# Patient Record
Sex: MALE | Race: WHITE | Employment: FULL TIME | URBAN - NONMETROPOLITAN AREA
[De-identification: names, ages, dates, MRNs, and addresses within clinical notes are randomized per-mention and may not be internally consistent; named-entity substitution may affect disease eponyms.]

---

## 2017-02-01 ENCOUNTER — OFFICE VISIT (OUTPATIENT)
Dept: FAMILY MEDICINE CLINIC | Age: 57
End: 2017-02-01

## 2017-02-01 VITALS
HEART RATE: 85 BPM | WEIGHT: 161.6 LBS | HEIGHT: 69 IN | BODY MASS INDEX: 23.93 KG/M2 | SYSTOLIC BLOOD PRESSURE: 157 MMHG | DIASTOLIC BLOOD PRESSURE: 79 MMHG | RESPIRATION RATE: 15 BRPM

## 2017-02-01 DIAGNOSIS — Z72.0 TOBACCO ABUSE: ICD-10-CM

## 2017-02-01 DIAGNOSIS — Z87.898 HISTORY OF PREDIABETES: ICD-10-CM

## 2017-02-01 DIAGNOSIS — Z11.59 NEED FOR HEPATITIS C SCREENING TEST: ICD-10-CM

## 2017-02-01 DIAGNOSIS — I10 ESSENTIAL HYPERTENSION: Primary | ICD-10-CM

## 2017-02-01 DIAGNOSIS — Z12.12 ENCOUNTER FOR COLORECTAL CANCER SCREENING: ICD-10-CM

## 2017-02-01 DIAGNOSIS — Z12.11 ENCOUNTER FOR COLORECTAL CANCER SCREENING: ICD-10-CM

## 2017-02-01 PROCEDURE — 99999 POCT URINALYSIS DIPSTICK W/O MICROSCOPE (AUTO): CPT | Performed by: NURSE PRACTITIONER

## 2017-02-01 PROCEDURE — 99204 OFFICE O/P NEW MOD 45 MIN: CPT | Performed by: NURSE PRACTITIONER

## 2017-02-01 PROCEDURE — 93000 ELECTROCARDIOGRAM COMPLETE: CPT | Performed by: NURSE PRACTITIONER

## 2017-02-01 RX ORDER — LISINOPRIL 10 MG/1
10 TABLET ORAL DAILY
Qty: 30 TABLET | Refills: 3 | Status: SHIPPED | OUTPATIENT
Start: 2017-02-01 | End: 2020-04-08

## 2017-02-01 RX ORDER — ACETAMINOPHEN 500 MG
500 TABLET ORAL EVERY 6 HOURS PRN
COMMUNITY
End: 2017-05-03

## 2017-02-01 ASSESSMENT — ENCOUNTER SYMPTOMS
VOMITING: 0
CONSTIPATION: 0
RHINORRHEA: 0
WHEEZING: 0
DIARRHEA: 0
COUGH: 0
EYE DISCHARGE: 0
CHEST TIGHTNESS: 0
ABDOMINAL PAIN: 0
SHORTNESS OF BREATH: 0

## 2017-02-01 ASSESSMENT — PATIENT HEALTH QUESTIONNAIRE - PHQ9
SUM OF ALL RESPONSES TO PHQ9 QUESTIONS 1 & 2: 0
2. FEELING DOWN, DEPRESSED OR HOPELESS: 0
SUM OF ALL RESPONSES TO PHQ QUESTIONS 1-9: 0
1. LITTLE INTEREST OR PLEASURE IN DOING THINGS: 0

## 2017-02-28 ENCOUNTER — NURSE ONLY (OUTPATIENT)
Dept: FAMILY MEDICINE CLINIC | Age: 57
End: 2017-02-28

## 2017-02-28 DIAGNOSIS — I10 ESSENTIAL HYPERTENSION: ICD-10-CM

## 2017-02-28 DIAGNOSIS — Z87.898 HISTORY OF PREDIABETES: ICD-10-CM

## 2017-02-28 DIAGNOSIS — Z11.59 NEED FOR HEPATITIS C SCREENING TEST: ICD-10-CM

## 2017-02-28 LAB
ALBUMIN SERPL-MCNC: 4.1 GM/DL (ref 3.5–5.1)
ALP BLD-CCNC: 53 U/L (ref 38–126)
ALT SERPL-CCNC: 13 U/L (ref 11–66)
ANION GAP SERPL CALCULATED.3IONS-SCNC: 13 MEQ/L (ref 8–16)
AST SERPL-CCNC: 12 U/L (ref 5–40)
AVERAGE GLUCOSE: 255 MG/DL (ref 70–126)
BASOPHILS # BLD: 0.6 %
BASOPHILS ABSOLUTE: 0 THOU/MM3 (ref 0–0.1)
BILIRUB SERPL-MCNC: 1 MG/DL (ref 0.3–1.2)
BUN BLDV-MCNC: 13 MG/DL (ref 7–22)
CALCIUM SERPL-MCNC: 9.2 MG/DL (ref 8.5–10.5)
CHLORIDE BLD-SCNC: 98 MEQ/L (ref 98–111)
CHOLESTEROL, TOTAL: 188 MG/DL (ref 100–199)
CO2: 27 MEQ/L (ref 23–33)
CREAT SERPL-MCNC: 0.6 MG/DL (ref 0.4–1.2)
EOSINOPHIL # BLD: 2.6 %
EOSINOPHILS ABSOLUTE: 0.2 THOU/MM3 (ref 0–0.4)
GFR SERPL CREATININE-BSD FRML MDRD: > 90 ML/MIN/1.73M2
GLUCOSE BLD-MCNC: 199 MG/DL (ref 70–108)
HBA1C MFR BLD: 10.5 % (ref 4.4–6.4)
HCT VFR BLD CALC: 43.1 % (ref 42–52)
HDLC SERPL-MCNC: 61 MG/DL
HEMOGLOBIN: 14.3 GM/DL (ref 14–18)
HEPATITIS C ANTIBODY: NEGATIVE
LDL CHOLESTEROL CALCULATED: 113 MG/DL
LYMPHOCYTES # BLD: 32.2 %
LYMPHOCYTES ABSOLUTE: 2.4 THOU/MM3 (ref 1–4.8)
MCH RBC QN AUTO: 28.4 PG (ref 27–31)
MCHC RBC AUTO-ENTMCNC: 33.1 GM/DL (ref 33–37)
MCV RBC AUTO: 85.7 FL (ref 80–94)
MONOCYTES # BLD: 8.9 %
MONOCYTES ABSOLUTE: 0.7 THOU/MM3 (ref 0.4–1.3)
NUCLEATED RED BLOOD CELLS: 0 /100 WBC
PDW BLD-RTO: 13.9 % (ref 11.5–14.5)
PLATELET # BLD: 258 THOU/MM3 (ref 130–400)
PMV BLD AUTO: 8.6 MCM (ref 7.4–10.4)
POTASSIUM SERPL-SCNC: 4.1 MEQ/L (ref 3.5–5.2)
RBC # BLD: 5.03 MILL/MM3 (ref 4.7–6.1)
RBC # BLD: NORMAL 10*6/UL
SEG NEUTROPHILS: 55.7 %
SEGMENTED NEUTROPHILS ABSOLUTE COUNT: 4.1 THOU/MM3 (ref 1.8–7.7)
SODIUM BLD-SCNC: 138 MEQ/L (ref 135–145)
TOTAL PROTEIN: 6.7 GM/DL (ref 6.1–8)
TRIGL SERPL-MCNC: 69 MG/DL (ref 0–199)
TSH SERPL DL<=0.05 MIU/L-ACNC: 1.08 MCIU/ML (ref 0.4–4.2)
WBC # BLD: 7.4 THOU/MM3 (ref 4.8–10.8)

## 2017-03-02 ENCOUNTER — OFFICE VISIT (OUTPATIENT)
Dept: FAMILY MEDICINE CLINIC | Age: 57
End: 2017-03-02

## 2017-03-02 ENCOUNTER — TELEPHONE (OUTPATIENT)
Dept: FAMILY MEDICINE CLINIC | Age: 57
End: 2017-03-02

## 2017-03-02 VITALS
RESPIRATION RATE: 16 BRPM | HEIGHT: 69 IN | DIASTOLIC BLOOD PRESSURE: 82 MMHG | HEART RATE: 80 BPM | WEIGHT: 161 LBS | SYSTOLIC BLOOD PRESSURE: 144 MMHG | BODY MASS INDEX: 23.85 KG/M2

## 2017-03-02 DIAGNOSIS — E11.9 TYPE 2 DIABETES MELLITUS WITHOUT COMPLICATION, WITHOUT LONG-TERM CURRENT USE OF INSULIN (HCC): Primary | ICD-10-CM

## 2017-03-02 LAB
CONTROL: NORMAL
HEMOCCULT STL QL: NORMAL

## 2017-03-02 PROCEDURE — 99214 OFFICE O/P EST MOD 30 MIN: CPT | Performed by: NURSE PRACTITIONER

## 2017-03-02 PROCEDURE — 82274 ASSAY TEST FOR BLOOD FECAL: CPT | Performed by: NURSE PRACTITIONER

## 2017-03-02 RX ORDER — INSULIN GLARGINE 100 [IU]/ML
10 INJECTION, SOLUTION SUBCUTANEOUS EVERY MORNING
Qty: 1 VIAL | Refills: 3 | Status: SHIPPED | OUTPATIENT
Start: 2017-03-02 | End: 2017-07-12 | Stop reason: SDUPTHER

## 2017-03-02 RX ORDER — ATORVASTATIN CALCIUM 40 MG/1
40 TABLET, FILM COATED ORAL DAILY
Qty: 90 TABLET | Refills: 0 | Status: SHIPPED
Start: 2017-03-02 | End: 2020-04-08

## 2017-03-02 ASSESSMENT — ENCOUNTER SYMPTOMS
EYE DISCHARGE: 0
BLURRED VISION: 0
DIARRHEA: 0
VISUAL CHANGE: 0
VOMITING: 0
COUGH: 0
ABDOMINAL PAIN: 0
SHORTNESS OF BREATH: 0
RHINORRHEA: 0
CHEST TIGHTNESS: 0
CONSTIPATION: 0

## 2017-03-10 ENCOUNTER — TELEPHONE (OUTPATIENT)
Dept: FAMILY MEDICINE CLINIC | Age: 57
End: 2017-03-10

## 2017-03-10 DIAGNOSIS — E11.9 TYPE 2 DIABETES MELLITUS WITHOUT COMPLICATION, WITH LONG-TERM CURRENT USE OF INSULIN (HCC): Primary | ICD-10-CM

## 2017-03-10 DIAGNOSIS — Z79.4 TYPE 2 DIABETES MELLITUS WITHOUT COMPLICATION, WITH LONG-TERM CURRENT USE OF INSULIN (HCC): Primary | ICD-10-CM

## 2017-04-03 ENCOUNTER — OFFICE VISIT (OUTPATIENT)
Dept: FAMILY MEDICINE CLINIC | Age: 57
End: 2017-04-03

## 2017-04-03 VITALS
SYSTOLIC BLOOD PRESSURE: 118 MMHG | HEART RATE: 81 BPM | WEIGHT: 158.6 LBS | BODY MASS INDEX: 23.49 KG/M2 | RESPIRATION RATE: 16 BRPM | HEIGHT: 69 IN | DIASTOLIC BLOOD PRESSURE: 82 MMHG

## 2017-04-03 DIAGNOSIS — Z72.0 TOBACCO ABUSE: ICD-10-CM

## 2017-04-03 DIAGNOSIS — Z79.4 TYPE 2 DIABETES MELLITUS WITHOUT COMPLICATION, WITH LONG-TERM CURRENT USE OF INSULIN (HCC): Primary | ICD-10-CM

## 2017-04-03 DIAGNOSIS — E11.9 TYPE 2 DIABETES MELLITUS WITHOUT COMPLICATION, WITH LONG-TERM CURRENT USE OF INSULIN (HCC): Primary | ICD-10-CM

## 2017-04-03 DIAGNOSIS — M77.01 GOLFER'S ELBOW, RIGHT: ICD-10-CM

## 2017-04-03 DIAGNOSIS — B35.1 FUNGAL TOENAIL INFECTION: ICD-10-CM

## 2017-04-03 PROBLEM — M77.00 GOLFER'S ELBOW: Status: ACTIVE | Noted: 2017-04-03

## 2017-04-03 LAB
AVERAGE GLUCOSE: 228 MG/DL (ref 70–126)
CREATININE URINE POCT: NORMAL
HBA1C MFR BLD: 9.6 % (ref 4.4–6.4)
MICROALBUMIN/CREAT 24H UR: 20 MG/G{CREAT}
MICROALBUMIN/CREAT UR-RTO: NORMAL

## 2017-04-03 PROCEDURE — 36415 COLL VENOUS BLD VENIPUNCTURE: CPT | Performed by: NURSE PRACTITIONER

## 2017-04-03 PROCEDURE — 82044 UR ALBUMIN SEMIQUANTITATIVE: CPT | Performed by: NURSE PRACTITIONER

## 2017-04-03 PROCEDURE — 99214 OFFICE O/P EST MOD 30 MIN: CPT | Performed by: NURSE PRACTITIONER

## 2017-04-03 RX ORDER — VARENICLINE TARTRATE 25 MG
KIT ORAL
Qty: 1 EACH | Refills: 0 | Status: SHIPPED | OUTPATIENT
Start: 2017-04-03 | End: 2017-05-03 | Stop reason: ALTCHOICE

## 2017-04-03 RX ORDER — BUPROPION HYDROCHLORIDE 150 MG/1
150 TABLET ORAL 2 TIMES DAILY
Qty: 60 TABLET | Refills: 1 | Status: SHIPPED | OUTPATIENT
Start: 2017-04-03 | End: 2017-04-12 | Stop reason: ALTCHOICE

## 2017-04-03 ASSESSMENT — ENCOUNTER SYMPTOMS
NAUSEA: 1
WHEEZING: 0
COLOR CHANGE: 0
SHORTNESS OF BREATH: 0
ABDOMINAL PAIN: 0
CONSTIPATION: 0
COUGH: 0
VOMITING: 0
BLOOD IN STOOL: 0
SINUS PRESSURE: 1
EYE DISCHARGE: 0

## 2017-04-06 DIAGNOSIS — Z72.0 TOBACCO ABUSE: ICD-10-CM

## 2017-04-06 DIAGNOSIS — E11.9 TYPE 2 DIABETES MELLITUS WITHOUT COMPLICATION, WITHOUT LONG-TERM CURRENT USE OF INSULIN (HCC): ICD-10-CM

## 2017-04-10 ENCOUNTER — TELEPHONE (OUTPATIENT)
Dept: FAMILY MEDICINE CLINIC | Age: 57
End: 2017-04-10

## 2017-04-12 RX ORDER — BUPROPION HYDROCHLORIDE 300 MG/1
300 TABLET ORAL EVERY MORNING
Qty: 30 TABLET | Refills: 3 | Status: SHIPPED | OUTPATIENT
Start: 2017-04-12 | End: 2020-04-08

## 2017-05-03 ENCOUNTER — OFFICE VISIT (OUTPATIENT)
Dept: FAMILY MEDICINE CLINIC | Age: 57
End: 2017-05-03

## 2017-05-03 VITALS
WEIGHT: 157.8 LBS | BODY MASS INDEX: 23.37 KG/M2 | DIASTOLIC BLOOD PRESSURE: 80 MMHG | OXYGEN SATURATION: 98 % | SYSTOLIC BLOOD PRESSURE: 138 MMHG | TEMPERATURE: 98 F | HEIGHT: 69 IN | HEART RATE: 82 BPM | RESPIRATION RATE: 16 BRPM

## 2017-05-03 DIAGNOSIS — Z79.4 TYPE 2 DIABETES MELLITUS WITHOUT COMPLICATION, WITH LONG-TERM CURRENT USE OF INSULIN (HCC): ICD-10-CM

## 2017-05-03 DIAGNOSIS — E11.9 TYPE 2 DIABETES MELLITUS WITHOUT COMPLICATION, WITH LONG-TERM CURRENT USE OF INSULIN (HCC): ICD-10-CM

## 2017-05-03 DIAGNOSIS — Z72.0 TOBACCO ABUSE: Primary | ICD-10-CM

## 2017-05-03 PROCEDURE — 99213 OFFICE O/P EST LOW 20 MIN: CPT | Performed by: NURSE PRACTITIONER

## 2017-05-03 RX ORDER — VARENICLINE TARTRATE 1 MG/1
1 TABLET, FILM COATED ORAL 2 TIMES DAILY
Qty: 60 TABLET | Refills: 2 | Status: SHIPPED
Start: 2017-05-03 | End: 2020-04-08

## 2017-05-03 ASSESSMENT — ENCOUNTER SYMPTOMS
VOMITING: 0
COUGH: 0
WHEEZING: 0
CONSTIPATION: 0
DIARRHEA: 0
NAUSEA: 0
ABDOMINAL DISTENTION: 0
CHOKING: 0

## 2017-07-12 ENCOUNTER — OFFICE VISIT (OUTPATIENT)
Dept: FAMILY MEDICINE CLINIC | Age: 57
End: 2017-07-12

## 2017-07-12 VITALS
DIASTOLIC BLOOD PRESSURE: 76 MMHG | BODY MASS INDEX: 22.39 KG/M2 | SYSTOLIC BLOOD PRESSURE: 120 MMHG | HEIGHT: 69 IN | HEART RATE: 71 BPM | RESPIRATION RATE: 16 BRPM | WEIGHT: 151.2 LBS

## 2017-07-12 DIAGNOSIS — Z91.199 MEDICAL NON-COMPLIANCE: ICD-10-CM

## 2017-07-12 DIAGNOSIS — E11.9 TYPE 2 DIABETES MELLITUS WITHOUT COMPLICATION, WITHOUT LONG-TERM CURRENT USE OF INSULIN (HCC): Primary | ICD-10-CM

## 2017-07-12 DIAGNOSIS — Z72.0 TOBACCO ABUSE: ICD-10-CM

## 2017-07-12 LAB — HBA1C MFR BLD: 11.6 %

## 2017-07-12 PROCEDURE — 99213 OFFICE O/P EST LOW 20 MIN: CPT | Performed by: NURSE PRACTITIONER

## 2017-07-12 PROCEDURE — 83036 HEMOGLOBIN GLYCOSYLATED A1C: CPT | Performed by: NURSE PRACTITIONER

## 2017-07-12 RX ORDER — INSULIN GLARGINE 100 [IU]/ML
16 INJECTION, SOLUTION SUBCUTANEOUS EVERY MORNING
Qty: 1 VIAL | Refills: 3 | Status: SHIPPED
Start: 2017-07-12 | End: 2020-04-08

## 2017-07-12 ASSESSMENT — ENCOUNTER SYMPTOMS
EYE DISCHARGE: 0
COUGH: 0
RHINORRHEA: 0
VOMITING: 0
CONSTIPATION: 0
SHORTNESS OF BREATH: 0
CHEST TIGHTNESS: 0
ABDOMINAL PAIN: 0
DIARRHEA: 0

## 2017-12-15 ENCOUNTER — TELEPHONE (OUTPATIENT)
Dept: OTHER | Facility: CLINIC | Age: 57
End: 2017-12-15

## 2020-04-07 ENCOUNTER — TELEPHONE (OUTPATIENT)
Dept: FAMILY MEDICINE CLINIC | Age: 60
End: 2020-04-07

## 2020-04-08 ENCOUNTER — VIRTUAL VISIT (OUTPATIENT)
Dept: FAMILY MEDICINE CLINIC | Age: 60
End: 2020-04-08
Payer: COMMERCIAL

## 2020-04-08 PROCEDURE — 99214 OFFICE O/P EST MOD 30 MIN: CPT | Performed by: NURSE PRACTITIONER

## 2020-04-08 RX ORDER — AZITHROMYCIN 250 MG/1
TABLET, FILM COATED ORAL
Qty: 6 TABLET | Refills: 0 | Status: SHIPPED | OUTPATIENT
Start: 2020-04-08 | End: 2021-07-29

## 2020-04-08 ASSESSMENT — ENCOUNTER SYMPTOMS
VOMITING: 0
RHINORRHEA: 0
SORE THROAT: 0
SINUS PAIN: 0
NAUSEA: 1
ABDOMINAL PAIN: 0
SHORTNESS OF BREATH: 0
CONSTIPATION: 0
DIARRHEA: 0
COUGH: 1
SINUS PRESSURE: 0
CHEST TIGHTNESS: 0
EYE DISCHARGE: 0

## 2020-04-08 ASSESSMENT — PATIENT HEALTH QUESTIONNAIRE - PHQ9
SUM OF ALL RESPONSES TO PHQ QUESTIONS 1-9: 0
SUM OF ALL RESPONSES TO PHQ9 QUESTIONS 1 & 2: 0
2. FEELING DOWN, DEPRESSED OR HOPELESS: 0
SUM OF ALL RESPONSES TO PHQ QUESTIONS 1-9: 0
1. LITTLE INTEREST OR PLEASURE IN DOING THINGS: 0

## 2020-04-08 NOTE — PROGRESS NOTES
2020    TELEHEALTH EVALUATION -- Audio/Visual (During QEDHV-45 public health emergency)    HPI:    Sonya Black (:  1960) has requested an audio/video evaluation for the following concern(s):    Cough with mild chest tightness  Headache  Nausea  Onset a few days  Has been resting and drinking plenty of fluids with some relief  Daughter is off of work as she was suspected to have covid  Denies sob or wheezing  Current tobacco user     DM. 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 Onset years ago. No interest in quitting. Has tried before and not been successful. Review of Systems   Constitutional: Positive for activity change and appetite change. Negative for chills, diaphoresis, fatigue and fever. HENT: Negative for congestion, ear pain, rhinorrhea, sinus pressure, sinus pain, sneezing and sore throat. Eyes: Negative for discharge and visual disturbance. Respiratory: Positive for cough. Negative for chest tightness and shortness of breath. Cardiovascular: Negative for chest pain and palpitations. Gastrointestinal: Positive for nausea. Negative for abdominal pain, constipation, diarrhea and vomiting. Genitourinary: Negative for difficulty urinating and hematuria. Musculoskeletal: Negative for arthralgias and myalgias. Skin: Negative for rash. Neurological: Positive for headaches. Negative for dizziness, weakness and numbness. Psychiatric/Behavioral: The patient is not nervous/anxious. Prior to Visit Medications    Medication Sig Taking? Authorizing Provider   azithromycin (ZITHROMAX Z-ALEJO) 250 MG tablet 2 tablets day 1 then1 tablet days 2 - 5.  Yes Kevin Veliz

## 2021-01-21 ENCOUNTER — VIRTUAL VISIT (OUTPATIENT)
Dept: FAMILY MEDICINE CLINIC | Age: 61
End: 2021-01-21
Payer: COMMERCIAL

## 2021-01-21 DIAGNOSIS — J06.9 VIRAL URI: ICD-10-CM

## 2021-01-21 DIAGNOSIS — Z20.822 EXPOSURE TO COVID-19 VIRUS: Primary | ICD-10-CM

## 2021-01-21 PROCEDURE — 99213 OFFICE O/P EST LOW 20 MIN: CPT | Performed by: NURSE PRACTITIONER

## 2021-01-21 SDOH — ECONOMIC STABILITY: TRANSPORTATION INSECURITY
IN THE PAST 12 MONTHS, HAS LACK OF TRANSPORTATION KEPT YOU FROM MEETINGS, WORK, OR FROM GETTING THINGS NEEDED FOR DAILY LIVING?: NO

## 2021-01-21 SDOH — ECONOMIC STABILITY: TRANSPORTATION INSECURITY
IN THE PAST 12 MONTHS, HAS THE LACK OF TRANSPORTATION KEPT YOU FROM MEDICAL APPOINTMENTS OR FROM GETTING MEDICATIONS?: NO

## 2021-01-21 SDOH — ECONOMIC STABILITY: FOOD INSECURITY: WITHIN THE PAST 12 MONTHS, THE FOOD YOU BOUGHT JUST DIDN'T LAST AND YOU DIDN'T HAVE MONEY TO GET MORE.: NEVER TRUE

## 2021-01-21 ASSESSMENT — PATIENT HEALTH QUESTIONNAIRE - PHQ9
SUM OF ALL RESPONSES TO PHQ QUESTIONS 1-9: 0
1. LITTLE INTEREST OR PLEASURE IN DOING THINGS: 0

## 2021-01-21 ASSESSMENT — ENCOUNTER SYMPTOMS
SINUS PAIN: 1
EYE DISCHARGE: 0
SHORTNESS OF BREATH: 0
DIARRHEA: 0
ABDOMINAL PAIN: 0
RHINORRHEA: 1
SINUS PRESSURE: 1
SORE THROAT: 1
VOMITING: 0
CHEST TIGHTNESS: 1
COUGH: 1
CONSTIPATION: 0

## 2021-01-21 NOTE — PROGRESS NOTES
[address] [facility],[address] [facility], [facility]. [place] [address] [facility]  Dept: [phone]  Dept Fax: : [phone]  [address] [place] [address] Fax: [phone]     Visit type: Established patient    Reason for Visit: Concern For COVID-19      Assessment and Plan       1. Exposure to COVID-19 virus  -     COVID-19 Ambulatory; Future  2. Viral URI  -     COVID-19 Ambulatory; Future    Rest, increase fluids jaime water, quarantine 14 days from exposure, if positive covid test 10 days from onset of syptoms  Vit c, vit d, vit b12, zinc and melatonin   Return in about 3 months (around 4/21/2021) for Anual exam.    Subjective       URI symptoms  Onset x3 day   2 dozen people out at work with covid   Headache, nasal congestion, chest congestion, cough, body aches,   Has tried tylenol        Review of Systems   Constitutional: Positive for activity change, appetite change, chills, fatigue and fever. HENT: Positive for congestion, rhinorrhea, sinus pressure, sinus pain, sneezing and sore throat. Negative for ear pain. Eyes: Negative for discharge and visual disturbance. Respiratory: Positive for cough and chest tightness. Negative for shortness of breath. Cardiovascular: Negative for chest pain and palpitations. Gastrointestinal: Negative for abdominal pain, constipation, diarrhea and vomiting. Genitourinary: Negative for difficulty urinating and hematuria. Musculoskeletal: Negative for arthralgias and myalgias. Skin: Negative for rash. Neurological: Positive for dizziness and headaches. Negative for weakness and numbness. Psychiatric/Behavioral: The patient is not nervous/anxious.          No Known Allergies    Outpatient Medications Prior to Visit   Medication Sig Dispense Refill    Aspirin-Acetaminophen-Caffeine (EXCEDRIN EXTRA STRENGTH PO) Take by mouth as needed  azithromycin (ZITHROMAX Z-ALEJO) 250 MG tablet 2 tablets day 1 then1 tablet days 2 - 5. (Patient not taking: Reported on 1/21/2021) 6 tablet 0     No facility-administered medications prior to visit. Past Medical History:   Diagnosis Date    Prediabetes         Social History     Tobacco Use    Smoking status: Current Every Day Smoker     Packs/day: 0.50     Years: 33.00     Pack years: 16.50    Smokeless tobacco: Never Used   Substance Use Topics    Alcohol use: Yes     Comment: rarely        Past Surgical History:   Procedure Laterality Date    TONSILLECTOMY         Family History   Problem Relation Age of Onset    Diabetes Father     Stroke Father     Diabetes Brother     Diabetes Brother        Objective       There were no vitals taken for this visit. Physical Exam  Constitutional:       General: He is not in acute distress. Appearance: He is well-developed. Interventions: He is not intubated. HENT:      Head: Normocephalic and atraumatic. Right Ear: External ear normal.      Left Ear: External ear normal.   Eyes:      General: Lids are normal.      Conjunctiva/sclera: Conjunctivae normal.   Neck:      Musculoskeletal: Normal range of motion. Pulmonary:      Effort: No tachypnea, bradypnea, prolonged expiration, respiratory distress or retractions. He is not intubated. Skin:     Coloration: Skin is not pale. Findings: No erythema or rash. Neurological:      Mental Status: He is alert and oriented to person, place, and time. Psychiatric:         Attention and Perception: Attention and perception normal.         Mood and Affect: Mood and affect normal.         Speech: Speech normal.         Behavior: Behavior normal. Behavior is cooperative. Thought Content:  Thought content normal.         Cognition and Memory: Cognition and memory normal.         Judgment: Judgment normal.           Data Reviewed and Summarized       Labs:     Imaging/Testing:

## 2021-01-22 LAB — SARS-COV-2: NOT DETECTED

## 2021-02-01 ENCOUNTER — TELEPHONE (OUTPATIENT)
Dept: FAMILY MEDICINE CLINIC | Age: 61
End: 2021-02-01

## 2021-02-01 ENCOUNTER — HOSPITAL ENCOUNTER (OUTPATIENT)
Age: 61
Discharge: HOME | End: 2021-02-01
Payer: COMMERCIAL

## 2021-02-01 DIAGNOSIS — Z20.822 EXPOSURE TO COVID-19 VIRUS: Primary | ICD-10-CM

## 2021-02-01 DIAGNOSIS — Z20.822 EXPOSURE TO COVID-19 VIRUS: ICD-10-CM

## 2021-02-01 PROCEDURE — U0003 INFECTIOUS AGENT DETECTION BY NUCLEIC ACID (DNA OR RNA); SEVERE ACUTE RESPIRATORY SYNDROME CORONAVIRUS 2 (SARS-COV-2) (CORONAVIRUS DISEASE [COVID-19]), AMPLIFIED PROBE TECHNIQUE, MAKING USE OF HIGH THROUGHPUT TECHNOLOGIES AS DESCRIBED BY CMS-2020-01-R: HCPCS

## 2021-02-01 NOTE — TELEPHONE ENCOUNTER
Patient called office requesting another covid test to be ordered as his daughter tested positive recently. Patient last tested for covid on 01/22/21.

## 2021-02-01 NOTE — TELEPHONE ENCOUNTER
Patient informed and requested order to be sent to [facility]. I informed him I will fax over the covid test order for him today

## 2021-02-03 LAB — SARS-COV-2: NOT DETECTED

## 2021-04-12 ENCOUNTER — TELEPHONE (OUTPATIENT)
Dept: FAMILY MEDICINE CLINIC | Age: 61
End: 2021-04-12

## 2021-07-07 ENCOUNTER — TELEPHONE (OUTPATIENT)
Dept: FAMILY MEDICINE CLINIC | Age: 61
End: 2021-07-07

## 2021-07-07 NOTE — TELEPHONE ENCOUNTER
I left a message for the patient to call the office back in regards to scheduling a yearly appointment. Notified of phone hours. Patient is due for colonoscopy, POCT HgbA1C, POCT Microalbumin and other health maintenance orders.

## 2021-07-29 ENCOUNTER — VIRTUAL VISIT (OUTPATIENT)
Dept: FAMILY MEDICINE CLINIC | Age: 61
End: 2021-07-29
Payer: COMMERCIAL

## 2021-07-29 ENCOUNTER — HOSPITAL ENCOUNTER (EMERGENCY)
Age: 61
Discharge: HOME | End: 2021-07-29
Payer: COMMERCIAL

## 2021-07-29 VITALS
TEMPERATURE: 97.9 F | SYSTOLIC BLOOD PRESSURE: 153 MMHG | WEIGHT: 153 LBS | HEART RATE: 80 BPM | HEIGHT: 70 IN | RESPIRATION RATE: 18 BRPM | DIASTOLIC BLOOD PRESSURE: 66 MMHG | OXYGEN SATURATION: 97 % | BODY MASS INDEX: 21.9 KG/M2

## 2021-07-29 DIAGNOSIS — Z20.822 EXPOSURE TO COVID-19 VIRUS: Primary | ICD-10-CM

## 2021-07-29 DIAGNOSIS — J00 ACUTE NASOPHARYNGITIS: Primary | ICD-10-CM

## 2021-07-29 DIAGNOSIS — Z91.199 MEDICAL NON-COMPLIANCE: ICD-10-CM

## 2021-07-29 LAB — SARS-COV-2, NAA: NOT  DETECTED

## 2021-07-29 PROCEDURE — 99202 OFFICE O/P NEW SF 15 MIN: CPT | Performed by: NURSE PRACTITIONER

## 2021-07-29 PROCEDURE — 99213 OFFICE O/P EST LOW 20 MIN: CPT | Performed by: NURSE PRACTITIONER

## 2021-07-29 PROCEDURE — 87635 SARS-COV-2 COVID-19 AMP PRB: CPT

## 2021-07-29 PROCEDURE — 99213 OFFICE O/P EST LOW 20 MIN: CPT

## 2021-07-29 RX ORDER — AZELASTINE 1 MG/ML
1 SPRAY, METERED NASAL 2 TIMES DAILY
Qty: 1 BOTTLE | Refills: 0 | Status: SHIPPED | OUTPATIENT
Start: 2021-07-29 | End: 2021-08-10 | Stop reason: ALTCHOICE

## 2021-07-29 RX ORDER — PSEUDOEPHEDRINE HYDROCHLORIDE 30 MG/1
30 TABLET ORAL EVERY 6 HOURS PRN
Qty: 20 TABLET | Refills: 0 | COMMUNITY
Start: 2021-07-29 | End: 2021-08-03

## 2021-07-29 ASSESSMENT — ENCOUNTER SYMPTOMS
SNORING: 0
SHORTNESS OF BREATH: 0
SORE THROAT: 0
DIARRHEA: 0
VOMITING: 0
CONSTIPATION: 0
COUGH: 1
WHEEZING: 0
EYE DISCHARGE: 0
COUGH: 0
RHINORRHEA: 0
RHINORRHEA: 0
APNEA: 0
CHOKING: 0
STRIDOR: 0
SHORTNESS OF BREATH: 0
VOMITING: 0
NAUSEA: 0
ABDOMINAL PAIN: 0
HOARSE VOICE: 0
CHEST TIGHTNESS: 0
CHEST TIGHTNESS: 0
SWOLLEN GLANDS: 0

## 2021-07-29 ASSESSMENT — PAIN DESCRIPTION - LOCATION: LOCATION: HEAD

## 2021-07-29 ASSESSMENT — PAIN SCALES - GENERAL: PAINLEVEL_OUTOF10: 4

## 2021-07-29 NOTE — PROGRESS NOTES
Adriana Hassan (:  1960) is a 63 y.o. male,Established patient, here for evaluation of the following chief complaint(s): Headache (x yesterday ) and Congestion (x yesterday - pt states his wife was ordered to get a covid test so he would like one too )         ASSESSMENT/PLAN:  1. Exposure to COVID-19 virus  -     COVID-19; Future  2. Medical non-compliance    Ordered covid testing  Advised importance of wellness visit, and getting labs- patient states he will call back as he has just gotten a new job   Return if symptoms worsen or fail to improve, for pt to call back and schedule for wellness . SUBJECTIVE/OBJECTIVE:  Exposure to covid at work   Headache, congestion  As tried nothing for this at home    Started a new job and does not want to take time off to address his chronic health care needs      Review of Systems   Constitutional: Negative for activity change, appetite change and fever. HENT: Positive for congestion. Negative for ear pain and rhinorrhea. Eyes: Negative for discharge and visual disturbance. Respiratory: Positive for cough. Negative for chest tightness and shortness of breath. Cardiovascular: Negative for chest pain and palpitations. Gastrointestinal: Negative for abdominal pain, constipation, diarrhea and vomiting. Genitourinary: Negative for difficulty urinating and hematuria. Musculoskeletal: Negative for arthralgias and myalgias. Skin: Negative for rash. Neurological: Positive for headaches. Negative for dizziness, weakness and numbness. Psychiatric/Behavioral: The patient is not nervous/anxious. No flowsheet data found.      Physical Exam    [INSTRUCTIONS:  \"[x]\" Indicates a positive item  \"[]\" Indicates a negative item  -- DELETE ALL ITEMS NOT EXAMINED]    Constitutional: [x] Appears well-developed and well-nourished [x] No apparent distress      [] Abnormal -     Mental status: [x] Alert and awake  [x] Oriented to person/place/time [x] Able to follow commands    [] Abnormal -     Eyes:   EOM    [x]  Normal    [] Abnormal -   Sclera  [x]  Normal    [] Abnormal -          Discharge [x]  None visible   [] Abnormal -     HENT: [x] Normocephalic, atraumatic  [] Abnormal -   [x] Mouth/Throat: Mucous membranes are moist    External Ears [x] Normal  [] Abnormal -    Neck: [x] No visualized mass [] Abnormal -     Pulmonary/Chest: [x] Respiratory effort normal   [x] No visualized signs of difficulty breathing or respiratory distress        [] Abnormal -      Musculoskeletal:   [x] Normal gait with no signs of ataxia         [x] Normal range of motion of neck        [] Abnormal -     Neurological:        [x] No Facial Asymmetry (Cranial nerve 7 motor function) (limited exam due to video visit)          [x] No gaze palsy        [] Abnormal -          Skin:        [x] No significant exanthematous lesions or discoloration noted on facial skin         [] Abnormal -            Psychiatric:       [x] Normal Affect [] Abnormal -        [x] No Hallucinations    Other pertinent observable physical exam findings:-                Ramsey Coelho, was evaluated through a synchronous (real-time) audio-video encounter. The patient (or guardian if applicable) is aware that this is a billable service. Verbal consent to proceed has been obtained within the past 12 months. The visit was conducted pursuant to the emergency declaration under the [address] [place] [address] authority and the [email or web address] General Act. Patient identification was verified, and a caregiver was present when appropriate. The patient was located in a state where the provider was credentialed to provide care.       An electronic signature was used to authenticate this note.    --Pawan Lopez, APRN - CNP

## 2021-07-29 NOTE — ED NOTES
Pt verbalized discharge instructions. Pt informed to go to ER if develop chest pain, shortness of breath or abdominal pain. Pt ambulatory out in stable condition. Assessment unchanged.        Lupillo Godoy RN  07/29/21 3668

## 2021-07-29 NOTE — ED PROVIDER NOTES
Date    Hypertension     Prediabetes        SURGICAL HISTORY     Patient  has a past surgical history that includes Tonsillectomy. CURRENT MEDICATIONS       Discharge Medication List as of 7/29/2021  7:14 PM          ALLERGIES     Patient is has No Known Allergies. FAMILY HISTORY     Patient's family history includes Diabetes in his brother, brother, and father; Stroke in his father. SOCIAL HISTORY     Patient  reports that he has been smoking. He has a 16.50 pack-year smoking history. He has never used smokeless tobacco. He reports current alcohol use. He reports that he does not use drugs. PHYSICAL EXAM     ED TRIAGE VITALS  BP: (!) 153/66, Temp: 97.9 °F (36.6 °C), Pulse: 80, Resp: 18, SpO2: 97 %  Physical Exam  Vitals and nursing note reviewed. Constitutional:       General: He is awake. He is not in acute distress. Appearance: Normal appearance. He is normal weight. He is not ill-appearing, toxic-appearing or diaphoretic. HENT:      Head: Normocephalic and atraumatic. Right Ear: External ear normal. There is impacted cerumen. Left Ear: External ear normal. There is impacted cerumen. Eyes:      Extraocular Movements: Extraocular movements intact. Conjunctiva/sclera: Conjunctivae normal.   Pulmonary:      Effort: Pulmonary effort is normal.   Musculoskeletal:         General: Normal range of motion. Cervical back: Normal range of motion. Skin:     General: Skin is warm. Neurological:      General: No focal deficit present. Mental Status: He is alert and oriented to person, place, and time. Psychiatric:         Mood and Affect: Mood normal.         Behavior: Behavior normal. Behavior is cooperative. Thought Content:  Thought content normal.         Judgment: Judgment normal.         DIAGNOSTIC RESULTS   Labs:  Results for orders placed or performed during the hospital encounter of 07/29/21   COVID-19   Result Value Ref Range    SARS-CoV-2, LEVI NOT DETECTED NOT DETECTED       IMAGING:  No orders to display     URGENT CARE COURSE:     Vitals:    07/29/21 1836   BP: (!) 153/66   Pulse: 80   Resp: 18   Temp: 97.9 °F (36.6 °C)   SpO2: 97%   Weight: 153 lb (69.4 kg)   Height: 5' 10\" (1.778 m)       Medications - No data to display  PROCEDURES:  None  FINAL IMPRESSION      1. Acute nasopharyngitis        DISPOSITION/PLAN   Decision To Discharge     I did discuss clinical findings with the patient as well as vital signs in assessment findings. He was advised that the Patient has signs and symptoms of upper respiratory infection. Patient is afebrile and stable. Patient can use Tylenol and/or OTC cough syrup. Avoid tobacco use/exposure,Take medication as directed,Drink Lots of fluids and Use Inhalers as directed if prescribed. Advised to follow up with family doctor in the next 2-3 days for reevaluation. The patient may return to urgent care if does not get better or symptoms worsen. However the patient is advised to go to ER immediately if present symptoms worsen, high fever >102 , vomiting, breathing difficulty, chest pain, lethargy or new symptoms develop. Patient/ parents understands this approach of home management and agrees to the treatment plan. PATIENT REFERRED TO:  BLACK Lam CNP  [address] [place] [address]  [phone]    Call   As needed    DISCHARGE MEDICATIONS:  Discharge Medication List as of 7/29/2021  7:14 PM      START taking these medications    Details   pseudoephedrine (SUDAFED) 30 MG tablet Take 1 tablet by mouth every 6 hours as needed for Congestion, Disp-20 tablet, R-0OTC      azelastine (ASTELIN) 0.1 % nasal spray 1 spray by Nasal route 2 times daily Use in each nostril as directed, Disp-1 Bottle, R-0Normal           Discharge Medication List as of 7/29/2021  7:14 PM          BLACK Loera CNP, APRN - CNP  07/29/21 9848

## 2021-07-31 NOTE — ED NOTES
Pt  Wife calls yesterday 7/30/21) and today (7/31/21)  states \" Me and my  need PCR covid testing our employer is requesting it. Can we just bring in our arm bands from our visit the other day and have the PCR testing done. And not have to go through that whole process again ? \"  Explained to pt they can clock in for urgent care or contact PCP for OP covid tesitng order.  Saturday sheP reports \" not able to get ahold of PCP\"  Instruct to clock into urgent care then if needed\"       Faraz Lemon, ABIGAIL  07/31/21 6412

## 2021-08-10 ENCOUNTER — OFFICE VISIT (OUTPATIENT)
Dept: FAMILY MEDICINE CLINIC | Age: 61
End: 2021-08-10
Payer: COMMERCIAL

## 2021-08-10 ENCOUNTER — HOSPITAL ENCOUNTER (OUTPATIENT)
Age: 61
Setting detail: SPECIMEN
Discharge: HOME | End: 2021-08-10
Payer: COMMERCIAL

## 2021-08-10 VITALS
WEIGHT: 150.2 LBS | DIASTOLIC BLOOD PRESSURE: 82 MMHG | TEMPERATURE: 97 F | OXYGEN SATURATION: 96 % | HEART RATE: 129 BPM | RESPIRATION RATE: 16 BRPM | SYSTOLIC BLOOD PRESSURE: 152 MMHG | BODY MASS INDEX: 21.55 KG/M2

## 2021-08-10 DIAGNOSIS — Z12.11 SCREENING FOR COLON CANCER: ICD-10-CM

## 2021-08-10 DIAGNOSIS — E11.65 UNCONTROLLED TYPE 2 DIABETES MELLITUS WITH HYPERGLYCEMIA (HCC): ICD-10-CM

## 2021-08-10 DIAGNOSIS — H61.21 IMPACTED CERUMEN OF RIGHT EAR: ICD-10-CM

## 2021-08-10 DIAGNOSIS — N52.9 ERECTILE DYSFUNCTION, UNSPECIFIED ERECTILE DYSFUNCTION TYPE: ICD-10-CM

## 2021-08-10 DIAGNOSIS — F41.1 GAD (GENERALIZED ANXIETY DISORDER): ICD-10-CM

## 2021-08-10 DIAGNOSIS — Z00.00 ANNUAL PHYSICAL EXAM: Primary | ICD-10-CM

## 2021-08-10 DIAGNOSIS — I10 ESSENTIAL (PRIMARY) HYPERTENSION: ICD-10-CM

## 2021-08-10 PROBLEM — E11.9 TYPE 2 DIABETES MELLITUS WITHOUT COMPLICATION, WITHOUT LONG-TERM CURRENT USE OF INSULIN (HCC): Status: RESOLVED | Noted: 2017-04-03 | Resolved: 2021-08-10

## 2021-08-10 LAB
ABSOLUTE EOS #: 0.27 K/UL (ref 0–0.44)
ABSOLUTE IMMATURE GRANULOCYTE: 0.03 K/UL (ref 0–0.3)
ABSOLUTE LYMPH #: 1.97 K/UL (ref 1.1–3.7)
ABSOLUTE MONO #: 0.76 K/UL (ref 0.1–1.2)
BASOPHILS # BLD: 1 % (ref 0–2)
BASOPHILS ABSOLUTE: 0.06 K/UL (ref 0–0.2)
CREATININE URINE POCT: ABNORMAL
DIFFERENTIAL TYPE: NORMAL
EOSINOPHILS RELATIVE PERCENT: 3 % (ref 1–4)
HBA1C MFR BLD: 14 %
HCT VFR BLD CALC: 45.2 % (ref 40.7–50.3)
HEMOGLOBIN: 14.1 G/DL (ref 13–17)
IMMATURE GRANULOCYTES: 0 %
LYMPHOCYTES # BLD: 24 % (ref 24–43)
MCH RBC QN AUTO: 27.7 PG (ref 25.2–33.5)
MCHC RBC AUTO-ENTMCNC: 31.2 G/DL (ref 28.4–34.8)
MCV RBC AUTO: 88.8 FL (ref 82.6–102.9)
MICROALBUMIN/CREAT 24H UR: ABNORMAL MG/G{CREAT}
MICROALBUMIN/CREAT UR-RTO: ABNORMAL
MONOCYTES # BLD: 9 % (ref 3–12)
NRBC AUTOMATED: 0 PER 100 WBC
PDW BLD-RTO: 12.3 % (ref 11.8–14.4)
PLATELET # BLD: 243 K/UL (ref 138–453)
PLATELET ESTIMATE: NORMAL
PMV BLD AUTO: 11.4 FL (ref 8.1–13.5)
RBC # BLD: 5.09 M/UL (ref 4.21–5.77)
RBC # BLD: NORMAL 10*6/UL
SEG NEUTROPHILS: 63 % (ref 36–65)
SEGMENTED NEUTROPHILS ABSOLUTE COUNT: 5.27 K/UL (ref 1.5–8.1)
WBC # BLD: 8.4 K/UL (ref 3.5–11.3)
WBC # BLD: NORMAL 10*3/UL

## 2021-08-10 PROCEDURE — 82044 UR ALBUMIN SEMIQUANTITATIVE: CPT | Performed by: NURSE PRACTITIONER

## 2021-08-10 PROCEDURE — 36415 COLL VENOUS BLD VENIPUNCTURE: CPT | Performed by: NURSE PRACTITIONER

## 2021-08-10 PROCEDURE — 99396 PREV VISIT EST AGE 40-64: CPT | Performed by: NURSE PRACTITIONER

## 2021-08-10 PROCEDURE — 83036 HEMOGLOBIN GLYCOSYLATED A1C: CPT | Performed by: NURSE PRACTITIONER

## 2021-08-10 RX ORDER — GLIMEPIRIDE 2 MG/1
2 TABLET ORAL
Qty: 30 TABLET | Refills: 3 | Status: SHIPPED | OUTPATIENT
Start: 2021-08-10 | End: 2022-05-24 | Stop reason: ALTCHOICE

## 2021-08-10 RX ORDER — BLOOD-GLUCOSE METER
1 KIT MISCELLANEOUS DAILY
Qty: 1 KIT | Refills: 0 | Status: SHIPPED | OUTPATIENT
Start: 2021-08-10 | End: 2022-05-24 | Stop reason: ALTCHOICE

## 2021-08-10 RX ORDER — DULAGLUTIDE 3 MG/.5ML
3 INJECTION, SOLUTION SUBCUTANEOUS WEEKLY
Qty: 4 PEN | Refills: 1 | Status: SHIPPED | OUTPATIENT
Start: 2021-10-09 | End: 2021-11-08

## 2021-08-10 RX ORDER — DULAGLUTIDE 1.5 MG/.5ML
1.5 INJECTION, SOLUTION SUBCUTANEOUS WEEKLY
Qty: 4 PEN | Refills: 0 | Status: SHIPPED | OUTPATIENT
Start: 2021-09-09 | End: 2021-10-09

## 2021-08-10 RX ORDER — EMPAGLIFLOZIN 25 MG/1
25 TABLET, FILM COATED ORAL DAILY
Qty: 30 TABLET | Refills: 3 | Status: SHIPPED | OUTPATIENT
Start: 2021-08-10 | End: 2022-05-24 | Stop reason: ALTCHOICE

## 2021-08-10 RX ORDER — LISINOPRIL 10 MG/1
10 TABLET ORAL DAILY
Qty: 90 TABLET | Refills: 1 | Status: SHIPPED | OUTPATIENT
Start: 2021-08-10 | End: 2022-05-24 | Stop reason: ALTCHOICE

## 2021-08-10 RX ORDER — ATORVASTATIN CALCIUM 40 MG/1
40 TABLET, FILM COATED ORAL DAILY
Qty: 30 TABLET | Refills: 2 | Status: SHIPPED | OUTPATIENT
Start: 2021-08-10 | End: 2022-05-24 | Stop reason: ALTCHOICE

## 2021-08-10 RX ORDER — DULAGLUTIDE 0.75 MG/.5ML
0.75 INJECTION, SOLUTION SUBCUTANEOUS WEEKLY
Qty: 4 PEN | Refills: 0 | Status: SHIPPED | OUTPATIENT
Start: 2021-08-10 | End: 2022-05-24 | Stop reason: ALTCHOICE

## 2021-08-10 RX ORDER — GLUCOSAMINE HCL/CHONDROITIN SU 500-400 MG
CAPSULE ORAL
Qty: 100 STRIP | Refills: 0 | Status: SHIPPED | OUTPATIENT
Start: 2021-08-10 | End: 2022-05-24 | Stop reason: ALTCHOICE

## 2021-08-10 RX ORDER — METFORMIN HYDROCHLORIDE 500 MG/1
500 TABLET, EXTENDED RELEASE ORAL
Qty: 90 TABLET | Refills: 1 | Status: SHIPPED | OUTPATIENT
Start: 2021-08-10 | End: 2022-05-24 | Stop reason: SINTOL

## 2021-08-10 ASSESSMENT — ENCOUNTER SYMPTOMS
CHEST TIGHTNESS: 0
DIARRHEA: 0
COUGH: 0
ABDOMINAL PAIN: 0
RHINORRHEA: 0
SHORTNESS OF BREATH: 0
EYE DISCHARGE: 0
CONSTIPATION: 0
VOMITING: 0

## 2021-08-10 NOTE — PROGRESS NOTES
Michael  [address] [facility] [place] [facility] [address]  Dept: [phone]  Dept Fax: [phone]    Visit type: Established patient    Reason for Visit: Annual Exam (yearly appointment), Health Maintenance (Colonoscopy-Fit test 2017), Anxiety (getting worse- no medications in the past), and Memory Loss (forgets conversations (per wife)- does have family history- Dad, Grandma)         Assessment and Plan       1. Annual physical exam  2. Screening for colon cancer  -     Cologuard (For External Results Only); Future  3. Uncontrolled type 2 diabetes mellitus with hyperglycemia (HCC)  -     POCT glycosylated hemoglobin (Hb A1C)  -     POCT microalbumin  -     CBC Auto Differential; Future  -     Comprehensive Metabolic Panel; Future  -     Lipid Panel; Future  -     TSH with Reflex; Future  -     Hemoglobin A1C; Future  -     metFORMIN (GLUCOPHAGE-XR) 500 MG extended release tablet; Take 1 tablet by mouth daily (with breakfast) Titrate up by 500 mg each week until taking 2000 mg daily, Disp-90 tablet, R-1Normal  -     empagliflozin (JARDIANCE) 25 MG tablet; Take 25 mg by mouth daily, Disp-30 tablet, R-3Normal  -     glimepiride (AMARYL) 2 MG tablet; Take 1 tablet by mouth every morning (before breakfast), Disp-30 tablet, R-3Normal  -     Dulaglutide (TRULICITY) 6.52 SS/6.8FB SOPN; Inject 0.75 mg into the skin once a week, Disp-4 pen, R-0Normal  -     Dulaglutide (TRULICITY) 1.5 YP/8.6FL SOPN; Inject 1.5 mg into the skin once a week, Disp-4 pen, R-0Normal  -     atorvastatin (LIPITOR) 40 MG tablet; Take 1 tablet by mouth daily, Disp-30 tablet, R-2Normal  -     lisinopril (PRINIVIL;ZESTRIL) 10 MG tablet; Take 1 tablet by mouth daily, Disp-90 tablet, R-1Normal  -     Lancets  4. Essential (primary) hypertension  -     lisinopril (PRINIVIL;ZESTRIL) 10 MG tablet; Take 1 tablet by mouth daily, Disp-90 tablet, R-1Normal  5. Impacted cerumen of right ear  6.  NAVARRO (generalized anxiety disorder)  -     sertraline (ZOLOFT) 50 MG tablet; Take 1 tablet by mouth daily, Disp-30 tablet, R-1Normal  7. Erectile dysfunction, unspecified erectile dysfunction type    New medications for uncontrolled DM, some are restarts  BP elevated today- tolerated ACE in past- will restart  Alex- new diagnosis- new start medication, can cut in half if nausea   Will do foot exam next visit  Will see if controlling BS and HTN will help his ED before adding anything on like viagra or cialis  Needs labs done today  a1c >14, goal should be 7  Declines insulin   Return in about 2 months (around 10/10/2021) for fu medication DM, foot exam .       Subjective       Annual exam    Working at [employer]    DM  Uncontrolled  Type 2  Is trying to follow with ADA diet  Exercise- has physically demanding job. Eye- is due  Foot exam- is due     htn  Onset years ago  Was on ace in the past   no cp or palpitations    Mood issue  Onset over a year ago  Getting angry quick, feeling anxious and overwhelmed  A lot of changes in his jobs, as well as worrying about his son being home a lone and his daughter coming out as non binary    Right hearing impaired  Onset a couple of months  Hx of cerumen impaction    ED  Onset over the last year      Is due for colon cancer screening        Review of Systems   Constitutional: Negative for activity change, appetite change and fever. HENT: Positive for hearing loss. Negative for congestion, ear pain and rhinorrhea. Eyes: Negative for discharge and visual disturbance. Respiratory: Negative for cough, chest tightness and shortness of breath. Cardiovascular: Negative for chest pain and palpitations. Gastrointestinal: Negative for abdominal pain, constipation, diarrhea and vomiting. Genitourinary: Negative for difficulty urinating and hematuria. ED   Musculoskeletal: Negative for arthralgias and myalgias. Skin: Negative for rash. Neurological: Positive for numbness.  Negative for dizziness, weakness and headaches. Psychiatric/Behavioral: The patient is not nervous/anxious. No Known Allergies    Outpatient Medications Prior to Visit   Medication Sig Dispense Refill    azelastine (ASTELIN) 0.1 % nasal spray 1 spray by Nasal route 2 times daily Use in each nostril as directed (Patient not taking: Reported on 8/10/2021) 1 Bottle 0     No facility-administered medications prior to visit. Past Medical History:   Diagnosis Date    Hypertension     Prediabetes         Social History     Tobacco Use    Smoking status: Current Every Day Smoker     Packs/day: 0.50     Years: 33.00     Pack years: 16.50    Smokeless tobacco: Never Used   Substance Use Topics    Alcohol use: Yes     Comment: rarely        Past Surgical History:   Procedure Laterality Date    TONSILLECTOMY         Family History   Problem Relation Age of Onset    Diabetes Father     Stroke Father     Diabetes Brother     Diabetes Brother        Objective       BP (!) 152/82 (Site: Right Upper Arm, Position: Sitting, Cuff Size: Medium Adult) Comment: manual  Pulse 129   Temp 97 °F (36.1 °C) (Temporal)   Resp 16   Wt 150 lb 3.2 oz (68.1 kg)   SpO2 96%   BMI 21.55 kg/m²   Physical Exam  Vitals reviewed. Constitutional:       Appearance: He is well-developed. HENT:      Head: Normocephalic and atraumatic. Right Ear: External ear normal. There is impacted cerumen. Left Ear: External ear normal.   Eyes:      Conjunctiva/sclera: Conjunctivae normal.   Neck:      Thyroid: No thyromegaly. Trachea: Trachea normal.   Cardiovascular:      Rate and Rhythm: Normal rate and regular rhythm. Heart sounds: Normal heart sounds. No murmur heard. No friction rub. No gallop. Pulmonary:      Effort: Pulmonary effort is normal.      Breath sounds: Normal breath sounds. Abdominal:      General: Bowel sounds are normal.      Palpations: Abdomen is soft. Tenderness:  There is no abdominal tenderness. Musculoskeletal:         General: Normal range of motion. Cervical back: Normal range of motion and neck supple. No spinous process tenderness. Skin:     General: Skin is warm and dry. Findings: No erythema or rash. Neurological:      Mental Status: He is alert and oriented to person, place, and time. Psychiatric:         Speech: Speech normal.         Behavior: Behavior normal.         Thought Content:  Thought content normal.           Data Reviewed and Summarized       Labs:   Lab Results   Component Value Date    LABA1C 14.0 (>) 08/10/2021     No results found for: EAG    Imaging/Testing:        BLACK Brantley - ARRON

## 2021-08-11 LAB
ALBUMIN SERPL-MCNC: 4.3 G/DL (ref 3.5–5.2)
ALBUMIN/GLOBULIN RATIO: 1.6 (ref 1–2.5)
ALP BLD-CCNC: 76 U/L (ref 40–129)
ALT SERPL-CCNC: 19 U/L (ref 5–41)
ANION GAP SERPL CALCULATED.3IONS-SCNC: 15 MMOL/L (ref 9–17)
AST SERPL-CCNC: 16 U/L
BILIRUB SERPL-MCNC: 0.44 MG/DL (ref 0.3–1.2)
BUN BLDV-MCNC: 22 MG/DL (ref 6–20)
BUN/CREAT BLD: ABNORMAL (ref 9–20)
CALCIUM SERPL-MCNC: 9.8 MG/DL (ref 8.6–10.4)
CHLORIDE BLD-SCNC: 95 MMOL/L (ref 98–107)
CHOLESTEROL/HDL RATIO: 2.8
CHOLESTEROL: 181 MG/DL
CO2: 24 MMOL/L (ref 20–31)
CREAT SERPL-MCNC: 1.13 MG/DL (ref 0.7–1.2)
ESTIMATED AVERAGE GLUCOSE: 378 MG/DL
GFR AFRICAN AMERICAN: >60 ML/MIN
GFR NON-AFRICAN AMERICAN: >60 ML/MIN
GFR SERPL CREATININE-BSD FRML MDRD: ABNORMAL ML/MIN/{1.73_M2}
GFR SERPL CREATININE-BSD FRML MDRD: ABNORMAL ML/MIN/{1.73_M2}
GLUCOSE BLD-MCNC: 405 MG/DL (ref 70–99)
HBA1C MFR BLD: 14.8 % (ref 4–6)
HDLC SERPL-MCNC: 65 MG/DL
LDL CHOLESTEROL: 100 MG/DL (ref 0–130)
POTASSIUM SERPL-SCNC: 4.8 MMOL/L (ref 3.7–5.3)
SODIUM BLD-SCNC: 134 MMOL/L (ref 135–144)
TOTAL PROTEIN: 7 G/DL (ref 6.4–8.3)
TRIGL SERPL-MCNC: 78 MG/DL
TSH SERPL DL<=0.05 MIU/L-ACNC: 0.78 MIU/L (ref 0.3–5)
VLDLC SERPL CALC-MCNC: NORMAL MG/DL (ref 1–30)

## 2022-05-24 ENCOUNTER — OFFICE VISIT (OUTPATIENT)
Dept: FAMILY MEDICINE CLINIC | Age: 62
End: 2022-05-24
Payer: COMMERCIAL

## 2022-05-24 ENCOUNTER — HOSPITAL ENCOUNTER (OUTPATIENT)
Age: 62
Setting detail: SPECIMEN
Discharge: HOME | End: 2022-05-24

## 2022-05-24 VITALS
SYSTOLIC BLOOD PRESSURE: 168 MMHG | TEMPERATURE: 96.4 F | DIASTOLIC BLOOD PRESSURE: 94 MMHG | WEIGHT: 144.6 LBS | RESPIRATION RATE: 16 BRPM | OXYGEN SATURATION: 97 % | HEART RATE: 106 BPM | BODY MASS INDEX: 20.75 KG/M2

## 2022-05-24 DIAGNOSIS — Z72.0 TOBACCO ABUSE: ICD-10-CM

## 2022-05-24 DIAGNOSIS — Z12.5 SCREENING FOR PROSTATE CANCER: ICD-10-CM

## 2022-05-24 DIAGNOSIS — E11.65 UNCONTROLLED TYPE 2 DIABETES MELLITUS WITH HYPERGLYCEMIA (HCC): ICD-10-CM

## 2022-05-24 DIAGNOSIS — F41.1 GAD (GENERALIZED ANXIETY DISORDER): ICD-10-CM

## 2022-05-24 DIAGNOSIS — I10 ESSENTIAL (PRIMARY) HYPERTENSION: Primary | ICD-10-CM

## 2022-05-24 LAB — HBA1C MFR BLD: 14 %

## 2022-05-24 PROCEDURE — 99214 OFFICE O/P EST MOD 30 MIN: CPT | Performed by: NURSE PRACTITIONER

## 2022-05-24 PROCEDURE — 83036 HEMOGLOBIN GLYCOSYLATED A1C: CPT | Performed by: NURSE PRACTITIONER

## 2022-05-24 PROCEDURE — 3046F HEMOGLOBIN A1C LEVEL >9.0%: CPT | Performed by: NURSE PRACTITIONER

## 2022-05-24 RX ORDER — GLIMEPIRIDE 2 MG/1
2 TABLET ORAL
Qty: 30 TABLET | Refills: 3 | Status: SHIPPED | OUTPATIENT
Start: 2022-05-24 | End: 2022-06-28 | Stop reason: SDUPTHER

## 2022-05-24 RX ORDER — DULAGLUTIDE 0.75 MG/.5ML
0.75 INJECTION, SOLUTION SUBCUTANEOUS WEEKLY
Qty: 4 PEN | Refills: 0 | Status: SHIPPED | OUTPATIENT
Start: 2022-05-24 | End: 2022-06-28 | Stop reason: ALTCHOICE

## 2022-05-24 RX ORDER — LISINOPRIL 10 MG/1
10 TABLET ORAL DAILY
Qty: 90 TABLET | Refills: 1 | Status: SHIPPED | OUTPATIENT
Start: 2022-05-24 | End: 2022-06-28 | Stop reason: SDUPTHER

## 2022-05-24 RX ORDER — ATORVASTATIN CALCIUM 40 MG/1
40 TABLET, FILM COATED ORAL DAILY
Qty: 30 TABLET | Refills: 2 | Status: ON HOLD | OUTPATIENT
Start: 2022-05-24

## 2022-05-24 SDOH — ECONOMIC STABILITY: FOOD INSECURITY: WITHIN THE PAST 12 MONTHS, YOU WORRIED THAT YOUR FOOD WOULD RUN OUT BEFORE YOU GOT MONEY TO BUY MORE.: NEVER TRUE

## 2022-05-24 SDOH — ECONOMIC STABILITY: FOOD INSECURITY: WITHIN THE PAST 12 MONTHS, THE FOOD YOU BOUGHT JUST DIDN'T LAST AND YOU DIDN'T HAVE MONEY TO GET MORE.: NEVER TRUE

## 2022-05-24 ASSESSMENT — PATIENT HEALTH QUESTIONNAIRE - PHQ9
1. LITTLE INTEREST OR PLEASURE IN DOING THINGS: 0
SUM OF ALL RESPONSES TO PHQ QUESTIONS 1-9: 0
SUM OF ALL RESPONSES TO PHQ QUESTIONS 1-9: 0
SUM OF ALL RESPONSES TO PHQ9 QUESTIONS 1 & 2: 0
SUM OF ALL RESPONSES TO PHQ QUESTIONS 1-9: 0
SUM OF ALL RESPONSES TO PHQ QUESTIONS 1-9: 0
2. FEELING DOWN, DEPRESSED OR HOPELESS: 0

## 2022-05-24 ASSESSMENT — SOCIAL DETERMINANTS OF HEALTH (SDOH): HOW HARD IS IT FOR YOU TO PAY FOR THE VERY BASICS LIKE FOOD, HOUSING, MEDICAL CARE, AND HEATING?: NOT HARD AT ALL

## 2022-05-24 NOTE — PROGRESS NOTES
Estuardo Jesus [address] [facility] Nicole. [facility] [address]  Dept: [phone]  Dept Fax: [phone]    Visit type: Established patient    Reason for Visit: Diabetes (7 month follow up), Dizziness (off balance x couple months- getting worse), Tremors (bilateral hands and bilateral legs x couple months), and Memory Loss (x couple months)         Assessment and Plan       1. Essential (primary) hypertension  -     lisinopril (PRINIVIL;ZESTRIL) 10 MG tablet; Take 1 tablet by mouth daily, Disp-90 tablet, R-1Normal  2. NAVARRO (generalized anxiety disorder)  3. Tobacco abuse  4. Uncontrolled type 2 diabetes mellitus with hyperglycemia (HCC)  -     glimepiride (AMARYL) 2 MG tablet; Take 1 tablet by mouth every morning (before breakfast), Disp-30 tablet, R-3Normal  -     Dulaglutide (TRULICITY) 2.27 VB/0.4YS SOPN; Inject 0.75 mg into the skin once a week, Disp-4 pen, R-0Normal  -     empagliflozin (JARDIANCE) 25 MG tablet; Take 1 tablet by mouth daily, Disp-30 tablet, R-3Normal  -     atorvastatin (LIPITOR) 40 MG tablet; Take 1 tablet by mouth daily, Disp-30 tablet, R-2Normal  -     lisinopril (PRINIVIL;ZESTRIL) 10 MG tablet; Take 1 tablet by mouth daily, Disp-90 tablet, R-1Normal  -     CBC with Auto Differential; Future  -     Lipid Panel; Future  -     Magnesium; Future  -     TSH with Reflex; Future  -     Comprehensive Metabolic Panel; Future  5. Screening for prostate cancer  -     PSA Screening; Future    Non complaint with medications, chronic issues are uncontrolled- will restart medications, discussed the importance of compliance   Return in about 1 month (around 6/24/2022) for restart of medications, DM HTN - labs today. Subjective       Here for follow up chronic issues. He did not tolerate the metformin well so he decided to quit all of his medications. Since then he has had feeling of being dizzy at times, tremors when he is hot and dry.  Has not been monitoring his blood sugars. Has trouble remembering things at home and at work at [employer]     DM  Uncontrolled  Type 2  Diet- not following   Exercise- has physically demanding job. Current treatment- nothing  Ace- no  Statin- no  Eye- is due  Foot exam- is due     htn  Onset years ago  Was on ace in the past   no cp or palpitations    Mood issue  Onset over a year ago  Angry,anxious and overwhelmed and brain fog   Current treatment- nothing   Triggers- family and job          Review of Systems   Constitutional: Positive for activity change, appetite change and fatigue. Negative for fever. HENT: Negative for congestion, ear pain and rhinorrhea. Eyes: Negative for discharge and visual disturbance. Respiratory: Negative for cough, chest tightness and shortness of breath. Cardiovascular: Negative for chest pain and palpitations. Gastrointestinal: Negative for abdominal pain, constipation, diarrhea and vomiting. Endocrine: Positive for cold intolerance, heat intolerance, polydipsia, polyphagia and polyuria. Genitourinary: Negative for difficulty urinating and hematuria. Musculoskeletal: Positive for arthralgias and myalgias. Skin: Negative for rash. Neurological: Positive for dizziness, tremors and headaches. Negative for weakness and numbness. Psychiatric/Behavioral: Positive for decreased concentration. The patient is not nervous/anxious.          Allergies   Allergen Reactions    Metformin And Related Nausea And Vomiting       Outpatient Medications Prior to Visit   Medication Sig Dispense Refill    metFORMIN (GLUCOPHAGE-XR) 500 MG extended release tablet Take 1 tablet by mouth daily (with breakfast) Titrate up by 500 mg each week until taking 2000 mg daily (Patient not taking: Reported on 5/24/2022) 90 tablet 1    empagliflozin (JARDIANCE) 25 MG tablet Take 25 mg by mouth daily (Patient not taking: Reported on 5/24/2022) 30 tablet 3    glimepiride (AMARYL) 2 MG tablet Take 1 tablet by mouth every morning (before breakfast) (Patient not taking: Reported on 5/24/2022) 30 tablet 3    Dulaglutide (TRULICITY) 2.69 IP/1.6XD SOPN Inject 0.75 mg into the skin once a week (Patient not taking: Reported on 5/24/2022) 4 pen 0    atorvastatin (LIPITOR) 40 MG tablet Take 1 tablet by mouth daily (Patient not taking: Reported on 5/24/2022) 30 tablet 2    lisinopril (PRINIVIL;ZESTRIL) 10 MG tablet Take 1 tablet by mouth daily (Patient not taking: Reported on 5/24/2022) 90 tablet 1    glucose monitoring (FREESTYLE FREEDOM) kit 1 kit by Does not apply route daily (Patient not taking: Reported on 5/24/2022) 1 kit 0    blood glucose monitor strips Test 1 times a day & as needed for symptoms of irregular blood glucose. Dispense sufficient amount for indicated testing frequency plus additional to accommodate PRN testing needs. (Patient not taking: Reported on 5/24/2022) 100 strip 0    sertraline (ZOLOFT) 50 MG tablet Take 1 tablet by mouth daily (Patient not taking: Reported on 5/24/2022) 30 tablet 1     No facility-administered medications prior to visit. Past Medical History:   Diagnosis Date    Hypertension     Prediabetes         Social History     Tobacco Use    Smoking status: Current Every Day Smoker     Packs/day: 0.50     Years: 33.00     Pack years: 16.50    Smokeless tobacco: Never Used   Substance Use Topics    Alcohol use: Yes     Comment: rarely        Past Surgical History:   Procedure Laterality Date    TONSILLECTOMY         Family History   Problem Relation Age of Onset    Diabetes Father     Stroke Father     Diabetes Brother     Diabetes Brother        Objective       BP (!) 168/94 (Site: Left Upper Arm, Position: Sitting, Cuff Size: Medium Adult) Comment: manual  Pulse 106   Temp 96.4 °F (35.8 °C) (Temporal)   Resp 16   Wt 144 lb 9.6 oz (65.6 kg)   SpO2 97%   BMI 20.75 kg/m²   Physical Exam  Vitals reviewed. Constitutional:       Appearance: He is well-developed.    HENT:      Head: Normocephalic and atraumatic. Right Ear: External ear normal.      Left Ear: External ear normal.   Eyes:      Conjunctiva/sclera: Conjunctivae normal.   Neck:      Thyroid: No thyromegaly. Trachea: Trachea normal.   Cardiovascular:      Rate and Rhythm: Normal rate and regular rhythm. Heart sounds: Normal heart sounds. No murmur heard. No friction rub. No gallop. Pulmonary:      Effort: Pulmonary effort is normal.      Breath sounds: Normal breath sounds. Abdominal:      General: Bowel sounds are normal.      Palpations: Abdomen is soft. Tenderness: There is no abdominal tenderness. Musculoskeletal:         General: Normal range of motion. Cervical back: Normal range of motion and neck supple. No spinous process tenderness. Skin:     General: Skin is warm and dry. Findings: No erythema or rash. Neurological:      Mental Status: He is alert and oriented to person, place, and time. Psychiatric:         Speech: Speech normal.         Behavior: Behavior normal.         Thought Content: Thought content normal.           Data Reviewed and Summarized       Labs:     Imaging/Testing:    Contains abnormal data POCT glycosylated hemoglobin (Hb A1C)  Order: [id number]   Status: Final result     Visible to patient: Yes (not seen)     0 Result Notes     1  Topic    Component Ref Range & Units 15:02   (5/24/22) 9 mo ago   (8/10/21) 9 mo ago   (8/10/21) 4 yr ago   (7/12/17) 5 yr ago   (4/3/17) 5 yr ago   (2/28/17) 8 yr ago   (9/20/13)   Hemoglobin A1C % 14. 0 High Off-Scale (>)  14.8 High  R  14. 0 High Off-Scale (>)  11.6  9.6 High  R  10.5 High  R  9.8 High  R    Resulting [facility] Lab              Specimen Collected: 05/24/22 15:02 Last Resulted: 05/24/22 15:02         Lab Flowsheet      Order Details      View Encounter      Lab and Collection Details      Routing      Result History

## 2022-05-25 DIAGNOSIS — E11.65 UNCONTROLLED TYPE 2 DIABETES MELLITUS WITH HYPERGLYCEMIA (HCC): ICD-10-CM

## 2022-05-25 DIAGNOSIS — Z12.5 SCREENING FOR PROSTATE CANCER: ICD-10-CM

## 2022-05-25 LAB
ABSOLUTE EOS #: 0.16 K/UL (ref 0–0.44)
ABSOLUTE IMMATURE GRANULOCYTE: <0.03 K/UL (ref 0–0.3)
ABSOLUTE LYMPH #: 1.56 K/UL (ref 1.1–3.7)
ABSOLUTE MONO #: 0.58 K/UL (ref 0.1–1.2)
ALBUMIN SERPL-MCNC: 4.5 G/DL (ref 3.5–5.2)
ALBUMIN/GLOBULIN RATIO: 1.8 (ref 1–2.5)
ALP BLD-CCNC: 71 U/L (ref 40–129)
ALT SERPL-CCNC: 12 U/L (ref 5–41)
ANION GAP SERPL CALCULATED.3IONS-SCNC: 13 MMOL/L (ref 9–17)
AST SERPL-CCNC: 10 U/L
BASOPHILS # BLD: 1 % (ref 0–2)
BASOPHILS ABSOLUTE: 0.06 K/UL (ref 0–0.2)
BILIRUB SERPL-MCNC: 0.37 MG/DL (ref 0.3–1.2)
BUN BLDV-MCNC: 20 MG/DL (ref 8–23)
CALCIUM SERPL-MCNC: 9.8 MG/DL (ref 8.6–10.4)
CHLORIDE BLD-SCNC: 93 MMOL/L (ref 98–107)
CHOLESTEROL/HDL RATIO: 3.1
CHOLESTEROL: 197 MG/DL
CO2: 27 MMOL/L (ref 20–31)
CREAT SERPL-MCNC: 1.5 MG/DL (ref 0.7–1.2)
EOSINOPHILS RELATIVE PERCENT: 2 % (ref 1–4)
GFR AFRICAN AMERICAN: 58 ML/MIN
GFR NON-AFRICAN AMERICAN: 48 ML/MIN
GFR SERPL CREATININE-BSD FRML MDRD: ABNORMAL ML/MIN/{1.73_M2}
GLUCOSE BLD-MCNC: 543 MG/DL (ref 70–99)
HCT VFR BLD CALC: 43 % (ref 40.7–50.3)
HDLC SERPL-MCNC: 64 MG/DL
HEMOGLOBIN: 13.6 G/DL (ref 13–17)
IMMATURE GRANULOCYTES: 0 %
LDL CHOLESTEROL: 108 MG/DL (ref 0–130)
LYMPHOCYTES # BLD: 22 % (ref 24–43)
MAGNESIUM: 1.9 MG/DL (ref 1.6–2.6)
MCH RBC QN AUTO: 27.6 PG (ref 25.2–33.5)
MCHC RBC AUTO-ENTMCNC: 31.6 G/DL (ref 28.4–34.8)
MCV RBC AUTO: 87.4 FL (ref 82.6–102.9)
MONOCYTES # BLD: 8 % (ref 3–12)
NRBC AUTOMATED: 0 PER 100 WBC
PDW BLD-RTO: 12.5 % (ref 11.8–14.4)
PLATELET # BLD: 266 K/UL (ref 138–453)
PMV BLD AUTO: 11 FL (ref 8.1–13.5)
POTASSIUM SERPL-SCNC: 4.6 MMOL/L (ref 3.7–5.3)
PROSTATE SPECIFIC ANTIGEN: 0.21 NG/ML
RBC # BLD: 4.92 M/UL (ref 4.21–5.77)
SEG NEUTROPHILS: 67 % (ref 36–65)
SEGMENTED NEUTROPHILS ABSOLUTE COUNT: 4.68 K/UL (ref 1.5–8.1)
SODIUM BLD-SCNC: 133 MMOL/L (ref 135–144)
TOTAL PROTEIN: 7 G/DL (ref 6.4–8.3)
TRIGL SERPL-MCNC: 125 MG/DL
TSH SERPL DL<=0.05 MIU/L-ACNC: 1.03 UIU/ML (ref 0.3–5)
WBC # BLD: 7.1 K/UL (ref 3.5–11.3)

## 2022-05-30 ASSESSMENT — ENCOUNTER SYMPTOMS
SHORTNESS OF BREATH: 0
CHEST TIGHTNESS: 0
VOMITING: 0
COUGH: 0
CONSTIPATION: 0
DIARRHEA: 0
ABDOMINAL PAIN: 0
EYE DISCHARGE: 0
RHINORRHEA: 0

## 2022-06-28 ENCOUNTER — OFFICE VISIT (OUTPATIENT)
Dept: FAMILY MEDICINE CLINIC | Age: 62
End: 2022-06-28
Payer: COMMERCIAL

## 2022-06-28 VITALS
OXYGEN SATURATION: 98 % | RESPIRATION RATE: 16 BRPM | SYSTOLIC BLOOD PRESSURE: 154 MMHG | WEIGHT: 148.4 LBS | DIASTOLIC BLOOD PRESSURE: 88 MMHG | HEART RATE: 80 BPM | BODY MASS INDEX: 21.29 KG/M2 | TEMPERATURE: 97.3 F

## 2022-06-28 DIAGNOSIS — I10 ESSENTIAL (PRIMARY) HYPERTENSION: Primary | ICD-10-CM

## 2022-06-28 DIAGNOSIS — Z91.199 MEDICAL NON-COMPLIANCE: ICD-10-CM

## 2022-06-28 DIAGNOSIS — K21.9 GASTROESOPHAGEAL REFLUX DISEASE, UNSPECIFIED WHETHER ESOPHAGITIS PRESENT: ICD-10-CM

## 2022-06-28 DIAGNOSIS — E11.65 UNCONTROLLED TYPE 2 DIABETES MELLITUS WITH HYPERGLYCEMIA (HCC): ICD-10-CM

## 2022-06-28 DIAGNOSIS — R06.6 HICCUPS: ICD-10-CM

## 2022-06-28 PROCEDURE — 99214 OFFICE O/P EST MOD 30 MIN: CPT | Performed by: NURSE PRACTITIONER

## 2022-06-28 PROCEDURE — 3046F HEMOGLOBIN A1C LEVEL >9.0%: CPT | Performed by: NURSE PRACTITIONER

## 2022-06-28 RX ORDER — OMEPRAZOLE 20 MG/1
20 TABLET, DELAYED RELEASE ORAL
Qty: 30 TABLET | Refills: 3 | Status: SHIPPED | OUTPATIENT
Start: 2022-06-28 | End: 2022-08-23 | Stop reason: ALTCHOICE

## 2022-06-28 RX ORDER — LISINOPRIL 20 MG/1
20 TABLET ORAL DAILY
Qty: 90 TABLET | Refills: 0 | Status: ON HOLD | OUTPATIENT
Start: 2022-06-28

## 2022-06-28 RX ORDER — GLIMEPIRIDE 4 MG/1
4 TABLET ORAL
Qty: 90 TABLET | Refills: 0 | Status: ON HOLD | OUTPATIENT
Start: 2022-06-28

## 2022-06-28 ASSESSMENT — ENCOUNTER SYMPTOMS
CHEST TIGHTNESS: 0
DIARRHEA: 0
ABDOMINAL PAIN: 0
CONSTIPATION: 0
EYE DISCHARGE: 0
VOMITING: 0
RHINORRHEA: 0
COUGH: 0
SHORTNESS OF BREATH: 0

## 2022-06-28 ASSESSMENT — PATIENT HEALTH QUESTIONNAIRE - PHQ9
SUM OF ALL RESPONSES TO PHQ QUESTIONS 1-9: 0
1. LITTLE INTEREST OR PLEASURE IN DOING THINGS: 0
SUM OF ALL RESPONSES TO PHQ QUESTIONS 1-9: 0
SUM OF ALL RESPONSES TO PHQ9 QUESTIONS 1 & 2: 0
2. FEELING DOWN, DEPRESSED OR HOPELESS: 0

## 2022-06-28 NOTE — PROGRESS NOTES
Mattie Reyna [address] [facility] Lisa. [facility] [address]  Dept: [phone]  Dept Fax: [phone]    Visit type: Established patient    Reason for Visit: Hypertension (1 month follow up)         Assessment and Plan       1. Essential (primary) hypertension  -     lisinopril (PRINIVIL;ZESTRIL) 20 MG tablet; Take 1 tablet by mouth daily, Disp-90 tablet, R-0Normal  2. Uncontrolled type 2 diabetes mellitus with hyperglycemia (HCC)  -     lisinopril (PRINIVIL;ZESTRIL) 20 MG tablet; Take 1 tablet by mouth daily, Disp-90 tablet, R-0Normal  -     glimepiride (AMARYL) 4 MG tablet; Take 1 tablet by mouth every morning (before breakfast), Disp-90 tablet, R-0Normal  3. Medical non-compliance  4. Hiccups  -     omeprazole (PRILOSEC OTC) 20 MG tablet; Take 1 tablet by mouth daily (with breakfast), Disp-30 tablet, R-3Normal  5. Gastroesophageal reflux disease, unspecified whether esophagitis present  -     omeprazole (PRILOSEC OTC) 20 MG tablet; Take 1 tablet by mouth daily (with breakfast), Disp-30 tablet, R-3Normal    Start monitor BS  Has not started trulicity- will discontinue again and then if he is able to start will let us know  Increase amaryl  A1C at next visit   Add prilosec  Is following low carb diabetic diet   Increase lisinopril to control BP     Return in about 2 months (around 8/28/2022) for HTN, DM- POCT a1c . Subjective           DM  Uncontrolled  Type 2  Diet- not following   Exercise- has physically demanding job.    Current treatment- nothing  Not monitor BS  Has started amaryl and jardiance   Is fearful of doing trulicity   Ace- no  Statin- no  Eye- is due  Foot exam- is due     htn  Onset years ago  Was on ace in the past- and this was restarted    no cp or palpitations    Mood issue  Onset over a year ago  Angry,anxious and overwhelmed and brain fog   Current treatment- nothing   Triggers- family and job          Review of Systems   Constitutional: Negative for activity change, appetite change and fever. HENT: Negative for congestion, ear pain and rhinorrhea. Eyes: Negative for discharge and visual disturbance. Respiratory: Negative for cough, chest tightness and shortness of breath. Cardiovascular: Negative for chest pain and palpitations. Gastrointestinal: Negative for abdominal pain, constipation, diarrhea and vomiting. Genitourinary: Negative for difficulty urinating and hematuria. Musculoskeletal: Negative for arthralgias and myalgias. Skin: Negative for rash. Neurological: Negative for dizziness, weakness, numbness and headaches. Psychiatric/Behavioral: The patient is not nervous/anxious. Allergies   Allergen Reactions    Metformin And Related Nausea And Vomiting       Outpatient Medications Prior to Visit   Medication Sig Dispense Refill    empagliflozin (JARDIANCE) 25 MG tablet Take 1 tablet by mouth daily 30 tablet 3    atorvastatin (LIPITOR) 40 MG tablet Take 1 tablet by mouth daily 30 tablet 2    glimepiride (AMARYL) 2 MG tablet Take 1 tablet by mouth every morning (before breakfast) 30 tablet 3    Dulaglutide (TRULICITY) 9.67 DS/4.6WY SOPN Inject 0.75 mg into the skin once a week (Patient not taking: Reported on 6/28/2022) 4 pen 0    lisinopril (PRINIVIL;ZESTRIL) 10 MG tablet Take 1 tablet by mouth daily 90 tablet 1     No facility-administered medications prior to visit.         Past Medical History:   Diagnosis Date    Hypertension     Prediabetes         Social History     Tobacco Use    Smoking status: Current Every Day Smoker     Packs/day: 0.50     Years: 33.00     Pack years: 16.50    Smokeless tobacco: Never Used   Substance Use Topics    Alcohol use: Yes     Comment: rarely        Past Surgical History:   Procedure Laterality Date    TONSILLECTOMY         Family History   Problem Relation Age of Onset    Diabetes Father     Stroke Father     Diabetes Brother     Diabetes Brother        Objective       BP (!) 154/88 (Site: Right Upper Arm, Position: Sitting, Cuff Size: Medium Adult) Comment: manual  Pulse 80   Temp 97.3 °F (36.3 °C) (Temporal)   Resp 16   Wt 148 lb 6.4 oz (67.3 kg)   SpO2 98%   BMI 21.29 kg/m²   Physical Exam  Vitals reviewed. Constitutional:       Appearance: He is well-developed. HENT:      Head: Normocephalic and atraumatic. Right Ear: External ear normal.      Left Ear: External ear normal.   Eyes:      Conjunctiva/sclera: Conjunctivae normal.   Neck:      Thyroid: No thyromegaly. Trachea: Trachea normal.   Cardiovascular:      Rate and Rhythm: Normal rate and regular rhythm. Heart sounds: Normal heart sounds. No murmur heard. No friction rub. No gallop. Pulmonary:      Effort: Pulmonary effort is normal.      Breath sounds: Normal breath sounds. Abdominal:      General: Bowel sounds are normal.      Palpations: Abdomen is soft. Tenderness: There is no abdominal tenderness. Musculoskeletal:         General: Normal range of motion. Cervical back: Normal range of motion and neck supple. No spinous process tenderness. Skin:     General: Skin is warm and dry. Findings: No erythema or rash. Neurological:      Mental Status: He is alert and oriented to person, place, and time. Psychiatric:         Speech: Speech normal.         Behavior: Behavior normal.         Thought Content:  Thought content normal.           Data Reviewed and Summarized       Labs:     Imaging/Testing:        BLACK Stafford - ARRON

## 2022-08-16 ENCOUNTER — TELEPHONE (OUTPATIENT)
Dept: FAMILY MEDICINE CLINIC | Age: 62
End: 2022-08-16

## 2022-08-16 DIAGNOSIS — Z79.4 TYPE 2 DIABETES MELLITUS WITHOUT COMPLICATION, WITH LONG-TERM CURRENT USE OF INSULIN (HCC): Primary | ICD-10-CM

## 2022-08-16 DIAGNOSIS — E11.9 TYPE 2 DIABETES MELLITUS WITHOUT COMPLICATION, WITH LONG-TERM CURRENT USE OF INSULIN (HCC): Primary | ICD-10-CM

## 2022-08-16 NOTE — TELEPHONE ENCOUNTER
----- Message from Seun Lucero sent at 8/16/2022 11:25 AM EDT -----  Subject: Medication Problem    Medication: Dulaglutide (TRULICITY) 1.5 CH/3.9NU SOPN  Dosage: Inject 1.5 mg into the skin once a week  Ordering Provider: Janeen Campbell    Question/Problem: Anne Alexander called on 08/16 @ 11:22 am. She would like   for Dr. Daryle Robes to know that her 's prescription was supposed to sent   to the [address], not the pharmacy in   [place]. Please switch that to [facility] instead.   Additional Information for Provider:     Pharmacy: [address] [place], [place] [phone]    ---------------------------------------------------------------------------  --------------  Mel GRIMALDO  [phone]; OK to leave message on voicemail, OK to respond with   electronic message via [employer] portal (only for patients who have   registered [employer] account)  ---------------------------------------------------------------------------  --------------    SCRIPT ANSWERS  Relationship to Patient: Other  Representative Name: Anne Alexander - wife  Is the Representative on the appropriate HIPAA document in Epic: Yes

## 2022-08-18 RX ORDER — DULAGLUTIDE 1.5 MG/.5ML
1.5 INJECTION, SOLUTION SUBCUTANEOUS WEEKLY
Qty: 4 PEN | Refills: 0 | Status: ON HOLD | OUTPATIENT
Start: 2022-08-18

## 2022-08-23 ENCOUNTER — OFFICE VISIT (OUTPATIENT)
Dept: FAMILY MEDICINE CLINIC | Age: 62
End: 2022-08-23
Payer: COMMERCIAL

## 2022-08-23 VITALS
SYSTOLIC BLOOD PRESSURE: 144 MMHG | BODY MASS INDEX: 20.22 KG/M2 | OXYGEN SATURATION: 98 % | DIASTOLIC BLOOD PRESSURE: 82 MMHG | TEMPERATURE: 97.2 F | HEIGHT: 70 IN | WEIGHT: 141.2 LBS | HEART RATE: 95 BPM | RESPIRATION RATE: 16 BRPM

## 2022-08-23 DIAGNOSIS — R06.6 HICCUPS: ICD-10-CM

## 2022-08-23 DIAGNOSIS — E11.65 UNCONTROLLED TYPE 2 DIABETES MELLITUS WITH HYPERGLYCEMIA (HCC): ICD-10-CM

## 2022-08-23 DIAGNOSIS — I10 ESSENTIAL (PRIMARY) HYPERTENSION: Primary | ICD-10-CM

## 2022-08-23 DIAGNOSIS — K21.9 GASTROESOPHAGEAL REFLUX DISEASE, UNSPECIFIED WHETHER ESOPHAGITIS PRESENT: ICD-10-CM

## 2022-08-23 DIAGNOSIS — Z23 NEED FOR DIPHTHERIA-TETANUS-PERTUSSIS (TDAP) VACCINE: ICD-10-CM

## 2022-08-23 DIAGNOSIS — Z12.11 SCREENING FOR COLON CANCER: ICD-10-CM

## 2022-08-23 LAB
CREATININE URINE POCT: 200
HBA1C MFR BLD: 7.8 %
MICROALBUMIN/CREAT 24H UR: 150 MG/G{CREAT}
MICROALBUMIN/CREAT UR-RTO: NORMAL

## 2022-08-23 PROCEDURE — 99214 OFFICE O/P EST MOD 30 MIN: CPT | Performed by: NURSE PRACTITIONER

## 2022-08-23 PROCEDURE — 82044 UR ALBUMIN SEMIQUANTITATIVE: CPT | Performed by: NURSE PRACTITIONER

## 2022-08-23 PROCEDURE — 83036 HEMOGLOBIN GLYCOSYLATED A1C: CPT | Performed by: NURSE PRACTITIONER

## 2022-08-23 PROCEDURE — 3051F HG A1C>EQUAL 7.0%<8.0%: CPT | Performed by: NURSE PRACTITIONER

## 2022-08-23 ASSESSMENT — ENCOUNTER SYMPTOMS
VOMITING: 0
SHORTNESS OF BREATH: 0
CHEST TIGHTNESS: 0
RHINORRHEA: 0
DIARRHEA: 0
EYE DISCHARGE: 0
COUGH: 1
ABDOMINAL PAIN: 0
CONSTIPATION: 0

## 2022-08-23 ASSESSMENT — PATIENT HEALTH QUESTIONNAIRE - PHQ9
1. LITTLE INTEREST OR PLEASURE IN DOING THINGS: 0
SUM OF ALL RESPONSES TO PHQ QUESTIONS 1-9: 0
2. FEELING DOWN, DEPRESSED OR HOPELESS: 0
SUM OF ALL RESPONSES TO PHQ9 QUESTIONS 1 & 2: 0
SUM OF ALL RESPONSES TO PHQ QUESTIONS 1-9: 0

## 2022-08-23 NOTE — PROGRESS NOTES
Joseline Levin [address] [facility] Lisa. [facility] [address]  Dept: [phone]  Dept Fax: [phone]    Visit type: Established patient    Reason for Visit: Diabetes (2mo fu ), Health Maintenance (Retinal exam /Vaccines /Colorectal cancer screening /Foot exam ), and Other (Hiccups Arnold Charles /More frequent /Going on for months )         Assessment and Plan       1. Type 2 diabetes mellitus without complication, with long-term current use of insulin ([address]  2. Essential (primary) hypertension  3. Uncontrolled type 2 diabetes mellitus with hyperglycemia ([address]  4. Hiccups  5. Gastroesophageal reflux disease, unspecified whether esophagitis present    Get prilosec hiccups   Not an ACE cough because he had this symptoms prior to taking lisinopril  Believe cough and hiccups are SE of high sugars and uncontrolled gerd- will send in nexium   Need periodically eat throughout the day- discussed this can get worse with increase trulicity - will continue trulicity at this time as a1c has improved  Declines foot exam  Can get tdap, shingles and covid booster at pharmacy   No follow-ups on file. Subjective           DM  Uncontrolled  Type 2  Diet- the last three months has been doing low carb   Exercise- has physically demanding job.    Current treatment- nothing  Not monitor BS  Has started amaryl and jardiance   Could not tolerate metformin   Is fearful of doing trulicity but started his first injection last Thursday   Ace- no  Statin- no  Eye- is due  Foot exam- is due     Fingers and toes are \"tingling\" but better with taking his DM medications     htn  Onset years ago  Was on ace in the past- and this was restarted    no cp or palpitations    Mood issue  Onset over a year ago  Angry,anxious and overwhelmed and brain fog   Current treatment- nothing   Triggers- family and job     Hiccups  Turns into burp or belch  Lasting 2-3 days at a time  Is not taking prilosec  Starting with cough and History   Problem Relation Age of Onset    Diabetes Father     Stroke Father     Diabetes Brother     Diabetes Brother        Objective       BP (!) 144/82   Pulse 95   Temp 97.2 °F (36.2 °C) (Temporal)   Resp 16   Ht 5' 10\" (1.778 m)   Wt 141 lb 3.2 oz (64 kg)   SpO2 98%   BMI 20.26 kg/m²   Physical Exam  Vitals reviewed. Constitutional:       Appearance: He is well-developed. HENT:      Head: Normocephalic and atraumatic. Right Ear: External ear normal.      Left Ear: External ear normal.   Eyes:      Conjunctiva/sclera: Conjunctivae normal.   Neck:      Thyroid: No thyromegaly. Trachea: Trachea normal.   Cardiovascular:      Rate and Rhythm: Normal rate and regular rhythm. Heart sounds: Normal heart sounds. No murmur heard. No friction rub. No gallop. Pulmonary:      Effort: Pulmonary effort is normal.      Breath sounds: Normal breath sounds. Abdominal:      General: Bowel sounds are normal.      Palpations: Abdomen is soft. Tenderness: There is no abdominal tenderness. Musculoskeletal:         General: Normal range of motion. Cervical back: Normal range of motion and neck supple. No spinous process tenderness. Skin:     General: Skin is warm and dry. Findings: No erythema or rash. Neurological:      Mental Status: He is alert and oriented to person, place, and time. Psychiatric:         Speech: Speech normal.         Behavior: Behavior normal.         Thought Content: Thought content normal.         Data Reviewed and Summarized       Labs:     Imaging/Testing:    POCT glycosylated hemoglobin (Hb A1C)  Order: [id number]  Status: Final result    Visible to patient: Yes (not seen)    Next appt: None    Dx: Type 2 diabetes mellitus without comp. ..    0 Result Notes    1  Topic  Component Ref Range & Units 8/23/22 1232 5/24/22 1502 8/10/21 2323 8/10/21 1237 7/12/17 0946 4/3/17 1032 2/28/17 0831   Hemoglobin A1C % 7.8  14.0 High Off-Scale (>)  14.8 High  R 14.0 High Off-Scale (>)  11.6  9.6 High  R  10.5 High  R    Resulting [facility] [address] [facility]              Specimen Collected: 08/23/22 12:32 EDT Last Resulted: 08/23/22 00:00 EDT        Lab Flowsheet     Order Details     View Encounter     Lab and Collection Details     Routing     Result History     View Encounter Conversation        R=Reference range differs from displayed range        Result Care Coordination        POCT microalbumin  Order: [id number]  Status: Final result    Visible to patient: Yes (not seen)    Next appt: None    Dx: Type 2 diabetes mellitus without comp. ..    0 Result Notes    1  Topic  Component 8/23/22 1230    Microalb, Ur 150    Creatinine Ur POCT 200    Microalbumin Creatinine Ratio                Specimen Collected: 08/23/22 12:30 EDT Last Resulted: 08/23/22 00:00 EDT                BLACK Layne - CNP

## 2022-10-05 ENCOUNTER — HOSPITAL ENCOUNTER (EMERGENCY)
Age: 62
Discharge: HOME | End: 2022-10-05
Attending: EMERGENCY MEDICINE
Payer: COMMERCIAL

## 2022-10-05 ENCOUNTER — APPOINTMENT (OUTPATIENT)
Dept: CT IMAGING | Age: 62
End: 2022-10-05
Payer: COMMERCIAL

## 2022-10-05 VITALS
TEMPERATURE: 98.2 F | HEART RATE: 109 BPM | RESPIRATION RATE: 16 BRPM | DIASTOLIC BLOOD PRESSURE: 88 MMHG | HEIGHT: 70 IN | WEIGHT: 150 LBS | SYSTOLIC BLOOD PRESSURE: 153 MMHG | BODY MASS INDEX: 21.47 KG/M2 | OXYGEN SATURATION: 100 %

## 2022-10-05 DIAGNOSIS — S22.42XA CLOSED FRACTURE OF MULTIPLE RIBS OF LEFT SIDE, INITIAL ENCOUNTER: Primary | ICD-10-CM

## 2022-10-05 LAB
ALBUMIN SERPL-MCNC: 4 G/DL (ref 3.5–5.1)
ALP BLD-CCNC: 77 U/L (ref 38–126)
ALT SERPL-CCNC: 9 U/L (ref 11–66)
ANION GAP SERPL CALCULATED.3IONS-SCNC: 10 MEQ/L (ref 8–16)
AST SERPL-CCNC: 13 U/L (ref 5–40)
BASOPHILS # BLD: 0.8 %
BASOPHILS ABSOLUTE: 0.1 THOU/MM3 (ref 0–0.1)
BILIRUB SERPL-MCNC: 0.4 MG/DL (ref 0.3–1.2)
BUN BLDV-MCNC: 16 MG/DL (ref 7–22)
CALCIUM SERPL-MCNC: 9.4 MG/DL (ref 8.5–10.5)
CHLORIDE BLD-SCNC: 103 MEQ/L (ref 98–111)
CO2: 28 MEQ/L (ref 23–33)
CREAT SERPL-MCNC: 1.3 MG/DL (ref 0.4–1.2)
EOSINOPHIL # BLD: 3.2 %
EOSINOPHILS ABSOLUTE: 0.3 THOU/MM3 (ref 0–0.4)
ERYTHROCYTE [DISTWIDTH] IN BLOOD BY AUTOMATED COUNT: 12.8 % (ref 11.5–14.5)
ERYTHROCYTE [DISTWIDTH] IN BLOOD BY AUTOMATED COUNT: 42.5 FL (ref 35–45)
GFR SERPL CREATININE-BSD FRML MDRD: 56 ML/MIN/1.73M2
GLUCOSE BLD-MCNC: 159 MG/DL (ref 70–108)
GLUCOSE BLD-MCNC: 181 MG/DL (ref 70–108)
HCT VFR BLD CALC: 39.3 % (ref 42–52)
HEMOGLOBIN: 12.6 GM/DL (ref 14–18)
IMMATURE GRANS (ABS): 0.04 THOU/MM3 (ref 0–0.07)
IMMATURE GRANULOCYTES: 0.4 %
LYMPHOCYTES # BLD: 17.3 %
LYMPHOCYTES ABSOLUTE: 1.6 THOU/MM3 (ref 1–4.8)
MCH RBC QN AUTO: 28.6 PG (ref 26–33)
MCHC RBC AUTO-ENTMCNC: 32.1 GM/DL (ref 32.2–35.5)
MCV RBC AUTO: 89.3 FL (ref 80–94)
MONOCYTES # BLD: 6.8 %
MONOCYTES ABSOLUTE: 0.6 THOU/MM3 (ref 0.4–1.3)
NUCLEATED RED BLOOD CELLS: 0 /100 WBC
PLATELET # BLD: 335 THOU/MM3 (ref 130–400)
PMV BLD AUTO: 10.3 FL (ref 9.4–12.4)
POTASSIUM REFLEX MAGNESIUM: 4.1 MEQ/L (ref 3.5–5.2)
RBC # BLD: 4.4 MILL/MM3 (ref 4.7–6.1)
SEG NEUTROPHILS: 71.5 %
SEGMENTED NEUTROPHILS ABSOLUTE COUNT: 6.4 THOU/MM3 (ref 1.8–7.7)
SODIUM BLD-SCNC: 141 MEQ/L (ref 135–145)
TOTAL PROTEIN: 6.6 G/DL (ref 6.1–8)
WBC # BLD: 9 THOU/MM3 (ref 4.8–10.8)

## 2022-10-05 PROCEDURE — 96375 TX/PRO/DX INJ NEW DRUG ADDON: CPT

## 2022-10-05 PROCEDURE — 96374 THER/PROPH/DIAG INJ IV PUSH: CPT

## 2022-10-05 PROCEDURE — 6360000004 HC RX CONTRAST MEDICATION: Performed by: EMERGENCY MEDICINE

## 2022-10-05 PROCEDURE — 80053 COMPREHEN METABOLIC PANEL: CPT

## 2022-10-05 PROCEDURE — 6360000002 HC RX W HCPCS: Performed by: EMERGENCY MEDICINE

## 2022-10-05 PROCEDURE — 71260 CT THORAX DX C+: CPT

## 2022-10-05 PROCEDURE — 82948 REAGENT STRIP/BLOOD GLUCOSE: CPT

## 2022-10-05 PROCEDURE — 85025 COMPLETE CBC W/AUTO DIFF WBC: CPT

## 2022-10-05 PROCEDURE — 76376 3D RENDER W/INTRP POSTPROCES: CPT

## 2022-10-05 PROCEDURE — 99285 EMERGENCY DEPT VISIT HI MDM: CPT

## 2022-10-05 RX ORDER — MORPHINE SULFATE 4 MG/ML
4 INJECTION, SOLUTION INTRAMUSCULAR; INTRAVENOUS ONCE
Status: COMPLETED | OUTPATIENT
Start: 2022-10-05 | End: 2022-10-05

## 2022-10-05 RX ORDER — HYDROCODONE BITARTRATE AND ACETAMINOPHEN 5; 325 MG/1; MG/1
1 TABLET ORAL EVERY 6 HOURS PRN
Qty: 8 TABLET | Refills: 0 | Status: SHIPPED | OUTPATIENT
Start: 2022-10-05 | End: 2022-10-08

## 2022-10-05 RX ORDER — ONDANSETRON 2 MG/ML
4 INJECTION INTRAMUSCULAR; INTRAVENOUS ONCE
Status: COMPLETED | OUTPATIENT
Start: 2022-10-05 | End: 2022-10-05

## 2022-10-05 RX ADMIN — MORPHINE SULFATE 4 MG: 4 INJECTION, SOLUTION INTRAMUSCULAR; INTRAVENOUS at 07:18

## 2022-10-05 RX ADMIN — IOPAMIDOL 80 ML: 755 INJECTION, SOLUTION INTRAVENOUS at 08:12

## 2022-10-05 RX ADMIN — ONDANSETRON 4 MG: 2 INJECTION INTRAMUSCULAR; INTRAVENOUS at 07:18

## 2022-10-05 ASSESSMENT — PAIN - FUNCTIONAL ASSESSMENT
PAIN_FUNCTIONAL_ASSESSMENT: ACTIVITIES ARE NOT PREVENTED
PAIN_FUNCTIONAL_ASSESSMENT: 0-10
PAIN_FUNCTIONAL_ASSESSMENT: ACTIVITIES ARE NOT PREVENTED
PAIN_FUNCTIONAL_ASSESSMENT: 0-10

## 2022-10-05 ASSESSMENT — ENCOUNTER SYMPTOMS
ABDOMINAL PAIN: 0
VOMITING: 0
SHORTNESS OF BREATH: 0
CHEST TIGHTNESS: 0
BACK PAIN: 1
WHEEZING: 0
NAUSEA: 0

## 2022-10-05 ASSESSMENT — PAIN DESCRIPTION - FREQUENCY
FREQUENCY: CONTINUOUS
FREQUENCY: CONTINUOUS

## 2022-10-05 ASSESSMENT — PAIN DESCRIPTION - DESCRIPTORS
DESCRIPTORS: DISCOMFORT
DESCRIPTORS: DISCOMFORT

## 2022-10-05 ASSESSMENT — PAIN DESCRIPTION - LOCATION
LOCATION: BACK
LOCATION: BACK

## 2022-10-05 ASSESSMENT — PAIN DESCRIPTION - ONSET
ONSET: ON-GOING
ONSET: ON-GOING

## 2022-10-05 ASSESSMENT — PAIN SCALES - GENERAL
PAINLEVEL_OUTOF10: 7
PAINLEVEL_OUTOF10: 7

## 2022-10-05 ASSESSMENT — PAIN DESCRIPTION - PAIN TYPE
TYPE: ACUTE PAIN
TYPE: ACUTE PAIN

## 2022-10-05 ASSESSMENT — PAIN DESCRIPTION - ORIENTATION
ORIENTATION: LEFT;MID
ORIENTATION: LEFT;MID

## 2022-10-05 NOTE — ED NOTES
Pt vitals collected. Pt denies any needs at this time. Pt respirations easy and unlabored.      Rodriguez Zapata RN  10/05/22 [id number]

## 2022-10-05 NOTE — ED NOTES
Pt resting in bed. VSS. Updated on POC. Medicated per order.       Felicity Ahumada, RN  10/05/22 [id number]

## 2022-10-05 NOTE — ED NOTES
Report given to Dex Olivarez [facility].      Nedra Link RN  10/05/22 [id number]

## 2022-10-05 NOTE — Clinical Note
Lavinia Bosworth was seen and treated in our emergency department on 10/5/2022. He may return to work on 10/11/2022. If you have any questions or concerns, please don't hesitate to call.       Eric Parker, DO

## 2022-10-05 NOTE — ED PROVIDER NOTES
[facility] ENCOUNTER          Pt Name: Destini Talavera  MRN: [id number]  [place] 1960  Date of evaluation: 10/5/2022  Treating Resident Physician: Barbara Martel DO  Supervising Physician: Dr. Selene Ford    History obtained from the patient. CHIEF COMPLAINT       Chief Complaint   Patient presents with    Fall     Back pain     HISTORY OF PRESENT ILLNESS    HPI  Destini Talavera is a 63 y.o. male who presents to the emergency department for evaluation of a fall this morning that occurred in the shower. Patient states he was washing his feet bent over the shower when he slipped falling backwards. Patient hit the left side of his back on the shower/bathtub faucet. Patient is seemingly had pain. He has not taken any medications for pain control at this time. Patient is not ice or heat to the area either. Patient came to the ER reportedly immediately after the fall happened. Patient denies hitting his head or losing consciousness. Patient reports he did not hit anywhere else on his body besides the left side of his back right below his scapula. Patient denies any weakness, dizziness, lightheadedness, vertigo-like symptoms. He reports this was purely mechanical fall due to shower being slippery. Patient denies any midline tenderness at this time. Patient does not have any shortness of breath, difficulty breathing or chest pain. Patient does have pain to palpation to the left side over his ribs. Patient reports he does have pain with taking a big deep breath. Then patient states flexing his biceps or triceps causes pain in his back. The patient has no other acute complaints at this time. REVIEW OF SYSTEMS   Review of Systems   Constitutional:  Negative for chills, fatigue and fever. Respiratory:  Negative for chest tightness, shortness of breath and wheezing.     Cardiovascular:  Negative for chest pain, palpitations and leg swelling. Gastrointestinal:  Negative for abdominal pain, nausea and vomiting. Musculoskeletal:  Positive for back pain. Negative for arthralgias, gait problem, joint swelling, neck pain and neck stiffness. Neurological: Negative. Negative for dizziness, light-headedness and headaches. PAST MEDICAL AND SURGICAL HISTORY     Past Medical History:   Diagnosis Date    Hypertension     Prediabetes      Past Surgical History:   Procedure Laterality Date    TONSILLECTOMY       MEDICATIONS   No current facility-administered medications for this encounter. Current Outpatient Medications:     HYDROcodone-acetaminophen (NORCO) 5-325 MG per tablet, Take 1 tablet by mouth every 6 hours as needed for Pain for up to 3 days. Intended supply: 3 days.  Take lowest dose possible to manage pain, Disp: 8 tablet, Rfl: 0    esomeprazole (NEXIUM) 20 MG delayed release capsule, Take 1 capsule by mouth every morning (before breakfast), Disp: 90 capsule, Rfl: 0    Dulaglutide (TRULICITY) 1.5 KG/8.3TP SOPN, Inject 1.5 mg into the skin once a week, Disp: 4 pen, Rfl: 0    lisinopril (PRINIVIL;ZESTRIL) 20 MG tablet, Take 1 tablet by mouth daily, Disp: 90 tablet, Rfl: 0    glimepiride (AMARYL) 4 MG tablet, Take 1 tablet by mouth every morning (before breakfast), Disp: 90 tablet, Rfl: 0    empagliflozin (JARDIANCE) 25 MG tablet, Take 1 tablet by mouth daily, Disp: 30 tablet, Rfl: 3    atorvastatin (LIPITOR) 40 MG tablet, Take 1 tablet by mouth daily, Disp: 30 tablet, Rfl: 2    SOCIAL HISTORY     Social History     Social History Narrative    Not on file     Social History     Tobacco Use    Smoking status: Every Day     Packs/day: 0.50     Years: 33.00     Pack years: 16.50     Types: Cigarettes    Smokeless tobacco: Never   Vaping Use    Vaping Use: Never used   Substance Use Topics    Alcohol use: Yes     Comment: rarely    Drug use: No     ALLERGIES     Allergies   Allergen Reactions    Metformin And Related Nausea And Vomiting FAMILY HISTORY     Family History   Problem Relation Age of Onset    Diabetes Father     Stroke Father     Diabetes Brother     Diabetes Brother      PREVIOUS RECORDS   Previous records reviewed: Fani Zavala Last seen in the ER on 7/29/2021 for sinus pressure and headache, patient was diagnosed with nasopharyngitis at that time. Patient follows with Matthews Severe, CNP as her primary care physician. PHYSICAL EXAM     ED Triage Vitals [10/05/22 0624]   BP Temp Temp Source Heart Rate Resp SpO2 Height Weight   (!) 157/78 98.2 °F (36.8 °C) Oral (!) 111 18 100 % 5' 10\" (1.778 m) 150 lb (68 kg)     Initial vital signs and nursing assessment reviewed and abnormal from tachycardiac at 111 and hypertensive at 157/78 . Body mass index is 21.52 kg/m². Pulsoximetry is normal per my interpretation. Additional Vital Signs:  Vitals:    10/05/22 0722   BP: (!) 153/88   Pulse: (!) 109   Resp: 16   Temp:    SpO2: 100%     Physical Exam  Constitutional:       General: He is not in acute distress. Appearance: Normal appearance. He is normal weight. He is not ill-appearing or toxic-appearing. HENT:      Head: Normocephalic and atraumatic. Eyes:      General: No scleral icterus. Extraocular Movements: Extraocular movements intact. Conjunctiva/sclera: Conjunctivae normal.      Pupils: Pupils are equal, round, and reactive to light. Cardiovascular:      Rate and Rhythm: Tachycardia present. Pulses: Normal pulses. Heart sounds: Normal heart sounds, S1 normal and S2 normal. No murmur heard. No friction rub. No gallop. Pulmonary:      Effort: Pulmonary effort is normal.      Breath sounds: Normal breath sounds and air entry. No decreased air movement. No decreased breath sounds, wheezing, rhonchi or rales. Abdominal:      General: Abdomen is flat. Bowel sounds are normal. There is no distension. Palpations: Abdomen is soft. There is no hepatomegaly or splenomegaly. Tenderness:  There is no abdominal tenderness. Musculoskeletal:      Cervical back: Normal and full passive range of motion without pain. Thoracic back: Swelling and tenderness present. Lumbar back: Normal.        Back:       Comments: Abrasion with surrounding erythema at T8-T10. Lesion is from approximately 1 inch lateral of mid line to mid axillary line. There is swelling to the area. No tenderness to palpation of midline over spinal processes    Neurological:      General: No focal deficit present. Mental Status: He is alert and oriented to person, place, and time. Mental status is at baseline. Cranial Nerves: Cranial nerves 2-12 are intact. Motor: Motor function is intact. MEDICAL DECISION MAKING   Initial Assessment:   Left-sided back pain. Suspecting rib fractures on left side. Patient has pain to palpation of ribs on the left. No pain to palpation within midline. Patient patient has pain with deep breaths. Plan:   Labs ordered including CBC, CMP, and POCT glucose  CT chest with contrast and CT thoracic reconstruction without post process ordered for further evaluation. Trauma rule out. Rule out spine fracture. Continue to monitor.   Patient was given morphine 2 g for pain control    ED RESULTS   Laboratory results:  Labs Reviewed   CBC WITH AUTO DIFFERENTIAL - Abnormal; Notable for the following components:       Result Value    RBC 4.40 (*)     Hemoglobin 12.6 (*)     Hematocrit 39.3 (*)     MCHC 32.1 (*)     All other components within normal limits   COMPREHENSIVE METABOLIC PANEL W/ REFLEX TO MG FOR LOW K - Abnormal; Notable for the following components:    Glucose 181 (*)     Creatinine 1.3 (*)     ALT 9 (*)     All other components within normal limits   GLOMERULAR FILTRATION RATE, ESTIMATED - Abnormal; Notable for the following components:    Est, Glom Filt Rate 56 (*)     All other components within normal limits   POCT GLUCOSE - Abnormal; Notable for the following components:    POC Glucose 159 (*)     All other components within normal limits   ANION GAP     Radiologic studies results:  CT CHEST W CONTRAST   Final Result       1. Compound fractures of the left seventh and eighth ribs with minimal displacement. 2. Moderate left and mild right pleural effusions with adjacent atelectasis. 3. Minimal pericardial effusion. 4. Mild emphysematous changes. **This report has been created using voice recognition software. It may contain minor errors which are inherent in voice recognition technology. **      Final report electronically signed by Dr. Filippo Rajput MD on 10/5/2022 8:41 AM      CT THORACIC RECONSTRUCTION WO POST PROCESS   Final Result      1. Mild thoracic dextroscoliosis and thoracic spondylosis especially at T6-7, T7-8, T8-9 and T9-T10.   2. Ventral osteophytes at T4 and T5.   3. No acute fracture in the thoracic spine. 4. Mild canal stenosis at T7 and T9.   5. Minimally displaced fractures involving the left sixth, seventh and eighth ribs near the costovertebral junctions. 6. Bilateral pleural effusions. **This report has been created using voice recognition software. It may contain minor errors which are inherent in voice recognition technology. **      Final report electronically signed by DR Justin Keen on 10/5/2022 8:32 AM        ED Medications administered this visit:   Medications   morphine injection 4 mg (4 mg IntraVENous Given 10/5/22 0718)   ondansetron (ZOFRAN) injection 4 mg (4 mg IntraVENous Given 10/5/22 0718)   iopamidol (ISOVUE-370) 76 % injection 80 mL (80 mLs IntraVENous Given 10/5/22 8904)     ED COURSE      CT chest with contrast and CT thoracic reconstruction without post process imaging shows minimally displaced fractures involving the left 6th, 7th and 8th ribs near the costovertebral junctions. There is mild dextroscoliosis and thoracic spondylosis seen. There is no acute fractures in the thoracic spine.   No pneumothorax seen.  Patient has previous history of thoracic compression fracture so questionable new versus old thoracic compression fractures most likely are old. -We will discharge patient with prescription for Norco for pain control. Additionally will give patient work note to offer rest. Patient is recommended to follow-up with his primary care physician in 3 to 5 days. If new symptoms arise or his current symptoms worsen patient is to return the ER for further evaluation. Strict return precautions and follow up instructions were discussed with the patient prior to discharge, with which the patient agrees. MEDICATION CHANGES     Discharge Medication List as of 10/5/2022  8:53 AM        START taking these medications    Details   HYDROcodone-acetaminophen (NORCO) 5-325 MG per tablet Take 1 tablet by mouth every 6 hours as needed for Pain for up to 3 days. Intended supply: 3 days. Take lowest dose possible to manage pain, Disp-8 tablet, R-0Print           FINAL DISPOSITION     Final diagnoses:   Closed fracture of multiple ribs of left side, initial encounter     Condition: condition: stable  Dispo: Discharge to home      This transcription was electronically signed. Parts of this transcriptions may have been dictated by use of voice recognition software and electronically transcribed, and parts may have been transcribed with the assistance of an ED scribe. The transcription may contain errors not detected in proofreading. Please refer to my supervising physician's documentation if my documentation differs.     Electronically Signed: Barbara Martel DO, PGY-1 10/05/22, 9:30 AM  Case was discussed with Huyen Henriquez,   Resident  10/05/22 0085

## 2022-10-05 NOTE — ED PROVIDER NOTES
[id number]    Pt Name: Elizabeth Nicholson  MRN: [id number]  [place] 1960  Date of evaluation: 9/12/20      I personally saw and examined the patient. I have reviewed and agree with the Resident findings, including all diagnostic interpretations and treatment plans as written. I was present for the key portion of any procedures performed and the inclusive time noted in any critical care statement. History: This patient was seen with Sesar Mcgrath, resident physician. This is a 17-year-old male who slipped in the shower and hit the upper left back against the faucet area. He has a big bruise there, basically external hematoma. Hurts with deep breaths. We are suspecting probable rib fractures. This is confirmed on CT imaging. CT of the chest with thoracic reconstructions were obtained showing rib fractures of left sixth seventh and eighth. No pneumothorax. Question of new versus old thoracic compression fractures. The patient reports that he does previously have thoracic compression fractures so this is probably old. But the rib fractures are new. Patient received pain medicine while here. Prescription for Norco.  Work note. Follow-up needed with primary in 3 to 5 days.     Diagnosis: Rib fractures 6 ,7, and 8 status post fall in shower  Discharged                        [address] DO [place]  10/05/22 6697

## 2022-10-05 NOTE — ED TRIAGE NOTES
Pt presents to ED with c/o a fall onto his back. Pt states he fell in the shower and landed on his back. Pt states his back landed on the faucet of the bathtub. Pt denies hitting his head or being on blood thinners. Pt does state having hyperglycemia and not checking his blood sugar and not taking anything for it this morning. POCT glucose reads at 159 at this time. VSS. Call light in reach.

## 2022-10-12 ENCOUNTER — OFFICE VISIT (OUTPATIENT)
Dept: FAMILY MEDICINE CLINIC | Age: 62
End: 2022-10-12
Payer: COMMERCIAL

## 2022-10-12 VITALS
OXYGEN SATURATION: 99 % | RESPIRATION RATE: 16 BRPM | SYSTOLIC BLOOD PRESSURE: 134 MMHG | DIASTOLIC BLOOD PRESSURE: 80 MMHG | BODY MASS INDEX: 20.17 KG/M2 | TEMPERATURE: 96.8 F | HEART RATE: 85 BPM | WEIGHT: 140.6 LBS

## 2022-10-12 DIAGNOSIS — I10 ESSENTIAL (PRIMARY) HYPERTENSION: ICD-10-CM

## 2022-10-12 DIAGNOSIS — R91.8 ABNORMAL CT SCAN OF LUNG: Primary | ICD-10-CM

## 2022-10-12 DIAGNOSIS — S22.49XD CLOSED FRACTURE OF MULTIPLE RIBS WITH ROUTINE HEALING, UNSPECIFIED LATERALITY, SUBSEQUENT ENCOUNTER: ICD-10-CM

## 2022-10-12 DIAGNOSIS — M47.814 THORACIC ARTHRITIS: ICD-10-CM

## 2022-10-12 DIAGNOSIS — K21.9 GASTROESOPHAGEAL REFLUX DISEASE, UNSPECIFIED WHETHER ESOPHAGITIS PRESENT: ICD-10-CM

## 2022-10-12 DIAGNOSIS — Z72.0 TOBACCO ABUSE: ICD-10-CM

## 2022-10-12 PROCEDURE — 99215 OFFICE O/P EST HI 40 MIN: CPT | Performed by: NURSE PRACTITIONER

## 2022-10-12 RX ORDER — BUPROPION HYDROCHLORIDE 150 MG/1
150 TABLET ORAL EVERY MORNING
Qty: 30 TABLET | Refills: 1 | Status: ON HOLD | OUTPATIENT
Start: 2022-10-12

## 2022-10-12 RX ORDER — OMEPRAZOLE 40 MG/1
40 CAPSULE, DELAYED RELEASE ORAL DAILY
Qty: 90 CAPSULE | Refills: 3 | Status: ON HOLD | OUTPATIENT
Start: 2022-10-12

## 2022-10-12 RX ORDER — ACETAMINOPHEN 500 MG
500 TABLET ORAL EVERY 6 HOURS PRN
Status: ON HOLD | COMMUNITY

## 2022-10-12 ASSESSMENT — PATIENT HEALTH QUESTIONNAIRE - PHQ9
2. FEELING DOWN, DEPRESSED OR HOPELESS: 0
1. LITTLE INTEREST OR PLEASURE IN DOING THINGS: 0
SUM OF ALL RESPONSES TO PHQ QUESTIONS 1-9: 0
SUM OF ALL RESPONSES TO PHQ9 QUESTIONS 1 & 2: 0
SUM OF ALL RESPONSES TO PHQ QUESTIONS 1-9: 0
SUM OF ALL RESPONSES TO PHQ9 QUESTIONS 1 & 2: 0
2. FEELING DOWN, DEPRESSED OR HOPELESS: 0
1. LITTLE INTEREST OR PLEASURE IN DOING THINGS: 0
SUM OF ALL RESPONSES TO PHQ QUESTIONS 1-9: 0

## 2022-10-12 ASSESSMENT — ENCOUNTER SYMPTOMS
RHINORRHEA: 0
CHEST TIGHTNESS: 0
ABDOMINAL PAIN: 0
VOMITING: 0
SHORTNESS OF BREATH: 0
CONSTIPATION: 0
COUGH: 0
EYE DISCHARGE: 0
DIARRHEA: 0

## 2022-10-12 NOTE — LETTER
[address] [place]  [facility] [place] [address]  Phone: [phone]  Fax: [phone]    BLACK Rebolledo CNP        October 12, 2022     Patient: Esteban Jaquez   YOB: 1960   Date of Visit: 10/12/2022       To Whom it May Concern:    Keron Jeronimo was seen in my clinic on 10/12/2022. He needs to be excused 10-5-2022 through 10- and can return on 10-. Return on 10-     If you have any questions or concerns, please don't hesitate to call.     Sincerely,         BLACK Rebolledo CNP

## 2022-10-12 NOTE — PROGRESS NOTES
[place] [address] [facility], [facility]. [facility] [address]  Dept: [phone]  Dept Fax: [phone]    Visit type: Established patient    Reason for Visit: Follow-up Rosalba Talamantes in shower - C7 rib is cracked twice and C8 cracked once pet pt ), Health Maintenance (Vaccines /Diabetic retinal exam ), and Hypertension (Hypertension )         Assessment and Plan       1. Abnormal CT scan of lung  -     Full PFT Study With Bronchodilator; Future  2. Tobacco abuse  -     buPROPion (WELLBUTRIN XL) 150 MG extended release tablet; Take 1 tablet by mouth every morning, Disp-30 tablet, R-1Normal  3. Essential (primary) hypertension  4. Closed fracture of multiple ribs with routine healing, unspecified laterality, subsequent encounter  5. Thoracic arthritis  6. Gastroesophageal reflux disease, unspecified whether esophagitis present  -     omeprazole (PRILOSEC) 40 MG delayed release capsule; Take 1 capsule by mouth daily, Disp-90 capsule, R-3Normal      Do PFT- consider adding on daily inhaler   Reviewed Ct of chest and back   Insurance did not covid nexium- has been taking it OTC- would like a different script- so prilosec   Will start wellbutrin to help with smoking   Cough and deep breathing exercises  Return in about 2 weeks (around 10/26/2022) for fu rib fracture . Subjective       Fell in shower on 10/5. States when he went to wash his hair his feet gave out and hit his head. Had CT scan back. Multiple ribs were fractured Has not follow up with orthopedic. Pain is controlled with tylenol. States intially it was a 7/10 now it is at 5-6/10. 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 Is a smoker and would like to try wellbutrin to quit again. No diagnosed of COPD but abnormal CT showed possibility of this.  Has not had PFT completed         Review of Systems Constitutional:  Negative for activity change, appetite change and fever. HENT:  Negative for congestion, ear pain and rhinorrhea. Eyes:  Negative for discharge and visual disturbance. Respiratory:  Negative for cough, chest tightness and shortness of breath. Cardiovascular:  Negative for chest pain and palpitations. Gastrointestinal:  Negative for abdominal pain, constipation, diarrhea and vomiting. Genitourinary:  Negative for difficulty urinating and hematuria. Musculoskeletal:  Positive for arthralgias and myalgias. Skin:  Negative for rash. Neurological:  Negative for dizziness, weakness, numbness and headaches. Psychiatric/Behavioral:  The patient is not nervous/anxious. Allergies   Allergen Reactions    Metformin And Related Nausea And Vomiting       Outpatient Medications Prior to Visit   Medication Sig Dispense Refill    acetaminophen (TYLENOL) 500 MG tablet Take 500 mg by mouth every 6 hours as needed for Pain As needed      Dulaglutide (TRULICITY) 1.5 RQ/1.9SJ SOPN Inject 1.5 mg into the skin once a week 4 pen 0    lisinopril (PRINIVIL;ZESTRIL) 20 MG tablet Take 1 tablet by mouth daily 90 tablet 0    glimepiride (AMARYL) 4 MG tablet Take 1 tablet by mouth every morning (before breakfast) 90 tablet 0    empagliflozin (JARDIANCE) 25 MG tablet Take 1 tablet by mouth daily 30 tablet 3    atorvastatin (LIPITOR) 40 MG tablet Take 1 tablet by mouth daily 30 tablet 2    esomeprazole (NEXIUM) 20 MG delayed release capsule Take 1 capsule by mouth every morning (before breakfast) 90 capsule 0     No facility-administered medications prior to visit.         Past Medical History:   Diagnosis Date    Hypertension     Prediabetes         Social History     Tobacco Use    Smoking status: Every Day     Packs/day: 0.50     Years: 33.00     Pack years: 16.50     Types: Cigarettes    Smokeless tobacco: Never   Substance Use Topics    Alcohol use: Yes     Comment: rarely        Past Surgical History:   Procedure Laterality Date    TONSILLECTOMY         Family History   Problem Relation Age of Onset    Diabetes Father     Stroke Father     Diabetes Brother     Diabetes Brother        Objective       /80   Pulse 85   Temp 96.8 °F (36 °C) (Temporal)   Resp 16   Wt 140 lb 9.6 oz (63.8 kg)   SpO2 99%   BMI 20.17 kg/m²   Physical Exam  Vitals reviewed. Constitutional:       Appearance: He is well-developed. HENT:      Head: Normocephalic and atraumatic. Right Ear: External ear normal.      Left Ear: External ear normal.   Eyes:      Conjunctiva/sclera: Conjunctivae normal.   Neck:      Thyroid: No thyromegaly. Trachea: Trachea normal.   Cardiovascular:      Rate and Rhythm: Normal rate and regular rhythm. Heart sounds: Normal heart sounds. No murmur heard. No friction rub. No gallop. Pulmonary:      Effort: Pulmonary effort is normal.      Breath sounds: Normal breath sounds. Chest:      Chest wall: Tenderness present. Abdominal:      General: Bowel sounds are normal.      Palpations: Abdomen is soft. Tenderness: There is no abdominal tenderness. Musculoskeletal:         General: Normal range of motion. Cervical back: Normal range of motion and neck supple. No spinous process tenderness. Skin:     General: Skin is warm and dry. Findings: No erythema or rash. Neurological:      Mental Status: He is alert and oriented to person, place, and time. Psychiatric:         Speech: Speech normal.         Behavior: Behavior normal.         Thought Content:  Thought content normal.         Data Reviewed and Summarized       Labs:   Lab Results   Component Value Date    TSH 1.03 05/24/2022     Lab Results   Component Value Date    WBC 9.0 10/05/2022    HGB 12.6 (L) 10/05/2022    HCT 39.3 (L) 10/05/2022    MCV 89.3 10/05/2022     10/05/2022    LABLYMP 17.3 10/05/2022    LYMPHOPCT 22 (L) 05/24/2022    RBC 4.40 (L) 10/05/2022    MCH 28.6 10/05/2022    MCHC 32.1 (L) 10/05/2022    RDW 12.5 05/24/2022         Lab Results   Component Value Date     10/05/2022    K 4.1 10/05/2022     10/05/2022    CO2 28 10/05/2022    BUN 16 10/05/2022    CREATININE 1.3 (H) 10/05/2022    GLUCOSE 181 (H) 10/05/2022    CALCIUM 9.4 10/05/2022    PROT 6.6 10/05/2022    LABALBU 4.0 10/05/2022    BILITOT 0.4 10/05/2022    ALKPHOS 77 10/05/2022    AST 13 10/05/2022    ALT 9 (L) 10/05/2022    LABGLOM 56 (A) 10/05/2022    GFRAA 58 (L) 05/24/2022       Hemoglobin A1C   Date Value Ref Range Status   08/23/2022 7.8 % Final         Imaging/Testing:  No radiation information found for this patient  Narrative   PROCEDURE: CT THORACIC RECONSTRUCTION WO POST PROCESS       CLINICAL INFORMATION: fall in shower. COMPARISON: No prior study. TECHNIQUE: 3 mm axial noncontrast CT images were reconstructed through the thoracic spine. These are reconstructed from the patient's chest CT. IV contrast is present. Sagittal and coronal reconstructions were obtained. All CT scans at this facility use dose modulation, iterative reconstruction, and/or weight-based dosing when appropriate to reduce radiation dose to as low as reasonably achievable. FINDINGS:               There is mild thoracic dextroscoliosis. .  There is normal mineralization. There is thoracic spondylosis especially at T6-7, T7-8, T8-9 and T9-10 with disc space narrowing and spurring. There are ventral osteophytes at T4 and T5. There are no acute    compression fractures in the thoracic spine. On the axial images, there is mild spinal canal stenosis at T7 and T9. There are minimally displaced fractures involving approximately the left sixth, seventh and eighth ribs near the costovertebral junctions. There are bilateral pleural effusions present. .           Impression       1.  Mild thoracic dextroscoliosis and thoracic spondylosis especially at T6-7, T7-8, T8-9 and T9-T10.   2. Ventral osteophytes at T4 and T5.   3. No acute fracture in the thoracic spine. 4. Mild canal stenosis at T7 and T9.   5. Minimally displaced fractures involving the left sixth, seventh and eighth ribs near the costovertebral junctions. 6. Bilateral pleural effusions. **This report has been created using voice recognition software. It may contain minor errors which are inherent in voice recognition technology. **       Final report electronically signed by DR Milta Severe on 10/5/2022 8:32 AM     Narrative   PROCEDURE: CT CHEST W CONTRAST       CLINICAL INFORMATION: fall in shower. Left-sided rib and back pain. COMPARISON: No prior study. TECHNIQUE: 5 mm axial images were obtained through the chest after the administration of 80 mL Isovue-370 injected in the left [facility] with sagittal coronal reconstructions. All CT scans at this facility use dose modulation, iterative reconstruction, and/or weight-based dosing when appropriate to reduce radiation dose to as low as reasonably achievable. FINDINGS:   There are small right and moderate left pleural effusions. There is mild to moderate adjacent atelectasis. There are mild emphysematous changes predominantly in the upper lungs. Lungs otherwise appear clear. No axillary, mediastinal or hilar    lymphadenopathy is identified. The heart is normal in size. There is a small anterior pericardial effusion. There is a partially imaged hypodense cystic lesion in the left kidney. Visualized upper abdominal solid organs are otherwise unremarkable. There is a minimally displaced fracture at the posterior lateral aspect of the left eighth rib. There is minimal    buckling of the proximal left seventh rib at the costovertebral junction. There is mild anterior wedge shape configuration of the T6-T8 vertebral bodies with approximately 10% anterior height loss, stable compared to prior chest radiographs in 2013. There is a dextrocurvature of the thoracic spine.  There is otherwise anatomic vertebral body height and alignment. There is a nondisplaced fracture at the posterior lateral aspect of the left seventh rib and a minimally displaced fracture of the left    seventh rib more medially just distal to the costovertebral junction. Impression       1. Compound fractures of the left seventh and eighth ribs with minimal displacement. 2. Moderate left and mild right pleural effusions with adjacent atelectasis. 3. Minimal pericardial effusion. 4. Mild emphysematous changes. **This report has been created using voice recognition software. It may contain minor errors which are inherent in voice recognition technology. **       Final report electronically signed by Dr. Helen Hernandez MD on 10/5/2022 8:41 AM       BLACK Delgadillo - CNP

## 2022-10-12 NOTE — LETTER
[address] [place]  [facility] [place] [address]  Phone: [phone]  Fax: [phone]    BLACK Dickinson CNP        October 12, 2022     Patient: Elson Fleischer   YOB: 1960   Date of Visit: 10/12/2022       To Whom it May Concern:    Patti Bar was seen in my clinic on 10/12/2022. He needs to be off from 10-5-2022 may return to work on 10/27/2022. If you have any questions or concerns, please don't hesitate to call.     Sincerely,         BLACK Dickinson CNP

## 2022-10-26 ENCOUNTER — ANCILLARY PROCEDURE (OUTPATIENT)
Dept: FAMILY MEDICINE CLINIC | Age: 62
End: 2022-10-26
Payer: COMMERCIAL

## 2022-10-26 ENCOUNTER — OFFICE VISIT (OUTPATIENT)
Dept: FAMILY MEDICINE CLINIC | Age: 62
End: 2022-10-26
Payer: COMMERCIAL

## 2022-10-26 VITALS
RESPIRATION RATE: 16 BRPM | TEMPERATURE: 97.2 F | HEART RATE: 95 BPM | DIASTOLIC BLOOD PRESSURE: 88 MMHG | WEIGHT: 151 LBS | OXYGEN SATURATION: 96 % | BODY MASS INDEX: 21.67 KG/M2 | SYSTOLIC BLOOD PRESSURE: 148 MMHG

## 2022-10-26 DIAGNOSIS — Z23 NEED FOR DIPHTHERIA-TETANUS-PERTUSSIS (TDAP) VACCINE: ICD-10-CM

## 2022-10-26 DIAGNOSIS — Z23 NEED FOR IMMUNIZATION AGAINST INFLUENZA: ICD-10-CM

## 2022-10-26 DIAGNOSIS — I10 ESSENTIAL (PRIMARY) HYPERTENSION: ICD-10-CM

## 2022-10-26 DIAGNOSIS — Z72.0 TOBACCO ABUSE: ICD-10-CM

## 2022-10-26 DIAGNOSIS — S22.49XD CLOSED FRACTURE OF MULTIPLE RIBS WITH ROUTINE HEALING, UNSPECIFIED LATERALITY, SUBSEQUENT ENCOUNTER: Primary | ICD-10-CM

## 2022-10-26 DIAGNOSIS — S22.49XD CLOSED FRACTURE OF MULTIPLE RIBS WITH ROUTINE HEALING, UNSPECIFIED LATERALITY, SUBSEQUENT ENCOUNTER: ICD-10-CM

## 2022-10-26 PROCEDURE — 3074F SYST BP LT 130 MM HG: CPT | Performed by: NURSE PRACTITIONER

## 2022-10-26 PROCEDURE — 71046 X-RAY EXAM CHEST 2 VIEWS: CPT | Performed by: NURSE PRACTITIONER

## 2022-10-26 PROCEDURE — 90472 IMMUNIZATION ADMIN EACH ADD: CPT | Performed by: NURSE PRACTITIONER

## 2022-10-26 PROCEDURE — 71046 X-RAY EXAM CHEST 2 VIEWS: CPT

## 2022-10-26 PROCEDURE — 90471 IMMUNIZATION ADMIN: CPT | Performed by: NURSE PRACTITIONER

## 2022-10-26 PROCEDURE — 3078F DIAST BP <80 MM HG: CPT | Performed by: NURSE PRACTITIONER

## 2022-10-26 PROCEDURE — 90674 CCIIV4 VAC NO PRSV 0.5 ML IM: CPT | Performed by: NURSE PRACTITIONER

## 2022-10-26 PROCEDURE — 90715 TDAP VACCINE 7 YRS/> IM: CPT | Performed by: NURSE PRACTITIONER

## 2022-10-26 PROCEDURE — 99214 OFFICE O/P EST MOD 30 MIN: CPT | Performed by: NURSE PRACTITIONER

## 2022-10-26 ASSESSMENT — PATIENT HEALTH QUESTIONNAIRE - PHQ9
SUM OF ALL RESPONSES TO PHQ QUESTIONS 1-9: 0
1. LITTLE INTEREST OR PLEASURE IN DOING THINGS: 0
2. FEELING DOWN, DEPRESSED OR HOPELESS: 0
SUM OF ALL RESPONSES TO PHQ QUESTIONS 1-9: 0
SUM OF ALL RESPONSES TO PHQ9 QUESTIONS 1 & 2: 0

## 2022-10-26 ASSESSMENT — ENCOUNTER SYMPTOMS
VOMITING: 0
EYE DISCHARGE: 0
SHORTNESS OF BREATH: 0
COUGH: 0
DIARRHEA: 0
CHEST TIGHTNESS: 0
ABDOMINAL PAIN: 0
CONSTIPATION: 0
RHINORRHEA: 0

## 2022-10-26 NOTE — PROGRESS NOTES
Vaccine Information Sheet, \"Influenza - Inactivated\"  given to Lisbeth Schuster, or parent/legal guardian of  Lisbeth Schuster and verbalized understanding. Patient responses:    Have you ever had a reaction to a flu vaccine? No  Do you have an allergy to eggs, neomycin or polymixin? No  Do you have an allergy to Thimerosal, contact lens solution, or Merthiolate? No  Have you ever had [place] Syndrome? No  Do you have any current illness? No  Do you have a temperature above 100 degrees? No  Are you pregnant? No  If pregnant, permission obtained from physician? N/a  Do you have an active neurological disorder? No      Flu vaccine given per order. Please see immunization tab. After obtaining consent, and per orders of Andreas Vicente CNP, injection of influenza 0.5ml given in Right deltoid by Clare Sullivan MA. Patient instructed to remain in clinic for 20 minutes afterwards, and to report any adverse reaction to me immediately. After obtaining consent, and per orders of Andreas Vicente CNP, injection of boostrix 0.5ml given in Right deltoid by Clare Sullivan MA. Patient instructed to remain in clinic for 20 minutes afterwards, and to report any adverse reaction to me immediately. Immunizations Administered       Name Date Dose Route    Influenza, FLUCELVAX, (age 10 mo+), MDCK, PF, 0.5mL 10/26/2022 0.5 mL Intramuscular    Site: Deltoid- Right    Lot: [id number]    NDC: 63971-303-99    Tdap (Boostrix, Adacel) 10/26/2022 0.5 mL Intramuscular    Site: Deltoid- Right    Lot: [id number]    ND: 54371-570-37                Pt tolerated well. VIS was given.

## 2022-10-26 NOTE — LETTER
[address], [place]  [facility] [address]. [place] [address]  Phone: [phone]  Fax: [phone]    BLACK Zuniga CNP        October 26, 2022     Patient: Geena Torres   YOB: 1960   Date of Visit: 10/26/2022       To Whom it May Concern:    Matthew Phillip was seen in my clinic on 10/26/2022. FMLA is extended to November 3, 2022 with return to 11/4/2022   If you have any questions or concerns, please don't hesitate to call.     Sincerely,         BLACK Zuniga CNP

## 2022-10-26 NOTE — PROGRESS NOTES
[place]addie [place] [address] [facility] [place]. [facility] [address]  Dept: [phone]  Dept Fax: [phone]    Visit type: Established patient    Reason for Visit: Follow-up (Rib fracture /), Health Maintenance (Vaccines /Diabetic retinal exam /), and Hypertension         Assessment and Plan       1. Closed fracture of multiple ribs with routine healing, unspecified laterality, subsequent encounter  -     XR CHEST STANDARD (2 VW); Future  2. Tobacco abuse  3. Essential (primary) hypertension  4. Need for diphtheria-tetanus-pertussis (Tdap) vaccine  -     Tdap, BOOSTRIX, (age 8 yrs+), IM  5. Need for immunization against influenza  -     Influenza, FLUCELVAX, (age 10 mo+), IM, Preservative Free, 0.5 mL    Will do repeat xray to see how he is healing  Continue cough and deep breathing exercise  PFT due- needs to complete prior to follow up visit   Will plan to be off until November 3rd   New grandparent- discussed importance of updated tdap and influenza vaccine   BP is elevated today- will re eval at follow up visit- need to cut back on smoking and follow AHA. ADA diet  If still uncontrolled at follow up follow up visit will add HCTZ to lisinopril   Return for keep fu apt, chest xray today . Subjective       Fell in shower on 10/5. States when he went to wash his hair his feet gave out and hit his head. Had CT scan back. Multiple ribs were fractured Has not follow up with orthopedic. Pain is controlled with tylenol. States intially it was a 7/10 now it is at 5-6/10. 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 Is a smoker and would like to try wellbutrin to quit again. No diagnosed of COPD but abnormal CT showed possibility of this. Has not had PFT completed  Update 10/26/2022 States that pain is better. Is doing pursed lipped breathing.  Has not scheduled his PFT yet.     States that He is on short term disability right now. States that he is doing a lot of bending and side to side in which he has more pain          Review of Systems   Constitutional:  Negative for activity change, appetite change and fever. HENT:  Negative for congestion, ear pain and rhinorrhea. Eyes:  Negative for discharge and visual disturbance. Respiratory:  Negative for cough, chest tightness and shortness of breath. Cardiovascular:  Negative for chest pain and palpitations. Gastrointestinal:  Negative for abdominal pain, constipation, diarrhea and vomiting. Genitourinary:  Negative for difficulty urinating and hematuria. Musculoskeletal:  Positive for arthralgias and myalgias. Skin:  Negative for rash. Neurological:  Negative for dizziness, weakness, numbness and headaches. Psychiatric/Behavioral:  The patient is not nervous/anxious. Allergies   Allergen Reactions    Metformin And Related Nausea And Vomiting       Outpatient Medications Prior to Visit   Medication Sig Dispense Refill    acetaminophen (TYLENOL) 500 MG tablet Take 500 mg by mouth every 6 hours as needed for Pain As needed      buPROPion (WELLBUTRIN XL) 150 MG extended release tablet Take 1 tablet by mouth every morning 30 tablet 1    omeprazole (PRILOSEC) 40 MG delayed release capsule Take 1 capsule by mouth daily 90 capsule 3    Dulaglutide (TRULICITY) 1.5 TF/5.5HR SOPN Inject 1.5 mg into the skin once a week 4 pen 0    lisinopril (PRINIVIL;ZESTRIL) 20 MG tablet Take 1 tablet by mouth daily 90 tablet 0    glimepiride (AMARYL) 4 MG tablet Take 1 tablet by mouth every morning (before breakfast) 90 tablet 0    empagliflozin (JARDIANCE) 25 MG tablet Take 1 tablet by mouth daily 30 tablet 3    atorvastatin (LIPITOR) 40 MG tablet Take 1 tablet by mouth daily 30 tablet 2     No facility-administered medications prior to visit.         Past Medical History:   Diagnosis Date    Hypertension     Prediabetes Social History     Tobacco Use    Smoking status: Every Day     Packs/day: 0.50     Years: 33.00     Pack years: 16.50     Types: Cigarettes    Smokeless tobacco: Never   Substance Use Topics    Alcohol use: Yes     Comment: rarely        Past Surgical History:   Procedure Laterality Date    TONSILLECTOMY         Family History   Problem Relation Age of Onset    Diabetes Father     Stroke Father     Diabetes Brother     Diabetes Brother        Objective       BP (!) 148/88   Pulse 95   Temp 97.2 °F (36.2 °C) (Temporal)   Resp 16   Wt 151 lb (68.5 kg)   SpO2 96%   BMI 21.67 kg/m²   Physical Exam  Vitals reviewed. Constitutional:       Appearance: He is well-developed. HENT:      Head: Normocephalic and atraumatic. Right Ear: External ear normal.      Left Ear: External ear normal.   Eyes:      Conjunctiva/sclera: Conjunctivae normal.   Neck:      Thyroid: No thyromegaly. Trachea: Trachea normal.   Cardiovascular:      Rate and Rhythm: Normal rate and regular rhythm. Heart sounds: Normal heart sounds. No murmur heard. No friction rub. No gallop. Pulmonary:      Effort: Pulmonary effort is normal.      Breath sounds: Normal breath sounds. Chest:      Chest wall: Tenderness present. Abdominal:      General: Bowel sounds are normal.      Palpations: Abdomen is soft. Tenderness: There is no abdominal tenderness. Musculoskeletal:         General: Normal range of motion. Cervical back: Normal range of motion and neck supple. No spinous process tenderness. Skin:     General: Skin is warm and dry. Findings: No erythema or rash. Neurological:      Mental Status: He is alert and oriented to person, place, and time. Psychiatric:         Speech: Speech normal.         Behavior: Behavior normal.         Thought Content:  Thought content normal.         Data Reviewed and Summarized       Labs:     Imaging/Testing:        Claudene New, APRN - CNP

## 2022-10-27 DIAGNOSIS — I10 ESSENTIAL (PRIMARY) HYPERTENSION: ICD-10-CM

## 2022-10-27 DIAGNOSIS — J90 BILATERAL PLEURAL EFFUSION: Primary | ICD-10-CM

## 2022-10-28 ENCOUNTER — APPOINTMENT (OUTPATIENT)
Dept: CT IMAGING | Age: 62
DRG: 233 | End: 2022-10-28
Payer: COMMERCIAL

## 2022-10-28 ENCOUNTER — APPOINTMENT (OUTPATIENT)
Dept: GENERAL RADIOLOGY | Age: 62
DRG: 233 | End: 2022-10-28
Payer: COMMERCIAL

## 2022-10-28 ENCOUNTER — HOSPITAL ENCOUNTER (INPATIENT)
Age: 62
LOS: 17 days | Discharge: HOME HEALTH | DRG: 233 | End: 2022-11-15
Attending: STUDENT IN AN ORGANIZED HEALTH CARE EDUCATION/TRAINING PROGRAM | Admitting: STUDENT IN AN ORGANIZED HEALTH CARE EDUCATION/TRAINING PROGRAM
Payer: COMMERCIAL

## 2022-10-28 DIAGNOSIS — R77.8 ELEVATED TROPONIN: ICD-10-CM

## 2022-10-28 DIAGNOSIS — I50.21 ACUTE SYSTOLIC CONGESTIVE HEART FAILURE (HCC): ICD-10-CM

## 2022-10-28 DIAGNOSIS — Z95.1 S/P CABG X 2: Primary | ICD-10-CM

## 2022-10-28 LAB
ANION GAP SERPL CALCULATED.3IONS-SCNC: 12 MEQ/L (ref 8–16)
BASOPHILS # BLD: 0.9 %
BASOPHILS ABSOLUTE: 0.1 THOU/MM3 (ref 0–0.1)
BUN BLDV-MCNC: 19 MG/DL (ref 7–22)
CALCIUM SERPL-MCNC: 9.9 MG/DL (ref 8.5–10.5)
CHLORIDE BLD-SCNC: 104 MEQ/L (ref 98–111)
CO2: 26 MEQ/L (ref 23–33)
CREAT SERPL-MCNC: 1.3 MG/DL (ref 0.4–1.2)
EOSINOPHIL # BLD: 3.4 %
EOSINOPHILS ABSOLUTE: 0.3 THOU/MM3 (ref 0–0.4)
ERYTHROCYTE [DISTWIDTH] IN BLOOD BY AUTOMATED COUNT: 13.4 % (ref 11.5–14.5)
ERYTHROCYTE [DISTWIDTH] IN BLOOD BY AUTOMATED COUNT: 43.7 FL (ref 35–45)
GFR SERPL CREATININE-BSD FRML MDRD: > 60 ML/MIN/1.73M2
GLUCOSE BLD-MCNC: 115 MG/DL (ref 70–108)
HCT VFR BLD CALC: 43.9 % (ref 42–52)
HEMOGLOBIN: 13.7 GM/DL (ref 14–18)
IMMATURE GRANS (ABS): 0.02 THOU/MM3 (ref 0–0.07)
IMMATURE GRANULOCYTES: 0.3 %
LYMPHOCYTES # BLD: 23.5 %
LYMPHOCYTES ABSOLUTE: 1.8 THOU/MM3 (ref 1–4.8)
MCH RBC QN AUTO: 28 PG (ref 26–33)
MCHC RBC AUTO-ENTMCNC: 31.2 GM/DL (ref 32.2–35.5)
MCV RBC AUTO: 89.8 FL (ref 80–94)
MONOCYTES # BLD: 8.4 %
MONOCYTES ABSOLUTE: 0.6 THOU/MM3 (ref 0.4–1.3)
NUCLEATED RED BLOOD CELLS: 0 /100 WBC
OSMOLALITY CALCULATION: 286.3 MOSMOL/KG (ref 275–300)
PLATELET # BLD: 281 THOU/MM3 (ref 130–400)
PMV BLD AUTO: 10.5 FL (ref 9.4–12.4)
POTASSIUM REFLEX MAGNESIUM: 4.3 MEQ/L (ref 3.5–5.2)
PRO-BNP: 8508 PG/ML (ref 0–900)
RBC # BLD: 4.89 MILL/MM3 (ref 4.7–6.1)
SEG NEUTROPHILS: 63.5 %
SEGMENTED NEUTROPHILS ABSOLUTE COUNT: 4.8 THOU/MM3 (ref 1.8–7.7)
SODIUM BLD-SCNC: 142 MEQ/L (ref 135–145)
TROPONIN T: 0.04 NG/ML
WBC # BLD: 7.5 THOU/MM3 (ref 4.8–10.8)

## 2022-10-28 PROCEDURE — 93005 ELECTROCARDIOGRAM TRACING: CPT | Performed by: STUDENT IN AN ORGANIZED HEALTH CARE EDUCATION/TRAINING PROGRAM

## 2022-10-28 PROCEDURE — 2580000003 HC RX 258: Performed by: STUDENT IN AN ORGANIZED HEALTH CARE EDUCATION/TRAINING PROGRAM

## 2022-10-28 PROCEDURE — 6370000000 HC RX 637 (ALT 250 FOR IP): Performed by: STUDENT IN AN ORGANIZED HEALTH CARE EDUCATION/TRAINING PROGRAM

## 2022-10-28 PROCEDURE — 83880 ASSAY OF NATRIURETIC PEPTIDE: CPT

## 2022-10-28 PROCEDURE — 36415 COLL VENOUS BLD VENIPUNCTURE: CPT

## 2022-10-28 PROCEDURE — 96374 THER/PROPH/DIAG INJ IV PUSH: CPT

## 2022-10-28 PROCEDURE — 71045 X-RAY EXAM CHEST 1 VIEW: CPT

## 2022-10-28 PROCEDURE — 99285 EMERGENCY DEPT VISIT HI MDM: CPT

## 2022-10-28 PROCEDURE — 85025 COMPLETE CBC W/AUTO DIFF WBC: CPT

## 2022-10-28 PROCEDURE — 84484 ASSAY OF TROPONIN QUANT: CPT

## 2022-10-28 PROCEDURE — 6360000004 HC RX CONTRAST MEDICATION: Performed by: STUDENT IN AN ORGANIZED HEALTH CARE EDUCATION/TRAINING PROGRAM

## 2022-10-28 PROCEDURE — 71275 CT ANGIOGRAPHY CHEST: CPT

## 2022-10-28 PROCEDURE — 80048 BASIC METABOLIC PNL TOTAL CA: CPT

## 2022-10-28 RX ORDER — 0.9 % SODIUM CHLORIDE 0.9 %
1000 INTRAVENOUS SOLUTION INTRAVENOUS ONCE
Status: COMPLETED | OUTPATIENT
Start: 2022-10-28 | End: 2022-10-29

## 2022-10-28 RX ORDER — IPRATROPIUM BROMIDE AND ALBUTEROL SULFATE 2.5; .5 MG/3ML; MG/3ML
2 SOLUTION RESPIRATORY (INHALATION) ONCE
Status: COMPLETED | OUTPATIENT
Start: 2022-10-28 | End: 2022-10-28

## 2022-10-28 RX ADMIN — IPRATROPIUM BROMIDE AND ALBUTEROL SULFATE 2 AMPULE: .5; 3 SOLUTION RESPIRATORY (INHALATION) at 22:48

## 2022-10-28 RX ADMIN — SODIUM CHLORIDE 1000 ML: 9 INJECTION, SOLUTION INTRAVENOUS at 22:47

## 2022-10-28 RX ADMIN — IOPAMIDOL 80 ML: 755 INJECTION, SOLUTION INTRAVENOUS at 23:17

## 2022-10-28 ASSESSMENT — PAIN - FUNCTIONAL ASSESSMENT: PAIN_FUNCTIONAL_ASSESSMENT: NONE - DENIES PAIN

## 2022-10-29 PROBLEM — I50.9 NEW ONSET OF CONGESTIVE HEART FAILURE (HCC): Status: ACTIVE | Noted: 2022-10-29

## 2022-10-29 PROBLEM — I50.21 ACUTE SYSTOLIC CONGESTIVE HEART FAILURE (HCC): Status: ACTIVE | Noted: 2022-10-29

## 2022-10-29 LAB
AMPHETAMINE+METHAMPHETAMINE URINE SCREEN: NEGATIVE
ANION GAP SERPL CALCULATED.3IONS-SCNC: 13 MEQ/L (ref 8–16)
BARBITURATE QUANTITATIVE URINE: NEGATIVE
BASOPHILS # BLD: 0.7 %
BASOPHILS ABSOLUTE: 0.1 THOU/MM3 (ref 0–0.1)
BENZODIAZEPINE QUANTITATIVE URINE: NEGATIVE
BUN BLDV-MCNC: 18 MG/DL (ref 7–22)
CALCIUM SERPL-MCNC: 8.8 MG/DL (ref 8.5–10.5)
CANNABINOID QUANTITATIVE URINE: NEGATIVE
CHLORIDE BLD-SCNC: 106 MEQ/L (ref 98–111)
CO2: 24 MEQ/L (ref 23–33)
COCAINE METABOLITE QUANTITATIVE URINE: NEGATIVE
CREAT SERPL-MCNC: 1.4 MG/DL (ref 0.4–1.2)
EOSINOPHIL # BLD: 1.9 %
EOSINOPHILS ABSOLUTE: 0.2 THOU/MM3 (ref 0–0.4)
ERYTHROCYTE [DISTWIDTH] IN BLOOD BY AUTOMATED COUNT: 13.3 % (ref 11.5–14.5)
ERYTHROCYTE [DISTWIDTH] IN BLOOD BY AUTOMATED COUNT: 43.8 FL (ref 35–45)
ETHYL ALCOHOL, SERUM: < 0.01 %
FENTANYL: NEGATIVE
GFR SERPL CREATININE-BSD FRML MDRD: 57 ML/MIN/1.73M2
GLUCOSE BLD-MCNC: 156 MG/DL (ref 70–108)
GLUCOSE BLD-MCNC: 162 MG/DL (ref 70–108)
GLUCOSE BLD-MCNC: 164 MG/DL (ref 70–108)
GLUCOSE BLD-MCNC: 202 MG/DL (ref 70–108)
HCT VFR BLD CALC: 36.9 % (ref 42–52)
HEMOGLOBIN: 11.5 GM/DL (ref 14–18)
IMMATURE GRANS (ABS): 0.03 THOU/MM3 (ref 0–0.07)
IMMATURE GRANULOCYTES: 0.3 %
LV EF: 28 %
LVEF MODALITY: NORMAL
LYMPHOCYTES # BLD: 16.1 %
LYMPHOCYTES ABSOLUTE: 1.4 THOU/MM3 (ref 1–4.8)
MAGNESIUM: 1.7 MG/DL (ref 1.6–2.4)
MCH RBC QN AUTO: 28.1 PG (ref 26–33)
MCHC RBC AUTO-ENTMCNC: 31.2 GM/DL (ref 32.2–35.5)
MCV RBC AUTO: 90.2 FL (ref 80–94)
MONOCYTES # BLD: 8.2 %
MONOCYTES ABSOLUTE: 0.7 THOU/MM3 (ref 0.4–1.3)
NUCLEATED RED BLOOD CELLS: 0 /100 WBC
OPIATES, URINE: NEGATIVE
OXYCODONE: NEGATIVE
PHENCYCLIDINE QUANTITATIVE URINE: NEGATIVE
PHOSPHORUS: 3.5 MG/DL (ref 2.4–4.7)
PLATELET # BLD: 237 THOU/MM3 (ref 130–400)
PMV BLD AUTO: 10.5 FL (ref 9.4–12.4)
POTASSIUM SERPL-SCNC: 4 MEQ/L (ref 3.5–5.2)
RBC # BLD: 4.09 MILL/MM3 (ref 4.7–6.1)
SEG NEUTROPHILS: 72.8 %
SEGMENTED NEUTROPHILS ABSOLUTE COUNT: 6.3 THOU/MM3 (ref 1.8–7.7)
SODIUM BLD-SCNC: 143 MEQ/L (ref 135–145)
T4 FREE: 1.42 NG/DL (ref 0.93–1.76)
TROPONIN T: 0.03 NG/ML
TROPONIN T: 0.04 NG/ML
TSH SERPL DL<=0.05 MIU/L-ACNC: 1.02 UIU/ML (ref 0.4–4.2)
WBC # BLD: 8.6 THOU/MM3 (ref 4.8–10.8)

## 2022-10-29 PROCEDURE — 84100 ASSAY OF PHOSPHORUS: CPT

## 2022-10-29 PROCEDURE — 1200000003 HC TELEMETRY R&B

## 2022-10-29 PROCEDURE — 80307 DRUG TEST PRSMV CHEM ANLYZR: CPT

## 2022-10-29 PROCEDURE — 85025 COMPLETE CBC W/AUTO DIFF WBC: CPT

## 2022-10-29 PROCEDURE — 6370000000 HC RX 637 (ALT 250 FOR IP): Performed by: STUDENT IN AN ORGANIZED HEALTH CARE EDUCATION/TRAINING PROGRAM

## 2022-10-29 PROCEDURE — 6360000002 HC RX W HCPCS: Performed by: STUDENT IN AN ORGANIZED HEALTH CARE EDUCATION/TRAINING PROGRAM

## 2022-10-29 PROCEDURE — 84439 ASSAY OF FREE THYROXINE: CPT

## 2022-10-29 PROCEDURE — 99222 1ST HOSP IP/OBS MODERATE 55: CPT | Performed by: INTERNAL MEDICINE

## 2022-10-29 PROCEDURE — 36415 COLL VENOUS BLD VENIPUNCTURE: CPT

## 2022-10-29 PROCEDURE — 84484 ASSAY OF TROPONIN QUANT: CPT

## 2022-10-29 PROCEDURE — 93306 TTE W/DOPPLER COMPLETE: CPT

## 2022-10-29 PROCEDURE — 83735 ASSAY OF MAGNESIUM: CPT

## 2022-10-29 PROCEDURE — 99223 1ST HOSP IP/OBS HIGH 75: CPT | Performed by: STUDENT IN AN ORGANIZED HEALTH CARE EDUCATION/TRAINING PROGRAM

## 2022-10-29 PROCEDURE — 82948 REAGENT STRIP/BLOOD GLUCOSE: CPT

## 2022-10-29 PROCEDURE — 82077 ASSAY SPEC XCP UR&BREATH IA: CPT

## 2022-10-29 PROCEDURE — 80048 BASIC METABOLIC PNL TOTAL CA: CPT

## 2022-10-29 PROCEDURE — 2580000003 HC RX 258: Performed by: STUDENT IN AN ORGANIZED HEALTH CARE EDUCATION/TRAINING PROGRAM

## 2022-10-29 PROCEDURE — 1200000000 HC SEMI PRIVATE

## 2022-10-29 PROCEDURE — 6370000000 HC RX 637 (ALT 250 FOR IP): Performed by: INTERNAL MEDICINE

## 2022-10-29 PROCEDURE — 84443 ASSAY THYROID STIM HORMONE: CPT

## 2022-10-29 RX ORDER — FUROSEMIDE 10 MG/ML
40 INJECTION INTRAMUSCULAR; INTRAVENOUS 2 TIMES DAILY
Status: DISCONTINUED | OUTPATIENT
Start: 2022-10-29 | End: 2022-11-02

## 2022-10-29 RX ORDER — DEXTROSE MONOHYDRATE 100 MG/ML
INJECTION, SOLUTION INTRAVENOUS CONTINUOUS PRN
Status: DISCONTINUED | OUTPATIENT
Start: 2022-10-29 | End: 2022-11-07

## 2022-10-29 RX ORDER — ATORVASTATIN CALCIUM 40 MG/1
40 TABLET, FILM COATED ORAL DAILY
Status: DISCONTINUED | OUTPATIENT
Start: 2022-10-29 | End: 2022-11-07

## 2022-10-29 RX ORDER — PANTOPRAZOLE SODIUM 40 MG/1
40 TABLET, DELAYED RELEASE ORAL
Status: DISCONTINUED | OUTPATIENT
Start: 2022-10-29 | End: 2022-11-07

## 2022-10-29 RX ORDER — ACETAMINOPHEN 325 MG/1
650 TABLET ORAL EVERY 6 HOURS PRN
Status: DISCONTINUED | OUTPATIENT
Start: 2022-10-29 | End: 2022-11-01 | Stop reason: SDUPTHER

## 2022-10-29 RX ORDER — FUROSEMIDE 10 MG/ML
20 INJECTION INTRAMUSCULAR; INTRAVENOUS ONCE
Status: COMPLETED | OUTPATIENT
Start: 2022-10-29 | End: 2022-10-29

## 2022-10-29 RX ORDER — SODIUM CHLORIDE 9 MG/ML
INJECTION, SOLUTION INTRAVENOUS PRN
Status: DISCONTINUED | OUTPATIENT
Start: 2022-10-29 | End: 2022-11-07

## 2022-10-29 RX ORDER — INSULIN LISPRO 100 [IU]/ML
0-4 INJECTION, SOLUTION INTRAVENOUS; SUBCUTANEOUS
Status: DISCONTINUED | OUTPATIENT
Start: 2022-10-29 | End: 2022-11-07

## 2022-10-29 RX ORDER — ACETAMINOPHEN 650 MG/1
650 SUPPOSITORY RECTAL EVERY 6 HOURS PRN
Status: DISCONTINUED | OUTPATIENT
Start: 2022-10-29 | End: 2022-11-07

## 2022-10-29 RX ORDER — FUROSEMIDE 10 MG/ML
20 INJECTION INTRAMUSCULAR; INTRAVENOUS ONCE
Status: DISCONTINUED | OUTPATIENT
Start: 2022-10-29 | End: 2022-10-29

## 2022-10-29 RX ORDER — ONDANSETRON 2 MG/ML
4 INJECTION INTRAMUSCULAR; INTRAVENOUS EVERY 6 HOURS PRN
Status: DISCONTINUED | OUTPATIENT
Start: 2022-10-29 | End: 2022-11-07

## 2022-10-29 RX ORDER — ASPIRIN 81 MG/1
324 TABLET, CHEWABLE ORAL ONCE
Status: COMPLETED | OUTPATIENT
Start: 2022-10-29 | End: 2022-10-29

## 2022-10-29 RX ORDER — METOPROLOL SUCCINATE 25 MG/1
25 TABLET, EXTENDED RELEASE ORAL DAILY
Status: DISCONTINUED | OUTPATIENT
Start: 2022-10-29 | End: 2022-11-07

## 2022-10-29 RX ORDER — IPRATROPIUM BROMIDE AND ALBUTEROL SULFATE 2.5; .5 MG/3ML; MG/3ML
1 SOLUTION RESPIRATORY (INHALATION) EVERY 4 HOURS PRN
Status: DISCONTINUED | OUTPATIENT
Start: 2022-10-29 | End: 2022-11-07

## 2022-10-29 RX ORDER — BUPROPION HYDROCHLORIDE 150 MG/1
150 TABLET ORAL EVERY MORNING
Status: DISCONTINUED | OUTPATIENT
Start: 2022-10-29 | End: 2022-11-07

## 2022-10-29 RX ORDER — SODIUM CHLORIDE 0.9 % (FLUSH) 0.9 %
5-40 SYRINGE (ML) INJECTION EVERY 12 HOURS SCHEDULED
Status: DISCONTINUED | OUTPATIENT
Start: 2022-10-29 | End: 2022-11-07

## 2022-10-29 RX ORDER — IPRATROPIUM BROMIDE AND ALBUTEROL SULFATE 2.5; .5 MG/3ML; MG/3ML
1 SOLUTION RESPIRATORY (INHALATION)
Status: DISCONTINUED | OUTPATIENT
Start: 2022-10-29 | End: 2022-10-29

## 2022-10-29 RX ORDER — ASPIRIN 81 MG/1
81 TABLET, CHEWABLE ORAL DAILY
Status: DISCONTINUED | OUTPATIENT
Start: 2022-10-29 | End: 2022-11-07

## 2022-10-29 RX ORDER — ENOXAPARIN SODIUM 100 MG/ML
40 INJECTION SUBCUTANEOUS DAILY
Status: DISCONTINUED | OUTPATIENT
Start: 2022-10-29 | End: 2022-11-07

## 2022-10-29 RX ORDER — IPRATROPIUM BROMIDE AND ALBUTEROL SULFATE 2.5; .5 MG/3ML; MG/3ML
2 SOLUTION RESPIRATORY (INHALATION) ONCE
Status: DISCONTINUED | OUTPATIENT
Start: 2022-10-29 | End: 2022-11-07

## 2022-10-29 RX ORDER — SODIUM CHLORIDE 0.9 % (FLUSH) 0.9 %
5-40 SYRINGE (ML) INJECTION PRN
Status: DISCONTINUED | OUTPATIENT
Start: 2022-10-29 | End: 2022-11-07

## 2022-10-29 RX ORDER — ONDANSETRON 4 MG/1
4 TABLET, ORALLY DISINTEGRATING ORAL EVERY 8 HOURS PRN
Status: DISCONTINUED | OUTPATIENT
Start: 2022-10-29 | End: 2022-11-07

## 2022-10-29 RX ORDER — INSULIN LISPRO 100 [IU]/ML
0-4 INJECTION, SOLUTION INTRAVENOUS; SUBCUTANEOUS NIGHTLY
Status: DISCONTINUED | OUTPATIENT
Start: 2022-10-29 | End: 2022-11-07

## 2022-10-29 RX ORDER — POLYETHYLENE GLYCOL 3350 17 G/17G
17 POWDER, FOR SOLUTION ORAL DAILY PRN
Status: DISCONTINUED | OUTPATIENT
Start: 2022-10-29 | End: 2022-11-07

## 2022-10-29 RX ORDER — LISINOPRIL 20 MG/1
20 TABLET ORAL DAILY
Status: DISCONTINUED | OUTPATIENT
Start: 2022-10-29 | End: 2022-10-29

## 2022-10-29 RX ADMIN — ATORVASTATIN CALCIUM 40 MG: 40 TABLET, FILM COATED ORAL at 10:48

## 2022-10-29 RX ADMIN — ACETAMINOPHEN 650 MG: 325 TABLET ORAL at 10:48

## 2022-10-29 RX ADMIN — PANTOPRAZOLE SODIUM 40 MG: 40 TABLET, DELAYED RELEASE ORAL at 05:44

## 2022-10-29 RX ADMIN — FUROSEMIDE 20 MG: 10 INJECTION, SOLUTION INTRAMUSCULAR; INTRAVENOUS at 04:10

## 2022-10-29 RX ADMIN — FUROSEMIDE 40 MG: 10 INJECTION, SOLUTION INTRAMUSCULAR; INTRAVENOUS at 18:20

## 2022-10-29 RX ADMIN — ASPIRIN 81 MG 81 MG: 81 TABLET ORAL at 10:49

## 2022-10-29 RX ADMIN — LISINOPRIL 20 MG: 20 TABLET ORAL at 10:48

## 2022-10-29 RX ADMIN — SODIUM CHLORIDE, PRESERVATIVE FREE 10 ML: 5 INJECTION INTRAVENOUS at 10:49

## 2022-10-29 RX ADMIN — ASPIRIN 81 MG 324 MG: 81 TABLET ORAL at 01:04

## 2022-10-29 RX ADMIN — FUROSEMIDE 40 MG: 10 INJECTION, SOLUTION INTRAMUSCULAR; INTRAVENOUS at 10:48

## 2022-10-29 RX ADMIN — METOPROLOL SUCCINATE 25 MG: 25 TABLET, FILM COATED, EXTENDED RELEASE ORAL at 18:20

## 2022-10-29 RX ADMIN — BUPROPION HYDROCHLORIDE 150 MG: 150 TABLET, FILM COATED, EXTENDED RELEASE ORAL at 10:48

## 2022-10-29 RX ADMIN — FUROSEMIDE 20 MG: 10 INJECTION, SOLUTION INTRAMUSCULAR; INTRAVENOUS at 01:04

## 2022-10-29 ASSESSMENT — ENCOUNTER SYMPTOMS
ABDOMINAL PAIN: 0
SORE THROAT: 0
VOMITING: 0
NAUSEA: 0
PHOTOPHOBIA: 0
SHORTNESS OF BREATH: 1
CONSTIPATION: 0
BACK PAIN: 0
COUGH: 0
DIARRHEA: 0

## 2022-10-29 ASSESSMENT — PAIN SCALES - GENERAL: PAINLEVEL_OUTOF10: 0

## 2022-10-29 NOTE — ED NOTES
ED to inpatient nurses report    Chief Complaint   Patient presents with    Shortness of Breath      Present to ED from home  LOC: alert and orientated to name, place, date  Vital signs   Vitals:    10/28/22 2142 10/28/22 2250 10/28/22 2359 10/29/22 0107   BP: (!) 160/91 133/82 (!) 152/83 137/80   Pulse: (!) 107 100 (!) 109 (!) 108   Resp: 16 14 18 20   Temp: 97.7 °F (36.5 °C)      TempSrc: Oral      SpO2: 97% 100% 98% 99%   Weight: 150 lb (68 kg)      Height: 5' 10\" (1.778 m)         Oxygen Baseline RA    Current needs required RA   LDAs:   Peripheral IV 10/28/22 Left Antecubital (Active)   Site Assessment Clean, dry & intact 10/29/22 0108   Line Status Flushed 10/29/22 0108     Mobility: Independent  Pending ED orders: none  Present condition: stable    C-SSRS    Swallow Screening    Preferred Language: [place]     Electronically signed by Domenico Rust RN on 10/29/2022 at 2:22 AM       Domenico Rust, [address] [place] [address]  10/29/22 6295

## 2022-10-29 NOTE — CONSULTS
[facility]'s  Cardiology Consult        Patient:  Natalie Saenz  YOB: 1960  MRN: [id number]     Acct: [de-identified]    PCP: BLACK Mars CNP    Date of Admission: 10/28/2022      Reason for Consultation:  CHF      History Of Present Illness:    63 y.o. pleasant male c hx of DM and HTN who presented to the hospital with complaints of shortness of breath. As per patient the shortness of breath started 1-2 days prior to presentation. It progressively gotten worsen so he decided to seek medication attention. Patient states that he used to drink alcohol regularly several beers for many years. He has been smoking 1 pack/day for many years recently has cut down to half a pack a day while being on Wellbutrin. He has had poorly controlled diabetes with A1c as high as 15 recently it was brought back down to 7.5. His laboratory work-up shows mildly elevated troponins with a flat trend. Creatinine is 1.4.  proBNP was elevated at 8508. His electrocardiogram shows sinus tachycardia at 105 bpm with nonspecific ST-T wave changes. His echocardiogram from today showed EF of 25-30% with large pleural effusion. Cardiology was consulted for acute CHF exacerbation    All labs, EKG's, diagnostic testing and images as well as cardiac cath, stress testing were reviewed during this encounter. Past Medical History:          Diagnosis Date    Hypertension     Prediabetes        Past Surgical History:          Procedure Laterality Date    TONSILLECTOMY         Medications Prior to Admission:      Prior to Admission medications    Medication Sig Start Date End Date Taking?  Authorizing Provider   buPROPion (WELLBUTRIN XL) 150 MG extended release tablet Take 1 tablet by mouth every morning 10/12/22  Yes BLACK Sandoval CNP   omeprazole (PRILOSEC) 40 MG delayed release capsule Take 1 capsule by mouth daily 10/12/22  Yes BLACK Sandoval CNP   lisinopril (PRINIVIL;ZESTRIL) 20 MG tablet Take 1 tablet by mouth daily 6/28/22  Yes BLACK Gambao CNP   glimepiride (AMARYL) 4 MG tablet Take 1 tablet by mouth every morning (before breakfast) 6/28/22  Yes BLACK Gamboa CNP   empagliflozin (JARDIANCE) 25 MG tablet Take 1 tablet by mouth daily 5/24/22  Yes BLACK Gamboa CNP   atorvastatin (LIPITOR) 40 MG tablet Take 1 tablet by mouth daily 5/24/22  Yes BLACK Gamboa CNP   acetaminophen (TYLENOL) 500 MG tablet Take 500 mg by mouth every 6 hours as needed for Pain As needed    Historical Provider, MD   Dulaglutide (TRULICITY) 1.5 EY/3.3JC SOPN Inject 1.5 mg into the skin once a week 8/18/22   BLACK Gamboa CNP       Current Facility-Administered Medications   Medication Dose Route Frequency Provider Last Rate Last Admin    atorvastatin (LIPITOR) tablet 40 mg  40 mg Oral Daily Andrea Price MD   40 mg at 10/29/22 1048    buPROPion (WELLBUTRIN XL) extended release tablet 150 mg  150 mg Oral QAM Andrea Price MD   150 mg at 10/29/22 1048    lisinopril (PRINIVIL;ZESTRIL) tablet 20 mg  20 mg Oral Daily Andrea Price MD   20 mg at 10/29/22 1048    pantoprazole (PROTONIX) tablet 40 mg  40 mg Oral QAM AC Ashita Behl, MD   40 mg at 10/29/22 0544    sodium chloride flush 0.9 % injection 5-40 mL  5-40 mL IntraVENous 2 times per day Andrea Price MD   10 mL at 10/29/22 1049    sodium chloride flush 0.9 % injection 5-40 mL  5-40 mL IntraVENous PRN Andrea Price MD        0.9 % sodium chloride infusion   IntraVENous PRN Andrea Price MD        ondansetron (ZOFRAN-ODT) disintegrating tablet 4 mg  4 mg Oral Q8H PRN Ashita Behl, MD        Or    ondansetron (ZOFRAN) injection 4 mg  4 mg IntraVENous Q6H PRN Ashita Behl, MD        polyethylene glycol (GLYCOLAX) packet 17 g  17 g Oral Daily PRN Andrea Price MD        acetaminophen (TYLENOL) tablet 650 mg  650 mg Oral Q6H PRN Ashita Behl, MD   650 mg at 10/29/22 1048    Or    acetaminophen (TYLENOL) suppository 650 mg  650 mg Rectal Q6H PRN Dash Robins MD        enoxaparin (LOVENOX) injection 40 mg  40 mg SubCUTAneous Daily Ashita Behl, MD        furosemide (LASIX) injection 40 mg  40 mg IntraVENous BID Dash Robins MD   40 mg at 10/29/22 1048    insulin lispro (HUMALOG) injection vial 0-4 Units  0-4 Units SubCUTAneous TID WC Ashita Behl, MD        insulin lispro (HUMALOG) injection vial 0-4 Units  0-4 Units SubCUTAneous Nightly Dash Robins MD        glucose chewable tablet 16 g  4 tablet Oral PRN Ashita Behl, MD        dextrose bolus 10% 125 mL  125 mL IntraVENous PRN Dash Robins MD        Or    dextrose bolus 10% 250 mL  250 mL IntraVENous PRN Dash Robins MD        glucagon (rDNA) injection 1 mg  1 mg SubCUTAneous PRN Ashita Behl, MD        dextrose 10 % infusion   IntraVENous Continuous PRN Dash Robins MD        ipratropium-albuterol (DUONEB) nebulizer solution 2 ampule  2 ampule Inhalation Once Dash Robins MD        aspirin chewable tablet 81 mg  81 mg Oral Daily Dash Robins MD   81 mg at 10/29/22 1049    ipratropium-albuterol (DUONEB) nebulizer solution 1 ampule  1 ampule Inhalation Q4H PRN Ashita Behl, MD           Allergies:  Metformin and related    Social History:    TOBACCO:   reports that he has been smoking cigarettes. He has a 16.50 pack-year smoking history. He has never used smokeless tobacco.  ETOH:   reports that he does not currently use alcohol. Family History:        Problem Relation Age of Onset    Diabetes Father     Stroke Father     Diabetes Brother     Diabetes Brother          Review of Systems -   General ROS: negative  Psychological ROS: negative  Hematological and Lymphatic ROS: No history of blood clots or bleeding disorder.    Respiratory ROS: no cough, shortness of breath, or wheezing  Cardiovascular ROS: As per HPI  Gastrointestinal ROS: negative  Genito-Urinary ROS: no dysuria, trouble voiding, or hematuria  Musculoskeletal ROS: negative  Neurological ROS: no TIA or stroke symptoms  Dermatological ROS: negative    All others reviewed and are negative.        /68   Pulse 88   Temp 98.5 °F (36.9 °C) (Oral)   Resp 18   Ht 5' 10\" (1.778 m)   Wt 150 lb (68 kg)   SpO2 94%   BMI 21.52 kg/m²       Physical Examination:   General appearance - alert, appears older than stated age  Mental status - alert, oriented to person, place, and time  Neck - supple, no significant adenopathy, no JVD, or carotid bruits  Chest - clear to auscultation, no wheezes, rales or rhonchi, symmetric air entry  Heart - normal rate, regular rhythm, normal S1, S2, no murmurs, rubs, clicks or gallops  Abdomen - soft, nontender, nondistended, no masses or organomegaly  Neurological - alert, oriented, normal speech, no focal findings or movement disorder noted  Musculoskeletal - no joint tenderness, deformity or swelling  Extremities - peripheral pulses normal, no pedal edema, no clubbing or cyanosis  Skin - normal coloration and turgor, no rashes, no suspicious skin lesions noted      LABS:    Recent Labs     10/29/22  0455 10/29/22  0856 10/29/22  1157   TROPONINT 0.033* 0.038* 0.035*     CBC:   Lab Results   Component Value Date/Time    WBC 8.6 10/29/2022 03:30 AM    RBC 4.09 10/29/2022 03:30 AM    HGB 11.5 10/29/2022 03:30 AM    HCT 36.9 10/29/2022 03:30 AM    MCV 90.2 10/29/2022 03:30 AM    MCH 28.1 10/29/2022 03:30 AM    MCHC 31.2 10/29/2022 03:30 AM    RDW 12.5 05/24/2022 04:58 AM     10/29/2022 03:30 AM    MPV 10.5 10/29/2022 03:30 AM     BMP:    Lab Results   Component Value Date/Time     10/29/2022 03:30 AM    K 4.0 10/29/2022 03:30 AM    K 4.3 10/28/2022 10:00 PM     10/29/2022 03:30 AM    CO2 24 10/29/2022 03:30 AM    BUN 18 10/29/2022 03:30 AM    LABALBU 4.0 10/05/2022 06:30 AM    CREATININE 1.4 10/29/2022 03:30 AM    CALCIUM 8.8 10/29/2022 03:30 AM    GFRAA 58 05/24/2022 04:58 AM    LABGLOM 57 10/29/2022 03:30 AM    GLUCOSE 164 10/29/2022 03:30 AM     Hepatic Function Panel:    Lab Results   Component Value Date/Time    ALKPHOS 77 10/05/2022 06:30 AM    ALT 9 10/05/2022 06:30 AM    AST 13 10/05/2022 06:30 AM    PROT 6.6 10/05/2022 06:30 AM    BILITOT 0.4 10/05/2022 06:30 AM    LABALBU 4.0 10/05/2022 06:30 AM     Magnesium:    Lab Results   Component Value Date/Time    MG 1.7 10/29/2022 03:30 AM     Warfarin PT/INR:  No components found for: PTPATWAR, PTINRWAR  HgBA1c:    Lab Results   Component Value Date/Time    LABA1C 7.8 08/23/2022 12:32 PM     FLP:    Lab Results   Component Value Date/Time    TRIG 125 05/24/2022 04:58 AM    HDL 64 05/24/2022 04:58 AM    LDLCALC 113 02/28/2017 08:31 AM     TSH:    Lab Results   Component Value Date/Time    TSH 1.020 10/29/2022 03:30 AM     BNP: No components found for: PRO-BNP      Assessment/Plan:    Patient Active Problem List   Diagnosis    Syncope    Facial injury    Tobacco abuse    Cranial facial fractures (HCC)    Diabetes mellitus (HCC)    Fungal toenail infection    Golfer's elbow    Medical non-compliance    Erectile dysfunction    NAVARRO (generalized anxiety disorder)    Impacted cerumen of right ear    Essential (primary) hypertension    Uncontrolled type 2 diabetes mellitus with hyperglycemia (HCC)    Thoracic arthritis    New onset of congestive heart failure (HCC)     Acute CHF exacerbation  Newly diagnosed LV dysfunction with ejection fraction of 25 to 30%  Chronic alcohol abuse  Chronic smoker  Large pleural effusion on echo  Poorly controlled diabetes    Telemetry monitoring  Continue IV diuresis  Monitor electrolytes and renal function  Strict I's and O's  Needs ischemic work-up with left heart cath on Monday  Risk benefits and alternatives were discussed with patient and wife by bedside  Needs heart failure therapies optimized  LifeVest on discharge  Start Toprol 25 mg daily  Start Entresto after Cath  Heart failure clinic follow-ups  Continue rest of management    Please do note hesitate to contact me for any further questions.    Thank you for the opportunity to be involved in this patient's care.     Code Status: Full Code    Electronically signed by Lorna Ortiz MD on 10/29/2022 at 3:25 PM

## 2022-10-29 NOTE — ED PROVIDER NOTES
[facility] ENCOUNTER          Pt Name: Elson Fleischer  MRN: [id number]  [place] 1960  Date of evaluation: 10/28/2022  Treating Resident Physician: Shelva Collet, MD  Supervising Physician: Elaina Beltran MD.    History obtained from the patient. CHIEF COMPLAINT       Chief Complaint   Patient presents with    Shortness of Breath           HISTORY OF PRESENT ILLNESS    HPI  Elson Fleischer is a 63 y.o. male who presents to the emergency department for evaluation of shortness of breath. Presents to the emergency department complaining of sudden episode of shortness of breath 2 hours ago while sitting, no chest pain, patient denies cough, no abdominal pain, no lightheadedness, no syncope, patient first coming to the ED 20 days ago because of alcohol, left hemithorax trauma, diagnosed with rib fractures. The patient has no other acute complaints at this time. REVIEW OF SYSTEMS   Review of Systems   Constitutional:  Negative for activity change, fatigue and fever. HENT:  Negative for congestion, ear pain and sore throat. Eyes:  Negative for photophobia. Respiratory:  Positive for shortness of breath. Negative for cough. Cardiovascular:  Negative for chest pain and leg swelling. Gastrointestinal:  Negative for abdominal pain, constipation, diarrhea, nausea and vomiting. Genitourinary:  Negative for dysuria and hematuria. Musculoskeletal:  Negative for arthralgias and back pain. Neurological:  Negative for dizziness and headaches. Psychiatric/Behavioral:  Negative for confusion. PAST MEDICAL AND SURGICAL HISTORY     Past Medical History:   Diagnosis Date    Hypertension     Prediabetes      Past Surgical History:   Procedure Laterality Date    TONSILLECTOMY           MEDICATIONS   No current facility-administered medications for this encounter.     Current Outpatient Medications:     acetaminophen (TYLENOL) 500 MG tablet, Take 500 mg by mouth every 6 hours as needed for Pain As needed, Disp: , Rfl:     buPROPion (WELLBUTRIN XL) 150 MG extended release tablet, Take 1 tablet by mouth every morning, Disp: 30 tablet, Rfl: 1    omeprazole (PRILOSEC) 40 MG delayed release capsule, Take 1 capsule by mouth daily, Disp: 90 capsule, Rfl: 3    Dulaglutide (TRULICITY) 1.5 ZX/3.4XT SOPN, Inject 1.5 mg into the skin once a week, Disp: 4 pen, Rfl: 0    lisinopril (PRINIVIL;ZESTRIL) 20 MG tablet, Take 1 tablet by mouth daily, Disp: 90 tablet, Rfl: 0    glimepiride (AMARYL) 4 MG tablet, Take 1 tablet by mouth every morning (before breakfast), Disp: 90 tablet, Rfl: 0    empagliflozin (JARDIANCE) 25 MG tablet, Take 1 tablet by mouth daily, Disp: 30 tablet, Rfl: 3    atorvastatin (LIPITOR) 40 MG tablet, Take 1 tablet by mouth daily, Disp: 30 tablet, Rfl: 2      SOCIAL HISTORY     Social History     Social History Narrative    Not on file     Social History     Tobacco Use    Smoking status: Every Day     Packs/day: 0.50     Years: 33.00     Pack years: 16.50     Types: Cigarettes    Smokeless tobacco: Never   Vaping Use    Vaping Use: Never used   Substance Use Topics    Alcohol use: Not Currently    Drug use: No         ALLERGIES     Allergies   Allergen Reactions    Metformin And Related Nausea And Vomiting         FAMILY HISTORY     Family History   Problem Relation Age of Onset    Diabetes Father     Stroke Father     Diabetes Brother     Diabetes Brother          PREVIOUS RECORDS   Previous records reviewed:  Previous medical history of hypertension, diabetes, smoking, rib fractures 6-7-8 . PHYSICAL EXAM     ED Triage Vitals [10/28/22 2142]   BP Temp Temp Source Heart Rate Resp SpO2 Height Weight   (!) 160/91 97.7 °F (36.5 °C) Oral (!) 107 16 97 % 5' 10\" (1.778 m) 150 lb (68 kg)     Initial vital signs and nursing assessment reviewed and abnormal from tachycardic . Body mass index is 21.52 kg/m².  Pulsoximetry is normal per my interpretation. Additional Vital Signs:  Vitals:    10/29/22 0107   BP: 137/80   Pulse: (!) 108   Resp: 20   Temp:    SpO2: 99%       Physical Exam  Constitutional:       General: He is not in acute distress. Appearance: He is not ill-appearing, toxic-appearing or diaphoretic. HENT:      Head: Normocephalic and atraumatic. Right Ear: Tympanic membrane, ear canal and external ear normal.      Left Ear: Tympanic membrane, ear canal and external ear normal.      Nose: Nose normal. No congestion or rhinorrhea. Mouth/Throat:      Mouth: Mucous membranes are moist.      Pharynx: No oropharyngeal exudate or posterior oropharyngeal erythema. Eyes:      Extraocular Movements: Extraocular movements intact. Pupils: Pupils are equal, round, and reactive to light. Cardiovascular:      Rate and Rhythm: Regular rhythm. Tachycardia present. Pulses: Normal pulses. Heart sounds: Normal heart sounds. No murmur heard. No friction rub. No gallop. Pulmonary:      Effort: Pulmonary effort is normal. No respiratory distress. Breath sounds: Normal breath sounds. No stridor. No wheezing, rhonchi or rales. Chest:      Chest wall: No tenderness. Abdominal:      General: Abdomen is flat. There is no distension. Palpations: Abdomen is soft. Tenderness: There is no abdominal tenderness. There is no guarding or rebound. Musculoskeletal:         General: No swelling, tenderness, deformity or signs of injury. Normal range of motion. Cervical back: Normal range of motion and neck supple. No rigidity or tenderness. Right lower leg: No edema. Left lower leg: No edema. Skin:     General: Skin is warm. Capillary Refill: Capillary refill takes less than 2 seconds. Coloration: Skin is not pale. Findings: No erythema, lesion or rash. Neurological:      General: No focal deficit present. Mental Status: He is alert and oriented to person, place, and time. Sensory: No sensory deficit. Motor: No weakness. Coordination: Coordination normal.   Psychiatric:         Mood and Affect: Mood normal.         Behavior: Behavior normal.           MEDICAL DECISION MAKING   Initial Assessment:   81-year-old patient with previous medical history of hypertension, diabetes, smoking, rib fractures, #shortness of breath. Physical examination remarkable for tachycardia. No signs of CHF. Our differentials include but are not exclusive for pulmonary embolism, acute coronary syndrome, pulmonary contusion, pneumothorax, atelectasis. Plan:   IV fluids, parenteral analgesia, DuoNeb. CT chest scan. Blood work. Reassessment. ED RESULTS   Laboratory results:  Labs Reviewed   BASIC METABOLIC PANEL W/ REFLEX TO MG FOR LOW K - Abnormal; Notable for the following components:       Result Value    Glucose 115 (*)     Creatinine 1.3 (*)     All other components within normal limits   BRAIN NATRIURETIC PEPTIDE - Abnormal; Notable for the following components:    Pro-BNP 8508.0 (*)     All other components within normal limits   CBC WITH AUTO DIFFERENTIAL - Abnormal; Notable for the following components:    Hemoglobin 13.7 (*)     MCHC 31.2 (*)     All other components within normal limits   TROPONIN - Abnormal; Notable for the following components:    Troponin T 0.039 (*)     All other components within normal limits   GLOMERULAR FILTRATION RATE, ESTIMATED   ANION GAP   OSMOLALITY       Radiologic studies results:  CTA CHEST W WO CONTRAST   Final Result   Impression:   No pulmonary embolism. Moderate pulmonary edema. Interval worsening since the prior study on    10/5/22. This document has been electronically signed by: Dee Banuelos. Radha Demarco MD    on 10/28/2022 11:51 PM      All CTs at this facility use dose modulation techniques and iterative    reconstructions, and/or weight-based dosing   when appropriate to reduce radiation to a low as reasonably achievable.       3D Post-processing was performed on this study. XR CHEST PORTABLE   Final Result   Impression:   Mild to moderate pulmonary edema. This document has been electronically signed by: Nargis Black. Sharon Flores MD    on 10/28/2022 11:15 PM          ED Medications administered this visit:   Medications   ipratropium-albuterol (DUONEB) nebulizer solution 2 ampule (2 ampules Inhalation Given 10/28/22 2248)   0.9 % sodium chloride bolus (0 mLs IntraVENous Stopped 10/29/22 0000)   iopamidol (ISOVUE-370) 76 % injection 80 mL (80 mLs IntraVENous Given 10/28/22 2317)   aspirin chewable tablet 324 mg (324 mg Oral Given 10/29/22 0104)   furosemide (LASIX) injection 20 mg (20 mg IntraVENous Given 10/29/22 0104)         ED COURSE   EKG T wave inversion V4 V5 V6, elevated troponin, currently patient asymptomatic for chest pain or anginal equivalent. Is elevated, CT chest scan with no pulmonary embolism but bilateral pleural effusion/pulmonary edema, creatinine elevated. Consider admission for new onset heart failure, elevated troponin, acute kidney injury. MEDICATION CHANGES     New Prescriptions    No medications on file         FINAL DISPOSITION     Final diagnoses:   Acute systolic congestive heart failure (HCC)   Elevated troponin     Condition: condition: good  Dispo: Admit to med/surg floor      This transcription was electronically signed. Parts of this transcriptions may have been dictated by use of voice recognition software and electronically transcribed, and parts may have been transcribed with the assistance of an ED scribe. The transcription may contain errors not detected in proofreading. Please refer to my supervising physician's documentation if my documentation differs.     Electronically Signed: Micki Henry MD, 10/29/22, 1:47 AM        Micki Henry MD  Resident  10/29/22 9648

## 2022-10-29 NOTE — ED TRIAGE NOTES
Pt ambulatory to ER for c/o SOB x 1 hour. Pt is in no obvious respiratory distress, speaking in full sentences. Pt reports smoking ~1 pack per day but has began taking Wellbutrin to help him quit smoking. Pt reports fracturing his ribs at the beginning of October. Pt denies any pain at this time. EKG complete & given to Dr Candance Spiro.

## 2022-10-29 NOTE — PLAN OF CARE
Pt admit, oriented to signal lights and surroundings, reviewed POC, 0 s/sx acute distress, admission assessment complete, refer to flowsheet

## 2022-10-29 NOTE — ED NOTES
Patient transported to [phone]  by cart in stable condition. Patient monitored on cardiac telemetry. IV line is patent.         Rolanda Bernardo, EMT-P  10/29/22 9949

## 2022-10-29 NOTE — H&P
Internal Medicine Resident History and Physical          Patient: Sangeetha Daugherty  : 1960  MRN: [id number]     Acct: [de-identified]    PCP: BLACK Aldridge CNP  Date of Admission: 10/28/2022  Date of Service: Pt seen/examined on 10/29/22  and Admitted to Inpatient with expected LOS greater than two midnights due to medical therapy. Hospital Problems             Last Modified POA    * (Principal) New onset of congestive heart failure ([address] 10/29/2022 Yes       Assessment and Plan:  New onset congestive heart failure  No prior echo. Patient denies any prior cardiac history. Initial troponin 0.039, proBNP 8508.0. Repeat troponin 0.034. Patient's only complaint shortness of breath. CXR and CTA chest both show moderate pulmonary edema. Differential for cause of new onset congestive heart failure includes ischemic, alcohol-related, tachycardia induced cardiomyopathy, unlikely infiltrative, multiple myeloma. CRAB criteria negative. Patient also reports excessive caffeine intake which may also be related. Patient already on SGLT2 inhibitor at home, [place]. Echo is needed to evaluate EF and will guide further treatment. TSH and free T4 WNL. -Aspirin 81 mg  -Lasix 40 mg IV twice daily  -DuoNeb every 4 hours as needed for shortness of breath  -Lipid panel ordered  -BNP every third day  -Troponin every 3 hours for 2 days  -Echo ordered to evaluate EF  -I/O'Shayne cruz for more accurate measurements  -Daily weights  -Continuous pulse ox  -Diet NPO in case of need for intervention  -Cardiology consulted    Tremors  Patient and patient's wife state the patient has had tremors on and off for the past few years. Patient has not had these tremors worked up by any physician. Patient's wife states that the patient's tremors have been worsened on day of admission. Patient reports drinking an average of 2 beers per day, however he states he has not had any alcohol in 3 to 4 weeks.   Patient does state he would drink heavily when he was younger. Tremors may also be due to patient attempting smoking cessation. Blood alcohol level negative. -UDS ordered    Questionable LC  Likely 2/2 heart failure. Initial creatinine 1.3. Repeat creatinine 1.4. Initial increase in creatinine likely due to 1 L NS bolus given in ED. Baseline creatinine ~1.1.  -Diurese as above  -Monitor creatinine    NIDDM  A1c 7.8 on 8/23. Patient takes Amaryl, Jardiance, Trulicity at home.  -Low-dose SSI  -Hypoglycemia protocol    Essential hypertension  -Continue home lisinopril    Hyperlipidemia  -Continue home atorvastatin  -Lipid panel ordered    GERD  -Protonix as substitute for home Prilosec    Smoking cessation  Patient states that he is currently attempting to quit smoking. Patient states that he started Wellbutrin approximately 1 and half weeks ago to assist with quitting smoking. Patient states that he has reduced his amount of smoking from 1 PPD to 0.5 PPD. Patient also states that he has not smoked since Thursday evening, 10/27.  -Continue home Wellbutrin  -Do not give nicotine patch unless requested by patient      =======================================================================      Chief Complaint: SOB    History Of Present Illness:  Sangeetha Daugherty is a 63 y.o. male with PMHx of diabetes mellitus, HTN, HLD, GERD who presents to [address] [place] [address] with shortness of breath. Patient states at approximately 8:30 PM on the date of initial presentation to the ED he began having shortness of breath while he was at rest watching TV. Patient states that the shortness of breath continued to progress which is why he decided to present to the ED. Patient states that he fractured his ribs on 10/5 due to falling in the shower and he has been \"taking it easy\". Patient denies any current pain from his rib fractures.   At time of examination patient states that he is barely short of breath, which he now attributes to some anxiety due to being hospitalized. Patient notes that the DuoNeb he received in the ED helped with his shortness of breath. Patient denies chest pain, palpitations, LE swelling, N/V/C/D. Patient states that he has no prior cardiac history or pulmonary history. Patient denies any fatigue. Patient did see his primary care doctor on 10/27, and due to mild pulmonary edema present on CXR, echo and stress test were ordered, however these have not been completed. Patient states that he is currently attempting to quit smoking, and he has been smoking for approximately 42 years. Patient states that he previously smoked 1 PPD. Patient was started on Wellbutrin approximately 1 and 1/2 weeks ago to aid in smoking cessation. Patient is that he currently smokes 0.5 PPD. Patient states that he has not smoked a cigarette since Thursday night, 10/27. Patient states that he currently drinks an average of 2 beers per day, however he states his last beer was approximately 3 to 4 weeks ago. Patient states that when he was younger he would drink alcohol heavily. Patient also reports excessive caffeine intake, stating he drinks approximately 4 cups of coffee per day and approximately 3 20 ounce bottles of diet Mountain Dew per day. Patient also reports tremors. Patient states that these have been present \"on and off\" for the past few years. Patient's wife is at bedside and states that she has noticed the tremors to be worsened on day of admission. Patient states this is due to anxiety from hospitalization and DuoNeb that he received in the ED. ED course: Initial vitals include /91, heart rate 107, respiratory rate 16, temperature 97.7 °F, SPO2 97% on room air. Initial labs include creatinine 1.3, proBNP 8508.0, troponin 0.039. CXR shows mild to moderate pulmonary edema. CTA chest shows no pulmonary embolism, moderate pulmonary edema.   Patient was given 1 L NS bolus, aspirin 324 mg, Lasix 20 mg IV in the ED.    Patient will be admitted to [place] [address] for further management of new onset congestive heart failure. Past Medical History:        Diagnosis Date    Hypertension     Prediabetes        Past Surgical History:        Procedure Laterality Date    TONSILLECTOMY         Medications Prior to Admission:   Prior to Admission medications    Medication Sig Start Date End Date Taking? Authorizing Provider   buPROPion (WELLBUTRIN XL) 150 MG extended release tablet Take 1 tablet by mouth every morning 10/12/22  Yes BLACK Newman CNP   omeprazole (PRILOSEC) 40 MG delayed release capsule Take 1 capsule by mouth daily 10/12/22  Yes BLACK Newman CNP   lisinopril (PRINIVIL;ZESTRIL) 20 MG tablet Take 1 tablet by mouth daily 6/28/22  Yes BLACK Newman CNP   glimepiride (AMARYL) 4 MG tablet Take 1 tablet by mouth every morning (before breakfast) 6/28/22  Yes BLACK Newman CNP   empagliflozin (JARDIANCE) 25 MG tablet Take 1 tablet by mouth daily 5/24/22  Yes BLACK Newman CNP   atorvastatin (LIPITOR) 40 MG tablet Take 1 tablet by mouth daily 5/24/22  Yes BLACK Newman CNP   acetaminophen (TYLENOL) 500 MG tablet Take 500 mg by mouth every 6 hours as needed for Pain As needed    Historical Provider, MD   Dulaglutide (TRULICITY) 1.5 QE/9.2ZD SOPN Inject 1.5 mg into the skin once a week 8/18/22   BLACK Newman CNP       Allergies:  Metformin and related    Social History:    The patient currently lives at home. Tobacco use:   reports that he has been smoking cigarettes. He has a 16.50 pack-year smoking history. He has never used smokeless tobacco.  Alcohol use:   reports that he does not currently use alcohol. Drug use:  reports no history of drug use.      Family History:  as follows:      Problem Relation Age of Onset    Diabetes Father     Stroke Father     Diabetes Brother     Diabetes Brother        Review of Systems:   Pertinent positives and negatives as noted in the HPI. Otherwise complete ROS negative. Physical Exam:    BP (!) 145/85   Pulse 100   Temp 97.9 °F (36.6 °C) (Oral)   Resp 18   Ht 5' 10\" (1.778 m)   Wt 150 lb (68 kg)   SpO2 99%   BMI 21.52 kg/m²       General appearance: No acute distress, appears stated age. Eyes:  Pupils equal, round, and reactive to light. Conjunctivae/corneas clear. HENT: Head normal in appearance. External nares normal.  Oral mucosa moist without lesions. Hearing grossly intact. Neck: Supple, with full range of motion. Trachea midline. No gross JVD appreciated. Respiratory: Crackles bilaterally  Cardiovascular: Tachycardic, regular rhythm with normal S1/S2 without murmurs. No lower extremity edema. Abdomen: Soft, non-tender, non-distended with normal bowel sounds. Musculoskeletal: No joint swelling or tenderness. Normal tone. No abnormal movements. Skin: Warm and dry. No rashes or lesions. Neurologic:  No focal sensory/motor deficits in the upper and lower extremities. Cranial nerves:  grossly non-focal 2-12. Tremor bilateral extremities  Psychiatric: Alert and oriented, normal insight and thought content. Capillary Refill: Brisk,< 3 seconds. Peripheral Pulses: +2 palpable, equal bilaterally. Labs:     Recent Labs     10/28/22  2200 10/29/22  0330   WBC 7.5 8.6   HGB 13.7* 11.5*   HCT 43.9 36.9*    237     Recent Labs     10/28/22  2200 10/29/22  0330    143   K 4.3 4.0    106   CO2 26 24   BUN 19 18   CREATININE 1.3* 1.4*   CALCIUM 9.9 8.8   PHOS  --  3.5     No results for input(s): AST, ALT, BILIDIR, BILITOT, ALKPHOS in the last 72 hours. No results for input(s): INR in the last 72 hours. Recent Labs     10/28/22  2200 10/29/22  0330   TROPONINT 0.039* 0.034*     No results for input(s): PROCAL in the last 72 hours.  No results found for: Mari Gerald, BACTERIA, RBCUA, Leata Kumar, [address]      Radiology:     CTA CHEST W WO CONTRAST   Final Result   Impression:   No pulmonary embolism. Moderate pulmonary edema. Interval worsening since the prior study on    10/5/22. This document has been electronically signed by: Christopher Carter. Gustavo Alexander MD    on 10/28/2022 11:51 PM      All CTs at this facility use dose modulation techniques and iterative    reconstructions, and/or weight-based dosing   when appropriate to reduce radiation to a low as reasonably achievable. 3D Post-processing was performed on this study. XR CHEST PORTABLE   Final Result   Impression:   Mild to moderate pulmonary edema. This document has been electronically signed by: Christopher Alexander MD    on 10/28/2022 11:15 PM             PT/OT Eval Status: will be assessed  Diet: Diet NPO  DVT prophylaxis: Lovenox  Code Status: Full Code  Disposition: admit to [facility]    Thank you BLACK Dixon - [facility] for the opportunity to be involved in this patient's care.     Electronically signed by Susi Caballero DO on 10/29/2022 at 5:32 AM.     Case discussed with Attending, Dr. Marquis Cardenas MD.

## 2022-10-30 LAB
ANION GAP SERPL CALCULATED.3IONS-SCNC: 13 MEQ/L (ref 8–16)
BASOPHILS # BLD: 1 %
BASOPHILS ABSOLUTE: 0.1 THOU/MM3 (ref 0–0.1)
BUN BLDV-MCNC: 20 MG/DL (ref 7–22)
CALCIUM SERPL-MCNC: 9.5 MG/DL (ref 8.5–10.5)
CHLORIDE BLD-SCNC: 102 MEQ/L (ref 98–111)
CHOLESTEROL, TOTAL: 191 MG/DL (ref 100–199)
CO2: 26 MEQ/L (ref 23–33)
CREAT SERPL-MCNC: 1.6 MG/DL (ref 0.4–1.2)
EKG ATRIAL RATE: 101 BPM
EKG ATRIAL RATE: 105 BPM
EKG P AXIS: 49 DEGREES
EKG P AXIS: 53 DEGREES
EKG P-R INTERVAL: 134 MS
EKG P-R INTERVAL: 142 MS
EKG Q-T INTERVAL: 342 MS
EKG Q-T INTERVAL: 358 MS
EKG QRS DURATION: 80 MS
EKG QRS DURATION: 84 MS
EKG QTC CALCULATION (BAZETT): 443 MS
EKG QTC CALCULATION (BAZETT): 473 MS
EKG R AXIS: 44 DEGREES
EKG R AXIS: 49 DEGREES
EKG T AXIS: 16 DEGREES
EKG T AXIS: 38 DEGREES
EKG VENTRICULAR RATE: 101 BPM
EKG VENTRICULAR RATE: 105 BPM
EOSINOPHIL # BLD: 4.7 %
EOSINOPHILS ABSOLUTE: 0.3 THOU/MM3 (ref 0–0.4)
ERYTHROCYTE [DISTWIDTH] IN BLOOD BY AUTOMATED COUNT: 13.2 % (ref 11.5–14.5)
ERYTHROCYTE [DISTWIDTH] IN BLOOD BY AUTOMATED COUNT: 43.5 FL (ref 35–45)
GFR SERPL CREATININE-BSD FRML MDRD: 49 ML/MIN/1.73M2
GLUCOSE BLD-MCNC: 114 MG/DL (ref 70–108)
GLUCOSE BLD-MCNC: 137 MG/DL (ref 70–108)
GLUCOSE BLD-MCNC: 138 MG/DL (ref 70–108)
GLUCOSE BLD-MCNC: 218 MG/DL (ref 70–108)
GLUCOSE BLD-MCNC: 233 MG/DL (ref 70–108)
HCT VFR BLD CALC: 36.3 % (ref 42–52)
HDLC SERPL-MCNC: 59 MG/DL
HEMOGLOBIN: 11.6 GM/DL (ref 14–18)
IMMATURE GRANS (ABS): 0.02 THOU/MM3 (ref 0–0.07)
IMMATURE GRANULOCYTES: 0.3 %
LDL CHOLESTEROL CALCULATED: 120 MG/DL
LYMPHOCYTES # BLD: 24.9 %
LYMPHOCYTES ABSOLUTE: 1.8 THOU/MM3 (ref 1–4.8)
MAGNESIUM: 1.7 MG/DL (ref 1.6–2.4)
MCH RBC QN AUTO: 28.4 PG (ref 26–33)
MCHC RBC AUTO-ENTMCNC: 32 GM/DL (ref 32.2–35.5)
MCV RBC AUTO: 89 FL (ref 80–94)
MONOCYTES # BLD: 8.7 %
MONOCYTES ABSOLUTE: 0.6 THOU/MM3 (ref 0.4–1.3)
NUCLEATED RED BLOOD CELLS: 0 /100 WBC
PLATELET # BLD: 240 THOU/MM3 (ref 130–400)
PMV BLD AUTO: 10.5 FL (ref 9.4–12.4)
POTASSIUM SERPL-SCNC: 4.2 MEQ/L (ref 3.5–5.2)
RBC # BLD: 4.08 MILL/MM3 (ref 4.7–6.1)
SEG NEUTROPHILS: 60.4 %
SEGMENTED NEUTROPHILS ABSOLUTE COUNT: 4.4 THOU/MM3 (ref 1.8–7.7)
SODIUM BLD-SCNC: 141 MEQ/L (ref 135–145)
TRIGL SERPL-MCNC: 59 MG/DL (ref 0–199)
TROPONIN T: 0.04 NG/ML
TROPONIN T: 0.04 NG/ML
WBC # BLD: 7.3 THOU/MM3 (ref 4.8–10.8)

## 2022-10-30 PROCEDURE — 36415 COLL VENOUS BLD VENIPUNCTURE: CPT

## 2022-10-30 PROCEDURE — 82948 REAGENT STRIP/BLOOD GLUCOSE: CPT

## 2022-10-30 PROCEDURE — 80061 LIPID PANEL: CPT

## 2022-10-30 PROCEDURE — 85025 COMPLETE CBC W/AUTO DIFF WBC: CPT

## 2022-10-30 PROCEDURE — 6370000000 HC RX 637 (ALT 250 FOR IP): Performed by: STUDENT IN AN ORGANIZED HEALTH CARE EDUCATION/TRAINING PROGRAM

## 2022-10-30 PROCEDURE — 80048 BASIC METABOLIC PNL TOTAL CA: CPT

## 2022-10-30 PROCEDURE — 83735 ASSAY OF MAGNESIUM: CPT

## 2022-10-30 PROCEDURE — 84484 ASSAY OF TROPONIN QUANT: CPT

## 2022-10-30 PROCEDURE — 1200000000 HC SEMI PRIVATE

## 2022-10-30 PROCEDURE — 93010 ELECTROCARDIOGRAM REPORT: CPT | Performed by: INTERNAL MEDICINE

## 2022-10-30 PROCEDURE — 1200000003 HC TELEMETRY R&B

## 2022-10-30 PROCEDURE — 2580000003 HC RX 258: Performed by: STUDENT IN AN ORGANIZED HEALTH CARE EDUCATION/TRAINING PROGRAM

## 2022-10-30 PROCEDURE — 6360000002 HC RX W HCPCS: Performed by: STUDENT IN AN ORGANIZED HEALTH CARE EDUCATION/TRAINING PROGRAM

## 2022-10-30 RX ADMIN — ENOXAPARIN SODIUM 40 MG: 100 INJECTION SUBCUTANEOUS at 09:30

## 2022-10-30 RX ADMIN — FUROSEMIDE 40 MG: 10 INJECTION, SOLUTION INTRAMUSCULAR; INTRAVENOUS at 16:27

## 2022-10-30 RX ADMIN — PANTOPRAZOLE SODIUM 40 MG: 40 TABLET, DELAYED RELEASE ORAL at 05:47

## 2022-10-30 RX ADMIN — SODIUM CHLORIDE, PRESERVATIVE FREE 10 ML: 5 INJECTION INTRAVENOUS at 09:31

## 2022-10-30 RX ADMIN — ASPIRIN 81 MG 81 MG: 81 TABLET ORAL at 09:31

## 2022-10-30 RX ADMIN — SODIUM CHLORIDE, PRESERVATIVE FREE 10 ML: 5 INJECTION INTRAVENOUS at 20:55

## 2022-10-30 RX ADMIN — INSULIN LISPRO 1 UNITS: 100 INJECTION, SOLUTION INTRAVENOUS; SUBCUTANEOUS at 11:46

## 2022-10-30 RX ADMIN — BUPROPION HYDROCHLORIDE 150 MG: 150 TABLET, FILM COATED, EXTENDED RELEASE ORAL at 09:31

## 2022-10-30 RX ADMIN — ATORVASTATIN CALCIUM 40 MG: 40 TABLET, FILM COATED ORAL at 09:33

## 2022-10-30 ASSESSMENT — ENCOUNTER SYMPTOMS: VOMITING: 0

## 2022-10-30 ASSESSMENT — PAIN SCALES - GENERAL
PAINLEVEL_OUTOF10: 0
PAINLEVEL_OUTOF10: 0

## 2022-10-30 NOTE — PLAN OF CARE
Problem: Discharge Planning  Goal: Discharge to home or other facility with appropriate resources  Outcome: Progressing  Flowsheets (Taken 10/30/2022 1902)  Discharge to home or other facility with appropriate resources:   Identify barriers to discharge with patient and caregiver   Arrange for needed discharge resources and transportation as appropriate   Identify discharge learning needs (meds, wound care, etc)     Problem: Skin/Tissue Integrity  Goal: Absence of new skin breakdown  Description: 1. Monitor for areas of redness and/or skin breakdown  2. Assess vascular access sites hourly  3. Every 4-6 hours minimum:  Change oxygen saturation probe site  4. Every 4-6 hours:  If on nasal continuous positive airway pressure, respiratory therapy assess nares and determine need for appliance change or resting period. Outcome: Progressing  Note: Skin assessed throughout shift.       Problem: Safety - Adult  Goal: Free from fall injury  Outcome: Progressing  Flowsheets (Taken 10/30/2022 1902)  Free From Fall Injury: Instruct family/caregiver on patient safety     Problem: Chronic Conditions and Co-morbidities  Goal: Patient's chronic conditions and co-morbidity symptoms are monitored and maintained or improved  Outcome: Progressing  Flowsheets (Taken 10/30/2022 1902)  Care Plan - Patient's Chronic Conditions and Co-Morbidity Symptoms are Monitored and Maintained or Improved:   Monitor and assess patient's chronic conditions and comorbid symptoms for stability, deterioration, or improvement   Collaborate with multidisciplinary team to address chronic and comorbid conditions and prevent exacerbation or deterioration   Update acute care plan with appropriate goals if chronic or comorbid symptoms are exacerbated and prevent overall improvement and discharge     Problem: Pain  Goal: Verbalizes/displays adequate comfort level or baseline comfort level  Outcome: Progressing  Flowsheets (Taken 10/30/2022 1909)  Verbalizes/displays adequate comfort level or baseline comfort level:   Encourage patient to monitor pain and request assistance   Assess pain using appropriate pain scale   Implement non-pharmacological measures as appropriate and evaluate response   Administer analgesics based on type and severity of pain and evaluate response

## 2022-10-30 NOTE — FLOWSHEET NOTE
10/30/22 9670   Safe Environment   Safety Measures Other (comment)  (virtual safety round complete)     Pt sitting in bed resting quietly, call light in reach, family at bedside, camera still not working, IT ticket created.

## 2022-10-30 NOTE — PROGRESS NOTES
hour   Intake 420 ml   Output 2625 ml   Net -2205 ml       Diet:  ADULT DIET; Regular; Low Fat/Low Chol/High Fiber/PADMINI    Review of Systems   Cardiovascular:  Negative for chest pain. Gastrointestinal:  Negative for vomiting. Exam:  BP 99/69   Pulse 84   Temp 98.5 °F (36.9 °C) (Oral)   Resp 16   Ht 5' 10\" (1.778 m)   Wt 141 lb 14.4 oz (64.4 kg)   SpO2 93%   BMI 20.36 kg/m²     Physical Exam  Constitutional:       Interventions: He is not intubated. Cardiovascular:      Rate and Rhythm: Rhythm irregular. Pulmonary:      Effort: He is not intubated. Comments: Bilateral air entry  Neurological:      Mental Status: He is alert. Psychiatric:         Speech: He is communicative. Labs:   Recent Labs     10/28/22  2200 10/29/22  0330 10/30/22  0335   WBC 7.5 8.6 7.3   HGB 13.7* 11.5* 11.6*   HCT 43.9 36.9* 36.3*    237 240     Recent Labs     10/28/22  2200 10/29/22  0330 10/30/22  0335    143 141   K 4.3 4.0 4.2    106 102   CO2 26 24 26   BUN 19 18 20   CREATININE 1.3* 1.4* 1.6*   CALCIUM 9.9 8.8 9.5   PHOS  --  3.5  --      No results for input(s): AST, ALT, BILIDIR, BILITOT, ALKPHOS in the last 72 hours. No results for input(s): INR in the last 72 hours. No results for input(s): Lalla Ill in the last 72 hours. Urinalysis:    No results found for: Lisset Babinski, BACTERIA, RBCUA, BLOODU, SPECGRAV, [address]    Radiology:  CTA CHEST W WO CONTRAST   Final Result   Impression:   No pulmonary embolism. Moderate pulmonary edema. Interval worsening since the prior study on    10/5/22. This document has been electronically signed by: Tamara Quevedo. Esperanza Luke MD    on 10/28/2022 11:51 PM      All CTs at this facility use dose modulation techniques and iterative    reconstructions, and/or weight-based dosing   when appropriate to reduce radiation to a low as reasonably achievable. 3D Post-processing was performed on this study.       XR CHEST PORTABLE   Final Result   Impression:   Mild to moderate pulmonary edema. This document has been electronically signed by: Erik Goetz MD    on 10/28/2022 11:15 PM          Diet: ADULT DIET;  Regular; Low Fat/Low Chol/High Fiber/PADMINI    DVT prophylaxis: [x] Lovenox                                 [] SCDs                                 [] SQ Heparin                                 [] Encourage ambulation           [] Already on Anticoagulation     Disposition:    [x] Home       [] TCU       [] Rehab       [] Psych       [] SNF       [] [place]       [] Other-    Code Status: Full Code    PT/OT Eval:         Electronically signed by Loren Kilgore PA-C on 10/30/2022 at 10:14 AM

## 2022-10-30 NOTE — FLOWSHEET NOTE
10/30/22 1119   Safe Environment   Safety Measures Other (comment)  (virtual safety round complete)     Pt sitting in bed resting quietly, call light in reach, pt unable to see and hear virtual nurse at the same time due to camera issue, voiced in for round with patient camera on

## 2022-10-31 ENCOUNTER — APPOINTMENT (OUTPATIENT)
Dept: ULTRASOUND IMAGING | Age: 62
DRG: 233 | End: 2022-10-31
Payer: COMMERCIAL

## 2022-10-31 PROBLEM — Z01.818 PREOPERATIVE CLEARANCE: Status: ACTIVE | Noted: 2022-10-31

## 2022-10-31 PROBLEM — N18.31 STAGE 3A CHRONIC KIDNEY DISEASE (HCC): Status: ACTIVE | Noted: 2022-10-31

## 2022-10-31 PROBLEM — I42.8 NONISCHEMIC CARDIOMYOPATHY (HCC): Status: ACTIVE | Noted: 2022-10-31

## 2022-10-31 LAB
ANION GAP SERPL CALCULATED.3IONS-SCNC: 12 MEQ/L (ref 8–16)
BACTERIA: ABNORMAL
BASOPHILS # BLD: 1 %
BASOPHILS ABSOLUTE: 0.1 THOU/MM3 (ref 0–0.1)
BILIRUBIN URINE: NEGATIVE
BLOOD, URINE: NEGATIVE
BUN BLDV-MCNC: 27 MG/DL (ref 7–22)
CALCIUM SERPL-MCNC: 9.1 MG/DL (ref 8.5–10.5)
CASTS: ABNORMAL /LPF
CASTS: ABNORMAL /LPF
CHARACTER, URINE: CLEAR
CHLORIDE BLD-SCNC: 100 MEQ/L (ref 98–111)
CHLORIDE, URINE: 61 MEQ/L
CO2: 28 MEQ/L (ref 23–33)
COLOR: YELLOW
CREAT SERPL-MCNC: 1.7 MG/DL (ref 0.4–1.2)
CREATININE URINE: 82 MG/DL
CREATININE, URINE: 82 MG/DL
CRYSTALS: ABNORMAL
EOSINOPHIL # BLD: 4.5 %
EOSINOPHILS ABSOLUTE: 0.3 THOU/MM3 (ref 0–0.4)
EPITHELIAL CELLS, UA: ABNORMAL /HPF
ERYTHROCYTE [DISTWIDTH] IN BLOOD BY AUTOMATED COUNT: 12.9 % (ref 11.5–14.5)
ERYTHROCYTE [DISTWIDTH] IN BLOOD BY AUTOMATED COUNT: 42.2 FL (ref 35–45)
GFR SERPL CREATININE-BSD FRML MDRD: 45 ML/MIN/1.73M2
GLUCOSE BLD-MCNC: 146 MG/DL (ref 70–108)
GLUCOSE BLD-MCNC: 151 MG/DL (ref 70–108)
GLUCOSE BLD-MCNC: 165 MG/DL (ref 70–108)
GLUCOSE BLD-MCNC: 245 MG/DL (ref 70–108)
GLUCOSE BLD-MCNC: 71 MG/DL (ref 70–108)
GLUCOSE, URINE: NEGATIVE MG/DL
HCT VFR BLD CALC: 35.3 % (ref 42–52)
HEMOGLOBIN: 11.1 GM/DL (ref 14–18)
IMMATURE GRANS (ABS): 0.02 THOU/MM3 (ref 0–0.07)
IMMATURE GRANULOCYTES: 0.3 %
KETONES, URINE: NEGATIVE
LEUKOCYTE ESTERASE, URINE: ABNORMAL
LYMPHOCYTES # BLD: 25.5 %
LYMPHOCYTES ABSOLUTE: 1.8 THOU/MM3 (ref 1–4.8)
MAGNESIUM: 1.6 MG/DL (ref 1.6–2.4)
MCH RBC QN AUTO: 27.6 PG (ref 26–33)
MCHC RBC AUTO-ENTMCNC: 31.4 GM/DL (ref 32.2–35.5)
MCV RBC AUTO: 87.8 FL (ref 80–94)
MICROALBUMIN UR-MCNC: 14.7 MG/DL
MICROALBUMIN/CREAT UR-RTO: 179 MG/G (ref 0–30)
MISCELLANEOUS LAB TEST RESULT: ABNORMAL
MONOCYTES # BLD: 9.6 %
MONOCYTES ABSOLUTE: 0.7 THOU/MM3 (ref 0.4–1.3)
NITRITE, URINE: NEGATIVE
NUCLEATED RED BLOOD CELLS: 0 /100 WBC
PH UA: 6 (ref 5–9)
PLATELET # BLD: 232 THOU/MM3 (ref 130–400)
PMV BLD AUTO: 10.4 FL (ref 9.4–12.4)
POTASSIUM SERPL-SCNC: 4.1 MEQ/L (ref 3.5–5.2)
POTASSIUM, URINE: 31.5 MEQ/L
PROT/CREAT RATIO, UR: 0.33
PROTEIN UA: 30 MG/DL
PROTEIN, URINE: 27.2 MG/DL
RBC # BLD: 4.02 MILL/MM3 (ref 4.7–6.1)
RBC URINE: ABNORMAL /HPF
RENAL EPITHELIAL, UA: ABNORMAL
SEG NEUTROPHILS: 59.1 %
SEGMENTED NEUTROPHILS ABSOLUTE COUNT: 4.3 THOU/MM3 (ref 1.8–7.7)
SODIUM BLD-SCNC: 140 MEQ/L (ref 135–145)
SODIUM URINE: 74 MEQ/L
SPECIFIC GRAVITY UA: 1.01 (ref 1–1.03)
UROBILINOGEN, URINE: 0.2 EU/DL (ref 0–1)
WBC # BLD: 7.2 THOU/MM3 (ref 4.8–10.8)
WBC UA: ABNORMAL /HPF
YEAST: ABNORMAL

## 2022-10-31 PROCEDURE — 2580000003 HC RX 258: Performed by: STUDENT IN AN ORGANIZED HEALTH CARE EDUCATION/TRAINING PROGRAM

## 2022-10-31 PROCEDURE — 36415 COLL VENOUS BLD VENIPUNCTURE: CPT

## 2022-10-31 PROCEDURE — 6370000000 HC RX 637 (ALT 250 FOR IP): Performed by: STUDENT IN AN ORGANIZED HEALTH CARE EDUCATION/TRAINING PROGRAM

## 2022-10-31 PROCEDURE — 83735 ASSAY OF MAGNESIUM: CPT

## 2022-10-31 PROCEDURE — 84156 ASSAY OF PROTEIN URINE: CPT

## 2022-10-31 PROCEDURE — 82043 UR ALBUMIN QUANTITATIVE: CPT

## 2022-10-31 PROCEDURE — 99223 1ST HOSP IP/OBS HIGH 75: CPT | Performed by: INTERNAL MEDICINE

## 2022-10-31 PROCEDURE — 80048 BASIC METABOLIC PNL TOTAL CA: CPT

## 2022-10-31 PROCEDURE — 84133 ASSAY OF URINE POTASSIUM: CPT

## 2022-10-31 PROCEDURE — 82570 ASSAY OF URINE CREATININE: CPT

## 2022-10-31 PROCEDURE — 82948 REAGENT STRIP/BLOOD GLUCOSE: CPT

## 2022-10-31 PROCEDURE — 51798 US URINE CAPACITY MEASURE: CPT

## 2022-10-31 PROCEDURE — 1200000000 HC SEMI PRIVATE

## 2022-10-31 PROCEDURE — 84300 ASSAY OF URINE SODIUM: CPT

## 2022-10-31 PROCEDURE — 99232 SBSQ HOSP IP/OBS MODERATE 35: CPT | Performed by: STUDENT IN AN ORGANIZED HEALTH CARE EDUCATION/TRAINING PROGRAM

## 2022-10-31 PROCEDURE — 99232 SBSQ HOSP IP/OBS MODERATE 35: CPT | Performed by: PHYSICIAN ASSISTANT

## 2022-10-31 PROCEDURE — 6370000000 HC RX 637 (ALT 250 FOR IP): Performed by: INTERNAL MEDICINE

## 2022-10-31 PROCEDURE — 81001 URINALYSIS AUTO W/SCOPE: CPT

## 2022-10-31 PROCEDURE — 76770 US EXAM ABDO BACK WALL COMP: CPT

## 2022-10-31 PROCEDURE — 82436 ASSAY OF URINE CHLORIDE: CPT

## 2022-10-31 PROCEDURE — 85025 COMPLETE CBC W/AUTO DIFF WBC: CPT

## 2022-10-31 RX ORDER — ACETYLCYSTEINE 200 MG/ML
1200 SOLUTION ORAL; RESPIRATORY (INHALATION) EVERY 12 HOURS
Status: DISCONTINUED | OUTPATIENT
Start: 2022-10-31 | End: 2022-10-31 | Stop reason: ALTCHOICE

## 2022-10-31 RX ORDER — SODIUM CHLORIDE 9 MG/ML
INJECTION, SOLUTION INTRAVENOUS CONTINUOUS
Status: DISCONTINUED | OUTPATIENT
Start: 2022-10-31 | End: 2022-11-05

## 2022-10-31 RX ADMIN — PANTOPRAZOLE SODIUM 40 MG: 40 TABLET, DELAYED RELEASE ORAL at 06:28

## 2022-10-31 RX ADMIN — SODIUM CHLORIDE, PRESERVATIVE FREE 10 ML: 5 INJECTION INTRAVENOUS at 09:30

## 2022-10-31 RX ADMIN — ATORVASTATIN CALCIUM 40 MG: 40 TABLET, FILM COATED ORAL at 09:13

## 2022-10-31 RX ADMIN — ACETAMINOPHEN 650 MG: 325 TABLET ORAL at 08:19

## 2022-10-31 RX ADMIN — INSULIN LISPRO 1 UNITS: 100 INJECTION, SOLUTION INTRAVENOUS; SUBCUTANEOUS at 17:55

## 2022-10-31 RX ADMIN — SODIUM CHLORIDE: 9 INJECTION, SOLUTION INTRAVENOUS at 09:29

## 2022-10-31 RX ADMIN — METOPROLOL SUCCINATE 25 MG: 25 TABLET, FILM COATED, EXTENDED RELEASE ORAL at 09:13

## 2022-10-31 RX ADMIN — ASPIRIN 81 MG 81 MG: 81 TABLET ORAL at 09:13

## 2022-10-31 RX ADMIN — BUPROPION HYDROCHLORIDE 150 MG: 150 TABLET, FILM COATED, EXTENDED RELEASE ORAL at 09:13

## 2022-10-31 ASSESSMENT — ENCOUNTER SYMPTOMS
BACK PAIN: 0
DIARRHEA: 0
SHORTNESS OF BREATH: 1
ABDOMINAL PAIN: 0
NAUSEA: 0
VOMITING: 0
COUGH: 0
EYES NEGATIVE: 1

## 2022-10-31 ASSESSMENT — PAIN SCALES - GENERAL
PAINLEVEL_OUTOF10: 0
PAINLEVEL_OUTOF10: 4
PAINLEVEL_OUTOF10: 0

## 2022-10-31 ASSESSMENT — PAIN DESCRIPTION - LOCATION: LOCATION: HEAD

## 2022-10-31 ASSESSMENT — PAIN DESCRIPTION - DESCRIPTORS: DESCRIPTORS: ACHING

## 2022-10-31 NOTE — PLAN OF CARE
Problem: Discharge Planning  Goal: Discharge to home or other facility with appropriate resources  10/31/2022 0735 by Alondra Juarez RN  Outcome: Progressing     Problem: Skin/Tissue Integrity  Goal: Absence of new skin breakdown  Description: 1. Monitor for areas of redness and/or skin breakdown  2. Assess vascular access sites hourly  3. Every 4-6 hours minimum:  Change oxygen saturation probe site  4. Every 4-6 hours:  If on nasal continuous positive airway pressure, respiratory therapy assess nares and determine need for appliance change or resting period.   10/31/2022 0735 by Alondra Juarez RN  Outcome: Progressing     Problem: Safety - Adult  Goal: Free from fall injury  10/31/2022 0735 by Alondra Juarez RN  Outcome: Progressing     Problem: Chronic Conditions and Co-morbidities  Goal: Patient's chronic conditions and co-morbidity symptoms are monitored and maintained or improved  10/31/2022 0735 by Alondra Juarez RN  Outcome: Progressing     Problem: Pain  Goal: Verbalizes/displays adequate comfort level or baseline comfort level  10/31/2022 0735 by Alondra Juarez RN  Outcome: Progressing

## 2022-10-31 NOTE — PROGRESS NOTES
Educated patient regarding heart failure management by explaining the importance of obtaining daily weights at the same time every day with approximately the same amount of clothing, how to recognize symptoms, low sodium diet and taking prescribed medications as ordered. Patient was given our heart failure booklet. Patient was receptive to the education given and verbalized understanding. NEW patient appointment scheduled with CHF clinic.

## 2022-10-31 NOTE — PLAN OF CARE
Problem: Discharge Planning  Goal: Discharge to home or other facility with appropriate resources  10/31/2022 1723 by Lexie Salmeron RN  Outcome: Progressing  Flowsheets (Taken 10/31/2022 0815)  Discharge to home or other facility with appropriate resources:   Identify barriers to discharge with patient and caregiver   Arrange for needed discharge resources and transportation as appropriate   Identify discharge learning needs (meds, wound care, etc)   Refer to discharge planning if patient needs post-hospital services based on physician order or complex needs related to functional status, cognitive ability or social support system  10/31/2022 0735 by Amy Morales RN  Outcome: Progressing     Problem: Skin/Tissue Integrity  Goal: Absence of new skin breakdown  Description: 1. Monitor for areas of redness and/or skin breakdown  2. Assess vascular access sites hourly  3. Every 4-6 hours minimum:  Change oxygen saturation probe site  4. Every 4-6 hours:  If on nasal continuous positive airway pressure, respiratory therapy assess nares and determine need for appliance change or resting period.   10/31/2022 1723 by Lexie Salmeron RN  Outcome: Progressing  10/31/2022 0735 by Amy Morales RN  Outcome: Progressing     Problem: Safety - Adult  Goal: Free from fall injury  10/31/2022 1723 by Lexie Salmeron RN  Outcome: Progressing  Flowsheets (Taken 10/30/2022 1902 by Charli Chowdary RN)  Free From Fall Injury: Instruct family/caregiver on patient safety  10/31/2022 0735 by Amy Morales RN  Outcome: Progressing     Problem: Chronic Conditions and Co-morbidities  Goal: Patient's chronic conditions and co-morbidity symptoms are monitored and maintained or improved  10/31/2022 1723 by Lexie Salmeron RN  Outcome: Progressing  Flowsheets (Taken 10/31/2022 0815)  Care Plan - Patient's Chronic Conditions and Co-Morbidity Symptoms are Monitored and Maintained or Improved: Monitor and assess patient's chronic conditions and comorbid symptoms for stability, deterioration, or improvement  10/31/2022 0735 by Sofya Guerrero RN  Outcome: Progressing     Problem: Pain  Goal: Verbalizes/displays adequate comfort level or baseline comfort level  10/31/2022 1723 by Carlitos Jerez RN  Outcome: Progressing  Flowsheets (Taken 10/30/2022 1902 by Ibrahima Leiva RN)  Verbalizes/displays adequate comfort level or baseline comfort level:   Encourage patient to monitor pain and request assistance   Assess pain using appropriate pain scale   Implement non-pharmacological measures as appropriate and evaluate response   Administer analgesics based on type and severity of pain and evaluate response  10/31/2022 0735 by Sofya Guerrero RN  Outcome: Progressing

## 2022-10-31 NOTE — CARE COORDINATION
Case Management Assessment  Initial Evaluation    Date/Time of Evaluation: 10/31/2022 7:49 AM  Assessment Completed by: Doung Nichols RN    If patient is discharged prior to next notation, then this note serves as note for discharge by case management. Patient Name: Keri Sales                   YOB: 1960  Diagnosis: Elevated troponin [T30.8]  Acute systolic congestive heart failure ([facility] [I50.21]  New onset of congestive heart failure ([facility] [address] [I50.9]                   Date / Time: 10/28/2022  9:39 PM    Patient Admission Status: Inpatient     Current PCP: BLACK Lopez CNP  PCP verified by CM? Chart Reviewed: Yes      Patient Orientation:      Patient Cognition:    History Provided by:      Hospitalization in the last 30 days (Readmission):  No    If yes, Readmission Assessment in CM Navigator will be completed. Advance Directives:     Code Status: Full Code       Discharge Planning  Patient lives with: Spouse/Significant Other Type of Home: House   Primary Caregiver:    Patient Support Systems include: Family Members   Current Financial resources:    Current community resources:    Current services prior to admission: None   Type of Home Care services:  None    ADLS  Prior functional level:    Current functional level:        Family can provide assistance at DC: Would you like Case Management to discuss the discharge plan with any other family members/significant others, and if so, who?     Plans to Return to Present Housing:    Other Identified Issues/Barriers to RETURNING to current housing: none  Potential Assistance needed at discharge: N/A  Patient expects to discharge to: [address] [place] [address] for transportation at discharge:      Financial  Payor: Alyx Barrientos / Plan: [address] [place] [address] / Product Type: *No Product type* /     Does insurance require precert for SNF: Yes    Potential assistance Purchasing Medications:    Meds-to-Beds request:        [address] [place] [address] [address] - [place], [place] - [facility]  Phone: [phone] Fax: [phone]      Factors facilitating achievement of predicted outcomes: Family support    Additional Case Management Notes: Creat 1.7, trops elev. Heart cath rescheduled for tomorrow. Life vest needed at discharge. The Plan for Transition of Care is related to the following treatment goals of Elevated troponin [S98.0]  Acute systolic congestive heart failure ([address] [I50.21]  New onset of congestive heart failure ([address] [I50.9]    Patient Goals/Plan/Treatment Preferences: Met with Mary Anne Cannon and his wife, they will return home together at discharge. Mary Anne Cannon is independent, denies all needs. He is aware that Life Vest will be ordered, and is agreeable. Transportation/Food Security/Housekeeping Addressed:  No issues identified.      Handy Jha RN  Case Management Department

## 2022-10-31 NOTE — PROGRESS NOTES
Hospitalist Progress Note    Patient:  Christine España      Unit/Bed:6K-17/017-A    YOB: 1960    MRN: [id number]       Acct: [de-identified]     PCP: BLACK Ervin CNP    Date of Admission: 10/28/2022    Assessment/Plan:    Acute CHF Exacerbation   -Cardiology consulted   -Echo with estimated 25-30% EF   -Cardiology planning to take for cath 11/01   -Metoprolol 25 mg daily   -Entresto after heart cath   -Life Vest on d/c     LC, ? On CKD: GFR 48 May of 2022:  -Creatinine increased to 1.7 10/31/22   -Nephrology consulted by Cardiology    -Hold 40 mg IV lasix BID   -Renal US ordered with:  Impression:     1. Bilateral hydronephrosis. 2. Markedly distended urinary bladder with a large amount of postvoid residual urine. NIDDM   -Low dose sliding scale   -Hypoglycemia protocol    Essential HTN   -Lisinopril discontinued, Entresto following heart cath   -Metoprolol added by cardiology, monitor blood pressure    HLD   -Continue statin    [address] [facility] Course:    10/30/22  Cardiology following  Plan for cath tomorrow    10/31/22  Patient cath postponed to tomorrow  Nephrology following  Hold diuretics    Chief Complaint: Shortness of breath      Subjective: 63 y.o. male admitted to the hospitalist service for acute CHF exacerbation. Patient plan for heart cath tomorrow (10/31/22) by Cardiology, this was delayed pending nephrology evaluation. Patient denies pain. He is reportedly tolerating his diet.        Medications:    Infusion Medications    sodium chloride 100 mL/hr at 10/31/22 0929    sodium chloride      dextrose       Scheduled Medications    atorvastatin  40 mg Oral Daily    buPROPion  150 mg Oral QAM    pantoprazole  40 mg Oral QAM AC    sodium chloride flush  5-40 mL IntraVENous 2 times per day    [Held by provider] enoxaparin  40 mg SubCUTAneous Daily    [Held by provider] furosemide  40 mg IntraVENous BID    insulin lispro  0-4 Units SubCUTAneous TID WC insulin lispro  0-4 Units SubCUTAneous Nightly    ipratropium-albuterol  2 ampule Inhalation Once    aspirin  81 mg Oral Daily    metoprolol succinate  25 mg Oral Daily     PRN Meds: sodium chloride flush, sodium chloride, ondansetron **OR** ondansetron, polyethylene glycol, acetaminophen **OR** acetaminophen, glucose, dextrose bolus **OR** dextrose bolus, glucagon (rDNA), dextrose, ipratropium-albuterol      Intake/Output Summary (Last 24 hours) at 10/31/2022 0932  Last data filed at 10/30/2022 1230  Gross per 24 hour   Intake 240 ml   Output 175 ml   Net 65 ml         Diet:  ADULT DIET; Regular; Low Sodium (2 gm)    Review of Systems   Constitutional:  Negative for fever. Cardiovascular:  Negative for chest pain. Gastrointestinal:  Negative for vomiting. Exam:  /76   Pulse 85   Temp 97.4 °F (36.3 °C) (Oral)   Resp 16   Ht 5' 10\" (1.778 m)   Wt 141 lb 14.4 oz (64.4 kg)   SpO2 99%   BMI 20.36 kg/m²     Physical Exam  Constitutional:       Interventions: He is not intubated. Cardiovascular:      Rate and Rhythm: Normal rate. Pulmonary:      Effort: He is not intubated. Comments: Bilateral air entry  Neurological:      Mental Status: He is alert. Psychiatric:         Speech: He is communicative. Labs:   Recent Labs     10/29/22  0330 10/30/22  0335 10/31/22  0521   WBC 8.6 7.3 7.2   HGB 11.5* 11.6* 11.1*   HCT 36.9* 36.3* 35.3*    240 232       Recent Labs     10/29/22  0330 10/30/22  0335 10/31/22  0521    141 140   K 4.0 4.2 4.1    102 100   CO2 24 26 28   BUN 18 20 27*   CREATININE 1.4* 1.6* 1.7*   CALCIUM 8.8 9.5 9.1   PHOS 3.5  --   --        No results for input(s): AST, ALT, BILIDIR, BILITOT, ALKPHOS in the last 72 hours. No results for input(s): INR in the last 72 hours. No results for input(s): Juanito Rosas in the last 72 hours.     Urinalysis:    No results found for: [place], [address], BACTERIA, RBCUA, BLOODU, [address], GLUCOSEU    Radiology:  CTA CHEST W WO CONTRAST   Final Result   Impression:   No pulmonary embolism. Moderate pulmonary edema. Interval worsening since the prior study on    10/5/22. This document has been electronically signed by: Kevin Meza. Emma Springer MD    on 10/28/2022 11:51 PM      All CTs at this facility use dose modulation techniques and iterative    reconstructions, and/or weight-based dosing   when appropriate to reduce radiation to a low as reasonably achievable. 3D Post-processing was performed on this study. XR CHEST PORTABLE   Final Result   Impression:   Mild to moderate pulmonary edema. This document has been electronically signed by: Kevin Meza. Emma Springer MD    on 10/28/2022 11:15 PM          Diet: ADULT DIET; Regular;  Low Sodium (2 gm)    DVT prophylaxis: [x] Lovenox                                 [] SCDs                                 [] SQ Heparin                                 [] Encourage ambulation           [] Already on Anticoagulation     Disposition:    [x] Home       [] TCU       [] Rehab       [] Psych       [] SNF       [] [place]       [] Other-    Code Status: Full Code    PT/OT Eval:         Electronically signed by Harish Castaneda PA-C on 10/31/2022 at 9:32 AM

## 2022-10-31 NOTE — FLOWSHEET NOTE
10/31/22 1104   Safe Environment   Safety Measures Other (comment)  (virtual safety round complete)     Pt sitting in bed resting quietly, call light in reach, family at bedside.

## 2022-10-31 NOTE — PROGRESS NOTES
Cardiology Progress Note      Patient:  Lisbeth Schuster  YOB: 1960  MRN: [id number]   Acct: [de-identified]  Admit Date:  10/28/2022  Primary Cardiologist:  none  Per Dr Becky Morejon note:  Jorge Lance for Consultation:  CHF        History Of Present Illness:    63 y.o. pleasant male c hx of DM and HTN who presented to the hospital with complaints of shortness of breath. As per patient the shortness of breath started 1-2 days prior to presentation. It progressively gotten worsen so he decided to seek medication attention. Patient states that he used to drink alcohol regularly several beers for many years. He has been smoking 1 pack/day for many years recently has cut down to half a pack a day while being on Wellbutrin. He has had poorly controlled diabetes with A1c as high as 15 recently it was brought back down to 7.5. His laboratory work-up shows mildly elevated troponins with a flat trend. Creatinine is 1.4.  proBNP was elevated at 8508. His electrocardiogram shows sinus tachycardia at 105 bpm with nonspecific ST-T wave changes. His echocardiogram from today showed EF of 25-30% with large pleural effusion. Cardiology was consulted for acute CHF exacerbation     All labs, EKG's, diagnostic testing and images as well as cardiac cath, stress testing were reviewed during this encounter. \"    Subjective (Events in last 24 hours):   Pt awake, alert. NAD. Denies cp, sob, swelling. States he never had any swelling. Was SOB. Found to have low EF and diuresed. D/w patient about his results and plans for cath. He will be recommended for lifevest and adjustments with medications. All of this was explained to patient. Also will have nephrology see patient for further evaluation of his kidney function. Plan for [facility] tomorrow.      Objective:   /76   Pulse 85   Temp 97.4 °F (36.3 °C) (Oral)   Resp 16   Ht 5' 10\" (1.778 m)   Wt 141 lb 14.4 oz (64.4 kg)   SpO2 99%   BMI 20.36 kg/m²      vss  TELEMETRY: nsr, no ectopy    Physical Exam:  General Appearance: alert and oriented to person, place and time, in no acute distress  Cardiovascular: normal rate, regular rhythm, normal S1 and S2, no murmurs, rubs, clicks, or gallops, distal pulses intact, no carotid bruits, no JVD  Pulmonary/Chest: clear to auscultation bilaterally- no wheezes, rales or rhonchi, normal air movement, no respiratory distress  Abdomen: soft, non-tender, non-distended, normal bowel sounds, no masses   Extremities: no cyanosis, clubbing or edema, pulse   Skin: warm and dry, no rash or erythema  Neurological: alert, oriented, normal speech, no focal findings or movement disorder noted    Medications:    atorvastatin  40 mg Oral Daily    buPROPion  150 mg Oral QAM    pantoprazole  40 mg Oral QAM AC    sodium chloride flush  5-40 mL IntraVENous 2 times per day    [Held by provider] enoxaparin  40 mg SubCUTAneous Daily    [Held by provider] furosemide  40 mg IntraVENous BID    insulin lispro  0-4 Units SubCUTAneous TID WC    insulin lispro  0-4 Units SubCUTAneous Nightly    ipratropium-albuterol  2 ampule Inhalation Once    aspirin  81 mg Oral Daily    metoprolol succinate  25 mg Oral Daily      sodium chloride      dextrose       sodium chloride flush, 5-40 mL, PRN  sodium chloride, , PRN  ondansetron, 4 mg, Q8H PRN   Or  ondansetron, 4 mg, Q6H PRN  polyethylene glycol, 17 g, Daily PRN  acetaminophen, 650 mg, Q6H PRN   Or  acetaminophen, 650 mg, Q6H PRN  glucose, 4 tablet, PRN  dextrose bolus, 125 mL, PRN   Or  dextrose bolus, 250 mL, PRN  glucagon (rDNA), 1 mg, PRN  dextrose, , Continuous PRN  ipratropium-albuterol, 1 ampule, Q4H PRN        Diagnostics:  EKG:     Echo:   Conclusions      Summary   Left ventricular size is normal and systolic function is severely reduced. Ejection fraction was estimated at 25-30%. LV wall thickness is within   normal limits. There was severe global hypokinesis of the left ventricle.    The right ventricular size appears normal with normal systolic function   and wall thickness. Large pleural effusion      Signature      ----------------------------------------------------------------   Electronically signed by Luz Rodarte MD (Interpreting   physician) on 10/29/2022  Stress:     Left Heart Cath:     Lab Data:    Cardiac Enzymes:  No results for input(s): CKTOTAL, CKMB, CKMBINDEX, TROPONINI in the last 72 hours.     CBC:   Lab Results   Component Value Date/Time    WBC 7.2 10/31/2022 05:21 AM    RBC 4.02 10/31/2022 05:21 AM    HGB 11.1 10/31/2022 05:21 AM    HCT 35.3 10/31/2022 05:21 AM     10/31/2022 05:21 AM       CMP:    Lab Results   Component Value Date/Time     10/31/2022 05:21 AM    K 4.1 10/31/2022 05:21 AM    K 4.3 10/28/2022 10:00 PM     10/31/2022 05:21 AM    CO2 28 10/31/2022 05:21 AM    BUN 27 10/31/2022 05:21 AM    CREATININE 1.7 10/31/2022 05:21 AM    GFRAA 58 05/24/2022 04:58 AM    LABGLOM 45 10/30/2022 06:04 PM    GLUCOSE 151 10/31/2022 05:21 AM    CALCIUM 9.1 10/31/2022 05:21 AM       Hepatic Function Panel:    Lab Results   Component Value Date/Time    ALKPHOS 77 10/05/2022 06:30 AM    ALT 9 10/05/2022 06:30 AM    AST 13 10/05/2022 06:30 AM    PROT 6.6 10/05/2022 06:30 AM    BILITOT 0.4 10/05/2022 06:30 AM    LABALBU 4.0 10/05/2022 06:30 AM       Magnesium:    Lab Results   Component Value Date/Time    MG 1.6 10/31/2022 05:21 AM       PT/INR:  No results found for: PROTIME, INR    HgBA1c:    Lab Results   Component Value Date/Time    LABA1C 7.8 08/23/2022 12:32 PM       FLP:    Lab Results   Component Value Date/Time    TRIG 59 10/30/2022 03:35 AM    HDL 59 10/30/2022 03:35 AM    LDLCALC 120 10/30/2022 03:35 AM       TSH:    Lab Results   Component Value Date/Time    TSH 1.020 10/29/2022 03:30 AM         Assessment:  SOB-improved/resolved currently   New nonischemic CMP--pending [facility]  EF 25-30%  Large pleural effusion  Hx of alcohol abuse  Tobacco abuse  Poorly controlled diabetes   LC--came in with mild elevated creat 1.3, seems baseline recently, LC now up to 1.7, likely overdiuresis    Plan:  D/w Dr Jorge Pollock. Hold diuretics. Start  ml/hour. Nephrology consult, Dr Sherly Bar updated. Tentatively plan for [facility] tomorrow. Daily weights  2 g sodium restriction daily  2 L fluid restriction daily  Keep K>4, Mg>2    CHF Guidelines  BB-yes . Toprol 25 mg daily. ACE/ARB/Entresto- No, lc. Will look to start prior to d/c. Diuretics-yes. Lasix. Hold for now. Aldactone-no. Consider later  Jardiance/Farxiga-no. Depends on insurance. Verquvo-not yet    Lifevest prior to discharge. Needs heart cath for low EF. Plan for tomorrow. Further management plans to follow this.       Electronically signed by Stephon Mcgill PA-C on 10/31/2022 at 8:55 AM

## 2022-10-31 NOTE — CONSULTS
[facility]          [facility]        [address]        [place] [facility]        [phone]           Inpatient Initial consult note         10/31/2022 9:43 AM      Patient Name:   Lisbeth Schuster  YOB: 1960  Primary Care Physician:  BLACK Woods CNP   Admit Date:    10/28/2022  9:39 PM    Consultation requested by : Celi Flower PA-C    Reason for Consult : Nephrology following for LC/CKD and clearance for cardiac cath. History of presenting illness :Lisbeth Schuster is a 63 y.o.   male with Past Medical History as stated below presented with a chief complaint of Shortness of Breath   on 10/28/2022 . Patient presented with shortness of breath which happened acutely on Friday while he was watching television. And it has continued to get worse, symptoms very severe no associated symptoms of fever chills nausea vomiting or abdominal pain.,  No other specific modifying factors. Patient has apparently fallen in the shower 3 weeks ago and sustained some injury to the ribs. Upon arrival to the ED patient was found to have a saturation of 97% elevated proBNP and chest x-ray showed CHF so has received some diuretics. Patient also had a CTA done of the chest which was negative for any pulmonary embolism. Patient was seen by cardiology and he needs ischemic work-up and cardiac cath. Patient presented with a creatinine of 1.3 which has consistently gotten worse so nephrology has been consulted for further eval and management patient has received several doses of diuretics and 1 dose of lisinopril.     Past History:  Past Medical History:   Diagnosis Date    Hypertension     Prediabetes      Past Surgical History:   Procedure Laterality Date    TONSILLECTOMY       Social History     Socioeconomic History    Marital status:      Spouse name: Not on file    Number of children: Not on file    Years of education: Not on file    Highest education level: Not on file   Occupational History    Not on file   Tobacco Use    Smoking status: Every Day     Packs/day: 0.50     Years: 33.00     Pack years: 16.50     Types: Cigarettes    Smokeless tobacco: Never   Vaping Use    Vaping Use: Never used   Substance and Sexual Activity    Alcohol use: Not Currently    Drug use: No    Sexual activity: Not on file   Other Topics Concern    Not on file   Social History Narrative    Not on file     Social Determinants of Health     Financial Resource Strain: Low Risk     Difficulty of Paying Living Expenses: Not hard at all   Food Insecurity: No Food Insecurity    Worried About Running Out of Food in the Last Year: Never true    Ran Out of Food in the Last Year: Never true   Transportation Needs: No Transportation Needs    Lack of Transportation (Medical): No    Lack of Transportation (Non-Medical): No   Physical Activity: Not on file   Stress: Not on file   Social Connections: Not on file   Intimate Partner Violence: Not on file   Housing Stability: Not on file     Family History   Problem Relation Age of Onset    Diabetes Father     Stroke Father     Diabetes Brother     Diabetes Brother        Medications & Allergies :  Prior to Admission medications    Medication Sig Start Date End Date Taking?  Authorizing Provider   buPROPion (WELLBUTRIN XL) 150 MG extended release tablet Take 1 tablet by mouth every morning 10/12/22  Yes BLACK Davey CNP   omeprazole (PRILOSEC) 40 MG delayed release capsule Take 1 capsule by mouth daily 10/12/22  Yes BLACK Davey CNP   lisinopril (PRINIVIL;ZESTRIL) 20 MG tablet Take 1 tablet by mouth daily 6/28/22  Yes BLACK Davey CNP   glimepiride (AMARYL) 4 MG tablet Take 1 tablet by mouth every morning (before breakfast) 6/28/22  Yes BLACK Davey CNP   empagliflozin (JARDIANCE) 25 MG tablet Take 1 tablet by mouth daily 5/24/22  Yes BLACK Davey CNP   atorvastatin (LIPITOR) 40 MG tablet Take 1 tablet by mouth daily 5/24/22  Yes BLACK Johnson CNP   acetaminophen (TYLENOL) 500 MG tablet Take 500 mg by mouth every 6 hours as needed for Pain As needed    Historical Provider, MD   Dulaglutide (TRULICITY) 1.5 JW/0.8HH SOPN Inject 1.5 mg into the skin once a week 8/18/22   BLACK Johnson CNP     Allergies: Metformin and related  IP meds : Scheduled Meds:   atorvastatin  40 mg Oral Daily    buPROPion  150 mg Oral QAM    pantoprazole  40 mg Oral QAM AC    sodium chloride flush  5-40 mL IntraVENous 2 times per day    [Held by provider] enoxaparin  40 mg SubCUTAneous Daily    [Held by provider] furosemide  40 mg IntraVENous BID    insulin lispro  0-4 Units SubCUTAneous TID WC    insulin lispro  0-4 Units SubCUTAneous Nightly    ipratropium-albuterol  2 ampule Inhalation Once    aspirin  81 mg Oral Daily    metoprolol succinate  25 mg Oral Daily     Continuous Infusions:   sodium chloride 100 mL/hr at 10/31/22 0929    sodium chloride      dextrose         Review of Systems  Review of Systems   Constitutional:  Negative for activity change, appetite change and fatigue. HENT: Negative. Eyes: Negative. Respiratory:  Positive for shortness of breath. Negative for cough. Cardiovascular:  Negative for chest pain and leg swelling. Gastrointestinal:  Negative for abdominal pain, diarrhea, nausea and vomiting. Genitourinary:  Negative for difficulty urinating, dysuria, flank pain and frequency. Musculoskeletal:  Negative for back pain and neck pain. Skin:  Negative for rash and wound. Neurological:  Negative for dizziness, light-headedness and headaches. Psychiatric/Behavioral:  Negative for agitation and confusion. Physical Exam  Physical Exam  Constitutional:       General: He is not in acute distress. Appearance: Normal appearance. HENT:      Head: Normocephalic and atraumatic.       Right Ear: External ear normal.      Left Ear: External ear normal.      Nose: Nose normal. Mouth/Throat:      Mouth: Mucous membranes are moist.   Eyes:      General: No scleral icterus. Right eye: No discharge. Left eye: No discharge. Conjunctiva/sclera: Conjunctivae normal.   Cardiovascular:      Rate and Rhythm: Normal rate and regular rhythm. Heart sounds: Normal heart sounds. Pulmonary:      Effort: Pulmonary effort is normal. No respiratory distress. Breath sounds: Normal breath sounds. No wheezing or rales. Abdominal:      General: Bowel sounds are normal. There is no distension. Palpations: Abdomen is soft. Tenderness: There is no abdominal tenderness. Musculoskeletal:         General: No swelling. Cervical back: Normal range of motion and neck supple. Right lower leg: No edema. Left lower leg: No edema. Skin:     General: Skin is warm and dry. Findings: No erythema or rash. Neurological:      General: No focal deficit present. Mental Status: He is alert and oriented to person, place, and time. Psychiatric:         Mood and Affect: Mood normal.         Behavior: Behavior normal.        Labs, Radiology and Tests :  CBC:   Recent Labs     10/29/22  0330 10/30/22  0335 10/31/22  0521   WBC 8.6 7.3 7.2   HGB 11.5* 11.6* 11.1*   HCT 36.9* 36.3* 35.3*    240 232     CMP:  Recent Labs     10/29/22  0330 10/30/22  0335 10/31/22  0521    141 140   K 4.0 4.2 4.1    102 100   CO2 24 26 28   BUN 18 20 27*   CREATININE 1.4* 1.6* 1.7*   GLUCOSE 164* 114* 151*   CALCIUM 8.8 9.5 9.1   MG 1.7 1.7 1.6   PHOS 3.5  --   --      Hepatic: No results for input(s): LABALBU, AST, ALT, ALB, BILITOT, ALKPHOS in the last 72 hours. Radiology : Chest x-ray reviewed by me shows bilateral congestion    Old lab data have been reviewed and noted patient's baseline creatinine is around at least 1.3-1.5.   However several years ago it was 0.6    Other : Echocardiogram shows ejection fraction 25-30% and severe global hypokinesis of the left ventricle    Assessment and Plan:  Renal -acute kidney injury, on chronic kidney disease stage III  This appears most likely due to the use of diuretics ACE inhibitor and primarily due to contrast exposure on the day of his admission. Currently appears to be may be stabilizing and agree with gentle IV hydration  Check urine studies and renal ultrasound scan  Electrolytes -appear to be within normal limits  Essential hypertension running well continue current medications  Hx of diabetes mellitus apparently his A1c was 15 3 months ago  New onset of acute congestive heart failure systolic needs a cardiac cath seen by cardiology. If the renal function is 1.5 or less could proceed for a cardiac cath tomorrow we will start Mucomyst in anticipation for a cardiac cath tomorrow as well continue IV fluids  Meds reviewed and discussed with patient and wife in detail    Soha Longo MD  [facility]    This report has been created using voice recognition software.  It may contain minor errors which are inherent in voice recognition technology

## 2022-10-31 NOTE — PROGRESS NOTES
10/31/22 1517   Encounter Summary   Encounter Overview/Reason  Initial Encounter   Service Provided For: Patient;Significant other   Referral/Consult From: [employer] EMR   Support System Spouse   Last Encounter  10/31/22   Complexity of Encounter Low   Begin Time 1430   End Time  1500   Total Time Calculated 30 min   Assessment/Intervention/Outcome   Assessment Calm   Intervention Active listening   Outcome Restored Hope;Acceptance   Plan and Referrals   Plan/Referrals Continue to visit, (comment)     Chaplain Rev. Garcia:    ASSESSMENT    Patient said he was feeling better today. \"I am in a better condition today,\" he said. Femi's wife was sitting with him. He works in the [employer] in [facility]. There is no Sabianist or any Zoroastrianism community in the support system. INTERVENTION    I asked him about the cause of his hospitalization. He thinks it is COPD and other issues. We talked with the patient and his wife for a some time. I prayed for Barbra Gordon and his family. OUTCOME  He was glad to hear of my work at [facility] in his hometown. I offered a prayer for him and his wife. CARE PLAN    Continue to support Barbra Gordon with counseling and prayers.

## 2022-10-31 NOTE — PROGRESS NOTES
Nutrition Education    Consult: heart failure guidelines. Hx: CHF, DB, smoking. 10/30/22: Cholesterol 191, HDL 59, , TG 59. Patient very informed about diabetic diet, states he has lowered his HgbA1c from ~ 15% to 7.9%. Focuses discussion on heart diet, low sodium, limiting fluid, etc.  States he has stopped smoking and drinking alcohol as well. Planning life vest at discharge. Great appetite, consuming all of meals since admit he reports. Rx: lasix, humalog, wellbutrin, lipitor    Educated on carb controlled, cardiac, CHF diet and fluid recommendations. Learners: Patient and Significant Other  Readiness: Eager  Method: Explanation, Handout, and Teachback  Response: Verbalizes Understanding  Contact name and number provided.     Chaitanya Beth RD, LD  Contact Number: [phone]

## 2022-11-01 ENCOUNTER — APPOINTMENT (OUTPATIENT)
Dept: CARDIAC CATH/INVASIVE PROCEDURES | Age: 62
DRG: 233 | End: 2022-11-01
Payer: COMMERCIAL

## 2022-11-01 LAB
ABO: NORMAL
ALBUMIN SERPL-MCNC: 3.7 G/DL (ref 3.5–5.1)
ALP BLD-CCNC: 64 U/L (ref 38–126)
ALT SERPL-CCNC: 6 U/L (ref 11–66)
ANION GAP SERPL CALCULATED.3IONS-SCNC: 11 MEQ/L (ref 8–16)
ANTIBODY SCREEN: NORMAL
APTT: 31.6 SECONDS (ref 22–38)
AST SERPL-CCNC: 10 U/L (ref 5–40)
BILIRUB SERPL-MCNC: 0.3 MG/DL (ref 0.3–1.2)
BUN BLDV-MCNC: 27 MG/DL (ref 7–22)
CALCIUM SERPL-MCNC: 8.7 MG/DL (ref 8.5–10.5)
CHLORIDE BLD-SCNC: 103 MEQ/L (ref 98–111)
CO2: 25 MEQ/L (ref 23–33)
CREAT SERPL-MCNC: 1.5 MG/DL (ref 0.4–1.2)
EKG ATRIAL RATE: 92 BPM
EKG P AXIS: 69 DEGREES
EKG P-R INTERVAL: 158 MS
EKG Q-T INTERVAL: 388 MS
EKG QRS DURATION: 96 MS
EKG QTC CALCULATION (BAZETT): 479 MS
EKG R AXIS: 80 DEGREES
EKG T AXIS: 66 DEGREES
EKG VENTRICULAR RATE: 92 BPM
ERYTHROCYTE [DISTWIDTH] IN BLOOD BY AUTOMATED COUNT: 13.2 % (ref 11.5–14.5)
ERYTHROCYTE [DISTWIDTH] IN BLOOD BY AUTOMATED COUNT: 42.1 FL (ref 35–45)
GFR SERPL CREATININE-BSD FRML MDRD: 53 ML/MIN/1.73M2
GLUCOSE BLD-MCNC: 165 MG/DL (ref 70–108)
GLUCOSE BLD-MCNC: 181 MG/DL (ref 70–108)
GLUCOSE BLD-MCNC: 181 MG/DL (ref 70–108)
GLUCOSE BLD-MCNC: 296 MG/DL (ref 70–108)
HCT VFR BLD CALC: 36.5 % (ref 42–52)
HEMOGLOBIN: 11.7 GM/DL (ref 14–18)
INR BLD: 0.99 (ref 0.85–1.13)
MAGNESIUM: 1.7 MG/DL (ref 1.6–2.4)
MCH RBC QN AUTO: 28.3 PG (ref 26–33)
MCHC RBC AUTO-ENTMCNC: 32.1 GM/DL (ref 32.2–35.5)
MCV RBC AUTO: 88.4 FL (ref 80–94)
PLATELET # BLD: 240 THOU/MM3 (ref 130–400)
PMV BLD AUTO: 10.8 FL (ref 9.4–12.4)
POTASSIUM SERPL-SCNC: 4.4 MEQ/L (ref 3.5–5.2)
PRO-BNP: 3862 PG/ML (ref 0–900)
RBC # BLD: 4.13 MILL/MM3 (ref 4.7–6.1)
RH FACTOR: NORMAL
SODIUM BLD-SCNC: 139 MEQ/L (ref 135–145)
TOTAL PROTEIN: 5.8 G/DL (ref 6.1–8)
WBC # BLD: 9.7 THOU/MM3 (ref 4.8–10.8)

## 2022-11-01 PROCEDURE — 1200000000 HC SEMI PRIVATE

## 2022-11-01 PROCEDURE — 85730 THROMBOPLASTIN TIME PARTIAL: CPT

## 2022-11-01 PROCEDURE — 6360000002 HC RX W HCPCS: Performed by: STUDENT IN AN ORGANIZED HEALTH CARE EDUCATION/TRAINING PROGRAM

## 2022-11-01 PROCEDURE — 99232 SBSQ HOSP IP/OBS MODERATE 35: CPT | Performed by: PHYSICIAN ASSISTANT

## 2022-11-01 PROCEDURE — 85027 COMPLETE CBC AUTOMATED: CPT

## 2022-11-01 PROCEDURE — 85610 PROTHROMBIN TIME: CPT

## 2022-11-01 PROCEDURE — 93458 L HRT ARTERY/VENTRICLE ANGIO: CPT | Performed by: INTERNAL MEDICINE

## 2022-11-01 PROCEDURE — 83880 ASSAY OF NATRIURETIC PEPTIDE: CPT

## 2022-11-01 PROCEDURE — 1200000003 HC TELEMETRY R&B

## 2022-11-01 PROCEDURE — 93458 L HRT ARTERY/VENTRICLE ANGIO: CPT

## 2022-11-01 PROCEDURE — B2111ZZ FLUOROSCOPY OF MULTIPLE CORONARY ARTERIES USING LOW OSMOLAR CONTRAST: ICD-10-PCS | Performed by: INTERNAL MEDICINE

## 2022-11-01 PROCEDURE — 2500000003 HC RX 250 WO HCPCS

## 2022-11-01 PROCEDURE — 86850 RBC ANTIBODY SCREEN: CPT

## 2022-11-01 PROCEDURE — 83735 ASSAY OF MAGNESIUM: CPT

## 2022-11-01 PROCEDURE — 36221 PLACE CATH THORACIC AORTA: CPT

## 2022-11-01 PROCEDURE — 86900 BLOOD TYPING SEROLOGIC ABO: CPT

## 2022-11-01 PROCEDURE — 6370000000 HC RX 637 (ALT 250 FOR IP): Performed by: STUDENT IN AN ORGANIZED HEALTH CARE EDUCATION/TRAINING PROGRAM

## 2022-11-01 PROCEDURE — 36415 COLL VENOUS BLD VENIPUNCTURE: CPT

## 2022-11-01 PROCEDURE — 6360000002 HC RX W HCPCS

## 2022-11-01 PROCEDURE — 82948 REAGENT STRIP/BLOOD GLUCOSE: CPT

## 2022-11-01 PROCEDURE — 86901 BLOOD TYPING SEROLOGIC RH(D): CPT

## 2022-11-01 PROCEDURE — 75716 ARTERY X-RAYS ARMS/LEGS: CPT

## 2022-11-01 PROCEDURE — 93005 ELECTROCARDIOGRAM TRACING: CPT | Performed by: STUDENT IN AN ORGANIZED HEALTH CARE EDUCATION/TRAINING PROGRAM

## 2022-11-01 PROCEDURE — 80053 COMPREHEN METABOLIC PANEL: CPT

## 2022-11-01 PROCEDURE — 75630 X-RAY AORTA LEG ARTERIES: CPT

## 2022-11-01 PROCEDURE — 2580000003 HC RX 258: Performed by: STUDENT IN AN ORGANIZED HEALTH CARE EDUCATION/TRAINING PROGRAM

## 2022-11-01 PROCEDURE — 93010 ELECTROCARDIOGRAM REPORT: CPT | Performed by: INTERNAL MEDICINE

## 2022-11-01 PROCEDURE — 6370000000 HC RX 637 (ALT 250 FOR IP): Performed by: INTERNAL MEDICINE

## 2022-11-01 PROCEDURE — 4A023N7 MEASUREMENT OF CARDIAC SAMPLING AND PRESSURE, LEFT HEART, PERCUTANEOUS APPROACH: ICD-10-PCS | Performed by: INTERNAL MEDICINE

## 2022-11-01 PROCEDURE — 6360000004 HC RX CONTRAST MEDICATION: Performed by: INTERNAL MEDICINE

## 2022-11-01 PROCEDURE — C1894 INTRO/SHEATH, NON-LASER: HCPCS

## 2022-11-01 PROCEDURE — 99232 SBSQ HOSP IP/OBS MODERATE 35: CPT | Performed by: INTERNAL MEDICINE

## 2022-11-01 PROCEDURE — C1769 GUIDE WIRE: HCPCS

## 2022-11-01 RX ORDER — ASPIRIN 325 MG
325 TABLET ORAL ONCE
Status: DISCONTINUED | OUTPATIENT
Start: 2022-11-01 | End: 2022-11-07

## 2022-11-01 RX ORDER — SODIUM CHLORIDE 0.9 % (FLUSH) 0.9 %
5-40 SYRINGE (ML) INJECTION PRN
Status: DISCONTINUED | OUTPATIENT
Start: 2022-11-01 | End: 2022-11-01 | Stop reason: SDUPTHER

## 2022-11-01 RX ORDER — DIPHENHYDRAMINE HYDROCHLORIDE 50 MG/ML
50 INJECTION INTRAMUSCULAR; INTRAVENOUS ONCE
Status: DISCONTINUED | OUTPATIENT
Start: 2022-11-01 | End: 2022-11-07

## 2022-11-01 RX ORDER — ACETAMINOPHEN 325 MG/1
650 TABLET ORAL EVERY 4 HOURS PRN
Status: DISCONTINUED | OUTPATIENT
Start: 2022-11-01 | End: 2022-11-07

## 2022-11-01 RX ORDER — SODIUM CHLORIDE 0.9 % (FLUSH) 0.9 %
5-40 SYRINGE (ML) INJECTION EVERY 12 HOURS SCHEDULED
Status: DISCONTINUED | OUTPATIENT
Start: 2022-11-01 | End: 2022-11-01 | Stop reason: SDUPTHER

## 2022-11-01 RX ORDER — SODIUM CHLORIDE 9 MG/ML
INJECTION, SOLUTION INTRAVENOUS PRN
Status: DISCONTINUED | OUTPATIENT
Start: 2022-11-01 | End: 2022-11-01 | Stop reason: SDUPTHER

## 2022-11-01 RX ORDER — NITROGLYCERIN 0.4 MG/1
0.4 TABLET SUBLINGUAL EVERY 5 MIN PRN
Status: DISCONTINUED | OUTPATIENT
Start: 2022-11-01 | End: 2022-11-07

## 2022-11-01 RX ORDER — LIDOCAINE HYDROCHLORIDE 20 MG/ML
JELLY TOPICAL PRN
Status: DISCONTINUED | OUTPATIENT
Start: 2022-11-01 | End: 2022-11-07

## 2022-11-01 RX ORDER — TAMSULOSIN HYDROCHLORIDE 0.4 MG/1
0.4 CAPSULE ORAL DAILY
Status: DISCONTINUED | OUTPATIENT
Start: 2022-11-01 | End: 2022-11-07

## 2022-11-01 RX ADMIN — SODIUM CHLORIDE, PRESERVATIVE FREE 10 ML: 5 INJECTION INTRAVENOUS at 09:30

## 2022-11-01 RX ADMIN — PANTOPRAZOLE SODIUM 40 MG: 40 TABLET, DELAYED RELEASE ORAL at 06:09

## 2022-11-01 RX ADMIN — ACETAMINOPHEN 650 MG: 325 TABLET ORAL at 08:55

## 2022-11-01 RX ADMIN — SODIUM CHLORIDE, PRESERVATIVE FREE 10 ML: 5 INJECTION INTRAVENOUS at 21:14

## 2022-11-01 RX ADMIN — IOPAMIDOL 125 ML: 755 INJECTION, SOLUTION INTRAVENOUS at 12:28

## 2022-11-01 RX ADMIN — ASPIRIN 81 MG 81 MG: 81 TABLET ORAL at 09:22

## 2022-11-01 RX ADMIN — TAMSULOSIN HYDROCHLORIDE 0.4 MG: 0.4 CAPSULE ORAL at 15:32

## 2022-11-01 RX ADMIN — ATORVASTATIN CALCIUM 40 MG: 40 TABLET, FILM COATED ORAL at 09:22

## 2022-11-01 RX ADMIN — METOPROLOL SUCCINATE 25 MG: 25 TABLET, FILM COATED, EXTENDED RELEASE ORAL at 09:30

## 2022-11-01 RX ADMIN — SODIUM CHLORIDE: 9 INJECTION, SOLUTION INTRAVENOUS at 02:30

## 2022-11-01 RX ADMIN — FUROSEMIDE 40 MG: 10 INJECTION, SOLUTION INTRAMUSCULAR; INTRAVENOUS at 17:00

## 2022-11-01 RX ADMIN — BUPROPION HYDROCHLORIDE 150 MG: 150 TABLET, FILM COATED, EXTENDED RELEASE ORAL at 09:22

## 2022-11-01 ASSESSMENT — PAIN DESCRIPTION - LOCATION: LOCATION: HEAD

## 2022-11-01 ASSESSMENT — ENCOUNTER SYMPTOMS
VOMITING: 0
SHORTNESS OF BREATH: 0

## 2022-11-01 NOTE — PRE SEDATION
[address] [address]  Sedation/Analgesia History & Physical    Pt Name: Kym Sandoval  Account number: [de-identified]  MRN: [id number]  YOB: 1960  Provider Performing Procedure: Guera Murphy MD MD  Referring Provider: Becky Olivier PA-C   Primary Care Physician: BLACK Delgadillo CNP  Date: 11/1/2022    PRE-PROCEDURE    Code Status: FULL CODE  Brief History/Pre-Procedure Diagnosis:   Severe CMP  EF 94-62%  Acute systolic CHF  Consent: : I have discussed with the patient risks, benefits, and alternatives (and relevant risks, benefits, and side effects related to alternatives or not receiving care), and likelihood of the success. The patient and/or representative appear to understand and agree to proceed. The discussion encompasses risks, benefits, and side effects related to the alternatives and the risks related to not receiving the proposed care, treatment, and services. The indication, risks and benefits of the procedure and possible therapeutic consequences and alternatives were discussed with the patient. The patient was given the opportunity to ask questions and to have them answered to his/her satisfaction. Risks of the procedure include but are not limited to the following: Bleeding, hematoma including retroperitoneal hemmorhage, infection, pain and discomfort, injury to the aorta and other blood vessels, rhythm disturbance, low blood pressure, myocardial infarction, stroke, kidney damage/failure, myocardial perforation, allergic reactions to sedatives/contrast material, loss of pulse/vascular compromise requiring surgery, aneurysm/pseudoaneurysm formation, possible loss of a limb/hand/leg, needing blood transfusion, requiring emergent open heart surgery or emergent coronary intervention, the need for intubation/respiratory support, the requirement for defibrillation/cardioversion, and death. Alternatives to and omission of the suggested procedure were discussed.  The patient had no further questions and wished to proceed; the consent form was signed. MEDICAL HISTORY  []ASHD/ANGINA/MI/CHF   []Hypertension  []Diabetes  []Hyperlipidemia  []Smoking  []Family Hx of ASHD  []Additional information:       has a past medical history of Hypertension and Prediabetes. SURGICAL HISTORY   has a past surgical history that includes Tonsillectomy.   Additional information:       ALLERGIES   Allergies as of 10/28/2022 - Fully Reviewed 10/28/2022   Allergen Reaction Noted    Metformin and related Nausea And Vomiting 05/24/2022     Additional information:       MEDICATIONS   Aspirin  [] 81 mg  [] 325 mg  [] None  Antiplatelet drug therapy use last 5 days  []No []Yes  Coumadin Use Last 5 Days []No []Yes  Other anticoagulant use last 5 days  []No []Yes    Current Facility-Administered Medications:     0.9 % sodium chloride infusion, , IntraVENous, Continuous, Melba Oven, PA-C, Last Rate: 100 mL/hr at 11/01/22 0230, New Bag at 11/01/22 0230    atorvastatin (LIPITOR) tablet 40 mg, 40 mg, Oral, Daily, Narayan Akins MD, 40 mg at 11/01/22 7762    buPROPion (WELLBUTRIN XL) extended release tablet 150 mg, 150 mg, Oral, QAM, Narayan Akins MD, 150 mg at 11/01/22 0922    pantoprazole (PROTONIX) tablet 40 mg, 40 mg, Oral, QAM AC, Ashita Behl, MD, 40 mg at 11/01/22 0609    sodium chloride flush 0.9 % injection 5-40 mL, 5-40 mL, IntraVENous, 2 times per day, Narayan Akins MD, 10 mL at 11/01/22 0930    sodium chloride flush 0.9 % injection 5-40 mL, 5-40 mL, IntraVENous, PRN, Ashita Behl, MD    0.9 % sodium chloride infusion, , IntraVENous, PRN, Ashita Behl, MD    ondansetron (ZOFRAN-ODT) disintegrating tablet 4 mg, 4 mg, Oral, Q8H PRN **OR** ondansetron (ZOFRAN) injection 4 mg, 4 mg, IntraVENous, Q6H PRN, Ashita Behl, MD    polyethylene glycol (GLYCOLAX) packet 17 g, 17 g, Oral, Daily PRN, Narayan Akins MD    acetaminophen (TYLENOL) tablet 650 mg, 650 mg, Oral, Q6H PRN, 650 mg at 11/01/22 0855 **OR** acetaminophen (TYLENOL) suppository 650 mg, 650 mg, Rectal, Q6H PRN, Dash Robins MD    [Held by provider] enoxaparin (LOVENOX) injection 40 mg, 40 mg, SubCUTAneous, Daily, Ashita Behl, MD, 40 mg at 10/30/22 0930    [Held by provider] furosemide (LASIX) injection 40 mg, 40 mg, IntraVENous, BID, Ashita Behl, MD, 40 mg at 10/30/22 1627    insulin lispro (HUMALOG) injection vial 0-4 Units, 0-4 Units, SubCUTAneous, TID WC, Dash Robins MD, 1 Units at 10/31/22 1755    insulin lispro (HUMALOG) injection vial 0-4 Units, 0-4 Units, SubCUTAneous, Nightly, Ashita Behl, MD    glucose chewable tablet 16 g, 4 tablet, Oral, PRN, Ashita Behl, MD    dextrose bolus 10% 125 mL, 125 mL, IntraVENous, PRN **OR** dextrose bolus 10% 250 mL, 250 mL, IntraVENous, PRN, Ashita Behl, MD    glucagon (rDNA) injection 1 mg, 1 mg, SubCUTAneous, PRN, Ashita Behl, MD    dextrose 10 % infusion, , IntraVENous, Continuous PRN, Dash Robins MD    ipratropium-albuterol (DUONEB) nebulizer solution 2 ampule, 2 ampule, Inhalation, Once, Dash Robins MD    aspirin chewable tablet 81 mg, 81 mg, Oral, Daily, Ashita Behl, MD, 81 mg at 11/01/22 1255    ipratropium-albuterol (DUONEB) nebulizer solution 1 ampule, 1 ampule, Inhalation, Q4H PRN, Dash Robins MD    metoprolol succinate (TOPROL XL) extended release tablet 25 mg, 25 mg, Oral, Daily, Luciano Álvarez MD, 25 mg at 11/01/22 0930  Prior to Admission medications    Medication Sig Start Date End Date Taking?  Authorizing Provider   buPROPion (WELLBUTRIN XL) 150 MG extended release tablet Take 1 tablet by mouth every morning 10/12/22  Yes BLACK Lopez - CNP   omeprazole (PRILOSEC) 40 MG delayed release capsule Take 1 capsule by mouth daily 10/12/22  Yes BLACK Lopez CNP   lisinopril (PRINIVIL;ZESTRIL) 20 MG tablet Take 1 tablet by mouth daily 6/28/22  Yes BLACK Lopez CNP   glimepiride (AMARYL) 4 MG tablet Take 1 tablet by mouth every morning (before breakfast) 6/28/22  Yes Anival Tirado Albion Abrahan, APRN - CNP   empagliflozin (JARDIANCE) 25 MG tablet Take 1 tablet by mouth daily 5/24/22  Yes Matthews Severe, APRN - CNP   atorvastatin (LIPITOR) 40 MG tablet Take 1 tablet by mouth daily 5/24/22  Yes Bertram Severe, APRN - CNP   acetaminophen (TYLENOL) 500 MG tablet Take 500 mg by mouth every 6 hours as needed for Pain As needed    Historical Provider, MD   Dulaglutide (TRULICITY) 1.5 CI/8.1IT SOPN Inject 1.5 mg into the skin once a week 8/18/22   Bertram Severe, APRN - CNP     Additional information:       VITAL SIGNS   Vitals:    11/01/22 0855   BP: 121/71   Pulse: 82   Resp: 18   Temp: 98 °F (36.7 °C)   SpO2: 98%       PHYSICAL:   General: No acute distress  HEENT:  Unremarkable for age  Neck: without increased JVD, carotid pulses 2+ bilaterally without bruits  Heart: RRR, S1 & S2 WNL. No murmurs   Lungs: Clear to auscultation    Abdomen: BS present, without HSM, masses, or tenderness    Extremities: without C,C,E.  Pulses 2+ bilaterally   Mental Status: Alert & Oriented        PLANNED PROCEDURE   [x]Cath  [x]PCI                []Pacemaker/AICD  []JOY             []Cardioversion []Peripheral angiography/PTA  []Other:      SEDATION  Planned agent:[x]Midazolam []Meperidine []Sublimaze []Morphine  []Diazepam  []Other:     ASA Classification:  []1 []2 [x]3 []4 []5   Class 1: A normal healthy patient  Class 2: Pt with mild to moderate systemic disease  Class 3: Severe systemic disease or disturbance  Class 4: Severe systemic disorders that are already life threatening. Class 5: Moribund pt with little chances of survival, for more than 24 hours. Mallampati I Airway Classification:   []1 [x]2 []3 []4     [x]Pre-procedure diagnostic studies complete and results available. Comment:    [x]Previous sedation/anesthesia experiences assessed. Comment:    [x]The patient is an appropriate candidate to undergo the planned procedure sedation and anesthesia.  (Refer to nursing sedation/analgesia documentation record)  [x]Formulation and discussion of sedation/procedure plan, risks, and expectations with patient and/or responsible adult completed. [x]Patient examined immediately prior to the procedure.  (Refer to nursing sedation/analgesia documentation record)      Italia Pablo MD, Humberto Brood    Electronically signed 11/1/2022 at 11:38 AM

## 2022-11-01 NOTE — BRIEF OP NOTE
[facility]  Sedation/Analgesia Post Sedation Record    Pt Name: Keri Sales  Account number: [de-identified]  MRN: [id number]  YOB: 1960  Procedure Performed By: Jose Antonio Troncoso MD MD   Primary Care Physician: Cesar Leon APRN - CNP  Date: 11/1/2022    POST-PROCEDURE    Physicians/Assistants: Jose Antonio Troncoso MD MD     Procedure Performed:Cath      Sedation/Anesthesia: Versed/ Fentanyl and 2% xylocaine local anesthesia. Estimated Blood Loss: < 50 ml. Specimens Removed: None         Disposition of Specimen: N/A        Complications: No Immediate Complications. Post-procedure Diagnosis/Findings:       Severe ischemic cardiomyopathy  EF 15-20% on left ventriculogram   Acute systolic CHF, LVEDP 31 mmHg   Severe CAD involving ostial LAD, D1 and Ramus Intermedius     Recommendations:  Optimize volume status  Resume Lasix   Daily weight, strict I/O  Has CKD, daily BMP  Nephrology is following  Apparently, a Bella was cath was placed last night due to concern for urine retention. Hematuria was reported reported afterwards. Urology consult is recommended   Monitor CBC  Cont ASA, Lipitor   Monitor on telemetry    Consult cardiovascular surgery, heart team discussion revascularization approach (CABG Vs high risk PCI with Impella support)     Above findings and plan of care were discussed with patient and his wife, questions were answered, agreeable with plan.        Jose Antonio Troncoso MD, Peng Augustin   Electronically signed 11/1/2022 at 12:28 PM  Interventional Cardiology

## 2022-11-01 NOTE — PROGRESS NOTES
distress  Abdomen: soft, non-tender, non-distended, normal bowel sounds, no masses Extremities: no cyanosis, clubbing or edema, pulse   Skin: warm and dry,  Head: normocephalic and atraumatic  Eyes: pupils equal, round, and reactive to light  Neck: supple and non-tender without mass, no thyromegaly   Neurological: alert, oriented, normal speech, no focal findings or movement disorder noted    Medications:    atorvastatin  40 mg Oral Daily    buPROPion  150 mg Oral QAM    pantoprazole  40 mg Oral QAM AC    sodium chloride flush  5-40 mL IntraVENous 2 times per day    [Held by provider] enoxaparin  40 mg SubCUTAneous Daily    [Held by provider] furosemide  40 mg IntraVENous BID    insulin lispro  0-4 Units SubCUTAneous TID WC    insulin lispro  0-4 Units SubCUTAneous Nightly    ipratropium-albuterol  2 ampule Inhalation Once    aspirin  81 mg Oral Daily    metoprolol succinate  25 mg Oral Daily      sodium chloride 100 mL/hr at 11/01/22 0230    sodium chloride      dextrose       sodium chloride flush, 5-40 mL, PRN  sodium chloride, , PRN  ondansetron, 4 mg, Q8H PRN   Or  ondansetron, 4 mg, Q6H PRN  polyethylene glycol, 17 g, Daily PRN  acetaminophen, 650 mg, Q6H PRN   Or  acetaminophen, 650 mg, Q6H PRN  glucose, 4 tablet, PRN  dextrose bolus, 125 mL, PRN   Or  dextrose bolus, 250 mL, PRN  glucagon (rDNA), 1 mg, PRN  dextrose, , Continuous PRN  ipratropium-albuterol, 1 ampule, Q4H PRN        Diagnostics:  TTE 10/29/22   Summary   Left ventricular size is normal and systolic function is severely reduced. Ejection fraction was estimated at 25-30%. LV wall thickness is within   normal limits. There was severe global hypokinesis of the left ventricle. The right ventricular size appears normal with normal systolic function   and wall thickness.    Large pleural effusion      Signature      ----------------------------------------------------------------   Electronically signed by Lisset Russo MD (Interpreting physician) on 10/29/2022 at 01:59 PM      Lab Data:    Cardiac Enzymes:  No results for input(s): CKTOTAL, CKMB, CKMBINDEX, TROPONINI in the last 72 hours.     CBC:   Lab Results   Component Value Date/Time    WBC 9.7 11/01/2022 04:33 AM    RBC 4.13 11/01/2022 04:33 AM    HGB 11.7 11/01/2022 04:33 AM    HCT 36.5 11/01/2022 04:33 AM     11/01/2022 04:33 AM       CMP:    Lab Results   Component Value Date/Time     11/01/2022 04:32 AM    K 4.4 11/01/2022 04:32 AM    K 4.3 10/28/2022 10:00 PM     11/01/2022 04:32 AM    CO2 25 11/01/2022 04:32 AM    BUN 27 11/01/2022 04:32 AM    CREATININE 1.5 11/01/2022 04:32 AM    GFRAA 58 05/24/2022 04:58 AM    LABGLOM 53 11/01/2022 02:49 AM    GLUCOSE 181 11/01/2022 04:32 AM    CALCIUM 8.7 11/01/2022 04:32 AM       Hepatic Function Panel:    Lab Results   Component Value Date/Time    ALKPHOS 64 11/01/2022 04:32 AM    ALT 6 11/01/2022 04:32 AM    AST 10 11/01/2022 04:32 AM    PROT 5.8 11/01/2022 04:32 AM    BILITOT 0.3 11/01/2022 04:32 AM    LABALBU 3.7 11/01/2022 04:32 AM       Magnesium:    Lab Results   Component Value Date/Time    MG 1.7 11/01/2022 04:32 AM       PT/INR:    Lab Results   Component Value Date/Time    INR 0.99 11/01/2022 04:32 AM       HgBA1c:    Lab Results   Component Value Date/Time    LABA1C 7.8 08/23/2022 12:32 PM       FLP:    Lab Results   Component Value Date/Time    TRIG 59 10/30/2022 03:35 AM    HDL 59 10/30/2022 03:35 AM    LDLCALC 120 10/30/2022 03:35 AM       TSH:    Lab Results   Component Value Date/Time    TSH 1.020 10/29/2022 03:30 AM         Assessment:    Acute systolic CHF - improved  New LVD - ef 25-30 per TTE 10/29/22  Large pleural effusion on echo  DM  Chronic ETOH abuse  Tobacco abuse  LC/CKD - nephro following  Gross hematuria - attending to follow    Plan:    Daily I/o and weights  2 liter fluid restriction and 2 gm sodium diet  Keep mag >2 and K >4  Lifevest upon dc - ordered  Referral to chf clinic - ordered  Cont asa/statin/BB  No ace/arb due to lower bp and LC  Npo   LHC today if ok with nephrology - dr Orellana Favors ok for cath today  Smoking cessation advised   Due to gross hematuria - likely only diagnostic cath today - dr Teresita Staples updated  Advised ETOH cessation as well      Patient and Practitioner mutually agreed upon goal:   Patient and provider goals: Feel better and have more energy     Electronically signed by Ashley Da Silva PA-C on 11/1/2022 at 9:38 AM

## 2022-11-01 NOTE — PLAN OF CARE
Problem: Discharge Planning  Goal: Discharge to home or other facility with appropriate resources  11/1/2022 1722 by Hannah Abbasi RN  Outcome: Progressing  11/1/2022 0512 by Mahendra Chau RN  Outcome: Progressing     Problem: Skin/Tissue Integrity  Goal: Absence of new skin breakdown  Description: 1. Monitor for areas of redness and/or skin breakdown  2. Assess vascular access sites hourly  3. Every 4-6 hours minimum:  Change oxygen saturation probe site  4. Every 4-6 hours:  If on nasal continuous positive airway pressure, respiratory therapy assess nares and determine need for appliance change or resting period.   11/1/2022 1722 by Hannah Abbasi RN  Outcome: Progressing  11/1/2022 0512 by Mahendra Chau RN  Outcome: Progressing     Problem: Safety - Adult  Goal: Free from fall injury  11/1/2022 1722 by Hannah Abbasi RN  Outcome: Progressing  11/1/2022 0512 by Mahendra Chau RN  Outcome: Progressing     Problem: Chronic Conditions and Co-morbidities  Goal: Patient's chronic conditions and co-morbidity symptoms are monitored and maintained or improved  11/1/2022 1722 by Hannah Abbasi RN  Outcome: Progressing  Flowsheets (Taken 10/31/2022 0815)  Care Plan - Patient's Chronic Conditions and Co-Morbidity Symptoms are Monitored and Maintained or Improved: Monitor and assess patient's chronic conditions and comorbid symptoms for stability, deterioration, or improvement  11/1/2022 0512 by Mahendra Chau RN  Outcome: Progressing     Problem: Metabolic/Fluid and Electrolytes - Adult  Goal: Electrolytes maintained within normal limits  11/1/2022 1722 by Hannah Abbasi RN  Outcome: Progressing  Flowsheets (Taken 11/1/2022 1722)  Electrolytes maintained within normal limits:   Monitor labs and assess patient for signs and symptoms of electrolyte imbalances   Administer electrolyte replacement as ordered   Monitor response to electrolyte replacements, including repeat lab results as appropriate Fluid restriction as ordered   Instruct patient on fluid and nutrition restrictions as appropriate  11/1/2022 0512 by Loulou Adame RN  Outcome: Progressing  Goal: Hemodynamic stability and optimal renal function maintained  11/1/2022 1722 by Deniz Salmeron RN  Outcome: Progressing  Flowsheets (Taken 11/1/2022 1722)  Hemodynamic stability and optimal renal function maintained:   Monitor labs and assess for signs and symptoms of volume excess or deficit   Monitor intake, output and patient weight   Monitor urine specific gravity, serum osmolarity and serum sodium as indicated or ordered   Monitor response to interventions for patient's volume status, including labs, urine output, blood pressure (other measures as available)   Encourage oral intake as appropriate   Instruct patient on fluid and nutrition restrictions as appropriate  11/1/2022 0512 by Loulou Adame RN  Outcome: Progressing  Goal: Glucose maintained within prescribed range  11/1/2022 1722 by Deniz Salmeron RN  Outcome: Progressing  Flowsheets (Taken 11/1/2022 1722)  Glucose maintained within prescribed range:   Monitor blood glucose as ordered   Assess for signs and symptoms of hyperglycemia and hypoglycemia   Administer ordered medications to maintain glucose within target range   Assess barriers to adequate nutritional intake and initiate nutrition consult as needed   Instruct patient on self management of diabetes and initiate consult as needed  11/1/2022 0512 by Loulou Adame RN  Outcome: Progressing     Problem: Hematologic - Adult  Goal: Maintains hematologic stability  11/1/2022 1722 by Deniz Salmeron RN  Outcome: Progressing  Flowsheets (Taken 11/1/2022 1722)  Maintains hematologic stability:   Assess for signs and symptoms of bleeding or hemorrhage   Monitor labs for bleeding or clotting disorders   Administer blood products/factors as ordered  11/1/2022 0512 by Loulou Adame RN  Outcome: Progressing

## 2022-11-01 NOTE — PROGRESS NOTES
Full consult to follow. This is a 31-year-old man presents to the hospital in congestive heart failure with no previous cardiac history. 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 He saw his medical doctor about 3 weeks ago because of worsening shortness of breath and a cardiac work-up was initiated but he never received any of the tests. At this visit he had some gurgling consistent with congestive heart failure. He enters the hospital and congestive heart failure with severely depressed ejection fraction and cardiorenal syndrome. Work-up has included a cardiac catheterization which shows severe multivessel coronary artery disease with high-grade stenosis in the LAD ramus and first diagonal.  Echo shows dilated left ventricle without significant valvular disease and severely depressed ejection fraction. He had a CAT scan of the chest which ruled out pulmonary embolism, but showed bilateral pleural effusions consistent with CHF. He had some urinary retention and a Bella catheter placed. Renal ultrasound shows bilateral high no hydronephrosis with distended bladder in the postvoid residual urine. He has mild hematuria after the Bella. I believe this patient would benefit from surgical revascularization, CABG. I have discussed this with Dr. Padmini Pierce from interventional cardiology heart team.  Percutaneous intervention required stenting left main and he is high risk. I believe that the patient can be optimized for surgery. This will take at least a week in the hospital treating his heart failure and improving his renal function. Also allowing his hematuria to resolve. Even before I discussed the details of surgery with the patient he vocalized that he preferred a surgical approach. He said he had multiple friends who has have had multiple stenting procedures in the past and he would rather have 1 solution to the problem. I agree.   I have explained the risks of surgery to him. The risks, benefits and alternatives were discussed in detail with the patient and family. The risks include, but are not limited to: Death, stroke, bleeding requiring reoperation, infection, Heart attack with graft failure, cardiac arrhythmias, thromboembolism, renal failure requiring dialysis, pneumonia with respiratory failure requiring tracheostomy. The patient expressed understanding of these issues, confirmed that all questions were answered, and desires to proceed. Plan for surgery once optimized which will not be until later next week.

## 2022-11-01 NOTE — PROGRESS NOTES
Dr. Alexus Rodas also notified of the blood in the urine with the crum bag. He let this nurse know he will consult urology in the morning and to make sure this patient had a H&H ordered for this morning.

## 2022-11-01 NOTE — PROGRESS NOTES
Hospitalist Progress Note    Patient:  Marion Fonseca      Unit/Bed:6K-17/017-A    YOB: 1960    MRN: [id number]       Acct: [de-identified]     PCP: BLACK Shirley CNP    Date of Admission: 10/28/2022    Assessment/Plan:    Acute CHF Exacerbation   -Cardiology consulted   -Echo with estimated 25-30% EF   -Cardiology took patient for cath 11/1/22, recommend cardiovascular surgery consult   -Metoprolol 25 mg daily   -Entresto after heart cath   -Life Vest on d/c     LC, ? On CKD: GFR 48 May of 2022:  -Creatinine increased to 1.7 10/31/22   -Nephrology consulted by Cardiology    -Hold 40 mg IV lasix BID   -Renal US ordered with:  Impression:     1. Bilateral hydronephrosis. 2. Markedly distended urinary bladder with a large amount of postvoid residual urine. Urinary Retention with Gross Hematuria   -Urology consulted   -Bella catheter placed    NIDDM   -Low dose sliding scale   -Hypoglycemia protocol    Essential HTN   -Lisinopril discontinued, Entresto following heart cath   -Metoprolol added by cardiology, monitor blood pressure    HLD   -Continue statin    GERD   -[address] Course:    10/30/22  Cardiology following  Plan for cath tomorrow    10/31/22  Patient cath postponed to tomorrow  Nephrology following  Hold diuretics    11/01/22  Patient underwent heart cath  Urology consulted for gross hematuria    Chief Complaint: Shortness of breath      Subjective: 63 y.o. male admitted to the hospitalist service for acute CHF exacerbation. Patient plan for heart cath today  (11/01/22) by Cardiology. Patient denies BM today.        Medications:    Infusion Medications    sodium chloride 100 mL/hr at 11/01/22 0230    sodium chloride      dextrose       Scheduled Medications    atorvastatin  40 mg Oral Daily    buPROPion  150 mg Oral QAM    pantoprazole  40 mg Oral QAM AC    sodium chloride flush  5-40 mL IntraVENous 2 times per day    [Held by provider] enoxaparin  40 mg SubCUTAneous Daily    [Held by provider] furosemide  40 mg IntraVENous BID    insulin lispro  0-4 Units SubCUTAneous TID WC    insulin lispro  0-4 Units SubCUTAneous Nightly    ipratropium-albuterol  2 ampule Inhalation Once    aspirin  81 mg Oral Daily    metoprolol succinate  25 mg Oral Daily     PRN Meds: sodium chloride flush, sodium chloride, ondansetron **OR** ondansetron, polyethylene glycol, acetaminophen **OR** acetaminophen, glucose, dextrose bolus **OR** dextrose bolus, glucagon (rDNA), dextrose, ipratropium-albuterol      Intake/Output Summary (Last 24 hours) at 11/1/2022 0837  Last data filed at 11/1/2022 0135  Gross per 24 hour   Intake 240 ml   Output 3575 ml   Net -3335 ml         Diet:  Diet NPO Exceptions are: Sips of Water with Meds    Review of Systems   Respiratory:  Negative for shortness of breath. Cardiovascular:  Negative for chest pain. Gastrointestinal:  Negative for vomiting. Exam:  /65   Pulse 89   Temp 98.3 °F (36.8 °C) (Oral)   Resp 16   Ht 5' 10\" (1.778 m)   Wt 147 lb 6.4 oz (66.9 kg)   SpO2 91%   BMI 21.15 kg/m²     Physical Exam  Constitutional:       Interventions: He is not intubated. Cardiovascular:      Rate and Rhythm: Regular rhythm. Pulmonary:      Effort: He is not intubated. Comments: Bilateral air entry  Genitourinary:     Comments: Bella collection container with gross hematuria noted  Neurological:      Mental Status: He is alert. Psychiatric:         Speech: He is communicative.           Labs:   Recent Labs     10/30/22  0335 10/31/22  0521 11/01/22  0433   WBC 7.3 7.2 9.7   HGB 11.6* 11.1* 11.7*   HCT 36.3* 35.3* 36.5*    232 240       Recent Labs     10/30/22  0335 10/31/22  0521 11/01/22  0432    140 139   K 4.2 4.1 4.4    100 103   CO2 26 28 25   BUN 20 27* 27*   CREATININE 1.6* 1.7* 1.5*   CALCIUM 9.5 9.1 8.7       Recent Labs     11/01/22 0432   AST 10   ALT 6*   BILITOT 0.3   ALKPHOS 64     Recent Labs 11/01/22  0432   INR 0.99     No results for input(s): CKTOTAL, TROPONINI in the last 72 hours. Urinalysis:      Lab Results   Component Value Date/Time    NITRU NEGATIVE 10/31/2022 07:48 PM    WBCUA 2-4 10/31/2022 07:48 PM    BACTERIA NONE SEEN 10/31/2022 07:48 PM    RBCUA 0-2 10/31/2022 07:48 PM    BLOODU NEGATIVE 10/31/2022 07:48 PM    SPECGRAV 1.012 10/31/2022 07:48 PM       Radiology:  US RENAL COMPLETE   Final Result   1. Bilateral hydronephrosis. 2. Markedly distended urinary bladder with a large amount of postvoid residual urine. Final report electronically signed by Dr. Janie Catalan on 10/31/2022 1:13 PM      CTA CHEST W WO CONTRAST   Final Result   Impression:   No pulmonary embolism. Moderate pulmonary edema. Interval worsening since the prior study on    10/5/22. This document has been electronically signed by: Nargis Black. Sharon Flores MD    on 10/28/2022 11:51 PM      All CTs at this facility use dose modulation techniques and iterative    reconstructions, and/or weight-based dosing   when appropriate to reduce radiation to a low as reasonably achievable. 3D Post-processing was performed on this study. XR CHEST PORTABLE   Final Result   Impression:   Mild to moderate pulmonary edema. This document has been electronically signed by: Nargis Black.  Sharon Flores MD    on 10/28/2022 11:15 PM          Diet: Diet NPO Exceptions are: Sips of Water with Meds    DVT prophylaxis: [x] Lovenox                                 [] SCDs                                 [] SQ Heparin                                 [] Encourage ambulation           [] Already on Anticoagulation     Disposition:    [x] Home       [] TCU       [] Rehab       [] Psych       [] SNF       [] [place]       [] Other-    Code Status: Full Code    PT/OT Eval:         Electronically signed by Landon Couch PA-C on 11/1/2022 at 8:37 AM

## 2022-11-01 NOTE — PROCEDURES
12 lead EKG completed. Results handed to [facility].  Tae Rodriguez CET

## 2022-11-01 NOTE — CONSULTS
[address] RENAL TELEMETRY 6K  [address] [facility] [address]  Dept: [phone]  Loc: [phone]  Visit Date: 10/28/2022    Urology Consult Note    Reason for Consult:  gross hematuria, has MV CAD, may need PCI if turned down for CABG, may need clearance for PCI, DAPT x 1 year  Urinary retention, gross hematuria  Requesting Physician:  medicine, nephrology, cardiology    History Obtained From:  patient, electronic medical record    Chief Complaint: sob    HISTORY OF PRESENT ILLNESS:      The patient is a 63 y.o. male with significant past medical history of diabetes mellitus, HTN, HLD, GERD who presents to [address] [place] [address] with shortness of breath. New onset CHF, LC.   Had heart cath today found to have  Severe ischemic cardiomyopathy  EF 76%   Acute systolic CHF, LVEDP 31 mmHg            Severe CAD involving ostial LAD, D1 and Ramus Intermedius     Urology consulted for gross hematuria, has MV CAD, may need PCI if turned down for CABG, may need clearance for PCI, DAPT x 1 year  Urinary retention, gross hematuria    Past Medical History:        Diagnosis Date    Hypertension     Prediabetes      Past Surgical History:        Procedure Laterality Date    TONSILLECTOMY       Allergies:  Metformin and related  Social History:  Social History     Socioeconomic History    Marital status:      Spouse name: Not on file    Number of children: Not on file    Years of education: Not on file    Highest education level: Not on file   Occupational History    Not on file   Tobacco Use    Smoking status: Every Day     Packs/day: 0.50     Years: 33.00     Pack years: 16.50     Types: Cigarettes    Smokeless tobacco: Never   Vaping Use    Vaping Use: Never used   Substance and Sexual Activity    Alcohol use: Not Currently    Drug use: No    Sexual activity: Not on file   Other Topics Concern    Not on file   Social History Narrative    Not on file     Social Determinants of Health     Financial Resource Strain: Low Risk     Difficulty of Paying Living Expenses: Not hard at all   Food Insecurity: No Food Insecurity    Worried About [employer] in the Last Year: Never true    Ran Out of Food in the Last Year: Never true   Transportation Needs: No Transportation Needs    Lack of Transportation (Medical): No    Lack of Transportation (Non-Medical): No   Physical Activity: Not on file   Stress: Not on file   Social Connections: Not on file   Intimate Partner Violence: Not on file   Housing Stability: Not on file     Family History:       Problem Relation Age of Onset    Diabetes Father     Stroke Father     Diabetes Brother     Diabetes Brother        ROS:  Constitutional: Negative for chills, fatigue, fever, or weight loss. Eyes: Denies reported visual changes. ENT: Denies headache, difficulty swallowing, earache, and nosebleeds. Cardiovascular: Negative for chest pain, palpitations, tachycardia or edema. Respiratory: Denies cough or SOB. GI:The patient denies abdominal or flank pain, anorexia, nausea or vomiting. : see HPI. Musculoskeletal: Patient denies low back pain or painful or reduced range of ROM. Neurological: The patient denies any symptoms of neurological impairment or TIA. Psychiatric: Denies anxiety or depression. Skin: Denies rash or lesions. All remaining ROS negative. PHYSICAL EXAM:  VITALS:  /71   Pulse 82   Temp 98 °F (36.7 °C) (Oral)   Resp 18   Ht 5' 10\" (1.778 m)   Wt 147 lb 6.4 oz (66.9 kg)   SpO2 98%   BMI 21.15 kg/m² . Nursing note and vitals reviewed. Constitutional: Alert and oriented times x3, no acute distress, and cooperative to examination with appropriate mood and affect. HEENT:   Head:         Normocephalic and atraumatic. Mouth/Throat:          Mucous membranes are normal.   Eyes:         EOM are normal. No scleral icterus. Nose:    The external appearance of the nose is normal  Ears:      The ears appear normal to external inspection. Cardiovascular:       Normal rate, regular rhythm. Pulmonary/Chest:  Normal respiratory rate and rhthym. No use of accessory muscles. Lungs clear bilaterally. Abdominal:          Soft. No tenderness. Active bowel sounds             Genitalia:    Normal circumcised penis  Urethral meatus is normal in size and location  Scrotal contents normal to inspection and palpation   Normal testes palpated bilaterally  Urine is yellow in tubing, pink in bag  Musculoskeletal:    Normal range of motion. He exhibits no edema or tenderness of lower extremities. Extremities:    No cyanosis, clubbing, or edema present. Neurological:    Alert and oriented.      DATA:  CBC:   Lab Results   Component Value Date/Time    WBC 9.7 11/01/2022 04:33 AM    RBC 4.13 11/01/2022 04:33 AM    HGB 11.7 11/01/2022 04:33 AM    HCT 36.5 11/01/2022 04:33 AM    MCV 88.4 11/01/2022 04:33 AM    MCH 28.3 11/01/2022 04:33 AM    MCHC 32.1 11/01/2022 04:33 AM    RDW 12.5 05/24/2022 04:58 AM     11/01/2022 04:33 AM    MPV 10.8 11/01/2022 04:33 AM     BMP:    Lab Results   Component Value Date/Time     11/01/2022 04:32 AM    K 4.4 11/01/2022 04:32 AM    K 4.3 10/28/2022 10:00 PM     11/01/2022 04:32 AM    CO2 25 11/01/2022 04:32 AM    BUN 27 11/01/2022 04:32 AM    CREATININE 1.5 11/01/2022 04:32 AM    CALCIUM 8.7 11/01/2022 04:32 AM    GFRAA 58 05/24/2022 04:58 AM    LABGLOM 53 11/01/2022 02:49 AM    GLUCOSE 181 11/01/2022 04:32 AM     BUN/Creatinine:    Lab Results   Component Value Date/Time    BUN 27 11/01/2022 04:32 AM    CREATININE 1.5 11/01/2022 04:32 AM     Magnesium:    Lab Results   Component Value Date/Time    MG 1.7 11/01/2022 04:32 AM     Phosphorus:    Lab Results   Component Value Date/Time    PHOS 3.5 10/29/2022 03:30 AM     PT/INR:    Lab Results   Component Value Date/Time    INR 0.99 11/01/2022 04:32 AM     U/A:    Lab Results   Component Value Date/Time    COLORU YELLOW 10/31/2022 07:48 PM    PHUR 6.0 10/31/2022 07:48 PM    LABCAST NONE SEEN 10/31/2022 07:48 PM    LABCAST NONE SEEN 10/31/2022 07:48 PM    WBCUA 2-4 10/31/2022 07:48 PM    RBCUA 0-2 10/31/2022 07:48 PM    YEAST NONE SEEN 10/31/2022 07:48 PM    BACTERIA NONE SEEN 10/31/2022 07:48 PM    SPECGRAV 1.012 10/31/2022 07:48 PM    LEUKOCYTESUR TRACE 10/31/2022 07:48 PM    UROBILINOGEN 0.2 10/31/2022 07:48 PM    BILIRUBINUR NEGATIVE 10/31/2022 07:48 PM    BLOODU NEGATIVE 10/31/2022 07:48 PM       Imaging: The patient has had a Renal Ultrasound which I have independently reviewed along with its accompanying report. The study demonstrates   Narrative   PROCEDURE: US RENAL COMPLETE       CLINICAL INFORMATION: Acute kidney injury, chronic kidney disease       TECHNIQUE: Ultrasound of the kidneys and urinary bladder was performed. Grayscale and color images were obtained. COMPARISON: None       FINDINGS: The right kidney measures 11.5 x 7.2 x 6.9 cm and left kidney measures 11.1 x 6.9 x 5.7 cm. Renal cortical thickness is normal bilaterally. There is bilateral hydronephrosis. No intrarenal mass or calcifications are identified. Color Doppler demonstrates expected wave forms in the bilateral renal arteries. Arcuate resistive indices are at the upper limits of normal bilaterally. The urinary bladder is markedly distended with a prevoid volume of 2600 mL there is a large amount of postvoid residual urine in the bladder with a post void volume of 1300 mL. There is an incidental finding of incompletely visualized bilateral pleural effusions. Impression   1. Bilateral hydronephrosis. 2. Markedly distended urinary bladder with a large amount of postvoid residual urine. IMPRESSION/Plan:   Ok to continue anticoagulation  Ct abd/pelvis wo to evaluate hematuria/hydronephrosis  Urine is yellow in tubing, will monitor. Hand irrigate as needed    Hematuria - Ua neg for blood prior to cath insertion. Likely from traumatic crum insertion. Urine is yellow in crum tubing  Urinary retention - crum placed over 2.5 L out. Flomax started 11/1/22  Bilateral hydronephrosis - seen on KEY, will get Ct abd wo. Likely from retention  LC - CRT 1.5, bilateral hydron on KEY.   Likely from urinary retention    Will follow  Reviewed with Dr Natalie Manzo    Thank you for including us in the care of BLACK Coreas - CNP, BLACK  11/01/22 12:52 PM  Urology

## 2022-11-01 NOTE — PROCEDURES
Bladder scan completed at 1949 and results told to [facility]. Scan showed 999+ mL in the patients bladder. Last void was unknown.  [facility] CET

## 2022-11-01 NOTE — PROGRESS NOTES
[facility]   Nephrology progress note  11/1/2022, 10:31 AM      Pt Name:    Jennifer Gaona  MRN:     [id number]     YOB: 1960  Admit Date:    10/28/2022  9:39 PM    Chief Complaint: Nephrology following for Toño/CKD- . Subjective:  Patient seen and examined  No chest pain or shortness of breath  Feels okay. No other complaints  Patient had urinary retention and so Bella catheter has been placed and now is having some hematuria    Objective:  24HR INTAKE/OUTPUT:    Intake/Output Summary (Last 24 hours) at 11/1/2022 1031  Last data filed at 11/1/2022 0614  Gross per 24 hour   Intake 240 ml   Output 3575 ml   Net -3335 ml      Admission weight: 150 lb (68 kg)  Wt Readings from Last 3 Encounters:   11/01/22 147 lb 6.4 oz (66.9 kg)   10/26/22 151 lb (68.5 kg)   10/12/22 140 lb 9.6 oz (63.8 kg)        Vitals :   Vitals:    10/31/22 2002 10/31/22 2316 11/01/22 0352 11/01/22 0855   BP: (!) 97/56 (!) 105/56 105/65 121/71   Pulse: 74 82 89 82   Resp: 16 16 16 18   Temp: 98 °F (36.7 °C) 97.4 °F (36.3 °C) 98.3 °F (36.8 °C) 98 °F (36.7 °C)   TempSrc: Oral Oral Oral Oral   SpO2: 93% 93% 91% 98%   Weight:   147 lb 6.4 oz (66.9 kg)    Height:           Physical examination  General Appearance:  Well developed.  No distress  Mouth/Throat:  Oral mucosa moist  Neck:  Supple, no JVD  Lungs:  Breath sounds: clear  Heart[de-identified]  S1,S2 heard  Abdomen:  Soft, non - tender  Musculoskeletal:  Edema - none    Medications:  Infusion:    sodium chloride 100 mL/hr at 11/01/22 0230    sodium chloride      dextrose       Meds:    atorvastatin  40 mg Oral Daily    buPROPion  150 mg Oral QAM    pantoprazole  40 mg Oral QAM AC    sodium chloride flush  5-40 mL IntraVENous 2 times per day    [Held by provider] enoxaparin  40 mg SubCUTAneous Daily    [Held by provider] furosemide  40 mg IntraVENous BID    insulin lispro  0-4 Units SubCUTAneous TID WC    insulin lispro  0-4 Units SubCUTAneous Nightly ipratropium-albuterol  2 ampule Inhalation Once    aspirin  81 mg Oral Daily    metoprolol succinate  25 mg Oral Daily       Lab Data :  CBC:   Recent Labs     10/30/22  0335 10/31/22  0521 11/01/22  0433   WBC 7.3 7.2 9.7   HGB 11.6* 11.1* 11.7*   HCT 36.3* 35.3* 36.5*    232 240     CMP:  Recent Labs     10/30/22  0335 10/31/22  0521 11/01/22  0432    140 139   K 4.2 4.1 4.4    100 103   CO2 26 28 25   BUN 20 27* 27*   CREATININE 1.6* 1.7* 1.5*   GLUCOSE 114* 151* 181*   CALCIUM 9.5 9.1 8.7   MG 1.7 1.6 1.7     Hepatic:   Recent Labs     11/01/22 0432   LABALBU 3.7   AST 10   ALT 6*   BILITOT 0.3   ALKPHOS 64     Assessment and Plan:  Renal -acute kidney injury, on chronic kidney disease stage III  This appears most likely due to the use of diuretics ACE inhibitor and primarily due to contrast exposure on the day of his admission. Currently improved to some degree close to his baseline  Okay from renal standpoint for the cardiac cath, continue IV fluids at least 6 hours post cath  Electrolytes -appear to be within normal limits  Essential hypertension running well continue current medications  Hx of diabetes mellitus apparently his A1c was 15.0,, 3 months ago  New onset of acute congestive heart failure systolic needs a cardiac cath seen by cardiology. Okay from renal standpoint for cardiac cath  Urinary retention -status post Bella, now with hematuria however Flomax consult urology  Meds reviewed and discussed with patient and wife in detail    Linette Syed MD  [facility]    This report has been created using voice recognition software.  It may contain minor errors which are inherent in voice recognition technology

## 2022-11-01 NOTE — PROGRESS NOTES
This nurse in with patient, urine in collection part of bag now appears to have some blood in urine. Crum bag emptied. Patient educated that it can be common to have blood in urine a few hour after crum placed and we will monitor it. [place]	Cyril AGUILLON on floor notified face to face of blood now in urine, she instructed to continue to monitor.

## 2022-11-01 NOTE — PLAN OF CARE
Problem: Skin/Tissue Integrity  Goal: Absence of new skin breakdown  Description: 1. Monitor for areas of redness and/or skin breakdown  2. Assess vascular access sites hourly  3. Every 4-6 hours minimum:  Change oxygen saturation probe site  4. Every 4-6 hours:  If on nasal continuous positive airway pressure, respiratory therapy assess nares and determine need for appliance change or resting period.   11/1/2022 0512 by Benitez Ruelas RN  Outcome: Progressing     Problem: Safety - Adult  Goal: Free from fall injury  11/1/2022 0512 by Benitez Ruelas RN  Outcome: Progressing     Problem: Chronic Conditions and Co-morbidities  Goal: Patient's chronic conditions and co-morbidity symptoms are monitored and maintained or improved  11/1/2022 0512 by Benitez Ruelas RN  Outcome: Progressing     Problem: Pain  Goal: Verbalizes/displays adequate comfort level or baseline comfort level  11/1/2022 0512 by Benitez Ruelas RN  Outcome: Progressing     Problem: Metabolic/Fluid and Electrolytes - Adult  Goal: Electrolytes maintained within normal limits  Outcome: Progressing     Problem: Metabolic/Fluid and Electrolytes - Adult  Goal: Hemodynamic stability and optimal renal function maintained  Outcome: Progressing     Problem: Metabolic/Fluid and Electrolytes - Adult  Goal: Glucose maintained within prescribed range  Outcome: Progressing     Problem: Hematologic - Adult  Goal: Maintains hematologic stability  Outcome: Progressing

## 2022-11-01 NOTE — PROCEDURES
[address] [place] [address]                            CARDIAC CATHETERIZATION    PATIENT NAME: Sarita Hdz                   :        1960  MED REC NO:   [id number]                           ROOM:       0017  ACCOUNT NO:   [de-identified]                           ADMIT DATE: 10/28/2022  PROVIDER:     Mao Klein MD    DATE OF PROCEDURE:  2022    INDICATION FOR PROCEDURE:  Acute systolic congestive heart failure. New-onset severe cardiomyopathy. Ejection fraction on echocardiogram  was 25-30%. PROCEDURES PERFORMED:  1. Left cardiac catheterization with selective coronary angiogram.  2.  Left ventriculogram.  3.  Abdominal aortogram.    DESCRIPTION OF PROCEDURE AND DETAILS:  After informed consent, the  patient was brought to the cardiac catheterization room. He was prepped  and draped in a sterile fashion. 2% lidocaine was injected in the skin  and subcutaneous tissue overlying the right radial artery. Under  ultrasound guidance using modified Seldinger technique, access was  obtained in the right radial artery. 5/6 Slender sheath was inserted. Standard antithrombotic/antispasmodic medications were given. I used  6-Amharic JR4, 6-Amharic JL3.5 diagnostic catheters to complete the  coronary angiogram.  For the left ventriculogram and the abdominal  aortogram, I used to the 6-Amharic pigtail catheter. FINDINGS:  ABDOMINAL AORTOGRAM:  Distal abdominal aorta is patent without  significant stenosis or aneurysmal dilation. There is evidence for  20-30% stenosis in the right common iliac artery. Right external iliac  and internal iliac arteries are patent. No significant stenotic lesions  on the left common iliac, internal iliac and external iliac arteries. LEFT VENTRICULOGRAM:  There is evidence for severe global hypokinesis  with akinetic movement of the anterior wall.   Ejection fraction  estimated at 15-20%. HEMODYNAMICS:  Left ventricular end-diastolic pressure 31 mmHg. No  significant pressure gradient across the aortic valve upon pullback. CORONARY ANGIOGRAM:  1. The left main coronary artery has 10-20% stenosis in the distal  segment, otherwise patent without evidence for obstructive disease. Gives rise to left circumflex, left anterior descending and ramus  intermedius arteries. 2.  Ramus intermedius has 90-95% stenosis in the proximal segment. 3.  Left circumflex artery. Luminal irregularities were noticed without  evidence for obstructive disease. 4.  Left anterior descending artery. The ostium of the LAD has a 90%  stenosis. Proximal LAD with luminal irregularities, otherwise LAD is  patent with no significant stenotic lesions. D1 has 99% subtotal  occlusion. 5.  Right coronary artery. The proximal RCA has luminal irregularities. Mid RCA has 40-50% stenosis. Distal RCA is patent. DOMINANCE:  Codominant system. MEDICATIONS:  See MAR. COMPLICATIONS:  None. ESTIMATED BLOOD LOSS:  Less than 50 mL. ACCESS:  Right radial artery access. Vasc Band was applied. Hemostasis  was achieved. IMPRESSION:  1. Severe ischemic cardiomyopathy, ejection fraction 15-20% on left  ventriculogram.  2.  Acute systolic congestive heart failure. Left ventricular  end-diastolic pressure 31 mmHg. 3.  Severe coronary artery disease involving the ostium of the LAD,  first diagonal branch and the ramus intermedius artery. RECOMMENDATIONS:  Optimize the volume status. Resume Lasix. Stop IV  fluids. Daily weight. Strict intake and output. The patient has  chronic kidney disease, monitor daily basic metabolic panel, Nephrology  is following. Apparently, a Bella catheter was placed last night due to  concern for urinary retention. Hematuria was reported after Bella  placement. Urology consult is recommended. Monitor CBC. Continue on  aspirin and Lipitor.   Monitor the patient on telemetry. Consult  Cardiovascular Surgery. Heart team discussion regarding  revascularization approach is warranted. Options are coronary artery  bypass graft surgery versus high-risk PCI with Impella support. Findings and plan of care were discussed with the patient and his  family, they are agreeable to the plan.         Carol Nam MD    D: 11/01/2022 13:06:38       T: 11/01/2022 13:10:35     [email or web address]  Job#: [id number]     Doc#: [id number]    CC:

## 2022-11-02 ENCOUNTER — TELEPHONE (OUTPATIENT)
Dept: UROLOGY | Age: 62
End: 2022-11-02

## 2022-11-02 ENCOUNTER — APPOINTMENT (OUTPATIENT)
Dept: CT IMAGING | Age: 62
DRG: 233 | End: 2022-11-02
Payer: COMMERCIAL

## 2022-11-02 LAB
ANION GAP SERPL CALCULATED.3IONS-SCNC: 11 MEQ/L (ref 8–16)
BUN BLDV-MCNC: 25 MG/DL (ref 7–22)
CALCIUM SERPL-MCNC: 9 MG/DL (ref 8.5–10.5)
CHLORIDE BLD-SCNC: 101 MEQ/L (ref 98–111)
CO2: 25 MEQ/L (ref 23–33)
CREAT SERPL-MCNC: 1.6 MG/DL (ref 0.4–1.2)
GFR SERPL CREATININE-BSD FRML MDRD: 49 ML/MIN/1.73M2
GLUCOSE BLD-MCNC: 138 MG/DL (ref 70–108)
GLUCOSE BLD-MCNC: 157 MG/DL (ref 70–108)
GLUCOSE BLD-MCNC: 176 MG/DL (ref 70–108)
GLUCOSE BLD-MCNC: 180 MG/DL (ref 70–108)
GLUCOSE BLD-MCNC: 227 MG/DL (ref 70–108)
MAGNESIUM: 1.6 MG/DL (ref 1.6–2.4)
POTASSIUM SERPL-SCNC: 4.2 MEQ/L (ref 3.5–5.2)
SODIUM BLD-SCNC: 137 MEQ/L (ref 135–145)

## 2022-11-02 PROCEDURE — 6370000000 HC RX 637 (ALT 250 FOR IP): Performed by: STUDENT IN AN ORGANIZED HEALTH CARE EDUCATION/TRAINING PROGRAM

## 2022-11-02 PROCEDURE — 82948 REAGENT STRIP/BLOOD GLUCOSE: CPT

## 2022-11-02 PROCEDURE — 1200000000 HC SEMI PRIVATE

## 2022-11-02 PROCEDURE — 99232 SBSQ HOSP IP/OBS MODERATE 35: CPT | Performed by: PHYSICIAN ASSISTANT

## 2022-11-02 PROCEDURE — 74176 CT ABD & PELVIS W/O CONTRAST: CPT

## 2022-11-02 PROCEDURE — 99255 IP/OBS CONSLTJ NEW/EST HI 80: CPT | Performed by: THORACIC SURGERY (CARDIOTHORACIC VASCULAR SURGERY)

## 2022-11-02 PROCEDURE — 99232 SBSQ HOSP IP/OBS MODERATE 35: CPT | Performed by: INTERNAL MEDICINE

## 2022-11-02 PROCEDURE — 80048 BASIC METABOLIC PNL TOTAL CA: CPT

## 2022-11-02 PROCEDURE — 36415 COLL VENOUS BLD VENIPUNCTURE: CPT

## 2022-11-02 PROCEDURE — 1200000003 HC TELEMETRY R&B

## 2022-11-02 PROCEDURE — 6370000000 HC RX 637 (ALT 250 FOR IP): Performed by: INTERNAL MEDICINE

## 2022-11-02 PROCEDURE — 83735 ASSAY OF MAGNESIUM: CPT

## 2022-11-02 PROCEDURE — 2580000003 HC RX 258: Performed by: STUDENT IN AN ORGANIZED HEALTH CARE EDUCATION/TRAINING PROGRAM

## 2022-11-02 RX ORDER — FUROSEMIDE 10 MG/ML
40 INJECTION INTRAMUSCULAR; INTRAVENOUS DAILY
Status: DISCONTINUED | OUTPATIENT
Start: 2022-11-03 | End: 2022-11-06

## 2022-11-02 RX ORDER — TAMSULOSIN HYDROCHLORIDE 0.4 MG/1
0.4 CAPSULE ORAL DAILY
Qty: 30 CAPSULE | Refills: 3 | Status: SHIPPED | OUTPATIENT
Start: 2022-11-03 | End: 2022-11-15 | Stop reason: HOSPADM

## 2022-11-02 RX ADMIN — SODIUM CHLORIDE, PRESERVATIVE FREE 10 ML: 5 INJECTION INTRAVENOUS at 21:47

## 2022-11-02 RX ADMIN — TAMSULOSIN HYDROCHLORIDE 0.4 MG: 0.4 CAPSULE ORAL at 10:00

## 2022-11-02 RX ADMIN — ATORVASTATIN CALCIUM 40 MG: 40 TABLET, FILM COATED ORAL at 10:00

## 2022-11-02 RX ADMIN — BUPROPION HYDROCHLORIDE 150 MG: 150 TABLET, FILM COATED, EXTENDED RELEASE ORAL at 10:00

## 2022-11-02 RX ADMIN — INSULIN LISPRO 1 UNITS: 100 INJECTION, SOLUTION INTRAVENOUS; SUBCUTANEOUS at 13:31

## 2022-11-02 RX ADMIN — ASPIRIN 81 MG 81 MG: 81 TABLET ORAL at 09:59

## 2022-11-02 RX ADMIN — SODIUM CHLORIDE, PRESERVATIVE FREE 5 ML: 5 INJECTION INTRAVENOUS at 10:01

## 2022-11-02 RX ADMIN — PANTOPRAZOLE SODIUM 40 MG: 40 TABLET, DELAYED RELEASE ORAL at 10:00

## 2022-11-02 ASSESSMENT — ENCOUNTER SYMPTOMS
SHORTNESS OF BREATH: 0
EYE DISCHARGE: 0
COLOR CHANGE: 0
BACK PAIN: 0
SHORTNESS OF BREATH: 1
VOMITING: 0
ABDOMINAL PAIN: 0
CHEST TIGHTNESS: 1

## 2022-11-02 ASSESSMENT — PAIN SCALES - GENERAL: PAINLEVEL_OUTOF10: 0

## 2022-11-02 NOTE — PROGRESS NOTES
Hospitalist Progress Note    Patient:  Mira Melendez      Unit/Bed:6K-17/017-A    YOB: 1960    MRN: [id number]       Acct: [de-identified]     PCP: BLACK Sanches CNP    Date of Admission: 10/28/2022    Assessment/Plan:    Acute CHF Exacerbation  Pro-BNP 8508 (10/28) 3862 (11/1)  Serial Troponins stable ~0.035  Unsure of baseline  EKG (11/1) - Normal sinus rhythm, Possible L atrial enlargement, Anterolateral infarct cited on or before Oct 28  CXR (10/28) - Accesory azygos fissure, interstitial prominence w/ faint bilateral central opacity, Mod sized L pleural effusion, Mild to Mod pulmonary edema  Echo (10/29) - EF 25-30%,   Heart Cath (11/1)  20-30% stenosis in R common iliac artery, severe global hypokinesis w/ akineic movement of anterior wall, EF 15-20%, L main coronary artery 10-20% stenosis, Ramus intermedius had 90-95% stenosis, Ostium of LAD has 90% stenosis, D1 w/ 99% subtotal occlusion, Mid RCA 40-50% stenosis  Severe ischemic cardiomyopathy w/ EF 15-20% on L ventriculogram, Acute systolic CHF Left ventricular end-diastolic pressure 31 mmHg, Severe coronary artery disease involving ostium of LAD as well as first diagonal branch and ramus intermedius artery  CTA (10/28) - No PEs, Mod pulmonary edema  Cardiology consulted  2 L fluid restriction and 2 gm sodium diet  Life vest for DC - ordered  Referral for CHF clinic - ordered  Cardiovascular surgery consult  Following  Recommending CABG in 1 week following resolution of current exacerbation  Continue ASA, Atorvastatin, Metoprolol  Entresto after heart cath    Mod sized Bilateral Pleural Effusions w/ Atelectasis  Likely secondary to #1  Order Incentive Spirometry & Acapella  CKD Stage 3 w/ LC  (11/2) Cr 1.6, GFR 49  Baseline Cr ~1.4, GFR baseline difficult to determine  GFR 48, May 2022  Hold 40 mg IV lasix BID  Renal US (10/31)  Bilateral hydronephrosis  Markedly distended bladder w/ large amount of postvoid residual urine  CT A/P wo Contrast (11/2)  Mild bilateral hydronephrosis, Marked mural thickening of bladder, Moderate constipation, Mod sized bilateral pleural effusions w/ atelectasis  Nephrology Consult  Avoid Renal toxic agents    Urinary Retention with Gross Hematuria  Urology consulted  Meseret Paris to continue anticoagulation  Hematuria likely secondary to traumatic crum insertion  Discharge with follow and follow up OP for cystoscopy  Crum catheter placed  Flomax started (11/1)    NIDDM  Last A1C 7.8 (8/23/22)  Low dose SSI w/ hypoglycemic protocols  Home meds Dulaglutide, Glimepiride, Empagliflozin on hold    Essential HTN  Lisinopril discontinued, Entresto following heart cath  Metoprolol added by cardiology, monitor blood pressure    HLD  Continue home Atorvastatin    GERD  Continue Protonix  Smoking cessation  Patient states that he is currently attempting to quit smoking. Started Wellbutrin approximately 1 and half weeks ago  He has reduced his amount of smoking from 1 PPD to 0.5 PPD. Patient also states that he has not smoked since Thursday evening, 10/27  Continue home Wellbutrin  Do not give nicotine patch unless requested by patient  Generalized Anxiety Disorder  Not on any home meds for this per chart review    Hospital Course:  HPI Provided by Chart Review    \"Len Slater is a 63 y.o. male with PMHx of diabetes mellitus, HTN, HLD, GERD who presents to [address] [place] [address] with shortness of breath. Patient states at approximately 8:30 PM on the date of initial presentation to the ED he began having shortness of breath while he was at rest watching TV. Patient states that the shortness of breath continued to progress which is why he decided to present to the ED. Patient states that he fractured his ribs on 10/5 due to falling in the shower and he has been \"taking it easy\". Patient denies any current pain from his rib fractures.   At time of examination patient states that he is barely short of breath, which he Lasix 20 mg IV in the ED. Patient will be admitted to [place] [address] for further management of new onset congestive heart failure. \"    10/30/22  Cardiology following  Plan for cath tomorrow    10/31/22  Patient cath postponed to tomorrow  Nephrology following  Hold diuretics    11/01/22  Patient underwent heart cath  Urology consulted for gross hematuria    Chief Complaint: Shortness of breath      Subjective:   (11/2)  Patient was resting comfortably in bed upon entering the room. He is complaining of no symptoms and states he feels much better. Denies pain, chest pain, palpitations, shortness of breath, numbness or tingling, nausea or vomiting, bowel irregularities, difficulty w/ ADLs, or any new symptoms. Currently has indwelling crum w/ 500 mL of dark yellow urine.     Medications:    Infusion Medications    [Held by provider] sodium chloride 100 mL/hr at 11/01/22 0230    sodium chloride      dextrose       Scheduled Medications    aspirin  325 mg Oral Once    diphenhydrAMINE  50 mg IntraVENous Once    hydrocortisone sodium succinate PF  200 mg IntraVENous Once    tamsulosin  0.4 mg Oral Daily    atorvastatin  40 mg Oral Daily    buPROPion  150 mg Oral QAM    pantoprazole  40 mg Oral QAM AC    sodium chloride flush  5-40 mL IntraVENous 2 times per day    [Held by provider] enoxaparin  40 mg SubCUTAneous Daily    furosemide  40 mg IntraVENous BID    insulin lispro  0-4 Units SubCUTAneous TID WC    insulin lispro  0-4 Units SubCUTAneous Nightly    ipratropium-albuterol  2 ampule Inhalation Once    aspirin  81 mg Oral Daily    metoprolol succinate  25 mg Oral Daily     PRN Meds: nitroGLYCERIN, acetaminophen, lidocaine, sodium chloride flush, sodium chloride, ondansetron **OR** ondansetron, polyethylene glycol, [DISCONTINUED] acetaminophen **OR** acetaminophen, glucose, dextrose bolus **OR** dextrose bolus, glucagon (rDNA), dextrose, ipratropium-albuterol      Intake/Output Summary (Last 24 hours) at 11/2/2022 [address] [facility] filed at 11/2/2022 0358  Gross per 24 hour   Intake 580 ml   Output 3150 ml   Net -2570 ml         Diet:  ADULT DIET; Regular    Review of Systems   Respiratory:  Negative for shortness of breath. Cardiovascular:  Negative for chest pain. Gastrointestinal:  Negative for vomiting. Exam:  /65   Pulse 78   Temp 97.7 °F (36.5 °C) (Oral)   Resp 18   Ht 5' 10\" (1.778 m)   Wt 144 lb 3.2 oz (65.4 kg)   SpO2 95%   BMI 20.69 kg/m²     Physical Exam  Constitutional:       Appearance: Normal appearance. Interventions: He is not intubated. HENT:      Head: Normocephalic and atraumatic. Right Ear: External ear normal.      Left Ear: External ear normal.      Nose: Nose normal.      Mouth/Throat:      Mouth: Mucous membranes are moist.      Pharynx: Oropharynx is clear. Eyes:      Extraocular Movements: Extraocular movements intact. Conjunctiva/sclera: Conjunctivae normal.   Cardiovascular:      Rate and Rhythm: Normal rate and regular rhythm. Pulses: Normal pulses. Heart sounds: Normal heart sounds. Pulmonary:      Effort: Pulmonary effort is normal. He is not intubated. Breath sounds: Normal breath sounds. Comments: Bilateral air entry  Abdominal:      General: Bowel sounds are normal.      Palpations: Abdomen is soft. Genitourinary:     Comments: Bella collection container with gross hematuria noted  Musculoskeletal:         General: Normal range of motion. Cervical back: Normal range of motion. Skin:     General: Skin is warm and dry. Capillary Refill: Capillary refill takes less than 2 seconds. Neurological:      General: No focal deficit present. Mental Status: He is alert and oriented to person, place, and time. Psychiatric:         Mood and Affect: Mood normal.         Speech: He is communicative.           Labs:   Recent Labs     10/31/22  0521 11/01/22  0433   WBC 7.2 9.7   HGB 11.1* 11.7*   HCT 35.3* 36.5*    240 Recent Labs     10/31/22  0521 11/01/22  0432 11/02/22  0603    139 137   K 4.1 4.4 4.2    103 101   CO2 28 25 25   BUN 27* 27* 25*   CREATININE 1.7* 1.5* 1.6*   CALCIUM 9.1 8.7 9.0       Recent Labs     11/01/22  0432   AST 10   ALT 6*   BILITOT 0.3   ALKPHOS 64       Recent Labs     11/01/22  0432   INR 0.99       No results for input(s): Aaron Ill in the last 72 hours. Urinalysis:      Lab Results   Component Value Date/Time    NITRU NEGATIVE 10/31/2022 07:48 PM    WBCUA 2-4 10/31/2022 07:48 PM    BACTERIA NONE SEEN 10/31/2022 07:48 PM    RBCUA 0-2 10/31/2022 07:48 PM    BLOODU NEGATIVE 10/31/2022 07:48 PM    SPECGRAV 1.012 10/31/2022 07:48 PM       Radiology:  CT ABDOMEN PELVIS WO CONTRAST   Final Result      Mild bilateral hydronephrosis. There is bilateral renal contrast excretion    from prior contrast administration limiting evaluation for renal stones. Marked mural thickening of the urinary bladder. The differential diagnosis    includes cystitis and neoplasm. Moderate amount of retained stool. Partially visualized lower thorax shows moderate sized bilateral pleural    effusions with atelectasis. This document has been electronically signed by: Don Lopez MD on    11/02/2022 03:23 AM      All CTs at this facility use dose modulation techniques and iterative    reconstructions, and/or weight-based dosing   when appropriate to reduce radiation to a low as reasonably achievable. US RENAL COMPLETE   Final Result   1. Bilateral hydronephrosis. 2. Markedly distended urinary bladder with a large amount of postvoid residual urine. Final report electronically signed by Dr. Edmundo Fritz on 10/31/2022 1:13 PM      CTA CHEST W WO CONTRAST   Final Result   Impression:   No pulmonary embolism. Moderate pulmonary edema. Interval worsening since the prior study on    10/5/22. This document has been electronically signed by: Tamara Quevedo.  Esperanza Luke MD on 10/28/2022 11:51 PM      All CTs at this facility use dose modulation techniques and iterative    reconstructions, and/or weight-based dosing   when appropriate to reduce radiation to a low as reasonably achievable. 3D Post-processing was performed on this study. XR CHEST PORTABLE   Final Result   Impression:   Mild to moderate pulmonary edema. This document has been electronically signed by: Dee Banuelos. Radha Demarco MD    on 10/28/2022 11:15 PM      PET MYOCARDIAL VIABILITY    (Results Pending)       Diet: ADULT DIET;  Regular    DVT prophylaxis: [x] Lovenox                                 [] SCDs                                 [] SQ Heparin                                 [] Encourage ambulation           [] Already on Anticoagulation     Disposition:    [x] Home       [] TCU       [] Rehab       [] Psych       [] SNF       [] [place]       [] Other-    Code Status: Full Code    PT/OT Eval:         Electronically signed by Yuliya Lee PA-C on 11/2/2022 at 9:59 AM

## 2022-11-02 NOTE — FLOWSHEET NOTE
11/02/22 1036   Safe Environment   Safety Measures Other (comment)  (Virtual nurse rounding complete)   Virtual nurse introduced via audio. Patient permits camera. Patient sitting up in bed, awake and alert. Denies any needs at this time. Call light in hand.

## 2022-11-02 NOTE — PROGRESS NOTES
Urology Progress Note    Chief Complaint: Reason for Consult:  gross hematuria, has MV CAD, may need PCI if turned down for CABG, may need clearance for PCI, DAPT x 1 year  Urinary retention, gross hematuria  Requesting Physician:  medicine, nephrology, cardiology     History Obtained From:  patient, electronic medical record     Chief Complaint: sob    Subjective: \"    Patient is resting in bed, voiding concentrated urine via crum, +flatus, -BM, ambulating with assistance, tolerating regular diet, denies any nausea or vomiting. There are complaints of no pain at this time.             Vitals:  BP (!) 104/56   Pulse 84   Temp 99 °F (37.2 °C) (Oral)   Resp 16   Ht 5' 10\" (1.778 m)   Wt 144 lb 3.2 oz (65.4 kg)   SpO2 98%   BMI 20.69 kg/m²   Temp  Av.2 °F (36.8 °C)  Min: 97.7 °F (36.5 °C)  Max: 99 °F (37.2 °C)    Intake/Output Summary (Last 24 hours) at 2022 1248  Last data filed at 2022 1229  Gross per 24 hour   Intake 1440 ml   Output 2550 ml   Net -1110 ml       Social History     Socioeconomic History    Marital status:      Spouse name: Not on file    Number of children: Not on file    Years of education: Not on file    Highest education level: Not on file   Occupational History    Not on file   Tobacco Use    Smoking status: Every Day     Packs/day: 0.50     Years: 33.00     Pack years: 16.50     Types: Cigarettes    Smokeless tobacco: Never   Vaping Use    Vaping Use: Never used   Substance and Sexual Activity    Alcohol use: Not Currently    Drug use: No    Sexual activity: Not on file   Other Topics Concern    Not on file   Social History Narrative    Not on file     Social Determinants of Health     Financial Resource Strain: Low Risk     Difficulty of Paying Living Expenses: Not hard at all   Food Insecurity: No Food Insecurity    Worried About Running Out of Food in the Last Year: Never true    Ran Out of Food in the Last Year: Never true   Transportation Needs: No Transportation Needs    Lack of Transportation (Medical): No    Lack of Transportation (Non-Medical): No   Physical Activity: Not on file   Stress: Not on file   Social Connections: Not on file   Intimate Partner Violence: Not on file   Housing Stability: Not on file     Family History   Problem Relation Age of Onset    Diabetes Father     Stroke Father     Diabetes Brother     Diabetes Brother      Allergies   Allergen Reactions    Metformin And Related Nausea And Vomiting         Constitutional: Alert and oriented times x3, no acute distress, and cooperative to examination with appropriate mood and affect. HEENT:   Head:         Normocephalic and atraumatic. Mucous membranes are normal.   Eyes:         EOM are normal.  Nose:    The external appearance of the nose is normal  Ears: The ears appear normal to external inspection. Cardiovascular:       Normal rate, regular rhythm. Pulmonary/Chest:  Normal respiratory rate and rhthym. No use of accessory muscles. Lungs clear bilaterally. Abdominal:          Soft. No tenderness. Active bowel sounds. Genitalia:    Bella catheter draining yellow concentrated urine  Musculoskeletal:    Normal range of motion. He exhibits no edema or tenderness of lower extremities. Extremities:    No cyanosis, clubbing, or edema present. Neurological:    Alert and oriented.      Labs:  WBC:    Lab Results   Component Value Date/Time    WBC 9.7 11/01/2022 04:33 AM     Hemoglobin/Hematocrit:    Lab Results   Component Value Date/Time    HGB 11.7 11/01/2022 04:33 AM    HCT 36.5 11/01/2022 04:33 AM     BMP:    Lab Results   Component Value Date/Time     11/02/2022 06:03 AM    K 4.2 11/02/2022 06:03 AM    K 4.3 10/28/2022 10:00 PM     11/02/2022 06:03 AM    CO2 25 11/02/2022 06:03 AM    BUN 25 11/02/2022 06:03 AM    LABALBU 3.7 11/01/2022 04:32 AM    CREATININE 1.6 11/02/2022 06:03 AM    CALCIUM 9.0 11/02/2022 06:03 AM    GFRAA 58 05/24/2022 04:58 AM    LABGLOM 49 11/01/2022 06:01 PM   CT abd shows  Narrative   CT abdomen and pelvis without contrast       COMPARISON: No prior       FINDINGS:   Please note that the lack of intravenous contrast limits the evaluation of    the solid organs and vasculature. The liver, spleen, pancreas and both adrenal glands are normal in size and    shape. There is hyperdense fluid within the gallbladder compatible with    vicarious excretion of contrast.       Both kidneys are normal in size. There is mild bilateral hydronephrosis. There is bilateral renal contrast excretion from prior contrast    administration. The distal and of the urethral catheter is within the    urinary bladder. There is marked mural thickening of the urinary bladder. There is no evidence for bowel obstruction. There is a moderate amount of    retained stool. A normal appendix is seen. The partially visualized lower thorax shows moderate sized bilateral    pleural effusions with atelectasis. Impression       Mild bilateral hydronephrosis. There is bilateral renal contrast excretion    from prior contrast administration limiting evaluation for renal stones. Marked mural thickening of the urinary bladder. The differential diagnosis    includes cystitis and neoplasm. Moderate amount of retained stool. Partially visualized lower thorax shows moderate sized bilateral pleural    effusions with atelectasis. Impression/Plan:  Cont anticoagulation  Urine is yellow in tubing, will monitor. Hand irrigate as needed  Discharge with crum catheter  Cont Flomax    Hematuria - Ua neg for blood prior to cath insertion. Likely from traumatic crum insertion. Urine is yellow in crum tubing  Urinary retention - crum placed over 2.5 L out. Flomax started 11/1/22  Bilateral hydronephrosis - seen on KEY, will get Ct abd wo. Likely from retention. Imporved on CT scan  LC - CRT 1.6, bilateral hydron on KEY.   Likely from urinary retention.     Our office will coordinate cystoscopy in office in the upcoming wks  Urology signing off, please call for worsening hematuria    BLACK Vaughn - CNP, BLACK  11/02/22 12:48 PM  Urology

## 2022-11-02 NOTE — PROGRESS NOTES
[facility]    [address], [address]  [facility]  11/2/2022 11:36 AM    Pt Name:    Elson Fleischer  MRN:     [id number]   [id number]  YOB: 1960  Admit Date:    10/28/2022  9:39 PM  Primary Care Physician:  BLACK Dickinson CNP    CSN Number:   [id number]    Reason for Consult:  LC on CKD   Requesting provider:     History:     Elson Fleischer is a 63 y.o. male with PMHx of diabetes mellitus, HTN, HLD, GERD who presents to [address] [place] [address] with shortness of breath. Patient states at approximately 8:30 PM on the date of initial presentation to the ED he began having shortness of breath while he was at rest watching TV. Patient states that the shortness of breath continued to progress which is why he decided to present to the ED. Patient states that he fractured his ribs on 10/5 due to falling in the shower and he has been \"taking it easy\". Patient denies any current pain from his rib fractures. At time of examination patient states that he is barely short of breath, which he now attributes to some anxiety due to being hospitalized. Patient notes that the DuoNeb he received in the ED helped with his shortness of breath. 11/2 Pt is doing okay today. Has no new symptoms. He received a left heart catheterization on 11/1/2022. Pt has crum in place and urine is yellow and turbid.      Past Medical History:  Past Medical History:   Diagnosis Date    Hypertension     Prediabetes        Past Surgical History:  Past Surgical History:   Procedure Laterality Date    TONSILLECTOMY         Family History:  Family History   Problem Relation Age of Onset    Diabetes Father     Stroke Father     Diabetes Brother     Diabetes Brother        Social History:  Social History     Socioeconomic History    Marital status:      Spouse name: Not on file    Number of children: Not on file    Years of education: Not on file    Highest education level: Not on file   Occupational History    Not on file   Tobacco Use    Smoking status: Every Day     Packs/day: 0.50     Years: 33.00     Pack years: 16.50     Types: Cigarettes    Smokeless tobacco: Never   Vaping Use    Vaping Use: Never used   Substance and Sexual Activity    Alcohol use: Not Currently    Drug use: No    Sexual activity: Not on file   Other Topics Concern    Not on file   Social History Narrative    Not on file     Social Determinants of Health     Financial Resource Strain: Low Risk     Difficulty of Paying Living Expenses: Not hard at all   Food Insecurity: No Food Insecurity    Worried About Running Out of Food in the Last Year: Never true    Ran Out of Food in the Last Year: Never true   Transportation Needs: No Transportation Needs    Lack of Transportation (Medical): No    Lack of Transportation (Non-Medical): No   Physical Activity: Not on file   Stress: Not on file   Social Connections: Not on file   Intimate Partner Violence: Not on file   Housing Stability: Not on file       Home Meds:  Prior to Admission medications    Medication Sig Start Date End Date Taking?  Authorizing Provider   buPROPion (WELLBUTRIN XL) 150 MG extended release tablet Take 1 tablet by mouth every morning 10/12/22  Yes BLACK Oglesby CNP   omeprazole (PRILOSEC) 40 MG delayed release capsule Take 1 capsule by mouth daily 10/12/22  Yes BLACK Oglesby CNP   lisinopril (PRINIVIL;ZESTRIL) 20 MG tablet Take 1 tablet by mouth daily 6/28/22  Yes BLACK Oglesby CNP   glimepiride (AMARYL) 4 MG tablet Take 1 tablet by mouth every morning (before breakfast) 6/28/22  Yes BLACK Oglesby CNP   empagliflozin (JARDIANCE) 25 MG tablet Take 1 tablet by mouth daily 5/24/22  Yes BLACK Oglesby CNP   atorvastatin (LIPITOR) 40 MG tablet Take 1 tablet by mouth daily 5/24/22  Yes BLACK Oglesby CNP   acetaminophen (TYLENOL) 500 MG tablet Take 500 mg by mouth every 6 hours as needed for Pain As needed    Historical Provider, MD   Dulaglutide (TRULICITY) 1.5 KA/3.7IE SOPN Inject 1.5 mg into the skin once a week 8/18/22   BLACK Rizvi - CNP       Review of Systems:  Constitutional: Negative for fever, fatigue or chills. Reports good appetite  Head: Negative for headaches  Eyes: Negative for blurry vision or discharge  Ears: Negative for ear pain or hearing changes  Nose: Negative for runny nose or epistaxis  Respiratory: Negative for shortness of breath. Negative for cough or sputum production. Negative for hemoptysis  Cardiovascular: Negative for chest pain  GI: Negative for nausea, vomiting and diarrhea.   Negative for hematochezia and melena  : Negative for discharge, dysuria, or hematuria  Skin: Negative for rash  Musculoskeletal: Negative for joint pain, moves all ext  Neuro: Negative for numbness or tingling, negative for slurred speech  Psychiatric: Reports stable mood, negative for depression or insomnia    All other review of systems were reviewed and negative    Current Meds:  Infusion:    [Held by provider] sodium chloride 100 mL/hr at 11/01/22 0230    sodium chloride      dextrose       Meds:    [START ON 11/3/2022] furosemide  40 mg IntraVENous Daily    aspirin  325 mg Oral Once    diphenhydrAMINE  50 mg IntraVENous Once    hydrocortisone sodium succinate PF  200 mg IntraVENous Once    tamsulosin  0.4 mg Oral Daily    atorvastatin  40 mg Oral Daily    buPROPion  150 mg Oral QAM    pantoprazole  40 mg Oral QAM AC    sodium chloride flush  5-40 mL IntraVENous 2 times per day    [Held by provider] enoxaparin  40 mg SubCUTAneous Daily    insulin lispro  0-4 Units SubCUTAneous TID WC    insulin lispro  0-4 Units SubCUTAneous Nightly    ipratropium-albuterol  2 ampule Inhalation Once    aspirin  81 mg Oral Daily    metoprolol succinate  25 mg Oral Daily     Meds prn: nitroGLYCERIN, acetaminophen, lidocaine, sodium chloride flush, sodium chloride, ondansetron **OR** ondansetron, polyethylene glycol, [DISCONTINUED] acetaminophen **OR** acetaminophen, glucose, dextrose bolus **OR** dextrose bolus, glucagon (rDNA), dextrose, ipratropium-albuterol     Allergies/Intolerances: ALLERGIES: Metformin and related    24HR INTAKE/OUTPUT:    Intake/Output Summary (Last 24 hours) at 11/2/2022 1136  Last data filed at 11/2/2022 0358  Gross per 24 hour   Intake 580 ml   Output 2550 ml   Net -1970 ml     I/O last 3 completed shifts: In: 80 [P.O.:580]  Out: 6725 [Urine:6725]  No intake/output data recorded. Admission weight: 150 lb (68 kg)  Wt Readings from Last 3 Encounters:   11/02/22 144 lb 3.2 oz (65.4 kg)   10/26/22 151 lb (68.5 kg)   10/12/22 140 lb 9.6 oz (63.8 kg)     Body mass index is 20.69 kg/m². Physical Examination:  VITALS:   Vitals:    11/01/22 1558 11/01/22 1945 11/01/22 2331 11/02/22 0358   BP: 118/66 (!) 97/53 (!) 90/58 105/65   Pulse: 76 82 73 78   Resp: 17 16 18 18   Temp: 97.8 °F (36.6 °C) 99 °F (37.2 °C) 98.1 °F (36.7 °C) 97.7 °F (36.5 °C)   TempSrc: Oral Oral Oral Oral   SpO2: 98% 93% 96% 95%   Weight:    144 lb 3.2 oz (65.4 kg)   Height:         Weight:   Wt Readings from Last 3 Encounters:   11/02/22 144 lb 3.2 oz (65.4 kg)   10/26/22 151 lb (68.5 kg)   10/12/22 140 lb 9.6 oz (63.8 kg)     Constitutional and General Appearance: alert and cooperative with exam, appears comfortable, no distress, not diaphoretic  Eyes: no icteric sclera in left eye or right eye,  no pallor conjunctiva in left or right eye, no discharge seen from left eye or right eye  Ears and Nose: normal external appearance of left and right ear. Both ear lobules are nontender to palpation. Normal external appearance of nose. No active drainage from nose.    Oral: moist oral mucus membranes  Neck: No jugular venous distention, appears symmetric, good ROM  Lungs: Air entry B/L, no crackles or rales, no use of accessory muscles or labored breathing  Chest: No chest wall tenderness  Heart: regular rate, S1, S2  Extremities: no LE edema, no tenderness  GI: soft, non-tender, no guarding, no distention  Skin: no rash seen on exposed extremities, warm to touch  Musculo: moves all extremities, no clubbing or cyanosis of digits of either upper extremity. Neuro: no slurred speech, no facial drooping, symmetric strength  Psychiatric: Normal mood and affect, Not agitated    Lab Data  CBC:   Recent Labs     10/31/22  0521 11/01/22  0433   WBC 7.2 9.7   HGB 11.1* 11.7*   HCT 35.3* 36.5*    240     BMP:  Recent Labs     10/31/22  0521 11/01/22  0432 11/02/22  0603    139 137   K 4.1 4.4 4.2    103 101   CO2 28 25 25   BUN 27* 27* 25*   CREATININE 1.7* 1.5* 1.6*   GLUCOSE 151* 181* 157*   CALCIUM 9.1 8.7 9.0   MG 1.6 1.7 1.6     PTH: @PTH@  TSH: No results for input(s): TSH in the last 72 hours. HgBa1c: No results for input(s): LABA1C in the last 72 hours. Hepatic:   Recent Labs     11/01/22 0432   LABALBU 3.7   AST 10   ALT 6*   BILITOT 0.3   ALKPHOS 64     ABGs: No results found for: PHART, PO2ART, XOG0YBI  Troponin: No results for input(s): TROPONINI in the last 72 hours. BNP: No results for input(s): BNP in the last 72 hours. Additional Labs:  Diagnostics:    Old labs and diagnostics reviewed. Echo: 10/29 EF 25-30%. LV thickness is within normal limits. There is severe global hypokinesis of the left ventricle   Labs: Cr increased today, otherwise it is about stable       Impression and Plan:  Renal -acute kidney injury, on chronic kidney disease stage III   This appears most likely due to the use of diuretics ACE inhibitor and primarily due to contrast exposure on the day of his admission. Currently improved to some degree close to his baseline  I/O 580/2370 likely due to post ATN diuresis and being on laxis. Reduce lasix dose to 40mg daily.   Electrolytes -appear to be within normal limits  Essential hypertension running well continue current medications  Hx of diabetes mellitus apparently his A1c was 15.0,, 3 months ago  New onset of acute congestive heart failure systolic needs a cardiac cath seen by cardiology. Cardiac cath reveal multiple coronary artery vessel block and recommends CABG  Urinary retention -status post Bella, now with hematuria however Flomax. Urology following  Meds reviewed and discussed with patient and wife in detail       Thank you for the consult. Please feel free to call me if you have any questions.      Víctor Lord MD  [facility]

## 2022-11-02 NOTE — PROGRESS NOTES
Cardiology Progress Note      Patient:  Suha Goff  YOB: 1960  MRN: [id number]   Acct: [de-identified]  Admit Date:  10/28/2022  Primary Cardiologist:  none    Note per dr Hemalatha Valiente for Consultation:  CHF        History Of Present Illness:    63 y.o. pleasant male c hx of DM and HTN who presented to the hospital with complaints of shortness of breath. As per patient the shortness of breath started 1-2 days prior to presentation. It progressively gotten worsen so he decided to seek medication attention. Patient states that he used to drink alcohol regularly several beers for many years. He has been smoking 1 pack/day for many years recently has cut down to half a pack a day while being on Wellbutrin. He has had poorly controlled diabetes with A1c as high as 15 recently it was brought back down to 7.5. His laboratory work-up shows mildly elevated troponins with a flat trend. Creatinine is 1.4.  proBNP was elevated at 8508. His electrocardiogram shows sinus tachycardia at 105 bpm with nonspecific ST-T wave changes. His echocardiogram from today showed EF of 25-30% with large pleural effusion. Cardiology was consulted for acute CHF exacerbation\"    Subjective (Events in last 24 hours):   Pt awake and alert. NAD. No cp or sob.   No edema or orthopnea  On RA    Net I/o -8.8 L    Objective:   /65   Pulse 78   Temp 97.7 °F (36.5 °C) (Oral)   Resp 18   Ht 5' 10\" (1.778 m)   Wt 144 lb 3.2 oz (65.4 kg)   SpO2 95%   BMI 20.69 kg/m²        TELEMETRY: nsr    Physical Exam:  General Appearance: alert and oriented to person, place and time, in no acute distress  Cardiovascular: normal rate, regular rhythm, normal S1 and S2, no murmurs, rubs, clicks, or gallops, distal pulses intact, no carotid bruits, no JVD  Pulmonary/Chest: clear to auscultation bilaterally- no wheezes, rales or rhonchi, normal air movement, no respiratory distress  Abdomen: soft, non-tender, non-distended, normal bowel sounds, no masses Extremities: no cyanosis, clubbing or edema, pulse   Skin: warm and dry,  Head: normocephalic and atraumatic  Eyes: pupils equal, round, and reactive to light  Neck: supple and non-tender without mass, no thyromegaly   Neurological: alert, oriented, normal speech, no focal findings or movement disorder noted  Right wrist - no hematoma, +2 radial pulse    Medications:    [START ON 11/3/2022] furosemide  40 mg IntraVENous Daily    aspirin  325 mg Oral Once    diphenhydrAMINE  50 mg IntraVENous Once    hydrocortisone sodium succinate PF  200 mg IntraVENous Once    tamsulosin  0.4 mg Oral Daily    atorvastatin  40 mg Oral Daily    buPROPion  150 mg Oral QAM    pantoprazole  40 mg Oral QAM AC    sodium chloride flush  5-40 mL IntraVENous 2 times per day    [Held by provider] enoxaparin  40 mg SubCUTAneous Daily    insulin lispro  0-4 Units SubCUTAneous TID WC    insulin lispro  0-4 Units SubCUTAneous Nightly    ipratropium-albuterol  2 ampule Inhalation Once    aspirin  81 mg Oral Daily    metoprolol succinate  25 mg Oral Daily      [Held by provider] sodium chloride 100 mL/hr at 11/01/22 0230    sodium chloride      dextrose       nitroGLYCERIN, 0.4 mg, Q5 Min PRN  acetaminophen, 650 mg, Q4H PRN  lidocaine, , PRN  sodium chloride flush, 5-40 mL, PRN  sodium chloride, , PRN  ondansetron, 4 mg, Q8H PRN   Or  ondansetron, 4 mg, Q6H PRN  polyethylene glycol, 17 g, Daily PRN  acetaminophen, 650 mg, Q6H PRN  glucose, 4 tablet, PRN  dextrose bolus, 125 mL, PRN   Or  dextrose bolus, 250 mL, PRN  glucagon (rDNA), 1 mg, PRN  dextrose, , Continuous PRN  ipratropium-albuterol, 1 ampule, Q4H PRN      Diagnostics:  TTE 10/29/22   Summary   Left ventricular size is normal and systolic function is severely reduced. Ejection fraction was estimated at 25-30%. LV wall thickness is within   normal limits. There was severe global hypokinesis of the left ventricle.    The right ventricular size appears normal with normal systolic function   and wall thickness. Large pleural effusion      Signature      ----------------------------------------------------------------   Electronically signed by Bossman Ribeiro MD (Interpreting   physician) on 10/29/2022 at 01:59 PM    Cath 11/1/22  FINDINGS:  ABDOMINAL AORTOGRAM:  Distal abdominal aorta is patent without  significant stenosis or aneurysmal dilation. There is evidence for  20-30% stenosis in the right common iliac artery. Right external iliac  and internal iliac arteries are patent. No significant stenotic lesions  on the left common iliac, internal iliac and external iliac arteries. LEFT VENTRICULOGRAM:  There is evidence for severe global hypokinesis  with akinetic movement of the anterior wall. Ejection fraction  estimated at 15-20%. HEMODYNAMICS:  Left ventricular end-diastolic pressure 31 mmHg. No  significant pressure gradient across the aortic valve upon pullback. CORONARY ANGIOGRAM:  1. The left main coronary artery has 10-20% stenosis in the distal  segment, otherwise patent without evidence for obstructive disease. Gives rise to left circumflex, left anterior descending and ramus  intermedius arteries. 2.  Ramus intermedius has 90-95% stenosis in the proximal segment. 3.  Left circumflex artery. Luminal irregularities were noticed without  evidence for obstructive disease. 4.  Left anterior descending artery. The ostium of the LAD has a 90%  stenosis. Proximal LAD with luminal irregularities, otherwise LAD is  patent with no significant stenotic lesions. D1 has 99% subtotal  occlusion. 5.  Right coronary artery. The proximal RCA has luminal irregularities. Mid RCA has 40-50% stenosis. Distal RCA is patent. DOMINANCE:  Codominant system. MEDICATIONS:  See MAR. COMPLICATIONS:  None. ESTIMATED BLOOD LOSS:  Less than 50 mL. ACCESS:  Right radial artery access. Vasc Band was applied. Hemostasis  was achieved. IMPRESSION:  1. Severe ischemic cardiomyopathy, ejection fraction 15-20% on left  ventriculogram.  2.  Acute systolic congestive heart failure. Left ventricular  end-diastolic pressure 31 mmHg. 3.  Severe coronary artery disease involving the ostium of the LAD,  first diagonal branch and the ramus intermedius artery. RECOMMENDATIONS:  Optimize the volume status. Resume Lasix. Stop IV  fluids. Daily weight. Strict intake and output. The patient has  chronic kidney disease, monitor daily basic metabolic panel, Nephrology  is following. Apparently, a Bella catheter was placed last night due to  concern for urinary retention. Hematuria was reported after Bella  placement. Urology consult is recommended. Monitor CBC. Continue on  aspirin and Lipitor. Monitor the patient on telemetry. Consult  Cardiovascular Surgery. Heart team discussion regarding  revascularization approach is warranted. Options are coronary artery  bypass graft surgery versus high-risk PCI with Impella support. Findings and plan of care were discussed with the patient and his  family, they are agreeable to the plan. Rachel Peralta MD    Lab Data:    Cardiac Enzymes:  No results for input(s): CKTOTAL, CKMB, CKMBINDEX, TROPONINI in the last 72 hours.     CBC:   Lab Results   Component Value Date/Time    WBC 9.7 11/01/2022 04:33 AM    RBC 4.13 11/01/2022 04:33 AM    HGB 11.7 11/01/2022 04:33 AM    HCT 36.5 11/01/2022 04:33 AM     11/01/2022 04:33 AM       CMP:    Lab Results   Component Value Date/Time     11/02/2022 06:03 AM    K 4.2 11/02/2022 06:03 AM    K 4.3 10/28/2022 10:00 PM     11/02/2022 06:03 AM    CO2 25 11/02/2022 06:03 AM    BUN 25 11/02/2022 06:03 AM    CREATININE 1.6 11/02/2022 06:03 AM    GFRAA 58 05/24/2022 04:58 AM    LABGLOM 49 11/01/2022 06:01 PM    GLUCOSE 157 11/02/2022 06:03 AM    CALCIUM 9.0 11/02/2022 06:03 AM       Hepatic Function Panel:    Lab Results   Component Value Date/Time    ALKPHOS 64 11/01/2022 04:32 AM    ALT 6 11/01/2022 04:32 AM    AST 10 11/01/2022 04:32 AM    PROT 5.8 11/01/2022 04:32 AM    BILITOT 0.3 11/01/2022 04:32 AM    LABALBU 3.7 11/01/2022 04:32 AM       Magnesium:    Lab Results   Component Value Date/Time    MG 1.6 11/02/2022 06:03 AM       PT/INR:    Lab Results   Component Value Date/Time    INR 0.99 11/01/2022 04:32 AM       HgBA1c:    Lab Results   Component Value Date/Time    LABA1C 7.8 08/23/2022 12:32 PM       FLP:    Lab Results   Component Value Date/Time    TRIG 59 10/30/2022 03:35 AM    HDL 59 10/30/2022 03:35 AM    LDLCALC 120 10/30/2022 03:35 AM       TSH:    Lab Results   Component Value Date/Time    TSH 1.020 10/29/2022 03:30 AM         Assessment:    S/p cath 11/1/22 - Severe coronary artery disease involving the ostium of the LAD, first diagonal branch and the ramus intermedius artery - CVS consulted    Acute systolic CHF - improved  New LVD/severe ICMP - ef 25-30 per TTE 10/29/22  Large pleural effusion on echo  DM  Chronic ETOH abuse  Tobacco abuse  LC/CKD - nephro following  Gross hematuria - urology following    Plan:    Daily I/o and weights  2 liter fluid restriction and 2 gm sodium diet  Keep mag >2 and K >4  Cont diuresis  Daily BMP  Lifevest upon dc - ordered  Referral to chf clinic - ordered  Cont asa/statin/BB  No ace/arb due to lower bp and LC  Smoking cessation advised  Advised ETOH cessation as well  [facility] consulted - CABG vs high risk PCI with impella   [facility]S ordered myocardial viability study today  Cardiac rehab  Sl ntg upon dc      Patient and Practitioner mutually agreed upon goal:   Patient and provider goals: Feel better and have more energy     Electronically signed by Mey Deutsch PA-C on 11/2/2022 at 11:32 AM

## 2022-11-02 NOTE — PLAN OF CARE
Problem: Safety - Adult  Goal: Free from fall injury  Outcome: Progressing    Patient uses call light appropriately.

## 2022-11-02 NOTE — PROGRESS NOTES
Cardiovascular Surgery Progress Note    Full consult to follow  Patient with low EF, and presentation without ischemic symptoms  Myocardial viability study ordered  Final recommendations based on results

## 2022-11-02 NOTE — TELEPHONE ENCOUNTER
Consult for hematuria and urinary retention  Will be discharged with crum catheter  Flomax started 11/1/22  Will need cysto with Dr Tan Peng in 3 wks, no void trial scheduled

## 2022-11-02 NOTE — FLOWSHEET NOTE
11/02/22 7066   Safe Environment   Safety Measures Other (comment)  (Virtual nurse rounding complete)   Spoke with patient via audio. Patient denies any needs at this time. Staff at bedside. States call light within reach.

## 2022-11-02 NOTE — CARE COORDINATION
11/2/22, 2:26 PM EDT    DISCHARGE ON [facility] day: 4  Location: [facility]17/017-A Reason for admit: Elevated troponin [N42.9]  Acute systolic congestive heart failure ([facility] [address] [I50.21]  New onset of congestive heart failure ([facility] [address] [I50.9]   Procedure:   10/29 Echo: ef 25-30%  11/1 Cardiac cath  Barriers to Discharge: Hospitalist, urology, cardio, CVS following. Plan CABG next week once optimized for surgery. Plan myocardial viability study. Bella for retention, irrigate as needed. Creatinine 1.6. PCP: BLACK Spence CNP  Readmission Risk Score: 13.6%  Patient Goals/Plan/Treatment Preferences: Plans home with wife, LV ordered. Updated [facility] on possible cabg, all information faxed, they will get approval and hold until ready for discharge. All information faxed to [facility]oll.

## 2022-11-03 LAB
ANION GAP SERPL CALCULATED.3IONS-SCNC: 11 MEQ/L (ref 8–16)
BUN BLDV-MCNC: 23 MG/DL (ref 7–22)
CALCIUM SERPL-MCNC: 9.2 MG/DL (ref 8.5–10.5)
CHLORIDE BLD-SCNC: 97 MEQ/L (ref 98–111)
CO2: 28 MEQ/L (ref 23–33)
CREAT SERPL-MCNC: 1.4 MG/DL (ref 0.4–1.2)
ERYTHROCYTE [DISTWIDTH] IN BLOOD BY AUTOMATED COUNT: 12.7 % (ref 11.5–14.5)
ERYTHROCYTE [DISTWIDTH] IN BLOOD BY AUTOMATED COUNT: 41 FL (ref 35–45)
GFR SERPL CREATININE-BSD FRML MDRD: 57 ML/MIN/1.73M2
GLUCOSE BLD-MCNC: 166 MG/DL (ref 70–108)
GLUCOSE BLD-MCNC: 184 MG/DL (ref 70–108)
GLUCOSE BLD-MCNC: 187 MG/DL (ref 70–108)
GLUCOSE BLD-MCNC: 213 MG/DL (ref 70–108)
GLUCOSE BLD-MCNC: 244 MG/DL (ref 70–108)
GLUCOSE BLD-MCNC: 263 MG/DL (ref 70–108)
HCT VFR BLD CALC: 36.2 % (ref 42–52)
HEMOGLOBIN: 11.5 GM/DL (ref 14–18)
MCH RBC QN AUTO: 27.7 PG (ref 26–33)
MCHC RBC AUTO-ENTMCNC: 31.8 GM/DL (ref 32.2–35.5)
MCV RBC AUTO: 87.2 FL (ref 80–94)
PLATELET # BLD: 237 THOU/MM3 (ref 130–400)
PMV BLD AUTO: 11.1 FL (ref 9.4–12.4)
POTASSIUM SERPL-SCNC: 4.4 MEQ/L (ref 3.5–5.2)
RBC # BLD: 4.15 MILL/MM3 (ref 4.7–6.1)
SODIUM BLD-SCNC: 136 MEQ/L (ref 135–145)
WBC # BLD: 7.6 THOU/MM3 (ref 4.8–10.8)

## 2022-11-03 PROCEDURE — 1200000003 HC TELEMETRY R&B

## 2022-11-03 PROCEDURE — 6370000000 HC RX 637 (ALT 250 FOR IP): Performed by: INTERNAL MEDICINE

## 2022-11-03 PROCEDURE — 2580000003 HC RX 258

## 2022-11-03 PROCEDURE — 99232 SBSQ HOSP IP/OBS MODERATE 35: CPT | Performed by: INTERNAL MEDICINE

## 2022-11-03 PROCEDURE — 99232 SBSQ HOSP IP/OBS MODERATE 35: CPT | Performed by: PHYSICIAN ASSISTANT

## 2022-11-03 PROCEDURE — 1200000000 HC SEMI PRIVATE

## 2022-11-03 PROCEDURE — 6360000002 HC RX W HCPCS

## 2022-11-03 PROCEDURE — 2580000003 HC RX 258: Performed by: STUDENT IN AN ORGANIZED HEALTH CARE EDUCATION/TRAINING PROGRAM

## 2022-11-03 PROCEDURE — 6370000000 HC RX 637 (ALT 250 FOR IP): Performed by: STUDENT IN AN ORGANIZED HEALTH CARE EDUCATION/TRAINING PROGRAM

## 2022-11-03 PROCEDURE — 85027 COMPLETE CBC AUTOMATED: CPT

## 2022-11-03 PROCEDURE — 82948 REAGENT STRIP/BLOOD GLUCOSE: CPT

## 2022-11-03 PROCEDURE — 36415 COLL VENOUS BLD VENIPUNCTURE: CPT

## 2022-11-03 PROCEDURE — 80048 BASIC METABOLIC PNL TOTAL CA: CPT

## 2022-11-03 PROCEDURE — 6360000002 HC RX W HCPCS: Performed by: STUDENT IN AN ORGANIZED HEALTH CARE EDUCATION/TRAINING PROGRAM

## 2022-11-03 RX ORDER — INSULIN GLARGINE 100 [IU]/ML
7 INJECTION, SOLUTION SUBCUTANEOUS NIGHTLY
Status: DISCONTINUED | OUTPATIENT
Start: 2022-11-03 | End: 2022-11-07

## 2022-11-03 RX ORDER — SODIUM CHLORIDE 9 MG/ML
INJECTION, SOLUTION INTRAVENOUS CONTINUOUS
Status: DISCONTINUED | OUTPATIENT
Start: 2022-11-03 | End: 2022-11-05

## 2022-11-03 RX ADMIN — ATORVASTATIN CALCIUM 40 MG: 40 TABLET, FILM COATED ORAL at 08:18

## 2022-11-03 RX ADMIN — BUPROPION HYDROCHLORIDE 150 MG: 150 TABLET, FILM COATED, EXTENDED RELEASE ORAL at 08:18

## 2022-11-03 RX ADMIN — INSULIN LISPRO 2 UNITS: 100 INJECTION, SOLUTION INTRAVENOUS; SUBCUTANEOUS at 12:43

## 2022-11-03 RX ADMIN — TAMSULOSIN HYDROCHLORIDE 0.4 MG: 0.4 CAPSULE ORAL at 08:18

## 2022-11-03 RX ADMIN — FUROSEMIDE 40 MG: 10 INJECTION, SOLUTION INTRAMUSCULAR; INTRAVENOUS at 08:17

## 2022-11-03 RX ADMIN — METOPROLOL SUCCINATE 25 MG: 25 TABLET, FILM COATED, EXTENDED RELEASE ORAL at 08:19

## 2022-11-03 RX ADMIN — ACETAMINOPHEN 650 MG: 325 TABLET ORAL at 04:39

## 2022-11-03 RX ADMIN — ENOXAPARIN SODIUM 40 MG: 100 INJECTION SUBCUTANEOUS at 08:17

## 2022-11-03 RX ADMIN — SODIUM CHLORIDE, PRESERVATIVE FREE 10 ML: 5 INJECTION INTRAVENOUS at 08:17

## 2022-11-03 RX ADMIN — SODIUM CHLORIDE 1000 ML: 9 INJECTION, SOLUTION INTRAVENOUS at 13:24

## 2022-11-03 RX ADMIN — SODIUM CHLORIDE, PRESERVATIVE FREE 10 ML: 5 INJECTION INTRAVENOUS at 13:19

## 2022-11-03 RX ADMIN — ASPIRIN 81 MG 81 MG: 81 TABLET ORAL at 08:17

## 2022-11-03 RX ADMIN — PANTOPRAZOLE SODIUM 40 MG: 40 TABLET, DELAYED RELEASE ORAL at 04:44

## 2022-11-03 RX ADMIN — SODIUM CHLORIDE, PRESERVATIVE FREE 10 ML: 5 INJECTION INTRAVENOUS at 21:01

## 2022-11-03 ASSESSMENT — ENCOUNTER SYMPTOMS
SHORTNESS OF BREATH: 0
VOMITING: 0

## 2022-11-03 ASSESSMENT — PAIN SCALES - GENERAL: PAINLEVEL_OUTOF10: 0

## 2022-11-03 NOTE — CONSULTS
Cardiothoracic Surgery History & Physical       Patient:  Ari Da Silva  YOB: 1960    MRN: [id number]     Acct: [de-identified]    PCP: BLACK Talbert CNP    Date of Admission: 10/28/2022    Chief Complaint:    Chief Complaint   Patient presents with    Shortness of Breath       History Of Present Illness:  63 y.o. diabetic male, extensive previous history of EtOH, current smoker, presents with chronic progressive dyspnea, worsening over past 2 months. Patient denies chest pain/pressure; no peripheral edema, presyncope, orthopnea. Large pleural effusions seen on admission imaging prompted echo, showing LVEF 25%; subsequent cath shows 2-v CAD. Past Medical History:          Diagnosis Date    Hypertension     Prediabetes        Past Surgical History:          Procedure Laterality Date    TONSILLECTOMY         Medications Prior to Admission:      Prior to Admission medications    Medication Sig Start Date End Date Taking?  Authorizing Provider   tamsulosin (FLOMAX) 0.4 MG capsule Take 1 capsule by mouth daily 11/3/22  Yes BLACK Amaya CNP   buPROPion (WELLBUTRIN XL) 150 MG extended release tablet Take 1 tablet by mouth every morning 10/12/22  Yes BLACK Bagley CNP   omeprazole (PRILOSEC) 40 MG delayed release capsule Take 1 capsule by mouth daily 10/12/22  Yes BLACK Bagley CNP   lisinopril (PRINIVIL;ZESTRIL) 20 MG tablet Take 1 tablet by mouth daily 6/28/22  Yes BLACK Bagley CNP   glimepiride (AMARYL) 4 MG tablet Take 1 tablet by mouth every morning (before breakfast) 6/28/22  Yes BLACK Bagley CNP   empagliflozin (JARDIANCE) 25 MG tablet Take 1 tablet by mouth daily 5/24/22  Yes BLACK Bagley CNP   atorvastatin (LIPITOR) 40 MG tablet Take 1 tablet by mouth daily 5/24/22  Yes BLACK Bagley CNP   acetaminophen (TYLENOL) 500 MG tablet Take 500 mg by mouth every 6 hours as needed for Pain As needed    Historical Provider, MD   Dulaglutide (TRULICITY) 1.5 EM/4.7HM SOPN Inject 1.5 mg into the skin once a week 8/18/22   Stephanie Hyatt, APRN - CNP       Allergies:  Metformin and related    Social History:      TOBACCO:   reports that he has been smoking cigarettes. He has a 16.50 pack-year smoking history. He has never used smokeless tobacco.  ETOH:   reports that he does not currently use alcohol. Social History     Substance and Sexual Activity   Drug Use No         Family History:          Problem Relation Age of Onset    Diabetes Father     Stroke Father     Diabetes Brother     Diabetes Brother        REVIEW OFSYSTEMS:      Review of Systems   Constitutional:  Positive for activity change and fatigue. HENT:  Negative for congestion. Eyes:  Negative for discharge. Respiratory:  Positive for chest tightness and shortness of breath. Cardiovascular:  Negative for chest pain and palpitations. Gastrointestinal:  Negative for abdominal pain. Endocrine: Negative for cold intolerance. Genitourinary:  Negative for dysuria. Musculoskeletal:  Negative for back pain. Skin:  Negative for color change. Allergic/Immunologic: Negative for environmental allergies. Neurological:  Negative for dizziness. Hematological:  Negative for adenopathy. Psychiatric/Behavioral:  Negative for agitation. PHYSICALEXAM:    /70   Pulse 79   Temp 98.1 °F (36.7 °C) (Oral)   Resp 16   Ht 5' 10\" (1.778 m)   Wt 144 lb 3.2 oz (65.4 kg)   SpO2 96%   BMI 20.69 kg/m²     General appearance:  No apparent distress, appears stated age and cooperative. HEENT:  Normal cephalic, atraumatic withoutobvious deformity. Pupils equal, round, and reactive to light. Extra ocular muscles intact. Conjunctivae/corneas clear. Neck: Supple, with full range of motion. No jugular venous distention. Trachea midline. Respiratory:  Normal respiratory effort.  Decreased bibasilar breath sounds  Cardiovascular:  Regular rate and rhythm with normal S1/S2 without murmurs, rubs or gallops. Abdomen: Soft,non-tender, non-distended with normal bowel sounds. Musculoskeletal:  No clubbing, cyanosis or edemabilaterally. Full range of motion without deformity. Skin: Skin color, texture, turgor normal.  No rashes orlesions. Neurologic:  Neurovascularly intact without any focal sensory/motor deficits. Cranial nerves: II-XIIintact, grossly non-focal.  Psychiatric:  Alert and oriented, thought content appropriate, normal insight  Capillary Refill: Brisk,< 3 seconds   PeripheralPulses: +2 palpable, equal bilaterally       Labs:    Recent Labs     10/31/22  0521 11/01/22  0433   WBC 7.2 9.7   HGB 11.1* 11.7*   HCT 35.3* 36.5*    240     Recent Labs     10/31/22  0521 11/01/22  0432 11/02/22  0603    139 137   K 4.1 4.4 4.2    103 101   CO2 28 25 25   BUN 27* 27* 25*   CREATININE 1.7* 1.5* 1.6*   CALCIUM 9.1 8.7 9.0     Recent Labs     11/01/22 0432   AST 10   ALT 6*   BILITOT 0.3   ALKPHOS 64     Recent Labs     11/01/22 0432   INR 0.99     No results for input(s): Leslie Olivas in the last 72 hours.     Urinalysis:      Lab Results   Component Value Date/Time    NITRU NEGATIVE 10/31/2022 07:48 PM    WBCUA 2-4 10/31/2022 07:48 PM    BACTERIA NONE SEEN 10/31/2022 07:48 PM    RBCUA 0-2 10/31/2022 07:48 PM    BLOODU NEGATIVE 10/31/2022 07:48 PM    SPECGRAV 1.012 10/31/2022 07:48 PM       ECHO (images personally reviewed): LVEF 20-25%, mild MR, LVEDD 5.7 cm      Radiology:     CT chest: I have reviewed the CT chest images with the following interpretation: no calcification ascending Ao  Left heart cath:  I have reviewed the left heart catheterization images with the following interpretation: as per HPI      Code Status: Full Code      ASSESSMENT:    Active Hospital Problems    Diagnosis Date Noted    Nonischemic cardiomyopathy ([facility] [address] [I42.8] 10/31/2022     Priority: High    Preoperative clearance [Z01.818] 10/31/2022     Priority: Medium    Stage 3a chronic kidney disease ([facility] [address] [N18.31] 10/31/2022     Priority: Medium    New onset of congestive heart failure ([facility] [address] [I50.9] 10/29/2022     Priority: Medium    Acute systolic congestive heart failure ([facility] [address] [I50.21] 10/29/2022     Priority: Medium    Essential hypertension [I10] 08/10/2021       PLAN:  63year old male admitted with purely congestive symptoms associated cardiomyopathy disproportionate to severity of coronary disease. From a pure technical perspective, patient has straightforward coronary targets. However, the total absence of ischemic symptoms, as well as the cardiomyopathy of probable mixed etiology, suggest the need for a myocardial viability study. If significant viability is demonstrated, we would recommend CABG; if not, standard of care would favor PCI. PET viability ordered. Further recommendations based on results. Discussed decision-making in detail with the patient, who understands. Issues discussed in detail with the patient, who understands and has no further questions. Time spent with patient: 120 minutes, of which more than 50% was spent counseling/coordinating the patient's care.     Electronically signed by Victorino Gonzales MD on 11/2/2022 at 11:09 PM

## 2022-11-03 NOTE — PROGRESS NOTES
Cardiology Progress Note      Patient:  Geena Torres  YOB: 1960  MRN: [id number]   Acct: [de-identified]  Admit Date:  10/28/2022  Primary Cardiologist:  none    Note per dr Maxi Schuster for Consultation:  CHF        History Of Present Illness:    63 y.o. pleasant male c hx of DM and HTN who presented to the hospital with complaints of shortness of breath. As per patient the shortness of breath started 1-2 days prior to presentation. It progressively gotten worsen so he decided to seek medication attention. Patient states that he used to drink alcohol regularly several beers for many years. He has been smoking 1 pack/day for many years recently has cut down to half a pack a day while being on Wellbutrin. He has had poorly controlled diabetes with A1c as high as 15 recently it was brought back down to 7.5. His laboratory work-up shows mildly elevated troponins with a flat trend. Creatinine is 1.4.  proBNP was elevated at 8508. His electrocardiogram shows sinus tachycardia at 105 bpm with nonspecific ST-T wave changes. His echocardiogram from today showed EF of 25-30% with large pleural effusion. Cardiology was consulted for acute CHF exacerbation\"    Subjective (Events in last 24 hours):   Pt awake and alert. NAD.  No cp or sob    no edema or orthopnea  On RA    Net I/o -10 L    Objective:   BP (!) 96/54   Pulse 76   Temp 98.4 °F (36.9 °C) (Oral)   Resp 16   Ht 5' 10\" (1.778 m)   Wt 142 lb 10.2 oz (64.7 kg)   SpO2 95%   BMI 20.47 kg/m²        TELEMETRY: nsr    Physical Exam:  General Appearance: alert and oriented to person, place and time, in no acute distress  Cardiovascular: normal rate, regular rhythm, normal S1 and S2, no murmurs, rubs, clicks, or gallops, distal pulses intact, no carotid bruits, no JVD  Pulmonary/Chest: clear to auscultation bilaterally- no wheezes, rales or rhonchi, normal air movement, no respiratory distress  Abdomen: soft, non-tender, non-distended, normal bowel sounds, no masses Extremities: no cyanosis, clubbing or edema, pulse   Skin: warm and dry,  Head: normocephalic and atraumatic  Eyes: pupils equal, round, and reactive to light  Neck: supple and non-tender without mass, no thyromegaly   Neurological: alert, oriented, normal speech, no focal findings or movement disorder noted  Right wrist - no hematoma, +2 radial pulse    Medications:    insulin glargine  7 Units SubCUTAneous Nightly    furosemide  40 mg IntraVENous Daily    aspirin  325 mg Oral Once    diphenhydrAMINE  50 mg IntraVENous Once    hydrocortisone sodium succinate PF  200 mg IntraVENous Once    tamsulosin  0.4 mg Oral Daily    atorvastatin  40 mg Oral Daily    buPROPion  150 mg Oral QAM    pantoprazole  40 mg Oral QAM AC    sodium chloride flush  5-40 mL IntraVENous 2 times per day    enoxaparin  40 mg SubCUTAneous Daily    insulin lispro  0-4 Units SubCUTAneous TID WC    insulin lispro  0-4 Units SubCUTAneous Nightly    ipratropium-albuterol  2 ampule Inhalation Once    aspirin  81 mg Oral Daily    metoprolol succinate  25 mg Oral Daily      sodium chloride      [Held by provider] sodium chloride 100 mL/hr at 11/01/22 0230    sodium chloride      dextrose       nitroGLYCERIN, 0.4 mg, Q5 Min PRN  acetaminophen, 650 mg, Q4H PRN  lidocaine, , PRN  sodium chloride flush, 5-40 mL, PRN  sodium chloride, , PRN  ondansetron, 4 mg, Q8H PRN   Or  ondansetron, 4 mg, Q6H PRN  polyethylene glycol, 17 g, Daily PRN  acetaminophen, 650 mg, Q6H PRN  glucose, 4 tablet, PRN  dextrose bolus, 125 mL, PRN   Or  dextrose bolus, 250 mL, PRN  glucagon (rDNA), 1 mg, PRN  dextrose, , Continuous PRN  ipratropium-albuterol, 1 ampule, Q4H PRN      Diagnostics:  TTE 10/29/22   Summary   Left ventricular size is normal and systolic function is severely reduced. Ejection fraction was estimated at 25-30%. LV wall thickness is within   normal limits. There was severe global hypokinesis of the left ventricle.    The right ventricular size appears normal with normal systolic function   and wall thickness. Large pleural effusion      Signature      ----------------------------------------------------------------   Electronically signed by Rick Hinds MD (Interpreting   physician) on 10/29/2022 at 01:59 PM    Cath 11/1/22  FINDINGS:  ABDOMINAL AORTOGRAM:  Distal abdominal aorta is patent without  significant stenosis or aneurysmal dilation. There is evidence for  20-30% stenosis in the right common iliac artery. Right external iliac  and internal iliac arteries are patent. No significant stenotic lesions  on the left common iliac, internal iliac and external iliac arteries. LEFT VENTRICULOGRAM:  There is evidence for severe global hypokinesis  with akinetic movement of the anterior wall. Ejection fraction  estimated at 15-20%. HEMODYNAMICS:  Left ventricular end-diastolic pressure 31 mmHg. No  significant pressure gradient across the aortic valve upon pullback. CORONARY ANGIOGRAM:  1. The left main coronary artery has 10-20% stenosis in the distal  segment, otherwise patent without evidence for obstructive disease. Gives rise to left circumflex, left anterior descending and ramus  intermedius arteries. 2.  Ramus intermedius has 90-95% stenosis in the proximal segment. 3.  Left circumflex artery. Luminal irregularities were noticed without  evidence for obstructive disease. 4.  Left anterior descending artery. The ostium of the LAD has a 90%  stenosis. Proximal LAD with luminal irregularities, otherwise LAD is  patent with no significant stenotic lesions. D1 has 99% subtotal  occlusion. 5.  Right coronary artery. The proximal RCA has luminal irregularities. Mid RCA has 40-50% stenosis. Distal RCA is patent. DOMINANCE:  Codominant system. MEDICATIONS:  See MAR. COMPLICATIONS:  None. ESTIMATED BLOOD LOSS:  Less than 50 mL. ACCESS:  Right radial artery access. Vasc Band was applied. Hemostasis  was achieved. IMPRESSION:  1. Severe ischemic cardiomyopathy, ejection fraction 15-20% on left  ventriculogram.  2.  Acute systolic congestive heart failure. Left ventricular  end-diastolic pressure 31 mmHg. 3.  Severe coronary artery disease involving the ostium of the LAD,  first diagonal branch and the ramus intermedius artery. RECOMMENDATIONS:  Optimize the volume status. Resume Lasix. Stop IV  fluids. Daily weight. Strict intake and output. The patient has  chronic kidney disease, monitor daily basic metabolic panel, Nephrology  is following. Apparently, a Bella catheter was placed last night due to  concern for urinary retention. Hematuria was reported after Bella  placement. Urology consult is recommended. Monitor CBC. Continue on  aspirin and Lipitor. Monitor the patient on telemetry. Consult  Cardiovascular Surgery. Heart team discussion regarding  revascularization approach is warranted. Options are coronary artery  bypass graft surgery versus high-risk PCI with Impella support. Findings and plan of care were discussed with the patient and his  family, they are agreeable to the plan. Marco Liriano MD    Lab Data:    Cardiac Enzymes:  No results for input(s): CKTOTAL, CKMB, CKMBINDEX, TROPONINI in the last 72 hours.     CBC:   Lab Results   Component Value Date/Time    WBC 7.6 11/03/2022 08:32 AM    RBC 4.15 11/03/2022 08:32 AM    HGB 11.5 11/03/2022 08:32 AM    HCT 36.2 11/03/2022 08:32 AM     11/03/2022 08:32 AM       CMP:    Lab Results   Component Value Date/Time     11/03/2022 08:32 AM    K 4.4 11/03/2022 08:32 AM    K 4.3 10/28/2022 10:00 PM    CL 97 11/03/2022 08:32 AM    CO2 28 11/03/2022 08:32 AM    BUN 23 11/03/2022 08:32 AM    CREATININE 1.4 11/03/2022 08:32 AM    GFRAA 58 05/24/2022 04:58 AM    LABGLOM 57 11/03/2022 07:52 AM    GLUCOSE 213 11/03/2022 08:32 AM    CALCIUM 9.2 11/03/2022 08:32 AM       Hepatic Function Panel:    Lab Results   Component Value Date/Time    [facility] 64 11/01/2022 04:32 AM    ALT 6 11/01/2022 04:32 AM    AST 10 11/01/2022 04:32 AM    PROT 5.8 11/01/2022 04:32 AM    BILITOT 0.3 11/01/2022 04:32 AM    LABALBU 3.7 11/01/2022 04:32 AM       Magnesium:    Lab Results   Component Value Date/Time    MG 1.6 11/02/2022 06:03 AM       PT/INR:    Lab Results   Component Value Date/Time    INR 0.99 11/01/2022 04:32 AM       HgBA1c:    Lab Results   Component Value Date/Time    LABA1C 7.8 08/23/2022 12:32 PM       FLP:    Lab Results   Component Value Date/Time    TRIG 59 10/30/2022 03:35 AM    HDL 59 10/30/2022 03:35 AM    LDLCALC 120 10/30/2022 03:35 AM       TSH:    Lab Results   Component Value Date/Time    TSH 1.020 10/29/2022 03:30 AM         Assessment:    S/p cath 11/1/22 - Severe coronary artery disease involving the ostium of the LAD, first diagonal branch and the ramus intermedius artery - CVS consulted    Acute systolic CHF - improved  New LVD/severe ICMP - ef 25-30 per TTE 10/29/22  Large pleural effusion on echo  DM  Chronic ETOH abuse  Tobacco abuse  CKD - nephro following  Gross hematuria - urology following    Plan:    Daily I/o and weights  2 liter fluid restriction and 2 gm sodium diet  Keep mag >2 and K >4  Cont diuresis - ok to change to po if ok with nephr  Also nurse states IVF is now ordered, nurse to clarify with nephro if IVF needed  Daily BMP  Lifevest upon dc - ordered  Referral to chf clinic - ordered  Cont asa/statin/BB  No ace/arb due to lower bp and LC  Smoking cessation advised  Advised ETOH cessation as well  [facility]S consulted - CABG vs high risk PCI with impella   CVS ordered myocardial viability study - scheduled for Friday morning  Cardiac rehab  Sl ntg upon dc      Patient and Practitioner mutually agreed upon goal:   Patient and provider goals: Feel better and have more energy     Electronically signed by Shabana Summers PA-C on 11/3/2022 at 12:04 PM

## 2022-11-03 NOTE — CARE COORDINATION
11/3/22, 2:50 PM EDT    DISCHARGE ON [address] [facility] day: 5  Location: [facility]17/017-A Reason for admit: Elevated troponin [Y14.3]  Acute systolic congestive heart failure ([address] [I50.21]  New onset of congestive heart failure ([facility] [address] [I50.9]   Procedure: 11/1 Cardiac cath  Barriers to Discharge: Myocardial viability study Friday morning to determine CABG vs high risk PCI with impella. IVF, IV lasix. PCP: BLACK Medina CNP  Readmission Risk Score: 14%  Patient Goals/Plan/Treatment Preferences: Home with wife. Life Vest is approved, Flor Saini @ [employer] needs notified prior to discharge.

## 2022-11-03 NOTE — PROGRESS NOTES
Hospitalist Progress Note    Patient:  Dennis Velasquez      Unit/Bed:6K-17/017-A    YOB: 1960    MRN: [id number]       Acct: [de-identified]     PCP: BLACK Romero CNP    Date of Admission: 10/28/2022    Assessment/Plan:    Acute CHF Exacerbation  Pro-BNP 8508 (10/28) 3862 (11/1)  Serial Troponins stable ~0.035  Unsure of baseline  EKG (11/1) - Normal sinus rhythm, Possible L atrial enlargement, Anterolateral infarct cited on or before Oct 28  CXR (10/28) - Accesory azygos fissure, interstitial prominence w/ faint bilateral central opacity, Mod sized L pleural effusion, Mild to Mod pulmonary edema  Echo (10/29) - EF 25-30%,   Heart Cath (11/1)  20-30% stenosis in R common iliac artery, severe global hypokinesis w/ akineic movement of anterior wall, EF 15-20%, L main coronary artery 10-20% stenosis, Ramus intermedius had 90-95% stenosis, Ostium of LAD has 90% stenosis, D1 w/ 99% subtotal occlusion, Mid RCA 40-50% stenosis  Severe ischemic cardiomyopathy w/ EF 15-20% on L ventriculogram, Acute systolic CHF Left ventricular end-diastolic pressure 31 mmHg, Severe coronary artery disease involving ostium of LAD as well as first diagonal branch and ramus intermedius artery  CTA (10/28) - No PEs, Mod pulmonary edema  Cardiology consulted  2 L fluid restriction and 2 gm sodium diet  Life vest for DC - ordered  Referral for CHF clinic - ordered  Cardiovascular surgery consult  Following  Recommending CABG in 1 week following resolution of current exacerbation  Continue ASA, Atorvastatin, Metoprolol  Entresto after heart cath    Mod sized Bilateral Pleural Effusions w/ Atelectasis  Likely secondary to #1  Order Incentive Spirometry & Acapella  CKD Stage 3 w/ LC  (11/2) Cr 1.6, GFR 49  Baseline Cr ~1.4, GFR baseline difficult to determine  GFR 48, May 2022  Hold 40 mg IV lasix BID  Renal US (10/31)  Bilateral hydronephrosis  Markedly distended bladder w/ large amount of postvoid residual urine  CT A/P wo Contrast (11/2)  Mild bilateral hydronephrosis, Marked mural thickening of bladder, Moderate constipation, Mod sized bilateral pleural effusions w/ atelectasis  Nephrology Consult  Avoid Renal toxic agents    Urinary Retention with Gross Hematuria  Urology consulted  Mich Crowder to continue anticoagulation  Hematuria likely secondary to traumatic crum insertion  Discharge with follow and follow up OP for cystoscopy  Signed off  Crum catheter placed  Flomax started (11/1)    NIDDM  Last A1C 7.8 (8/23/22)  Low dose SSI w/ hypoglycemic protocols  FBG elevated- start Lantus 7 units 11/3  Home meds Dulaglutide, Glimepiride, Empagliflozin on hold    Essential HTN  Lisinopril discontinued, Entresto following heart cath  Metoprolol added by cardiology, monitor blood pressure    HLD  Continue home Atorvastatin    GERD  Continue Protonix  Smoking cessation  Patient states that he is currently attempting to quit smoking. Started Wellbutrin approximately 1 and half weeks ago  He has reduced his amount of smoking from 1 PPD to 0.5 PPD. Patient also states that he has not smoked since Thursday evening, 10/27  Continue home Wellbutrin  Do not give nicotine patch unless requested by patient  Generalized Anxiety Disorder  Not on any home meds for this per chart review    Hospital Course:  HPI Provided by Chart Review    \"Len Slater is a 63 y.o. male with PMHx of diabetes mellitus, HTN, HLD, GERD who presents to [address] [place] [address] with shortness of breath. Patient states at approximately 8:30 PM on the date of initial presentation to the ED he began having shortness of breath while he was at rest watching TV. Patient states that the shortness of breath continued to progress which is why he decided to present to the ED. Patient states that he fractured his ribs on 10/5 due to falling in the shower and he has been \"taking it easy\". Patient denies any current pain from his rib fractures.   At time of examination patient states that he is barely short of breath, which he now attributes to some anxiety due to being hospitalized. Patient notes that the DuoNeb he received in the ED helped with his shortness of breath. Patient denies chest pain, palpitations, LE swelling, N/V/C/D. Patient states that he has no prior cardiac history or pulmonary history. Patient denies any fatigue. Patient did see his primary care doctor on 10/27, and due to mild pulmonary edema present on CXR, echo and stress test were ordered, however these have not been completed. Patient states that he is currently attempting to quit smoking, and he has been smoking for approximately 42 years. Patient states that he previously smoked 1 PPD. Patient was started on Wellbutrin approximately 1 and 1/2 weeks ago to aid in smoking cessation. Patient is that he currently smokes 0.5 PPD. Patient states that he has not smoked a cigarette since Thursday night, 10/27. Patient states that he currently drinks an average of 2 beers per day, however he states his last beer was approximately 3 to 4 weeks ago. Patient states that when he was younger he would drink alcohol heavily. Patient also reports excessive caffeine intake, stating he drinks approximately 4 cups of coffee per day and approximately 3 20 ounce bottles of diet Mountain Dew per day. Patient also reports tremors. Patient states that these have been present \"on and off\" for the past few years. Patient's wife is at bedside and states that she has noticed the tremors to be worsened on day of admission. Patient states this is due to anxiety from hospitalization and DuoNeb that he received in the ED. ED course: Initial vitals include /91, heart rate 107, respiratory rate 16, temperature 97.7 °F, SPO2 97% on room air. Initial labs include creatinine 1.3, proBNP 8508.0, troponin 0.039. CXR shows mild to moderate pulmonary edema.   CTA chest shows no pulmonary embolism, moderate pulmonary edema. Patient was given 1 L NS bolus, aspirin 324 mg, Lasix 20 mg IV in the ED. Patient will be admitted to [place] [address] for further management of new onset congestive heart failure. \"    10/30/22  Cardiology following  Plan for cath tomorrow    10/31/22  Patient cath postponed to tomorrow  Nephrology following  Hold diuretics    11/01/22  Patient underwent heart cath  Urology consulted for gross hematuria    (11/2)  Patient was resting comfortably in bed upon entering the room. He is complaining of no symptoms and states he feels much better. Denies pain, chest pain, palpitations, shortness of breath, numbness or tingling, nausea or vomiting, bowel irregularities, difficulty w/ ADLs, or any new symptoms. Currently has indwelling crum w/ 500 mL of dark yellow urine. Chief Complaint: Shortness of breath      Subjective:   (11/3)  Patient was resting comfortably in bed upon entering the room. He is not having any pain and has no complaints. Denies pain, chest pain, palpitations, shortness of breath, numbness or tingling, nausea or vomiting, bowel or urinary irregularities, difficulty with ADLs, or any new symptoms. Currently has indwelling crum w/ 400mL of yellow urine.     Medications:    Infusion Medications    [Held by provider] sodium chloride 100 mL/hr at 11/01/22 0230    sodium chloride      dextrose       Scheduled Medications    furosemide  40 mg IntraVENous Daily    aspirin  325 mg Oral Once    diphenhydrAMINE  50 mg IntraVENous Once    hydrocortisone sodium succinate PF  200 mg IntraVENous Once    tamsulosin  0.4 mg Oral Daily    atorvastatin  40 mg Oral Daily    buPROPion  150 mg Oral QAM    pantoprazole  40 mg Oral QAM AC    sodium chloride flush  5-40 mL IntraVENous 2 times per day    enoxaparin  40 mg SubCUTAneous Daily    insulin lispro  0-4 Units SubCUTAneous TID WC    insulin lispro  0-4 Units SubCUTAneous Nightly    ipratropium-albuterol  2 ampule Inhalation Once    aspirin  81 mg Oral Daily metoprolol succinate  25 mg Oral Daily     PRN Meds: nitroGLYCERIN, acetaminophen, lidocaine, sodium chloride flush, sodium chloride, ondansetron **OR** ondansetron, polyethylene glycol, [DISCONTINUED] acetaminophen **OR** acetaminophen, glucose, dextrose bolus **OR** dextrose bolus, glucagon (rDNA), dextrose, ipratropium-albuterol      Intake/Output Summary (Last 24 hours) at 11/3/2022 0800  Last data filed at 11/3/2022 0344  Gross per 24 hour   Intake 920 ml   Output 2300 ml   Net -1380 ml         Diet:  ADULT DIET; Regular    Review of Systems   Respiratory:  Negative for shortness of breath. Cardiovascular:  Negative for chest pain. Gastrointestinal:  Negative for vomiting. Exam:  BP (!) 114/58   Pulse 72   Temp 97.9 °F (36.6 °C) (Oral)   Resp 16   Ht 5' 10\" (1.778 m)   Wt 142 lb 10.2 oz (64.7 kg)   SpO2 93%   BMI 20.47 kg/m²     Physical Exam  Constitutional:       Appearance: Normal appearance. Interventions: He is not intubated. HENT:      Head: Normocephalic and atraumatic. Right Ear: External ear normal.      Left Ear: External ear normal.      Nose: Nose normal.      Mouth/Throat:      Mouth: Mucous membranes are moist.      Pharynx: Oropharynx is clear. Eyes:      Extraocular Movements: Extraocular movements intact. Conjunctiva/sclera: Conjunctivae normal.   Cardiovascular:      Rate and Rhythm: Normal rate and regular rhythm. Pulses: Normal pulses. Heart sounds: Normal heart sounds. Pulmonary:      Effort: Pulmonary effort is normal. He is not intubated. Breath sounds: Normal breath sounds. Comments: Bilateral air entry  Abdominal:      General: Bowel sounds are normal.      Palpations: Abdomen is soft. Genitourinary:     Comments: Bella collection container with gross hematuria noted  Musculoskeletal:         General: Normal range of motion. Cervical back: Normal range of motion. Skin:     General: Skin is warm and dry.       Capillary Refill: Capillary refill takes less than 2 seconds. Neurological:      General: No focal deficit present. Mental Status: He is alert and oriented to person, place, and time. Psychiatric:         Mood and Affect: Mood normal.         Speech: He is communicative. Labs:   Recent Labs     11/01/22  0433   WBC 9.7   HGB 11.7*   HCT 36.5*          Recent Labs     11/01/22 0432 11/02/22  0603    137   K 4.4 4.2    101   CO2 25 25   BUN 27* 25*   CREATININE 1.5* 1.6*   CALCIUM 8.7 9.0       Recent Labs     11/01/22 0432   AST 10   ALT 6*   BILITOT 0.3   ALKPHOS 64       Recent Labs     11/01/22 0432   INR 0.99       No results for input(s): CKTOTAL, TROPONINI in the last 72 hours. Urinalysis:      Lab Results   Component Value Date/Time    NITRU NEGATIVE 10/31/2022 07:48 PM    WBCUA 2-4 10/31/2022 07:48 PM    BACTERIA NONE SEEN 10/31/2022 07:48 PM    RBCUA 0-2 10/31/2022 07:48 PM    BLOODU NEGATIVE 10/31/2022 07:48 PM    SPECGRAV 1.012 10/31/2022 07:48 PM       Radiology:  CT ABDOMEN PELVIS WO CONTRAST   Final Result      Mild bilateral hydronephrosis. There is bilateral renal contrast excretion    from prior contrast administration limiting evaluation for renal stones. Marked mural thickening of the urinary bladder. The differential diagnosis    includes cystitis and neoplasm. Moderate amount of retained stool. Partially visualized lower thorax shows moderate sized bilateral pleural    effusions with atelectasis. This document has been electronically signed by: Elvira Unger MD on    11/02/2022 03:23 AM      All CTs at this facility use dose modulation techniques and iterative    reconstructions, and/or weight-based dosing   when appropriate to reduce radiation to a low as reasonably achievable. US RENAL COMPLETE   Final Result   1. Bilateral hydronephrosis. 2. Markedly distended urinary bladder with a large amount of postvoid residual urine.       Final report electronically signed by Dr. Nikita Garibay on 10/31/2022 1:13 PM      CTA CHEST W WO CONTRAST   Final Result   Impression:   No pulmonary embolism. Moderate pulmonary edema. Interval worsening since the prior study on    10/5/22. This document has been electronically signed by: Ruslan Jeter. Matty Price MD    on 10/28/2022 11:51 PM      All CTs at this facility use dose modulation techniques and iterative    reconstructions, and/or weight-based dosing   when appropriate to reduce radiation to a low as reasonably achievable. 3D Post-processing was performed on this study. XR CHEST PORTABLE   Final Result   Impression:   Mild to moderate pulmonary edema. This document has been electronically signed by: Ruslan Jeter. Matty Price MD    on 10/28/2022 11:15 PM      PET MYOCARDIAL VIABILITY    (Results Pending)       Diet: ADULT DIET;  Regular    DVT prophylaxis: [x] Lovenox                                 [] SCDs                                 [] SQ Heparin                                 [] Encourage ambulation           [] Already on Anticoagulation     Disposition:    [x] Home       [] TCU       [] Rehab       [] Psych       [] SNF       [] [facility]       [] Other-    Code Status: Full Code    PT/OT Eval:         Electronically signed by Cipriano David PA-C on 11/3/2022 at 8:00 AM

## 2022-11-03 NOTE — PLAN OF CARE
Problem: Discharge Planning  Goal: Discharge to home or other facility with appropriate resources  Outcome: Progressing     Problem: Skin/Tissue Integrity  Goal: Absence of new skin breakdown  Description: 1. Monitor for areas of redness and/or skin breakdown  2. Assess vascular access sites hourly  3. Every 4-6 hours minimum:  Change oxygen saturation probe site  4. Every 4-6 hours:  If on nasal continuous positive airway pressure, respiratory therapy assess nares and determine need for appliance change or resting period.   Outcome: Progressing     Problem: Safety - Adult  Goal: Free from fall injury  Outcome: Progressing     Problem: Chronic Conditions and Co-morbidities  Goal: Patient's chronic conditions and co-morbidity symptoms are monitored and maintained or improved  Outcome: Progressing     Problem: Pain  Goal: Verbalizes/displays adequate comfort level or baseline comfort level  Outcome: Progressing     Problem: Metabolic/Fluid and Electrolytes - Adult  Goal: Electrolytes maintained within normal limits  Outcome: Progressing     Problem: Metabolic/Fluid and Electrolytes - Adult  Goal: Hemodynamic stability and optimal renal function maintained  Outcome: Progressing     Problem: Metabolic/Fluid and Electrolytes - Adult  Goal: Glucose maintained within prescribed range  Outcome: Progressing     Problem: Hematologic - Adult  Goal: Maintains hematologic stability  Outcome: Progressing

## 2022-11-03 NOTE — FLOWSHEET NOTE
No response to voice call. Camera on for safety check. Patient lying in bed. Blankets wrapped around. Respirations regular.

## 2022-11-03 NOTE — PROGRESS NOTES
[facility]    [address], [address]  [facility]  11/3/2022 10:26 AM    Pt Name:    Denise Castro  MRN:     [id number]   [id number]  YOB: 1960  Admit Date:    10/28/2022  9:39 PM  Primary Care Physician:  BLACK Samson - CNP    CSN Number:   [id number]    Reason for Consult:  LC on CKD  Requesting provider:     History:   Denise Castro is a 63 y.o. male with PMHx of diabetes mellitus, HTN, HLD, GERD who presents to [address] [place] [address] with shortness of breath. Patient states at approximately 8:30 PM on the date of initial presentation to the ED he began having shortness of breath while he was at rest watching TV. Patient states that the shortness of breath continued to progress which is why he decided to present to the ED. Patient states that he fractured his ribs on 10/5 due to falling in the shower and he has been \"taking it easy\". Patient denies any current pain from his rib fractures. At time of examination patient states that he is barely short of breath, which he now attributes to some anxiety due to being hospitalized. Patient notes that the DuoNeb he received in the ED helped with his shortness of breath. 11/2 Pt is doing okay today. Has no new symptoms. He received a left heart catheterization on 11/1/2022. Pt has crum in place and urine is yellow and turbid    11/3 has no new complaints this morning. Awaiting lab results. patient is currently stable.  He is awaiting myocardial viability study tomorrow    Past Medical History:  Past Medical History:   Diagnosis Date    Hypertension     Prediabetes        Past Surgical History:  Past Surgical History:   Procedure Laterality Date    TONSILLECTOMY         Family History:  Family History   Problem Relation Age of Onset    Diabetes Father     Stroke Father     Diabetes Brother     Diabetes Brother        Social History:  Social History     Socioeconomic History Marital status:      Spouse name: Not on file    Number of children: Not on file    Years of education: Not on file    Highest education level: Not on file   Occupational History    Not on file   Tobacco Use    Smoking status: Every Day     Packs/day: 0.50     Years: 33.00     Pack years: 16.50     Types: Cigarettes    Smokeless tobacco: Never   Vaping Use    Vaping Use: Never used   Substance and Sexual Activity    Alcohol use: Not Currently    Drug use: No    Sexual activity: Not on file   Other Topics Concern    Not on file   Social History Narrative    Not on file     Social Determinants of Health     Financial Resource Strain: Low Risk     Difficulty of Paying Living Expenses: Not hard at all   Food Insecurity: No Food Insecurity    Worried About Running Out of Food in the Last Year: Never true    [address] in the Last Year: Never true   Transportation Needs: No Transportation Needs    Lack of Transportation (Medical): No    Lack of Transportation (Non-Medical): No   Physical Activity: Not on file   Stress: Not on file   Social Connections: Not on file   Intimate Partner Violence: Not on file   Housing Stability: Not on file       Home Meds:  Prior to Admission medications    Medication Sig Start Date End Date Taking?  Authorizing Provider   tamsulosin (FLOMAX) 0.4 MG capsule Take 1 capsule by mouth daily 11/3/22  Yes BLACK Huitron CNP   buPROPion (WELLBUTRIN XL) 150 MG extended release tablet Take 1 tablet by mouth every morning 10/12/22  Yes BLACK Sandoval CNP   omeprazole (PRILOSEC) 40 MG delayed release capsule Take 1 capsule by mouth daily 10/12/22  Yes BLACK Sandoval CNP   lisinopril (PRINIVIL;ZESTRIL) 20 MG tablet Take 1 tablet by mouth daily 6/28/22  Yes BLACK Sandoval CNP   glimepiride (AMARYL) 4 MG tablet Take 1 tablet by mouth every morning (before breakfast) 6/28/22  Yes BLCAK Sandoval CNP   empagliflozin (JARDIANCE) 25 MG tablet Take 1 tablet by mouth daily 5/24/22  Yes BLACK Piña CNP   atorvastatin (LIPITOR) 40 MG tablet Take 1 tablet by mouth daily 5/24/22  Yes BLACK Piña CNP   acetaminophen (TYLENOL) 500 MG tablet Take 500 mg by mouth every 6 hours as needed for Pain As needed    Historical Provider, MD   Dulaglutide (TRULICITY) 1.5 SA/5.9YH SOPN Inject 1.5 mg into the skin once a week 8/18/22   BLACK Piña CNP       Review of Systems:  Constitutional: Negative for fever, fatigue or chills. Reports good appetite  Head: Negative for headaches  Eyes: Negative for blurry vision or discharge  Ears: Negative for ear pain or hearing changes  Nose: Negative for runny nose or epistaxis  Respiratory: Negative for shortness of breath. Negative for cough or sputum production. Negative for hemoptysis  Cardiovascular: Negative for chest pain  GI: Negative for nausea, vomiting and diarrhea.   Negative for hematochezia and melena  : Negative for discharge, dysuria, or hematuria  Skin: Negative for rash  Musculoskeletal: Negative for joint pain, moves all ext  Neuro: Negative for numbness or tingling, negative for slurred speech  Psychiatric: Reports stable mood, negative for depression or insomnia    All other review of systems were reviewed and negative    Current Meds:  Infusion:    [Held by provider] sodium chloride 100 mL/hr at 11/01/22 0230    sodium chloride      dextrose       Meds:    furosemide  40 mg IntraVENous Daily    aspirin  325 mg Oral Once    diphenhydrAMINE  50 mg IntraVENous Once    hydrocortisone sodium succinate PF  200 mg IntraVENous Once    tamsulosin  0.4 mg Oral Daily    atorvastatin  40 mg Oral Daily    buPROPion  150 mg Oral QAM    pantoprazole  40 mg Oral QAM AC    sodium chloride flush  5-40 mL IntraVENous 2 times per day    enoxaparin  40 mg SubCUTAneous Daily    insulin lispro  0-4 Units SubCUTAneous TID WC    insulin lispro  0-4 Units SubCUTAneous Nightly    ipratropium-albuterol  2 ampule Inhalation Once    aspirin  81 mg Oral Daily    metoprolol succinate  25 mg Oral Daily     Meds prn: nitroGLYCERIN, acetaminophen, lidocaine, sodium chloride flush, sodium chloride, ondansetron **OR** ondansetron, polyethylene glycol, [DISCONTINUED] acetaminophen **OR** acetaminophen, glucose, dextrose bolus **OR** dextrose bolus, glucagon (rDNA), dextrose, ipratropium-albuterol     Allergies/Intolerances: ALLERGIES: Metformin and related    24HR INTAKE/OUTPUT:    Intake/Output Summary (Last 24 hours) at 11/3/2022 1026  Last data filed at 11/3/2022 0344  Gross per 24 hour   Intake 920 ml   Output 2300 ml   Net -1380 ml     I/O last 3 completed shifts: In: 1500 [P.O.:1500]  Out: 4550 [Urine:4550]  No intake/output data recorded. Admission weight: 150 lb (68 kg)  Wt Readings from Last 3 Encounters:   11/03/22 142 lb 10.2 oz (64.7 kg)   10/26/22 151 lb (68.5 kg)   10/12/22 140 lb 9.6 oz (63.8 kg)     Body mass index is 20.47 kg/m². Physical Examination:  VITALS:   Vitals:    11/02/22 2146 11/02/22 2358 11/03/22 0348 11/03/22 0800   BP: 104/70 115/70 (!) 114/58 133/74   Pulse: 79 81 72 85   Resp: 16 16 16 16   Temp: 98.1 °F (36.7 °C) 97.9 °F (36.6 °C) 97.9 °F (36.6 °C) 97.7 °F (36.5 °C)   TempSrc: Oral Oral Oral Oral   SpO2: 96% 98% 93% 100%   Weight:   142 lb 10.2 oz (64.7 kg)    Height:         Weight:   Wt Readings from Last 3 Encounters:   11/03/22 142 lb 10.2 oz (64.7 kg)   10/26/22 151 lb (68.5 kg)   10/12/22 140 lb 9.6 oz (63.8 kg)     Constitutional and General Appearance: alert and cooperative with exam, appears comfortable, no distress, not diaphoretic  Eyes: no icteric sclera in left eye or right eye,  no pallor conjunctiva in left or right eye, no discharge seen from left eye or right eye  Ears and Nose: normal external appearance of left and right ear. Both ear lobules are nontender to palpation. Normal external appearance of nose. No active drainage from nose.    Oral: moist oral mucus membranes  Neck: No jugular venous distention, appears symmetric, good ROM  Lungs: Air entry B/L, no crackles or rales, no use of accessory muscles or labored breathing. No wheezing, rales and rhonchi   Chest: No chest wall tenderness bilaterally   Heart: regular rate, S1, S2. No murmur, gallops and rubs   Extremities: no  LE edema, no tenderness  GI: soft, non-tender, no guarding, no distention  Skin: no rash seen on exposed extremities, warm to touch  Musculo: moves all extremities, no clubbing or cyanosis of digits of either upper extremity. Neuro: no slurred speech, no facial drooping, symmetric strength  Psychiatric: Normal mood and affect, Not agitated    Lab Data  CBC:   Recent Labs     11/01/22 0433 11/03/22  0832   WBC 9.7 7.6   HGB 11.7* 11.5*   HCT 36.5* 36.2*    237     BMP:  Recent Labs     11/01/22 0432 11/02/22  0603 11/03/22  0832    137 136   K 4.4 4.2 4.4    101 97*   CO2 25 25 28   BUN 27* 25* 23*   CREATININE 1.5* 1.6* 1.4*   GLUCOSE 181* 157* 213*   CALCIUM 8.7 9.0 9.2   MG 1.7 1.6  --      PTH: @PTH@  TSH: No results for input(s): TSH in the last 72 hours. HgBa1c: No results for input(s): LABA1C in the last 72 hours. Hepatic:   Recent Labs     11/01/22 0432   LABALBU 3.7   AST 10   ALT 6*   BILITOT 0.3   ALKPHOS 64     ABGs: No results found for: PHART, PO2ART, [id number]  Troponin: No results for input(s): TROPONINI in the last 72 hours. BNP: No results for input(s): BNP in the last 72 hours. Additional Labs:  Diagnostics:    Old labs and diagnostics reviewed. Echo: 10/29 EF 25-30%. LV thickness is within normal limits. There is severe global hypokinesis of the left ventricle   Labs: results pending       Impression and Plan:    Renal -acute kidney injury, on chronic kidney disease stage III  This appears most likely due to the use of diuretics ACE inhibitor and primarily due to contrast exposure on the day of his admission.   Currently improved to some degree close to his baseline  Status post repeat cardiac cath on 11/1/2022, monitor for signs of any contrast-induced nephropathy. Start IV fluid at 50cc/hr   Electrolytes -results pending   Essential hypertension running borderline okay with once a day Lasix   Hx of diabetes mellitus apparently his A1c was 15.0,, 3 months ago  New onset of acute congestive heart failure systolic needs a PET variability study to determine the viability of cardiac muscle seen by cardiology and cardiothoracic surgery. continue furosemide 40mg daily. Urinary retention -status post Bella, now with hematuria urology following on Flomax as well  Meds reviewed and discussed with patient   Thank you for the consult. Please feel free to call me if you have any questions.      Jazmin Grace MD  [facility]

## 2022-11-04 ENCOUNTER — APPOINTMENT (OUTPATIENT)
Dept: PET IMAGING | Age: 62
DRG: 233 | End: 2022-11-04
Payer: COMMERCIAL

## 2022-11-04 PROBLEM — E78.5 HYPERLIPIDEMIA: Status: ACTIVE | Noted: 2022-11-04

## 2022-11-04 PROBLEM — K21.9 GASTROESOPHAGEAL REFLUX DISEASE: Status: ACTIVE | Noted: 2022-11-04

## 2022-11-04 PROBLEM — R25.1 TREMOR: Status: ACTIVE | Noted: 2022-11-04

## 2022-11-04 PROBLEM — N17.9 AKI (ACUTE KIDNEY INJURY) (HCC): Status: ACTIVE | Noted: 2022-11-04

## 2022-11-04 LAB
ANION GAP SERPL CALCULATED.3IONS-SCNC: 11 MEQ/L (ref 8–16)
BUN BLDV-MCNC: 24 MG/DL (ref 7–22)
CALCIUM SERPL-MCNC: 8.5 MG/DL (ref 8.5–10.5)
CHLORIDE BLD-SCNC: 100 MEQ/L (ref 98–111)
CO2: 27 MEQ/L (ref 23–33)
CREAT SERPL-MCNC: 1.5 MG/DL (ref 0.4–1.2)
ERYTHROCYTE [DISTWIDTH] IN BLOOD BY AUTOMATED COUNT: 12.8 % (ref 11.5–14.5)
ERYTHROCYTE [DISTWIDTH] IN BLOOD BY AUTOMATED COUNT: 41.9 FL (ref 35–45)
GFR SERPL CREATININE-BSD FRML MDRD: 53 ML/MIN/1.73M2
GLUCOSE BLD-MCNC: 148 MG/DL (ref 70–108)
GLUCOSE BLD-MCNC: 149 MG/DL (ref 70–108)
GLUCOSE BLD-MCNC: 192 MG/DL (ref 70–108)
GLUCOSE BLD-MCNC: 225 MG/DL (ref 70–108)
GLUCOSE BLD-MCNC: 226 MG/DL (ref 70–108)
HCT VFR BLD CALC: 37 % (ref 42–52)
HEMOGLOBIN: 11.6 GM/DL (ref 14–18)
MCH RBC QN AUTO: 28 PG (ref 26–33)
MCHC RBC AUTO-ENTMCNC: 31.4 GM/DL (ref 32.2–35.5)
MCV RBC AUTO: 89.2 FL (ref 80–94)
PLATELET # BLD: 262 THOU/MM3 (ref 130–400)
PMV BLD AUTO: 10.7 FL (ref 9.4–12.4)
POTASSIUM SERPL-SCNC: 4.1 MEQ/L (ref 3.5–5.2)
PRO-BNP: 3340 PG/ML (ref 0–900)
RBC # BLD: 4.15 MILL/MM3 (ref 4.7–6.1)
SODIUM BLD-SCNC: 138 MEQ/L (ref 135–145)
WBC # BLD: 8.1 THOU/MM3 (ref 4.8–10.8)

## 2022-11-04 PROCEDURE — 36415 COLL VENOUS BLD VENIPUNCTURE: CPT

## 2022-11-04 PROCEDURE — 83880 ASSAY OF NATRIURETIC PEPTIDE: CPT

## 2022-11-04 PROCEDURE — 99232 SBSQ HOSP IP/OBS MODERATE 35: CPT | Performed by: INTERNAL MEDICINE

## 2022-11-04 PROCEDURE — 78459 MYOCRD IMG PET SINGLE STUDY: CPT

## 2022-11-04 PROCEDURE — A9552 F18 FDG: HCPCS | Performed by: THORACIC SURGERY (CARDIOTHORACIC VASCULAR SURGERY)

## 2022-11-04 PROCEDURE — 3430000000 HC RX DIAGNOSTIC RADIOPHARMACEUTICAL: Performed by: THORACIC SURGERY (CARDIOTHORACIC VASCULAR SURGERY)

## 2022-11-04 PROCEDURE — 1200000000 HC SEMI PRIVATE

## 2022-11-04 PROCEDURE — 82948 REAGENT STRIP/BLOOD GLUCOSE: CPT

## 2022-11-04 PROCEDURE — 6370000000 HC RX 637 (ALT 250 FOR IP): Performed by: PHYSICIAN ASSISTANT

## 2022-11-04 PROCEDURE — 6370000000 HC RX 637 (ALT 250 FOR IP): Performed by: STUDENT IN AN ORGANIZED HEALTH CARE EDUCATION/TRAINING PROGRAM

## 2022-11-04 PROCEDURE — 80048 BASIC METABOLIC PNL TOTAL CA: CPT

## 2022-11-04 PROCEDURE — 6360000002 HC RX W HCPCS: Performed by: STUDENT IN AN ORGANIZED HEALTH CARE EDUCATION/TRAINING PROGRAM

## 2022-11-04 PROCEDURE — 85027 COMPLETE CBC AUTOMATED: CPT

## 2022-11-04 PROCEDURE — 6360000002 HC RX W HCPCS

## 2022-11-04 PROCEDURE — 2580000003 HC RX 258

## 2022-11-04 PROCEDURE — 2580000003 HC RX 258: Performed by: STUDENT IN AN ORGANIZED HEALTH CARE EDUCATION/TRAINING PROGRAM

## 2022-11-04 PROCEDURE — 6370000000 HC RX 637 (ALT 250 FOR IP): Performed by: INTERNAL MEDICINE

## 2022-11-04 PROCEDURE — 99232 SBSQ HOSP IP/OBS MODERATE 35: CPT | Performed by: PHYSICIAN ASSISTANT

## 2022-11-04 RX ORDER — FLUDEOXYGLUCOSE F 18 200 MCI/ML
10 INJECTION, SOLUTION INTRAVENOUS
Status: COMPLETED | OUTPATIENT
Start: 2022-11-04 | End: 2022-11-04

## 2022-11-04 RX ADMIN — SODIUM CHLORIDE, PRESERVATIVE FREE 10 ML: 5 INJECTION INTRAVENOUS at 08:37

## 2022-11-04 RX ADMIN — PANTOPRAZOLE SODIUM 40 MG: 40 TABLET, DELAYED RELEASE ORAL at 05:49

## 2022-11-04 RX ADMIN — ATORVASTATIN CALCIUM 40 MG: 40 TABLET, FILM COATED ORAL at 12:03

## 2022-11-04 RX ADMIN — INSULIN GLARGINE 7 UNITS: 100 INJECTION, SOLUTION SUBCUTANEOUS at 00:16

## 2022-11-04 RX ADMIN — METOPROLOL SUCCINATE 25 MG: 25 TABLET, FILM COATED, EXTENDED RELEASE ORAL at 11:42

## 2022-11-04 RX ADMIN — FLUDEOXYGLUCOSE F 18 11.9 MILLICURIE: 200 INJECTION, SOLUTION INTRAVENOUS at 10:05

## 2022-11-04 RX ADMIN — FUROSEMIDE 40 MG: 10 INJECTION, SOLUTION INTRAMUSCULAR; INTRAVENOUS at 11:43

## 2022-11-04 RX ADMIN — TAMSULOSIN HYDROCHLORIDE 0.4 MG: 0.4 CAPSULE ORAL at 12:03

## 2022-11-04 RX ADMIN — SODIUM CHLORIDE: 9 INJECTION, SOLUTION INTRAVENOUS at 18:00

## 2022-11-04 RX ADMIN — INSULIN LISPRO 1 UNITS: 100 INJECTION, SOLUTION INTRAVENOUS; SUBCUTANEOUS at 12:04

## 2022-11-04 RX ADMIN — INSULIN GLARGINE 7 UNITS: 100 INJECTION, SOLUTION SUBCUTANEOUS at 20:56

## 2022-11-04 RX ADMIN — ENOXAPARIN SODIUM 40 MG: 100 INJECTION SUBCUTANEOUS at 11:43

## 2022-11-04 RX ADMIN — ASPIRIN 81 MG 81 MG: 81 TABLET ORAL at 11:42

## 2022-11-04 RX ADMIN — BUPROPION HYDROCHLORIDE 150 MG: 150 TABLET, FILM COATED, EXTENDED RELEASE ORAL at 12:03

## 2022-11-04 ASSESSMENT — ENCOUNTER SYMPTOMS
VOMITING: 0
SHORTNESS OF BREATH: 0

## 2022-11-04 ASSESSMENT — PAIN SCALES - GENERAL
PAINLEVEL_OUTOF10: 0
PAINLEVEL_OUTOF10: 0

## 2022-11-04 NOTE — PROGRESS NOTES
[facility]   Nephrology progress note  11/4/2022, 9:24 AM      Pt Name:    Riccardo Velasquez  MRN:     [id number]     YOB: 1960  Admit Date:    10/28/2022  9:39 PM    Chief Complaint: Nephrology following for LC/CKD. Subjective:  Patient seen and examined  No chest pain or shortness of breath  Feels okay. Urine looks much clear    Objective:  24HR INTAKE/OUTPUT:    Intake/Output Summary (Last 24 hours) at 11/4/2022 0924  Last data filed at 11/4/2022 0358  Gross per 24 hour   Intake --   Output 2750 ml   Net -2750 ml      Admission weight: 150 lb (68 kg)  Wt Readings from Last 3 Encounters:   11/04/22 142 lb (64.4 kg)   10/26/22 151 lb (68.5 kg)   10/12/22 140 lb 9.6 oz (63.8 kg)        Vitals :   Vitals:    11/03/22 2000 11/03/22 2310 11/04/22 0311 11/04/22 0835   BP: 112/63 111/68 110/65 122/78   Pulse: 84 79 78 72   Resp: 16 16 16    Temp: 97.9 °F (36.6 °C) 98.5 °F (36.9 °C) 98.4 °F (36.9 °C) 97.7 °F (36.5 °C)   TempSrc: Oral Oral Oral    SpO2: 99% 98% 96% 98%   Weight:   142 lb (64.4 kg)    Height:           Physical examination  General Appearance:  Well developed.  No distress  Mouth/Throat:  Oral mucosa moist  Neck:  Supple, no JVD  Lungs:  Breath sounds: clear  Heart[de-identified]  S1,S2 heard  Abdomen:  Soft, non - tender  Musculoskeletal:  Edema -no edema    Medications:  Infusion:    sodium chloride Stopped (11/04/22 0838)    [Held by provider] sodium chloride 100 mL/hr at 11/01/22 0230    sodium chloride      dextrose       Meds:    insulin glargine  7 Units SubCUTAneous Nightly    furosemide  40 mg IntraVENous Daily    aspirin  325 mg Oral Once    diphenhydrAMINE  50 mg IntraVENous Once    tamsulosin  0.4 mg Oral Daily    atorvastatin  40 mg Oral Daily    buPROPion  150 mg Oral QAM    pantoprazole  40 mg Oral QAM AC    sodium chloride flush  5-40 mL IntraVENous 2 times per day    enoxaparin  40 mg SubCUTAneous Daily    insulin lispro  0-4 Units SubCUTAneous TID     insulin lispro  0-4 Units SubCUTAneous Nightly    ipratropium-albuterol  2 ampule Inhalation Once    aspirin  81 mg Oral Daily    metoprolol succinate  25 mg Oral Daily       Lab Data :  CBC:   Recent Labs     11/03/22  0832 11/04/22  0358   WBC 7.6 8.1   HGB 11.5* 11.6*   HCT 36.2* 37.0*    262     CMP:  Recent Labs     11/02/22  0603 11/03/22  0832 11/04/22  0358    136 138   K 4.2 4.4 4.1    97* 100   CO2 25 28 27   BUN 25* 23* 24*   CREATININE 1.6* 1.4* 1.5*   GLUCOSE 157* 213* 148*   CALCIUM 9.0 9.2 8.5   MG 1.6  --   --      Hepatic: No results for input(s): LABALBU, AST, ALT, ALB, BILITOT, ALKPHOS in the last 72 hours. Assessment and Plan:  Renal -acute kidney injury, on chronic kidney disease stage III  This appears most likely due to the use of diuretics ACE inhibitor and primarily due to contrast exposure on the day of his admission. Currently improved to some degree close to his baseline. Status post repeat cardiac cath on 11/1/2022, no signs of any contrast-induced nephropathy. However he definitely poses a higher risk for renal injury with the cardiac surgery  Electrolytes - appear to be within normal limits  Essential hypertension running reasonable at this time  Hx of diabetes mellitus apparently his A1c was 15.0,, 3 months ago  New onset of acute congestive heart failure systolic needs a cardiac cath seen by cardiology. on diuretic maintaining stable  CAD for possible CABG, awaiting viability studies for viability studies today  Urinary retention -status post Bella, hematuria better following urology  Meds reviewed and discussed with patient. Kvng Osei MD  [facility]    This report has been created using voice recognition software.  It may contain minor errors which are inherent in voice recognition technology

## 2022-11-04 NOTE — PLAN OF CARE
Problem: Discharge Planning  Goal: Discharge to home or other facility with appropriate resources  Outcome: Progressing     Problem: Skin/Tissue Integrity  Goal: Absence of new skin breakdown  Description: 1. Monitor for areas of redness and/or skin breakdown  2. Assess vascular access sites hourly  3. Every 4-6 hours minimum:  Change oxygen saturation probe site  4. Every 4-6 hours:  If on nasal continuous positive airway pressure, respiratory therapy assess nares and determine need for appliance change or resting period.   Outcome: Progressing     Problem: Safety - Adult  Goal: Free from fall injury  Outcome: Progressing     Problem: Chronic Conditions and Co-morbidities  Goal: Patient's chronic conditions and co-morbidity symptoms are monitored and maintained or improved  Outcome: Progressing     Problem: Pain  Goal: Verbalizes/displays adequate comfort level or baseline comfort level  Outcome: Progressing     Problem: Metabolic/Fluid and Electrolytes - Adult  Goal: Glucose maintained within prescribed range  Outcome: Progressing     Problem: Hematologic - Adult  Goal: Maintains hematologic stability  Outcome: Progressing

## 2022-11-04 NOTE — PROGRESS NOTES
Hospitalist Progress Note    Patient:  Kimberly Velazco      Unit/Bed:6K-17/017-A    YOB: 1960    MRN: [id number]       Acct: [de-identified]     PCP: BLACK Kramer CNP    Date of Admission: 10/28/2022    Assessment/Plan:    Acute CHF Exacerbation  Pro-BNP 8508 (10/28) 3862 (11/1)  Serial Troponins stable ~0.035  CXR (10/28) - Accesory azygos fissure, interstitial prominence w/ faint bilateral central opacity, Mod sized L pleural effusion, Mild to Mod pulmonary edema  Echo (10/29) - EF 25-30%,   Heart Cath (11/1)  20-30% stenosis in R common iliac artery, severe global hypokinesis w/ akineic movement of anterior wall, EF 15-20%, L main coronary artery 10-20% stenosis, Ramus intermedius had 90-95% stenosis, Ostium of LAD has 90% stenosis, D1 w/ 99% subtotal occlusion, Mid RCA 40-50% stenosis  Severe ischemic cardiomyopathy w/ EF 15-20% on L ventriculogram, Acute systolic CHF Left ventricular end-diastolic pressure 31 mmHg, Severe coronary artery disease involving ostium of LAD as well as first diagonal branch and ramus intermedius artery  CTA (10/28) - No PEs, Mod pulmonary edema  Cardiology consulted  2 L fluid restriction and 2 gm sodium diet  Life vest for DC - ordered  Referral for CHF clinic - ordered  Cardiovascular surgery consult  Following  Recommending CABG in 1 week following resolution of current exacerbation  PET myocardial viability study ordered for today  Continue ASA, Atorvastatin, Metoprolol  Entresto after heart cath    Mod sized Bilateral Pleural Effusions w/ Atelectasis  Likely secondary to #1  Order Incentive Spirometry & Acapella  CKD Stage 3 w/ LC  Baseline 1.4- currently 1.5  Gentle IVF started 11/3 given concerns for development of contrast induced nephropathy  Renal US (10/31)  Bilateral hydronephrosis  Markedly distended bladder w/ large amount of postvoid residual urine  CT A/P wo Contrast (11/2)  Mild bilateral hydronephrosis, Marked mural thickening of bladder, Moderate constipation, Mod sized bilateral pleural effusions w/ atelectasis  Nephrology following  Avoid Renal toxic agents    Urinary Retention with Gross Hematuria  Urology consulted  [id number] [address] to continue anticoagulation  Hematuria likely secondary to traumatic crum insertion  Discharge with follow and follow up OP for cystoscopy  Signed off  Crum catheter placed  Flomax started (11/1)    NIDDM  Last A1C 7.8 (8/23/22)  Low dose SSI w/ hypoglycemic protocols  FBG elevated- start Lantus 7 units 11/3  Home meds Dulaglutide, Glimepiride, Empagliflozin on hold    Essential HTN  Lisinopril discontinued, Entresto following heart cath  Metoprolol added by cardiology, monitor blood pressure    HLD  Continue home Atorvastatin    GERD  Continue Protonix  Smoking cessation  Patient states that he is currently attempting to quit smoking. Started Wellbutrin approximately 1 and half weeks ago  He has reduced his amount of smoking from 1 PPD to 0.5 PPD. Patient also states that he has not smoked since Thursday evening, 10/27  Continue home Wellbutrin  Do not give nicotine patch unless requested by patient  Generalized Anxiety Disorder  Not on any home meds for this per chart review    Hospital Course:  HPI Provided by Chart Review    \"Len Slater is a 63 y.o. male with PMHx of diabetes mellitus, HTN, HLD, GERD who presents to [facility] with shortness of breath. Patient states at approximately 8:30 PM on the date of initial presentation to the ED he began having shortness of breath while he was at rest watching TV. Patient states that the shortness of breath continued to progress which is why he decided to present to the ED. Patient states that he fractured his ribs on 10/5 due to falling in the shower and he has been \"taking it easy\". Patient denies any current pain from his rib fractures.   At time of examination patient states that he is barely short of breath, which he now attributes to some anxiety due to being hospitalized. Patient notes that the DuoNeb he received in the ED helped with his shortness of breath. Patient denies chest pain, palpitations, LE swelling, N/V/C/D. Patient states that he has no prior cardiac history or pulmonary history. Patient denies any fatigue. Patient did see his primary care doctor on 10/27, and due to mild pulmonary edema present on CXR, echo and stress test were ordered, however these have not been completed. Patient states that he is currently attempting to quit smoking, and he has been smoking for approximately 42 years. Patient states that he previously smoked 1 PPD. Patient was started on Wellbutrin approximately 1 and 1/2 weeks ago to aid in smoking cessation. Patient is that he currently smokes 0.5 PPD. Patient states that he has not smoked a cigarette since Thursday night, 10/27. Patient states that he currently drinks an average of 2 beers per day, however he states his last beer was approximately 3 to 4 weeks ago. Patient states that when he was younger he would drink alcohol heavily. Patient also reports excessive caffeine intake, stating he drinks approximately 4 cups of coffee per day and approximately 3 20 ounce bottles of diet Mountain Dew per day. Patient also reports tremors. Patient states that these have been present \"on and off\" for the past few years. Patient's wife is at bedside and states that she has noticed the tremors to be worsened on day of admission. Patient states this is due to anxiety from hospitalization and DuoNeb that he received in the ED. ED course: Initial vitals include /91, heart rate 107, respiratory rate 16, temperature 97.7 °F, SPO2 97% on room air. Initial labs include creatinine 1.3, proBNP 8508.0, troponin 0.039. CXR shows mild to moderate pulmonary edema. CTA chest shows no pulmonary embolism, moderate pulmonary edema. Patient was given 1 L NS bolus, aspirin 324 mg, Lasix 20 mg IV in the ED. Patient will be admitted to [place] [id number] for further management of new onset congestive heart failure. \"    10/30/22  Cardiology following  Plan for cath tomorrow    10/31/22  Patient cath postponed to tomorrow  Nephrology following  Hold diuretics    11/01/22  Patient underwent heart cath  Urology consulted for gross hematuria    (11/2)  Patient was resting comfortably in bed upon entering the room. He is complaining of no symptoms and states he feels much better. Denies pain, chest pain, palpitations, shortness of breath, numbness or tingling, nausea or vomiting, bowel irregularities, difficulty w/ ADLs, or any new symptoms. Currently has indwelling crum w/ 500 mL of dark yellow urine. (11/3)  Patient was resting comfortably in bed upon entering the room. He is not having any pain and has no complaints. Denies pain, chest pain, palpitations, shortness of breath, numbness or tingling, nausea or vomiting, bowel or urinary irregularities, difficulty with ADLs, or any new symptoms. Currently has indwelling crum w/ 400mL of yellow urine. 11/4  No changes overnight .  PET myocardial study scheduled for today   FBG much improved today with lantus 7U          Medications:    Infusion Medications    sodium chloride 1,000 mL (11/03/22 1324)    [Held by provider] sodium chloride 100 mL/hr at 11/01/22 0230    sodium chloride      dextrose       Scheduled Medications    insulin glargine  7 Units SubCUTAneous Nightly    furosemide  40 mg IntraVENous Daily    aspirin  325 mg Oral Once    diphenhydrAMINE  50 mg IntraVENous Once    hydrocortisone sodium succinate PF  200 mg IntraVENous Once    tamsulosin  0.4 mg Oral Daily    atorvastatin  40 mg Oral Daily    buPROPion  150 mg Oral QAM    pantoprazole  40 mg Oral QAM AC    sodium chloride flush  5-40 mL IntraVENous 2 times per day    enoxaparin  40 mg SubCUTAneous Daily    insulin lispro  0-4 Units SubCUTAneous TID WC    insulin lispro  0-4 Units SubCUTAneous Nightly ipratropium-albuterol  2 ampule Inhalation Once    aspirin  81 mg Oral Daily    metoprolol succinate  25 mg Oral Daily     PRN Meds: nitroGLYCERIN, acetaminophen, lidocaine, sodium chloride flush, sodium chloride, ondansetron **OR** ondansetron, polyethylene glycol, [DISCONTINUED] acetaminophen **OR** acetaminophen, glucose, dextrose bolus **OR** dextrose bolus, glucagon (rDNA), dextrose, ipratropium-albuterol      Intake/Output Summary (Last 24 hours) at 11/4/2022 0734  Last data filed at 11/4/2022 0358  Gross per 24 hour   Intake --   Output 2750 ml   Net -2750 ml         Diet:  ADULT DIET; Regular    Review of Systems   Respiratory:  Negative for shortness of breath. Cardiovascular:  Negative for chest pain. Gastrointestinal:  Negative for vomiting. Exam:  /65   Pulse 78   Temp 98.4 °F (36.9 °C) (Oral)   Resp 16   Ht 5' 10\" (1.778 m)   Wt 142 lb (64.4 kg)   SpO2 96%   BMI 20.37 kg/m²        Constitutional:       Appearance: Normal appearance. HENT:      Head: Normocephalic and atraumatic. Eyes:      Extraocular Movements: Extraocular movements intact. Conjunctiva/sclera: Conjunctivae normal.   Cardiovascular:      Rate and Rhythm: Normal rate and regular rhythm. Pulses: Normal pulses. Heart sounds: Normal heart sounds. Pulmonary:      Effort: Pulmonary effort is normal. He is not intubated. Breath sounds: Normal breath sounds. Abdominal:      General: Bowel sounds are normal.      Palpations: Abdomen is soft. Genitourinary:     Comments: Bella collection container with yellow urine  Musculoskeletal:         General: Normal range of motion. Cervical back: Normal range of motion. Skin:     General: Skin is warm and dry. Capillary Refill: Capillary refill takes less than 2 seconds. Neurological:      General: No focal deficit present. Mental Status: He is alert and oriented to person, place, and time.    Psychiatric:         Mood and Affect: Mood normal.         Speech: He is communicative. Labs:   Recent Labs     11/03/22  0832 11/04/22  0358   WBC 7.6 8.1   HGB 11.5* 11.6*   HCT 36.2* 37.0*    262       Recent Labs     11/02/22  0603 11/03/22  0832 11/04/22  0358    136 138   K 4.2 4.4 4.1    97* 100   CO2 25 28 27   BUN 25* 23* 24*   CREATININE 1.6* 1.4* 1.5*   CALCIUM 9.0 9.2 8.5       No results for input(s): AST, ALT, BILIDIR, BILITOT, ALKPHOS in the last 72 hours. No results for input(s): INR in the last 72 hours. No results for input(s): [place] Deisy in the last 72 hours. Urinalysis:      Lab Results   Component Value Date/Time    NITRU NEGATIVE 10/31/2022 07:48 PM    WBCUA 2-4 10/31/2022 07:48 PM    BACTERIA NONE SEEN 10/31/2022 07:48 PM    RBCUA 0-2 10/31/2022 07:48 PM    BLOODU NEGATIVE 10/31/2022 07:48 PM    SPECGRAV 1.012 10/31/2022 07:48 PM       Radiology:  CT ABDOMEN PELVIS WO CONTRAST   Final Result      Mild bilateral hydronephrosis. There is bilateral renal contrast excretion    from prior contrast administration limiting evaluation for renal stones. Marked mural thickening of the urinary bladder. The differential diagnosis    includes cystitis and neoplasm. Moderate amount of retained stool. Partially visualized lower thorax shows moderate sized bilateral pleural    effusions with atelectasis. This document has been electronically signed by: Jeff Riggins MD on    11/02/2022 03:23 AM      All CTs at this facility use dose modulation techniques and iterative    reconstructions, and/or weight-based dosing   when appropriate to reduce radiation to a low as reasonably achievable. US RENAL COMPLETE   Final Result   1. Bilateral hydronephrosis. 2. Markedly distended urinary bladder with a large amount of postvoid residual urine.       Final report electronically signed by Dr. Troy Mosquera on 10/31/2022 1:13 PM      CTA CHEST W WO CONTRAST   Final Result   Impression:   No pulmonary embolism. Moderate pulmonary edema. Interval worsening since the prior study on    10/5/22. This document has been electronically signed by: Kaitlyn Pal. Tanesha Rogers MD    on 10/28/2022 11:51 PM      All CTs at this facility use dose modulation techniques and iterative    reconstructions, and/or weight-based dosing   when appropriate to reduce radiation to a low as reasonably achievable. 3D Post-processing was performed on this study. XR CHEST PORTABLE   Final Result   Impression:   Mild to moderate pulmonary edema. This document has been electronically signed by: Kaitlyn Pal. Tanesha Rogers MD    on 10/28/2022 11:15 PM      PET MYOCARDIAL VIABILITY    (Results Pending)       Diet: ADULT DIET;  Regular    DVT prophylaxis: [x] Lovenox                                 [] SCDs                                 [] SQ Heparin                                 [] Encourage ambulation           [] Already on Anticoagulation     Disposition:    [x] Home       [] TCU       [] Rehab       [] Psych       [] SNF       [] [place]       [] Other-    Code Status: Full Code    PT/OT Eval:         Electronically signed by Lydia Armenta PA-C on 11/4/2022 at 7:34 AM

## 2022-11-05 PROBLEM — F10.10 ETOH ABUSE: Status: ACTIVE | Noted: 2022-11-05

## 2022-11-05 PROBLEM — I25.5 ISCHEMIC CARDIOMYOPATHY: Status: ACTIVE | Noted: 2022-11-05

## 2022-11-05 PROBLEM — Z98.890 S/P CARDIAC CATH: Status: ACTIVE | Noted: 2022-11-05

## 2022-11-05 PROBLEM — I25.10 CAD, MULTIPLE VESSEL: Status: ACTIVE | Noted: 2022-11-05

## 2022-11-05 LAB
ANION GAP SERPL CALCULATED.3IONS-SCNC: 9 MEQ/L (ref 8–16)
BUN BLDV-MCNC: 23 MG/DL (ref 7–22)
CALCIUM SERPL-MCNC: 8.8 MG/DL (ref 8.5–10.5)
CHLORIDE BLD-SCNC: 101 MEQ/L (ref 98–111)
CO2: 28 MEQ/L (ref 23–33)
CREAT SERPL-MCNC: 1.4 MG/DL (ref 0.4–1.2)
ERYTHROCYTE [DISTWIDTH] IN BLOOD BY AUTOMATED COUNT: 12.8 % (ref 11.5–14.5)
ERYTHROCYTE [DISTWIDTH] IN BLOOD BY AUTOMATED COUNT: 41.4 FL (ref 35–45)
GFR SERPL CREATININE-BSD FRML MDRD: 57 ML/MIN/1.73M2
GLUCOSE BLD-MCNC: 100 MG/DL (ref 70–108)
GLUCOSE BLD-MCNC: 167 MG/DL (ref 70–108)
GLUCOSE BLD-MCNC: 184 MG/DL (ref 70–108)
GLUCOSE BLD-MCNC: 227 MG/DL (ref 70–108)
GLUCOSE BLD-MCNC: 92 MG/DL (ref 70–108)
HCT VFR BLD CALC: 34.3 % (ref 42–52)
HEMOGLOBIN: 10.8 GM/DL (ref 14–18)
MCH RBC QN AUTO: 27.8 PG (ref 26–33)
MCHC RBC AUTO-ENTMCNC: 31.5 GM/DL (ref 32.2–35.5)
MCV RBC AUTO: 88.4 FL (ref 80–94)
PLATELET # BLD: 256 THOU/MM3 (ref 130–400)
PMV BLD AUTO: 10.6 FL (ref 9.4–12.4)
POTASSIUM SERPL-SCNC: 4.2 MEQ/L (ref 3.5–5.2)
RBC # BLD: 3.88 MILL/MM3 (ref 4.7–6.1)
SODIUM BLD-SCNC: 138 MEQ/L (ref 135–145)
WBC # BLD: 7.6 THOU/MM3 (ref 4.8–10.8)

## 2022-11-05 PROCEDURE — 36415 COLL VENOUS BLD VENIPUNCTURE: CPT

## 2022-11-05 PROCEDURE — 2580000003 HC RX 258: Performed by: STUDENT IN AN ORGANIZED HEALTH CARE EDUCATION/TRAINING PROGRAM

## 2022-11-05 PROCEDURE — 85027 COMPLETE CBC AUTOMATED: CPT

## 2022-11-05 PROCEDURE — 1200000000 HC SEMI PRIVATE

## 2022-11-05 PROCEDURE — 6370000000 HC RX 637 (ALT 250 FOR IP): Performed by: INTERNAL MEDICINE

## 2022-11-05 PROCEDURE — 6360000002 HC RX W HCPCS: Performed by: STUDENT IN AN ORGANIZED HEALTH CARE EDUCATION/TRAINING PROGRAM

## 2022-11-05 PROCEDURE — 99232 SBSQ HOSP IP/OBS MODERATE 35: CPT | Performed by: INTERNAL MEDICINE

## 2022-11-05 PROCEDURE — 6370000000 HC RX 637 (ALT 250 FOR IP): Performed by: NURSE PRACTITIONER

## 2022-11-05 PROCEDURE — 99233 SBSQ HOSP IP/OBS HIGH 50: CPT | Performed by: THORACIC SURGERY (CARDIOTHORACIC VASCULAR SURGERY)

## 2022-11-05 PROCEDURE — 6370000000 HC RX 637 (ALT 250 FOR IP): Performed by: PHYSICIAN ASSISTANT

## 2022-11-05 PROCEDURE — 99232 SBSQ HOSP IP/OBS MODERATE 35: CPT | Performed by: NURSE PRACTITIONER

## 2022-11-05 PROCEDURE — 6370000000 HC RX 637 (ALT 250 FOR IP): Performed by: STUDENT IN AN ORGANIZED HEALTH CARE EDUCATION/TRAINING PROGRAM

## 2022-11-05 PROCEDURE — 6360000002 HC RX W HCPCS

## 2022-11-05 PROCEDURE — 99232 SBSQ HOSP IP/OBS MODERATE 35: CPT | Performed by: PHYSICIAN ASSISTANT

## 2022-11-05 PROCEDURE — 80048 BASIC METABOLIC PNL TOTAL CA: CPT

## 2022-11-05 PROCEDURE — 82948 REAGENT STRIP/BLOOD GLUCOSE: CPT

## 2022-11-05 RX ORDER — SPIRONOLACTONE 25 MG/1
25 TABLET ORAL DAILY
Status: DISCONTINUED | OUTPATIENT
Start: 2022-11-05 | End: 2022-11-06

## 2022-11-05 RX ADMIN — ASPIRIN 81 MG 81 MG: 81 TABLET ORAL at 08:47

## 2022-11-05 RX ADMIN — PANTOPRAZOLE SODIUM 40 MG: 40 TABLET, DELAYED RELEASE ORAL at 05:49

## 2022-11-05 RX ADMIN — FUROSEMIDE 40 MG: 10 INJECTION, SOLUTION INTRAMUSCULAR; INTRAVENOUS at 08:47

## 2022-11-05 RX ADMIN — TAMSULOSIN HYDROCHLORIDE 0.4 MG: 0.4 CAPSULE ORAL at 08:46

## 2022-11-05 RX ADMIN — SODIUM CHLORIDE, PRESERVATIVE FREE 10 ML: 5 INJECTION INTRAVENOUS at 20:43

## 2022-11-05 RX ADMIN — ATORVASTATIN CALCIUM 40 MG: 40 TABLET, FILM COATED ORAL at 08:46

## 2022-11-05 RX ADMIN — INSULIN LISPRO 1 UNITS: 100 INJECTION, SOLUTION INTRAVENOUS; SUBCUTANEOUS at 12:13

## 2022-11-05 RX ADMIN — ENOXAPARIN SODIUM 40 MG: 100 INJECTION SUBCUTANEOUS at 08:47

## 2022-11-05 RX ADMIN — INSULIN GLARGINE 7 UNITS: 100 INJECTION, SOLUTION SUBCUTANEOUS at 20:41

## 2022-11-05 RX ADMIN — BUPROPION HYDROCHLORIDE 150 MG: 150 TABLET, FILM COATED, EXTENDED RELEASE ORAL at 08:46

## 2022-11-05 RX ADMIN — SPIRONOLACTONE 25 MG: 25 TABLET ORAL at 16:06

## 2022-11-05 ASSESSMENT — ENCOUNTER SYMPTOMS
VOMITING: 0
SHORTNESS OF BREATH: 0

## 2022-11-05 ASSESSMENT — PAIN SCALES - GENERAL: PAINLEVEL_OUTOF10: 0

## 2022-11-05 NOTE — PROGRESS NOTES
Cardiovascular Surgery Progress Note    Case discussed with Dr. Tiffanie Pablo  Patient considered to be poor candidate for PCI due to ostial LAD disease, with risk of occluding large RI and Cx  Plan off-pump CABG w/ IABP Monday  Will discuss with patient

## 2022-11-05 NOTE — PROGRESS NOTES
Hospitalist Progress Note    Patient:  Isacc Key      Unit/Bed:6K-17/017-A    YOB: 1960    MRN: [id number]       Acct: [de-identified]     PCP: BLACK Andrade CNP    Date of Admission: 10/28/2022    Assessment/Plan:    Acute CHF Exacerbation  Pro-BNP 8508 (10/28) 3862 (11/1)  Serial Troponins stable ~0.035  CXR (10/28) - Accesory azygos fissure, interstitial prominence w/ faint bilateral central opacity, Mod sized L pleural effusion, Mild to Mod pulmonary edema  Echo (10/29) - EF 25-30%,   Heart Cath (11/1)  20-30% stenosis in R common iliac artery, severe global hypokinesis w/ akineic movement of anterior wall, EF 15-20%, L main coronary artery 10-20% stenosis, Ramus intermedius had 90-95% stenosis, Ostium of LAD has 90% stenosis, D1 w/ 99% subtotal occlusion, Mid RCA 40-50% stenosis  Severe ischemic cardiomyopathy w/ EF 15-20% on L ventriculogram, Acute systolic CHF Left ventricular end-diastolic pressure 31 mmHg, Severe coronary artery disease involving ostium of LAD as well as first diagonal branch and ramus intermedius artery  CTA (10/28) - No PEs, Mod pulmonary edema  Cardiology consulted  2 L fluid restriction and 2 gm sodium diet  Life vest for DC - ordered  Referral for CHF clinic - ordered  Cardiovascular surgery consult  Following  Recommending CABG in 1 week following resolution of current exacerbation  PET myocardial viability study completed 11/4 showing a large area of anterior LV with suspected non viability- awaiting further recs from [facility]  Continue ASA, Atorvastatin, Metoprolol  Entresto after heart cath    Mod sized Bilateral Pleural Effusions w/ Atelectasis  Likely secondary to #1  Order Incentive Spirometry & Acapella  CKD Stage 3 w/ LC- resolved  Baseline 1.4- currently 1.4  Renal US (10/31)  Bilateral hydronephrosis  Markedly distended bladder w/ large amount of postvoid residual urine  CT A/P wo Contrast (11/2)  Mild bilateral hydronephrosis, Marked mural thickening of bladder, Moderate constipation, Mod sized bilateral pleural effusions w/ atelectasis  Nephrology following  Avoid Renal toxic agents    Urinary Retention with Gross Hematuria  Urology consulted  [id number] to continue anticoagulation  Hematuria likely secondary to traumatic crum insertion  Discharge with follow and follow up OP for cystoscopy  Signed off  Crum catheter placed  Flomax started (11/1)    NIDDM  Last A1C 7.8 (8/23/22)  Low dose SSI w/ hypoglycemic protocols  FBG elevated- start Lantus 7 units 11/3- FBG good since  Home meds Dulaglutide, Glimepiride, Empagliflozin on hold    Essential HTN  Lisinopril discontinued, Entresto following heart cath  Metoprolol added by cardiology, monitor blood pressure    HLD  Continue home Atorvastatin    GERD  Continue Protonix  Smoking cessation  Patient states that he is currently attempting to quit smoking. Started Wellbutrin approximately 1 and half weeks ago  He has reduced his amount of smoking from 1 PPD to 0.5 PPD. Patient also states that he has not smoked since Thursday evening, 10/27  Continue home Wellbutrin  Do not give nicotine patch unless requested by patient  Generalized Anxiety Disorder  Not on any home meds for this per chart review    Hospital Course:  HPI Provided by Chart Review    \"Len Slater is a 63 y.o. male with PMHx of diabetes mellitus, HTN, HLD, GERD who presents to [address] [place] [address] with shortness of breath. Patient states at approximately 8:30 PM on the date of initial presentation to the ED he began having shortness of breath while he was at rest watching TV. Patient states that the shortness of breath continued to progress which is why he decided to present to the ED. Patient states that he fractured his ribs on 10/5 due to falling in the shower and he has been \"taking it easy\". Patient denies any current pain from his rib fractures.   At time of examination patient states that he is barely short of breath, which he now attributes to some anxiety due to being hospitalized. Patient notes that the DuoNeb he received in the ED helped with his shortness of breath. Patient denies chest pain, palpitations, LE swelling, N/V/C/D. Patient states that he has no prior cardiac history or pulmonary history. Patient denies any fatigue. Patient did see his primary care doctor on 10/27, and due to mild pulmonary edema present on CXR, echo and stress test were ordered, however these have not been completed. Patient states that he is currently attempting to quit smoking, and he has been smoking for approximately 42 years. Patient states that he previously smoked 1 PPD. Patient was started on Wellbutrin approximately 1 and 1/2 weeks ago to aid in smoking cessation. Patient is that he currently smokes 0.5 PPD. Patient states that he has not smoked a cigarette since Thursday night, 10/27. Patient states that he currently drinks an average of 2 beers per day, however he states his last beer was approximately 3 to 4 weeks ago. Patient states that when he was younger he would drink alcohol heavily. Patient also reports excessive caffeine intake, stating he drinks approximately 4 cups of coffee per day and approximately 3 20 ounce bottles of diet Mountain Dew per day. Patient also reports tremors. Patient states that these have been present \"on and off\" for the past few years. Patient's wife is at bedside and states that she has noticed the tremors to be worsened on day of admission. Patient states this is due to anxiety from hospitalization and DuoNeb that he received in the ED. ED course: Initial vitals include /91, heart rate 107, respiratory rate 16, temperature 97.7 °F, SPO2 97% on room air. Initial labs include creatinine 1.3, proBNP 8508.0, troponin 0.039. CXR shows mild to moderate pulmonary edema. CTA chest shows no pulmonary embolism, moderate pulmonary edema.   Patient was given 1 L NS bolus, aspirin 324 mg, Lasix 20 mg IV in the ED. Patient will be admitted to [place] [address] for further management of new onset congestive heart failure. \"    10/30/22  Cardiology following  Plan for cath tomorrow    10/31/22  Patient cath postponed to tomorrow  Nephrology following  Hold diuretics    11/01/22  Patient underwent heart cath  Urology consulted for gross hematuria    (11/2)  Patient was resting comfortably in bed upon entering the room. He is complaining of no symptoms and states he feels much better. Denies pain, chest pain, palpitations, shortness of breath, numbness or tingling, nausea or vomiting, bowel irregularities, difficulty w/ ADLs, or any new symptoms. Currently has indwelling crum w/ 500 mL of dark yellow urine. (11/3)  Patient was resting comfortably in bed upon entering the room. He is not having any pain and has no complaints. Denies pain, chest pain, palpitations, shortness of breath, numbness or tingling, nausea or vomiting, bowel or urinary irregularities, difficulty with ADLs, or any new symptoms. Currently has indwelling crum w/ 400mL of yellow urine. 11/4  No changes overnight . PET myocardial study scheduled for today   FBG much improved today with lantus 7U    11/5  Labs look good- CR at baseline. FBG within range.    Myocardial study with evidence of non viable aspect of anterior LV   VSS          Medications:    Infusion Medications    sodium chloride 50 mL/hr at 11/04/22 1800    [Held by provider] sodium chloride 100 mL/hr at 11/01/22 0230    sodium chloride      dextrose       Scheduled Medications    insulin glargine  7 Units SubCUTAneous Nightly    furosemide  40 mg IntraVENous Daily    aspirin  325 mg Oral Once    diphenhydrAMINE  50 mg IntraVENous Once    tamsulosin  0.4 mg Oral Daily    atorvastatin  40 mg Oral Daily    buPROPion  150 mg Oral QAM    pantoprazole  40 mg Oral QAM AC    sodium chloride flush  5-40 mL IntraVENous 2 times per day    enoxaparin  40 mg SubCUTAneous Daily insulin lispro  0-4 Units SubCUTAneous TID     insulin lispro  0-4 Units SubCUTAneous Nightly    ipratropium-albuterol  2 ampule Inhalation Once    aspirin  81 mg Oral Daily    metoprolol succinate  25 mg Oral Daily     PRN Meds: nitroGLYCERIN, acetaminophen, lidocaine, sodium chloride flush, sodium chloride, ondansetron **OR** ondansetron, polyethylene glycol, [DISCONTINUED] acetaminophen **OR** acetaminophen, glucose, dextrose bolus **OR** dextrose bolus, glucagon (rDNA), dextrose, ipratropium-albuterol      Intake/Output Summary (Last 24 hours) at 11/5/2022 0932  Last data filed at 11/5/2022 0519  Gross per 24 hour   Intake 1358.47 ml   Output 3100 ml   Net -1741.53 ml         Diet:  ADULT DIET; Regular    Review of Systems   Respiratory:  Negative for shortness of breath. Cardiovascular:  Negative for chest pain. Gastrointestinal:  Negative for vomiting. Exam:  /65   Pulse 81   Temp 98.3 °F (36.8 °C) (Oral)   Resp 16   Ht 5' 10\" (1.778 m)   Wt 147 lb 11.3 oz (67 kg)   SpO2 100%   BMI 21.19 kg/m²        Constitutional:       Appearance: Normal appearance. HENT:      Head: Normocephalic and atraumatic. Eyes:      Extraocular Movements: Extraocular movements intact. Conjunctiva/sclera: Conjunctivae normal.   Cardiovascular:      Rate and Rhythm: Normal rate and regular rhythm. Pulses: Normal pulses. Heart sounds: Normal heart sounds. Pulmonary:      Effort: Pulmonary effort is normal. He is not intubated. Breath sounds: Normal breath sounds. Abdominal:      General: Bowel sounds are normal.      Palpations: Abdomen is soft. Genitourinary:     Comments: Bella collection container with yellow urine  Musculoskeletal:         General: Normal range of motion. Cervical back: Normal range of motion. Skin:     General: Skin is warm and dry. Capillary Refill: Capillary refill takes less than 2 seconds.    Neurological:      General: No focal deficit present. Mental Status: He is alert and oriented to person, place, and time. Psychiatric:         Mood and Affect: Mood normal.         Speech: He is communicative. Labs:   Recent Labs     11/03/22  0832 11/04/22  0358 11/05/22  0649   WBC 7.6 8.1 7.6   HGB 11.5* 11.6* 10.8*   HCT 36.2* 37.0* 34.3*    262 256       Recent Labs     11/03/22  0832 11/04/22  0358 11/05/22  0649    138 138   K 4.4 4.1 4.2   CL 97* 100 101   CO2 28 27 28   BUN 23* 24* 23*   CREATININE 1.4* 1.5* 1.4*   CALCIUM 9.2 8.5 8.8       No results for input(s): AST, ALT, BILIDIR, BILITOT, ALKPHOS in the last 72 hours. No results for input(s): INR in the last 72 hours. No results for input(s): [place] in the last 72 hours. Urinalysis:      Lab Results   Component Value Date/Time    NITRU NEGATIVE 10/31/2022 07:48 PM    WBCUA 2-4 10/31/2022 07:48 PM    BACTERIA NONE SEEN 10/31/2022 07:48 PM    RBCUA 0-2 10/31/2022 07:48 PM    BLOODU NEGATIVE 10/31/2022 07:48 PM    SPECGRAV 1.012 10/31/2022 07:48 PM       Radiology:  PET MYOCARDIAL VIABILITY   Final Result      Absent activity at the anterior left ventricular wall extending into the apex, likely nonviable myocardium. Final report electronically signed by Dr. Bette Lozano on 11/4/2022 1:31 PM      CT ABDOMEN PELVIS WO CONTRAST   Final Result      Mild bilateral hydronephrosis. There is bilateral renal contrast excretion    from prior contrast administration limiting evaluation for renal stones. Marked mural thickening of the urinary bladder. The differential diagnosis    includes cystitis and neoplasm. Moderate amount of retained stool. Partially visualized lower thorax shows moderate sized bilateral pleural    effusions with atelectasis.       This document has been electronically signed by: Cecil Buerger, MD on    11/02/2022 03:23 AM      All CTs at this facility use dose modulation techniques and iterative    reconstructions, and/or weight-based dosing   when appropriate to reduce radiation to a low as reasonably achievable. US RENAL COMPLETE   Final Result   1. Bilateral hydronephrosis. 2. Markedly distended urinary bladder with a large amount of postvoid residual urine. Final report electronically signed by Dr. Joshua Johnson on 10/31/2022 1:13 PM      CTA CHEST W WO CONTRAST   Final Result   Impression:   No pulmonary embolism. Moderate pulmonary edema. Interval worsening since the prior study on    10/5/22. This document has been electronically signed by: Gaby Younger. Rachel Kan MD    on 10/28/2022 11:51 PM      All CTs at this facility use dose modulation techniques and iterative    reconstructions, and/or weight-based dosing   when appropriate to reduce radiation to a low as reasonably achievable. 3D Post-processing was performed on this study. XR CHEST PORTABLE   Final Result   Impression:   Mild to moderate pulmonary edema. This document has been electronically signed by: Gaby Younger. Rachel Kan MD    on 10/28/2022 11:15 PM          Diet: ADULT DIET;  Regular    DVT prophylaxis: [x] Lovenox                                 [] SCDs                                 [] SQ Heparin                                 [] Encourage ambulation           [] Already on Anticoagulation     Disposition:    [x] Home       [] TCU       [] Rehab       [] Psych       [] SNF       [] [place]       [] Other-    Code Status: Full Code    PT/OT Eval:         Electronically signed by Susan Trammell PA-C on 11/5/2022 at 9:32 AM

## 2022-11-05 NOTE — PROGRESS NOTES
[facility]   Nephrology progress note  11/5/2022, 2:51 PM      Pt Name:    Kym Sandoval  MRN:     [id number]     YOB: 1960  Admit Date:    10/28/2022  9:39 PM    Chief Complaint: Nephrology following for LC/CKD. Subjective:  Patient seen and examined. Feels well. No SOB. No edema. Good urine output. Objective:  24HR INTAKE/OUTPUT:    Intake/Output Summary (Last 24 hours) at 11/5/2022 1451  Last data filed at 11/5/2022 1107  Gross per 24 hour   Intake 758.47 ml   Output 5000 ml   Net -4241.53 ml      Admission weight: 150 lb (68 kg)  Wt Readings from Last 3 Encounters:   11/05/22 147 lb 11.3 oz (67 kg)   10/26/22 151 lb (68.5 kg)   10/12/22 140 lb 9.6 oz (63.8 kg)        Vitals :   Vitals:    11/04/22 2315 11/05/22 0352 11/05/22 0843 11/05/22 1136   BP: (!) 105/55 100/65 112/65 113/64   Pulse: 79 72 81 71   Resp: 16 16 16 16   Temp: 98 °F (36.7 °C) 98.1 °F (36.7 °C) 98.3 °F (36.8 °C) 97.5 °F (36.4 °C)   TempSrc: Oral Oral Oral Oral   SpO2:  95% 100% 98%   Weight:  147 lb 11.3 oz (67 kg)     Height:  5' 10\" (1.778 m)         Physical examination  General Appearance:  Well developed.  No distress  Mouth/Throat:  Oral mucosa moist  Neck:  Supple, no JVD  Lungs:  Breath sounds: clear  Heart[de-identified]  S1,S2 heard  Abdomen:  Soft, non - tender  Musculoskeletal:  Edema -no edema    Medications:  Infusion:    [Held by provider] sodium chloride 100 mL/hr at 11/01/22 0230    sodium chloride      dextrose       Meds:    spironolactone  25 mg Oral Daily    insulin glargine  7 Units SubCUTAneous Nightly    furosemide  40 mg IntraVENous Daily    aspirin  325 mg Oral Once    diphenhydrAMINE  50 mg IntraVENous Once    tamsulosin  0.4 mg Oral Daily    atorvastatin  40 mg Oral Daily    buPROPion  150 mg Oral QAM    pantoprazole  40 mg Oral QAM AC    sodium chloride flush  5-40 mL IntraVENous 2 times per day    enoxaparin  40 mg SubCUTAneous Daily    insulin lispro  0-4 Units SubCUTAneous TID WC insulin lispro  0-4 Units SubCUTAneous Nightly    ipratropium-albuterol  2 ampule Inhalation Once    aspirin  81 mg Oral Daily    metoprolol succinate  25 mg Oral Daily       Lab Data :  CBC:   Recent Labs     11/03/22  0832 11/04/22  0358 11/05/22  0649   WBC 7.6 8.1 7.6   HGB 11.5* 11.6* 10.8*   HCT 36.2* 37.0* 34.3*    262 256     CMP:  Recent Labs     11/03/22  0832 11/04/22  0358 11/05/22  0649    138 138   K 4.4 4.1 4.2   CL 97* 100 101   CO2 28 27 28   BUN 23* 24* 23*   CREATININE 1.4* 1.5* 1.4*   GLUCOSE 213* 148* 92   CALCIUM 9.2 8.5 8.8     Hepatic: No results for input(s): LABALBU, AST, ALT, ALB, BILITOT, ALKPHOS in the last 72 hours. Assessment and Plan:  Renal -CKD III: renal function appears at his baseline  -did have some mild LC d/t contrast nephropathy earlier in admission    Electrolytes - appear to be within normal limits  Essential hypertension, stable  Hx of diabetes mellitus uncontrolled  Systolic CHF, new onset. Diuresing well on iv lasix  Multivessel CAD  : ? PCI  Urinary retention -status post Bella, hematuria better following urology  Meds reviewed and discussed with patient. Nusrat Robins,   [facility]    This report has been created using voice recognition software.  It may contain minor errors which are inherent in voice recognition technology

## 2022-11-05 NOTE — PROGRESS NOTES
Cardiothoracic Surgery History & Physical        Patient:  Kimberly Velazco  YOB: 1960    MRN: [id number]     Acct: [de-identified]    Date of Admission: 10/28/2022    Interim History:  Patient stable, results and images of viability study personally reviewed    Past Medical History:          Diagnosis Date    Hypertension     Prediabetes        Past Surgical History:          Procedure Laterality Date    TONSILLECTOMY         Medications Prior to Admission:      Prior to Admission medications    Medication Sig Start Date End Date Taking? Authorizing Provider   tamsulosin (FLOMAX) 0.4 MG capsule Take 1 capsule by mouth daily 11/3/22  Yes BLACK Truong CNP   buPROPion (WELLBUTRIN XL) 150 MG extended release tablet Take 1 tablet by mouth every morning 10/12/22  Yes BLACK Newman CNP   omeprazole (PRILOSEC) 40 MG delayed release capsule Take 1 capsule by mouth daily 10/12/22  Yes BLACK Newman CNP   lisinopril (PRINIVIL;ZESTRIL) 20 MG tablet Take 1 tablet by mouth daily 6/28/22  Yes BLACK Newman CNP   glimepiride (AMARYL) 4 MG tablet Take 1 tablet by mouth every morning (before breakfast) 6/28/22  Yes BLACK Newman CNP   empagliflozin (JARDIANCE) 25 MG tablet Take 1 tablet by mouth daily 5/24/22  Yes BLACK Newman CNP   atorvastatin (LIPITOR) 40 MG tablet Take 1 tablet by mouth daily 5/24/22  Yes BLACK Newman CNP   acetaminophen (TYLENOL) 500 MG tablet Take 500 mg by mouth every 6 hours as needed for Pain As needed    Historical Provider, MD   Dulaglutide (TRULICITY) 1.5 HO/1.2MA SOPN Inject 1.5 mg into the skin once a week 8/18/22   BLACK Newman CNP       Allergies:  Metformin and related    Social History:      TOBACCO:   reports that he has been smoking cigarettes. He has a 16.50 pack-year smoking history. He has never used smokeless tobacco.  ETOH:   reports that he does not currently use alcohol.     Social History     Substance and Sexual Activity   Drug Use No         Family History:          Problem Relation Age of Onset    Diabetes Father     Stroke Father     Diabetes Brother     Diabetes Brother        PHYSICAL EXAM:    /65   Pulse 81   Temp 98.3 °F (36.8 °C) (Oral)   Resp 16   Ht 5' 10\" (1.778 m)   Wt 147 lb 11.3 oz (67 kg)   SpO2 100%   BMI 21.19 kg/m²     General appearance:  No apparent distress, appears stated age and cooperative. HEENT:  Normal cephalic, atraumatic without obvious deformity. Pupils equal, round, and reactive to light. Extra ocular muscles intact. Conjunctivae/corneas clear. Neck: Supple, with full range of motion. No jugular venous distention. Trachea midline. Respiratory:  Normal respiratory effort. Clear to auscultation, bilaterally without Rales/Wheezes/Rhonchi. Cardiovascular:  Regular rate and rhythm with normal S1/S2 without murmurs, rubs or gallops. Abdomen: Soft, non-tender, non-distended with normal bowel sounds. Musculoskeletal:  No clubbing, cyanosis or edema bilaterally. Full range of motion without deformity. Skin: Skin color, texture, turgor normal.  No rashes or lesions. Neurologic:  Neurovascularly intact without any focal sensory/motor deficits. Cranial nerves: II-XII intact, grossly non-focal.  Psychiatric:  Alert and oriented, thought content appropriate, normal insight  Capillary Refill: Brisk,< 3 seconds   Peripheral Pulses: +2 palpable, equal bilaterally       Labs:     Recent Labs     11/03/22  0832 11/04/22  0358 11/05/22  0649   WBC 7.6 8.1 7.6   HGB 11.5* 11.6* 10.8*   HCT 36.2* 37.0* 34.3*    262 256     Recent Labs     11/03/22  0832 11/04/22  0358 11/05/22  0649    138 138   K 4.4 4.1 4.2   CL 97* 100 101   CO2 28 27 28   BUN 23* 24* 23*   CREATININE 1.4* 1.5* 1.4*   CALCIUM 9.2 8.5 8.8     No results for input(s): AST, ALT, BILIDIR, BILITOT, ALKPHOS in the last 72 hours. No results for input(s): INR in the last 72 hours.   No results for input(s): [place] in the last 72 hours. Urinalysis:      Lab Results   Component Value Date/Time    NITRU NEGATIVE 10/31/2022 07:48 PM    WBCUA 2-4 10/31/2022 07:48 PM    BACTERIA NONE SEEN 10/31/2022 07:48 PM    RBCUA 0-2 10/31/2022 07:48 PM    BLOODU NEGATIVE 10/31/2022 07:48 PM    SPECGRAV 1.012 10/31/2022 07:48 PM         Code Status: Full Code      ASSESSMENT:    Active Hospital Problems    Diagnosis Date Noted    Nonischemic cardiomyopathy ([facility] [address] [I42.8] 10/31/2022     Priority: High    Tremor [R25.1] 11/04/2022     Priority: Medium    LC (acute kidney injury) ([facility] [address] [N17.9] 11/04/2022     Priority: Medium    Hyperlipidemia [E78.5] 11/04/2022     Priority: Medium    Gastroesophageal reflux disease [K21.9] 11/04/2022     Priority: Medium    Preoperative clearance [Z01.818] 10/31/2022     Priority: Medium    Stage 3a chronic kidney disease ([facility] [address] [N18.31] 10/31/2022     Priority: Medium    New onset of congestive heart failure ([facility] [address] [I50.9] 10/29/2022     Priority: Medium    Acute systolic congestive heart failure ([facility] [address] [I50.21] 10/29/2022     Priority: Medium    Hypertension [I10] 08/10/2021    [facility] [E11.9] 09/22/2013       PLAN:  Long discussion with patient and wife regarding results of viability study  Study shows absence of uptake anterolateral wall and nonbasilar septum--essentially the bulk of the distribution planned for revascularization  Moreover, echo shows apex to be thinned out--which portends poorly for recruitability  Balance of data would tend to favor PCI if feasible, with CABG as backup option  Have reached out to Dr. Jose Mccurdy to discuss  Final recommendations to follow  Patient and wife fully informed, and express understanding    Time spent with patient: 60 minutes, of which more than 50% was spent counseling/coordinating the patient's care.     Electronically signed by Shirley You MD on 11/5/2022 at 10:36 AM

## 2022-11-05 NOTE — PROGRESS NOTES
Cardiology Progress Note      Patient:  Kym Sandoval  YOB: 1960  MRN: [id number]   Acct: [de-identified]  Admit Date:  10/28/2022  Primary Cardiologist:  none  seen by dr. Jorge Corral    Per prior cardiology consult note-  Reason for Consultation:  CHF        History Of Present Illness:    63 y.o. pleasant male c hx of DM and HTN who presented to the hospital with complaints of shortness of breath. As per patient the shortness of breath started 1-2 days prior to presentation. It progressively gotten worsen so he decided to seek medication attention. Patient states that he used to drink alcohol regularly several beers for many years. He has been smoking 1 pack/day for many years recently has cut down to half a pack a day while being on Wellbutrin. He has had poorly controlled diabetes with A1c as high as 15 recently it was brought back down to 7.5. His laboratory work-up shows mildly elevated troponins with a flat trend. Creatinine is 1.4.  proBNP was elevated at 8508. His electrocardiogram shows sinus tachycardia at 105 bpm with nonspecific ST-T wave changes. His echocardiogram from today showed EF of 25-30% with large pleural effusion. Cardiology was consulted for acute CHF exacerbation    Subjective (Events in last 24 hours):   Viability scan : There is a large segment of absent activity at the anterior left ventricular wall wall which extends into the apex. Normal activity is present in remaining myocardium.       Diuresed 3.9L/ 24 hours    Pt w/out CP or sob   Up in room     VSS  tele SR no ectopy     Objective:   /64   Pulse 71   Temp 97.5 °F (36.4 °C) (Oral)   Resp 16   Ht 5' 10\" (1.778 m)   Wt 147 lb 11.3 oz (67 kg)   SpO2 98%   BMI 21.19 kg/m²        TELEMETRY: SR no ectopy       Physical Exam:  General Appearance: alert and oriented to person, place and time, in no acute distress  Cardiovascular: normal rate, regular rhythm, normal S1 and S2, no murmurs, rubs, clicks, or gallops, distal pulses intact,   Pulmonary/Chest: clear to auscultation bilaterally- no wheezes, rales or rhonchi, normal air movement, no respiratory distress  Abdomen: soft, non-tender, non-distended, normal bowel sounds, no masses Extremities: no cyanosis, clubbing or edema, pulses present    Skin: warm and dry, no rash or erythema  Musculoskeletal: normal range of motion, no joint swelling, deformity or tenderness  Neurological: alert, oriented, normal speech, no focal findings or movement disorder noted    Medications:    insulin glargine  7 Units SubCUTAneous Nightly    furosemide  40 mg IntraVENous Daily    aspirin  325 mg Oral Once    diphenhydrAMINE  50 mg IntraVENous Once    tamsulosin  0.4 mg Oral Daily    atorvastatin  40 mg Oral Daily    buPROPion  150 mg Oral QAM    pantoprazole  40 mg Oral QAM AC    sodium chloride flush  5-40 mL IntraVENous 2 times per day    enoxaparin  40 mg SubCUTAneous Daily    insulin lispro  0-4 Units SubCUTAneous TID WC    insulin lispro  0-4 Units SubCUTAneous Nightly    ipratropium-albuterol  2 ampule Inhalation Once    aspirin  81 mg Oral Daily    metoprolol succinate  25 mg Oral Daily      [Held by provider] sodium chloride 100 mL/hr at 11/01/22 0230    sodium chloride      dextrose       nitroGLYCERIN, 0.4 mg, Q5 Min PRN  acetaminophen, 650 mg, Q4H PRN  lidocaine, , PRN  sodium chloride flush, 5-40 mL, PRN  sodium chloride, , PRN  ondansetron, 4 mg, Q8H PRN   Or  ondansetron, 4 mg, Q6H PRN  polyethylene glycol, 17 g, Daily PRN  acetaminophen, 650 mg, Q6H PRN  glucose, 4 tablet, PRN  dextrose bolus, 125 mL, PRN   Or  dextrose bolus, 250 mL, PRN  glucagon (rDNA), 1 mg, PRN  dextrose, , Continuous PRN  ipratropium-albuterol, 1 ampule, Q4H PRN      Diagnostics:    Echo:  Electronically signed by Rick Hinds MD (Interpreting   physician) on 10/29/2022 at 01:59 PM   ----------------------------------------------------------------      Findings      Mitral Valve   The mitral valve structure is normal with normal leaflet separation. DOPPLER: The transmitral velocity was within the normal range with no   evidence for mitral stenosis. Mild mitral regurgitation is present. Aortic Valve   The aortic valve appears trileaflet with normal thickness and leaflet   excursion. DOPPLER: Transaortic velocity was within the normal range with   no evidence of aortic stenosis. There was no evidence of aortic   regurgitation. Tricuspid Valve   The tricuspid valve structure is normal with normal leaflet separation. DOPPLER: There is no evidence of tricuspid stenosis. Mild tricuspid regurgitation visualized. Pulmonic Valve   The pulmonic valve was not well visualized . Left Atrium   Left atrial size is normal.      Left Ventricle   Left ventricular size is normal and systolic function is severely reduced. Ejection fraction was estimated at 25-30%. LV wall thickness is within   normal limits. There was severe global hypokinesis of the left ventricle. Right Atrium   Right atrial size was normal.      Right Ventricle   The right ventricular size appears normal with normal systolic function   and wall thickness. Pericardial Effusion   The pericardium appears normal with no evidence of a pericardial effusion. Pleural Effusion   Large pleural effusion      Aorta / Great Vessels   -Aortic root dimension within normal limits. -IVC size is within normal limits with normal respiratory phasic changes. Left Heart Cath:   FINDINGS:  ABDOMINAL AORTOGRAM:  Distal abdominal aorta is patent without  significant stenosis or aneurysmal dilation. There is evidence for  20-30% stenosis in the right common iliac artery. Right external iliac  and internal iliac arteries are patent. No significant stenotic lesions  on the left common iliac, internal iliac and external iliac arteries.      LEFT VENTRICULOGRAM:  There is evidence for severe global hypokinesis  with akinetic movement of the anterior wall. Ejection fraction  estimated at 15-20%. HEMODYNAMICS:  Left ventricular end-diastolic pressure 31 mmHg. No  significant pressure gradient across the aortic valve upon pullback. CORONARY ANGIOGRAM:  1. The left main coronary artery has 10-20% stenosis in the distal  segment, otherwise patent without evidence for obstructive disease. Gives rise to left circumflex, left anterior descending and ramus  intermedius arteries. 2.  Ramus intermedius has 90-95% stenosis in the proximal segment. 3.  Left circumflex artery. Luminal irregularities were noticed without  evidence for obstructive disease. 4.  Left anterior descending artery. The ostium of the LAD has a 90%  stenosis. Proximal LAD with luminal irregularities, otherwise LAD is  patent with no significant stenotic lesions. D1 has 99% subtotal  occlusion. 5.  Right coronary artery. The proximal RCA has luminal irregularities. Mid RCA has 40-50% stenosis. Distal RCA is patent. DOMINANCE:  Codominant system. IMPRESSION:  1. Severe ischemic cardiomyopathy, ejection fraction 15-20% on left  ventriculogram.  2.  Acute systolic congestive heart failure. Left ventricular  end-diastolic pressure 31 mmHg. 3.  Severe coronary artery disease involving the ostium of the LAD,  first diagonal branch and the ramus intermedius artery. RECOMMENDATIONS:  Optimize the volume status. Resume Lasix. Stop IV  fluids. Daily weight. Strict intake and output. The patient has  chronic kidney disease, monitor daily basic metabolic panel, Nephrology  is following. Apparently, a Bella catheter was placed last night due to  concern for urinary retention. Hematuria was reported after Bella  placement. Urology consult is recommended. Monitor CBC. Continue on  aspirin and Lipitor. Monitor the patient on telemetry. Consult  Cardiovascular Surgery. Heart team discussion regarding  revascularization approach is warranted. abuse    CKD stage 3  Chronic anemia     DM II        Plan:   Will need to discuss with  if pt candidate for impella assisted PCI   Follow        CHF guidelines:  BB: yes  ACE / ARB/ entresto: no  Diuretics: yes  Aldactone: start today   Sanna Second: check with insurance - not done   Valente candidate: no     Electronically signed by BLACK Mohan CNP on 11/5/2022 at 2:14 PM

## 2022-11-05 NOTE — FLOWSHEET NOTE
Pt family responded to audio and stated no voiced concerns or complaints      11/05/22 6620   Safe Environment   Safety Measures Other (comment)  (vn safety check complete)

## 2022-11-05 NOTE — PROGRESS NOTES
Nurses please contact Xavier Walter from [facility] the day before patient planes to be discharged so she can come and set him up with the vest.   #[phone]

## 2022-11-05 NOTE — PLAN OF CARE
Problem: Discharge Planning  Goal: Discharge to home or other facility with appropriate resources  11/4/2022 2354 by Michael Garcia RN  Outcome: Progressing  Flowsheets (Taken 11/4/2022 2045)  Discharge to home or other facility with appropriate resources: Identify barriers to discharge with patient and caregiver  Note: Patient would like to return to his private home with wife at discharge. Problem: Skin/Tissue Integrity  Goal: Absence of new skin breakdown  Description: 1. Monitor for areas of redness and/or skin breakdown  2. Assess vascular access sites hourly  3. Every 4-6 hours minimum:  Change oxygen saturation probe site  4. Every 4-6 hours:  If on nasal continuous positive airway pressure, respiratory therapy assess nares and determine need for appliance change or resting period. 11/4/2022 2354 by Michael Garcia RN  Note: Patient free from skin breakdown. Patient turns self and makes frequent positional changes. Will continue to monitor. Problem: Safety - Adult  Goal: Free from fall injury  11/4/2022 2354 by Michael Garcia RN  Outcome: Progressing  Note: Call light in reach, bed in lowest position, and bed alarm activated. Education given on use of call light before ambulation and when in need of assistance. Patient expressed understanding but seemed a little agitated. Hourly visual checks performed and charted. Toileting offered to patient. No falls this shift, at any time. Arm band and falling star in place. Will continue to monitor. Problem: Pain  Goal: Verbalizes/displays adequate comfort level or baseline comfort level  11/4/2022 2354 by Michael Garcia RN  Note: Patient free from pain this shift. Pain rated on 0-10 pain rating scale. Will continue to reassess.       Problem: Metabolic/Fluid and Electrolytes - Adult  Goal: Glucose maintained within prescribed range  11/4/2022 2354 by Michael Garcia RN  Outcome: Progressing  Note: Patient's Glucose is being checked every 8 hours and as needed to check need for intervention.

## 2022-11-05 NOTE — PLAN OF CARE
Problem: Discharge Planning  Goal: Discharge to home or other facility with appropriate resources  11/5/2022 1232 by Mimi Braxton RN  Outcome: Progressing  Flowsheets (Taken 11/5/2022 6493)  Discharge to home or other facility with appropriate resources:   Identify barriers to discharge with patient and caregiver   Arrange for needed discharge resources and transportation as appropriate   Identify discharge learning needs (meds, wound care, etc)  Note: Patient plans to discharge home with wife once medically stable. 11/4/2022 2354 by Karthikeyan Mcdonald RN  Outcome: Progressing  Flowsheets (Taken 11/4/2022 2045)  Discharge to home or other facility with appropriate resources: Identify barriers to discharge with patient and caregiver  Note: Patient would like to return to his private home with wife at discharge. Problem: Skin/Tissue Integrity  Goal: Absence of new skin breakdown  Description: 1. Monitor for areas of redness and/or skin breakdown  2. Assess vascular access sites hourly  3. Every 4-6 hours minimum:  Change oxygen saturation probe site  4. Every 4-6 hours:  If on nasal continuous positive airway pressure, respiratory therapy assess nares and determine need for appliance change or resting period. 11/5/2022 1232 by Mimi Braxton RN  Outcome: Progressing  Note: No new skin break down noted at this time. Encouraged patient to reposition self in bed. 11/4/2022 2354 by Karthikeyan Mcdonald RN  Note: Patient free from skin breakdown. Patient turns self and makes frequent positional changes. Will continue to monitor. Problem: Safety - Adult  Goal: Free from fall injury  11/5/2022 1232 by Mimi Braxton RN  Outcome: Progressing  Flowsheets (Taken 10/30/2022 1902 by Vincenzo An RN)  Free From Fall Injury: Instruct family/caregiver on patient safety  Note: No falls noted this shift. Continue falling star program. Bed alarm on, bed in low position. Call light and personal belongings in reach. Patient uses call light appropriately. 11/4/2022 2354 by Juma Quinn RN  Outcome: Progressing  Note: Call light in reach, bed in lowest position, and bed alarm activated. Education given on use of call light before ambulation and when in need of assistance. Patient expressed understanding but seemed a little agitated. Hourly visual checks performed and charted. Toileting offered to patient. No falls this shift, at any time. Arm band and falling star in place. Will continue to monitor. Problem: Chronic Conditions and Co-morbidities  Goal: Patient's chronic conditions and co-morbidity symptoms are monitored and maintained or improved  Outcome: Progressing  Flowsheets (Taken 11/5/2022 0842)  Care Plan - Patient's Chronic Conditions and Co-Morbidity Symptoms are Monitored and Maintained or Improved:   Monitor and assess patient's chronic conditions and comorbid symptoms for stability, deterioration, or improvement   Collaborate with multidisciplinary team to address chronic and comorbid conditions and prevent exacerbation or deterioration   Update acute care plan with appropriate goals if chronic or comorbid symptoms are exacerbated and prevent overall improvement and discharge  Note: Diabetic patient     Problem: Pain  Goal: Verbalizes/displays adequate comfort level or baseline comfort level  11/5/2022 1232 by Aravind Woodward RN  Outcome: Progressing  Flowsheets (Taken 10/30/2022 1902 by Amalia Jama RN)  Verbalizes/displays adequate comfort level or baseline comfort level:   Encourage patient to monitor pain and request assistance   Assess pain using appropriate pain scale   Implement non-pharmacological measures as appropriate and evaluate response   Administer analgesics based on type and severity of pain and evaluate response  Note: No complaint of pain voiced at this time. Continue to monitor. PRN medications available if needed.    11/4/2022 2354 by Juma Quinn RN  Note: Patient free from pain this shift. Pain rated on 0-10 pain rating scale. Will continue to reassess. Problem: Metabolic/Fluid and Electrolytes - Adult  Goal: Electrolytes maintained within normal limits  Outcome: Progressing  Flowsheets (Taken 11/5/2022 0842)  Electrolytes maintained within normal limits:   Monitor labs and assess patient for signs and symptoms of electrolyte imbalances   Administer electrolyte replacement as ordered   Monitor response to electrolyte replacements, including repeat lab results as appropriate  Goal: Hemodynamic stability and optimal renal function maintained  Outcome: Progressing  Flowsheets (Taken 11/5/2022 0842)  Hemodynamic stability and optimal renal function maintained:   Monitor labs and assess for signs and symptoms of volume excess or deficit   Monitor intake, output and patient weight   Monitor urine specific gravity, serum osmolarity and serum sodium as indicated or ordered  Goal: Glucose maintained within prescribed range  11/5/2022 1232 by Dudley Mendoza RN  Outcome: Progressing  Flowsheets (Taken 11/5/2022 4284)  Glucose maintained within prescribed range:   Monitor blood glucose as ordered   Assess for signs and symptoms of hyperglycemia and hypoglycemia   Administer ordered medications to maintain glucose within target range  11/4/2022 2354 by Selena Martinez RN  Outcome: Progressing  Note: Patient's Glucose is being checked every 8 hours and as needed to check need for intervention. Problem: Hematologic - Adult  Goal: Maintains hematologic stability  Outcome: Progressing  Flowsheets (Taken 11/5/2022 0842)  Maintains hematologic stability:   Assess for signs and symptoms of bleeding or hemorrhage   Monitor labs for bleeding or clotting disorders     Care plan reviewed with patient. Patient verbalizes understanding of plan of care and contributes to goal setting.

## 2022-11-06 ENCOUNTER — APPOINTMENT (OUTPATIENT)
Dept: INTERVENTIONAL RADIOLOGY/VASCULAR | Age: 62
DRG: 233 | End: 2022-11-06
Payer: COMMERCIAL

## 2022-11-06 LAB
ABO: NORMAL
ANION GAP SERPL CALCULATED.3IONS-SCNC: 12 MEQ/L (ref 8–16)
ANTIBODY SCREEN: NORMAL
BACTERIA: ABNORMAL
BILIRUBIN URINE: NEGATIVE
BLOOD, URINE: ABNORMAL
BUN BLDV-MCNC: 25 MG/DL (ref 7–22)
CALCIUM SERPL-MCNC: 9.2 MG/DL (ref 8.5–10.5)
CASTS: ABNORMAL /LPF
CASTS: ABNORMAL /LPF
CHARACTER, URINE: CLEAR
CHLORIDE BLD-SCNC: 95 MEQ/L (ref 98–111)
CO2: 26 MEQ/L (ref 23–33)
COLOR: YELLOW
CREAT SERPL-MCNC: 1.4 MG/DL (ref 0.4–1.2)
CRYSTALS: ABNORMAL
EPITHELIAL CELLS, UA: ABNORMAL /HPF
ERYTHROCYTE [DISTWIDTH] IN BLOOD BY AUTOMATED COUNT: 12.7 % (ref 11.5–14.5)
ERYTHROCYTE [DISTWIDTH] IN BLOOD BY AUTOMATED COUNT: 41 FL (ref 35–45)
GFR SERPL CREATININE-BSD FRML MDRD: 57 ML/MIN/1.73M2
GLUCOSE BLD-MCNC: 145 MG/DL (ref 70–108)
GLUCOSE BLD-MCNC: 190 MG/DL (ref 70–108)
GLUCOSE BLD-MCNC: 259 MG/DL (ref 70–108)
GLUCOSE BLD-MCNC: 268 MG/DL (ref 70–108)
GLUCOSE BLD-MCNC: 316 MG/DL (ref 70–108)
GLUCOSE, URINE: NEGATIVE MG/DL
HCT VFR BLD CALC: 38.4 % (ref 42–52)
HEMOGLOBIN: 12.1 GM/DL (ref 14–18)
KETONES, URINE: NEGATIVE
LEUKOCYTE EST, POC: ABNORMAL
MCH RBC QN AUTO: 27.9 PG (ref 26–33)
MCHC RBC AUTO-ENTMCNC: 31.5 GM/DL (ref 32.2–35.5)
MCV RBC AUTO: 88.5 FL (ref 80–94)
MISCELLANEOUS LAB TEST RESULT: ABNORMAL
NITRITE, URINE: NEGATIVE
PH UA: 6.5 (ref 5–9)
PLATELET # BLD: 280 THOU/MM3 (ref 130–400)
PMV BLD AUTO: 11 FL (ref 9.4–12.4)
POTASSIUM SERPL-SCNC: 4.5 MEQ/L (ref 3.5–5.2)
PROTEIN UA: 300 MG/DL
RBC # BLD: 4.34 MILL/MM3 (ref 4.7–6.1)
RBC URINE: > 200 /HPF
RENAL EPITHELIAL, UA: ABNORMAL
RH FACTOR: NORMAL
SODIUM BLD-SCNC: 133 MEQ/L (ref 135–145)
SPECIFIC GRAVITY UA: 1.01 (ref 1–1.03)
UROBILINOGEN, URINE: 0.2 EU/DL (ref 0–1)
WBC # BLD: 7.3 THOU/MM3 (ref 4.8–10.8)
WBC UA: ABNORMAL /HPF
YEAST: ABNORMAL

## 2022-11-06 PROCEDURE — P9016 RBC LEUKOCYTES REDUCED: HCPCS

## 2022-11-06 PROCEDURE — 6370000000 HC RX 637 (ALT 250 FOR IP): Performed by: THORACIC SURGERY (CARDIOTHORACIC VASCULAR SURGERY)

## 2022-11-06 PROCEDURE — 99232 SBSQ HOSP IP/OBS MODERATE 35: CPT | Performed by: INTERNAL MEDICINE

## 2022-11-06 PROCEDURE — 80048 BASIC METABOLIC PNL TOTAL CA: CPT

## 2022-11-06 PROCEDURE — 86900 BLOOD TYPING SEROLOGIC ABO: CPT

## 2022-11-06 PROCEDURE — 82948 REAGENT STRIP/BLOOD GLUCOSE: CPT

## 2022-11-06 PROCEDURE — 93971 EXTREMITY STUDY: CPT

## 2022-11-06 PROCEDURE — 86901 BLOOD TYPING SEROLOGIC RH(D): CPT

## 2022-11-06 PROCEDURE — 85027 COMPLETE CBC AUTOMATED: CPT

## 2022-11-06 PROCEDURE — 6370000000 HC RX 637 (ALT 250 FOR IP): Performed by: PHYSICIAN ASSISTANT

## 2022-11-06 PROCEDURE — 1200000000 HC SEMI PRIVATE

## 2022-11-06 PROCEDURE — 36415 COLL VENOUS BLD VENIPUNCTURE: CPT

## 2022-11-06 PROCEDURE — 99232 SBSQ HOSP IP/OBS MODERATE 35: CPT | Performed by: PHYSICIAN ASSISTANT

## 2022-11-06 PROCEDURE — 86850 RBC ANTIBODY SCREEN: CPT

## 2022-11-06 PROCEDURE — 6370000000 HC RX 637 (ALT 250 FOR IP): Performed by: INTERNAL MEDICINE

## 2022-11-06 PROCEDURE — 99233 SBSQ HOSP IP/OBS HIGH 50: CPT | Performed by: THORACIC SURGERY (CARDIOTHORACIC VASCULAR SURGERY)

## 2022-11-06 PROCEDURE — 6360000002 HC RX W HCPCS

## 2022-11-06 PROCEDURE — 6370000000 HC RX 637 (ALT 250 FOR IP): Performed by: STUDENT IN AN ORGANIZED HEALTH CARE EDUCATION/TRAINING PROGRAM

## 2022-11-06 PROCEDURE — 6360000002 HC RX W HCPCS: Performed by: THORACIC SURGERY (CARDIOTHORACIC VASCULAR SURGERY)

## 2022-11-06 PROCEDURE — 2580000003 HC RX 258: Performed by: THORACIC SURGERY (CARDIOTHORACIC VASCULAR SURGERY)

## 2022-11-06 PROCEDURE — 2580000003 HC RX 258: Performed by: STUDENT IN AN ORGANIZED HEALTH CARE EDUCATION/TRAINING PROGRAM

## 2022-11-06 PROCEDURE — 6370000000 HC RX 637 (ALT 250 FOR IP): Performed by: NURSE PRACTITIONER

## 2022-11-06 PROCEDURE — 86923 COMPATIBILITY TEST ELECTRIC: CPT

## 2022-11-06 PROCEDURE — 81001 URINALYSIS AUTO W/SCOPE: CPT

## 2022-11-06 RX ORDER — FUROSEMIDE 10 MG/ML
40 INJECTION INTRAMUSCULAR; INTRAVENOUS DAILY
Status: DISCONTINUED | OUTPATIENT
Start: 2022-11-08 | End: 2022-11-07

## 2022-11-06 RX ORDER — SODIUM CHLORIDE 0.9 % (FLUSH) 0.9 %
10 SYRINGE (ML) INJECTION PRN
Status: DISCONTINUED | OUTPATIENT
Start: 2022-11-06 | End: 2022-11-06 | Stop reason: SDUPTHER

## 2022-11-06 RX ORDER — CHLORHEXIDINE GLUCONATE 0.12 MG/ML
15 RINSE ORAL ONCE
Status: COMPLETED | OUTPATIENT
Start: 2022-11-07 | End: 2022-11-07

## 2022-11-06 RX ORDER — SODIUM CHLORIDE 0.9 % (FLUSH) 0.9 %
10 SYRINGE (ML) INJECTION EVERY 12 HOURS SCHEDULED
Status: DISCONTINUED | OUTPATIENT
Start: 2022-11-06 | End: 2022-11-06 | Stop reason: SDUPTHER

## 2022-11-06 RX ORDER — CHLORHEXIDINE GLUCONATE 0.12 MG/ML
15 RINSE ORAL ONCE
Status: DISCONTINUED | OUTPATIENT
Start: 2022-11-06 | End: 2022-11-06

## 2022-11-06 RX ORDER — SPIRONOLACTONE 25 MG/1
25 TABLET ORAL DAILY
Status: DISCONTINUED | OUTPATIENT
Start: 2022-11-08 | End: 2022-11-07

## 2022-11-06 RX ORDER — SODIUM CHLORIDE 9 MG/ML
INJECTION, SOLUTION INTRAVENOUS PRN
Status: DISCONTINUED | OUTPATIENT
Start: 2022-11-06 | End: 2022-11-06 | Stop reason: SDUPTHER

## 2022-11-06 RX ORDER — CHLORHEXIDINE GLUCONATE 4 G/100ML
SOLUTION TOPICAL 2 TIMES DAILY
Status: COMPLETED | OUTPATIENT
Start: 2022-11-06 | End: 2022-11-07

## 2022-11-06 RX ORDER — AMIODARONE HYDROCHLORIDE 200 MG/1
200 TABLET ORAL 3 TIMES DAILY
Status: DISCONTINUED | OUTPATIENT
Start: 2022-11-06 | End: 2022-11-07

## 2022-11-06 RX ADMIN — PANTOPRAZOLE SODIUM 40 MG: 40 TABLET, DELAYED RELEASE ORAL at 05:47

## 2022-11-06 RX ADMIN — BUPROPION HYDROCHLORIDE 150 MG: 150 TABLET, FILM COATED, EXTENDED RELEASE ORAL at 07:17

## 2022-11-06 RX ADMIN — INSULIN LISPRO 3 UNITS: 100 INJECTION, SOLUTION INTRAVENOUS; SUBCUTANEOUS at 15:53

## 2022-11-06 RX ADMIN — SODIUM CHLORIDE, PRESERVATIVE FREE 10 ML: 5 INJECTION INTRAVENOUS at 07:17

## 2022-11-06 RX ADMIN — SODIUM CHLORIDE, PRESERVATIVE FREE 5 ML: 5 INJECTION INTRAVENOUS at 22:28

## 2022-11-06 RX ADMIN — INSULIN GLARGINE 7 UNITS: 100 INJECTION, SOLUTION SUBCUTANEOUS at 22:46

## 2022-11-06 RX ADMIN — FUROSEMIDE 40 MG: 10 INJECTION, SOLUTION INTRAMUSCULAR; INTRAVENOUS at 07:17

## 2022-11-06 RX ADMIN — SPIRONOLACTONE 25 MG: 25 TABLET ORAL at 07:17

## 2022-11-06 RX ADMIN — ASPIRIN 81 MG 81 MG: 81 TABLET ORAL at 07:17

## 2022-11-06 RX ADMIN — AMIODARONE HYDROCHLORIDE 200 MG: 200 TABLET ORAL at 12:06

## 2022-11-06 RX ADMIN — ATORVASTATIN CALCIUM 40 MG: 40 TABLET, FILM COATED ORAL at 07:17

## 2022-11-06 RX ADMIN — TAMSULOSIN HYDROCHLORIDE 0.4 MG: 0.4 CAPSULE ORAL at 07:17

## 2022-11-06 RX ADMIN — SODIUM CHLORIDE, PRESERVATIVE FREE 5 ML: 5 INJECTION INTRAVENOUS at 22:45

## 2022-11-06 RX ADMIN — AMIODARONE HYDROCHLORIDE 200 MG: 200 TABLET ORAL at 22:28

## 2022-11-06 RX ADMIN — MAGNESIUM SULFATE HEPTAHYDRATE 1500 MG: 500 INJECTION, SOLUTION INTRAMUSCULAR; INTRAVENOUS at 12:08

## 2022-11-06 RX ADMIN — METOPROLOL SUCCINATE 25 MG: 25 TABLET, FILM COATED, EXTENDED RELEASE ORAL at 07:17

## 2022-11-06 RX ADMIN — CHLORHEXIDINE GLUCONATE: 4 SOLUTION TOPICAL at 22:46

## 2022-11-06 ASSESSMENT — ENCOUNTER SYMPTOMS
SHORTNESS OF BREATH: 0
VOMITING: 0

## 2022-11-06 NOTE — PLAN OF CARE
Problem: Discharge Planning  Goal: Discharge to home or other facility with appropriate resources  Outcome: Progressing  Flowsheets (Taken 11/6/2022 0712)  Discharge to home or other facility with appropriate resources:   Identify barriers to discharge with patient and caregiver   Arrange for needed discharge resources and transportation as appropriate   Identify discharge learning needs (meds, wound care, etc)  Note: Patient plans to discharge home with wife once medically stable. Problem: Skin/Tissue Integrity  Goal: Absence of new skin breakdown  Description: 1. Monitor for areas of redness and/or skin breakdown  2. Assess vascular access sites hourly  3. Every 4-6 hours minimum:  Change oxygen saturation probe site  4. Every 4-6 hours:  If on nasal continuous positive airway pressure, respiratory therapy assess nares and determine need for appliance change or resting period. Outcome: Progressing  Note: No new skin break down noted at this time. Encouraged patient to reposition self in bed. Problem: Safety - Adult  Goal: Free from fall injury  Outcome: Progressing  Flowsheets (Taken 10/30/2022 1902 by Charlie Barajas RN)  Free From Fall Injury: Instruct family/caregiver on patient safety  Note: No falls noted this shift. Continue falling star program. Bed alarm on, bed in low position. Call light and personal belongings in reach. Patient uses call light appropriately.       Problem: Chronic Conditions and Co-morbidities  Goal: Patient's chronic conditions and co-morbidity symptoms are monitored and maintained or improved  Outcome: Progressing  Flowsheets (Taken 11/6/2022 0712)  Care Plan - Patient's Chronic Conditions and Co-Morbidity Symptoms are Monitored and Maintained or Improved:   Monitor and assess patient's chronic conditions and comorbid symptoms for stability, deterioration, or improvement   Collaborate with multidisciplinary team to address chronic and comorbid conditions and prevent exacerbation or deterioration   Update acute care plan with appropriate goals if chronic or comorbid symptoms are exacerbated and prevent overall improvement and discharge  Note: Diabetic Patient     Problem: Pain  Goal: Verbalizes/displays adequate comfort level or baseline comfort level  Outcome: Progressing  Flowsheets (Taken 10/30/2022 1902 by Vanessa Humphries RN)  Verbalizes/displays adequate comfort level or baseline comfort level:   Encourage patient to monitor pain and request assistance   Assess pain using appropriate pain scale   Implement non-pharmacological measures as appropriate and evaluate response   Administer analgesics based on type and severity of pain and evaluate response  Note: No complaint of pain voiced at this time. Continue to monitor. PRN medications available if needed.       Problem: Metabolic/Fluid and Electrolytes - Adult  Goal: Electrolytes maintained within normal limits  Outcome: Progressing  Flowsheets (Taken 11/6/2022 0712)  Electrolytes maintained within normal limits:   Monitor labs and assess patient for signs and symptoms of electrolyte imbalances   Administer electrolyte replacement as ordered   Monitor response to electrolyte replacements, including repeat lab results as appropriate  Goal: Hemodynamic stability and optimal renal function maintained  Outcome: Progressing  Flowsheets (Taken 11/6/2022 0712)  Hemodynamic stability and optimal renal function maintained:   Monitor labs and assess for signs and symptoms of volume excess or deficit   Monitor intake, output and patient weight   Monitor urine specific gravity, serum osmolarity and serum sodium as indicated or ordered  Goal: Glucose maintained within prescribed range  Outcome: Progressing  Flowsheets (Taken 11/6/2022 0098)  Glucose maintained within prescribed range:   Monitor blood glucose as ordered   Assess for signs and symptoms of hyperglycemia and hypoglycemia   Administer ordered medications to maintain glucose within target range     Problem: Hematologic - Adult  Goal: Maintains hematologic stability  Outcome: Progressing  Flowsheets (Taken 11/6/2022 0712)  Maintains hematologic stability:   Assess for signs and symptoms of bleeding or hemorrhage   Monitor labs for bleeding or clotting disorders     Care plan reviewed with patient. Patient verbalizes understanding of plan of care and contributes to goal setting.

## 2022-11-06 NOTE — PROGRESS NOTES
[facility]   Nephrology progress note  11/6/2022, 1:04 PM      Pt Name:    Julio Corrales  MRN:     [id number]     YOB: 1960  Admit Date:    10/28/2022  9:39 PM    Chief Complaint: Nephrology following for LC/CKD. Subjective:  Patient seen and examined. Doing well. Good urine output. No SOB. He is having CABG tomorrow. Objective:  24HR INTAKE/OUTPUT:    Intake/Output Summary (Last 24 hours) at 11/6/2022 1304  Last data filed at 11/6/2022 1212  Gross per 24 hour   Intake 220 ml   Output 4350 ml   Net -4130 ml      Admission weight: 150 lb (68 kg)  Wt Readings from Last 3 Encounters:   11/06/22 145 lb 3.2 oz (65.9 kg)   10/26/22 151 lb (68.5 kg)   10/12/22 140 lb 9.6 oz (63.8 kg)        Vitals :   Vitals:    11/06/22 0355 11/06/22 0551 11/06/22 0711 11/06/22 1111   BP: 126/65  125/68 111/72   Pulse: 71  71 67   Resp: 16  16    Temp: 98.5 °F (36.9 °C)  98.3 °F (36.8 °C) 98.2 °F (36.8 °C)   TempSrc: Oral  Oral Oral   SpO2: 96%  100% 99%   Weight:  145 lb 3.2 oz (65.9 kg)     Height:           Physical examination  General Appearance:  Well developed.  No distress  Mouth/Throat:  Oral mucosa moist  Neck:  Supple, no JVD  Lungs:  Breath sounds: clear  Heart[de-identified]  S1,S2 heard  Abdomen:  Soft, non - tender  Musculoskeletal:  Edema -no edema    Medications:  Infusion:    sodium chloride      dextrose       Meds:    [START ON 11/7/2022] ceFAZolin (ANCEF) IVPB  2,000 mg IntraVENous On Call to OR    amiodarone  200 mg Oral TID    chlorhexidine   Topical BID    magnesium sulfate  1,500 mg IntraVENous Once    [START ON 11/7/2022] chlorhexidine  15 mL Mouth/Throat Once    [START ON 11/8/2022] furosemide  40 mg IntraVENous Daily    [START ON 11/8/2022] spironolactone  25 mg Oral Daily    insulin glargine  7 Units SubCUTAneous Nightly    aspirin  325 mg Oral Once    diphenhydrAMINE  50 mg IntraVENous Once    tamsulosin  0.4 mg Oral Daily    atorvastatin  40 mg Oral Daily    buPROPion  150 mg Oral QAM    pantoprazole  40 mg Oral QAM AC    sodium chloride flush  5-40 mL IntraVENous 2 times per day    [Held by provider] enoxaparin  40 mg SubCUTAneous Daily    insulin lispro  0-4 Units SubCUTAneous TID WC    insulin lispro  0-4 Units SubCUTAneous Nightly    ipratropium-albuterol  2 ampule Inhalation Once    aspirin  81 mg Oral Daily    metoprolol succinate  25 mg Oral Daily       Lab Data :  CBC:   Recent Labs     11/04/22 0358 11/05/22  0649 11/06/22  0910   WBC 8.1 7.6 7.3   HGB 11.6* 10.8* 12.1*   HCT 37.0* 34.3* 38.4*    256 280     CMP:  Recent Labs     11/04/22 0358 11/05/22  0649 11/06/22  0910    138 133*   K 4.1 4.2 4.5    101 95*   CO2 27 28 26   BUN 24* 23* 25*   CREATININE 1.5* 1.4* 1.4*   GLUCOSE 148* 92 259*   CALCIUM 8.5 8.8 9.2     Hepatic: No results for input(s): LABALBU, AST, ALT, ALB, BILITOT, ALKPHOS in the last 72 hours. Assessment and Plan:  Renal -CKD III: renal function appears at his baseline  -did have some mild LC d/t contrast nephropathy earlier in admission  -will be having CABG tomorrow. Hold diuretics in AM    Electrolytes - appear to be within normal limits  Essential hypertension, stable  Hx of diabetes mellitus uncontrolled  Systolic CHF, new onset. Diuresed well, appears euvolemic  Multivessel CAD  : CABG tomorrow  Urinary retention -status post Bella, hematuria better following urology  Meds reviewed and discussed with patient. [id number] Doris James DO  [facility]    This report has been created using voice recognition software.  It may contain minor errors which are inherent in voice recognition technology

## 2022-11-06 NOTE — PROGRESS NOTES
Hospitalist Progress Note    Patient:  Funmilayo Foley      Unit/Bed:6K-17/017-A    YOB: 1960    MRN: [id number]       Acct: [de-identified]     PCP: BLACK Tello CNP    Date of Admission: 10/28/2022    Assessment/Plan:    Acute CHF Exacerbation-   Pro-BNP 8508 (10/28) 3862 (11/1)  Serial Troponins stable ~0.035  CXR (10/28) - Accesory azygos fissure, interstitial prominence w/ faint bilateral central opacity, Mod sized L pleural effusion, Mild to Mod pulmonary edema  Echo (10/29) - EF 25-30%,   Heart Cath (11/1)  20-30% stenosis in R common iliac artery, severe global hypokinesis w/ akineic movement of anterior wall, EF 15-20%, L main coronary artery 10-20% stenosis, Ramus intermedius had 90-95% stenosis, Ostium of LAD has 90% stenosis, D1 w/ 99% subtotal occlusion, Mid RCA 40-50% stenosis  Severe ischemic cardiomyopathy w/ EF 15-20% on L ventriculogram, Acute systolic CHF Left ventricular end-diastolic pressure 31 mmHg, Severe coronary artery disease involving ostium of LAD as well as first diagonal branch and ramus intermedius artery  CTA (10/28) - No PEs, Mod pulmonary edema  Cardiology consulted  2 L fluid restriction and 2 gm sodium diet  Life vest for DC - ordered  Referral for CHF clinic - ordered  Cardiovascular surgery consult  Following  Patient not a candidate for PCI- plan for CABG with IABP tomorrow 11/7  PET myocardial viability study completed 11/4 showing a large area of anterior LV with suspected non viability-   Continue ASA, Atorvastatin, Metoprolol  Entresto after heart cath    Mod sized Bilateral Pleural Effusions w/ Atelectasis  Likely secondary to #1  Order Incentive Spirometry & Acapella  CKD Stage 3 w/ LC- resolved  Baseline 1.4- currently 1.4  Renal US (10/31)  Bilateral hydronephrosis  Markedly distended bladder w/ large amount of postvoid residual urine  CT A/P wo Contrast (11/2)  Mild bilateral hydronephrosis, Marked mural thickening of bladder, Moderate constipation, Mod sized bilateral pleural effusions w/ atelectasis  Nephrology following  Avoid Renal toxic agents    Urinary Retention with Gross Hematuria  Urology consulted  [id number] Carley Coelho to continue anticoagulation  Hematuria likely secondary to traumatic crum insertion  Discharge with follow and follow up OP for cystoscopy  Signed off  Crum catheter placed  Flomax started (11/1)    NIDDM  Last A1C 7.8 (8/23/22)  Low dose SSI w/ hypoglycemic protocols  FBG elevated- start Lantus 7 units 11/3- FBG good since  Home meds Dulaglutide, Glimepiride, Empagliflozin on hold    Essential HTN  Lisinopril discontinued, Entresto following heart cath  Metoprolol added by cardiology, monitor blood pressure    HLD  Continue home Atorvastatin    GERD  Continue Protonix  Smoking cessation  Patient states that he is currently attempting to quit smoking. Started Wellbutrin approximately 1 and half weeks ago  He has reduced his amount of smoking from 1 PPD to 0.5 PPD. Patient also states that he has not smoked since Thursday evening, 10/27  Continue home Wellbutrin  Do not give nicotine patch unless requested by patient  Generalized Anxiety Disorder  Not on any home meds for this per chart review    Hospital Course:  HPI Provided by Chart Review    \"Len Slater is a 63 y.o. male with PMHx of diabetes mellitus, HTN, HLD, GERD who presents to [address] [place] [address] with shortness of breath. Patient states at approximately 8:30 PM on the date of initial presentation to the ED he began having shortness of breath while he was at rest watching TV. Patient states that the shortness of breath continued to progress which is why he decided to present to the ED. Patient states that he fractured his ribs on 10/5 due to falling in the shower and he has been \"taking it easy\". Patient denies any current pain from his rib fractures.   At time of examination patient states that he is barely short of breath, which he now attributes to some anxiety due to being hospitalized. Patient notes that the DuoNeb he received in the ED helped with his shortness of breath. Patient denies chest pain, palpitations, LE swelling, N/V/C/D. Patient states that he has no prior cardiac history or pulmonary history. Patient denies any fatigue. Patient did see his primary care doctor on 10/27, and due to mild pulmonary edema present on CXR, echo and stress test were ordered, however these have not been completed. Patient states that he is currently attempting to quit smoking, and he has been smoking for approximately 42 years. Patient states that he previously smoked 1 PPD. Patient was started on Wellbutrin approximately 1 and 1/2 weeks ago to aid in smoking cessation. Patient is that he currently smokes 0.5 PPD. Patient states that he has not smoked a cigarette since Thursday night, 10/27. Patient states that he currently drinks an average of 2 beers per day, however he states his last beer was approximately 3 to 4 weeks ago. Patient states that when he was younger he would drink alcohol heavily. Patient also reports excessive caffeine intake, stating he drinks approximately 4 cups of coffee per day and approximately 3 20 ounce bottles of diet Mountain Dew per day. Patient also reports tremors. Patient states that these have been present \"on and off\" for the past few years. Patient's wife is at bedside and states that she has noticed the tremors to be worsened on day of admission. Patient states this is due to anxiety from hospitalization and DuoNeb that he received in the ED. ED course: Initial vitals include /91, heart rate 107, respiratory rate 16, temperature 97.7 °F, SPO2 97% on room air. Initial labs include creatinine 1.3, proBNP 8508.0, troponin 0.039. CXR shows mild to moderate pulmonary edema. CTA chest shows no pulmonary embolism, moderate pulmonary edema. Patient was given 1 L NS bolus, aspirin 324 mg, Lasix 20 mg IV in the ED. Patient will be admitted to [place] [address] for further management of new onset congestive heart failure. \"    10/30/22  Cardiology following  Plan for cath tomorrow    10/31/22  Patient cath postponed to tomorrow  Nephrology following  Hold diuretics    11/01/22  Patient underwent heart cath  Urology consulted for gross hematuria    (11/2)  Patient was resting comfortably in bed upon entering the room. He is complaining of no symptoms and states he feels much better. Denies pain, chest pain, palpitations, shortness of breath, numbness or tingling, nausea or vomiting, bowel irregularities, difficulty w/ ADLs, or any new symptoms. Currently has indwelling crum w/ 500 mL of dark yellow urine. (11/3)  Patient was resting comfortably in bed upon entering the room. He is not having any pain and has no complaints. Denies pain, chest pain, palpitations, shortness of breath, numbness or tingling, nausea or vomiting, bowel or urinary irregularities, difficulty with ADLs, or any new symptoms. Currently has indwelling crum w/ 400mL of yellow urine. 11/4  No changes overnight . PET myocardial study scheduled for today   FBG much improved today with lantus 7U    11/5  Labs look good- CR at baseline. FBG within range. Myocardial study with evidence of non viable aspect of anterior LV   VSS    11/6  Cardio recommended AGAINST PCI.  Planned for CABG and IABP tomorrow   VSS-       Medications:    Infusion Medications    sodium chloride      dextrose       Scheduled Medications    [START ON 11/7/2022] ceFAZolin (ANCEF) IVPB  2,000 mg IntraVENous On Call to OR    amiodarone  200 mg Oral TID    chlorhexidine  15 mL Mouth/Throat Once    chlorhexidine   Topical BID    magnesium sulfate  1,500 mg IntraVENous Once    spironolactone  25 mg Oral Daily    insulin glargine  7 Units SubCUTAneous Nightly    furosemide  40 mg IntraVENous Daily    aspirin  325 mg Oral Once    diphenhydrAMINE  50 mg IntraVENous Once    tamsulosin  0.4 mg Oral Daily    atorvastatin  40 mg Oral Daily    buPROPion  150 mg Oral QAM    pantoprazole  40 mg Oral QAM AC    sodium chloride flush  5-40 mL IntraVENous 2 times per day    enoxaparin  40 mg SubCUTAneous Daily    insulin lispro  0-4 Units SubCUTAneous TID WC    insulin lispro  0-4 Units SubCUTAneous Nightly    ipratropium-albuterol  2 ampule Inhalation Once    aspirin  81 mg Oral Daily    metoprolol succinate  25 mg Oral Daily     PRN Meds: nitroGLYCERIN, acetaminophen, lidocaine, sodium chloride flush, sodium chloride, ondansetron **OR** ondansetron, polyethylene glycol, [DISCONTINUED] acetaminophen **OR** acetaminophen, glucose, dextrose bolus **OR** dextrose bolus, glucagon (rDNA), dextrose, ipratropium-albuterol      Intake/Output Summary (Last 24 hours) at 11/6/2022 1205  Last data filed at 11/6/2022 0717  Gross per 24 hour   Intake 220 ml   Output 2700 ml   Net -2480 ml         Diet:  ADULT DIET; Regular  Diet NPO Exceptions are: Sips of Water with Meds    Review of Systems   Respiratory:  Negative for shortness of breath. Cardiovascular:  Negative for chest pain. Gastrointestinal:  Negative for vomiting. Exam:  /72   Pulse 67   Temp 98.2 °F (36.8 °C) (Oral)   Resp 16   Ht 5' 10\" (1.778 m)   Wt 145 lb 3.2 oz (65.9 kg)   SpO2 99%   BMI 20.83 kg/m²        Constitutional:       Appearance: Normal appearance. HENT:      Head: Normocephalic and atraumatic. Eyes:      Extraocular Movements: Extraocular movements intact. Conjunctiva/sclera: Conjunctivae normal.   Cardiovascular:      Rate and Rhythm: Normal rate and regular rhythm. Pulses: Normal pulses. Heart sounds: Normal heart sounds. Pulmonary:      Effort: Pulmonary effort is normal. He is not intubated. Breath sounds: Normal breath sounds. Abdominal:      General: Bowel sounds are normal.      Palpations: Abdomen is soft.    Genitourinary:     Comments: Bella collection container with yellow urine  Musculoskeletal:         General: Normal range of motion. Cervical back: Normal range of motion. Skin:     General: Skin is warm and dry. Capillary Refill: Capillary refill takes less than 2 seconds. Neurological:      General: No focal deficit present. Mental Status: He is alert and oriented to person, place, and time. Psychiatric:         Mood and Affect: Mood normal.         Speech: He is communicative. Labs:   Recent Labs     11/04/22  0358 11/05/22  0649 11/06/22  0910   WBC 8.1 7.6 7.3   HGB 11.6* 10.8* 12.1*   HCT 37.0* 34.3* 38.4*    256 280       Recent Labs     11/04/22  0358 11/05/22  0649 11/06/22  0910    138 133*   K 4.1 4.2 4.5    101 95*   CO2 27 28 26   BUN 24* 23* 25*   CREATININE 1.5* 1.4* 1.4*   CALCIUM 8.5 8.8 9.2       No results for input(s): AST, ALT, BILIDIR, BILITOT, ALKPHOS in the last 72 hours. No results for input(s): INR in the last 72 hours. No results for input(s): Debbie Rowels in the last 72 hours. Urinalysis:      Lab Results   Component Value Date/Time    NITRU NEGATIVE 11/06/2022 09:55 AM    WBCUA 5-9 11/06/2022 09:55 AM    BACTERIA NONE SEEN 11/06/2022 09:55 AM    RBCUA > 200 11/06/2022 09:55 AM    BLOODU LARGE 11/06/2022 09:55 AM    SPECGRAV 1.007 11/06/2022 09:55 AM       Radiology:  PET MYOCARDIAL VIABILITY   Final Result      Absent activity at the anterior left ventricular wall extending into the apex, likely nonviable myocardium. Final report electronically signed by Dr. Danya Shane on 11/4/2022 1:31 PM      CT ABDOMEN PELVIS WO CONTRAST   Final Result      Mild bilateral hydronephrosis. There is bilateral renal contrast excretion    from prior contrast administration limiting evaluation for renal stones. Marked mural thickening of the urinary bladder. The differential diagnosis    includes cystitis and neoplasm. Moderate amount of retained stool.       Partially visualized lower thorax shows moderate sized bilateral pleural    effusions with atelectasis. This document has been electronically signed by: Skip Cárdenas MD on    11/02/2022 03:23 AM      All CTs at this facility use dose modulation techniques and iterative    reconstructions, and/or weight-based dosing   when appropriate to reduce radiation to a low as reasonably achievable. US RENAL COMPLETE   Final Result   1. Bilateral hydronephrosis. 2. Markedly distended urinary bladder with a large amount of postvoid residual urine. Final report electronically signed by Dr. Richard Vera on 10/31/2022 1:13 PM      CTA CHEST W WO CONTRAST   Final Result   Impression:   No pulmonary embolism. Moderate pulmonary edema. Interval worsening since the prior study on    10/5/22. This document has been electronically signed by: Alise Klein MD    on 10/28/2022 11:51 PM      All CTs at this facility use dose modulation techniques and iterative    reconstructions, and/or weight-based dosing   when appropriate to reduce radiation to a low as reasonably achievable. 3D Post-processing was performed on this study. XR CHEST PORTABLE   Final Result   Impression:   Mild to moderate pulmonary edema. This document has been electronically signed by: Alise Klein MD    on 10/28/2022 11:15 PM      VL PRE OP VEIN MAPPING    (Results Pending)       Diet: ADULT DIET;  Regular  Diet NPO Exceptions are: Sips of Water with Meds    DVT prophylaxis: [x] Lovenox                                 [] SCDs                                 [] SQ Heparin                                 [] Encourage ambulation           [] Already on Anticoagulation     Disposition:    [x] Home       [] TCU       [] Rehab       [] Psych       [] SNF       [] [place]       [] Other-    Code Status: Full Code    PT/OT Eval:         Electronically signed by Ric Jeter PA-C on 11/6/2022 at 12:05 PM

## 2022-11-06 NOTE — PLAN OF CARE
Problem: Discharge Planning  Goal: Discharge to home or other facility with appropriate resources  11/5/2022 2000 by Heather Salazar  Outcome: Progressing  11/5/2022 1232 by Aravind Woodward RN  Outcome: Progressing  Flowsheets (Taken 11/5/2022 9342)  Discharge to home or other facility with appropriate resources:   Identify barriers to discharge with patient and caregiver   Arrange for needed discharge resources and transportation as appropriate   Identify discharge learning needs (meds, wound care, etc)  Note: Patient plans to discharge home with wife once medically stable. Problem: Skin/Tissue Integrity  Goal: Absence of new skin breakdown  Description: 1. Monitor for areas of redness and/or skin breakdown  2. Assess vascular access sites hourly  3. Every 4-6 hours minimum:  Change oxygen saturation probe site  4. Every 4-6 hours:  If on nasal continuous positive airway pressure, respiratory therapy assess nares and determine need for appliance change or resting period. 11/5/2022 2000 by Heather Salazar  Outcome: Progressing  11/5/2022 1232 by Aravind Woodward RN  Outcome: Progressing  Note: No new skin break down noted at this time. Encouraged patient to reposition self in bed. Problem: Safety - Adult  Goal: Free from fall injury  11/5/2022 2000 by Heather Salazar  Outcome: Progressing  11/5/2022 1232 by Aravind Woodward RN  Outcome: Progressing  Flowsheets (Taken 10/30/2022 1902 by Amalia Jama RN)  Free From Fall Injury: Instruct family/caregiver on patient safety  Note: No falls noted this shift. Continue falling star program. Bed alarm on, bed in low position. Call light and personal belongings in reach. Patient uses call light appropriately.       Problem: Chronic Conditions and Co-morbidities  Goal: Patient's chronic conditions and co-morbidity symptoms are monitored and maintained or improved  11/5/2022 2000 by Heathre Salazar  Outcome: Progressing  11/5/2022 1232 by Aravind Woodward RN  Outcome: Progressing  Flowsheets (Taken 11/5/2022 3085)  Care Plan - Patient's Chronic Conditions and Co-Morbidity Symptoms are Monitored and Maintained or Improved:   Monitor and assess patient's chronic conditions and comorbid symptoms for stability, deterioration, or improvement   Collaborate with multidisciplinary team to address chronic and comorbid conditions and prevent exacerbation or deterioration   Update acute care plan with appropriate goals if chronic or comorbid symptoms are exacerbated and prevent overall improvement and discharge  Note: Diabetic patient     Problem: Pain  Goal: Verbalizes/displays adequate comfort level or baseline comfort level  11/5/2022 2000 by Fátima Carlin  Outcome: Progressing  11/5/2022 1232 by Edd Ferreira RN  Outcome: Progressing  Flowsheets (Taken 10/30/2022 1902 by Mayte Del Cid RN)  Verbalizes/displays adequate comfort level or baseline comfort level:   Encourage patient to monitor pain and request assistance   Assess pain using appropriate pain scale   Implement non-pharmacological measures as appropriate and evaluate response   Administer analgesics based on type and severity of pain and evaluate response  Note: No complaint of pain voiced at this time. Continue to monitor. PRN medications available if needed.       Problem: Metabolic/Fluid and Electrolytes - Adult  Goal: Electrolytes maintained within normal limits  11/5/2022 2000 by Fátima Carlin  Outcome: Progressing  11/5/2022 1232 by Edd Ferreira, RN  Outcome: Progressing  Flowsheets (Taken 11/5/2022 6393)  Electrolytes maintained within normal limits:   Monitor labs and assess patient for signs and symptoms of electrolyte imbalances   Administer electrolyte replacement as ordered   Monitor response to electrolyte replacements, including repeat lab results as appropriate  Goal: Hemodynamic stability and optimal renal function maintained  11/5/2022 2000 by Fátima Carlin  Outcome: Progressing  11/5/2022 1232 by Chuy Estrada RN  Outcome: Progressing  Flowsheets (Taken 11/5/2022 1807)  Hemodynamic stability and optimal renal function maintained:   Monitor labs and assess for signs and symptoms of volume excess or deficit   Monitor intake, output and patient weight   Monitor urine specific gravity, serum osmolarity and serum sodium as indicated or ordered  Goal: Glucose maintained within prescribed range  11/5/2022 2000 by Hillary Cheng  Outcome: Progressing  11/5/2022 1232 by Chuy Estrada RN  Outcome: Progressing  Flowsheets (Taken 11/5/2022 9694)  Glucose maintained within prescribed range:   Monitor blood glucose as ordered   Assess for signs and symptoms of hyperglycemia and hypoglycemia   Administer ordered medications to maintain glucose within target range     Problem: Hematologic - Adult  Goal: Maintains hematologic stability  11/5/2022 2000 by Hillary Cheng  Outcome: Progressing  11/5/2022 1232 by Chuy Estrada RN  Outcome: Progressing  Flowsheets (Taken 11/5/2022 7142)  Maintains hematologic stability:   Assess for signs and symptoms of bleeding or hemorrhage   Monitor labs for bleeding or clotting disorders never used

## 2022-11-06 NOTE — PROGRESS NOTES
Cardiothoracic Surgery History & Physical        Patient:  Matthew Valderrama  YOB: 1960    MRN: [id number]     Acct: [de-identified]    Date of Admission: 10/28/2022    Interim History:  Patient stable, no chest pain    Past Medical History:          Diagnosis Date    Hypertension     Prediabetes        Past Surgical History:          Procedure Laterality Date    TONSILLECTOMY         Medications Prior to Admission:      Prior to Admission medications    Medication Sig Start Date End Date Taking? Authorizing Provider   tamsulosin (FLOMAX) 0.4 MG capsule Take 1 capsule by mouth daily 11/3/22  Yes BLACK Sen CNP   buPROPion (WELLBUTRIN XL) 150 MG extended release tablet Take 1 tablet by mouth every morning 10/12/22  Yes BLACK Lei CNP   omeprazole (PRILOSEC) 40 MG delayed release capsule Take 1 capsule by mouth daily 10/12/22  Yes BLACK Lei CNP   lisinopril (PRINIVIL;ZESTRIL) 20 MG tablet Take 1 tablet by mouth daily 6/28/22  Yes BLACK Lei CNP   glimepiride (AMARYL) 4 MG tablet Take 1 tablet by mouth every morning (before breakfast) 6/28/22  Yes BLACK Lei CNP   empagliflozin (JARDIANCE) 25 MG tablet Take 1 tablet by mouth daily 5/24/22  Yes BLACK Lei CNP   atorvastatin (LIPITOR) 40 MG tablet Take 1 tablet by mouth daily 5/24/22  Yes BLACK Lei CNP   acetaminophen (TYLENOL) 500 MG tablet Take 500 mg by mouth every 6 hours as needed for Pain As needed    Historical Provider, MD   Dulaglutide (TRULICITY) 1.5 ZN/9.0BX SOPN Inject 1.5 mg into the skin once a week 8/18/22   BLACK Lei CNP       Allergies:  Metformin and related    Social History:      TOBACCO:   reports that he has been smoking cigarettes. He has a 16.50 pack-year smoking history. He has never used smokeless tobacco.  ETOH:   reports that he does not currently use alcohol.     Social History     Substance and Sexual Activity   Drug Use No         Family History:          Problem Relation Age of Onset    Diabetes Father     Stroke Father     Diabetes Brother     Diabetes Brother        PHYSICAL EXAM:    /68   Pulse 71   Temp 98.3 °F (36.8 °C) (Oral)   Resp 16   Ht 5' 10\" (1.778 m)   Wt 145 lb 3.2 oz (65.9 kg)   SpO2 100%   BMI 20.83 kg/m²     General appearance:  No apparent distress, appears stated age and cooperative. HEENT:  Normal cephalic, atraumatic without obvious deformity. Pupils equal, round, and reactive to light. Extra ocular muscles intact. Conjunctivae/corneas clear. Neck: Supple, with full range of motion. No jugular venous distention. Trachea midline. Respiratory:  Normal respiratory effort. Clear to auscultation, bilaterally without Rales/Wheezes/Rhonchi. Cardiovascular:  Regular rate and rhythm with normal S1/S2 without murmurs, rubs or gallops. Abdomen: Soft, non-tender, non-distended with normal bowel sounds. Musculoskeletal:  No clubbing, cyanosis or edema bilaterally. Full range of motion without deformity. Skin: Skin color, texture, turgor normal.  No rashes or lesions. Neurologic:  Neurovascularly intact without any focal sensory/motor deficits. Cranial nerves: II-XII intact, grossly non-focal.  Psychiatric:  Alert and oriented, thought content appropriate, normal insight  Capillary Refill: Brisk,< 3 seconds   Peripheral Pulses: +2 palpable, equal bilaterally       Labs:     Recent Labs     11/04/22  0358 11/05/22  0649   WBC 8.1 7.6   HGB 11.6* 10.8*   HCT 37.0* 34.3*    256     Recent Labs     11/04/22  0358 11/05/22  0649    138   K 4.1 4.2    101   CO2 27 28   BUN 24* 23*   CREATININE 1.5* 1.4*   CALCIUM 8.5 8.8     No results for input(s): AST, ALT, BILIDIR, BILITOT, ALKPHOS in the last 72 hours. No results for input(s): INR in the last 72 hours. No results for input(s): Corky Stallion in the last 72 hours.     Urinalysis:      Lab Results   Component Value Date/Time    NITRU NEGATIVE 10/31/2022 07:48 PM    WBCUA 2-4 10/31/2022 07:48 PM    BACTERIA NONE SEEN 10/31/2022 07:48 PM    RBCUA 0-2 10/31/2022 07:48 PM    BLOODU NEGATIVE 10/31/2022 07:48 PM    SPECGRAV 1.012 10/31/2022 07:48 PM         Code Status: Full Code      ASSESSMENT:    Active Hospital Problems    Diagnosis Date Noted    S/P cardiac cath [Z98.890] 11/05/2022     Priority: High    CAD, multiple vessel [I25.10] 11/05/2022     Priority: High    Ischemic cardiomyopathy [I25.5] 11/05/2022     Priority: High    ETOH abuse [F10.10] 11/05/2022     Priority: High    Nonischemic cardiomyopathy ([address] [I42.8] 10/31/2022     Priority: High    Tremor [R25.1] 11/04/2022     Priority: Medium    LC (acute kidney injury) ([facility] [N17.9] 11/04/2022     Priority: Medium    Hyperlipidemia [E78.5] 11/04/2022     Priority: Medium    Gastroesophageal reflux disease [K21.9] 11/04/2022     Priority: Medium    Preoperative clearance [Z01.818] 10/31/2022     Priority: Medium    Stage 3a chronic kidney disease ([address] [N18.31] 10/31/2022     Priority: Medium    New onset of congestive heart failure ([address] [I50.9] 10/29/2022     Priority: Medium    Acute systolic congestive heart failure ([facility] [address] [I50.21] 10/29/2022     Priority: Medium    Hypertension [I10] 08/10/2021    Diabetes mellitus type 2 in nonobese [facility]) [E11.9] 09/22/2013       PLAN:  Patient deemed to be poor candidate for PCI due to left main equivalent disease  Plan insert IABP and off-pump CABG tomorrow  Discussed in detail with patient and wife, who understand and agree    The risks, benefits and alternatives were discussed in detail with the patient and family. The risks include bleeding, infection, graft failure, cardiac arrhythmias, thromboembolism, stroke, need for reoperation and death. The patient expressed understanding of these issues, confirmed that all questions were answered, and desires to proceed.     Time spent with patient: 35 minutes, of which more than 50% was spent counseling/coordinating the patient's care.     Electronically signed by Marie Pugh MD on 11/6/2022 at 8:48 AM

## 2022-11-07 ENCOUNTER — APPOINTMENT (OUTPATIENT)
Dept: GENERAL RADIOLOGY | Age: 62
DRG: 233 | End: 2022-11-07
Payer: COMMERCIAL

## 2022-11-07 ENCOUNTER — ANESTHESIA (OUTPATIENT)
Dept: OPERATING ROOM | Age: 62
DRG: 233 | End: 2022-11-07
Payer: COMMERCIAL

## 2022-11-07 ENCOUNTER — ANESTHESIA EVENT (OUTPATIENT)
Dept: OPERATING ROOM | Age: 62
DRG: 233 | End: 2022-11-07
Payer: COMMERCIAL

## 2022-11-07 PROBLEM — Z95.1 S/P CABG X 2: Status: ACTIVE | Noted: 2022-11-07

## 2022-11-07 LAB
ACTIVATED CLOTTING TIME: 129 SECONDS (ref 99–130)
ACTIVATED CLOTTING TIME: 139 SECONDS (ref 99–130)
ACTIVATED CLOTTING TIME: 367 SECONDS (ref 99–130)
ACTIVATED CLOTTING TIME: 610 SECONDS (ref 99–130)
ACTIVATED CLOTTING TIME: 646 SECONDS (ref 99–130)
ACTIVATED CLOTTING TIME: 745 SECONDS (ref 99–130)
ALLEN TEST: ABNORMAL
ANION GAP SERPL CALCULATED.3IONS-SCNC: 11 MEQ/L (ref 8–16)
ANION GAP SERPL CALCULATED.3IONS-SCNC: 14 MEQ/L (ref 8–16)
BASE EXCESS (CALCULATED): -2 MMOL/L (ref -2.5–2.5)
BASE EXCESS (CALCULATED): -2.2 MMOL/L (ref -2.5–2.5)
BASE EXCESS (CALCULATED): -3.3 MMOL/L (ref -2.5–2.5)
BASE EXCESS (CALCULATED): -3.4 MMOL/L (ref -2.5–2.5)
BASE EXCESS (CALCULATED): 0.3 MMOL/L (ref -2.5–2.5)
BASE EXCESS (CALCULATED): 2.2 MMOL/L (ref -2.5–2.5)
BASE EXCESS (CALCULATED): 2.9 MMOL/L (ref -2.5–2.5)
BASE EXCESS MIXED: 2 MMOL/L (ref -2–3)
BUN BLDV-MCNC: 27 MG/DL (ref 7–22)
BUN BLDV-MCNC: 30 MG/DL (ref 7–22)
CALCIUM IONIZED SERUM: 1.01 MMOL/L (ref 1.12–1.32)
CALCIUM IONIZED SERUM: 1.02 MMOL/L (ref 1.12–1.32)
CALCIUM IONIZED SERUM: 1.1 MMOL/L (ref 1.12–1.32)
CALCIUM IONIZED SERUM: 1.19 MMOL/L (ref 1.12–1.32)
CALCIUM IONIZED SERUM: 1.29 MMOL/L (ref 1.12–1.32)
CALCIUM IONIZED: 1.11 MMOL/L (ref 1.12–1.32)
CALCIUM IONIZED: 1.21 MMOL/L (ref 1.12–1.32)
CALCIUM SERPL-MCNC: 8.3 MG/DL (ref 8.5–10.5)
CALCIUM SERPL-MCNC: 9.4 MG/DL (ref 8.5–10.5)
CARTRIDGE COLOR: NORMAL
CHLORIDE BLD-SCNC: 98 MEQ/L (ref 98–111)
CHLORIDE BLD-SCNC: 99 MEQ/L (ref 98–111)
CO2: 21 MEQ/L (ref 23–33)
CO2: 28 MEQ/L (ref 23–33)
COLLECTED BY:: ABNORMAL
COMMENT: ABNORMAL
CREAT SERPL-MCNC: 1.6 MG/DL (ref 0.4–1.2)
CREAT SERPL-MCNC: 1.8 MG/DL (ref 0.4–1.2)
DEVICE: ABNORMAL
ERYTHROCYTE [DISTWIDTH] IN BLOOD BY AUTOMATED COUNT: 12.7 % (ref 11.5–14.5)
ERYTHROCYTE [DISTWIDTH] IN BLOOD BY AUTOMATED COUNT: 40.1 FL (ref 35–45)
GFR SERPL CREATININE-BSD FRML MDRD: 42 ML/MIN/1.73M2
GFR SERPL CREATININE-BSD FRML MDRD: 49 ML/MIN/1.73M2
GLUCOSE BLD-MCNC: 100 MG/DL (ref 70–108)
GLUCOSE BLD-MCNC: 116 MG/DL (ref 70–108)
GLUCOSE BLD-MCNC: 127 MG/DL (ref 70–108)
GLUCOSE BLD-MCNC: 131 MG/DL (ref 70–108)
GLUCOSE BLD-MCNC: 134 MG/DL (ref 70–108)
GLUCOSE BLD-MCNC: 151 MG/DL (ref 70–108)
GLUCOSE BLD-MCNC: 171 MG/DL (ref 70–108)
GLUCOSE BLD-MCNC: 214 MG/DL (ref 70–108)
GLUCOSE BLD-MCNC: 232 MG/DL (ref 70–108)
GLUCOSE BLD-MCNC: 97 MG/DL (ref 70–108)
GLUCOSE, WHOLE BLOOD: 103 MG/DL (ref 70–108)
GLUCOSE, WHOLE BLOOD: 142 MG/DL (ref 70–108)
GLUCOSE, WHOLE BLOOD: 156 MG/DL (ref 70–108)
GLUCOSE, WHOLE BLOOD: 195 MG/DL (ref 70–108)
GLUCOSE, WHOLE BLOOD: 195 MG/DL (ref 70–108)
GLUCOSE, WHOLE BLOOD: 224 MG/DL (ref 70–108)
GLUCOSE, WHOLE BLOOD: 250 MG/DL (ref 70–108)
HCO3, MIXED: 28 MMOL/L (ref 23–28)
HCO3: 21 MMOL/L (ref 23–28)
HCO3: 23 MMOL/L (ref 23–28)
HCO3: 23 MMOL/L (ref 23–28)
HCO3: 24 MMOL/L (ref 23–28)
HCO3: 25 MMOL/L (ref 23–28)
HCO3: 27 MMOL/L (ref 23–28)
HCO3: 29 MMOL/L (ref 23–28)
HCT VFR BLD CALC: 19.1 % (ref 42–52)
HCT VFR BLD CALC: 21.6 % (ref 42–52)
HCT VFR BLD CALC: 22.2 % (ref 42–52)
HCT VFR BLD CALC: 27.2 % (ref 42–52)
HCT VFR BLD CALC: 33.5 % (ref 42–52)
HEMOGLOBIN FINGERSTICK, POC: 10.3 G/DL (ref 14–18)
HEMOGLOBIN FINGERSTICK, POC: 6.7 G/DL (ref 14–18)
HEMOGLOBIN: 11.1 GM/DL (ref 14–18)
HEMOGLOBIN: 6 GM/DL (ref 14–18)
HEMOGLOBIN: 7.1 GM/DL (ref 14–18)
HEMOGLOBIN: 7.2 GM/DL (ref 14–18)
HEMOGLOBIN: 8.9 GM/DL (ref 14–18)
IFIO2: 40
IFIO2: 80
INR BLD: 1.14 (ref 0.85–1.13)
MAGNESIUM: 3.1 MG/DL (ref 1.6–2.4)
MCH RBC QN AUTO: 28.4 PG (ref 26–33)
MCHC RBC AUTO-ENTMCNC: 32.7 GM/DL (ref 32.2–35.5)
MCV RBC AUTO: 86.9 FL (ref 80–94)
MODE: ABNORMAL
O2 SAT, MIXED: 81 %
O2 SATURATION: 100 %
O2 SATURATION: 99 %
O2 SATURATION: 99 %
PATIENT BOLUS: NORMAL
PATIENT HEPARIN CONCENTRATION: 0
PATIENT HEPARIN CONCENTRATION: 2
PATIENT HEPARIN CONCENTRATION: 2
PATIENT HEPARIN CONCENTRATION: 3
PATIENT HEPARIN CONCENTRATION: 4
PCO2, MIXED VENOUS: 51 MMHG (ref 41–51)
PCO2: 30 MMHG (ref 35–45)
PCO2: 42 MMHG (ref 35–45)
PCO2: 43 MMHG (ref 35–45)
PCO2: 44 MMHG (ref 35–45)
PCO2: 45 MMHG (ref 35–45)
PCO2: 46 MMHG (ref 35–45)
PCO2: 49 MMHG (ref 35–45)
PH BLOOD GAS: 7.3 (ref 7.35–7.45)
PH BLOOD GAS: 7.31 (ref 7.35–7.45)
PH BLOOD GAS: 7.34 (ref 7.35–7.45)
PH BLOOD GAS: 7.38 (ref 7.35–7.45)
PH BLOOD GAS: 7.39 (ref 7.35–7.45)
PH BLOOD GAS: 7.41 (ref 7.35–7.45)
PH BLOOD GAS: 7.46 (ref 7.35–7.45)
PH, MIXED: 7.34 (ref 7.31–7.41)
PIP: 18 CMH2O
PLATELET # BLD: 183 THOU/MM3 (ref 130–400)
PLATELET # BLD: 202 THOU/MM3 (ref 130–400)
PLATELET # BLD: 282 THOU/MM3 (ref 130–400)
PMV BLD AUTO: 10.7 FL (ref 9.4–12.4)
PO2 MIXED: 49 MMHG (ref 25–40)
PO2: 137 MMHG (ref 71–104)
PO2: 157 MMHG (ref 71–104)
PO2: 330 MMHG (ref 71–104)
PO2: 446 MMHG (ref 71–104)
PO2: 449 MMHG (ref 71–104)
PO2: 500 MMHG (ref 71–104)
PO2: 524 MMHG (ref 71–104)
POTASSIUM SERPL-SCNC: 3.8 MEQ/L (ref 3.5–5.2)
POTASSIUM SERPL-SCNC: 3.8 MEQ/L (ref 3.5–5.2)
POTASSIUM SERPL-SCNC: 4.8 MEQ/L (ref 3.5–5.2)
POTASSIUM, WHOLE BLOOD: 4.1 MEQ/L (ref 3.5–4.9)
POTASSIUM, WHOLE BLOOD: 4.4 MEQ/L (ref 3.5–4.9)
POTASSIUM, WHOLE BLOOD: 4.6 MEQ/L (ref 3.5–4.9)
POTASSIUM, WHOLE BLOOD: 4.8 MEQ/L (ref 3.5–4.9)
POTASSIUM, WHOLE BLOOD: 5 MEQ/L (ref 3.5–4.9)
PROJECTED HEPARIN CONCENTATION: 3
RANGE: NORMAL
RBC # BLD: 3.13 MILL/MM3 (ref 4.7–6.1)
SET PEEP: 6 MMHG
SET PRESS SUPP: 10 CMH2O
SET PRESS SUPP: 10 CMH2O
SET RESPIRATORY RATE: 16 BPM
SODIUM BLD-SCNC: 134 MEQ/L (ref 135–145)
SODIUM BLD-SCNC: 137 MEQ/L (ref 135–145)
SODIUM, WHOLE BLOOD: 134 MEQ/L (ref 138–146)
SODIUM, WHOLE BLOOD: 136 MEQ/L (ref 138–146)
SODIUM, WHOLE BLOOD: 138 MEQ/L (ref 138–146)
SOURCE, BLOOD GAS: ABNORMAL
WBC # BLD: 17.9 THOU/MM3 (ref 4.8–10.8)

## 2022-11-07 PROCEDURE — 83735 ASSAY OF MAGNESIUM: CPT

## 2022-11-07 PROCEDURE — 5A02210 ASSISTANCE WITH CARDIAC OUTPUT USING BALLOON PUMP, CONTINUOUS: ICD-10-PCS | Performed by: THORACIC SURGERY (CARDIOTHORACIC VASCULAR SURGERY)

## 2022-11-07 PROCEDURE — 2580000003 HC RX 258: Performed by: THORACIC SURGERY (CARDIOTHORACIC VASCULAR SURGERY)

## 2022-11-07 PROCEDURE — 2580000003 HC RX 258: Performed by: NURSE ANESTHETIST, CERTIFIED REGISTERED

## 2022-11-07 PROCEDURE — 2000000000 HC ICU R&B

## 2022-11-07 PROCEDURE — 33508 ENDOSCOPIC VEIN HARVEST: CPT | Performed by: PHYSICIAN ASSISTANT

## 2022-11-07 PROCEDURE — 33533 CABG ARTERIAL SINGLE: CPT | Performed by: THORACIC SURGERY (CARDIOTHORACIC VASCULAR SURGERY)

## 2022-11-07 PROCEDURE — 36430 TRANSFUSION BLD/BLD COMPNT: CPT

## 2022-11-07 PROCEDURE — 2500000003 HC RX 250 WO HCPCS

## 2022-11-07 PROCEDURE — 2500000003 HC RX 250 WO HCPCS: Performed by: THORACIC SURGERY (CARDIOTHORACIC VASCULAR SURGERY)

## 2022-11-07 PROCEDURE — 33533 CABG ARTERIAL SINGLE: CPT | Performed by: PHYSICIAN ASSISTANT

## 2022-11-07 PROCEDURE — C1729 CATH, DRAINAGE: HCPCS | Performed by: THORACIC SURGERY (CARDIOTHORACIC VASCULAR SURGERY)

## 2022-11-07 PROCEDURE — 36415 COLL VENOUS BLD VENIPUNCTURE: CPT

## 2022-11-07 PROCEDURE — 84295 ASSAY OF SERUM SODIUM: CPT

## 2022-11-07 PROCEDURE — 2500000003 HC RX 250 WO HCPCS: Performed by: ANESTHESIOLOGY

## 2022-11-07 PROCEDURE — 82803 BLOOD GASES ANY COMBINATION: CPT

## 2022-11-07 PROCEDURE — 6370000000 HC RX 637 (ALT 250 FOR IP): Performed by: THORACIC SURGERY (CARDIOTHORACIC VASCULAR SURGERY)

## 2022-11-07 PROCEDURE — 94002 VENT MGMT INPAT INIT DAY: CPT

## 2022-11-07 PROCEDURE — 06BP4ZZ EXCISION OF RIGHT SAPHENOUS VEIN, PERCUTANEOUS ENDOSCOPIC APPROACH: ICD-10-PCS | Performed by: THORACIC SURGERY (CARDIOTHORACIC VASCULAR SURGERY)

## 2022-11-07 PROCEDURE — 3700000000 HC ANESTHESIA ATTENDED CARE: Performed by: THORACIC SURGERY (CARDIOTHORACIC VASCULAR SURGERY)

## 2022-11-07 PROCEDURE — 33970 AORTIC CIRCULATION ASSIST: CPT | Performed by: THORACIC SURGERY (CARDIOTHORACIC VASCULAR SURGERY)

## 2022-11-07 PROCEDURE — 2500000003 HC RX 250 WO HCPCS: Performed by: NURSE ANESTHETIST, CERTIFIED REGISTERED

## 2022-11-07 PROCEDURE — 99231 SBSQ HOSP IP/OBS SF/LOW 25: CPT | Performed by: PHYSICIAN ASSISTANT

## 2022-11-07 PROCEDURE — 33508 ENDOSCOPIC VEIN HARVEST: CPT | Performed by: THORACIC SURGERY (CARDIOTHORACIC VASCULAR SURGERY)

## 2022-11-07 PROCEDURE — P9047 ALBUMIN (HUMAN), 25%, 50ML: HCPCS

## 2022-11-07 PROCEDURE — 99232 SBSQ HOSP IP/OBS MODERATE 35: CPT | Performed by: INTERNAL MEDICINE

## 2022-11-07 PROCEDURE — 3600000008 HC SURGERY OHS BASE: Performed by: THORACIC SURGERY (CARDIOTHORACIC VASCULAR SURGERY)

## 2022-11-07 PROCEDURE — 37799 UNLISTED PX VASCULAR SURGERY: CPT

## 2022-11-07 PROCEDURE — 02100Z9 BYPASS CORONARY ARTERY, ONE ARTERY FROM LEFT INTERNAL MAMMARY, OPEN APPROACH: ICD-10-PCS | Performed by: THORACIC SURGERY (CARDIOTHORACIC VASCULAR SURGERY)

## 2022-11-07 PROCEDURE — 85049 AUTOMATED PLATELET COUNT: CPT

## 2022-11-07 PROCEDURE — 2700000000 HC OXYGEN THERAPY PER DAY

## 2022-11-07 PROCEDURE — 85014 HEMATOCRIT: CPT

## 2022-11-07 PROCEDURE — 2780000010 HC IMPLANT OTHER: Performed by: THORACIC SURGERY (CARDIOTHORACIC VASCULAR SURGERY)

## 2022-11-07 PROCEDURE — P9041 ALBUMIN (HUMAN),5%, 50ML: HCPCS | Performed by: THORACIC SURGERY (CARDIOTHORACIC VASCULAR SURGERY)

## 2022-11-07 PROCEDURE — 85018 HEMOGLOBIN: CPT

## 2022-11-07 PROCEDURE — 84132 ASSAY OF SERUM POTASSIUM: CPT

## 2022-11-07 PROCEDURE — 3600000018 HC SURGERY OHS ADDTL 15MIN: Performed by: THORACIC SURGERY (CARDIOTHORACIC VASCULAR SURGERY)

## 2022-11-07 PROCEDURE — 82330 ASSAY OF CALCIUM: CPT

## 2022-11-07 PROCEDURE — 82948 REAGENT STRIP/BLOOD GLUCOSE: CPT

## 2022-11-07 PROCEDURE — 6360000002 HC RX W HCPCS: Performed by: NURSE ANESTHETIST, CERTIFIED REGISTERED

## 2022-11-07 PROCEDURE — 33517 CABG ARTERY-VEIN SINGLE: CPT | Performed by: THORACIC SURGERY (CARDIOTHORACIC VASCULAR SURGERY)

## 2022-11-07 PROCEDURE — 85610 PROTHROMBIN TIME: CPT

## 2022-11-07 PROCEDURE — 021009W BYPASS CORONARY ARTERY, ONE ARTERY FROM AORTA WITH AUTOLOGOUS VENOUS TISSUE, OPEN APPROACH: ICD-10-PCS | Performed by: THORACIC SURGERY (CARDIOTHORACIC VASCULAR SURGERY)

## 2022-11-07 PROCEDURE — 80048 BASIC METABOLIC PNL TOTAL CA: CPT

## 2022-11-07 PROCEDURE — 2720000010 HC SURG SUPPLY STERILE: Performed by: THORACIC SURGERY (CARDIOTHORACIC VASCULAR SURGERY)

## 2022-11-07 PROCEDURE — 71045 X-RAY EXAM CHEST 1 VIEW: CPT

## 2022-11-07 PROCEDURE — 82947 ASSAY GLUCOSE BLOOD QUANT: CPT

## 2022-11-07 PROCEDURE — 85027 COMPLETE CBC AUTOMATED: CPT

## 2022-11-07 PROCEDURE — 6360000002 HC RX W HCPCS: Performed by: THORACIC SURGERY (CARDIOTHORACIC VASCULAR SURGERY)

## 2022-11-07 PROCEDURE — 2580000003 HC RX 258: Performed by: ANESTHESIOLOGY

## 2022-11-07 PROCEDURE — 93005 ELECTROCARDIOGRAM TRACING: CPT | Performed by: THORACIC SURGERY (CARDIOTHORACIC VASCULAR SURGERY)

## 2022-11-07 PROCEDURE — 85520 HEPARIN ASSAY: CPT

## 2022-11-07 PROCEDURE — 2709999900 HC NON-CHARGEABLE SUPPLY: Performed by: THORACIC SURGERY (CARDIOTHORACIC VASCULAR SURGERY)

## 2022-11-07 PROCEDURE — 2580000003 HC RX 258

## 2022-11-07 PROCEDURE — P9045 ALBUMIN (HUMAN), 5%, 250 ML: HCPCS | Performed by: NURSE ANESTHETIST, CERTIFIED REGISTERED

## 2022-11-07 PROCEDURE — 3700000001 HC ADD 15 MINUTES (ANESTHESIA): Performed by: THORACIC SURGERY (CARDIOTHORACIC VASCULAR SURGERY)

## 2022-11-07 PROCEDURE — 33517 CABG ARTERY-VEIN SINGLE: CPT | Performed by: PHYSICIAN ASSISTANT

## 2022-11-07 PROCEDURE — 6360000002 HC RX W HCPCS

## 2022-11-07 PROCEDURE — 33970 AORTIC CIRCULATION ASSIST: CPT | Performed by: PHYSICIAN ASSISTANT

## 2022-11-07 PROCEDURE — 94761 N-INVAS EAR/PLS OXIMETRY MLT: CPT

## 2022-11-07 RX ORDER — FAMOTIDINE 10 MG/ML
20 INJECTION, SOLUTION INTRAVENOUS 2 TIMES DAILY
Status: DISCONTINUED | OUTPATIENT
Start: 2022-11-07 | End: 2022-11-07

## 2022-11-07 RX ORDER — PROPOFOL 10 MG/ML
INJECTION, EMULSION INTRAVENOUS PRN
Status: DISCONTINUED | OUTPATIENT
Start: 2022-11-07 | End: 2022-11-07 | Stop reason: SDUPTHER

## 2022-11-07 RX ORDER — SENNA AND DOCUSATE SODIUM 50; 8.6 MG/1; MG/1
1 TABLET, FILM COATED ORAL 2 TIMES DAILY
Status: DISCONTINUED | OUTPATIENT
Start: 2022-11-07 | End: 2022-11-15 | Stop reason: HOSPADM

## 2022-11-07 RX ORDER — AMIODARONE HYDROCHLORIDE 200 MG/1
200 TABLET ORAL 2 TIMES DAILY
Status: DISCONTINUED | OUTPATIENT
Start: 2022-11-07 | End: 2022-11-09

## 2022-11-07 RX ORDER — FAMOTIDINE 10 MG/ML
20 INJECTION, SOLUTION INTRAVENOUS DAILY
Status: DISCONTINUED | OUTPATIENT
Start: 2022-11-07 | End: 2022-11-08

## 2022-11-07 RX ORDER — ROCURONIUM BROMIDE 10 MG/ML
INJECTION, SOLUTION INTRAVENOUS PRN
Status: DISCONTINUED | OUTPATIENT
Start: 2022-11-07 | End: 2022-11-07 | Stop reason: SDUPTHER

## 2022-11-07 RX ORDER — SODIUM CHLORIDE, SODIUM GLUCONATE, SODIUM ACETATE, POTASSIUM CHLORIDE AND MAGNESIUM CHLORIDE 526; 502; 368; 37; 30 MG/100ML; MG/100ML; MG/100ML; MG/100ML; MG/100ML
INJECTION, SOLUTION INTRAVENOUS CONTINUOUS PRN
Status: DISCONTINUED | OUTPATIENT
Start: 2022-11-07 | End: 2022-11-07 | Stop reason: SDUPTHER

## 2022-11-07 RX ORDER — MULTIVITAMIN WITH IRON
1 TABLET ORAL
Status: DISCONTINUED | OUTPATIENT
Start: 2022-11-08 | End: 2022-11-15 | Stop reason: HOSPADM

## 2022-11-07 RX ORDER — ACETAMINOPHEN 325 MG/1
650 TABLET ORAL EVERY 4 HOURS PRN
Status: DISCONTINUED | OUTPATIENT
Start: 2022-11-07 | End: 2022-11-15 | Stop reason: HOSPADM

## 2022-11-07 RX ORDER — MIDAZOLAM HYDROCHLORIDE 1 MG/ML
INJECTION INTRAMUSCULAR; INTRAVENOUS PRN
Status: DISCONTINUED | OUTPATIENT
Start: 2022-11-07 | End: 2022-11-07 | Stop reason: SDUPTHER

## 2022-11-07 RX ORDER — PROTAMINE SULFATE 10 MG/ML
50 INJECTION, SOLUTION INTRAVENOUS
Status: DISCONTINUED | OUTPATIENT
Start: 2022-11-07 | End: 2022-11-08

## 2022-11-07 RX ORDER — SODIUM CHLORIDE 9 MG/ML
INJECTION, SOLUTION INTRAVENOUS PRN
Status: DISCONTINUED | OUTPATIENT
Start: 2022-11-07 | End: 2022-11-08

## 2022-11-07 RX ORDER — MORPHINE SULFATE 2 MG/ML
2 INJECTION, SOLUTION INTRAMUSCULAR; INTRAVENOUS
Status: DISCONTINUED | OUTPATIENT
Start: 2022-11-07 | End: 2022-11-08 | Stop reason: ALTCHOICE

## 2022-11-07 RX ORDER — PHENYLEPHRINE HYDROCHLORIDE 10 MG/ML
INJECTION INTRAVENOUS PRN
Status: DISCONTINUED | OUTPATIENT
Start: 2022-11-07 | End: 2022-11-07 | Stop reason: SDUPTHER

## 2022-11-07 RX ORDER — ALBUTEROL SULFATE 90 UG/1
2 AEROSOL, METERED RESPIRATORY (INHALATION) EVERY 6 HOURS PRN
Status: DISCONTINUED | OUTPATIENT
Start: 2022-11-07 | End: 2022-11-08

## 2022-11-07 RX ORDER — ENOXAPARIN SODIUM 100 MG/ML
40 INJECTION SUBCUTANEOUS DAILY
Status: DISCONTINUED | OUTPATIENT
Start: 2022-11-08 | End: 2022-11-15 | Stop reason: HOSPADM

## 2022-11-07 RX ORDER — ATORVASTATIN CALCIUM 40 MG/1
40 TABLET, FILM COATED ORAL NIGHTLY
Status: DISCONTINUED | OUTPATIENT
Start: 2022-11-08 | End: 2022-11-15 | Stop reason: HOSPADM

## 2022-11-07 RX ORDER — ENALAPRILAT 2.5 MG/2ML
0.62 INJECTION INTRAVENOUS
Status: DISCONTINUED | OUTPATIENT
Start: 2022-11-07 | End: 2022-11-08

## 2022-11-07 RX ORDER — HEPARIN SODIUM 1000 [USP'U]/ML
INJECTION, SOLUTION INTRAVENOUS; SUBCUTANEOUS PRN
Status: DISCONTINUED | OUTPATIENT
Start: 2022-11-07 | End: 2022-11-07 | Stop reason: SDUPTHER

## 2022-11-07 RX ORDER — FENTANYL CITRATE 50 UG/ML
INJECTION, SOLUTION INTRAMUSCULAR; INTRAVENOUS PRN
Status: DISCONTINUED | OUTPATIENT
Start: 2022-11-07 | End: 2022-11-07 | Stop reason: SDUPTHER

## 2022-11-07 RX ORDER — PROMETHAZINE HYDROCHLORIDE 25 MG/ML
12.5 INJECTION, SOLUTION INTRAMUSCULAR; INTRAVENOUS EVERY 6 HOURS PRN
Status: DISCONTINUED | OUTPATIENT
Start: 2022-11-07 | End: 2022-11-15 | Stop reason: HOSPADM

## 2022-11-07 RX ORDER — OXYCODONE HYDROCHLORIDE 5 MG/1
5 TABLET ORAL EVERY 4 HOURS PRN
Status: DISCONTINUED | OUTPATIENT
Start: 2022-11-07 | End: 2022-11-15 | Stop reason: HOSPADM

## 2022-11-07 RX ORDER — SENNOSIDES 8.8 MG/5ML
10 LIQUID ORAL DAILY PRN
Status: DISCONTINUED | OUTPATIENT
Start: 2022-11-07 | End: 2022-11-15 | Stop reason: HOSPADM

## 2022-11-07 RX ORDER — ALBUMIN, HUMAN INJ 5% 5 %
25 SOLUTION INTRAVENOUS PRN
Status: DISCONTINUED | OUTPATIENT
Start: 2022-11-07 | End: 2022-11-08

## 2022-11-07 RX ORDER — ONDANSETRON 2 MG/ML
4 INJECTION INTRAMUSCULAR; INTRAVENOUS EVERY 6 HOURS PRN
Status: DISCONTINUED | OUTPATIENT
Start: 2022-11-07 | End: 2022-11-15 | Stop reason: HOSPADM

## 2022-11-07 RX ORDER — OXYCODONE HYDROCHLORIDE 5 MG/1
10 TABLET ORAL EVERY 4 HOURS PRN
Status: DISCONTINUED | OUTPATIENT
Start: 2022-11-07 | End: 2022-11-15 | Stop reason: HOSPADM

## 2022-11-07 RX ORDER — SODIUM CHLORIDE 0.9 % (FLUSH) 0.9 %
5-40 SYRINGE (ML) INJECTION PRN
Status: DISCONTINUED | OUTPATIENT
Start: 2022-11-07 | End: 2022-11-15 | Stop reason: HOSPADM

## 2022-11-07 RX ORDER — POTASSIUM CHLORIDE 29.8 MG/ML
20 INJECTION INTRAVENOUS PRN
Status: DISCONTINUED | OUTPATIENT
Start: 2022-11-07 | End: 2022-11-08

## 2022-11-07 RX ORDER — ATORVASTATIN CALCIUM 20 MG/1
20 TABLET, FILM COATED ORAL NIGHTLY
Status: DISCONTINUED | OUTPATIENT
Start: 2022-11-08 | End: 2022-11-07

## 2022-11-07 RX ORDER — MAGNESIUM SULFATE IN WATER 40 MG/ML
2000 INJECTION, SOLUTION INTRAVENOUS PRN
Status: DISCONTINUED | OUTPATIENT
Start: 2022-11-07 | End: 2022-11-08

## 2022-11-07 RX ORDER — NITROGLYCERIN 20 MG/100ML
INJECTION INTRAVENOUS PRN
Status: DISCONTINUED | OUTPATIENT
Start: 2022-11-07 | End: 2022-11-07 | Stop reason: SDUPTHER

## 2022-11-07 RX ORDER — PROTAMINE SULFATE 10 MG/ML
INJECTION, SOLUTION INTRAVENOUS PRN
Status: DISCONTINUED | OUTPATIENT
Start: 2022-11-07 | End: 2022-11-07 | Stop reason: SDUPTHER

## 2022-11-07 RX ORDER — ALBUMIN (HUMAN) 12.5 G/50ML
SOLUTION INTRAVENOUS
Status: DISCONTINUED
Start: 2022-11-07 | End: 2022-11-07 | Stop reason: WASHOUT

## 2022-11-07 RX ORDER — DEXTROSE MONOHYDRATE 100 MG/ML
INJECTION, SOLUTION INTRAVENOUS CONTINUOUS PRN
Status: DISCONTINUED | OUTPATIENT
Start: 2022-11-07 | End: 2022-11-08

## 2022-11-07 RX ORDER — AMINOCAPROIC ACID 250 MG/ML
INJECTION, SOLUTION INTRAVENOUS PRN
Status: DISCONTINUED | OUTPATIENT
Start: 2022-11-07 | End: 2022-11-07 | Stop reason: SDUPTHER

## 2022-11-07 RX ORDER — SODIUM CHLORIDE 9 MG/ML
INJECTION, SOLUTION INTRAVENOUS CONTINUOUS
Status: DISCONTINUED | OUTPATIENT
Start: 2022-11-07 | End: 2022-11-08

## 2022-11-07 RX ORDER — ALBUMIN, HUMAN INJ 5% 5 %
SOLUTION INTRAVENOUS PRN
Status: DISCONTINUED | OUTPATIENT
Start: 2022-11-07 | End: 2022-11-07 | Stop reason: SDUPTHER

## 2022-11-07 RX ORDER — FAMOTIDINE 20 MG/1
20 TABLET, FILM COATED ORAL 2 TIMES DAILY
Status: DISCONTINUED | OUTPATIENT
Start: 2022-11-07 | End: 2022-11-07

## 2022-11-07 RX ORDER — PAPAVERINE HYDROCHLORIDE 30 MG/ML
INJECTION INTRAMUSCULAR; INTRAVENOUS PRN
Status: DISCONTINUED | OUTPATIENT
Start: 2022-11-07 | End: 2022-11-07 | Stop reason: HOSPADM

## 2022-11-07 RX ORDER — SODIUM CHLORIDE 0.9 % (FLUSH) 0.9 %
5-40 SYRINGE (ML) INJECTION EVERY 12 HOURS SCHEDULED
Status: DISCONTINUED | OUTPATIENT
Start: 2022-11-07 | End: 2022-11-15 | Stop reason: HOSPADM

## 2022-11-07 RX ORDER — METOPROLOL TARTRATE 5 MG/5ML
2.5 INJECTION INTRAVENOUS EVERY 10 MIN PRN
Status: DISCONTINUED | OUTPATIENT
Start: 2022-11-07 | End: 2022-11-08

## 2022-11-07 RX ORDER — FAMOTIDINE 20 MG/1
20 TABLET, FILM COATED ORAL DAILY
Status: DISCONTINUED | OUTPATIENT
Start: 2022-11-07 | End: 2022-11-08

## 2022-11-07 RX ORDER — ONDANSETRON 4 MG/1
4 TABLET, ORALLY DISINTEGRATING ORAL EVERY 8 HOURS PRN
Status: DISCONTINUED | OUTPATIENT
Start: 2022-11-07 | End: 2022-11-15 | Stop reason: HOSPADM

## 2022-11-07 RX ADMIN — PROTAMINE SULFATE 180 MG: 10 INJECTION, SOLUTION INTRAVENOUS at 11:31

## 2022-11-07 RX ADMIN — MORPHINE SULFATE 2 MG: 2 INJECTION, SOLUTION INTRAMUSCULAR; INTRAVENOUS at 17:04

## 2022-11-07 RX ADMIN — SODIUM CHLORIDE: 9 INJECTION, SOLUTION INTRAVENOUS at 13:27

## 2022-11-07 RX ADMIN — ALBUMIN (HUMAN) 25 G: 12.5 INJECTION, SOLUTION INTRAVENOUS at 13:29

## 2022-11-07 RX ADMIN — HEPARIN SODIUM 23000 UNITS: 1000 INJECTION, SOLUTION INTRAVENOUS; SUBCUTANEOUS at 09:55

## 2022-11-07 RX ADMIN — POTASSIUM CHLORIDE 20 MEQ: 29.8 INJECTION, SOLUTION INTRAVENOUS at 21:53

## 2022-11-07 RX ADMIN — OXYCODONE 10 MG: 5 TABLET ORAL at 14:04

## 2022-11-07 RX ADMIN — SODIUM CHLORIDE, PRESERVATIVE FREE 10 ML: 5 INJECTION INTRAVENOUS at 21:03

## 2022-11-07 RX ADMIN — ALBUMIN (HUMAN) 25 G: 12.5 INJECTION, SOLUTION INTRAVENOUS at 18:16

## 2022-11-07 RX ADMIN — MORPHINE SULFATE 2 MG: 2 INJECTION, SOLUTION INTRAMUSCULAR; INTRAVENOUS at 13:52

## 2022-11-07 RX ADMIN — AMINOCAPROIC ACID 1 G/HR: 250 INJECTION, SOLUTION INTRAVENOUS at 12:15

## 2022-11-07 RX ADMIN — ROCURONIUM BROMIDE 50 MG: 10 INJECTION INTRAVENOUS at 08:17

## 2022-11-07 RX ADMIN — FENTANYL CITRATE 50 MCG: 50 INJECTION INTRAMUSCULAR; INTRAVENOUS at 09:17

## 2022-11-07 RX ADMIN — CHLORHEXIDINE GLUCONATE: 4 SOLUTION TOPICAL at 06:54

## 2022-11-07 RX ADMIN — ROCURONIUM BROMIDE 40 MG: 10 INJECTION INTRAVENOUS at 10:18

## 2022-11-07 RX ADMIN — NITROGLYCERIN 80 MCG: 20 INJECTION INTRAVENOUS at 09:20

## 2022-11-07 RX ADMIN — ALBUMIN (HUMAN) 25 G: 12.5 INJECTION, SOLUTION INTRAVENOUS at 16:27

## 2022-11-07 RX ADMIN — ALBUMIN (HUMAN) 250 ML: 12.5 INJECTION, SOLUTION INTRAVENOUS at 11:57

## 2022-11-07 RX ADMIN — PHENYLEPHRINE HYDROCHLORIDE 200 MCG: 10 INJECTION INTRAVENOUS at 08:39

## 2022-11-07 RX ADMIN — HEPARIN SODIUM 10000 UNITS: 1000 INJECTION, SOLUTION INTRAVENOUS; SUBCUTANEOUS at 10:14

## 2022-11-07 RX ADMIN — SODIUM CHLORIDE, SODIUM GLUCONATE, SODIUM ACETATE, POTASSIUM CHLORIDE AND MAGNESIUM CHLORIDE: 526; 502; 368; 37; 30 INJECTION, SOLUTION INTRAVENOUS at 08:01

## 2022-11-07 RX ADMIN — ONDANSETRON 4 MG: 2 INJECTION INTRAMUSCULAR; INTRAVENOUS at 17:03

## 2022-11-07 RX ADMIN — AMINOCAPROIC ACID 5000 MG: 250 INJECTION, SOLUTION INTRAVENOUS at 09:27

## 2022-11-07 RX ADMIN — PHENYLEPHRINE HYDROCHLORIDE 200 MCG: 10 INJECTION INTRAVENOUS at 10:21

## 2022-11-07 RX ADMIN — PHENYLEPHRINE HYDROCHLORIDE 200 MCG: 10 INJECTION INTRAVENOUS at 09:53

## 2022-11-07 RX ADMIN — CALCIUM CHLORIDE 1000 MG: 100 INJECTION, SOLUTION INTRAVENOUS at 16:16

## 2022-11-07 RX ADMIN — FENTANYL CITRATE 50 MCG: 50 INJECTION INTRAMUSCULAR; INTRAVENOUS at 09:30

## 2022-11-07 RX ADMIN — PROPOFOL 70 MG: 10 INJECTION, EMULSION INTRAVENOUS at 08:17

## 2022-11-07 RX ADMIN — CEFAZOLIN 2000 MG: 10 INJECTION, POWDER, FOR SOLUTION INTRAVENOUS at 17:29

## 2022-11-07 RX ADMIN — PHENYLEPHRINE HYDROCHLORIDE 200 MCG: 10 INJECTION INTRAVENOUS at 10:19

## 2022-11-07 RX ADMIN — PHENYLEPHRINE HYDROCHLORIDE 200 MCG: 10 INJECTION INTRAVENOUS at 08:22

## 2022-11-07 RX ADMIN — FENTANYL CITRATE 100 MCG: 50 INJECTION INTRAMUSCULAR; INTRAVENOUS at 09:22

## 2022-11-07 RX ADMIN — VASOPRESSIN 0.04 UNITS/MIN: 20 INJECTION INTRAVENOUS at 08:24

## 2022-11-07 RX ADMIN — PHENYLEPHRINE HYDROCHLORIDE 200 MCG: 10 INJECTION INTRAVENOUS at 10:23

## 2022-11-07 RX ADMIN — MORPHINE SULFATE 2 MG: 2 INJECTION, SOLUTION INTRAMUSCULAR; INTRAVENOUS at 13:39

## 2022-11-07 RX ADMIN — FENTANYL CITRATE 100 MCG: 50 INJECTION INTRAMUSCULAR; INTRAVENOUS at 08:17

## 2022-11-07 RX ADMIN — FENTANYL CITRATE 50 MCG: 50 INJECTION INTRAMUSCULAR; INTRAVENOUS at 09:26

## 2022-11-07 RX ADMIN — ALBUMIN (HUMAN) 25 G: 12.5 INJECTION, SOLUTION INTRAVENOUS at 13:37

## 2022-11-07 RX ADMIN — FENTANYL CITRATE 150 MCG: 50 INJECTION INTRAMUSCULAR; INTRAVENOUS at 10:14

## 2022-11-07 RX ADMIN — MORPHINE SULFATE 2 MG: 2 INJECTION, SOLUTION INTRAMUSCULAR; INTRAVENOUS at 16:04

## 2022-11-07 RX ADMIN — MORPHINE SULFATE 2 MG: 2 INJECTION, SOLUTION INTRAMUSCULAR; INTRAVENOUS at 13:22

## 2022-11-07 RX ADMIN — CEFAZOLIN SODIUM 2000 MG: 10 INJECTION, POWDER, FOR SOLUTION INTRAVENOUS at 08:42

## 2022-11-07 RX ADMIN — 0.12% CHLORHEXIDINE GLUCONATE 15 ML: 1.2 RINSE ORAL at 06:53

## 2022-11-07 RX ADMIN — NITROGLYCERIN 80 MCG: 20 INJECTION INTRAVENOUS at 09:22

## 2022-11-07 RX ADMIN — PHENYLEPHRINE HYDROCHLORIDE 200 MCG: 10 INJECTION INTRAVENOUS at 08:48

## 2022-11-07 RX ADMIN — PROMETHAZINE HYDROCHLORIDE 12.5 MG: 25 INJECTION INTRAMUSCULAR; INTRAVENOUS at 22:17

## 2022-11-07 RX ADMIN — MIDAZOLAM 3 MG: 1 INJECTION INTRAMUSCULAR; INTRAVENOUS at 08:01

## 2022-11-07 RX ADMIN — FAMOTIDINE 20 MG: 10 INJECTION INTRAVENOUS at 15:33

## 2022-11-07 RX ADMIN — ALBUMIN (HUMAN) 25 G: 12.5 INJECTION, SOLUTION INTRAVENOUS at 23:16

## 2022-11-07 RX ADMIN — MIDAZOLAM 2 MG: 1 INJECTION INTRAMUSCULAR; INTRAVENOUS at 08:17

## 2022-11-07 RX ADMIN — Medication 5 MCG/MIN: at 18:19

## 2022-11-07 RX ADMIN — PHENYLEPHRINE HYDROCHLORIDE 200 MCG: 10 INJECTION INTRAVENOUS at 08:24

## 2022-11-07 RX ADMIN — POTASSIUM CHLORIDE 20 MEQ: 29.8 INJECTION, SOLUTION INTRAVENOUS at 23:05

## 2022-11-07 RX ADMIN — Medication 5 MCG/MIN: at 08:49

## 2022-11-07 RX ADMIN — MORPHINE SULFATE 2 MG: 2 INJECTION, SOLUTION INTRAMUSCULAR; INTRAVENOUS at 14:02

## 2022-11-07 ASSESSMENT — ENCOUNTER SYMPTOMS
SHORTNESS OF BREATH: 0
VOMITING: 0

## 2022-11-07 ASSESSMENT — PULMONARY FUNCTION TESTS
PIF_VALUE: 17
PIF_VALUE: 1
PIF_VALUE: 17
PIF_VALUE: 15

## 2022-11-07 ASSESSMENT — LIFESTYLE VARIABLES: SMOKING_STATUS: 1

## 2022-11-07 NOTE — ANESTHESIA PROCEDURE NOTES
Arterial Line:    An arterial line was placed using surface landmarks, in the OR for the following indication(s): continuous blood pressure monitoring and blood sampling needed. A 20 gauge (size) (length), Arrow (type) catheter was placed, Seldinger technique used, into the left radial artery, secured by suture, tape and Tegaderm. Anesthesia type: Local  Local infiltration: Injection    Events:  patient tolerated procedure well with no complications.   Anesthesiologist: Jeff Dalton MD  Performed: Anesthesiologist   Preanesthetic Checklist  Completed: patient identified, IV checked, site marked, risks and benefits discussed, surgical/procedural consents, equipment checked, pre-op evaluation, timeout performed, anesthesia consent given, oxygen available, monitors applied/VS acknowledged, fire risk safety assessment completed and verbalized and blood product R/B/A discussed and consented

## 2022-11-07 NOTE — ANESTHESIA PROCEDURE NOTES
Procedure Performed: JOY       Start Time:        End Time:      Preanesthesia Checklist:  Patient identified, IV assessed, risks and benefits discussed, monitors and equipment assessed, procedure being performed at surgeon's request and anesthesia consent obtained. General Procedure Information  Diagnostic Indications for Echo:  assessment of ascending aorta, assessment of surgical repair, hemodynamic monitoring and assessment of valve function  Physician Requesting Echo: Lata Holland MD  CPT Code:  [id number] [id number]  Location performed:  OR  Intubated  Heart visualized  Probe Type:  3D  Modalities:  3D, color flow mapping, continuous wave Doppler and pulse wave Doppler    Echocardiographic and Doppler Measurements    Ventricles    Right Ventricle:  Cavity size normal.    Left Ventricle:  Cavity size normal.  Thrombus not present. Global Function severely impaired. Ejection Fraction 15%. Ventricular Regional Function:  1- Basal Anteroseptal:  hypokinetic  2- Basal Anterior:  hypokinetic  3- Basal Anterolateral:  hypokinetic  4- Basal Inferolateral:  hypokinetic  5- Basal Inferior:  hypokinetic  6- Basal Inferoseptal:  hypokinetic  7- Mid Anteroseptal:  hypokinetic  8- Mid Anterior:  hypokinetic  9- Mid Anterolateral:  hypokinetic  10- Mid Inferolateral:  hypokinetic  11- Mid Inferior:  hypokinetic  12- Mid Inferoseptal:  hypokinetic  13- Apical Anterior:  hypokinetic  14- Apical Lateral:  hypokinetic  15- Apical Inferior:  hypokinetic  16- Apical Septal:  hypokinetic  17- [place]:  hypokinetic      Valves    Aortic Valve: Annulus normal.  Stenosis not present. Regurgitation none. Leaflets normal.  Leaflet motions normal.      Mitral Valve: Annulus normal.  Stenosis not present. Regurgitation mild. Leaflets normal.  Leaflet motions normal.      Tricuspid Valve: Annulus normal.  Stenosis not present. Regurgitation mild. Leaflets normal.  Leaflet motions normal.    Pulmonic Valve:   Annulus normal. Stenosis not present. Regurgitation mild. Aorta    Ascending Aorta:  Size normal.    Other Aortic Findings: Moderate amount of atheroma present in descending aorta    Atria    Right Atrium:  Size normal.    Left Atrium:  Size normal.  Left atrial appendage normal.      Septa    Atrial Septum:  Intra-atrial septal morphology normal.      Ventricular Septum:  Intra-ventricular septum morphology normal.          Other Findings  Pericardium:  normal      Anesthesia Information  Performed Personally  Anesthesiologist:  Joel Wooten MD    Post Intervention Follow-up Study  Aortic Function: unchangedMitral Function: unchangedTricuspid Function: unchangedNoneComments: All findings discussed with Dr. Kirsty Bolaños intraoperatively.

## 2022-11-07 NOTE — OP NOTE
DATE: 11/07/22    PATIENT: Manisha Chester    MRN: [id number]     Acct: [de-identified]    PREOP DIAGNOSIS:   Coronary artery disease    Postop diagnosis:  Coronary artery disease    Procedure:  1) Coronary artery bypass grafting x 2, with the left internal mammary artery grafted to the left anterior descending artery, a reversed greater saphenous aortocoronary vein graft to the  ramus intermedius coronary artery  2) Endoscopic greater saphenous vein harvest  3) Intraoperative coronary angiography of all distal anastomoses  4) Insertion of a left common femoral intra-aortic balloon pump    SURGEON:  Handy Law MD    FIRST ASSISTANT OF MEDICAL NECESSITY: PAL Magana    DUE TO THE COMPLEXITY OF THE CASE, THE FIRST ASSISTANT WAS REQUIRED THROUGHOUT THE OPERATION FOR ENDOSCOPIC VEIN HARVEST AND FOR THE CRITICAL PORTIONS OF THE PROCEDURE    INDICATION:  The patient is a 63y.o. year-old male who presents with systolic congestive heart failure and a dilated cardiomyopathy in the context of double vessel coronary disease. FINDNGS: There were no intrapericardial adhesions. The mammary artery and saphenous vein were of good caliber. The coronary targets were approximately 2 mm in diameter. Insertion of the IABP was planned in advance, given the patient's severe cardiomyopathy (baseline LVEF 20%). Post-CPB JOY showed the LVEF to have improved to 30%. CARDIOPULMONARY BYPASS TIME: 61    CROSSCLAMP TIME: 50    ESTIMATED BLOOD LOSS: Minimal (Cardiopulmonary bypass/cell saver)    DESCRIPTION:  The patient was prepped and draped in sterile fashion in the supine position. A formal time-out was performed. Using Seldinger technique, the left common femoral artery was cannulated with a 6 Fr introducer. Under JOY guidance, a guidewire was advanced to the aortic arch. The balloon was advanced under JOY guidance and positioned in the proximal descending aorta. Heparin was administered.  Working in two teams, the chest was opened, while a segment of greater saphenous vein was harvested using endoscopic techniques from the right thigh. The left internal mammary artery was mobilized from the chest wall and divided at its distal bifurcation. A retractor was placed, and a pericardial well was created. The ascending aorta was palpated and found to be free of calcifications. An attempt was made to perform the procedure without cardiopulmonary bypass. However, the LAD was found to be subendocardial in the proximal 2/3 of its course, and the LIMA bifurcated somewhat high. Thus, the length of the LIMA, while certainly adequate with the heart in its normal position in the posterior pericardium, was of insufficient length to allow for the usual sequence in off-pump CABG procedure of grafting the LAD first, without risking undue tension on the LIMA itself (which was harvested in skeletonized fashion). The proximal arch was cannulated with a 21 mm arterial cannula. A double stage venous cannula was inserted into the right atrium. An antegrade cardioplegia cannula was placed in the mid distal ascending aorta. Cardiopulmonary bypass was initiated. The coronary targets were inspected. The aorta was crossclamped. 1500 ml of cold blood cardioplegia was administered antegrade. Thereafter, at 20 minute intervals throughout the crossclamped portion of the operation, cold blood cardioplegia was administered down the completed vein graft, and/or the aortic root. The lateral wall was exposed. An arteriotomy was made in the mid ramus intermedius coronary artery, and a segment of reversed greater saphenous vein was anastomosed in end to side fashion using running 7-0 Prolene.   Patency was confirmed with the direct injection of fluorescein contrast into the vein graft under real-time coronary angiography, showing excellent flow into the native coronary, followed by direct administration of cardioplegia down the graft at a rate of 80 ml/min. The anterolateral wall was exposed. The appropriately trimmed left internal mammary artery was anastomosed in end to side fashion to the mid proximal left anterior descending coronary artery using running 7-0 Prolene. Patency was confirmed with the injection into the cardiopulmonary bypass circuit of fluorescein contrast under real-time coronary angiography, showing excellent flow into the native coronary. The adventitia was trimmed off the mid ascending aorta. A 4.4 mm punch aortotomy was made. The appropriately trimmed proximal end of the vein graft was anastomosed to the ascending aorta using running 6-0 Prolene. The patient was then placed in steep Trendelenburg position with the aortic root vent turned on full and the right filled, so as to de-air the left heart. The crossclamp was removed, and the vein graft was de-aired. Atrial and ventricular pacing wires were placed. The patient weaned readily from cardiopulmonary bypass with excellent contractility. Protamine was administered. The patient was de-cannulated. All sites were verified as hemostatic. Chest tubes were placed in the mediastinum and the pleural cavities. The chest was closed using #7 wire for the sternum, followed by the usual 3-layer soft tissue closure. The patient transferred to the ICU in stable condition.

## 2022-11-07 NOTE — PROGRESS NOTES
thickening of bladder, Moderate constipation, Mod sized bilateral pleural effusions w/ atelectasis  Nephrology following  Avoid Renal toxic agents    Urinary Retention with Gross Hematuria  Urology consulted  [id number] Carley Coelho to continue anticoagulation  Hematuria likely secondary to traumatic crum insertion  Discharge with follow and follow up OP for cystoscopy  Signed off  Crum catheter placed  Flomax started (11/1)    NIDDM  Last A1C 7.8 (8/23/22)  Low dose SSI w/ hypoglycemic protocols  FBG elevated- start Lantus 7 units 11/3- FBG good since  Home meds Dulaglutide, Glimepiride, Empagliflozin on hold    Essential HTN  Lisinopril discontinued, Entresto following heart cath  Metoprolol added by cardiology, monitor blood pressure    HLD  Continue home Atorvastatin    GERD  Continue Protonix  Smoking cessation  Patient states that he is currently attempting to quit smoking. Started Wellbutrin approximately 1 and half weeks ago  He has reduced his amount of smoking from 1 PPD to 0.5 PPD. Patient also states that he has not smoked since Thursday evening, 10/27  Continue home Wellbutrin  Do not give nicotine patch unless requested by patient  Generalized Anxiety Disorder  Not on any home meds for this per chart review    Hospital Course:  HPI Provided by Chart Review    \"Len Slater is a 63 y.o. male with PMHx of diabetes mellitus, HTN, HLD, GERD who presents to [address] [place] [address] with shortness of breath. Patient states at approximately 8:30 PM on the date of initial presentation to the ED he began having shortness of breath while he was at rest watching TV. Patient states that the shortness of breath continued to progress which is why he decided to present to the ED. Patient states that he fractured his ribs on 10/5 due to falling in the shower and he has been \"taking it easy\". Patient denies any current pain from his rib fractures.   At time of examination patient states that he is barely short of breath, which he now attributes to some anxiety due to being hospitalized. Patient notes that the DuoNeb he received in the ED helped with his shortness of breath. Patient denies chest pain, palpitations, LE swelling, N/V/C/D. Patient states that he has no prior cardiac history or pulmonary history. Patient denies any fatigue. Patient did see his primary care doctor on 10/27, and due to mild pulmonary edema present on CXR, echo and stress test were ordered, however these have not been completed. Patient states that he is currently attempting to quit smoking, and he has been smoking for approximately 42 years. Patient states that he previously smoked 1 PPD. Patient was started on Wellbutrin approximately 1 and 1/2 weeks ago to aid in smoking cessation. Patient is that he currently smokes 0.5 PPD. Patient states that he has not smoked a cigarette since Thursday night, 10/27. Patient states that he currently drinks an average of 2 beers per day, however he states his last beer was approximately 3 to 4 weeks ago. Patient states that when he was younger he would drink alcohol heavily. Patient also reports excessive caffeine intake, stating he drinks approximately 4 cups of coffee per day and approximately 3 20 ounce bottles of diet Mountain Dew per day. Patient also reports tremors. Patient states that these have been present \"on and off\" for the past few years. Patient's wife is at bedside and states that she has noticed the tremors to be worsened on day of admission. Patient states this is due to anxiety from hospitalization and DuoNeb that he received in the ED. ED course: Initial vitals include /91, heart rate 107, respiratory rate 16, temperature 97.7 °F, SPO2 97% on room air. Initial labs include creatinine 1.3, proBNP 8508.0, troponin 0.039. CXR shows mild to moderate pulmonary edema. CTA chest shows no pulmonary embolism, moderate pulmonary edema.   Patient was given 1 L NS bolus, aspirin 324 mg, Lasix 20 mg IV in the ED. Patient will be admitted to [place] [address] for further management of new onset congestive heart failure. \"    10/30/22  Cardiology following  Plan for cath tomorrow    10/31/22  Patient cath postponed to tomorrow  Nephrology following  Hold diuretics    11/01/22  Patient underwent heart cath  Urology consulted for gross hematuria    (11/2)  Patient was resting comfortably in bed upon entering the room. He is complaining of no symptoms and states he feels much better. Denies pain, chest pain, palpitations, shortness of breath, numbness or tingling, nausea or vomiting, bowel irregularities, difficulty w/ ADLs, or any new symptoms. Currently has indwelling crum w/ 500 mL of dark yellow urine. (11/3)  Patient was resting comfortably in bed upon entering the room. He is not having any pain and has no complaints. Denies pain, chest pain, palpitations, shortness of breath, numbness or tingling, nausea or vomiting, bowel or urinary irregularities, difficulty with ADLs, or any new symptoms. Currently has indwelling crum w/ 400mL of yellow urine. 11/4  No changes overnight . PET myocardial study scheduled for today   FBG much improved today with lantus 7U    11/5  Labs look good- CR at baseline. FBG within range. Myocardial study with evidence of non viable aspect of anterior LV   VSS    11/6  Cardio recommended AGAINST PCI.  Planned for CABG and IABP tomorrow   VSS-     11/7   Patient in [address] [place] [address] currently for CABG with IABP  Mild worsening of LC today from 1.4 to 1.8 - nephrology continues to follow  VSS prior to procedure and blood glucose acceptable      Medications:    Infusion Medications    sodium chloride      sodium chloride      insulin      dextrose       Scheduled Medications    sodium chloride flush  5-40 mL IntraVENous 2 times per day    [START ON 11/8/2022] enoxaparin  40 mg SubCUTAneous Daily    [START ON 11/8/2022] aspirin  325 mg Oral Daily    amiodarone  200 mg Oral BID [START ON 11/8/2022] multivitamin  1 tablet Oral Daily with breakfast    sennosides-docusate sodium  1 tablet Oral BID    metoprolol tartrate  25 mg Oral BID    [START ON 11/8/2022] atorvastatin  20 mg Oral Nightly    famotidine  20 mg Oral BID    Or    famotidine (PEPCID) injection  20 mg IntraVENous BID    ceFAZolin (ANCEF) IVPB  2,000 mg IntraVENous Q8H     PRN Meds: protamine, sodium chloride flush, sodium chloride, ondansetron **OR** ondansetron, acetaminophen, oxyCODONE **OR** oxyCODONE, morphine, enalaprilat, metoprolol, sodium bicarbonate, magnesium hydroxide, senna, potassium chloride, magnesium sulfate, calcium chloride IVPB **OR** calcium chloride IVPB, albuterol sulfate HFA, albumin human, glucose, dextrose bolus **OR** dextrose bolus, glucagon (rDNA), dextrose      Intake/Output Summary (Last 24 hours) at 11/7/2022 1248  Last data filed at 11/7/2022 1207  Gross per 24 hour   Intake 1075 ml   Output 2325 ml   Net -1250 ml         Diet:  ADULT DIET; Clear Liquid    Review of Systems   Respiratory:  Negative for shortness of breath. Cardiovascular:  Negative for chest pain. Gastrointestinal:  Negative for vomiting.       Exam:  BP (!) 131/59   Pulse 64   Temp 97.9 °F (36.6 °C) (Oral)   Resp 18   Ht 5' 10\" (1.778 m)   Wt 131 lb 12.8 oz (59.8 kg)   SpO2 98%   BMI 18.91 kg/m²      PATIENT WAS KOMAL DURING ROUNDING         Labs:   Recent Labs     11/05/22  0649 11/06/22  0910 11/07/22  0910 11/07/22  1110   WBC 7.6 7.3  --   --    HGB 10.8* 12.1* 11.1* 7.1*   HCT 34.3* 38.4* 33.5* 21.6*    280 282 183       Recent Labs     11/05/22  0649 11/06/22  0910 11/07/22  0600 11/07/22  0916 11/07/22  1038 11/07/22  1120 11/07/22  1138    133* 137   < > 134* 134* 134*   K 4.2 4.5 4.8   < > 4.8 5.0* 4.4    95* 98  --   --   --   --    CO2 28 26 28  --   --   --   --    BUN 23* 25* 30*  --   --   --   --    CREATININE 1.4* 1.4* 1.8*  --   --   --   --    CALCIUM 8.8 9.2 9.4  --   --   --   -- < > = values in this interval not displayed. No results for input(s): AST, ALT, BILIDIR, BILITOT, ALKPHOS in the last 72 hours. No results for input(s): INR in the last 72 hours. No results for input(s): Purvi Bryce in the last 72 hours. Urinalysis:      Lab Results   Component Value Date/Time    NITRU NEGATIVE 11/06/2022 09:55 AM    WBCUA 5-9 11/06/2022 09:55 AM    BACTERIA NONE SEEN 11/06/2022 09:55 AM    RBCUA > 200 11/06/2022 09:55 AM    BLOODU LARGE 11/06/2022 09:55 AM    SPECGRAV 1.007 11/06/2022 09:55 AM       Radiology:  VL PRE OP VEIN MAPPING   Final Result   1. Status post vein mapping procedure. 2. Both greater and lesser saphenous veins are widely patent with the measurements indicated in the table above. **This report has been created using voice recognition software. It may contain minor errors which are inherent in voice recognition technology. **      Final report electronically signed by Dr. Alivia Lopez on 11/7/2022 11:53 AM      PET MYOCARDIAL VIABILITY   Final Result      Absent activity at the anterior left ventricular wall extending into the apex, likely nonviable myocardium. Final report electronically signed by Dr. Sadaf Ledesma on 11/4/2022 1:31 PM      CT ABDOMEN PELVIS WO CONTRAST   Final Result      Mild bilateral hydronephrosis. There is bilateral renal contrast excretion    from prior contrast administration limiting evaluation for renal stones. Marked mural thickening of the urinary bladder. The differential diagnosis    includes cystitis and neoplasm. Moderate amount of retained stool. Partially visualized lower thorax shows moderate sized bilateral pleural    effusions with atelectasis.       This document has been electronically signed by: Belem Golden MD on    11/02/2022 03:23 AM      All CTs at this facility use dose modulation techniques and iterative    reconstructions, and/or weight-based dosing   when appropriate to reduce radiation to a low as reasonably achievable. US RENAL COMPLETE   Final Result   1. Bilateral hydronephrosis. 2. Markedly distended urinary bladder with a large amount of postvoid residual urine. Final report electronically signed by Dr. Bette Lozano on 10/31/2022 1:13 PM      CTA CHEST W WO CONTRAST   Final Result   Impression:   No pulmonary embolism. Moderate pulmonary edema. Interval worsening since the prior study on    10/5/22. This document has been electronically signed by: Quinten Krause. Perez Regalado MD    on 10/28/2022 11:51 PM      All CTs at this facility use dose modulation techniques and iterative    reconstructions, and/or weight-based dosing   when appropriate to reduce radiation to a low as reasonably achievable. 3D Post-processing was performed on this study. XR CHEST PORTABLE   Final Result   Impression:   Mild to moderate pulmonary edema. This document has been electronically signed by: Quinten Regalado MD    on 10/28/2022 11:15 PM      XR CHEST PORTABLE    (Results Pending)   XR CHEST PORTABLE    (Results Pending)       Diet: ADULT DIET;  Clear Liquid    DVT prophylaxis: [x] Lovenox on hold for procedure                                 [] SCDs                                 [] SQ Heparin                                 [] Encourage ambulation           [] Already on Anticoagulation     Disposition:    [x] Home       [] TCU       [] Rehab       [] Psych       [] SNF       [] [place]       [] Other-    Code Status: Prior    PT/OT Eval:         Electronically signed by Luis Montague PA-C on 11/7/2022 at 12:48 PM

## 2022-11-07 NOTE — ANESTHESIA PRE PROCEDURE
Department of Anesthesiology  Preprocedure Note       Name:  Kimberly Velazco   Age:  63 y.o.  :  1960                                          MRN:  [id number]         Date:  2022      Surgeon: Joanne Nicole):  Elly Mann MD    Procedure: Procedure(s):  CABG X2 JOY WITH BALLOON PUMP    Medications prior to admission:   Prior to Admission medications    Medication Sig Start Date End Date Taking?  Authorizing Provider   tamsulosin (FLOMAX) 0.4 MG capsule Take 1 capsule by mouth daily 11/3/22  Yes BLACK Truong CNP   buPROPion (WELLBUTRIN XL) 150 MG extended release tablet Take 1 tablet by mouth every morning 10/12/22  Yes BLACK Newman CNP   omeprazole (PRILOSEC) 40 MG delayed release capsule Take 1 capsule by mouth daily 10/12/22  Yes BLACK Newman CNP   lisinopril (PRINIVIL;ZESTRIL) 20 MG tablet Take 1 tablet by mouth daily 22  Yes BLACK Newman CNP   glimepiride (AMARYL) 4 MG tablet Take 1 tablet by mouth every morning (before breakfast) 22  Yes BLACK Newman CNP   empagliflozin (JARDIANCE) 25 MG tablet Take 1 tablet by mouth daily 22  Yes BLACK Newman CNP   atorvastatin (LIPITOR) 40 MG tablet Take 1 tablet by mouth daily 22  Yes BLACK Newman CNP   acetaminophen (TYLENOL) 500 MG tablet Take 500 mg by mouth every 6 hours as needed for Pain As needed    Historical Provider, MD   Dulaglutide (TRULICITY) 1.5 KT/1.0XY SOPN Inject 1.5 mg into the skin once a week 22   BLACK Newman CNP       Current medications:    Current Facility-Administered Medications   Medication Dose Route Frequency Provider Last Rate Last Admin    vasopressin 20 Units in dextrose 5 % 100 mL infusion  0.04 Units/min IntraVENous Continuous Navjot Cruz MD        ceFAZolin (ANCEF) 2000 mg in dextrose 5 % 50 mL IVPB  2,000 mg IntraVENous On Call to [address] [place] [address] [address] [place], [place]        amiodarone (CORDARONE) tablet 200 mg  200 mg Oral TID Elly Mann MD 200 mg at 11/06/22 2228    [START ON 11/8/2022] furosemide (LASIX) injection 40 mg  40 mg IntraVENous Daily Tata Teran DO        [START ON 11/8/2022] spironolactone (ALDACTONE) tablet 25 mg  25 mg Oral Daily Tata Teran DO        insulin glargine (LANTUS) injection vial 7 Units  7 Units SubCUTAneous Nightly Holly Dunlap PA-C   7 Units at 11/06/22 2246    aspirin tablet 325 mg  325 mg Oral Once Oscar Springer PA-C        diphenhydrAMINE (BENADRYL) injection 50 mg  50 mg IntraVENous Once Oscar Springer PA-C        nitroGLYCERIN (NITROSTAT) SL tablet 0.4 mg  0.4 mg SubLINGual Q5 Min PRN Oscar Springer PA-C        tamsulosin [facility]) capsule 0.4 mg  0.4 mg Oral Daily Toya De La Cruz MD   0.4 mg at 11/06/22 0717    acetaminophen (TYLENOL) tablet 650 mg  650 mg Oral Q4H PRN Yuan Langford MD   650 mg at 11/03/22 0439    lidocaine (XYLOCAINE) 2 % uro-jet   Topical PRN BLACK Esparza - CNP        atorvastatin (LIPITOR) tablet 40 mg  40 mg Oral Daily Ashita Behl, MD   40 mg at 11/06/22 0717    buPROPion (WELLBUTRIN XL) extended release tablet 150 mg  150 mg Oral DEJUANM Ashita Behl, MD   150 mg at 11/06/22 0717    pantoprazole (PROTONIX) tablet 40 mg  40 mg Oral QAM AC Ashita Behl, MD   40 mg at 11/06/22 0547    sodium chloride flush 0.9 % injection 5-40 mL  5-40 mL IntraVENous 2 times per day Ese Mary MD   5 mL at 11/06/22 2245    sodium chloride flush 0.9 % injection 5-40 mL  5-40 mL IntraVENous PRN Ashita Behl, MD   10 mL at 11/03/22 1319    0.9 % sodium chloride infusion   IntraVENous PRN Ashita Behl, MD        ondansetron (ZOFRAN-ODT) disintegrating tablet 4 mg  4 mg Oral Q8H PRN Ashita Behl, MD        Or    ondansetron (ZOFRAN) injection 4 mg  4 mg IntraVENous Q6H PRN Ashita Behl, MD        polyethylene glycol (GLYCOLAX) packet 17 g  17 g Oral Daily PRN Ashita Behl, MD        acetaminophen (TYLENOL) suppository 650 mg  650 mg Rectal Q6H PRN Ese Mary MD       [facility] by provider] enoxaparin (LOVENOX) injection 40 mg  40 mg SubCUTAneous Daily Ashita Behl, MD   40 mg at 11/05/22 0847    insulin lispro (HUMALOG) injection vial 0-4 Units  0-4 Units SubCUTAneous TID WC Ashita Behl, MD   3 Units at 11/06/22 1553    insulin lispro (HUMALOG) injection vial 0-4 Units  0-4 Units SubCUTAneous Nightly Ashita Behl, MD        glucose chewable tablet 16 g  4 tablet Oral PRN Ashita Behl, MD        dextrose bolus 10% 125 mL  125 mL IntraVENous PRN Ashita Behl, MD        Or    dextrose bolus 10% 250 mL  250 mL IntraVENous PRN Narayan Akins MD        glucagon (rDNA) injection 1 mg  1 mg SubCUTAneous PRN Narayan Akins MD        dextrose 10 % infusion   IntraVENous Continuous PRN Narayan Akins MD        ipratropium-albuterol (DUONEB) nebulizer solution 2 ampule  2 ampule Inhalation Once Narayan Akins MD        aspirin chewable tablet 81 mg  81 mg Oral Daily Ashita Behl, MD   81 mg at 11/06/22 0717    ipratropium-albuterol (DUONEB) nebulizer solution 1 ampule  1 ampule Inhalation Q4H PRN Narayan Akins MD        metoprolol succinate (TOPROL XL) extended release tablet 25 mg  25 mg Oral Daily Severino Anthony MD   25 mg at 11/06/22 0717       Allergies: Allergies   Allergen Reactions    Metformin And Related Nausea And Vomiting       Problem List:    Patient Active Problem List   Diagnosis Code    Syncope R55    Facial injury S09. 93XA    Tobacco abuse Z72.0    Cranial facial fractures ([facility] [address] S02. 91XA, S02. 92XA    Diabetes mellitus type 2 in nonobese (HCC) E11.9    Fungal toenail infection B35.1    Golfer's elbow M77.00    Medical non-compliance Z91.199    Erectile dysfunction N52.9    NAVARRO (generalized anxiety disorder) F41.1    Impacted cerumen of right ear H61.21    Hypertension I10    Uncontrolled type 2 diabetes mellitus with hyperglycemia (HCC) E11.65    Thoracic arthritis M47.814    New onset of congestive heart failure ([facility]) K19.0    Acute systolic congestive heart failure ([address] I50.21    Nonischemic cardiomyopathy ([address] I42.8    Preoperative clearance Z01.818    Stage 3a chronic kidney disease (HCC) N18.31    Tremor R25.1    LC (acute kidney injury) ([address] N17.9    Hyperlipidemia E78.5    Gastroesophageal reflux disease K21.9    S/P cardiac cath Z98.890    CAD, multiple vessel I25.10    Ischemic cardiomyopathy I25.5    ETOH abuse F10.10       Past Medical History:        Diagnosis Date    Hypertension     Prediabetes        Past Surgical History:        Procedure Laterality Date    TONSILLECTOMY         Social History:    Social History     Tobacco Use    Smoking status: Every Day     Packs/day: 0.50     Years: 33.00     Pack years: 16.50     Types: Cigarettes    Smokeless tobacco: Never   Substance Use Topics    Alcohol use: Not Currently                                Ready to quit: Not Answered  Counseling given: Not Answered      Vital Signs (Current):   Vitals:    11/07/22 0000 11/07/22 0400 11/07/22 0700 11/07/22 0713   BP:   114/62 (!) 131/59   Pulse: 68 64     Resp:       Temp:       TempSrc:       SpO2:       Weight:   131 lb 12.8 oz (59.8 kg)    Height:   5' 10\" (1.778 m)                                               BP Readings from Last 3 Encounters:   11/07/22 (!) 131/59   10/26/22 (!) 148/88   10/12/22 134/80       NPO Status:                                                                                 BMI:   Wt Readings from Last 3 Encounters:   11/07/22 131 lb 12.8 oz (59.8 kg)   10/26/22 151 lb (68.5 kg)   10/12/22 140 lb 9.6 oz (63.8 kg)     Body mass index is 18.91 kg/m².     CBC:   Lab Results   Component Value Date/Time    WBC 7.3 11/06/2022 09:10 AM    RBC 4.34 11/06/2022 09:10 AM    HGB 12.1 11/06/2022 09:10 AM    HCT 38.4 11/06/2022 09:10 AM    MCV 88.5 11/06/2022 09:10 AM    RDW 12.5 05/24/2022 04:58 AM     11/06/2022 09:10 AM       CMP:   Lab Results   Component Value Date/Time     11/07/2022 06:00 AM    K 4.8 11/07/2022 06:00 AM    K 4.3 10/28/2022 10:00 PM    CL 98 11/07/2022 06:00 AM    CO2 28 11/07/2022 06:00 AM    BUN 30 11/07/2022 06:00 AM    CREATININE 1.8 11/07/2022 06:00 AM    GFRAA 58 05/24/2022 04:58 AM    LABGLOM 42 11/07/2022 06:00 AM    GLUCOSE 131 11/07/2022 06:00 AM    PROT 5.8 11/01/2022 04:32 AM    CALCIUM 9.4 11/07/2022 06:00 AM    BILITOT 0.3 11/01/2022 04:32 AM    ALKPHOS 64 11/01/2022 04:32 AM    AST 10 11/01/2022 04:32 AM    ALT 6 11/01/2022 04:32 AM       POC Tests:   Recent Labs     11/07/22  0710   POCGLU 151*       Coags:   Lab Results   Component Value Date/Time    INR 0.99 11/01/2022 04:32 AM    APTT 31.6 11/01/2022 04:32 AM       HCG (If Applicable): No results found for: PREGTESTUR, PREGSERUM, HCG, HCGQUANT     ABGs: No results found for: PHART, PO2ART, OKY6ANB, WEQ5KFT, BEART, I0GOECKA     Type & Screen (If Applicable):  Lab Results   Component Value Date    LABRH POS 11/06/2022       Drug/Infectious Status (If Applicable):  Lab Results   Component Value Date/Time    HEPCAB Negative 02/28/2017 08:31 AM       COVID-19 Screening (If Applicable):   Lab Results   Component Value Date/Time    COVID19 NOT  DETECTED 07/29/2021 06:29 PM           Anesthesia Evaluation   no history of anesthetic complications:   Airway: Mallampati: II  TM distance: >3 FB   Neck ROM: full  Mouth opening: > = 3 FB   Dental:          Pulmonary:Negative Pulmonary ROS and normal exam    (+) current smoker                           Cardiovascular:  Exercise tolerance: poor (<4 METS),   (+) hypertension:, CAD:, CHF: systolic,       ECG reviewed      Echocardiogram reviewed  Stress test reviewed  Cleared by cardiology              Neuro/Psych:   (+) psychiatric history:            GI/Hepatic/Renal:   (+) GERD:,           Endo/Other:    (+) Diabetes, . Pt had no PAT visit       Abdominal:             Vascular: negative vascular ROS.          Other Findings:           Anesthesia Plan      general     ASA 4       Induction: intravenous. arterial line, PA catheter, JOY and BIS  MIPS: Postoperative ventilation. Anesthetic plan and risks discussed with patient. Use of blood products discussed with patient whom consented to blood products. Plan discussed with CRNA.                     Brea Escamilla MD   11/7/2022

## 2022-11-07 NOTE — CARE COORDINATION
11/7/22, 1:46 PM EST    DISCHARGE ON GOING EVALUATION    [facility] day: 9  Location: -11/011-A Reason for admit: Elevated troponin [V56.3]  Acute systolic congestive heart failure ([facility] [address] [I50.21]  New onset of congestive heart failure ([facility] [address] [I50.9]   Procedure: 11/7 CABG with IABP   11/1 cardiac cath  Barriers to Discharge: Ical 1.11, WBC 17.9, creat 1.8. Intubated, AC/PC mode, 100% on FiO2 80. Chest tubes x4 to continuous suction. IVF, IV ancef, vasopressin and epinephrine gtts, lyte replacement, pain control. Nephrology, cardiology, CVS, and hospitalist following. PT/OT ordered. PCP: BLACK Dixon CNP  Readmission Risk Score: 14.8%  Patient Goals/Plan/Treatment Preferences: Home with wife. Will need HH. Life Vest is approved, please contact [facility] prior to discharge for fitting. Transferred to ICU post-op. Handoff report given to [facility] CM.

## 2022-11-07 NOTE — PROGRESS NOTES
[facility]   Nephrology progress note  11/7/2022, 6:16 PM      Pt Name:    Manisha Chester  MRN:     [id number]     YOB: 1960  Admit Date:    10/28/2022  9:39 PM    Chief Complaint: Nephrology following for LC/CKD. Subjective:  Patient seen and examined  Seen earlier today. Intubated and so accurate history and ROS cannot be obtain. Patient appears to be awake and alert being planned for possible extubation  Making decent urine output  Status post CABG with intra-aortic balloon pump in place    Objective:  24HR INTAKE/OUTPUT:    Intake/Output Summary (Last 24 hours) at 11/7/2022 1816  Last data filed at 11/7/2022 1800  Gross per 24 hour   Intake 3642.27 ml   Output 3727 ml   Net -84.73 ml        Admission weight: 150 lb (68 kg)  Wt Readings from Last 3 Encounters:   11/07/22 131 lb 12.8 oz (59.8 kg)   10/26/22 151 lb (68.5 kg)   10/12/22 140 lb 9.6 oz (63.8 kg)        Vitals :   Vitals:    11/07/22 1559 11/07/22 1646 11/07/22 1657 11/07/22 1721   BP:       Pulse: 94 87 100 92   Resp: 27 (!) 7 20 12   Temp:       TempSrc:       SpO2: 100% 100% 100% 100%   Weight:       Height:           Physical examination  General Appearance:  Well developed.  No distress  Mouth/Throat:  Oral mucosa moist  Neck:  Supple, no JVD  Lungs:  Breath sounds: clear  Heart[de-identified]  S1,S2 heard  Abdomen:  Soft, non - tender  Musculoskeletal:  Edema -no edema    Medications:  Infusion:    sodium chloride 20 mL/hr at 11/07/22 1327    sodium chloride      insulin 0.8 Units/hr (11/07/22 1709)    dextrose      vasopressin Stopped (11/07/22 1345)    EPINEPHrine Stopped (11/07/22 1345)    niCARdipine       Meds:    sodium chloride flush  5-40 mL IntraVENous 2 times per day    [START ON 11/8/2022] enoxaparin  40 mg SubCUTAneous Daily    [START ON 11/8/2022] aspirin  325 mg Oral Daily    amiodarone  200 mg Oral BID    [START ON 11/8/2022] multivitamin  1 tablet Oral Daily with breakfast    sennosides-docusate sodium 11/7/2022  Urinary retention -status post Bella,   Meds reviewed     Alanis Sims MD  [facility]    This report has been created using voice recognition software.  It may contain minor errors which are inherent in voice recognition technology

## 2022-11-07 NOTE — ANESTHESIA PROCEDURE NOTES
Central Venous Line:    A central venous line was placed using ultrasound guidance, in the OR for the following indication(s): central venous access and CVP monitoring. Sterility preparation included the following: hand hygiene performed prior to procedure, maximum sterile barriers used and sterile technique used to drape from head to toe. The patient was placed in Trendelenburg position. The right internal jugular vein was prepped. The site was prepped with Chloraprep. A 9 Fr (size), 10 (length), introducer single lumen was placed. During the procedure, the following specific steps were taken: target vein identified, needle advanced into vein and blood aspirated and guidewire advanced into vein. Intravenous verification was obtained by ultrasound, venous blood return, JOY and JOY. Post insertion care included: all ports aspirated, all ports flushed easily, guidewire removed intact, Biopatch applied, line sutured in place and dressing applied. During the procedure the patient experienced: patient tolerated procedure well with no complications. Insertion site scrubbed per usage guidelines?: Yes  Skin prep agent dried for 3 minutes prior to procedure?:yes  Outcomes: uncomplicated  Real-time US image taken/store: yes  Anesthesia type: generalA(n) non-oximetric, 7.5 (size) Pulmonary Artery Catheter (PAC) was placed through the Introducer CVL in the right internal jugular vein. The PAC placement was confirmed by pressure tracing changes and JOY. The patient experienced the following events during the procedure: patient tolerated procedure well with no complications. PA Cath placed?: Yes  Staffing  Performed: Anesthesiologist   Anesthesiologist: Ovi Kaba MD  Preanesthetic Checklist  Completed: patient identified, IV checked, site marked, risks and benefits discussed, surgical/procedural consents, equipment checked, pre-op evaluation, timeout performed, anesthesia consent given, oxygen available, monitors applied/VS acknowledged, fire risk safety assessment completed and verbalized and blood product R/B/A discussed and consented

## 2022-11-07 NOTE — PROGRESS NOTES
Patient has been successfully weaned from Mechanical Ventilation. Before extubation SpO2 of 100% on 40% FiO2. Patient extubated and placed on 4 liters/min via nasal cannula. Post extubation SpO2 is 100% with HR  92 bpm and RR 18 breaths/min. Patient had moderate cough that was non-productive. Extubation Well tolerated by patient. Yoli Ramos

## 2022-11-07 NOTE — ADT AUTH CERT
Heart Failure - Care Day 9 (11/5/2022) by Indiana Ahumada RN       Review Entered Review Status   11/7/2022 1408 Completed      Criteria Review      Care Day: 9 Care Date: 11/5/2022 Level of Care: Telemetry    Guideline Day 3    Level Of Care    ( ) Floor to discharge    11/7/2022 2:08 PM EST by Mandy Reyes      RENAL TELEMETRY    Clinical Status    (X) * Hemodynamic stability    11/7/2022 2:08 PM EST by Mandy Reyes      /55   Pulse 79    ( ) * Tachypnea absent    11/7/2022 2:08 PM EST by Mandy Reyes      RR 16    (X) * Oxygenation at baseline or acceptable for next level of care    11/7/2022 2:08 PM EST by Mandy Reyes      SpO2 95% ROOM AIR    (X) * Dyspnea at baseline or acceptable for next level of care    11/7/2022 2:08 PM EST by Mandy Reyes      Lungs:  Breath sounds: clear    (X) * Cardiac rate and rhythm acceptable    11/7/2022 2:08 PM EST by Mandy Reyes      Heart:  S1,S2 heard    (X) * Pulmonary edema absent or acceptable for next level of care    11/7/2022 2:08 PM EST by Veronika Porter    (X) * Peripheral or sacral edema absent or acceptable for next level of care    11/7/2022 2:08 PM EST by Manyd Reyes      Musculoskeletal:  Edema -no edema    (X) * Mental status at baseline    11/7/2022 2:08 PM EST by Mandy Reyes      awake and alert    (X) * Volume status acceptable on oral medication    11/7/2022 2:08 PM EST by Mandy Reyes      acceptable    ( ) * Renal function at baseline or acceptable for next level of care    11/7/2022 2:08 PM EST by Mandy Reyes      BUN 23 High    Creatinine   1.4 High    Est, Glom Filt Rate 57 Abnormal    ( ) * Electrolyte abnormalities absent or acceptable for next level of care    11/7/2022 2:08 PM EST by Mandy Reyes      absent    (X) * Precipitating factors absent or controlled    11/7/2022 2:08 PM EST by Mandy Reyes      controlled    ( ) * Discharge plans and education understood    Activity    ( ) * Ambulatory or acceptable for next level of care    11/7/2022 2:08 PM EST by Scooby Burrows      Strict Bedrest    Routes    (X) * Oral hydration    11/7/2022 2:08 PM EST by Scooby Burrows      [id number] ml    ( ) * Oral medications or regimen acceptable for next level of care    11/7/2022 2:08 PM EST by Scooby Burrows      aspirin chewable tablet 81 mg PO X1, atorvastatin (LIPITOR) tablet 40 mg PO X1, buPROPion (WELLBUTRIN XL) extended release  PO X1, pantoprazole (PROTONIX) tablet 40 mg PO X1    ( ) * Oral diet or acceptable for next level of care    11/7/2022 2:08 PM EST by Rashad Santiago DIET;  Regular    Interventions    (X) Weigh    11/7/2022 2:08 PM EST by Scooby Burrows      Wt 147 lb 11.3 oz (67 kg)    Medications    (X) Diuretics    11/7/2022 2:08 PM EST by Scooby Burrows      furosemide (LASIX) injection 40 mg  Freq: DAILY Route: IV, spironolactone (ALDACTONE) tablet 25 mg  Freq: DAILY Route: PO       Definitions for Care Day 9    Hemodynamic stability    (X) Hemodynamic stability, as indicated by  1 or more  of the following :       (X) Hemodynamic abnormalities at baseline or acceptable for next level of care       * Milestone   Additional Notes   DATE: 11/5/22  RENAL TELEMETRY          Pertinent Updates:   -with crum catheter in place   -monitor CBC, RBC   low   -monitor glucose, POC Glucose high   -monitor renal function, BUN and Creatinine  are high   -monitor signs of bleeding, enoxaparin given      Vitals:   /55   Pulse 79   Temp 97.5 °F (36.4 °C) (Oral)   Resp 16    SpO2 95% ROOM AIR            Abnl/Pertinent Labs/Radiology/Diagnostic Studies:   POC Glucose  184 High   167 High  CM  227 High      RBC   3.88 Low     Hemoglobin  10.8 Low     Hematocrit   34.3 Low           Physical Exam:   Lungs:  Breath sounds: clear   Heart[de-identified]  S1,S2 heard      MD Consults/Assessments & Plans:   Cardiothoracic Surgery 11/5   Case discussed with Dr. Priyanka Campuzano   Patient considered to be poor candidate for PCI due to ostial LAD disease, with risk of occluding large RI and Cx   Plan off-pump CABG w/ IABP Monday   Will discuss with patient       Nephrology 11/5   1. Renal -CKD III: renal function appears at his baseline   -did have some mild LC d/t contrast nephropathy earlier in admission       2. Electrolytes - appear to be within normal limits   3. Essential hypertension, stable   4. Hx of diabetes mellitus uncontrolled   5. Systolic CHF, new onset. Diuresing well on iv lasix   6. Multivessel CAD  : ? PCI   7. Urinary retention -status post Bella, hematuria better following urology   8. Meds reviewed and discussed with patient. Cardiology 11/5   ? Will need to discuss with  if pt candidate for impella assisted PCI    ? Follow       Internal Medicine 11/5   1. Acute CHF Exacerbation   a. Pro-BNP 8508 (10/28) 3862 (11/1)   b. Serial Troponins stable ~0.035   c. CXR (10/28) - Accesory azygos fissure, interstitial prominence w/ faint bilateral central opacity, Mod sized L pleural effusion, Mild to Mod pulmonary edema   d. Echo (10/29) - EF 25-30%,    e. Heart Cath (11/1)   i. 20-30% stenosis in R common iliac artery, severe global hypokinesis w/ akineic movement of anterior wall, EF 15-20%, L main coronary artery 10-20% stenosis, Ramus intermedius had 90-95% stenosis, Ostium of LAD has 90% stenosis, D1 w/ 99% subtotal occlusion, Mid RCA 40-50% stenosis   ii. Severe ischemic cardiomyopathy w/ EF 15-20% on L ventriculogram, Acute systolic CHF Left ventricular end-diastolic pressure 31 mmHg, Severe coronary artery disease involving ostium of LAD as well as first diagonal branch and ramus intermedius artery   f. CTA (10/28) - No PEs, Mod pulmonary edema   g. Cardiology consulted   i. 2 L fluid restriction and 2 gm sodium diet   ii. Life vest for DC - ordered   iii. Referral for CHF clinic - ordered   h. Cardiovascular surgery consult   i. Following   ii.  Recommending CABG in 1 week following resolution of current exacerbation   i. PET myocardial viability study completed 11/4 showing a large area of anterior LV with suspected non viability- awaiting further recs from [facility]   j. Continue ASA, Atorvastatin, Metoprolol   k. Entresto after heart cath       2. Mod sized Bilateral Pleural Effusions w/ Atelectasis   a. Likely secondary to #1   b. Order Incentive Spirometry & Acapella   3. CKD Stage 3 w/ LC- resolved   a. Baseline 1.4- currently 1.4   b. Renal US (10/31)   i. Bilateral hydronephrosis   ii. Markedly distended bladder w/ large amount of postvoid residual urine   c. CT A/P wo Contrast (11/2)   i. Mild bilateral hydronephrosis, Marked mural thickening of bladder, Moderate constipation, Mod sized bilateral pleural effusions w/ atelectasis   d. Nephrology following   e. Avoid Renal toxic agents       4. Urinary Retention with Gross Hematuria   a. Urology consulted   Kit Phill to continue anticoagulation   ii. Hematuria likely secondary to traumatic crum insertion   iii. Discharge with follow and follow up OP for cystoscopy   iv. Signed off   b. Crum catheter placed   c. Flomax started (11/1)       5. NIDDM   a. Last A1C 7.8 (8/23/22)   b. Low dose SSI w/ hypoglycemic protocols   c. FBG elevated- start Lantus 7 units 11/3- FBG good since   d. Home meds Dulaglutide, Glimepiride, Empagliflozin on hold       6. Essential HTN   a. Lisinopril discontinued, Entresto following heart cath   b. Metoprolol added by cardiology, monitor blood pressure       7. HLD   a. Continue home Atorvastatin       8. GERD   a. Continue Protonix   9. Smoking cessation   a. Patient states that he is currently attempting to quit smoking.     b. Started Wellbutrin approximately 1 and half weeks ago   i. He has reduced his amount of smoking from 1 PPD to 0.5 PPD. c. Patient also states that he has not smoked since Thursday evening, 10/27   d. Continue home Wellbutrin   e. Do not give nicotine patch unless requested by patient   10.  Generalized Anxiety Disorder   a. Not on any home meds for this per chart review         Cardiothoracic Surgery 11/5   Long discussion with patient and wife regarding results of viability study   Study shows absence of uptake anterolateral wall and nonbasilar septum--essentially the bulk of the distribution planned for revascularization   Moreover, echo shows apex to be thinned out--which portends poorly for recruitability   Balance of data would tend to favor PCI if feasible, with CABG as backup option   Have reached out to Dr. Elodia Gleason to discuss   Final recommendations to follow   Patient and wife fully informed, and express understanding         Medications:   aspirin chewable tablet 81 mg   Freq: DAILY Route: PO   atorvastatin (LIPITOR) tablet 40 mg   Freq: DAILY Route: PO   buPROPion (WELLBUTRIN XL) extended release Freq: EVERY MORNING Route: PO   pantoprazole (PROTONIX) tablet 40 mg   Freq: DAILY BEFORE BREAKFAST Route: PO      enoxaparin (LOVENOX) injection 40 mg   Freq: DAILY Route: SC      Orders:   Intake and output    ADULT DIET;  Regular         Heart Failure - Care Day 7 (11/3/2022) by Naty Fontaine RN       Review Entered Review Status   11/7/2022 1326 Completed      Criteria Review      Care Day: 7 Care Date: 11/3/2022 Level of Care: Telemetry    Guideline Day 3    Level Of Care    ( ) Floor to discharge    11/7/2022 1:26 PM EST by Maykel Martin      RENAL TELEMETRY    Clinical Status    (X) * Hemodynamic stability    11/7/2022 1:26 PM EST by Maykel Martin      BP (!) 96/54   Pulse 79    (X) * Tachypnea absent    11/7/2022 1:26 PM EST by Maykel Martin      RR 16    (X) * Oxygenation at baseline or acceptable for next level of care    11/7/2022 1:26 PM EST by Maykel Martin      SpO2 93% ROOM AIR    (X) * Dyspnea at baseline or acceptable for next level of care    11/7/2022 1:26 PM EST by Maykel Martin      clear to auscultation bilaterally- no wheezes, rales or rhonchi, normal air movement, no respiratory distress    (X) * Cardiac rate and rhythm acceptable    11/7/2022 1:26 PM EST by Gayathri Morocho      normal rate, regular rhythm, normal S1 and S2, no murmurs, rubs, clicks, or gallops, distal pulses intact, no carotid bruits, no JVD    (X) * Pulmonary edema absent or acceptable for next level of care    11/7/2022 1:26 PM EST by Jimbo Wallace    (X) * Peripheral or sacral edema absent or acceptable for next level of care    11/7/2022 1:26 PM EST by Jimbo Wallace    (X) * Mental status at baseline    11/7/2022 1:26 PM EST by Gayathri Morocho      alert, oriented, normal speech, no focal findings or movement disorder noted    (X) * Volume status acceptable on oral medication    11/7/2022 1:26 PM EST by Shayy Montoya    ( ) * Renal function at baseline or acceptable for next level of care    11/7/2022 1:26 PM EST by Gayathri Morocho      BUN 23 High    Creatinine   1.4 High    Est, Glom Filt Rate  53 Abnormal   57 Abnormal    ( ) * Electrolyte abnormalities absent or acceptable for next level of care    11/7/2022 1:26 PM EST by Gayathri Morocho      Chloride 97 Low    (X) * Precipitating factors absent or controlled    11/7/2022 1:26 PM EST by Jimbo Wallace    ( ) * Discharge plans and education understood    Activity    ( ) * Ambulatory or acceptable for next level of care    11/7/2022 1:26 PM EST by Gayathri Morocho      ambulatory    Routes    (X) * Oral hydration    11/7/2022 1:26 PM EST by Gayathri Morocho      [id number] ml    ( ) * Oral medications or regimen acceptable for next level of care    11/7/2022 1:26 PM EST by Gayathri Morocho      aspirin chewable tablet 81 mg PO X1, atorvastatin (LIPITOR) tablet 40 mg PO X1, buPROPion (WELLBUTRIN XL) extended release tablet 150 mg PO X1, pantoprazole (PROTONIX) tablet 40 mg PO X1, acetaminophen (TYLENOL) tablet 650 mg PO X1 PRN    ( ) * Oral diet or acceptable for next level of care    11/7/2022 1:26 PM EST by Kavitha Martin      ADULT DIET; Regular    Interventions    (X) Weigh    11/7/2022 1:26 PM EST by Klevercuauhtemoc Sniderolya      142 lb 10.2 oz (64.7 kg)    Medications    (X) Diuretics    11/7/2022 1:26 PM EST by Kavitha Martin      furosemide (LASIX) injection 40 mg  Freq: DAILY Route: IV    (X) Beta-blocker    11/7/2022 1:26 PM EST by Kavitha Martin      metoprolol succinate (TOPROL XL) extended release tablet 25 mg  Freq: DAILY Route: PO       Definitions for Care Day 7    Hemodynamic stability    (X) Hemodynamic stability, as indicated by  1 or more  of the following :       (X) Hemodynamic abnormalities at baseline or acceptable for next level of care       Tachypnea absent    (X) Tachypnea absent, as indicated by respiratory rate of  1 or more  of the following  (1) (2):       (X) Less than or equal to 18 breaths per minute in adult or child 15years of age or older       * Milestone   Additional Notes   DATE: 11/3/22  RENAL TELEMETRY          Pertinent Updates:   -monitor CBC, RBC low   -monitor renal function, BUN and Creatinine    are high   -monitor glucose, POC Glucose high   -2 L fluid restriction and 2 gm sodium diet   -Bella catheter placed         Vitals:   BP (!) 96/54   Pulse 79   Temp 98.5 °F (36.9 °C) (Oral)   Resp 16   SpO2 93% ROOM AIR          Abnl/Pertinent Labs/Radiology/Diagnostic Studies:   POC Glucose  166 High   244 High  CM  187 High  CM  263 High  CM  184 High     RBC  4.15 Low     Hemoglobin    11.5 Low     Hematocrit   36.2 Low     Glucose   213 High           Physical Exam:   Cardiovascular: normal rate, regular rhythm, normal S1 and S2, no murmurs, rubs, clicks, or gallops, distal pulses intact, no carotid bruits, no JVD   Pulmonary/Chest: clear to auscultation bilaterally- no wheezes, rales or rhonchi, normal air movement, no respiratory distress         MD Consults/Assessments & Plans:   Internal Medicine 11/3   1. Acute CHF Exacerbation   a. Pro-BNP 8508 (10/28) 3862 (11/1)   b.  Serial Troponins stable ~0.035   i. Unsure of baseline   c. EKG (11/1) - Normal sinus rhythm, Possible L atrial enlargement, Anterolateral infarct cited on or before Oct 28   d. CXR (10/28) - Accesory azygos fissure, interstitial prominence w/ faint bilateral central opacity, Mod sized L pleural effusion, Mild to Mod pulmonary edema   e. Echo (10/29) - EF 25-30%,    f. Heart Cath (11/1)   i. 20-30% stenosis in R common iliac artery, severe global hypokinesis w/ akineic movement of anterior wall, EF 15-20%, L main coronary artery 10-20% stenosis, Ramus intermedius had 90-95% stenosis, Ostium of LAD has 90% stenosis, D1 w/ 99% subtotal occlusion, Mid RCA 40-50% stenosis   ii. Severe ischemic cardiomyopathy w/ EF 15-20% on L ventriculogram, Acute systolic CHF Left ventricular end-diastolic pressure 31 mmHg, Severe coronary artery disease involving ostium of LAD as well as first diagonal branch and ramus intermedius artery   g. CTA (10/28) - No PEs, Mod pulmonary edema   h. Cardiology consulted   i. 2 L fluid restriction and 2 gm sodium diet   ii. Life vest for DC - ordered   iii. Referral for CHF clinic - ordered   i. Cardiovascular surgery consult   i. Following   ii. Recommending CABG in 1 week following resolution of current exacerbation   j. Continue ASA, Atorvastatin, Metoprolol   k. Entresto after heart cath       2. Mod sized Bilateral Pleural Effusions w/ Atelectasis   a. Likely secondary to #1   b. Order Incentive Spirometry & Acapella   3. CKD Stage 3 w/ LC   a. (11/2) Cr 1.6, GFR 49   i. Baseline Cr ~1.4, GFR baseline difficult to determine   1. GFR 48, May 2022   b. Hold 40 mg IV lasix BID   c. Renal US (10/31)   i. Bilateral hydronephrosis   ii. Markedly distended bladder w/ large amount of postvoid residual urine   d. CT A/P wo Contrast (11/2)   i. Mild bilateral hydronephrosis, Marked mural thickening of bladder, Moderate constipation, Mod sized bilateral pleural effusions w/ atelectasis   e.  Nephrology Consult   f. Avoid Renal toxic agents       4. Urinary Retention with Gross Hematuria   a. Urology consulted   [place] to continue anticoagulation   ii. Hematuria likely secondary to traumatic crum insertion   iii. Discharge with follow and follow up OP for cystoscopy   iv. Signed off   b. Crum catheter placed   c. Flomax started (11/1)       5. NIDDM   a. Last A1C 7.8 (8/23/22)   b. Low dose SSI w/ hypoglycemic protocols   c. FBG elevated- start Lantus 7 units 11/3   d. Home meds Dulaglutide, Glimepiride, Empagliflozin on hold       6. Essential HTN   a. Lisinopril discontinued, Entresto following heart cath   b. Metoprolol added by cardiology, monitor blood pressure       7. HLD   a. Continue home Atorvastatin       8. GERD   a. Continue Protonix   9. Smoking cessation   a. Patient states that he is currently attempting to quit smoking.     b. Started Wellbutrin approximately 1 and half weeks ago   i. He has reduced his amount of smoking from 1 PPD to 0.5 PPD. c. Patient also states that he has not smoked since Thursday evening, 10/27   d. Continue home Wellbutrin   e. Do not give nicotine patch unless requested by patient   10. Generalized Anxiety Disorder   a. Not on any home meds for this per chart review         Cardiology 11/3   Daily I/o and weights   2 liter fluid restriction and 2 gm sodium diet   Keep mag >2 and K >4   Cont diuresis - ok to change to po if ok with nephr   Also nurse states IVF is now ordered, nurse to clarify with nephro if IVF needed   Daily BMP   Lifevest upon dc - ordered   Referral to chf clinic - ordered   Cont asa/statin/BB   No ace/arb due to lower bp and LC   Smoking cessation advised   Advised ETOH cessation as well   [facility] consulted - CABG vs high risk PCI with impella               [facility] ordered myocardial viability study - scheduled for Friday morning   Cardiac rehab   Sl ntg upon dc      Internal Medicine 11/3   1.  Renal -acute kidney injury, on chronic kidney disease stage III   ? This appears most likely due to the use of diuretics ACE inhibitor and primarily due to contrast exposure on the day of his admission. ? Currently improved to some degree close to his baseline   ? Status post repeat cardiac cath on 11/1/2022, monitor for signs of any contrast-induced nephropathy. ? Start IV fluid at 50cc/hr    2. Electrolytes -results pending    3. Essential hypertension running borderline okay with once a day Lasix    4. Hx of diabetes mellitus apparently his A1c was 15.0,, 3 months ago   5. New onset of acute congestive heart failure systolic needs a PET variability study to determine the viability of cardiac muscle seen by cardiology and cardiothoracic surgery. continue furosemide 40mg daily. 6. Urinary retention -status post Bella, now with hematuria urology following on Flomax as well   7. Meds reviewed and discussed with patient             Medications:   enoxaparin (LOVENOX) injection 40 mg   Freq: DAILY Route: SC      0.9 % sodium chloride infusion   Rate: 50 mL/hr Freq: CONTINUOUS Route: IV         Orders:   Intake and output    ADULT DIET; Regular    Strict Bedrest          PT/OT/SLP/CM Assessments or Notes:   Case Management   Barriers to Discharge: Myocardial viability study Friday morning to determine CABG vs high risk PCI with impella. IVF, IV lasix. Readmission Risk Score: 14%   Patient Goals/Plan/Treatment Preferences: Home with wife. Life Vest is approved, Olena Bailey @ [facility] needs notified prior to discharge.

## 2022-11-08 ENCOUNTER — APPOINTMENT (OUTPATIENT)
Dept: GENERAL RADIOLOGY | Age: 62
DRG: 233 | End: 2022-11-08
Payer: COMMERCIAL

## 2022-11-08 LAB
ANION GAP SERPL CALCULATED.3IONS-SCNC: 9 MEQ/L (ref 8–16)
BUN BLDV-MCNC: 31 MG/DL (ref 7–22)
CALCIUM SERPL-MCNC: 8.8 MG/DL (ref 8.5–10.5)
CHLORIDE BLD-SCNC: 109 MEQ/L (ref 98–111)
CO2: 21 MEQ/L (ref 23–33)
CREAT SERPL-MCNC: 1.7 MG/DL (ref 0.4–1.2)
EKG ATRIAL RATE: 85 BPM
EKG P AXIS: 67 DEGREES
EKG P-R INTERVAL: 152 MS
EKG Q-T INTERVAL: 382 MS
EKG QRS DURATION: 80 MS
EKG QTC CALCULATION (BAZETT): 454 MS
EKG R AXIS: 78 DEGREES
EKG T AXIS: 98 DEGREES
EKG VENTRICULAR RATE: 85 BPM
ERYTHROCYTE [DISTWIDTH] IN BLOOD BY AUTOMATED COUNT: 13 % (ref 11.5–14.5)
ERYTHROCYTE [DISTWIDTH] IN BLOOD BY AUTOMATED COUNT: 13.2 % (ref 11.5–14.5)
ERYTHROCYTE [DISTWIDTH] IN BLOOD BY AUTOMATED COUNT: 42 FL (ref 35–45)
ERYTHROCYTE [DISTWIDTH] IN BLOOD BY AUTOMATED COUNT: 42.5 FL (ref 35–45)
GFR SERPL CREATININE-BSD FRML MDRD: 45 ML/MIN/1.73M2
GLUCOSE BLD-MCNC: 100 MG/DL (ref 70–108)
GLUCOSE BLD-MCNC: 100 MG/DL (ref 70–108)
GLUCOSE BLD-MCNC: 111 MG/DL (ref 70–108)
GLUCOSE BLD-MCNC: 138 MG/DL (ref 70–108)
GLUCOSE BLD-MCNC: 146 MG/DL (ref 70–108)
GLUCOSE BLD-MCNC: 201 MG/DL (ref 70–108)
GLUCOSE BLD-MCNC: 220 MG/DL (ref 70–108)
GLUCOSE BLD-MCNC: 75 MG/DL (ref 70–108)
GLUCOSE BLD-MCNC: 91 MG/DL (ref 70–108)
GLUCOSE BLD-MCNC: 92 MG/DL (ref 70–108)
GLUCOSE BLD-MCNC: 99 MG/DL (ref 70–108)
HCT VFR BLD CALC: 20.7 % (ref 42–52)
HCT VFR BLD CALC: 24.3 % (ref 42–52)
HEMOGLOBIN: 6.6 GM/DL (ref 14–18)
HEMOGLOBIN: 8 GM/DL (ref 14–18)
INR BLD: 1.23 (ref 0.85–1.13)
MAGNESIUM: 2.4 MG/DL (ref 1.6–2.4)
MCH RBC QN AUTO: 28.6 PG (ref 26–33)
MCH RBC QN AUTO: 29 PG (ref 26–33)
MCHC RBC AUTO-ENTMCNC: 31.9 GM/DL (ref 32.2–35.5)
MCHC RBC AUTO-ENTMCNC: 32.9 GM/DL (ref 32.2–35.5)
MCV RBC AUTO: 88 FL (ref 80–94)
MCV RBC AUTO: 89.6 FL (ref 80–94)
PATHOLOGIST REVIEW: ABNORMAL
PLATELET # BLD: 129 THOU/MM3 (ref 130–400)
PLATELET # BLD: 134 THOU/MM3 (ref 130–400)
PMV BLD AUTO: 11.1 FL (ref 9.4–12.4)
PMV BLD AUTO: 11.1 FL (ref 9.4–12.4)
POTASSIUM SERPL-SCNC: 5.6 MEQ/L (ref 3.5–5.2)
POTASSIUM SERPL-SCNC: 5.9 MEQ/L (ref 3.5–5.2)
RBC # BLD: 2.31 MILL/MM3 (ref 4.7–6.1)
RBC # BLD: 2.76 MILL/MM3 (ref 4.7–6.1)
SODIUM BLD-SCNC: 139 MEQ/L (ref 135–145)
WBC # BLD: 11.2 THOU/MM3 (ref 4.8–10.8)
WBC # BLD: 12.3 THOU/MM3 (ref 4.8–10.8)

## 2022-11-08 PROCEDURE — 6360000002 HC RX W HCPCS: Performed by: THORACIC SURGERY (CARDIOTHORACIC VASCULAR SURGERY)

## 2022-11-08 PROCEDURE — 97110 THERAPEUTIC EXERCISES: CPT

## 2022-11-08 PROCEDURE — 93005 ELECTROCARDIOGRAM TRACING: CPT | Performed by: THORACIC SURGERY (CARDIOTHORACIC VASCULAR SURGERY)

## 2022-11-08 PROCEDURE — 85610 PROTHROMBIN TIME: CPT

## 2022-11-08 PROCEDURE — 80048 BASIC METABOLIC PNL TOTAL CA: CPT

## 2022-11-08 PROCEDURE — 6370000000 HC RX 637 (ALT 250 FOR IP): Performed by: INTERNAL MEDICINE

## 2022-11-08 PROCEDURE — 97167 OT EVAL HIGH COMPLEX 60 MIN: CPT

## 2022-11-08 PROCEDURE — 97162 PT EVAL MOD COMPLEX 30 MIN: CPT

## 2022-11-08 PROCEDURE — 6370000000 HC RX 637 (ALT 250 FOR IP): Performed by: THORACIC SURGERY (CARDIOTHORACIC VASCULAR SURGERY)

## 2022-11-08 PROCEDURE — 82948 REAGENT STRIP/BLOOD GLUCOSE: CPT

## 2022-11-08 PROCEDURE — 99232 SBSQ HOSP IP/OBS MODERATE 35: CPT | Performed by: INTERNAL MEDICINE

## 2022-11-08 PROCEDURE — 71045 X-RAY EXAM CHEST 1 VIEW: CPT

## 2022-11-08 PROCEDURE — 97530 THERAPEUTIC ACTIVITIES: CPT

## 2022-11-08 PROCEDURE — 93010 ELECTROCARDIOGRAM REPORT: CPT | Performed by: NUCLEAR MEDICINE

## 2022-11-08 PROCEDURE — 83735 ASSAY OF MAGNESIUM: CPT

## 2022-11-08 PROCEDURE — 36415 COLL VENOUS BLD VENIPUNCTURE: CPT

## 2022-11-08 PROCEDURE — 84132 ASSAY OF SERUM POTASSIUM: CPT

## 2022-11-08 PROCEDURE — 85027 COMPLETE CBC AUTOMATED: CPT

## 2022-11-08 PROCEDURE — 2580000003 HC RX 258: Performed by: THORACIC SURGERY (CARDIOTHORACIC VASCULAR SURGERY)

## 2022-11-08 PROCEDURE — 36430 TRANSFUSION BLD/BLD COMPNT: CPT

## 2022-11-08 PROCEDURE — 2060000000 HC ICU INTERMEDIATE R&B

## 2022-11-08 RX ORDER — INSULIN LISPRO 100 [IU]/ML
0-16 INJECTION, SOLUTION INTRAVENOUS; SUBCUTANEOUS
Status: DISCONTINUED | OUTPATIENT
Start: 2022-11-08 | End: 2022-11-15 | Stop reason: HOSPADM

## 2022-11-08 RX ORDER — POTASSIUM CHLORIDE 20 MEQ/1
40 TABLET, EXTENDED RELEASE ORAL PRN
Status: DISCONTINUED | OUTPATIENT
Start: 2022-11-08 | End: 2022-11-15 | Stop reason: HOSPADM

## 2022-11-08 RX ORDER — POTASSIUM CHLORIDE 7.45 MG/ML
10 INJECTION INTRAVENOUS PRN
Status: DISCONTINUED | OUTPATIENT
Start: 2022-11-08 | End: 2022-11-15 | Stop reason: HOSPADM

## 2022-11-08 RX ORDER — PANTOPRAZOLE SODIUM 40 MG/1
40 TABLET, DELAYED RELEASE ORAL
Status: DISCONTINUED | OUTPATIENT
Start: 2022-11-09 | End: 2022-11-15 | Stop reason: HOSPADM

## 2022-11-08 RX ORDER — INSULIN LISPRO 100 [IU]/ML
0-4 INJECTION, SOLUTION INTRAVENOUS; SUBCUTANEOUS NIGHTLY
Status: DISCONTINUED | OUTPATIENT
Start: 2022-11-08 | End: 2022-11-15 | Stop reason: HOSPADM

## 2022-11-08 RX ORDER — POTASSIUM CHLORIDE 20 MEQ/1
20 TABLET, EXTENDED RELEASE ORAL PRN
Status: DISCONTINUED | OUTPATIENT
Start: 2022-11-08 | End: 2022-11-15 | Stop reason: HOSPADM

## 2022-11-08 RX ORDER — MORPHINE SULFATE 4 MG/ML
4 INJECTION, SOLUTION INTRAMUSCULAR; INTRAVENOUS
Status: DISCONTINUED | OUTPATIENT
Start: 2022-11-08 | End: 2022-11-15 | Stop reason: HOSPADM

## 2022-11-08 RX ADMIN — FAMOTIDINE 20 MG: 20 TABLET ORAL at 07:59

## 2022-11-08 RX ADMIN — MORPHINE SULFATE 2 MG: 2 INJECTION, SOLUTION INTRAMUSCULAR; INTRAVENOUS at 01:29

## 2022-11-08 RX ADMIN — AMIODARONE HYDROCHLORIDE 200 MG: 200 TABLET ORAL at 22:28

## 2022-11-08 RX ADMIN — CEFAZOLIN 2000 MG: 10 INJECTION, POWDER, FOR SOLUTION INTRAVENOUS at 19:11

## 2022-11-08 RX ADMIN — ONDANSETRON 4 MG: 4 TABLET, ORALLY DISINTEGRATING ORAL at 10:25

## 2022-11-08 RX ADMIN — OXYCODONE 5 MG: 5 TABLET ORAL at 22:27

## 2022-11-08 RX ADMIN — Medication 1 TABLET: at 07:53

## 2022-11-08 RX ADMIN — SODIUM ZIRCONIUM CYCLOSILICATE 10 G: 10 POWDER, FOR SUSPENSION ORAL at 10:25

## 2022-11-08 RX ADMIN — ONDANSETRON 4 MG: 2 INJECTION INTRAMUSCULAR; INTRAVENOUS at 00:34

## 2022-11-08 RX ADMIN — CEFAZOLIN 2000 MG: 10 INJECTION, POWDER, FOR SOLUTION INTRAVENOUS at 01:36

## 2022-11-08 RX ADMIN — ENOXAPARIN SODIUM 40 MG: 100 INJECTION SUBCUTANEOUS at 11:59

## 2022-11-08 RX ADMIN — METOPROLOL TARTRATE 25 MG: 25 TABLET, FILM COATED ORAL at 07:53

## 2022-11-08 RX ADMIN — METOPROLOL TARTRATE 25 MG: 25 TABLET, FILM COATED ORAL at 22:28

## 2022-11-08 RX ADMIN — SODIUM CHLORIDE, PRESERVATIVE FREE 10 ML: 5 INJECTION INTRAVENOUS at 22:27

## 2022-11-08 RX ADMIN — ACETAMINOPHEN 650 MG: 325 TABLET ORAL at 07:58

## 2022-11-08 RX ADMIN — AMIODARONE HYDROCHLORIDE 200 MG: 200 TABLET ORAL at 07:53

## 2022-11-08 RX ADMIN — SENNOSIDES AND DOCUSATE SODIUM 1 TABLET: 50; 8.6 TABLET ORAL at 22:28

## 2022-11-08 RX ADMIN — SODIUM ZIRCONIUM CYCLOSILICATE 10 G: 10 POWDER, FOR SUSPENSION ORAL at 19:57

## 2022-11-08 RX ADMIN — SODIUM CHLORIDE, PRESERVATIVE FREE 10 ML: 5 INJECTION INTRAVENOUS at 08:00

## 2022-11-08 RX ADMIN — MORPHINE SULFATE 4 MG: 4 INJECTION, SOLUTION INTRAMUSCULAR; INTRAVENOUS at 06:07

## 2022-11-08 RX ADMIN — MORPHINE SULFATE 2 MG: 2 INJECTION, SOLUTION INTRAMUSCULAR; INTRAVENOUS at 05:18

## 2022-11-08 RX ADMIN — ATORVASTATIN CALCIUM 40 MG: 40 TABLET, FILM COATED ORAL at 22:28

## 2022-11-08 RX ADMIN — PROMETHAZINE HYDROCHLORIDE 12.5 MG: 25 INJECTION INTRAMUSCULAR; INTRAVENOUS at 05:18

## 2022-11-08 RX ADMIN — ASPIRIN 325 MG: 325 TABLET, COATED ORAL at 07:59

## 2022-11-08 RX ADMIN — SENNOSIDES AND DOCUSATE SODIUM 1 TABLET: 50; 8.6 TABLET ORAL at 07:59

## 2022-11-08 RX ADMIN — CEFAZOLIN 2000 MG: 10 INJECTION, POWDER, FOR SOLUTION INTRAVENOUS at 07:55

## 2022-11-08 ASSESSMENT — PAIN - FUNCTIONAL ASSESSMENT: PAIN_FUNCTIONAL_ASSESSMENT: ACTIVITIES ARE NOT PREVENTED

## 2022-11-08 ASSESSMENT — PAIN SCALES - GENERAL
PAINLEVEL_OUTOF10: 8
PAINLEVEL_OUTOF10: 0
PAINLEVEL_OUTOF10: 1
PAINLEVEL_OUTOF10: 10
PAINLEVEL_OUTOF10: 0
PAINLEVEL_OUTOF10: 3
PAINLEVEL_OUTOF10: 10

## 2022-11-08 ASSESSMENT — PAIN DESCRIPTION - DESCRIPTORS: DESCRIPTORS: ACHING;DISCOMFORT

## 2022-11-08 ASSESSMENT — PAIN DESCRIPTION - LOCATION: LOCATION: CHEST;PENIS

## 2022-11-08 ASSESSMENT — PAIN DESCRIPTION - ORIENTATION: ORIENTATION: MID

## 2022-11-08 NOTE — CARE COORDINATION
11/8/22, 2:59 PM EST    DISCHARGE ON GOING EVALUATION    [facility] day: 10  Location: -17/017-A Reason for admit: Elevated troponin [T88.7]  Acute systolic congestive heart failure ([address] [I50.21]  New onset of congestive heart failure ([address] [I50.9]   Procedure:   10/28 CTA Chest: No pulmonary embolism. Moderate pulmonary edema. Interval worsening since the prior study on 10/5/22  10/29 Echo with EF 25-30%; severe global hypokinesis of LV  10/31 Renal US: Bilateral hydronephrosis; Markedly distended urinary bladder with a large amount of postvoid residual urine  11/1 Cardiac Cath: MVD  11/2 CT Abd/pelvis: Mild bilateral hydronephrosis. There is bilateral renal contrast excretion   from prior contrast administration limiting evaluation for renal stones. Marked mural thickening of the urinary bladder. The differential diagnosis includes cystitis and neoplasm. Moderate amount of retained stool. Partially visualized lower thorax shows moderate sized bilateral pleural effusions with atelectasis. 11/4 PET Myocardial viability: Absent activity at the anterior left ventricular wall extending into the apex, likely nonviable myocardium  11/7 Vein mapping  11/7 2v CABG; placement of IABP  11/7 Intubated - 11/7 Extubated  11/7 IABP removed  11/8 CXR:   Interval removal of IABP without development of pulmonary edema. Persistent patchy bilateral opacities worse on the left. Small left    pleural effusion. Barriers to Discharge: POD #1. Hgb down to 6 - received total 2 PRBC. Lines out today and transferred to stepdown. Afebrile. NSR. Sats 95% on 3L O2. Ox4. Follows commands. CT x4 to -20sx with 2040 ml out since surgery. CR/PT/OT. Dietitian consulted. Dietary ileus prevention protocol. CVS, Cardiology, and Nephrology following. Telemetry, I&O, daily weight, IS, sternal precautions, CT care, wound care, SCDs, crum care, ambulate.  Amio, asa, lipitor, IV ancef, lovenox, pepcid, SSI, lopressor, prn IV morphine, prn IV zofran, prn roxicodone, lokelma Q6H, electrolyte replacement protocols. K+ 5.6, BUN 31, Creat 1.7, wbc 12.3, hgb 6.6 - now 8, plt 129, INR 1.23. PCP: BLACK Bonilla CNP  Readmission Risk Score: 15.6%  Patient Goals/Plan/Treatment Preferences: Spoke with Bridger Owen; states he has a 2-wheeled walker and shower chair if needed. Monitor for possible BSC. Home with wife and new HH. SW on case.

## 2022-11-08 NOTE — PROGRESS NOTES
Cardiovascular Surgery Progress Note      Cardiovascular Surgery Progress Note      Comfortable on NC  Good hemodynamics, no inotropes  Nominal chest tube output currently  EKG no acute changes  CXR small left effusion, otherwise no space issues  Labs noted, good urine output  -Hold diuresis  -Probable transfer floor

## 2022-11-08 NOTE — ANESTHESIA POSTPROCEDURE EVALUATION
Department of Anesthesiology  Postprocedure Note    Patient: Christine España  MRN: [id number]  YOB: 1960  Date of evaluation: 11/8/2022      Procedure Summary     Date: 11/07/22 [facility]    Anesthesia Start: 0801 Anesthesia Stop: 4581    Procedure: CABG X2 JOY WITH BALLOON PUMP (Chest) Diagnosis: Other congestive heart failure [address] [address] [facility]    Surgeons: Zack Medina MD Responsible Provider: Lara Davidson MD    Anesthesia Type: general ASA Status: 4          Anesthesia Type: No value filed.     Eliza Phase I:      Eliza Phase II:        Anesthesia Post Evaluation    Patient location during evaluation: ICU  Level of consciousness: sleepy but conscious  Airway patency: patent  Nausea & Vomiting: vomiting  Complications: no  Cardiovascular status: hemodynamically stable  Respiratory status: room air  Hydration status: stable

## 2022-11-08 NOTE — FLOWSHEET NOTE
2000-Assessment    2030-Pt vomiting. Complete bath and linen change done at his time. 2100-Pt continues to vomit. Dr Virginia Noel updated. New order received for phenergan. 2224-IABP lost waveform on machine, no augmentation noted in arterial line waveform. IABP condom with blood in line (this is a new finding). Post tib and pedal pulses nonpalpable now. 2230-Dr Virginia Noel called and updated about IABP. States to get supplies ready, he will be in to pull the line. 2305-IABP pulled per Dr Virginia Noel, FemStop placed. States if recheck hgb is <7 transfuse another unit PRBCs    2345-Femstop pressure decreased by 10mmhg    0015-Assessment complete. 0030-Femstop removed. No signs of bleeding, no hematoma. Will start cath checks over a q15m.     0300-Now able to palpate pedal and post tib pulses on LLE    0430- Assessment complete. 0525-Dr Chopra notified of critical hgb, will transfuse. Updated on increase in pleural CT output. 0600-Dr Chopra at bedside. Suctioning out CTs. Discussed pain management. New orders received. Discussed insulin gtt, will place orders. Discussed possibility of returning to OR.     0645-Dr Virginia Noel returned to bedside. States will monitor bleeding. No OR at this time. Will likely place transfer orders after repeat cbc results.

## 2022-11-08 NOTE — PROGRESS NOTES
[address] ICU STEPDOWN TELEMETRY 4K  EVALUATION    Time:   Time In: 1210  Time Out: 1227  Timed Code Treatment Minutes: 8 Minutes  Minutes: 17          Date: 2022  Patient Name: Dalton Riggs,   Gender: male      MRN: [id number]  : 1960  (63 y.o.)  Referring Practitioner: Cait Brice MD  Diagnosis: elevated troponin  Additional Pertinent Hx: Per EMR:  63 y.o. diabetic male, extensive previous history of EtOH, current smoker, presents with chronic progressive dyspnea, worsening over past 2 months. Patient denies chest pain/pressure; no peripheral edema, presyncope, orthopnea. Pt admitted on 10/28/22. Large pleural effusions seen on admission imaging prompted echo, showing LVEF 25%; subsequent cath shows 2-v CAD. Pt s/p CABG X2 and insertion of IABP on 22. Restrictions/Precautions:  Restrictions/Precautions: Surgical Protocols, Fall Risk  Position Activity Restriction  Sternal Precautions: No Pushing, No Pulling, 10# Lifting Restrictions  Other position/activity restrictions: CT X2, temporary pacer    Subjective  Chart Reviewed: Yes, Orders, Progress Notes, History and Physical  Patient assessed for rehabilitation services?: Yes  Family / Caregiver Present: Yes (wife)    Subjective: Pt seated at EOB upon arrival with RN present in room. Pt preparing to transfer to stepdown unit and RN OK'd OT assisting. OT had attempted earlier  date and RN was just pulling Jareth and arterial lines    Pain: Pt c/o pain over sternum although does not quantify     Vitals: Vitals not assessed per clinical judgement, see nursing flowsheet-RN had just removed vitals monitoring in prep for transfer to stepdown.  Pt on RA during session, is relying on temporary pacer    Social/Functional History:  Lives With: Spouse  Type of Home: House  Home Layout: One level  Home Access: Stairs to enter with rails  Entrance Stairs - Number of Steps: 3  Home Equipment: Ashish Perry, rolling   Bathroom Shower/Tub: Tub/Shower unit  Bathroom Toilet: Standard  Bathroom Equipment: Shower chair (wife getting HHS)       ADL Assistance: Independent  Homemaking Assistance: Independent  Ambulation Assistance: Independent  Transfer Assistance: Independent    Active : No  Occupation: Full time employment  Type of Occupation: [address] in [employer] [employer]  Additional Comments: Wife works during the day    VISION:WFL    HEARING:  WFL    COGNITION: Decreased Recall, Decreased Problem Solving, and Decreased Safety Awareness    RANGE OF MOTION:  Bilateral Upper Extremity:  WFL, although only assessed shoulders to 90 degrees due to sternal prec    STRENGTH:  Bilateral Upper Extremity:  Not Tested    ADL:   No ADL's completed this session. Gina Simmons BALANCE:  Sitting Balance:  Contact Guard Assistance. Progressing to SBA  Standing Balance: Minimal Assistance, X 2. , Tolerates standing ~1 minute X2 trials    BED MOBILITY:  Not Tested    TRANSFERS:  Sit to Stand:  Minimal Assistance, X 2, with increased time for completion, cues for hand placement, with verbal cues. Stand to Sit: Minimal Assistance, X 2, with increased time for completion, cues for hand placement, with verbal cues. **Transfers completed X2 trials. Minimal cues for sternal prec    FUNCTIONAL MOBILITY:  Assistive Device: hand held assist  Assist Level:  Minimal Assistance, X 2, with verbal cues , and with increased time for completion. Distance:  EOB>transport chair; transport chair>bedside chair  Slightly unsteady with pt stating \"I just feel so wobbly right now. \"      Activity Tolerance:  Patient tolerance of  treatment: fair. Assessment:  Assessment: Pt presents requiring increased assistance for ADLs, transfers, and functional mobility compared to PLOF. Pt will continue to benefit from OT services to improve independence with these tasks, in addition to overall strength/endurance to facilitate return to PLOF.      Performance deficits / Impairments: Decreased functional mobility , Decreased ADL status, Decreased strength, Decreased endurance, Decreased balance, Decreased high-level IADLs  Prognosis: Good  REQUIRES OT FOLLOW-UP: Yes  Decision Making: High Complexity    Treatment Initiated: Treatment and education initiated within context of evaluation. Evaluation time included review of current medical information, gathering information related to past medical, social and functional history, completion of standardized testing, formal and informal observation of tasks, assessment of data and development of plan of care and goals. Treatment time included skilled education and facilitation of tasks to increase safety and independence with ADL's for improved functional independence and quality of life. Discharge Recommendations:  Patient would benefit from continued therapy after discharge, Continue to assess pending progress (may benefit from physiatry consult pending progress and family's ability to provide 24/7 supervision at discharge)    Patient Education:     Patient Education  Education Given To: Family, Patient  Education Provided: Role of Therapy, Plan of Care, Precautions, Transfer Training  Education Method: Demonstration, Verbal  Barriers to Learning: Cognition  Education Outcome: Verbalized understanding, Continued education needed    Equipment Recommendations: Other: may benefit from [facility] to place over toilet at home    Plan:  Times Per Week: 6x  Current Treatment Recommendations: ROM, Balance training, Functional mobility training, Endurance training, Safety education & training, Patient/Caregiver education & training, Equipment evaluation, education, & procurement, Self-Care / ADL. See long-term goal time frame for expected duration of plan of care. If no long-term goals established, a short length of stay is anticipated.     Goals:     Short Term Goals  Time Frame for Short Term Goals: by discharge  Short Term Goal 1: Pt will increase activity tolerance for functional mobility to/from bathroom, progressing to household distances as able, with supervision in prep for ADL completion. Short Term Goal 2: Pt will complete functional transfers with supervision in prep for toilet/shower transfers. Short Term Goal 3: Pt will complete BADL tasks with supervision to increase independence with self care tasks. Short Term Goal 4: Pt will tolerate standing >5 minutes with supervision in prep for sinkside grooming tasks. Following session, patient left in safe position with all fall risk precautions in place.

## 2022-11-08 NOTE — PROGRESS NOTES
[facility]   Nephrology progress note  11/8/2022, 8:54 AM      Pt Name:    Geena Torres  MRN:     [id number]     YOB: 1960  Admit Date:    10/28/2022  9:39 PM    Chief Complaint: Nephrology following for LC/CKD. Subjective:  Patient seen and examined  Feels okay denies any complaints  Some chest pain but no significant shortness of breath  Has a lot of output from the chest tubes, bloody received blood transfusion    Objective:  24HR INTAKE/OUTPUT:    Intake/Output Summary (Last 24 hours) at 11/8/2022 0854  Last data filed at 11/8/2022 4638  Gross per 24 hour   Intake 5171.4 ml   Output 3697 ml   Net 1474.4 ml        Admission weight: 150 lb (68 kg)  Wt Readings from Last 3 Encounters:   11/07/22 131 lb 12.8 oz (59.8 kg)   10/26/22 151 lb (68.5 kg)   10/12/22 140 lb 9.6 oz (63.8 kg)        Vitals :   Vitals:    11/08/22 0547 11/08/22 0600 11/08/22 0635 11/08/22 0734   BP: (!) 134/46 (!) 109/53 (!) 152/48    Pulse: 87 90 87    Resp: 16 20 12    Temp: 98.2 °F (36.8 °C) 98.1 °F (36.7 °C) 98.1 °F (36.7 °C) 98.1 °F (36.7 °C)   TempSrc:  Core Core Core   SpO2: 98% 100% 95%    Weight:       Height:           Physical examination  General Appearance:  Well developed.  No distress  Mouth/Throat:  Oral mucosa moist  Neck:  Supple, no JVD  Lungs:  Breath sounds: clear  Heart[de-identified]  S1,S2 heard  Abdomen:  Soft, non - tender  Musculoskeletal:  Edema -no edema    Medications:  Infusion:    sodium chloride      dextrose      EPINEPHrine Stopped (11/07/22 0154)     Meds:    insulin lispro  0-16 Units SubCUTAneous TID     insulin lispro  0-4 Units SubCUTAneous Nightly    sodium chloride flush  5-40 mL IntraVENous 2 times per day    enoxaparin  40 mg SubCUTAneous Daily    aspirin  325 mg Oral Daily    amiodarone  200 mg Oral BID    multivitamin  1 tablet Oral Daily with breakfast    sennosides-docusate sodium  1 tablet Oral BID    metoprolol tartrate  25 mg Oral BID    ceFAZolin (ANCEF) IVPB 2,000 mg IntraVENous Q8H    famotidine  20 mg Oral Daily    Or    famotidine (PEPCID) injection  20 mg IntraVENous Daily    atorvastatin  40 mg Oral Nightly       Lab Data :  CBC:   Recent Labs     11/07/22  1300 11/07/22  1850 11/07/22  2300 11/08/22  0430 11/08/22  0800   WBC 17.9*  --   --  11.2* 12.3*   HGB 8.9*   < > 7.2* 6.6* 8.0*   HCT 27.2*   < > 22.2* 20.7* 24.3*     --   --  134 129*    < > = values in this interval not displayed. CMP:  Recent Labs     11/07/22  0600 11/07/22  0916 11/07/22  1138 11/07/22  1300 11/07/22  1850 11/08/22  0155 11/08/22  0430      < > 134* 134*  --   --  139   K 4.8   < > 4.4 3.8 3.8 5.9* 5.6*   CL 98  --   --  99  --   --  109   CO2 28  --   --  21*  --   --  21*   BUN 30*  --   --  27*  --   --  31*   CREATININE 1.8*  --   --  1.6*  --   --  1.7*   GLUCOSE 131*  --   --  171*  --   --  75   CALCIUM 9.4  --   --  8.3*  --   --  8.8   MG  --   --   --  3.1*  --   --  2.4    < > = values in this interval not displayed. Hepatic: No results for input(s): LABALBU, AST, ALT, ALB, BILITOT, ALKPHOS in the last 72 hours. Assessment and Plan:  Renal -acute kidney injury, on chronic kidney disease stage III  This appears most likely due to the use of diuretics ACE inhibitor and primarily due to contrast exposure on the day of his admission. Currently improved to some degree close to his baseline, with mild fluctuations  Urine output okay  Electrolytes -hyperkalemia may be due to multiple units of blood transfusion we will give Lokelma 2 doses  Essential hypertension running reasonable at this time  Hx of diabetes mellitus apparently his A1c was 15.0,, 3 months ago  New onset of acute congestive heart failure systolic stable  Anemia due to increased drainage from the chest tubes got several units of blood transfusion  CAD status post CABG 11/7/2022  Urinary retention -status post Bella,   Meds reviewed.   Discussed with patient and nursing staff    Daryle Leyland Sherly Bar MD  [facility]    This report has been created using voice recognition software.  It may contain minor errors which are inherent in voice recognition technology

## 2022-11-08 NOTE — PLAN OF CARE
Problem: Discharge Planning  Goal: Discharge to home or other facility with appropriate resources  Outcome: Progressing      Consult received. Please see SW note dated 11/08.

## 2022-11-08 NOTE — CONSENT
Informed Consent for Blood Component Transfusion Note    I have discussed with the patient the rationale for blood component transfusion; its benefits in treating or preventing fatigue, organ damage, or death; and its risk which includes mild transfusion reactions, rare risk of blood borne infection, or more serious but rare reactions. I have discussed the alternatives to transfusion, including the risk and consequences of not receiving transfusion. The patient had an opportunity to ask questions and had agreed to proceed with transfusion of blood components.     Electronically signed by Almaz Hernandez MD on 11/7/22 at 8:47 PM EST

## 2022-11-08 NOTE — CARE COORDINATION
DISCHARGE/PLANNING EVALUATION  11/8/22, 2:43 PM EST    Reason for Referral: Patient will require [facility]  Mental Status: Answered questions appropriately  Decision Making: Independent with help from wife  Family/Social/Home Environment: Lives at home with his wife  Current Services including food security, transportation and housekeeping: Patient is independent in his care, will require new HH following OHS. Current Equipment: None  Payment Source: BCBS  Concerns or Barriers to Discharge:  None   Post-acute [facility]) provider list was provided to patient. Patient was informed of their freedom to choose [facility] provider. Discussed and offered to show the patient the relevant [facility] Providers quality and resource use measures on Medicare Compare web site via computer based on patient's goals of care and treatment preferences. Questions regarding selection process were answered. Teach Back Method used with Usha Gatica regarding care plan and discharge planning  Patient and wife verbalize understanding of the plan of care and contribute to goal setting. Patient goals, treatment preferences and discharge plan: Return to home with wife and new HH. This SW provided patient with a [facility] list and explained the star rating system. Made him aware that someone will stop back tomorrow for [facility] choice.      Electronically signed by SYDNEY Maharaj on 11/8/2022 at 2:43 PM

## 2022-11-08 NOTE — PROGRESS NOTES
[address] [address]  INPATIENT PHYSICAL THERAPY  EVALUATION  [facility] ICU STEPDOWN TELEMETRY 4K - 4K-17/017-A    Time In: 1340  Time Out: 1359  Timed Code Treatment Minutes: 10 Minutes  Minutes: 19          Date: 2022  Patient Name: Keri Sales,  Gender:  male        MRN: [id number]  : 1960  (63 y.o.)      Referring Practitioner: Radha Vasquez MD  Diagnosis: Elevated troponin  Additional Pertinent Hx: 63 y.o. diabetic male, extensive previous history of EtOH, current smoker, presents with chronic progressive dyspnea, worsening over past 2 months. Patient denies chest pain/pressure; no peripheral edema, presyncope, orthopnea. Large pleural effusions seen on admission imaging prompted echo, showing LVEF 25%; subsequent cath shows 2-v CAD. Pt is s/p CABG x 2 . Restrictions/Precautions:  Restrictions/Precautions: Surgical Protocols, Fall Risk  Position Activity Restriction  Sternal Precautions: No Pushing, No Pulling, 10# Lifting Restrictions  Other position/activity restrictions: CT X2, temporary pacer    Subjective:  Chart Reviewed: Yes  Patient assessed for rehabilitation services?: Yes  Family / Caregiver Present: No  Subjective: RN approved session, pt is seated in recliner, agreeable to PT. Education on heart hugger as it is on bed out of reach upon arrival to room, given to pt.     General:  Overall Orientation Status: Within Functional Limits  Vision: Within Functional Limits  Hearing: Within functional limits       Pain: 7/10:     Vitals: Vitals not assessed per clinical judgement, see nursing flowsheet    Social/Functional History:    Lives With: Spouse  Type of Home: House  Home Layout: One level  Home Access: Stairs to enter with rails  Entrance Stairs - Number of Steps: 3  Home Equipment: Walker, rolling     Bathroom Shower/Tub: Tub/Shower unit  Bathroom Toilet: Standard  Bathroom Equipment: Shower chair (wife getting HHS)       ADL Assistance: Independent  Homemaking Assistance: Independent  Ambulation Assistance: Independent  Transfer Assistance: Independent    Active : No  Occupation: Full time employment  Type of Occupation: [id number] in [employer]  Additional Comments: Wife works during the day    OBJECTIVE:  Range of Motion:  Bilateral Lower Extremity: WFL    Strength:  Bilateral Lower Extremity: Impaired - grossly 4/5    Balance:  Static Sitting Balance: Moderate Assistance, X 1, with verbal cues   Static Standing Balance: Minimal Assistance, X 1  Dynamic Standing Balance: Moderate Assistance, X 1    Bed Mobility:  Sit to Supine: Moderate Assistance, X 1, with head of bed flat, with verbal cues; pt impulsive to lie down after ambulation     Transfers:  Sit to Stand: Minimal Assistance, X 1, with increased time for completion, with verbal cues  Stand to Sit:Minimal Assistance, X 1, with verbal cues    Ambulation:  Minimal Assistance-Moderate Assistance, X 1  Distance: 10 feet  Surface: Level Tile  Device:No Device  Gait Deviations: Forward Flexed Posture, Slow Jada, Decreased Step Length Bilaterally, Decreased Trunk Rotation, Decreased Gait Speed, and Unsteady Gait  **Increased posterior push towards the end of ambulation, pt lacks awareness, cues for posture, impulsive to sit down    Exercise:  Patient was guided in 1 set(s) 10 reps of exercise to both lower extremities. Ankle pumps, Upper trunk rotations, Shoulder horizontal abduction/adduction, Shoulder rolls, and Long arc quads. Exercises were completed for increased independence with functional mobility. Functional Outcome Measures: Completed  AM-PAC Inpatient Mobility without Stair Climbing Raw Score : 13  AM-PAC Inpatient without Stair Climbing T-Scale Score : 38.96    ASSESSMENT:  Activity Tolerance:  Patient tolerance of  treatment: good. Treatment Initiated: Treatment and education initiated within context of evaluation.   Evaluation time included review of current medical information, gathering information related to past medical, social and functional history, completion of standardized testing, formal and informal observation of tasks, assessment of data and development of plan of care and goals. Treatment time included skilled education and facilitation of tasks to increase safety and independence with functional mobility for improved independence and quality of life. Assessment: Body Structures, Functions, Activity Limitations Requiring Skilled Therapeutic Intervention: Decreased functional mobility , Decreased safe awareness, Decreased endurance, Decreased balance, Decreased strength  Assessment: Toño Davis is a 63 y.o. male that presents s/p CABG. he is ind prior to admission now requiring assist for basic mobility. Pt demonstrates a decrease in baseline by way of bed mobility, transfers and ambulation secondary to decreased activity tolerance, strength, fatigue, and balance deficits. Pt will benefit from skilled PT services throughout admission and beyond hospital discharge for improvements in functional mobility and in order to decrease fall risk and return pt to PLOF. Therapy Prognosis: Good    Requires PT Follow-Up: Yes    Discharge Recommendations:  Discharge Recommendations: Continue to assess pending progress, Patient would benefit from continued therapy after discharge    Patient Education:      .     Patient Education  Education Given To: Patient  Education Provided: Role of Therapy, Plan of Care, Precautions  Education Method: Verbal  Barriers to Learning: None  Education Outcome: Verbalized understanding       Equipment Recommendations:  Equipment Needed: No    Plan:  Current Treatment Recommendations: Strengthening, Balance training, Functional mobility training, Transfer training, Endurance training, Neuromuscular re-education, Gait training, Stair training, Patient/Caregiver education & training, Therapeutic activities, Safety education & training, Home exercise program  General Plan: (6x CABG)    Goals:  Patient Goals : to go home  Short Term Goals  Time Frame for Short Term Goals: by discharge  Short Term Goal 1: Pt to transfer supine <--> sit S to enable pt to get in/out of bed. Short Term Goal 2: Pt to transfer sit <--> stand S for increased functional mobility. Short Term Goal 3: Pt to ambulate >250 feet without AD SBA for household ambulation. Short Term Goal 4: Pt to ascend/descend 3 steps with HR SBA for home entry. Long Term Goals  Time Frame for Long Term Goals : NA due to short length of stay. Following session, patient left in safe position with all fall risk precautions in place.

## 2022-11-09 ENCOUNTER — APPOINTMENT (OUTPATIENT)
Dept: GENERAL RADIOLOGY | Age: 62
DRG: 233 | End: 2022-11-09
Payer: COMMERCIAL

## 2022-11-09 LAB
ANION GAP SERPL CALCULATED.3IONS-SCNC: 10 MEQ/L (ref 8–16)
BUN BLDV-MCNC: 36 MG/DL (ref 7–22)
CALCIUM SERPL-MCNC: 9.1 MG/DL (ref 8.5–10.5)
CHLORIDE BLD-SCNC: 101 MEQ/L (ref 98–111)
CO2: 25 MEQ/L (ref 23–33)
CREAT SERPL-MCNC: 1.7 MG/DL (ref 0.4–1.2)
EKG ATRIAL RATE: 91 BPM
EKG P AXIS: 78 DEGREES
EKG P-R INTERVAL: 166 MS
EKG Q-T INTERVAL: 400 MS
EKG QRS DURATION: 94 MS
EKG QTC CALCULATION (BAZETT): 492 MS
EKG R AXIS: 80 DEGREES
EKG T AXIS: 98 DEGREES
EKG VENTRICULAR RATE: 91 BPM
ERYTHROCYTE [DISTWIDTH] IN BLOOD BY AUTOMATED COUNT: 13.5 % (ref 11.5–14.5)
ERYTHROCYTE [DISTWIDTH] IN BLOOD BY AUTOMATED COUNT: 44.4 FL (ref 35–45)
GFR SERPL CREATININE-BSD FRML MDRD: 45 ML/MIN/1.73M2
GLUCOSE BLD-MCNC: 148 MG/DL (ref 70–108)
GLUCOSE BLD-MCNC: 155 MG/DL (ref 70–108)
GLUCOSE BLD-MCNC: 177 MG/DL (ref 70–108)
GLUCOSE BLD-MCNC: 203 MG/DL (ref 70–108)
GLUCOSE BLD-MCNC: 226 MG/DL (ref 70–108)
HCT VFR BLD CALC: 25.1 % (ref 42–52)
HEMOGLOBIN: 8.2 GM/DL (ref 14–18)
MCH RBC QN AUTO: 29.4 PG (ref 26–33)
MCHC RBC AUTO-ENTMCNC: 32.7 GM/DL (ref 32.2–35.5)
MCV RBC AUTO: 90 FL (ref 80–94)
PLATELET # BLD: 156 THOU/MM3 (ref 130–400)
PMV BLD AUTO: 11.4 FL (ref 9.4–12.4)
POTASSIUM SERPL-SCNC: 4.8 MEQ/L (ref 3.5–5.2)
RBC # BLD: 2.79 MILL/MM3 (ref 4.7–6.1)
SODIUM BLD-SCNC: 136 MEQ/L (ref 135–145)
WBC # BLD: 14.2 THOU/MM3 (ref 4.8–10.8)

## 2022-11-09 PROCEDURE — 6370000000 HC RX 637 (ALT 250 FOR IP): Performed by: THORACIC SURGERY (CARDIOTHORACIC VASCULAR SURGERY)

## 2022-11-09 PROCEDURE — 71045 X-RAY EXAM CHEST 1 VIEW: CPT

## 2022-11-09 PROCEDURE — 2580000003 HC RX 258: Performed by: THORACIC SURGERY (CARDIOTHORACIC VASCULAR SURGERY)

## 2022-11-09 PROCEDURE — 36415 COLL VENOUS BLD VENIPUNCTURE: CPT

## 2022-11-09 PROCEDURE — 97110 THERAPEUTIC EXERCISES: CPT

## 2022-11-09 PROCEDURE — 2060000000 HC ICU INTERMEDIATE R&B

## 2022-11-09 PROCEDURE — 6360000002 HC RX W HCPCS: Performed by: THORACIC SURGERY (CARDIOTHORACIC VASCULAR SURGERY)

## 2022-11-09 PROCEDURE — 82948 REAGENT STRIP/BLOOD GLUCOSE: CPT

## 2022-11-09 PROCEDURE — 80048 BASIC METABOLIC PNL TOTAL CA: CPT

## 2022-11-09 PROCEDURE — 85027 COMPLETE CBC AUTOMATED: CPT

## 2022-11-09 PROCEDURE — 97530 THERAPEUTIC ACTIVITIES: CPT

## 2022-11-09 PROCEDURE — 97535 SELF CARE MNGMENT TRAINING: CPT

## 2022-11-09 PROCEDURE — 99232 SBSQ HOSP IP/OBS MODERATE 35: CPT | Performed by: INTERNAL MEDICINE

## 2022-11-09 RX ORDER — INSULIN GLARGINE 100 [IU]/ML
15 INJECTION, SOLUTION SUBCUTANEOUS 2 TIMES DAILY
Status: DISCONTINUED | OUTPATIENT
Start: 2022-11-09 | End: 2022-11-11

## 2022-11-09 RX ORDER — AMIODARONE HYDROCHLORIDE 200 MG/1
200 TABLET ORAL DAILY
Status: DISCONTINUED | OUTPATIENT
Start: 2022-11-09 | End: 2022-11-12

## 2022-11-09 RX ADMIN — PANTOPRAZOLE SODIUM 40 MG: 40 TABLET, DELAYED RELEASE ORAL at 09:02

## 2022-11-09 RX ADMIN — Medication 1 TABLET: at 09:02

## 2022-11-09 RX ADMIN — INSULIN GLARGINE 15 UNITS: 100 INJECTION, SOLUTION SUBCUTANEOUS at 09:11

## 2022-11-09 RX ADMIN — SENNOSIDES AND DOCUSATE SODIUM 1 TABLET: 50; 8.6 TABLET ORAL at 21:02

## 2022-11-09 RX ADMIN — ATORVASTATIN CALCIUM 40 MG: 40 TABLET, FILM COATED ORAL at 21:02

## 2022-11-09 RX ADMIN — AMIODARONE HYDROCHLORIDE 200 MG: 200 TABLET ORAL at 09:02

## 2022-11-09 RX ADMIN — ASPIRIN 325 MG: 325 TABLET, COATED ORAL at 09:02

## 2022-11-09 RX ADMIN — SENNOSIDES AND DOCUSATE SODIUM 1 TABLET: 50; 8.6 TABLET ORAL at 09:03

## 2022-11-09 RX ADMIN — ENOXAPARIN SODIUM 40 MG: 100 INJECTION SUBCUTANEOUS at 09:03

## 2022-11-09 RX ADMIN — METOPROLOL TARTRATE 25 MG: 25 TABLET, FILM COATED ORAL at 21:02

## 2022-11-09 RX ADMIN — SODIUM CHLORIDE, PRESERVATIVE FREE 10 ML: 5 INJECTION INTRAVENOUS at 09:24

## 2022-11-09 RX ADMIN — INSULIN GLARGINE 15 UNITS: 100 INJECTION, SOLUTION SUBCUTANEOUS at 21:02

## 2022-11-09 RX ADMIN — INSULIN LISPRO 4 UNITS: 100 INJECTION, SOLUTION INTRAVENOUS; SUBCUTANEOUS at 09:09

## 2022-11-09 RX ADMIN — METOPROLOL TARTRATE 25 MG: 25 TABLET, FILM COATED ORAL at 09:02

## 2022-11-09 RX ADMIN — SODIUM CHLORIDE, PRESERVATIVE FREE 10 ML: 5 INJECTION INTRAVENOUS at 21:02

## 2022-11-09 RX ADMIN — CEFAZOLIN 2000 MG: 10 INJECTION, POWDER, FOR SOLUTION INTRAVENOUS at 00:42

## 2022-11-09 ASSESSMENT — PAIN SCALES - GENERAL: PAINLEVEL_OUTOF10: 5

## 2022-11-09 ASSESSMENT — PAIN - FUNCTIONAL ASSESSMENT: PAIN_FUNCTIONAL_ASSESSMENT: ACTIVITIES ARE NOT PREVENTED

## 2022-11-09 ASSESSMENT — PAIN DESCRIPTION - LOCATION: LOCATION: CHEST

## 2022-11-09 ASSESSMENT — PAIN DESCRIPTION - ONSET: ONSET: ON-GOING

## 2022-11-09 ASSESSMENT — PAIN DESCRIPTION - PAIN TYPE: TYPE: SURGICAL PAIN

## 2022-11-09 ASSESSMENT — PAIN DESCRIPTION - FREQUENCY: FREQUENCY: CONTINUOUS

## 2022-11-09 ASSESSMENT — PAIN DESCRIPTION - DESCRIPTORS: DESCRIPTORS: ACHING;DISCOMFORT

## 2022-11-09 ASSESSMENT — PAIN DESCRIPTION - ORIENTATION: ORIENTATION: MID

## 2022-11-09 NOTE — CARE COORDINATION
11/9/22, 2:39 PM EST    DISCHARGE ON GOING EVALUATION    [facility] campus day: 11  Location: -17/017-A Reason for admit: Elevated troponin [W76.4]  Acute systolic congestive heart failure ([address] [I50.21]  New onset of congestive heart failure ([address] [I50.9]   Procedure:   10/28 CTA Chest: No pulmonary embolism. Moderate pulmonary edema. Interval worsening since the prior study on 10/5/22  10/29 Echo with EF 25-30%; severe global hypokinesis of LV  10/31 Renal US: Bilateral hydronephrosis; Markedly distended urinary bladder with a large amount of postvoid residual urine  11/1 Cardiac Cath: MVD  11/2 CT Abd/pelvis: Mild bilateral hydronephrosis. There is bilateral renal contrast excretion   from prior contrast administration limiting evaluation for renal stones. Marked mural thickening of the urinary bladder. The differential diagnosis includes cystitis and neoplasm. Moderate amount of retained stool. Partially visualized lower thorax shows moderate sized bilateral pleural effusions with atelectasis. 11/4 PET Myocardial viability: Absent activity at the anterior left ventricular wall extending into the apex, likely nonviable myocardium  11/7 Vein mapping  11/7 2v CABG; placement of IABP  11/7 Intubated - 11/7 Extubated  11/7 IABP removed  11/9 CXR: There are very small biapical pneumothoraces. The appearance of the chest has improved since the previous study    Barriers to Discharge: POD #2. The external tubing of the chest tube was inadvertently disconnected last night and this morning. This resulted in b/l pneumothorax briefly until the chest tube was reconnected. Pneumothorax almost completely resolved now. No BM yet. Tmax 99.3. NSR. On room air. Ox4. Follows commands. CT x4 to -20sx with 2040 ml out since surgery. CR/PT/OT. Dietitian consulted. Dietary ileus prevention protocol. CVS, Cardiology, and Nephrology following.  Telemetry, I&O, daily weight, IS, sternal precautions, CT care, wound care, SCDs, crum care, ambulate. Amio, asa, lipitor, lovenox, lantus, SSI, lopressor, prn IV morphine, prn IV zofran, prn roxicodone, protonix, electrolyte replacement protocols. BUN 36, Creat 1.7, wbc 14.2, hgb 8.2. PCP: BLACK Dixon CNP  Readmission Risk Score: 15.4%  Patient Goals/Plan/Treatment Preferences: Home with wife and new HH. Life Vest approved. Monitor for poss BSC at discharge. SW on case.

## 2022-11-09 NOTE — PROGRESS NOTES
[facility] STEPDOWN TELEMET[facility] 4K  Occupational Therapy  Daily Note  Time:   Time In:   Time Out: 1443  Timed Code Treatment Minutes: 38 Minutes  Minutes: 38          Date: 2022  Patient Name: Riccardo Velasquez,   Gender: male      Room: [facility]17/017-A  MRN: [id number]  : 1960  (63 y.o.)  Referring Practitioner: Vandana Rosa MD  Diagnosis: elevated troponin  Additional Pertinent Hx: Per EMR:  63 y.o. diabetic male, extensive previous history of EtOH, current smoker, presents with chronic progressive dyspnea, worsening over past 2 months. Patient denies chest pain/pressure; no peripheral edema, presyncope, orthopnea. Pt admitted on 10/28/22. Large pleural effusions seen on admission imaging prompted echo, showing LVEF 25%; subsequent cath shows 2-v CAD. Pt s/p CABG X2 and insertion of IABP on 22. Restrictions/Precautions:  Restrictions/Precautions: Surgical Protocols, Fall Risk  Position Activity Restriction  Sternal Precautions: No Pushing, No Pulling, 10# Lifting Restrictions  Other position/activity restrictions: CT X2, temporary pacer     SUBJECTIVE: Patient supine in bed upon arrival; agreeable to therapy this date. Nursing ok'd session, temp pacemaker attached however not on. Patient becomes irritable with cueing expresses frustration as he feels he should be moving better. This therapist provided emotional support and education to patient and spouse. PAIN: no numerical scale provided    Vitals: Blood Pressure: 98/58 beginning of session, 83/63 at end of session in bedside chair    COGNITION: Decreased Insight, Decreased Problem Solving, Decreased Safety Awareness, and Impulsive    ADL:   Lower Extremity Dressing: Maximum Assistance. Doff/carli B socks . BALANCE:  Sitting Balance:  Stand By Assistance, Contact Guard Assistance, X 1, with cues for safety, with verbal cues , with increased time for completion.  Seated EOB during CABG exercises requiring cues for upright posture as well as maintaining sternal precautions  Standing Balance: Minimal Assistance, X 1, with cues for safety, with verbal cues , posterior lean, RW. Patient states he does not feel lightheaded or dizzy however \"just feel wobbly\". BED MOBILITY:  Supine to Sit: Moderate Assistance, X 1, with head of bed raised, without rail, with verbal cues , with increased time for completion, verbal cues to maintain sternal precautions      TRANSFERS:  Sit to Stand: Moderate Assistance, X 1, with increased time for completion, cues for hand placement, with verbal cues, posterior lean, cues to maintain sternal precautions. Stand to Sit: Maximum Assistance, X 1, with increased time for completion, cues for hand placement, with verbal cues, to/from chair with arms, verbal cues to maintain sternal precautions as well as technique as patient demonstrates posterior lean. FUNCTIONAL MOBILITY:  Assistive Device: Rolling Walker  Assist Level:  Minimal Assistance, X 1, with verbal cues , and with increased time for completion. Distance:  EOB to bedside chair  Posterior lean     ADDITIONAL ACTIVITIES:  Pt completed CABG Step II exercises x10 reps x1 set this date in order to increase strength and improve activity tolerance for ADLs and homemaking tasks. Pt exhibited minimal fatigue during task, requring minimal rest breaks and minimal verbal VCs for technique frequent rest breaks for positioning to upright posture on EOB demonstrating decreased core strength. Patient/spouse vended open heart booklet with education regarding exercises as well as heart health information. ASSESSMENT:     Activity Tolerance:  Patient tolerance of  treatment: good. Discharge Recommendations: Inpatient Rehabilitation  Equipment Recommendations:  Other: may benefit from [facility] to place over toilet at home  Plan: Times Per Week: 6x  Current Treatment Recommendations: ROM, Balance training, Functional mobility training, Endurance training, Safety education & training, Patient/Caregiver education & training, Equipment evaluation, education, & procurement, Self-Care / ADL    Patient Education  Patient Education: Role of OT, Plan of Care, ADL's, IADL's, Home Exercise Program, Precautions, Family Education, Equipment Education, Home Safety, Importance of Increasing Activity, and Assistive Device Safety    Goals  Short Term Goals  Time Frame for Short Term Goals: by discharge  Short Term Goal 1: Pt will increase activity tolerance for functional mobility to/from bathroom, progressing to household distances as able, with supervision in prep for ADL completion. Short Term Goal 2: Pt will complete functional transfers with supervision in prep for toilet/shower transfers. Short Term Goal 3: Pt will complete BADL tasks with supervision to increase independence with self care tasks. Short Term Goal 4: Pt will tolerate standing >5 minutes with supervision in prep for sinkside grooming tasks. Following session, patient left in safe position with all fall risk precautions in place.

## 2022-11-09 NOTE — PROGRESS NOTES
CT/CV Surgery Progress Note    2022 2:08 PM  Surgeon:  Dr. Corinna Owen:  Mr. Concetta Thomas is s/p CABGx2 and insertion of IABP, POD#2. He is resting comfortably in bed on room air, alert, and in no acute distress. Pt denies chest pressure, SOB, fever,chills, N/V/D. The external tubing of the chest tube was inadvertently disconnected last night and this morning. This resulted in b/l pneumothorax briefly until the chest tube was reconnected. Pneumothorax almost completely resolved now. Pt currently has no SOB with O2 sat 96% on room air. CT output: 850ml past 24 hrs. Urine output: 1,000ml past 24 hrs. Vital Signs: BP (!) 95/47   Pulse 89   Temp 99.2 °F (37.3 °C) (Oral)   Resp 20   Ht 5' 10\" (1.778 m)   Wt 138 lb (62.6 kg)   SpO2 91%   BMI 19.80 kg/m²    Temp (24hrs), Av.8 °F (37.1 °C), Min:98.2 °F (36.8 °C), Max:99.3 °F (37.4 °C)      PULSE OXIMETRY RANGE: SpO2  Av.2 %  Min: 91 %  Max: 98 %     Labs:   CBC:     Recent Labs     22  1300 22  1850 22  0430 22  0800 22  1008   WBC 17.9*  --  11.2* 12.3* 14.2*   HGB 8.9*   < > 6.6* 8.0* 8.2*   HCT 27.2*   < > 20.7* 24.3* 25.1*   MCV 86.9  --  89.6 88.0 90.0     --  134 129* 156   INR 1.14*  --  1.23*  --   --     < > = values in this interval not displayed. BMP:   Recent Labs     22  1300 22  1424 22  0155 22  0156 22  0430 22  0431 22  1008 22  1138   *  --   --   --  139  --  136  --    K 3.8   < > 5.9*  --  5.6*  --  4.8  --    CL 99  --   --   --  109  --  101  --    CO2 21*  --   --   --  21*  --  25  --    BUN 27*  --   --   --  31*  --  36*  --    CREATININE 1.6*  --   --   --  1.7*  --  1.7*  --    MG 3.1*  --   --   --  2.4  --   --   --    POCGLU  --    < >  --    < >  --    < >  --  155*    < > = values in this interval not displayed.      Last HgA1C:   Lab Results   Component Value Date    LABA1C 7.8 2022       Imaging:  CXR: I have reviewed the CXR image. 11/9/22  FINDINGS:    Metallic wire sutures remain in position. There are mediastinal drains and bilateral chest tubes. There is cardiomegaly. There are is persistence of small biapical pneumothoraces. Improvement is seen when compared to the prior study. There is blunting at the left costophrenic angle which could represent a small pleural effusion. There are patchy perihilar and lower lobe opacities, worse on the left. Visualized portions of the upper abdomen are within normal limits. The osseous structures are intact. No acute fractures or suspicious osseous lesions. Impression   There are very small biapical pneumothoraces. The appearance of the chest has improved since the previous study. Intake/Output Summary (Last 24 hours) at 11/9/2022 1408  Last data filed at 11/9/2022 1330  Gross per 24 hour   Intake 960 ml   Output 1960 ml   Net -1000 ml       Scheduled Meds:    amiodarone  200 mg Oral Daily    insulin glargine  15 Units SubCUTAneous BID    insulin lispro  0-16 Units SubCUTAneous TID WC    insulin lispro  0-4 Units SubCUTAneous Nightly    pantoprazole  40 mg Oral QAM AC    sodium chloride flush  5-40 mL IntraVENous 2 times per day    enoxaparin  40 mg SubCUTAneous Daily    aspirin  325 mg Oral Daily    multivitamin  1 tablet Oral Daily with breakfast    sennosides-docusate sodium  1 tablet Oral BID    metoprolol tartrate  25 mg Oral BID    atorvastatin  40 mg Oral Nightly       ROS: All neg unless specifically mentioned in subjective section.      Exam:  General Appearance: alert ,conversing, in no acute distress  Cardiovascular: normal rate, regular rhythm, normal S1 and S2, no murmurs, rubs, clicks, or gallops  Pulmonary/Chest: clear to auscultation bilaterally- no wheezes, rales or rhonchi, normal air movement, no respiratory distress  Neurological: alert, oriented, normal speech, no focal findings or movement disorder noted  Sternum: Incision healing appropriately and no wound dehiscence noted.      Assessment:   Patient Active Problem List   Diagnosis    Syncope    Facial injury    Tobacco abuse    Cranial facial fractures (HCC)    Diabetes mellitus type 2 in nonobese (HCC)    Fungal toenail infection    Golfer's elbow    Medical non-compliance    Erectile dysfunction    NAVARRO (generalized anxiety disorder)    Impacted cerumen of right ear    Essential hypertension    Uncontrolled type 2 diabetes mellitus with hyperglycemia (HCC)    Thoracic arthritis    New onset of congestive heart failure (HCC)    Acute systolic congestive heart failure (HCC)    Nonischemic cardiomyopathy (HCC)    Preoperative clearance    Stage 3a chronic kidney disease (HCC)    Tremor    LC (acute kidney injury) ([facility] [facility]    Hyperlipidemia    Gastroesophageal reflux disease    S/P cardiac cath    CAD, multiple vessel    Ischemic cardiomyopathy    ETOH abuse    S/P CABG x 2       Plan:   CXR reviewed- Continue daily CXR's   Appreciate Nephrology input  Keep chest tubes to suction  Encourage incentive spirometer  Continue current care      The plan of care was discussed in detail with Dr. Lilliana Krishnan, PA

## 2022-11-09 NOTE — FLOWSHEET NOTE
11/09/22 1010   Safe Environment   Safety Measures Other (comment)  (vn quiet round complete , pt resting in bed , family member present in pt room , no s/s distress noted)

## 2022-11-09 NOTE — PROGRESS NOTES
[facility]   Nephrology progress note  11/9/2022, 9:47 AM      Pt Name:    Tonny Miner  MRN:     [id number]     YOB: 1960  Admit Date:    10/28/2022  9:39 PM    Chief Complaint: Nephrology following for LC/CKD. Subjective:  Patient seen and examined  Feels okay denies any complaints  Some chest pain but no significant shortness of breath  Hb is stable. decent UO    Objective:  24HR INTAKE/OUTPUT:    Intake/Output Summary (Last 24 hours) at 11/9/2022 0947  Last data filed at 11/9/2022 0649  Gross per 24 hour   Intake 600 ml   Output 1850 ml   Net -1250 ml        Admission weight: 150 lb (68 kg)  Wt Readings from Last 3 Encounters:   11/09/22 138 lb (62.6 kg)   10/26/22 151 lb (68.5 kg)   10/12/22 140 lb 9.6 oz (63.8 kg)        Vitals :   Vitals:    11/08/22 2308 11/09/22 0329 11/09/22 0649 11/09/22 0845   BP: (!) 119/58 128/66  119/63   Pulse: 92 85  78   Resp: 18 22  20   Temp: 99.3 °F (37.4 °C) 98.8 °F (37.1 °C)  98.6 °F (37 °C)   TempSrc: Oral Oral  Oral   SpO2: 95% 98%  96%   Weight:   138 lb (62.6 kg)    Height:           Physical examination  General Appearance:  Well developed.  No distress  Mouth/Throat:  Oral mucosa moist  Neck:  Supple, no JVD  Lungs:  Breath sounds: clear, diminished slightly  Heart[de-identified]  S1,S2 heard  Abdomen:  Soft, non - tender  Musculoskeletal:  Edema -no edema    Medications:  Infusion:       Meds:    amiodarone  200 mg Oral Daily    insulin glargine  15 Units SubCUTAneous BID    insulin lispro  0-16 Units SubCUTAneous TID WC    insulin lispro  0-4 Units SubCUTAneous Nightly    pantoprazole  40 mg Oral QAM AC    sodium chloride flush  5-40 mL IntraVENous 2 times per day    enoxaparin  40 mg SubCUTAneous Daily    aspirin  325 mg Oral Daily    multivitamin  1 tablet Oral Daily with breakfast    sennosides-docusate sodium  1 tablet Oral BID    metoprolol tartrate  25 mg Oral BID    atorvastatin  40 mg Oral Nightly       Lab Data :  CBC:   Recent Labs 11/07/22  1300 11/07/22  1850 11/07/22  2300 11/08/22  0430 11/08/22  0800   WBC 17.9*  --   --  11.2* 12.3*   HGB 8.9*   < > 7.2* 6.6* 8.0*   HCT 27.2*   < > 22.2* 20.7* 24.3*     --   --  134 129*    < > = values in this interval not displayed. CMP:  Recent Labs     11/07/22  0600 11/07/22  0916 11/07/22  1138 11/07/22  1300 11/07/22  1850 11/08/22  0155 11/08/22  0430      < > 134* 134*  --   --  139   K 4.8   < > 4.4 3.8 3.8 5.9* 5.6*   CL 98  --   --  99  --   --  109   CO2 28  --   --  21*  --   --  21*   BUN 30*  --   --  27*  --   --  31*   CREATININE 1.8*  --   --  1.6*  --   --  1.7*   GLUCOSE 131*  --   --  171*  --   --  75   CALCIUM 9.4  --   --  8.3*  --   --  8.8   MG  --   --   --  3.1*  --   --  2.4    < > = values in this interval not displayed. Hepatic: No results for input(s): LABALBU, AST, ALT, ALB, BILITOT, ALKPHOS in the last 72 hours. Assessment and Plan:  Renal -acute kidney injury, on chronic kidney disease stage III  This appears most likely due to the use of diuretics ACE inhibitor and primarily due to contrast exposure on the day of his admission. Currently improved to some degree close to his baseline, with mild fluctuations  Urine output okay  Electrolytes -hyperkalemia may be due to multiple units of blood transfusion . S/P Lokelma 2 doses. No new labs check a BMP today  Essential hypertension running reasonable at this time  Hx of diabetes mellitus apparently his A1c was 15.0,, 3 months ago  New onset of acute congestive heart failure systolic stable  Anemia due to increased drainage from the chest tubes got several units of blood transfusion  CAD status post CABG 11/7/2022  Urinary retention -status post Bella,   Meds reviewed. Discussed with patient and family    Isabella Conroy MD  [facility]    This report has been created using voice recognition software.  It may contain minor errors which are inherent in voice recognition technology

## 2022-11-09 NOTE — CONSULTS
Comprehensive Nutrition Assessment    Type and Reason for Visit:  Reassess, Consult (s/p CABG)    Nutrition Recommendations/Plan:   Diet as per Physician  Initiated ileus prevention protocol; Activa yogurt at breakfast and 4 oz Caffeine free Hot Beverage with all meals  Diet Education Provided (10/31); Carb Controlled, Cardiac, CHF diet, and Fluid Restrcition     Malnutrition Assessment:  Malnutrition Status: At risk for malnutrition (Comment) (11/09/22 6405)    Context:  Acute Illness     Findings of the 6 clinical characteristics of malnutrition:  Energy Intake:   (Good po intake during beging of LOS %; Varied po intake s/p CABG 11/9/22)  Weight Loss:  No significant weight loss (varies d/t fluid shifts)     Body Fat Loss:  No significant body fat loss     Muscle Mass Loss:  No significant muscle mass loss    Fluid Accumulation:  No significant fluid accumulation     Strength:  Not Performed    Nutrition Assessment:     Pt. nutritionally compromised AEB varied po intake s/p CABG. At risk for further nutrition compromise r/t  Acute CHF exacerbation (pt not a candidate for PCI- s/p CAGB 11/7), LC/CKD stage III, T2DM, and underlying medical condition (Hx; CHF, HTN, CKD stage 3). Nutrition Related Findings:    Pt. Report/Treatments/Miscellaneous: Pt resting in chair with family at bedside; S/P CABG 11/7; started ileus prevention protocol; Good appetite and intake during beginning of LOS, varied po intake s/p CABG; Will continue to monitor while inpatient. GI Status: BM 11/5  Pertinent Labs: A1C 7.8 (8/23/22), BUN 36, Cr 1.7, Glucose 203, Hgb 8.2  Pertinent Meds: Lantus, Humalog, MVI, Protonix, Senokot-S     Wound Type: Surgical Incision (sternum and leg s/p CABG 11/7; Chest tube in place)       Current Nutrition Intake & Therapies:    Average Meal Intake: 51-75%, %, 26-50%  Average Supplements Intake:  (just initiated the ileus prevention protocol)  ADULT DIET;  Regular; 3 carb choices (45 gm/meal); Low Sodium (2 gm); 1800 ml  ADULT ORAL NUTRITION SUPPLEMENT; Breakfast, Lunch, Dinner; Other Oral Supplement; Activia Yogurt with breakfast; 4 oz Caffeine Free Hot beverage with all meals    Anthropometric Measures:  Height: 5' 10\" (177.8 cm)  Ideal Body Weight (IBW): 166 lbs (75 kg)    Admission Body Weight: 141 lb 14 oz (64.4 kg) (10/30; Generalized Edema)  Current Body Weight: 138 lb (62.6 kg) (11/9; Trace Edema), 83.1 % IBW. Weight Source: Standing Scale  Current BMI (kg/m2): 19.8  Usual Body Weight:  (hard to say fluctuates with fluid \"150-155# Per EMR 8/10/21 150 lbs 3 oz, 5/24/22 144 lbs 10 oz)     Weight Adjustment For: No Adjustment                 BMI Categories: Normal Weight (BMI 18.5-24. 9)    Estimated Daily Nutrient Needs:  Energy Requirements Based On: Kcal/kg  Weight Used for Energy Requirements: Current (62.6 kg)  Energy (kcal/day): 3471-3972 (25-30 kcal/kg)  Weight Used for Protein Requirements: Current (62.6 kg)  Protein (g/day): 63-94 (1-1.5 g/kg)- h/o CKD stage III will adjust appropriately         Nutrition Diagnosis:   Increased nutrient needs related to increase demand for energy/nutrients as evidenced by  (s/p CABG)    Nutrition Interventions:   Food and/or Nutrient Delivery: Continue Current Diet (intitated ileus prevention protocol)  Nutrition Education/Counseling: Education completed (Carb Controlled, Cardiac, CHF, and fluid restriction diet education given 10/31)  Coordination of Nutrition Care: Continue to monitor while inpatient       Goals:     Goals: PO intake 75% or greater, by next RD assessment       Nutrition Monitoring and Evaluation:   Behavioral-Environmental Outcomes: None Identified  Food/Nutrient Intake Outcomes: Food and Nutrient Intake, Vitamin/Mineral Intake  Physical Signs/Symptoms Outcomes: Biochemical Data, GI Status, Fluid Status or Edema, Weight, Skin    Discharge Planning:     Too soon to determine     Daniel Tolliver, [address]  Contact: [phone]

## 2022-11-09 NOTE — PROGRESS NOTES
[facility]  PHYSICAL THERAPY MISSED TREATMENT NOTE  [facility] ICU STEPDOWN TELEMETRY 4K    Date: 2022  Patient Name: Sangeetha Daugherty        MRN: [id number]   : 1960  (63 y.o.)  Gender: male   Referring Practitioner: Ivon Montesinos MD  Diagnosis: Elevated troponin         REASON FOR MISSED TREATMENT:  Missed Treat.   Pt just returning to bed, pt declines PT.

## 2022-11-09 NOTE — PROGRESS NOTES
[facility] PROGRESS NOTE      Patient: Riccardo Velasquez  Room #: 5V-28/610-R            YOB: 1960  Age: 63 y.o. Gender: male            Admit Date & Time: 10/28/2022  9:39 PM    Assessment:  Jennifer Zapata is a 63year old who is no longer in ICU. He is sitting up in a chair on 4k and his wife is by his side. This  had seen them in ICU and he is doing better but is not having the quick results that he wants. He stated, \"the healing is coming but it is very slow. \"    Interventions:  Prayer for his healing and strength, for patience as it is going slow for him. Words of encouragement and support given. Outcomes:  His wife and he are grateful for the spiritual care contact and the prayer cards given. Plan:    1.follow up as needed. Care Plan:  Continue spiritual and emotional care for patient and family. Including prayers.       Electronically signed by Kim Arnold on 11/9/2022 at 4:09 PM.  Karl Atkinson  [phone]

## 2022-11-10 ENCOUNTER — APPOINTMENT (OUTPATIENT)
Dept: GENERAL RADIOLOGY | Age: 62
DRG: 233 | End: 2022-11-10
Payer: COMMERCIAL

## 2022-11-10 LAB
ANION GAP SERPL CALCULATED.3IONS-SCNC: 10 MEQ/L (ref 8–16)
BUN BLDV-MCNC: 36 MG/DL (ref 7–22)
CALCIUM SERPL-MCNC: 8.3 MG/DL (ref 8.5–10.5)
CHLORIDE BLD-SCNC: 106 MEQ/L (ref 98–111)
CO2: 25 MEQ/L (ref 23–33)
CREAT SERPL-MCNC: 1.4 MG/DL (ref 0.4–1.2)
ERYTHROCYTE [DISTWIDTH] IN BLOOD BY AUTOMATED COUNT: 13.2 % (ref 11.5–14.5)
ERYTHROCYTE [DISTWIDTH] IN BLOOD BY AUTOMATED COUNT: 43.2 FL (ref 35–45)
GFR SERPL CREATININE-BSD FRML MDRD: 57 ML/MIN/1.73M2
GLUCOSE BLD-MCNC: 131 MG/DL (ref 70–108)
GLUCOSE BLD-MCNC: 176 MG/DL (ref 70–108)
GLUCOSE BLD-MCNC: 194 MG/DL (ref 70–108)
GLUCOSE BLD-MCNC: 88 MG/DL (ref 70–108)
GLUCOSE BLD-MCNC: 96 MG/DL (ref 70–108)
HCT VFR BLD CALC: 22.1 % (ref 42–52)
HEMOGLOBIN: 7.1 GM/DL (ref 14–18)
MCH RBC QN AUTO: 28.7 PG (ref 26–33)
MCHC RBC AUTO-ENTMCNC: 32.1 GM/DL (ref 32.2–35.5)
MCV RBC AUTO: 89.5 FL (ref 80–94)
PLATELET # BLD: 154 THOU/MM3 (ref 130–400)
PMV BLD AUTO: 11.4 FL (ref 9.4–12.4)
POTASSIUM SERPL-SCNC: 3.9 MEQ/L (ref 3.5–5.2)
RBC # BLD: 2.47 MILL/MM3 (ref 4.7–6.1)
SODIUM BLD-SCNC: 141 MEQ/L (ref 135–145)
WBC # BLD: 11.9 THOU/MM3 (ref 4.8–10.8)

## 2022-11-10 PROCEDURE — 97110 THERAPEUTIC EXERCISES: CPT

## 2022-11-10 PROCEDURE — 71045 X-RAY EXAM CHEST 1 VIEW: CPT

## 2022-11-10 PROCEDURE — 80048 BASIC METABOLIC PNL TOTAL CA: CPT

## 2022-11-10 PROCEDURE — 36415 COLL VENOUS BLD VENIPUNCTURE: CPT

## 2022-11-10 PROCEDURE — 85027 COMPLETE CBC AUTOMATED: CPT

## 2022-11-10 PROCEDURE — 97530 THERAPEUTIC ACTIVITIES: CPT

## 2022-11-10 PROCEDURE — 82948 REAGENT STRIP/BLOOD GLUCOSE: CPT

## 2022-11-10 PROCEDURE — 99232 SBSQ HOSP IP/OBS MODERATE 35: CPT | Performed by: INTERNAL MEDICINE

## 2022-11-10 PROCEDURE — 2580000003 HC RX 258: Performed by: THORACIC SURGERY (CARDIOTHORACIC VASCULAR SURGERY)

## 2022-11-10 PROCEDURE — 6360000002 HC RX W HCPCS: Performed by: THORACIC SURGERY (CARDIOTHORACIC VASCULAR SURGERY)

## 2022-11-10 PROCEDURE — 6370000000 HC RX 637 (ALT 250 FOR IP): Performed by: THORACIC SURGERY (CARDIOTHORACIC VASCULAR SURGERY)

## 2022-11-10 PROCEDURE — 2060000000 HC ICU INTERMEDIATE R&B

## 2022-11-10 RX ORDER — POTASSIUM CHLORIDE 20 MEQ/1
20 TABLET, EXTENDED RELEASE ORAL 2 TIMES DAILY WITH MEALS
Status: DISCONTINUED | OUTPATIENT
Start: 2022-11-10 | End: 2022-11-11

## 2022-11-10 RX ORDER — FUROSEMIDE 10 MG/ML
20 INJECTION INTRAMUSCULAR; INTRAVENOUS 2 TIMES DAILY
Status: DISCONTINUED | OUTPATIENT
Start: 2022-11-10 | End: 2022-11-11

## 2022-11-10 RX ADMIN — Medication 1 TABLET: at 08:02

## 2022-11-10 RX ADMIN — AMIODARONE HYDROCHLORIDE 200 MG: 200 TABLET ORAL at 08:02

## 2022-11-10 RX ADMIN — INSULIN GLARGINE 15 UNITS: 100 INJECTION, SOLUTION SUBCUTANEOUS at 20:46

## 2022-11-10 RX ADMIN — Medication 17.6 MG: at 15:19

## 2022-11-10 RX ADMIN — PANTOPRAZOLE SODIUM 40 MG: 40 TABLET, DELAYED RELEASE ORAL at 08:02

## 2022-11-10 RX ADMIN — FUROSEMIDE 20 MG: 10 INJECTION, SOLUTION INTRAMUSCULAR; INTRAVENOUS at 18:18

## 2022-11-10 RX ADMIN — ENOXAPARIN SODIUM 40 MG: 100 INJECTION SUBCUTANEOUS at 08:02

## 2022-11-10 RX ADMIN — SENNOSIDES AND DOCUSATE SODIUM 1 TABLET: 50; 8.6 TABLET ORAL at 19:58

## 2022-11-10 RX ADMIN — METOPROLOL TARTRATE 12.5 MG: 25 TABLET, FILM COATED ORAL at 19:58

## 2022-11-10 RX ADMIN — SODIUM CHLORIDE, PRESERVATIVE FREE 10 ML: 5 INJECTION INTRAVENOUS at 08:03

## 2022-11-10 RX ADMIN — ASPIRIN 325 MG: 325 TABLET, COATED ORAL at 08:02

## 2022-11-10 RX ADMIN — SODIUM CHLORIDE, PRESERVATIVE FREE 10 ML: 5 INJECTION INTRAVENOUS at 19:58

## 2022-11-10 RX ADMIN — MAGNESIUM HYDROXIDE 30 ML: 400 SUSPENSION ORAL at 15:19

## 2022-11-10 RX ADMIN — SENNOSIDES AND DOCUSATE SODIUM 1 TABLET: 50; 8.6 TABLET ORAL at 08:02

## 2022-11-10 RX ADMIN — POTASSIUM CHLORIDE 20 MEQ: 1500 TABLET, EXTENDED RELEASE ORAL at 18:18

## 2022-11-10 RX ADMIN — ATORVASTATIN CALCIUM 40 MG: 40 TABLET, FILM COATED ORAL at 19:58

## 2022-11-10 RX ADMIN — INSULIN GLARGINE 15 UNITS: 100 INJECTION, SOLUTION SUBCUTANEOUS at 08:03

## 2022-11-10 NOTE — CARE COORDINATION
11/10/22 8:16 AM    Spoke with Dr. Arnoldo Buck; he states patient will still need LifeVest at discharge as his post-op EF only improved to 30%. Called and LM with Chase Coats at [facility]; reported patient will still need LifeVest and please come to fit patient.

## 2022-11-10 NOTE — FLOWSHEET NOTE
11/10/22 0745   Vital Signs   Blood Pressure Lying 110/59  (return to supine 95/57)   Pulse Lying 80 PER MINUTE   Blood Pressure Sitting 78/46  (sitting after [id number])   Pulse Sitting 78 PER MINUTE   Blood Pressure Standing (S)  51/42  (CTS aware)   Pulse Standing 82 PER MINUTE       Patient's initial lying BP:   110/59 HR 79  Sitting BP: 78/46  Standing BP: 51/42 HR 83    Return to sittin/52 HR 87  Return to lyin/57 HR 85

## 2022-11-10 NOTE — PROGRESS NOTES
[facility]  INPATIENT PHYSICAL THERAPY  DAILY NOTE  [facility] ICU STEPDOWN TELEMETRY 4K - 4K-17/017-A    Time In: 5910  Time Out: 1358  Timed Code Treatment Minutes: 13 Minutes  Minutes: 13          Date: 11/10/2022  Patient Name: Dalton Riggs,  Gender:  male        MRN: [id number]  : 1960  (63 y.o.)     Referring Practitioner: Cait Brice MD  Diagnosis: Elevated troponin  Additional Pertinent Hx: 63 y.o. diabetic male, extensive previous history of EtOH, current smoker, presents with chronic progressive dyspnea, worsening over past 2 months. Patient denies chest pain/pressure; no peripheral edema, presyncope, orthopnea. Large pleural effusions seen on admission imaging prompted echo, showing LVEF 25%; subsequent cath shows 2-v CAD. Pt is s/p CABG x 2 . Prior Level of Function:  Lives With: Spouse  Type of Home: House  Home Layout: One level  Home Access: Stairs to enter with rails  Entrance Stairs - Number of Steps: 3  Home Equipment: FluxDriveara, rolling   Bathroom Shower/Tub: Tub/Shower unit  Bathroom Toilet: Standard  Bathroom Equipment: Shower chair (wife getting HHS)    ADL Assistance: Independent  Homemaking Assistance: Independent  Ambulation Assistance: Independent  Transfer Assistance: Independent  Active : No  Additional Comments: Wife works during the day    Restrictions/Precautions:  Restrictions/Precautions: Surgical Protocols, Fall Risk  Position Activity Restriction  Sternal Precautions: No Pushing, No Pulling, 10# Lifting Restrictions  Other position/activity restrictions: CT X2, temporary pacer     SUBJECTIVE: RN approved session, reported just sat patient edge of bed ~15min just prior. Patient laying in bed upon arrival and agreeable to exercises in bed. Patient's family member present to observe session. PAIN: denies, reports \"feeling better\"    Vitals: Blood Pressure: 90/51 in supine during exercises.     OBJECTIVE:  Bed Mobility:  Not Tested    Transfers:  Not Tested    Ambulation:  Not Tested    Exercise:  Patient was guided in 1 set(s) 10 reps of exercise to both lower extremities. In supine position. Ankle pumps, Heelslides, Hip abduction/adduction, Straight leg raises, Upper trunk rotations, Shoulder horizontal abduction/adduction, and Shoulder rolls. Exercises were completed for increased independence with functional mobility. Functional Outcome Measures: Completed  AM-PAC Inpatient Mobility without Stair Climbing Raw Score : 13  AM-PAC Inpatient without Stair Climbing T-Scale Score : 38.96    ASSESSMENT:  Assessment:  Limited by fatigue. Activity Tolerance:  Patient tolerance of  treatment: fair. Equipment Recommendations:Equipment Needed: No  Discharge Recommendations: Continue to assess pending progress and Patient would benefit from continued PT at discharge  Plan: Current Treatment Recommendations: Strengthening, Balance training, Functional mobility training, Transfer training, Endurance training, Neuromuscular re-education, Gait training, Stair training, Patient/Caregiver education & training, Therapeutic activities, Safety education & training, Home exercise program  General Plan:  (6x CABG)    Patient Education  Patient Education: Plan of Care, Precautions/Restrictions, Verbal Exercise Instruction    Goals:  Patient Goals : to go home  Short Term Goals  Time Frame for Short Term Goals: by discharge  Short Term Goal 1: Pt to transfer supine <--> sit S to enable pt to get in/out of bed. Short Term Goal 2: Pt to transfer sit <--> stand S for increased functional mobility. Short Term Goal 3: Pt to ambulate >250 feet without AD SBA for household ambulation. Short Term Goal 4: Pt to ascend/descend 3 steps with HR SBA for home entry. Long Term Goals  Time Frame for Long Term Goals : NA due to short length of stay. Following session, patient left in safe position with all fall risk precautions in place.

## 2022-11-10 NOTE — PROGRESS NOTES
Inpatient Cardiac Rehabilitation Consult    Received consult for Phase II Cardiac Rehabilitation. Patient needs cardiac rehab due to CABG on 11/7/22. Importance of Cardiac Rehab discussed with patient. Reviewed cardiac rehab class times. Patient questions answered. We will contact patient at home to schedule evaluation appointment. Cardiac Rehab brochure given.

## 2022-11-10 NOTE — FLOWSHEET NOTE
11/10/22 1516   Safe Environment   Safety Measures Other (comment)  (pt resting in bed with eyes closed family at the bedside)

## 2022-11-10 NOTE — PROGRESS NOTES
[facility] ICU STEPDOWN TELEMETRY 4K  Occupational Therapy  Daily Note  Time:   Time In: 7614  Time Out: 9889  Timed Code Treatment Minutes: 32 Minutes  Minutes: 32          Date: 11/10/2022  Patient Name: Denise Castro,   Gender: male      Room: [facility]17/017-A  MRN: [id number]  : 1960  (63 y.o.)  Referring Practitioner: Mikala Dodd MD  Diagnosis: elevated troponin  Additional Pertinent Hx: Per EMR:  63 y.o. diabetic male, extensive previous history of EtOH, current smoker, presents with chronic progressive dyspnea, worsening over past 2 months. Patient denies chest pain/pressure; no peripheral edema, presyncope, orthopnea. Pt admitted on 10/28/22. Large pleural effusions seen on admission imaging prompted echo, showing LVEF 25%; subsequent cath shows 2-v CAD. Pt s/p CABG X2 and insertion of IABP on 22. Restrictions/Precautions:  Restrictions/Precautions: Surgical Protocols, Fall Risk  Position Activity Restriction  Sternal Precautions: No Pushing, No Pulling, 10# Lifting Restrictions  Other position/activity restrictions: monitor ortho     SUBJECTIVE: Patient supine in bed upon arrival; agreeable to therapy this date. Patient pleasant and cooperative throughout. Nursing present mid session d/t low bp, nursing terminates session. PAIN: no indication of pain at this time    Vitals: Orthostatic Blood Pressure: Supine: 110/59, upon returning to supine 95/57, Sittinst: 78/46, 2nd: 80/56, 3rd upon returning from standing position 74/52, Standin/42    COGNITION: Decreased Insight, Decreased Problem Solving, and Decreased Safety Awareness    ADL:   Lower Extremity Dressing: Maximum Assistance. Kumar B socks . BALANCE:  Sitting Balance:  Stand By Assistance, X 1, with cues for safety, with verbal cues , with increased time for completion. Standing Balance: Contact Guard Assistance, Maximum Assistance.  RW, no LOB however posterior lean during standing orthos as activity progressed. Patient notes dizziness. BED MOBILITY:  Rolling to Left: Maximum Assistance, X 2, with head of bed flat    Rolling to Right: Maximum Assistance, X 2, with head of bed flat    Supine to Sit: Moderate Assistance, X 1, with head of bed raised, without rail, with verbal cues , with increased time for completion    Sit to Supine: Maximum Assistance, X 2, with head of bed flat    Scooting: Dependent      TRANSFERS:  Sit to Stand:  Minimal Assistance. Stand to Sit: Maximum Assistance, X 2, with increased time for completion, cues for hand placement,    FUNCTIONAL MOBILITY:  Not appropriate at this time    ASSESSMENT:     Activity Tolerance:  Patient tolerance of  treatment: poor. Discharge Recommendations: Inpatient Rehabilitation  Equipment Recommendations: Other: may benefit from [facility] to place over toilet at home  Plan: Times Per Week: 6x  Current Treatment Recommendations: ROM, Balance training, Functional mobility training, Endurance training, Safety education & training, Patient/Caregiver education & training, Equipment evaluation, education, & procurement, Self-Care / ADL    Patient Education  Patient Education: Role of OT, Plan of Care, and Precautions     Goals  Short Term Goals  Time Frame for Short Term Goals: by discharge  Short Term Goal 1: Pt will increase activity tolerance for functional mobility to/from bathroom, progressing to household distances as able, with supervision in prep for ADL completion. Short Term Goal 2: Pt will complete functional transfers with supervision in prep for toilet/shower transfers. Short Term Goal 3: Pt will complete BADL tasks with supervision to increase independence with self care tasks. Short Term Goal 4: Pt will tolerate standing >5 minutes with supervision in prep for sinkside grooming tasks. Following session, patient left in safe position with all fall risk precautions in place.

## 2022-11-10 NOTE — CARE COORDINATION
11/10/22, 12:56 PM EST    DISCHARGE PLANNING EVALUATION     SW met with pt and sig other, requested [facility], referral made.

## 2022-11-10 NOTE — PLAN OF CARE
Problem: Discharge Planning  Goal: Discharge to home or other facility with appropriate resources  Outcome: Progressing  Flowsheets (Taken 11/10/2022 0054)  Discharge to home or other facility with appropriate resources:   Identify barriers to discharge with patient and caregiver   Arrange for needed discharge resources and transportation as appropriate   Identify discharge learning needs (meds, wound care, etc)     Problem: Skin/Tissue Integrity  Goal: Absence of new skin breakdown  Description: 1. Monitor for areas of redness and/or skin breakdown  2. Assess vascular access sites hourly  3. Every 4-6 hours minimum:  Change oxygen saturation probe site  4. Every 4-6 hours:  If on nasal continuous positive airway pressure, respiratory therapy assess nares and determine need for appliance change or resting period. Outcome: Progressing  Note: No new skin breakdown this shift. Problem: Safety - Adult  Goal: Free from fall injury  Outcome: Progressing  Flowsheets (Taken 11/10/2022 0054)  Free From Fall Injury: Instruct family/caregiver on patient safety  Note: Patient free from falls this shift.      Problem: Chronic Conditions and Co-morbidities  Goal: Patient's chronic conditions and co-morbidity symptoms are monitored and maintained or improved  Outcome: Progressing  Flowsheets (Taken 11/10/2022 0054)  Care Plan - Patient's Chronic Conditions and Co-Morbidity Symptoms are Monitored and Maintained or Improved:   Monitor and assess patient's chronic conditions and comorbid symptoms for stability, deterioration, or improvement   Collaborate with multidisciplinary team to address chronic and comorbid conditions and prevent exacerbation or deterioration   Update acute care plan with appropriate goals if chronic or comorbid symptoms are exacerbated and prevent overall improvement and discharge     Problem: Pain  Goal: Verbalizes/displays adequate comfort level or baseline comfort level  Outcome: Progressing  Flowsheets (Taken 11/10/2022 0054)  Verbalizes/displays adequate comfort level or baseline comfort level:   Encourage patient to monitor pain and request assistance   Assess pain using appropriate pain scale   Administer analgesics based on type and severity of pain and evaluate response   Implement non-pharmacological measures as appropriate and evaluate response     Problem: Metabolic/Fluid and Electrolytes - Adult  Goal: Electrolytes maintained within normal limits  Outcome: Progressing  Flowsheets (Taken 11/10/2022 0054)  Electrolytes maintained within normal limits: Monitor labs and assess patient for signs and symptoms of electrolyte imbalances     Problem: Metabolic/Fluid and Electrolytes - Adult  Goal: Glucose maintained within prescribed range  Outcome: Progressing  Flowsheets (Taken 11/10/2022 0054)  Glucose maintained within prescribed range:   Monitor blood glucose as ordered   Assess for signs and symptoms of hyperglycemia and hypoglycemia   Administer ordered medications to maintain glucose within target range   Assess barriers to adequate nutritional intake and initiate nutrition consult as needed     Problem: Hematologic - Adult  Goal: Maintains hematologic stability  Outcome: Progressing  Flowsheets (Taken 11/10/2022 0054)  Maintains hematologic stability:   Assess for signs and symptoms of bleeding or hemorrhage   Monitor labs for bleeding or clotting disorders     Problem: Nutrition Deficit:  Goal: Optimize nutritional status  Outcome: Progressing  Flowsheets (Taken 11/10/2022 0054)  Nutrient intake appropriate for improving, restoring, or maintaining nutritional needs:   Assess nutritional status and recommend course of action   Monitor oral intake, labs, and treatment plans   Recommend appropriate diets, oral nutritional supplements, and vitamin/mineral supplements     Problem: ABCDS Injury Assessment  Goal: Absence of physical injury  Outcome: Progressing  Flowsheets (Taken 11/10/2022 0054)  Absence of Physical Injury: Implement safety measures based on patient assessment  Note: Bed alarm and tele-sitter in use this shift.      Problem: Cardiovascular - Adult  Goal: Maintains optimal cardiac output and hemodynamic stability  Outcome: Progressing  Flowsheets (Taken 11/10/2022 0054)  Maintains optimal cardiac output and hemodynamic stability:   Monitor blood pressure and heart rate   Monitor urine output and notify Licensed Independent Practitioner for values outside of normal range   Assess for signs of decreased cardiac output     Problem: Skin/Tissue Integrity - Adult  Goal: Skin integrity remains intact  Outcome: Progressing  Flowsheets (Taken 11/10/2022 0054)  Skin Integrity Remains Intact:   Monitor for areas of redness and/or skin breakdown   Assess vascular access sites hourly     Problem: Skin/Tissue Integrity - Adult  Goal: Incisions, wounds, or drain sites healing without S/S of infection  Outcome: Progressing  Flowsheets (Taken 11/10/2022 0054)  Incisions, Wounds, or Drain Sites Healing Without Sign and Symptoms of Infection:   ADMISSION and DAILY: Assess and document risk factors for pressure ulcer development   TWICE DAILY: Assess and document skin integrity   TWICE DAILY: Assess and document dressing/incision, wound bed, drain sites and surrounding tissue     Problem: Musculoskeletal - Adult  Goal: Return mobility to safest level of function  Outcome: Progressing  Flowsheets (Taken 11/10/2022 0054)  Return Mobility to Safest Level of Function:   Assess patient stability and activity tolerance for standing, transferring and ambulating with or without assistive devices   Assist with transfers and ambulation using safe patient handling equipment as needed   Ensure adequate protection for wounds/incisions during mobilization   Instruct patient/family in ordered activity level     Problem: Gastrointestinal - Adult  Goal: Maintains or returns to baseline bowel function  Outcome: Progressing  Flowsheets (Taken 11/10/2022 0054)  Maintains or returns to baseline bowel function:   Assess bowel function   Encourage mobilization and activity   Administer ordered medications as needed     Problem: Genitourinary - Adult  Goal: Absence of urinary retention  Outcome: Progressing  Flowsheets (Taken 11/10/2022 0054)  Absence of urinary retention:   Assess patients ability to void and empty bladder   Monitor intake/output and perform bladder scan as needed     Problem: Genitourinary - Adult  Goal: Urinary catheter remains patent  Outcome: Progressing  Flowsheets (Taken 11/10/2022 0054)  Urinary catheter remains patent: Assess patency of urinary catheter     Problem: Infection - Adult  Goal: Absence of infection during hospitalization  Outcome: Progressing  Flowsheets (Taken 11/10/2022 0054)  Absence of infection during hospitalization:   Assess and monitor for signs and symptoms of infection   Monitor lab/diagnostic results   Monitor all insertion sites i.e., indwelling lines, tubes and drains   Instruct and encourage patient and family to use good hand hygiene technique   Care plan reviewed with patient. Patient verbalizes understanding of the plan of care and contribute to goal setting.

## 2022-11-10 NOTE — PROGRESS NOTES
CT/CV Surgery Progress Note    11/10/2022 8:36 AM  Surgeon:  Dr. Xavier Emerson:  Mr. Bridget Glass is s/p CABGx2 and insertion of IABP, POD#3. He is resting comfortably in bed on 2L NC, O2sat 98%, alert, and in no acute distress. Pt denies chest pressure, SOB, fever,chills, N/V/D, urinary or BM changes. Pneumothoraces from yesterday further resolving per imaging. Pt currently has no SOB. CT output: 110ml past 24 hrs. Urine output: 1,100ml past 24 hrs. I/O -850ml past 24 hrs. Hypotensive and pt complains of becoming lightheaded when standing. Lopressor and lasix held this am.    Vital Signs: BP (!) 95/57   Pulse 80   Temp 97 °F (36.1 °C) (Axillary)   Resp 16   Ht 5' 10\" (1.778 m)   Wt 139 lb 5.3 oz (63.2 kg)   SpO2 90%   BMI 19.99 kg/m²    Temp (24hrs), Av.2 °F (36.8 °C), Min:97 °F (36.1 °C), Max:99.2 °F (37.3 °C)      PULSE OXIMETRY RANGE: SpO2  Av %  Min: 90 %  Max: 96 %     Labs:   CBC:     Recent Labs     22  1300 22  1850 22  0430 22  0800 22  1008 11/10/22  0335   WBC 17.9*  --  11.2* 12.3* 14.2* 11.9*   HGB 8.9*   < > 6.6* 8.0* 8.2* 7.1*   HCT 27.2*   < > 20.7* 24.3* 25.1* 22.1*   MCV 86.9  --  89.6 88.0 90.0 89.5     --  134 129* 156 154   INR 1.14*  --  1.23*  --   --   --     < > = values in this interval not displayed. BMP:   Recent Labs     22  1300 22  1424 22  0430 22  0431 22  1008 22  1138 22  1950 11/10/22  0333   *  --  139  --  136  --   --  141   K 3.8   < > 5.6*  --  4.8  --   --  3.9   CL 99  --  109  --  101  --   --  106   CO2 21*  --  21*  --  25  --   --  25   BUN 27*  --  31*  --  36*  --   --  36*   CREATININE 1.6*  --  1.7*  --  1.7*  --   --  1.4*   MG 3.1*  --  2.4  --   --   --   --   --    POCGLU  --    < >  --    < >  --    < > 177*  --     < > = values in this interval not displayed.        Last HgA1C:   Lab Results   Component Value Date    LABA1C 7.8 2022 Imaging:  CXR: I have reviewed the CXR image. 11/10/22  Findings:   Bilateral chest tubes. Trace residual bilateral pneumothoraces, further    decreased since the most recent study. Mediastinal drain. Midline sternotomy wires and surgical clips. Epicardial    leads. Mild blunting of the left costophrenic angle, could be small pleural    effusion, similar to prior study. Airspace opacity left lower lobe,    improved. Mild patchy opacities in both perihilar regions. The cardiac silhouette is enlarged and stable in size. Bony thorax is grossly intact. Impression   Impression:   1. Trace bilateral pneumothoraces, further decreased since the prior    study. 2. Improvement of the left basilar airspace disease. Intake/Output Summary (Last 24 hours) at 11/10/2022 0836  Last data filed at 11/10/2022 0027  Gross per 24 hour   Intake 360 ml   Output 1210 ml   Net -850 ml         Scheduled Meds:    furosemide  20 mg IntraVENous BID    potassium chloride  20 mEq Oral BID WC    potassium (CARDIAC) replacement protocol   Other RX Placeholder    amiodarone  200 mg Oral Daily    insulin glargine  15 Units SubCUTAneous BID    insulin lispro  0-16 Units SubCUTAneous TID     insulin lispro  0-4 Units SubCUTAneous Nightly    pantoprazole  40 mg Oral QAM AC    sodium chloride flush  5-40 mL IntraVENous 2 times per day    enoxaparin  40 mg SubCUTAneous Daily    aspirin  325 mg Oral Daily    multivitamin  1 tablet Oral Daily with breakfast    sennosides-docusate sodium  1 tablet Oral BID    metoprolol tartrate  25 mg Oral BID    atorvastatin  40 mg Oral Nightly       ROS: All neg unless specifically mentioned in subjective section.      Exam:  General Appearance: alert ,conversing, in no acute distress  Cardiovascular: normal rate, regular rhythm, normal S1 and S2, no murmurs, rubs, clicks, or gallops  Pulmonary/Chest: clear to auscultation bilaterally- no wheezes, rales or rhonchi, normal air movement, no respiratory distress  Neurological: alert, oriented, normal speech, no focal findings or movement disorder noted  Sternum: Incision healing appropriately and no wound dehiscence noted.      Assessment:   Patient Active Problem List   Diagnosis    Syncope    Facial injury    Tobacco abuse    Cranial facial fractures (HCC)    Diabetes mellitus type 2 in nonobese (HCC)    Fungal toenail infection    Golfer's elbow    Medical non-compliance    Erectile dysfunction    NAVARRO (generalized anxiety disorder)    Impacted cerumen of right ear    Essential hypertension    Uncontrolled type 2 diabetes mellitus with hyperglycemia (HCC)    Thoracic arthritis    New onset of congestive heart failure (HCC)    Acute systolic congestive heart failure (HCC)    Nonischemic cardiomyopathy (HCC)    Preoperative clearance    Stage 3a chronic kidney disease (HCC)    Tremor    LC (acute kidney injury) ([facility] [address]    Hyperlipidemia    Gastroesophageal reflux disease    S/P cardiac cath    CAD, multiple vessel    Ischemic cardiomyopathy    ETOH abuse    S/P CABG x 2       Plan:   CXR reviewed with continued improvement- Continue daily CXR's  Remove temporary pacing wires and chest tubes  Encourage incentive spirometer  Continue current care      The plan of care was discussed in detail with Dr. Riley Kerns

## 2022-11-11 ENCOUNTER — APPOINTMENT (OUTPATIENT)
Dept: GENERAL RADIOLOGY | Age: 62
DRG: 233 | End: 2022-11-11
Payer: COMMERCIAL

## 2022-11-11 LAB
ANION GAP SERPL CALCULATED.3IONS-SCNC: 12 MEQ/L (ref 8–16)
ANION GAP SERPL CALCULATED.3IONS-SCNC: 12 MEQ/L (ref 8–16)
BUN BLDV-MCNC: 33 MG/DL (ref 7–22)
BUN BLDV-MCNC: 35 MG/DL (ref 7–22)
CALCIUM SERPL-MCNC: 8.1 MG/DL (ref 8.5–10.5)
CALCIUM SERPL-MCNC: 8.5 MG/DL (ref 8.5–10.5)
CHLORIDE BLD-SCNC: 102 MEQ/L (ref 98–111)
CHLORIDE BLD-SCNC: 98 MEQ/L (ref 98–111)
CO2: 25 MEQ/L (ref 23–33)
CO2: 25 MEQ/L (ref 23–33)
CREAT SERPL-MCNC: 1.5 MG/DL (ref 0.4–1.2)
CREAT SERPL-MCNC: 1.5 MG/DL (ref 0.4–1.2)
EKG ATRIAL RATE: 74 BPM
EKG P AXIS: 66 DEGREES
EKG P-R INTERVAL: 164 MS
EKG Q-T INTERVAL: 420 MS
EKG QRS DURATION: 86 MS
EKG QTC CALCULATION (BAZETT): 466 MS
EKG R AXIS: 80 DEGREES
EKG T AXIS: 96 DEGREES
EKG VENTRICULAR RATE: 74 BPM
ERYTHROCYTE [DISTWIDTH] IN BLOOD BY AUTOMATED COUNT: 12.8 % (ref 11.5–14.5)
ERYTHROCYTE [DISTWIDTH] IN BLOOD BY AUTOMATED COUNT: 41.6 FL (ref 35–45)
GFR SERPL CREATININE-BSD FRML MDRD: 53 ML/MIN/1.73M2
GFR SERPL CREATININE-BSD FRML MDRD: 53 ML/MIN/1.73M2
GLUCOSE BLD-MCNC: 106 MG/DL (ref 70–108)
GLUCOSE BLD-MCNC: 145 MG/DL (ref 70–108)
GLUCOSE BLD-MCNC: 177 MG/DL (ref 70–108)
GLUCOSE BLD-MCNC: 191 MG/DL (ref 70–108)
GLUCOSE BLD-MCNC: 209 MG/DL (ref 70–108)
GLUCOSE BLD-MCNC: 235 MG/DL (ref 70–108)
GLUCOSE BLD-MCNC: 271 MG/DL (ref 70–108)
GLUCOSE BLD-MCNC: 301 MG/DL (ref 70–108)
GLUCOSE BLD-MCNC: 40 MG/DL (ref 70–108)
GLUCOSE BLD-MCNC: 46 MG/DL (ref 70–108)
GLUCOSE BLD-MCNC: 47 MG/DL (ref 70–108)
GLUCOSE BLD-MCNC: 57 MG/DL (ref 70–108)
GLUCOSE BLD-MCNC: 60 MG/DL (ref 70–108)
GLUCOSE BLD-MCNC: 62 MG/DL (ref 70–108)
HCT VFR BLD CALC: 23.3 % (ref 42–52)
HEMOGLOBIN: 7.6 GM/DL (ref 14–18)
MCH RBC QN AUTO: 28.8 PG (ref 26–33)
MCHC RBC AUTO-ENTMCNC: 32.6 GM/DL (ref 32.2–35.5)
MCV RBC AUTO: 88.3 FL (ref 80–94)
PLATELET # BLD: 206 THOU/MM3 (ref 130–400)
PMV BLD AUTO: 11.2 FL (ref 9.4–12.4)
POTASSIUM SERPL-SCNC: 3.5 MEQ/L (ref 3.5–5.2)
POTASSIUM SERPL-SCNC: 4.5 MEQ/L (ref 3.5–5.2)
RBC # BLD: 2.64 MILL/MM3 (ref 4.7–6.1)
SODIUM BLD-SCNC: 135 MEQ/L (ref 135–145)
SODIUM BLD-SCNC: 139 MEQ/L (ref 135–145)
WBC # BLD: 13.1 THOU/MM3 (ref 4.8–10.8)

## 2022-11-11 PROCEDURE — 6370000000 HC RX 637 (ALT 250 FOR IP): Performed by: INTERNAL MEDICINE

## 2022-11-11 PROCEDURE — 97530 THERAPEUTIC ACTIVITIES: CPT

## 2022-11-11 PROCEDURE — 6370000000 HC RX 637 (ALT 250 FOR IP): Performed by: PHYSICIAN ASSISTANT

## 2022-11-11 PROCEDURE — 99232 SBSQ HOSP IP/OBS MODERATE 35: CPT | Performed by: INTERNAL MEDICINE

## 2022-11-11 PROCEDURE — 6360000002 HC RX W HCPCS: Performed by: PHYSICIAN ASSISTANT

## 2022-11-11 PROCEDURE — 2060000000 HC ICU INTERMEDIATE R&B

## 2022-11-11 PROCEDURE — 93010 ELECTROCARDIOGRAM REPORT: CPT | Performed by: NUCLEAR MEDICINE

## 2022-11-11 PROCEDURE — 82948 REAGENT STRIP/BLOOD GLUCOSE: CPT

## 2022-11-11 PROCEDURE — 71045 X-RAY EXAM CHEST 1 VIEW: CPT

## 2022-11-11 PROCEDURE — 2580000003 HC RX 258: Performed by: THORACIC SURGERY (CARDIOTHORACIC VASCULAR SURGERY)

## 2022-11-11 PROCEDURE — 86923 COMPATIBILITY TEST ELECTRIC: CPT

## 2022-11-11 PROCEDURE — 6370000000 HC RX 637 (ALT 250 FOR IP): Performed by: THORACIC SURGERY (CARDIOTHORACIC VASCULAR SURGERY)

## 2022-11-11 PROCEDURE — P9045 ALBUMIN (HUMAN), 5%, 250 ML: HCPCS | Performed by: PHYSICIAN ASSISTANT

## 2022-11-11 PROCEDURE — 97535 SELF CARE MNGMENT TRAINING: CPT

## 2022-11-11 PROCEDURE — 93005 ELECTROCARDIOGRAM TRACING: CPT | Performed by: INTERNAL MEDICINE

## 2022-11-11 PROCEDURE — 6360000002 HC RX W HCPCS: Performed by: THORACIC SURGERY (CARDIOTHORACIC VASCULAR SURGERY)

## 2022-11-11 PROCEDURE — 80048 BASIC METABOLIC PNL TOTAL CA: CPT

## 2022-11-11 PROCEDURE — 36415 COLL VENOUS BLD VENIPUNCTURE: CPT

## 2022-11-11 PROCEDURE — 2580000003 HC RX 258: Performed by: PHYSICIAN ASSISTANT

## 2022-11-11 PROCEDURE — 85027 COMPLETE CBC AUTOMATED: CPT

## 2022-11-11 RX ORDER — 0.9 % SODIUM CHLORIDE 0.9 %
250 INTRAVENOUS SOLUTION INTRAVENOUS ONCE
Status: COMPLETED | OUTPATIENT
Start: 2022-11-11 | End: 2022-11-11

## 2022-11-11 RX ORDER — DEXTROSE MONOHYDRATE 100 MG/ML
INJECTION, SOLUTION INTRAVENOUS AS NEEDED
Status: DISCONTINUED | OUTPATIENT
Start: 2022-11-11 | End: 2022-11-15 | Stop reason: HOSPADM

## 2022-11-11 RX ORDER — 0.9 % SODIUM CHLORIDE 0.9 %
250 INTRAVENOUS SOLUTION INTRAVENOUS ONCE
Status: DISCONTINUED | OUTPATIENT
Start: 2022-11-11 | End: 2022-11-11

## 2022-11-11 RX ORDER — MIDODRINE HYDROCHLORIDE 5 MG/1
5 TABLET ORAL
Status: DISCONTINUED | OUTPATIENT
Start: 2022-11-11 | End: 2022-11-12

## 2022-11-11 RX ORDER — FUROSEMIDE 20 MG/1
20 TABLET ORAL DAILY
Status: DISCONTINUED | OUTPATIENT
Start: 2022-11-12 | End: 2022-11-11

## 2022-11-11 RX ORDER — SODIUM CHLORIDE 9 MG/ML
INJECTION, SOLUTION INTRAVENOUS PRN
Status: DISCONTINUED | OUTPATIENT
Start: 2022-11-11 | End: 2022-11-15 | Stop reason: HOSPADM

## 2022-11-11 RX ORDER — INSULIN GLARGINE 100 [IU]/ML
10 INJECTION, SOLUTION SUBCUTANEOUS NIGHTLY
Status: COMPLETED | OUTPATIENT
Start: 2022-11-12 | End: 2022-11-12

## 2022-11-11 RX ORDER — MIDODRINE HYDROCHLORIDE 5 MG/1
5 TABLET ORAL ONCE
Status: COMPLETED | OUTPATIENT
Start: 2022-11-11 | End: 2022-11-11

## 2022-11-11 RX ORDER — INSULIN GLARGINE 100 [IU]/ML
30 INJECTION, SOLUTION SUBCUTANEOUS 2 TIMES DAILY
Status: DISCONTINUED | OUTPATIENT
Start: 2022-11-12 | End: 2022-11-12

## 2022-11-11 RX ORDER — DEXTROSE MONOHYDRATE 100 MG/ML
INJECTION, SOLUTION INTRAVENOUS CONTINUOUS PRN
Status: DISCONTINUED | OUTPATIENT
Start: 2022-11-11 | End: 2022-11-15 | Stop reason: HOSPADM

## 2022-11-11 RX ORDER — ALBUMIN, HUMAN INJ 5% 5 %
25 SOLUTION INTRAVENOUS ONCE
Status: COMPLETED | OUTPATIENT
Start: 2022-11-11 | End: 2022-11-11

## 2022-11-11 RX ADMIN — SODIUM CHLORIDE 250 ML: 9 INJECTION, SOLUTION INTRAVENOUS at 23:24

## 2022-11-11 RX ADMIN — FUROSEMIDE 20 MG: 10 INJECTION, SOLUTION INTRAMUSCULAR; INTRAVENOUS at 07:32

## 2022-11-11 RX ADMIN — SODIUM CHLORIDE, PRESERVATIVE FREE 10 ML: 5 INJECTION INTRAVENOUS at 08:32

## 2022-11-11 RX ADMIN — MIDODRINE HYDROCHLORIDE 5 MG: 5 TABLET ORAL at 16:08

## 2022-11-11 RX ADMIN — AMIODARONE HYDROCHLORIDE 200 MG: 200 TABLET ORAL at 07:31

## 2022-11-11 RX ADMIN — MIDODRINE HYDROCHLORIDE 5 MG: 5 TABLET ORAL at 09:28

## 2022-11-11 RX ADMIN — MIDODRINE HYDROCHLORIDE 5 MG: 5 TABLET ORAL at 10:08

## 2022-11-11 RX ADMIN — SODIUM CHLORIDE, PRESERVATIVE FREE 10 ML: 5 INJECTION INTRAVENOUS at 19:44

## 2022-11-11 RX ADMIN — DEXTROSE MONOHYDRATE 125 ML: 100 INJECTION, SOLUTION INTRAVENOUS at 09:44

## 2022-11-11 RX ADMIN — POTASSIUM CHLORIDE 20 MEQ: 1500 TABLET, EXTENDED RELEASE ORAL at 07:31

## 2022-11-11 RX ADMIN — ALBUMIN (HUMAN) 12.5 G: 12.5 INJECTION, SOLUTION INTRAVENOUS at 12:25

## 2022-11-11 RX ADMIN — Medication 1 TABLET: at 07:31

## 2022-11-11 RX ADMIN — POTASSIUM CHLORIDE 20 MEQ: 1500 TABLET, EXTENDED RELEASE ORAL at 16:08

## 2022-11-11 RX ADMIN — METOPROLOL TARTRATE 12.5 MG: 25 TABLET, FILM COATED ORAL at 07:31

## 2022-11-11 RX ADMIN — PANTOPRAZOLE SODIUM 40 MG: 40 TABLET, DELAYED RELEASE ORAL at 05:13

## 2022-11-11 RX ADMIN — ATORVASTATIN CALCIUM 40 MG: 40 TABLET, FILM COATED ORAL at 19:44

## 2022-11-11 RX ADMIN — SODIUM CHLORIDE 250 ML: 9 INJECTION, SOLUTION INTRAVENOUS at 09:41

## 2022-11-11 RX ADMIN — MIDODRINE HYDROCHLORIDE 5 MG: 5 TABLET ORAL at 11:41

## 2022-11-11 RX ADMIN — ENOXAPARIN SODIUM 40 MG: 100 INJECTION SUBCUTANEOUS at 07:32

## 2022-11-11 RX ADMIN — ASPIRIN 325 MG: 325 TABLET, COATED ORAL at 07:32

## 2022-11-11 ASSESSMENT — PAIN SCALES - GENERAL: PAINLEVEL_OUTOF10: 0

## 2022-11-11 NOTE — PROGRESS NOTES
[facility]   Nephrology progress note  11/11/2022, 8:50 AM      Pt Name:    Oseas Wilkinson  MRN:     [id number]     YOB: 1960  Admit Date:    10/28/2022  9:39 PM    Chief Complaint: Nephrology following for LC/CKD. Subjective:  Patient seen and examined  Feels okay denies any complaints  Some chest pain but no significant shortness of breath  The chest tubes have been taken out yesterday  Blood pressure still fluctuating to some degree    Objective:  24HR INTAKE/OUTPUT:    Intake/Output Summary (Last 24 hours) at 11/11/2022 0850  Last data filed at 11/11/2022 1425  Gross per 24 hour   Intake 10 ml   Output 2000 ml   Net -1990 ml        Admission weight: 150 lb (68 kg)  Wt Readings from Last 3 Encounters:   11/11/22 139 lb 8.8 oz (63.3 kg)   10/26/22 151 lb (68.5 kg)   10/12/22 140 lb 9.6 oz (63.8 kg)        Vitals :   Vitals:    11/11/22 0321 11/11/22 0723 11/11/22 0830 11/11/22 0835   BP: (!) 118/58 124/60 (!) 77/51 (!) 94/47   Pulse: 85 83     Resp: 16 18     Temp: 98.6 °F (37 °C) 98.7 °F (37.1 °C)     TempSrc: Oral Oral     SpO2: 96% 96%     Weight: 139 lb 8.8 oz (63.3 kg)      Height:           Physical examination  General Appearance:  Well developed.  No distress  Mouth/Throat:  Oral mucosa moist  Neck:  Supple, no JVD  Lungs:  Breath sounds: clear, diminished slightly  Heart[de-identified]  S1,S2 heard  Abdomen:  Soft, non - tender  Musculoskeletal:  Edema -no edema    Medications:  Infusion:       Meds:    [START ON 11/12/2022] furosemide  20 mg Oral Daily    potassium chloride  20 mEq Oral BID WC    metoprolol tartrate  12.5 mg Oral BID    amiodarone  200 mg Oral Daily    insulin glargine  15 Units SubCUTAneous BID    insulin lispro  0-16 Units SubCUTAneous TID WC    insulin lispro  0-4 Units SubCUTAneous Nightly    pantoprazole  40 mg Oral QAM AC    sodium chloride flush  5-40 mL IntraVENous 2 times per day    enoxaparin  40 mg SubCUTAneous Daily    aspirin  325 mg Oral Daily multivitamin  1 tablet Oral Daily with breakfast    sennosides-docusate sodium  1 tablet Oral BID    atorvastatin  40 mg Oral Nightly       Lab Data :  CBC:   Recent Labs     11/09/22  1008 11/10/22  0335 11/11/22  0343   WBC 14.2* 11.9* 13.1*   HGB 8.2* 7.1* 7.6*   HCT 25.1* 22.1* 23.3*    154 206       CMP:  Recent Labs     11/09/22  1008 11/10/22  0333 11/11/22  0343    141 139   K 4.8 3.9 3.5    106 102   CO2 25 25 25   BUN 36* 36* 35*   CREATININE 1.7* 1.4* 1.5*   GLUCOSE 203* 96 46*   CALCIUM 9.1 8.3* 8.1*       Hepatic: No results for input(s): LABALBU, AST, ALT, ALB, BILITOT, ALKPHOS in the last 72 hours. Assessment and Plan:  Renal -acute kidney injury, on chronic kidney disease stage III  This appears most likely due to the use of diuretics ACE inhibitor and primarily due to contrast exposure on the day of his admission. Overall improved currently at 1.4-1.5 this is mostly his baseline  Electrolytes -within normal limits  Essential hypertension now running borderline, add midodrine 5 3 times daily until blood pressure stabilizes  Hx of diabetes mellitus apparently his A1c was 15.0,, 3 months ago  New onset of acute congestive heart failure systolic stable on Lasix also may consider holding the Lasix volume status looks pretty good  Anemia due to increased drainage from the chest tubes got several units of blood transfusion currently maintaining stable  CAD status post CABG 11/7/2022  Urinary retention -status post Bella, this will be removed today  Meds reviewed. Discussed with patient and family and nursing staff    Ari Lamb MD  [facility]    This report has been created using voice recognition software.  It may contain minor errors which are inherent in voice recognition technology

## 2022-11-11 NOTE — PROGRESS NOTES
CT/CV Surgery Progress Note    2022 7:51 AM  Surgeon:  Dr. Nargis Fuller:  Mr. Veda Montgomery is s/p CABGx2 and insertion of IABP, POD#4. He is resting comfortably in bed on RA (weaned off 2L NC) this am, O2sat 97%, alert, and in no acute distress. Pt denies chest pressure, SOB, fever,chills, N/V/D, urinary or BM changes. Pneumothoraces from yesterday almost fully resolved per imaging. Pt currently has no SOB. Urine output: 2000ml past 24 hrs. I/O -2000ml past 24 hrs. Pt still orthostatic hypotensive and on ambulation. Vital Signs: /60   Pulse 83   Temp 98.7 °F (37.1 °C) (Oral)   Resp 18   Ht 5' 10\" (1.778 m)   Wt 139 lb 8.8 oz (63.3 kg)   SpO2 96%   BMI 20.02 kg/m²    Temp (24hrs), Av.5 °F (36.9 °C), Min:98.1 °F (36.7 °C), Max:98.7 °F (37.1 °C)      PULSE OXIMETRY RANGE: SpO2  Av %  Min: 93 %  Max: 100 %     Labs:   CBC:  Recent Labs     22  1008 11/10/22  0335 22  0343   WBC 14.2* 11.9* 13.1*   HGB 8.2* 7.1* 7.6*   HCT 25.1* 22.1* 23.3*   MCV 90.0 89.5 88.3    154 206       BMP:   Recent Labs     22  1008 22  1138 11/10/22  0333 11/10/22  0850 22  0343 22  0445 22  0512     --  141  --  139  --   --    K 4.8  --  3.9  --  3.5  --   --      --  106  --  102  --   --    CO2 25  --  25  --  25  --   --    BUN 36*  --  36*  --  35*  --   --    CREATININE 1.7*  --  1.4*  --  1.5*  --   --    POCGLU  --    < >  --    < >  --    < > 106    < > = values in this interval not displayed. Last HgA1C:   Lab Results   Component Value Date    LABA1C 7.8 2022     Imaging:  CXR: I have reviewed the CXR image. 22  Findings:   Interval removal of bilateral chest tubes and mediastinal drain. Near    complete resolution of the trace pneumothoraces, further decreased since    the prior study. No significant pleural effusion. Mild airspace opacity in the left    retrocardiac region, similar to prior study.        The cardiac silhouette is enlarged and stable in size. Prior sternotomy. Epicardial leads. Bony thorax is grossly intact. Impression   Impression:   1. Near complete resolution of the trace pneumothoraces decreased since    the prior study. 2. Nikko left retrocardiac/basilar airspace disease. Intake/Output Summary (Last 24 hours) at 11/11/2022 0751  Last data filed at 11/11/2022 0325  Gross per 24 hour   Intake --   Output 2000 ml   Net -2000 ml       Scheduled Meds:    furosemide  20 mg IntraVENous BID    potassium chloride  20 mEq Oral BID WC    metoprolol tartrate  12.5 mg Oral BID    amiodarone  200 mg Oral Daily    insulin glargine  15 Units SubCUTAneous BID    insulin lispro  0-16 Units SubCUTAneous TID WC    insulin lispro  0-4 Units SubCUTAneous Nightly    pantoprazole  40 mg Oral QAM AC    sodium chloride flush  5-40 mL IntraVENous 2 times per day    enoxaparin  40 mg SubCUTAneous Daily    aspirin  325 mg Oral Daily    multivitamin  1 tablet Oral Daily with breakfast    sennosides-docusate sodium  1 tablet Oral BID    atorvastatin  40 mg Oral Nightly     ROS: All neg unless specifically mentioned in subjective section. Exam:  General Appearance: alert ,conversing, in no acute distress  Cardiovascular: normal rate, regular rhythm, normal S1 and S2, no murmurs, rubs, clicks, or gallops  Pulmonary/Chest: clear to auscultation bilaterally- no wheezes, rales or rhonchi, normal air movement, no respiratory distress  Neurological: alert, oriented, normal speech, no focal findings or movement disorder noted  Sternum: Incision healing appropriately and no wound dehiscence noted.      Assessment:   Patient Active Problem List   Diagnosis    Syncope    Facial injury    Tobacco abuse    Cranial facial fractures (HCC)    Diabetes mellitus type 2 in nonobese (HCC)    Fungal toenail infection    [employer]'s elbow    Medical non-compliance    Erectile dysfunction    NAVARRO (generalized anxiety disorder)    Impacted cerumen of right ear    Essential hypertension    Uncontrolled type 2 diabetes mellitus with hyperglycemia (HCC)    Thoracic arthritis    New onset of congestive heart failure (HCC)    Acute systolic congestive heart failure (HCC)    Nonischemic cardiomyopathy (HCC)    Preoperative clearance    Stage 3a chronic kidney disease (HCC)    Tremor    LC (acute kidney injury) ([facility] [address]    Hyperlipidemia    Gastroesophageal reflux disease    S/P cardiac cath    CAD, multiple vessel    Ischemic cardiomyopathy    ETOH abuse    S/P CABG x 2     Plan:   CXR reviewed with continued improvement- Continue daily CXR's  Encourage incentive spirometer  Midodrine 5mg TID  Per urology, leave in [place] for cystoscopy next week. Continue current care, encourage ambulation    The plan of care was discussed in detail with Dr. Yvette Rodriguez agree with above plan.   Priti Steen PA-C.

## 2022-11-11 NOTE — PROGRESS NOTES
[facility] [address]  PHYSICAL THERAPY MISSED TREATMENT NOTE  [facility] ICU STEPDOWN TELEMETRY 4K    Date: 2022  Patient Name: Destini Talavera        MRN: [id number]   : 1960  (63 y.o.)  Gender: male   Referring Practitioner: Cherylene Papas MD  Diagnosis: Elevated troponin         REASON FOR MISSED TREATMENT:  RN requested to hold session due to low pressure's and patient reporting increased chest pain with all movement. Will check back at next available date.   Ora Marie, PTA

## 2022-11-11 NOTE — PLAN OF CARE
Problem: Discharge Planning  Goal: Discharge to home or other facility with appropriate resources  Outcome: Progressing  Flowsheets (Taken 11/10/2022 2206)  Discharge to home or other facility with appropriate resources:   Identify barriers to discharge with patient and caregiver   Arrange for needed discharge resources and transportation as appropriate   Identify discharge learning needs (meds, wound care, etc)     Problem: Skin/Tissue Integrity  Goal: Absence of new skin breakdown  Description: 1. Monitor for areas of redness and/or skin breakdown  2. Assess vascular access sites hourly  3. Every 4-6 hours minimum:  Change oxygen saturation probe site  4. Every 4-6 hours:  If on nasal continuous positive airway pressure, respiratory therapy assess nares and determine need for appliance change or resting period. Outcome: Progressing  Note: No new skin breakdown this shift. Problem: Safety - Adult  Goal: Free from fall injury  Outcome: Progressing  Flowsheets (Taken 11/10/2022 2206)  Free From Fall Injury: Instruct family/caregiver on patient safety  Note: Patient free from falls this shift.      Problem: Chronic Conditions and Co-morbidities  Goal: Patient's chronic conditions and co-morbidity symptoms are monitored and maintained or improved  Outcome: Progressing  Flowsheets (Taken 11/10/2022 2206)  Care Plan - Patient's Chronic Conditions and Co-Morbidity Symptoms are Monitored and Maintained or Improved:   Monitor and assess patient's chronic conditions and comorbid symptoms for stability, deterioration, or improvement   Update acute care plan with appropriate goals if chronic or comorbid symptoms are exacerbated and prevent overall improvement and discharge   Collaborate with multidisciplinary team to address chronic and comorbid conditions and prevent exacerbation or deterioration     Problem: Pain  Goal: Verbalizes/displays adequate comfort level or baseline comfort level  Outcome: Progressing  Flowsheets (Taken 11/10/2022 2206)  Verbalizes/displays adequate comfort level or baseline comfort level:   Encourage patient to monitor pain and request assistance   Assess pain using appropriate pain scale   Implement non-pharmacological measures as appropriate and evaluate response     Problem: Metabolic/Fluid and Electrolytes - Adult  Goal: Electrolytes maintained within normal limits  Outcome: Progressing  Flowsheets (Taken 11/10/2022 2206)  Electrolytes maintained within normal limits: Monitor labs and assess patient for signs and symptoms of electrolyte imbalances     Problem: Metabolic/Fluid and Electrolytes - Adult  Goal: Hemodynamic stability and optimal renal function maintained  Outcome: Progressing  Flowsheets (Taken 11/10/2022 2206)  Hemodynamic stability and optimal renal function maintained:   Monitor labs and assess for signs and symptoms of volume excess or deficit   Monitor intake, output and patient weight   Monitor urine specific gravity, serum osmolarity and serum sodium as indicated or ordered     Problem: Metabolic/Fluid and Electrolytes - Adult  Goal: Glucose maintained within prescribed range  Outcome: Progressing  Flowsheets (Taken 11/10/2022 2206)  Glucose maintained within prescribed range:   Monitor blood glucose as ordered   Assess for signs and symptoms of hyperglycemia and hypoglycemia   Administer ordered medications to maintain glucose within target range     Problem: Hematologic - Adult  Goal: Maintains hematologic stability  Outcome: Progressing  Flowsheets (Taken 11/10/2022 2206)  Maintains hematologic stability: Assess for signs and symptoms of bleeding or hemorrhage     Problem: Nutrition Deficit:  Goal: Optimize nutritional status  Outcome: Progressing  Flowsheets (Taken 11/10/2022 2206)  Nutrient intake appropriate for improving, restoring, or maintaining nutritional needs:   Assess nutritional status and recommend course of action   Monitor oral intake, labs, and treatment plans   Recommend appropriate diets, oral nutritional supplements, and vitamin/mineral supplements     Problem: ABCDS Injury Assessment  Goal: Absence of physical injury  Outcome: Progressing  Flowsheets (Taken 11/10/2022 2206)  Absence of Physical Injury: Implement safety measures based on patient assessment  Note: Bed alarm in use this shift.      Problem: Cardiovascular - Adult  Goal: Maintains optimal cardiac output and hemodynamic stability  Outcome: Progressing  Flowsheets (Taken 11/10/2022 2206)  Maintains optimal cardiac output and hemodynamic stability:   Monitor blood pressure and heart rate   Monitor urine output and notify Licensed Independent Practitioner for values outside of normal range   Assess for signs of decreased cardiac output     Problem: Skin/Tissue Integrity - Adult  Goal: Incisions, wounds, or drain sites healing without S/S of infection  Outcome: Progressing  Flowsheets (Taken 11/10/2022 2206)  Incisions, Wounds, or Drain Sites Healing Without Sign and Symptoms of Infection:   ADMISSION and DAILY: Assess and document risk factors for pressure ulcer development   TWICE DAILY: Assess and document skin integrity   TWICE DAILY: Assess and document dressing/incision, wound bed, drain sites and surrounding tissue     Problem: Skin/Tissue Integrity - Adult  Goal: Skin integrity remains intact  Outcome: Progressing  Flowsheets (Taken 11/10/2022 2206)  Skin Integrity Remains Intact:   Monitor for areas of redness and/or skin breakdown   Assess vascular access sites hourly     Problem: Gastrointestinal - Adult  Goal: Maintains or returns to baseline bowel function  Outcome: Progressing  Flowsheets (Taken 11/10/2022 2206)  Maintains or returns to baseline bowel function:   Assess bowel function   Encourage mobilization and activity   Encourage oral fluids to ensure adequate hydration   Administer ordered medications as needed     Problem: Genitourinary - Adult  Goal: Absence of urinary retention  Outcome: Progressing  Flowsheets (Taken 11/10/2022 2206)  Absence of urinary retention: Assess patients ability to void and empty bladder     Problem: Genitourinary - Adult  Goal: Urinary catheter remains patent  Outcome: Progressing  Flowsheets (Taken 11/10/2022 2206)  Urinary catheter remains patent: Assess patency of urinary catheter     Problem: Infection - Adult  Goal: Absence of infection during hospitalization  Outcome: Progressing  Flowsheets (Taken 11/10/2022 2206)  Absence of infection during hospitalization:   Assess and monitor for signs and symptoms of infection   Monitor lab/diagnostic results   Monitor all insertion sites i.e., indwelling lines, tubes and drains   Instruct and encourage patient and family to use good hand hygiene technique   Care plan reviewed with patient. Patient verbalizes understanding of the plan of care and contribute to goal setting.

## 2022-11-11 NOTE — FLOWSHEET NOTE
11/11/22 [facility]   Safe Environment   Safety Measures Other (comment)  (VN safety round)   Called into patient room and introduced myself and role. Patient stated he is not in the mood today. Can try again tomorrow.

## 2022-11-11 NOTE — PROGRESS NOTES
0918- PT complained of chest pain and pressure. STAT EKG ordered. Chente AGUILLON messaged. 3611- EKG completed    0930- Chente AGUILLON assessed     D10 250ml bolus started   BP: 83/46, 0.9 NS 250ml bolus started   Chest pressure 6/10      0932- BP 80/50-map (64), HR 74, O2 100%    0935- chem 40 R hand, Chem 145 L hand. 0941- BP 92/51 map (64), HR 77  Chest pressure 3/10    0945- BP 88/48 map (61), D10 bolus stopped    0955- chest pressure increasing. Lab arrived. BMP drawn, chem 301 via lab drawn  BP- 82/51 (61), HR 75  Chest pressure 5/10  Hiccups started  5mg midodrine given. 0959- BP 82/51 map (61), [employer]. Not symptomatic  1008- 5mg midodrine given    1016-BP 84/50 map(61)      1145- BP 79/38 map 54. Trygve Pedrito AGUILLON messaged. 1225- albumin started     1539- [employer]. BP 82/38 (map 52). Not symptomatic.   1540- No new orders

## 2022-11-11 NOTE — PROGRESS NOTES
[facility] ICU STEPDOWN TELEMETRY 4K  Occupational Therapy  Daily Note  Time:   Time In: 813  Time Out:   Timed Code Treatment Minutes: 24 Minutes  Minutes: 24          Date: 2022  Patient Name: Elson Fleischer,   Gender: male      Room: [facility]17/017-A  MRN: [id number]  : 1960  (63 y.o.)  Referring Practitioner: Jaja Barney MD  Diagnosis: elevated troponin  Additional Pertinent Hx: Per EMR:  63 y.o. diabetic male, extensive previous history of EtOH, current smoker, presents with chronic progressive dyspnea, worsening over past 2 months. Patient denies chest pain/pressure; no peripheral edema, presyncope, orthopnea. Pt admitted on 10/28/22. Large pleural effusions seen on admission imaging prompted echo, showing LVEF 25%; subsequent cath shows 2-v CAD. Pt s/p CABG X2 and insertion of IABP on 22. Restrictions/Precautions:  Restrictions/Precautions: Surgical Protocols, Fall Risk  Position Activity Restriction  Sternal Precautions: No Pushing, No Pulling, 10# Lifting Restrictions  Other position/activity restrictions: monitor ortho     SUBJECTIVE: Nurse approved Tx. Pt supine in bed,     PAIN: does not report pain     Vitals: increased time, BP supine initially 119/67, Supine BP 85/40. On RA SpO2 95%. Nurse notified of BP. Per nursing- pt approved for sitting/supine activity. COGNITION: Decreased Insight    ADL:   Bathing: Minimal Assistance. UB sponge bath and face,assist with back. Sitting EOB. Grooming: Oral care with set up sitting EOB. BALANCE:  Sitting Balance:  Stand By Assistance, Minimal Assistance. -dmeo'd posterior lean initially, min A to correct. Tactile cues and vc's to facilitate sitting balance and posture. Tolerated over x10min sitting EOB.      BED MOBILITY:  Supine to Sit: Moderate Assistance    Sit to Supine: Maximum Assistance to MOD A for BLEs and repositioning   Increased time for task     ASSESSMENT:     Activity Tolerance:  Patient tolerance of  treatment: fair. Discharge Recommendations: Continue to assess pending progress  Equipment Recommendations: Other: may benefit from [facility] to place over toilet at home  Plan: Times Per Week: 6x  Current Treatment Recommendations: ROM, Balance training, Functional mobility training, Endurance training, Safety education & training, Patient/Caregiver education & training, Equipment evaluation, education, & procurement, Self-Care / ADL    Patient Education  Patient Education: Role of OT, Plan of Care, and Precautions     Goals  Short Term Goals  Time Frame for Short Term Goals: by discharge  Short Term Goal 1: Pt will increase activity tolerance for functional mobility to/from bathroom, progressing to household distances as able, with supervision in prep for ADL completion. Short Term Goal 2: Pt will complete functional transfers with supervision in prep for toilet/shower transfers. Short Term Goal 3: Pt will complete BADL tasks with supervision to increase independence with self care tasks. Short Term Goal 4: Pt will tolerate standing >5 minutes with supervision in prep for sinkside grooming tasks. Following session, patient left in safe position with all fall risk precautions in place.

## 2022-11-11 NOTE — CARE COORDINATION
11/11/22, 2:51 PM EST    DISCHARGE ON GOING EVALUATION    [facility] day: 13  Location: -17/017-A Reason for admit: Elevated troponin [Q38.3]  Acute systolic congestive heart failure ([address] [I50.21]  New onset of congestive heart failure ([address] [I50.9]   Procedure:   10/28 CTA Chest: No pulmonary embolism. Moderate pulmonary edema. Interval worsening since the prior study on 10/5/22  10/29 Echo with EF 25-30%; severe global hypokinesis of LV  10/31 Renal US: Bilateral hydronephrosis; Markedly distended urinary bladder with a large amount of postvoid residual urine  11/1 Cardiac Cath: MVD  11/2 CT Abd/pelvis: Mild bilateral hydronephrosis. There is bilateral renal contrast excretion   from prior contrast administration limiting evaluation for renal stones. Marked mural thickening of the urinary bladder. The differential diagnosis includes cystitis and neoplasm. Moderate amount of retained stool. Partially visualized lower thorax shows moderate sized bilateral pleural effusions with atelectasis. 11/4 PET Myocardial viability: Absent activity at the anterior left ventricular wall extending into the apex, likely nonviable myocardium  11/7 Vein mapping  11/7 2v CABG; placement of IABP  11/7 Intubated - 11/7 Extubated  11/7 IABP removed  11/10 Chest tubes and pacer wires removed  11/11 CXR:   1. Near complete resolution of the trace pneumothoraces decreased since    the prior study. 2. Nikko left retrocardiac/basilar airspace disease     Barriers to Discharge: POD #4. Chest tubes and pacer wires removed yesterday. Episodes of orthostatic hypotension yesterday and again today. Midodrine tid. Plan to keep crum in at discharge and plan for OP f/u for cystoscopy next week. +BM. Afebrile. NSR. On room air. Ox4. Follows commands. CR/PT/OT. Dietitian consulted. Dietary ileus prevention protocol. CVS, Cardiology, and Nephrology following.  Telemetry, I&O, daily weight, IS, sternal precautions, wound care, SCDs, crum care, ambulate. Amio, asa, lipitor, lovenox, lantus, SSI, midodrine 5 mg tid, prn roxicodone, protonix, K+, electrolyte replacement protocols. Lasix 20 mg daily to start tomorrow. Received 250 ml fld bolus x1, albumin x1, and 125 ml D10 bolus x1 today. BUN 33, Creat 1.5, wbc 13.1, hgb 7.6. PCP: BLACK Romero CNP  Readmission Risk Score: 17.8%  Patient Goals/Plan/Treatment Preferences: Home with wife and new [address]. Life Vest to be fitted today. Spoke with Henry Perea and his wife; denies need for [facility]. No need for DME. SW on case.

## 2022-11-12 LAB
ABO: NORMAL
ALBUMIN SERPL-MCNC: 3.3 G/DL (ref 3.5–5.1)
ANION GAP SERPL CALCULATED.3IONS-SCNC: 10 MEQ/L (ref 8–16)
ANTIBODY SCREEN: NORMAL
BUN BLDV-MCNC: 24 MG/DL (ref 7–22)
CALCIUM SERPL-MCNC: 8.3 MG/DL (ref 8.5–10.5)
CHLORIDE BLD-SCNC: 98 MEQ/L (ref 98–111)
CO2: 24 MEQ/L (ref 23–33)
CREAT SERPL-MCNC: 1.2 MG/DL (ref 0.4–1.2)
ERYTHROCYTE [DISTWIDTH] IN BLOOD BY AUTOMATED COUNT: 13.3 % (ref 11.5–14.5)
ERYTHROCYTE [DISTWIDTH] IN BLOOD BY AUTOMATED COUNT: 43.3 FL (ref 35–45)
GFR SERPL CREATININE-BSD FRML MDRD: > 60 ML/MIN/1.73M2
GLUCOSE BLD-MCNC: 123 MG/DL (ref 70–108)
GLUCOSE BLD-MCNC: 150 MG/DL (ref 70–108)
GLUCOSE BLD-MCNC: 163 MG/DL (ref 70–108)
GLUCOSE BLD-MCNC: 165 MG/DL (ref 70–108)
GLUCOSE BLD-MCNC: 222 MG/DL (ref 70–108)
GLUCOSE BLD-MCNC: 222 MG/DL (ref 70–108)
HCT VFR BLD CALC: 27.1 % (ref 42–52)
HEMOGLOBIN: 8.8 GM/DL (ref 14–18)
MCH RBC QN AUTO: 28.9 PG (ref 26–33)
MCHC RBC AUTO-ENTMCNC: 32.5 GM/DL (ref 32.2–35.5)
MCV RBC AUTO: 88.9 FL (ref 80–94)
PLATELET # BLD: 237 THOU/MM3 (ref 130–400)
PMV BLD AUTO: 10.6 FL (ref 9.4–12.4)
POTASSIUM SERPL-SCNC: 4.5 MEQ/L (ref 3.5–5.2)
RBC # BLD: 3.05 MILL/MM3 (ref 4.7–6.1)
RH FACTOR: NORMAL
SODIUM BLD-SCNC: 132 MEQ/L (ref 135–145)
WBC # BLD: 12.6 THOU/MM3 (ref 4.8–10.8)

## 2022-11-12 PROCEDURE — P9016 RBC LEUKOCYTES REDUCED: HCPCS

## 2022-11-12 PROCEDURE — 36430 TRANSFUSION BLD/BLD COMPNT: CPT

## 2022-11-12 PROCEDURE — 86850 RBC ANTIBODY SCREEN: CPT

## 2022-11-12 PROCEDURE — P9045 ALBUMIN (HUMAN), 5%, 250 ML: HCPCS | Performed by: THORACIC SURGERY (CARDIOTHORACIC VASCULAR SURGERY)

## 2022-11-12 PROCEDURE — 82040 ASSAY OF SERUM ALBUMIN: CPT

## 2022-11-12 PROCEDURE — 6360000002 HC RX W HCPCS: Performed by: THORACIC SURGERY (CARDIOTHORACIC VASCULAR SURGERY)

## 2022-11-12 PROCEDURE — 80048 BASIC METABOLIC PNL TOTAL CA: CPT

## 2022-11-12 PROCEDURE — 2580000003 HC RX 258: Performed by: THORACIC SURGERY (CARDIOTHORACIC VASCULAR SURGERY)

## 2022-11-12 PROCEDURE — 6370000000 HC RX 637 (ALT 250 FOR IP): Performed by: THORACIC SURGERY (CARDIOTHORACIC VASCULAR SURGERY)

## 2022-11-12 PROCEDURE — 97530 THERAPEUTIC ACTIVITIES: CPT

## 2022-11-12 PROCEDURE — 85027 COMPLETE CBC AUTOMATED: CPT

## 2022-11-12 PROCEDURE — 6370000000 HC RX 637 (ALT 250 FOR IP): Performed by: INTERNAL MEDICINE

## 2022-11-12 PROCEDURE — 36415 COLL VENOUS BLD VENIPUNCTURE: CPT

## 2022-11-12 PROCEDURE — 2060000000 HC ICU INTERMEDIATE R&B

## 2022-11-12 PROCEDURE — 82948 REAGENT STRIP/BLOOD GLUCOSE: CPT

## 2022-11-12 PROCEDURE — 99232 SBSQ HOSP IP/OBS MODERATE 35: CPT | Performed by: INTERNAL MEDICINE

## 2022-11-12 PROCEDURE — 86901 BLOOD TYPING SEROLOGIC RH(D): CPT

## 2022-11-12 PROCEDURE — 86900 BLOOD TYPING SEROLOGIC ABO: CPT

## 2022-11-12 RX ORDER — MIDODRINE HYDROCHLORIDE 10 MG/1
10 TABLET ORAL
Status: DISCONTINUED | OUTPATIENT
Start: 2022-11-12 | End: 2022-11-14

## 2022-11-12 RX ORDER — ALBUMIN, HUMAN INJ 5% 5 %
25 SOLUTION INTRAVENOUS ONCE
Status: COMPLETED | OUTPATIENT
Start: 2022-11-12 | End: 2022-11-12

## 2022-11-12 RX ORDER — INSULIN GLARGINE 100 [IU]/ML
10 INJECTION, SOLUTION SUBCUTANEOUS 2 TIMES DAILY
Status: DISCONTINUED | OUTPATIENT
Start: 2022-11-12 | End: 2022-11-14

## 2022-11-12 RX ADMIN — AMIODARONE HYDROCHLORIDE 200 MG: 200 TABLET ORAL at 08:09

## 2022-11-12 RX ADMIN — MIDODRINE HYDROCHLORIDE 10 MG: 10 TABLET ORAL at 17:27

## 2022-11-12 RX ADMIN — ASPIRIN 325 MG: 325 TABLET, COATED ORAL at 08:09

## 2022-11-12 RX ADMIN — SODIUM CHLORIDE, PRESERVATIVE FREE 10 ML: 5 INJECTION INTRAVENOUS at 19:58

## 2022-11-12 RX ADMIN — MIDODRINE HYDROCHLORIDE 5 MG: 5 TABLET ORAL at 08:09

## 2022-11-12 RX ADMIN — ALBUMIN (HUMAN) 25 G: 12.5 INJECTION, SOLUTION INTRAVENOUS at 02:09

## 2022-11-12 RX ADMIN — INSULIN GLARGINE 10 UNITS: 100 INJECTION, SOLUTION SUBCUTANEOUS at 01:10

## 2022-11-12 RX ADMIN — ENOXAPARIN SODIUM 40 MG: 100 INJECTION SUBCUTANEOUS at 08:08

## 2022-11-12 RX ADMIN — SENNOSIDES AND DOCUSATE SODIUM 1 TABLET: 50; 8.6 TABLET ORAL at 08:09

## 2022-11-12 RX ADMIN — INSULIN GLARGINE 10 UNITS: 100 INJECTION, SOLUTION SUBCUTANEOUS at 21:04

## 2022-11-12 RX ADMIN — MIDODRINE HYDROCHLORIDE 10 MG: 10 TABLET ORAL at 11:50

## 2022-11-12 RX ADMIN — PANTOPRAZOLE SODIUM 40 MG: 40 TABLET, DELAYED RELEASE ORAL at 06:54

## 2022-11-12 RX ADMIN — ATORVASTATIN CALCIUM 40 MG: 40 TABLET, FILM COATED ORAL at 19:58

## 2022-11-12 RX ADMIN — INSULIN GLARGINE 10 UNITS: 100 INJECTION, SOLUTION SUBCUTANEOUS at 09:08

## 2022-11-12 RX ADMIN — Medication 1 TABLET: at 08:09

## 2022-11-12 RX ADMIN — INSULIN LISPRO 4 UNITS: 100 INJECTION, SOLUTION INTRAVENOUS; SUBCUTANEOUS at 11:50

## 2022-11-12 ASSESSMENT — PAIN SCALES - GENERAL
PAINLEVEL_OUTOF10: 0

## 2022-11-12 NOTE — FLOWSHEET NOTE
11/12/22 1038   Safe Environment   Safety Measures   (VIrtual RN rounds complete)       PT lying in bed with eyes closed, no distress noted at this time

## 2022-11-12 NOTE — PROGRESS NOTES
Postop day #4 status post high risk CABG  Patient in good spirits and comfortable, vocalizing well  Has been on midodrine with continued orthostatic hypotension, will increase midodrine to 10 mg; despite episodes of hypotension patient remains well perfused and has good urine output and therefore I am not too concerned about this  Urinary retention last night requiring replacement of Bella catheter  Yesterday had episode of hypoglycemia due to too much insulin; I have decreased his Lantus to 10 mg with excellent result and maintenance of normal glycemia  I discussed this with his nurse who is well versed on his issues and taking good care of him

## 2022-11-12 NOTE — PROGRESS NOTES
[facility]   Nephrology progress note  11/12/2022, 11:18 AM      Pt Name:    Kimberly Velazco  MRN:     [id number]     YOB: 1960  Admit Date:    10/28/2022  9:39 PM    Chief Complaint: Nephrology following for LC/CKD. Subjective:  Patient seen and examined  Feels okay denies any complaints  Some chest pain but no significant shortness of breath  Blood pressure still fluctuating  Bella has been taken out and has to be replaced    Objective:  24HR INTAKE/OUTPUT:    Intake/Output Summary (Last 24 hours) at 11/12/2022 1118  Last data filed at 11/12/2022 0915  Gross per 24 hour   Intake 1796.87 ml   Output 1800 ml   Net -3.13 ml        Admission weight: 150 lb (68 kg)  Wt Readings from Last 3 Encounters:   11/12/22 145 lb 15.1 oz (66.2 kg)   10/26/22 151 lb (68.5 kg)   10/12/22 140 lb 9.6 oz (63.8 kg)        Vitals :   Vitals:    11/12/22 0729 11/12/22 0800 11/12/22 0915 11/12/22 0916   BP:  111/64 (!) 82/52 (!) 82/52   Pulse: (!) 101  87 86   Resp: 20  20 20   Temp: 98.7 °F (37.1 °C)  98.4 °F (36.9 °C) 98.4 °F (36.9 °C)   TempSrc: Oral   Oral   SpO2: 96%  92% 92%   Weight:       Height:           Physical examination  General Appearance:  Well developed.  No distress  Mouth/Throat:  Oral mucosa moist  Neck:  Supple, no JVD  Lungs:  Breath sounds: clear, diminished slightly  Heart[de-identified]  S1,S2 heard  Abdomen:  Soft, non - tender  Musculoskeletal:  Edema -no edema    Medications:  Infusion:    dextrose      dextrose      sodium chloride         Meds:    insulin glargine  10 Units SubCUTAneous BID    midodrine  10 mg Oral TID WC    insulin lispro  0-16 Units SubCUTAneous TID WC    insulin lispro  0-4 Units SubCUTAneous Nightly    pantoprazole  40 mg Oral QAM AC    sodium chloride flush  5-40 mL IntraVENous 2 times per day    enoxaparin  40 mg SubCUTAneous Daily    aspirin  325 mg Oral Daily    multivitamin  1 tablet Oral Daily with breakfast    sennosides-docusate sodium  1 tablet Oral BID atorvastatin  40 mg Oral Nightly       Lab Data :  CBC:   Recent Labs     11/10/22  0335 11/11/22  0343 11/12/22  0933   WBC 11.9* 13.1* 12.6*   HGB 7.1* 7.6* 8.8*   HCT 22.1* 23.3* 27.1*    206 237       CMP:  Recent Labs     11/11/22  0343 11/11/22  0959 11/12/22  0933    135 132*   K 3.5 4.5 4.5    98 98   CO2 25 25 24   BUN 35* 33* 24*   CREATININE 1.5* 1.5* 1.2   GLUCOSE 46* 271* 165*   CALCIUM 8.1* 8.5 8.3*       Hepatic:   Recent Labs     11/12/22  0933   LABALBU 3.3*         Assessment and Plan:  Renal -acute kidney injury, on chronic kidney disease stage III  This appears most likely due to the use of diuretics ACE inhibitor and primarily due to contrast exposure on the day of his admission. Overall improved currently at 1.4-1.5 this is mostly his baseline today it is down to 1.2 after holding the Lasix  Electrolytes -within normal limits  Essential hypertension now running borderline, on midodrine  Hx of diabetes mellitus apparently his A1c was 15.0,, 3 months ago  New onset of acute congestive heart failure systolic stable off lassix  Anemia due to increased drainage from the chest tubes got several units of blood transfusion currently maintaining stable  CAD status post CABG 11/7/2022  Urinary retention -status post reinsertion of the Bella  Meds reviewed. Discussed with patient and family     Roshan Shultz MD  [facility]    This report has been created using voice recognition software.  It may contain minor errors which are inherent in voice recognition technology

## 2022-11-12 NOTE — PLAN OF CARE
Problem: Discharge Planning  Goal: Discharge to home or other facility with appropriate resources  Outcome: Progressing  Flowsheets (Taken 11/11/2022 1934)  Discharge to home or other facility with appropriate resources:   Identify barriers to discharge with patient and caregiver   Arrange for needed discharge resources and transportation as appropriate   Identify discharge learning needs (meds, wound care, etc)     Problem: Skin/Tissue Integrity  Goal: Absence of new skin breakdown  Description: 1. Monitor for areas of redness and/or skin breakdown  2. Assess vascular access sites hourly  3. Every 4-6 hours minimum:  Change oxygen saturation probe site  4. Every 4-6 hours:  If on nasal continuous positive airway pressure, respiratory therapy assess nares and determine need for appliance change or resting period. Outcome: Progressing  Note: No s/s of new skin breakdown. Will continue to monitor.       Problem: Safety - Adult  Goal: Free from fall injury  Outcome: Progressing  Flowsheets (Taken 11/12/2022 0556)  Free From Fall Injury:   Instruct family/caregiver on patient safety   Based on caregiver fall risk screen, instruct family/caregiver to ask for assistance with transferring infant if caregiver noted to have fall risk factors     Problem: Chronic Conditions and Co-morbidities  Goal: Patient's chronic conditions and co-morbidity symptoms are monitored and maintained or improved  Outcome: Progressing  Flowsheets (Taken 11/11/2022 1934)  Care Plan - Patient's Chronic Conditions and Co-Morbidity Symptoms are Monitored and Maintained or Improved:   Monitor and assess patient's chronic conditions and comorbid symptoms for stability, deterioration, or improvement   Collaborate with multidisciplinary team to address chronic and comorbid conditions and prevent exacerbation or deterioration   Update acute care plan with appropriate goals if chronic or comorbid symptoms are exacerbated and prevent overall improvement and discharge     Problem: Pain  Goal: Verbalizes/displays adequate comfort level or baseline comfort level  Outcome: Progressing  Flowsheets (Taken 11/12/2022 0556)  Verbalizes/displays adequate comfort level or baseline comfort level:   Encourage patient to monitor pain and request assistance   Assess pain using appropriate pain scale   Administer analgesics based on type and severity of pain and evaluate response   Implement non-pharmacological measures as appropriate and evaluate response   Consider cultural and social influences on pain and pain management   Notify Licensed Independent Practitioner if interventions unsuccessful or patient reports new pain     Problem: Metabolic/Fluid and Electrolytes - Adult  Goal: Electrolytes maintained within normal limits  Outcome: Progressing  Flowsheets (Taken 11/11/2022 1934)  Electrolytes maintained within normal limits:   Monitor labs and assess patient for signs and symptoms of electrolyte imbalances   Administer electrolyte replacement as ordered   Monitor response to electrolyte replacements, including repeat lab results as appropriate     Problem: Metabolic/Fluid and Electrolytes - Adult  Goal: Hemodynamic stability and optimal renal function maintained  Outcome: Progressing  Flowsheets (Taken 11/11/2022 1934)  Hemodynamic stability and optimal renal function maintained:   Monitor labs and assess for signs and symptoms of volume excess or deficit   Monitor intake, output and patient weight   Monitor urine specific gravity, serum osmolarity and serum sodium as indicated or ordered   Monitor response to interventions for patient's volume status, including labs, urine output, blood pressure (other measures as available)     Problem: Metabolic/Fluid and Electrolytes - Adult  Goal: Glucose maintained within prescribed range  Outcome: Progressing  Flowsheets (Taken 11/11/2022 1934)  Glucose maintained within prescribed range:   Monitor blood glucose as ordered   Assess for signs and symptoms of hyperglycemia and hypoglycemia   Administer ordered medications to maintain glucose within target range     Problem: Hematologic - Adult  Goal: Maintains hematologic stability  Outcome: Progressing  Flowsheets (Taken 11/11/2022 1934)  Maintains hematologic stability:   Assess for signs and symptoms of bleeding or hemorrhage   Monitor labs for bleeding or clotting disorders   Administer blood products/factors as ordered     Problem: Nutrition Deficit:  Goal: Optimize nutritional status  Outcome: Progressing  Flowsheets (Taken 11/12/2022 0556)  Nutrient intake appropriate for improving, restoring, or maintaining nutritional needs:   Assess nutritional status and recommend course of action   Recommend appropriate diets, oral nutritional supplements, and vitamin/mineral supplements     Problem: ABCDS Injury Assessment  Goal: Absence of physical injury  Outcome: Progressing  Flowsheets (Taken 11/12/2022 0556)  Absence of Physical Injury: Implement safety measures based on patient assessment     Problem: Cardiovascular - Adult  Goal: Maintains optimal cardiac output and hemodynamic stability  Outcome: Progressing  Flowsheets  Taken 11/12/2022 0556  Maintains optimal cardiac output and hemodynamic stability:   Monitor blood pressure and heart rate   Monitor urine output and notify Licensed Independent Practitioner for values outside of normal range   Assess for signs of decreased cardiac output  Taken 11/11/2022 1934  Maintains optimal cardiac output and hemodynamic stability:   Monitor blood pressure and heart rate   Monitor urine output and notify Licensed Independent Practitioner for values outside of normal range   Assess for signs of decreased cardiac output   Administer fluid and/or volume expanders as ordered   Administer vasoactive medications as ordered     Problem: Skin/Tissue Integrity - Adult  Goal: Skin integrity remains intact  Outcome: Progressing  Flowsheets  Taken 11/12/2022 0556  Skin Integrity Remains Intact: Monitor for areas of redness and/or skin breakdown  Taken 11/11/2022 1934  Skin Integrity Remains Intact: Monitor for areas of redness and/or skin breakdown     Problem: Skin/Tissue Integrity - Adult  Goal: Incisions, wounds, or drain sites healing without S/S of infection  Outcome: Progressing  Flowsheets  Taken 11/12/2022 0556  Incisions, Wounds, or Drain Sites Healing Without Sign and Symptoms of Infection: ADMISSION and DAILY: Assess and document risk factors for pressure ulcer development  Taken 11/11/2022 1934  Incisions, Wounds, or Drain Sites Healing Without Sign and Symptoms of Infection: ADMISSION and DAILY: Assess and document risk factors for pressure ulcer development     Problem: Musculoskeletal - Adult  Goal: Return mobility to safest level of function  Outcome: Progressing  Flowsheets  Taken 11/12/2022 0556  Return Mobility to Safest Level of Function:   Assess patient stability and activity tolerance for standing, transferring and ambulating with or without assistive devices   Assist with transfers and ambulation using safe patient handling equipment as needed   Ensure adequate protection for wounds/incisions during mobilization  Taken 11/11/2022 1934  Return Mobility to Safest Level of Function:   Assess patient stability and activity tolerance for standing, transferring and ambulating with or without assistive devices   Assist with transfers and ambulation using safe patient handling equipment as needed   Ensure adequate protection for wounds/incisions during mobilization     Problem: Gastrointestinal - Adult  Goal: Maintains or returns to baseline bowel function  Outcome: Progressing  Flowsheets (Taken 11/11/2022 1934)  Maintains or returns to baseline bowel function:   Assess bowel function   Encourage oral fluids to ensure adequate hydration   Administer IV fluids as ordered to ensure adequate hydration   Administer ordered medications as needed Encourage mobilization and activity     Problem: Genitourinary - Adult  Goal: Absence of urinary retention  Outcome: Progressing  Flowsheets (Taken 11/11/2022 1934)  Absence of urinary retention:   Assess patients ability to void and empty bladder   Monitor intake/output and perform bladder scan as needed   Place urinary catheter per Licensed Independent Practitioner order if needed     Problem: Genitourinary - Adult  Goal: Urinary catheter remains patent  Outcome: Progressing  Flowsheets (Taken 11/12/2022 0556)  Urinary catheter remains patent: Assess patency of urinary catheter     Problem: Infection - Adult  Goal: Absence of infection during hospitalization  Outcome: Progressing  Flowsheets (Taken 11/11/2022 1934)  Absence of infection during hospitalization:   Assess and monitor for signs and symptoms of infection   Monitor lab/diagnostic results   Monitor all insertion sites i.e., indwelling lines, tubes and drains    Care plan reviewed with patient. Patient verbalize understanding of the plan of care and contribute to goal setting.

## 2022-11-12 NOTE — PROGRESS NOTES
[address] [facility] PHYSICAL THERAPY  DAILY NOTE  [facility] ICU STEPDOWN TELEMETRY 4K - 4K-17/017-A    Time In: 0720  Time Out: 0745  Timed Code Treatment Minutes: 25 Minutes  Minutes: 25          Date: 2022  Patient Name: Dalton Riggs,  Gender:  male        MRN: [id number]  : 1960  (63 y.o.)     Referring Practitioner: Cait Brice MD  Diagnosis: Elevated troponin  Additional Pertinent Hx: 63 y.o. diabetic male, extensive previous history of EtOH, current smoker, presents with chronic progressive dyspnea, worsening over past 2 months. Patient denies chest pain/pressure; no peripheral edema, presyncope, orthopnea. Large pleural effusions seen on admission imaging prompted echo, showing LVEF 25%; subsequent cath shows 2-v CAD. Pt is s/p CABG x 2 . Prior Level of Function:  Lives With: Spouse  Type of Home: House  Home Layout: One level  Home Access: Stairs to enter with rails  Entrance Stairs - Number of Steps: 3  Home Equipment: Walker, rolling   Bathroom Shower/Tub: Tub/Shower unit  Bathroom Toilet: Standard  Bathroom Equipment: Shower chair (wife getting HHS)    ADL Assistance: Independent  Homemaking Assistance: Independent  Ambulation Assistance: Independent  Transfer Assistance: Independent  Active : No  Additional Comments: Wife works during the day    Restrictions/Precautions:  Restrictions/Precautions: Surgical Protocols, Fall Risk  Position Activity Restriction  Sternal Precautions: No Pushing, No Pulling, 10# Lifting Restrictions  Other position/activity restrictions: monitor ortho     SUBJECTIVE: per RN, ok to work with pt if able to maintain systolic over 956. Pt motivated and cooperative for PT session.     PAIN: no pain per pt    Vitals: on room air with O2 sats >90%,   Sup HR 85, /62  Sit EOB HR 89 and BP 84/56  After around 5 min sitting EOB /65  Std BP 89/52 and   Sitting back EOB BP 97/66  Pt returned to sup in bed per RN request    OBJECTIVE:  Bed Mobility:  Rolling to Left: Stand By Assistance   Rolling to Right: Stand By Assistance   Supine to Sit: Contact Guard Assistance  Sit to Supine: Contact Guard Assistance   Scooting: Contact Guard Assistance  HOB up 20 degrees, cues to maintain BUE close to body during mobility  Transfers:  Sit to Stand: Minimal Assistance  Stand to Sit:Minimal Assistance  HHA, unsteady  Ambulation:  Not tested      Balance:  Static Sitting Balance:  Stand By Assistance, tolerated around 15 min, see vitals above  Static Standing Balance: Minimal Assistance, to CGA, HHA, tolerated around 1 min to have vitals taken as stated above    Exercise:  Patient was guided in 1 set(s) 10 reps of exercise to both lower extremities. Seated marches, Seated heel/toe raises, and Long arc quads. Exercises were completed for increased independence with functional mobility. Functional Outcome Measures: Completed  AM-PAC Inpatient Mobility without Stair Climbing Raw Score : 12  AM-PAC Inpatient without Stair Climbing T-Scale Score : 37.26    ASSESSMENT:  Assessment: Patient progressing toward established goals.  and pt limited by vitals as stated above, pt very motivated, 1 assist for safety  Activity Tolerance:  Patient tolerance of  treatment: fair. -     Equipment Recommendations:Equipment Needed: No  Discharge Recommendations: Continue to assess pending progress  Plan: Current Treatment Recommendations: Strengthening, Balance training, Functional mobility training, Transfer training, Endurance training, Neuromuscular re-education, Gait training, Stair training, Patient/Caregiver education & training, Therapeutic activities, Safety education & training, Home exercise program  General Plan:  (6x CABG)    Patient Education  Patient Education: Home Exercise Program, Bed Mobility, Transfers    Goals:  Patient Goals : to go home  Short Term Goals  Time Frame for Short Term Goals: by discharge  Short Term Goal 1: Pt to transfer supine <--> sit S to enable pt to get in/out of bed. Short Term Goal 2: Pt to transfer sit <--> stand S for increased functional mobility. Short Term Goal 3: Pt to ambulate >250 feet without AD SBA for household ambulation. Short Term Goal 4: Pt to ascend/descend 3 steps with HR SBA for home entry. Long Term Goals  Time Frame for Long Term Goals : NA due to short length of stay. Following session, patient left in safe position with all fall risk precautions in place.

## 2022-11-13 ENCOUNTER — APPOINTMENT (OUTPATIENT)
Dept: GENERAL RADIOLOGY | Age: 62
DRG: 233 | End: 2022-11-13
Payer: COMMERCIAL

## 2022-11-13 LAB
ANION GAP SERPL CALCULATED.3IONS-SCNC: 9 MEQ/L (ref 8–16)
BUN BLDV-MCNC: 24 MG/DL (ref 7–22)
CALCIUM SERPL-MCNC: 8.4 MG/DL (ref 8.5–10.5)
CHLORIDE BLD-SCNC: 101 MEQ/L (ref 98–111)
CO2: 24 MEQ/L (ref 23–33)
CREAT SERPL-MCNC: 1.2 MG/DL (ref 0.4–1.2)
EKG ATRIAL RATE: 83 BPM
EKG P AXIS: 70 DEGREES
EKG P-R INTERVAL: 144 MS
EKG Q-T INTERVAL: 374 MS
EKG QRS DURATION: 84 MS
EKG QTC CALCULATION (BAZETT): 439 MS
EKG R AXIS: 77 DEGREES
EKG T AXIS: 88 DEGREES
EKG VENTRICULAR RATE: 83 BPM
ERYTHROCYTE [DISTWIDTH] IN BLOOD BY AUTOMATED COUNT: 13.2 % (ref 11.5–14.5)
ERYTHROCYTE [DISTWIDTH] IN BLOOD BY AUTOMATED COUNT: 41.1 FL (ref 35–45)
GFR SERPL CREATININE-BSD FRML MDRD: > 60 ML/MIN/1.73M2
GLUCOSE BLD-MCNC: 109 MG/DL (ref 70–108)
GLUCOSE BLD-MCNC: 142 MG/DL (ref 70–108)
GLUCOSE BLD-MCNC: 207 MG/DL (ref 70–108)
GLUCOSE BLD-MCNC: 256 MG/DL (ref 70–108)
GLUCOSE BLD-MCNC: 96 MG/DL (ref 70–108)
HCT VFR BLD CALC: 27.7 % (ref 42–52)
HEMOGLOBIN: 9 GM/DL (ref 14–18)
MCH RBC QN AUTO: 28 PG (ref 26–33)
MCHC RBC AUTO-ENTMCNC: 32.5 GM/DL (ref 32.2–35.5)
MCV RBC AUTO: 86 FL (ref 80–94)
PLATELET # BLD: 297 THOU/MM3 (ref 130–400)
PMV BLD AUTO: 10.1 FL (ref 9.4–12.4)
POTASSIUM SERPL-SCNC: 4.3 MEQ/L (ref 3.5–5.2)
RBC # BLD: 3.22 MILL/MM3 (ref 4.7–6.1)
SODIUM BLD-SCNC: 134 MEQ/L (ref 135–145)
WBC # BLD: 11.9 THOU/MM3 (ref 4.8–10.8)

## 2022-11-13 PROCEDURE — 85027 COMPLETE CBC AUTOMATED: CPT

## 2022-11-13 PROCEDURE — 6370000000 HC RX 637 (ALT 250 FOR IP): Performed by: THORACIC SURGERY (CARDIOTHORACIC VASCULAR SURGERY)

## 2022-11-13 PROCEDURE — 80048 BASIC METABOLIC PNL TOTAL CA: CPT

## 2022-11-13 PROCEDURE — 82948 REAGENT STRIP/BLOOD GLUCOSE: CPT

## 2022-11-13 PROCEDURE — 99232 SBSQ HOSP IP/OBS MODERATE 35: CPT | Performed by: INTERNAL MEDICINE

## 2022-11-13 PROCEDURE — 97110 THERAPEUTIC EXERCISES: CPT

## 2022-11-13 PROCEDURE — 93005 ELECTROCARDIOGRAM TRACING: CPT | Performed by: THORACIC SURGERY (CARDIOTHORACIC VASCULAR SURGERY)

## 2022-11-13 PROCEDURE — 2580000003 HC RX 258: Performed by: THORACIC SURGERY (CARDIOTHORACIC VASCULAR SURGERY)

## 2022-11-13 PROCEDURE — 36415 COLL VENOUS BLD VENIPUNCTURE: CPT

## 2022-11-13 PROCEDURE — 2060000000 HC ICU INTERMEDIATE R&B

## 2022-11-13 PROCEDURE — 93010 ELECTROCARDIOGRAM REPORT: CPT | Performed by: NUCLEAR MEDICINE

## 2022-11-13 PROCEDURE — 71045 X-RAY EXAM CHEST 1 VIEW: CPT

## 2022-11-13 PROCEDURE — 6360000002 HC RX W HCPCS: Performed by: THORACIC SURGERY (CARDIOTHORACIC VASCULAR SURGERY)

## 2022-11-13 RX ADMIN — ASPIRIN 325 MG: 325 TABLET, COATED ORAL at 07:39

## 2022-11-13 RX ADMIN — MIDODRINE HYDROCHLORIDE 10 MG: 10 TABLET ORAL at 16:17

## 2022-11-13 RX ADMIN — MIDODRINE HYDROCHLORIDE 10 MG: 10 TABLET ORAL at 07:39

## 2022-11-13 RX ADMIN — OXYCODONE 5 MG: 5 TABLET ORAL at 05:41

## 2022-11-13 RX ADMIN — PANTOPRAZOLE SODIUM 40 MG: 40 TABLET, DELAYED RELEASE ORAL at 05:41

## 2022-11-13 RX ADMIN — SODIUM CHLORIDE, PRESERVATIVE FREE 10 ML: 5 INJECTION INTRAVENOUS at 07:40

## 2022-11-13 RX ADMIN — SODIUM CHLORIDE, PRESERVATIVE FREE 10 ML: 5 INJECTION INTRAVENOUS at 20:16

## 2022-11-13 RX ADMIN — INSULIN LISPRO 4 UNITS: 100 INJECTION, SOLUTION INTRAVENOUS; SUBCUTANEOUS at 11:12

## 2022-11-13 RX ADMIN — Medication 1 TABLET: at 07:39

## 2022-11-13 RX ADMIN — SENNOSIDES AND DOCUSATE SODIUM 1 TABLET: 50; 8.6 TABLET ORAL at 20:16

## 2022-11-13 RX ADMIN — ENOXAPARIN SODIUM 40 MG: 100 INJECTION SUBCUTANEOUS at 07:40

## 2022-11-13 RX ADMIN — SENNOSIDES AND DOCUSATE SODIUM 1 TABLET: 50; 8.6 TABLET ORAL at 07:39

## 2022-11-13 RX ADMIN — INSULIN GLARGINE 10 UNITS: 100 INJECTION, SOLUTION SUBCUTANEOUS at 20:18

## 2022-11-13 RX ADMIN — MIDODRINE HYDROCHLORIDE 10 MG: 10 TABLET ORAL at 11:13

## 2022-11-13 RX ADMIN — ATORVASTATIN CALCIUM 40 MG: 40 TABLET, FILM COATED ORAL at 20:15

## 2022-11-13 ASSESSMENT — PAIN SCALES - GENERAL
PAINLEVEL_OUTOF10: 6
PAINLEVEL_OUTOF10: 0

## 2022-11-13 ASSESSMENT — PAIN DESCRIPTION - ORIENTATION: ORIENTATION: MID

## 2022-11-13 ASSESSMENT — PAIN DESCRIPTION - LOCATION: LOCATION: CHEST

## 2022-11-13 ASSESSMENT — PAIN DESCRIPTION - DESCRIPTORS: DESCRIPTORS: ACHING;TENDER

## 2022-11-13 ASSESSMENT — PAIN - FUNCTIONAL ASSESSMENT: PAIN_FUNCTIONAL_ASSESSMENT: PREVENTS OR INTERFERES SOME ACTIVE ACTIVITIES AND ADLS

## 2022-11-13 NOTE — PROGRESS NOTES
Postop day #5 status post high risk CABG  Lantus held today because blood sugar 96  Continue midodrine  Keep Bella in place

## 2022-11-13 NOTE — FLOWSHEET NOTE
11/13/22 1227   Safe Environment   Safety Measures Standard Safety Measures; Other (comment)  (Virtual Safety Check)   Patient sitting up in chair. Respirations even, unlabored. Call light in reach. No safety concerns at this time.

## 2022-11-13 NOTE — PROGRESS NOTES
[facility]   Nephrology progress note  11/13/2022, 2:26 PM      Pt Name:    Christine España  MRN:     [id number]     YOB: 1960  Admit Date:    10/28/2022  9:39 PM    Chief Complaint: Nephrology following for LC/CKD. Subjective:  Patient seen and examined  Feels okay denies any complaints  Some chest pain but no significant shortness of breath  Blood pressure still fluctuating but better    Objective:  24HR INTAKE/OUTPUT:    Intake/Output Summary (Last 24 hours) at 11/13/2022 1426  Last data filed at 11/13/2022 1108  Gross per 24 hour   Intake 860 ml   Output 2825 ml   Net -1965 ml        Admission weight: 150 lb (68 kg)  Wt Readings from Last 3 Encounters:   11/13/22 141 lb 14.4 oz (64.4 kg)   10/26/22 151 lb (68.5 kg)   10/12/22 140 lb 9.6 oz (63.8 kg)        Vitals :   Vitals:    11/12/22 2312 11/13/22 0306 11/13/22 0728 11/13/22 1055   BP: (!) 116/54 107/62 90/62 109/73   Pulse: 82 90 93 97   Resp: 16 18 18 16   Temp: 98.7 °F (37.1 °C) 98.4 °F (36.9 °C) 98.3 °F (36.8 °C) 98.4 °F (36.9 °C)   TempSrc: Oral Oral Oral Oral   SpO2: 96% 98% 92% 99%   Weight:  141 lb 14.4 oz (64.4 kg)     Height:           Physical examination  General Appearance:  Well developed.  No distress  Mouth/Throat:  Oral mucosa moist  Neck:  Supple, no JVD  Lungs:  Breath sounds: clear, diminished slightly  Heart[de-identified]  S1,S2 heard  Abdomen:  Soft, non - tender  Musculoskeletal:  Edema -no edema    Medications:  Infusion:    dextrose      dextrose      sodium chloride         Meds:    insulin glargine  10 Units SubCUTAneous BID    midodrine  10 mg Oral TID WC    insulin lispro  0-16 Units SubCUTAneous TID WC    insulin lispro  0-4 Units SubCUTAneous Nightly    pantoprazole  40 mg Oral QAM AC    sodium chloride flush  5-40 mL IntraVENous 2 times per day    enoxaparin  40 mg SubCUTAneous Daily    aspirin  325 mg Oral Daily    multivitamin  1 tablet Oral Daily with breakfast    sennosides-docusate sodium  1 tablet Oral BID    atorvastatin  40 mg Oral Nightly       Lab Data :  CBC:   Recent Labs     11/11/22  0343 11/12/22  0933 11/13/22  0436   WBC 13.1* 12.6* 11.9*   HGB 7.6* 8.8* 9.0*   HCT 23.3* 27.1* 27.7*    237 297       CMP:  Recent Labs     11/11/22  0959 11/12/22  0933 11/13/22  0436    132* 134*   K 4.5 4.5 4.3   CL 98 98 101   CO2 25 24 24   BUN 33* 24* 24*   CREATININE 1.5* 1.2 1.2   GLUCOSE 271* 165* 109*   CALCIUM 8.5 8.3* 8.4*       Hepatic:   Recent Labs     11/12/22  0933   LABALBU 3.3*           Assessment and Plan:  Renal -acute kidney injury, on chronic kidney disease stage III  This appears most likely due to the use of diuretics ACE inhibitor and primarily due to contrast exposure on the day of his admission. Overall improved currently down to 1.2 having a lot of urine output encouraged to drink more liquids  Electrolytes -within normal limits  Essential hypertension now running borderline, on midodrine  Hx of diabetes mellitus apparently his A1c was 15.0,, 3 months ago  New onset of acute congestive heart failure systolic stable off lassix  Anemia status post CABG doing better  CAD status post CABG 11/7/2022  Urinary retention -status post reinsertion of the [place]  Meds reviewed. Discussed with patient and family     Lin Sanders MD  [facility]    This report has been created using voice recognition software.  It may contain minor errors which are inherent in voice recognition technology

## 2022-11-13 NOTE — PROGRESS NOTES
[facility]  Occupational Therapy  Daily Note  Time:    Time In: 1083  Time Out: 1104  Timed Code Treatment Minutes: 21 Minutes  Minutes: 21          Date: 2022  Patient Name: Evelyn Matthews,   Gender: male      Room: [facility]17/017-A  MRN: [id number]  : 1960  (63 y.o.)  Referring Practitioner: Alexa Carmona MD  Diagnosis: elevated troponin  Additional Pertinent Hx: Per EMR:  63 y.o. diabetic male, extensive previous history of EtOH, current smoker, presents with chronic progressive dyspnea, worsening over past 2 months. Patient denies chest pain/pressure; no peripheral edema, presyncope, orthopnea. Pt admitted on 10/28/22. Large pleural effusions seen on admission imaging prompted echo, showing LVEF 25%; subsequent cath shows 2-v CAD. Pt s/p CABG X2 and insertion of IABP on 22. Restrictions/Precautions:  Restrictions/Precautions: Surgical Protocols, Fall Risk  Position Activity Restriction  Sternal Precautions: No Pushing, No Pulling, 10# Lifting Restrictions  Other position/activity restrictions: monitor ortho, life vest on       SUBJECTIVE: cooperative, up in recliner upon arrival of therapist-Pt slide way down in recliner had to have A to correct prior to therapist bringing leg rest down    PAIN: no # given/10: minimal pain complaints during session    Vitals: Orthostatic Blood Pressure: Supine: NA, Sittin/73, Standin/59; after ambulation in room 115/68  HR 94  O2 sat 98% on room air    COGNITION: Slow Processing, Decreased Insight, Decreased Problem Solving, and Decreased Safety Awareness    ADL:   No ADL's completed this session. Pt declined participation in ADLs at this time . BALANCE:  Standing Balance: Contact Guard Assistance. BED MOBILITY:  Not Tested    TRANSFERS:  Sit to Stand:  Minimal Assistance. From recliner  Stand to Sit: [employer].  Vcs for technique  **mod vcs for sternal precautions throughout session    FUNCTIONAL MOBILITY:  Assistive Device: Rolling Walker  Assist Level:  Minimal Assistance-CGA  Distance:  10ft in room  Vcs for posture & walker safety     ADDITIONAL ACTIVITIES:  Completed step 2 cardiac exercises x 10 reps while seated in recliner. Poor attention span during exercises mod vcs for completion. ASSESSMENT:     Activity Tolerance:  Patient tolerance of  treatment: fair. Discharge Recommendations: 24 hour assistance or supervision and Home with Home Health OT  Equipment Recommendations: Other: may benefit from [facility] to place over toilet at home  Plan: Times Per Week: 6x  Current Treatment Recommendations: ROM, Balance training, Functional mobility training, Endurance training, Safety education & training, Patient/Caregiver education & training, Equipment evaluation, education, & procurement, Self-Care / ADL    Patient Education  Patient Education:  see above    Goals  Short Term Goals  Time Frame for Short Term Goals: by discharge  Short Term Goal 1: Pt will increase activity tolerance for functional mobility to/from bathroom, progressing to household distances as able, with supervision in prep for ADL completion. Short Term Goal 2: Pt will complete functional transfers with supervision in prep for toilet/shower transfers. Short Term Goal 3: Pt will complete BADL tasks with supervision to increase independence with self care tasks. Short Term Goal 4: Pt will tolerate standing >5 minutes with supervision in prep for sinkside grooming tasks. Following session, patient left in safe position with all fall risk precautions in place.

## 2022-11-13 NOTE — PLAN OF CARE
Problem: Discharge Planning  Goal: Discharge to home or other facility with appropriate resources  Outcome: Progressing  Flowsheets (Taken 11/12/2022 1933)  Discharge to home or other facility with appropriate resources:   Identify barriers to discharge with patient and caregiver   Arrange for needed discharge resources and transportation as appropriate   Identify discharge learning needs (meds, wound care, etc)     Problem: Skin/Tissue Integrity  Goal: Absence of new skin breakdown  Description: 1. Monitor for areas of redness and/or skin breakdown  2. Assess vascular access sites hourly  3. Every 4-6 hours minimum:  Change oxygen saturation probe site  4. Every 4-6 hours:  If on nasal continuous positive airway pressure, respiratory therapy assess nares and determine need for appliance change or resting period. Outcome: Progressing  Note: No s/s of new skin breakdown. Will continue to monitor.      Problem: Safety - Adult  Goal: Free from fall injury  Outcome: Progressing  Flowsheets (Taken 11/12/2022 0556)  Free From Fall Injury:   Instruct family/caregiver on patient safety   Based on caregiver fall risk screen, instruct family/caregiver to ask for assistance with transferring infant if caregiver noted to have fall risk factors     Problem: Chronic Conditions and Co-morbidities  Goal: Patient's chronic conditions and co-morbidity symptoms are monitored and maintained or improved  Outcome: Progressing  Flowsheets (Taken 11/12/2022 1933)  Care Plan - Patient's Chronic Conditions and Co-Morbidity Symptoms are Monitored and Maintained or Improved:   Monitor and assess patient's chronic conditions and comorbid symptoms for stability, deterioration, or improvement   Collaborate with multidisciplinary team to address chronic and comorbid conditions and prevent exacerbation or deterioration   Update acute care plan with appropriate goals if chronic or comorbid symptoms are exacerbated and prevent overall improvement and discharge     Problem: Pain  Goal: Verbalizes/displays adequate comfort level or baseline comfort level  Outcome: Progressing  Flowsheets (Taken 11/13/2022 0029)  Verbalizes/displays adequate comfort level or baseline comfort level:   Encourage patient to monitor pain and request assistance   Assess pain using appropriate pain scale   Administer analgesics based on type and severity of pain and evaluate response   Implement non-pharmacological measures as appropriate and evaluate response   Consider cultural and social influences on pain and pain management   Notify Licensed Independent Practitioner if interventions unsuccessful or patient reports new pain     Problem: Metabolic/Fluid and Electrolytes - Adult  Goal: Electrolytes maintained within normal limits  Outcome: Progressing  Flowsheets  Taken 11/13/2022 0029  Electrolytes maintained within normal limits:   Monitor labs and assess patient for signs and symptoms of electrolyte imbalances   Administer electrolyte replacement as ordered   Monitor response to electrolyte replacements, including repeat lab results as appropriate  Taken 11/12/2022 1933  Electrolytes maintained within normal limits:   Monitor labs and assess patient for signs and symptoms of electrolyte imbalances   Administer electrolyte replacement as ordered   Monitor response to electrolyte replacements, including repeat lab results as appropriate     Problem: Metabolic/Fluid and Electrolytes - Adult  Goal: Hemodynamic stability and optimal renal function maintained  Outcome: Progressing  Flowsheets (Taken 11/12/2022 1933)  Hemodynamic stability and optimal renal function maintained:   Monitor labs and assess for signs and symptoms of volume excess or deficit   Monitor intake, output and patient weight   Monitor urine specific gravity, serum osmolarity and serum sodium as indicated or ordered     Problem: Metabolic/Fluid and Electrolytes - Adult  Goal: Glucose maintained within prescribed range  Outcome: Progressing  Flowsheets (Taken 11/12/2022 1933)  Glucose maintained within prescribed range:   Monitor blood glucose as ordered   Assess for signs and symptoms of hyperglycemia and hypoglycemia   Administer ordered medications to maintain glucose within target range     Problem: Hematologic - Adult  Goal: Maintains hematologic stability  Outcome: Progressing  Flowsheets  Taken 11/13/2022 0029  Maintains hematologic stability:   Assess for signs and symptoms of bleeding or hemorrhage   Administer blood products/factors as ordered   Monitor labs for bleeding or clotting disorders  Taken 11/12/2022 1933  Maintains hematologic stability:   Assess for signs and symptoms of bleeding or hemorrhage   Monitor labs for bleeding or clotting disorders   Administer blood products/factors as ordered     Problem: Nutrition Deficit:  Goal: Optimize nutritional status  Outcome: Progressing  Flowsheets (Taken 11/13/2022 0029)  Nutrient intake appropriate for improving, restoring, or maintaining nutritional needs:   Assess nutritional status and recommend course of action   Recommend appropriate diets, oral nutritional supplements, and vitamin/mineral supplements     Problem: ABCDS Injury Assessment  Goal: Absence of physical injury  Outcome: Progressing  Flowsheets (Taken 11/13/2022 0029)  Absence of Physical Injury: Implement safety measures based on patient assessment     Problem: Cardiovascular - Adult  Goal: Maintains optimal cardiac output and hemodynamic stability  Outcome: Progressing  Flowsheets (Taken 11/12/2022 1933)  Maintains optimal cardiac output and hemodynamic stability:   Monitor blood pressure and heart rate   Monitor urine output and notify Licensed Independent Practitioner for values outside of normal range   Assess for signs of decreased cardiac output     Problem: Skin/Tissue Integrity - Adult  Goal: Skin integrity remains intact  Outcome: Progressing  Flowsheets  Taken 11/13/2022 6876  Skin Integrity Remains Intact: Monitor for areas of redness and/or skin breakdown  Taken 11/12/2022 1933  Skin Integrity Remains Intact: Monitor for areas of redness and/or skin breakdown     Problem: Skin/Tissue Integrity - Adult  Goal: Incisions, wounds, or drain sites healing without S/S of infection  Outcome: Progressing  Flowsheets  Taken 11/13/2022 0029  Incisions, Wounds, or Drain Sites Healing Without Sign and Symptoms of Infection: ADMISSION and DAILY: Assess and document risk factors for pressure ulcer development  Taken 11/12/2022 1933  Incisions, Wounds, or Drain Sites Healing Without Sign and Symptoms of Infection: ADMISSION and DAILY: Assess and document risk factors for pressure ulcer development     Problem: Musculoskeletal - Adult  Goal: Return mobility to safest level of function  Outcome: Progressing  Flowsheets (Taken 11/12/2022 1933)  Return Mobility to Safest Level of Function:   Assess patient stability and activity tolerance for standing, transferring and ambulating with or without assistive devices   Assist with transfers and ambulation using safe patient handling equipment as needed   Ensure adequate protection for wounds/incisions during mobilization     Problem: Gastrointestinal - Adult  Goal: Maintains or returns to baseline bowel function  Outcome: Progressing  Flowsheets (Taken 11/12/2022 1933)  Maintains or returns to baseline bowel function:   Assess bowel function   Encourage oral fluids to ensure adequate hydration   Administer IV fluids as ordered to ensure adequate hydration   Administer ordered medications as needed   Encourage mobilization and activity     Problem: Genitourinary - Adult  Goal: Absence of urinary retention  Outcome: Progressing  Flowsheets (Taken 11/12/2022 1933)  Absence of urinary retention:   Assess patients ability to void and empty bladder   Monitor intake/output and perform bladder scan as needed   Place urinary catheter per Licensed Independent Practitioner order if needed     Problem: Genitourinary - Adult  Goal: Urinary catheter remains patent  Outcome: Progressing  Flowsheets  Taken 11/13/2022 0029  Urinary catheter remains patent:   Assess patency of urinary catheter   Irrigate catheter per Licensed Independent Practitioner order if indicated and notify Licensed Independent Practitioner if unable to irrigate  Taken 11/12/2022 1933  Urinary catheter remains patent:   Assess patency of urinary catheter   Irrigate catheter per Licensed Independent Practitioner order if indicated and notify Licensed Independent Practitioner if unable to irrigate   Assess need for a larger catheter size or a 3-way catheter for continuous bladder irrigation     Problem: Infection - Adult  Goal: Absence of infection during hospitalization  Outcome: Progressing  Flowsheets (Taken 11/12/2022 1933)  Absence of infection during hospitalization:   Assess and monitor for signs and symptoms of infection   Monitor lab/diagnostic results   Monitor all insertion sites i.e., indwelling lines, tubes and drains    Care plan reviewed with patient. Patient verbalize understanding of the plan of care and contribute to goal setting.

## 2022-11-14 LAB
ANION GAP SERPL CALCULATED.3IONS-SCNC: 11 MEQ/L (ref 8–16)
BUN BLDV-MCNC: 22 MG/DL (ref 7–22)
CALCIUM SERPL-MCNC: 8.7 MG/DL (ref 8.5–10.5)
CHLORIDE BLD-SCNC: 100 MEQ/L (ref 98–111)
CO2: 24 MEQ/L (ref 23–33)
CREAT SERPL-MCNC: 1.1 MG/DL (ref 0.4–1.2)
ERYTHROCYTE [DISTWIDTH] IN BLOOD BY AUTOMATED COUNT: 13.2 % (ref 11.5–14.5)
ERYTHROCYTE [DISTWIDTH] IN BLOOD BY AUTOMATED COUNT: 46.9 FL (ref 35–45)
GFR SERPL CREATININE-BSD FRML MDRD: > 60 ML/MIN/1.73M2
GLUCOSE BLD-MCNC: 130 MG/DL (ref 70–108)
GLUCOSE BLD-MCNC: 130 MG/DL (ref 70–108)
GLUCOSE BLD-MCNC: 158 MG/DL (ref 70–108)
GLUCOSE BLD-MCNC: 174 MG/DL (ref 70–108)
GLUCOSE BLD-MCNC: 295 MG/DL (ref 70–108)
GLUCOSE BLD-MCNC: 88 MG/DL (ref 70–108)
HCT VFR BLD CALC: 31.9 % (ref 42–52)
HEMOGLOBIN: 9.4 GM/DL (ref 14–18)
MCH RBC QN AUTO: 28.3 PG (ref 26–33)
MCHC RBC AUTO-ENTMCNC: 29.5 GM/DL (ref 32.2–35.5)
MCV RBC AUTO: 96.1 FL (ref 80–94)
PLATELET # BLD: 329 THOU/MM3 (ref 130–400)
PMV BLD AUTO: 10.1 FL (ref 9.4–12.4)
POTASSIUM SERPL-SCNC: 4.6 MEQ/L (ref 3.5–5.2)
RBC # BLD: 3.32 MILL/MM3 (ref 4.7–6.1)
SODIUM BLD-SCNC: 135 MEQ/L (ref 135–145)
WBC # BLD: 13.1 THOU/MM3 (ref 4.8–10.8)

## 2022-11-14 PROCEDURE — 6360000002 HC RX W HCPCS: Performed by: THORACIC SURGERY (CARDIOTHORACIC VASCULAR SURGERY)

## 2022-11-14 PROCEDURE — 99232 SBSQ HOSP IP/OBS MODERATE 35: CPT | Performed by: INTERNAL MEDICINE

## 2022-11-14 PROCEDURE — 97535 SELF CARE MNGMENT TRAINING: CPT

## 2022-11-14 PROCEDURE — 51798 US URINE CAPACITY MEASURE: CPT

## 2022-11-14 PROCEDURE — 51702 INSERT TEMP BLADDER CATH: CPT

## 2022-11-14 PROCEDURE — 97110 THERAPEUTIC EXERCISES: CPT

## 2022-11-14 PROCEDURE — 6370000000 HC RX 637 (ALT 250 FOR IP): Performed by: THORACIC SURGERY (CARDIOTHORACIC VASCULAR SURGERY)

## 2022-11-14 PROCEDURE — 97116 GAIT TRAINING THERAPY: CPT

## 2022-11-14 PROCEDURE — 80048 BASIC METABOLIC PNL TOTAL CA: CPT

## 2022-11-14 PROCEDURE — 85027 COMPLETE CBC AUTOMATED: CPT

## 2022-11-14 PROCEDURE — 2060000000 HC ICU INTERMEDIATE R&B

## 2022-11-14 PROCEDURE — 82948 REAGENT STRIP/BLOOD GLUCOSE: CPT

## 2022-11-14 PROCEDURE — APPSS30 APP SPLIT SHARED TIME 16-30 MINUTES: Performed by: PHYSICIAN ASSISTANT

## 2022-11-14 PROCEDURE — 36415 COLL VENOUS BLD VENIPUNCTURE: CPT

## 2022-11-14 PROCEDURE — 2580000003 HC RX 258: Performed by: THORACIC SURGERY (CARDIOTHORACIC VASCULAR SURGERY)

## 2022-11-14 PROCEDURE — 97530 THERAPEUTIC ACTIVITIES: CPT

## 2022-11-14 RX ORDER — INSULIN GLARGINE 100 [IU]/ML
20 INJECTION, SOLUTION SUBCUTANEOUS 2 TIMES DAILY
Status: DISCONTINUED | OUTPATIENT
Start: 2022-11-14 | End: 2022-11-15 | Stop reason: HOSPADM

## 2022-11-14 RX ADMIN — SENNOSIDES AND DOCUSATE SODIUM 1 TABLET: 50; 8.6 TABLET ORAL at 07:44

## 2022-11-14 RX ADMIN — PANTOPRAZOLE SODIUM 40 MG: 40 TABLET, DELAYED RELEASE ORAL at 06:19

## 2022-11-14 RX ADMIN — Medication 1 TABLET: at 07:44

## 2022-11-14 RX ADMIN — ASPIRIN 325 MG: 325 TABLET, COATED ORAL at 07:44

## 2022-11-14 RX ADMIN — ENOXAPARIN SODIUM 40 MG: 100 INJECTION SUBCUTANEOUS at 07:44

## 2022-11-14 RX ADMIN — SODIUM CHLORIDE, PRESERVATIVE FREE 10 ML: 5 INJECTION INTRAVENOUS at 19:54

## 2022-11-14 RX ADMIN — INSULIN GLARGINE 20 UNITS: 100 INJECTION, SOLUTION SUBCUTANEOUS at 21:41

## 2022-11-14 RX ADMIN — INSULIN GLARGINE 10 UNITS: 100 INJECTION, SOLUTION SUBCUTANEOUS at 07:45

## 2022-11-14 RX ADMIN — SODIUM CHLORIDE, PRESERVATIVE FREE 10 ML: 5 INJECTION INTRAVENOUS at 07:44

## 2022-11-14 RX ADMIN — INSULIN LISPRO 8 UNITS: 100 INJECTION, SOLUTION INTRAVENOUS; SUBCUTANEOUS at 12:53

## 2022-11-14 RX ADMIN — ATORVASTATIN CALCIUM 40 MG: 40 TABLET, FILM COATED ORAL at 19:54

## 2022-11-14 RX ADMIN — MIDODRINE HYDROCHLORIDE 10 MG: 10 TABLET ORAL at 07:43

## 2022-11-14 RX ADMIN — SENNOSIDES AND DOCUSATE SODIUM 1 TABLET: 50; 8.6 TABLET ORAL at 19:54

## 2022-11-14 ASSESSMENT — PAIN SCALES - GENERAL
PAINLEVEL_OUTOF10: 0

## 2022-11-14 NOTE — PLAN OF CARE
Problem: Discharge Planning  Goal: Discharge to home or other facility with appropriate resources  Outcome: Progressing  Flowsheets (Taken 11/14/2022 0745)  Discharge to home or other facility with appropriate resources: Identify barriers to discharge with patient and caregiver     Problem: Skin/Tissue Integrity  Goal: Absence of new skin breakdown  Description: 1. Monitor for areas of redness and/or skin breakdown  2. Assess vascular access sites hourly  3. Every 4-6 hours minimum:  Change oxygen saturation probe site  4. Every 4-6 hours:  If on nasal continuous positive airway pressure, respiratory therapy assess nares and determine need for appliance change or resting period. Outcome: Progressing  Note: Patient's skin is appropriate for ethnicity, warm, and dry, with no new skin issues noted this shift. Mucous membranes are pink, moist, and intact.        Problem: Safety - Adult  Goal: Free from fall injury  Outcome: Progressing  Flowsheets (Taken 11/14/2022 1221)  Free From Fall Injury: Instruct family/caregiver on patient safety     Problem: Chronic Conditions and Co-morbidities  Goal: Patient's chronic conditions and co-morbidity symptoms are monitored and maintained or improved  Outcome: Progressing  Flowsheets (Taken 11/14/2022 0745)  Care Plan - Patient's Chronic Conditions and Co-Morbidity Symptoms are Monitored and Maintained or Improved: Monitor and assess patient's chronic conditions and comorbid symptoms for stability, deterioration, or improvement     Problem: Pain  Goal: Verbalizes/displays adequate comfort level or baseline comfort level  Outcome: Progressing  Flowsheets (Taken 11/14/2022 0730)  Verbalizes/displays adequate comfort level or baseline comfort level: Encourage patient to monitor pain and request assistance     Problem: Metabolic/Fluid and Electrolytes - Adult  Goal: Electrolytes maintained within normal limits  Outcome: Progressing  Flowsheets (Taken 11/14/2022 0745)  Electrolytes maintained within normal limits: Monitor labs and assess patient for signs and symptoms of electrolyte imbalances  Goal: Hemodynamic stability and optimal renal function maintained  Outcome: Progressing  Flowsheets (Taken 11/14/2022 0745)  Hemodynamic stability and optimal renal function maintained: Monitor labs and assess for signs and symptoms of volume excess or deficit  Goal: Glucose maintained within prescribed range  Outcome: Progressing  Flowsheets (Taken 11/14/2022 0745)  Glucose maintained within prescribed range: Monitor blood glucose as ordered     Problem: Hematologic - Adult  Goal: Maintains hematologic stability  Outcome: Progressing  Flowsheets (Taken 11/14/2022 0745)  Maintains hematologic stability: Assess for signs and symptoms of bleeding or hemorrhage     Problem: Nutrition Deficit:  Goal: Optimize nutritional status  Outcome: Progressing  Flowsheets (Taken 11/13/2022 0029 by Arvid Simmonds, RN)  Nutrient intake appropriate for improving, restoring, or maintaining nutritional needs:   Assess nutritional status and recommend course of action   Recommend appropriate diets, oral nutritional supplements, and vitamin/mineral supplements     Problem: ABCDS Injury Assessment  Goal: Absence of physical injury  Outcome: Progressing  Flowsheets (Taken 11/13/2022 2214 by Arvid Simmonds, RN)  Absence of Physical Injury: Implement safety measures based on patient assessment     Problem: Cardiovascular - Adult  Goal: Maintains optimal cardiac output and hemodynamic stability  Outcome: Progressing  Flowsheets (Taken 11/14/2022 0745)  Maintains optimal cardiac output and hemodynamic stability: Monitor blood pressure and heart rate     Problem: Skin/Tissue Integrity - Adult  Goal: Skin integrity remains intact  Outcome: Progressing  Flowsheets  Taken 11/14/2022 1221  Skin Integrity Remains Intact: Monitor for areas of redness and/or skin breakdown  Taken 11/14/2022 0745  Skin Integrity Remains Intact: Monitor for areas of redness and/or skin breakdown  Goal: Incisions, wounds, or drain sites healing without S/S of infection  Outcome: Progressing  Flowsheets  Taken 11/14/2022 1221  Incisions, Wounds, or Drain Sites Healing Without Sign and Symptoms of Infection: ADMISSION and DAILY: Assess and document risk factors for pressure ulcer development  Taken 11/14/2022 0745  Incisions, Wounds, or Drain Sites Healing Without Sign and Symptoms of Infection: ADMISSION and DAILY: Assess and document risk factors for pressure ulcer development     Problem: Musculoskeletal - Adult  Goal: Return mobility to safest level of function  Outcome: Progressing  Flowsheets (Taken 11/14/2022 0745)  Return Mobility to Safest Level of Function: Assess patient stability and activity tolerance for standing, transferring and ambulating with or without assistive devices     Problem: Gastrointestinal - Adult  Goal: Maintains or returns to baseline bowel function  Outcome: Progressing  Flowsheets (Taken 11/14/2022 0745)  Maintains or returns to baseline bowel function: Assess bowel function     Problem: Genitourinary - Adult  Goal: Absence of urinary retention  Outcome: Progressing  Flowsheets (Taken 11/14/2022 0745)  Absence of urinary retention: Assess patients ability to void and empty bladder     Problem: Infection - Adult  Goal: Absence of infection during hospitalization  Outcome: Progressing  Flowsheets  Taken 11/14/2022 1221  Absence of infection during hospitalization: Assess and monitor for signs and symptoms of infection  Taken 11/14/2022 0745  Absence of infection during hospitalization: Assess and monitor for signs and symptoms of infection       Care plan reviewed with patient and family. Patient and family verbalize understanding of the plan of care and contribute to goal setting.

## 2022-11-14 NOTE — PROGRESS NOTES
[facility]   Nephrology progress note  11/14/2022, 10:00 AM      Pt Name:    Jeffrey Garcia  MRN:     [id number]     YOB: 1960  Admit Date:    10/28/2022  9:39 PM    Chief Complaint: Nephrology following for LC/CKD. Subjective:  Patient seen and examined  Feels okay denies any complaints  Some chest pain but no significant shortness of breath  Blood pressure still fluctuating but better    Objective:  24HR INTAKE/OUTPUT:    Intake/Output Summary (Last 24 hours) at 11/14/2022 1000  Last data filed at 11/14/2022 0930  Gross per 24 hour   Intake 400 ml   Output 3550 ml   Net -3150 ml        Admission weight: 150 lb (68 kg)  Wt Readings from Last 3 Encounters:   11/14/22 138 lb 8 oz (62.8 kg)   10/26/22 151 lb (68.5 kg)   10/12/22 140 lb 9.6 oz (63.8 kg)        Vitals :   Vitals:    11/13/22 2308 11/14/22 0319 11/14/22 0457 11/14/22 0730   BP: (!) 106/48 126/76  134/62   Pulse: 80 87  87   Resp: 16 18  16   Temp: 98.7 °F (37.1 °C) 98 °F (36.7 °C)  97.2 °F (36.2 °C)   TempSrc: Oral Oral  Oral   SpO2: 96% 98%  99%   Weight:   138 lb 8 oz (62.8 kg)    Height:           Physical examination  General Appearance:  Well developed.  No distress  Mouth/Throat:  Oral mucosa moist  Neck:  Supple, no JVD  Lungs:  Breath sounds: clear, diminished slightly  Heart[de-identified]  S1,S2 heard  Abdomen:  Soft, non - tender  Musculoskeletal:  Edema -no edema    Medications:  Infusion:    dextrose      dextrose      sodium chloride         Meds:    insulin glargine  10 Units SubCUTAneous BID    midodrine  10 mg Oral TID WC    insulin lispro  0-16 Units SubCUTAneous TID WC    insulin lispro  0-4 Units SubCUTAneous Nightly    pantoprazole  40 mg Oral QAM AC    sodium chloride flush  5-40 mL IntraVENous 2 times per day    enoxaparin  40 mg SubCUTAneous Daily    aspirin  325 mg Oral Daily    multivitamin  1 tablet Oral Daily with breakfast    sennosides-docusate sodium  1 tablet Oral BID    atorvastatin  40 mg Oral Nightly       Lab Data :  CBC:   Recent Labs     11/12/22  0933 11/13/22  0436 11/14/22  0426   WBC 12.6* 11.9* 13.1*   HGB 8.8* 9.0* 9.4*   HCT 27.1* 27.7* 31.9*    297 329       CMP:  Recent Labs     11/12/22  0933 11/13/22  0436 11/14/22  0426   * 134* 135   K 4.5 4.3 4.6   CL 98 101 100   CO2 24 24 24   BUN 24* 24* 22   CREATININE 1.2 1.2 1.1   GLUCOSE 165* 109* 130*   CALCIUM 8.3* 8.4* 8.7       Hepatic:   Recent Labs     11/12/22 0933   LABALBU 3.3*           Assessment and Plan:  Renal -acute kidney injury, on chronic kidney disease stage III  This appears most likely due to the use of diuretics ACE inhibitor and primarily due to contrast exposure on the day of his admission. Overall improved currently down to 1.1 having a lot of urine output   Electrolytes -within normal limits  Essential hypertension now running borderline, on midodrine  Hx of diabetes mellitus apparently his A1c was 15.0,, 3 months ago  New onset of acute congestive heart failure systolic stable off lassix  Anemia status post CABG doing better  CAD status post CABG 11/7/2022  Urinary retention -status post reinsertion of the [place]. Seen by urology  Meds reviewed. Discussed with patient     Overall renal issues resolved will sign off please call with any questions or concerns or if situation changes. Please have the patient follow-up with me in my office in 1 month with a BMP prior    Isabella Conroy MD  [facility]    This report has been created using voice recognition software.  It may contain minor errors which are inherent in voice recognition technology

## 2022-11-14 NOTE — FLOWSHEET NOTE
Denise Castro was evaluated today and a DME order was entered for a wheeled walker because he requires this to successfully complete daily living tasks of toileting, personal cares, and ambulating. A wheeled walker is necessary due to the patient's unsteady gait, upper body weakness, and inability to  an ambulation device; and he can ambulate only by pushing a walker instead of a lesser assistive device such as a cane, crutch, or standard walker. The need for this equipment was discussed with the patient and he understands and is in agreement.

## 2022-11-14 NOTE — PLAN OF CARE
Problem: Discharge Planning  Goal: Discharge to home or other facility with appropriate resources  Outcome: Progressing  Flowsheets  Taken 11/13/2022 2214 by Mimi Baez RN  Discharge to home or other facility with appropriate resources:   Identify barriers to discharge with patient and caregiver   Arrange for needed discharge resources and transportation as appropriate   Identify discharge learning needs (meds, wound care, etc)  Taken 11/13/2022 2008 by Mimi Baez RN  Discharge to home or other facility with appropriate resources:   Identify barriers to discharge with patient and caregiver   Arrange for needed discharge resources and transportation as appropriate   Identify discharge learning needs (meds, wound care, etc)  Taken 11/13/2022 0855 by Esdras Ruth RN  Discharge to home or other facility with appropriate resources:   Identify barriers to discharge with patient and caregiver   Arrange for needed discharge resources and transportation as appropriate   Identify discharge learning needs (meds, wound care, etc)   Refer to discharge planning if patient needs post-hospital services based on physician order or complex needs related to functional status, cognitive ability or social support system     Problem: Skin/Tissue Integrity  Goal: Absence of new skin breakdown  Description: 1. Monitor for areas of redness and/or skin breakdown  2. Assess vascular access sites hourly  3. Every 4-6 hours minimum:  Change oxygen saturation probe site  4. Every 4-6 hours:  If on nasal continuous positive airway pressure, respiratory therapy assess nares and determine need for appliance change or resting period. Outcome: Progressing  Note: No s/s of new skin breakdown. Will continue to monitor.       Problem: Safety - Adult  Goal: Free from fall injury  Outcome: Progressing  Flowsheets (Taken 11/13/2022 2214)  Free From Fall Injury:   Instruct family/caregiver on patient safety   Based on caregiver fall risk screen, instruct family/caregiver to ask for assistance with transferring infant if caregiver noted to have fall risk factors     Problem: Chronic Conditions and Co-morbidities  Goal: Patient's chronic conditions and co-morbidity symptoms are monitored and maintained or improved  Outcome: Progressing  Flowsheets  Taken 11/13/2022 2008 by Amaris Mayorga 34 - Patient's Chronic Conditions and Co-Morbidity Symptoms are Monitored and Maintained or Improved:   Monitor and assess patient's chronic conditions and comorbid symptoms for stability, deterioration, or improvement   Collaborate with multidisciplinary team to address chronic and comorbid conditions and prevent exacerbation or deterioration   Update acute care plan with appropriate goals if chronic or comorbid symptoms are exacerbated and prevent overall improvement and discharge  Taken 11/13/2022 0855 by Amaris Oliva 34 - Patient's Chronic Conditions and Co-Morbidity Symptoms are Monitored and Maintained or Improved:   Monitor and assess patient's chronic conditions and comorbid symptoms for stability, deterioration, or improvement   Collaborate with multidisciplinary team to address chronic and comorbid conditions and prevent exacerbation or deterioration   Update acute care plan with appropriate goals if chronic or comorbid symptoms are exacerbated and prevent overall improvement and discharge     Problem: Pain  Goal: Verbalizes/displays adequate comfort level or baseline comfort level  Outcome: Progressing  Flowsheets (Taken 11/13/2022 2214)  Verbalizes/displays adequate comfort level or baseline comfort level:   Encourage patient to monitor pain and request assistance   Assess pain using appropriate pain scale   Administer analgesics based on type and severity of pain and evaluate response   Implement non-pharmacological measures as appropriate and evaluate response   Consider cultural and social influences on pain and pain management   Notify Licensed Independent Practitioner if interventions unsuccessful or patient reports new pain     Problem: Metabolic/Fluid and Electrolytes - Adult  Goal: Electrolytes maintained within normal limits  Outcome: Progressing  Flowsheets  Taken 11/13/2022 2008 by Esperanza Beth RN  Electrolytes maintained within normal limits:   Monitor labs and assess patient for signs and symptoms of electrolyte imbalances   Administer electrolyte replacement as ordered   Monitor response to electrolyte replacements, including repeat lab results as appropriate  Taken 11/13/2022 0855 by Rick Cardozo RN  Electrolytes maintained within normal limits: Monitor labs and assess patient for signs and symptoms of electrolyte imbalances     Problem: Metabolic/Fluid and Electrolytes - Adult  Goal: Hemodynamic stability and optimal renal function maintained  Outcome: Progressing  Flowsheets  Taken 11/13/2022 2008 by Esperanza Beth RN  Hemodynamic stability and optimal renal function maintained:   Monitor labs and assess for signs and symptoms of volume excess or deficit   Monitor intake, output and patient weight   Monitor urine specific gravity, serum osmolarity and serum sodium as indicated or ordered  Taken 11/13/2022 0855 by Rick Cardozo RN  Hemodynamic stability and optimal renal function maintained: Monitor labs and assess for signs and symptoms of volume excess or deficit     Problem: Metabolic/Fluid and Electrolytes - Adult  Goal: Glucose maintained within prescribed range  Outcome: Progressing  Flowsheets  Taken 11/13/2022 2214 by Esperanza Beth RN  Glucose maintained within prescribed range:   Monitor blood glucose as ordered   Assess for signs and symptoms of hyperglycemia and hypoglycemia   Administer ordered medications to maintain glucose within target range   Assess barriers to adequate nutritional intake and initiate nutrition consult as needed   Instruct patient on self management of diabetes and initiate consult as needed  Taken 11/13/2022 2008 by Devyn Dial RN  Glucose maintained within prescribed range:   Monitor blood glucose as ordered   Assess for signs and symptoms of hyperglycemia and hypoglycemia   Administer ordered medications to maintain glucose within target range  Taken 11/13/2022 0855 by Alejandrina Rodriguez RN  Glucose maintained within prescribed range: Monitor blood glucose as ordered     Problem: Hematologic - Adult  Goal: Maintains hematologic stability  Outcome: Progressing  Flowsheets  Taken 11/13/2022 2008 by Devyn Dial RN  Maintains hematologic stability:   Assess for signs and symptoms of bleeding or hemorrhage   Monitor labs for bleeding or clotting disorders   Administer blood products/factors as ordered  Taken 11/13/2022 0855 by Alejandrina Rodriguez RN  Maintains hematologic stability:   Assess for signs and symptoms of bleeding or hemorrhage   Monitor labs for bleeding or clotting disorders   Administer blood products/factors as ordered     Problem: ABCDS Injury Assessment  Goal: Absence of physical injury  Outcome: Progressing  Flowsheets (Taken 11/13/2022 2214)  Absence of Physical Injury: Implement safety measures based on patient assessment     Problem: Cardiovascular - Adult  Goal: Maintains optimal cardiac output and hemodynamic stability  Outcome: Progressing  Flowsheets  Taken 11/13/2022 2214 by Devyn Dial RN  Maintains optimal cardiac output and hemodynamic stability:   Monitor blood pressure and heart rate   Monitor urine output and notify Licensed Independent Practitioner for values outside of normal range   Assess for signs of decreased cardiac output   Administer fluid and/or volume expanders as ordered   Administer vasoactive medications as ordered  Taken 11/13/2022 2008 by Devyn Dial RN  Maintains optimal cardiac output and hemodynamic stability:   Monitor blood pressure and heart rate   Monitor urine output and notify Licensed Independent Practitioner for values outside of normal range   Assess for signs of decreased cardiac output  Taken 11/13/2022 0855 by Negrita Bean RN  Maintains optimal cardiac output and hemodynamic stability: Monitor blood pressure and heart rate     Problem: Skin/Tissue Integrity - Adult  Goal: Skin integrity remains intact  Outcome: Progressing  Flowsheets  Taken 11/13/2022 2214 by Almas Penn RN  Skin Integrity Remains Intact: Monitor for areas of redness and/or skin breakdown  Taken 11/13/2022 2008 by Almas Penn RN  Skin Integrity Remains Intact: Monitor for areas of redness and/or skin breakdown  Taken 11/13/2022 0855 by Negrita Bean RN  Skin Integrity Remains Intact: Monitor for areas of redness and/or skin breakdown     Problem: Skin/Tissue Integrity - Adult  Goal: Incisions, wounds, or drain sites healing without S/S of infection  Outcome: Progressing  Flowsheets  Taken 11/13/2022 2214 by Almas Penn RN  Incisions, Wounds, or Drain Sites Healing Without Sign and Symptoms of Infection: ADMISSION and DAILY: Assess and document risk factors for pressure ulcer development  Taken 11/13/2022 2008 by Almas Penn RN  Incisions, Wounds, or Drain Sites Healing Without Sign and Symptoms of Infection: ADMISSION and DAILY: Assess and document risk factors for pressure ulcer development  Taken 11/13/2022 0855 by Negrita Bean RN  Incisions, Wounds, or Drain Sites Healing Without Sign and Symptoms of Infection: ADMISSION and DAILY: Assess and document risk factors for pressure ulcer development     Problem: Musculoskeletal - Adult  Goal: Return mobility to safest level of function  Outcome: Progressing  Flowsheets  Taken 11/13/2022 2008 by Almas Penn RN  Return Mobility to Safest Level of Function:   Assess patient stability and activity tolerance for standing, transferring and ambulating with or without assistive devices   Assist with transfers and ambulation using safe patient handling equipment as needed   Ensure adequate protection for wounds/incisions during mobilization  Taken 11/13/2022 0855 by Esdras Ruth RN  Return Mobility to Safest Level of Function: Assess patient stability and activity tolerance for standing, transferring and ambulating with or without assistive devices     Problem: Gastrointestinal - Adult  Goal: Maintains or returns to baseline bowel function  Outcome: Progressing  Flowsheets  Taken 11/13/2022 2008 by Mimi Baez RN  Maintains or returns to baseline bowel function:   Assess bowel function   Encourage oral fluids to ensure adequate hydration   Administer IV fluids as ordered to ensure adequate hydration   Administer ordered medications as needed   Encourage mobilization and activity  Taken 11/13/2022 0855 by Esdras Ruth [address] [place] [address] or returns to baseline bowel function: Assess bowel function     Problem: Genitourinary - Adult  Goal: Absence of urinary retention  Outcome: Progressing  Flowsheets  Taken 11/13/2022 2008 by Mimi Baez RN  Absence of urinary retention:   Assess patients ability to void and empty bladder   Monitor intake/output and perform bladder scan as needed   Place urinary catheter per Licensed Independent Practitioner order if needed  Taken 11/13/2022 0855 by Esdras Ruth RN  Absence of urinary retention: Assess patients ability to void and empty bladder     Problem: Genitourinary - Adult  Goal: Urinary catheter remains patent  Outcome: Progressing  Flowsheets  Taken 11/13/2022 2008 by Mimi Baez RN  Urinary catheter remains patent:   Assess patency of urinary catheter   Irrigate catheter per Licensed Independent Practitioner order if indicated and notify Licensed Independent Practitioner if unable to irrigate   Assess need for a larger catheter size or a 3-way catheter for continuous bladder irrigation  Taken 11/13/2022 0855 by Esdras Ruth RN  Urinary catheter remains patent: Assess patency of urinary catheter     Problem: Infection - Adult  Goal: Absence of infection during hospitalization  Outcome: Progressing  Flowsheets  Taken 11/13/2022 2008 by Kalyan Teague RN  Absence of infection during hospitalization:   Assess and monitor for signs and symptoms of infection   Monitor lab/diagnostic results   Monitor all insertion sites i.e., indwelling lines, tubes and drains  Taken 11/13/2022 0855 by Marie Estrada RN  Absence of infection during hospitalization: Assess and monitor for signs and symptoms of infection    Care plan reviewed with patient. Patient verbalize understanding of the plan of care and contribute to goal setting.

## 2022-11-14 NOTE — PROGRESS NOTES
CT/CV Surgery Progress Note    2022 7:56 AM  Surgeon:  Dr. Evelyn Arcos: Mr. Steven Luo is resting comfortably in bed, alert, and in no acute distress. Pt denies chest pressure, SOB, fever,chills, N/V/D. I/O last 3 completed shifts: In: 1000 [P.O.:1000]  Out: 5125 [Urine:5125]    Vital Signs: /62   Pulse 87   Temp 97.2 °F (36.2 °C) (Oral)   Resp 16   Ht 5' 10\" (1.778 m)   Wt 138 lb 8 oz (62.8 kg)   SpO2 99%   BMI 19.87 kg/m²    Temp (24hrs), Av.2 °F (36.8 °C), Min:97.2 °F (36.2 °C), Max:98.7 °F (37.1 °C)    Labs:   CBC:   Recent Labs     22  0933 22  0436 22  0426   WBC 12.6* 11.9* 13.1*   HGB 8.8* 9.0* 9.4*   HCT 27.1* 27.7* 31.9*   MCV 88.9 86.0 96.1*    297 329     BMP:   Recent Labs     22  0933 22  1120 22  0436 22  0738 22  0426 22  0718   *  --  134*  --  135  --    K 4.5  --  4.3  --  4.6  --    CL 98  --  101  --  100  --    CO2 24  --  24  --  24  --    BUN 24*  --  24*  --  22  --    CREATININE 1.2  --  1.2  --  1.1  --    POCGLU  --    < >  --    < >  --  130*    < > = values in this interval not displayed. Intake/Output Summary (Last 24 hours) at 2022 2295  Last data filed at 2022 0319  Gross per 24 hour   Intake 640 ml   Output 3050 ml   Net -2410 ml       Scheduled Meds:    insulin glargine  10 Units SubCUTAneous BID    midodrine  10 mg Oral TID WC    insulin lispro  0-16 Units SubCUTAneous TID WC    insulin lispro  0-4 Units SubCUTAneous Nightly    pantoprazole  40 mg Oral QAM AC    sodium chloride flush  5-40 mL IntraVENous 2 times per day    enoxaparin  40 mg SubCUTAneous Daily    aspirin  325 mg Oral Daily    multivitamin  1 tablet Oral Daily with breakfast    sennosides-docusate sodium  1 tablet Oral BID    atorvastatin  40 mg Oral Nightly     ROS: All neg unless specifically mentioned in subjective section.      Exam:  General Appearance: alert ,conversing, in no acute distress  Cardiovascular: normal rate, regular rhythm, normal S1 and S2, no murmurs, rubs, clicks, or gallops  Pulmonary/Chest: clear to auscultation bilaterally- no wheezes, rales or rhonchi, normal air movement, no respiratory distress  Neurological: alert, oriented, normal speech, no focal findings or movement disorder noted  Sternum: Incision healing appropriately and no wound dehiscence noted.      Assessment:   Patient Active Problem List   Diagnosis    Syncope    Facial injury    Tobacco abuse    Cranial facial fractures (HCC)    Diabetes mellitus type 2 in nonobese (HCC)    Fungal toenail infection    Augmentix's elbow    Medical non-compliance    Erectile dysfunction    NAVARRO (generalized anxiety disorder)    Impacted cerumen of right ear    Essential hypertension    Uncontrolled type 2 diabetes mellitus with hyperglycemia ([address]    Thoracic arthritis    New onset of congestive heart failure (HCC)    Acute systolic congestive heart failure (HCC)    Nonischemic cardiomyopathy (HCC)    Preoperative clearance    Stage 3a chronic kidney disease (HCC)    Tremor    LC (acute kidney injury) ([address]    Hyperlipidemia    Gastroesophageal reflux disease    S/P cardiac cath    CAD, multiple vessel    Ischemic cardiomyopathy    ETOH abuse    S/P CABG x 2     Plan:   CXR reviewed- Continue daily CXR's   Continue current therapy    The plan of care was discussed in detail with Dr. Melisa Tinoco     Electronically signed by Leelee Baron PA-C on 11/14/2022 at 7:56 AM

## 2022-11-14 NOTE — CARE COORDINATION
11/14/22, 2:57 PM EST    DISCHARGE ON GOING EVALUATION    [facility] day: 16  Location: -17/017-A Reason for admit: Elevated troponin [Z92.3]  Acute systolic congestive heart failure ([address] [I50.21]  New onset of congestive heart failure ([address] [I50.9]   Procedure:   10/28 CTA Chest: No pulmonary embolism. Moderate pulmonary edema. Interval worsening since the prior study on 10/5/22  10/29 Echo with EF 25-30%; severe global hypokinesis of LV  10/31 Renal US: Bilateral hydronephrosis; Markedly distended urinary bladder with a large amount of postvoid residual urine  11/1 Cardiac Cath: MVD  11/2 CT Abd/pelvis: Mild bilateral hydronephrosis. There is bilateral renal contrast excretion   from prior contrast administration limiting evaluation for renal stones. Marked mural thickening of the urinary bladder. The differential diagnosis includes cystitis and neoplasm. Moderate amount of retained stool. Partially visualized lower thorax shows moderate sized bilateral pleural effusions with atelectasis. 11/4 PET Myocardial viability: Absent activity at the anterior left ventricular wall extending into the apex, likely nonviable myocardium  11/7 Vein mapping  11/7 2v CABG; placement of IABP  11/7 Intubated - 11/7 Extubated  11/7 IABP removed  11/10 Chest tubes and pacer wires removed  11/13 CXR:   Possible small left pleural effusion. Atelectasis or consolidation left lower lung     Barriers to Discharge: POD #7. BP better today. +BM. Afebrile. NSR. On room air. Ox4. Follows commands. CR/PT/OT. Dietitian consulted. Dietary ileus prevention protocol. CVS, Cardiology, and Nephrology following. Telemetry, I&O, daily weight, IS, sternal precautions, wound care, SCDs, ambulate. Asa, lipitor, lovenox, lantus, SSI, prn roxicodone, protonix, electrolyte replacement protocols. WBC 13.1, hgb 9.4.      PCP: BLACK Talbert CNP  Readmission Risk Score: 18.1%  Patient Goals/Plan/Treatment Preferences: Home with wife and [facility]. Life Vest here. RW ordered. SW on case.

## 2022-11-14 NOTE — PROGRESS NOTES
[address] [address]  INPATIENT PHYSICAL THERAPY  DAILY NOTE  [facility] ICU STEPDOWN TELEMETRY 4K - 4K-17/017-A    Time In: 4565  Time Out: 1415  Timed Code Treatment Minutes: 52 Minutes  Minutes: 47          Date: 2022  Patient Name: Diamond Jose,  Gender:  male        MRN: [id number]  : 1960  (63 y.o.)     Referring Practitioner: Feliciano Lassiter MD  Diagnosis: Elevated troponin  Additional Pertinent Hx: 63 y.o. diabetic male, extensive previous history of EtOH, current smoker, presents with chronic progressive dyspnea, worsening over past 2 months. Patient denies chest pain/pressure; no peripheral edema, presyncope, orthopnea. Large pleural effusions seen on admission imaging prompted echo, showing LVEF 25%; subsequent cath shows 2-v CAD. Pt is s/p CABG x 2 . Prior Level of Function:  Lives With: Spouse  Type of Home: House  Home Layout: One level  Home Access: Stairs to enter with rails  Entrance Stairs - Number of Steps: 3  Home Equipment: Shon Chandler, rolling   Bathroom Shower/Tub: Tub/Shower unit  Bathroom Toilet: Standard  Bathroom Equipment: Shower chair (wife getting HHS)    ADL Assistance: Independent  Homemaking Assistance: Independent  Ambulation Assistance: Independent  Transfer Assistance: Independent  Active : No  Additional Comments: Wife works during the day    Restrictions/Precautions:  Restrictions/Precautions: Surgical Protocols, Fall Risk  Position Activity Restriction  Sternal Precautions: No Pushing, No Pulling, 10# Lifting Restrictions  Other position/activity restrictions: monitor ortho, life vest on      SUBJECTIVE: RN approved session stating they had stopped pts midodrin today and BPs have been better/not dizzy. Pt in bed with wife present and cooperative.     PAIN: 0/10: denies    Vitals: Orthostatic Blood Pressure: Supine: 100/56 MAP 69, Sittin/56 MAP 69, Standin/56 MAP66  Oxygen: WFL  Heart Rate: 88    OBJECTIVE:  Bed Mobility:  Rolling to Right: Minimal Assistance, with verbal cues , with increased time for completion, pt needing cues to avoid using UEs per restrictions   Supine to Sit: Minimal Assistance, with verbal cues , with increased time for completion, ciues for restrictions  Sit to Supine: Minimal Assistance, with verbal cues , with increased time for completion   Scooting: Moderate Assistance, to scoot to EOB in seated position    Transfers:  Sit to Stand: Minimal Assistance, with increased time for completion, cues for hand placement, with verbal cues, cues for hand placement due to restrictions  Stand to [place] [address], cues for hand placement, with verbal cues    Ambulation:  Contact Guard Assistance, Minimal Assistance, with cues for safety, with verbal cues , with increased time for completion  Distance: ~50ft  Surface: Level Tile  Device:Rolling Walker  Gait Deviations: Forward Flexed Posture, Slow Jada, Decreased Step Length Bilaterally, Decreased Gait Speed, Decreased Heel Strike Bilaterally, Mild Path Deviations, Unsteady Gait, Decreased Terminal Knee Extension, and mult mild LOBs, pt unsteady and shaky on RLE, cues to sytay within RW frame    Balance:  Pt sat EOB ~2 mins for BP check with gerber UE support. Pt also stood with UE support at AD ~2 mins. Pt demos 0 LOBs. Pt asymptomatic. Exercise:  Patient was guided in 1 set(s) 10 reps of exercise to both upper and lower extremities. Seated CABG therex . Exercises were completed for increased independence with functional mobility. Functional Outcome Measures: Completed  AM-PAC Inpatient Mobility without Stair Climbing Raw Score : 15  AM-PAC Inpatient without Stair Climbing T-Scale Score : 43.03    ASSESSMENT:  Assessment: Patient progressing toward established goals. Activity Tolerance:  Patient tolerance of  treatment: good. Pt demos decreased strength, endurance, and independence with mobility.  Pt unsteady on feet and requires hands on assist for safety with mobility. Pt will benefit from cont PT at this time to ensure safety, to decrease the risk for falls and to return to PLOF. Equipment Recommendations:Equipment Needed: No  Discharge Recommendations: 24 hour assistance or supervision, Home with Home Health PT, and recommend initial 24 hour S/A to enssuresafety and establish a routine  Plan: Current Treatment Recommendations: Strengthening, Balance training, Functional mobility training, Transfer training, Endurance training, Neuromuscular re-education, Gait training, Stair training, Patient/Caregiver education & training, Therapeutic activities, Safety education & training, Home exercise program  General Plan:  (6x CABG)    Patient Education  Patient Education: Plan of Care, Home Exercise Program, Precautions/Restrictions, Bed Mobility, Equipment Education, Transfers, Gait, Stairs, Car Transfers, RPE Scale, Verbal Exercise Instruction, Education Related to Potential Risks and Complications Due to Impairment/Illness/Injury, Health Promotion and Wellness Education, Home Safety Education, Heart Booklet, Activity Pacing    Goals:  Patient Goals : to go home  Short Term Goals  Time Frame for Short Term Goals: by discharge  Short Term Goal 1: Pt to transfer supine <--> sit S to enable pt to get in/out of bed. Short Term Goal 2: Pt to transfer sit <--> stand S for increased functional mobility. Short Term Goal 3: Pt to ambulate >250 feet without AD SBA for household ambulation. Short Term Goal 4: Pt to ascend/descend 3 steps with HR SBA for home entry. Long Term Goals  Time Frame for Long Term Goals : NA due to short length of stay. Following session, patient left in safe position with all fall risk precautions in place.

## 2022-11-14 NOTE — DISCHARGE INSTRUCTIONS
Get a BMP (Blood test) done prior to appointment with the nephrologist (Kidney doctor)      Discharge Instructions [facility]  [facility]  Pt Name: Manisha Chester  Medical Record Number: [id number]  Today's Date: 11/15/2022    ACTIVITY/EXERCISE   ? It will take 2 to 3 months to feel like you did before surgery in terms of energy and strength. ?    Slowly increase your activity after you go home. Pace yourself, listen to your body. If it hurts, stop. During the first 2 months, no digging, outside bike riding, or using arm movement on  a stationary bike for sternal precautions. ?   The limit on how much you can lift, push or pull is 10 pounds. For the first 4 weeks after surgery, you must not lift anything over 10 pounds. (You cannot lift anything heavier than a gallon of  milk). Beginning the 5th week after surgery, you may lift, push or pull up to 20 pounds. ? Begin your walking program on the first day you are home and record how many times per day and number of minutes on your log. You may climb stairs; there are no limits to stair climbing. ? Continue to do your cough and deep breathing exercises and the incentive spirometer 6 times a day as you did in the hospital.   ?    Do not use arms to get up from a seated position. Instead, rock in your chair to give yourself momentum to sit up.   ?    You may begin light house hold chores, washing a few dishes, dusting, setting the table or folding laundry. ?    You can have sex when it is comfortable for you. The amount of stress on the heart during sex is about the same as climbing 2 flights of stairs. APPETITE   ? Your appetite might be decreased at first.  It should slowly improve. ? Small, frequent meals, as well as cold foods, may help. ?   The dietitian will assist you with a low fat, low cholesterol, low salt diet. ?    Consult your open heart binder for diet instructions. TEMPERATURE   ? Take your temperature at the same time each day. Take your temperature at 8 a.m. (morning) and 8 p.m. (evening). WEIGHT   ? Weigh yourself when you awaken in the morning and record in your daily log. PAIN   ? You may have pain at the incision. Shoulder, neck, back and upper chest discomfort are common. Take your pain medications as ordered. ?   You may use a heating pad on your neck and shoulders if you have soreness. Never place it on your incision. BOWEL HABITS   ? Constipation is common due to Pain medications and inactivity. ?   Try drinking prune juice, eating a well balanced diet including fruits, vegetables and whole grains. ?   If constipation persists, you may use any over-the-counter laxative, suppository or enema. DRIVING AND RIDING IN A CAR INSTRUCTIONS  ? You may ride in a car, NO DRIVING until seen by your surgeon. ? Your surgeon will tell you when you can resume driving at your postoperative office visit, normally 4 weeks after you are discharged, so he can check your sternal incision. ? No DRIVING while on Prescription pain medication! ? You should always wear the seat belt. It is OK to sit in the front passenger seat. If you are in an accident that causes the air bag to go off. ..it is better to have the seat belt on and the air bag to protect yourself. INCISIONS  ? Warning signs of infection: redness, warmth to touch, green colored pus, tender to touch. Notify surgeon's office right away if any warnings occur. ?   Ok to shower daily, no tub baths for the first month following procedure. ?   Be sure to rinse the incision with water and pat dry with clean towels. ?   Wash your hands before beginning to clean your incisions. ?   Wash each of your incisions with Exidine soap and water 2 times a day using a clean wash cloth and towel for each incision each time. Carefully pat incision dry with a towel.    ? Female patients, wear a bra 24 hours/day for 2 weeks. Change your bra daily. You may place a gauze between the breasts to reduce moisture. ?   Sutures may be removed by any health-care professional after 7 days from DAV/chest tube removal.    SMOKING   If you smoke, STOP. Smoking will cause early graft closure, other blockages, new heart attacks, and possibly even death. SLEEPING   ? If you have trouble sleeping during the night, take your pain medication at bedtime. SUPPORT STOCKINGS   ? Wear at home for 1 month and when the swelling in your legs go away. Take them off at night when you go to bed. ?   A family member needs to put them on you, it takes more than 10 pounds of pressure to put them on.   ? Hand or machine wash. Line dry. SWELLING OF LEGS   ? It is common to have leg swelling, especially if you have a leg incision. ? It is helpful to keep your legs elevated when you are sitting or lie down and elevate your legs on pillows. AWARENESS OF HEART BEATING   ? You may feel your heart is beating stronger or that your heart is beating in your neck and ears. DON'T WORRY - this is common especially at bedtime. VIDEO   ? This is a video link to watch about discharge. Type into computer and you will be able to watch them. [email or web address]            CALL YOUR SURGEON IF YOU HAVE:   ? Rapid heart rate or fluttering in your chest   ? Fever over 101° F.   ? Weight gain or loss of 3 pounds overnight or 5      pounds in one week   ? Persistent nausea or vomiting        ? ANY NEW STERNAL DRAINAGE   ? Excessive LEG drainage or very red incision   ? Increasing shortness of breath   ? Pain unrelieved by prescribed medication    OFFICE VISITS   ? BRING YOUR MEDICATION LIST and medications even over the counter medications to all your follow up appointments.    ?    You should have a follow up appointment in the office in about 1 month after discharge from the hospital.   Office Location:   [address] [facility], [facility] [address]   ? You may call the office to make an appointment, if you don't have an appointment. ([phone]). ? You will need to have a chest xray and labs completed 3-7 days before your follow up appointment. ?    Bring any questions you have so they may be addressed. ? You should have a follow up appointment with your primary care doctor 7-10 days from discharge, please call and make one if you do not have one.   ?   You should have a  follow up appointment with your Cardiologist 5-6 weeks from discharge, please call and make one if you do not have one.   ?   Cardiac Rehab will set up a follow up time for you to begin your rehabilitation program.         EMERGENCIES   ? You should either call 911 or go to the Emergency Room to be evaluated if you need seen immediately. ?  Sudden, severe shortness of breath go to the Emergency Room. If you have questions regarding these instructions, please call our office  [phone]. We have an answering service 24 hours a day to get your calls to the ON Call Physician, but we are often in surgery and it takes us awhile to get back to you.

## 2022-11-14 NOTE — PROGRESS NOTES
[address]  ICU STEPDOWN TELEMETRY 4K  Occupational Therapy  Daily Note  Time:   Time In: 0800  Time Out: 0825  Timed Code Treatment Minutes: 25 Minutes  Minutes: 25          Date: 2022  Patient Name: Ari Da Silva,   Gender: male      Room: [facility]17/017-A  MRN: [id number]  : 1960  (63 y.o.)  Referring Practitioner: Jillian Mcwilliams MD  Diagnosis: elevated troponin  Additional Pertinent Hx: Per EMR:  63 y.o. diabetic male, extensive previous history of EtOH, current smoker, presents with chronic progressive dyspnea, worsening over past 2 months. Patient denies chest pain/pressure; no peripheral edema, presyncope, orthopnea. Pt admitted on 10/28/22. Large pleural effusions seen on admission imaging prompted echo, showing LVEF 25%; subsequent cath shows 2-v CAD. Pt s/p CABG X2 and insertion of IABP on 22. Restrictions/Precautions:  Restrictions/Precautions: Surgical Protocols, Fall Risk  Position Activity Restriction  Sternal Precautions: No Pushing, No Pulling, 10# Lifting Restrictions  Other position/activity restrictions: monitor ortho, life vest on      SUBJECTIVE: Nurse approved Tx. Pt sitting in chair upon arrival. Pt agreeable to Tx Pt donning LIFE VEST this day. PAIN: does not rate pain     Vitals: Vitals not assessed per clinical judgement, see nursing flowsheet    COGNITION: WNL and Decreased Insight    ADL:   Grooming: Contact Guard Assistance. Ambulated to bathroom and engaged at sink with oral care, cues for walker safety and use  . BALANCE:  Sitting Balance:  Stand By Assistance. Supported   Standing Balance: [employer]. Demo'd slight unsteadiness. Educated on RODNEY  and demo'd heel walking, no toe off. Pt states \" I've walked that way for years. \"     BED MOBILITY:  Not Tested    TRANSFERS:  Sit to Stand:  Contact Guard Assistance. Adhere to precautions   Stand to Sit: Contact Guard Assistance.  Adhere to precautions     FUNCTIONAL MOBILITY:  Assistive Device: Rolling Walker  Assist Level:  Contact Guard Assistance. Distance: To and from bathroom  Cues for RODNEY and heal to toe walking demo'd slight unsteadiness, educated on fall risk     ADDITIONAL ACTIVITIES:  Engaged in seated CABG I and CABG II Ex's, 1x10 each to increase strength and activity tolerance for self care tasks and transferring. ASSESSMENT:     Activity Tolerance:  Patient tolerance of  treatment: good. Discharge Recommendations: Home with Home Health OT  Equipment Recommendations: Other: may benefit from [facility] to place over toilet at home  Plan: Times Per Week: 6x  Current Treatment Recommendations: ROM, Balance training, Functional mobility training, Endurance training, Safety education & training, Patient/Caregiver education & training, Equipment evaluation, education, & procurement, Self-Care / ADL    Patient Education  Patient Education: Role of OT, Plan of Care, and Precautions    Goals  Short Term Goals  Time Frame for Short Term Goals: by discharge  Short Term Goal 1: Pt will increase activity tolerance for functional mobility to/from bathroom, progressing to household distances as able, with supervision in prep for ADL completion. Short Term Goal 2: Pt will complete functional transfers with supervision in prep for toilet/shower transfers. Short Term Goal 3: Pt will complete BADL tasks with supervision to increase independence with self care tasks. Short Term Goal 4: Pt will tolerate standing >5 minutes with supervision in prep for sinkside grooming tasks. Following session, patient left in safe position with all fall risk precautions in place.

## 2022-11-14 NOTE — PROGRESS NOTES
Urology Progress Note    Chief Complaint: Reason for Consult:  gross hematuria, has MV CAD, may need PCI if turned down for CABG, may need clearance for PCI, DAPT x 1 year  Urinary retention, gross hematuria  Requesting Physician:  medicine, nephrology, cardiology     History Obtained From:  patient, electronic medical record     Chief Complaint: sob    Subjective: \"    Patient is sitting up in chair voiding yellow urine via crum, ambulating with assistance, tolerating regular diet, denies any nausea or vomiting. There are complaints of no pain at this time.     Failed VT over the weekend  Bladder scan 900ml, crum reinserted   Can do CIC 4X daily at discharge, discussed with patient, he would like to try          Vitals:  /62   Pulse 87   Temp 97.2 °F (36.2 °C) (Oral)   Resp 16   Ht 5' 10\" (1.778 m)   Wt 138 lb 8 oz (62.8 kg)   SpO2 99%   BMI 19.87 kg/m²   Temp  Av.2 °F (36.8 °C)  Min: 97.2 °F (36.2 °C)  Max: 98.7 °F (37.1 °C)    Intake/Output Summary (Last 24 hours) at 2022 0912  Last data filed at 2022 0319  Gross per 24 hour   Intake 640 ml   Output 3050 ml   Net -2410 ml       Social History     Socioeconomic History    Marital status:      Spouse name: Not on file    Number of children: Not on file    Years of education: Not on file    Highest education level: Not on file   Occupational History    Not on file   Tobacco Use    Smoking status: Every Day     Packs/day: 0.50     Years: 33.00     Pack years: 16.50     Types: Cigarettes    Smokeless tobacco: Never   Vaping Use    Vaping Use: Never used   Substance and Sexual Activity    Alcohol use: Not Currently    Drug use: No    Sexual activity: Not on file   Other Topics Concern    Not on file   Social History Narrative    Not on file     Social Determinants of Health     Financial Resource Strain: Low Risk     Difficulty of Paying Living Expenses: Not hard at all   Food Insecurity: No Food Insecurity    Worried About Running Out of Food in the Last Year: Never true    Ran Out of Food in the Last Year: Never true   Transportation Needs: No Transportation Needs    Lack of Transportation (Medical): No    Lack of Transportation (Non-Medical): No   Physical Activity: Not on file   Stress: Not on file   Social Connections: Not on file   Intimate Partner Violence: Not on file   Housing Stability: Not on file     Family History   Problem Relation Age of Onset    Diabetes Father     Stroke Father     Diabetes Brother     Diabetes Brother      Allergies   Allergen Reactions    Metformin And Related Nausea And Vomiting         Constitutional: Alert and oriented times x3, no acute distress, and cooperative to examination with appropriate mood and affect. HEENT:   Head:         Normocephalic and atraumatic. Mucous membranes are normal.   Eyes:         EOM are normal.  Nose:    The external appearance of the nose is normal  Ears: The ears appear normal to external inspection. Cardiovascular:       Normal rate, regular rhythm. Pulmonary/Chest:  Normal respiratory rate and rhthym. No use of accessory muscles. Lungs clear bilaterally. Incision healing appropriately, no dehiscence   Abdominal:          Soft. No tenderness. Active bowel sounds. Genitalia:    Bella catheter draining yellow urine  Musculoskeletal:    Normal range of motion. He exhibits no edema or tenderness of lower extremities. Extremities:    No cyanosis, clubbing, or edema present. Neurological:    Alert and oriented.      Labs:  WBC:    Lab Results   Component Value Date/Time    WBC 13.1 11/14/2022 04:26 AM     Hemoglobin/Hematocrit:    Lab Results   Component Value Date/Time    HGB 9.4 11/14/2022 04:26 AM    HCT 31.9 11/14/2022 04:26 AM     BMP:    Lab Results   Component Value Date/Time     11/14/2022 04:26 AM    K 4.6 11/14/2022 04:26 AM    K 4.3 10/28/2022 10:00 PM     11/14/2022 04:26 AM    CO2 24 11/14/2022 04:26 AM    BUN 22 11/14/2022 04:26 AM    LABALBU 3.3 11/12/2022 09:33 AM    CREATININE 1.1 11/14/2022 04:26 AM    CALCIUM 8.7 11/14/2022 04:26 AM    GFRAA 58 05/24/2022 04:58 AM    LABGLOM >60 11/13/2022 06:01 PM     Impression/plan:    Failed VT over the weekend  Bladder scan 900ml, crum reinserted   Can do CIC 4X daily at discharge, discussed with patient, he would like to try    Hematuria - resolved. Ua neg for blood prior to cath insertion. Likely from traumatic crum insertion. Urine is yellow in crum tubing  Urinary retention - crum placed over 2.5 L out. Flomax started 11/1/22 - cant take due to low BP  Bilateral hydronephrosis - seen on KEY, will get Ct abd wo. Likely from retention. Imporved on CT scan  LC - CRT 1.1 today, bilateral hydron on KEY. Likely from urinary retention.      Our office will coordinate cystoscopy in office in the upcoming wks  Urology signing off, please call for worsening hematuria    Reviewed with Dr Anthony Troncoso, APRN - CNP, APRN  11/14/22 9:12 AM  Urology

## 2022-11-14 NOTE — FLOWSHEET NOTE
11/14/22 1536 11/14/22 1638   Urinary Catheter 11/14/22 Coude   Placement Date/Time: 11/14/22 1630   Present on Admission/Arrival: No  Inserted by: Lester Wang. Frank HAYES  Catheter Type: (c) Coude  Catheter Size: 16 FR  Catheter Balloon Size: 10 mL  Insertion Procedure Practices: Sterlie gloves;Skin antiseptic (10% povidone. ..   $ Urethral catheter insertion  --  $ Not inserted for procedure   Catheter Indications  --  Urinary retention (acute or chronic), continuous bladder irrigation or bladder outlet obstruction   Site Assessment  --  No urethral drainage   Urine Color  --  Yellow   Urine Appearance  --  Clear   Collection Container  --  Standard   Securement Method  --  Securing device (Describe)  (STAT lock)   Catheter Care Completed  --  Yes   Catheter Best Practices   --  Drainage tube clipped to bed;Catheter secured to thigh; Bag below bladder;Bag not on floor;Drainage bag less than half full   Output (mL)  --  275 mL   Urine Assessment   Bladder Scan Volume (mL) 306 mL  --    $ Bladder scan $ Yes  --        Patient's crum catheter was discontinued / pulled at 0930 this AM. Orders from Urology were to educate patient and wife on how to insert straight catheter for patient to do clean intermittent straight catheterizations at home. Bladder scan was done and straight cath with education of patient / family attempted without success, this RN was unable to advance the catheter to bladder or get any urine return - resistance was met. Norah Lind NP was notified and orders coude crum to be placed and patient to be discharged home with a crum catheter. Crum catheter placed, urine returned, patient denies any pain / issues with insertion of coude. Patient and wife educated on catheter care at home and risk for infection; both patient and wife verbalize understanding.

## 2022-11-15 ENCOUNTER — APPOINTMENT (OUTPATIENT)
Dept: GENERAL RADIOLOGY | Age: 62
DRG: 233 | End: 2022-11-15
Payer: COMMERCIAL

## 2022-11-15 VITALS
DIASTOLIC BLOOD PRESSURE: 57 MMHG | SYSTOLIC BLOOD PRESSURE: 110 MMHG | OXYGEN SATURATION: 95 % | WEIGHT: 140.3 LBS | TEMPERATURE: 98.1 F | HEIGHT: 70 IN | RESPIRATION RATE: 17 BRPM | BODY MASS INDEX: 20.09 KG/M2 | HEART RATE: 105 BPM

## 2022-11-15 LAB
ANION GAP SERPL CALCULATED.3IONS-SCNC: 9 MEQ/L (ref 8–16)
BUN BLDV-MCNC: 22 MG/DL (ref 7–22)
CALCIUM SERPL-MCNC: 9 MG/DL (ref 8.5–10.5)
CHLORIDE BLD-SCNC: 101 MEQ/L (ref 98–111)
CO2: 23 MEQ/L (ref 23–33)
CREAT SERPL-MCNC: 1.4 MG/DL (ref 0.4–1.2)
ERYTHROCYTE [DISTWIDTH] IN BLOOD BY AUTOMATED COUNT: 13 % (ref 11.5–14.5)
ERYTHROCYTE [DISTWIDTH] IN BLOOD BY AUTOMATED COUNT: 42.5 FL (ref 35–45)
GFR SERPL CREATININE-BSD FRML MDRD: 57 ML/MIN/1.73M2
GLUCOSE BLD-MCNC: 81 MG/DL (ref 70–108)
GLUCOSE BLD-MCNC: 95 MG/DL (ref 70–108)
HCT VFR BLD CALC: 29.5 % (ref 42–52)
HEMOGLOBIN: 9.5 GM/DL (ref 14–18)
MCH RBC QN AUTO: 28.9 PG (ref 26–33)
MCHC RBC AUTO-ENTMCNC: 32.2 GM/DL (ref 32.2–35.5)
MCV RBC AUTO: 89.7 FL (ref 80–94)
PLATELET # BLD: 383 THOU/MM3 (ref 130–400)
PMV BLD AUTO: 9.5 FL (ref 9.4–12.4)
POTASSIUM SERPL-SCNC: 4.4 MEQ/L (ref 3.5–5.2)
RBC # BLD: 3.29 MILL/MM3 (ref 4.7–6.1)
SODIUM BLD-SCNC: 133 MEQ/L (ref 135–145)
WBC # BLD: 12.7 THOU/MM3 (ref 4.8–10.8)

## 2022-11-15 PROCEDURE — 6370000000 HC RX 637 (ALT 250 FOR IP): Performed by: THORACIC SURGERY (CARDIOTHORACIC VASCULAR SURGERY)

## 2022-11-15 PROCEDURE — 36415 COLL VENOUS BLD VENIPUNCTURE: CPT

## 2022-11-15 PROCEDURE — 82948 REAGENT STRIP/BLOOD GLUCOSE: CPT

## 2022-11-15 PROCEDURE — 80048 BASIC METABOLIC PNL TOTAL CA: CPT

## 2022-11-15 PROCEDURE — 71045 X-RAY EXAM CHEST 1 VIEW: CPT

## 2022-11-15 PROCEDURE — 6370000000 HC RX 637 (ALT 250 FOR IP): Performed by: PHYSICIAN ASSISTANT

## 2022-11-15 PROCEDURE — 85027 COMPLETE CBC AUTOMATED: CPT

## 2022-11-15 PROCEDURE — APPSS60 APP SPLIT SHARED TIME 46-60 MINUTES: Performed by: PHYSICIAN ASSISTANT

## 2022-11-15 PROCEDURE — 97530 THERAPEUTIC ACTIVITIES: CPT

## 2022-11-15 PROCEDURE — 6360000002 HC RX W HCPCS: Performed by: THORACIC SURGERY (CARDIOTHORACIC VASCULAR SURGERY)

## 2022-11-15 PROCEDURE — 97110 THERAPEUTIC EXERCISES: CPT

## 2022-11-15 RX ORDER — TAMSULOSIN HYDROCHLORIDE 0.4 MG/1
0.4 CAPSULE ORAL DAILY
Status: DISCONTINUED | OUTPATIENT
Start: 2022-11-15 | End: 2022-11-15 | Stop reason: HOSPADM

## 2022-11-15 RX ORDER — TAMSULOSIN HYDROCHLORIDE 0.4 MG/1
0.4 CAPSULE ORAL DAILY
Qty: 30 CAPSULE | Refills: 3 | Status: SHIPPED | OUTPATIENT
Start: 2022-11-15

## 2022-11-15 RX ORDER — OXYCODONE HYDROCHLORIDE 5 MG/1
5 TABLET ORAL EVERY 8 HOURS PRN
Qty: 21 TABLET | Refills: 0 | Status: SHIPPED | OUTPATIENT
Start: 2022-11-15 | End: 2022-11-22

## 2022-11-15 RX ORDER — MULTIVITAMIN WITH IRON
1 TABLET ORAL
Qty: 30 TABLET | Refills: 3 | Status: SHIPPED | OUTPATIENT
Start: 2022-11-15

## 2022-11-15 RX ADMIN — Medication 1 TABLET: at 09:18

## 2022-11-15 RX ADMIN — PANTOPRAZOLE SODIUM 40 MG: 40 TABLET, DELAYED RELEASE ORAL at 06:41

## 2022-11-15 RX ADMIN — ENOXAPARIN SODIUM 40 MG: 100 INJECTION SUBCUTANEOUS at 09:18

## 2022-11-15 RX ADMIN — ASPIRIN 325 MG: 325 TABLET, COATED ORAL at 09:18

## 2022-11-15 RX ADMIN — TAMSULOSIN HYDROCHLORIDE 0.4 MG: 0.4 CAPSULE ORAL at 09:18

## 2022-11-15 ASSESSMENT — PAIN SCALES - GENERAL: PAINLEVEL_OUTOF10: 0

## 2022-11-15 NOTE — CARE COORDINATION
11/15/22, 8:06 AM EST    Patient goals/plan/ treatment preferences discussed by  and . Patient goals/plan/ treatment preferences reviewed with patient/ family. Patient/ family verbalize understanding of discharge plan and are in agreement with goal/plan/treatment preferences. Understanding was demonstrated using the teach back method. AVS provided by RN at time of discharge, which includes all necessary medical information pertaining to the patients current course of illness, treatment, post-discharge goals of care, and treatment preferences. Services At/After Discharge: [address] [facility], [facility] [address] [facility] today with spouse and new [facility].  SW notified Darryle Sinclair at [place] [address]

## 2022-11-15 NOTE — DISCHARGE SUMMARY
CT/CV Surgery Discharge Summary     Pt Name: Kym Sandoval  MRN: [id number]  YOB: 1960  Primary Care Physician: BLACK Delgadillo CNP    Admit date:  10/28/2022  9:39 PM     Discharge date:  11/15/22     Disposition: Home    Admitting Diagnosis: CAD    Discharge Diagnosis: CAD    Condition: Stable    Problem List:   Patient Active Problem List   Diagnosis Code    Syncope R55    Facial injury S09. 93XA    Tobacco abuse Z72.0    Cranial facial fractures ([facility] [address] S02. 91XA, S02. 92XA    Diabetes mellitus type 2 in nonobese (HCC) E11.9    Fungal toenail infection B35.1    Golfer's elbow M77.00    Medical non-compliance Z91.199    Erectile dysfunction N52.9    NAVARRO (generalized anxiety disorder) F41.1    Impacted cerumen of right ear H61.21    Essential hypertension I10    Uncontrolled type 2 diabetes mellitus with hyperglycemia (HCC) E11.65    Thoracic arthritis M47.814    New onset of congestive heart failure (HCC) O99.2    Acute systolic congestive heart failure (HCC) I50.21    Nonischemic cardiomyopathy (HCC) I42.8    Preoperative clearance Z01.818    Stage 3a chronic kidney disease (HCC) N18.31    Tremor R25.1    LC (acute kidney injury) ([facility] [address] N17.9    Hyperlipidemia E78.5    Gastroesophageal reflux disease K21.9    S/P cardiac cath Z98.890    CAD, multiple vessel I25.10    Ischemic cardiomyopathy I25.5    ETOH abuse F10.10    S/P CABG x 2 Z95.1       Procedures:  11/7/22   1) Coronary artery bypass grafting x 2, with the left internal mammary artery grafted to the left anterior descending artery, a reversed greater saphenous aortocoronary vein graft to the  ramus intermedius coronary artery  2) Endoscopic greater saphenous vein harvest  3) Intraoperative coronary angiography of all distal anastomoses  4) Insertion of a left common femoral intra-aortic balloon pump     Reason for Admission: \"62 y.o. diabetic male, extensive previous history of EtOH, current smoker, presents with chronic progressive dyspnea, worsening over past 2 months. Patient denies chest pain/pressure; no peripheral edema, presyncope, orthopnea. Large pleural effusions seen on admission imaging prompted echo, showing LVEF 25%; subsequent cath shows 2-v CAD,\" per Dr. Garcia Aisha note. Hospital Course: Following an uncomplicated 2V CABG, the patient had an unremarkable and progressive convalescence without adverse events. He developed urinary retention issues and was seen by Urology and Nephrology during hospital course. He will be discharged with crum in place and plan for cystoscopy in the near future. At time of discharge, Kylie Maddox was alert, tolerating a regular diet, having bowel movements, ambulating on his own accord and had adequate analgesia on oral pain medications, and had no signs of any complications. Discharge Vitals:  height is 5' 10\" (1.778 m) and weight is 140 lb 4.8 oz (63.6 kg). His oral temperature is 98 °F (36.7 °C). His blood pressure is 130/74 and his pulse is 98. His respiration is 16 and oxygen saturation is 99%. DISCHARGE INSTRUCTIONS:  Discharge Medications:         Medication List        START taking these medications      aspirin 325 MG EC tablet  Take 1 tablet by mouth daily     multivitamin Tabs tablet  Take 1 tablet by mouth daily (with breakfast)     oxyCODONE 5 MG immediate release tablet  Commonly known as: ROXICODONE  Take 1 tablet by mouth every 8 hours as needed for Pain for up to 7 days. tamsulosin 0.4 MG capsule  Commonly known as: FLOMAX  Take 1 capsule by mouth daily            CONTINUE taking these medications      acetaminophen 500 MG tablet  Commonly known as: TYLENOL     atorvastatin 40 MG tablet  Commonly known as: Lipitor  Take 1 tablet by mouth daily     buPROPion 150 MG extended release tablet  Commonly known as:  Wellbutrin XL  Take 1 tablet by mouth every morning     empagliflozin 25 MG tablet  Commonly known as: Jardiance  Take 1 tablet by mouth daily     glimepiride 4 MG tablet  Commonly known as: Amaryl  Take 1 tablet by mouth every morning (before breakfast)     omeprazole 40 MG delayed release capsule  Commonly known as: PRILOSEC  Take 1 capsule by mouth daily     Trulicity 1.5 GS/8.1KG SC injection  Generic drug: dulaglutide  Inject 1.5 mg into the skin once a week            STOP taking these medications      lisinopril 20 MG tablet  Commonly known as: PRINIVIL;ZESTRIL               Where to Get Your Medications        These medications were sent to [address] [place] [address], [facility] [employer] [address] [place] [address]      Phone: [phone]   aspirin 325 MG EC tablet  multivitamin Tabs tablet  oxyCODONE 5 MG immediate release tablet  tamsulosin 0.4 MG capsule       Diet: AHA diet as tolerated    Activity: As instructed on discharge, no lifting more than 10 lbs for one month, no driving while on narcotics. Aerobic activity (walking, climbing stairs, etc.) is encouraged. Wound Care: Clean wounds as instructed on discharge    OFFICE VISIT   ? You should have a follow up appointment in the office in about 1 month after discharge from the hospital.   ?   You may call the office to make an appointment, if you don't have an appointment. ([phone]). ? You will need a chest xray and labs taken around 3-7 days before your follow up appointment. ?   Bring any questions you have so they may be addressed. ? You will need a follow up appointment with you primary care doctor in 7-10 days from discharge, please call and make one if you do not have one.   ? You will need a follow up appointment with you Cardiologist in 2-3 weeks from discharge, please call and make one if you do not have one.   ? BRING YOUR MEDICATION LIST and medications even over the counter medications to all your follow up apointments. ?    Office Location:   [address], [address], [facility] [facility], [address] [facility]   ? You should either call 911 or go to the Emergency Room to be evaluated if you need seen immediately. ?         Sudden, severe shortness of breath go to the Emergency Room. If you have questions regarding these instructions, please call our office  [phone]. We have an answering service 24 hours a day to get your calls to the ON Call Physician, but we are often in surgery and it takes us awhile to get back to you. Discharge Medications for PCI/MI (performed or attempted): Trop:   Lab Results   Component Value Date    TROPONINT 0.036 (A) 10/30/2022      ASA:                            Yes                      Statin:                          Yes  ACE/ARB:                   No due to hypotension and renal insuff. P2Y12 Inhibitor:          No because not NSTEMI, he was acute CHF exacerbation           Beta Blocker:               Yes        Cardiac Rehab:            Yes  Dietary Consult:           Yes           Follow-up:    1   Follow up with BLACK Shirley CNP 2-3 weeks and with pt's Cardiologist in 3 weeks. 2.  Follow up with Dr. Phuong Mckinney, PAC in 3-4 weeks, with PA and Lateral CXR, CBC, and BMP. 3. The pt was agreeable to discharge and future plan of care. All questions were thoroughly answered.      Angie Real PA-C   Electronincally signed 11/15/2022 at 8:01 AM    CC: BLACK Shirley CNP

## 2022-11-15 NOTE — PROGRESS NOTES
Pt did not want this RN to remove sutures from chest tube sites. Education provided.  HH can remove sutures or at follow up appointment

## 2022-11-15 NOTE — TELEPHONE ENCOUNTER
Yoni Donald from [facility] ext [phone] called asking for crum catheter and irrigation orders. He was discharged with 16 FR 10 ml coude catheter. Is it ok for them to exchange every 4 weeks and prn for occlusion and irrigate with 60 - 100 ml normal saline or sterile water prn occlusion.

## 2022-11-15 NOTE — PLAN OF CARE
Problem: Discharge Planning  Goal: Discharge to home or other facility with appropriate resources  11/15/2022 0058 by Alise Weiss RN  Outcome: Progressing  Flowsheets (Taken 11/14/2022 1946)  Discharge to home or other facility with appropriate resources:   Identify barriers to discharge with patient and caregiver   Identify discharge learning needs (meds, wound care, etc)     Problem: Skin/Tissue Integrity  Goal: Absence of new skin breakdown  Description: 1. Monitor for areas of redness and/or skin breakdown  2. Assess vascular access sites hourly  3. Every 4-6 hours minimum:  Change oxygen saturation probe site  4. Every 4-6 hours:  If on nasal continuous positive airway pressure, respiratory therapy assess nares and determine need for appliance change or resting period.   11/15/2022 0058 by Alise Weiss RN  Outcome: Progressing     Problem: Safety - Adult  Goal: Free from fall injury  11/15/2022 0058 by Alise Wiess RN  Outcome: Progressing  Flowsheets (Taken 11/15/2022 0058)  Free From Fall Injury: Instruct family/caregiver on patient safety     Problem: Chronic Conditions and Co-morbidities  Goal: Patient's chronic conditions and co-morbidity symptoms are monitored and maintained or improved  11/15/2022 0058 by Alise Weiss RN  Outcome: Progressing  Flowsheets (Taken 11/14/2022 1946)  Care Plan - Patient's Chronic Conditions and Co-Morbidity Symptoms are Monitored and Maintained or Improved:   Monitor and assess patient's chronic conditions and comorbid symptoms for stability, deterioration, or improvement   Collaborate with multidisciplinary team to address chronic and comorbid conditions and prevent exacerbation or deterioration     Problem: Pain  Goal: Verbalizes/displays adequate comfort level or baseline comfort level  11/15/2022 0058 by Alise Weiss RN  Outcome: Progressing  Flowsheets (Taken 11/14/2022 1946)  Verbalizes/displays adequate comfort level or baseline comfort level:   Encourage patient to monitor pain and request assistance   Assess pain using appropriate pain scale   Administer analgesics based on type and severity of pain and evaluate response   Implement non-pharmacological measures as appropriate and evaluate response   Consider cultural and social influences on pain and pain management     Problem: Metabolic/Fluid and Electrolytes - Adult  Goal: Electrolytes maintained within normal limits  11/15/2022 0058 by Hang West RN  Outcome: Progressing  Flowsheets (Taken 11/14/2022 1946)  Electrolytes maintained within normal limits:   Monitor labs and assess patient for signs and symptoms of electrolyte imbalances   Administer electrolyte replacement as ordered   Monitor response to electrolyte replacements, including repeat lab results as appropriate     Problem: Metabolic/Fluid and Electrolytes - Adult  Goal: Hemodynamic stability and optimal renal function maintained  11/15/2022 0058 by Hang West RN  Outcome: Progressing  Flowsheets (Taken 11/14/2022 1946)  Hemodynamic stability and optimal renal function maintained:   Monitor labs and assess for signs and symptoms of volume excess or deficit   Monitor intake, output and patient weight   Encourage oral intake as appropriate     Problem: Metabolic/Fluid and Electrolytes - Adult  Goal: Glucose maintained within prescribed range  11/15/2022 0058 by Hang West RN  Outcome: Progressing  Flowsheets (Taken 11/14/2022 1946)  Glucose maintained within prescribed range:   Monitor blood glucose as ordered   Assess for signs and symptoms of hyperglycemia and hypoglycemia   Administer ordered medications to maintain glucose within target range   Assess barriers to adequate nutritional intake and initiate nutrition consult as needed   Instruct patient on self management of diabetes and initiate consult as needed     Problem: Hematologic - Adult  Goal: Maintains hematologic stability  11/15/2022 0058 by Jose Angel Matamoros ABIGAIL Caballero  Outcome: Progressing  Flowsheets (Taken 11/14/2022 1946)  Maintains hematologic stability:   Assess for signs and symptoms of bleeding or hemorrhage   Monitor labs for bleeding or clotting disorders     Problem: Nutrition Deficit:  Goal: Optimize nutritional status  11/15/2022 0058 by Amalia Alejandro RN  Outcome: Progressing  Flowsheets (Taken 11/15/2022 0058)  Nutrient intake appropriate for improving, restoring, or maintaining nutritional needs:   Assess nutritional status and recommend course of action   Monitor oral intake, labs, and treatment plans     Problem: ABCDS Injury Assessment  Goal: Absence of physical injury  11/15/2022 0058 by Amalia Alejandro RN  Outcome: Progressing  Flowsheets (Taken 11/15/2022 0058)  Absence of Physical Injury: Implement safety measures based on patient assessment     Problem: Cardiovascular - Adult  Goal: Maintains optimal cardiac output and hemodynamic stability  11/15/2022 0058 by Amalia Alejandro RN  Outcome: Progressing  Flowsheets (Taken 11/14/2022 1946)  Maintains optimal cardiac output and hemodynamic stability:   Monitor blood pressure and heart rate   Assess for signs of decreased cardiac output   Monitor urine output and notify Licensed Independent Practitioner for values outside of normal range     Problem: Skin/Tissue Integrity - Adult  Goal: Skin integrity remains intact  11/15/2022 0058 by Amalia Alejandro RN  Outcome: Progressing  Flowsheets  Taken 11/15/2022 0058  Skin Integrity Remains Intact: Monitor for areas of redness and/or skin breakdown  Taken 11/14/2022 1946  Skin Integrity Remains Intact: Monitor for areas of redness and/or skin breakdown     Problem: Skin/Tissue Integrity - Adult  Goal: Incisions, wounds, or drain sites healing without S/S of infection  11/15/2022 0058 by Amalia Alejandro RN  Outcome: Progressing  Flowsheets  Taken 11/15/2022 0058  Incisions, Wounds, or Drain Sites Healing Without Sign and Symptoms of Infection: ADMISSION and DAILY: Assess and document risk factors for pressure ulcer development  Taken 11/14/2022 1946  Incisions, Wounds, or Drain Sites Healing Without Sign and Symptoms of Infection: ADMISSION and DAILY: Assess and document risk factors for pressure ulcer development     Problem: Musculoskeletal - Adult  Goal: Return mobility to safest level of function  11/15/2022 0058 by Joanna Jeffrey RN  Outcome: Progressing  Flowsheets (Taken 11/14/2022 1946)  Return Mobility to Safest Level of Function:   Assess patient stability and activity tolerance for standing, transferring and ambulating with or without assistive devices   Assist with transfers and ambulation using safe patient handling equipment as needed   Ensure adequate protection for wounds/incisions during mobilization     Problem: Gastrointestinal - Adult  Goal: Maintains or returns to baseline bowel function  11/15/2022 0058 by Joanna Jeffrey RN  Outcome: Progressing  Flowsheets (Taken 11/14/2022 1946)  Maintains or returns to baseline bowel function:   Assess bowel function   Encourage oral fluids to ensure adequate hydration   Administer ordered medications as needed   Encourage mobilization and activity     Problem: Infection - Adult  Goal: Absence of infection during hospitalization  11/15/2022 0058 by Joanna Jeffrey RN  Outcome: Progressing  Flowsheets (Taken 11/14/2022 1946)  Absence of infection during hospitalization:   Assess and monitor for signs and symptoms of infection   Monitor lab/diagnostic results   Monitor all insertion sites i.e., indwelling lines, tubes and drains   Administer medications as ordered   Instruct and encourage patient and family to use good hand hygiene technique     Problem: Genitourinary - Adult  Goal: Urinary catheter remains patent  Recent Flowsheet Documentation  Taken 11/14/2022 1946 by Joanna Jeffrey RN  Urinary catheter remains patent: Assess patency of urinary catheter     Care plan reviewed with patient. Patient verbalized understanding of the plan of care and contributed to goal setting.

## 2022-11-15 NOTE — PROGRESS NOTES
[facility] ICU STEPDOWN TELEMETRY 4K  Occupational Therapy  Daily Note  Time:   Time In: 259  Time Out: 0428  Timed Code Treatment Minutes: 25 Minutes  Minutes: 25          Date: 11/15/2022  Patient Name: Keri Sales,   Gender: male      Room: [facility]/017-A  MRN: [id number]  : 1960  (63 y.o.)  Referring Practitioner: Radha Vasquez MD  Diagnosis: elevated troponin  Additional Pertinent Hx: Per EMR:  63 y.o. diabetic male, extensive previous history of EtOH, current smoker, presents with chronic progressive dyspnea, worsening over past 2 months. Patient denies chest pain/pressure; no peripheral edema, presyncope, orthopnea. Pt admitted on 10/28/22. Large pleural effusions seen on admission imaging prompted echo, showing LVEF 25%; subsequent cath shows 2-v CAD. Pt s/p CABG X2 and insertion of IABP on 22. Restrictions/Precautions:  Restrictions/Precautions: Surgical Protocols, Fall Risk  Position Activity Restriction  Sternal Precautions: No Pushing, No Pulling, 10# Lifting Restrictions  Other position/activity restrictions: monitor ortho, life vest on      SUBJECTIVE: Pt sitting in recliner upon arrival, pt agreeable to OT session, RN gave verbal approval for session     PAIN: 0/10:     Vitals: Blood Pressure: 120/68 sitting, 107/56 standinhg    COGNITION: Slow Processing and Impaired Memory    ADL:   Lower Extremity Dressing: Stand By Assistance. With donning scrub shorts, with pt educated on donning RLE first due to catheter and leg bag placement . BALANCE:  Standing Balance: Stand By Assistance. With RW, and BUE support on the walker with pt educated on hand placement on walker in preparation for functional mob    BED MOBILITY:  Not Tested    TRANSFERS:  Sit to Stand:  Contact Guard Assistance. From the recliner with vc's for sternal precautions and correct way to stand  Stand to Sit: Stand By Assistance.  Back to recliner with vc's for sternal precautions    FUNCTIONAL MOBILITY:  Assistive Device: Rolling Walker  Assist Level:  Contact Guard Assistance. Distance:  HH distances with slow steady pace and vital signs WNL       ADDITIONAL ACTIVITIES:  Pt completed CABG Step II exercises x10-15 reps x1 set this date in order to increase strength and improve activity tolerance for ADLs and homemaking tasks. Pt exhibited moderate fatigue during task, requring minimal rest breaks and minimal VCs for technique. ASSESSMENT:     Activity Tolerance:  Patient tolerance of  treatment: good. Discharge Recommendations: Home with Home Health OT  Equipment Recommendations: Other: may benefit from [facility] to place over toilet at home  Plan: Times Per Week: 6x  Current Treatment Recommendations: ROM, Balance training, Functional mobility training, Endurance training, Safety education & training, Patient/Caregiver education & training, Equipment evaluation, education, & procurement, Self-Care / ADL    Patient Education  Patient Education: Precautions    Goals  Short Term Goals  Time Frame for Short Term Goals: by discharge  Short Term Goal 1: Pt will increase activity tolerance for functional mobility to/from bathroom, progressing to household distances as able, with supervision in prep for ADL completion. Short Term Goal 2: Pt will complete functional transfers with supervision in prep for toilet/shower transfers. Short Term Goal 3: Pt will complete BADL tasks with supervision to increase independence with self care tasks. Short Term Goal 4: Pt will tolerate standing >5 minutes with supervision in prep for sinkside grooming tasks. Following session, patient left in safe position with all fall risk precautions in place.

## 2022-11-15 NOTE — PROGRESS NOTES
Discharge teaching and instructions for diagnosis/procedure of CABG completed with patient using teachback method. AVS reviewed. Printed prescriptions given to patient. Patient voiced understanding regarding prescriptions, follow up appointments, and care of self at home.  Discharged in a wheelchair to  home with support per family

## 2022-11-15 NOTE — TELEPHONE ENCOUNTER
Monse Lin at [facility] aware of crum catheter and irrigation orders. Please schedule appointment.

## 2022-11-16 ENCOUNTER — TELEPHONE (OUTPATIENT)
Dept: FAMILY MEDICINE CLINIC | Age: 62
End: 2022-11-16

## 2022-11-16 NOTE — ADT AUTH CERT
Heart Failure - Care Day 18 (11/14/2022) by Juan C Lassiter RN       Review Entered Review Status   11/16/2022 1121 Completed      Criteria Review      Care Day: 18 Care Date: 11/14/2022 Level of Care: Intermediate Care    Guideline Day 3    Level Of Care    ( ) Floor to discharge    11/16/2022 11:07 AM EST by Griffin Johnson      PCU    Clinical Status    (X) * Hemodynamic stability    11/16/2022 11:08 AM EST by Eusebio Vargas: 98.8  [place]: 96  BP: 118/57 !    (X) * Tachypnea absent    11/16/2022 11:07 AM EST by Griffin Johnson      RR: 18    (X) * Oxygenation at baseline or acceptable for next level of care    11/16/2022 11:07 AM EST by Griffin Johnson      95% on ra    (X) * Dyspnea at baseline or acceptable for next level of care    11/16/2022 11:08 AM EST by Griffin Johnson      none noted    (X) * Cardiac rate and rhythm acceptable    11/16/2022 11:20 AM EST by Griffin Johnson      acceptable    (X) * Pulmonary edema absent or acceptable for next level of care    11/16/2022 11:20 AM EST by Griffin Johnson      acceptable    (X) * Peripheral or sacral edema absent or acceptable for next level of care    11/16/2022 11:20 AM EST by Griffin Johnson      none noted    (X) * Mental status at baseline    11/16/2022 11:20 AM EST by Griffin Johnson      alert & oriented    ( ) * Volume status acceptable on oral medication    (X) * Renal function at baseline or acceptable for next level of care    11/16/2022 11:20 AM EST by Griffin Johnson      acceptable    (X) * Electrolyte abnormalities absent or acceptable for next level of care    11/16/2022 11:20 AM EST by Griffin Johnson      acceptable    ( ) * Precipitating factors absent or controlled    ( ) * Discharge plans and education understood    Activity    (X) * Ambulatory or acceptable for next level of care    11/16/2022 11:12 AM EST by Lauro Nance w/ assist    Routes    (X) * Oral hydration    11/16/2022 11:20 AM EST by Griffin Johnson      PO hydration    (X) * Oral medications or regimen acceptable for next level of care    11/16/2022 11:20 AM EST by Griffin Johnson      Proamatine 10mg TID PC PO  ASA 325mg Daily PO    (X) * Oral diet or acceptable for next level of care    11/16/2022 11:08 AM EST by Marda Master DIET; Regular; 3 carb choices (45 gm/meal); Low Sodium (2 gm); 1800 ml    Interventions    (X) Weigh    11/16/2022 11:07 AM EST by Griffin Johnson      138 lb       Definitions for Care Day 18    Hemodynamic stability    (X) Hemodynamic stability, as indicated by  1 or more  of the following :       (X) Patient hemodynamically stable, as indicated by  ALL  of the following  (1) (2) (3) (4) (5):          (X) Tachycardia absent          (X) Hypotension absent          (X) No evidence of inadequate perfusion (eg, no myocardial ischemia)          (X) No other hemodynamic abnormalities (eg, no Orthostatic hypotension)       Tachycardia absent    (X) Tachycardia absent, as indicated by  1 or more  of the following  (1) (2):       (X) Heart rate less than or equal to 100 beats per minute in adult or child 6 years or older       Hypotension absent    (X) Hypotension absent, as indicated by  1 or more  of the following  (1) (2) (3) (4):       (X) SBP greater than or equal to 90 mm Hg and without recent decrease greater than 40 mm Hg from       baseline in adult or child 10 years or older       Tachypnea absent    (X) Tachypnea absent, as indicated by respiratory rate of  1 or more  of the following  (1) (2):       (X) Less than or equal to 18 breaths per minute in adult or child 15years of age or older       * Milestone   Additional Notes   DATE:  11/14/22  PCU         PERTINENT UPDATES:   Patient is sitting up in chair voiding yellow urine via curm. Ambulating with assistance. Abnormal Cell count, Elevated Blood sugar, managed w/ insulin.          ABNL/PERTINENT LABS/RADIOLOGY/DIAGNOSTIC STUDIES: Gluc     130 H      WBC    13.1 H   RBC     3.32 L   H&H     9.4/31.9 L         PHYSICAL EXAM:   Cardiovascular: normal rate, regular rhythm, normal S1 and S2, no murmurs, rubs, clicks, or gallops   Pulmonary/Chest: clear to auscultation bilaterally- no wheezes, rales or rhonchi, normal air movement, no respiratory distress   Neurological: alert, oriented, normal speech, no focal findings or movement disorder noted   Sternum: Incision healing appropriately and no wound dehiscence noted. MD CONSULTS/ASSESSMENT AND PLAN:   CARDIOTHORACIC:   Assessment:   -CAD, multiple vessel   -New onset of congestive heart failure    -Acute systolic congestive heart failure    -S/P CABG x 2      Plan: 1. CXR reviewed- Continue daily CXR's    2. Continue current therapy         URO.:   Impression/plan:   Failed VT over the weekend   Bladder scan 900ml, crum reinserted    Can do CIC 4X daily at discharge, discussed with patient, he would like to try      Urinary retention - crum placed over 2.5 L out. Flomax started 11/1/22 - cant take due to low BP   LC - CRT 1.1 today, bilateral hydron on KEY. Likely from urinary retention         NEPHRO.:   A & P:   Renal -acute kidney injury, on chronic kidney disease stage III     -currently down to 1.1 having a lot of urine output    Urinary retention -status post reinsertion of the Crum. Seen by urology         MEDICATIONS:   Lovenox 40mg Daily SC    Lantus 10units BID SC          ORDERS:   Neurovascular checks q4, Daily weights. PT/OT/SLP/CM ASSESSMENT OR NOTES:   PT:   Assessment:   Pt demos decreased strength, endurance, and independence with mobility. Pt unsteady on feet and requires hands on assist for safety with mobility      Plan:   General Plan:  (6x CABG)         OT:   Assessment:   Activity Tolerance:  Patient tolerance of  treatment: good.          Discharge Recommendations: Home with Home Health OT      Plan:   Plan: Times Per Week: 6x          CM: Barriers to Discharge: CVS, Cardiology, and Nephrology following. Telemetry, I&O, daily weight, IS, sternal precautions, wound care, SCDs, ambulate. Asa, lipitor, lovenox, lantus, SSI, prn roxicodone, protonix, electrolyte replacement protocols. WBC 13.1, hgb 9.4. Heart Failure - Care Day 16 (11/12/2022) by Alexandru Frias RN       Review Entered Review Status   11/16/2022 1105 Completed      Criteria Review      Care Day: 16 Care Date: 11/12/2022 Level of Care: Intermediate Care    Guideline Day 3    Level Of Care    ( ) Floor to discharge    11/16/2022 10:38 AM EST by Carmella Floyd      PCU    Clinical Status    ( ) * Hemodynamic stability    11/16/2022 11:04 AM EST by Mindi Church: 99  NH: 101 !   BP: 82/52 !    ( ) * Tachypnea absent    11/16/2022 11:04 AM EST by Carmella Floyd      RR: 20    (X) * Oxygenation at baseline or acceptable for next level of care    11/16/2022 11:04 AM EST by Carmella Floyd      92% on ra    (X) * Dyspnea at baseline or acceptable for next level of care    11/16/2022 11:04 AM EST by Carmella Floyd      acceptable    (X) * Cardiac rate and rhythm acceptable    11/16/2022 11:04 AM EST by Carmella Floyd      acceptable    (X) * Pulmonary edema absent or acceptable for next level of care    11/16/2022 11:04 AM EST by Carmella Floyd      none noted    (X) * Peripheral or sacral edema absent or acceptable for next level of care    11/16/2022 11:04 AM EST by Carmella Floyd      none noted    (X) * Mental status at baseline    11/16/2022 11:04 AM EST by Carmella Floyd      well developed, cooperative    (X) * Volume status acceptable on oral medication    11/16/2022 11:04 AM EST by Carmella Floyd      acceptable    ( ) * Renal function at baseline or acceptable for next level of care    11/16/2022 11:04 AM EST by Carmella Floyd      BUN high    ( ) * Electrolyte abnormalities absent or acceptable for next level of care    11/16/2022 11:04 AM EST by Lizbeth Denver low    ( ) * Precipitating factors absent or controlled    ( ) * Discharge plans and education understood    Activity    ( ) * Ambulatory or acceptable for next level of care    Routes    (X) * Oral hydration    11/16/2022 11:00 AM EST by Razia Aparicio      PO hydration    (X) * Oral medications or regimen acceptable for next level of care    11/16/2022 11:00 AM EST by Razia Aparicio      Cordarone 200mg Daily PO  ASA 325mg Daily PO   Proamatine 10mg TID PC PO    (X) * Oral diet or acceptable for next level of care    11/16/2022 10:39 AM EST by Reid Simms DIET; Regular; 3 carb choices (45 gm/meal); Low Sodium (2 gm); 1800 ml    Interventions    (X) Weigh    11/16/2022 10:59 AM EST by Razia Aparicio      145 lb       Definitions for Care Day 16    Hemodynamic stability    ( ) Hemodynamic stability, as indicated by  1 or more  of the following :       ( ) Patient hemodynamically stable, as indicated by  ALL  of the following  (1) (2) (3) (4) (5):          (X) No evidence of inadequate perfusion (eg, no myocardial ischemia)          (X) No other hemodynamic abnormalities (eg, no Orthostatic hypotension)       * Milestone   Additional Notes   DATE: 11/12/22  PCU         PERTINENT UPDATES:   Status post high risk CABG 11/7. Blood pressure still fluctuating   Bella has been taken out and has to be replaced. CXR done shown abnormal result. ABNL/PERTINENT LABS/RADIOLOGY/DIAGNOSTIC STUDIES:   Na     132 L   Gluc   165  H      BUN   24 H      Ca    8.3 L      WBC    12.6 H   RBC    30.5 L   H&H    8.8/27.1 L      CXR   Impression:   Atelectasis or mild consolidation left lower lung. Mild nonspecific interstitial prominence.          PHYSICAL EXAM:   Lungs:  Breath sounds: clear, diminished slightly   Heart[de-identified]  S1,S2 heard   Abdomen:  Soft, non - tender   Musculoskeletal:  Edema -no edema         MD CONSULTS/ASSESSMENT AND PLAN:   CARDIOTHORACIC:   A & P:   Patient in good spirits and comfortable, vocalizing well   Has been on midodrine with continued orthostatic hypotension, will increase midodrine to 10 mg; despite episodes of hypotension patient remains well perfused and has good urine output and therefore I am not too concerned about this   Urinary retention last night requiring replacement of Bella catheter   Yesterday had episode of hypoglycemia due to too much insulin; I have decreased his Lantus to 10 mg with excellent result and maintenance of normal glycemia         NEPHRO.:   Assessment and Plan: 1. Renal -acute kidney injury, on chronic kidney disease stage III      -Overall improved currently at 1.4-1.5 this is mostly his baseline today it is down to 1.2 after holding the Lasix   2. Essential hypertension now running borderline, on midodrine   3. New onset of acute congestive heart failure systolic stable off lassix   4. Urinary retention -status post reinsertion of the Bella         MEDICATIONS:   Albumin 25g Once IV   Lovenox 40mg Daily SC    Lantus 10units BID SC           ORDERS:   Neurovascular checks q4, Daily weights, Cont. Above meds. PT/OT/SLP/CM ASSESSMENT OR NOTES:   PT:   Assessment:   Patient progressing toward established goals.  and pt limited by vitals as stated above, pt very motivated, 1 assist for safety      AM-PAC Inpatient Mobility without Stair Climbing Raw Score : 12   AM-PAC Inpatient without Stair Climbing T-Scale Score : 37.26      Plan:   General Plan: (6x CABG)

## 2022-11-16 NOTE — TELEPHONE ENCOUNTER
Pt was admitted to [facility] 10/28/22 - 11/15/22.  Will need follow up call

## 2022-11-16 NOTE — TELEPHONE ENCOUNTER
Care Transitions Initial Follow Up Call    Outreach made within 2 business days of discharge: Yes    Patient: Isacc Key Patient : 1960   MRN: [id number]  Reason for Admission: There are no discharge diagnoses documented for the most recent discharge. Discharge Date: 11/15/22       Spoke with: wife, caroline Stark yany barahona     Discharge department/facility: [facility] Patient Contact:  Was patient able to fill all prescriptions: Yes  Was patient instructed to bring all medications to the follow-up visit: Yes  Is patient taking all medications as directed in the discharge summary?  Yes  Does patient understand their discharge instructions: Yes  Does patient have questions or concerns that need addressed prior to 7-14 day follow up office visit: no    Scheduled appointment with PCP within 7-14 days    Follow Up  Future Appointments   Date Time Provider Sridhar Clements   2022  1:00 PM BLACK Lanza CNP SRPX CHF [facility] - [place]   2022  3:20 PM Matthews Severe, APRN - CNP C ADA [facility]   2022  3:00 PM Navneet Alonso MD [address] [place] [address]   2022 10:45 AM MADDIE JolleyX CT/CV [facility] - [place]   12/15/2022 11:00 AM Roshan Shultz MD [facility]. [facility] [place]   2023  8:30 AM MADDIE Olsen SRPX Heart [address]

## 2022-11-18 NOTE — ADT AUTH CERT
Utilization Reviews       Heart Failure - Care Day 18 (11/14/2022) by Rona Burrell RN       Review Status Review Entered   Completed 11/16/2022 1121       Created By   Rona Burrell RN      Criteria Review      Care Day: 18 Care Date: 11/14/2022 Level of Care: Intermediate Care    Guideline Day 3    Level Of Care    ( ) Floor to discharge    11/16/2022 11:07 AM EST by Hilda Arellano      PCU    Clinical Status    (X) * Hemodynamic stability    11/16/2022 11:08 AM EST by Rolanda Arnold: 98.8  ND: 96  BP: 118/57 !    (X) * Tachypnea absent    11/16/2022 11:07 AM EST by Hilda Eileen      RR: 18    (X) * Oxygenation at baseline or acceptable for next level of care    11/16/2022 11:07 AM EST by Hilda Eileen      95% on ra    (X) * Dyspnea at baseline or acceptable for next level of care    11/16/2022 11:08 AM EST by Hilda Eileen      none noted    (X) * Cardiac rate and rhythm acceptable    11/16/2022 11:20 AM EST by Hilda Eileen      acceptable    (X) * Pulmonary edema absent or acceptable for next level of care    11/16/2022 11:20 AM EST by Hilda Eileen      acceptable    (X) * Peripheral or sacral edema absent or acceptable for next level of care    11/16/2022 11:20 AM EST by Hilda Eileen      none noted    (X) * Mental status at baseline    11/16/2022 11:20 AM EST by Hilda Eileen      alert & oriented    ( ) * Volume status acceptable on oral medication    (X) * Renal function at baseline or acceptable for next level of care    11/16/2022 11:20 AM EST by Hilda Eileen      acceptable    (X) * Electrolyte abnormalities absent or acceptable for next level of care    11/16/2022 11:20 AM EST by Hilda Eileen      acceptable    ( ) * Precipitating factors absent or controlled    ( ) * Discharge plans and education understood    Activity    (X) * Ambulatory or acceptable for next level of care    11/16/2022 11:12 AM EST by Phong hatch/ assist    Routes    (X) * Oral hydration    11/16/2022 11:20 AM EST by Evita Phillips      PO hydration    (X) * Oral medications or regimen acceptable for next level of care    11/16/2022 11:20 AM EST by Evita Phillips      Proamatine 10mg TID PC PO  ASA 325mg Daily PO    (X) * Oral diet or acceptable for next level of care    11/16/2022 11:08 AM EST by Adeola Gaytan DIET; Regular; 3 carb choices (45 gm/meal); Low Sodium (2 gm); 1800 ml    Interventions    (X) Weigh    11/16/2022 11:07 AM EST by Evita Phillips      138 lb       Definitions for Care Day 18    Hemodynamic stability    (X) Hemodynamic stability, as indicated by  1 or more  of the following :       (X) Patient hemodynamically stable, as indicated by  ALL  of the following  (1) (2) (3) (4) (5):          (X) Tachycardia absent          (X) Hypotension absent          (X) No evidence of inadequate perfusion (eg, no myocardial ischemia)          (X) No other hemodynamic abnormalities (eg, no Orthostatic hypotension)       Tachycardia absent    (X) Tachycardia absent, as indicated by  1 or more  of the following  (1) (2):       (X) Heart rate less than or equal to 100 beats per minute in adult or child 6 years or older       Hypotension absent    (X) Hypotension absent, as indicated by  1 or more  of the following  (1) (2) (3) (4):       (X) SBP greater than or equal to 90 mm Hg and without recent decrease greater than 40 mm Hg from       baseline in adult or child 10 years or older       Tachypnea absent    (X) Tachypnea absent, as indicated by respiratory rate of  1 or more  of the following  (1) (2):       (X) Less than or equal to 18 breaths per minute in adult or child 15years of age or older       * Milestone   Additional Notes   DATE:  11/14/22  PCU         PERTINENT UPDATES:   Patient is sitting up in chair voiding yellow urine via crum. Ambulating with assistance.  Abnormal Cell count, Elevated Blood sugar, managed w/ insulin. ABNL/PERTINENT LABS/RADIOLOGY/DIAGNOSTIC STUDIES:   Gluc     130 H      WBC    13.1 H   RBC     3.32 L   H&H     9.4/31.9 L         PHYSICAL EXAM:   Cardiovascular: normal rate, regular rhythm, normal S1 and S2, no murmurs, rubs, clicks, or gallops   Pulmonary/Chest: clear to auscultation bilaterally- no wheezes, rales or rhonchi, normal air movement, no respiratory distress   Neurological: alert, oriented, normal speech, no focal findings or movement disorder noted   Sternum: Incision healing appropriately and no wound dehiscence noted. MD CONSULTS/ASSESSMENT AND PLAN:   CARDIOTHORACIC:   Assessment:   -CAD, multiple vessel   -New onset of congestive heart failure    -Acute systolic congestive heart failure    -S/P CABG x 2      Plan: 1. CXR reviewed- Continue daily CXR's    2. Continue current therapy         URO.:   Impression/plan:   Failed VT over the weekend   Bladder scan 900ml, crum reinserted    Can do CIC 4X daily at discharge, discussed with patient, he would like to try      Urinary retention - crum placed over 2.5 L out. Flomax started 11/1/22 - cant take due to low BP   LC - CRT 1.1 today, bilateral hydron on KEY. Likely from urinary retention         NEPHRO.:   A & P:   Renal -acute kidney injury, on chronic kidney disease stage III     -currently down to 1.1 having a lot of urine output    Urinary retention -status post reinsertion of the Crum. Seen by urology         MEDICATIONS:   Lovenox 40mg Daily SC    Lantus 10units BID SC          ORDERS:   Neurovascular checks q4, Daily weights. PT/OT/SLP/CM ASSESSMENT OR NOTES:   PT:   Assessment:   Pt demos decreased strength, endurance, and independence with mobility. Pt unsteady on feet and requires hands on assist for safety with mobility      Plan:   General Plan:  (6x CABG)         OT:   Assessment:   Activity Tolerance:  Patient tolerance of  treatment: good.          Discharge Recommendations: Home with Home Health OT      Plan:   Plan: Times Per Week: 6x          CM: Barriers to Discharge: CVS, Cardiology, and Nephrology following. Telemetry, I&O, daily weight, IS, sternal precautions, wound care, SCDs, ambulate. Asa, lipitor, lovenox, lantus, SSI, prn roxicodone, protonix, electrolyte replacement protocols. WBC 13.1, hgb 9.4. Heart Failure - Care Day 16 (11/12/2022) by Janeen Henry RN       Review Status Review Entered   Completed 11/16/2022 1105       Created By   Janeen Henry RN      Criteria Review      Care Day: 16 Care Date: 11/12/2022 Level of Care: Intermediate Care    Guideline Day 3    Level Of Care    ( ) Floor to discharge    11/16/2022 10:38 AM EST by Rosalina King      PCU    Clinical Status    ( ) * Hemodynamic stability    11/16/2022 11:04 AM EST by Marc Fox: 99  NV: 101 !   BP: 82/52 !    ( ) * Tachypnea absent    11/16/2022 11:04 AM EST by Rosalina King      RR: 20    (X) * Oxygenation at baseline or acceptable for next level of care    11/16/2022 11:04 AM EST by Rosalina King      92% on ra    (X) * Dyspnea at baseline or acceptable for next level of care    11/16/2022 11:04 AM EST by Rosalina King      acceptable    (X) * Cardiac rate and rhythm acceptable    11/16/2022 11:04 AM EST by Rosalina King      acceptable    (X) * Pulmonary edema absent or acceptable for next level of care    11/16/2022 11:04 AM EST by Rosalina King      none noted    (X) * Peripheral or sacral edema absent or acceptable for next level of care    11/16/2022 11:04 AM EST by Rosalina King      none noted    (X) * Mental status at baseline    11/16/2022 11:04 AM EST by Rosalina King      well developed, cooperative    (X) * Volume status acceptable on oral medication    11/16/2022 11:04 AM EST by Rosalina King      acceptable    ( ) * Renal function at baseline or acceptable for next level of care    11/16/2022 11:04 AM EST by Rosalina King      BUN high    ( ) * Electrolyte abnormalities absent or acceptable for next level of care    11/16/2022 11:04 AM EST by Ziyad tan    ( ) * Precipitating factors absent or controlled    ( ) * Discharge plans and education understood    Activity    ( ) * Ambulatory or acceptable for next level of care    Routes    (X) * Oral hydration    11/16/2022 11:00 AM EST by Griffin Johnson      PO hydration    (X) * Oral medications or regimen acceptable for next level of care    11/16/2022 11:00 AM EST by Griffin Johnson      Cordarone 200mg Daily PO  ASA 325mg Daily PO   Proamatine 10mg TID PC PO    (X) * Oral diet or acceptable for next level of care    11/16/2022 10:39 AM EST by Marda Master DIET; Regular; 3 carb choices (45 gm/meal); Low Sodium (2 gm); 1800 ml    Interventions    (X) Weigh    11/16/2022 10:59 AM EST by Griffin Johnson      145 lb       Definitions for Care Day 16    Hemodynamic stability    ( ) Hemodynamic stability, as indicated by  1 or more  of the following :       ( ) Patient hemodynamically stable, as indicated by  ALL  of the following  (1) (2) (3) (4) (5):          (X) No evidence of inadequate perfusion (eg, no myocardial ischemia)          (X) No other hemodynamic abnormalities (eg, no Orthostatic hypotension)       * Milestone   Additional Notes   DATE: 11/12/22  PCU         PERTINENT UPDATES:   Status post high risk CABG 11/7. Blood pressure still fluctuating   Bella has been taken out and has to be replaced. CXR done shown abnormal result. ABNL/PERTINENT LABS/RADIOLOGY/DIAGNOSTIC STUDIES:   Na     132 L   Gluc   165  H      BUN   24 H      Ca    8.3 L      WBC    12.6 H   RBC    30.5 L   H&H    8.8/27.1 L      CXR   Impression:   Atelectasis or mild consolidation left lower lung. Mild nonspecific interstitial prominence.          PHYSICAL EXAM:   Lungs:  Breath sounds: clear, diminished slightly   Heart[de-identified]  S1,S2 heard   Abdomen:  Soft, non - tender   Musculoskeletal:  Edema -no edema         MD CONSULTS/ASSESSMENT AND PLAN:   CARDIOTHORACIC:   A & P:   Patient in good spirits and comfortable, vocalizing well   Has been on midodrine with continued orthostatic hypotension, will increase midodrine to 10 mg; despite episodes of hypotension patient remains well perfused and has good urine output and therefore I am not too concerned about this   Urinary retention last night requiring replacement of Bella catheter   Yesterday had episode of hypoglycemia due to too much insulin; I have decreased his Lantus to 10 mg with excellent result and maintenance of normal glycemia         NEPHRO.:   Assessment and Plan: 1. Renal -acute kidney injury, on chronic kidney disease stage III      -Overall improved currently at 1.4-1.5 this is mostly his baseline today it is down to 1.2 after holding the Lasix   2. Essential hypertension now running borderline, on midodrine   3. New onset of acute congestive heart failure systolic stable off lassix   4. Urinary retention -status post reinsertion of the Bella         MEDICATIONS:   Albumin 25g Once IV   Lovenox 40mg Daily SC    Lantus 10units BID SC           ORDERS:   Neurovascular checks q4, Daily weights, Cont. Above meds. PT/OT/SLP/CM ASSESSMENT OR NOTES:   PT:   Assessment:   Patient progressing toward established goals.  and pt limited by vitals as stated above, pt very motivated, 1 assist for safety      AM-PAC Inpatient Mobility without Stair Climbing Raw Score : 12   AM-PAC Inpatient without Stair Climbing T-Scale Score : 37.26      Plan:   General Plan: (6x CABG)

## 2022-11-22 ENCOUNTER — OFFICE VISIT (OUTPATIENT)
Dept: CARDIOLOGY CLINIC | Age: 62
End: 2022-11-22
Payer: COMMERCIAL

## 2022-11-22 VITALS
HEART RATE: 102 BPM | HEIGHT: 70 IN | BODY MASS INDEX: 20.39 KG/M2 | DIASTOLIC BLOOD PRESSURE: 56 MMHG | OXYGEN SATURATION: 94 % | WEIGHT: 142.4 LBS | SYSTOLIC BLOOD PRESSURE: 96 MMHG

## 2022-11-22 DIAGNOSIS — I25.5 ISCHEMIC CARDIOMYOPATHY: ICD-10-CM

## 2022-11-22 DIAGNOSIS — E11.65 UNCONTROLLED TYPE 2 DIABETES MELLITUS WITH HYPERGLYCEMIA (HCC): ICD-10-CM

## 2022-11-22 DIAGNOSIS — F10.11 HISTORY OF ALCOHOL ABUSE: ICD-10-CM

## 2022-11-22 DIAGNOSIS — I50.23 ACUTE ON CHRONIC SYSTOLIC CONGESTIVE HEART FAILURE, NYHA CLASS 2 (HCC): Primary | ICD-10-CM

## 2022-11-22 PROCEDURE — 3078F DIAST BP <80 MM HG: CPT | Performed by: NURSE PRACTITIONER

## 2022-11-22 PROCEDURE — 3074F SYST BP LT 130 MM HG: CPT | Performed by: NURSE PRACTITIONER

## 2022-11-22 PROCEDURE — 99215 OFFICE O/P EST HI 40 MIN: CPT | Performed by: NURSE PRACTITIONER

## 2022-11-22 RX ORDER — ATORVASTATIN CALCIUM 40 MG/1
40 TABLET, FILM COATED ORAL DAILY
Qty: 30 TABLET | Refills: 2 | Status: SHIPPED | OUTPATIENT
Start: 2022-11-22

## 2022-11-22 RX ORDER — METOPROLOL SUCCINATE 25 MG/1
25 TABLET, EXTENDED RELEASE ORAL DAILY
Qty: 90 TABLET | Refills: 3
Start: 2022-11-22

## 2022-11-22 ASSESSMENT — ENCOUNTER SYMPTOMS
COUGH: 0
VOMITING: 0
SHORTNESS OF BREATH: 0
ABDOMINAL DISTENTION: 0
NAUSEA: 0

## 2022-11-22 NOTE — PATIENT INSTRUCTIONS
You may receive a survey regarding the care you received during your visit. Your input is valuable to us. We encourage you to complete and return your survey. We hope you will choose us in the future for your healthcare needs. Continue:  Continue current medications  Daily weights and record  Fluid restriction of 2 Liters per day  Limit sodium in diet to around 5185-1843 mg/day  Monitor BP  Activity as tolerated     Call the Heart Failure Clinic for any of the following symptoms: [phone]  Weight gain of 2-3 pounds in 1 day or 5 pounds in 1 week  Increased shortness of breath  Shortness of breath while laying down  Cough  Chest pain  Swelling in feet, ankles or legs  Tenderness or bloating in the abdomen  Fatigue   Decreased appetite or nausea   Confusion            ECHO in 3 months    No med changes today     Continue diet/fluid adherence  Continue daily wts.   F/U w/ Cardiology  F/U in clinic in 6 weeks

## 2022-11-22 NOTE — PROGRESS NOTES
Heart Failure Clinic       Visit Date: 11/22/2022  Cardiologist:  Dr. Donovan Montoya Physician: Dr. Afsaneh Saleem, APRN - CNP    Sangeetha Daugherty is a 63 y.o. male who presents today for:  Chief Complaint   Patient presents with    Congestive Heart Failure     New Patient        HPI:     TYPE HF: HFrEF 25-30%    Cause: ischemic s/p CABG 11/2022, hx of alcohol abuse  Device: life vest  HX: HTN, CAD s/p CABG 11/2022, smoker  Dry Wt:  142 on 11/22/22      Sangeetha Daugherty is a 63 y.o. male who presents to the office for a new patient visit in the heart failure clinic. Concerns today: here today as a new pt. S/P CABG and new dx of ischemic cardiomyopathy. Doing well, only complaint is fatigue. Not on a diuretic. Had been on midodrine, not on it now but still hypotensive. Patient follows:      Hospitalization:        Date of Admission: 10/28/2022  Reason for Consultation:  CHF        History Of Present Illness:    63 y.o. pleasant male c hx of DM and HTN who presented to the hospital with complaints of shortness of breath. As per patient the shortness of breath started 1-2 days prior to presentation. It progressively gotten worsen so he decided to seek medication attention. Patient states that he used to drink alcohol regularly several beers for many years. He has been smoking 1 pack/day for many years recently has cut down to half a pack a day while being on Wellbutrin. He has had poorly controlled diabetes with A1c as high as 15 recently it was brought back down to 7.5. His laboratory work-up shows mildly elevated troponins with a flat trend. Creatinine is 1.4.  proBNP was elevated at 8508. His electrocardiogram shows sinus tachycardia at 105 bpm with nonspecific ST-T wave changes. His echocardiogram from today showed EF of 25-30% with large pleural effusion.   Cardiology was consulted for acute CHF exacerbation     All labs, EKG's, diagnostic testing and images as well as cardiac cath, stress testing were reviewed during this encounter. Activity: ADLs performed  Diet: educated today    Patient has:  Chest Pain: no  SOB: no  Orthopnea/PND: no  CONSTANZA: never tested  Edema: no  Fatigue: yes  Abdominal bloating: no  Cough: no  Appetite: good      Past Medical History:   Diagnosis Date    Hypertension     Prediabetes      Past Surgical History:   Procedure Laterality Date    CORONARY ARTERY BYPASS GRAFT N/A 11/7/2022    CABG X2 JOY WITH BALLOON PUMP performed by Nick Zabala MD at [address] [facility] [facility]       Family History   Problem Relation Age of Onset    Diabetes Father     Stroke Father     Diabetes Brother     Diabetes Brother      Social History     Tobacco Use    Smoking status: Every Day     Packs/day: 0.50     Years: 33.00     Pack years: 16.50     Types: Cigarettes    Smokeless tobacco: Never   Substance Use Topics    Alcohol use: Not Currently     Current Outpatient Medications   Medication Sig Dispense Refill    oxyCODONE (ROXICODONE) 5 MG immediate release tablet Take 1 tablet by mouth every 8 hours as needed for Pain for up to 7 days.  21 tablet 0    aspirin 325 MG EC tablet Take 1 tablet by mouth daily 30 tablet 3    Multiple Vitamin (MULTIVITAMIN) TABS tablet Take 1 tablet by mouth daily (with breakfast) 30 tablet 3    tamsulosin (FLOMAX) 0.4 MG capsule Take 1 capsule by mouth daily 30 capsule 3    acetaminophen (TYLENOL) 500 MG tablet Take 500 mg by mouth every 6 hours as needed for Pain As needed      buPROPion (WELLBUTRIN XL) 150 MG extended release tablet Take 1 tablet by mouth every morning 30 tablet 1    omeprazole (PRILOSEC) 40 MG delayed release capsule Take 1 capsule by mouth daily 90 capsule 3    Dulaglutide (TRULICITY) 1.5 XL/7.9YM SOPN Inject 1.5 mg into the skin once a week 4 pen 0    glimepiride (AMARYL) 4 MG tablet Take 1 tablet by mouth every morning (before breakfast) 90 tablet 0    empagliflozin (JARDIANCE) 25 MG tablet Take 1 tablet by mouth daily 30 tablet 3 atorvastatin (LIPITOR) 40 MG tablet Take 1 tablet by mouth daily 30 tablet 2     No current facility-administered medications for this visit. Allergies   Allergen Reactions    Metformin And Related Nausea And Vomiting       SUBJECTIVE:   Review of Systems   Constitutional:  Positive for fatigue. Negative for activity change, appetite change and diaphoresis. Respiratory:  Negative for cough and shortness of breath. Cardiovascular:  Negative for chest pain, palpitations and leg swelling. Gastrointestinal:  Negative for abdominal distention, nausea and vomiting. Neurological:  Negative for weakness, light-headedness and headaches. Hematological:  Negative for adenopathy. Psychiatric/Behavioral:  Negative for sleep disturbance. OBJECTIVE:   Today's Vitals:  BP (!) 96/56   Pulse (!) 102   Ht 5' 10\" (1.778 m)   Wt 142 lb 6.4 oz (64.6 kg)   SpO2 94%   BMI 20.43 kg/m²     Physical Exam  Vitals reviewed. Constitutional:       General: He is not in acute distress. Appearance: Normal appearance. He is well-developed. He is not diaphoretic. HENT:      Head: Normocephalic and atraumatic. Eyes:      Conjunctiva/sclera: Conjunctivae normal.   Cardiovascular:      Rate and Rhythm: Normal rate and regular rhythm. Heart sounds: Normal heart sounds. No murmur heard. Pulmonary:      Effort: Pulmonary effort is normal. No respiratory distress. Breath sounds: Normal breath sounds. No wheezing or rales. Abdominal:      General: Bowel sounds are normal. There is no distension. Palpations: Abdomen is soft. Tenderness: There is no abdominal tenderness. Musculoskeletal:         General: Normal range of motion. Cervical back: Normal range of motion and neck supple. Right lower leg: No edema. Left lower leg: No edema. Skin:     General: Skin is warm and dry. Capillary Refill: Capillary refill takes less than 2 seconds.    Neurological:      Mental Status: He is alert and oriented to person, place, and time. Coordination: Coordination normal.   Psychiatric:         Behavior: Behavior normal.       Wt Readings from Last 3 Encounters:   11/22/22 142 lb 6.4 oz (64.6 kg)   11/15/22 140 lb 4.8 oz (63.6 kg)   10/26/22 151 lb (68.5 kg)     BP Readings from Last 3 Encounters:   11/22/22 (!) 96/56   11/15/22 (!) 110/57   10/26/22 (!) 148/88     Pulse Readings from Last 3 Encounters:   11/22/22 (!) 102   11/15/22 (!) 105   10/26/22 95     Body mass index is 20.43 kg/m². ECHO:    Conclusions      Summary   Left ventricular size is normal and systolic function is severely reduced. Ejection fraction was estimated at 25-30%. LV wall thickness is within   normal limits. There was severe global hypokinesis of the left ventricle. The right ventricular size appears normal with normal systolic function   and wall thickness. Large pleural effusion      Signature      ----------------------------------------------------------------   Electronically signed by Brandy Vernon MD (Interpreting   physician) on 10/29/2022 at 01:59 PM   ----------------------------------------------------------------       CATH/STRESS:   IMPRESSION:  1. Severe ischemic cardiomyopathy, ejection fraction 15-20% on left  ventriculogram.  2.  Acute systolic congestive heart failure. Left ventricular  end-diastolic pressure 31 mmHg. 3.  Severe coronary artery disease involving the ostium of the LAD,  first diagonal branch and the ramus intermedius artery. RECOMMENDATIONS:  Optimize the volume status. Resume Lasix. Stop IV  fluids. Daily weight. Strict intake and output. The patient has  chronic kidney disease, monitor daily basic metabolic panel, Nephrology  is following. Apparently, a Bella catheter was placed last night due to  concern for urinary retention. Hematuria was reported after Bella  placement. Urology consult is recommended. Monitor CBC. Continue on  aspirin and Lipitor. Monitor the patient on telemetry. Consult  Cardiovascular Surgery. Heart team discussion regarding  revascularization approach is warranted. Options are coronary artery  bypass graft surgery versus high-risk PCI with Impella support. Findings and plan of care were discussed with the patient and his  family, they are agreeable to the plan. Александр Valentine MD     D: 11/01/2022 13:06:38       T: 11/01/2022 13:10:35     [email or web address]  Job#: [id number]     Doc#: [id number]     CC:         Results reviewed:  BNP: No results found for: BNP  CBC:   Lab Results   Component Value Date/Time    WBC 12.7 11/15/2022 03:32 AM    RBC 3.29 11/15/2022 03:32 AM    HGB 9.5 11/15/2022 03:32 AM    HCT 29.5 11/15/2022 03:32 AM     11/15/2022 03:32 AM     CMP:    Lab Results   Component Value Date/Time     11/15/2022 03:32 AM    K 4.4 11/15/2022 03:32 AM    K 4.3 10/28/2022 10:00 PM     11/15/2022 03:32 AM    CO2 23 11/15/2022 03:32 AM    BUN 22 11/15/2022 03:32 AM    CREATININE 1.4 11/15/2022 03:32 AM    GFRAA 58 05/24/2022 04:58 AM    LABGLOM 57 11/14/2022 06:01 PM    GLUCOSE 95 11/15/2022 03:32 AM    CALCIUM 9.0 11/15/2022 03:32 AM     Hepatic Function Panel:    Lab Results   Component Value Date/Time    ALKPHOS 64 11/01/2022 04:32 AM    ALT 6 11/01/2022 04:32 AM    AST 10 11/01/2022 04:32 AM    PROT 5.8 11/01/2022 04:32 AM    BILITOT 0.3 11/01/2022 04:32 AM    LABALBU 3.3 11/12/2022 09:33 AM     Magnesium:    Lab Results   Component Value Date/Time    MG 2.4 11/08/2022 04:30 AM     PT/INR:    Lab Results   Component Value Date/Time    INR 1.23 11/08/2022 04:30 AM     Lipids:    Lab Results   Component Value Date/Time    TRIG 59 10/30/2022 03:35 AM    HDL 59 10/30/2022 03:35 AM    LDLCALC 120 10/30/2022 03:35 AM       ASSESSMENT AND PLAN:   The patient's condition/symptoms are stable        Diagnosis Orders   1. Acute on chronic systolic congestive heart failure, NYHA class 2 ([facility] [address]        2.  Ischemic treatment plan.        Electronically signed by BLACK Márquez CNP on 11/22/2022 at 2:35 PM

## 2022-11-29 ENCOUNTER — TELEPHONE (OUTPATIENT)
Dept: FAMILY MEDICINE CLINIC | Age: 62
End: 2022-11-29

## 2022-11-29 NOTE — TELEPHONE ENCOUNTER
Called patient to get a visit scheduled so Laraine Bloch can fill out his paperwork at the time of his visit that he dropped off for FMLA. When he calls back just need to get him scheduled for a visit.

## 2022-11-30 ENCOUNTER — TELEPHONE (OUTPATIENT)
Dept: CARDIOTHORACIC SURGERY | Age: 62
End: 2022-11-30

## 2022-11-30 PROBLEM — Z01.818 PREOPERATIVE CLEARANCE: Status: RESOLVED | Noted: 2022-10-31 | Resolved: 2022-11-30

## 2022-11-30 NOTE — TELEPHONE ENCOUNTER
Patient states that he needs something sent to his work place, acknowledging his surgery date and time off work. I asked him if he had FMLA paperwork that needed to be filled out? He was not sure. Patient works at [employer] in [address] name is Delmi Robb ([phone] ext. [phone])  [facility] [place] me back regarding patient's FMLA.

## 2022-11-30 NOTE — TELEPHONE ENCOUNTER
That would work, or he could reach out to his specialist and ask if one that saw him in the hospital could fill it out. Thanks!

## 2022-11-30 NOTE — TELEPHONE ENCOUNTER
Call returned by [employer]- she states that they do not handle FMLA, that it will come from third-party source [employer].   She just needs a letter stating how long patient will be off work and when he can return. Letter to be faxed to [phone] once completed.

## 2022-11-30 NOTE — TELEPHONE ENCOUNTER
I spoke to patient wife okay per HIPAA and notified her of patient needing to have a follow up appointment for [facility] paperwork. Patient wife states they do not have a car at this time and have no way of getting to the office. Patient wife wanting to know if Fiorella Jimenez CNP would be able to do a Virtual Visit to discuss paperwork. Please advise.

## 2022-12-05 ENCOUNTER — TELEMEDICINE (OUTPATIENT)
Dept: FAMILY MEDICINE CLINIC | Age: 62
End: 2022-12-05
Payer: COMMERCIAL

## 2022-12-05 DIAGNOSIS — E11.9 DIABETES MELLITUS TYPE 2 IN NONOBESE (HCC): ICD-10-CM

## 2022-12-05 DIAGNOSIS — I42.8 NONISCHEMIC CARDIOMYOPATHY (HCC): ICD-10-CM

## 2022-12-05 DIAGNOSIS — I50.9 NEW ONSET OF CONGESTIVE HEART FAILURE (HCC): Primary | ICD-10-CM

## 2022-12-05 DIAGNOSIS — N18.31 STAGE 3A CHRONIC KIDNEY DISEASE (HCC): ICD-10-CM

## 2022-12-05 DIAGNOSIS — I25.5 ISCHEMIC CARDIOMYOPATHY: ICD-10-CM

## 2022-12-05 PROCEDURE — 3051F HG A1C>EQUAL 7.0%<8.0%: CPT | Performed by: NURSE PRACTITIONER

## 2022-12-05 PROCEDURE — 99214 OFFICE O/P EST MOD 30 MIN: CPT | Performed by: NURSE PRACTITIONER

## 2022-12-05 ASSESSMENT — ENCOUNTER SYMPTOMS
EYE DISCHARGE: 0
CONSTIPATION: 0
COUGH: 0
DIARRHEA: 0
VOMITING: 0
ABDOMINAL PAIN: 0
CHEST TIGHTNESS: 0
SHORTNESS OF BREATH: 0

## 2022-12-05 NOTE — PROGRESS NOTES
Maddie Brizuela [address] [facility] [address]. [address] [facility]  Dept: [phone]  Dept Fax: [phone]    Visit type: Established patient    Reason for Visit: Follow-Up from Hospital         Assessment and Plan       1. New onset of congestive heart failure ([address]  2. Ischemic cardiomyopathy  3. Nonischemic cardiomyopathy ([address]  4. Diabetes mellitus type 2 in nonobese (HCC)  5. Stage 3a chronic kidney disease ([address]    FMLA 10/28/22- when cleared by cardiologist   No medication changes at this time  Had no questions regarding admission  ADA/ AHA Diet with fluid restriction   Return in about 3 months (around 3/5/2023) for DM. Subjective       Is here for follow up hospital  Was admitted for CP, HAD CABG completed and diagnosed with CHF and CAD   Needs FMLA filled out   Is taking toprol, lipitor and ASA  Has follow up apt with cardiothoracic  12/13/2022  Does have crum cath and has follow up on 12/6/2022 with urology   CHF clinic- apt on 1/4/2023   Cardiology follow up 1/18/2023  Diet- trying to follow heart healthy diabetic  Has not been monitoring BS at home   Is not able to move or lift. Review of Systems   Constitutional:  Negative for activity change, appetite change and fever. Eyes:  Negative for discharge and visual disturbance. Respiratory:  Negative for cough, chest tightness and shortness of breath. Cardiovascular:  Negative for chest pain and palpitations. Has life vest in place   Gastrointestinal:  Negative for abdominal pain, constipation, diarrhea and vomiting. Genitourinary:  Positive for difficulty urinating. Crum cath    Musculoskeletal:  Negative for arthralgias and myalgias. Skin:  Positive for wound (to chest from CABG- states itchy). Negative for rash. Neurological:  Negative for dizziness, weakness, numbness and headaches. Psychiatric/Behavioral:  The patient is not nervous/anxious.        Allergies   Allergen Reactions Metformin And Related Nausea And Vomiting       Outpatient Medications Prior to Visit   Medication Sig Dispense Refill    atorvastatin (LIPITOR) 40 MG tablet Take 1 tablet by mouth daily 30 tablet 2    metoprolol succinate (TOPROL XL) 25 MG extended release tablet Take 1 tablet by mouth daily 90 tablet 3    aspirin 325 MG EC tablet Take 1 tablet by mouth daily 30 tablet 3    Multiple Vitamin (MULTIVITAMIN) TABS tablet Take 1 tablet by mouth daily (with breakfast) 30 tablet 3    tamsulosin (FLOMAX) 0.4 MG capsule Take 1 capsule by mouth daily 30 capsule 3    acetaminophen (TYLENOL) 500 MG tablet Take 500 mg by mouth every 6 hours as needed for Pain As needed      buPROPion (WELLBUTRIN XL) 150 MG extended release tablet Take 1 tablet by mouth every morning 30 tablet 1    omeprazole (PRILOSEC) 40 MG delayed release capsule Take 1 capsule by mouth daily 90 capsule 3    Dulaglutide (TRULICITY) 1.5 MG/0.5HL SOPN Inject 1.5 mg into the skin once a week 4 pen 0    glimepiride (AMARYL) 4 MG tablet Take 1 tablet by mouth every morning (before breakfast) 90 tablet 0    empagliflozin (JARDIANCE) 25 MG tablet Take 1 tablet by mouth daily 30 tablet 3     No facility-administered medications prior to visit. Past Medical History:   Diagnosis Date    Hypertension     Prediabetes         Social History     Tobacco Use    Smoking status: Every Day     Packs/day: 0.50     Years: 33.00     Pack years: 16.50     Types: Cigarettes    Smokeless tobacco: Never   Substance Use Topics    Alcohol use: Not Currently        Past Surgical History:   Procedure Laterality Date    CORONARY ARTERY BYPASS GRAFT N/A 11/7/2022    CABG X2 JOY WITH BALLOON PUMP performed by Buffy Balbuena MD at [address] [facility]         Family History   Problem Relation Age of Onset    Diabetes Father     Stroke Father     Diabetes Brother     Diabetes Brother        Objective       There were no vitals taken for this visit.   Physical Exam  Constitutional: General: He is not in acute distress. Appearance: He is well-developed. Interventions: He is not intubated. HENT:      Head: Normocephalic and atraumatic. Right Ear: External ear normal.      Left Ear: External ear normal.   Eyes:      General: Lids are normal.      Conjunctiva/sclera: Conjunctivae normal.   Pulmonary:      Effort: No tachypnea, bradypnea, prolonged expiration, respiratory distress or retractions. He is not intubated. Musculoskeletal:      Cervical back: Normal range of motion. Skin:     Coloration: Skin is not pale. Findings: No erythema or rash. Neurological:      Mental Status: He is alert and oriented to person, place, and time. Psychiatric:         Attention and Perception: Attention and perception normal.         Mood and Affect: Mood and affect normal.         Speech: Speech normal.         Behavior: Behavior normal. Behavior is cooperative. Thought Content: Thought content normal.         Cognition and Memory: Cognition and memory normal.         Judgment: Judgment normal.         Data Reviewed and Summarized       Labs:     Imaging/Testing:        BLACK Samson - CNP    [employer], was evaluated through a synchronous (real-time) audio-video encounter. The patient (or guardian if applicable) is aware that this is a billable service, which includes applicable co-pays. This Virtual Visit was conducted with patient's (and/or legal guardian's) consent. The visit was conducted pursuant to the emergency declaration under the [facility], [facility] authority and the [place] [employer] and [employer] General Act. Patient identification was verified, and a caregiver was present when appropriate. The patient was located at Home: [address]. Provider was located at [facility] (Appt Dept): [address] [place] [address]. [address]         Total time spent for this encounter: Not billed by time    --BLACK Bonilla - CNP on 12/5/2022 at 12:52 PM    An electronic signature was used to authenticate this note.

## 2022-12-06 ENCOUNTER — TELEPHONE (OUTPATIENT)
Dept: FAMILY MEDICINE CLINIC | Age: 62
End: 2022-12-06

## 2022-12-06 ENCOUNTER — TELEPHONE (OUTPATIENT)
Dept: CARDIOTHORACIC SURGERY | Age: 62
End: 2022-12-06

## 2022-12-06 NOTE — TELEPHONE ENCOUNTER
Mary Pardon called from [employer]  group she was wanting some confirmation on him short term disability     She needs confirmation as to that he was out of work from October 5th due to fractured ribs and when his return to work is/was?     Mary Damian can be reached at [facility]

## 2022-12-06 NOTE — TELEPHONE ENCOUNTER
October 5th - Nov 3rd is what I had discussed with patient for fracture ribs-  During that time however he had Had CABG and was admitted 10/28/2022 for something different and is off until cardiac clearance

## 2022-12-08 NOTE — TELEPHONE ENCOUNTER
Faxed restrictions form to [employer] [phone]  Received confirmation.     Physician's statement from [employer] filled out, pending signature

## 2022-12-13 ENCOUNTER — PROCEDURE VISIT (OUTPATIENT)
Dept: UROLOGY | Age: 62
End: 2022-12-13
Payer: COMMERCIAL

## 2022-12-13 ENCOUNTER — HOSPITAL ENCOUNTER (OUTPATIENT)
Age: 62
Discharge: HOME | End: 2022-12-13
Payer: COMMERCIAL

## 2022-12-13 ENCOUNTER — HOSPITAL ENCOUNTER (OUTPATIENT)
Dept: GENERAL RADIOLOGY | Age: 62
Discharge: HOME | End: 2022-12-13
Payer: COMMERCIAL

## 2022-12-13 ENCOUNTER — OFFICE VISIT (OUTPATIENT)
Dept: CARDIOTHORACIC SURGERY | Age: 62
End: 2022-12-13

## 2022-12-13 ENCOUNTER — NURSE ONLY (OUTPATIENT)
Dept: LAB | Age: 62
End: 2022-12-13

## 2022-12-13 VITALS
DIASTOLIC BLOOD PRESSURE: 57 MMHG | HEART RATE: 93 BPM | HEIGHT: 70 IN | BODY MASS INDEX: 20.41 KG/M2 | SYSTOLIC BLOOD PRESSURE: 108 MMHG | OXYGEN SATURATION: 100 % | WEIGHT: 142.6 LBS

## 2022-12-13 VITALS — WEIGHT: 142 LBS | RESPIRATION RATE: 16 BRPM | BODY MASS INDEX: 20.33 KG/M2 | HEIGHT: 70 IN

## 2022-12-13 DIAGNOSIS — R31.9 HEMATURIA, UNSPECIFIED TYPE: Primary | ICD-10-CM

## 2022-12-13 DIAGNOSIS — Z95.1 S/P CABG X 2: ICD-10-CM

## 2022-12-13 DIAGNOSIS — Z95.1 S/P CABG X 2: Primary | ICD-10-CM

## 2022-12-13 LAB
ANION GAP SERPL CALCULATED.3IONS-SCNC: 12 MEQ/L (ref 8–16)
BUN BLDV-MCNC: 36 MG/DL (ref 7–22)
CALCIUM SERPL-MCNC: 9.5 MG/DL (ref 8.5–10.5)
CHLORIDE BLD-SCNC: 101 MEQ/L (ref 98–111)
CO2: 24 MEQ/L (ref 23–33)
CREAT SERPL-MCNC: 1.7 MG/DL (ref 0.4–1.2)
ERYTHROCYTE [DISTWIDTH] IN BLOOD BY AUTOMATED COUNT: 13.3 % (ref 11.5–14.5)
ERYTHROCYTE [DISTWIDTH] IN BLOOD BY AUTOMATED COUNT: 44.1 FL (ref 35–45)
GFR SERPL CREATININE-BSD FRML MDRD: 45 ML/MIN/1.73M2
GLUCOSE BLD-MCNC: 185 MG/DL (ref 70–108)
HCT VFR BLD CALC: 36.5 % (ref 42–52)
HEMOGLOBIN: 11.2 GM/DL (ref 14–18)
MCH RBC QN AUTO: 27.7 PG (ref 26–33)
MCHC RBC AUTO-ENTMCNC: 30.7 GM/DL (ref 32.2–35.5)
MCV RBC AUTO: 90.3 FL (ref 80–94)
PLATELET # BLD: 339 THOU/MM3 (ref 130–400)
PMV BLD AUTO: 10.4 FL (ref 9.4–12.4)
POTASSIUM SERPL-SCNC: 4.9 MEQ/L (ref 3.5–5.2)
RBC # BLD: 4.04 MILL/MM3 (ref 4.7–6.1)
SODIUM BLD-SCNC: 137 MEQ/L (ref 135–145)
WBC # BLD: 8.4 THOU/MM3 (ref 4.8–10.8)

## 2022-12-13 PROCEDURE — 52000 CYSTOURETHROSCOPY: CPT | Performed by: UROLOGY

## 2022-12-13 PROCEDURE — 71046 X-RAY EXAM CHEST 2 VIEWS: CPT

## 2022-12-13 PROCEDURE — 99024 POSTOP FOLLOW-UP VISIT: CPT | Performed by: PHYSICIAN ASSISTANT

## 2022-12-13 NOTE — PROGRESS NOTES
Cystoscopy    Operative Note    Patient:  Geena Torres  MRN: [id number]  YOB: 1960    Date: 12/13/22  Surgeon: Kala Holt MD  Anesthesia: Radha Vieyra Local  Indications: gross hematuria, retention after cabg. Position: Supine  EBL: 0 ml    Findings:   The patient was prepped and draped in the usual sterile fashion. The flexible cystoscope was advanced through the urethra and into the bladder. The bladder was thoroughly inspected and the following was noted:    Residual Urine: significant\" \" . urine cloudy and concerning for infection  Urethra: No abnormalities of the urethra are noted. Urethral dilation was not performed. Prostate: lateral lobe hypertrophy + present, prostate moderately obstructing, intravesical extension of prostate not present. There was no previous TURP defect. Bladder:  catheter cystitis vs tumor  . Moderate trabeculation noted. small bladder diverticulum. Ureters: Orifices with normal configuration and location. The cystoscope was removed. The patient tolerated the procedure well. Postop retention after cabg  Failed voiding trials  Leave catheter out today  Needs reassessment in 6-8 weeks with cystoscopy to reevaluate cystitis vs tumor  Incomplete emptying likely baseline but pt without issues.  Cont flomax

## 2022-12-13 NOTE — PROGRESS NOTES
Patient has given me verbal consent to perform crum removal  Yes    10 cc of water deflated from crum balloon. 16 Fr crum removed without difficulty. Foreskin reduced back down? N/A      Pt will drink fluids and report to ER in 6-8 hours if patient unable to urinate.

## 2022-12-13 NOTE — PROGRESS NOTES
CT/CV Surgery Follow Up Office Visit      Patient's Name/Date of Birth: Tonny Miner / 1960 (63 y.o.)    CC:   Chief Complaint   Patient presents with    Follow-up     S/p CABG x 2 11.07.2022 with Dr. Monika Casarez        PCP: BLACK Severino CNP    Date: December 13, 2022     HPI:   We had the pleasure of seeing Tonny Miner in the office today, as you know this is a very pleasant 63y.o. year old male S/p CABG X 2 on 11/7/22 by Dr. Matt Dee. He has slowly regaining energy and strength post-op. Has not started cardiac rehab yet. CXR PA & LATERAL:12/13/22      Past Medical History:  Gurmeet Smart  has a past medical history of Hypertension and Prediabetes. Past Surgical History:  The patient  has a past surgical history that includes Tonsillectomy and Coronary artery bypass graft (N/A, 11/7/2022). Allergies: The patient is allergic to metformin and related. Medications:  Prior to Admission medications    Medication Sig Start Date End Date Taking?  Authorizing Provider   atorvastatin (LIPITOR) 40 MG tablet Take 1 tablet by mouth daily 11/22/22  Yes BLACK Shultz CNP   metoprolol succinate (TOPROL XL) 25 MG extended release tablet Take 1 tablet by mouth daily 11/22/22  Yes BLACK Saldivar CNP   aspirin 325 MG EC tablet Take 1 tablet by mouth daily 11/15/22  Yes Vishnu Jovel PA-C   tamsulosin [facility]) 0.4 MG capsule Take 1 capsule by mouth daily 11/15/22  Yes Vishnu Jovel PA-C   acetaminophen (TYLENOL) 500 MG tablet Take 500 mg by mouth every 6 hours as needed for Pain As needed   Yes Historical Provider, MD   buPROPion (WELLBUTRIN XL) 150 MG extended release tablet Take 1 tablet by mouth every morning 10/12/22  Yes BLACK Shultz CNP   omeprazole (PRILOSEC) 40 MG delayed release capsule Take 1 capsule by mouth daily 10/12/22  Yes BLACK Shultz CNP   Dulaglutide (TRULICITY) 1.5 KI/6.9LO SOPN Inject 1.5 mg into the skin once a week 8/18/22 Yes Monie Cerise, APRN - CNP   glimepiride (AMARYL) 4 MG tablet Take 1 tablet by mouth every morning (before breakfast) 6/28/22  Yes Monie Cerise, APRN - CNP   empagliflozin (JARDIANCE) 25 MG tablet Take 1 tablet by mouth daily 5/24/22  Yes Monie Cerise, APRN - CNP   Multiple Vitamin (MULTIVITAMIN) TABS tablet Take 1 tablet by mouth daily (with breakfast)  Patient not taking: Reported on 12/13/2022 11/15/22   En Fitzgerald PA-C       Family History: This patient's family history includes Diabetes in his brother, brother, and father; Stroke in his father. Social History:  Sindy Parry  reports that he has been smoking cigarettes. He has a 16.50 pack-year smoking history. He has never used smokeless tobacco. He reports that he does not currently use alcohol. He reports that he does not use drugs. Vital Signs:   BP (!) 108/57   Pulse 93   Ht 5' 10\" (1.778 m)   Wt 142 lb 9.6 oz (64.7 kg)   SpO2 100%   BMI 20.46 kg/m²          Physical Exam:  General appearance:  No acute distress, appears stated age and cooperative. Neck: No jugular venous distention. Trachea midline. Respiratory:  Normal respiratory effort. Clear to auscultation, bilaterally without Rales/Wheezes/Rhonch. Cardiovascular:  Regular rate and rhythm with normal S1/S2 without murmurs, rubs or gallops. Abdomen: Soft, non-tender, non-distended with normal bowel sounds. Ext: No clubbing, cyanosis or edema bilaterally. Skin: Skin color, texture, turgor normal.  No rashes or lesions. Neurologic:  Neurovascularly intact without any focal sensory/motor deficits. Psychiatric:  Alert and oriented, thought content appropriate, normal insight. Peripheral Pulses: +2 palpable, equal bilaterally   Incisions: Clean, dry, and intact. Sternum with no dehiscence.      Labs:    CBC:  Lab Results   Component Value Date/Time    WBC 8.4 12/13/2022 09:36 AM    HGB 11.2 12/13/2022 09:36 AM    HCT 36.5 12/13/2022 09:36 AM    MCV 90.3 12/13/2022 09:36 AM  12/13/2022 09:36 AM    INR 1.23 11/08/2022 04:30 AM     BMP:   Lab Results   Component Value Date/Time     12/13/2022 09:36 AM    K 4.9 12/13/2022 09:36 AM    K 4.3 10/28/2022 10:00 PM     12/13/2022 09:36 AM    CO2 24 12/13/2022 09:36 AM    PHOS 3.5 10/29/2022 03:30 AM    BUN 36 12/13/2022 09:36 AM    CREATININE 1.7 12/13/2022 09:36 AM    MG 2.4 11/08/2022 04:30 AM       Active Problem List  Patient Active Problem List   Diagnosis    Syncope    Facial injury    Tobacco abuse    Cranial facial fractures (HCC)    Diabetes mellitus type 2 in nonobese (HCC)    Fungal toenail infection    Golfer's elbow    Medical non-compliance    Erectile dysfunction    NAVARRO (generalized anxiety disorder)    Impacted cerumen of right ear    Essential hypertension    Uncontrolled type 2 diabetes mellitus with hyperglycemia (HCC)    Thoracic arthritis    New onset of congestive heart failure (HCC)    Acute systolic congestive heart failure (HCC)    Nonischemic cardiomyopathy (HCC)    Stage 3a chronic kidney disease (HCC)    Tremor    LC (acute kidney injury) ([facility] [address]    Hyperlipidemia    Gastroesophageal reflux disease    S/P cardiac cath    CAD, multiple vessel    Ischemic cardiomyopathy    ETOH abuse    S/P CABG x 2       Assessment:  CAD- S/p CABG X 2      Plan 12/13/22:  Continue current medical therapy. 2.  CXR and labs reviewed. 3.  Sternal precautions are still in place for 2 full months postop with no heavy lifting, but they are cleared to start driving locally. 4.  Importance of cardiac rehab recommended. 5.  Follow up office visit prn. Thank you for allowing us to be involved in the patient's care.     Electronically by Pari Powell PA-C  on 12/13/2022 at 12:25 PM

## 2022-12-15 ENCOUNTER — APPOINTMENT (OUTPATIENT)
Dept: CT IMAGING | Age: 62
End: 2022-12-15
Payer: COMMERCIAL

## 2022-12-15 ENCOUNTER — APPOINTMENT (OUTPATIENT)
Dept: MRI IMAGING | Age: 62
End: 2022-12-15
Payer: COMMERCIAL

## 2022-12-15 ENCOUNTER — HOSPITAL ENCOUNTER (INPATIENT)
Age: 62
LOS: 8 days | Discharge: NURSING, REHAB OR LONG-TERM CARE | End: 2022-12-23
Attending: EMERGENCY MEDICINE | Admitting: INTERNAL MEDICINE
Payer: COMMERCIAL

## 2022-12-15 DIAGNOSIS — N10 ACUTE PYELONEPHRITIS: Primary | ICD-10-CM

## 2022-12-15 DIAGNOSIS — R29.6 MULTIPLE FALLS: ICD-10-CM

## 2022-12-15 PROBLEM — N30.80 EMPHYSEMATOUS CYSTITIS: Status: ACTIVE | Noted: 2022-12-15

## 2022-12-15 LAB
ACINETOBACTER CALCOACETICUS-BAUMANNII COMPLEX: NOT DETECTED
ALBUMIN SERPL-MCNC: 4 G/DL (ref 3.5–5.1)
ALP BLD-CCNC: 96 U/L (ref 38–126)
ALT SERPL-CCNC: 10 U/L (ref 11–66)
AMORPHOUS: ABNORMAL
ANION GAP SERPL CALCULATED.3IONS-SCNC: 12 MEQ/L (ref 8–16)
ANION GAP SERPL CALCULATED.3IONS-SCNC: 15 MEQ/L (ref 8–16)
ANTIBIOTIC RESISTANCE MARKER: VANCOMYCIN: VANA,B: ABNORMAL
AST SERPL-CCNC: 15 U/L (ref 5–40)
AVERAGE GLUCOSE: 168 MG/DL (ref 70–126)
BACTERIA: ABNORMAL /HPF
BACTEROIDES FRAGILIS: NOT DETECTED
BASOPHILS # BLD: 0.3 %
BASOPHILS ABSOLUTE: 0.1 THOU/MM3 (ref 0–0.1)
BILIRUB SERPL-MCNC: 0.8 MG/DL (ref 0.3–1.2)
BILIRUBIN URINE: ABNORMAL
BLOOD, URINE: ABNORMAL
BOTTLE TYPE: ABNORMAL
BUN BLDV-MCNC: 42 MG/DL (ref 7–22)
BUN BLDV-MCNC: 48 MG/DL (ref 7–22)
CALCIUM SERPL-MCNC: 8.4 MG/DL (ref 8.5–10.5)
CALCIUM SERPL-MCNC: 9.6 MG/DL (ref 8.5–10.5)
CANDIDA ALBICANS: NOT DETECTED
CANDIDA AURIS: NOT DETECTED
CANDIDA GLABRATA: NOT DETECTED
CANDIDA KRUSEI: NOT DETECTED
CANDIDA PARAPSILOSIS: NOT DETECTED
CANDIDA TROPICALIS: NOT DETECTED
CASTS 2: ABNORMAL /LPF
CASTS UA: ABNORMAL /LPF
CHARACTER, URINE: ABNORMAL
CHLORIDE BLD-SCNC: 102 MEQ/L (ref 98–111)
CHLORIDE BLD-SCNC: 97 MEQ/L (ref 98–111)
CO2: 19 MEQ/L (ref 23–33)
CO2: 20 MEQ/L (ref 23–33)
COLOR: ABNORMAL
CREAT SERPL-MCNC: 1.7 MG/DL (ref 0.4–1.2)
CREAT SERPL-MCNC: 2.1 MG/DL (ref 0.4–1.2)
CRYPTOCOCCUS NEOFORMANS/GATTII: NOT DETECTED
CRYSTALS, UA: ABNORMAL
CRYSTALS, UA: ABNORMAL
CTX-M: NOT DETECTED
EKG ATRIAL RATE: 121 BPM
EKG P AXIS: 73 DEGREES
EKG P-R INTERVAL: 132 MS
EKG Q-T INTERVAL: 312 MS
EKG QRS DURATION: 74 MS
EKG QTC CALCULATION (BAZETT): 443 MS
EKG R AXIS: 85 DEGREES
EKG T AXIS: -33 DEGREES
EKG VENTRICULAR RATE: 121 BPM
ENTEROBACTER CLOACAE COMPLEX: DETECTED
ENTEROBACTERALES: DETECTED
ENTEROCOCCUS FAECALIS: NOT DETECTED
ENTEROCOCCUS FAECIUM: NOT DETECTED
EOSINOPHIL # BLD: 0.2 %
EOSINOPHILS ABSOLUTE: 0 THOU/MM3 (ref 0–0.4)
EPITHELIAL CELLS, UA: ABNORMAL /HPF
ERYTHROCYTE [DISTWIDTH] IN BLOOD BY AUTOMATED COUNT: 13.6 % (ref 11.5–14.5)
ERYTHROCYTE [DISTWIDTH] IN BLOOD BY AUTOMATED COUNT: 43.4 FL (ref 35–45)
ESCHERICHIA COLI: NOT DETECTED
ETHYL ALCOHOL, SERUM: < 0.01 %
GFR SERPL CREATININE-BSD FRML MDRD: 35 ML/MIN/1.73M2
GFR SERPL CREATININE-BSD FRML MDRD: 45 ML/MIN/1.73M2
GLUCOSE BLD-MCNC: 130 MG/DL (ref 70–108)
GLUCOSE BLD-MCNC: 151 MG/DL (ref 70–108)
GLUCOSE BLD-MCNC: 164 MG/DL (ref 70–108)
GLUCOSE BLD-MCNC: 171 MG/DL (ref 70–108)
GLUCOSE BLD-MCNC: 275 MG/DL (ref 70–108)
GLUCOSE URINE: >= 1000 MG/DL
HAEMOPHILUS INFLUENZAE: NOT DETECTED
HBA1C MFR BLD: 7.6 % (ref 4.4–6.4)
HCT VFR BLD CALC: 33.3 % (ref 42–52)
HEMOGLOBIN: 10.6 GM/DL (ref 14–18)
ICTOTEST: NEGATIVE
IMMATURE GRANS (ABS): 0.15 THOU/MM3 (ref 0–0.07)
IMMATURE GRANULOCYTES: 0.7 %
IMP: NOT DETECTED
KETONES, URINE: NEGATIVE
KLEBSIELLA AEROGENES: NOT DETECTED
KLEBSIELLA OXYTOCA: NOT DETECTED
KLEBSIELLA PNEUMONIAE GROUP: DETECTED
KPC (CARBAPENEM RESISTANCE GENE): NOT DETECTED
LACTIC ACID: 1.3 MMOL/L (ref 0.5–2)
LEUKOCYTE ESTERASE, URINE: ABNORMAL
LISTERIA MONOCYTOGENES: NOT DETECTED
LYMPHOCYTES # BLD: 2.6 %
LYMPHOCYTES ABSOLUTE: 0.6 THOU/MM3 (ref 1–4.8)
MCH RBC QN AUTO: 27.7 PG (ref 26–33)
MCHC RBC AUTO-ENTMCNC: 31.8 GM/DL (ref 32.2–35.5)
MCR-1: NOT DETECTED
MCV RBC AUTO: 87.2 FL (ref 80–94)
MECA/C AND MREJ (MRSA): ABNORMAL
MECA/C: ABNORMAL
MISCELLANEOUS 2: ABNORMAL
MISCELLANEOUS LAB TEST RESULT: ABNORMAL
MONOCYTES # BLD: 8.1 %
MONOCYTES ABSOLUTE: 1.8 THOU/MM3 (ref 0.4–1.3)
MRSA SCREEN RT-PCR: NEGATIVE
MUCUS: ABNORMAL
NDM: NOT DETECTED
NEISSERIA MENINGITIDIS, NMNI: NOT DETECTED
NITRITE, URINE: POSITIVE
NUCLEATED RED BLOOD CELLS: 0 /100 WBC
OSMOLALITY CALCULATION: 280.5 MOSMOL/KG (ref 275–300)
OSMOLALITY CALCULATION: 286.9 MOSMOL/KG (ref 275–300)
OXA-48-LIKE: NOT DETECTED
PH UA: 5.5 (ref 5–9)
PLATELET # BLD: 296 THOU/MM3 (ref 130–400)
PMV BLD AUTO: 10.2 FL (ref 9.4–12.4)
POTASSIUM SERPL-SCNC: 4.1 MEQ/L (ref 3.5–5.2)
POTASSIUM SERPL-SCNC: 4.5 MEQ/L (ref 3.5–5.2)
PROTEIN UA: 100
PROTEUS SPP: NOT DETECTED
PSEUDOMONAS AERUGINOSA: NOT DETECTED
RBC # BLD: 3.82 MILL/MM3 (ref 4.7–6.1)
RBC URINE: > 200 /HPF
RENAL EPITHELIAL, UA: ABNORMAL
SALMONELLA SPECIES: NOT DETECTED
SEG NEUTROPHILS: 88.1 %
SEGMENTED NEUTROPHILS ABSOLUTE COUNT: 19.6 THOU/MM3 (ref 1.8–7.7)
SERRATIA MARCESCENS: NOT DETECTED
SODIUM BLD-SCNC: 132 MEQ/L (ref 135–145)
SODIUM BLD-SCNC: 133 MEQ/L (ref 135–145)
SOURCE OF BLOOD CULTURE: ABNORMAL
SPECIFIC GRAVITY, URINE: >= 1.03 (ref 1–1.03)
STAPHYLOCOCCUS AUREUS: NOT DETECTED
STAPHYLOCOCCUS EPIDERMIDIS: NOT DETECTED
STAPHYLOCOCCUS LUGDUNENSIS: NOT DETECTED
STAPHYLOCOCCUS SPP: NOT DETECTED
STENOTROPHOMONAS MALTOPHILIA: NOT DETECTED
STREPTOCOCCUS AGALACTIAE (GROUP B): NOT DETECTED
STREPTOCOCCUS PNEUMONIAE: NOT DETECTED
STREPTOCOCCUS PYOGENES (GROUP A): NOT DETECTED
STREPTOCOCCUS SPP: NOT DETECTED
TOTAL PROTEIN: 6.7 G/DL (ref 6.1–8)
TROPONIN T: 0.01 NG/ML
UROBILINOGEN, URINE: 0.2 EU/DL (ref 0–1)
VANCOMYCIN RESISTANT ENTEROCOCCUS: NEGATIVE
VIM: NOT DETECTED
WBC # BLD: 22.3 THOU/MM3 (ref 4.8–10.8)
WBC UA: > 200 /HPF
YEAST: ABNORMAL

## 2022-12-15 PROCEDURE — 99221 1ST HOSP IP/OBS SF/LOW 40: CPT | Performed by: NURSE PRACTITIONER

## 2022-12-15 PROCEDURE — 81001 URINALYSIS AUTO W/SCOPE: CPT

## 2022-12-15 PROCEDURE — 70551 MRI BRAIN STEM W/O DYE: CPT

## 2022-12-15 PROCEDURE — 87070 CULTURE OTHR SPECIMN AEROBIC: CPT

## 2022-12-15 PROCEDURE — 6360000002 HC RX W HCPCS: Performed by: EMERGENCY MEDICINE

## 2022-12-15 PROCEDURE — 85025 COMPLETE CBC W/AUTO DIFF WBC: CPT

## 2022-12-15 PROCEDURE — 84484 ASSAY OF TROPONIN QUANT: CPT

## 2022-12-15 PROCEDURE — 6370000000 HC RX 637 (ALT 250 FOR IP): Performed by: SOCIAL WORKER

## 2022-12-15 PROCEDURE — 70547 MR ANGIOGRAPHY NECK W/O DYE: CPT

## 2022-12-15 PROCEDURE — 72125 CT NECK SPINE W/O DYE: CPT

## 2022-12-15 PROCEDURE — 70544 MR ANGIOGRAPHY HEAD W/O DYE: CPT

## 2022-12-15 PROCEDURE — 76376 3D RENDER W/INTRP POSTPROCES: CPT

## 2022-12-15 PROCEDURE — 87040 BLOOD CULTURE FOR BACTERIA: CPT

## 2022-12-15 PROCEDURE — 36415 COLL VENOUS BLD VENIPUNCTURE: CPT

## 2022-12-15 PROCEDURE — 71250 CT THORAX DX C-: CPT

## 2022-12-15 PROCEDURE — 70450 CT HEAD/BRAIN W/O DYE: CPT

## 2022-12-15 PROCEDURE — 82948 REAGENT STRIP/BLOOD GLUCOSE: CPT

## 2022-12-15 PROCEDURE — 93005 ELECTROCARDIOGRAM TRACING: CPT | Performed by: EMERGENCY MEDICINE

## 2022-12-15 PROCEDURE — 87641 MR-STAPH DNA AMP PROBE: CPT

## 2022-12-15 PROCEDURE — 87186 SC STD MICRODIL/AGAR DIL: CPT

## 2022-12-15 PROCEDURE — 6370000000 HC RX 637 (ALT 250 FOR IP): Performed by: EMERGENCY MEDICINE

## 2022-12-15 PROCEDURE — 80053 COMPREHEN METABOLIC PANEL: CPT

## 2022-12-15 PROCEDURE — 83605 ASSAY OF LACTIC ACID: CPT

## 2022-12-15 PROCEDURE — 87500 VANOMYCIN DNA AMP PROBE: CPT

## 2022-12-15 PROCEDURE — 2580000003 HC RX 258: Performed by: INTERNAL MEDICINE

## 2022-12-15 PROCEDURE — 83036 HEMOGLOBIN GLYCOSYLATED A1C: CPT

## 2022-12-15 PROCEDURE — 6360000002 HC RX W HCPCS: Performed by: STUDENT IN AN ORGANIZED HEALTH CARE EDUCATION/TRAINING PROGRAM

## 2022-12-15 PROCEDURE — 6370000000 HC RX 637 (ALT 250 FOR IP): Performed by: STUDENT IN AN ORGANIZED HEALTH CARE EDUCATION/TRAINING PROGRAM

## 2022-12-15 PROCEDURE — 96360 HYDRATION IV INFUSION INIT: CPT

## 2022-12-15 PROCEDURE — 93010 ELECTROCARDIOGRAM REPORT: CPT | Performed by: INTERNAL MEDICINE

## 2022-12-15 PROCEDURE — 99223 1ST HOSP IP/OBS HIGH 75: CPT | Performed by: INTERNAL MEDICINE

## 2022-12-15 PROCEDURE — 87077 CULTURE AEROBIC IDENTIFY: CPT

## 2022-12-15 PROCEDURE — 87086 URINE CULTURE/COLONY COUNT: CPT

## 2022-12-15 PROCEDURE — 74176 CT ABD & PELVIS W/O CONTRAST: CPT

## 2022-12-15 PROCEDURE — 82077 ASSAY SPEC XCP UR&BREATH IA: CPT

## 2022-12-15 PROCEDURE — 96365 THER/PROPH/DIAG IV INF INIT: CPT

## 2022-12-15 PROCEDURE — 93005 ELECTROCARDIOGRAM TRACING: CPT | Performed by: INTERNAL MEDICINE

## 2022-12-15 PROCEDURE — 87801 DETECT AGNT MULT DNA AMPLI: CPT

## 2022-12-15 PROCEDURE — 2580000003 HC RX 258: Performed by: EMERGENCY MEDICINE

## 2022-12-15 PROCEDURE — 2060000000 HC ICU INTERMEDIATE R&B

## 2022-12-15 PROCEDURE — 99285 EMERGENCY DEPT VISIT HI MDM: CPT

## 2022-12-15 PROCEDURE — 2580000003 HC RX 258: Performed by: STUDENT IN AN ORGANIZED HEALTH CARE EDUCATION/TRAINING PROGRAM

## 2022-12-15 RX ORDER — ASPIRIN 81 MG/1
81 TABLET ORAL DAILY
Status: DISCONTINUED | OUTPATIENT
Start: 2022-12-15 | End: 2022-12-18

## 2022-12-15 RX ORDER — DEXTROSE MONOHYDRATE 100 MG/ML
INJECTION, SOLUTION INTRAVENOUS CONTINUOUS PRN
Status: DISCONTINUED | OUTPATIENT
Start: 2022-12-15 | End: 2022-12-23 | Stop reason: HOSPADM

## 2022-12-15 RX ORDER — SODIUM CHLORIDE 0.9 % (FLUSH) 0.9 %
5-40 SYRINGE (ML) INJECTION EVERY 12 HOURS SCHEDULED
Status: DISCONTINUED | OUTPATIENT
Start: 2022-12-15 | End: 2022-12-23 | Stop reason: HOSPADM

## 2022-12-15 RX ORDER — BUPROPION HYDROCHLORIDE 150 MG/1
150 TABLET ORAL EVERY MORNING
Status: DISCONTINUED | OUTPATIENT
Start: 2022-12-15 | End: 2022-12-23 | Stop reason: HOSPADM

## 2022-12-15 RX ORDER — ACETAMINOPHEN 500 MG
1000 TABLET ORAL ONCE
Status: COMPLETED | OUTPATIENT
Start: 2022-12-15 | End: 2022-12-15

## 2022-12-15 RX ORDER — POLYETHYLENE GLYCOL 3350 17 G/17G
17 POWDER, FOR SOLUTION ORAL DAILY PRN
Status: DISCONTINUED | OUTPATIENT
Start: 2022-12-15 | End: 2022-12-23

## 2022-12-15 RX ORDER — INSULIN LISPRO 100 [IU]/ML
0-8 INJECTION, SOLUTION INTRAVENOUS; SUBCUTANEOUS
Status: DISCONTINUED | OUTPATIENT
Start: 2022-12-15 | End: 2022-12-23 | Stop reason: HOSPADM

## 2022-12-15 RX ORDER — OMEPRAZOLE 20 MG/1
40 CAPSULE, DELAYED RELEASE ORAL DAILY
Status: DISCONTINUED | OUTPATIENT
Start: 2022-12-15 | End: 2022-12-15 | Stop reason: CLARIF

## 2022-12-15 RX ORDER — ATORVASTATIN CALCIUM 40 MG/1
40 TABLET, FILM COATED ORAL DAILY
Status: DISCONTINUED | OUTPATIENT
Start: 2022-12-15 | End: 2022-12-23 | Stop reason: HOSPADM

## 2022-12-15 RX ORDER — LEVOFLOXACIN 5 MG/ML
500 INJECTION, SOLUTION INTRAVENOUS ONCE
Status: COMPLETED | OUTPATIENT
Start: 2022-12-15 | End: 2022-12-15

## 2022-12-15 RX ORDER — ONDANSETRON 2 MG/ML
4 INJECTION INTRAMUSCULAR; INTRAVENOUS EVERY 6 HOURS PRN
Status: DISCONTINUED | OUTPATIENT
Start: 2022-12-15 | End: 2022-12-23 | Stop reason: HOSPADM

## 2022-12-15 RX ORDER — ACETAMINOPHEN 650 MG/1
650 SUPPOSITORY RECTAL EVERY 6 HOURS PRN
Status: DISCONTINUED | OUTPATIENT
Start: 2022-12-15 | End: 2022-12-23 | Stop reason: HOSPADM

## 2022-12-15 RX ORDER — CLOPIDOGREL BISULFATE 75 MG/1
75 TABLET ORAL DAILY
Status: DISCONTINUED | OUTPATIENT
Start: 2022-12-15 | End: 2022-12-18

## 2022-12-15 RX ORDER — HYDROCODONE BITARTRATE AND ACETAMINOPHEN 5; 325 MG/1; MG/1
1 TABLET ORAL ONCE
Status: COMPLETED | OUTPATIENT
Start: 2022-12-15 | End: 2022-12-15

## 2022-12-15 RX ORDER — SODIUM CHLORIDE 0.9 % (FLUSH) 0.9 %
5-40 SYRINGE (ML) INJECTION PRN
Status: DISCONTINUED | OUTPATIENT
Start: 2022-12-15 | End: 2022-12-23 | Stop reason: HOSPADM

## 2022-12-15 RX ORDER — ONDANSETRON 4 MG/1
4 TABLET, ORALLY DISINTEGRATING ORAL EVERY 8 HOURS PRN
Status: DISCONTINUED | OUTPATIENT
Start: 2022-12-15 | End: 2022-12-23 | Stop reason: HOSPADM

## 2022-12-15 RX ORDER — SODIUM CHLORIDE 9 MG/ML
INJECTION, SOLUTION INTRAVENOUS PRN
Status: DISCONTINUED | OUTPATIENT
Start: 2022-12-15 | End: 2022-12-23 | Stop reason: HOSPADM

## 2022-12-15 RX ORDER — SODIUM CHLORIDE, SODIUM LACTATE, POTASSIUM CHLORIDE, CALCIUM CHLORIDE 600; 310; 30; 20 MG/100ML; MG/100ML; MG/100ML; MG/100ML
INJECTION, SOLUTION INTRAVENOUS CONTINUOUS
Status: DISCONTINUED | OUTPATIENT
Start: 2022-12-15 | End: 2022-12-16

## 2022-12-15 RX ORDER — INSULIN LISPRO 100 [IU]/ML
0-4 INJECTION, SOLUTION INTRAVENOUS; SUBCUTANEOUS NIGHTLY
Status: DISCONTINUED | OUTPATIENT
Start: 2022-12-15 | End: 2022-12-23 | Stop reason: HOSPADM

## 2022-12-15 RX ORDER — ACETAMINOPHEN 325 MG/1
650 TABLET ORAL EVERY 6 HOURS PRN
Status: DISCONTINUED | OUTPATIENT
Start: 2022-12-15 | End: 2022-12-23 | Stop reason: HOSPADM

## 2022-12-15 RX ORDER — PANTOPRAZOLE SODIUM 40 MG/1
40 TABLET, DELAYED RELEASE ORAL
Status: DISCONTINUED | OUTPATIENT
Start: 2022-12-15 | End: 2022-12-23 | Stop reason: HOSPADM

## 2022-12-15 RX ORDER — SODIUM CHLORIDE 9 MG/ML
INJECTION, SOLUTION INTRAVENOUS CONTINUOUS
Status: DISCONTINUED | OUTPATIENT
Start: 2022-12-15 | End: 2022-12-23 | Stop reason: HOSPADM

## 2022-12-15 RX ORDER — ENOXAPARIN SODIUM 100 MG/ML
40 INJECTION SUBCUTANEOUS DAILY
Status: DISCONTINUED | OUTPATIENT
Start: 2022-12-15 | End: 2022-12-18

## 2022-12-15 RX ORDER — 0.9 % SODIUM CHLORIDE 0.9 %
2000 INTRAVENOUS SOLUTION INTRAVENOUS ONCE
Status: COMPLETED | OUTPATIENT
Start: 2022-12-15 | End: 2022-12-15

## 2022-12-15 RX ADMIN — SODIUM CHLORIDE: 9 INJECTION, SOLUTION INTRAVENOUS at 12:32

## 2022-12-15 RX ADMIN — ASPIRIN 81 MG: 81 TABLET, COATED ORAL at 20:11

## 2022-12-15 RX ADMIN — ACETAMINOPHEN 650 MG: 325 TABLET ORAL at 08:55

## 2022-12-15 RX ADMIN — ASPIRIN 325 MG: 325 TABLET, COATED ORAL at 11:55

## 2022-12-15 RX ADMIN — ACETAMINOPHEN 1000 MG: 500 TABLET ORAL at 07:21

## 2022-12-15 RX ADMIN — SODIUM CHLORIDE 2000 ML: 9 INJECTION, SOLUTION INTRAVENOUS at 03:55

## 2022-12-15 RX ADMIN — SODIUM CHLORIDE, PRESERVATIVE FREE 10 ML: 5 INJECTION INTRAVENOUS at 20:13

## 2022-12-15 RX ADMIN — ATORVASTATIN CALCIUM 40 MG: 40 TABLET, FILM COATED ORAL at 20:12

## 2022-12-15 RX ADMIN — SODIUM CHLORIDE, POTASSIUM CHLORIDE, SODIUM LACTATE AND CALCIUM CHLORIDE: 600; 310; 30; 20 INJECTION, SOLUTION INTRAVENOUS at 17:43

## 2022-12-15 RX ADMIN — PANTOPRAZOLE SODIUM 40 MG: 40 TABLET, DELAYED RELEASE ORAL at 11:55

## 2022-12-15 RX ADMIN — ENOXAPARIN SODIUM 40 MG: 100 INJECTION SUBCUTANEOUS at 11:55

## 2022-12-15 RX ADMIN — SODIUM CHLORIDE: 9 INJECTION, SOLUTION INTRAVENOUS at 08:55

## 2022-12-15 RX ADMIN — LEVOFLOXACIN 500 MG: 5 INJECTION, SOLUTION INTRAVENOUS at 06:12

## 2022-12-15 RX ADMIN — CLOPIDOGREL BISULFATE 75 MG: 75 TABLET ORAL at 20:11

## 2022-12-15 RX ADMIN — SODIUM CHLORIDE, PRESERVATIVE FREE 10 ML: 5 INJECTION INTRAVENOUS at 11:55

## 2022-12-15 RX ADMIN — VANCOMYCIN HYDROCHLORIDE 750 MG: 1 INJECTION, POWDER, LYOPHILIZED, FOR SOLUTION INTRAVENOUS at 12:36

## 2022-12-15 RX ADMIN — HYDROCODONE BITARTRATE AND ACETAMINOPHEN 1 TABLET: 5; 325 TABLET ORAL at 02:49

## 2022-12-15 RX ADMIN — MEROPENEM 1000 MG: 1 INJECTION, POWDER, FOR SOLUTION INTRAVENOUS at 11:50

## 2022-12-15 RX ADMIN — BUPROPION HYDROCHLORIDE 150 MG: 150 TABLET, FILM COATED, EXTENDED RELEASE ORAL at 12:32

## 2022-12-15 RX ADMIN — ACETAMINOPHEN 650 MG: 325 TABLET ORAL at 20:11

## 2022-12-15 ASSESSMENT — ENCOUNTER SYMPTOMS
RESPIRATORY NEGATIVE: 1
DIARRHEA: 0
COUGH: 0
GASTROINTESTINAL NEGATIVE: 1
TROUBLE SWALLOWING: 0
PHOTOPHOBIA: 0
NAUSEA: 0
VOMITING: 0
SHORTNESS OF BREATH: 0
EYES NEGATIVE: 1

## 2022-12-15 ASSESSMENT — PAIN SCALES - GENERAL
PAINLEVEL_OUTOF10: 3
PAINLEVEL_OUTOF10: 6
PAINLEVEL_OUTOF10: 0
PAINLEVEL_OUTOF10: 6

## 2022-12-15 ASSESSMENT — PAIN - FUNCTIONAL ASSESSMENT
PAIN_FUNCTIONAL_ASSESSMENT: NONE - DENIES PAIN
PAIN_FUNCTIONAL_ASSESSMENT: PREVENTS OR INTERFERES SOME ACTIVE ACTIVITIES AND ADLS

## 2022-12-15 ASSESSMENT — PAIN DESCRIPTION - ORIENTATION: ORIENTATION: RIGHT;LEFT;MID

## 2022-12-15 ASSESSMENT — PAIN DESCRIPTION - DESCRIPTORS: DESCRIPTORS: ACHING

## 2022-12-15 ASSESSMENT — PAIN DESCRIPTION - LOCATION: LOCATION: HEAD

## 2022-12-15 NOTE — ED PROVIDER NOTES
[facility] [address] [address] EMERGENCY DEPT  [address] [facility] [address]  Phone: [phone]      CHIEF COMPLAINT       Chief Complaint   Patient presents with    Fall    Back Pain       Nurses Notes reviewed and I agree except as noted in the HPI. HISTORY OF PRESENT ILLNESS    Elson Fleischer is a 63 y.o. male. This patient initially described the reason for being here as a fall but clearly is ill-appearing. And likely led to him falling in the first place. It looks like the patient has had a lot of recent falls. I saw him for a fall a few weeks ago where he had some rib fractures. He is also been having a lot of recent urological history. Just had a Bella catheter removed and uroscopy done yesterday. He sees Dr. Cody Vicente from the urology group    The patient in any case reported a fall tonight and complained of pain in his upper back and neck but denies hitting head. The spouse stated that after they took out the Bella catheter she thinks that he is having urinary retention again. Patient is noted to be tachycardic and having a soft blood pressure. Patient also recently had a cardiac bypass. REVIEW OF SYSTEMS         Generalized pains, has had some suprapubic pain, low neck upper back. Remainder of review of systems is otherwise reviewed as negative. PAST MEDICAL HISTORY    has a past medical history of Hypertension and Prediabetes. SURGICAL HISTORY      has a past surgical history that includes Tonsillectomy and Coronary artery bypass graft (N/A, 11/7/2022).     CURRENT MEDICATIONS       Previous Medications    ACETAMINOPHEN (TYLENOL) 500 MG TABLET    Take 500 mg by mouth every 6 hours as needed for Pain As needed    ASPIRIN 325 MG EC TABLET    Take 1 tablet by mouth daily    ATORVASTATIN (LIPITOR) 40 MG TABLET    Take 1 tablet by mouth daily    BUPROPION (WELLBUTRIN XL) 150 MG EXTENDED RELEASE TABLET    Take 1 tablet by mouth every morning    DULAGLUTIDE (TRULICITY) 1.5 MW/5.6YI SOPN Inject 1.5 mg into the skin once a week    EMPAGLIFLOZIN (JARDIANCE) 25 MG TABLET    Take 1 tablet by mouth daily    GLIMEPIRIDE (AMARYL) 4 MG TABLET    Take 1 tablet by mouth every morning (before breakfast)    METOPROLOL SUCCINATE (TOPROL XL) 25 MG EXTENDED RELEASE TABLET    Take 1 tablet by mouth daily    MULTIPLE VITAMIN (MULTIVITAMIN) TABS TABLET    Take 1 tablet by mouth daily (with breakfast)    OMEPRAZOLE (PRILOSEC) 40 MG DELAYED RELEASE CAPSULE    Take 1 capsule by mouth daily    TAMSULOSIN (FLOMAX) 0.4 MG CAPSULE    Take 1 capsule by mouth daily       ALLERGIES     is allergic to metformin and related. FAMILY HISTORY     He indicated that his mother is . He indicated that his father is . He indicated that his sister is alive. He indicated that both of his brothers are alive. family history includes Diabetes in his brother, brother, and father; Stroke in his father. SOCIAL HISTORY      reports that he has quit smoking. His smoking use included cigarettes. He has a 16.50 pack-year smoking history. He has never used smokeless tobacco. He reports that he does not currently use alcohol. He reports that he does not use drugs. PHYSICAL EXAM     INITIAL VITALS:  height is 5' 10\" (1.778 m) and weight is 140 lb (63.5 kg). His oral temperature is 98.4 °F (36.9 °C). His blood pressure is 113/70 and his pulse is 107 (abnormal). His respiration is 22 and oxygen saturation is 100%. Constitutional: Chronically ill-appearing  Eyes:  Pupils are equal and reactive, extraocular muscles intact   HENT:  Atraumatic appearing  oropharynx moist, no pharyngeal exudates.   Neck- normal range of motion, no point tenderness, supple   Respiratory:  No wheezing, rhonchi or rales  Cardiovascular: regular, no murmur  GI:  Non tender, no rigidity, rebound or guarding  Musculoskeletal:  2/4 distal pulses, no pitting edema  Integument: warm and dry  Neurologic:  Alert & oriented x 3, cranial nerves II through XII are grossly intact. Equal strength in the upper and lower extremities bilaterally. DIAGNOSTIC RESULTS     EKG: All EKG's are interpreted by the Emergency Department Physician who either signs or Co-signs this chart in the absence of a cardiologist.  EKG interpreted by me showing sinus tachycardia at a rate of 121, QRS of 74, QTc of 443, axis of 85. RADIOLOGY: non-plain film images(s) such as CT, Ultrasound and MRI are read by the radiologist.  CT of the cervical spine and chest with no new traumatic injury. CT of the abdomen and pelvis showing urinary bladder distended with significant mucosal thickening air within the mucosa of the urinary bladder with inflammatory stranding and air-fluid levels in the lumen consistent with emphysematous cystitis.   Bilateral hydronephrosis      LABS:   Labs Reviewed   CBC WITH AUTO DIFFERENTIAL - Abnormal; Notable for the following components:       Result Value    WBC 22.3 (*)     RBC 3.82 (*)     Hemoglobin 10.6 (*)     Hematocrit 33.3 (*)     MCHC 31.8 (*)     Segs Absolute 19.6 (*)     Lymphocytes Absolute 0.6 (*)     Monocytes Absolute 1.8 (*)     Immature Grans (Abs) 0.15 (*)     All other components within normal limits   COMPREHENSIVE METABOLIC PANEL - Abnormal; Notable for the following components:    Glucose 275 (*)     Creatinine 2.1 (*)     BUN 48 (*)     Sodium 132 (*)     Chloride 97 (*)     CO2 20 (*)     ALT 10 (*)     All other components within normal limits   TROPONIN - Abnormal; Notable for the following components:    Troponin T 0.011 (*)     All other components within normal limits   GLOMERULAR FILTRATION RATE, ESTIMATED - Abnormal; Notable for the following components:    Est, Glom Filt Rate 35 (*)     All other components within normal limits   URINE WITH REFLEXED MICRO - Abnormal; Notable for the following components:    Glucose, Ur >= 1000 (*)     Bilirubin Urine SMALL (*)     Blood, Urine MODERATE (*)     Protein,  (*)     Nitrite, Urine POSITIVE (*)     Leukocyte Esterase, Urine SMALL (*)     Color, UA BROWN (*)     Character, Urine TURBID (*)     All other components within normal limits   CULTURE, BLOOD 1   CULTURE, BLOOD 2   CULTURE, REFLEXED, URINE    Narrative:     Source: urine, clean catch       Site:           Current Antibiotics: not stated   ETHANOL   ANION GAP   OSMOLALITY   LACTIC ACID   BILE ACIDS, TOTAL       EMERGENCY DEPARTMENT COURSE:   Vitals:    Vitals:    12/15/22 0546 12/15/22 0547 12/15/22 0612 12/15/22 0647   BP: 111/61  118/68 113/70   Pulse: (!) 114 (!) 116 (!) 107 (!) 107   Resp: 21 22 18 22   Temp:       TempSrc:       SpO2:  100% 100%    Weight:       Height:         The patient's urine is very purulent appearing. He does not have a fever but does have tachycardia and a low blood pressure. We are giving him IV Levaquin. The CT imaging suggests urinary retention is occurring once again. He just had a catheter in for urinary retention. We put in a urinary catheter today due to urinary retention. I did discuss this with urology. Initially more than 400 cc out of then a rush of air and also blood and clots are noted. Urology is the [facility] this morning. Indicated they would be coming by to consult on the patient. We are admitting to the hospitalist service. Spoke to Dr. Patricia Messina. CRITICAL CARE:   15 min    CONSULTS:  urology    PROCEDURES:  None    FINAL IMPRESSION      1. Acute pyelonephritis    2.  Multiple falls          DISPOSITION/PLAN   Admitted    DISCHARGE MEDICATIONS:  New Prescriptions    No medications on file       (Please note that portions of this note were completed with a voice recognition program.  Efforts were made to edit the dictations but occasionally words are mis-transcribed.)    Betsey Dominguez, [address] [place] [address] [place], DO  12/15/22 [id number]

## 2022-12-15 NOTE — PROGRESS NOTES
Pharmacy Note - Renal Dosing    Meropenem 1g Q8h for treatment of Urinary tract infection. Per [facility] Renal Dose Adjustment Policy, meropenem will be changed to   1000mg Q12h extended infusion    Please call with any questions.     Thank you,    Beth Brenner, [facility]

## 2022-12-15 NOTE — CONSULTS
Neurology Stroke Alert Note    Date:12/15/2022       CTYQ:[id number]  Patient Name:Len Slater     YOB: 1960     Age:62 y.o. Requesting Physician: Starr Gilliland MD     Reason for Consult:  Evaluate for stroke alert    HPI: Jackelyn Nj who is a 63 y.o. male with a history of hypertension, diabetes, CAD on  daily, recent CABG in November, ischemic cardiomyopathy is admitted to [facility] for sepsis, emphysematous cystitis and acute kidney injury on CKD. During admission exam patient stated that his right arm feldman has been weak and that he has had some slurred speech. Stroke alert was called for right-sided weakness and dysarthria. On arrival patient's NIH was 4 for right upper extremity, right lower extremity weakness, 1 limb ataxia and dysarthria. On further questioning, patient and wife state that the right arm weakness actually started last night. Patient's wife noticed when patient was trying to get up from the toilet that he was unable to push himself up with his right arm. Last known well 2330 on 12/14/2022. Patient taken to imaging for stat CT head which revealed no ICH, midline shift, mass-effect. CTA head and neck deferred due to patient's LC. Decision for no tPA was made due to patient's time since last known well. Patient with relatively low NIH and symptoms which are not consistent with LVO, therefore no thrombectomy/neuro intervention at this time. Patient will have stat MRI brain and MRA head and neck without contrast.     Time of symptoms onset/last time seen at baseline: 2330 on 12/14/2022. Time of stroke alert: 1620 on 12/15/2022. Time of Neurology arrival: 1622. Vascular risk factors: HTN, T2DM. Initial Glucose: 171. Old deficits from prior stroke: None. INR in ED if anticoagulated: N/A.  BP in ED: 96/60. Initial NIHSS: 4.  Modified Waverly Score upon admission: 3.    IV tPA administerd: No.  Time of Initial Imaging Read: 1637.  Thrombectomy performed: No.  Puncture time: N/A. Review of Systems   Review of Systems   Constitutional:  Positive for activity change, chills and fever. Negative for fatigue. HENT:  Negative for tinnitus and trouble swallowing. Eyes:  Negative for photophobia and visual disturbance. Respiratory:  Negative for cough and shortness of breath. Cardiovascular:  Negative for chest pain and palpitations. Gastrointestinal:  Negative for diarrhea, nausea and vomiting. Genitourinary:  Positive for difficulty urinating, dysuria and hematuria. Negative for flank pain. Musculoskeletal:  Negative for neck pain and neck stiffness. Skin:  Positive for pallor. Negative for rash. Neurological:  Positive for dizziness, speech difficulty and weakness. Negative for facial asymmetry, numbness and headaches. Psychiatric/Behavioral:  Negative for agitation and confusion. Medications   Scheduled Meds:    sodium chloride flush  5-40 mL IntraVENous 2 times per day    enoxaparin  40 mg SubCUTAneous Daily    aspirin  325 mg Oral Daily    atorvastatin  40 mg Oral Daily    buPROPion  150 mg Oral QAM    insulin lispro  0-8 Units SubCUTAneous TID WC    insulin lispro  0-4 Units SubCUTAneous Nightly    vancomycin (VANCOCIN) intermittent dosing (placeholder)   Other RX Placeholder    vancomycin  750 mg IntraVENous Q24H    pantoprazole  40 mg Oral QAM AC    meropenem  1,000 mg IntraVENous Q12H     Continuous Infusions:    sodium chloride      sodium chloride 125 mL/hr at 12/15/22 1427    dextrose      lactated ringers       PRN Meds: sodium chloride flush, sodium chloride, ondansetron **OR** ondansetron, polyethylene glycol, acetaminophen **OR** acetaminophen, glucose, dextrose bolus **OR** dextrose bolus, glucagon (rDNA), dextrose    Past History    Past Medical History:   has a past medical history of Hypertension and Prediabetes. Social History:   reports that he has quit smoking.  His smoking use included cigarettes. He has a 16.50 pack-year smoking history. He has never used smokeless tobacco. He reports that he does not currently use alcohol. He reports that he does not use drugs. Family History:   Family History   Problem Relation Age of Onset    Diabetes Father     Stroke Father     Diabetes Brother     Diabetes Brother        Physical Examination        NIH Stroke Scale  1a Level of consciousness 0=alert; keenly responsive   1b LOC questions 0=Performs both tasks correctly   1c LOC commands 0=Performs both tasks correctly   2 Best Gaze 0=normal   3 Visual 0=No visual loss   4 Facial Palsy 0=Normal symmetric movement   5a Motor left arm 0=No drift, limb holds 90 (or 45) degrees for full 10 seconds   5b Motor right arm 1=Drift, limb holds 90 (or 45) degrees but drifts down before full 10 seconds: does not hit bed   6a Motor left leg 0=No drift, limb holds 90 (or 45) degrees for full 10 seconds   6b Motor right leg 1=Drift, limb holds 90 (or 45) degrees but drifts down before full 10 seconds: does not hit bed   7 Limb Ataxia 1=Present in one limb   8 Sensory 0=Normal; no sensory loss   9 Best Language 0=No aphasia, normal   10 Dysarthria 1=Mild to moderate, patient slurs at least some words and at worst, can be understood with some difficulty   11 Extinction and Inattention 0=No abnormality   TOTAL  4   Time NIHSS performed: 1624    Vitals:  BP 96/60   Pulse 94   Temp 99.1 °F (37.3 °C) (Oral)   Resp 16   Ht 5' 10\" (1.778 m)   Wt 140 lb (63.5 kg)   SpO2 100%   BMI 20.09 kg/m²   Temp (24hrs), Av.2 °F (37.3 °C), Min:98.1 °F (36.7 °C), Max:101.2 °F (38.4 °C)      I/O (24Hr): Intake/Output Summary (Last 24 hours) at 12/15/2022 1727  Last data filed at 12/15/2022 1427  Gross per 24 hour   Intake 3274.66 ml   Output --   Net 3274.66 ml       Physical Exam  Vitals reviewed. Constitutional:       Comments: Chronically ill appearing   HENT:      Head: Normocephalic and atraumatic.       Right Ear: External ear normal.      Left Ear: External ear normal.      Nose: Nose normal.      Mouth/Throat:      Mouth: Mucous membranes are moist.      Pharynx: Oropharynx is clear. Eyes:      Extraocular Movements: Extraocular movements intact and EOM normal.      Pupils: Pupils are equal, round, and reactive to light. Cardiovascular:      Rate and Rhythm: Normal rate and regular rhythm. Comments: Midline chest incision healing  Chest binder in place  Pulmonary:      Effort: Pulmonary effort is normal. No respiratory distress. Abdominal:      General: There is no distension. Palpations: Abdomen is soft. Musculoskeletal:         General: No deformity or signs of injury. Cervical back: No rigidity or tenderness. Skin:     General: Skin is warm and dry. Neurological:      Mental Status: He is alert and oriented to person, place, and time. Coordination: Finger-Nose-Finger Test abnormal. Heel to Hawkins Test normal.      Deep Tendon Reflexes:      Reflex Scores:       Bicep reflexes are 2+ on the right side and 2+ on the left side. Brachioradialis reflexes are 2+ on the right side and 2+ on the left side. Patellar reflexes are 2+ on the right side and 2+ on the left side. Psychiatric:         Mood and Affect: Mood normal.         Speech: Speech is slurred. Behavior: Behavior normal.         Thought Content: Thought content normal.     Neurologic Exam     Mental Status   Oriented to person, place, and time. Follows 2 step commands. Attention: normal.   Speech: slurred   Level of consciousness: alert  Knowledge: good. Able to name object. Able to read. Able to repeat. Mildly slurred speech     Cranial Nerves     CN II   Visual fields full to confrontation. CN III, IV, VI   Pupils are equal, round, and reactive to light. Extraocular motions are normal.     CN V   Facial sensation intact. CN VII   Facial expression full, symmetric.      CN VIII   CN VIII normal.   Hearing: intact    CN IX, X   CN IX normal.   Palate: symmetric    CN XI   CN XI normal.   Right sternocleidomastoid strength: normal  Left sternocleidomastoid strength: normal  Right trapezius strength: weak  Left trapezius strength: normal    CN XII   CN XII normal.   Tongue: not atrophic    Motor Exam   Muscle bulk: normal  Overall muscle tone: normalLUE 5/5  RUE 3/5  LLE 5/5  RLE 4-/5     Sensory Exam   Light touch normal.     Gait, Coordination, and Reflexes     Coordination   Finger to nose coordination: abnormal  Heel to shin coordination: normal    Reflexes   Right brachioradialis: 2+  Left brachioradialis: 2+  Right biceps: 2+  Left biceps: 2+  Right patellar: 2+  Left patellar: 2+       Labs/Imaging/Diagnostics   Labs:  CBC:  Recent Labs     12/13/22  0936 12/15/22  0130   WBC 8.4 22.3*   RBC 4.04* 3.82*   HGB 11.2* 10.6*   HCT 36.5* 33.3*   MCV 90.3 87.2    296     CHEMISTRIES:  Recent Labs     12/13/22  0936 12/15/22  0130 12/15/22  1615    132* 133*   K 4.9 4.5 4.1    97* 102   CO2 24 20* 19*   BUN 36* 48* 42*   CREATININE 1.7* 2.1* 1.7*   GLUCOSE 185* 275* 164*     PT/INR:No results for input(s): PROTIME, INR in the last 72 hours. APTT:No results for input(s): APTT in the last 72 hours. LIVER PROFILE:  Recent Labs     12/15/22  0130   AST 15   ALT 10*   BILITOT 0.8   ALKPHOS 96       Imaging Last 24 Hours:  CT ABDOMEN PELVIS WO CONTRAST Additional Contrast? None    Result Date: 12/15/2022   ADDENDUM #1  Receipt of this report by the clinical staff was confirmed with Landon Saenz on Dec 15, 2022 05:08:00 EST. This document has been electronically signed by: Deepika Khan on 12/15/2022 05:09 AM  ORIGINAL REPORT  CT abdomen and pelvis without contrast Comparison: None Findings: The lung bases are clear. The stomach is nondistended. Severe mucosal thickening of the stomach at the fundus and mid body which could represent changes of gastritis in the correct clinical setting.  Atherosclerosis of the abdominal aorta which continues into the iliac vasculature. The liver and gallbladder are unremarkable. Partial atrophy of the pancreas. The spleen is unremarkable. Chronic thickening of the bilateral adrenal glands. Moderate bilateral hydronephrosis with mild bilateral perinephric stranding. Evaluation for infectious process including pyelonephritis limited without IV contrast, recommend correlation with urinalysis. Mild bilateral hydroureter throughout the course of the bilateral ureters without ureterolithiasis. The urinary bladder is dilated with significant mucosal thickening. Air within the mucosa of the urinary bladder. Inflammatory stranding surrounds the urinary bladder. An air-fluid level within the lumen of the bladder. 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 Recommend correlation with urinalysis. Pelvic contents unremarkable. Perirectal and presacral stranding. Circumferential mucosal thickening of the distal rectum best identified on axial image 73 of series 2. Recommend correlation with direct visualization/colonoscopy to exclude underlying mucosal lesion. Moderate fecal burden throughout the colon greatest involving the ascending and transverse colon, favor constipation. The cecum is positioned within the right mid abdomen. No bowel obstruction, pneumoperitoneum, or pneumatosis. Normal appendix. The bones are intact. Spondylosis of the lumbar spine. 1. The urinary bladder is dilated with significant mucosal thickening. Air within the mucosa of the urinary bladder. Inflammatory stranding surrounds the urinary bladder. An air-fluid level within the lumen of the bladder. 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 Recommend correlation with urinalysis. 2. Moderate bilateral hydronephrosis with mild bilateral perinephric stranding. Evaluation for infectious process including pyelonephritis limited without IV contrast, recommend correlation with urinalysis. Mild bilateral hydroureter throughout the course of the bilateral ureters without ureterolithiasis. 3. Moderate fecal burden throughout the colon greatest involving the ascending and transverse colon, favor constipation. 4. Perirectal and presacral stranding. Circumferential mucosal thickening of the distal rectum best identified on axial image 73 of series 2. Recommend correlation with direct visualization/colonoscopy to exclude underlying mucosal lesion. 5. Severe mucosal thickening of the stomach at the fundus and mid body which could represent changes of gastritis in the correct clinical setting. This document has been electronically signed by: Sanket Espinoza DO on 12/15/2022 05:03 AM All CTs at this facility use dose modulation techniques and iterative reconstructions, and/or weight-based dosing when appropriate to reduce radiation to a low as reasonably achievable. CT HEAD WO CONTRAST    Result Date: 12/15/2022  PROCEDURE: CT HEAD WO CONTRAST CLINICAL INFORMATION:Code stroke COMPARISON: CT head 9/21/2013 TECHNIQUE: 5 mm axial imaging through the head without IV contrast. All CT scans at this facility use dose modulation, iterative reconstruction, and/or weight based dosing when appropriate to reduce the radiation dose to as low as reasonably achievable. FINDINGS: Intracranial atherosclerotic calcifications. Areas of old infarct seen in the coronal radiata on the left. No ventriculomegaly. No midline shift or mass effect. No acute intracranial hemorrhage. No intracranial collection. Gray-white differentiation is unremarkable. The posterior fossa is unremarkable. The craniocervical junction is unremarkable. No acute bony abnormality. The  paranasal sinuses are clear. Mild left mastoid effusion. Otherwise, the right mastoid air cells are clear. The orbits are unremarkable. 1. No acute intracranial abnormality.  The pertinent finding(s) was called to patient's nurse  Francisco Hays RN at 1645 hours on 12/15/2022 by Dr. Bessie Meredith. Verbal acknowledgment and readback was given. 2. Chronic findings as described above. **This report has been created using voice recognition software. It may contain minor errors which are inherent in voice recognition technology. ** Final report electronically signed by Dr Faraz Perkins on 12/15/2022 4:46 PM    CT CHEST WO CONTRAST    Result Date: 12/15/2022  CT chest without contrast. Comparison: Chest x-ray December 13, 2022 Findings: The thyroid is unremarkable. Normal caliber of the thoracic aorta. Atherosclerosis of the thoracic aorta. The heart is normal size. Coronary bypass grafting. Moderate coronary atherosclerosis. The mediastinum is intact. Mild paraseptal emphysematous disease involving the lung apices. Chronic left seventh posterior rib fracture with callus formation. Likely chronic left eighth posterior rib fracture, mild sclerosis at the margins of the fracture. Metallic device external to the patient and medially posterior to the left eighth rib fracture partially limits evaluation at this region. Median sternotomy wires. The stomach is non-distended. Moderate/severe bilateral hydronephrosis. No nephrolithiasis. Recommend consideration for dedicated cross-sectional imaging of the abdomen/pelvis for further evaluation of this finding. 1. No definitive acute rib fracture. 2. Chronic left seventh posterior rib fracture with callus formation. Likely chronic left eighth posterior rib fracture, mild sclerosis at the margins of this chronic appearing fracture. Metallic device external to the patient and medially posterior to the left eighth rib fracture partially limits evaluation at this region. 3. Moderate/severe bilateral hydronephrosis. No nephrolithiasis. Recommend consideration for dedicated cross-sectional imaging of the abdomen/pelvis for further evaluation of this finding.  This document has been electronically signed by: Brinda Villalba DO on 12/15/2022 03:05 AM All CTs at this facility use dose modulation techniques and iterative reconstructions, and/or weight-based dosing when appropriate to reduce radiation to a low as reasonably achievable. CT CERVICAL SPINE WO CONTRAST    Result Date: 12/15/2022  CT cervical spine without contrast Comparison: September 21, 2013 Findings: Study is partially limited due to motion artifact and technique. Vertebral alignment is within normal limits. No acute fractures or dislocations. Multilevel facet and uncovertebral joint arthropathy. Visualized intracranial contents are unremarkable. No cervical fluid collections or masses. Thyroid gland unremarkable. Paraseptal emphysematous disease. Minimal fluid within the left mastoid air cells. 1. Study is partially limited due to motion artifact and technique. Within this limitation, no acute fracture of the cervical spine. 2. Multilevel facet and uncovertebral joint arthropathy. This document has been electronically signed by: Hay Gutierrez DO on 12/15/2022 02:45 AM All CTs at this facility use dose modulation techniques and iterative reconstructions, and/or weight-based dosing when appropriate to reduce radiation to a low as reasonably achievable. CT THORACIC RECONSTRUCTION WO POST PROCESS    Result Date: 12/15/2022  CT thoracic spine without contrast Comparison: CT chest October 5, 2022 Findings: Vertebral alignment is within normal limits. Minimal rightward curvature of the mid thoracic spine. No acute fractures or dislocations. Mild multilevel spondylosis. Paraseptal emphysematous disease of the lung apices. Median sternotomy wires. Coronary artery bypass grafting. Atherosclerosis of the thoracic aorta. Coronary atherosclerosis Appearance of mild to moderate bilateral hydronephrosis. Recommend consideration for cross-sectional imaging of the abdomen and pelvis for further evaluation of this finding. 1. No acute fracture of the thoracic spine.  2. Mild multilevel spondylosis of the thoracic spine. This document has been electronically signed by: Elsy Driver DO on 12/15/2022 02:58 AM All CTs at this facility use dose modulation techniques and iterative reconstructions, and/or weight-based dosing when appropriate to reduce radiation to a low as reasonably achievable. Assessment and Plan:        Hospital Problems             Last Modified POA    * (Principal) Emphysematous cystitis 12/15/2022 Yes    Acute pyelonephritis 12/15/2022 Yes       Right Upper and Lower Extremity Weakness, Dysarthria- suspect acute ischemic stroke   Imaging  CT revealed no ICH, midline shift, mass-effect, dense MCA sign. Does demonstrate chronic infarct of the L corona radiata. CTA of head and neck deferred due to LC on CKD. MRI/MRA Head/MRA Neck WO Contrast ordered  2D echocardiogram ordered. Stat CT head is needed if the patient develops new-onset altered mental status, a severe headache, or new-onset neurologic deficit  Risk factors and medications  Blood pressure goal: permissive hypertension up to 220/120 for 24 hours after stroke onset. following this period, less than 130/80. Keep well hydrated. Will defer to primary as pt is with CHF  Antithrombotics: ASA 81 + Plavix 75mg  HgbA1C 7.6, recommend tight control of A1c with goal of <7.0 if attainable. Lipid panel in the morning. LDL goal of 45-70. Continue Lipitor 40mg daily. Smoking and alcohol cessation when applicable. Provide stroke eduction for individualized risk factors. EKG with T wave inversion in anterolateral leads  Maintain telemetry to monitor for atrial fibrillation  Core stroke metrics  Dysphagia screen prior to oral intake  PT/OT/SLP consult. IPR consult if applicable. DVT prophylaxis: Lovenox  NIHSS every shift. Neuro checks per unit unless otherwise specified. Pre-morbid Modified Cardwell Scale: 2 - Slight disability:  unable to carry out all previous activities, but able to look after own affairs without assistance. Discussed plan with patient and family. Thank you for allowing us to participate in the care of [place]. This is a critically ill patient with impairment of vital organ system requiring high complexity decision making to assess, manipulate, and support vital systems to treat vital organ system failure and prevent further life-threatening deterioration of the patient's condition. 33 minutes of critical care time was spent with this patient not including procedure time. Electronically signed by Chetan Godinez PA-C on 12/15/22 at 5:27 PM EST    I agree with evaluation and plan.     Francy Baptiste MD

## 2022-12-15 NOTE — CONSULTS
Urology Consult Note      Reason for Consult:  Gross hematuria and abnormal CT findings  Requesting Physician:  Dr. Teena Pérez    History Obtained From:  patient, electronic medical record, staff    Chief Complaint: fall    HISTORY OF PRESENT ILLNESS:                The patient is a 63 y.o. male with extensive previous history of ETOH use recently hospitalized 10/28/22-11/15/22 during which hospitalization he underwent CABG x 2 by Dr. Shi Moran on 11/7/22. He had issues with urinary retention failing void trials and hematuria during hospitalization for which urology was consulted. Pt was discharged with crum catheter in place and saw Dr. Gian Wright in the office 12/13/22 at which time cystoscopy was performed and crum removed. Cystoscopy revealed findings of catheter cystitis vs tumor, moderate trabeculation and small bladder diverticulum. Reassessment with repeat cystoscopy in 6-8 weeks was recommended and crum left out. Pt presented to ER today following fall when attempting to sit on the toilet. Pt noted to be tachycardic and have mildly low blood pressures. UA + for infection. CT abd/pelvis with findings of significant mucosal thickening of the bladder, air/fluid levels in lumen of the bladder and air within the mucosa of the bladder, moderate bilateral hydronephrosis, perirectal and presacral stranding. Crum inserted by ER with return of 400 mls of pus and purulent yellow appearing urine followed by air and then return of bloody urine with clots. Currently pt denies pain. Crum draining light red/brown urine with few clots/debris in tubing. Low grade temps. Reports has felt weak and fatigued last 2 days. Has been urinating small amounts every 10-15 minutes since crum removed 12/13.     Past Medical History:        Diagnosis Date    Hypertension     Prediabetes      Past Surgical History:        Procedure Laterality Date    CORONARY ARTERY BYPASS GRAFT N/A 11/7/2022    CABG X2 JOY WITH BALLOON PUMP performed by Alm Holstein, MD at [address]       Allergies:  Metformin and related    Social History     Socioeconomic History    Marital status:      Spouse name: Not on file    Number of children: Not on file    Years of education: Not on file    Highest education level: Not on file   Occupational History    Not on file   Tobacco Use    Smoking status: Former     Packs/day: 0.50     Years: 33.00     Pack years: 16.50     Types: Cigarettes    Smokeless tobacco: Never   Vaping Use    Vaping Use: Never used   Substance and Sexual Activity    Alcohol use: Not Currently    Drug use: No    Sexual activity: Not on file   Other Topics Concern    Not on file   Social History Narrative    Not on file     Social Determinants of Health     Financial Resource Strain: Low Risk     Difficulty of Paying Living Expenses: Not hard at all   Food Insecurity: No Food Insecurity    Worried About Running Out of Food in the Last Year: Never true    [address] in the Last Year: Never true   Transportation Needs: No Transportation Needs    Lack of Transportation (Medical): No    Lack of Transportation (Non-Medical): No   Physical Activity: Not on file   Stress: Not on file   Social Connections: Not on file   Intimate Partner Violence: Not on file   Housing Stability: Not on file       Family History:       Problem Relation Age of Onset    Diabetes Father     Stroke Father     Diabetes Brother     Diabetes Brother        ROS:  Constitutional: Negative for chills. + fatigue and weakness  Eyes: Denies reported visual changes. ENT: Denies headache, difficulty swallowing, earache, and nosebleeds. Cardiovascular: Negative for chest pain, palpitations, tachycardia or edema. Respiratory: Denies cough or SOB. GI:The patient denies abdominal or flank pain, anorexia, nausea or vomiting. : see HPI. Musculoskeletal: Patient denies low back pain or painful or reduced range of ROM.   Neurological: The patient denies any symptoms of neurological impairment or TIA. Psychiatric: Denies anxiety or depression. Skin: Denies rash or lesions. All remaining ROS negative. PHYSICAL EXAM:  VITALS:  BP (!) 96/53   Pulse (!) 112   Temp 99.3 °F (37.4 °C) (Oral)   Resp 18   Ht 5' 10\" (1.778 m)   Wt 140 lb (63.5 kg)   SpO2 99%   BMI 20.09 kg/m² . Nursing note and vitals reviewed. Constitutional: Alert and oriented times x3, no acute distress, and cooperative to examination with appropriate mood and affect. HEENT:   Head:         Normocephalic and atraumatic. Mouth/Throat:          Mucous membranes are normal.   Eyes:         EOM are normal. No scleral icterus. Nose:    The external appearance of the nose is normal  Ears: The ears appear normal to external inspection. Cardiovascular:       Normal rate, regular rhythm. Pulmonary/Chest:  Normal respiratory rate and rhthym. No use of accessory muscles. Lungs clear bilaterally. Abdominal:          Soft. No tenderness. Active bowel sounds             Genitalia:   Normal circumcised penis  Urethral meatus is normal in size and location  Bella draining tea colored urine--pink after irrigation  Musculoskeletal:    Normal range of motion. He exhibits no edema or tenderness of lower extremities. Extremities:    No cyanosis, clubbing, or edema present. Neurological:    Alert and oriented.      DATA:  CBC:   Lab Results   Component Value Date/Time    WBC 22.3 12/15/2022 01:30 AM    RBC 3.82 12/15/2022 01:30 AM    HGB 10.6 12/15/2022 01:30 AM    HCT 33.3 12/15/2022 01:30 AM    MCV 87.2 12/15/2022 01:30 AM    MCH 27.7 12/15/2022 01:30 AM    MCHC 31.8 12/15/2022 01:30 AM    RDW 12.5 05/24/2022 04:58 AM     12/15/2022 01:30 AM    MPV 10.2 12/15/2022 01:30 AM     BMP:    Lab Results   Component Value Date/Time     12/15/2022 01:30 AM    K 4.5 12/15/2022 01:30 AM    K 4.3 10/28/2022 10:00 PM    CL 97 12/15/2022 01:30 AM    CO2 20 12/15/2022 01:30 AM    BUN 48 12/15/2022 01:30 AM    CREATININE 2.1 12/15/2022 01:30 AM    CALCIUM 9.6 12/15/2022 01:30 AM    GFRAA 58 05/24/2022 04:58 AM    LABGLOM 35 12/15/2022 01:30 AM    GLUCOSE 275 12/15/2022 01:30 AM     BUN/Creatinine:    Lab Results   Component Value Date/Time    BUN 48 12/15/2022 01:30 AM    CREATININE 2.1 12/15/2022 01:30 AM     Magnesium:    Lab Results   Component Value Date/Time    MG 2.4 11/08/2022 04:30 AM     Phosphorus:    Lab Results   Component Value Date/Time    PHOS 3.5 10/29/2022 03:30 AM     PT/INR:    Lab Results   Component Value Date/Time    INR 1.23 11/08/2022 04:30 AM     U/A:    Lab Results   Component Value Date/Time    COLORU BROWN 12/15/2022 05:19 AM    PHUR 5.5 12/15/2022 05:19 AM    LABCAST 4-8 HYALINE 11/06/2022 09:55 AM    LABCAST NONE SEEN 11/06/2022 09:55 AM    WBCUA > 200 12/15/2022 05:19 AM    RBCUA > 200 12/15/2022 05:19 AM    MUCUS NONE SEEN 12/15/2022 05:19 AM    YEAST NONE SEEN 12/15/2022 05:19 AM    BACTERIA MANY 12/15/2022 05:19 AM    SPECGRAV 1.007 11/06/2022 09:55 AM    LEUKOCYTESUR SMALL 12/15/2022 05:19 AM    UROBILINOGEN 0.2 12/15/2022 05:19 AM    BILIRUBINUR SMALL 12/15/2022 05:19 AM    BLOODU MODERATE 12/15/2022 05:19 AM    GLUCOSEU >= 1000 12/15/2022 05:19 AM    AMORPHOUS NONE SEEN 12/15/2022 05:19 AM       Imaging: The patient has had a CT abd/pelvis 12/15/22  which I have reviewed along with its accompanying report. The study demonstrates significant mucosal thickening of the bladder, air/fluid levels in lumen of the bladder and air within the mucosa of the bladder, moderate bilateral hydronephrosis, perirectal and presacral stranding. CT abdomen and pelvis without contrast       Comparison: None       Findings: The lung bases are clear. The stomach is nondistended. Severe mucosal thickening of the stomach at    the fundus and mid body which could represent changes of gastritis in the    correct clinical setting.        Atherosclerosis of the abdominal aorta which continues into the iliac    vasculature. The liver and gallbladder are unremarkable. Partial atrophy of the    pancreas. The spleen is unremarkable. Chronic thickening of the bilateral    adrenal glands. Moderate bilateral hydronephrosis with mild bilateral perinephric    stranding. Evaluation for infectious process including pyelonephritis    limited without IV contrast, recommend correlation with urinalysis. Mild    bilateral hydroureter throughout the course of the bilateral ureters    without ureterolithiasis. The urinary bladder is dilated with significant mucosal thickening. Air    within the mucosa of the urinary bladder. Inflammatory stranding surrounds    the urinary bladder. An air-fluid level within the lumen of the bladder. 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 Recommend    correlation with urinalysis. Pelvic contents unremarkable. Perirectal and presacral stranding. Circumferential mucosal thickening of    the distal rectum best identified on axial image 73 of series 2. Recommend    correlation with direct visualization/colonoscopy to exclude underlying    mucosal lesion. Moderate fecal burden throughout the colon greatest involving the    ascending and transverse colon, favor constipation. The cecum is positioned within the right mid abdomen. No bowel obstruction, pneumoperitoneum, or pneumatosis. Normal appendix. The bones are intact. Spondylosis of the lumbar spine. Impression   1. The urinary bladder is dilated with significant mucosal thickening. Air    within the mucosa of the urinary bladder. Inflammatory stranding surrounds    the urinary bladder. An air-fluid level within the lumen of the bladder. 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 Recommend    correlation with urinalysis. 2. Moderate bilateral hydronephrosis with mild bilateral perinephric    stranding.  Evaluation for infectious process including pyelonephritis    limited without IV contrast, recommend correlation with urinalysis. Mild    bilateral hydroureter throughout the course of the bilateral ureters    without ureterolithiasis. 3. Moderate fecal burden throughout the colon greatest involving the    ascending and transverse colon, favor constipation. 4. Perirectal and presacral stranding. Circumferential mucosal thickening    of the distal rectum best identified on axial image 73 of series 2. Recommend correlation with direct visualization/colonoscopy to exclude    underlying mucosal lesion. 5. Severe mucosal thickening of the stomach at the fundus and mid body    which could represent changes of gastritis in the correct clinical setting. Urologic Impression/Plan:   Emphysematous cystitis  BPH with Urinary retention  Gross hematuria  LC  Moderate bilateral hydronephrosis  Hx ETOH use      Crum hand irrigated with 1 liter of sterile solution with return of pink urine with small amount of small clots and debris. Draining light pink colored urine when I left the room. Hand irrigate prn with 120 mls of sterile solution. Notify urology of any worsening in hematuria or clot retention. Continue broad spectrum antibiotics. Follow culture results. Hydronephrosis likely secondary to urinary retention. Pt failed multiple void trials during recent hospitalization and again s/p crum removal on 12/13. Continue catheter at this time. Noted lateral lobe hypertrophy and moderately obstructive prostate on recent cysto. Urology will continue to follow.       Thank you for including us in the care of Νοταρά BLACK [facility], [place]  12/15/22 10:13 AM  Urology

## 2022-12-15 NOTE — ED NOTES
Patient called nurse in stating that his body is shaking.  patient body temp has increased, order from provider obtained      Cassi Fonseca RN  12/15/22 1010

## 2022-12-15 NOTE — PROGRESS NOTES
Pharmacy Note  BioFire Result    Jeffrey Garcia is a 63 y.o. male, with a positive blood culture result    Communication received from Sherrell, laboratory employee on 12/15/2022 at 6:24 PM    First Gram stain result: gram negative bacilli    BioFire BCID result: Klebsiella pneumoniae no resistance genes detected and Enterobacter cloacae no resistance genes detected    BioFire BCID and gram stain congruent? Yes    Suspected source? Potentially urine    Patient chart has been reviewed for signs/symptoms of infection: Yes  BP 96/60   Pulse 94   Temp 99.1 °F (37.3 °C) (Oral)   Resp 16   Ht 5' 10\" (1.778 m)   Wt 140 lb (63.5 kg)   SpO2 100%   BMI 20.09 kg/m²   Lab Results   Component Value Date    WBC 22.3 (H) 12/15/2022     Allergies reviewed  Metformin and related    Renal function reviewed  Estimated Creatinine Clearance: 42 mL/min (A) (based on SCr of 1.7 mg/dL (H)). Current antibiotic regimen: meropenem 1000 mg Q12H and vancomycin 750 mg Q24H    Intervention needed: No    Individual contacted: Dustin Schultz RN    Recommendations: Continue current abx and await sensitives    Recommendations accepted?  N/A    Clare Dalton, [facility]  12/15/2022 6:24 PM

## 2022-12-15 NOTE — ED NOTES
Pt medicated per MAR. Pt resting comfortably at this time. No needs expressed.      Griselda Zamora RN  12/15/22 [id number]

## 2022-12-15 NOTE — CARE COORDINATION
DISCHARGE PLANNING EVALUATION  12/15/22, 10:25 AM EST    Reason for Referral: Discharge planning  Mental Status: Alert and Oriented  Decision Making: Self  Family/Social/Home Environment: Patient lives with spouse, who is supportive and helpful. Current Services including food security, transportation and housekeeping: Patient reported that he was fairly independent at home but just recently had open heart surgery last month so he has been slower than normal. SW asked if he had any current services such as HH and he reported, \"No they were not working out\". Current Equipment: None  Payment Source: BCBS  Concerns or Barriers to Discharge:  None  Post-acute [facility]) provider list was provided to patient. Patient was informed of their freedom to choose [facility] provider. Discussed and offered to show the patient the relevant [facility] Providers quality and resource use measures on Medicare Compare web site via computer based on patient's goals of care and treatment preferences. Questions regarding selection process were answered. Teach Back Method used with patient and spouse regarding care plan and needs. Patient and spouse verbalize understanding of the plan of care and contribute to goal setting. Patient goals, treatment preferences and discharge plan: Patient plans to return home. He reported that he may need a walker at discharge. Await PT and OT recs for further discharge planning. SW following.      Electronically signed by SYDNEY Reyna on 12/15/2022 at 10:25 AM

## 2022-12-15 NOTE — SIGNIFICANT EVENT
1620: CODE STROKE called. Tricia Leonard in room at beside to assess patient. Glucose 171    1621: Neuro team and rapid response team  at bedside to assess patient and complete NIH. Mara Garcia Score 4      1623: Patient applied to cardiac monitor. . rhythm normal sinus heart rate 94, oxygen 99% on room air, bp 96/60       1626: Patient to CT at this time.

## 2022-12-15 NOTE — ED TRIAGE NOTES
Pt presents to the ED with complaints of a fall. Pt states he was trying to sit down on the toilet when he lost his balance and fell. Pt denies hitting his head. Pt states he just had bypass surgery about a month ago and has been home for about 10 days. Pt denies any pain at this time.

## 2022-12-15 NOTE — ED NOTES
Indwelling catheter inserted. Initial rush of air noted once in the bladder and then roughly 400ml of dark brown urine noted with bright red noted as well.      Nicole Pal RN  12/15/22 [id number]

## 2022-12-15 NOTE — PLAN OF CARE
Problem: Discharge Planning  Goal: Discharge to home or other facility with appropriate resources  Outcome: Progressing   SW consult received and completed. See SW Note.

## 2022-12-15 NOTE — PROGRESS NOTES
[address] [facility] Pharmacokinetic Monitoring Service - Vancomycin     Dalton Riggs is a 63 y.o. male starting on vancomycin therapy for UTI (emphysematous pyelonephritis). Pharmacy consulted by Dr. Earl Brooks for monitoring and adjustment. Target Concentration: Dosing based on anticipated concentration <15 mg/L due to renal impairment/insufficiency    Additional Antimicrobials: Merrem    Pertinent Laboratory Values: Wt Readings from Last 1 Encounters:   12/15/22 140 lb (63.5 kg)     Temp Readings from Last 1 Encounters:   12/15/22 99.3 °F (37.4 °C) (Oral)     Estimated Creatinine Clearance: 34 mL/min (A) (based on SCr of 2.1 mg/dL (H)). Recent Labs     12/13/22  0936 12/15/22  0130   CREATININE 1.7* 2.1*   WBC 8.4 22.3*       Pertinent Cultures:  Culture Date Source Results   12/15/22 Urine Pending (NGTD)    12/15/22 Blood Pending (NGTD)    MRSA Nasal Swab: N/A. Non-respiratory infection. Plan:  Concentration-guided dosing due to renal impairment/insufficiency  Start vancomycin 750mg IV q24h  Renal labs as indicated   Vancomycin concentration not yet ordered. Pharmacy will continue to monitor patient and adjust therapy as indicated    Thank you for the consult,  Gayla Goldberg.  Ignacio Nazario, [facility]  12/15/2022 8:52 AM

## 2022-12-15 NOTE — ED NOTES
ED to inpatient nurses report    Chief Complaint   Patient presents with    Fall    Back Pain      Present to ED from home  LOC: alert and orientated to name, place, date  Vital signs   Vitals:    12/15/22 0516 12/15/22 0546 12/15/22 0547 12/15/22 0612   BP: (!) 106/55 111/61  118/68   Pulse: (!) 107 (!) 114 (!) 116 (!) 107   Resp: 20 21 22 18   Temp:       TempSrc:       SpO2: 100%  100% 100%   Weight:       Height:          Oxygen Baseline RA    Current needs required RA   LDAs:   Peripheral IV 12/15/22 Left Antecubital (Active)   Site Assessment Clean, dry & intact 12/15/22 0115   Line Status Normal saline locked; Blood return noted;Specimen collected; Flushed 12/15/22 0115   Phlebitis Assessment No symptoms 12/15/22 0115   Infiltration Assessment 0 12/15/22 0115   Dressing Status Clean, dry & intact 12/15/22 0115       Peripheral IV 12/15/22 Right Forearm (Active)     Mobility: Requires assistance * 2  Pending ED orders: NA  Present condition: Stable    C-SSRS    Swallow Screening    Preferred Language: [place]     Electronically signed by Yoon Mosquera RN on 12/15/2022 at [address] [facility]  12/15/22 1467

## 2022-12-15 NOTE — NURSE NAVIGATOR
Code Stroke    Patient- Esteban Jaquez   [id number]   12/15/2022   Attending Physician- Kellen Hand MD    Code stroke called for sudden weakness of right arm(s) and slurred speech. Pt reports right arm weakness, which wife reports began last evening at 1130 pm. Pt and wife endorse slurred speech, but wife states speech doesn't seemed as slurred now as it did earlier in the day. NIHSS assessed. BP 96/60 prior to transport. Pt transported to CT by primary RN; RR team; Dr. Anderson 0777; Dennis Pe[place], [place]; and this RN. CT complete. Pt transported back to [facility]. Team members       RR team-Dr. Maribeth Montague- PAL Sen, [facility], RN/NAN     Chem stick- 171     Time to CT scan- 1626     INITIAL NIH STROKE SCALE    Time Performed:  5166    1a. Level of consciousness:  0 - alert; keenly responsive  1b. Level of consciousness questions:  0 - answers both questions correctly  1c. Level of consciousness questions:  0 - performs both tasks correctly  2. Best Gaze:  0 - normal  3. Visual:  0 - no visual loss  4. Facial Palsy:  0 - normal symmetric movement  5a. Motor left arm:  0 - no drift, limb holds 90 (or 45) degrees for full 10 seconds  5b. Motor right arm:  1 - drift, limb holds 90 (or 45) degrees but drifts down before full 10 seconds: does not hit bed  6a. Motor left le - no drift; leg holds 30 degree position for full 5 seconds  6b. Motor right le - drift; leg falls by the end of the 5 second period but does not hit bed  7. Limb Ataxia:  1 - present in one limb  8. Sensory:  0 - normal; no sensory loss  9. Best Language:  0 - no aphasia, normal  10. Dysarthria:  1 - mild to moderate, patient slurs at least some words and at worst, can be understood with some difficulty  11. Extinction and Inattention:  0 - no abnormality    TOTAL:  4        Primary RN, Felix Abarca, updated on POC.     End time of Code Stroke-      Electronically signed by Socorro Bejarano Rachel Victoria, RN, RN on 12/15/2022 at 4:44 PM

## 2022-12-15 NOTE — H&P
History & Physical       Patient: Armen Rose  YOB: 1960    MRN: [id number]     Acct: [de-identified]    PCP: BLACK Christianson CNP    Date of Admission: 12/15/2022    Date of Service: Patient seen / examined on 12/15/22 and admitted to inpatient with expected LOS greater than two midnights due to medical therapy. ASSESSMENT / PLAN:    Sepsis -meets SIRS criteria (elevated heart rate > 90, elevated WBC 22.3) +UTI as a source of infection w/ imaging findings for emphysematous cystitis. S/p 2L IVF in the ED along with Levaquin x1. Plan -follow-up blood cultures/urine cultures-> results pending. Initiate broad-spectrum antibiotic treatment for emphysematous cystitis. De-escalate when cultures become available. Continue maintenance IV fluid at 125 cc/hr and clinically monitor for any signs of fluid overload (patient currently dry on physical exam)    Emphysematous cystitis -as seen on CT abdomen / pelvis. Urinalysis positive for UTI. S/p Levaquin x1 on presentation. Plan -initiate broad-spectrum antibiotic coverage with Merrem/vancomycin. De-escalate antibiotic treatment when urine culture result becomes available. Moderate bilateral hydronephrosis with mild bilateral perinephric stranding -as seen on CT abdomen pelvis. Patient developed urinary retention post CABG surgery 11/15/22 and has had Bella catheter inserted but recently removed 12/13/22. Patient follows with Dr. Gian Wright (urology). Plan -consult urology (Vee Will, NP notified), appreciate recommendations. Urinary retention - Patient developed urinary retention post CABG surgery 11/15/22 and has had Bella catheter inserted but recently removed 12/13/22. Patient follows with Dr. Gian Wright (urology). Plan -Bella catheter placed on presentation. consult urology (Vee Will, NP notified), appreciate recommendations. Patient takes Flomax 0.4 mg. Unable to resume at this time due to hypotension. Acute kidney injury -creatinine 2.1, with BUN to creatinine > 20. Difficult to determine baseline as patient has had an increase in creatinine to 1.6 in the last month. Secondary to dehydration/hypotension/post renal obstruction. Plan -continue maintenance IV fluids at 125 cc/h. Repeat renal function panel. Daily BMP. Avoid nephrotoxic agents. Renally dose medications    Ischemic cardiomyopathy, HFrEF 25-30% echo 10/2022, LifeVest on- compensated. Patient follows with heart failure clinic as outpatient. Currently he is dry appearing/hypotensive. No concern for acute CHF exacerbation. He is not on GDMT ACE/ARB/ARNi/Diuretic due to hypotension. Patient was started on Toprol 25 mg 11/22/22. Plan -continue with maintenance fluids, patient appears dry. Hold Toprol 25 mg due to hypotension. Consider resuming when patient is clinically stable. Daily weights. Low-salt diet. CAD s/p CABG x2 -patient underwent open heart on 11/7/2022. Patient follows with Dr. Virginia Noel. NIDDM type II -uncontrolled. Last hemoglobin A1c 7.8 8/23/2022. Home medications include Trulicity/Amaryl/Jardiance. Blood glucose noted to be 275  Plan -check hemoglobin A1c. Hold home diabetic medications. Initiate medium dose insulin sliding scale. Hypoglycemia protocol. Accu-Cheks    Primary hypertension -currently patient is hypotensive. Hold Toprol 25 mg. Closely monitor blood pressure. Chief Complaint:  Fall/weakness     History of Present Illness:  63 y.o. male who presented to [address] [place] [address] for evaluation of fall. PMHx significant for ischemic cardiomyopathy (echo EF 25-30%), CAD s/p CABG x2 (11/2022), primary hypertension, NIDDM type II, urinary retention. Patient presents for evaluation of a fall. Patient fell after getting up rom the toilet in the bathroom. He felt dizzy and lightheaded. He did not lose consciousness or hit his head. He was on the ground for approximately 20 minutes.   His wife called EMS and patient was brought to the hospital.     History provided by patient and wife at bedside. Patient had CABG X2 on 11/7/22 at [facility] and developed urinary retention post CABG needing insertion of a Bella. Patient was previously hospitalized 10/28/22 - 11/15/22 and underwent CABG x2 on 11/7/22. For the past week patient has been getting weaker and weaker, experiencing fatigue. He went to Dr. Juan Paige on 12/13/22 for cystoscopy and removal of Bella. Since removal of Bella patient has not been able to urinate well. He has not been drinking and eating much in the last week. States that he feels \" tired\". He denies chest pain or shortness of breath. Initial vital signs reveal temp 98.4, respiratory rate 18, heart rate 112, blood pressure 95/68, SPO2 100 on room air. Lab work significant for sodium 132, chloride 97, bicarb 20, BUN 48, creatinine 2.1, blood glucose 275, WBC 22.3, hemoglobin 10.6. Urinalysis revealed positive for nitrite, small leukocyte esterase. CT chest reveals chronic rib fractures but no acute. CT cervical spine reveals no fractures. CT thorax reveals no acute fracture of the thoracic spine. CT abdomen pelvis reveals emphysematous cystitis, moderate bilateral hydronephrosis with mild bilateral perinephric stranding. ED treatment, patient was given Norco, 2 L of IV fluid, levofloxacin. Past Medical History:    Past Medical History:   Diagnosis Date    Hypertension     Prediabetes      Past Surgical History:    Past Surgical History:   Procedure Laterality Date    CORONARY ARTERY BYPASS GRAFT N/A 11/7/2022    CABG X2 JOY WITH BALLOON PUMP performed by Lata Holalnd MD at [address] [place] [address]        Medications Prior to Admission:   No current facility-administered medications on file prior to encounter.      Current Outpatient Medications on File Prior to Encounter   Medication Sig Dispense Refill    atorvastatin (LIPITOR) 40 MG tablet Take 1 tablet by mouth daily 30 tablet 2    metoprolol succinate (TOPROL XL) 25 MG extended release tablet Take 1 tablet by mouth daily 90 tablet 3    aspirin 325 MG EC tablet Take 1 tablet by mouth daily 30 tablet 3    Multiple Vitamin (MULTIVITAMIN) TABS tablet Take 1 tablet by mouth daily (with breakfast) (Patient not taking: Reported on 12/13/2022) 30 tablet 3    tamsulosin (FLOMAX) 0.4 MG capsule Take 1 capsule by mouth daily 30 capsule 3    acetaminophen (TYLENOL) 500 MG tablet Take 500 mg by mouth every 6 hours as needed for Pain As needed      buPROPion (WELLBUTRIN XL) 150 MG extended release tablet Take 1 tablet by mouth every morning 30 tablet 1    omeprazole (PRILOSEC) 40 MG delayed release capsule Take 1 capsule by mouth daily 90 capsule 3    Dulaglutide (TRULICITY) 1.5 FG/4.1NZ SOPN Inject 1.5 mg into the skin once a week 4 pen 0    glimepiride (AMARYL) 4 MG tablet Take 1 tablet by mouth every morning (before breakfast) 90 tablet 0    empagliflozin (JARDIANCE) 25 MG tablet Take 1 tablet by mouth daily 30 tablet 3     Allergies:   Metformin and related    Social History:   Social History     Socioeconomic History    Marital status:      Spouse name: Not on file    Number of children: Not on file    Years of education: Not on file    Highest education level: Not on file   Occupational History    Not on file   Tobacco Use    Smoking status: Former     Packs/day: 0.50     Years: 33.00     Pack years: 16.50     Types: Cigarettes    Smokeless tobacco: Never   Vaping Use    Vaping Use: Never used   Substance and Sexual Activity    Alcohol use: Not Currently    Drug use: No    Sexual activity: Not on file   Other Topics Concern    Not on file   Social History Narrative    Not on file     Social Determinants of Health     Financial Resource Strain: Low Risk     Difficulty of Paying Living Expenses: Not hard at all   Food Insecurity: No Food Insecurity    Worried About Running Out of Food in the Last Year: Never true    Ran Out of Food in the Last Year: Never true   Transportation Needs: No Transportation Needs    Lack of Transportation (Medical): No    Lack of Transportation (Non-Medical): No   Physical Activity: Not on file   Stress: Not on file   Social Connections: Not on file   Intimate Partner Violence: Not on file   Housing Stability: Not on file     Family History:    Family History   Problem Relation Age of Onset    Diabetes Father     Stroke Father     Diabetes Brother     Diabetes Brother      REVIEW OF SYSTEMS:  A 14-point ROS was obtained and negative, with the exception of pertinent positives as listed below:    Review of Systems   Constitutional:  Positive for activity change, chills and fatigue. HENT: Negative. Eyes: Negative. Respiratory: Negative. Cardiovascular: Negative. Gastrointestinal: Negative. Endocrine: Negative. Genitourinary:  Positive for difficulty urinating and hematuria. Musculoskeletal:  Positive for gait problem. Skin:  Positive for pallor. Neurological:  Positive for dizziness, weakness and light-headedness. Psychiatric/Behavioral: Negative. PHYSICAL EXAM:  Vitals:    12/15/22 0706 12/15/22 0709 12/15/22 0751 12/15/22 0809   BP: (!) 106/45  (!) 112/55 (!) 96/53   Pulse: (!) 123  (!) 114 (!) 112   Resp: 20  20 18   Temp:  99.3 °F (37.4 °C)     TempSrc:  Oral     SpO2: 98%  98% 99%   Weight:       Height:         General appearance: Acutely ill-appearing male  HEENT:  Normocephalic / atraumatic. PERRL. EOM intact. Conjunctivae appear normal.  Neck: Supple. No JVD. Respiratory: Normal respiratory effort on RA. CTAB. Cardiovascular: Tachycardia  Abdomen: Soft / non-tender / non-distended. BS present. Musculoskeletal: No cyanosis or edema. Skin: Warm / dry. Normal turgor. Neurologic: A/O x 3.  Speech normal.     Labs:   Results for orders placed or performed during the hospital encounter of 12/15/22   CBC with Auto Differential   Result Value Ref Range    WBC 22.3 (H) 4.8 - 10.8 thou/mm3    RBC 3.82 (L) 4.70 - 6.10 mill/mm3    Hemoglobin 10.6 (L) 14.0 - 18.0 gm/dl    Hematocrit 33.3 (L) 42.0 - 52.0 %    MCV 87.2 80.0 - 94.0 fL    MCH 27.7 26.0 - 33.0 pg    MCHC 31.8 (L) 32.2 - 35.5 gm/dl    RDW-CV 13.6 11.5 - 14.5 %    RDW-SD 43.4 35.0 - 45.0 fL    Platelets 274 097 - 432 thou/mm3    MPV 10.2 9.4 - 12.4 fL    Seg Neutrophils 88.1 %    Lymphocytes 2.6 %    Monocytes 8.1 %    Eosinophils 0.2 %    Basophils 0.3 %    Immature Granulocytes 0.7 %    Segs Absolute 19.6 (H) 1.8 - 7.7 thou/mm3    Lymphocytes Absolute 0.6 (L) 1.0 - 4.8 thou/mm3    Monocytes Absolute 1.8 (H) 0.4 - 1.3 thou/mm3    Eosinophils Absolute 0.0 0.0 - 0.4 thou/mm3    Basophils Absolute 0.1 0.0 - 0.1 thou/mm3    Immature Grans (Abs) 0.15 (H) 0.00 - 0.07 thou/mm3    nRBC 0 /100 wbc   CMP   Result Value Ref Range    Glucose 275 (H) 70 - 108 mg/dL    Creatinine 2.1 (H) 0.4 - 1.2 mg/dL    BUN 48 (H) 7 - 22 mg/dL    Sodium 132 (L) 135 - 145 meq/L    Potassium 4.5 3.5 - 5.2 meq/L    Chloride 97 (L) 98 - 111 meq/L    CO2 20 (L) 23 - 33 meq/L    Calcium 9.6 8.5 - 10.5 mg/dL    AST 15 5 - 40 U/L    Alkaline Phosphatase 96 38 - 126 U/L    Total Protein 6.7 6.1 - 8.0 g/dL    Albumin 4.0 3.5 - 5.1 g/dL    Total Bilirubin 0.8 0.3 - 1.2 mg/dL    ALT 10 (L) 11 - 66 U/L   Troponin   Result Value Ref Range    Troponin T 0.011 (A) ng/ml   ETOH   Result Value Ref Range    ETHYL ALCOHOL, SERUM < 0.01 0.00 %   Anion Gap   Result Value Ref Range    Anion Gap 15.0 8.0 - 16.0 meq/L   Glomerular Filtration Rate, Estimated   Result Value Ref Range    Est, Glom Filt Rate 35 (A) >60 ml/min/1.73m2   Osmolality   Result Value Ref Range    Osmolality Calc 286.9 275.0 - 300.0 mOsmol/kg   Lactic Acid   Result Value Ref Range    Lactic Acid 1.3 0.5 - 2.0 mmol/L   Urine with Reflexed Micro   Result Value Ref Range    Glucose, Ur >= 1000 (A) NEGATIVE mg/dl    Bilirubin Urine SMALL (A) NEGATIVE    Ketones, Urine NEGATIVE NEGATIVE    Specific [place], Urine >= 1.030 1.002 - 1.030    Blood, Urine MODERATE (A) NEGATIVE    pH, UA 5.5 5.0 - 9.0    Protein,  (A) NEGATIVE    Urobilinogen, Urine 0.2 0.0 - 1.0 eu/dl    Nitrite, Urine POSITIVE (A) NEGATIVE    Leukocyte Esterase, Urine SMALL (A) NEGATIVE    Color, UA BROWN (A) STRAW-YELLOW    Character, Urine TURBID (A) CLEAR-SL CLOUD    RBC, UA > 200 0-2/hpf /hpf    WBC, UA > 200 0-4/hpf /hpf    Epithelial Cells, UA 5-10 3-5/hpf /hpf    Amorphous, UA NONE SEEN NONE SEEN    Mucus, UA NONE SEEN NONE SEEN/THREA    Bacteria, UA MANY FEW/NONE SEEN /hpf    Casts UA NONE SEEN NONE SEEN /lpf    Crystals, UA NONE SEEN NONE SEEN    Renal Epithelial, UA NONE NONE SEEN    Yeast, UA NONE SEEN NONE SEEN    Miscellaneous Lab Test Result NONE SEEN     CASTS 2 NONE SEEN NONE SEEN /lpf    Crystals, UA NONE SEEN NONE SEEN    MISCELLANEOUS 2 NONE SEEN    Bile Acids, Total   Result Value Ref Range    Ictotest NEGATIVE NEGATIVE   EKG Emergency   Result Value Ref Range    Ventricular Rate 121 BPM    Atrial Rate 121 BPM    P-R Interval 132 ms    QRS Duration 74 ms    Q-T Interval 312 ms    QTc Calculation (Bazett) 443 ms    P Axis 73 degrees    R Axis 85 degrees    T Axis -33 degrees       EKG / Radiology:     EKG:  Reviewed by me --    CXR:   Reviewed by me --    CT ABDOMEN PELVIS WO CONTRAST Additional Contrast? None    Result Date: 12/15/2022  ADDENDUM #1 Receipt of this report by the clinical staff was confirmed with  [place] on Dec 15, 2022 05:08:00 EST. This document has been electronically signed by: Letty Silva on 12/15/2022 05:09 AM ORIGINAL REPORT CT abdomen and pelvis without contrast Comparison: None Findings: The lung bases are clear. The stomach is nondistended. Severe mucosal thickening of the stomach at the fundus and mid body which could represent changes of gastritis in the correct clinical setting. Atherosclerosis of the abdominal aorta which continues into the iliac vasculature.  The liver and gallbladder are unremarkable. Partial atrophy of the pancreas. The spleen is unremarkable. Chronic thickening of the bilateral adrenal glands. Moderate bilateral hydronephrosis with mild bilateral perinephric stranding. Evaluation for infectious process including pyelonephritis limited without IV contrast, recommend correlation with urinalysis. Mild bilateral hydroureter throughout the course of the bilateral ureters without ureterolithiasis. The urinary bladder is dilated with significant mucosal thickening. Air within the mucosa of the urinary bladder. Inflammatory stranding surrounds the urinary bladder. An air-fluid level within the lumen of the bladder. 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 Recommend correlation with urinalysis. Pelvic contents unremarkable. Perirectal and presacral stranding. Circumferential mucosal thickening of the distal rectum best identified on axial image 73 of series 2. Recommend correlation with direct visualization/colonoscopy to exclude underlying mucosal lesion. Moderate fecal burden throughout the colon greatest involving the ascending and transverse colon, favor constipation. The cecum is positioned within the right mid abdomen. No bowel obstruction, pneumoperitoneum, or pneumatosis. Normal appendix. The bones are intact. Spondylosis of the lumbar spine. 1. The urinary bladder is dilated with significant mucosal thickening. Air within the mucosa of the urinary bladder. Inflammatory stranding surrounds the urinary bladder. An air-fluid level within the lumen of the bladder. 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 Recommend correlation with urinalysis. 2. Moderate bilateral hydronephrosis with mild bilateral perinephric stranding. Evaluation for infectious process including pyelonephritis limited without IV contrast, recommend correlation with urinalysis.  Mild bilateral hydroureter throughout the course of the bilateral ureters without ureterolithiasis. 3. Moderate fecal burden throughout the colon greatest involving the ascending and transverse colon, favor constipation. 4. Perirectal and presacral stranding. Circumferential mucosal thickening of the distal rectum best identified on axial image 73 of series 2. Recommend correlation with direct visualization/colonoscopy to exclude underlying mucosal lesion. 5. Severe mucosal thickening of the stomach at the fundus and mid body which could represent changes of gastritis in the correct clinical setting. This document has been electronically signed by: Sushila Tomas DO on 12/15/2022 05:03 AM All CTs at this facility use dose modulation techniques and iterative reconstructions, and/or weight-based dosing when appropriate to reduce radiation to a low as reasonably achievable. XR CHEST (2 VW)    Result Date: 12/13/2022  PROCEDURE: XR CHEST (2 VW) CLINICAL INFORMATION: Status post coronary artery bypass x 2 COMPARISON: Chest x-ray 11/15/2022. TECHNIQUE: PA and lateral views of the chest performed. FINDINGS: Lines/tubes: None. Heart/mediastinum: The heart size is normal. Median sternotomy wires are stable. The pulmonary vascularity is unremarkable. Lungs: Improved aeration is noted at the lung bases. The small left pleural effusion has decreased in volume. No focal opacity is observed. No pneumothorax is identified. Bones: The visualized skeletal structures appear intact. Improved aeration is noted at the lung bases. Pulmonary vascular congestion has improved and the small left pleural effusion has decreased in volume. The right pleural effusion has resolved. **This report has been created using voice recognition software. It may contain minor errors which are inherent in voice recognition technology. ** Final report electronically signed by Dr Yamilka Dozier on 12/13/2022 11:05 AM    CT CHEST WO CONTRAST    Result Date: 12/15/2022  CT chest without contrast. Comparison: Chest x-ray December 13, 2022 Findings: The thyroid is unremarkable. Normal caliber of the thoracic aorta. Atherosclerosis of the thoracic aorta. The heart is normal size. Coronary bypass grafting. Moderate coronary atherosclerosis. The mediastinum is intact. Mild paraseptal emphysematous disease involving the lung apices. Chronic left seventh posterior rib fracture with callus formation. Likely chronic left eighth posterior rib fracture, mild sclerosis at the margins of the fracture. Metallic device external to the patient and medially posterior to the left eighth rib fracture partially limits evaluation at this region. Median sternotomy wires. The stomach is non-distended. Moderate/severe bilateral hydronephrosis. No nephrolithiasis. Recommend consideration for dedicated cross-sectional imaging of the abdomen/pelvis for further evaluation of this finding. 1. No definitive acute rib fracture. 2. Chronic left seventh posterior rib fracture with callus formation. Likely chronic left eighth posterior rib fracture, mild sclerosis at the margins of this chronic appearing fracture. Metallic device external to the patient and medially posterior to the left eighth rib fracture partially limits evaluation at this region. 3. Moderate/severe bilateral hydronephrosis. No nephrolithiasis. Recommend consideration for dedicated cross-sectional imaging of the abdomen/pelvis for further evaluation of this finding. This document has been electronically signed by: Brinda Villalba DO on 12/15/2022 03:05 AM All CTs at this facility use dose modulation techniques and iterative reconstructions, and/or weight-based dosing when appropriate to reduce radiation to a low as reasonably achievable. CT CERVICAL SPINE WO CONTRAST    Result Date: 12/15/2022  CT cervical spine without contrast Comparison: September 21, 2013 Findings: Study is partially limited due to motion artifact and technique. Vertebral alignment is within normal limits.  No acute fractures or dislocations. Multilevel facet and uncovertebral joint arthropathy. Visualized intracranial contents are unremarkable. No cervical fluid collections or masses. Thyroid gland unremarkable. Paraseptal emphysematous disease. Minimal fluid within the left mastoid air cells. 1. Study is partially limited due to motion artifact and technique. Within this limitation, no acute fracture of the cervical spine. 2. Multilevel facet and uncovertebral joint arthropathy. This document has been electronically signed by: Hay Gutierrez DO on 12/15/2022 02:45 AM All CTs at this facility use dose modulation techniques and iterative reconstructions, and/or weight-based dosing when appropriate to reduce radiation to a low as reasonably achievable. CT THORACIC RECONSTRUCTION WO POST PROCESS    Result Date: 12/15/2022  CT thoracic spine without contrast Comparison: CT chest October 5, 2022 Findings: Vertebral alignment is within normal limits. Minimal rightward curvature of the mid thoracic spine. No acute fractures or dislocations. Mild multilevel spondylosis. Paraseptal emphysematous disease of the lung apices. Median sternotomy wires. Coronary artery bypass grafting. Atherosclerosis of the thoracic aorta. Coronary atherosclerosis Appearance of mild to moderate bilateral hydronephrosis. Recommend consideration for cross-sectional imaging of the abdomen and pelvis for further evaluation of this finding. 1. No acute fracture of the thoracic spine. 2. Mild multilevel spondylosis of the thoracic spine. This document has been electronically signed by: Hay Gutierrez DO on 12/15/2022 02:58 AM All CTs at this facility use dose modulation techniques and iterative reconstructions, and/or weight-based dosing when appropriate to reduce radiation to a low as reasonably achievable.     FEN/GI/DVT:  IVF: NS @ 125 cc/hr  Electrolytes: Monitor and replace per protocols  Diet: Diabetic  GI PPX: On H2 blocker for GERD  DVT Prophylaxis: Lovenox    CODE STATUS:  Full    Thank you BLACK Delgadillo CNP for the opportunity to be involved in this patient's care.     Electronically signed by Abimael Hawkins MD PGY-3 IM on 12/15/2022 at 8:19 AM

## 2022-12-16 ENCOUNTER — ANESTHESIA (OUTPATIENT)
Dept: ENDOSCOPY | Age: 62
End: 2022-12-16
Payer: COMMERCIAL

## 2022-12-16 ENCOUNTER — ANESTHESIA EVENT (OUTPATIENT)
Dept: ENDOSCOPY | Age: 62
End: 2022-12-16
Payer: COMMERCIAL

## 2022-12-16 ENCOUNTER — TELEPHONE (OUTPATIENT)
Dept: FAMILY MEDICINE CLINIC | Age: 62
End: 2022-12-16

## 2022-12-16 PROBLEM — B96.89 BACTEREMIA DUE TO ENTEROBACTER SPECIES: Status: ACTIVE | Noted: 2022-12-16

## 2022-12-16 PROBLEM — R33.9 URINARY RETENTION: Status: ACTIVE | Noted: 2022-12-16

## 2022-12-16 PROBLEM — I63.9 ACUTE CVA (CEREBROVASCULAR ACCIDENT) (HCC): Status: ACTIVE | Noted: 2022-12-16

## 2022-12-16 PROBLEM — R78.81 BACTEREMIA DUE TO ENTEROBACTER SPECIES: Status: ACTIVE | Noted: 2022-12-16

## 2022-12-16 LAB
ANION GAP SERPL CALCULATED.3IONS-SCNC: 12 MEQ/L (ref 8–16)
BUN BLDV-MCNC: 40 MG/DL (ref 7–22)
CALCIUM SERPL-MCNC: 8.5 MG/DL (ref 8.5–10.5)
CHLORIDE BLD-SCNC: 101 MEQ/L (ref 98–111)
CHOLESTEROL, TOTAL: 99 MG/DL (ref 100–199)
CO2: 17 MEQ/L (ref 23–33)
CREAT SERPL-MCNC: 1.7 MG/DL (ref 0.4–1.2)
EKG ATRIAL RATE: 87 BPM
EKG ATRIAL RATE: 99 BPM
EKG P AXIS: 61 DEGREES
EKG P AXIS: 69 DEGREES
EKG P-R INTERVAL: 136 MS
EKG P-R INTERVAL: 148 MS
EKG Q-T INTERVAL: 334 MS
EKG Q-T INTERVAL: 364 MS
EKG QRS DURATION: 82 MS
EKG QRS DURATION: 86 MS
EKG QTC CALCULATION (BAZETT): 428 MS
EKG QTC CALCULATION (BAZETT): 438 MS
EKG R AXIS: 81 DEGREES
EKG R AXIS: 87 DEGREES
EKG T AXIS: 113 DEGREES
EKG T AXIS: 44 DEGREES
EKG VENTRICULAR RATE: 87 BPM
EKG VENTRICULAR RATE: 99 BPM
ERYTHROCYTE [DISTWIDTH] IN BLOOD BY AUTOMATED COUNT: 13.5 % (ref 11.5–14.5)
ERYTHROCYTE [DISTWIDTH] IN BLOOD BY AUTOMATED COUNT: 44.6 FL (ref 35–45)
GFR SERPL CREATININE-BSD FRML MDRD: 45 ML/MIN/1.73M2
GLUCOSE BLD-MCNC: 148 MG/DL (ref 70–108)
GLUCOSE BLD-MCNC: 187 MG/DL (ref 70–108)
GLUCOSE BLD-MCNC: 193 MG/DL (ref 70–108)
GLUCOSE BLD-MCNC: 200 MG/DL (ref 70–108)
HCT VFR BLD CALC: 30.3 % (ref 42–52)
HDLC SERPL-MCNC: 45 MG/DL
HEMOGLOBIN: 9.3 GM/DL (ref 14–18)
LDL CHOLESTEROL CALCULATED: 42 MG/DL
MCH RBC QN AUTO: 27.6 PG (ref 26–33)
MCHC RBC AUTO-ENTMCNC: 30.7 GM/DL (ref 32.2–35.5)
MCV RBC AUTO: 89.9 FL (ref 80–94)
ORGANISM: ABNORMAL
PLATELET # BLD: 206 THOU/MM3 (ref 130–400)
PMV BLD AUTO: 10.2 FL (ref 9.4–12.4)
POTASSIUM SERPL-SCNC: 4.1 MEQ/L (ref 3.5–5.2)
RBC # BLD: 3.37 MILL/MM3 (ref 4.7–6.1)
SODIUM BLD-SCNC: 130 MEQ/L (ref 135–145)
SODIUM BLD-SCNC: 136 MEQ/L (ref 135–145)
TRIGL SERPL-MCNC: 62 MG/DL (ref 0–199)
TROPONIN T: < 0.01 NG/ML
URINE CULTURE REFLEX: ABNORMAL
WBC # BLD: 14.9 THOU/MM3 (ref 4.8–10.8)

## 2022-12-16 PROCEDURE — 93325 DOPPLER ECHO COLOR FLOW MAPG: CPT

## 2022-12-16 PROCEDURE — 93005 ELECTROCARDIOGRAM TRACING: CPT

## 2022-12-16 PROCEDURE — 84295 ASSAY OF SERUM SODIUM: CPT

## 2022-12-16 PROCEDURE — 97167 OT EVAL HIGH COMPLEX 60 MIN: CPT

## 2022-12-16 PROCEDURE — 7100000010 HC PHASE II RECOVERY - FIRST 15 MIN: Performed by: INTERNAL MEDICINE

## 2022-12-16 PROCEDURE — 99223 1ST HOSP IP/OBS HIGH 75: CPT | Performed by: INTERNAL MEDICINE

## 2022-12-16 PROCEDURE — 99232 SBSQ HOSP IP/OBS MODERATE 35: CPT

## 2022-12-16 PROCEDURE — 93320 DOPPLER ECHO COMPLETE: CPT

## 2022-12-16 PROCEDURE — 85027 COMPLETE CBC AUTOMATED: CPT

## 2022-12-16 PROCEDURE — 84484 ASSAY OF TROPONIN QUANT: CPT

## 2022-12-16 PROCEDURE — 6360000002 HC RX W HCPCS: Performed by: ANESTHESIOLOGY

## 2022-12-16 PROCEDURE — 2580000003 HC RX 258: Performed by: STUDENT IN AN ORGANIZED HEALTH CARE EDUCATION/TRAINING PROGRAM

## 2022-12-16 PROCEDURE — 6370000000 HC RX 637 (ALT 250 FOR IP): Performed by: SOCIAL WORKER

## 2022-12-16 PROCEDURE — 2060000000 HC ICU INTERMEDIATE R&B

## 2022-12-16 PROCEDURE — 82948 REAGENT STRIP/BLOOD GLUCOSE: CPT

## 2022-12-16 PROCEDURE — 6360000002 HC RX W HCPCS: Performed by: STUDENT IN AN ORGANIZED HEALTH CARE EDUCATION/TRAINING PROGRAM

## 2022-12-16 PROCEDURE — 6370000000 HC RX 637 (ALT 250 FOR IP): Performed by: STUDENT IN AN ORGANIZED HEALTH CARE EDUCATION/TRAINING PROGRAM

## 2022-12-16 PROCEDURE — 93312 ECHO TRANSESOPHAGEAL: CPT | Performed by: INTERNAL MEDICINE

## 2022-12-16 PROCEDURE — 99233 SBSQ HOSP IP/OBS HIGH 50: CPT | Performed by: STUDENT IN AN ORGANIZED HEALTH CARE EDUCATION/TRAINING PROGRAM

## 2022-12-16 PROCEDURE — 2500000003 HC RX 250 WO HCPCS: Performed by: ANESTHESIOLOGY

## 2022-12-16 PROCEDURE — 93010 ELECTROCARDIOGRAM REPORT: CPT | Performed by: INTERNAL MEDICINE

## 2022-12-16 PROCEDURE — 97535 SELF CARE MNGMENT TRAINING: CPT

## 2022-12-16 PROCEDURE — 36415 COLL VENOUS BLD VENIPUNCTURE: CPT

## 2022-12-16 PROCEDURE — 92523 SPEECH SOUND LANG COMPREHEN: CPT

## 2022-12-16 PROCEDURE — 80048 BASIC METABOLIC PNL TOTAL CA: CPT

## 2022-12-16 PROCEDURE — 93312 ECHO TRANSESOPHAGEAL: CPT

## 2022-12-16 PROCEDURE — 97112 NEUROMUSCULAR REEDUCATION: CPT

## 2022-12-16 PROCEDURE — 80061 LIPID PANEL: CPT

## 2022-12-16 PROCEDURE — 2709999900 HC NON-CHARGEABLE SUPPLY: Performed by: INTERNAL MEDICINE

## 2022-12-16 PROCEDURE — 3700000000 HC ANESTHESIA ATTENDED CARE: Performed by: INTERNAL MEDICINE

## 2022-12-16 PROCEDURE — 3700000001 HC ADD 15 MINUTES (ANESTHESIA): Performed by: INTERNAL MEDICINE

## 2022-12-16 PROCEDURE — 97530 THERAPEUTIC ACTIVITIES: CPT

## 2022-12-16 PROCEDURE — 6370000000 HC RX 637 (ALT 250 FOR IP)

## 2022-12-16 RX ORDER — LIDOCAINE HYDROCHLORIDE 20 MG/ML
INJECTION, SOLUTION INFILTRATION; PERINEURAL PRN
Status: DISCONTINUED | OUTPATIENT
Start: 2022-12-16 | End: 2022-12-16 | Stop reason: SDUPTHER

## 2022-12-16 RX ORDER — PROPOFOL 10 MG/ML
INJECTION, EMULSION INTRAVENOUS PRN
Status: DISCONTINUED | OUTPATIENT
Start: 2022-12-16 | End: 2022-12-16 | Stop reason: SDUPTHER

## 2022-12-16 RX ORDER — 0.9 % SODIUM CHLORIDE 0.9 %
500 INTRAVENOUS SOLUTION INTRAVENOUS ONCE
Status: DISCONTINUED | OUTPATIENT
Start: 2022-12-16 | End: 2022-12-16

## 2022-12-16 RX ORDER — TAMSULOSIN HYDROCHLORIDE 0.4 MG/1
0.8 CAPSULE ORAL DAILY
Status: DISCONTINUED | OUTPATIENT
Start: 2022-12-16 | End: 2022-12-23 | Stop reason: HOSPADM

## 2022-12-16 RX ADMIN — PROPOFOL 40 MG: 10 INJECTION, EMULSION INTRAVENOUS at 16:18

## 2022-12-16 RX ADMIN — ATORVASTATIN CALCIUM 40 MG: 40 TABLET, FILM COATED ORAL at 20:41

## 2022-12-16 RX ADMIN — BUPROPION HYDROCHLORIDE 150 MG: 150 TABLET, FILM COATED, EXTENDED RELEASE ORAL at 10:29

## 2022-12-16 RX ADMIN — PANTOPRAZOLE SODIUM 40 MG: 40 TABLET, DELAYED RELEASE ORAL at 05:01

## 2022-12-16 RX ADMIN — LIDOCAINE HYDROCHLORIDE 100 MG: 20 INJECTION, SOLUTION INFILTRATION; PERINEURAL at 16:11

## 2022-12-16 RX ADMIN — TAMSULOSIN HYDROCHLORIDE 0.8 MG: 0.4 CAPSULE ORAL at 18:12

## 2022-12-16 RX ADMIN — ENOXAPARIN SODIUM 40 MG: 100 INJECTION SUBCUTANEOUS at 10:29

## 2022-12-16 RX ADMIN — PROPOFOL 50 MG: 10 INJECTION, EMULSION INTRAVENOUS at 16:15

## 2022-12-16 RX ADMIN — SODIUM CHLORIDE, PRESERVATIVE FREE 10 ML: 5 INJECTION INTRAVENOUS at 20:41

## 2022-12-16 RX ADMIN — PROPOFOL 50 MG: 10 INJECTION, EMULSION INTRAVENOUS at 16:11

## 2022-12-16 RX ADMIN — SODIUM CHLORIDE, PRESERVATIVE FREE 10 ML: 5 INJECTION INTRAVENOUS at 10:28

## 2022-12-16 RX ADMIN — MEROPENEM 1000 MG: 1 INJECTION, POWDER, FOR SOLUTION INTRAVENOUS at 00:28

## 2022-12-16 RX ADMIN — CLOPIDOGREL BISULFATE 75 MG: 75 TABLET ORAL at 10:29

## 2022-12-16 RX ADMIN — SODIUM CHLORIDE: 9 INJECTION, SOLUTION INTRAVENOUS at 18:09

## 2022-12-16 RX ADMIN — MEROPENEM 1000 MG: 1 INJECTION, POWDER, FOR SOLUTION INTRAVENOUS at 11:42

## 2022-12-16 RX ADMIN — ASPIRIN 81 MG: 81 TABLET, COATED ORAL at 10:29

## 2022-12-16 ASSESSMENT — PAIN SCALES - GENERAL: PAINLEVEL_OUTOF10: 0

## 2022-12-16 ASSESSMENT — PAIN - FUNCTIONAL ASSESSMENT: PAIN_FUNCTIONAL_ASSESSMENT: NONE - DENIES PAIN

## 2022-12-16 ASSESSMENT — PAIN SCALES - WONG BAKER: WONGBAKER_NUMERICALRESPONSE: 0

## 2022-12-16 NOTE — PROGRESS NOTES
Message sent to Dr. Jovana Gleason regarding pt's right arm drifting. Pt states he thinks this started last night. Inquiring if this was noticed on her examination this morning. She stated that this was not there, and pt was shivering. Updated her that he's able to move both of his arms but the right is more difficult to move and it drifts. Advised that there is no numbness, redness, warmth or pain. Pt is alert/oriented, no facial droop. 1340: Dr. Jovana Gleason to bedside.

## 2022-12-16 NOTE — PROGRESS NOTES
[address] STEPDOWN TELEMETRY 4K  [address] [facility] [address]  Dept: [phone]  Loc: [phone]  Visit Date: 12/15/2022  Urology Progress Note    Chief Complaint: Fall     HPI:               The patient is a 63 y.o. male with extensive previous history of ETOH use recently hospitalized 10/28/22-11/15/22 during which hospitalization he underwent CABG x 2 by Dr. Arnoldo Buck on 22. He had issues with urinary retention failing void trials and hematuria during hospitalization for which urology was consulted. Patient was discharged with crum catheter in place and saw Dr. Domenica Tierney in the office 22 at which time cystoscopy was performed and crum removed. Cystoscopy revealed findings of catheter cystitis vs tumor, moderate trabeculation and small bladder diverticulum. Reassessment with repeat cystoscopy in 6-8 weeks was recommended and crum left out. Pt presented to ER today following fall when attempting to sit on the toilet. Pt noted to be tachycardic and have mildly low blood pressures. UA + for infection. CT abd/pelvis with findings of significant mucosal thickening of the bladder, air/fluid levels in lumen of the bladder and air within the mucosa of the bladder, moderate bilateral hydronephrosis, perirectal and presacral stranding. Crum inserted by ER with return of 400 mls of pus and purulent yellow appearing urine followed by air and then return of bloody urine with clots. Currently pt denies pain. Crum draining light red/brown urine with few clots/debris in tubing. Low grade temps. Reports has felt weak and fatigued last 2 days. Has been urinating small amounts every 10-15 minutes since crum removed .           Vitals:  /68   Pulse (!) 101   Temp 99.1 °F (37.3 °C) (Oral)   Resp 18   Ht 5' 10\" (1.778 m)   Wt 140 lb (63.5 kg)   SpO2 99%   BMI 20.09 kg/m²   Temp  Av.5 °F (36.9 °C)  Min: 97.8 °F (36.6 °C)  Max: 99.1 °F (37.3 °C)    Intake/Output Summary (Last 24 hours) at 12/16/2022 1022  Last data filed at 12/16/2022 0416  Gross per 24 hour   Intake 3310.65 ml   Output 3050 ml   Net 260.65 ml       Social History     Socioeconomic History    Marital status:      Spouse name: Not on file    Number of children: Not on file    Years of education: Not on file    Highest education level: Not on file   Occupational History    Not on file   Tobacco Use    Smoking status: Former     Packs/day: 0.50     Years: 33.00     Pack years: 16.50     Types: Cigarettes    Smokeless tobacco: Never   Vaping Use    Vaping Use: Never used   Substance and Sexual Activity    Alcohol use: Not Currently    Drug use: No    Sexual activity: Not on file   Other Topics Concern    Not on file   Social History Narrative    Not on file     Social Determinants of Health     Financial Resource Strain: Low Risk     Difficulty of Paying Living Expenses: Not hard at all   Food Insecurity: No Food Insecurity    Worried About [address] in the Last Year: Never true    [address] [employer] in the Last Year: Never true   Transportation Needs: No Transportation Needs    Lack of Transportation (Medical): No    Lack of Transportation (Non-Medical): No   Physical Activity: Not on file   Stress: Not on file   Social Connections: Not on file   Intimate Partner Violence: Not on file   Housing Stability: Not on file     Family History   Problem Relation Age of Onset    Diabetes Father     Stroke Father     Diabetes Brother     Diabetes Brother      Allergies   Allergen Reactions    Metformin And Related Nausea And Vomiting       Objective:    Constitutional: Alert and oriented times x3, no acute distress, and cooperative to examination with appropriate mood and affect. Cardiovascular:   Normal rate and regular rhythm. Pulmonary/Chest:  Normal respiratory rate and rhthym. No use of accessory muscles. Abdominal:          Soft. No tenderness.  Active bowel sounds. Genitourinary: Crum catheter draining yellow urine without clots or mucus/debris. Neurological:    Alert and oriented. Labs:  WBC:    Lab Results   Component Value Date/Time    WBC 14.9 12/16/2022 05:09 AM     Hemoglobin/Hematocrit:    Lab Results   Component Value Date/Time    HGB 9.3 12/16/2022 05:09 AM    HCT 30.3 12/16/2022 05:09 AM     BMP:    Lab Results   Component Value Date/Time     12/16/2022 05:09 AM    K 4.1 12/16/2022 05:09 AM    K 4.3 10/28/2022 10:00 PM     12/16/2022 05:09 AM    CO2 17 12/16/2022 05:09 AM    BUN 40 12/16/2022 05:09 AM    LABALBU 4.0 12/15/2022 01:30 AM    CREATININE 1.7 12/16/2022 05:09 AM    CALCIUM 8.5 12/16/2022 05:09 AM    GFRAA 58 05/24/2022 04:58 AM    LABGLOM 45 12/16/2022 05:09 AM       Impression/Plan:  Emphysematous cystitis  - Continue broad spectrum antibiotics. Follow culture results. Preliminary results show gram negative bacilli. Pending sensitivity. BPH with Urinary retention  - Patient failed multiple void trials during recent hospitalization and again s/p crum removal on 12/13. Continue catheter at this time. Gross hematuria  - Continue to hand irrigate PRN with 120 mls of sterile solution. Notify Urology with any worsening in hematuria or clot retention. LC  - Creatinine at 1.7. Improved from 2.1 yesterday. Secondary to dehydration/postrenal obstruction. Patient receiving IVF. Moderate bilateral hydronephrosis  - Hydronephrosis likely secondary to urinary retention. Noted lateral lobe hypertrophy and moderately obstructive prostate on recent cystoscopy. Urology will continue to follow.        Thank you for including us in the care of Lola James PA-C  12/16/22 10:22 AM  Urology

## 2022-12-16 NOTE — PROGRESS NOTES
Occupational Therapy  Mónica Schilling 62  INPATIENT OCCUPATIONAL THERAPY  STR ICU STEPDOWN TELEMETRY 4K  EVALUATION    Time:   Time In: 6174  Time Out: 1120  Timed Code Treatment Minutes: 44 Minutes  Minutes: 52          Date: 2022  Patient Name: Destini Talvaera,   Gender: male      MRN: [id number]  : 1960  (63 y.o.)  Referring Practitioner: Ari Conway MD  Diagnosis: emphysematous cycstitis  Additional Pertinent Hx: Destini Talavera who is a 63 y.o. male with a history of hypertension, diabetes, CAD on  daily, recent CABG in 2022, ischemic cardiomyopathy is admitted to [facility] for sepsis, emphysematous cystitis and acute kidney injury on CKD. During admission exam patient stated that his right arm feldman has been weak and that he has had some slurred speech. Stroke alert was called for right-sided weakness and dysarthria on 12/15. On arrival patient's NIH was 4 for right upper extremity, right lower extremity weakness, 1 limb ataxia and dysarthria. On further questioning, patient and wife state that the right arm weakness actually started last night. Patient's wife noticed when patient was trying to get up from the toilet that he was unable to push himself up with his right arm. Last known well 2330 on 2022. Patient taken to imaging for stat CT head which revealed no ICH, midline shift, mass-effect. CTA head and neck deferred due to patient's LC. Decision for no tPA was made due to patient's time since last known well. Patient with relatively low NIH and symptoms which are not consistent with LVO, therefore no thrombectomy/neuro intervention at this time. Patient had stat MRI brain and MRA head and neck without contrast.  MRI showed acute ischemic stroke of left parietal region.     Restrictions/Precautions:  Restrictions/Precautions: Up as Tolerated, Fall Risk  Position Activity Restriction  Other position/activity restrictions: right side hemiparesis    Subjective  Chart Reviewed: Yes, Orders, Imaging    Subjective: Pt in bed upon arrival, wife at bedside and supportive. Pt initially declines OOB activity. Education provided on importance of out of bed activity and importance of being able to assess pt abilities s/p confirmed CVA to assist in discharge planning. Pt is agreeable after educaiton. Pain: does not report pain     Vitals: Vitals not assessed per clinical judgement, see nursing flowsheet    Social/Functional History:  Lives With: Spouse  Type of Home: House  Home Layout: One level  Home Access: Stairs to enter without rails, Stairs to enter with rails  Entrance Stairs - Number of Steps: 3 AMOR  Entrance Stairs - Rails: Both  Home Equipment: Cinthya Chastity, rolling   Bathroom Shower/Tub: Tub/Shower unit  Bathroom Toilet: Standard  Bathroom Equipment: Tub transfer bench    Receives Help From: Family  ADL Assistance: Independent  Homemaking Assistance: Independent  Ambulation Assistance: Independent  Transfer Assistance: Independent    Active : No  Mode of Transportation: Car  Occupation: Full time employment  Type of Occupation: currently still on short term disability from CABG in November 2022       VISION:WFL    HEARING:  WFL    COGNITION: Decreased Insight, Decreased Problem Solving, and Decreased Safety Awareness    [address]:  Right Upper Extremity: WFL for PROM, limited AROM due to strength deficits (see below)  Left Upper Extremity:  WFL    STRENGTH:  Right Upper Extremity: Impaired - shoulder flexion: 2/5, elbow flexion: 2+/5, elbow extension: 2/5  Left Upper Extremity:  WFL grossly 4/5    HAND ASSESSMENT    Hand Dominance: Left  Left Hand Strength -  (lbs)  Handle Setting 2: 12/16: 33#, 31#, 30#  Right Hand Strength -  (lbs)  Handle Setting 2: 12/16: 5#, 4#, 4#        ADL:   Bathing: Maximum Assistance. Upper Extremity Dressing: Maximum Assistance. Lower Extremity Dressing: Dependent.      Toileting: Dependent. Bella catheter in place . BALANCE:  Sitting Balance:  Minimal Assistance, Moderate Assistance. Varied between Minimal A to moderate A with posterior lean and pushing to right side. Pt is not receptive to tactile cues to correct posture in sitting. Knees blocked to prevent scooting self off bed. Standing Balance: Moderate Assistance. With B Ue support on walker. Pt places limited weight through R LE and reports decreased proprioception of R LE.     BED MOBILITY:  Supine to Sit: Maximum Assistance, X 1, with head of bed raised, with verbal cues , with increased time for completion      TRANSFERS:  Sit to Stand:  Maximum Assistance. With blocking of R LE provided. 1st attempt was at walker. Pt requires max A to lift right hand onto walker and hand over hand to maintain  on walker. Stand to Sit: Maximum Assistance. With tactile cues to initiate hip flexion secondary to extension tone at hips and decreased ability to motor plan  Stand Pivot: Maximum Assistance. From EOB to recliner going towards left side. Without use of AD. FUNCTIONAL MOBILITY:  Assistive Device: Attempted side steps with RW  Assist Level:  Not tested. Distance:  Attempted side step to right/left standing infront of bed with walker. Pt unable to successfully weight shift to initiate functional mobility. OTR to assess when appropriate. Activity Tolerance:  Patient tolerance of  treatment: fair. Assessment:  Assessment: Elson Fleischer is a 63 y.o.male that presents with above new performance deficits secondary to acute left CVA. Pt is requiring increased assistance for ADLs, functional mobility, ADL transfers compared to baseline level of function. Pt is exhibiting new significant right sided hemiparesis of R UE and LE. Pt demos decreased proprioception and motor planning abilities. Skilled OT services is warranted to improve above performance deficits and progress pt towards PLOF.  Without OT pt is at risk for falls, further decline in functional abilities, increased caregiver burden, increased risk for medical complication as a result of reduce mobility and inability to return to prior level of living. Performance deficits / Impairments: Decreased functional mobility , Decreased cognition, Decreased ADL status, Decreased strength, Decreased endurance, Decreased safe awareness, Decreased balance  Prognosis: Good  REQUIRES OT FOLLOW-UP: Yes  Decision Making: High Complexity    Treatment Initiated: Treatment and education initiated within context of evaluation. Evaluation time included review of current medical information, gathering information related to past medical, social and functional history, completion of standardized testing, formal and informal observation of tasks, assessment of data and development of plan of care and goals. Treatment time included skilled education and facilitation of tasks to increase safety and independence with ADL's for improved functional independence and quality of life. Discharge Recommendations:  IP Rehab    Patient Education:     Patient Education  Education Given To: Patient  Education Provided: Role of Therapy, Plan of Care  Education Method: Demonstration  Barriers to Learning: None  Education Outcome: Verbalized understanding, Demonstrated understanding, Continued education needed    Equipment Recommendations:  Equipment Needed: Yes  Other: TBD. Pt's wife reports she would like a walker but pt is not safe to use walker at this time. Will continue to assess for needs    Plan:  Times Per Week: 6x  Times Per Day: Once a day  Current Treatment Recommendations: Strengthening, Balance training, Self-Care / ADL, Equipment evaluation, education, & procurement, Safety education & training, Endurance training, Functional mobility training, Neuromuscular re-education. See long-term goal time frame for expected duration of plan of care.   If no long-term goals established, a short length of stay is anticipated. Goals:  Patient goals : To return home with wife  Short Term Goals  Time Frame for Short Term Goals: until discharge  Short Term Goal 1: Pt will complete stand pivot transfer with Min A x1 to improve access with ADLs  Short Term Goal 2: OTR to assess functional mobility when appropriate  Short Term Goal 3: Pt will improve R UE strength to 3+/5 to improve participation with ADLs. Short Term Goal 4: Pt will improve dynamic sitting balance and tolerance to SBA x10 minutes to improve trunk control required for transfers and ADLs. Short Term Goal 5: Pt will perofrm UB ADls and grooming with Min A. Following session, patient left in safe position with all fall risk precautions in place.

## 2022-12-16 NOTE — PROGRESS NOTES
JOY completed. Emulsion study complete. Patient tolerated well. Report called to [facility] staff.

## 2022-12-16 NOTE — NURSE NAVIGATOR
Neuro Nurse Navigator     This RN in to provide stroke education to patient. Patient resting in bed with wife, Adán Nathan, at bedside. Stroke folder reviewed with pt and wife. Adán Nathan asking if the patient would need 24 hour care at home and voiced that her intermittent FMLA has  and she may struggle to get him to appointments. Explained to Adán Nathan that OT/PT/ST will evaluate pt today and give their recommendations. Also shared with Adán Nathan that insurance companies will sometimes provide rides to appointments and she should speak with her insurance company to see if this is an option for them. Adán Nathan voiced understanding. All other questions answered. No further needs or concerns voiced at this time. Primary RN, Comfort Pulliam, updated that education is complete. No questions or concerns voiced from primary RN. Stroke Folder given. What is Stroke/CVA  Signs and Symptoms of stroke (BEFAST)  Treatments for Stroke  Personal Risk Factors for Stroke discussed  Education--Call 911    Patient/family has been educated on their personal risk factors of:    Hypertension  Hyperlipidemia  smoking cessation-recently quit  Coronary Artery Disease  diabetes      They have been given hand outs on the following medications:(give handouts/attach to AVS)   asa  Plavix    statins(Lipitor)      Treatment for stroke includes:  Risk factor modifications  Following the medication regime prescribed by physician      Educated on FAST-Face-Arm-Speech-Time    A stroke is a brain attack. Stroke is a brain injury. It occurs when the brain's blood supply is interrupted. Blood carries oxygen and nutrients to the brain. Without oxygen and nutrients from blood, brain tissue starts to die rapidly. This can happen in less than 10 minutes. A stroke occurs when blood flow to the brain is blocked (called ischemic stroke).  This is caused by one of the following:   Sudden decreased blood flow   Damage to a blood vessel supplying blood to the brain can occur suddenly from either:   Injury   A clot that forms and breaks off from another part of the body (such as the heart or neck)   There are certain conditions which predispose people to form blood clots, such as:   Cancer   Pregnancy   Atrial fibrillation   Certain autoimmune diseases   Local blood clot   A build-up of fatty substances ( atherosclerotic plaque ) along the inner lining of the artery causes:   Narrowing of artery   Reduced elasticity   Local inflammation   Blood protein defects leading to increased clotting tendency   Decreased blood flow in the artery   Clot in an artery supplying the brain   Inflammatory conditions in the blood vessels (vasculitis)   A stroke may also occur if a blood vessel breaks and bleeds into or around the brain. This is called hemorrhagic stroke. This condition needs to be monitored closely. Be sure to keep all appointments. Have exams and blood tests done as directed. Call 911 If Any of the Following Occurs   It is important that you and those around you know the warning signs for stroke. CALL 911 immediately if you have any of the following which may suggest a new stroke:   Sudden weakness or numbness of face, arm, or leg, especially on one side of the body   Sudden confusion   Sudden trouble speaking or understanding   Sudden trouble seeing in one or both eyes   Sudden dizziness, trouble walking, loss of balance, or coordination   Sudden severe headache with no known cause   If you think you have an emergency, CALL 911       To help reduce your risk of stroke, take the following steps:   Eat a well-balanced diet. The DASH diet rich in fruits, vegetables and low-fat dairy foods, and low in saturated fat, total fat, and cholesterolmay help keep your blood pressure in the healthy range. Exercise regularly. Maintain a healthy weight. (Your body mass index should be below 25.)   If you smoke, quit . Drink alcohol in moderation.  Moderate is two or fewer drinks per day for men and one or fewer drinks per day for women and older adults. Control your diabetes    All patient/family questions were answered and teach back method was utilized.

## 2022-12-16 NOTE — CONSULTS
CONSULTATION NOTE :ID       Patient - Julio Corrales,  Age - 63 y.o.    - 1960      Room Number - 4K-13/013-A   MRN -  [id number]   Acct # - [de-identified]  Date of Admission -  12/15/2022  1:08 AM  Patient's PCP: BLACK Romo CNP     Requesting Physician: Melinda Nichols MD    REASON FOR CONSULTATION   Emphysematous Cystitis, assist with management    CHIEF COMPLAINT   Fall/weakness    HISTORY OF PRESENT ILLNESS     This is a very pleasant 63 y.o. male who was admitted to the hospital with a chief complaints of fall. He has a PMHx significant for CAD S/p CABGx2, Urinary retention post surgery s/p crum, HTN, NIDDM2 and former tobacco use. He had felt dizzy and lightheaded after standing up from using the toilet and fell to the ground. There was no reported LOC or head injury. His wife called EMS and he was brought to the ED for further evaluation. He felt over the past week he has been getting weaker. He had a recent cystoscopy for removal of crum with Dr. Kvng Hernandez on  and since has had poor oral intake. In the ED he was afebrile, slightly tachy with a HR of 112. He was noted to have an LC with a Cr of 2.1 and leukocytosis with WBC 22.3.  UA +eve for nitrite and LE, blood, protein. Microscopy showed >200 WBC and many bacteria. CTAP showed emphysematous cystitis, moderate b/l hydronephrosis with mild b/l perinephric stranding. He was given IVF and a dose of Levaquin in the ED. He was then started on Meropenum and Vancomycin    Blood cultures x2 are positive for GNB. Biofire shows Klebsiella Pneumonia and Enterobacter Cloaca. Urine culture was sent. On 12/15 a stroke alert was called. He was noted to have right sided weakness and dysarthria. Per report weakness had started slightly day previous. Neurology evaluated patient. CT head was negative. MRI showed acute ischemic stroke of left parietal region.     PAST MEDICAL  HISTORY       Past Medical History:   Diagnosis Date    Hypertension     Prediabetes        PAST SURGICAL HISTORY     Past Surgical History:   Procedure Laterality Date    CORONARY ARTERY BYPASS GRAFT N/A 11/7/2022    CABG X2 JOY WITH BALLOON PUMP performed by Caldwell Cockayne, MD at [address]:       Scheduled Meds:   sodium chloride flush  5-40 mL IntraVENous 2 times per day    enoxaparin  40 mg SubCUTAneous Daily    atorvastatin  40 mg Oral Daily    buPROPion  150 mg Oral QAM    insulin lispro  0-8 Units SubCUTAneous TID WC    insulin lispro  0-4 Units SubCUTAneous Nightly    pantoprazole  40 mg Oral QAM AC    meropenem  1,000 mg IntraVENous Q12H    clopidogrel  75 mg Oral Daily    aspirin  81 mg Oral Daily     Continuous Infusions:   sodium chloride      sodium chloride Stopped (12/15/22 1743)    dextrose       PRN Meds:sodium chloride flush, sodium chloride, ondansetron **OR** ondansetron, polyethylene glycol, acetaminophen **OR** acetaminophen, glucose, dextrose bolus **OR** dextrose bolus, glucagon (rDNA), dextrose  Allergies:   ALLERGIES:    Metformin and related        SOCIAL HISTORY:     TOBACCO:   reports that he has quit smoking. His smoking use included cigarettes. He has a 16.50 pack-year smoking history. He has never used smokeless tobacco.     ETOH:   reports that he does not currently use alcohol. Patient currently lives with family       FAMILY HISTORY:         Problem Relation Age of Onset    Diabetes Father     Stroke Father     Diabetes Brother     Diabetes Brother        REVIEW OF SYSTEMS:     Constitutional: no fever, no night sweats, no fatigue, no weight loss. Head: no head ache , no head injury, no migranes. Eye: no eye discharge, blurring of vision, no double vision,no eye pain.   Ears: no hearing difficulty, no tinnitus  Mouth/throat: some slurred speech  Respiratory: no cough no chest pain,no shortness of breath,no wheezing  CVS: no palpitation, no chest pain,   GI: no n/v/d/c  YADI: no dysuria, frequency and urgency. Hematuria improved. Musculoskeletal: no joint pain, swelling , stiffness,  Endocrine: no polyuria, polydipsia, no cold or heat intolerance  Hematology: no anemia, no easy brusing or bleeding, no hx of clotting disorder  Dermatology: no skin rash, no skin lesions, no pruritis,  Neurological:Right upper extremity weakness  Psychiatry: no depression, no anxiety,no panic attacks, no suicide ideation    PHYSICAL EXAM:     /68   Pulse (!) 101   Temp 99.1 °F (37.3 °C) (Oral)   Resp 18   Ht 5' 10\" (1.778 m)   Wt 140 lb (63.5 kg)   SpO2 99%   BMI 20.09 kg/m²     General apperance: Chronically ill appearing. No acute distress  HEENT: pink conjunctiva, unicteric sclera, moist oral mucosa. Chest:  CTA. No wheezes rales noted. Surgical incision midline chest present. Cardiovascular:  RRR ,S1S2, no murmur or gallop. Abdomen:  Soft, non tender to palpation. Extremities: Residual right sided weakness, UE > LE  Skin:  Warm and dry. CNS: A&Ox3. Slight dysarthria presents        LABS:     CBC:   Recent Labs     12/15/22  0130 12/16/22  0509   WBC 22.3* 14.9*   HGB 10.6* 9.3*    206     BMP:    Recent Labs     12/15/22  0130 12/15/22  1615 12/16/22  0509   * 133* 130*   K 4.5 4.1 4.1   CL 97* 102 101   CO2 20* 19* 17*   BUN 48* 42* 40*   CREATININE 2.1* 1.7* 1.7*   GLUCOSE 275* 164* 148*     Calcium:  Recent Labs     12/16/22  0509   CALCIUM 8.5     Ionized Calcium:No results for input(s): IONCA in the last 72 hours. Magnesium:No results for input(s): MG in the last 72 hours. Phosphorus:No results for input(s): PHOS in the last 72 hours. BNP:No results for input(s): BNP in the last 72 hours. Glucose:  Recent Labs     12/15/22  1202 12/15/22  1621 12/15/22  2021   POCGLU 151* 171* 130*     HgbA1C:   Recent Labs     12/15/22  0130   LABA1C 7.6*     INR: No results for input(s): INR in the last 72 hours.   Hepatic:   Recent Labs     12/15/22  0130   ALKPHOS 96   ALT 10*   AST 15 PROT 6.7   BILITOT 0.8   LABALBU 4.0     Amylase and Lipase:  Recent Labs     12/15/22  0400   LACTA 1.3     Lactic Acid:   Recent Labs     12/15/22  0400   LACTA 1.3     Troponin: No results for input(s): CKTOTAL, CKMB, TROPONINI in the last 72 hours. BNP: No results for input(s): BNP in the last 72 hours. Lipids:   Recent Labs     12/16/22  0509   CHOL 99*   TRIG 62   HDL 45   LDLCALC 42     ABGs: No results found for: PHART, PO2ART, XAP3UCG, SGM2OXH, BEART    Cultures: BC x2 - GNB - Biofire positive for Klebsiella Pneumonia and Enterobacter Cloaca        U Cx - GNB >100k CFU                   MRSA Screen Nares - negative       CXR: CTAP - findings consistent with emphysematous cystitis. B/l Hydronephrosis with mild b/l perinephric stranding. B/l Hydroureter. Perirectal and presacral stranding. CT Chest - Chronic rib fracture    CT Head - No acute findings    MRA H/N - Mild disease b/l carotid bulbs    MRI Brain - Acute left parietal ischemic event. Potential small additional acute ischemic event within right lobe of cerebellum. UA:   Recent Labs     12/15/22  0519   PHUR 5.5   COLORU BROWN*   MUCUS NONE SEEN   PROTEINU 100*   BLOODU MODERATE*   RBCUA > 200   WBCUA > 200   BACTERIA MANY   NITRU POSITIVE*   GLUCOSEU >= 1000*   BILIRUBINUR SMALL*   UROBILINOGEN 0.2   KETUA NEGATIVE         IMAGING:    Micro: No results found for: [facility]    Problem list of patient      Patient Active Problem List   Diagnosis Code    Syncope R55    Facial injury S09. 93XA    Tobacco abuse Z72.0    Cranial facial fractures ([address] S02. 91XA, S02. 92XA    Diabetes mellitus type 2 in nonobese (HCC) E11.9    Fungal toenail infection B35.1    Golfer's elbow M77.00    Medical non-compliance Z91.199    Erectile dysfunction N52.9    NAVARRO (generalized anxiety disorder) F41.1    Impacted cerumen of right ear H61.21    Essential hypertension I10    Uncontrolled type 2 diabetes mellitus with hyperglycemia ([address] E11.65    Thoracic arthritis X11.889    New onset of congestive heart failure (HCC) P00.3    Acute systolic congestive heart failure (HCC) I50.21    Nonischemic cardiomyopathy (HCC) I42.8    Stage 3a chronic kidney disease (HCC) N18.31    Tremor R25.1    LC (acute kidney injury) ([facility] [address] N17.9    Hyperlipidemia E78.5    Gastroesophageal reflux disease K21.9    S/P cardiac cath Z98.890    CAD, multiple vessel I25.10    Ischemic cardiomyopathy I25.5    ETOH abuse F10.10    S/P CABG x 2 Z95.1    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    Acute pyelonephritis N10           Impression and Recommendation:   Sepsis with GNB Bacteremia due to cystitis   - Biofire positive for Enterobacter Cloaca and Klebsiella Pneumonia   - Continue Meropenum. Follow C&S   - D/c Vanc  Emphysematous Cystitis - Urine Cx positive for GNB. Recent history of urinary retention s/p crum   - Continue Meropenum. Will need prolonged course. - Follow C&S  Left parietal acute CVA - Neurology following  LC  Urinary retention following surgery - Urology following      Infection Control:   Standard Precautions      Discharge Planning (Coordination of out patient care: Thank you Trav Leon MD for allowing me to participate in this patient's care. Case and plan developed in coordination with Dr. Diego Mariano DO,  12/16/2022 11:01 AM   Patient was seen and examined face-to-face by me  The chart, progress notes, labs and radiographs were reviewed. Case discussed with the nurse. Questions and concerns were addressed. I agree with the progress note.

## 2022-12-16 NOTE — PROGRESS NOTES
[facility] COORDINATOR CONSULT    Referral Type: internal    Patient Name: Geena Torres      MRN: [id number]    : 1960  (63 y.o.)  Gender: male   Race:White (non-)     Payor Source: Payor: Cyndee Cervantes / Plan: Rollene Litten / Product Type: *No Product type* /   Secondary Payor Source:      Isolation Status: No active isolations    Lives With: Spouse  Type of Home: House  Home Layout: One level  Home Access: Stairs to enter without rails, Stairs to enter with rails  Entrance Stairs - Number of Steps: 3 AMOR  Receives Help From: Family  Occupation: Full time employment  Type of Occupation: currently still on short term disability from CABG in 2022       What is treatment plan?   Disciplines Required upon Admission to Inpatient Rehabilitation: Physical Therapy, Occupational Therapy, and Speech Therapy  Post operative: No  Fall: Yes  Dialysis: No  Diet: Diet NPO  Discussed patient with  and PM&R provider: Await medical work up completion

## 2022-12-16 NOTE — PROGRESS NOTES
Neurology Progress Note    Date:12/16/2022       VGMN:[id number]  Patient Madi Floyd     YOB: 1960     Age:62 y.o.        HPI: Evelyn Cousin who is a 63 y.o. male with a history of hypertension, diabetes, CAD on  daily, recent CABG in November, ischemic cardiomyopathy is admitted to [facility] for sepsis, emphysematous cystitis and acute kidney injury on CKD. During admission exam patient stated that his right arm feldman has been weak and that he has had some slurred speech. Stroke alert was called for right-sided weakness and dysarthria. On arrival patient's NIH was 4 for right upper extremity, right lower extremity weakness, 1 limb ataxia and dysarthria. On further questioning, patient and wife state that the right arm weakness actually started last night. Patient's wife noticed when patient was trying to get up from the toilet that he was unable to push himself up with his right arm. Last known well 2330 on 12/14/2022. Patient taken to imaging for stat CT head which revealed no ICH, midline shift, mass-effect. CTA head and neck deferred due to patient's LC. Decision for no tPA was made due to patient's time since last known well. Patient with relatively low NIH and symptoms which are not consistent with LVO, therefore no thrombectomy/neuro intervention at this time. Patient will have stat MRI brain and MRA head and neck without contrast.     Time of symptoms onset/last time seen at baseline: 2330 on 12/14/2022. Time of stroke alert: 1620 on 12/15/2022. Time of Neurology arrival: 1622. Vascular risk factors: HTN, T2DM. Initial Glucose: 171. Old deficits from prior stroke: None. INR in ED if anticoagulated: N/A.  BP in ED: 96/60. Initial NIHSS: 4.  Modified Celine Score upon admission: 3. IV tPA administerd: No.  Time of Initial Imaging Read: 1637. Thrombectomy performed: No.  Puncture time: N/A. Interval history 12/16/22:  The patient continues to have right-sided weakness, dysarthria, and facial droop. He went down for a JOY today. The results are pending. Review of Systems   Review of Systems   Eyes:  Negative for visual disturbance. Neurological:  Positive for facial asymmetry, speech difficulty, weakness and numbness. Negative for dizziness, light-headedness and headaches. Medications   Scheduled Meds:    sodium chloride flush  5-40 mL IntraVENous 2 times per day    enoxaparin  40 mg SubCUTAneous Daily    atorvastatin  40 mg Oral Daily    buPROPion  150 mg Oral QAM    insulin lispro  0-8 Units SubCUTAneous TID WC    insulin lispro  0-4 Units SubCUTAneous Nightly    vancomycin (VANCOCIN) intermittent dosing (placeholder)   Other RX Placeholder    vancomycin  750 mg IntraVENous Q24H    pantoprazole  40 mg Oral QAM AC    meropenem  1,000 mg IntraVENous Q12H    clopidogrel  75 mg Oral Daily    aspirin  81 mg Oral Daily     Continuous Infusions:    sodium chloride      sodium chloride Stopped (12/15/22 1743)    dextrose       PRN Meds: sodium chloride flush, sodium chloride, ondansetron **OR** ondansetron, polyethylene glycol, acetaminophen **OR** acetaminophen, glucose, dextrose bolus **OR** dextrose bolus, glucagon (rDNA), dextrose    Past History    Past Medical History:   has a past medical history of Hypertension and Prediabetes. Social History:   reports that he has quit smoking. His smoking use included cigarettes. He has a 16.50 pack-year smoking history. He has never used smokeless tobacco. He reports that he does not currently use alcohol. He reports that he does not use drugs.      Family History:   Family History   Problem Relation Age of Onset    Diabetes Father     Stroke Father     Diabetes Brother     Diabetes Brother        Physical Examination          Vitals:  BP (!) 111/59   Pulse 88   Temp 97.8 °F (36.6 °C) (Oral)   Resp 18   Ht 5' 10\" (1.778 m)   Wt 140 lb (63.5 kg)   SpO2 99%   BMI 20.09 kg/m²   Temp (24hrs), Av.3 °F (36.8 °C), Min:97.8 °F (36.6 °C), Max:99.1 °F (37.3 °C)      I/O (24Hr): Intake/Output Summary (Last 24 hours) at 2022 0813  Last data filed at 2022 0416  Gross per 24 hour   Intake 3310.65 ml   Output 3050 ml   Net 260.65 ml         Physical Exam  Vitals reviewed. Constitutional:       General: He is not in acute distress. Appearance: Normal appearance. He is not ill-appearing. HENT:      Head: Normocephalic and atraumatic. Right Ear: External ear normal.      Left Ear: External ear normal.      Nose: Nose normal.      Mouth/Throat:      Mouth: Mucous membranes are moist.      Pharynx: No oropharyngeal exudate or posterior oropharyngeal erythema. Eyes:      Extraocular Movements: EOM normal.      Pupils: Pupils are equal, round, and reactive to light. Neurological:      Mental Status: He is alert and oriented to person, place, and time. Psychiatric:         Mood and Affect: Mood normal.         Speech: Speech is slurred. Behavior: Behavior normal.     Neurologic Exam     Mental Status   Oriented to person, place, and time. Attention: normal. Concentration: normal.   Speech: slurred   Level of consciousness: alert  Normal comprehension. Cranial Nerves     CN II   Visual fields full to confrontation. Right visual field deficit: none  Left visual field deficit: none     CN III, IV, VI   Pupils are equal, round, and reactive to light. Extraocular motions are normal.   Right pupil: Shape: regular. Reactivity: brisk. Left pupil: Shape: regular. Reactivity: brisk. CN V   Facial sensation intact.    Right facial sensation deficit: none  Left facial sensation deficit: none    CN VII   Right facial weakness: central  Left facial weakness: none    CN VIII   CN VIII normal.   Hearing: intact    CN IX, X   CN IX normal.   CN X normal.   Palate: symmetric    CN XI   CN XI normal.   Right trapezius strength: normal  Left trapezius strength: normal    CN XII   CN XII normal.   Tongue: not atrophic  Fasciculations: absent  Tongue deviation: none    Motor Exam   Muscle bulk: normal  Overall muscle tone: normal  Right arm tone: normal  Left arm tone: normal  Right arm pronator drift: absent  Left arm pronator drift: absent  Right leg tone: normal  Left leg tone: normal  Muscle strength 5/5 in left upper and lower extremities. Muscle strength 4/5 in right upper and lower extremities. Sensory Exam   Light touch normal.        Labs/Imaging/Diagnostics   Labs:  CBC:  Recent Labs     12/13/22  0936 12/15/22  0130 12/16/22  0509   WBC 8.4 22.3* 14.9*   RBC 4.04* 3.82* 3.37*   HGB 11.2* 10.6* 9.3*   HCT 36.5* 33.3* 30.3*   MCV 90.3 87.2 89.9    296 206       CHEMISTRIES:  Recent Labs     12/15/22  0130 12/15/22  1615 12/16/22  0509   * 133* 130*   K 4.5 4.1 4.1   CL 97* 102 101   CO2 20* 19* 17*   BUN 48* 42* 40*   CREATININE 2.1* 1.7* 1.7*   GLUCOSE 275* 164* 148*       PT/INR:No results for input(s): PROTIME, INR in the last 72 hours. APTT:No results for input(s): APTT in the last 72 hours. LIVER PROFILE:  Recent Labs     12/15/22  0130   AST 15   ALT 10*   BILITOT 0.8   ALKPHOS 96         Imaging Last 24 Hours:  CT ABDOMEN PELVIS WO CONTRAST Additional Contrast? None    Result Date: 12/15/2022   ADDENDUM #1  Receipt of this report by the clinical staff was confirmed with Humera Retana on Dec 15, 2022 05:08:00 EST. This document has been electronically signed by: Dior Godfrey on 12/15/2022 05:09 AM  ORIGINAL REPORT CT abdomen and pelvis without contrast Comparison: None Findings: The lung bases are clear. The stomach is nondistended. Severe mucosal thickening of the stomach at the fundus and mid body which could represent changes of gastritis in the correct clinical setting. Atherosclerosis of the abdominal aorta which continues into the iliac vasculature. The liver and gallbladder are unremarkable. Partial atrophy of the pancreas.  The spleen is unremarkable. Chronic thickening of the bilateral adrenal glands. Moderate bilateral hydronephrosis with mild bilateral perinephric stranding. Evaluation for infectious process including pyelonephritis limited without IV contrast, recommend correlation with urinalysis. Mild bilateral hydroureter throughout the course of the bilateral ureters without ureterolithiasis. The urinary bladder is dilated with significant mucosal thickening. Air within the mucosa of the urinary bladder. Inflammatory stranding surrounds the urinary bladder. An air-fluid level within the lumen of the bladder. 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 Recommend correlation with urinalysis. Pelvic contents unremarkable. Perirectal and presacral stranding. Circumferential mucosal thickening of the distal rectum best identified on axial image 73 of series 2. Recommend correlation with direct visualization/colonoscopy to exclude underlying mucosal lesion. Moderate fecal burden throughout the colon greatest involving the ascending and transverse colon, favor constipation. The cecum is positioned within the right mid abdomen. No bowel obstruction, pneumoperitoneum, or pneumatosis. Normal appendix. The bones are intact. Spondylosis of the lumbar spine. 1. The urinary bladder is dilated with significant mucosal thickening. Air within the mucosa of the urinary bladder. Inflammatory stranding surrounds the urinary bladder. An air-fluid level within the lumen of the bladder. 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 Recommend correlation with urinalysis. 2. Moderate bilateral hydronephrosis with mild bilateral perinephric stranding. Evaluation for infectious process including pyelonephritis limited without IV contrast, recommend correlation with urinalysis. Mild bilateral hydroureter throughout the course of the bilateral ureters without ureterolithiasis.  3. Moderate fecal burden throughout the colon greatest involving the ascending and transverse colon, favor constipation. 4. Perirectal and presacral stranding. Circumferential mucosal thickening of the distal rectum best identified on axial image 73 of series 2. Recommend correlation with direct visualization/colonoscopy to exclude underlying mucosal lesion. 5. Severe mucosal thickening of the stomach at the fundus and mid body which could represent changes of gastritis in the correct clinical setting. This document has been electronically signed by: Marie Morin DO on 12/15/2022 05:03 AM All CTs at this facility use dose modulation techniques and iterative reconstructions, and/or weight-based dosing when appropriate to reduce radiation to a low as reasonably achievable. CT HEAD WO CONTRAST    Result Date: 12/15/2022  PROCEDURE: CT HEAD WO CONTRAST CLINICAL INFORMATION:Code stroke COMPARISON: CT head 9/21/2013 TECHNIQUE: 5 mm axial imaging through the head without IV contrast. All CT scans at this facility use dose modulation, iterative reconstruction, and/or weight based dosing when appropriate to reduce the radiation dose to as low as reasonably achievable. FINDINGS: Intracranial atherosclerotic calcifications. Areas of old infarct seen in the coronal radiata on the left. No ventriculomegaly. No midline shift or mass effect. No acute intracranial hemorrhage. No intracranial collection. Gray-white differentiation is unremarkable. The posterior fossa is unremarkable. The craniocervical junction is unremarkable. No acute bony abnormality. The  paranasal sinuses are clear. Mild left mastoid effusion. Otherwise, the right mastoid air cells are clear. The orbits are unremarkable. 1. No acute intracranial abnormality. The pertinent finding(s) was called to patient's nurse  Vj Lopez RN at [phone] hours on 12/15/2022 by Dr. Selena Fonseca. Verbal acknowledgment and readback was given. 2. Chronic findings as described above.  **This report has been created using voice recognition software. It may contain minor errors which are inherent in voice recognition technology. ** Final report electronically signed by Dr Glenn Booker on 12/15/2022 4:46 PM    CT CHEST WO CONTRAST    Result Date: 12/15/2022  CT chest without contrast. Comparison: Chest x-ray December 13, 2022 Findings: The thyroid is unremarkable. Normal caliber of the thoracic aorta. Atherosclerosis of the thoracic aorta. The heart is normal size. Coronary bypass grafting. Moderate coronary atherosclerosis. The mediastinum is intact. Mild paraseptal emphysematous disease involving the lung apices. Chronic left seventh posterior rib fracture with callus formation. Likely chronic left eighth posterior rib fracture, mild sclerosis at the margins of the fracture. Metallic device external to the patient and medially posterior to the left eighth rib fracture partially limits evaluation at this region. Median sternotomy wires. The stomach is non-distended. Moderate/severe bilateral hydronephrosis. No nephrolithiasis. Recommend consideration for dedicated cross-sectional imaging of the abdomen/pelvis for further evaluation of this finding. 1. No definitive acute rib fracture. 2. Chronic left seventh posterior rib fracture with callus formation. Likely chronic left eighth posterior rib fracture, mild sclerosis at the margins of this chronic appearing fracture. Metallic device external to the patient and medially posterior to the left eighth rib fracture partially limits evaluation at this region. 3. Moderate/severe bilateral hydronephrosis. No nephrolithiasis. Recommend consideration for dedicated cross-sectional imaging of the abdomen/pelvis for further evaluation of this finding.  This document has been electronically signed by: Arcelia Alvarenga DO on 12/15/2022 03:05 AM All CTs at this facility use dose modulation techniques and iterative reconstructions, and/or weight-based dosing when appropriate to reduce radiation to a low as reasonably achievable. CT CERVICAL SPINE WO CONTRAST    Result Date: 12/15/2022  CT cervical spine without contrast Comparison: September 21, 2013 Findings: Study is partially limited due to motion artifact and technique. Vertebral alignment is within normal limits. No acute fractures or dislocations. Multilevel facet and uncovertebral joint arthropathy. Visualized intracranial contents are unremarkable. No cervical fluid collections or masses. Thyroid gland unremarkable. Paraseptal emphysematous disease. Minimal fluid within the left mastoid air cells. 1. Study is partially limited due to motion artifact and technique. Within this limitation, no acute fracture of the cervical spine. 2. Multilevel facet and uncovertebral joint arthropathy. This document has been electronically signed by: Arcelia Alvarenga DO on 12/15/2022 02:45 AM All CTs at this facility use dose modulation techniques and iterative reconstructions, and/or weight-based dosing when appropriate to reduce radiation to a low as reasonably achievable. MRA HEAD WO CONTRAST    Result Date: 12/15/2022  MRA of the brain. Technique: Axial MR images of the brain. 3-D reconstructions provided. Comparison: None Findings: Normal anterior circulation. This includes the intracranial portion of the internal carotid arteries, anterior cerebral arteries and middle cerebral arteries. No evidence for aneurysm or occlusion. Normal posterior circulation. This includes the vertebral arteries, basilar artery and posterior cerebral arteries. No evidence for aneurysm or occlusion. Impression: Normal MRA of the brain. This document has been electronically signed by: China Mittal MD on 12/15/2022 08:10 PM 3D Post-processing was performed on this study. CT THORACIC RECONSTRUCTION WO POST PROCESS    Result Date: 12/15/2022  CT thoracic spine without contrast Comparison: CT chest October 5, 2022 Findings: Vertebral alignment is within normal limits.  Minimal rightward curvature of the mid thoracic spine. No acute fractures or dislocations. Mild multilevel spondylosis. Paraseptal emphysematous disease of the lung apices. Median sternotomy wires. Coronary artery bypass grafting. Atherosclerosis of the thoracic aorta. Coronary atherosclerosis Appearance of mild to moderate bilateral hydronephrosis. Recommend consideration for cross-sectional imaging of the abdomen and pelvis for further evaluation of this finding. 1. No acute fracture of the thoracic spine. 2. Mild multilevel spondylosis of the thoracic spine. This document has been electronically signed by: Latia Cisse DO on 12/15/2022 02:58 AM All CTs at this facility use dose modulation techniques and iterative reconstructions, and/or weight-based dosing when appropriate to reduce radiation to a low as reasonably achievable. MRA NECK WO CONTRAST    Result Date: 12/15/2022  MRA of the neck Technique: 3-D time-of-flight axial images obtained. 3-D reconstructions. Comparison: None Findings: Right common carotid artery: Normal, no significant stenosis. Right internal carotid artery: Mild disease right carotid bulb, no significant stenosis. Left common carotid artery: Normal, no significant stenosis. Left internal carotid artery: Mild disease left carotid bulb, no significant stenosis. Normal bilateral vertebral arteries. Incidental findings: None     Impression: Mild disease bilateral carotid bulbs. This document has been electronically signed by: Lisset Alex MD on 12/15/2022 08:20 PM Carotid stenosis and measurements are in accordance with NASCET criteria. 3D Post-processing was performed on this study. MRI BRAIN WO CONTRAST    Result Date: 12/15/2022   ADDENDUM #1  This report was discussed with Ronak Greenwood RN on Dec 15, 2022 20:23:00 EST. This document has been electronically signed by: Padmini Mays on 12/15/2022 08:23 PM  ORIGINAL REPORT  Noncontrast MRI of the brain.  Technique: Sagittal T1, coronal T2, axial T1, T2, FLAIR and diffusion-weighted imaging. Comparison: CT/SR - CT HEAD WO CONTRAST - 12/15/2022 04:32 PM EST Findings: Area of increased signal within the left parietal region coronal radiata measuring 1.2 x 0.8 cm. This represent acute ischemic event. Possible small additional acute ischemic event within the right lobe of the cerebellum measuring 3 mm. Moderate nonspecific periventricular and deep white matter disease. This most often can ascribed to chronic small vessel ischemic change. There are no intracranial masses. No evidence for hemorrhage. The ventricles are normal size, no hydrocephalus. Contents of the posterior fossa including brainstem and cerebellum are unremarkable. Normal vascular flow voids. Normal aeration of paranasal sinuses. Moderate left mastoid effusion. Impression: Acute ischemic event left parietal region coronal radiata. Possible small additional acute ischemic event within the right lobe of the cerebellum. Moderate nonspecific periventricular and deep white matter disease. This most often can ascribed to chronic small vessel ischemic change. This document has been electronically signed by: Maegan Aguilera MD on 12/15/2022 08:14 PM        Assessment and Plan:          Acute ischemic strokes in left corona radiata and right cerebellar hemisphere concerning for a cardioembolic source. Imaging  CT revealed no ICH, midline shift, mass-effect, dense MCA sign. Does demonstrate chronic infarct of the L corona radiata. CTA of head and neck deferred due to LC on CKD. MRI/MRA Head/MRA Neck WO Contrast ordered  JOY pending. Upon reviewing the patient's echocardiogram from October, he was noted to have a low ejection fraction of 20 to 25% with severe global hypokinesis of the left ventricle.   Stat CT head is needed if the patient develops new-onset altered mental status, a severe headache, or new-onset neurologic deficit  Risk factors and medications  Blood pressure goal: permissive hypertension up to 220/120 for 24 hours after stroke onset. following this period, less than 130/80. Keep well hydrated. Will defer to primary as pt is with CHF  Antithrombotics: ASA 81 + Plavix 75mg  HgbA1C 7.6, recommend tight control of A1c with goal of <7.0 if attainable. LDL 42. LDL goal of 45-70. Continue Lipitor 40mg daily. Smoking and alcohol cessation when applicable. Provide stroke eduction for individualized risk factors. EKG with T wave inversion in anterolateral leads  Maintain telemetry to monitor for atrial fibrillation  Core stroke metrics  Dysphagia screen prior to oral intake  PT/OT/SLP consult. IPR consult if applicable. DVT prophylaxis: Lovenox  NIHSS every shift. Neuro checks per unit unless otherwise specified. Pre-morbid Modified [place] Scale: 2 - Slight disability:  unable to carry out all previous activities, but able to look after own affairs without assistance. Depending on the patient's JOY results, he may benefit from anticoagulation for secondary stroke prevention given a cardioembolic appearing etiology of his strokes and the severe global hypokinesis of his left ventricle. Will review JOY results tomorrow. Discussed with patient possibility of inpatient rehab. Patient is agreeable. Will await therapy recommendations.     Electronically signed by Claudia Tierney PA-C on 12/16/22 at 6:21 PM EST

## 2022-12-16 NOTE — CARE COORDINATION
Case Management Assessment  Initial Evaluation    Date/Time of Evaluation: 12/16/2022 11:49 AM  Assessment Completed by: Dione Cole RN    If patient is discharged prior to next notation, then this note serves as note for discharge by case management. Patient Name: Ari Da Silva                   YOB: 1960  Diagnosis: Emphysematous cystitis [N30.80]  Acute pyelonephritis [N10]  Multiple falls [R29.6]                   Date / Time: 12/15/2022  1:08 AM  Location: [facility]     Patient Admission Status: Inpatient   Readmission Risk (Low < 19, Mod (19-27), High > 27): Readmission Risk Score: 23.6    Current PCP: BLACK Talbert CNP  PCP verified by CM? Yes    Chart Reviewed: Yes      History Provided by: Patient, Spouse  Patient Orientation: Alert and Oriented    Patient Cognition: Alert    Hospitalization in the last 30 days (Readmission):  No    If yes, Readmission Assessment in CM Navigator will be completed. Advance Directives:      Code Status: Full Code   Patient's Primary Decision Maker is: Legal Next of Kin      Discharge Planning:    Patient lives with: Spouse/Significant Other Type of Home: House  Primary Care Giver: Self  Patient Support Systems include: Spouse/Significant Other   Current Financial resources: Other (Comment) (BCBS)  Current community resources: None  Current services prior to admission: None            Current DME:              Type of Home Care services:  None    ADLS  Prior functional level: Independent in ADLs/IADLs  Current functional level: Independent in ADLs/IADLs    Family can provide assistance at DC: Yes  Would you like Case Management to discuss the discharge plan with any other family members/significant others, and if so, who?  Yes (spouse Estrella Lust)  Plans to Return to Present Housing: Yes  Other Identified Issues/Barriers to RETURNING to current housing: yes  Potential Assistance needed at discharge: N/A            Potential DME: Walker  Patient expects to discharge to: House  Plan for transportation at discharge: Family    Financial    Payor: Gualberto Gaines / Plan: [facility] / Product Type: *No Product type* /     Does insurance require precert for SNF: Yes    Potential assistance Purchasing Medications: No  Meds-to-Beds request:        Christopher GoldbergKrystin Ramirezmir Sherwin [address]  [address] [facility]  Phone: [phone] Fax: [phone]      Notes:    Factors facilitating achievement of predicted outcomes: Family support, Motivated, Cooperative, and Pleasant    Barriers to discharge: Cognitive deficit, Anxiety, Upper extremity weakness, and Lower extremity weakness    Additional Case Management Notes:   Emphysematous Cystitis/Fall/CVA/Bacteremia  History: recent CABG 11/22   12/15 Code Stroke  Blood ID  Enterobacter Cloacae, Klebsiella Pneumoniae  ECHO planned today    IV AB, IVF    The Plan for Transition of Care is related to the following treatment goals of Emphysematous cystitis [N30.80]  Acute pyelonephritis [N10]  Multiple falls [R29.6]    Patient Goals/Plan/Treatment Preferences: lives w spouse Nano Will; prefers new IPR (precert) when stable; prefers new RW; therapy following; monitor Physiatry consult when stable  Transportation/Food Security/Housekeeping Addressed: No issues identified.      Artem Goode RN  Case Management Department

## 2022-12-16 NOTE — PROGRESS NOTES
Internal Medicine Resident Progress Note    Patient:  Christine España    YOB: 1960  Unit/Bed:4K-13/013-A  MRN: [id number]    Acct: [de-identified]   PCP: BLACK Ervin CNP    Date of Admission: 12/15/2022      Assessment/Plan:  Sepsis, secondary to klebsiella pneumoniae/enterobacter cloacae bacteremia. On admit met SIRS 2/4 (HR >90, WBC 22.3) with purulent UA and imaging showing emphysematous cystitis. Received 2L IVFs and levofloxacin in the ED. LA 1.3 [id number]. Blood cultures positive x2 for gram negative bacilli. Preliminary urine culture results show gram negative bacilli. Initially started on meropenem and vancomycin. Biofire positive for klebsiella pneumoniae and enterobacter cloacae complex. ID following  Continue meropenem, will likely require prolonged course of antibiotics  Discontinue vancomycin  Final blood & urine cultures pending. Continue gentle IVFs as tolerated with close attention to fluid status. Bacteremia, secondary to emphysematous cystitis. Treatment as above. Acute ischemic stroke, code stroke called [id number] for right sided weakness and dysarthria. Initial NIHSS 4. Last known well uncertain. CT Head unremarkable for any acute events, however MRI showed acute ischemic stroke in the left parietal corona radiata region right cerebellar hemisphere, concerning for cardioembolic stroke. Ongoing right-sided deficits, mildly improved from yesterday. Neurology following  Permissive HTN up to 220/120 for 24h, then <130/80 (starting [id number]). ASA + plavix  Continue telemetry  NIHSS every shift  Cardiology consulted, JYO performed today, report pending. Completed successful bedside swallow exam  SLP exam, including cognitive eval, to follow. PT/OT/speech eval & treat  Consult IPR Monday  Emphysematous cystitis, secondary to chronic indwelling Crum. On CT Abd/Pelvis at admit. S/p urinary retention with chronic crum after CABG in November.  UA growing gram negative bacilli. Treatment as above. Pyelonephritis, with bilateral hydronephrosis with bilateral perinephric stranding, secondary to chronic indwelling Bella. Urine retention, s/p CABG [id number] in setting of moderate BPH on recent cystoscopy. Discharged home with Bella catheter at that time. Flomax initially held d/t hypotension, restarted [id number]. Urology following. Continue Bella  Continue flomax if tolerated  LC, BUN 36/Cr 1.7 on admit. BUN/Cr stable. Continue gentle fluids as needed with careful attention to fluid status. Hyponatremia, Na 137 on admit, trending down and 130 [id number]. Continue to monitor with daily BMP and evening sodium  Gentle IVFs as tolerated  HFrEF, secondary to ischemic cardiomyopathy. LVEF 25-30% per echo [id number]. Presented with Libia Villalobos with heart failure clinic. Dry and hypotensive on initial presentation. Metoprolol held d/t hypotension  Cardiology consulted  Strict I/Os  Daily weights  Continue to hold metoprolol. CAD s/p CABG x 2, CABG performed [id number]. Follows with Dr. Marcie Jackson. Troponin elevation 0.011 and EKG changes noted [id number]. Metoprolol held d/t hypotension. Continue treatment as above. NIDDM2, poorly controlled. HbA1c 7.6 [id number]. On trulicity, amaryl, and jardiance outpatient. Blood glucose 275 on admit. Holding home meds. Inpatient blood glucose goal 140-180, currently maintaining. MD SSI in place, but has not required insulin during this admit  Hypoglycemic protocol in place  HTN, essential. Hypotensive on admit, remains hypotensive. Holding home metoprolol. Continue to closely monitor BP. Expected discharge date:  TBD    Disposition: TBD  [] Home  [] TCU  [] Rehab  [] Psych  [] SNF  [] [place]  [] Other-    ===================================================================      Chief Complaint: fall/weakness    Hospital Course:   Per original HPI:  \"63 y.o. male who presented to [address] [place] [address] for evaluation of fall. PMHx significant for ischemic cardiomyopathy (echo EF 25-30%), CAD s/p CABG x2 (11/2022), primary hypertension, NIDDM type II, urinary retention. Patient presents for evaluation of a fall. Patient fell after getting up rom the toilet in the bathroom. He felt dizzy and lightheaded. He did not lose consciousness or hit his head. He was on the ground for approximately 20 minutes. His wife called EMS and patient was brought to the hospital.      History provided by patient and wife at bedside. Patient had CABG X2 on 11/7/22 at [address] [place] [address] and developed urinary retention post CABG needing insertion of a Bella. Patient was previously hospitalized 10/28/22 - 11/15/22 and underwent CABG x2 on 11/7/22. For the past week patient has been getting weaker and weaker, experiencing fatigue. He went to Dr. Felisa Wilcox on 12/13/22 for cystoscopy and removal of Bella. Since removal of Bella patient has not been able to urinate well. He has not been drinking and eating much in the last week. States that he feels \" tired\". He denies chest pain or shortness of breath. Initial vital signs reveal temp 98.4, respiratory rate 18, heart rate 112, blood pressure 95/68, SPO2 100 on room air. Lab work significant for sodium 132, chloride 97, bicarb 20, BUN 48, creatinine 2.1, blood glucose 275, WBC 22.3, hemoglobin 10.6. Urinalysis revealed positive for nitrite, small leukocyte esterase. CT chest reveals chronic rib fractures but no acute. CT cervical spine reveals no fractures. CT thorax reveals no acute fracture of the thoracic spine. CT abdomen pelvis reveals emphysematous cystitis, moderate bilateral hydronephrosis with mild bilateral perinephric stranding. ED treatment, patient was given Norco, 2 L of IV fluid, levofloxacin. \"      [id number] - experienced right-sided weakness and dysarthria with uncertain last known well time.  Code stroke called, initial CT without acute changes, but MRI showed acute infarct in the left parietal region/corona radiata and possible smaller acute stroke in right cerebellar hemisphere. Concern for cardioembolic etiology. Subjective (past 24 hours):   Endorses ongoing dysarthria and right-sided weakness. NIHSS remains 4, however right-sided drift of the upper and lower extremities is improved today over yesterday. Denies headache, dizziness, n/v, chest pain, dyspnea, abdominal pain, changes in bowels/bladder, skin changes, or edema. ROS: reviewed complete ROS unchanged unless otherwise stated in hospital course/subjective portion. Medications:  Reviewed    Infusion Medications    sodium chloride      sodium chloride 75 mL/hr at 12/16/22 1809    dextrose       Scheduled Medications    tamsulosin  0.8 mg Oral Daily    sodium chloride flush  5-40 mL IntraVENous 2 times per day    enoxaparin  40 mg SubCUTAneous Daily    atorvastatin  40 mg Oral Daily    buPROPion  150 mg Oral QAM    insulin lispro  0-8 Units SubCUTAneous TID WC    insulin lispro  0-4 Units SubCUTAneous Nightly    pantoprazole  40 mg Oral QAM AC    meropenem  1,000 mg IntraVENous Q12H    clopidogrel  75 mg Oral Daily    aspirin  81 mg Oral Daily     PRN Meds: sodium chloride flush, sodium chloride, ondansetron **OR** ondansetron, polyethylene glycol, acetaminophen **OR** acetaminophen, glucose, dextrose bolus **OR** dextrose bolus, glucagon (rDNA), dextrose        Intake/Output Summary (Last 24 hours) at 12/16/2022 1947  Last data filed at 12/16/2022 1927  Gross per 24 hour   Intake 1998.91 ml   Output 3100 ml   Net -1101.09 ml       Exam:  /60   Pulse 84   Temp 98.4 °F (36.9 °C) (Oral)   Resp 16   Ht 5' 10\" (1.778 m)   Wt 140 lb (63.5 kg)   SpO2 99%   BMI 20.09 kg/m²     General: elderly  male, resting in bed on arrival, alert, cooperative  Eyes:  PERRLA. Nonicteric, no conjunctivitis, EOMs intact. HENT: Normocephalic, atraumatic. Nares normal. Oral mucosa moist.  Hearing grossly intact.    Neck: Supple, with full range of motion. No gross JVD appreciated. Respiratory:  Normal effort. Clear to auscultation, without rales or wheezes or rhonchi. Cardiovascular: RRR, good S1/S2, rubs, gallops, or murmurs. No lower extremity edema. Abdomen: Soft, non-tender, suprapubic tenderness to palpation. Bowel sounds present. Musculoskeletal: No joint swelling or tenderness. Normal tone. No abnormal movements. Skin: Warm and dry. No rashes or lesions. Neurologic:  Dysarthria, right sided facial droop, ongoing right-sided upper and lower extremity weakness 4/5  Psychiatric: Alert and oriented, normal insight and thought content. Capillary Refill: Brisk,< 3 seconds. Peripheral Pulses: +2 palpable, equal bilaterally. Labs:   Recent Labs     12/15/22  0130 12/16/22  0509   WBC 22.3* 14.9*   HGB 10.6* 9.3*   HCT 33.3* 30.3*    206     Recent Labs     12/15/22  0130 12/15/22  1615 12/16/22  0509   * 133* 130*   K 4.5 4.1 4.1   CL 97* 102 101   CO2 20* 19* 17*   BUN 48* 42* 40*   CREATININE 2.1* 1.7* 1.7*   CALCIUM 9.6 8.4* 8.5     Recent Labs     12/15/22  0130   AST 15   ALT 10*   BILITOT 0.8   ALKPHOS 96     No results for input(s): INR in the last 72 hours. No results for input(s): Sylvia Fuse in the last 72 hours. No results for input(s): PROCAL in the last 72 hours. Lab Results   Component Value Date/Time    NITRU POSITIVE 12/15/2022 05:19 AM    WBCUA > 200 12/15/2022 05:19 AM    BACTERIA MANY 12/15/2022 05:19 AM    RBCUA > 200 12/15/2022 05:19 AM    BLOODU MODERATE 12/15/2022 05:19 AM    SPECGRAV 1.007 11/06/2022 09:55 AM    GLUCOSEU >= 1000 12/15/2022 05:19 AM       Radiology (48 hours):  CT ABDOMEN PELVIS WO CONTRAST Additional Contrast? None    Result Date: 12/15/2022  1. The urinary bladder is dilated with significant mucosal thickening. Air within the mucosa of the urinary bladder. Inflammatory stranding surrounds the urinary bladder.  An air-fluid level within the lumen of the bladder. 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 Recommend correlation with urinalysis. 2. Moderate bilateral hydronephrosis with mild bilateral perinephric stranding. Evaluation for infectious process including pyelonephritis limited without IV contrast, recommend correlation with urinalysis. Mild bilateral hydroureter throughout the course of the bilateral ureters without ureterolithiasis. 3. Moderate fecal burden throughout the colon greatest involving the ascending and transverse colon, favor constipation. 4. Perirectal and presacral stranding. Circumferential mucosal thickening of the distal rectum best identified on axial image 73 of series 2. Recommend correlation with direct visualization/colonoscopy to exclude underlying mucosal lesion. 5. Severe mucosal thickening of the stomach at the fundus and mid body which could represent changes of gastritis in the correct clinical setting. This document has been electronically signed by: Hay Gutierrez DO on 12/15/2022 05:03 AM All CTs at this facility use dose modulation techniques and iterative reconstructions, and/or weight-based dosing when appropriate to reduce radiation to a low as reasonably achievable. CT HEAD WO CONTRAST    Result Date: 12/15/2022  1. No acute intracranial abnormality. The pertinent finding(s) was called to patient's nurse  Seth Danielle RN at [phone] hours on 12/15/2022 by Dr. Nathan Ramsay. Verbal acknowledgment and readback was given. 2. Chronic findings as described above. **This report has been created using voice recognition software. It may contain minor errors which are inherent in voice recognition technology. ** Final report electronically signed by Dr Joya Fenton on 12/15/2022 4:46 PM    CT CHEST WO CONTRAST    Result Date: 12/15/2022  1. No definitive acute rib fracture. 2. Chronic left seventh posterior rib fracture with callus formation.  Likely chronic left eighth posterior rib fracture, mild sclerosis at the margins of this chronic appearing fracture. Metallic device external to the patient and medially posterior to the left eighth rib fracture partially limits evaluation at this region. 3. Moderate/severe bilateral hydronephrosis. No nephrolithiasis. Recommend consideration for dedicated cross-sectional imaging of the abdomen/pelvis for further evaluation of this finding. This document has been electronically signed by: Mone Mathew DO on 12/15/2022 03:05 AM All CTs at this facility use dose modulation techniques and iterative reconstructions, and/or weight-based dosing when appropriate to reduce radiation to a low as reasonably achievable. CT CERVICAL SPINE WO CONTRAST    Result Date: 12/15/2022  1. Study is partially limited due to motion artifact and technique. Within this limitation, no acute fracture of the cervical spine. 2. Multilevel facet and uncovertebral joint arthropathy. This document has been electronically signed by: Mone Mathew DO on 12/15/2022 02:45 AM All CTs at this facility use dose modulation techniques and iterative reconstructions, and/or weight-based dosing when appropriate to reduce radiation to a low as reasonably achievable. MRA HEAD WO CONTRAST    Result Date: 12/15/2022  Impression: Normal MRA of the brain. This document has been electronically signed by: Jarred Obrien MD on 12/15/2022 08:10 PM 3D Post-processing was performed on this study. CT THORACIC RECONSTRUCTION WO POST PROCESS    Result Date: 12/15/2022  1. No acute fracture of the thoracic spine. 2. Mild multilevel spondylosis of the thoracic spine. This document has been electronically signed by: Mone Mathew DO on 12/15/2022 02:58 AM All CTs at this facility use dose modulation techniques and iterative reconstructions, and/or weight-based dosing when appropriate to reduce radiation to a low as reasonably achievable.     MRA NECK WO CONTRAST    Result Date: 12/15/2022  Impression: Mild disease bilateral carotid bulbs. This document has been electronically signed by: Jarred Obrien MD on 12/15/2022 08:20 PM Carotid stenosis and measurements are in accordance with NASCET criteria. 3D Post-processing was performed on this study. MRI BRAIN WO CONTRAST    Result Date: 12/15/2022  Impression: Acute ischemic event left parietal region coronal radiata. Possible small additional acute ischemic event within the right lobe of the cerebellum. Moderate nonspecific periventricular and deep white matter disease. This most often can ascribed to chronic small vessel ischemic change. This document has been electronically signed by: Jarred Obrien MD on 12/15/2022 08:14 PM        DVT prophylaxis:    [x] Lovenox  [] SCDs  [] SQ Heparin  [] Encourage ambulation   [] Already on Anticoagulation       Diet: ADULT DIET; Dysphagia - Soft and Bite Sized; 3 carb choices (45 gm/meal)  Code Status: Full Code  PT/OT: Yes  Tele: Yes  IVF: NS 75ml/h with frequent fluid status reassessment. Electronically signed by Tanisha Hall MD, [id number] on 12/16/2022 at 7:47 PM    Case was discussed with Attending, Dr. aMndeep Perez.

## 2022-12-16 NOTE — CONSULTS
[facility]    Patient's Name/Date of Birth: Christine España / 1960 (22 y.o.)    Date: December 16, 2022     Referring Provider: Brittany Barone MD    CHIEF COMPLAINT: S/P mechanical fall with generalized weakness      HPI:   This is a 77-year-old male who presented to [facility] ED on 12/15/2022 after a mechanical fall. Patient was sitting in the bathroom and when he tried to rise he felt generalized weakness in his lower extremities and had a fall. He denies loss of consciousness, seizure activity, or head trauma. Patient's wife attempted to help him get back up but was unable to do so and so she called EMS. Patient admits to generalized malaise since 12/14/2022. He states that he saw his outpatient providers on 61/97/8097 without complication. He denies chest pain, fever, chills, shortness of breath, cough with sputum production, nausea, vomiting, diarrhea, constipation, falls, loss of consciousness, seizure activity, rash/skin changes, hematochezia, melena, hemoptysis, hematemesis, hematuria, dysphagia, arm/leg swelling, headache, change in vision. Review of systems is positive for decreased appetite, abdominal cramping. He admits to having difficulty with urination after Bella catheter removal.    He admits to hospitalization in November 2022 for coronary artery bypass grafting without complication. He denies any changes in medication since discharge. He denies any additional surgical interventions since discharge. He admits to having a cystoscopy in the outpatient urology setting without complication. He denies recent illness, sick contacts, recent travel, new exposures, recent traumas. He admits to being a former smoker and admits to smoking again after his coronary artery bypass grafting. His wife has thrown out his cigarettes and he has not smoked in the last week. He denies any alcohol, marijuana, smokeless tobacco, recreational drug use.   He denies any additional acute symptoms or concerns. ED provider documentation reviewed. Patient was endorsing pain in his upper back at that time. EKG showed sinus tachycardia ventricular rate 121, QRS 74, QTc 443 with ST and T wave abnormality concerning for possible inferior ischemia. CT of the cervical spine and chest showed no new traumatic injury. CT of the abdomen and pelvis showed urinary bladder distention with significant mucosal thickening, air within the mucosa of the urinary bladder with inflammatory stranding and air-fluid levels in the lumen consistent with emphysematous cystitis. Bilateral hydronephrosis. Labs were significant for WBC 22.3, Hb 10.6, HCT 33.3. Glucose 275, creatinine 2.1, BUN 48. Initial troponin was elevated at 0.011. Urinalysis was consistent with a urinary tract infection. In the evening, patient endorsed right upper extremity weakness and slurred speech that also started the same day. Neurologic exam showed evidence of diminished strength and pronator drift in the right upper extremity. Code stroke was called. A CT noncontrast of the head did not show any evidence of acute bleed. MRI showed evidence of acute ischemic event in the left parietal region corona radiata with possible small additional acute ischemic event within the right lobe of the cerebellum. MRA of the head and neck showed mild disease bilaterally in the carotid bulbs. Repeat EKG on 12/15/2022 at 1636 showed normal sinus rhythm with ventricular rate 87, UT interval 148, QTc 438. There was nonspecific T wave abnormalities in the inferior leads with more prominent T wave inversions in the lateral leads. 11/01/2022 diagnostic cardiac catheterization showed the left main coronary artery had 10 to 20% stenosis in the distal segment. Ramus intermedius had 90-95% stenosis in the proximal segment. There was luminal irregularities in the left circumflex artery. The ostium of the LAD had 90% stenosis. Proximal LAD had luminal irregularities. D1 had 99% subtotal occlusion. Proximal RCA had luminal irregularities. Mid RCA had 40-50% stenosis. 10/29/2022 echocardiogram showed left ventricular size was normal and systolic function was severely reduced. Ejection fraction was estimated at 25-30%. Severe global hypokinesis of the left ventricle. There was also evidence of a large pleural effusion at that time. Mild mitral regurgitation was present. Mild tricuspid regurgitation was also visualized. 11/07/2022 operative note reviewed. Coronary artery bypass grafting x2 with a left internal mammary artery grafted to the left anterior descending artery, a reversed greater saphenous aortocoronary vein graft to the ramus intermedius coronary artery. Endoscopic greater saphenous vein harvest.  Intraoperative coronary angiography of all distal anastomoses. Insertion of the left common femoral intra-aortic balloon pump. Patient was discharged with a regimen of aspirin 325 mg daily, atorvastatin 40 mg, empagliflozin 25 mg once daily, Trulicity once a week. Patient was told to stop taking lisinopril 20 mg.       All labs, EKG's, diagnostic testing and images as well as cardiac cath, stress testing were reviewed during this encounter    Past Medical History:   Diagnosis Date    Hypertension     Prediabetes      Past Surgical History:   Procedure Laterality Date    CORONARY ARTERY BYPASS GRAFT N/A 11/7/2022    CABG X2 JOY WITH BALLOON PUMP performed by Kayden Mathew MD at [address] [place] [address] Facility-Administered Medications   Medication Dose Route Frequency Provider Last Rate Last Admin    sodium chloride flush 0.9 % injection 5-40 mL  5-40 mL IntraVENous 2 times per day Sarita Banerjee MD   10 mL at 12/15/22 2013    sodium chloride flush 0.9 % injection 5-40 mL  5-40 mL IntraVENous PRN Sarita Banerjee MD        0.9 % sodium chloride infusion   IntraVENous PRN Sarita Banerjee MD enoxaparin (LOVENOX) injection 40 mg  40 mg SubCUTAneous Daily Emerald Zavala MD   40 mg at 12/15/22 1155    ondansetron (ZOFRAN-ODT) disintegrating tablet 4 mg  4 mg Oral Q8H PRN Emerald Zavala MD        Or    ondansetron (ZOFRAN) injection 4 mg  4 mg IntraVENous Q6H PRN Emerald Zavala MD        polyethylene glycol (GLYCOLAX) packet 17 g  17 g Oral Daily PRN Emerald Zavala MD        acetaminophen (TYLENOL) tablet 650 mg  650 mg Oral Q6H PRN Emerald Zavala MD   650 mg at 12/15/22 2011    Or    acetaminophen (TYLENOL) suppository 650 mg  650 mg Rectal Q6H PRN Emerald Zavala MD        0.9 % sodium chloride infusion   IntraVENous Continuous Emerald Zavala MD   Stopped at 12/15/22 1743    atorvastatin (LIPITOR) tablet 40 mg  40 mg Oral Daily Emerald Zavala MD   40 mg at 12/15/22 2012    buPROPion (WELLBUTRIN XL) extended release tablet 150 mg  150 mg Oral QAM Emerald Zavala MD   150 mg at 12/15/22 1232    insulin lispro (HUMALOG) injection vial 0-8 Units  0-8 Units SubCUTAneous TID WC Emerald Zavala MD        insulin lispro (HUMALOG) injection vial 0-4 Units  0-4 Units SubCUTAneous Nightly Emerald Zavala MD        glucose chewable tablet 16 g  4 tablet Oral PRN Emerald Zavala MD        dextrose bolus 10% 125 mL  125 mL IntraVENous PRSILVIO Zavala MD        Or    dextrose bolus 10% 250 mL  250 mL IntraVENous PRN Emerald Zavala MD        glucagon (rDNA) injection 1 mg  1 mg SubCUTAneous PRN Emerald Zavala MD        dextrose 10 % infusion   IntraVENous Continuous PRN Emerald Zavala MD        vancomycin (VANCOCIN) intermittent dosing (placeholder)   Other RX Celestino Kirkland MD        vancomycin (VANCOCIN) 750 mg in dextrose 5 % 250 mL IVPB  750 mg IntraVENous Q24H Emerald Zavala MD   Stopped at 12/15/22 1336    pantoprazole (PROTONIX) tablet 40 mg  40 mg Oral QAM AC Emerald Zavala MD   40 mg at 12/16/22 0501 meropenem (MERREM) 1,000 mg in sodium chloride 0.9 % 100 mL IVPB (mini-bag)  1,000 mg IntraVENous Q12H Sarita Banerjee MD   Stopped at 12/16/22 0329    clopidogrel (PLAVIX) tablet 75 mg  75 mg Oral Daily Dannielle Hewitt PA-C   75 mg at 12/15/22 2011    aspirin EC tablet 81 mg  81 mg Oral Daily Dannielle Hewitt PA-C   81 mg at 12/15/22 2011     Prior to Admission medications    Medication Sig Start Date End Date Taking?  Authorizing Provider   atorvastatin (LIPITOR) 40 MG tablet Take 1 tablet by mouth daily 11/22/22   BLACK Whittaker CNP   metoprolol succinate (TOPROL XL) 25 MG extended release tablet Take 1 tablet by mouth daily 11/22/22   BLACK Reinoso CNP   aspirin 325 MG EC tablet Take 1 tablet by mouth daily 11/15/22   Alberta Flores PA-C   Multiple Vitamin (MULTIVITAMIN) TABS tablet Take 1 tablet by mouth daily (with breakfast)  Patient not taking: No sig reported 11/15/22   Alberta Flores PA-C   tamsulosin [facility]) 0.4 MG capsule Take 1 capsule by mouth daily  Patient not taking: Reported on 12/15/2022 11/15/22   Alberta Flores PA-C   acetaminophen (TYLENOL) 500 MG tablet Take 500 mg by mouth every 6 hours as needed for Pain As needed  Patient not taking: Reported on 12/15/2022    Historical Provider, MD   buPROPion (WELLBUTRIN XL) 150 MG extended release tablet Take 1 tablet by mouth every morning 10/12/22   BLACK Whittaker CNP   omeprazole (PRILOSEC) 40 MG delayed release capsule Take 1 capsule by mouth daily 10/12/22   BLACK Whittaker CNP   Dulaglutide (TRULICITY) 1.5 UR/4.6NW SOPN Inject 1.5 mg into the skin once a week 8/18/22   BLACK Whittaker CNP   glimepiride (AMARYL) 4 MG tablet Take 1 tablet by mouth every morning (before breakfast) 6/28/22   BLACK Whittaker CNP   empagliflozin (JARDIANCE) 25 MG tablet Take 1 tablet by mouth daily 5/24/22   Michelle Yosvany, APRN - CNP   Scheduled Meds:   sodium chloride flush  5-40 mL IntraVENous 2 times per day    enoxaparin  40 mg SubCUTAneous Daily    atorvastatin  40 mg Oral Daily    buPROPion  150 mg Oral QAM    insulin lispro  0-8 Units SubCUTAneous TID WC    insulin lispro  0-4 Units SubCUTAneous Nightly    vancomycin (VANCOCIN) intermittent dosing (placeholder)   Other RX Placeholder    vancomycin  750 mg IntraVENous Q24H    pantoprazole  40 mg Oral QAM AC    meropenem  1,000 mg IntraVENous Q12H    clopidogrel  75 mg Oral Daily    aspirin  81 mg Oral Daily     Continuous Infusions:   sodium chloride      sodium chloride Stopped (12/15/22 9664)    dextrose       PRN Meds:.sodium chloride flush, sodium chloride, ondansetron **OR** ondansetron, polyethylene glycol, acetaminophen **OR** acetaminophen, glucose, dextrose bolus **OR** dextrose bolus, glucagon (rDNA), dextrose    Allergies   Allergen Reactions    Metformin And Related Nausea And Vomiting     Family History   Problem Relation Age of Onset    Diabetes Father     Stroke Father     Diabetes Brother     Diabetes Brother      Social History     Socioeconomic History    Marital status:      Spouse name: Not on file    Number of children: Not on file    Years of education: Not on file    Highest education level: Not on file   Occupational History    Not on file   Tobacco Use    Smoking status: Former     Packs/day: 0.50     Years: 33.00     Pack years: 16.50     Types: Cigarettes    Smokeless tobacco: Never   Vaping Use    Vaping Use: Never used   Substance and Sexual Activity    Alcohol use: Not Currently    Drug use: No    Sexual activity: Not on file   Other Topics Concern    Not on file   Social History Narrative    Not on file     Social Determinants of Health     Financial Resource Strain: Low Risk     Difficulty of Paying Living Expenses: Not hard at all   Food Insecurity: No Food Insecurity    Worried About Running Out of Food in the Last Year: Never true    Ran Out of Food in the Last Year: Never true   Transportation Needs: No Transportation Needs    Lack of Transportation (Medical): No    Lack of Transportation (Non-Medical): No   Physical Activity: Not on file   Stress: Not on file   Social Connections: Not on file   Intimate Partner Violence: Not on file   Housing Stability: Not on file     ROS:   12 point review of systems negative unless otherwise specified in HPI  Labs:  CBC:   Recent Labs     12/13/22  0936 12/15/22  0130 12/16/22  0509   WBC 8.4 22.3* 14.9*   HGB 11.2* 10.6* 9.3*   HCT 36.5* 33.3* 30.3*   MCV 90.3 87.2 89.9    296 206     BMP:   Recent Labs     12/15/22  0130 12/15/22  1615 12/16/22  0509   * 133* 130*   K 4.5 4.1 4.1   CL 97* 102 101   CO2 20* 19* 17*   BUN 48* 42* 40*   CREATININE 2.1* 1.7* 1.7*     Accucheck Glucoses:   Recent Labs     12/15/22  1202 12/15/22  1621 12/15/22  2021   POCGLU 151* 171* 130*     Cardiac Enzymes: No results for input(s): CKTOTAL, CKMB, CKMBINDEX, TROPONINI in the last 72 hours. PT/INR: No results for input(s): PROTIME, INR in the last 72 hours. APTT: No results for input(s): APTT in the last 72 hours.   Liver Profile:  Lab Results   Component Value Date/Time    AST 15 12/15/2022 01:30 AM    ALT 10 12/15/2022 01:30 AM    BILITOT 0.8 12/15/2022 01:30 AM    ALKPHOS 96 12/15/2022 01:30 AM     Lab Results   Component Value Date/Time    CHOL 99 12/16/2022 05:09 AM    HDL 45 12/16/2022 05:09 AM    TRIG 62 12/16/2022 05:09 AM     TSH:   Lab Results   Component Value Date/Time    TSH 1.020 10/29/2022 03:30 AM     UA:   Lab Results   Component Value Date/Time    COLORU BROWN 12/15/2022 05:19 AM    PHUR 5.5 12/15/2022 05:19 AM    LABCAST 4-8 HYALINE 11/06/2022 09:55 AM    LABCAST NONE SEEN 11/06/2022 09:55 AM    WBCUA > 200 12/15/2022 05:19 AM    RBCUA > 200 12/15/2022 05:19 AM    MUCUS NONE SEEN 12/15/2022 05:19 AM    YEAST NONE SEEN 12/15/2022 05:19 AM    BACTERIA MANY 12/15/2022 05:19 AM    SPECGRAV 1.007 11/06/2022 09:55 AM    LEUKOCYTESUR SMALL 12/15/2022 05:19 AM    UROBILINOGEN 0.2 12/15/2022 05:19 AM BILIRUBINUR SMALL 12/15/2022 05:19 AM    BLOODU MODERATE 12/15/2022 05:19 AM    GLUCOSEU >= 1000 12/15/2022 05:19 AM    AMORPHOUS NONE SEEN 12/15/2022 05:19 AM         Physical Exam:  Vitals:    12/16/22 0400   BP: (!) 111/59   Pulse: 88   Resp: 18   Temp: 97.8 °F (36.6 °C)   SpO2: 99%      Intake/Output Summary (Last 24 hours) at 12/16/2022 9271  Last data filed at 12/16/2022 0416  Gross per 24 hour   Intake 3310.65 ml   Output 3050 ml   Net 260.65 ml      General:  No acute distress  Neck: Supple, no JVD, no carotid bruits  Heart: Regular rhythm normal S1 and S2, no rubs, murmurs or gallops  Lungs: clear to ascultation no rales, wheezes, or rhonchi  Abdomen: positive bowel sounds, soft, non-tender, non-distended, no bruits, no masses  Extremities:no clubbing, cyanosis or edema  Neurologic: alert and oriented x 3, cranial nerves 2-12 grossly intact, motor and sensory intact, moving all extremities. 3 out of 5 strength in the right upper extremity. 5 out of 5 strength in all other extremities with full range of motion.   Skin: No rashes  Psych: AO x 3, no depression/madeline, no pressured speech, normal affect  Lymph: No obvious LAD      Assessment:  Emphysematous cystitis  Coronary artery disease, s/p CABG 11/07/2022  CVA left parietal region corona radiata, possibly right lobe of the cerebellum  T wave inversion in the lateral leads  Troponin elevation  Hypertension  Hyperlipidemia  Sepsis secondary to emphysematous cystitis  Chronic urinary retention  Tobacco use disorder  Diabetes mellitus type 2, with insulin use, with hyperglycemia  CKD stage IIIa  Hx alcohol abuse  S/p mechanical fall without loss of consciousness or head trauma  Troponin Elevation on presentation, now downtrending  Chronic Systolic Heart Failure with Reduced Ejection Fraction, EF 25-30%, NYHA Class III, Not in Acute Exacerbation    Plan:  Patient presented after mechanical fall with generalized weakness, with notable weakness in the right upper extremity as well as dysarthria. Patient was diagnosed with sepsis as well as CVA. Troponin elevation with EKG changes noted. Patient denies any chest pain. It is possible for troponin to be elevated in sepsis, heart failure, demand ischemiaand stroke. Likely elevation secondary to these etiologies. Downtrending troponin noted (0.011, less than 0.010)  It is possible to see t wave changes after CVA secondary to ischemia (\"cerebral t waves\"). Low clinical suspicion for ACS  Recommendation for initiation of DAPT therapy when ok with Neurology   Antihypertensive resumption per neurology  After CABG, it is possible for dysrhythmia to occur. Recommendation for 30 day event monitor on discharge  Recommendation for JOY with bubble study to assess for possible embolic source  Recommendation for optimization of medical therapy for patient's other conditions  Patient counseled on lifestyle modification (patient admitted to smoking cigarettes after CABG)  Strict I/O's, Daily Weights, Judicious Diuresis and IVF  Continue daily aspirin, statin  Thank you for allowing us to participate in the care of this patient. Please do not hesitate to call us with questions. Electronically signed by Orlin Sharif MD on 12/16/2022 at 8:07 AM    Interventional Cardiology - [facility]     Attestation     I performed the subjective and objective examination of the patient and discussed management with the resident/CNP. I reviewed the resident/CNPs note. Any areas of disagreement were adjusted in the chart. I have personally evaluated this patient and have completed at least one if not all key elements of the E/M (history, physical exam, and MDM). Additional findings are as noted. I agree with the chief complaint, past medical history, past surgical history, allergies, medications, social and family history as documented unless otherwise noted below.      Electronically signed by Bria Flores MD on 12/16/2022 at 1:53 PM  Interventional Cardiology - [facility]

## 2022-12-16 NOTE — ANESTHESIA PRE PROCEDURE
Department of Anesthesiology  Preprocedure Note       Name:  Shawn Low   Age:  63 y.o.  :  1960                                          MRN:  [id number]         Date:  2022      Surgeon: Andria William):  Chantal Murillo MD    Procedure: Procedure(s):  TRANSESOPHAGEAL ECHOCARDIOGRAM WITH ANESTHESIA    Medications prior to admission:   Prior to Admission medications    Medication Sig Start Date End Date Taking?  Authorizing Provider   atorvastatin (LIPITOR) 40 MG tablet Take 1 tablet by mouth daily 22   BLACK Gabriel CNP   metoprolol succinate (TOPROL XL) 25 MG extended release tablet Take 1 tablet by mouth daily 22   BLACK Bustos CNP   aspirin 325 MG EC tablet Take 1 tablet by mouth daily 11/15/22   Yuniel Call PA-C   Multiple Vitamin (MULTIVITAMIN) TABS tablet Take 1 tablet by mouth daily (with breakfast)  Patient not taking: No sig reported 11/15/22   Yuniel Call PA-C   tamsulosin [facility]) 0.4 MG capsule Take 1 capsule by mouth daily  Patient not taking: Reported on 12/15/2022 11/15/22   Yuniel Call PA-C   acetaminophen (TYLENOL) 500 MG tablet Take 500 mg by mouth every 6 hours as needed for Pain As needed  Patient not taking: Reported on 12/15/2022    Historical Provider, MD   buPROPion (WELLBUTRIN XL) 150 MG extended release tablet Take 1 tablet by mouth every morning 10/12/22   BLACK Gabriel CNP   omeprazole (PRILOSEC) 40 MG delayed release capsule Take 1 capsule by mouth daily 10/12/22   BLACK Gabriel CNP   Dulaglutide (TRULICITY) 1.5 KY/7.2SY SOPN Inject 1.5 mg into the skin once a week 22   BLACK Gabriel CNP   glimepiride (AMARYL) 4 MG tablet Take 1 tablet by mouth every morning (before breakfast) 22   BLACK Gabriel CNP   empagliflozin (JARDIANCE) 25 MG tablet Take 1 tablet by mouth daily 22   BLACK Gabriel CNP       Current medications:    Current Facility-Administered Medications   Medication Dose Route Frequency Provider Last Rate Last Admin    tamsulosin (FLOMAX) capsule 0.8 mg  0.8 mg Oral Daily Magno Batres DO        sodium chloride flush 0.9 % injection 5-40 mL  5-40 mL IntraVENous 2 times per day Abimael Hawkins MD   10 mL at 12/16/22 1028    sodium chloride flush 0.9 % injection 5-40 mL  5-40 mL IntraVENous PRN Abimael Hawkins MD        0.9 % sodium chloride infusion   IntraVENous PRN Abimael Hawkins MD        enoxaparin (LOVENOX) injection 40 mg  40 mg SubCUTAneous Daily Abimael Hawkins MD   40 mg at 12/16/22 1029    ondansetron (ZOFRAN-ODT) disintegrating tablet 4 mg  4 mg Oral Q8H PRN Abimael Hawkins MD        Or    ondansetron (ZOFRAN) injection 4 mg  4 mg IntraVENous Q6H PRN Abimael Hawkins MD        polyethylene glycol (GLYCOLAX) packet 17 g  17 g Oral Daily PRN Abimael Hawkins MD        acetaminophen (TYLENOL) tablet 650 mg  650 mg Oral Q6H PRN Abimael Hawkins MD   650 mg at 12/15/22 2011    Or    acetaminophen (TYLENOL) suppository 650 mg  650 mg Rectal Q6H PRN Abimael Hawkins MD        0.9 % sodium chloride infusion   IntraVENous Continuous Magno Batres DO 75 mL/hr at 12/16/22 1509 Rate Change at 12/16/22 1509    atorvastatin (LIPITOR) tablet 40 mg  40 mg Oral Daily Abimael Hawkins MD   40 mg at 12/15/22 2012    buPROPion (WELLBUTRIN XL) extended release tablet 150 mg  150 mg Oral QAM Abimael Hawkins MD   150 mg at 12/16/22 1029    insulin lispro (HUMALOG) injection vial 0-8 Units  0-8 Units SubCUTAneous TID WC Abimael Hawkins MD        insulin lispro (HUMALOG) injection vial 0-4 Units  0-4 Units SubCUTAneous Nightly Abimael Hawkins MD        glucose chewable tablet 16 g  4 tablet Oral PRN Abimael Hawkins MD        dextrose bolus 10% 125 mL  125 mL IntraVENous PRN Abimael Hawkins MD        Or    dextrose bolus 10% 250 mL  250 mL IntraVENous PRN Abimael Hawkins MD        glucagon (rDNA) injection 1 mg  1 mg SubCUTAneous PRN Fabiola Bustillo MD        dextrose 10 % infusion   IntraVENous Continuous PRN Fabiola Bustillo MD        pantoprazole (PROTONIX) tablet 40 mg  40 mg Oral QAM AC Fabiola Bustillo MD   40 mg at 12/16/22 0501    meropenem (MERREM) 1,000 mg in sodium chloride 0.9 % 100 mL IVPB (mini-bag)  1,000 mg IntraVENous Q12H Fabiola Bustillo MD 33.3 mL/hr at 12/16/22 1142 1,000 mg at 12/16/22 1142    clopidogrel (PLAVIX) tablet 75 mg  75 mg Oral Daily Cinthya Deal PA-C   75 mg at 12/16/22 1029    aspirin EC tablet 81 mg  81 mg Oral Daily Cinthya Deal PA-C   81 mg at 12/16/22 1029       Allergies: Allergies   Allergen Reactions    Metformin And Related Nausea And Vomiting       Problem List:    Patient Active Problem List   Diagnosis Code    Syncope R55    Facial injury S09. 93XA    Tobacco abuse Z72.0    Cranial facial fractures ([facility] [address] S02. 91XA, S02. 92XA    Diabetes mellitus type 2 in nonobese ([facility]) E11.9    Fungal toenail infection B35.1    Golfer's elbow M77.00    Medical non-compliance Z91.199    Erectile dysfunction N52.9    NAVARRO (generalized anxiety disorder) F41.1    Impacted cerumen of right ear H61.21    Essential hypertension I10    Uncontrolled type 2 diabetes mellitus with hyperglycemia ([facility]) E11.65    Thoracic arthritis M47.814    New onset of congestive heart failure ([facility]) C78.5    Acute systolic congestive heart failure ([facility]) I50.21    Nonischemic cardiomyopathy ([facility]) I42.8    Stage 3a chronic kidney disease ([facility]) N18.31    Tremor R25.1    LC (acute kidney injury) ([facility]) N17.9    Hyperlipidemia E78.5    Gastroesophageal reflux disease K21.9    S/P cardiac cath Z98.890    CAD, multiple vessel I25.10    Ischemic cardiomyopathy I25.5    ETOH abuse F10.10    S/P CABG x 2 Z95.1    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    Acute pyelonephritis N10       Past Medical History:        Diagnosis Date    Hypertension     Prediabetes        Past Surgical History: Procedure Laterality Date    CORONARY ARTERY BYPASS GRAFT N/A 11/7/2022    CABG X2 JOY WITH BALLOON PUMP performed by Milady Aguilar MD at [facility] History:    Social History     Tobacco Use    Smoking status: Former     Packs/day: 0.50     Years: 33.00     Pack years: 16.50     Types: Cigarettes    Smokeless tobacco: Never   Substance Use Topics    Alcohol use: Not Currently                                Counseling given: Not Answered      Vital Signs (Current):   Vitals:    12/16/22 0830 12/16/22 1115 12/16/22 1509 12/16/22 1532   BP: 121/68 (!) 84/52 130/73 (!) 115/57   Pulse:  (!) 107 94 95   Resp:  18 20 18   Temp:  98.8 °F (37.1 °C) 98.8 °F (37.1 °C)    TempSrc:   Oral    SpO2:  97% 98% 100%   Weight:       Height:                                                  BP Readings from Last 3 Encounters:   12/16/22 (!) 115/57   12/13/22 (!) 108/57   11/22/22 (!) 96/56       NPO Status: Time of last liquid consumption: 1030 (sips with meds)                        Time of last solid consumption: 1700                        Date of last liquid consumption: 12/16/22                        Date of last solid food consumption: 12/15/22    BMI:   Wt Readings from Last 3 Encounters:   12/15/22 140 lb (63.5 kg)   12/13/22 142 lb 9.6 oz (64.7 kg)   12/13/22 142 lb (64.4 kg)     Body mass index is 20.09 kg/m².     CBC:   Lab Results   Component Value Date/Time    WBC 14.9 12/16/2022 05:09 AM    RBC 3.37 12/16/2022 05:09 AM    HGB 9.3 12/16/2022 05:09 AM    HCT 30.3 12/16/2022 05:09 AM    MCV 89.9 12/16/2022 05:09 AM    RDW 12.5 05/24/2022 04:58 AM     12/16/2022 05:09 AM       CMP:   Lab Results   Component Value Date/Time     12/16/2022 05:09 AM    K 4.1 12/16/2022 05:09 AM    K 4.3 10/28/2022 10:00 PM     12/16/2022 05:09 AM    CO2 17 12/16/2022 05:09 AM    BUN 40 12/16/2022 05:09 AM    CREATININE 1.7 12/16/2022 05:09 AM    GFRAA 58 05/24/2022 04:58 AM    LABGLOM 45 12/16/2022 05:09 AM    GLUCOSE 148 12/16/2022 05:09 AM    PROT 6.7 12/15/2022 01:30 AM    CALCIUM 8.5 12/16/2022 05:09 AM    BILITOT 0.8 12/15/2022 01:30 AM    ALKPHOS 96 12/15/2022 01:30 AM    AST 15 12/15/2022 01:30 AM    ALT 10 12/15/2022 01:30 AM       POC Tests:   Recent Labs     12/16/22  1203   POCGLU 187*       Coags:   Lab Results   Component Value Date/Time    INR 1.23 11/08/2022 04:30 AM    APTT 31.6 11/01/2022 04:32 AM       HCG (If Applicable): No results found for: PREGTESTUR, PREGSERUM, HCG, HCGQUANT     ABGs: No results found for: PHART, PO2ART, JYK3YBI, XET9EQD, BEART, D7KUVXDA     Type & Screen (If Applicable):  Lab Results   Component Value Date    LABRH POS 11/12/2022       Drug/Infectious Status (If Applicable):  Lab Results   Component Value Date/Time    HEPCAB Negative 02/28/2017 08:31 AM       COVID-19 Screening (If Applicable):   Lab Results   Component Value Date/Time    COVID19 NOT  DETECTED 07/29/2021 06:29 PM           Anesthesia Evaluation  Patient summary reviewed no history of anesthetic complications:   Airway: Mallampati: II  TM distance: >3 FB   Neck ROM: full  Mouth opening: > = 3 FB   Dental:    (+) edentulous      Pulmonary:   (+) decreased breath sounds                            Cardiovascular:    (+) hypertension:, CAD:, CABG/stent:, CHF: systolic,                   Neuro/Psych:   (+) depression/anxiety             GI/Hepatic/Renal:   (+) GERD:, renal disease: CRI,           Endo/Other:    (+) Diabetes, . Abdominal:             Vascular: Other Findings:           Anesthesia Plan      MAC     ASA 4       Induction: intravenous. MIPS: prophylactic pharmacologic antiemetic agents not administered perioperatively for documented reasons. Anesthetic plan and risks discussed with patient and spouse.                         Alfonzo Tucker MD   12/16/2022

## 2022-12-16 NOTE — CARE COORDINATION
12/16/22 1132   Service Assessment   Patient Orientation Alert and Oriented   Cognition Alert   History Provided By Patient;Spouse   Primary Caregiver Self   Accompanied By/Relationship spouse [address] Spouse/Significant Other   Patient's Healthcare Decision Maker is: Legal Next of Kin   PCP Verified by CM Yes   Last Visit to PCP Within last 3 months   Prior Functional Level Independent in ADLs/IADLs   Current Functional Level Independent in ADLs/IADLs   Can patient return to prior living arrangement Yes   Ability to make needs known: Good   Family able to assist with home care needs: Yes   Would you like for me to discuss the discharge plan with any other family members/significant others, and if so, who? Yes  (spouse Lavelle Jiménez)   Financial Resources Other (Comment)  (BCBS)   Community Resources None   CM/SW Referral ADLs/IADLs   Social/Functional History   Lives With Spouse   Type of [address] One level   Home Access Stairs to enter without rails;Stairs to enter with rails   Entrance Stairs - Number of Steps 3 AMOR   Entrance Stairs - Rails Both   Bathroom Shower/Tub Tub/Shower unit   Bathroom Toilet Standard   Bathroom Equipment Tub transfer bench   Home Equipment Walker, [address]   Ambulation Assistance Independent   Transfer Assistance Independent   Active  No   Mode of Transportation Car   Occupation Full time employment   Type of Occupation currently still on short term disability from CABG in November 2022   Discharge Planning   Type of [address] Prior To Admission None   Potential Assistance Needed N/A   Potential DME Needed [employer]   DME Ordered?  No   Potential Assistance Purchasing Medications No   Type of Home Care Services None   Patient expects to be discharged to: House   Follow Up Appointment: Best Day/Time (PM)   One/Two Story Residence One story   Services At/After Discharge   Transition of Care Consult (CM Consult) [address] Discharge Inpatient rehab   [address] Provided? No   Confirm Follow Up Transport Family   Condition of Participation: Discharge Planning   The Plan for Transition of Care is related to the following treatment goals: Fall/Cystitis/CVA Treatment   The Patient and/or Patient Representative was provided with a Choice of Provider? Patient   The Patient and/Or Patient Representative agree with the Discharge Plan? Yes   Freedom of Choice list was provided with basic dialogue that supports the patient's individualized plan of care/goals, treatment preferences, and shares the quality data associated with the providers?   Yes

## 2022-12-16 NOTE — PROGRESS NOTES
Physician Progress Note      Sarai Khan  CSN #:                  [id number]  :                       1960  ADMIT DATE:       12/15/2022 1:08 AM  DISCH DATE:  RESPONDING  PROVIDER #:        Leona Corrales MD          QUERY TEXT:    Pt admitted with UTI. Pt noted to have previous urinary catheter that was   removed . If possible, please document in the progress notes and   discharge summary if you are evaluating and/or treating any of the following: The medical record reflects the following:    Risk Factors: previous crum catheter- removed   Clinical Indicators: presents with purulent appearing urine, fever,   tachycardia, UA many bacteria, small leukocytes, + nitrite, just had catheter   removed day prior to presenting  Treatment: IV Meropenem, IVF    Thank you! Daniel Rodrigues Asp, CRCR  RN Clinical   P: [phone]  Options provided:  -- UTI due to previous urinary catheter removed   -- UTI not due to indwelling urinary catheter  -- Other - I will add my own diagnosis  -- Disagree - Not applicable / Not valid  -- Disagree - Clinically unable to determine / Unknown  -- Refer to Clinical Documentation Reviewer    PROVIDER RESPONSE TEXT:    UTI is due to the previous indwelling urinary catheter removed .     Query created by: Shelley Cardenas on 2022 10:12 AM      Electronically signed by:  Leona Corrales MD 2022 2:54 PM

## 2022-12-16 NOTE — H&P
[address] [address]  Sedation/Analgesia History & Physical    Pt Name: Genea Torres  Account number: [de-identified]  MRN: [id number]  YOB: 1960  Provider Performing Procedure: Elly Torres MD MD  Referring Provider: Lazaro Manning MD   Primary Care Physician: Fariba Mcfadden APRN - [facility]  Date: 12/16/2022    PRE-PROCEDURE    Code Status: FULL CODE  Brief History/Pre-Procedure Diagnosis:   CVA  Consent: : I have discussed with the patient risks, benefits, and alternatives (and relevant risks, benefits, and side effects related to alternatives or not receiving care), and likelihood of the success. The patient and/or representative appear to understand and agree to proceed. The discussion encompasses risks, benefits, and side effects related to the alternatives and the risks related to not receiving the proposed care, treatment, and services. The indication, risks and benefits of the procedure and possible therapeutic consequences and alternatives were discussed with the patient. The patient was given the opportunity to ask questions and to have them answered to his/her satisfaction. Risks of the procedure include but are not limited to the following: Bleeding, hematoma including retroperitoneal hemmorhage, infection, pain and discomfort, injury to the aorta and other blood vessels, rhythm disturbance, low blood pressure, myocardial infarction, stroke, kidney damage/failure, myocardial perforation, allergic reactions to sedatives/contrast material, loss of pulse/vascular compromise requiring surgery, aneurysm/pseudoaneurysm formation, possible loss of a limb/hand/leg, needing blood transfusion, requiring emergent open heart surgery or emergent coronary intervention, the need for intubation/respiratory support, the requirement for defibrillation/cardioversion, and death. Alternatives to and omission of the suggested procedure were discussed.  The patient had no further questions and wished to proceed; the consent form was signed. MEDICAL HISTORY  []ASHD/ANGINA/MI/CHF   []Hypertension  []Diabetes  []Hyperlipidemia  []Smoking  []Family Hx of ASHD  []Additional information:       has a past medical history of Hypertension and Prediabetes. SURGICAL HISTORY   has a past surgical history that includes Tonsillectomy and Coronary artery bypass graft (N/A, 11/7/2022).   Additional information:       ALLERGIES   Allergies as of 12/15/2022 - Fully Reviewed 12/15/2022   Allergen Reaction Noted    Metformin and related Nausea And Vomiting 05/24/2022     Additional information:       MEDICATIONS   Aspirin  [] 81 mg  [] 325 mg  [] None  Antiplatelet drug therapy use last 5 days  []No []Yes  Coumadin Use Last 5 Days []No []Yes  Other anticoagulant use last 5 days  []No []Yes    Current Facility-Administered Medications:     sodium chloride flush 0.9 % injection 5-40 mL, 5-40 mL, IntraVENous, 2 times per day, Travon Thao MD, 10 mL at 12/16/22 1028    sodium chloride flush 0.9 % injection 5-40 mL, 5-40 mL, IntraVENous, PRN, Travon Thao MD    0.9 % sodium chloride infusion, , IntraVENous, PRN, Travon Thao MD    enoxaparin (LOVENOX) injection 40 mg, 40 mg, SubCUTAneous, Daily, Travon Thao MD, 40 mg at 12/16/22 1029    ondansetron (ZOFRAN-ODT) disintegrating tablet 4 mg, 4 mg, Oral, Q8H PRN **OR** ondansetron (ZOFRAN) injection 4 mg, 4 mg, IntraVENous, Q6H PRN, Travon Thao MD    polyethylene glycol (GLYCOLAX) packet 17 g, 17 g, Oral, Daily PRN, Travon Thao MD    acetaminophen (TYLENOL) tablet 650 mg, 650 mg, Oral, Q6H PRN, 650 mg at 12/15/22 2011 **OR** acetaminophen (TYLENOL) suppository 650 mg, 650 mg, Rectal, Q6H PRN, Travon Thao MD    0.9 % sodium chloride infusion, , IntraVENous, Continuous, Travon Thao MD, Stopped at 12/15/22 1743    atorvastatin (LIPITOR) tablet 40 mg, 40 mg, Oral, Daily, Travon Thao MD, 40 mg at 12/15/22 2012 buPROPion (WELLBUTRIN XL) extended release tablet 150 mg, 150 mg, Oral, QAM, Alex Avalos MD, 150 mg at 12/16/22 1029    insulin lispro (HUMALOG) injection vial 0-8 Units, 0-8 Units, SubCUTAneous, TID WC, Eri Guzman MD    insulin lispro (HUMALOG) injection vial 0-4 Units, 0-4 Units, SubCUTAneous, Nightly, Alex Avalos MD    glucose chewable tablet 16 g, 4 tablet, Oral, PRN, Alex Avalos MD    dextrose bolus 10% 125 mL, 125 mL, IntraVENous, PRN **OR** dextrose bolus 10% 250 mL, 250 mL, IntraVENous, PRN, Alex Avalos MD    glucagon (rDNA) injection 1 mg, 1 mg, SubCUTAneous, PRN, Alex Avalos MD    dextrose 10 % infusion, , IntraVENous, Continuous PRN, Alex Avalos MD    pantoprazole (PROTONIX) tablet 40 mg, 40 mg, Oral, QAM AC, Alex Avalos MD, 40 mg at 12/16/22 0501    meropenem (MERREM) 1,000 mg in sodium chloride 0.9 % 100 mL IVPB (mini-bag), 1,000 mg, IntraVENous, Q12H, Alex Avalos MD, Last Rate: 33.3 mL/hr at 12/16/22 1142, 1,000 mg at 12/16/22 1142    clopidogrel (PLAVIX) tablet 75 mg, 75 mg, Oral, Daily, Pratima Villegas PA-C, 75 mg at 12/16/22 1029    aspirin EC tablet 81 mg, 81 mg, Oral, Daily, Pratima Villegas PA-C, 81 mg at 12/16/22 1029  Prior to Admission medications    Medication Sig Start Date End Date Taking?  Authorizing Provider   atorvastatin (LIPITOR) 40 MG tablet Take 1 tablet by mouth daily 11/22/22   BLACK Sinclair CNP   metoprolol succinate (TOPROL XL) 25 MG extended release tablet Take 1 tablet by mouth daily 11/22/22   BLACK Cunningham CNP   aspirin 325 MG EC tablet Take 1 tablet by mouth daily 11/15/22   Angie Real PA-C   Multiple Vitamin (MULTIVITAMIN) TABS tablet Take 1 tablet by mouth daily (with breakfast)  Patient not taking: No sig reported 11/15/22   Angie Real PA-C   tamsulosin [facility]) 0.4 MG capsule Take 1 capsule by mouth daily  Patient not taking: Reported on 12/15/2022 11/15/22   Angie Real, MADDIE   acetaminophen (TYLENOL) 500 MG tablet Take 500 mg by mouth every 6 hours as needed for Pain As needed  Patient not taking: Reported on 12/15/2022    Historical Provider, MD   buPROPion (WELLBUTRIN XL) 150 MG extended release tablet Take 1 tablet by mouth every morning 10/12/22   BLACK Whittaker CNP   omeprazole (PRILOSEC) 40 MG delayed release capsule Take 1 capsule by mouth daily 10/12/22   BLACK Whittaker CNP   Dulaglutide (TRULICITY) 1.5 JJ/7.6UC SOPN Inject 1.5 mg into the skin once a week 8/18/22   BLACK Whittaker CNP   glimepiride (AMARYL) 4 MG tablet Take 1 tablet by mouth every morning (before breakfast) 6/28/22   BLACK Whittaker CNP   empagliflozin (JARDIANCE) 25 MG tablet Take 1 tablet by mouth daily 5/24/22   BLACK Whittaker CNP     Additional information:       VITAL SIGNS   Vitals:    12/16/22 1115   BP: (!) 84/52   Pulse: (!) 107   Resp: 18   Temp: 98.8 °F (37.1 °C)   SpO2: 97%       PHYSICAL:   General: No acute distress  HEENT:  Unremarkable for age  Neck: without increased JVD, carotid pulses 2+ bilaterally without bruits  Heart: RRR, S1 & S2 WNL. No murmurs    Lungs: Clear to auscultation    Abdomen: BS present, without HSM, masses, or tenderness    Extremities: without C,C,E.  Pulses 2+ bilaterally   Mental Status: Alert & Oriented        PLANNED PROCEDURE   []Cath  []PCI                []Pacemaker/AICD  [x]JOY             []Cardioversion []Peripheral angiography/PTA  []Other:      SEDATION  Planned agent:[]Midazolam []Meperidine []Sublimaze []Morphine  []Diazepam  [x]Other: PER ANESTHESIA     ASA Classification:  []1 []2 [x]3 []4 []5   Class 1: A normal healthy patient  Class 2: Pt with mild to moderate systemic disease  Class 3: Severe systemic disease or disturbance  Class 4: Severe systemic disorders that are already life threatening. Class 5: Moribund pt with little chances of survival, for more than 24 hours.   Mallampati I Airway Classification:   []1 []2 [x]3 []4     [x]Pre-procedure diagnostic studies complete and results available. Comment:    [x]Previous sedation/anesthesia experiences assessed. Comment:    [x]The patient is an appropriate candidate to undergo the planned procedure sedation and anesthesia. (Refer to nursing sedation/analgesia documentation record)  [x]Formulation and discussion of sedation/procedure plan, risks, and expectations with patient and/or responsible adult completed. [x]Patient examined immediately prior to the procedure.  (Refer to nursing sedation/analgesia documentation record)      Eloy Beard MD, Jose Muse    Electronically signed 12/16/2022 at 1:54 PM

## 2022-12-16 NOTE — ANESTHESIA POSTPROCEDURE EVALUATION
Department of Anesthesiology  Postprocedure Note    Patient: Connor Bose  MRN: [id number]  YOB: 1960  Date of evaluation: 12/16/2022      Procedure Summary     Date: 12/16/22 Room / Location: [address] [place] [address] 12 / [facility]    Anesthesia Start: 5185 Anesthesia Stop: 5875    Procedure: TRANSESOPHAGEAL ECHOCARDIOGRAM WITH ANESTHESIA Diagnosis:       Emphysematous cystitis      (Emphysematous cystitis [N30.80])    Surgeons: Catalina Lyn MD Responsible Provider: Arley Savage MD    Anesthesia Type: MAC ASA Status: 4          Anesthesia Type: No value filed. Eliza Phase I:      Eliza Phase II:        Anesthesia Post Evaluation    Patient location during evaluation: PACU  Patient participation: complete - patient participated  Level of consciousness: awake and alert  Airway patency: patent  Nausea & Vomiting: no nausea and no vomiting  Complications: no  Cardiovascular status: hemodynamically stable  Respiratory status: acceptable  Hydration status: euvolemic      [facility]  POST-ANESTHESIA NOTE       Name:  Connor Bose                                         Age:  63 y.o.   MRN:  [id number]      Last Vitals:  BP (!) 115/57   Pulse 95   Temp 98.8 °F (37.1 °C) (Oral)   Resp 18   Ht 5' 10\" (1.778 m)   Wt 140 lb (63.5 kg)   SpO2 100%   BMI 20.09 kg/m²   Patient Vitals for the past 4 hrs:   BP Temp Temp src Pulse Resp SpO2   12/16/22 1532 (!) 115/57 -- -- 95 18 100 %   12/16/22 1509 130/73 98.8 °F (37.1 °C) Oral 94 20 98 %       Level of Consciousness:  Awake    Respiratory:  Stable    Oxygen Saturation:  Stable    Cardiovascular:  Stable    Hydration:  Adequate    PONV:  Stable    Post-op Pain:  Adequate analgesia    Post-op Assessment:  No apparent anesthetic complications    Additional Follow-Up / Treatment / Comment:  None    Heena Bee MD  December 16, 2022   4:21 PM

## 2022-12-16 NOTE — PROGRESS NOTES
[address] ICU STEPDOWN TELEMETRY 4K  Speech - Language - Cognitive Evaluation    SLP Individual Minutes  Time In: 6234  Time Out: 3448  Minutes: 26  Timed Code Treatment Minutes: 0 Minutes       Date: 2022  Patient Name: Jackelyn Nj      CSN: [id number]   : 1960  (63 y.o.)  Gender: male   Referring Physician:  Dona Dorman PA-C  Diagnosis: Emphysematous cystitis  Precautions: Fall Risk  History of Present Illness/Injury: Patient admitted to [facility] with above diagnosis; please see physician H&P for full report. Per chart review, \"63 y.o. male who presented to [facility] for evaluation of fall. PMHx significant for ischemic cardiomyopathy (echo EF 25-30%), CAD s/p CABG x2 (2022), primary hypertension, NIDDM type II, urinary retention. Patient presents for evaluation of a fall. Patient fell after getting up rom the toilet in the bathroom. He felt dizzy and lightheaded. He did not lose consciousness or hit his head. He was on the ground for approximately 20 minutes. His wife called EMS and patient was brought to the hospital.      History provided by patient and wife at bedside. Patient had CABG X2 on 22 at [facility] and developed urinary retention post CABG needing insertion of a Bella. Patient was previously hospitalized 10/28/22 - 11/15/22 and underwent CABG x2 on 22. For the past week patient has been getting weaker and weaker, experiencing fatigue. He went to Dr. Mikal Fraser on 22 for cystoscopy and removal of Bella. Since removal of Bella patient has not been able to urinate well. He has not been drinking and eating much in the last week. States that he feels \" tired\". He denies chest pain or shortness of breath. \"    ST consulted to further evaluate cognitive function with implementation of goals/POC as clinically indicated.  Patient also with need for completion of CSE, however, currently NPO pending JOY procedure; will complete as clinically indicated. Past Medical History:   Diagnosis Date    Hypertension     Prediabetes        Pain: No pain reported. Subjective:  Patient seen with ABIGAIL Cote permission. Patient seen sitting upright in chair upon ST arrival; alert and cooperative throughout evaluation. Patient's wife present. 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 Hobbies: None reported  Vision Status: Impaired  Hearing: [facility]  Type of Home: House  Home Layout: One level  Home Access: Stairs to enter without rails, Stairs to enter with rails  Entrance Stairs - Number of Steps: 3 AMOR  Entrance Stairs - Rails: Both  Home Equipment: Ollie Angulo, david    SPEECH / VOICE:  Speech and Voice appear to be grossly intact for basic and complex daily communication    LANGUAGE:  Receptive:  Receptive language skills appear to be grossly intact for basic and complex daily communication. Expressive:  Expressive language skills appear to be grossly intact for basic and complex daily communication. COGNITION:  [place] Cognitive Assessment [facility]) version 7.1 completed. Patient scored 26/30. Normal is greater than or equal to 26/30.   Inclusion of +1 point given highest level of education achieved less than/equal to 12th grade or GED with limited-0 post-secondary schooling   Orientation: 6/6 independent  Immediate Recall: 3/5 independent, 5/5  with repetition  Short-Term Recall: 3/5 independent, 2/5 with MC cuing  Divergent Namin members/60 seconds  Problem Solving: WFL  Reasonin/2 independent  Sequencing: [facility]  Thought Organization: [facility]  Insight: Good  Attention: 3/3  BASIC sustained attention  Math Computation: 5/5 independent  Executive Functionin/5 independent    SWALLOWING:  Current Diet: NPO pending completion of JOY;  will complete CSE as clinically appropriate. Not Tested    RECOMMENDATIONS/ASSESSMENT:  DIAGNOSTIC IMPRESSIONS:    Patient presents with cognitive functioning grossly intact evidenced by evaluation results outlined above. Speech and voice appear to be [facility] with no presence of dysarthria, dysphonia, or aphonia; patient intelligible at the conversation level with approximately 100% accuracy. Expressive and receptive language skills grossly intact with no apparent communicative breakdowns. Skilled ST services are recommended  to address HIGH level cognitive function d/t PLOF and high level of independence. Post evaluation, patient with call light in reach and bed alarm on upon leaving room;  Mansi Mcadams notified re: clinical findings and recommendations from the assessment; verbal receptiveness noted. Rehabilitation Potential: good  Discharge Recommendations: Continue to Assess Pending Progress    EDUCATION:  Learner: Patient and Significant Other  Education:  Reviewed results and recommendations of this evaluation, Reviewed ST goals and Plan of Care, and Reviewed recommendations for follow-up  Evaluation of Education: Demarco Ames understanding and Needs further instruction    PLAN:  Skilled SLP intervention on acute care 3-5 x per week or until goals met and/or pt plateaus in function. Specific interventions for next session may include: CSE and cognitive treatment. PATIENT GOAL:    Return to prior level of function. SHORT TERM GOALS:  Short Term Goals  Time Frame for Short Term Goals: 2 weeks  Goal 1: Patient will complete complex executive functioning tasks (i.e., medication management, complex reasoning/deductive reasoning, etc.) with 80% accuracy and minimal cuing in order to allow for safe return to PLOF.   Goal 2: Patient will complete complex attention tasks with no more than two errros in three minutes in order to improve completion of ADLs/IADLs. Goal 3: Patient will complete clinical swallow evaluation in order to determine safety with PO diet. LONG TERM GOALS:  No LTGs established due to short ELOS.       Daina Floyd M.S., [facility]

## 2022-12-17 LAB
ANION GAP SERPL CALCULATED.3IONS-SCNC: 10 MEQ/L (ref 8–16)
BLOOD CULTURE, ROUTINE: ABNORMAL
BUN BLDV-MCNC: 36 MG/DL (ref 7–22)
CALCIUM SERPL-MCNC: 8.5 MG/DL (ref 8.5–10.5)
CHLORIDE BLD-SCNC: 104 MEQ/L (ref 98–111)
CO2: 20 MEQ/L (ref 23–33)
CREAT SERPL-MCNC: 1.8 MG/DL (ref 0.4–1.2)
ERYTHROCYTE [DISTWIDTH] IN BLOOD BY AUTOMATED COUNT: 13.5 % (ref 11.5–14.5)
ERYTHROCYTE [DISTWIDTH] IN BLOOD BY AUTOMATED COUNT: 42.8 FL (ref 35–45)
GFR SERPL CREATININE-BSD FRML MDRD: 42 ML/MIN/1.73M2
GLUCOSE BLD-MCNC: 159 MG/DL (ref 70–108)
GLUCOSE BLD-MCNC: 183 MG/DL (ref 70–108)
GLUCOSE BLD-MCNC: 189 MG/DL (ref 70–108)
GLUCOSE BLD-MCNC: 191 MG/DL (ref 70–108)
GLUCOSE BLD-MCNC: 290 MG/DL (ref 70–108)
HCT VFR BLD CALC: 28.1 % (ref 42–52)
HEMOGLOBIN: 9.1 GM/DL (ref 14–18)
MCH RBC QN AUTO: 28 PG (ref 26–33)
MCHC RBC AUTO-ENTMCNC: 32.4 GM/DL (ref 32.2–35.5)
MCV RBC AUTO: 86.5 FL (ref 80–94)
ORGANISM: ABNORMAL
PLATELET # BLD: 238 THOU/MM3 (ref 130–400)
PMV BLD AUTO: 10.4 FL (ref 9.4–12.4)
POTASSIUM SERPL-SCNC: 4.1 MEQ/L (ref 3.5–5.2)
RBC # BLD: 3.25 MILL/MM3 (ref 4.7–6.1)
SODIUM BLD-SCNC: 134 MEQ/L (ref 135–145)
WBC # BLD: 8.8 THOU/MM3 (ref 4.8–10.8)

## 2022-12-17 PROCEDURE — 2060000000 HC ICU INTERMEDIATE R&B

## 2022-12-17 PROCEDURE — 99233 SBSQ HOSP IP/OBS HIGH 50: CPT | Performed by: STUDENT IN AN ORGANIZED HEALTH CARE EDUCATION/TRAINING PROGRAM

## 2022-12-17 PROCEDURE — 2580000003 HC RX 258: Performed by: STUDENT IN AN ORGANIZED HEALTH CARE EDUCATION/TRAINING PROGRAM

## 2022-12-17 PROCEDURE — 80048 BASIC METABOLIC PNL TOTAL CA: CPT

## 2022-12-17 PROCEDURE — 99232 SBSQ HOSP IP/OBS MODERATE 35: CPT

## 2022-12-17 PROCEDURE — 6370000000 HC RX 637 (ALT 250 FOR IP): Performed by: STUDENT IN AN ORGANIZED HEALTH CARE EDUCATION/TRAINING PROGRAM

## 2022-12-17 PROCEDURE — 97530 THERAPEUTIC ACTIVITIES: CPT

## 2022-12-17 PROCEDURE — 36415 COLL VENOUS BLD VENIPUNCTURE: CPT

## 2022-12-17 PROCEDURE — 6360000002 HC RX W HCPCS: Performed by: STUDENT IN AN ORGANIZED HEALTH CARE EDUCATION/TRAINING PROGRAM

## 2022-12-17 PROCEDURE — 92610 EVALUATE SWALLOWING FUNCTION: CPT

## 2022-12-17 PROCEDURE — 85027 COMPLETE CBC AUTOMATED: CPT

## 2022-12-17 PROCEDURE — 97110 THERAPEUTIC EXERCISES: CPT

## 2022-12-17 PROCEDURE — 6370000000 HC RX 637 (ALT 250 FOR IP): Performed by: SOCIAL WORKER

## 2022-12-17 PROCEDURE — 82948 REAGENT STRIP/BLOOD GLUCOSE: CPT

## 2022-12-17 PROCEDURE — 97163 PT EVAL HIGH COMPLEX 45 MIN: CPT

## 2022-12-17 RX ADMIN — INSULIN LISPRO 4 UNITS: 100 INJECTION, SOLUTION INTRAVENOUS; SUBCUTANEOUS at 12:44

## 2022-12-17 RX ADMIN — ATORVASTATIN CALCIUM 40 MG: 40 TABLET, FILM COATED ORAL at 19:54

## 2022-12-17 RX ADMIN — MEROPENEM 1000 MG: 1 INJECTION, POWDER, FOR SOLUTION INTRAVENOUS at 23:43

## 2022-12-17 RX ADMIN — TAMSULOSIN HYDROCHLORIDE 0.8 MG: 0.4 CAPSULE ORAL at 10:37

## 2022-12-17 RX ADMIN — PANTOPRAZOLE SODIUM 40 MG: 40 TABLET, DELAYED RELEASE ORAL at 05:52

## 2022-12-17 RX ADMIN — ENOXAPARIN SODIUM 40 MG: 100 INJECTION SUBCUTANEOUS at 10:36

## 2022-12-17 RX ADMIN — BUPROPION HYDROCHLORIDE 150 MG: 150 TABLET, FILM COATED, EXTENDED RELEASE ORAL at 10:37

## 2022-12-17 RX ADMIN — ACETAMINOPHEN 650 MG: 325 TABLET ORAL at 02:13

## 2022-12-17 RX ADMIN — ASPIRIN 81 MG: 81 TABLET, COATED ORAL at 10:36

## 2022-12-17 RX ADMIN — CLOPIDOGREL BISULFATE 75 MG: 75 TABLET ORAL at 10:36

## 2022-12-17 RX ADMIN — SODIUM CHLORIDE: 9 INJECTION, SOLUTION INTRAVENOUS at 12:42

## 2022-12-17 RX ADMIN — MEROPENEM 1000 MG: 1 INJECTION, POWDER, FOR SOLUTION INTRAVENOUS at 00:57

## 2022-12-17 RX ADMIN — MEROPENEM 1000 MG: 1 INJECTION, POWDER, FOR SOLUTION INTRAVENOUS at 12:43

## 2022-12-17 RX ADMIN — ACETAMINOPHEN 650 MG: 325 TABLET ORAL at 10:14

## 2022-12-17 RX ADMIN — SODIUM CHLORIDE, PRESERVATIVE FREE 10 ML: 5 INJECTION INTRAVENOUS at 10:37

## 2022-12-17 ASSESSMENT — PAIN DESCRIPTION - ORIENTATION
ORIENTATION: MID
ORIENTATION: MID

## 2022-12-17 ASSESSMENT — PAIN - FUNCTIONAL ASSESSMENT
PAIN_FUNCTIONAL_ASSESSMENT: ACTIVITIES ARE NOT PREVENTED
PAIN_FUNCTIONAL_ASSESSMENT: ACTIVITIES ARE NOT PREVENTED

## 2022-12-17 ASSESSMENT — PAIN DESCRIPTION - LOCATION
LOCATION: HEAD
LOCATION: GENERALIZED

## 2022-12-17 ASSESSMENT — PAIN SCALES - GENERAL
PAINLEVEL_OUTOF10: 2
PAINLEVEL_OUTOF10: 0
PAINLEVEL_OUTOF10: 6

## 2022-12-17 ASSESSMENT — PAIN DESCRIPTION - DESCRIPTORS
DESCRIPTORS: THROBBING
DESCRIPTORS: ACHING

## 2022-12-17 ASSESSMENT — PAIN DESCRIPTION - PAIN TYPE: TYPE: ACUTE PAIN

## 2022-12-17 NOTE — PLAN OF CARE
Problem: Discharge Planning  Goal: Discharge to home or other facility with appropriate resources  Outcome: Progressing  Flowsheets (Taken 12/17/2022 1000)  Discharge to home or other facility with appropriate resources:   Identify barriers to discharge with patient and caregiver   Arrange for needed discharge resources and transportation as appropriate   Arrange for interpreters to assist at discharge as needed     Problem: ABCDS Injury Assessment  Goal: Absence of physical injury  Outcome: Progressing     Problem: Safety - Adult  Goal: Free from fall injury  Outcome: Progressing     Problem: Chronic Conditions and Co-morbidities  Goal: Patient's chronic conditions and co-morbidity symptoms are monitored and maintained or improved  Outcome: Progressing  Flowsheets (Taken 12/17/2022 1000)  Care Plan - Patient's Chronic Conditions and Co-Morbidity Symptoms are Monitored and Maintained or Improved:   Monitor and assess patient's chronic conditions and comorbid symptoms for stability, deterioration, or improvement   Collaborate with multidisciplinary team to address chronic and comorbid conditions and prevent exacerbation or deterioration   Update acute care plan with appropriate goals if chronic or comorbid symptoms are exacerbated and prevent overall improvement and discharge     Problem: Pain  Goal: Verbalizes/displays adequate comfort level or baseline comfort level  Outcome: Progressing     Problem: Skin/Tissue Integrity  Goal: Absence of new skin breakdown  Description: 1. Monitor for areas of redness and/or skin breakdown  2. Assess vascular access sites hourly  3. Every 4-6 hours minimum:  Change oxygen saturation probe site  4. Every 4-6 hours:  If on nasal continuous positive airway pressure, respiratory therapy assess nares and determine need for appliance change or resting period.   Outcome: Progressing     Problem: Neurosensory - Adult  Goal: Achieves stable or improved neurological status  Outcome: Progressing     Problem: Neurosensory - Adult  Goal: Achieves maximal functionality and self care  Outcome: Progressing

## 2022-12-17 NOTE — PROGRESS NOTES
[facility]  INPATIENT PHYSICAL THERAPY  EVALUATION  [facility] ICU STEPDOWN TELEMETRY 4K - 4K-13/013-A    Time In: 1007  Time Out: 1026  Timed Code Treatment Minutes: 9 Minutes  Minutes: 19          Date: 2022  Patient Name: Samantha Kwon,  Gender:  male        MRN: [id number]  : 1960  (63 y.o.)      Referring Practitioner: Dr. Juan Antonio Campos  Diagnosis: emphysematous cystitis  Additional Pertinent Hx: Samantha Kwon who is a 63 y.o. male with a history of hypertension, diabetes, CAD on  daily, recent CABG in 2022, ischemic cardiomyopathy is admitted to [facility] for sepsis, emphysematous cystitis and acute kidney injury on CKD. During admission exam patient stated that his right arm feldman has been weak and that he has had some slurred speech. Stroke alert was called for right-sided weakness and dysarthria on 12/15. On arrival patient's NIH was 4 for right upper extremity, right lower extremity weakness, 1 limb ataxia and dysarthria. On further questioning, patient and wife state that the right arm weakness actually started last night. Patient's wife noticed when patient was trying to get up from the toilet that he was unable to push himself up with his right arm. Last known well 2330 on 2022. Patient taken to imaging for stat CT head which revealed no ICH, midline shift, mass-effect. CTA head and neck deferred due to patient's LC. Decision for no tPA was made due to patient's time since last known well. Patient with relatively low NIH and symptoms which are not consistent with LVO, therefore no thrombectomy/neuro intervention at this time. Patient had stat MRI brain and MRA head and neck without contrast.  MRI showed acute ischemic stroke of left parietal region.      Restrictions/Precautions:  Restrictions/Precautions: Up as Tolerated, Fall Risk  Position Activity Restriction  Other position/activity restrictions: right side hemiparesis, recent CABG 11/22    Subjective:  Chart Reviewed: Yes  Patient assessed for rehabilitation services?: Yes  Subjective: pleasant and cooperative, pt up in chair and stated feeling tired and ready to return to bed soon. motivated. General:        Hearing: Within functional limits       Pain: 6/10: everywhere per pt    Vitals: Vitals not assessed per clinical judgement, see nursing flowsheet    Social/Functional History:    Lives With: Spouse  Type of Home: House  Home Layout: One level  Home Access: Stairs to enter without rails, Stairs to enter with rails  Entrance Stairs - Number of Steps: 3 AMOR  Entrance Stairs - Rails: Both  Home Equipment: Nelida Schillings, rolling     Bathroom Shower/Tub: Tub/Shower unit  Bathroom Toilet: Standard  Bathroom Equipment: Tub transfer bench    Receives Help From: Family  ADL Assistance: Independent  Homemaking Assistance: Independent  Ambulation Assistance: Independent  Transfer Assistance: Independent    Active : No  Mode of Transportation: Car  Occupation: Full time employment  Type of Occupation: currently still on short term disability from CABG in November 2022       OBJECTIVE:  Range of Motion:  Bilateral Lower Extremity: WFL  With reciprocal/coordination testing BLE, RLE dec accuracy and slower compared to LLE  Strength:  Right Lower Extremity: grossly 3+/5  Left Lower Extremity: WFL    Sensation: BLE WNL to lt touch    Balance:  Static Sitting Balance:  Stand By Assistance  Static Standing Balance: Minimal Assistance, X 1, BUE HHA, fwd flexed posture, cues to look fwd, pt with tendency for post lean.     Bed Mobility:  Sit to Supine: Contact Guard Assistance, inc time to complete to get RLE onto bed   Scooting: Minimal Assistance fwd in sitting to edge of chair  HOB flat    Transfers:  Sit to Stand: Minimal Assistance  Stand to [address] for hand placement and wt shift fwd  Ambulation:  Minimal Assistance, X 1  Distance: 3'x1  Surface: Level Tile  Device:Hand-Held Assist  Gait Deviations: Forward Flexed Posture, Slow Jada, Decreased Step Length Bilaterally, Decreased Heel Strike on Right, Narrow Base of Support, Unsteady Gait, and tendency to lean post    Exercise:  Patient was guided in 1 set(s) 3-5 reps of exercise to both lower extremities. Ankle pumps, Heelslides, Seated marches, Long arc quads, and sup heel to oppposite great toe . Exercises were completed for increased independence with functional mobility. Functional Outcome Measures: Completed  AM-PAC Inpatient Mobility Raw Score : 17  AM-PAC Inpatient T-Scale Score : 42.13    ASSESSMENT:  Activity Tolerance:  Patient tolerance of  treatment: good. Treatment Initiated: Treatment and education initiated within context of evaluation. Evaluation time included review of current medical information, gathering information related to past medical, social and functional history, completion of standardized testing, formal and informal observation of tasks, assessment of data and development of plan of care and goals. Treatment time included skilled education and facilitation of tasks to increase safety and independence with functional mobility for improved independence and quality of life. Assessment: Body Structures, Functions, Activity Limitations Requiring Skilled Therapeutic Intervention: Decreased functional mobility , Decreased endurance, Decreased balance, Decreased tolerance to work activity, Decreased strength, Decreased coordination, Increased pain  Assessment: pt c/o pain everywhere, s/p CVA with right side hemiparesis, Kym Sandoval is a 63 y.o. male that presents with emphysematous cystitis. he is ind prior to admission now requiring assist for basic mobility. Pt demonstrates a decrease in baseline by way of bed mobility, transfers and ambulation secondary to decreased activity tolerance, strength, fatigue, and balance deficits.  Pt will benefit from skilled PT services throughout admission and beyond hospital discharge for improvements in functional mobility and in order to decrease fall risk and return pt to PLOF. Therapy Prognosis: Good    Requires PT Follow-Up: Yes    Discharge Recommendations:  Discharge Recommendations: Continue to assess pending progress, IP Rehab, Patient able to tolerate 3hrs of therapy a day, Patient would benefit from continued therapy after discharge    Patient Education:      . Patient Education  Education Given To: Patient  Education Provided: Role of Therapy, Plan of Care  Education Method: Verbal  Barriers to Learning: Hearing  Education Outcome: Verbalized understanding, Demonstrated understanding, Continued education needed       Equipment Recommendations: Other: cont to assess needs    Plan:  Specific Instructions for Next Treatment: therex and mobility, pregait and balance activities  General Plan:  (6X N)  Specific Instructions for Next Treatment: therex and mobility, pregait and balance activities    Goals:  Patient Goals : do aggressive rehab to get independence back  Short Term Goals  Time Frame for Short Term Goals: by discharge  Short Term Goal 1: bed mobility with SBA to get in/out of bed  Short Term Goal 2: transfer with SBA to get in/out of chairs  Short Term Goal 3: amb >10'x1 with LRAD and CGA to walk safely to bathroom  Long Term Goals  Time Frame for Long Term Goals : no LTGs set secondary to short ELOS    Following session, patient left in safe position with all fall risk precautions in place.

## 2022-12-17 NOTE — PROGRESS NOTES
[facility] ICU STEPDOWN TELEMETRY 4K  Occupational Therapy  Daily Note  Time:   Time In: 5849  Time Out: 5123  Timed Code Treatment Minutes: 40 Minutes  Minutes: 40          Date: 2022  Patient Name: Dennis Velasquez,   Gender: male      Room: [facility]13/013-A  MRN: [id number]  : 1960  (63 y.o.)  Referring Practitioner: Arianna Sharma MD  Diagnosis: emphysematous cycstitis  Additional Pertinent Hx: Dennis Velasquez who is a 63 y.o. male with a history of hypertension, diabetes, CAD on  daily, recent CABG in 2022, ischemic cardiomyopathy is admitted to [facility] for sepsis, emphysematous cystitis and acute kidney injury on CKD. During admission exam patient stated that his right arm feldman has been weak and that he has had some slurred speech. Stroke alert was called for right-sided weakness and dysarthria on 12/15. On arrival patient's NIH was 4 for right upper extremity, right lower extremity weakness, 1 limb ataxia and dysarthria. On further questioning, patient and wife state that the right arm weakness actually started last night. Patient's wife noticed when patient was trying to get up from the toilet that he was unable to push himself up with his right arm. Last known well 2330 on 2022. Patient taken to imaging for stat CT head which revealed no ICH, midline shift, mass-effect. CTA head and neck deferred due to patient's LC. Decision for no tPA was made due to patient's time since last known well. Patient with relatively low NIH and symptoms which are not consistent with LVO, therefore no thrombectomy/neuro intervention at this time. Patient had stat MRI brain and MRA head and neck without contrast.  MRI showed acute ischemic stroke of left parietal region.     Restrictions/Precautions:  Restrictions/Precautions: Up as Tolerated, Fall Risk  Position Activity Restriction  Other position/activity restrictions: right side hemiparesis SUBJECTIVE: Pleasant and cooperative  Pt was willing to get up to a chair for breakfast.  He also did some hand exercises with a therapy ball. Pt reported generalized pain. His nurse was notified that he had asked for a tylenol. PAIN: 6/10: Generalized and pt requesting a tylenol    Vitals: Vitals not assessed per clinical judgement, see nursing flowsheet    COGNITION: Decreased Problem Solving and Decreased Safety Awareness    ADL:   Feeding: with set-up. Pt used his L hand throughout with help needed to open packets or condiments . BALANCE:  Sitting Balance:  Contact Guard Assistance. Static sitting as pt tends to lean posteriorly- help needed for positioning his legs and cues provided to straighten his back  Standing Balance: Minimal Assistance. With hand held assist on his R side. Preparing to take steps. Cues needed to keep feet apart for a larger base of support    BED MOBILITY:  Rolling to Left: Contact Guard Assistance, with head of bed raised, with rail    Supine to Sit: Minimal Assistance, with head of bed raised, with rail    Scooting: Minimal Assistance, with rail, with increased time for completion      TRANSFERS:  Sit to Stand:  Minimal Assistance. From the edge of bed  Stand to Sit: Minimal Assistance. To the chair with cues for sitting slowly    FUNCTIONAL MOBILITY:  Assistive Device: None  Assist Level: Moderate Assistance. Distance:  2 ft toward his L side  Pt had extra time to get balance and take steps. Cues needed to take small steps for safety and to keep his feet apart to help his balance. ADDITIONAL ACTIVITIES:  Pt completed hand squeeze exercises with the soft thera ball with his R hand. Cues provided to exercise his thumb especially. Pt verbalized understanding. Pt was given tissues during session x 2 for wiping his mouth secondary to drool out of the R part if his mouth.   Cues provided for pt to check his mouth for pocketed food after he had finished breakfast.    ASSESSMENT:     Activity Tolerance:  Patient tolerance of  treatment: fair. Discharge Recommendations: Inpatient Rehabilitation  Equipment Recommendations: Equipment Needed: Yes  Other: TBD. Pt's wife reports she would like a walker but pt is not safe to use walker at this time. Will continue to assess for needs  Plan: Times Per Week: 6x  Times Per Day: Once a day  Current Treatment Recommendations: Strengthening, Balance training, Self-Care / ADL, Equipment evaluation, education, & procurement, Safety education & training, Endurance training, Functional mobility training, Neuromuscular re-education    Patient Education  Patient Education: ADL's, Hemibody Awareness/Attention, and using the theraball to help him regain strength in his hand    Goals  Short Term Goals  Time Frame for Short Term Goals: until discharge  Short Term Goal 1: Pt will complete stand pivot transfer with Min A x1 to improve access with ADLs. Not met. Short Term Goal 2: OTR to assess functional mobility when appropriate. Met. Revise goal.  Short Term Goal 3: Pt will improve R UE strength to 3+/5 to improve participation with ADLs. Not met. Short Term Goal 4: Pt will improve dynamic sitting balance and tolerance to SBA x10 minutes to improve trunk control required for transfers and ADLs. Not met. Short Term Goal 5: Pt will perofrm UB ADls and grooming with Min A. Not met. Revised Short-Term Goals  Short Term Goals  Time Frame for Short Term Goals: until discharge  Short Term Goal 1: Pt will complete stand pivot transfer with Min A x1 to improve access with ADLs  Short Term Goal 2: Pt will demonstrate functional mobility walking short distances while using any AD needed with no > than MIN A and cues to attend to his RUE for preparing to do self care while out of bed. Short Term Goal 3: Pt will improve R UE strength to 3+/5 to improve participation with ADLs.   Short Term Goal 4: Pt will improve dynamic sitting balance and tolerance to SBA x10 minutes to improve trunk control required for transfers and ADLs. Short Term Goal 5: Pt will perofrm UB ADls and grooming with Min A. Following session, patient left in safe position with all fall risk precautions in place.

## 2022-12-17 NOTE — PROGRESS NOTES
Hospitalist Progress Note      Patient:  Marion Fonseca    Unit/Bed:4K-13/013-A  YOB: 1960  MRN: [id number]   Acct: [de-identified]   PCP: BLACK Shirley CNP  Date of Admission: 12/15/2022      Chief Complaint: fall/weakness     Admission HPI:   \"63 y.o. male who presented to [facility] for evaluation of fall. PMHx significant for ischemic cardiomyopathy (echo EF 25-30%), CAD s/p CABG x2 (11/2022), primary hypertension, NIDDM type II, urinary retention. Patient presents for evaluation of a fall. Patient fell after getting up rom the toilet in the bathroom. He felt dizzy and lightheaded. He did not lose consciousness or hit his head. He was on the ground for approximately 20 minutes. His wife called EMS and patient was brought to the hospital.      History provided by patient and wife at bedside. Patient had CABG X2 on 11/7/22 at [facility] and developed urinary retention post CABG needing insertion of a Bella. Patient was previously hospitalized 10/28/22 - 11/15/22 and underwent CABG x2 on 11/7/22. For the past week patient has been getting weaker and weaker, experiencing fatigue. He went to Dr. Stephen Alejandra on 12/13/22 for cystoscopy and removal of Bella. Since removal of Bella patient has not been able to urinate well. He has not been drinking and eating much in the last week. States that he feels \" tired\". He denies chest pain or shortness of breath. Initial vital signs reveal temp 98.4, respiratory rate 18, heart rate 112, blood pressure 95/68, SPO2 100 on room air. Lab work significant for sodium 132, chloride 97, bicarb 20, BUN 48, creatinine 2.1, blood glucose 275, WBC 22.3, hemoglobin 10.6. Urinalysis revealed positive for nitrite, small leukocyte esterase. CT chest reveals chronic rib fractures but no acute. CT cervical spine reveals no fractures. CT thorax reveals no acute fracture of the thoracic spine.  CT abdomen pelvis reveals emphysematous cystitis, moderate bilateral hydronephrosis with mild bilateral perinephric stranding. ED treatment, patient was given Norco, 2 L of IV fluid, levofloxacin. \"    Subjective (past 24 hours):   No acute overnight events. Clinically appears improved since admission. Sitting up in chair at bedside. No BM since admission. Assessment/Plan:    Acute CVA involving the left parietal corona radiata  -Seen on MRI which was done due to patient exhibiting slurred speech and left sided weakness on admission for which a code stroke was called. Patient went for JOY with cardiology for concern for PFO and to elucidate possible cardioembolic source of CVA    Plan:  -Neurology following and recommend [address] for secondary stroke prevention. We will touch base with cardiology and start patient on [address] in am.   Will continue aspirin/Plavix/statin for now. -PT/OT/SLP evaluation     Klebsiella pneumoniae and Enterobacter cloacae bacteremia due to underlying pyelonephritis in the setting of urinary retention  -Bio fire positive for above species on 12/15  -Initially started on vancomycin and meropenem which have been de-escalated to just meropenem based on cultures. Cont meropenem, will follow-up culture and sensitivities. Vancomycin discontinued on 12/16. -ID following    Bilateral hydronephrosis  Obstructive uropathy due to BPH   Urinary retention with chronic indwelling Bella-new since 11/2022  LC due to sepsis/post-renal   -Patient developed urinary retention post CABG back in 11/2022 and was discharged with a Bella catheter due to failure of multiple voiding trials. It was recently removed on 12/13  by urology and since then patient was having difficulty voiding at home.  Bella reinserted on admission, 12/15    Plan:  -Urology following, he will need repeat cystoscopy in 6 to 8 weeks  -Cont flomax     CAD status post CABG in 11/2022  HFrEF due to ICM with EF 25-30%-compensated  -Last echo in 10/2022. Patient had LifeVest on presentation. Plan:  -Holding home metoprolol due to hypotension and underlying sepsis  -Cardiology following for JOY  -Continue aspirin/Plavix/statin as mentioned above    Past medical history, family history, social history and allergies reviewed again and is unchanged since admission. ROS (All review of systems completed. Pertinent positives noted. Otherwise All other systems reviewed and negative.)     Medications:  Reviewed    Infusion Medications    sodium chloride      sodium chloride 75 mL/hr at 12/16/22 1809    dextrose       Scheduled Medications    tamsulosin  0.8 mg Oral Daily    sodium chloride flush  5-40 mL IntraVENous 2 times per day    enoxaparin  40 mg SubCUTAneous Daily    atorvastatin  40 mg Oral Daily    buPROPion  150 mg Oral QAM    insulin lispro  0-8 Units SubCUTAneous TID WC    insulin lispro  0-4 Units SubCUTAneous Nightly    pantoprazole  40 mg Oral QAM AC    meropenem  1,000 mg IntraVENous Q12H    clopidogrel  75 mg Oral Daily    aspirin  81 mg Oral Daily     PRN Meds: sodium chloride flush, sodium chloride, ondansetron **OR** ondansetron, polyethylene glycol, acetaminophen **OR** acetaminophen, glucose, dextrose bolus **OR** dextrose bolus, glucagon (rDNA), dextrose      Intake/Output Summary (Last 24 hours) at 12/17/2022 0956  Last data filed at 12/17/2022 7270  Gross per 24 hour   Intake 800 ml   Output 3000 ml   Net -2200 ml       Diet:  ADULT DIET;  Dysphagia - Soft and Bite Sized; 3 carb choices (45 gm/meal)    Exam:  BP (!) 105/59   Pulse 96   Temp 99.9 °F (37.7 °C) (Oral)   Resp 16   Ht 5' 10\" (1.778 m)   Wt 151 lb 14.4 oz (68.9 kg)   SpO2 100%   BMI 21.79 kg/m²   General: ederly gentleman sitting up at in chair; awake and alert; clinically euvolemic   HEENT: Pupils equal, round, and reactive to light; oral mucosa is moist; no pharyngeal erythema; dentition is intact  Cardiac: Regular rate and rhythm, S1 and S2 auscultated; no murmurs, gallops, or rubs; peripheral pulses 2+   Respiratory: Non labored respirations on RA; no wheezing, rhonchi, or crackles bilaterally   GI: Abdomen is soft, non-tender, and non-distended; bowel sounds are normal  : No suprapubic or pelvic tenderness; crum in place draining light yellow urine with sediment present    Neuro: ANOx3; CN2-12 are grossly intact; no tremors; no FNDs  MSK: Normal muscle tone and bulk, no joint effusions     Labs:   Recent Labs     12/15/22  0130 12/16/22  0509 12/17/22  0323   WBC 22.3* 14.9* 8.8   HGB 10.6* 9.3* 9.1*   HCT 33.3* 30.3* 28.1*    206 238     Recent Labs     12/15/22  1615 12/16/22  0509 12/16/22  1940 12/17/22  0323   * 130* 136 134*   K 4.1 4.1  --  4.1    101  --  104   CO2 19* 17*  --  20*   BUN 42* 40*  --  36*   CREATININE 1.7* 1.7*  --  1.8*   CALCIUM 8.4* 8.5  --  8.5     Recent Labs     12/15/22  0130   AST 15   ALT 10*   BILITOT 0.8   ALKPHOS 96     No results for input(s): INR in the last 72 hours. No results for input(s): Doyne Knock in the last 72 hours.     Microbiology:    Blood culture #1:   Lab Results   Component Value Date/Time    [facility]  12/15/2022 04:12 AM     sensitivity done-previous specimen; see blood culture drawn on 12/15/2022 at 04:10       Blood culture #2:No results found for: Anjum Amato    Organism:  Lab Results   Component Value Date/Time    ORG Klebsiella pneumoniae 12/15/2022 05:19 AM       No results found for: EMILY    MRSA culture only:No results found for: [facility]    Urine culture:   Lab Results   Component Value Date/Time    LABURIN 200 08/23/2022 12:30 PM       Respiratory culture: No results found for: CULTRESP    Aerobic and Anaerobic :  No results found for: LABAERO  No results found for: LABANAE    Urinalysis:      Lab Results   Component Value Date/Time    NITRU POSITIVE 12/15/2022 05:19 AM    WBCUA > 200 12/15/2022 05:19 AM    BACTERIA MANY 12/15/2022 05:19 AM    RBCUA > 200 12/15/2022 05:19 AM BLOODU MODERATE 12/15/2022 05:19 AM    SPECGRAV 1.007 11/06/2022 09:55 AM    GLUCOSEU >= 1000 12/15/2022 05:19 AM       Radiology:  MRA HEAD WO CONTRAST   Final Result   Impression:   Normal MRA of the brain. This document has been electronically signed by: Arpit Ho MD on    12/15/2022 08:10 PM      3D Post-processing was performed on this study. MRA NECK WO CONTRAST   Final Result   Impression:   Mild disease bilateral carotid bulbs. This document has been electronically signed by: Arpit Ho MD on    12/15/2022 08:20 PM      Carotid stenosis and measurements are in accordance with NASCET criteria. 3D Post-processing was performed on this study. MRI BRAIN WO CONTRAST   Final Result   Impression:   Acute ischemic event left parietal region coronal radiata. Possible small additional acute ischemic event within the right lobe of    the cerebellum. Moderate nonspecific periventricular and deep white matter disease. This    most often can ascribed to chronic small vessel ischemic change. This document has been electronically signed by: Arpit Ho MD on    12/15/2022 08:14 PM         CT HEAD WO CONTRAST   Final Result   1. No acute intracranial abnormality. The pertinent finding(s) was called to patient's nurse  Jose Mahoney RN at [phone] hours on 12/15/2022 by Dr. Stephon Burton. Verbal acknowledgment and readback was given. 2. Chronic findings as described above. **This report has been created using voice recognition software. It may contain minor errors which are inherent in voice recognition technology. **      Final report electronically signed by Dr Edilberto Mcnamara on 12/15/2022 4:46 PM      CT ABDOMEN PELVIS WO CONTRAST Additional Contrast? None   Final Result   1. The urinary bladder is dilated with significant mucosal thickening. Air    within the mucosa of the urinary bladder. Inflammatory stranding surrounds    the urinary bladder.  An air-fluid level within the lumen of the bladder. 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 Recommend    correlation with urinalysis. 2. Moderate bilateral hydronephrosis with mild bilateral perinephric    stranding. Evaluation for infectious process including pyelonephritis    limited without IV contrast, recommend correlation with urinalysis. Mild    bilateral hydroureter throughout the course of the bilateral ureters    without ureterolithiasis. 3. Moderate fecal burden throughout the colon greatest involving the    ascending and transverse colon, favor constipation. 4. Perirectal and presacral stranding. Circumferential mucosal thickening    of the distal rectum best identified on axial image 73 of series 2. Recommend correlation with direct visualization/colonoscopy to exclude    underlying mucosal lesion. 5. Severe mucosal thickening of the stomach at the fundus and mid body    which could represent changes of gastritis in the correct clinical setting. This document has been electronically signed by: Chidi Hutson DO on    12/15/2022 05:03 AM      All CTs at this facility use dose modulation techniques and iterative    reconstructions, and/or weight-based dosing   when appropriate to reduce radiation to a low as reasonably achievable. CT CERVICAL SPINE WO CONTRAST   Final Result   1. Study is partially limited due to motion artifact and technique. Within    this limitation, no acute fracture of the cervical spine. 2. Multilevel facet and uncovertebral joint arthropathy. This document has been electronically signed by: Chidi Hutson DO on    12/15/2022 02:45 AM      All CTs at this facility use dose modulation techniques and iterative    reconstructions, and/or weight-based dosing   when appropriate to reduce radiation to a low as reasonably achievable. CT CHEST WO CONTRAST   Final Result   1. No definitive acute rib fracture.    2. Chronic left seventh posterior rib fracture with callus formation. Likely chronic left eighth posterior rib fracture, mild sclerosis at the    margins of this chronic appearing fracture. Metallic device external to    the patient and medially posterior to the left eighth rib fracture    partially limits evaluation at this region. 3. Moderate/severe bilateral hydronephrosis. No nephrolithiasis. Recommend    consideration for dedicated cross-sectional imaging of the abdomen/pelvis    for further evaluation of this finding. This document has been electronically signed by: Gisel Grimaldo DO on    12/15/2022 03:05 AM      All CTs at this facility use dose modulation techniques and iterative    reconstructions, and/or weight-based dosing   when appropriate to reduce radiation to a low as reasonably achievable. CT THORACIC RECONSTRUCTION WO POST PROCESS   Final Result   1. No acute fracture of the thoracic spine. 2. Mild multilevel spondylosis of the thoracic spine. This document has been electronically signed by: Gisel Grimaldo DO on    12/15/2022 02:58 AM      All CTs at this facility use dose modulation techniques and iterative    reconstructions, and/or weight-based dosing   when appropriate to reduce radiation to a low as reasonably achievable. CT ABDOMEN PELVIS WO CONTRAST Additional Contrast? None    Result Date: 12/15/2022   ADDENDUM #1  Receipt of this report by the clinical staff was confirmed with Rolando Magallon on Dec 15, 2022 05:08:00 EST. This document has been electronically signed by: Ela Brooks on 12/15/2022 05:09 AM ORIGINAL REPORT CT abdomen and pelvis without contrast Comparison: None Findings: The lung bases are clear. The stomach is nondistended. Severe mucosal thickening of the stomach at the fundus and mid body which could represent changes of gastritis in the correct clinical setting. Atherosclerosis of the abdominal aorta which continues into the iliac vasculature.  The liver and gallbladder are unremarkable. Partial atrophy of the pancreas. The spleen is unremarkable. Chronic thickening of the bilateral adrenal glands. Moderate bilateral hydronephrosis with mild bilateral perinephric stranding. Evaluation for infectious process including pyelonephritis limited without IV contrast, recommend correlation with urinalysis. Mild bilateral hydroureter throughout the course of the bilateral ureters without ureterolithiasis. The urinary bladder is dilated with significant mucosal thickening. Air within the mucosa of the urinary bladder. Inflammatory stranding surrounds the urinary bladder. An air-fluid level within the lumen of the bladder. 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 Recommend correlation with urinalysis. Pelvic contents unremarkable. Perirectal and presacral stranding. Circumferential mucosal thickening of the distal rectum best identified on axial image 73 of series 2. Recommend correlation with direct visualization/colonoscopy to exclude underlying mucosal lesion. Moderate fecal burden throughout the colon greatest involving the ascending and transverse colon, favor constipation. The cecum is positioned within the right mid abdomen. No bowel obstruction, pneumoperitoneum, or pneumatosis. Normal appendix. The bones are intact. Spondylosis of the lumbar spine. 1. The urinary bladder is dilated with significant mucosal thickening. Air within the mucosa of the urinary bladder. Inflammatory stranding surrounds the urinary bladder. An air-fluid level within the lumen of the bladder. 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 Recommend correlation with urinalysis. 2. Moderate bilateral hydronephrosis with mild bilateral perinephric stranding. Evaluation for infectious process including pyelonephritis limited without IV contrast, recommend correlation with urinalysis.  Mild bilateral hydroureter throughout the course of the bilateral ureters without ureterolithiasis. 3. Moderate fecal burden throughout the colon greatest involving the ascending and transverse colon, favor constipation. 4. Perirectal and presacral stranding. Circumferential mucosal thickening of the distal rectum best identified on axial image 73 of series 2. Recommend correlation with direct visualization/colonoscopy to exclude underlying mucosal lesion. 5. Severe mucosal thickening of the stomach at the fundus and mid body which could represent changes of gastritis in the correct clinical setting. This document has been electronically signed by: Mone Mathew DO on 12/15/2022 05:03 AM All CTs at this facility use dose modulation techniques and iterative reconstructions, and/or weight-based dosing when appropriate to reduce radiation to a low as reasonably achievable. CT HEAD WO CONTRAST    Result Date: 12/15/2022  PROCEDURE: CT HEAD WO CONTRAST CLINICAL INFORMATION:Code stroke COMPARISON: CT head 9/21/2013 TECHNIQUE: 5 mm axial imaging through the head without IV contrast. All CT scans at this facility use dose modulation, iterative reconstruction, and/or weight based dosing when appropriate to reduce the radiation dose to as low as reasonably achievable. FINDINGS: Intracranial atherosclerotic calcifications. Areas of old infarct seen in the coronal radiata on the left. No ventriculomegaly. No midline shift or mass effect. No acute intracranial hemorrhage. No intracranial collection. Gray-white differentiation is unremarkable. The posterior fossa is unremarkable. The craniocervical junction is unremarkable. No acute bony abnormality. The  paranasal sinuses are clear. Mild left mastoid effusion. Otherwise, the right mastoid air cells are clear. The orbits are unremarkable. 1. No acute intracranial abnormality. The pertinent finding(s) was called to patient's nurse  Rosco Jeans RN at [phone] hours on 12/15/2022 by Dr. Zoie Arnold. Verbal acknowledgment and readback was given.  2. Chronic findings as described above. **This report has been created using voice recognition software. It may contain minor errors which are inherent in voice recognition technology. ** Final report electronically signed by Dr Eryn Hoskins on 12/15/2022 4:46 PM    CT CHEST WO CONTRAST    Result Date: 12/15/2022  CT chest without contrast. Comparison: Chest x-ray December 13, 2022 Findings: The thyroid is unremarkable. Normal caliber of the thoracic aorta. Atherosclerosis of the thoracic aorta. The heart is normal size. Coronary bypass grafting. Moderate coronary atherosclerosis. The mediastinum is intact. Mild paraseptal emphysematous disease involving the lung apices. Chronic left seventh posterior rib fracture with callus formation. Likely chronic left eighth posterior rib fracture, mild sclerosis at the margins of the fracture. Metallic device external to the patient and medially posterior to the left eighth rib fracture partially limits evaluation at this region. Median sternotomy wires. The stomach is non-distended. Moderate/severe bilateral hydronephrosis. No nephrolithiasis. Recommend consideration for dedicated cross-sectional imaging of the abdomen/pelvis for further evaluation of this finding. 1. No definitive acute rib fracture. 2. Chronic left seventh posterior rib fracture with callus formation. Likely chronic left eighth posterior rib fracture, mild sclerosis at the margins of this chronic appearing fracture. Metallic device external to the patient and medially posterior to the left eighth rib fracture partially limits evaluation at this region. 3. Moderate/severe bilateral hydronephrosis. No nephrolithiasis. Recommend consideration for dedicated cross-sectional imaging of the abdomen/pelvis for further evaluation of this finding.  This document has been electronically signed by: Peter Mckay DO on 12/15/2022 03:05 AM All CTs at this facility use dose modulation techniques and iterative reconstructions, and/or weight-based dosing when appropriate to reduce radiation to a low as reasonably achievable. CT CERVICAL SPINE WO CONTRAST    Result Date: 12/15/2022  CT cervical spine without contrast Comparison: September 21, 2013 Findings: Study is partially limited due to motion artifact and technique. Vertebral alignment is within normal limits. No acute fractures or dislocations. Multilevel facet and uncovertebral joint arthropathy. Visualized intracranial contents are unremarkable. No cervical fluid collections or masses. Thyroid gland unremarkable. Paraseptal emphysematous disease. Minimal fluid within the left mastoid air cells. 1. Study is partially limited due to motion artifact and technique. Within this limitation, no acute fracture of the cervical spine. 2. Multilevel facet and uncovertebral joint arthropathy. This document has been electronically signed by: Lara Milton DO on 12/15/2022 02:45 AM All CTs at this facility use dose modulation techniques and iterative reconstructions, and/or weight-based dosing when appropriate to reduce radiation to a low as reasonably achievable. MRA HEAD WO CONTRAST    Result Date: 12/15/2022  MRA of the brain. Technique: Axial MR images of the brain. 3-D reconstructions provided. Comparison: None Findings: Normal anterior circulation. This includes the intracranial portion of the internal carotid arteries, anterior cerebral arteries and middle cerebral arteries. No evidence for aneurysm or occlusion. Normal posterior circulation. This includes the vertebral arteries, basilar artery and posterior cerebral arteries. No evidence for aneurysm or occlusion. Impression: Normal MRA of the brain. This document has been electronically signed by: Raymond Funez MD on 12/15/2022 08:10 PM 3D Post-processing was performed on this study.     CT THORACIC RECONSTRUCTION WO POST PROCESS    Result Date: 12/15/2022  CT thoracic spine without contrast Comparison: CT chest October 5, 2022 Findings: Vertebral alignment is within normal limits. Minimal rightward curvature of the mid thoracic spine. No acute fractures or dislocations. Mild multilevel spondylosis. Paraseptal emphysematous disease of the lung apices. Median sternotomy wires. Coronary artery bypass grafting. Atherosclerosis of the thoracic aorta. Coronary atherosclerosis Appearance of mild to moderate bilateral hydronephrosis. Recommend consideration for cross-sectional imaging of the abdomen and pelvis for further evaluation of this finding. 1. No acute fracture of the thoracic spine. 2. Mild multilevel spondylosis of the thoracic spine. This document has been electronically signed by: Jacob Wheeler DO on 12/15/2022 02:58 AM All CTs at this facility use dose modulation techniques and iterative reconstructions, and/or weight-based dosing when appropriate to reduce radiation to a low as reasonably achievable. MRA NECK WO CONTRAST    Result Date: 12/15/2022  MRA of the neck Technique: 3-D time-of-flight axial images obtained. 3-D reconstructions. Comparison: None Findings: Right common carotid artery: Normal, no significant stenosis. Right internal carotid artery: Mild disease right carotid bulb, no significant stenosis. Left common carotid artery: Normal, no significant stenosis. Left internal carotid artery: Mild disease left carotid bulb, no significant stenosis. Normal bilateral vertebral arteries. Incidental findings: None     Impression: Mild disease bilateral carotid bulbs. This document has been electronically signed by: Olivia Turcios MD on 12/15/2022 08:20 PM Carotid stenosis and measurements are in accordance with NASCET criteria. 3D Post-processing was performed on this study. MRI BRAIN WO CONTRAST    Result Date: 12/15/2022 ADDENDUM #1 This report was discussed with Fortunato Zabala RN on Dec 15, 2022 20:23:00 EST.  This document has been electronically signed by: hSe Miller on 12/15/2022 08:23 PM ORIGINAL REPORT Noncontrast MRI of the brain. Technique: Sagittal T1, coronal T2, axial T1, T2, FLAIR and diffusion-weighted imaging. Comparison: CT/SR - CT HEAD WO CONTRAST - 12/15/2022 04:32 PM EST Findings: Area of increased signal within the left parietal region coronal radiata measuring 1.2 x 0.8 cm. This represent acute ischemic event. Possible small additional acute ischemic event within the right lobe of the cerebellum measuring 3 mm. Moderate nonspecific periventricular and deep white matter disease. This most often can ascribed to chronic small vessel ischemic change. There are no intracranial masses. No evidence for hemorrhage. The ventricles are normal size, no hydrocephalus. Contents of the posterior fossa including brainstem and cerebellum are unremarkable. Normal vascular flow voids. Normal aeration of paranasal sinuses. Moderate left mastoid effusion. Impression: Acute ischemic event left parietal region coronal radiata. Possible small additional acute ischemic event within the right lobe of the cerebellum. Moderate nonspecific periventricular and deep white matter disease. This most often can ascribed to chronic small vessel ischemic change.  This document has been electronically signed by: Olivia Turcios MD on 12/15/2022 08:14 PM       Electronically signed by Johana Shabazz MD on 12/17/2022 at 9:56 AM

## 2022-12-17 NOTE — PROGRESS NOTES
[address] ICU STEPDOWN TELEMETRY 4K  Clinical Swallow Evaluation      SLP Individual Minutes  Time In: 5473  Time Out: 1382  Minutes: 15  Timed Code Treatment Minutes: 0 Minutes       Date: 2022  Patient Name: Oseas Wilkinson      CSN: [id number]   : 1960  (63 y.o.)  Gender: male   Referring Physician:  Pam Veras PA-C  Diagnosis: Emphysematous cystitis  Precautions: Fall Risk  History of Present Illness/Injury: Patient admitted to [facility] with above diagnosis. Per chart review, \"Len Slater who is a 63 y.o. male with a history of hypertension, diabetes, CAD on  daily, recent CABG in November, ischemic cardiomyopathy is admitted to [facility] for sepsis, emphysematous cystitis and acute kidney injury on CKD. During admission exam patient stated that his right arm feldman has been weak and that he has had some slurred speech. Stroke alert was called for right-sided weakness and dysarthria. On arrival patient's NIH was 4 for right upper extremity, right lower extremity weakness, 1 limb ataxia and dysarthria. On further questioning, patient and wife state that the right arm weakness actually started last night. Patient's wife noticed when patient was trying to get up from the toilet that he was unable to push himself up with his right arm. Last known well 2330 on 2022. Patient taken to imaging for stat CT head which revealed no ICH, midline shift, mass-effect. CTA head and neck deferred due to patient's LC. Decision for no tPA was made due to patient's time since last known well. Patient with relatively low NIH and symptoms which are not consistent with LVO, therefore no thrombectomy/neuro intervention at this time.   Patient will have stat MRI brain and MRA head and neck without contrast.\"     ST consulted to complete clinical swallow evaluation to assess current swallow function and update POC as clinically indicated. Past Medical History:   Diagnosis Date    Hypertension     Prediabetes        SUBJECTIVE:  ABIGAIL Cordova approved completion of clinical swallow evaluation and reporting good success with current diet level thus far. Patient awake in bed upon ST arrival. Pleasant and cooperative throughout. OBJECTIVE:    Pain:  No pain reported. Current Diet: Soft and Bite-Size with Thin Liquids     Respiratory Status:  Room Air    Behavioral Observation:  Alert and Oriented    CRANIAL NERVE ASSESSMENT   CN V (Trigeminal) Closes and Opens Mandible WFL    Rotary Jaw Movement WFL      CN VII (Facial) Cheeks Hold Food out of Sulci WFL    Opens, Closes/Seals, Protrudes, Retracts Lips WFL    General Appearance WFL    Sensation WFL      CN X (Vagus - Pharyngeal) Raises Back of Tongue WFL      CN XI (Accessory) Lifts Soft Palate WFL      CN XII (Hypoglossal) Elevates Tongue Up and Back WFL    Protrusion   WFL    Lateralizes Tongue WFL    Sensation Not Tested      Other Observations Dentition Edentulous - does not wear dentures/partial     Vocal Quality WFL    Cough DNT     Patient Evaluated Using: Thin Liquids and Coarse Solids    Oral Phase:  Impaired:  Impaired Mastication    Pharyngeal Phase: WFL:  Pharyngeal phase appears WFL but cannot rule out pharyngeal phase deficits from a bedside swallowing evaluation alone. Signs and Symptoms of Laryngeal Penetration/Aspiration: No signs/symptoms of aspiration evident in this evaluation, but cannot rule out silent aspiration. Impressions: Patient presents with WFL/MOD I oral dysphagia with inability to fully discern potential presence of pharyngeal phase deficits without formal instrumentation. Patient with adequate bolus control and manipulation, mildly prolonged mastication with effective textural breakdown when given additional time, and complete bolus clearance with utilization of lingual sweep and/or liquid wash.  Patient with suspected consistent swallow initiation and no overt s/s of aspiration observed across all trials. Patient endorsing he does not consume raw fruits/vegetables s/t edentulous state. At this time, it is recommended patient initiate an easy to chew diet with thin liquids. RECOMMENDATIONS/ASSESSMENT:  Instrumental Evaluation: Instrumental evaluation not indicated at this time. Diet Recommendations:  Easy to Chew with Thin Liquids   Strategies:  Full Upright Position, Small Bite/Sip, Medications Whole with Thin, and Alternate Solids and Liquids   Rehabilitation Potential: good  Discharge Recommendations: Inpatient Rehabilitation    EDUCATION:  Learner: Patient  Education:  Reviewed results and recommendations of this evaluation, Reviewed diet and strategies, Reviewed signs, symptoms and risks of aspiration, Reviewed ST goals and Plan of Care, Education Related to Potential Risks and Complications Due to Impairment/Illness/Injury, and Education Related to [employer] and Wellness  Evaluation of Education: Stoney Ribeiro understanding and Needs further instruction    PLAN:  Skilled SLP intervention on acute care 3-5 x per week or until goals met and/or pt plateaus in function. Specific interventions for next session may include: Dietary analysis + cog tx. PATIENT GOAL:    Did not state. Will further assess during treatment. SHORT TERM GOALS:  Short Term Goals  Time Frame for Short Term Goals: 2 weeks  Goal 1: Patient will complete complex executive functioning tasks (i.e., medication management, complex reasoning/deductive reasoning, etc.) with 80% accuracy and minimal cuing in order to allow for safe return to PLOF. Goal 2: Patient will complete complex attention tasks with no more than two errros in three minutes in order to improve completion of ADLs/IADLs.   Goal 3: Patient will consume an easy to chew diet and thin liquids with stable pulmonary status and adequate endurance to assist with meeting nutrition/hydration needs across 1-2 skilled dietary analysis    LONG TERM GOALS:  No long term goals recommended d/t short FAZAL Nowak ([facility] 587) [address], [address]

## 2022-12-17 NOTE — PROGRESS NOTES
[address] ICU STEPDOWN TELEMETRY 4K  [address] [facility] [address]  Dept: [phone]  Loc: [phone]  Visit Date: 12/15/2022  Urology Progress Note    Chief Complaint: Fall      Impression/Plan:  Emphysematous cystitis  - Patient on Meropenem which shows sensitivity to cultured organism of Klebsiella pneumoniae. Resulted last evening. Also shows sensitivity to Augmentin and Bactrim as alternative therapy/outpatient options. BPH with Urinary retention  - Patient failed multiple void trials during recent hospitalization and again s/p crum removal on 12/13. Crum replaced. Continue catheter at this time. - Patient on Flomax.  - Draining yellow urine, mucus in tubing. Improved from yesterday. Gross hematuria  - Continue to hand irrigate PRN with 120 mls of sterile solution. Notify Urology with any worsening in hematuria or clot retention. LC  - Creatinine at 1.8. Stable. Secondary to dehydration/postrenal obstruction. Patient receiving IVF. Moderate bilateral hydronephrosis  - Hydronephrosis likely secondary to urinary retention. Noted lateral lobe hypertrophy and moderately obstructive prostate on recent cystoscopy. Urology Disposition:  Urology will continue to follow. Will plan to keep catheter in as inpatient given failure of multiple void trials. Continue Flomax. May have been living in state of incomplete bladder emptying. Cystoscopy on 12/13/22. Per Dr. Jena Bingham will repeat cystoscopy in 6-8 weeks. HPI:    The patient is a 63 y.o. male with extensive previous history of ETOH use recently hospitalized 10/28/22-11/15/22 during which hospitalization he underwent CABG x 2 by Dr. Melisa Tinooc on 11/7/22. He had issues with urinary retention failing void trials and hematuria during hospitalization for which urology was consulted.  Patient was discharged with crum catheter in place and saw Dr. Jena Bingham in the office 12/13/22 at which time cystoscopy was performed and crum removed. Cystoscopy revealed findings of catheter cystitis vs tumor, moderate trabeculation and small bladder diverticulum. Reassessment with repeat cystoscopy in 6-8 weeks was recommended and crum left out. Patient presented to ER today following fall when attempting to sit on the toilet. Noted to be tachycardic and have mildly low blood pressures. UA + for infection. CT Abdomen Pelvis with findings of significant mucosal thickening of the bladder, air/fluid levels in lumen of the bladder and air within the mucosa of the bladder, moderate bilateral hydronephrosis, perirectal and presacral stranding. Crum inserted by ER with return of 400 mls of pus and purulent yellow appearing urine followed by air and then return of bloody urine with clots. Today, patient denies pain. Crum draining dark yellow urine. Low grade temps. Reports has felt weak and fatigued last 2 days.           Vitals:  BP (!) 105/59   Pulse 96   Temp 99.9 °F (37.7 °C) (Oral)   Resp 16   Ht 5' 10\" (1.778 m)   Wt 151 lb 14.4 oz (68.9 kg)   SpO2 100%   BMI 21.79 kg/m²   Temp  Av.7 °F (37.1 °C)  Min: 98.1 °F (36.7 °C)  Max: 99.9 °F (37.7 °C)    Intake/Output Summary (Last 24 hours) at 2022 0925  Last data filed at 2022 3333  Gross per 24 hour   Intake 800 ml   Output 3000 ml   Net -2200 ml       Social History     Socioeconomic History    Marital status:      Spouse name: Not on file    Number of children: Not on file    Years of education: Not on file    Highest education level: Not on file   Occupational History    Not on file   Tobacco Use    Smoking status: Former     Packs/day: 0.50     Years: 33.00     Pack years: 16.50     Types: Cigarettes    Smokeless tobacco: Never   Vaping Use    Vaping Use: Never used   Substance and Sexual Activity    Alcohol use: Not Currently    Drug use: No    Sexual activity: Not on file   Other Topics Concern    Not on file   Social History Narrative    Not on file     Social Determinants of Health     Financial Resource Strain: Low Risk     Difficulty of Paying Living Expenses: Not hard at all   Food Insecurity: No Food Insecurity    Worried About [address] in the Last Year: Never true    [address] [facility] [address] in the Last Year: Never true   Transportation Needs: No Transportation Needs    Lack of Transportation (Medical): No    Lack of Transportation (Non-Medical): No   Physical Activity: Not on file   Stress: Not on file   Social Connections: Not on file   Intimate Partner Violence: Not on file   Housing Stability: Not on file     Family History   Problem Relation Age of Onset    Diabetes Father     Stroke Father     Diabetes Brother     Diabetes Brother      Allergies   Allergen Reactions    Metformin And Related Nausea And Vomiting       Objective:    Constitutional: Alert and oriented times x3, no acute distress, and cooperative to examination with appropriate mood and affect. Cardiovascular:   Normal rate and regular rhythm. Pulmonary/Chest:  Normal respiratory rate and rhthym. No use of accessory muscles. Abdominal:          Soft. No tenderness. Active bowel sounds. Genitourinary: Bella catheter draining pale yellow urine with mucus in tubing. Neurological:    Alert and oriented.      Labs:  WBC:    Lab Results   Component Value Date/Time    WBC 8.8 12/17/2022 03:23 AM     Hemoglobin/Hematocrit:    Lab Results   Component Value Date/Time    HGB 9.1 12/17/2022 03:23 AM    HCT 28.1 12/17/2022 03:23 AM     BMP:    Lab Results   Component Value Date/Time     12/17/2022 03:23 AM    K 4.1 12/17/2022 03:23 AM    K 4.3 10/28/2022 10:00 PM     12/17/2022 03:23 AM    CO2 20 12/17/2022 03:23 AM    BUN 36 12/17/2022 03:23 AM    LABALBU 4.0 12/15/2022 01:30 AM    CREATININE 1.8 12/17/2022 03:23 AM    CALCIUM 8.5 12/17/2022 03:23 AM    GFRAA 58 05/24/2022 04:58 AM    LABGLOM 42 12/17/2022 03:23 AM         Dolores Brink MADDIE  12/17/22 9:25 AM  Urology

## 2022-12-17 NOTE — PROGRESS NOTES
Neurology Progress Note    Date:12/17/2022       BKYN:[id number]  Patient Cesar Madrigal     YOB: 1960     Age:62 y.o.        HPI: Marion Fonseca who is a 63 y.o. male with a history of hypertension, diabetes, CAD on  daily, recent CABG in November, ischemic cardiomyopathy is admitted to [facility] for sepsis, emphysematous cystitis and acute kidney injury on CKD. During admission exam patient stated that his right arm feldman has been weak and that he has had some slurred speech. Stroke alert was called for right-sided weakness and dysarthria. On arrival patient's NIH was 4 for right upper extremity, right lower extremity weakness, 1 limb ataxia and dysarthria. On further questioning, patient and wife state that the right arm weakness actually started last night. Patient's wife noticed when patient was trying to get up from the toilet that he was unable to push himself up with his right arm. Last known well 2330 on 12/14/2022. Patient taken to imaging for stat CT head which revealed no ICH, midline shift, mass-effect. CTA head and neck deferred due to patient's LC. Decision for no tPA was made due to patient's time since last known well. Patient with relatively low NIH and symptoms which are not consistent with LVO, therefore no thrombectomy/neuro intervention at this time. Patient will have stat MRI brain and MRA head and neck without contrast.     Time of symptoms onset/last time seen at baseline: 2330 on 12/14/2022. Time of stroke alert: 1620 on 12/15/2022. Time of Neurology arrival: 1622. Vascular risk factors: HTN, T2DM. Initial Glucose: 171. Old deficits from prior stroke: None. INR in ED if anticoagulated: N/A.  BP in ED: 96/60. Initial NIHSS: 4.  Modified Celine Score upon admission: 3. IV tPA administerd: No.  Time of Initial Imaging Read: 1637. Thrombectomy performed: No.  Puncture time: N/A. Interval history 12/16/22:  The patient continues to have right-sided weakness, dysarthria, and facial droop. He went down for a JOY today. The results are pending. Interval history 22: No neurologic changes today. Review of Systems   Review of Systems   Eyes:  Negative for visual disturbance. Neurological:  Positive for facial asymmetry, speech difficulty, weakness and numbness. Negative for dizziness, light-headedness and headaches. Medications   Scheduled Meds:    tamsulosin  0.8 mg Oral Daily    sodium chloride flush  5-40 mL IntraVENous 2 times per day    enoxaparin  40 mg SubCUTAneous Daily    atorvastatin  40 mg Oral Daily    buPROPion  150 mg Oral QAM    insulin lispro  0-8 Units SubCUTAneous TID WC    insulin lispro  0-4 Units SubCUTAneous Nightly    pantoprazole  40 mg Oral QAM AC    meropenem  1,000 mg IntraVENous Q12H    clopidogrel  75 mg Oral Daily    aspirin  81 mg Oral Daily     Continuous Infusions:    sodium chloride      sodium chloride 75 mL/hr at 22 1242    dextrose       PRN Meds: sodium chloride flush, sodium chloride, ondansetron **OR** ondansetron, polyethylene glycol, acetaminophen **OR** acetaminophen, glucose, dextrose bolus **OR** dextrose bolus, glucagon (rDNA), dextrose    Past History    Past Medical History:   has a past medical history of Hypertension and Prediabetes. Social History:   reports that he has quit smoking. His smoking use included cigarettes. He has a 16.50 pack-year smoking history. He has never used smokeless tobacco. He reports that he does not currently use alcohol. He reports that he does not use drugs.      Family History:   Family History   Problem Relation Age of Onset    Diabetes Father     Stroke Father     Diabetes Brother     Diabetes Brother        Physical Examination          Vitals:  BP (!) 102/52   Pulse 95   Temp 99.8 °F (37.7 °C) (Oral)   Resp 18   Ht 5' 10\" (1.778 m)   Wt 151 lb 14.4 oz (68.9 kg)   SpO2 97%   BMI 21.79 kg/m²   Temp (24hrs), Av °F (37.2 °C), Min:98.1 °F (36.7 °C), Max:99.9 °F (37.7 °C)      I/O (24Hr): Intake/Output Summary (Last 24 hours) at 12/17/2022 1546  Last data filed at 12/17/2022 1508  Gross per 24 hour   Intake 1040 ml   Output 3150 ml   Net -2110 ml         Physical Exam  Vitals reviewed. Constitutional:       General: He is not in acute distress. Appearance: Normal appearance. He is not ill-appearing. HENT:      Head: Normocephalic and atraumatic. Right Ear: External ear normal.      Left Ear: External ear normal.      Nose: Nose normal.      Mouth/Throat:      Mouth: Mucous membranes are moist.      Pharynx: No oropharyngeal exudate or posterior oropharyngeal erythema. Eyes:      Extraocular Movements: EOM normal.      Pupils: Pupils are equal, round, and reactive to light. Neurological:      Mental Status: He is alert and oriented to person, place, and time. Psychiatric:         Mood and Affect: Mood normal.         Speech: Speech is slurred. Behavior: Behavior normal.     Neurologic Exam     Mental Status   Oriented to person, place, and time. Attention: normal. Concentration: normal.   Speech: slurred   Level of consciousness: alert  Normal comprehension. Cranial Nerves     CN II   Visual fields full to confrontation. Right visual field deficit: none  Left visual field deficit: none     CN III, IV, VI   Pupils are equal, round, and reactive to light. Extraocular motions are normal.   Right pupil: Shape: regular. Reactivity: brisk. Left pupil: Shape: regular. Reactivity: brisk. CN V   Facial sensation intact.    Right facial sensation deficit: none  Left facial sensation deficit: none    CN VII   Right facial weakness: central  Left facial weakness: none    CN VIII   CN VIII normal.   Hearing: intact    CN IX, X   CN IX normal.   CN X normal.   Palate: symmetric    CN XI   CN XI normal.   Right trapezius strength: normal  Left trapezius strength: normal    CN XII   CN XII normal. Tongue: not atrophic  Fasciculations: absent  Tongue deviation: none    Motor Exam   Muscle bulk: normal  Overall muscle tone: normal  Right arm tone: normal  Left arm tone: normal  Right arm pronator drift: absent  Left arm pronator drift: absent  Right leg tone: normal  Left leg tone: normal  Muscle strength 5/5 in left upper and lower extremities. Muscle strength 4/5 in right upper and lower extremities. Sensory Exam   Light touch normal.        Labs/Imaging/Diagnostics   Labs:  CBC:  Recent Labs     12/15/22  0130 12/16/22  0509 12/17/22  0323   WBC 22.3* 14.9* 8.8   RBC 3.82* 3.37* 3.25*   HGB 10.6* 9.3* 9.1*   HCT 33.3* 30.3* 28.1*   MCV 87.2 89.9 86.5    206 238       CHEMISTRIES:  Recent Labs     12/15/22  1615 12/16/22  0509 12/16/22  1940 12/17/22  0323   * 130* 136 134*   K 4.1 4.1  --  4.1    101  --  104   CO2 19* 17*  --  20*   BUN 42* 40*  --  36*   CREATININE 1.7* 1.7*  --  1.8*   GLUCOSE 164* 148*  --  191*       PT/INR:No results for input(s): PROTIME, INR in the last 72 hours. APTT:No results for input(s): APTT in the last 72 hours. LIVER PROFILE:  Recent Labs     12/15/22  0130   AST 15   ALT 10*   BILITOT 0.8   ALKPHOS 96         Imaging Last 24 Hours:  CT HEAD WO CONTRAST    Result Date: 12/15/2022  PROCEDURE: CT HEAD WO CONTRAST CLINICAL INFORMATION:Code stroke COMPARISON: CT head 9/21/2013 TECHNIQUE: 5 mm axial imaging through the head without IV contrast. All CT scans at this facility use dose modulation, iterative reconstruction, and/or weight based dosing when appropriate to reduce the radiation dose to as low as reasonably achievable. FINDINGS: Intracranial atherosclerotic calcifications. Areas of old infarct seen in the coronal radiata on the left. No ventriculomegaly. No midline shift or mass effect. No acute intracranial hemorrhage. No intracranial collection. Gray-white differentiation is unremarkable. The posterior fossa is unremarkable.  The craniocervical junction is unremarkable. No acute bony abnormality. The  paranasal sinuses are clear. Mild left mastoid effusion. Otherwise, the right mastoid air cells are clear. The orbits are unremarkable. 1. No acute intracranial abnormality. The pertinent finding(s) was called to patient's nurse  Vj Lopez RN at [phone] hours on 12/15/2022 by Dr. Selena Fonseca. Verbal acknowledgment and readback was given. 2. Chronic findings as described above. **This report has been created using voice recognition software. It may contain minor errors which are inherent in voice recognition technology. ** Final report electronically signed by Dr Michael Erickson on 12/15/2022 4:46 PM    MRA HEAD WO CONTRAST    Result Date: 12/15/2022  MRA of the brain. Technique: Axial MR images of the brain. 3-D reconstructions provided. Comparison: None Findings: Normal anterior circulation. This includes the intracranial portion of the internal carotid arteries, anterior cerebral arteries and middle cerebral arteries. No evidence for aneurysm or occlusion. Normal posterior circulation. This includes the vertebral arteries, basilar artery and posterior cerebral arteries. No evidence for aneurysm or occlusion. Impression: Normal MRA of the brain. This document has been electronically signed by: Mary La MD on 12/15/2022 08:10 PM 3D Post-processing was performed on this study. MRA NECK WO CONTRAST    Result Date: 12/15/2022  MRA of the neck Technique: 3-D time-of-flight axial images obtained. 3-D reconstructions. Comparison: None Findings: Right common carotid artery: Normal, no significant stenosis. Right internal carotid artery: Mild disease right carotid bulb, no significant stenosis. Left common carotid artery: Normal, no significant stenosis. Left internal carotid artery: Mild disease left carotid bulb, no significant stenosis. Normal bilateral vertebral arteries.  Incidental findings: None     Impression: Mild disease bilateral carotid bulbs. This document has been electronically signed by: Cady Sih MD on 12/15/2022 08:20 PM Carotid stenosis and measurements are in accordance with NASCET criteria. 3D Post-processing was performed on this study. MRI BRAIN WO CONTRAST    Result Date: 12/15/2022   ADDENDUM #1  This report was discussed with Bora Oakley RN on Dec 15, 2022 20:23:00 EST. This document has been electronically signed by: Sheeba Carey on 12/15/2022 08:23 PM  ORIGINAL REPORT  Noncontrast MRI of the brain. Technique: Sagittal T1, coronal T2, axial T1, T2, FLAIR and diffusion-weighted imaging. Comparison: CT/SR - CT HEAD WO CONTRAST - 12/15/2022 04:32 PM EST Findings: Area of increased signal within the left parietal region coronal radiata measuring 1.2 x 0.8 cm. This represent acute ischemic event. Possible small additional acute ischemic event within the right lobe of the cerebellum measuring 3 mm. Moderate nonspecific periventricular and deep white matter disease. This most often can ascribed to chronic small vessel ischemic change. There are no intracranial masses. No evidence for hemorrhage. The ventricles are normal size, no hydrocephalus. Contents of the posterior fossa including brainstem and cerebellum are unremarkable. Normal vascular flow voids. Normal aeration of paranasal sinuses. Moderate left mastoid effusion. Impression: Acute ischemic event left parietal region coronal radiata. Possible small additional acute ischemic event within the right lobe of the cerebellum. Moderate nonspecific periventricular and deep white matter disease. This most often can ascribed to chronic small vessel ischemic change. This document has been electronically signed by: Cady Shi MD on 12/15/2022 08:14 PM        Assessment and Plan:          Acute ischemic strokes in left corona radiata and right cerebellar hemisphere concerning for a cardioembolic source.   Imaging  CT revealed no ICH, midline shift, mass-effect, dense MCA sign. Does demonstrate chronic infarct of the L corona radiata. CTA of head and neck deferred due to LC on CKD. MRI/MRA Head/MRA Neck WO Contrast ordered  JOY pending. Upon reviewing the patient's echocardiogram from October, he was noted to have a low ejection fraction of 20 to 25% with severe global hypokinesis of the left ventricle. Stat CT head is needed if the patient develops new-onset altered mental status, a severe headache, or new-onset neurologic deficit  Risk factors and medications  Blood pressure goal: Less than 130/80  Keep well hydrated. Will defer to primary as pt is with CHF  Antithrombotics: ASA 81 + Plavix 75mg   HgbA1C 7.6, recommend tight control of A1c with goal of <7.0 if attainable. LDL 42. LDL goal of 45-70. Continue Lipitor 40mg daily. Smoking and alcohol cessation when applicable. Provide stroke eduction for individualized risk factors. EKG with T wave inversion in anterolateral leads  Maintain telemetry to monitor for atrial fibrillation  Core stroke metrics  Dysphagia screen prior to oral intake  PT/OT/SLP consult. IPR consult if applicable. DVT prophylaxis: Lovenox  NIHSS every shift. Neuro checks per unit unless otherwise specified. Pre-morbid Modified Celine Scale: 2 - Slight disability:  unable to carry out all previous activities, but able to look after own affairs without assistance. Discussed with Dr. Haider Malone. Given the patient's low ejection fraction and left ventricular global hypokinesis, we recommend initiating anticoagulation for secondary stroke prevention. From a neurologic standpoint, if anticoagulation is initiated, antiplatelets can be discontinued. We will defer the choice of the specific anticoagulant to cardiology. Discussed with patient possibility of inpatient rehab. Patient is agreeable. Will await therapy recommendations. No further inpatient work-up from neurologic standpoint at this time.   Neurology will sign off.  Please call any questions or concerns. Outpatient follow-up with general neurology upon discharge.     Electronically signed by Polly Anders PA-C on 12/17/22 at 6:36 PM EST

## 2022-12-18 ENCOUNTER — APPOINTMENT (OUTPATIENT)
Dept: ULTRASOUND IMAGING | Age: 62
End: 2022-12-18
Payer: COMMERCIAL

## 2022-12-18 ENCOUNTER — TELEPHONE (OUTPATIENT)
Dept: UROLOGY | Age: 62
End: 2022-12-18

## 2022-12-18 LAB
AEROBIC CULTURE: NORMAL
ANION GAP SERPL CALCULATED.3IONS-SCNC: 11 MEQ/L (ref 8–16)
BLOOD CULTURE, ROUTINE: ABNORMAL
BUN BLDV-MCNC: 31 MG/DL (ref 7–22)
CALCIUM SERPL-MCNC: 8.6 MG/DL (ref 8.5–10.5)
CHLORIDE BLD-SCNC: 102 MEQ/L (ref 98–111)
CO2: 20 MEQ/L (ref 23–33)
CREAT SERPL-MCNC: 1.8 MG/DL (ref 0.4–1.2)
EKG ATRIAL RATE: 94 BPM
EKG P AXIS: 54 DEGREES
EKG P-R INTERVAL: 140 MS
EKG Q-T INTERVAL: 356 MS
EKG QRS DURATION: 84 MS
EKG QTC CALCULATION (BAZETT): 445 MS
EKG R AXIS: 69 DEGREES
EKG T AXIS: 96 DEGREES
EKG VENTRICULAR RATE: 94 BPM
ERYTHROCYTE [DISTWIDTH] IN BLOOD BY AUTOMATED COUNT: 13.4 % (ref 11.5–14.5)
ERYTHROCYTE [DISTWIDTH] IN BLOOD BY AUTOMATED COUNT: 42.3 FL (ref 35–45)
GFR SERPL CREATININE-BSD FRML MDRD: 42 ML/MIN/1.73M2
GLUCOSE BLD-MCNC: 142 MG/DL (ref 70–108)
GLUCOSE BLD-MCNC: 148 MG/DL (ref 70–108)
GLUCOSE BLD-MCNC: 154 MG/DL (ref 70–108)
GLUCOSE BLD-MCNC: 178 MG/DL (ref 70–108)
GLUCOSE BLD-MCNC: 208 MG/DL (ref 70–108)
GLUCOSE BLD-MCNC: 307 MG/DL (ref 70–108)
HCT VFR BLD CALC: 29.1 % (ref 42–52)
HEMOGLOBIN: 9.3 GM/DL (ref 14–18)
MAGNESIUM: 1.8 MG/DL (ref 1.6–2.4)
MCH RBC QN AUTO: 27.4 PG (ref 26–33)
MCHC RBC AUTO-ENTMCNC: 32 GM/DL (ref 32.2–35.5)
MCV RBC AUTO: 85.8 FL (ref 80–94)
ORGANISM: ABNORMAL
ORGANISM: ABNORMAL
PHOSPHORUS: 2.9 MG/DL (ref 2.4–4.7)
PLATELET # BLD: 238 THOU/MM3 (ref 130–400)
PMV BLD AUTO: 9.9 FL (ref 9.4–12.4)
POTASSIUM SERPL-SCNC: 4.3 MEQ/L (ref 3.5–5.2)
RBC # BLD: 3.39 MILL/MM3 (ref 4.7–6.1)
SODIUM BLD-SCNC: 133 MEQ/L (ref 135–145)
TROPONIN T: < 0.01 NG/ML
WBC # BLD: 6.1 THOU/MM3 (ref 4.8–10.8)

## 2022-12-18 PROCEDURE — C9113 INJ PANTOPRAZOLE SODIUM, VIA: HCPCS

## 2022-12-18 PROCEDURE — 93005 ELECTROCARDIOGRAM TRACING: CPT

## 2022-12-18 PROCEDURE — 83735 ASSAY OF MAGNESIUM: CPT

## 2022-12-18 PROCEDURE — 2580000003 HC RX 258: Performed by: STUDENT IN AN ORGANIZED HEALTH CARE EDUCATION/TRAINING PROGRAM

## 2022-12-18 PROCEDURE — 82948 REAGENT STRIP/BLOOD GLUCOSE: CPT

## 2022-12-18 PROCEDURE — 2060000000 HC ICU INTERMEDIATE R&B

## 2022-12-18 PROCEDURE — 85027 COMPLETE CBC AUTOMATED: CPT

## 2022-12-18 PROCEDURE — 93010 ELECTROCARDIOGRAM REPORT: CPT | Performed by: INTERNAL MEDICINE

## 2022-12-18 PROCEDURE — 6360000002 HC RX W HCPCS: Performed by: STUDENT IN AN ORGANIZED HEALTH CARE EDUCATION/TRAINING PROGRAM

## 2022-12-18 PROCEDURE — 99233 SBSQ HOSP IP/OBS HIGH 50: CPT | Performed by: STUDENT IN AN ORGANIZED HEALTH CARE EDUCATION/TRAINING PROGRAM

## 2022-12-18 PROCEDURE — 6370000000 HC RX 637 (ALT 250 FOR IP): Performed by: SOCIAL WORKER

## 2022-12-18 PROCEDURE — 6360000002 HC RX W HCPCS

## 2022-12-18 PROCEDURE — 99232 SBSQ HOSP IP/OBS MODERATE 35: CPT

## 2022-12-18 PROCEDURE — 36415 COLL VENOUS BLD VENIPUNCTURE: CPT

## 2022-12-18 PROCEDURE — 84484 ASSAY OF TROPONIN QUANT: CPT

## 2022-12-18 PROCEDURE — 6370000000 HC RX 637 (ALT 250 FOR IP): Performed by: STUDENT IN AN ORGANIZED HEALTH CARE EDUCATION/TRAINING PROGRAM

## 2022-12-18 PROCEDURE — 84100 ASSAY OF PHOSPHORUS: CPT

## 2022-12-18 PROCEDURE — 6370000000 HC RX 637 (ALT 250 FOR IP)

## 2022-12-18 PROCEDURE — 80048 BASIC METABOLIC PNL TOTAL CA: CPT

## 2022-12-18 RX ORDER — METOPROLOL SUCCINATE 25 MG/1
25 TABLET, EXTENDED RELEASE ORAL DAILY
Status: DISCONTINUED | OUTPATIENT
Start: 2022-12-18 | End: 2022-12-20

## 2022-12-18 RX ORDER — OXYCODONE HYDROCHLORIDE 5 MG/1
5 TABLET ORAL EVERY 6 HOURS PRN
Status: DISCONTINUED | OUTPATIENT
Start: 2022-12-18 | End: 2022-12-23 | Stop reason: HOSPADM

## 2022-12-18 RX ORDER — PANTOPRAZOLE SODIUM 40 MG/10ML
40 INJECTION, POWDER, LYOPHILIZED, FOR SOLUTION INTRAVENOUS ONCE
Status: COMPLETED | OUTPATIENT
Start: 2022-12-18 | End: 2022-12-18

## 2022-12-18 RX ADMIN — ASPIRIN 81 MG: 81 TABLET, COATED ORAL at 09:23

## 2022-12-18 RX ADMIN — ENOXAPARIN SODIUM 40 MG: 100 INJECTION SUBCUTANEOUS at 09:23

## 2022-12-18 RX ADMIN — ACETAMINOPHEN 650 MG: 325 TABLET ORAL at 01:49

## 2022-12-18 RX ADMIN — TAMSULOSIN HYDROCHLORIDE 0.8 MG: 0.4 CAPSULE ORAL at 09:23

## 2022-12-18 RX ADMIN — MEROPENEM 1000 MG: 1 INJECTION, POWDER, FOR SOLUTION INTRAVENOUS at 12:41

## 2022-12-18 RX ADMIN — ACETAMINOPHEN 650 MG: 325 TABLET ORAL at 18:24

## 2022-12-18 RX ADMIN — SODIUM CHLORIDE, PRESERVATIVE FREE 10 ML: 5 INJECTION INTRAVENOUS at 09:23

## 2022-12-18 RX ADMIN — ATORVASTATIN CALCIUM 40 MG: 40 TABLET, FILM COATED ORAL at 20:48

## 2022-12-18 RX ADMIN — INSULIN LISPRO 6 UNITS: 100 INJECTION, SOLUTION INTRAVENOUS; SUBCUTANEOUS at 16:50

## 2022-12-18 RX ADMIN — SODIUM CHLORIDE: 9 INJECTION, SOLUTION INTRAVENOUS at 09:23

## 2022-12-18 RX ADMIN — PANTOPRAZOLE SODIUM 40 MG: 40 INJECTION, POWDER, FOR SOLUTION INTRAVENOUS at 02:48

## 2022-12-18 RX ADMIN — MEROPENEM 1000 MG: 1 INJECTION, POWDER, FOR SOLUTION INTRAVENOUS at 23:24

## 2022-12-18 RX ADMIN — METOPROLOL SUCCINATE 25 MG: 25 TABLET, EXTENDED RELEASE ORAL at 12:41

## 2022-12-18 RX ADMIN — ACETAMINOPHEN 650 MG: 325 TABLET ORAL at 09:23

## 2022-12-18 RX ADMIN — SODIUM CHLORIDE, PRESERVATIVE FREE 10 ML: 5 INJECTION INTRAVENOUS at 20:48

## 2022-12-18 RX ADMIN — BUPROPION HYDROCHLORIDE 150 MG: 150 TABLET, FILM COATED, EXTENDED RELEASE ORAL at 09:23

## 2022-12-18 RX ADMIN — APIXABAN 5 MG: 5 TABLET, FILM COATED ORAL at 20:48

## 2022-12-18 RX ADMIN — POLYETHYLENE GLYCOL 3350 17 G: 17 POWDER, FOR SOLUTION ORAL at 12:47

## 2022-12-18 RX ADMIN — CLOPIDOGREL BISULFATE 75 MG: 75 TABLET ORAL at 09:23

## 2022-12-18 ASSESSMENT — PAIN SCALES - GENERAL
PAINLEVEL_OUTOF10: 0
PAINLEVEL_OUTOF10: 6
PAINLEVEL_OUTOF10: 0
PAINLEVEL_OUTOF10: 0

## 2022-12-18 NOTE — PROGRESS NOTES
[address] STEPDOWN TELEMETRY 4[address]  [address] [facility] [address]  Dept: [phone]  Loc: [phone]  Visit Date: 12/15/2022  Urology Progress Note    Chief Complaint: Fall  Reason for Consult: Urinary Retention and Emphysematous Cystitis    Impression/Plan:  Emphysematous cystitis  - Patient on Meropenem which shows sensitivity to cultured organism of Klebsiella pneumoniae. Resulted last evening. Also shows sensitivity to Augmentin and Bactrim as alternative therapy/outpatient options. ID is following. BPH with Urinary retention  - Patient failed multiple void trials during recent hospitalization and again s/p crum removal on 12/13. Crum replaced. Continue catheter at this time. - Patient on Flomax.  - Draining yellow urine, mucus in tubing. Improved from yesterday. Gross hematuria  - Continue to hand irrigate PRN with 120 mls of sterile solution. Notify Urology with any worsening in hematuria or clot retention. LC  - Creatinine at 1.8. Stable. Secondary to dehydration/postrenal obstruction. Patient receiving IVF. Moderate bilateral hydronephrosis  - Hydronephrosis likely secondary to urinary retention. Noted lateral lobe hypertrophy and moderately obstructive prostate on recent cystoscopy. - Spoke to IM who will be ordering Renal US today. Urology Disposition:  Will plan to keep catheter in as inpatient given failure of multiple void trials. Continue Flomax. May have been living in state of incomplete bladder emptying. Cystoscopy on 12/13/22. Per Dr. Nathan Cool will repeat cystoscopy in 6-8 weeks. Office notified. Discharge home with catheter in place. Will need follow-up in 2 weeks to discontinue catheter and have PVR. HPI:     The patient is a 63 y.o. male with extensive previous history of ETOH use recently hospitalized 10/28/22-11/15/22 during which hospitalization he underwent CABG x 2 by Dr. Antwon Duque on 11/7/22.   He had issues with urinary retention failing void trials and hematuria during hospitalization for which urology was consulted. Patient was discharged with crum catheter in place and saw Dr. Wendy Krishnan in the office 22 at which time cystoscopy was performed and crum removed. Cystoscopy revealed findings of catheter cystitis vs tumor, moderate trabeculation and small bladder diverticulum. Reassessment with repeat cystoscopy in 6-8 weeks was recommended and crum left out. Patient presented to ER today following fall when attempting to sit on the toilet. Noted to be tachycardic and have mildly low blood pressures. UA + for infection. CT Abdomen Pelvis with findings of significant mucosal thickening of the bladder, air/fluid levels in lumen of the bladder and air within the mucosa of the bladder, moderate bilateral hydronephrosis, perirectal and presacral stranding. Crum inserted by ER with return of 400 mls of pus and purulent yellow appearing urine followed by air and then return of bloody urine with clots. Today, patient denies pain. Crum draining light yellow urine. No hematuria. No new mucus present in drainage tubes. No fevers overnight.          Vitals:  /77   Pulse 95   Temp 97.6 °F (36.4 °C) (Oral)   Resp 20   Ht 5' 10\" (1.778 m)   Wt 151 lb 14.4 oz (68.9 kg)   SpO2 100%   BMI 21.79 kg/m²   Temp  Av °F (37.2 °C)  Min: 97.6 °F (36.4 °C)  Max: 99.8 °F (37.7 °C)    Intake/Output Summary (Last 24 hours) at 2022 0842  Last data filed at 2022 0300  Gross per 24 hour   Intake 2560.89 ml   Output 3400 ml   Net -839.11 ml       Social History     Socioeconomic History    Marital status:      Spouse name: Not on file    Number of children: Not on file    Years of education: Not on file    Highest education level: Not on file   Occupational History    Not on file   Tobacco Use    Smoking status: Former     Packs/day: 0.50     Years: 33.00     Pack years: 16.50     Types: Cigarettes Smokeless tobacco: Never   Vaping Use    Vaping Use: Never used   Substance and Sexual Activity    Alcohol use: Not Currently    Drug use: No    Sexual activity: Not on file   Other Topics Concern    Not on file   Social History Narrative    Not on file     Social Determinants of Health     Financial Resource Strain: Low Risk     Difficulty of Paying Living Expenses: Not hard at all   Food Insecurity: No Food Insecurity    Worried About [address] in the Last Year: Never true    [address] in the Last Year: Never true   Transportation Needs: No Transportation Needs    Lack of Transportation (Medical): No    Lack of Transportation (Non-Medical): No   Physical Activity: Not on file   Stress: Not on file   Social Connections: Not on file   Intimate Partner Violence: Not on file   Housing Stability: Not on file     Family History   Problem Relation Age of Onset    Diabetes Father     Stroke Father     Diabetes Brother     Diabetes Brother      Allergies   Allergen Reactions    Metformin And Related Nausea And Vomiting       Objective:    Constitutional: Alert and oriented times x3, no acute distress, and cooperative to examination with appropriate mood and affect. Cardiovascular:   Normal rate and regular rhythm. Pulmonary/Chest:  Normal respiratory rate and rhthym. No use of accessory muscles. Abdominal:          Soft. No tenderness. Active bowel sounds. Genitourinary: Bella catheter draining clear, light yellow urine. Mucus present in tubes (old). Neurological:    Alert and oriented.      Labs:  WBC:    Lab Results   Component Value Date/Time    WBC 6.1 12/18/2022 03:33 AM     Hemoglobin/Hematocrit:    Lab Results   Component Value Date/Time    HGB 9.3 12/18/2022 03:33 AM    HCT 29.1 12/18/2022 03:33 AM     BMP:    Lab Results   Component Value Date/Time     12/18/2022 03:33 AM    K 4.3 12/18/2022 03:33 AM    K 4.3 10/28/2022 10:00 PM     12/18/2022 03:33 AM    CO2 20 12/18/2022 03:33 AM    BUN 31 12/18/2022 03:33 AM    LABALBU 4.0 12/15/2022 01:30 AM    CREATININE 1.8 12/18/2022 03:33 AM    CALCIUM 8.6 12/18/2022 03:33 AM    GFRAA 58 05/24/2022 04:58 AM    LABGLOM 42 12/18/2022 03:33 AM         Burton Brink PA-C  12/18/22 8:42 AM  Urology

## 2022-12-18 NOTE — SIGNIFICANT EVENT
I received notification from the nurse that the patient started developing chest tightness (described as substernal pressure) at around 2 AM. It was also associated with hiccuping that started around the same time as the chest pain. A repeat EKG was ordered which showed worsening changes in V1 and V2 (with improvement in V3), as well as new T-wave inversions in V4. A repeat Troponin was ordered and is currently pending. Cardiology, who is already following this patient, was notified of the changes in the EKG and the new chest pain. I have also trialed 1 dose of IV protonix 40 mg to treat a potential GI cause of the patient's new symptoms (because of the associated hiccuping).     Elizabeth Costa, DO

## 2022-12-18 NOTE — TELEPHONE ENCOUNTER
Had cystoscopy with Dr. Zelalem Blum following findings of emphysematous cystitis and bilateral hydronephrosis on 12/13. Per his note, repeat cystoscopy in 6-8 weeks. Patient will need scheduled!

## 2022-12-18 NOTE — PLAN OF CARE
Problem: Discharge Planning  Goal: Discharge to home or other facility with appropriate resources  12/18/2022 0126 by Ana Donovan RN  Outcome: Progressing  Flowsheets (Taken 12/17/2022 1000 by Socorro Kaufman RN)  Discharge to home or other facility with appropriate resources:   Identify barriers to discharge with patient and caregiver   Arrange for needed discharge resources and transportation as appropriate   Arrange for interpreters to assist at discharge as needed  12/17/2022 1651 by Socorro Kaufman RN  Outcome: Progressing  Flowsheets (Taken 12/17/2022 1000)  Discharge to home or other facility with appropriate resources:   Identify barriers to discharge with patient and caregiver   Arrange for needed discharge resources and transportation as appropriate   Arrange for interpreters to assist at discharge as needed     Problem: ABCDS Injury Assessment  Goal: Absence of physical injury  12/18/2022 0126 by Ana Donovan RN  Outcome: Progressing  Flowsheets (Taken 12/18/2022 0126)  Absence of Physical Injury: Implement safety measures based on patient assessment  12/17/2022 1651 by Socorro Kaufman RN  Outcome: Progressing     Problem: Safety - Adult  Goal: Free from fall injury  12/18/2022 0126 by Ana Donovan RN  Outcome: Progressing  Flowsheets (Taken 12/18/2022 0126)  Free From Fall Injury: Instruct family/caregiver on patient safety  12/17/2022 1651 by Socorro Kaufman RN  Outcome: Progressing     Problem: Chronic Conditions and Co-morbidities  Goal: Patient's chronic conditions and co-morbidity symptoms are monitored and maintained or improved  12/18/2022 0126 by Ana Donovan RN  Outcome: Progressing  Flowsheets (Taken 12/17/2022 1000 by Socorro Kaufman RN)  Care Plan - Patient's Chronic Conditions and Co-Morbidity Symptoms are Monitored and Maintained or Improved:   Monitor and assess patient's chronic conditions and comorbid symptoms for stability, deterioration, or improvement   Collaborate with multidisciplinary team to address chronic and comorbid conditions and prevent exacerbation or deterioration   Update acute care plan with appropriate goals if chronic or comorbid symptoms are exacerbated and prevent overall improvement and discharge  12/17/2022 1651 by Socorro Kaufman RN  Outcome: Progressing  Flowsheets (Taken 12/17/2022 1000)  Care Plan - Patient's Chronic Conditions and Co-Morbidity Symptoms are Monitored and Maintained or Improved:   Monitor and assess patient's chronic conditions and comorbid symptoms for stability, deterioration, or improvement   Collaborate with multidisciplinary team to address chronic and comorbid conditions and prevent exacerbation or deterioration   Update acute care plan with appropriate goals if chronic or comorbid symptoms are exacerbated and prevent overall improvement and discharge     Problem: Pain  Goal: Verbalizes/displays adequate comfort level or baseline comfort level  12/18/2022 0126 by Ana Donovan RN  Outcome: Progressing  Flowsheets (Taken 12/15/2022 2246 by Alexandrea Devlin RN)  Verbalizes/displays adequate comfort level or baseline comfort level:   Encourage patient to monitor pain and request assistance   Assess pain using appropriate pain scale   Implement non-pharmacological measures as appropriate and evaluate response  12/17/2022 1651 by Socorro Kaufman RN  Outcome: Progressing     Problem: Skin/Tissue Integrity  Goal: Absence of new skin breakdown  Description: 1. Monitor for areas of redness and/or skin breakdown  2. Assess vascular access sites hourly  3. Every 4-6 hours minimum:  Change oxygen saturation probe site  4. Every 4-6 hours:  If on nasal continuous positive airway pressure, respiratory therapy assess nares and determine need for appliance change or resting period.   12/18/2022 0126 by Ana Donovan RN  Outcome: Progressing  Note: No new skin breakdown this shift  12/17/2022 1651 by Socorro Kaufman RN  Outcome: Progressing     Problem: Neurosensory - Adult  Goal: Achieves stable or improved neurological status  12/18/2022 0126 by Kitty Pace RN  Outcome: Progressing  Flowsheets (Taken 12/18/2022 0126)  Achieves stable or improved neurological status: Assess for and report changes in neurological status  12/17/2022 1651 by Samantha Story RN  Outcome: Progressing  Goal: Achieves maximal functionality and self care  12/18/2022 0126 by Kitty Pace RN  Outcome: Progressing  Flowsheets (Taken 12/18/2022 0126)  Achieves maximal functionality and self care: Monitor swallowing and airway patency with patient fatigue and changes in neurological status  12/17/2022 1651 by Samantha Story RN  Outcome: Progressing

## 2022-12-18 NOTE — PROGRESS NOTES
Cardiology Progress Note      Patient:  Jennifer Gaona  YOB: 1960  MRN: [id number]   Acct: [de-identified]  Admit Date:  12/15/2022  Primary Cardiologist:  Preeti Hendrix note:  \"CHIEF COMPLAINT: S/P mechanical fall with generalized weakness        HPI:   This is a 61-year-old male who presented to [address] [place] [address] ED on 12/15/2022 after a mechanical fall. Patient was sitting in the bathroom and when he tried to rise he felt generalized weakness in his lower extremities and had a fall. He denies loss of consciousness, seizure activity, or head trauma. Patient's wife attempted to help him get back up but was unable to do so and so she called EMS. Patient admits to generalized malaise since 12/14/2022. He states that he saw his outpatient providers on 04/01/6575 without complication. He denies chest pain, fever, chills, shortness of breath, cough with sputum production, nausea, vomiting, diarrhea, constipation, falls, loss of consciousness, seizure activity, rash/skin changes, hematochezia, melena, hemoptysis, hematemesis, hematuria, dysphagia, arm/leg swelling, headache, change in vision. Review of systems is positive for decreased appetite, abdominal cramping. He admits to having difficulty with urination after Bella catheter removal.     He admits to hospitalization in November 2022 for coronary artery bypass grafting without complication. He denies any changes in medication since discharge. He denies any additional surgical interventions since discharge. He admits to having a cystoscopy in the outpatient urology setting without complication. He denies recent illness, sick contacts, recent travel, new exposures, recent traumas. He admits to being a former smoker and admits to smoking again after his coronary artery bypass grafting. His wife has thrown out his cigarettes and he has not smoked in the last week. He denies any alcohol, marijuana, smokeless tobacco, recreational drug use.   He denies any additional acute symptoms or concerns. ED provider documentation reviewed. Patient was endorsing pain in his upper back at that time. EKG showed sinus tachycardia ventricular rate 121, QRS 74, QTc 443 with ST and T wave abnormality concerning for possible inferior ischemia. CT of the cervical spine and chest showed no new traumatic injury. CT of the abdomen and pelvis showed urinary bladder distention with significant mucosal thickening, air within the mucosa of the urinary bladder with inflammatory stranding and air-fluid levels in the lumen consistent with emphysematous cystitis. Bilateral hydronephrosis. Labs were significant for WBC 22.3, Hb 10.6, HCT 33.3. Glucose 275, creatinine 2.1, BUN 48. Initial troponin was elevated at 0.011. Urinalysis was consistent with a urinary tract infection. In the evening, patient endorsed right upper extremity weakness and slurred speech that also started the same day. Neurologic exam showed evidence of diminished strength and pronator drift in the right upper extremity. Code stroke was called. A CT noncontrast of the head did not show any evidence of acute bleed. MRI showed evidence of acute ischemic event in the left parietal region corona radiata with possible small additional acute ischemic event within the right lobe of the cerebellum. MRA of the head and neck showed mild disease bilaterally in the carotid bulbs. Repeat EKG on 12/15/2022 at 1636 showed normal sinus rhythm with ventricular rate 87, MO interval 148, QTc 438. There was nonspecific T wave abnormalities in the inferior leads with more prominent T wave inversions in the lateral leads. 11/01/2022 diagnostic cardiac catheterization showed the left main coronary artery had 10 to 20% stenosis in the distal segment. Ramus intermedius had 90-95% stenosis in the proximal segment. There was luminal irregularities in the left circumflex artery.   The ostium of the LAD had 90% stenosis. Proximal LAD had luminal irregularities. D1 had 99% subtotal occlusion. Proximal RCA had luminal irregularities. Mid RCA had 40-50% stenosis. 10/29/2022 echocardiogram showed left ventricular size was normal and systolic function was severely reduced. Ejection fraction was estimated at 25-30%. Severe global hypokinesis of the left ventricle. There was also evidence of a large pleural effusion at that time. Mild mitral regurgitation was present. Mild tricuspid regurgitation was also visualized. 11/07/2022 operative note reviewed. Coronary artery bypass grafting x2 with a left internal mammary artery grafted to the left anterior descending artery, a reversed greater saphenous aortocoronary vein graft to the ramus intermedius coronary artery. Endoscopic greater saphenous vein harvest.  Intraoperative coronary angiography of all distal anastomoses. Insertion of the left common femoral intra-aortic balloon pump. Patient was discharged with a regimen of aspirin 325 mg daily, atorvastatin 40 mg, empagliflozin 25 mg once daily, Trulicity once a week. Patient was told to stop taking lisinopril 20 mg. All labs, EKG's, diagnostic testing and images as well as cardiac cath, stress testing were reviewed during this encounter\"    Subjective (Events in last 24 hours):   Pt awake, alert. NAD. Not feeling the best. Had some chest pain, midsternal, along the incision line from cABG. Hurts worse with palpation, no pain elsewhere really. Only that 1 episode with SOB as well. No significant swelling. Did not notice the episode of NSVT this morning. Has not received his metoprolol upon admission d/t lower BP. Now stable. Reviewed all of the above with patient. Restart metoprolol. Monitor for worsening. Has lifevest when he goes home.      Objective:   /77   Pulse 95   Temp 97.6 °F (36.4 °C) (Oral)   Resp 20   Ht 5' 10\" (1.778 m)   Wt 151 lb 14.4 oz (68.9 kg)   SpO2 100%   BMI 21.79 kg/m²      vss  TELEMETRY: nsr, episode of NSVT 20 beats off metoprolol.      Physical Exam:  General Appearance: alert and oriented to person, place and time, in no acute distress  Cardiovascular: normal rate, regular rhythm, normal S1 and S2, no murmurs, rubs, clicks, or gallops, distal pulses intact, no carotid bruits, no JVD  Pulmonary/Chest: clear to auscultation bilaterally- no wheezes, rales or rhonchi, normal air movement, no respiratory distress, exquisitely tender to palpation over the sternal incision  Abdomen: soft, non-tender, non-distended, normal bowel sounds, no masses   Extremities: no cyanosis, clubbing or edema, pulse   Skin: warm and dry, no rash or erythema  Neurological: alert, oriented, normal speech, no focal findings or movement disorder noted    Medications:    tamsulosin  0.8 mg Oral Daily    sodium chloride flush  5-40 mL IntraVENous 2 times per day    enoxaparin  40 mg SubCUTAneous Daily    atorvastatin  40 mg Oral Daily    buPROPion  150 mg Oral QAM    insulin lispro  0-8 Units SubCUTAneous TID WC    insulin lispro  0-4 Units SubCUTAneous Nightly    pantoprazole  40 mg Oral QAM AC    meropenem  1,000 mg IntraVENous Q12H    clopidogrel  75 mg Oral Daily    aspirin  81 mg Oral Daily      sodium chloride      sodium chloride 75 mL/hr at 12/18/22 0923    dextrose       sodium chloride flush, 5-40 mL, PRN  sodium chloride, , PRN  ondansetron, 4 mg, Q8H PRN   Or  ondansetron, 4 mg, Q6H PRN  polyethylene glycol, 17 g, Daily PRN  acetaminophen, 650 mg, Q6H PRN   Or  acetaminophen, 650 mg, Q6H PRN  glucose, 4 tablet, PRN  dextrose bolus, 125 mL, PRN   Or  dextrose bolus, 250 mL, PRN  glucagon (rDNA), 1 mg, PRN  dextrose, , Continuous PRN        Diagnostics:  EKG:   Normal sinus rhythm  Possible Lateral infarct (cited on or before 28-OCT-2022)  Abnormal ECG  When compared with ECG of 16-DEC-2022 09:49,  Questionable change in initial forces of Anterior leads  Confirmed by Nyla Byrd ([id number]) on 12/18/2022  Echo:     Stress:     Left Heart Cath:     Lab Data:    Cardiac Enzymes:  No results for input(s): CKTOTAL, CKMB, CKMBINDEX, TROPONINI in the last 72 hours. CBC:   Lab Results   Component Value Date/Time    WBC 6.1 12/18/2022 03:33 AM    RBC 3.39 12/18/2022 03:33 AM    HGB 9.3 12/18/2022 03:33 AM    HCT 29.1 12/18/2022 03:33 AM     12/18/2022 03:33 AM       CMP:    Lab Results   Component Value Date/Time     12/18/2022 03:33 AM    K 4.3 12/18/2022 03:33 AM    K 4.3 10/28/2022 10:00 PM     12/18/2022 03:33 AM    CO2 20 12/18/2022 03:33 AM    BUN 31 12/18/2022 03:33 AM    CREATININE 1.8 12/18/2022 03:33 AM    GFRAA 58 05/24/2022 04:58 AM    LABGLOM 42 12/18/2022 03:33 AM    GLUCOSE 148 12/18/2022 03:33 AM    CALCIUM 8.6 12/18/2022 03:33 AM       Hepatic Function Panel:    Lab Results   Component Value Date/Time    ALKPHOS 96 12/15/2022 01:30 AM    ALT 10 12/15/2022 01:30 AM    AST 15 12/15/2022 01:30 AM    PROT 6.7 12/15/2022 01:30 AM    BILITOT 0.8 12/15/2022 01:30 AM    LABALBU 4.0 12/15/2022 01:30 AM       Magnesium:    Lab Results   Component Value Date/Time    MG 1.8 12/18/2022 03:33 AM       PT/INR:    Lab Results   Component Value Date/Time    INR 1.23 11/08/2022 04:30 AM       HgBA1c:    Lab Results   Component Value Date/Time    LABA1C 7.6 12/15/2022 01:30 AM       FLP:    Lab Results   Component Value Date/Time    TRIG 62 12/16/2022 05:09 AM    HDL 45 12/16/2022 05:09 AM    LDLCALC 42 12/16/2022 05:09 AM       TSH:    Lab Results   Component Value Date/Time    TSH 1.020 10/29/2022 03:30 AM         Assessment:  Emphysematous cystitis  CAD s/p CABG 11/07/22  Acute CVA left parietal   Elevated trop-  HTN  HLD  Chronic systolic CHF  EF 65-74%  Chest pain-incisional   NSVT--restart metoprolol 25 mg     Plan:  Neurology recommending [facility] and can stop DAPT if initiating. Would recommend eliquis 5 mg BID.  Patient did have CABG, no stents so no absolute need for DAPT in setting of [address] [place] [address].     Continue statin. Restart metoprolol 25 mg daily. Chest pain appears to be incisional pain. Patient is quite tender to any pressure at the incision site. Has no other chest pain focuses. Sob related to the 1 episode of chest pain so may just be pain related as well. Would continue to watch the monitor for further NSVT episodes. May need to consider increasing bb or adding amiodarone. Does have lifevest for discharge, previously order and has been wearing outside the hospital.     Cardiology will monitor symptoms and heart rate.       Electronically signed by Liza Patel PA-C on 12/18/2022 at 10:14 AM

## 2022-12-19 ENCOUNTER — APPOINTMENT (OUTPATIENT)
Dept: ULTRASOUND IMAGING | Age: 62
End: 2022-12-19
Payer: COMMERCIAL

## 2022-12-19 LAB
ANION GAP SERPL CALCULATED.3IONS-SCNC: 13 MEQ/L (ref 8–16)
BUN BLDV-MCNC: 29 MG/DL (ref 7–22)
CALCIUM SERPL-MCNC: 8.3 MG/DL (ref 8.5–10.5)
CHLORIDE BLD-SCNC: 98 MEQ/L (ref 98–111)
CO2: 21 MEQ/L (ref 23–33)
CREAT SERPL-MCNC: 1.3 MG/DL (ref 0.4–1.2)
ERYTHROCYTE [DISTWIDTH] IN BLOOD BY AUTOMATED COUNT: 13.6 % (ref 11.5–14.5)
ERYTHROCYTE [DISTWIDTH] IN BLOOD BY AUTOMATED COUNT: 42.9 FL (ref 35–45)
GFR SERPL CREATININE-BSD FRML MDRD: > 60 ML/MIN/1.73M2
GLUCOSE BLD-MCNC: 174 MG/DL (ref 70–108)
GLUCOSE BLD-MCNC: 179 MG/DL (ref 70–108)
GLUCOSE BLD-MCNC: 197 MG/DL (ref 70–108)
GLUCOSE BLD-MCNC: 229 MG/DL (ref 70–108)
GLUCOSE BLD-MCNC: 230 MG/DL (ref 70–108)
HCT VFR BLD CALC: 31.3 % (ref 42–52)
HEMOGLOBIN: 10.2 GM/DL (ref 14–18)
MCH RBC QN AUTO: 27.9 PG (ref 26–33)
MCHC RBC AUTO-ENTMCNC: 32.6 GM/DL (ref 32.2–35.5)
MCV RBC AUTO: 85.8 FL (ref 80–94)
PLATELET # BLD: 260 THOU/MM3 (ref 130–400)
PMV BLD AUTO: 10.5 FL (ref 9.4–12.4)
POTASSIUM SERPL-SCNC: 4.4 MEQ/L (ref 3.5–5.2)
RBC # BLD: 3.65 MILL/MM3 (ref 4.7–6.1)
SODIUM BLD-SCNC: 132 MEQ/L (ref 135–145)
WBC # BLD: 7.4 THOU/MM3 (ref 4.8–10.8)

## 2022-12-19 PROCEDURE — 99232 SBSQ HOSP IP/OBS MODERATE 35: CPT | Performed by: STUDENT IN AN ORGANIZED HEALTH CARE EDUCATION/TRAINING PROGRAM

## 2022-12-19 PROCEDURE — 2060000000 HC ICU INTERMEDIATE R&B

## 2022-12-19 PROCEDURE — 36415 COLL VENOUS BLD VENIPUNCTURE: CPT

## 2022-12-19 PROCEDURE — 6370000000 HC RX 637 (ALT 250 FOR IP)

## 2022-12-19 PROCEDURE — 6370000000 HC RX 637 (ALT 250 FOR IP): Performed by: STUDENT IN AN ORGANIZED HEALTH CARE EDUCATION/TRAINING PROGRAM

## 2022-12-19 PROCEDURE — 85027 COMPLETE CBC AUTOMATED: CPT

## 2022-12-19 PROCEDURE — 97535 SELF CARE MNGMENT TRAINING: CPT

## 2022-12-19 PROCEDURE — 82948 REAGENT STRIP/BLOOD GLUCOSE: CPT

## 2022-12-19 PROCEDURE — 97530 THERAPEUTIC ACTIVITIES: CPT

## 2022-12-19 PROCEDURE — 97112 NEUROMUSCULAR REEDUCATION: CPT

## 2022-12-19 PROCEDURE — 97116 GAIT TRAINING THERAPY: CPT

## 2022-12-19 PROCEDURE — 80048 BASIC METABOLIC PNL TOTAL CA: CPT

## 2022-12-19 PROCEDURE — 6360000002 HC RX W HCPCS: Performed by: STUDENT IN AN ORGANIZED HEALTH CARE EDUCATION/TRAINING PROGRAM

## 2022-12-19 PROCEDURE — 76775 US EXAM ABDO BACK WALL LIM: CPT

## 2022-12-19 PROCEDURE — 2580000003 HC RX 258: Performed by: STUDENT IN AN ORGANIZED HEALTH CARE EDUCATION/TRAINING PROGRAM

## 2022-12-19 PROCEDURE — B24BZZ4 ULTRASONOGRAPHY OF HEART WITH AORTA, TRANSESOPHAGEAL: ICD-10-PCS | Performed by: INTERNAL MEDICINE

## 2022-12-19 PROCEDURE — APPNB30 APP NON BILLABLE TIME 0-30 MINS: Performed by: UROLOGY

## 2022-12-19 RX ORDER — BACLOFEN 10 MG/1
10 TABLET ORAL 3 TIMES DAILY
Status: DISCONTINUED | OUTPATIENT
Start: 2022-12-19 | End: 2022-12-20

## 2022-12-19 RX ADMIN — BUPROPION HYDROCHLORIDE 150 MG: 150 TABLET, FILM COATED, EXTENDED RELEASE ORAL at 08:02

## 2022-12-19 RX ADMIN — ACETAMINOPHEN 650 MG: 325 TABLET ORAL at 06:45

## 2022-12-19 RX ADMIN — APIXABAN 5 MG: 5 TABLET, FILM COATED ORAL at 20:59

## 2022-12-19 RX ADMIN — BACLOFEN 10 MG: 10 TABLET ORAL at 14:41

## 2022-12-19 RX ADMIN — ATORVASTATIN CALCIUM 40 MG: 40 TABLET, FILM COATED ORAL at 20:59

## 2022-12-19 RX ADMIN — INSULIN LISPRO 2 UNITS: 100 INJECTION, SOLUTION INTRAVENOUS; SUBCUTANEOUS at 12:17

## 2022-12-19 RX ADMIN — SODIUM CHLORIDE, PRESERVATIVE FREE 10 ML: 5 INJECTION INTRAVENOUS at 08:03

## 2022-12-19 RX ADMIN — SODIUM CHLORIDE, PRESERVATIVE FREE 10 ML: 5 INJECTION INTRAVENOUS at 20:59

## 2022-12-19 RX ADMIN — APIXABAN 5 MG: 5 TABLET, FILM COATED ORAL at 08:02

## 2022-12-19 RX ADMIN — TAMSULOSIN HYDROCHLORIDE 0.8 MG: 0.4 CAPSULE ORAL at 08:02

## 2022-12-19 RX ADMIN — PANTOPRAZOLE SODIUM 40 MG: 40 TABLET, DELAYED RELEASE ORAL at 06:43

## 2022-12-19 RX ADMIN — BACLOFEN 10 MG: 10 TABLET ORAL at 10:50

## 2022-12-19 RX ADMIN — MEROPENEM 1000 MG: 1 INJECTION, POWDER, FOR SOLUTION INTRAVENOUS at 12:13

## 2022-12-19 RX ADMIN — BACLOFEN 10 MG: 10 TABLET ORAL at 20:59

## 2022-12-19 ASSESSMENT — PAIN DESCRIPTION - DESCRIPTORS: DESCRIPTORS: DISCOMFORT

## 2022-12-19 ASSESSMENT — PAIN SCALES - GENERAL
PAINLEVEL_OUTOF10: 0
PAINLEVEL_OUTOF10: 0
PAINLEVEL_OUTOF10: 3
PAINLEVEL_OUTOF10: 0

## 2022-12-19 NOTE — DISCHARGE INSTRUCTIONS
Please document Modified [place] Score on the Barthel Index Scale flow sheet before discharge. 2 - Slight disability:  unable to carry out all previous activities, but able to look after own affairs without assistance.

## 2022-12-19 NOTE — PROGRESS NOTES
Discharge with crum  Hydronephrosis improved  CRT near new baseline  Continue Flomax 0.8mg  UTI tx per primary  Urology planning repeat cystoscopy 1/24/22    Discussed with resident, Urology signing off, please call with questions

## 2022-12-19 NOTE — PROGRESS NOTES
Progress note: Infectious diseases    Patient - Jennifer Gaona,  Age - 63 y.o.    - 1960      Room Number - 4K-13/013-A   MRN -  [id number]   Acct # - [de-identified]  Date of Admission -  12/15/2022  1:08 AM    SUBJECTIVE:   He is feeling good, denies any abdominal pain. OBJECTIVE   VITALS    height is 5' 10\" (1.778 m) and weight is 145 lb 4.5 oz (65.9 kg). His oral temperature is 98.3 °F (36.8 °C). His blood pressure is 145/81 (abnormal) and his pulse is 87. His respiration is 14 and oxygen saturation is 100%. Wt Readings from Last 3 Encounters:   22 145 lb 4.5 oz (65.9 kg)   22 142 lb 9.6 oz (64.7 kg)   22 142 lb (64.4 kg)       I/O (24 Hours)    Intake/Output Summary (Last 24 hours) at 2022 1401  Last data filed at 2022 0802  Gross per 24 hour   Intake 1951.9 ml   Output 3450 ml   Net -1498.1 ml       General Appearance  Awake, alert, oriented,  not  In acute distress  HEENT - normocephalic, atraumatic, pink conjunctiva,  anicteric sclera  Neck - Supple, no mass.   Lungs -  Bilateral good air entry, no rhonchi, no wheeze  Cardiovascular - Heart sounds are normal.     Abdomen - soft, not distended, nontender,   Neurologic -oriented  Skin - No bruising or bleeding  Extremities - No edema, no cyanosis, clubbing   Bella in place  MEDICATIONS:      baclofen  10 mg Oral TID    metoprolol succinate  25 mg Oral Daily    apixaban  5 mg Oral BID    tamsulosin  0.8 mg Oral Daily    sodium chloride flush  5-40 mL IntraVENous 2 times per day    atorvastatin  40 mg Oral Daily    buPROPion  150 mg Oral QAM    insulin lispro  0-8 Units SubCUTAneous TID WC    insulin lispro  0-4 Units SubCUTAneous Nightly    pantoprazole  40 mg Oral QAM AC    meropenem  1,000 mg IntraVENous Q12H      sodium chloride      [Held by provider] sodium chloride Stopped (22 1622)    dextrose       oxyCODONE, sodium chloride flush, sodium chloride, ondansetron **OR** ondansetron, polyethylene glycol, acetaminophen **OR** acetaminophen, glucose, dextrose bolus **OR** dextrose bolus, glucagon (rDNA), dextrose      LABS:     CBC:   Recent Labs     12/17/22  0323 12/18/22  0333 12/19/22  0459   WBC 8.8 6.1 7.4   HGB 9.1* 9.3* 10.2*    238 260     BMP:    Recent Labs     12/17/22  0323 12/18/22  0333 12/19/22  0459   * 133* 132*   K 4.1 4.3 4.4    102 98   CO2 20* 20* 21*   BUN 36* 31* 29*   CREATININE 1.8* 1.8* 1.3*   GLUCOSE 191* 148* 179*     Calcium:  Recent Labs     12/19/22  0459   CALCIUM 8.3*     Ionized Calcium:No results for input(s): IONCA in the last 72 hours. Magnesium:  Recent Labs     12/18/22  0333   MG 1.8     Phosphorus:  Recent Labs     12/18/22  0333   PHOS 2.9     BNP:No results for input(s): BNP in the last 72 hours. Glucose:  Recent Labs     12/18/22  2047 12/19/22  0743 12/19/22  1148   POCGLU 178* 174* 229*         Problem list of patient:     Patient Active Problem List   Diagnosis Code    Syncope R55    Facial injury S09. 93XA    Tobacco abuse Z72.0    Cranial facial fractures ([facility] [address] S02. 91XA, S02. 92XA    Diabetes mellitus type 2 in nonobese (HCC) E11.9    Fungal toenail infection B35.1    Golfer's elbow M77.00    Medical non-compliance Z91.199    Erectile dysfunction N52.9    NAVARRO (generalized anxiety disorder) F41.1    Impacted cerumen of right ear H61.21    Essential hypertension I10    Uncontrolled type 2 diabetes mellitus with hyperglycemia (HCC) E11.65    Thoracic arthritis M47.814    New onset of congestive heart failure (HCC) M62.8    Acute systolic congestive heart failure (HCC) I50.21    Nonischemic cardiomyopathy (HCC) I42.8    Stage 3a chronic kidney disease (HCC) N18.31    Tremor R25.1    LC (acute kidney injury) ([address] N17.9    Hyperlipidemia E78.5    Gastroesophageal reflux disease K21.9    S/P cardiac cath Z98.890    CAD, multiple vessel I25.10    Ischemic cardiomyopathy I25.5    ETOH abuse F10.10    S/P CABG x 2 Z95.1    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    Acute pyelonephritis N10    Bacteremia due to Enterobacter species R78.81, B96.89    Acute CVA (cerebrovascular accident) ([facility] [address] I63.9    Urinary retention R33.9         ASSESSMENT/PLAN     UTI(emphysematous): will transition to oral antibiotic on discharge  Hx of CVA  Deconditioning  Continue current treatment.     Panda Yan MD, MD, FACP 12/19/2022 2:01 PM

## 2022-12-19 NOTE — PROGRESS NOTES
Assessment: This was a spiritual care encounter as a part of rounds. Patient, [address], was oriented and alert. Patient appeared calm and shared extensively about his life and his beliefs. Interventions:   provided active listening and facilitated storytelling and theological reflection with patient.  and provided information regarding  services and availability. Outcomes:  Patient debriefed his health concerns and reflected on his spirituality and how it impacts his current state. Patient's spirituality is based in a variety of different religions with themes of justice, protection, and acceptance as main pillars of his beliefs. Patient shared stories from his life and requested follow-up visits. Plan:  Spiritual care team will remain available to support patient, PRN. [address], [place]. [address] [place] [address] Kenny Valdes, [address]  [phone]

## 2022-12-19 NOTE — PROGRESS NOTES
[address] [place] [address]  INPATIENT PHYSICAL THERAPY  DAILY NOTE  [facility] ICU STEPDOWN TELEMETRY 4K - 4K-13/013-A    Time In: 9548  Time Out: 3156  Timed Code Treatment Minutes: 29 Minutes  Minutes: 29          Date: 2022  Patient Name: Isacc Key,  Gender:  male        MRN: [id number]  : 1960  (63 y.o.)     Referring Practitioner: Dr. Salma Pat  Diagnosis: emphysematous cystitis  Additional Pertinent Hx: Isacc Key who is a 63 y.o. male with a history of hypertension, diabetes, CAD on  daily, recent CABG in 2022, ischemic cardiomyopathy is admitted to [facility] for sepsis, emphysematous cystitis and acute kidney injury on CKD. During admission exam patient stated that his right arm feldman has been weak and that he has had some slurred speech. Stroke alert was called for right-sided weakness and dysarthria on 12/15. On arrival patient's NIH was 4 for right upper extremity, right lower extremity weakness, 1 limb ataxia and dysarthria. On further questioning, patient and wife state that the right arm weakness actually started last night. Patient's wife noticed when patient was trying to get up from the toilet that he was unable to push himself up with his right arm. Last known well 2330 on 2022. Patient taken to imaging for stat CT head which revealed no ICH, midline shift, mass-effect. CTA head and neck deferred due to patient's LC. Decision for no tPA was made due to patient's time since last known well. Patient with relatively low NIH and symptoms which are not consistent with LVO, therefore no thrombectomy/neuro intervention at this time. Patient had stat MRI brain and MRA head and neck without contrast.  MRI showed acute ischemic stroke of left parietal region.      Prior Level of Function:  Lives With: Spouse  Type of Home: House  Home Layout: One level  Home Access: Stairs to enter without rails, Stairs to enter with rails  Entrance Stairs - Number of Steps: 3 AMOR  Entrance Stairs - Rails: Both  Home Equipment: Saranya Mowers, rolling   Bathroom Shower/Tub: Tub/Shower unit  Bathroom Toilet: Standard  Bathroom Equipment: Tub transfer bench    Receives Help From: Family  ADL Assistance: Independent  Homemaking Assistance: Independent  Ambulation Assistance: Independent  Transfer Assistance: Independent  Active : No    Restrictions/Precautions:  Restrictions/Precautions: Up as Tolerated, Fall Risk  Position Activity Restriction  Other position/activity restrictions: right side hemiparesis, recent CABG 11/22     SUBJECTIVE: RN approved session. Patient in bed upon arrival and agreeable to therapy. PAIN: Not Rated    Vitals: Vitals not assessed per clinical judgement, see nursing flowsheet    OBJECTIVE:  Bed Mobility:  Supine to Sit: Contact Guard Assistance, Minimal Assistance, X 1, with head of bed raised, with rail, with verbal cues , with increased time for completion  Scooting: Stand By Assistance, with verbal cues , with increased time for completion    Transfers:  Sit to Stand: Contact Guard Assistance, Minimal Assistance, with increased time for completion, cues for hand placement, with verbal cues, x3 trials; x1 EOB, x 2 from chair  Stand to [place] [address]    Ambulation:  [address], with verbal cues , with increased time for completion  Distance: 20 ft  Surface: Level Tile  Device:Rolling Walker  Gait Deviations:   Forward Flexed Posture, Slow Jada, Decreased Step Length Bilaterally, Decreased Heel Strike Bilaterally, and Mild Path Deviations  -pt needed cues for foot placement of right and cues to keep right LE inside walker especially when turning    Balance:  Dynamic Sitting Balance: Stand By Assistance, While performing seated exercises without back support  Static Standing Balance: Contact Guard Assistance, Minimal Assistance, with verbal cues   -x30 sec no UE support shoulder width RODNEY  *Posterior lean Tandem Stance: x30 sec with each LE in front and increased width between LE  -No UE support and increased assistance needed with this task    Exercise:  Patient was guided in 1 set(s) 10 reps of exercise to both lower extremities. Seated marches and Long arc quads. Exercises were completed for increased independence with functional mobility. Functional Outcome Measures: Completed  AM-PAC Inpatient Mobility Raw Score : 17  AM-PAC Inpatient T-Scale Score : 42.13    ASSESSMENT:  Assessment: Patient progressing toward established goals. Activity Tolerance:  Patient tolerance of  treatment: good. Equipment Recommendations: Other: cont to assess needs  Discharge Recommendations: Inpatient Rehabilitation and Patient would benefit from continued PT at discharge  Plan: Specific Instructions for Next Treatment: therex and mobility, pregait and balance activities  General Plan:  (6X N)  Specific Instructions for Next Treatment: therex and mobility, pregait and balance activities    Patient Education  Patient Education: Plan of Care, Bed Mobility, Transfers, Gait, Up in Chair for All Meals, Verbal Exercise Instruction    Goals:  Patient Goals : do aggressive rehab to get independence back  Short Term Goals  Time Frame for Short Term Goals: by discharge  Short Term Goal 1: bed mobility with SBA to get in/out of bed  Short Term Goal 2: transfer with SBA to get in/out of chairs  Short Term Goal 3: amb >10'x1 with LRAD and CGA to walk safely to bathroom  Long Term Goals  Time Frame for Long Term Goals : no LTGs set secondary to short ELOS    Following session, patient left in safe position with all fall risk precautions in place.

## 2022-12-19 NOTE — CARE COORDINATION
Collaborative Discharge Planning    Destini Talavera  :  1960  MRN:  [id number]    ADMIT DATE:  12/15/2022      Discharge Planning Discharge Planning  Type of Residence: House  Living Arrangements: Spouse/Significant Other  Support Systems: Spouse/Significant Other  Current Services Prior To Admission: None  Potential Assistance Needed: N/A  Potential DME Needed: Eleonore Fail  DME Ordered?: No  Potential Assistance Purchasing Medications: No  Type of Home Care Services: None  Patient expects to be discharged to[de-identified] House  Follow Up Appointment: Best Day/Time :  (PM)  One/Two Story Residence: One story  White Board Notes /Social Work Whiteboard Notes  /Social Work Whiteboard: ; SW - From home with spouse. Await PT/OT recs and clinical course.     Discharge Plan Rehab  lives w spouse Steve Ross; prefers new IPR (precert) w new fole; prefers new RW; therapy following; monitor Physiatry consult recommendations, has [facility] PTA  Discharge Milestones and Delays: Clinical status  Emphysematous Cystitis/Fall/CVA/Bacteremia  History: recent CABG 11/7  12/15 Code Stroke/Acute CVA  Blood ID  Enterobacter Cloacae, Klebsiella Pneumoniae     ID Plans PO AB at discharge    SIGNED:  Lacy Floyd RN   2022, 12:53 PM

## 2022-12-19 NOTE — PROGRESS NOTES
Internal Medicine Resident Progress Note    Patient:  Maria Guadalupe Rush    YOB: 1960  Unit/Bed:4K-13/013-A  MRN: [id number]    Acct: [de-identified]   PCP: BLACK Dixon CNP    Date of Admission: 12/15/2022      Assessment/Plan:  Acute ischemic stroke, code stroke called [id number] for right sided weakness and dysarthria. Initial NIHSS 4. Last known well uncertain. CT Head unremarkable for any acute events, however MRI showed acute ischemic stroke in the left parietal corona radiata region right cerebellar hemisphere, concerning for cardioembolic stroke. Ongoing right-sided deficits, mildly improved from yesterday. Neurology following  Maintain <130/80  Discontinue ASA + plavix  Start eliquis 5mg BID  Continue telemetry  NIHSS every shift  Cardiology consulted  Restart metoprolol 25mg daily  Continue statin  JOY results pending  Speech following  Recommend easy to chew with thin liquids  Cog eval to follow  PT/OT/speech eval & treat  Consult IPR Monday  Sepsis, secondary to klebsiella pneumoniae/enterobacter cloacae bacteremia. On admit met SIRS 2/4 (HR >90, WBC 22.3) with purulent UA and imaging showing emphysematous cystitis. Received 2L IVFs and levofloxacin in the ED. LA 1.3 [id number]. Blood cultures positive x2 for gram negative bacilli. Preliminary urine culture results show gram negative bacilli. Initially started on meropenem and vancomycin. Biofire positive for klebsiella pneumoniae and enterobacter cloacae complex, vancomycin discontinued. Blood culture sensitivities show organisms are both sensitive to cefepime and ciprofloxacin. UA positive for klebsiella pneumoniae. ID following  Continue meropenem, will likely require prolonged course of antibiotics  Consider deescalation to cefepime pending ID recommendations. Hold IVFs if PO intake adequate, reassess frequently  Chest pain, s/p CAD s/p CABG x 2, CABG performed [id number].  Reported episode of significant chest pain and dyspnea early AM of [id number]. Reproducible on palpation, troponins negative. Cardiology following, chest pain likely incisional  Restarted metoprolol and continue statin as above. Consider increasing BB or adding amiodarone if he has further episodes of   Continue to monitor for NSVT  [facility] at discharge, already has at home. Bacteremia, secondary to emphysematous cystitis. Treatment as above. Emphysematous cystitis, secondary to chronic indwelling Crum. On CT Abd/Pelvis at admit. S/p urinary retention with chronic crum after CABG in November. UA growing gram negative bacilli. Treatment as above. Bilateral hydronephrosis, with bilateral perinephric stranding, secondary to urine retention. Noted lateral lobe hypertrophy and moderately obstructive prostate on recent cystoscopy. US renal ordered  Urology following  Repeat cystoscopy in 6-8 weeks after discharge  Continue Crum while inpatient  Voiding trial(s) prior to dc  Continue flomax  Urine retention, s/p CABG [id number] in setting of moderate BPH on recent cystoscopy. Failed multiple void trials during most recent hospitalization and again after Crum removal on [id number]. Subsequently replaced, currently productive of yellow urine with pale sediment. Flomax initially held d/t hypotension, restarted [id number]. Urology following. Continue Crum  Continue flomax if tolerated  LC, BUN 36/Cr 1.7 on admit. BUN/Cr stable. Continue gentle fluids as needed with careful attention to fluid status. Hyponatremia, Na 137 on admit, trending down and 130 [id number]. Continue to monitor with daily BMP and evening sodium  Gentle IVFs as tolerated  HFrEF, secondary to ischemic cardiomyopathy. LVEF 25-30% per echo [id number]. Presented with Zakiya Gallegos with heart failure clinic. Dry and hypotensive on initial presentation. Metoprolol held d/t hypotension  Cardiology consulted  Strict I/Os  Daily weights  Continue to hold metoprolol.   CAD s/p CABG x 2, CABG performed [id number]. Follows with Dr. Cindy Kurtz. Troponin elevation 0.011 and EKG changes noted [id number]. Metoprolol held d/t hypotension. Continue treatment as above. NIDDM2, poorly controlled. HbA1c 7.6 [id number]. On trulicity, amaryl, and jardiance outpatient. Blood glucose 275 on admit. Holding home meds. Inpatient blood glucose goal 140-180, currently maintaining. MD SSI in place, but has not required insulin during this admit  Hypoglycemic protocol in place  HTN, essential. Hypotensive on admit, remains hypotensive. Holding home metoprolol. Continue to closely monitor BP. Expected discharge date:  TBD    Disposition: TBD  [] Home  [] TCU  [] Rehab  [] Psych  [] SNF  [] [place]  [] Other-    ===================================================================      Chief Complaint: fall/weakness    Hospital Course:   Per original HPI:  \"63 y.o. male who presented to [address] [place] [address] for evaluation of fall. PMHx significant for ischemic cardiomyopathy (echo EF 25-30%), CAD s/p CABG x2 (11/2022), primary hypertension, NIDDM type II, urinary retention. Patient presents for evaluation of a fall. Patient fell after getting up rom the toilet in the bathroom. He felt dizzy and lightheaded. He did not lose consciousness or hit his head. He was on the ground for approximately 20 minutes. His wife called EMS and patient was brought to the hospital.      History provided by patient and wife at bedside. Patient had CABG X2 on 11/7/22 at [facility] and developed urinary retention post CABG needing insertion of a Bella. Patient was previously hospitalized 10/28/22 - 11/15/22 and underwent CABG x2 on 11/7/22. For the past week patient has been getting weaker and weaker, experiencing fatigue. He went to Dr. Sheryl Moran on 12/13/22 for cystoscopy and removal of Bella. Since removal of Bella patient has not been able to urinate well. He has not been drinking and eating much in the last week.   States that he feels \" tired\". He denies chest pain or shortness of breath. Initial vital signs reveal temp 98.4, respiratory rate 18, heart rate 112, blood pressure 95/68, SPO2 100 on room air. Lab work significant for sodium 132, chloride 97, bicarb 20, BUN 48, creatinine 2.1, blood glucose 275, WBC 22.3, hemoglobin 10.6. Urinalysis revealed positive for nitrite, small leukocyte esterase. CT chest reveals chronic rib fractures but no acute. CT cervical spine reveals no fractures. CT thorax reveals no acute fracture of the thoracic spine. CT abdomen pelvis reveals emphysematous cystitis, moderate bilateral hydronephrosis with mild bilateral perinephric stranding. ED treatment, patient was given Norco, 2 L of IV fluid, levofloxacin. \"      [id number] - experienced right-sided weakness and dysarthria with uncertain last known well time. Code stroke called, initial CT without acute changes, but MRI showed acute infarct in the left parietal region/corona radiata and possible smaller acute stroke in right cerebellar hemisphere. Concern for cardioembolic etiology. Subjective (past 24 hours): Improved right-sided upper and lower extremity strength. Describes episode of SOB and chest pain earlier this morning, stating that the pain was worse with any movement or palpation of his chest. Denies headache, dizziness, n/v, chest pain, dyspnea, abdominal pain, changes in bowels/bladder, skin changes, or edema. ROS: reviewed complete ROS unchanged unless otherwise stated in hospital course/subjective portion.        Medications:  Reviewed    Infusion Medications    sodium chloride      [Held by provider] sodium chloride 75 mL/hr at 12/18/22 0923    dextrose       Scheduled Medications    metoprolol succinate  25 mg Oral Daily    tamsulosin  0.8 mg Oral Daily    sodium chloride flush  5-40 mL IntraVENous 2 times per day    enoxaparin  40 mg SubCUTAneous Daily    atorvastatin  40 mg Oral Daily    buPROPion  150 mg Oral QAM insulin lispro  0-8 Units SubCUTAneous TID WC    insulin lispro  0-4 Units SubCUTAneous Nightly    pantoprazole  40 mg Oral QAM AC    meropenem  1,000 mg IntraVENous Q12H    clopidogrel  75 mg Oral Daily    aspirin  81 mg Oral Daily     PRN Meds: oxyCODONE, sodium chloride flush, sodium chloride, ondansetron **OR** ondansetron, polyethylene glycol, acetaminophen **OR** acetaminophen, glucose, dextrose bolus **OR** dextrose bolus, glucagon (rDNA), dextrose        Intake/Output Summary (Last 24 hours) at 12/18/2022 1914  Last data filed at 12/18/2022 1502  Gross per 24 hour   Intake 550 ml   Output 3650 ml   Net -3100 ml       Exam:  BP (!) 96/57   Pulse 87   Temp 97.7 °F (36.5 °C) (Oral)   Resp 18   Ht 5' 10\" (1.778 m)   Wt 151 lb 14.4 oz (68.9 kg)   SpO2 100%   BMI 21.79 kg/m²     General: elderly  male, resting in bed on arrival, alert, cooperative  Eyes:  Nonicteric, no conjunctivitis, EOMs intact. HENT: Normocephalic, atraumatic. Nares normal. Oral mucosa moist.  Hearing grossly intact. Neck: Supple, with full range of motion. No gross JVD appreciated. Respiratory:  Normal effort. Clear to auscultation, without rales or wheezes or rhonchi. Cardiovascular: RRR, good S1/S2, rubs, gallops, or murmurs. No lower extremity edema. Abdomen: Soft, non-distended. Ongoing suprapubic tenderness to palpation. Bowel sounds present. Musculoskeletal: No joint swelling or tenderness. Normal tone. No abnormal movements. Skin: Warm and dry. No rashes or lesions. Neurologic:  Dysarthria, right sided facial droop, ongoing right-sided upper and lower extremity weakness more significant in the upper extremity. Psychiatric: Alert and oriented, normal insight and thought content. Capillary Refill: Brisk,< 3 seconds. Peripheral Pulses: +2 palpable, equal bilaterally.          Labs:   Recent Labs     12/16/22  0509 12/17/22  0323 12/18/22  0333   WBC 14.9* 8.8 6.1   HGB 9.3* 9.1* 9.3*   HCT 30.3* 28.1* 29.1*    238 238     Recent Labs     12/16/22  0509 12/16/22  1940 12/17/22  0323 12/18/22  0333   * 136 134* 133*   K 4.1  --  4.1 4.3     --  104 102   CO2 17*  --  20* 20*   BUN 40*  --  36* 31*   CREATININE 1.7*  --  1.8* 1.8*   CALCIUM 8.5  --  8.5 8.6   PHOS  --   --   --  2.9     No results for input(s): AST, ALT, BILIDIR, BILITOT, ALKPHOS in the last 72 hours. No results for input(s): INR in the last 72 hours. No results for input(s): [place] in the last 72 hours. No results for input(s): PROCAL in the last 72 hours. Lab Results   Component Value Date/Time    NITRU POSITIVE 12/15/2022 05:19 AM    WBCUA > 200 12/15/2022 05:19 AM    BACTERIA MANY 12/15/2022 05:19 AM    RBCUA > 200 12/15/2022 05:19 AM    BLOODU MODERATE 12/15/2022 05:19 AM    SPECGRAV 1.007 11/06/2022 09:55 AM    GLUCOSEU >= 1000 12/15/2022 05:19 AM       Radiology (48 hours):  CT ABDOMEN PELVIS WO CONTRAST Additional Contrast? None    Result Date: 12/15/2022  1. The urinary bladder is dilated with significant mucosal thickening. Air within the mucosa of the urinary bladder. Inflammatory stranding surrounds the urinary bladder. An air-fluid level within the lumen of the bladder. 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 Recommend correlation with urinalysis. 2. Moderate bilateral hydronephrosis with mild bilateral perinephric stranding. Evaluation for infectious process including pyelonephritis limited without IV contrast, recommend correlation with urinalysis. Mild bilateral hydroureter throughout the course of the bilateral ureters without ureterolithiasis. 3. Moderate fecal burden throughout the colon greatest involving the ascending and transverse colon, favor constipation. 4. Perirectal and presacral stranding. Circumferential mucosal thickening of the distal rectum best identified on axial image 73 of series 2.  Recommend correlation with direct visualization/colonoscopy to exclude underlying mucosal lesion. 5. Severe mucosal thickening of the stomach at the fundus and mid body which could represent changes of gastritis in the correct clinical setting. This document has been electronically signed by: Salima Au DO on 12/15/2022 05:03 AM All CTs at this facility use dose modulation techniques and iterative reconstructions, and/or weight-based dosing when appropriate to reduce radiation to a low as reasonably achievable. CT HEAD WO CONTRAST    Result Date: 12/15/2022  1. No acute intracranial abnormality. The pertinent finding(s) was called to patient's nurse  Nickolas Domingo RN at [facility] 81. hours on 12/15/2022 by Dr. Domonique Menard. Verbal acknowledgment and readback was given. 2. Chronic findings as described above. **This report has been created using voice recognition software. It may contain minor errors which are inherent in voice recognition technology. ** Final report electronically signed by Dr Kayleigh Coley on 12/15/2022 4:46 PM    CT CHEST WO CONTRAST    Result Date: 12/15/2022  1. No definitive acute rib fracture. 2. Chronic left seventh posterior rib fracture with callus formation. Likely chronic left eighth posterior rib fracture, mild sclerosis at the margins of this chronic appearing fracture. Metallic device external to the patient and medially posterior to the left eighth rib fracture partially limits evaluation at this region. 3. Moderate/severe bilateral hydronephrosis. No nephrolithiasis. Recommend consideration for dedicated cross-sectional imaging of the abdomen/pelvis for further evaluation of this finding. This document has been electronically signed by: Salima Au DO on 12/15/2022 03:05 AM All CTs at this facility use dose modulation techniques and iterative reconstructions, and/or weight-based dosing when appropriate to reduce radiation to a low as reasonably achievable. CT CERVICAL SPINE WO CONTRAST    Result Date: 12/15/2022  1.  Study is partially limited due to motion artifact and technique. Within this limitation, no acute fracture of the cervical spine. 2. Multilevel facet and uncovertebral joint arthropathy. This document has been electronically signed by: Carter Blandon DO on 12/15/2022 02:45 AM All CTs at this facility use dose modulation techniques and iterative reconstructions, and/or weight-based dosing when appropriate to reduce radiation to a low as reasonably achievable. MRA HEAD WO CONTRAST    Result Date: 12/15/2022  Impression: Normal MRA of the brain. This document has been electronically signed by: Rosendo Shelby MD on 12/15/2022 08:10 PM 3D Post-processing was performed on this study. CT THORACIC RECONSTRUCTION WO POST PROCESS    Result Date: 12/15/2022  1. No acute fracture of the thoracic spine. 2. Mild multilevel spondylosis of the thoracic spine. This document has been electronically signed by: Carter Blandon DO on 12/15/2022 02:58 AM All CTs at this facility use dose modulation techniques and iterative reconstructions, and/or weight-based dosing when appropriate to reduce radiation to a low as reasonably achievable. MRA NECK WO CONTRAST    Result Date: 12/15/2022  Impression: Mild disease bilateral carotid bulbs. This document has been electronically signed by: Rosendo Shelby MD on 12/15/2022 08:20 PM Carotid stenosis and measurements are in accordance with NASCET criteria. 3D Post-processing was performed on this study. MRI BRAIN WO CONTRAST    Result Date: 12/15/2022  Impression: Acute ischemic event left parietal region coronal radiata. Possible small additional acute ischemic event within the right lobe of the cerebellum. Moderate nonspecific periventricular and deep white matter disease. This most often can ascribed to chronic small vessel ischemic change.  This document has been electronically signed by: Rosendo Shelby MD on 12/15/2022 08:14 PM        DVT prophylaxis:    [] Lovenox  [] SCDs  [] SQ Heparin  [] Encourage ambulation   [x] Already on Anticoagulation - eliquis 5mg BID       Diet: ADULT DIET; Easy to Chew; 3 carb choices (45 gm/meal)  Code Status: Full Code  PT/OT: Yes  Tele: Yes  IVF: NS 75ml/h with frequent fluid status reassessment. Electronically signed by Ramos Dominguez MD, [id number] on 12/18/2022 at 7:14 PM    Case was discussed with Attending, Dr. Bethany Bell.

## 2022-12-19 NOTE — FLOWSHEET NOTE
12/19/22 1020   Safe Environment   Safety Measures Other (comment)  (VN safety round)   VN called into patients room and introduced myself and role. Patient answered and permitted video. Video activated. . Patient resting comfortably in bed. . Patient voiced no needs or concerns at this time. Call light within reach.

## 2022-12-19 NOTE — PROGRESS NOTES
Occupational [facility] ICU STEPDOWN TELEMETRY 4K  Occupational Therapy  Daily Note  Time:   Time In: 6483  Time Out: 1408  Timed Code Treatment Minutes: 25 Minutes  Minutes: 25          Date: 2022  Patient Name: Maria Guadalupe Rush,   Gender: male      Room: [facility]13/013-A  MRN: [id number]  : 1960  (63 y.o.)  Referring Practitioner: Alfonso Lamar MD  Diagnosis: emphysematous cycstitis  Additional Pertinent Hx: Maria Guadalupe Rush who is a 63 y.o. male with a history of hypertension, diabetes, CAD on  daily, recent CABG in 2022, ischemic cardiomyopathy is admitted to [facility] for sepsis, emphysematous cystitis and acute kidney injury on CKD. During admission exam patient stated that his right arm feldman has been weak and that he has had some slurred speech. Stroke alert was called for right-sided weakness and dysarthria on 12/15. On arrival patient's NIH was 4 for right upper extremity, right lower extremity weakness, 1 limb ataxia and dysarthria. On further questioning, patient and wife state that the right arm weakness actually started last night. Patient's wife noticed when patient was trying to get up from the toilet that he was unable to push himself up with his right arm. Last known well 2330 on 2022. Patient taken to imaging for stat CT head which revealed no ICH, midline shift, mass-effect. CTA head and neck deferred due to patient's LC. Decision for no tPA was made due to patient's time since last known well. Patient with relatively low NIH and symptoms which are not consistent with LVO, therefore no thrombectomy/neuro intervention at this time. Patient had stat MRI brain and MRA head and neck without contrast.  MRI showed acute ischemic stroke of left parietal region.     Restrictions/Precautions:  Restrictions/Precautions: Up as Tolerated, Fall Risk  Position Activity Restriction  Other position/activity restrictions: right side hemiparesis, recent CABG 11/22     SUBJECTIVE: Nurse approved OT treatment. Pt in bed upon arrival, pleasant and agreeable to OT session. Pt able to recall PT session and events that occurred during that session. Pt reporting improved movement and strength in right UE compared to 12/16. PAIN: denies    Vitals: Vitals not assessed per clinical judgement, see nursing flowsheet    COGNITION: Decreased Problem Solving, Decreased Safety Awareness, and Impulsive    ADL:   Upper Extremity Dressing: Maximum Assistance. Lower Extremity Dressing: Maximum Assistance. Secondary to impaired FM skills of R hand  Toileting: Dependent. Bella catheter in place . BALANCE:  Sitting Balance:  Stand By Assistance. (Improved form Moderate A at evaluation)  Standing Balance: Minimal Assistance. With RW    BED MOBILITY:  Supine to Sit: Minimal Assistance      TRANSFERS:  Sit to Stand:  Minimal Assistance. With cues for proper hand placement, cues for technique  Stand to Sit: Moderate Assistance. To avoid premature sitting and for slow descend to chair  To/From Bed and Chair: Minimal Assistance. Using RW (improved from stand pivot  @ moderate A without device on 12/16) With minimal tactile cues required to management walker and moderate VC for technique. Pt also demos x3 LOB with moderate A to correct and avoid fall. FUNCTIONAL MOBILITY:  Assistive Device: Rolling Walker  Assist Level:  Minimal Assistance. Distance: To and from bathroom  Pt requires minimal tactile cues. Difficulty with retro stepping and requiring max verbal cues and moderate physical assist to back up to chair and avoid premature sitting. Fatigue also a limiting factor at end of functional mobility. ADDITIONAL ACTIVITIES:  Pt demo's improved strength of R UE. Pt demo's 3+/5 strength for shoulder and elbow flexion.  Pt continues to demo impaired FM abilities in right hand but demos improved  strength (not formally tested)    ASSESSMENT:     Activity Tolerance:  Patient tolerance of  treatment: fair. Discharge Recommendations: Inpatient Rehabilitation  Equipment Recommendations: Equipment Needed: Yes  Other: TBD. Pt's wife reports she would like a walker but pt is not safe to use walker at this time. Will continue to assess for needs  Plan: Times Per Week: 6x  Times Per Day: Once a day  Current Treatment Recommendations: Strengthening, Balance training, Self-Care / ADL, Equipment evaluation, education, & procurement, Safety education & training, Endurance training, Functional mobility training, Neuromuscular re-education    Patient Education  Patient Education: Hemibody Awareness/Attention, Importance of Increasing Activity, and Assistive Device Safety    Goals  Short Term Goals  Time Frame for Short Term Goals: until discharge  Short Term Goal 1: Pt will complete stand pivot transfer with Min A x1 to improve access with ADLs  Short Term Goal 2: Pt will demonstrate functional mobility walking short distances while using any AD needed with no > than MIN A and cues to attend to his RUE for preparing to do self care while out of bed. Short Term Goal 3: Pt will improve R UE strength to 3+/5 to improve participation with ADLs. Short Term Goal 4: Pt will improve dynamic sitting balance and tolerance to SBA x10 minutes to improve trunk control required for transfers and ADLs. Short Term Goal 5: Pt will perofrm UB ADls and grooming with Min A. Following session, patient left in safe position with all fall risk precautions in place.

## 2022-12-19 NOTE — PLAN OF CARE
Problem: Discharge Planning  Goal: Discharge to home or other facility with appropriate resources  Outcome: Progressing  Flowsheets (Taken 12/18/2022 2042)  Discharge to home or other facility with appropriate resources:   Identify barriers to discharge with patient and caregiver   Arrange for needed discharge resources and transportation as appropriate   Identify discharge learning needs (meds, wound care, etc)   Arrange for interpreters to assist at discharge as needed   Refer to discharge planning if patient needs post-hospital services based on physician order or complex needs related to functional status, cognitive ability or social support system     Problem: ABCDS Injury Assessment  Goal: Absence of physical injury  Outcome: Progressing  Flowsheets (Taken 12/18/2022 2145)  Absence of Physical Injury: Implement safety measures based on patient assessment     Problem: Safety - Adult  Goal: Free from fall injury  Outcome: Progressing  Flowsheets (Taken 12/18/2022 2145)  Free From Fall Injury: Instruct family/caregiver on patient safety     Problem: Chronic Conditions and Co-morbidities  Goal: Patient's chronic conditions and co-morbidity symptoms are monitored and maintained or improved  Outcome: Progressing  Flowsheets (Taken 12/18/2022 2042)  Care Plan - Patient's Chronic Conditions and Co-Morbidity Symptoms are Monitored and Maintained or Improved:   Monitor and assess patient's chronic conditions and comorbid symptoms for stability, deterioration, or improvement   Collaborate with multidisciplinary team to address chronic and comorbid conditions and prevent exacerbation or deterioration   Update acute care plan with appropriate goals if chronic or comorbid symptoms are exacerbated and prevent overall improvement and discharge     Problem: Pain  Goal: Verbalizes/displays adequate comfort level or baseline comfort level  Outcome: Progressing  Flowsheets (Taken 12/18/2022 2145)  Verbalizes/displays adequate comfort level or baseline comfort level:   Encourage patient to monitor pain and request assistance   Assess pain using appropriate pain scale   Administer analgesics based on type and severity of pain and evaluate response   Implement non-pharmacological measures as appropriate and evaluate response   Consider cultural and social influences on pain and pain management   Notify Licensed Independent Practitioner if interventions unsuccessful or patient reports new pain     Problem: Skin/Tissue Integrity  Goal: Absence of new skin breakdown  Description: 1. Monitor for areas of redness and/or skin breakdown  2. Assess vascular access sites hourly  3. Every 4-6 hours minimum:  Change oxygen saturation probe site  4. Every 4-6 hours:  If on nasal continuous positive airway pressure, respiratory therapy assess nares and determine need for appliance change or resting period. Outcome: Progressing  Note: No new skin breakdown this shift. Patient is assisted with turning every two hours and as needed. Problem: Neurosensory - Adult  Goal: Achieves stable or improved neurological status  Outcome: Progressing  Flowsheets (Taken 12/18/2022 2042)  Achieves stable or improved neurological status:   Assess for and report changes in neurological status   Initiate measures to prevent increased intracranial pressure   Maintain blood pressure and fluid volume within ordered parameters to optimize cerebral perfusion and minimize risk of hemorrhage   Monitor temperature, glucose, and sodium.  Initiate appropriate interventions as ordered  Goal: Achieves maximal functionality and self care  Outcome: Progressing  Flowsheets (Taken 12/18/2022 2042)  Achieves maximal functionality and self care:   Monitor swallowing and airway patency with patient fatigue and changes in neurological status   Encourage and assist patient to increase activity and self care with guidance from physical therapy/occupational therapy   Encourage visually impaired, hearing impaired and aphasic patients to use assistive/communication devices   Care plan reviewed with patient. Patient verbalized understanding of the plan of care and contribute to goal setting.

## 2022-12-19 NOTE — BRIEF OP NOTE
[facility]  Sedation/Analgesia Post Sedation Record    Pt Name: Denise Castro  Account number: [de-identified]  MRN: [id number]  YOB: 1960  Procedure Performed By: Tri Schafer MD MD   Primary Care Physician: BLACK Samson - [facility]  Date: 12/16/2022    POST-PROCEDURE    Physicians/Assistants: Tri Schafer MD MD     Procedure Performed: JOY       Sedation/Anesthesia:per anesthesia records/MAR    Complications: No Immediate Complications. Post-procedure Diagnosis/Findings:       Stable EF, full report completed     Above findings and plan of care were discussed with patient, questions were answered, agreeable with plan.        Tri Schafer MD, Deangelo Ivey   Electronically signed 12/19/2022 at 2:14 PM  Interventional Cardiology

## 2022-12-19 NOTE — PROGRESS NOTES
Maurisio served Dr. Conklin Loop inquiring about the length of Merepenum treatment; he responeded--Max one wk. PM&R team updated on this.

## 2022-12-20 LAB
ANION GAP SERPL CALCULATED.3IONS-SCNC: 15 MEQ/L (ref 8–16)
BUN BLDV-MCNC: 29 MG/DL (ref 7–22)
CALCIUM IONIZED: 1.18 MMOL/L (ref 1.12–1.32)
CALCIUM SERPL-MCNC: 8.4 MG/DL (ref 8.5–10.5)
CHLORIDE BLD-SCNC: 97 MEQ/L (ref 98–111)
CO2: 24 MEQ/L (ref 23–33)
CREAT SERPL-MCNC: 1.2 MG/DL (ref 0.4–1.2)
ERYTHROCYTE [DISTWIDTH] IN BLOOD BY AUTOMATED COUNT: 13.5 % (ref 11.5–14.5)
ERYTHROCYTE [DISTWIDTH] IN BLOOD BY AUTOMATED COUNT: 41.4 FL (ref 35–45)
GFR SERPL CREATININE-BSD FRML MDRD: > 60 ML/MIN/1.73M2
GLUCOSE BLD-MCNC: 151 MG/DL (ref 70–108)
GLUCOSE BLD-MCNC: 180 MG/DL (ref 70–108)
GLUCOSE BLD-MCNC: 187 MG/DL (ref 70–108)
GLUCOSE BLD-MCNC: 188 MG/DL (ref 70–108)
GLUCOSE BLD-MCNC: 265 MG/DL (ref 70–108)
GLUCOSE BLD-MCNC: 269 MG/DL (ref 70–108)
HCT VFR BLD CALC: 31.3 % (ref 42–52)
HEMOGLOBIN: 10.1 GM/DL (ref 14–18)
MCH RBC QN AUTO: 27.3 PG (ref 26–33)
MCHC RBC AUTO-ENTMCNC: 32.3 GM/DL (ref 32.2–35.5)
MCV RBC AUTO: 84.6 FL (ref 80–94)
PLATELET # BLD: 279 THOU/MM3 (ref 130–400)
PMV BLD AUTO: 10.1 FL (ref 9.4–12.4)
POTASSIUM SERPL-SCNC: 4.2 MEQ/L (ref 3.5–5.2)
RBC # BLD: 3.7 MILL/MM3 (ref 4.7–6.1)
SODIUM BLD-SCNC: 136 MEQ/L (ref 135–145)
T4 FREE: 1.5 NG/DL (ref 0.93–1.76)
TSH SERPL DL<=0.05 MIU/L-ACNC: 2.43 UIU/ML (ref 0.4–4.2)
WBC # BLD: 7 THOU/MM3 (ref 4.8–10.8)

## 2022-12-20 PROCEDURE — 2580000003 HC RX 258

## 2022-12-20 PROCEDURE — 99223 1ST HOSP IP/OBS HIGH 75: CPT | Performed by: PHYSICAL MEDICINE & REHABILITATION

## 2022-12-20 PROCEDURE — 6370000000 HC RX 637 (ALT 250 FOR IP): Performed by: STUDENT IN AN ORGANIZED HEALTH CARE EDUCATION/TRAINING PROGRAM

## 2022-12-20 PROCEDURE — 1200000000 HC SEMI PRIVATE

## 2022-12-20 PROCEDURE — 97116 GAIT TRAINING THERAPY: CPT

## 2022-12-20 PROCEDURE — 97110 THERAPEUTIC EXERCISES: CPT

## 2022-12-20 PROCEDURE — 6360000002 HC RX W HCPCS: Performed by: STUDENT IN AN ORGANIZED HEALTH CARE EDUCATION/TRAINING PROGRAM

## 2022-12-20 PROCEDURE — 92526 ORAL FUNCTION THERAPY: CPT | Performed by: SPEECH-LANGUAGE PATHOLOGIST

## 2022-12-20 PROCEDURE — 82948 REAGENT STRIP/BLOOD GLUCOSE: CPT

## 2022-12-20 PROCEDURE — 84443 ASSAY THYROID STIM HORMONE: CPT

## 2022-12-20 PROCEDURE — 36415 COLL VENOUS BLD VENIPUNCTURE: CPT

## 2022-12-20 PROCEDURE — 6370000000 HC RX 637 (ALT 250 FOR IP)

## 2022-12-20 PROCEDURE — 85027 COMPLETE CBC AUTOMATED: CPT

## 2022-12-20 PROCEDURE — 80048 BASIC METABOLIC PNL TOTAL CA: CPT

## 2022-12-20 PROCEDURE — 99232 SBSQ HOSP IP/OBS MODERATE 35: CPT | Performed by: STUDENT IN AN ORGANIZED HEALTH CARE EDUCATION/TRAINING PROGRAM

## 2022-12-20 PROCEDURE — 84439 ASSAY OF FREE THYROXINE: CPT

## 2022-12-20 PROCEDURE — 82330 ASSAY OF CALCIUM: CPT

## 2022-12-20 PROCEDURE — 2580000003 HC RX 258: Performed by: STUDENT IN AN ORGANIZED HEALTH CARE EDUCATION/TRAINING PROGRAM

## 2022-12-20 PROCEDURE — 92507 TX SP LANG VOICE COMM INDIV: CPT | Performed by: SPEECH-LANGUAGE PATHOLOGIST

## 2022-12-20 RX ORDER — GLIMEPIRIDE 4 MG/1
4 TABLET ORAL
Status: DISCONTINUED | OUTPATIENT
Start: 2022-12-21 | End: 2022-12-20 | Stop reason: CLARIF

## 2022-12-20 RX ORDER — BACLOFEN 10 MG/1
5 TABLET ORAL 3 TIMES DAILY
Status: DISCONTINUED | OUTPATIENT
Start: 2022-12-20 | End: 2022-12-23 | Stop reason: HOSPADM

## 2022-12-20 RX ORDER — METOPROLOL SUCCINATE 25 MG/1
12.5 TABLET, EXTENDED RELEASE ORAL DAILY
Status: DISCONTINUED | OUTPATIENT
Start: 2022-12-21 | End: 2022-12-23 | Stop reason: HOSPADM

## 2022-12-20 RX ORDER — GLIPIZIDE 10 MG/1
10 TABLET ORAL
Status: DISCONTINUED | OUTPATIENT
Start: 2022-12-21 | End: 2022-12-23 | Stop reason: HOSPADM

## 2022-12-20 RX ORDER — 0.9 % SODIUM CHLORIDE 0.9 %
500 INTRAVENOUS SOLUTION INTRAVENOUS ONCE
Status: COMPLETED | OUTPATIENT
Start: 2022-12-20 | End: 2022-12-20

## 2022-12-20 RX ADMIN — SODIUM CHLORIDE, PRESERVATIVE FREE 10 ML: 5 INJECTION INTRAVENOUS at 09:12

## 2022-12-20 RX ADMIN — PANTOPRAZOLE SODIUM 40 MG: 40 TABLET, DELAYED RELEASE ORAL at 06:03

## 2022-12-20 RX ADMIN — TAMSULOSIN HYDROCHLORIDE 0.8 MG: 0.4 CAPSULE ORAL at 09:13

## 2022-12-20 RX ADMIN — INSULIN LISPRO 4 UNITS: 100 INJECTION, SOLUTION INTRAVENOUS; SUBCUTANEOUS at 18:27

## 2022-12-20 RX ADMIN — SODIUM CHLORIDE, PRESERVATIVE FREE 10 ML: 5 INJECTION INTRAVENOUS at 22:04

## 2022-12-20 RX ADMIN — APIXABAN 5 MG: 5 TABLET, FILM COATED ORAL at 09:13

## 2022-12-20 RX ADMIN — INSULIN LISPRO 4 UNITS: 100 INJECTION, SOLUTION INTRAVENOUS; SUBCUTANEOUS at 12:34

## 2022-12-20 RX ADMIN — MEROPENEM 1000 MG: 1 INJECTION, POWDER, FOR SOLUTION INTRAVENOUS at 12:34

## 2022-12-20 RX ADMIN — BACLOFEN 10 MG: 10 TABLET ORAL at 09:13

## 2022-12-20 RX ADMIN — MEROPENEM 1000 MG: 1 INJECTION, POWDER, FOR SOLUTION INTRAVENOUS at 00:05

## 2022-12-20 RX ADMIN — BACLOFEN 10 MG: 10 TABLET ORAL at 13:29

## 2022-12-20 RX ADMIN — BUPROPION HYDROCHLORIDE 150 MG: 150 TABLET, FILM COATED, EXTENDED RELEASE ORAL at 09:12

## 2022-12-20 RX ADMIN — ATORVASTATIN CALCIUM 40 MG: 40 TABLET, FILM COATED ORAL at 22:04

## 2022-12-20 RX ADMIN — BACLOFEN 5 MG: 10 TABLET ORAL at 22:03

## 2022-12-20 RX ADMIN — SODIUM CHLORIDE 500 ML: 9 INJECTION, SOLUTION INTRAVENOUS at 15:44

## 2022-12-20 RX ADMIN — METOPROLOL SUCCINATE 25 MG: 25 TABLET, EXTENDED RELEASE ORAL at 09:13

## 2022-12-20 RX ADMIN — OXYCODONE 5 MG: 5 TABLET ORAL at 12:28

## 2022-12-20 RX ADMIN — MEROPENEM 1000 MG: 1 INJECTION, POWDER, FOR SOLUTION INTRAVENOUS at 22:09

## 2022-12-20 RX ADMIN — APIXABAN 5 MG: 5 TABLET, FILM COATED ORAL at 22:04

## 2022-12-20 ASSESSMENT — ENCOUNTER SYMPTOMS
TROUBLE SWALLOWING: 0
DIARRHEA: 0
EYE PAIN: 0
SHORTNESS OF BREATH: 0
RHINORRHEA: 0
CONSTIPATION: 1
EYE DISCHARGE: 0
BACK PAIN: 1
ABDOMINAL PAIN: 0
COUGH: 0
WHEEZING: 0
SORE THROAT: 0
VOMITING: 0
NAUSEA: 0

## 2022-12-20 ASSESSMENT — PAIN - FUNCTIONAL ASSESSMENT: PAIN_FUNCTIONAL_ASSESSMENT: ACTIVITIES ARE NOT PREVENTED

## 2022-12-20 ASSESSMENT — PAIN DESCRIPTION - DESCRIPTORS: DESCRIPTORS: ACHING;DISCOMFORT

## 2022-12-20 ASSESSMENT — PAIN SCALES - GENERAL
PAINLEVEL_OUTOF10: 0
PAINLEVEL_OUTOF10: 7
PAINLEVEL_OUTOF10: 0

## 2022-12-20 ASSESSMENT — PAIN DESCRIPTION - LOCATION: LOCATION: NECK

## 2022-12-20 ASSESSMENT — PAIN DESCRIPTION - ORIENTATION: ORIENTATION: POSTERIOR

## 2022-12-20 ASSESSMENT — PAIN DESCRIPTION - PAIN TYPE: TYPE: ACUTE PAIN

## 2022-12-20 ASSESSMENT — PAIN DESCRIPTION - FREQUENCY: FREQUENCY: CONTINUOUS

## 2022-12-20 ASSESSMENT — PAIN DESCRIPTION - ONSET: ONSET: ON-GOING

## 2022-12-20 NOTE — PROGRESS NOTES
[address] [facility]  INPATIENT REHABILITATION UNIT  PRECERTIFICATION TEMPLATE    Date of Admission: 12/15/2022  1:08 AM    Patient Name: Mira Melendez      MRN: [id number]    : 1960  (63 y.o.)  Gender: male     Payor Source: Payor: [facility] / Plan: [address] [place] [address] / Product Type: *No Product type* /   Secondary Payor Source:      Isolation Status: No active isolations    Precert initiated for Inpatient Rehab Admission at [facility]. Reference Number: [id number]  Route of Precertification Transaction: Phone Call [phone] faxed clinicals to .

## 2022-12-20 NOTE — PROGRESS NOTES
[facility]  PREADMISSION CONVERSATION WITH PATIENT/FAMILY    Date of Admission: 12/15/2022  1:08 AM    Patient Name: Oseas Wilkinson      MRN: [id number]    : 1960  (63 y.o.)  Gender: male     Payor Source: Payor: [employer] / Plan: [facility] / Product Type: *No Product type* /   Secondary Payor Source:      Isolation Status: No active isolations    Spoke with patient and family explained acute rehabilitation philosophy, including fact that rehab consists of inpatient stay at [address] [place] [address]. Rehabilitation unit care includes 24-hour nursing care, oversight by physician, 3 hours of therapy 6 days a week, individualized treatment plan. Explained how benefits through insurance provider works. Discussed patient readiness for admission to Inpatient Rehab based. All medical testing must be completed and must be medically stable to be discharged from acute hospital setting to the acute inpatient rehabilitation unit. Patient and family agree with planned admission to acute inpatient rehab upon insurance approval and medical stability for admission. Patient and family able to explain reason for admission to acute inpatient rehab. Spoke with patient in person; called patient spouse Sophie Aguirre and explained Rehab philosophy to her. Patient and spouse both are agreeable with admission upon precert approval.  Precert approval discussed with both parties.

## 2022-12-20 NOTE — CONSULTS
Physical Medicine & Rehabilitation Consultation Note      Admitting Physician: Marge Urbano MD    Primary Care Provider: BLACK Samson CNP     Reason for Consult: Rehab knee, deconditioning, new stroke    History of Present Illness:  Denise Castro is a 63 y.o. left-handed  male with a history of hypertension, diabetes mellitus type 2, coronary artery disease with ischemic cardiomyopathy requiring two-vessel CABG, urinary retention with several failed void trial, status post tonsillectomy, was admitted to [address] [place] [address] on 12/15/2022 due to urinary tract infection with sepsis/bacteremia, and stroke. The patient was previously hospitalized at [facility] from 10/28/2022 to 11/15/2022 for coronary artery disease and ischemic cardiomyopathy requiring two-vessel CABG on 11/7/2022. His postoperative course was complicated by urinary retention with failed void trial.  The patient has been experiencing progressive weakness after he was discharged to home. The Bella was later removed on 12/13/2022 but the patient continued having difficulty voiding. On 12/14/2022 approximately 11:30 PM his wife noticed the patient had slight slurred speech with right arm weakness. On 12/15/2022 the patient suddenly felt dizzy and lightheaded while he was trying to get up from sitting on toilet and fell into the ground. He says he did not lose consciousness and did not hit his head. His wife called [phone]. He was transported to [address] [place] [address] ER for evaluation by EMS. He complains of neck pain and upper back pain. He was found to be tachycardic. His WBC was found to be 22.3. Bella was placed in ER and a rush of air followed by dark brown and bright red color urine came out. He was put on IV meropenem and admitted. Urology was consulted. CT of cervical spine revealed only multilevel facet and uncovertebral joint arthropathy.   CT of head done on 12/15/2020 revealed no acute intracranial abnormality. CT of the chest, abdomen and pelvis done on 12/15/2022 revealed emphysematous cystitis, bilateral hydronephrosis, and constipation. Because of right arm weakness, stroke code was called on 12/15/2022. MRI of brain done on 12/15/2022 revealed acute ischemic stroke at the left parietal region corona radiata, and possible small additional acute ischemic stroke within right cerebellum. Neurology service start patient on aspirin and Plavix combination for the stroke. MRA of the neck and head done on 12/15/2022 showed only mild bilateral carotid bulb disease. Transesophageal echocardiogram was done on 12/16/2022 showing severe global hypokinesis of left ventricle with estimated ejection fraction of 30%, and no thrombus identified. 2 blood culture and urine culture done on 12/15/2022 revealed Klebsiella pneumonia. Infectious disease had been consulted. The plan is to switch to oral antibiotic when the patient is discharged from acute hospital.    At the present time the patient says he still has weakness on his right upper extremity but it is improving. He also noticed the right lower extremity also is slightly weaker than the left side. He says his bilateral hands and feet have intermittent tingling sensation. He has slight difficulty speaking. He says his neck and upper back has intermittent pain. He has not moved his bowel for about 2-day. He denies having headache, dizziness, lightheadedness, blurry vision, nausea or vomiting, abdominal pain, chest pain, cough, sore throat, diarrhea. Most Recent Rehabilitation Assessments:  PT:    (12/19/2022) : Timed Code Treatment Minutes: 29 Minutes  Activity Tolerance:  Patient tolerance of  treatment: good.     Bed Mobility:  Supine to Sit: Contact Guard Assistance, Minimal Assistance, X 1, with head of bed raised, with rail, with verbal cues , with increased time for completion  Scooting: Stand By Assistance, with verbal cues , with increased time for completion     Transfers:  Sit to Stand: Contact Guard Assistance, Minimal Assistance, with increased time for completion, cues for hand placement, with verbal cues, x3 trials; x1 EOB, x 2 from chair  Stand to [place] [address]     Ambulation:  [employer], with verbal cues , with increased time for completion  Distance: 20 ft  Surface: Level Tile  Device:Rolling Walker  Gait Deviations: Forward Flexed Posture, Slow Jada, Decreased Step Length Bilaterally, Decreased Heel Strike Bilaterally, and Mild Path Deviations  -pt needed cues for foot placement of right and cues to keep right LE inside walker especially when turning     Balance:  Dynamic Sitting Balance: Stand By Assistance, While performing seated exercises without back support  Static Standing Balance: Contact Guard Assistance, Minimal Assistance, with verbal cues   -x30 sec no UE support shoulder width RODNEY  *Posterior lean   Tandem Stance: x30 sec with each LE in front and increased width between LE  -No UE support and increased assistance needed with this task        OT:    (12/19/2022)  Timed Code Treatment Minutes: 25 Minutes  Activity Tolerance:  Patient tolerance of  treatment: fair. ADL:   Upper Extremity Dressing: Maximum Assistance. Lower Extremity Dressing: Maximum Assistance. Secondary to impaired FM skills of R hand  Toileting: Dependent. Bella catheter in place . BALANCE:  Sitting Balance:  Stand By Assistance. (Improved form Moderate A at evaluation)  Standing Balance: Minimal Assistance. With RW     BED MOBILITY:  Supine to Sit: Minimal Assistance       TRANSFERS:  Sit to Stand:  Minimal Assistance. With cues for proper hand placement, cues for technique  Stand to Sit: Moderate Assistance. To avoid premature sitting and for slow descend to chair  To/From Bed and Chair: Minimal Assistance.  Using RW (improved from stand pivot  @ moderate A without device on 12/16) With minimal tactile cues required to management walker and moderate VC for technique. Pt also demos x3 LOB with moderate A to correct and avoid fall. FUNCTIONAL MOBILITY:  Assistive Device: Rolling Walker  Assist Level:  Minimal Assistance. Distance: To and from bathroom  Pt requires minimal tactile cues. Difficulty with retro stepping and requiring max verbal cues and moderate physical assist to back up to chair and avoid premature sitting. Fatigue also a limiting factor at end of functional mobility. ADDITIONAL ACTIVITIES:  Pt demo's improved strength of R UE. Pt demo's 3+/5 strength for shoulder and elbow flexion. Pt continues to demo impaired FM abilities in right hand but demos improved  strength (not formally tested)      ST:    (12/17/2022) : Impressions: Patient presents with WFL/MOD I oral dysphagia with inability to fully discern potential presence of pharyngeal phase deficits without formal instrumentation. Patient with adequate bolus control and manipulation, mildly prolonged mastication with effective textural breakdown when given additional time, and complete bolus clearance with utilization of lingual sweep and/or liquid wash. Patient with suspected consistent swallow initiation and no overt s/s of aspiration observed across all trials. Patient endorsing he does not consume raw fruits/vegetables s/t edentulous state. At this time, it is recommended patient initiate an easy to chew diet with thin liquids. Diet Recommendations:  Easy to Chew with Thin Liquids     (12/16/2022)  [facility] Cognitive Assessment [facility]) version 7.1 completed. Patient scored 26/30. Normal is greater than or equal to 26/30. DIAGNOSTIC IMPRESSIONS:    Patient presents with cognitive functioning grossly intact evidenced by evaluation results outlined above. Speech and voice appear to be [facility] with no presence of dysarthria, dysphonia, or aphonia; patient intelligible at the conversation level with approximately 100% accuracy.  Expressive and receptive language skills grossly intact with no apparent communicative breakdowns. Skilled ST services are recommended  to address HIGH level cognitive function d/t PLOF and high level of independence. Post evaluation, patient with call light in reach and bed alarm on upon leaving room; RN Jazmine Fitch notified re: clinical findings and recommendations from the assessment; verbal receptiveness noted. Past Medical History:        Diagnosis Date    Hypertension     Prediabetes        Past Surgical History:        Procedure Laterality Date    CORONARY ARTERY BYPASS GRAFT N/A 11/7/2022    CABG X2 JOY WITH BALLOON PUMP performed by Lyndsey Davies MD at [address] [facility]      TRANSESOPHAGEAL ECHOCARDIOGRAM N/A 12/16/2022    TRANSESOPHAGEAL ECHOCARDIOGRAM WITH ANESTHESIA performed by Wero Washington MD at [facility]       Allergies:     Allergies   Allergen Reactions    Metformin And Related Nausea And Vomiting        Current Medications:   Current Facility-Administered Medications   Medication Dose Route Frequency Provider Last Rate Last Admin    baclofen (LIORESAL) tablet 10 mg  10 mg Oral TID Magno Batres DO   10 mg at 12/20/22 0913    metoprolol succinate (TOPROL XL) extended release tablet 25 mg  25 mg Oral Daily Ronnie Ragland MD   25 mg at 12/20/22 0913    oxyCODONE (ROXICODONE) immediate release tablet 5 mg  5 mg Oral Q6H PRN Ronnie Ragland MD        apixaban Urvashi Viviana) tablet 5 mg  5 mg Oral BID Jai Levine MD   5 mg at 12/20/22 0913    tamsulosin (FLOMAX) capsule 0.8 mg  0.8 mg Oral Daily Magno Batres DO   0.8 mg at 12/20/22 0913    sodium chloride flush 0.9 % injection 5-40 mL  5-40 mL IntraVENous 2 times per day Shirley Gleason MD   10 mL at 12/20/22 0912    sodium chloride flush 0.9 % injection 5-40 mL  5-40 mL IntraVENous PRN Shirley Gleason MD        0.9 % sodium chloride infusion   IntraVENous PRN Shirley Gleason MD        ondansetron (ZOFRAN-ODT) disintegrating tablet 4 mg  4 mg Oral Q8H PRN Hasmukh Mcgraw MD        Or    ondansetron (ZOFRAN) injection 4 mg  4 mg IntraVENous Q6H PRN Hasmukh Mcgraw MD        polyethylene glycol (GLYCOLAX) packet 17 g  17 g Oral Daily PRN Hasmukh Mcgraw MD   17 g at 12/18/22 1247    acetaminophen (TYLENOL) tablet 650 mg  650 mg Oral Q6H PRN Hasmukh Mcgraw MD   650 mg at 12/19/22 3411    Or    acetaminophen (TYLENOL) suppository 650 mg  650 mg Rectal Q6H PRN Hasmukh Mcgraw MD        [Held by provider] 0.9 % sodium chloride infusion   IntraVENous Continuous Magno Batres, DO   Stopped at 12/18/22 1622    atorvastatin (LIPITOR) tablet 40 mg  40 mg Oral Daily Hasmukh Mcgraw MD   40 mg at 12/19/22 2059    buPROPion (WELLBUTRIN XL) extended release tablet 150 mg  150 mg Oral QAM Hasmukh Mcgraw MD   150 mg at 12/20/22 0912    insulin lispro (HUMALOG) injection vial 0-8 Units  0-8 Units SubCUTAneous TID WC Hasmukh Mcgraw MD   2 Units at 12/19/22 1217    insulin lispro (HUMALOG) injection vial 0-4 Units  0-4 Units SubCUTAneous Nightly Hasmukh Mcgraw MD        glucose chewable tablet 16 g  4 tablet Oral PRN Hasmukh Mcgraw MD        dextrose bolus 10% 125 mL  125 mL IntraVENous PRN Hasmukh Mcgraw MD        Or    dextrose bolus 10% 250 mL  250 mL IntraVENous PRN Hasmukh Mcgraw MD        glucagon (rDNA) injection 1 mg  1 mg SubCUTAneous PRN Hasmukh Mcgraw MD        dextrose 10 % infusion   IntraVENous Continuous PRN Hasmukh Mcgraw MD        pantoprazole (PROTONIX) tablet 40 mg  40 mg Oral QAM AC Hasmukh Mcgraw MD   40 mg at 12/20/22 0603    meropenem (MERREM) 1,000 mg in sodium chloride 0.9 % 100 mL IVPB (mini-bag)  1,000 mg IntraVENous Q12H Hasmukh Mcgraw MD   Stopped at 12/20/22 0306        Social History:  Social History     Socioeconomic History    Marital status:      Spouse name: Not on file    Number of children: Not on file    Years of education: Not on file    Highest education level: Not on file   Occupational History    Not on file   Tobacco Use    Smoking status: Former     Packs/day: 0.50     Years: 33.00     Pack years: 16.50     Types: Cigarettes    Smokeless tobacco: Never   Vaping Use    Vaping Use: Never used   Substance and Sexual Activity    Alcohol use: Not Currently    Drug use: No    Sexual activity: Not on file   Other Topics Concern    Not on file   Social History Narrative    Not on file     Social Determinants of Health     Financial Resource Strain: Low Risk     Difficulty of Paying Living Expenses: Not hard at all   Food Insecurity: No Food Insecurity    Worried About Running Out of Food in the Last Year: Never true    [address] in the Last Year: Never true   Transportation Needs: No Transportation Needs    Lack of Transportation (Medical): No    Lack of Transportation (Non-Medical): No   Physical Activity: Not on file   Stress: Not on file   Social Connections: Not on file   Intimate Partner Violence: Not on file   Housing Stability: Not on file     Occupation: Last work in early October 2022 as [employer]   Lives with: His wife, his 15years old son and 21years old daughter  Home setup: 1 level house with a 2+1/2 step outside front door with bilateral handrail  Previous level of independence: Independent in all ADLs, community ambulation without using any assistive walking device; patient does not drive      Family History:       Problem Relation Age of Onset    Diabetes Father     Stroke Father     Diabetes Brother     Diabetes Brother        Review of Systems:  Review of Systems   Constitutional:  Negative for chills, diaphoresis, fatigue and fever. HENT:  Negative for ear discharge, ear pain, hearing loss, rhinorrhea, sneezing, sore throat, tinnitus and trouble swallowing. Eyes:  Negative for pain, discharge and visual disturbance. Respiratory:  Negative for cough, shortness of breath and wheezing. Cardiovascular:  Negative for chest pain, palpitations and leg swelling. Gastrointestinal:  Positive for constipation. Negative for abdominal pain, diarrhea, nausea and vomiting. Endocrine: Negative for cold intolerance and heat intolerance. Genitourinary:  Positive for difficulty urinating. Negative for dysuria. Musculoskeletal:  Positive for back pain (Upper back), gait problem and neck pain. Negative for arthralgias, joint swelling and myalgias. Skin:  Negative for rash. Allergic/Immunologic: Negative for food allergies. Neurological:  Positive for speech difficulty, weakness (Right upper and lower extremities) and numbness (Bilateral feet and hands intermittently). Negative for dizziness, tremors, facial asymmetry, light-headedness and headaches. Hematological:  Does not bruise/bleed easily. Psychiatric/Behavioral:  Negative for dysphoric mood, hallucinations and sleep disturbance. The patient is not nervous/anxious.         Physical Exam:  /86   Pulse 93   Temp 97.7 °F (36.5 °C) (Oral)   Resp 18   Ht 5' 10\" (1.778 m)   Wt 140 lb 3.4 oz (63.6 kg)   SpO2 99%   BMI 20.12 kg/m²   Physical Exam  General:  well-developed, well nourished  male; in no acute distress ; appropriate affect & mood; sitting on reclining chair comfortably  Eyes: pupil equally round ; extra-ocular motion intact bilaterally  Head, Ear, Nose, Mouth & Throat : normocephalic ; no tenderness at the face or head scalp; no discharge from ears or nose ; no deformity ; no facial swelling ; oral mucosa pink   Neck :  supple ; no tenderness ; mild muscle spasm at bilateral cervical paraspinal muscles  Cardiovascular : Presence of newly healed vertical surgical scar at sternum with tenderness; regular rate & rhythm ; normal S1 & S2 heart sound ; no murmur ; normal peripheral pulse at the bilateral upper extremities; reduced pulse strength at the bilateral lower extremities  Pulmonary : Present breath sounds at the bilateral lung fields; no wheezing ; no rale; no crackle  Gastrointestinal : soft, slightly protruded abdomen without tenderness ; normal bowel sound present   Back : no tenderness; no muscle spasm  Skin: no skin lesion or rash ; no pitting edema at all 4 extremities  Musculoskeletal : no limb asymmetry; no limb deformity; no tenderness at bilateral upper & lower extremities; no palpable mass at limbs ; no joints laxity or crepitation ; shoulder flexion and abduction passive ROM reaching 170 degrees bilaterally; hip flexion passive ROM reaching 130 degrees bilaterally; normal functional joints ROM at bilateral upper & lower extremities  Cerebral :  alert ; awake ; oriented to place, person and time; follow two-step verbal command; able to recall 3/3 items given immediately and 2/3 items about 3 minutes later; able to repeat series of 5 single digit numbers in right order forward and backward; abstract thinking intact; perform serial 7 subtraction test for 6 steps and making 1 mistake ([id number]  Cerebellum : Dysmetria with the right finger-to-nose test; dysmetria with left finger-to-nose test ; very mild dysmetria with right heel-to-shin test; no dysmetria with the left heel-to-shin test; presence of right dysdiadochokinesia with rapid bilateral synchronous supination/pronation  Cranial Nerves : Tongue protrudes slightly to her right  (CN XII) ; mild right upper trapezius muscle weakness (CN XI) : grossly intact other CN II to X function  Sensory : intact light touch and pin prick sensation at bilateral upper & lower extremities  Motor : Slightly reduced muscle tone at right upper and right lower extremities; normal muscle tone at left upper & lower extremities ; 4+/5 to 5/5 muscle strength at right shoulder flexion and abduction; 4+/5 muscle strength at right elbow flexion and extension; 4+/5 muscle strength at right wrist extension; 4-/5 muscle strain at right finger extension; 4+/5 muscle strength at the right finger flexion and handgrip; 3+/5 to 4-/5 muscle strength at right finger abduction; 4-/5 to 4+/5 muscle strength at the right hip flexion; 4-/5 muscle strength at right knee flexion; 4+/5 muscle strength at the right knee extension; 4-/5 muscle strength at right ankle dorsiflexion; 4+/5 muscle strength at the right ankle plantarflexion; normal 5/5 muscle strength at left upper & lower extremities  Reflex : 1+ left biceps, left brachioradialis, left knee reflexes; 0 right biceps, bilateral triceps, right brachioradialis, right knee and bilateral ankle reflexes  Pathological Reflex :  No Ion's sign ; no Babinski sign ; no ankle clonus  Gait : Not assessed      Diagnostics:  Recent Results (from the past 24 hour(s))   POCT Glucose    Collection Time: 12/19/22 11:48 AM   Result Value Ref Range    POC Glucose 229 (H) 70 - 108 mg/dl   POCT Glucose    Collection Time: 12/19/22  4:47 PM   Result Value Ref Range    POC Glucose 197 (H) 70 - 108 mg/dl   POCT Glucose    Collection Time: 12/19/22  8:04 PM   Result Value Ref Range    POC Glucose 230 (H) 70 - 108 mg/dl   Basic Metabolic Panel    Collection Time: 12/20/22  3:43 AM   Result Value Ref Range    Sodium 136 135 - 145 meq/L    Potassium 4.2 3.5 - 5.2 meq/L    Chloride 97 (L) 98 - 111 meq/L    CO2 24 23 - 33 meq/L    Glucose 180 (H) 70 - 108 mg/dL    BUN 29 (H) 7 - 22 mg/dL    Creatinine 1.2 0.4 - 1.2 mg/dL    Calcium 8.4 (L) 8.5 - 10.5 mg/dL   CBC    Collection Time: 12/20/22  3:43 AM   Result Value Ref Range    WBC 7.0 4.8 - 10.8 thou/mm3    RBC 3.70 (L) 4.70 - 6.10 mill/mm3    Hemoglobin 10.1 (L) 14.0 - 18.0 gm/dl    Hematocrit 31.3 (L) 42.0 - 52.0 %    MCV 84.6 80.0 - 94.0 fL    MCH 27.3 26.0 - 33.0 pg    MCHC 32.3 32.2 - 35.5 gm/dl    RDW-CV 13.5 11.5 - 14.5 %    RDW-SD 41.4 35.0 - 45.0 fL    Platelets 333 814 - 351 thou/mm3    MPV 10.1 9.4 - 12.4 fL   Anion Gap    Collection Time: 12/20/22  3:43 AM   Result Value Ref Range    Anion Gap 15.0 8.0 - 16.0 meq/L   Glomerular Filtration Rate, Estimated    Collection Time: 12/20/22  3:43 AM   Result Value Ref Range    Est, Glom Filt Rate >60 >60 ml/min/1.73m2   POCT Glucose    Collection Time: 12/20/22  6:00 AM   Result Value Ref Range    POC Glucose 187 (H) 70 - 108 mg/dl          Latest Reference Range & Units 12/18/22 03:33 12/19/22 04:59 12/20/22 03:43   Sodium 135 - 145 meq/L 133 (L) 132 (L) 136   Potassium 3.5 - 5.2 meq/L 4.3 4.4 4.2   Chloride 98 - 111 meq/L 102 98 97 (L)   CO2 23 - 33 meq/L 20 (L) 21 (L) 24   BUN,BUNPL 7 - 22 mg/dL 31 (H) 29 (H) 29 (H)   Creatinine 0.4 - 1.2 mg/dL 1.8 (H) 1.3 (H) 1.2   Anion Gap 8.0 - 16.0 meq/L 11.0 13.0 15.0   Est, Glom Filt Rate >60 ml/min/1.73m2 42 ! >60 >60   Magnesium 1.6 - 2.4 mg/dL 1.8     Glucose, Random 70 - 108 mg/dL 148 (H) 179 (H) 180 (H)   CALCIUM, SERUM, [id number] 8.5 - 10.5 mg/dL 8.6 8.3 (L) 8.4 (L)   Phosphorus 2.4 - 4.7 mg/dL 2.9     (L): Data is abnormally low  (H): Data is abnormally high  !: Data is abnormal       Latest Reference Range & Units 12/19/22 16:47 12/19/22 20:04 12/20/22 06:00 12/20/22 09:03   POC Glucose 70 - 108 mg/dl 197 (H) 230 (H) 187 (H) 188 (H)   (H): Data is abnormally high       Latest Reference Range & Units 8/23/22 12:32 12/15/22 01:30   Hemoglobin A1C 4.4 - 6.4 % 7.8 7.6 (H)   (H): Data is abnormally high       Latest Reference Range & Units 12/16/22 05:09 12/16/22 09:38 12/18/22 02:10   Troponin T ng/ml  < 0.010 < 0.010   CHOLESTEROL, TOTAL, [id number] 100 - 199 mg/dL 99 (L)     HDL Cholesterol mg/dL 45     LDL Calculated mg/dL 42     Triglycerides 0 - 199 mg/dL 62     (L): Data is abnormally low       Latest Reference Range & Units 12/18/22 03:33 12/19/22 04:59 12/20/22 03:43   WBC 4.8 - 10.8 thou/mm3 6.1 7.4 7.0   RBC 4.70 - 6.10 mill/mm3 3.39 (L) 3.65 (L) 3.70 (L)   Hemoglobin Quant 14.0 - 18.0 gm/dl 9.3 (L) 10.2 (L) 10.1 (L)   Hematocrit 42.0 - 52.0 % 29.1 (L) 31.3 (L) 31.3 (L)   MCV 80.0 - 94.0 fL 85.8 85.8 84.6   MCH 26.0 - 33.0 pg 27.4 27.9 27.3   MCHC 32.2 - 35.5 gm/dl 32.0 (L) 32.6 32.3   MPV 9.4 - 12.4 fL 9.9 10.5 10.1   RDW-CV 11.5 - 14.5 % 13.4 13.6 13.5   RDW-SD 35.0 - 45.0 fL 42.3 42.9 41.4   Platelet Count 144 - 400 thou/mm3 238 260 279   (L): Data is abnormally low       Latest Reference Range & Units 12/15/22 05:19   Color, UA STRAW-YELLOW  BROWN ! Glucose, UA NEGATIVE mg/dl >= 1000 ! Bilirubin, Urine NEGATIVE  SMALL ! Ketones, Urine NEGATIVE  NEGATIVE   Blood, Urine NEGATIVE  MODERATE !   pH, UA 5.0 - 9.0  5.5   Protein, UA NEGATIVE  100 ! Urobilinogen, Urine 0.0 - 1.0 eu/dl 0.2   Nitrite, Urine NEGATIVE  POSITIVE ! Leukocyte Esterase, Urine NEGATIVE  SMALL ! Casts UA NONE SEEN /lpf NONE SEEN   CASTS 2 NONE SEEN /lpf NONE SEEN   Mucus, UA NONE SEEN/THREA  NONE SEEN   WBC, UA 0-4/hpf /hpf > 200   RBC, UA 0-2/hpf /hpf > 200   Epithelial Cells, UA 3-5/hpf /hpf 5-10   Renal Epithelial, UA NONE SEEN  NONE   Bacteria, UA FEW/NONE SEEN /hpf MANY   Amorphous, UA NONE SEEN  NONE SEEN   Yeast, Urine NONE SEEN  NONE SEEN   Crystals, UA NONE SEEN   NONE SEEN  NONE SEEN  NONE SEEN   Ictotest NEGATIVE  NEGATIVE   Character, Urine CLEAR-SL CLOUD  TURBID !   !: Data is abnormal      Blood culture 1 (12/15/2022)  Component 12/15/22 0410    Organism Klebsiella pneumoniae Abnormal     Organism Enterobacter cloacae complex Abnormal     Blood Culture, Routine Enterobacter species which may be initially susceptible to third generation cephalosporins may develop resistance within three to four days of initiation of this antimicrobial therapy.    Susceptibility  Klebsiella pneumoniae (1)  Antibiotic Interpretation Microscan  Method Status    cefTRIAXone Sensitive <=1 mcg/mL BACTERIAL SUSCEPTIBILITY PANEL BY DILIP Final    cefepime Sensitive <=1 mcg/mL BACTERIAL SUSCEPTIBILITY PANEL BY DILIP Final    ampicillin Resistant >=32 mcg/mL BACTERIAL SUSCEPTIBILITY PANEL BY DILIP Final    meropenem Sensitive <=0.25 mcg/mL BACTERIAL SUSCEPTIBILITY PANEL BY DILIP Final    trimethoprim-sulfamethoxazole Sensitive <=20 mcg/mL BACTERIAL SUSCEPTIBILITY PANEL BY DIILP Final    gentamicin Sensitive <=1 mcg/mL BACTERIAL SUSCEPTIBILITY PANEL BY DILIP Final    ciprofloxacin Sensitive <=0.25 mcg/mL BACTERIAL SUSCEPTIBILITY PANEL BY DILIP Final    Enterobacter cloacae complex (2)  Antibiotic Interpretation Microscan  Method Status    cefepime Sensitive <=1 mcg/mL BACTERIAL SUSCEPTIBILITY PANEL BY DILIP Final    meropenem Sensitive <=0.25 mcg/mL BACTERIAL SUSCEPTIBILITY PANEL BY DILIP Final    trimethoprim-sulfamethoxazole Sensitive <=20 mcg/mL BACTERIAL SUSCEPTIBILITY PANEL BY DILIP Final    gentamicin Sensitive <=1 mcg/mL BACTERIAL SUSCEPTIBILITY PANEL BY DILIP Final    ciprofloxacin Sensitive <=0.25 mcg/mL BACTERIAL SUSCEPTIBILITY PANEL BY DILIP Final        Blood culture 2 (12/15/2022) : Component 12/15/22 0412    Organism Klebsiella pneumoniae Abnormal     Blood Culture, Routine sensitivity done-previous specimen; see blood culture drawn on 12/15/2022 at 04:10        Urine culture (12/15/2022) :   Component 12/15/22 0519    Organism Klebsiella pneumoniae Abnormal     Urine Culture Reflex [place] count: >100,000 CFU/mL    Susceptibility  Klebsiella pneumoniae (1)  Antibiotic Interpretation Microscan  Method Status    amoxicillin-clavulanate Sensitive <=2 mcg/mL BACTERIAL SUSCEPTIBILITY PANEL BY DILIP Final    cefOXitin Sensitive <=4 mcg/mL BACTERIAL SUSCEPTIBILITY PANEL BY DILIP Final    cefTRIAXone Sensitive <=1 mcg/mL BACTERIAL SUSCEPTIBILITY PANEL BY DILIP Final    ampicillin Resistant >=32 mcg/mL BACTERIAL SUSCEPTIBILITY PANEL BY DILIP Final    meropenem Sensitive <=0.25 mcg/mL BACTERIAL SUSCEPTIBILITY PANEL BY DILIP Final    trimethoprim-sulfamethoxazole Sensitive <=20 mcg/mL BACTERIAL SUSCEPTIBILITY PANEL BY DILIP Final    gentamicin Sensitive <=1 mcg/mL BACTERIAL SUSCEPTIBILITY PANEL BY DILIP Final    tetracycline Sensitive <=1 mcg/mL BACTERIAL SUSCEPTIBILITY PANEL BY DILIP Final    nitrofurantoin Resistant 128 mcg/mL BACTERIAL SUSCEPTIBILITY PANEL BY DILIP Final        CT of cervical spine without contrast (12/15/2022) : Impression   1. Study is partially limited due to motion artifact and technique. Within this limitation, no acute fracture of the cervical spine. 2. Multilevel facet and uncovertebral joint arthropathy. CT of chest without contrast (12/15/2022) : Impression   1. No definitive acute rib fracture. 2. Chronic left seventh posterior rib fracture with callus formation. Likely chronic left eighth posterior rib fracture, mild sclerosis at the margins of this chronic appearing fracture. Metallic device external to the patient and medially posterior to the left eighth rib fracture partially limits evaluation at this region. 3. Moderate/severe bilateral hydronephrosis. No nephrolithiasis. Recommend consideration for dedicated cross-sectional imaging of the abdomen/pelvis for further evaluation of this finding. CT of thoracic spine reconstruction (12/15/2022) : Impression   1. No acute fracture of the thoracic spine. 2. Mild multilevel spondylosis of the thoracic spine. CT of abdomen and pelvis without contrast (12/15/2022) : Impression   1. The urinary bladder is dilated with significant mucosal thickening. Air within the mucosa of the urinary bladder. Inflammatory stranding surrounds the urinary bladder. An air-fluid level within the lumen of the bladder. 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 Recommend correlation with urinalysis. 2. Moderate bilateral hydronephrosis with mild bilateral perinephric stranding. Evaluation for infectious process including pyelonephritis limited without IV contrast, recommend correlation with urinalysis. Mild bilateral hydroureter throughout the course of the bilateral ureters without ureterolithiasis.    3. Moderate fecal burden throughout the colon greatest involving the ascending and transverse colon, favor constipation. 4. Perirectal and presacral stranding. Circumferential mucosal thickening of the distal rectum best identified on axial image 73 of series 2. Recommend correlation with direct visualization/colonoscopy to exclude underlying mucosal lesion. 5. Severe mucosal thickening of the stomach at the fundus and mid body which could represent changes of gastritis in the correct clinical setting. CT of the head without contrast (12/15/2022) : Impression   1. No acute intracranial abnormality. 2. Chronic findings as described above. MRI of brain without contrast (12/15/2022) : Impression   Acute ischemic event left parietal region coronal radiata. Possible small additional acute ischemic event within the right lobe of the cerebellum. Moderate nonspecific periventricular and deep white matter disease. This most often can ascribed to chronic small vessel ischemic change. MRA of the neck without contrast (12/15/2022) : Impression   Mild disease bilateral carotid bulbs. MRA of the head without contrast (12/15/2022) : Impression   Normal MRA of the brain. Limited renal ultrasound (12/19/2022) : Impression   Bilateral hydronephrosis as described, significantly improved vs prior US. Transesophageal echocardiogram (12/16/2020) :  Conclusions Summary  Left ventricle size is normal.   No asymmetrical left ventricular hypertrophy was seen. There was severe global hypokinesis of the left ventricle. Ejection fraction is visually estimated at 30%. Left atrial size was moderately dilated with no thrombus identified. ATRIAL SEPTUM: No defect or patent foramen ovale was identified. The left atrial appendage size was normal with no thrombus identified.       Impression:  Acute left parietal corona radiata and right cerebellar ischemic stroke causing right hemiparesis with impaired coordination, and dysarthria  Debility/physical decondition due to Klebsiella pneumonia urinary tract infection with emphysematous cystitis and bilateral hydronephrosis complicated by Klebsiella pneumonia bacteremia/sepsis  Urinary retention  Klebsiella pneumonia urinary tract infection with emphysematous cystitis and bilateral hydronephrosis  Klebsiella pneumoniae bacteremia/sepsis  Coronary artery disease with ischemic cardiomyopathy requiring two-vessel coronary artery bypass graft surgery on 11/7/2022  Hypertension  Diabetes mellitus type 2    Continuing ongoing PT, OT and speech therapy is medically indicated. The patient will benefit from a course of intensive inpatient rehabilitation treatment to further improve his function. He should be able to tolerate minimal 3-hour rehab intervention required for intensive rehabilitation program.  We will initiate insurance pre-cert process for possible inpatient rehab admission. We plan to admit the patient to inpatient rehab service when insurance approval is obtained, all medically necessary diagnostic test and treatment are completed, and the patient is medically stable to be discharged from acute hospital.      Recommendations:  Continue PT, OT and speech therapy while the patient remained in acute hospital  The patient will benefit from intensive inpatient rehab treatment program to improve his function. We will initiate insurance pre-cert process for possible inpatient rehab admission. Plan to admit the patient inpatient rehab service when insurance approval is obtained, all medically necessary diagnostic test and treatment are completed, and the patient is medically stable and cleared to be discharged from acute hospital.      It was my pleasure to evaluate Samantha Kwon today. Please call with questions.       Fallon Price MD

## 2022-12-20 NOTE — CARE COORDINATION
Collaborative Discharge Planning    Evelyn Matthews  :  1960  MRN:  [id number]    ADMIT DATE:  12/15/2022      Discharge Planning Discharge Planning  Type of Residence: House  Living Arrangements: Spouse/Significant Other  Support Systems: Spouse/Significant Other  Current Services Prior To Admission: None  Potential Assistance Needed: N/A  Potential DME Needed: Mason Mariaalbamyke  DME Ordered?: No  Potential Assistance Purchasing Medications: No  Type of Home Care Services: None  Patient expects to be discharged to[de-identified] Acute rehab  Follow Up Appointment: Best Day/Time :  (PM)  One/Two Story Residence: One story  White Board Notes /Social Work Whiteboard Notes  /Social Work Whiteboard: ; SW - From home with spouse. Await PT/OT recs and clinical course.     Discharge Plan Home with family  lives w spouse [place]; prefers new IPR (precert) w new crum; prefers new RW (unless goes to IPR; then can get there); therapy following; monitor Physiatry consult recommendations, has [employer] PTA; plans PO AB at discharge; collaborated w Gayathri Vera, [address] Liaison  Discharge Milestones and Delays: Clinical status  Emphysematous Cystitis/Fall/CVA/Bacteremia/UTI  History: recent CABG 11/7  12/15 Code Stroke/Acute CVA  Blood ID  Enterobacter Cloacae, Klebsiella Pneumoniae     ID Plans PO AB at discharge    Await IPR precet (day 1)          SIGNED:  Keyonna Guan RN   2022, 1:33 PM

## 2022-12-20 NOTE — PROGRESS NOTES
[facility] ICU STEPDOWN TELEMETRY 4K  Occupational Therapy  Daily Note  Time:   Time In: 1406  Time Out: 1423  Timed Code Treatment Minutes: 17 Minutes  Minutes: 17          Date: 2022  Patient Name: Ryanne Montalvo,   Gender: male      Room: [facility]13/013-A  MRN: [id number]  : 1960  (63 y.o.)  Referring Practitioner: Arnoldo Araiza MD  Diagnosis: emphysematous cycstitis  Additional Pertinent Hx: Ryanne Montalvo who is a 63 y.o. male with a history of hypertension, diabetes, CAD on  daily, recent CABG in 2022, ischemic cardiomyopathy is admitted to [facility] for sepsis, emphysematous cystitis and acute kidney injury on CKD. During admission exam patient stated that his right arm feldman has been weak and that he has had some slurred speech. Stroke alert was called for right-sided weakness and dysarthria on 12/15. On arrival patient's NIH was 4 for right upper extremity, right lower extremity weakness, 1 limb ataxia and dysarthria. On further questioning, patient and wife state that the right arm weakness actually started last night. Patient's wife noticed when patient was trying to get up from the toilet that he was unable to push himself up with his right arm. Last known well 2330 on 2022. Patient taken to imaging for stat CT head which revealed no ICH, midline shift, mass-effect. CTA head and neck deferred due to patient's LC. Decision for no tPA was made due to patient's time since last known well. Patient with relatively low NIH and symptoms which are not consistent with LVO, therefore no thrombectomy/neuro intervention at this time. Patient had stat MRI brain and MRA head and neck without contrast.  MRI showed acute ischemic stroke of left parietal region.     Restrictions/Precautions:  Restrictions/Precautions: Up as Tolerated, Fall Risk  Position Activity Restriction  Other position/activity restrictions: right side hemiparesis, recent CABG 11/22     SUBJECTIVE: Nurse approved Tx. Pt supine in bed, denied OOB activites. Reports he just got to bed and agreeable to UE ex's this day. PAIN: denies pain    Vitals: Vitals not assessed per clinical judgement, see nursing flowsheet    COGNITION: WFL    ADL:   No ADL's completed this session. .    ADDITIONAL ACTIVITIES:  Engaged in [address] ex's at the shoulder and elbow joints in all planes of motion with min [place] assist to R hand to engage theraband secondary to decrease FM strength. Pt completed BUEx 1x15 ea, with RB between each set to increase (I) with self care tasks. Pt demo'd good ROM in BUEs. ASSESSMENT:     Activity Tolerance:  Patient tolerance of  treatment: good. Discharge Recommendations: Inpatient Rehabilitation  Equipment Recommendations: Equipment Needed: Yes  Other: TBD. Pt's wife reports she would like a walker but pt is not safe to use walker at this time. Will continue to assess for needs  Plan: Times Per Week: 6x  Times Per Day: Once a day  Current Treatment Recommendations: Strengthening, Balance training, Self-Care / ADL, Equipment evaluation, education, & procurement, Safety education & training, Endurance training, Functional mobility training, Neuromuscular re-education    Patient Education  Patient Education: Role of OT, Plan of Care, Hemibody Awareness/Attention, and Importance of Increasing Activity     Goals  Short Term Goals  Time Frame for Short Term Goals: until discharge  Short Term Goal 1: Pt will complete stand pivot transfer with Min A x1 to improve access with ADLs  Short Term Goal 2: Pt will demonstrate functional mobility walking short distances while using any AD needed with no > than MIN A and cues to attend to his RUE for preparing to do self care while out of bed. Short Term Goal 3: Pt will improve R UE strength to 3+/5 to improve participation with ADLs.   Short Term Goal 4: Pt will improve dynamic sitting balance and tolerance to SBA x10 minutes to improve trunk control required for transfers and ADLs. Short Term Goal 5: Pt will perofrm UB ADls and grooming with Min A. Following session, patient left in safe position with all fall risk precautions in place.

## 2022-12-20 NOTE — PROGRESS NOTES
[address] [place] [address]  INPATIENT PHYSICAL THERAPY  DAILY NOTE  [facility] ICU STEPDOWN TELEMETRY 4K - 4K-13/013-A     Time In: 1201  Time Out: 1224  Timed Code Treatment Minutes: 23 Minutes  Minutes: 23          Date: 2022  Patient Name: Julio Corrales,  Gender:  male        MRN: [id number]  : 1960  (63 y.o.)     Referring Practitioner: Dr. Gracia Victor  Diagnosis: emphysematous cystitis  Additional Pertinent Hx: Julio Corrales who is a 63 y.o. male with a history of hypertension, diabetes, CAD on  daily, recent CABG in 2022, ischemic cardiomyopathy is admitted to [facility] for sepsis, emphysematous cystitis and acute kidney injury on CKD. During admission exam patient stated that his right arm feldman has been weak and that he has had some slurred speech. Stroke alert was called for right-sided weakness and dysarthria on 12/15. On arrival patient's NIH was 4 for right upper extremity, right lower extremity weakness, 1 limb ataxia and dysarthria. On further questioning, patient and wife state that the right arm weakness actually started last night. Patient's wife noticed when patient was trying to get up from the toilet that he was unable to push himself up with his right arm. Last known well 2330 on 2022. Patient taken to imaging for stat CT head which revealed no ICH, midline shift, mass-effect. CTA head and neck deferred due to patient's LC. Decision for no tPA was made due to patient's time since last known well. Patient with relatively low NIH and symptoms which are not consistent with LVO, therefore no thrombectomy/neuro intervention at this time. Patient had stat MRI brain and MRA head and neck without contrast.  MRI showed acute ischemic stroke of left parietal region.      Prior Level of Function:  Lives With: Spouse  Type of Home: House  Home Layout: One level  Home Access: Stairs to enter without rails, Stairs to enter with rails  Entrance Stairs - Number of Steps: 3 AMOR  Entrance Stairs - Rails: Both  Home Equipment: Yoni Star, rolling   Bathroom Shower/Tub: Tub/Shower unit  Bathroom Toilet: Standard  Bathroom Equipment: Tub transfer bench    Receives Help From: Family  ADL Assistance: Independent  Homemaking Assistance: Independent  Ambulation Assistance: Independent  Transfer Assistance: Independent  Active : No    Restrictions/Precautions:  Restrictions/Precautions: Up as Tolerated, Fall Risk  Position Activity Restriction  Other position/activity restrictions: right side hemiparesis, recent CABG 11/22      SUBJECTIVE: Pt sitting in bathroom with STNA having just finished up with bathing. Pt agreed to therapy. RN approved session. PAIN: 6-7/10: neck    Vitals: Vitals not assessed per clinical judgement, see nursing flowsheet    OBJECTIVE:  Bed Mobility:  Not Tested    Transfers:  Sit to Stand: Contact Guard Assistance  Stand to Sit:Stand By Assistance    Ambulation:  Contact Guard Assistance  Distance: 20 ft  Surface: Level Tile  Device:Rolling Walker  Gait Deviations:  Decreased Step Length Bilaterally and Decreased Gait Speed    Balance:  Static Sitting Balance:  Modified Independent  Dynamic Sitting Balance: Supervision  Static Standing Balance: Contact Guard Assistance  Dynamic Standing Balance: Contact Guard Assistance    Exercise:  Patient was guided in 1-2 set(s) 10-12 reps of exercise to both lower extremities. Ankle pumps, Glut sets, Quad sets, Hip abduction/adduction, Seated marches, Seated heel/toe raises, and Long arc quads. Exercises were completed for increased independence with functional mobility. Functional Outcome Measures: Completed  AM-PAC Inpatient Mobility Raw Score : 17  AM-PAC Inpatient T-Scale Score : 42.13    ASSESSMENT:  Assessment: Patient progressing toward established goals. Activity Tolerance:  Patient tolerance of  treatment: good. Equipment Recommendations: Other: cont to assess needs  Discharge Recommendations: Continue to assess pending progress, Inpatient Rehabilitation, and Patient would benefit from continued PT at discharge  Plan: Specific Instructions for Next Treatment: therex and mobility, pregait and balance activities  General Plan:  (6X N)  Specific Instructions for Next Treatment: therex and mobility, pregait and balance activities    Patient Education  Patient Education: Plan of Care, Home Exercise Program    Goals:  Patient Goals : do aggressive rehab to get independence back  Short Term Goals  Time Frame for Short Term Goals: by discharge  Short Term Goal 1: bed mobility with SBA to get in/out of bed  Short Term Goal 2: transfer with SBA to get in/out of chairs  Short Term Goal 3: amb >10'x1 with LRAD and CGA to walk safely to bathroom - MET, see revision  Revised Short-Term Goals:    Short Term Goals  Time Frame for Short Term Goals: by discharge  Short Term Goal 1: bed mobility with SBA to get in/out of bed  Short Term Goal 2: transfer with SBA to get in/out of chairs  Short Term Goal 3: amb >30'x1 with LRAD and SBA to walk safely to bathroom      Long Term Goals  Time Frame for Long Term Goals : no LTGs set secondary to short ELOS    Following session, patient left in safe position with all fall risk precautions in place. Pita Mcdaniel.  Nino Church [place] [address]

## 2022-12-20 NOTE — PROGRESS NOTES
[address] [place] [address]  INPATIENT SPEECH THERAPY  [facility] ICU STEPDOWN TELEMETRY 4K  DAILY NOTE    TIME   SLP Individual Minutes  Time In: 1135  Time Out: 5530  Minutes: 21  Timed Code Treatment Minutes: 0 Minutes   Speech- 11 minutes  Dysphagia tx- 10 minutes    Date: 2022  Patient Name: Sangeetha Daugherty      CSN: [id number]   : 1960  (63 y.o.)  Gender: male   Referring Physician:  Ines Fuller PA-C  Diagnosis: Emphysematous cystitis  Precautions: Fall Risk   Current Diet: Easy to chew with thin liquids  Swallowing Strategies: Standard Universal Swallow Precautions  Date of Last MBS/FEES: N/A    Pain:  No pain reported. Subjective:  Patient seen sitting upright in bed, pleasant and cooperative. No family present. Short-Term Goals:  SHORT TERM GOAL #1:  Goal 1: Patient will complete complex executive functioning tasks (i.e., medication management, complex reasoning/deductive reasoning, etc.) with 80% accuracy and minimal cuing in order to allow for safe return to PLOF. INTERVENTIONS:Excellent recall of information pertaining to medical care, recalling name of physiatrist that came to see him earlier today (Dr. Heidy Pulido), as well as as name of intake nurse (Oscar) to coordinate possible transition to IPR. Patient reporting change in speech clarity, provided education of speech strategies (slow rate, opening mouth more, speaking up) to improve speech clarity. Wrote strategies on patient's care board for future review. SHORT TERM GOAL #2:  Goal 2: Patient will complete complex attention tasks with no more than two errros in three minutes in order to improve completion of ADLs/IADLs. INTERVENTIONS:Did not address due to focus on other goals.      SHORT TERM GOAL #3:  Goal 3: Patient will consume an easy to chew diet and thin liquids with stable pulmonary status and adequate endurance to assist with meeting nutrition/hydration needs across 1-2  skilled dietary analysis  INTERVENTIONS:Completed trials of cracker x2 and thin liquids (continuous sips) x5 with mild oral dysphagia requiring slightly prolonged time for mastication and bolus formation. Patient verbalizing and demonstrating use of lingual sweep and liquid wash to ensure oral clearance. No cough, throat clear or change in vocal quality evident. Overall, patient reporting good success with easy to chew diet and thin liquids. Long-Term Goals:  No long term goals established due to estimated length of stay. EDUCATION:  Learner: Patient  Education:  Reviewed diet and strategies Speech strategies   Evaluation of Education: Verbalizes understanding    ASSESSMENT/PLAN:  Activity Tolerance:  Patient tolerance of  treatment: good. Appropriate participation      Assessment/Plan: Patient progressing toward established goals. Continues to require skilled care of licensed speech pathologist to progress toward achievement of established goals and plan of care. .     Plan for Next Session: Structured speech tasks, Higher level cognition   Discharge Recommendations:  KERRY Raya Ratel.  [address], [address]

## 2022-12-20 NOTE — PROGRESS NOTES
Progress note: Infectious diseases    Patient - Geena Torres,  Age - 63 y.o.    - 1960      Room Number - 4K-13/013-A   MRN -  [id number]   Acct # - [de-identified]  Date of Admission -  12/15/2022  1:08 AM    SUBJECTIVE:   No new issues  OBJECTIVE   VITALS    height is 5' 10\" (1.778 m) and weight is 140 lb 3.4 oz (63.6 kg). His oral temperature is 97.5 °F (36.4 °C). His blood pressure is 100/51 (abnormal) and his pulse is 86. His respiration is 16 and oxygen saturation is 96%. Wt Readings from Last 3 Encounters:   22 140 lb 3.4 oz (63.6 kg)   22 142 lb 9.6 oz (64.7 kg)   22 142 lb (64.4 kg)       I/O (24 Hours)    Intake/Output Summary (Last 24 hours) at 2022 1851  Last data filed at 2022 1337  Gross per 24 hour   Intake 2101.33 ml   Output 3575 ml   Net -1473.67 ml         General Appearance  Awake, alert, oriented,  not  In acute distress  HEENT - normocephalic, atraumatic, pink conjunctiva,  anicteric sclera  Neck - Supple, no mass.   Lungs -  Bilateral good air entry, no rhonchi, no wheeze  Cardiovascular - Heart sounds are normal.     Abdomen - soft, not distended, nontender,   Neurologic -oriented  Skin - No bruising or bleeding  Extremities - No edema, no cyanosis, clubbing   Bella in place  MEDICATIONS:      baclofen  5 mg Oral TID    [START ON 2022] metoprolol succinate  12.5 mg Oral Daily    meropenem  1,000 mg IntraVENous Q8H    [START ON 2022] glimepiride  4 mg Oral QAM AC    [START ON 2022] empagliflozin  25 mg Oral Daily    apixaban  5 mg Oral BID    tamsulosin  0.8 mg Oral Daily    sodium chloride flush  5-40 mL IntraVENous 2 times per day    atorvastatin  40 mg Oral Daily    buPROPion  150 mg Oral QAM    insulin lispro  0-8 Units SubCUTAneous TID     insulin lispro  0-4 Units SubCUTAneous Nightly    pantoprazole  40 mg Oral QAM AC      sodium chloride      [Held by provider] sodium chloride Stopped (12/18/22 1622)    dextrose       oxyCODONE, sodium chloride flush, sodium chloride, ondansetron **OR** ondansetron, polyethylene glycol, acetaminophen **OR** acetaminophen, glucose, dextrose bolus **OR** dextrose bolus, glucagon (rDNA), dextrose      LABS:     CBC:   Recent Labs     12/18/22  0333 12/19/22  0459 12/20/22  0343   WBC 6.1 7.4 7.0   HGB 9.3* 10.2* 10.1*    260 279       BMP:    Recent Labs     12/18/22  0333 12/19/22  0459 12/20/22  0343   * 132* 136   K 4.3 4.4 4.2    98 97*   CO2 20* 21* 24   BUN 31* 29* 29*   CREATININE 1.8* 1.3* 1.2   GLUCOSE 148* 179* 180*       Calcium:  Recent Labs     12/20/22  0343   CALCIUM 8.4*       Ionized Calcium:No results for input(s): IONCA in the last 72 hours. Magnesium:  Recent Labs     12/18/22  0333   MG 1.8       Phosphorus:  Recent Labs     12/18/22  0333   PHOS 2.9       BNP:No results for input(s): BNP in the last 72 hours. Glucose:  Recent Labs     12/20/22  0903 12/20/22  1229 12/20/22  1644   POCGLU 188* 265* 269*           Problem list of patient:     Patient Active Problem List   Diagnosis Code    Syncope R55    Facial injury S09. 93XA    Tobacco abuse Z72.0    Cranial facial fractures ([facility] [address] S02. 91XA, S02. 92XA    Diabetes mellitus type 2 in nonobese (HCC) E11.9    Fungal toenail infection B35.1    Golfer's elbow M77.00    Medical non-compliance Z91.199    Erectile dysfunction N52.9    NAVARRO (generalized anxiety disorder) F41.1    Impacted cerumen of right ear H61.21    Essential hypertension I10    Uncontrolled type 2 diabetes mellitus with hyperglycemia (HCC) E11.65    Thoracic arthritis M47.814    New onset of congestive heart failure (HCC) O63.2    Acute systolic congestive heart failure (HCC) I50.21    Nonischemic cardiomyopathy (HCC) I42.8    Stage 3a chronic kidney disease (HCC) N18.31    Tremor R25.1    LC (acute kidney injury) ([facility] [address] N17.9    Hyperlipidemia E78.5 Gastroesophageal reflux disease K21.9    S/P cardiac cath Z98.890    CAD, multiple vessel I25.10    Ischemic cardiomyopathy I25.5    ETOH abuse F10.10    S/P CABG x 2 Z95.1    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    Acute pyelonephritis N10    Bacteremia due to Enterobacter species R78.81, B96.89    Acute CVA (cerebrovascular accident) ([address] I63.9    Urinary retention R33.9         ASSESSMENT/PLAN     UTI(emphysematous): will transition to oral antibiotic on discharge  Hx of CVA  Deconditioning  Continue current treatment.     Grayson Conde MD, MD, FACP 12/20/2022 6:51 PM

## 2022-12-20 NOTE — PLAN OF CARE
Problem: Discharge Planning  Goal: Discharge to home or other facility with appropriate resources  Outcome: Progressing  Flowsheets (Taken 12/19/2022 2044)  Discharge to home or other facility with appropriate resources:   Identify barriers to discharge with patient and caregiver   Identify discharge learning needs (meds, wound care, etc)     Problem: ABCDS Injury Assessment  Goal: Absence of physical injury  Outcome: Progressing  Flowsheets (Taken 12/19/2022 2311)  Absence of Physical Injury: Implement safety measures based on patient assessment     Problem: Safety - Adult  Goal: Free from fall injury  Outcome: Progressing  Flowsheets (Taken 12/19/2022 2311)  Free From Fall Injury: Instruct family/caregiver on patient safety     Problem: Chronic Conditions and Co-morbidities  Goal: Patient's chronic conditions and co-morbidity symptoms are monitored and maintained or improved  Outcome: Progressing  Flowsheets (Taken 12/19/2022 2044)  Care Plan - Patient's Chronic Conditions and Co-Morbidity Symptoms are Monitored and Maintained or Improved:   Monitor and assess patient's chronic conditions and comorbid symptoms for stability, deterioration, or improvement   Collaborate with multidisciplinary team to address chronic and comorbid conditions and prevent exacerbation or deterioration     Problem: Pain  Goal: Verbalizes/displays adequate comfort level or baseline comfort level  Outcome: Progressing  Flowsheets (Taken 12/19/2022 2044)  Verbalizes/displays adequate comfort level or baseline comfort level:   Encourage patient to monitor pain and request assistance   Assess pain using appropriate pain scale   Administer analgesics based on type and severity of pain and evaluate response   Implement non-pharmacological measures as appropriate and evaluate response   Consider cultural and social influences on pain and pain management     Problem: Skin/Tissue Integrity  Goal: Absence of new skin breakdown  Description: 1. Monitor for areas of redness and/or skin breakdown  2. Assess vascular access sites hourly  3. Every 4-6 hours minimum:  Change oxygen saturation probe site  4. Every 4-6 hours:  If on nasal continuous positive airway pressure, respiratory therapy assess nares and determine need for appliance change or resting period. Outcome: Progressing     Problem: Neurosensory - Adult  Goal: Achieves stable or improved neurological status  Outcome: Progressing  Flowsheets (Taken 12/19/2022 2044)  Achieves stable or improved neurological status: Assess for and report changes in neurological status  Goal: Achieves maximal functionality and self care  Outcome: Progressing  Flowsheets (Taken 12/19/2022 2044)  Achieves maximal functionality and self care:   Monitor swallowing and airway patency with patient fatigue and changes in neurological status   Encourage and assist patient to increase activity and self care with guidance from physical therapy/occupational therapy   Care plan reviewed with patient. Patient verbalized understanding of the plan of care and contributed to goal setting.

## 2022-12-20 NOTE — PROGRESS NOTES
Internal Medicine Resident Progress Note    Patient:  Sangeetha Daugherty    YOB: 1960  Unit/Bed:4K-13/013-A  MRN: [id number]    Acct: [de-identified]   PCP: BLACK Aldridge CNP    Date of Admission: 12/15/2022      Assessment/Plan:  Acute ischemic stroke, code stroke called [id number] for right sided weakness and dysarthria. Initial NIHSS 4. Last known well uncertain. CT Head unremarkable for any acute events, however MRI showed acute ischemic stroke in the left parietal corona radiata region right cerebellar hemisphere, concerning for cardioembolic stroke. Ongoing right-sided deficits, mildly improved from yesterday. JOY performed [id number], full report pending. IPR consult, pending eval  Neurology signed off  Maintain <130/80  Start eliquis 5mg BID  Continue telemetry  NIHSS every shift  Cardiology consulted  Continue metoprolol 25mg daily  Continue statin  Speech following  Recommend easy to chew with thin liquids  Sepsis, secondary to klebsiella pneumoniae/enterobacter cloacae bacteremia. On admit met SIRS 2/4 (HR >90, WBC 22.3) with purulent UA and imaging showing emphysematous cystitis. Received 2L IVFs and levofloxacin in the ED. LA 1.3 [id number]. Blood cultures positive x2 for gram negative bacilli. Preliminary urine culture results show gram negative bacilli. Initially started on meropenem and vancomycin. Biofire positive for klebsiella pneumoniae and enterobacter cloacae complex, vancomycin discontinued. Blood culture sensitivities show organisms are both sensitive to cefepime and ciprofloxacin. UA positive for klebsiella pneumoniae. ID following  Continue meropenem max 7 days, will likely require prolonged course of antibiotics, however will be able to deescalate to PO abx on discharge  Hold IVFs if PO intake adequate, reassess frequently  Chest pain, s/p CAD s/p CABG x 2, CABG performed [id number]. Reported episode of significant chest pain and dyspnea early AM of [id number].  Reproducible on palpation, troponins negative. Cardiology following, chest pain likely incisional  Restarted metoprolol and continue statin as above. Consider increasing BB or adding amiodarone if he has further episodes of   Start baclofen 10mg TID  Continue to monitor for NSVT  Lifevest at discharge, already has at home. Bacteremia, secondary to emphysematous cystitis. Treatment as above. Emphysematous cystitis, secondary to chronic indwelling Crum. On CT Abd/Pelvis at admit. S/p urinary retention with chronic crum after CABG in November. UA growing gram negative bacilli. Treatment as above. Bilateral hydronephrosis, with bilateral perinephric stranding, secondary to urine retention. Noted lateral lobe hypertrophy and moderately obstructive prostate on recent cystoscopy. US renal ordered  Urology following  Repeat cystoscopy in 6-8 weeks after discharge  Continue Crum while inpatient  Voiding trial(s) prior to dc  Continue flomax  Urine retention, s/p CABG [id number] in setting of moderate BPH on recent cystoscopy. Failed multiple void trials during most recent hospitalization and again after Crum removal on [id number]. Subsequently replaced, currently productive of yellow urine with pale sediment. Flomax initially held d/t hypotension, restarted [id number]. Per urology, patient likely has a component of neurogenic bladder on existing moderate BPH and possible trauma during prior admit. Urology signed off  Plan for repeat cystoscopy [id number]  Avoid voiding trials   Discharge with Crum  Continue Flomax  LC, BUN 36/Cr 1.7 on admit. BUN/Cr improved to 29/1.3 [id number]. Continue gentle fluids as needed with careful attention to fluid status. Hyponatremia, Na 137 on admit, 132 [id number]. Continue to monitor with daily BMP and evening sodium  Gentle IVFs as tolerated  HFrEF, compensated. Secondary to ischemic cardiomyopathy. LVEF 25-30% per echo [id number]. Presented with Rossana Page with heart failure clinic.  Dry and hypotensive on initial presentation. Metoprolol initially held d/t hypotension  Cardiology consulted  Strict I/Os  Daily weights  Continue to hold metoprolol. CAD s/p CABG x 2, CABG performed [id number]. Follows with Dr. Antwon Duque. Troponin elevation 0.011 and EKG changes noted [id number]. Metoprolol held d/t hypotension. Continue treatment as above. NIDDM2, poorly controlled. HbA1c 7.6 [id number]. On trulicity, amaryl, and jardiance outpatient. Blood glucose 275 on admit. Holding home meds. Inpatient blood glucose goal 140-180, currently maintaining. MD SSI in place, but has not required insulin during this admit  Hypoglycemic protocol in place  HTN, essential. Hypotensive on admit, remains hypotensive. Holding home metoprolol. Continue to closely monitor BP. Expected discharge date:  1-3 days    Disposition: TBD - strong preference for IPR  [] Home  [] TCU  [] Rehab  [] Psych  [] SNF  [] [facility]  [] Other-    ===================================================================      Chief Complaint: fall/weakness    Hospital Course:   Per original HPI:  \"63 y.o. male who presented to [address] [place] [address] for evaluation of fall. PMHx significant for ischemic cardiomyopathy (echo EF 25-30%), CAD s/p CABG x2 (11/2022), primary hypertension, NIDDM type II, urinary retention. Patient presents for evaluation of a fall. Patient fell after getting up rom the toilet in the bathroom. He felt dizzy and lightheaded. He did not lose consciousness or hit his head. He was on the ground for approximately 20 minutes. His wife called EMS and patient was brought to the hospital.      History provided by patient and wife at bedside. Patient had CABG X2 on 11/7/22 at [facility] and developed urinary retention post CABG needing insertion of a Bella. Patient was previously hospitalized 10/28/22 - 11/15/22 and underwent CABG x2 on 11/7/22.  For the past week patient has been getting weaker and weaker, experiencing fatigue. He went to Dr. Abbe Russell on 12/13/22 for cystoscopy and removal of Bella. Since removal of Bella patient has not been able to urinate well. He has not been drinking and eating much in the last week. States that he feels \" tired\". He denies chest pain or shortness of breath. Initial vital signs reveal temp 98.4, respiratory rate 18, heart rate 112, blood pressure 95/68, SPO2 100 on room air. Lab work significant for sodium 132, chloride 97, bicarb 20, BUN 48, creatinine 2.1, blood glucose 275, WBC 22.3, hemoglobin 10.6. Urinalysis revealed positive for nitrite, small leukocyte esterase. CT chest reveals chronic rib fractures but no acute. CT cervical spine reveals no fractures. CT thorax reveals no acute fracture of the thoracic spine. CT abdomen pelvis reveals emphysematous cystitis, moderate bilateral hydronephrosis with mild bilateral perinephric stranding. ED treatment, patient was given Norco, 2 L of IV fluid, levofloxacin. \"      [id number] - experienced right-sided weakness and dysarthria with uncertain last known well time. Code stroke called, initial CT without acute changes, but MRI showed acute infarct in the left parietal region/corona radiata and possible smaller acute stroke in right cerebellar hemisphere. Concern for cardioembolic etiology. Subjective (past 24 hours):   Reports episode of diaphoresis without CP after working with PT today, however states that he feels overall better. Notes improvement in his dysarthria and in his right upper extremity strength. Has been working actively with PT/OT and is optimistic about rehab. Denies headache, dizziness, n/v, chest pain, dyspnea, abdominal pain, changes in bowels/bladder, skin changes, or edema. ROS: reviewed complete ROS unchanged unless otherwise stated in hospital course/subjective portion.        Medications:  Reviewed    Infusion Medications    sodium chloride      [Held by provider] sodium chloride Stopped (12/18/22 1622)    dextrose       Scheduled Medications    baclofen  10 mg Oral TID    metoprolol succinate  25 mg Oral Daily    apixaban  5 mg Oral BID    tamsulosin  0.8 mg Oral Daily    sodium chloride flush  5-40 mL IntraVENous 2 times per day    atorvastatin  40 mg Oral Daily    buPROPion  150 mg Oral QAM    insulin lispro  0-8 Units SubCUTAneous TID WC    insulin lispro  0-4 Units SubCUTAneous Nightly    pantoprazole  40 mg Oral QAM AC    meropenem  1,000 mg IntraVENous Q12H     PRN Meds: oxyCODONE, sodium chloride flush, sodium chloride, ondansetron **OR** ondansetron, polyethylene glycol, acetaminophen **OR** acetaminophen, glucose, dextrose bolus **OR** dextrose bolus, glucagon (rDNA), dextrose        Intake/Output Summary (Last 24 hours) at 12/19/2022 2328  Last data filed at 12/19/2022 2306  Gross per 24 hour   Intake 884.43 ml   Output 3950 ml   Net -3065.57 ml       Exam:  /78   Pulse 87   Temp 98.1 °F (36.7 °C) (Oral)   Resp 16   Ht 5' 10\" (1.778 m)   Wt 145 lb 4.5 oz (65.9 kg)   SpO2 100%   BMI 20.85 kg/m²     General: elderly  male, resting in bed on arrival, alert, cooperative  Eyes:  Nonicteric, no conjunctivitis, EOMs intact. HENT: Normocephalic, atraumatic. Nares normal. Oral mucosa moist.  Hearing grossly intact. Neck: Supple, with full range of motion. No gross JVD appreciated. Respiratory:  Normal effort. Clear to auscultation, without rales or wheezes or rhonchi. Cardiovascular: RRR, good S1/S2, rubs, gallops, or murmurs. No lower extremity edema. Abdomen: Soft, non-distended. Ongoing suprapubic tenderness to palpation. Bowel sounds present. Musculoskeletal: No joint swelling or tenderness. Normal tone. No abnormal movements. Skin: Warm and dry. No rashes or lesions.   Neurologic:  Marked improvement in dysarthria today, right sided facial droop also improved, ongoing right-sided upper and lower extremity weakness more significant in the upper extremity with improved ROM in RUE today over previous exams. Psychiatric: Alert and oriented, normal insight and thought content. Capillary Refill: Brisk,< 3 seconds. Peripheral Pulses: +2 palpable, equal bilaterally. Labs:   Recent Labs     12/17/22 0323 12/18/22 0333 12/19/22 0459   WBC 8.8 6.1 7.4   HGB 9.1* 9.3* 10.2*   HCT 28.1* 29.1* 31.3*    238 260     Recent Labs     12/17/22 0323 12/18/22 0333 12/19/22 0459   * 133* 132*   K 4.1 4.3 4.4    102 98   CO2 20* 20* 21*   BUN 36* 31* 29*   CREATININE 1.8* 1.8* 1.3*   CALCIUM 8.5 8.6 8.3*   PHOS  --  2.9  --      No results for input(s): AST, ALT, BILIDIR, BILITOT, ALKPHOS in the last 72 hours. No results for input(s): INR in the last 72 hours. No results for input(s): Hammad Ebbs in the last 72 hours. No results for input(s): PROCAL in the last 72 hours. Lab Results   Component Value Date/Time    NITRU POSITIVE 12/15/2022 05:19 AM    WBCUA > 200 12/15/2022 05:19 AM    BACTERIA MANY 12/15/2022 05:19 AM    RBCUA > 200 12/15/2022 05:19 AM    BLOODU MODERATE 12/15/2022 05:19 AM    SPECGRAV 1.007 11/06/2022 09:55 AM    GLUCOSEU >= 1000 12/15/2022 05:19 AM       Radiology (48 hours):  CT ABDOMEN PELVIS WO CONTRAST Additional Contrast? None    Result Date: 12/15/2022  1. The urinary bladder is dilated with significant mucosal thickening. Air within the mucosa of the urinary bladder. Inflammatory stranding surrounds the urinary bladder. An air-fluid level within the lumen of the bladder. 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 Recommend correlation with urinalysis. 2. Moderate bilateral hydronephrosis with mild bilateral perinephric stranding. Evaluation for infectious process including pyelonephritis limited without IV contrast, recommend correlation with urinalysis. Mild bilateral hydroureter throughout the course of the bilateral ureters without ureterolithiasis.  3. Moderate fecal burden throughout the colon greatest involving the ascending and transverse colon, favor constipation. 4. Perirectal and presacral stranding. Circumferential mucosal thickening of the distal rectum best identified on axial image 73 of series 2. Recommend correlation with direct visualization/colonoscopy to exclude underlying mucosal lesion. 5. Severe mucosal thickening of the stomach at the fundus and mid body which could represent changes of gastritis in the correct clinical setting. This document has been electronically signed by: Arcelia Alvarenga DO on 12/15/2022 05:03 AM All CTs at this facility use dose modulation techniques and iterative reconstructions, and/or weight-based dosing when appropriate to reduce radiation to a low as reasonably achievable. CT HEAD WO CONTRAST    Result Date: 12/15/2022  1. No acute intracranial abnormality. The pertinent finding(s) was called to patient's nurse  Lisa Chappell RN at [phone] hours on 12/15/2022 by Dr. Ramos Holden. Verbal acknowledgment and readback was given. 2. Chronic findings as described above. **This report has been created using voice recognition software. It may contain minor errors which are inherent in voice recognition technology. ** Final report electronically signed by Dr Glenn Booker on 12/15/2022 4:46 PM    CT CHEST WO CONTRAST    Result Date: 12/15/2022  1. No definitive acute rib fracture. 2. Chronic left seventh posterior rib fracture with callus formation. Likely chronic left eighth posterior rib fracture, mild sclerosis at the margins of this chronic appearing fracture. Metallic device external to the patient and medially posterior to the left eighth rib fracture partially limits evaluation at this region. 3. Moderate/severe bilateral hydronephrosis. No nephrolithiasis. Recommend consideration for dedicated cross-sectional imaging of the abdomen/pelvis for further evaluation of this finding.  This document has been electronically signed by: Arcelia Alvarenga DO on 12/15/2022 03:05 AM All CTs at this facility use dose modulation techniques and iterative reconstructions, and/or weight-based dosing when appropriate to reduce radiation to a low as reasonably achievable. CT CERVICAL SPINE WO CONTRAST    Result Date: 12/15/2022  1. Study is partially limited due to motion artifact and technique. Within this limitation, no acute fracture of the cervical spine. 2. Multilevel facet and uncovertebral joint arthropathy. This document has been electronically signed by: Kellie Sparks DO on 12/15/2022 02:45 AM All CTs at this facility use dose modulation techniques and iterative reconstructions, and/or weight-based dosing when appropriate to reduce radiation to a low as reasonably achievable. MRA HEAD WO CONTRAST    Result Date: 12/15/2022  Impression: Normal MRA of the brain. This document has been electronically signed by: Chapin Eddy MD on 12/15/2022 08:10 PM 3D Post-processing was performed on this study. CT THORACIC RECONSTRUCTION WO POST PROCESS    Result Date: 12/15/2022  1. No acute fracture of the thoracic spine. 2. Mild multilevel spondylosis of the thoracic spine. This document has been electronically signed by: Kellie Sparks DO on 12/15/2022 02:58 AM All CTs at this facility use dose modulation techniques and iterative reconstructions, and/or weight-based dosing when appropriate to reduce radiation to a low as reasonably achievable. MRA NECK WO CONTRAST    Result Date: 12/15/2022  Impression: Mild disease bilateral carotid bulbs. This document has been electronically signed by: Chapin Eddy MD on 12/15/2022 08:20 PM Carotid stenosis and measurements are in accordance with NASCET criteria. 3D Post-processing was performed on this study. MRI BRAIN WO CONTRAST    Result Date: 12/15/2022  Impression: Acute ischemic event left parietal region coronal radiata. Possible small additional acute ischemic event within the right lobe of the cerebellum.  Moderate nonspecific periventricular and deep white matter disease. This most often can ascribed to chronic small vessel ischemic change. This document has been electronically signed by: Pineda Vargas MD on 12/15/2022 08:14 PM        DVT prophylaxis:    [] Lovenox  [] SCDs  [] SQ Heparin  [] Encourage ambulation   [x] Already on Anticoagulation - eliquis 5mg BID       Diet: ADULT DIET; Easy to Chew; 3 carb choices (45 gm/meal)  Code Status: Full Code  PT/OT: Yes  Tele: Yes  IVF: Holding NS 75ml/h. Continue frequent fluid status reassessments. Electronically signed by Bassem Dejesus MD, [id number] on 12/19/2022 at 11:28 PM    Case was discussed with Attending, Dr. Daniel Hayward.

## 2022-12-21 LAB
ANION GAP SERPL CALCULATED.3IONS-SCNC: 10 MEQ/L (ref 8–16)
BUN BLDV-MCNC: 31 MG/DL (ref 7–22)
CALCIUM SERPL-MCNC: 8.6 MG/DL (ref 8.5–10.5)
CHLORIDE BLD-SCNC: 102 MEQ/L (ref 98–111)
CO2: 25 MEQ/L (ref 23–33)
CREAT SERPL-MCNC: 1.2 MG/DL (ref 0.4–1.2)
ERYTHROCYTE [DISTWIDTH] IN BLOOD BY AUTOMATED COUNT: 13.6 % (ref 11.5–14.5)
ERYTHROCYTE [DISTWIDTH] IN BLOOD BY AUTOMATED COUNT: 42.8 FL (ref 35–45)
GFR SERPL CREATININE-BSD FRML MDRD: > 60 ML/MIN/1.73M2
GLUCOSE BLD-MCNC: 184 MG/DL (ref 70–108)
GLUCOSE BLD-MCNC: 186 MG/DL (ref 70–108)
GLUCOSE BLD-MCNC: 190 MG/DL (ref 70–108)
GLUCOSE BLD-MCNC: 197 MG/DL (ref 70–108)
GLUCOSE BLD-MCNC: 210 MG/DL (ref 70–108)
GLUCOSE BLD-MCNC: 215 MG/DL (ref 70–108)
HCT VFR BLD CALC: 31.6 % (ref 42–52)
HEMOGLOBIN: 10.3 GM/DL (ref 14–18)
MCH RBC QN AUTO: 27.8 PG (ref 26–33)
MCHC RBC AUTO-ENTMCNC: 32.6 GM/DL (ref 32.2–35.5)
MCV RBC AUTO: 85.2 FL (ref 80–94)
PLATELET # BLD: 316 THOU/MM3 (ref 130–400)
PMV BLD AUTO: 9.8 FL (ref 9.4–12.4)
POTASSIUM SERPL-SCNC: 4.5 MEQ/L (ref 3.5–5.2)
RBC # BLD: 3.71 MILL/MM3 (ref 4.7–6.1)
SODIUM BLD-SCNC: 137 MEQ/L (ref 135–145)
WBC # BLD: 7.8 THOU/MM3 (ref 4.8–10.8)

## 2022-12-21 PROCEDURE — 85027 COMPLETE CBC AUTOMATED: CPT

## 2022-12-21 PROCEDURE — 97535 SELF CARE MNGMENT TRAINING: CPT

## 2022-12-21 PROCEDURE — 97530 THERAPEUTIC ACTIVITIES: CPT

## 2022-12-21 PROCEDURE — 1200000000 HC SEMI PRIVATE

## 2022-12-21 PROCEDURE — 2580000003 HC RX 258: Performed by: STUDENT IN AN ORGANIZED HEALTH CARE EDUCATION/TRAINING PROGRAM

## 2022-12-21 PROCEDURE — 6370000000 HC RX 637 (ALT 250 FOR IP): Performed by: STUDENT IN AN ORGANIZED HEALTH CARE EDUCATION/TRAINING PROGRAM

## 2022-12-21 PROCEDURE — 36415 COLL VENOUS BLD VENIPUNCTURE: CPT

## 2022-12-21 PROCEDURE — 97116 GAIT TRAINING THERAPY: CPT

## 2022-12-21 PROCEDURE — 80048 BASIC METABOLIC PNL TOTAL CA: CPT

## 2022-12-21 PROCEDURE — 92526 ORAL FUNCTION THERAPY: CPT

## 2022-12-21 PROCEDURE — 6360000002 HC RX W HCPCS: Performed by: STUDENT IN AN ORGANIZED HEALTH CARE EDUCATION/TRAINING PROGRAM

## 2022-12-21 PROCEDURE — 6370000000 HC RX 637 (ALT 250 FOR IP)

## 2022-12-21 PROCEDURE — 97130 THER IVNTJ EA ADDL 15 MIN: CPT

## 2022-12-21 PROCEDURE — 97129 THER IVNTJ 1ST 15 MIN: CPT

## 2022-12-21 PROCEDURE — 82948 REAGENT STRIP/BLOOD GLUCOSE: CPT

## 2022-12-21 RX ORDER — BUPROPION HYDROCHLORIDE 150 MG/1
150 TABLET ORAL EVERY MORNING
Status: CANCELLED | OUTPATIENT
Start: 2022-12-22

## 2022-12-21 RX ORDER — INSULIN LISPRO 100 [IU]/ML
0-8 INJECTION, SOLUTION INTRAVENOUS; SUBCUTANEOUS
Status: CANCELLED | OUTPATIENT
Start: 2022-12-21

## 2022-12-21 RX ORDER — DEXTROSE MONOHYDRATE 100 MG/ML
INJECTION, SOLUTION INTRAVENOUS CONTINUOUS PRN
Status: CANCELLED | OUTPATIENT
Start: 2022-12-21

## 2022-12-21 RX ORDER — OXYCODONE HYDROCHLORIDE 5 MG/1
2.5 TABLET ORAL EVERY 6 HOURS PRN
Status: CANCELLED | OUTPATIENT
Start: 2022-12-21

## 2022-12-21 RX ORDER — ONDANSETRON 4 MG/1
4 TABLET, ORALLY DISINTEGRATING ORAL EVERY 8 HOURS PRN
Status: CANCELLED | OUTPATIENT
Start: 2022-12-21

## 2022-12-21 RX ORDER — OXYCODONE HYDROCHLORIDE 5 MG/1
5 TABLET ORAL EVERY 6 HOURS PRN
Status: CANCELLED | OUTPATIENT
Start: 2022-12-21

## 2022-12-21 RX ORDER — BISACODYL 10 MG
10 SUPPOSITORY, RECTAL RECTAL DAILY PRN
Status: CANCELLED | OUTPATIENT
Start: 2022-12-21

## 2022-12-21 RX ORDER — POLYETHYLENE GLYCOL 3350 17 G/17G
17 POWDER, FOR SOLUTION ORAL DAILY PRN
Status: CANCELLED | OUTPATIENT
Start: 2022-12-21

## 2022-12-21 RX ORDER — TAMSULOSIN HYDROCHLORIDE 0.4 MG/1
0.8 CAPSULE ORAL DAILY
Status: CANCELLED | OUTPATIENT
Start: 2022-12-22

## 2022-12-21 RX ORDER — SODIUM CHLORIDE 9 MG/ML
INJECTION, SOLUTION INTRAVENOUS PRN
Status: CANCELLED | OUTPATIENT
Start: 2022-12-21

## 2022-12-21 RX ORDER — SODIUM CHLORIDE 0.9 % (FLUSH) 0.9 %
5-40 SYRINGE (ML) INJECTION EVERY 12 HOURS SCHEDULED
Status: CANCELLED | OUTPATIENT
Start: 2022-12-21

## 2022-12-21 RX ORDER — BACLOFEN 10 MG/1
5 TABLET ORAL 3 TIMES DAILY
Status: CANCELLED | OUTPATIENT
Start: 2022-12-21

## 2022-12-21 RX ORDER — TRAZODONE HYDROCHLORIDE 50 MG/1
50 TABLET ORAL NIGHTLY PRN
Status: CANCELLED | OUTPATIENT
Start: 2022-12-21

## 2022-12-21 RX ORDER — INSULIN LISPRO 100 [IU]/ML
0-4 INJECTION, SOLUTION INTRAVENOUS; SUBCUTANEOUS NIGHTLY
Status: CANCELLED | OUTPATIENT
Start: 2022-12-21

## 2022-12-21 RX ORDER — SODIUM CHLORIDE 0.9 % (FLUSH) 0.9 %
5-40 SYRINGE (ML) INJECTION PRN
Status: CANCELLED | OUTPATIENT
Start: 2022-12-21

## 2022-12-21 RX ORDER — GLIPIZIDE 10 MG/1
10 TABLET ORAL
Status: CANCELLED | OUTPATIENT
Start: 2022-12-22

## 2022-12-21 RX ORDER — SENNA PLUS 8.6 MG/1
2 TABLET ORAL NIGHTLY PRN
Status: CANCELLED | OUTPATIENT
Start: 2022-12-21

## 2022-12-21 RX ORDER — ATORVASTATIN CALCIUM 40 MG/1
40 TABLET, FILM COATED ORAL DAILY
Status: CANCELLED | OUTPATIENT
Start: 2022-12-21

## 2022-12-21 RX ORDER — ACETAMINOPHEN 325 MG/1
650 TABLET ORAL EVERY 4 HOURS PRN
Status: CANCELLED | OUTPATIENT
Start: 2022-12-21

## 2022-12-21 RX ORDER — MULTIVITAMIN WITH IRON
1 TABLET ORAL
Status: CANCELLED | OUTPATIENT
Start: 2022-12-22

## 2022-12-21 RX ORDER — METOPROLOL SUCCINATE 25 MG/1
12.5 TABLET, EXTENDED RELEASE ORAL DAILY
Status: CANCELLED | OUTPATIENT
Start: 2022-12-22

## 2022-12-21 RX ORDER — DOCUSATE SODIUM 100 MG/1
100 CAPSULE, LIQUID FILLED ORAL 2 TIMES DAILY
Status: CANCELLED | OUTPATIENT
Start: 2022-12-21

## 2022-12-21 RX ORDER — PANTOPRAZOLE SODIUM 40 MG/1
40 TABLET, DELAYED RELEASE ORAL
Status: CANCELLED | OUTPATIENT
Start: 2022-12-22

## 2022-12-21 RX ORDER — LANOLIN ALCOHOL/MO/W.PET/CERES
3 CREAM (GRAM) TOPICAL NIGHTLY
Status: CANCELLED | OUTPATIENT
Start: 2022-12-21

## 2022-12-21 RX ADMIN — GLIPIZIDE 10 MG: 10 TABLET ORAL at 06:10

## 2022-12-21 RX ADMIN — MEROPENEM 1000 MG: 1 INJECTION, POWDER, FOR SOLUTION INTRAVENOUS at 22:07

## 2022-12-21 RX ADMIN — EMPAGLIFLOZIN 25 MG: 25 TABLET, FILM COATED ORAL at 08:13

## 2022-12-21 RX ADMIN — BACLOFEN 5 MG: 10 TABLET ORAL at 21:47

## 2022-12-21 RX ADMIN — BUPROPION HYDROCHLORIDE 150 MG: 150 TABLET, FILM COATED, EXTENDED RELEASE ORAL at 08:13

## 2022-12-21 RX ADMIN — ATORVASTATIN CALCIUM 40 MG: 40 TABLET, FILM COATED ORAL at 21:47

## 2022-12-21 RX ADMIN — APIXABAN 5 MG: 5 TABLET, FILM COATED ORAL at 08:13

## 2022-12-21 RX ADMIN — METOPROLOL SUCCINATE 12.5 MG: 25 TABLET, EXTENDED RELEASE ORAL at 08:13

## 2022-12-21 RX ADMIN — APIXABAN 5 MG: 5 TABLET, FILM COATED ORAL at 21:47

## 2022-12-21 RX ADMIN — TAMSULOSIN HYDROCHLORIDE 0.8 MG: 0.4 CAPSULE ORAL at 08:13

## 2022-12-21 RX ADMIN — MEROPENEM 1000 MG: 1 INJECTION, POWDER, FOR SOLUTION INTRAVENOUS at 13:38

## 2022-12-21 RX ADMIN — OXYCODONE 5 MG: 5 TABLET ORAL at 13:35

## 2022-12-21 RX ADMIN — PANTOPRAZOLE SODIUM 40 MG: 40 TABLET, DELAYED RELEASE ORAL at 06:10

## 2022-12-21 RX ADMIN — MEROPENEM 1000 MG: 1 INJECTION, POWDER, FOR SOLUTION INTRAVENOUS at 05:36

## 2022-12-21 RX ADMIN — BACLOFEN 5 MG: 10 TABLET ORAL at 13:36

## 2022-12-21 RX ADMIN — BACLOFEN 5 MG: 10 TABLET ORAL at 08:13

## 2022-12-21 RX ADMIN — SODIUM CHLORIDE, PRESERVATIVE FREE 10 ML: 5 INJECTION INTRAVENOUS at 08:10

## 2022-12-21 ASSESSMENT — PAIN SCALES - GENERAL: PAINLEVEL_OUTOF10: 0

## 2022-12-21 NOTE — PROGRESS NOTES
Internal Medicine Resident Progress Note    Patient:  Destini Talavera    YOB: 1960  Unit/Bed:5K-20/020-A  MRN: [id number]    Acct: [de-identified]   PCP: BLACK Frazier CNP    Date of Admission: 12/15/2022      Assessment/Plan:  Acute ischemic stroke, code stroke called [id number] for right sided weakness and dysarthria. Initial NIHSS 4. Last known well uncertain. CT Head unremarkable for any acute events, however MRI showed acute ischemic stroke in the left parietal corona radiata region and right cerebellar hemisphere, concerning for cardioembolic stroke. Ongoing right-sided deficits, mildly improved from yesterday. JOY performed [id number], found LVEF 30%, severe global hypokinesis of the LV, moderately dilted LA, and no defect or patent foramen ovale of the atrial septum, no thrombus in LA or in LA appendage. IPR consulted, tentative plan to discharge [id number] to [facility]. Stable for transfer off stepdown  Neurology signed off  Maintain <130/80  Start eliquis 5mg BID  Continue telemetry  NIHSS every shift  Cardiology consulted  Continue metoprolol 25mg daily  Continue statin  Speech following  Recommend easy to chew with thin liquids  Sepsis, secondary to klebsiella pneumoniae/enterobacter cloacae bacteremia. On admit met SIRS 2/4 (HR >90, WBC 22.3) with purulent UA and imaging showing emphysematous cystitis. Received 2L IVFs and levofloxacin in the ED. LA 1.3 [id number]. Blood cultures positive x2 for gram negative bacilli. Preliminary urine culture results show gram negative bacilli. Initially started on meropenem and vancomycin. Biofire positive for klebsiella pneumoniae and enterobacter cloacae complex, vancomycin discontinued. Blood culture sensitivities show organisms are both sensitive to cefepime and ciprofloxacin. UA positive for klebsiella pneumoniae. ID following  Continue meropenem, scheduled to end [id number], will deescalate to PO abx upon discharge to IPR.   Hold IVFs if PO intake adequate, reassess frequently  Chest pain, s/p CAD s/p CABG x 2, CABG performed [id number]. Reported episode of significant chest pain and dyspnea early AM of [id number]. Reproducible on palpation, troponins negative. Chest pain improved s/p addition of baclofen and resolution of hiccups. Cardiology following, chest pain likely incisional  Continue metoprolol and continue statin as above. Consider increasing BB or adding amiodarone if he has further episodes of   Continue baclofen 10mg TID  Continue to monitor for NSVT  Lifevest at discharge, already has at home. Bacteremia, secondary to emphysematous cystitis. Treatment as above. Emphysematous cystitis, secondary to chronic indwelling Crum. On CT Abd/Pelvis at admit. S/p urinary retention with chronic crum after CABG in November. UA growing gram negative bacilli. Treatment as above. Bilateral hydronephrosis, with bilateral perinephric stranding, secondary to urine retention. Noted lateral lobe hypertrophy and moderately obstructive prostate on recent cystoscopy. US renal ordered  Urology following  Repeat cystoscopy in 6-8 weeks after discharge  Continue Crum  Continue flomax  Urine retention, s/p CABG [id number] in setting of moderate BPH on recent cystoscopy. Failed multiple void trials during most recent hospitalization and again after Crum removal on [id number]. Subsequently replaced, currently productive of yellow urine with pale sediment. Flomax initially held d/t hypotension, restarted [id number]. Per urology, patient likely has a component of neurogenic bladder on existing moderate BPH and possible trauma during prior admit. Urology signed off  Plan for repeat cystoscopy [id number]  Avoid voiding trials   Discharge with Crum  Continue Flomax  LC, BUN 36/Cr 1.7 on admit. BUN/Cr improved to 29/1.3 [id number]. Continue gentle fluids as needed with careful attention to fluid status. Hyponatremia, Na 137 on admit, 132 [id number].    Continue to monitor with daily BMP and evening sodium  Gentle IVFs as tolerated  HFrEF, compensated. Secondary to ischemic cardiomyopathy. LVEF 25-30% per echo [id number]. Presented with [facility] [place] with heart failure clinic. Dry and hypotensive on initial presentation. Metoprolol initially held d/t hypotension  Cardiology following  Strict I/Os  Daily weights  Continue metoprolol  CAD s/p CABG x 2, CABG performed [id number]. Follows with Dr. Tina Valverde. Troponin elevation 0.011 and EKG changes noted 69KPH6192. Metoprolol held d/t hypotension, restarted at lower dose. Continue treatment as above. NIDDM2, poorly controlled. HbA1c 7.6 [id number]. On trulicity, amaryl, and jardiance outpatient. Blood glucose 275 on admit. Holding home meds. Inpatient blood glucose goal 140-180, currently maintaining. MD SSI in place, but has not required insulin during this admit  Hypoglycemic protocol in place  HTN, essential. Hypotensive on admit, remains hypotensive. Initially held home metoprolol, restarted at decreased dose d/t hypotension. Continue to closely monitor BP. Expected discharge date:  1-2 days    Disposition:   [] Home  [] TCU  [x] Rehab - IPR  [] Psych  [] SNF  [] [place]  [] Other-    ===================================================================      Chief Complaint: fall/weakness    Hospital Course:   Per original HPI:  \"63 y.o. male who presented to [facility] for evaluation of fall. PMHx significant for ischemic cardiomyopathy (echo EF 25-30%), CAD s/p CABG x2 (11/2022), primary hypertension, NIDDM type II, urinary retention. Patient presents for evaluation of a fall. Patient fell after getting up rom the toilet in the bathroom. He felt dizzy and lightheaded. He did not lose consciousness or hit his head. He was on the ground for approximately 20 minutes. His wife called EMS and patient was brought to the hospital.      History provided by patient and wife at bedside.   Patient had CABG X2 on 11/7/22 at [facility] and developed urinary retention post CABG needing insertion of a Bella. Patient was previously hospitalized 10/28/22 - 11/15/22 and underwent CABG x2 on 11/7/22. For the past week patient has been getting weaker and weaker, experiencing fatigue. He went to Dr. Radha García on 12/13/22 for cystoscopy and removal of Bella. Since removal of Bella patient has not been able to urinate well. He has not been drinking and eating much in the last week. States that he feels \" tired\". He denies chest pain or shortness of breath. Initial vital signs reveal temp 98.4, respiratory rate 18, heart rate 112, blood pressure 95/68, SPO2 100 on room air. Lab work significant for sodium 132, chloride 97, bicarb 20, BUN 48, creatinine 2.1, blood glucose 275, WBC 22.3, hemoglobin 10.6. Urinalysis revealed positive for nitrite, small leukocyte esterase. CT chest reveals chronic rib fractures but no acute. CT cervical spine reveals no fractures. CT thorax reveals no acute fracture of the thoracic spine. CT abdomen pelvis reveals emphysematous cystitis, moderate bilateral hydronephrosis with mild bilateral perinephric stranding. ED treatment, patient was given Norco, 2 L of IV fluid, levofloxacin. \"      [id number] - experienced right-sided weakness and dysarthria with uncertain last known well time. Code stroke called, initial CT without acute changes, but MRI showed acute infarct in the left parietal region/corona radiata and possible smaller acute stroke in right cerebellar hemisphere. Concern for cardioembolic etiology. [id number] - Pending precert to [facility]. [id number] - [facility] approved for 7 days, [id number]. Plan to discharge to [facility] tomorrow. Subjective (past 24 hours): Modest interval improvement in fine motor movement of the right hand, but has ongoing weakness. States he has not had chest pain or hiccups since starting baclofen.  Denies headache, dizziness, n/v, chest pain, dyspnea, abdominal pain, changes in bowels/bladder, skin changes, or edema. ROS: reviewed complete ROS unchanged unless otherwise stated in hospital course/subjective portion. Medications:  Reviewed    Infusion Medications    sodium chloride      [Held by provider] sodium chloride Stopped (12/18/22 1622)    dextrose       Scheduled Medications    baclofen  5 mg Oral TID    metoprolol succinate  12.5 mg Oral Daily    meropenem  1,000 mg IntraVENous Q8H    empagliflozin  25 mg Oral Daily    glipiZIDE  10 mg Oral QAM AC    apixaban  5 mg Oral BID    tamsulosin  0.8 mg Oral Daily    sodium chloride flush  5-40 mL IntraVENous 2 times per day    atorvastatin  40 mg Oral Daily    buPROPion  150 mg Oral QAM    insulin lispro  0-8 Units SubCUTAneous TID WC    insulin lispro  0-4 Units SubCUTAneous Nightly    pantoprazole  40 mg Oral QAM AC     PRN Meds: oxyCODONE, sodium chloride flush, sodium chloride, ondansetron **OR** ondansetron, polyethylene glycol, acetaminophen **OR** acetaminophen, glucose, dextrose bolus **OR** dextrose bolus, glucagon (rDNA), dextrose        Intake/Output Summary (Last 24 hours) at 12/21/2022 1726  Last data filed at 12/21/2022 1538  Gross per 24 hour   Intake 940 ml   Output 4000 ml   Net -3060 ml       Exam:  /74   Pulse 84   Temp 98.1 °F (36.7 °C) (Oral)   Resp 18   Ht 5' 10\" (1.778 m)   Wt 140 lb 3.4 oz (63.6 kg)   SpO2 100%   BMI 20.12 kg/m²     General: elderly  male, seated in bed on arrival, alert, cooperative, verbose  Eyes:  Nonicteric, no conjunctivitis, EOMs intact. HENT: Normocephalic, atraumatic. Nares normal. Oral mucosa moist.  Hearing grossly intact. Neck: Supple, with full range of motion. No gross JVD appreciated. Respiratory:  Normal effort. Clear to auscultation, without rales or wheezes or rhonchi. Cardiovascular: RRR, good S1/S2, rubs, gallops, or murmurs. No lower extremity edema. Abdomen: Soft, non-distended.  Ongoing suprapubic tenderness to palpation. Bowel sounds present. Musculoskeletal: No joint swelling or tenderness. Normal tone. No abnormal movements. Skin: Warm and dry. No rashes or lesions. Neurologic:  Continued improvement in dysarthria, right-sided facial droop and right-sided upper and lower extremity weakness. Ongoing significant right upper extremity fine motor deficit. Psychiatric: Alert and oriented, normal insight and thought content. Capillary Refill: Brisk,< 3 seconds. Peripheral Pulses: +2 palpable, equal bilaterally. Labs:   Recent Labs     12/19/22 0459 12/20/22 0343 12/21/22  0528   WBC 7.4 7.0 7.8   HGB 10.2* 10.1* 10.3*   HCT 31.3* 31.3* 31.6*    279 316     Recent Labs     12/19/22 0459 12/20/22 0343 12/21/22  0528   * 136 137   K 4.4 4.2 4.5   CL 98 97* 102   CO2 21* 24 25   BUN 29* 29* 31*   CREATININE 1.3* 1.2 1.2   CALCIUM 8.3* 8.4* 8.6     No results for input(s): AST, ALT, BILIDIR, BILITOT, ALKPHOS in the last 72 hours. No results for input(s): INR in the last 72 hours. No results for input(s): Sylvia Fuse in the last 72 hours. No results for input(s): PROCAL in the last 72 hours. Lab Results   Component Value Date/Time    NITRU POSITIVE 12/15/2022 05:19 AM    WBCUA > 200 12/15/2022 05:19 AM    BACTERIA MANY 12/15/2022 05:19 AM    RBCUA > 200 12/15/2022 05:19 AM    BLOODU MODERATE 12/15/2022 05:19 AM    SPECGRAV 1.007 11/06/2022 09:55 AM    GLUCOSEU >= 1000 12/15/2022 05:19 AM       Radiology (48 hours):  CT ABDOMEN PELVIS WO CONTRAST Additional Contrast? None    Result Date: 12/15/2022  1. The urinary bladder is dilated with significant mucosal thickening. Air within the mucosa of the urinary bladder. Inflammatory stranding surrounds the urinary bladder. An air-fluid level within the lumen of the bladder. 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 Recommend correlation with urinalysis.  2. Moderate bilateral hydronephrosis with mild bilateral perinephric stranding. Evaluation for infectious process including pyelonephritis limited without IV contrast, recommend correlation with urinalysis. Mild bilateral hydroureter throughout the course of the bilateral ureters without ureterolithiasis. 3. Moderate fecal burden throughout the colon greatest involving the ascending and transverse colon, favor constipation. 4. Perirectal and presacral stranding. Circumferential mucosal thickening of the distal rectum best identified on axial image 73 of series 2. Recommend correlation with direct visualization/colonoscopy to exclude underlying mucosal lesion. 5. Severe mucosal thickening of the stomach at the fundus and mid body which could represent changes of gastritis in the correct clinical setting. This document has been electronically signed by: Samuel Bauman DO on 12/15/2022 05:03 AM All CTs at this facility use dose modulation techniques and iterative reconstructions, and/or weight-based dosing when appropriate to reduce radiation to a low as reasonably achievable. CT HEAD WO CONTRAST    Result Date: 12/15/2022  1. No acute intracranial abnormality. The pertinent finding(s) was called to patient's nurse  Itzel Mercedes RN at [phone] hours on 12/15/2022 by Dr. Khanh Nicholson. Verbal acknowledgment and readback was given. 2. Chronic findings as described above. **This report has been created using voice recognition software. It may contain minor errors which are inherent in voice recognition technology. ** Final report electronically signed by Dr Richard Honeycutt on 12/15/2022 4:46 PM    CT CHEST WO CONTRAST    Result Date: 12/15/2022  1. No definitive acute rib fracture. 2. Chronic left seventh posterior rib fracture with callus formation. Likely chronic left eighth posterior rib fracture, mild sclerosis at the margins of this chronic appearing fracture.  Metallic device external to the patient and medially posterior to the left eighth rib fracture partially limits evaluation at this region. 3. Moderate/severe bilateral hydronephrosis. No nephrolithiasis. Recommend consideration for dedicated cross-sectional imaging of the abdomen/pelvis for further evaluation of this finding. This document has been electronically signed by: Rahul Hare DO on 12/15/2022 03:05 AM All CTs at this facility use dose modulation techniques and iterative reconstructions, and/or weight-based dosing when appropriate to reduce radiation to a low as reasonably achievable. CT CERVICAL SPINE WO CONTRAST    Result Date: 12/15/2022  1. Study is partially limited due to motion artifact and technique. Within this limitation, no acute fracture of the cervical spine. 2. Multilevel facet and uncovertebral joint arthropathy. This document has been electronically signed by: Rahul Hare DO on 12/15/2022 02:45 AM All CTs at this facility use dose modulation techniques and iterative reconstructions, and/or weight-based dosing when appropriate to reduce radiation to a low as reasonably achievable. MRA HEAD WO CONTRAST    Result Date: 12/15/2022  Impression: Normal MRA of the brain. This document has been electronically signed by: Karely Khan MD on 12/15/2022 08:10 PM 3D Post-processing was performed on this study. CT THORACIC RECONSTRUCTION WO POST PROCESS    Result Date: 12/15/2022  1. No acute fracture of the thoracic spine. 2. Mild multilevel spondylosis of the thoracic spine. This document has been electronically signed by: Rahul Hare DO on 12/15/2022 02:58 AM All CTs at this facility use dose modulation techniques and iterative reconstructions, and/or weight-based dosing when appropriate to reduce radiation to a low as reasonably achievable. MRA NECK WO CONTRAST    Result Date: 12/15/2022  Impression: Mild disease bilateral carotid bulbs. This document has been electronically signed by: Karely Khan MD on 12/15/2022 08:20 PM Carotid stenosis and measurements are in accordance with NASCET criteria.  3D Post-processing was performed on this study. MRI BRAIN WO CONTRAST    Result Date: 12/15/2022  Impression: Acute ischemic event left parietal region coronal radiata. Possible small additional acute ischemic event within the right lobe of the cerebellum. Moderate nonspecific periventricular and deep white matter disease. This most often can ascribed to chronic small vessel ischemic change. This document has been electronically signed by: Raymond Funez MD on 12/15/2022 08:14 PM        DVT prophylaxis:    [] Lovenox  [] SCDs  [] SQ Heparin  [] Encourage ambulation   [x] Already on Anticoagulation - eliquis 5mg BID       Diet: ADULT DIET; Easy to Chew; 3 carb choices (45 gm/meal)  Code Status: Full Code  PT/OT: Yes  Tele: Yes  IVF: Holding NS 75ml/h. Continue frequent fluid status reassessments. Electronically signed by Zuleyka Pal MD, [id number] on 12/21/2022 at 5:26 PM    Case was discussed with Attending, Dr. Remy Dykes.

## 2022-12-21 NOTE — PROGRESS NOTES
Call from Aladdin at [address] [place] [address], approved for IPR 7days  Auth # [id number]  dates 12/22-12/28 with NRD 12/28 fax updated clinicals to [phone] or Call [phone]. Will plan to admit to IPR on 12/22.

## 2022-12-21 NOTE — PROGRESS NOTES
Progress note: Infectious diseases    Patient - Sangeetha Daugherty,  Age - 63 y.o.    - 1960      Room Number - 5K-20/020-A   MRN -  [id number]   Acct # - [de-identified]  Date of Admission -  12/15/2022  1:08 AM    SUBJECTIVE:   No new complaints. OBJECTIVE   VITALS    height is 5' 10\" (1.778 m) and weight is 140 lb 3.4 oz (63.6 kg). His oral temperature is 98.1 °F (36.7 °C). His blood pressure is 123/74 and his pulse is 84. His respiration is 18 and oxygen saturation is 100%. Wt Readings from Last 3 Encounters:   22 140 lb 3.4 oz (63.6 kg)   22 142 lb 9.6 oz (64.7 kg)   22 142 lb (64.4 kg)       I/O (24 Hours)    Intake/Output Summary (Last 24 hours) at 2022 1557  Last data filed at 2022 1538  Gross per 24 hour   Intake 940 ml   Output 4000 ml   Net -3060 ml         General Appearance  Awake, alert, oriented,  not  In acute distress  HEENT - normocephalic, atraumatic, pink conjunctiva,  anicteric sclera  Neck - Supple, no mass.   Lungs -  Bilateral  air entry, no rhonchi, no wheeze  Cardiovascular - Heart sounds are normal.     Abdomen - soft, not distended, nontender,   Neurologic -oriented  Skin - No bruising or bleeding  Extremities - No edema, no cyanosis, clubbing   Bella in place  MEDICATIONS:      baclofen  5 mg Oral TID    metoprolol succinate  12.5 mg Oral Daily    meropenem  1,000 mg IntraVENous Q8H    empagliflozin  25 mg Oral Daily    glipiZIDE  10 mg Oral QAM AC    apixaban  5 mg Oral BID    tamsulosin  0.8 mg Oral Daily    sodium chloride flush  5-40 mL IntraVENous 2 times per day    atorvastatin  40 mg Oral Daily    buPROPion  150 mg Oral QAM    insulin lispro  0-8 Units SubCUTAneous TID WC    insulin lispro  0-4 Units SubCUTAneous Nightly    pantoprazole  40 mg Oral QAM AC      sodium chloride      [Held by provider] sodium chloride Stopped (22 1622)    dextrose oxyCODONE, sodium chloride flush, sodium chloride, ondansetron **OR** ondansetron, polyethylene glycol, acetaminophen **OR** acetaminophen, glucose, dextrose bolus **OR** dextrose bolus, glucagon (rDNA), dextrose      LABS:     CBC:   Recent Labs     12/19/22  0459 12/20/22  0343 12/21/22  0528   WBC 7.4 7.0 7.8   HGB 10.2* 10.1* 10.3*    279 316       BMP:    Recent Labs     12/19/22  0459 12/20/22  0343 12/21/22  0528   * 136 137   K 4.4 4.2 4.5   CL 98 97* 102   CO2 21* 24 25   BUN 29* 29* 31*   CREATININE 1.3* 1.2 1.2   GLUCOSE 179* 180* 215*       Calcium:  Recent Labs     12/21/22  0528   CALCIUM 8.6       Recent Labs     12/21/22  0607 12/21/22  0740 12/21/22  1208   POCGLU 210* 197* 184*           Problem list of patient:     Patient Active Problem List   Diagnosis Code    Syncope R55    Facial injury S09. 93XA    Tobacco abuse Z72.0    Cranial facial fractures ([facility] [address] S02. 91XA, S02. 92XA    Diabetes mellitus type 2 in nonobese (HCC) E11.9    Fungal toenail infection B35.1    Golfer's elbow M77.00    Medical non-compliance Z91.199    Erectile dysfunction N52.9    NAVARRO (generalized anxiety disorder) F41.1    Impacted cerumen of right ear H61.21    Essential hypertension I10    Uncontrolled type 2 diabetes mellitus with hyperglycemia (HCC) E11.65    Thoracic arthritis M47.814    New onset of congestive heart failure (HCC) P25.4    Acute systolic congestive heart failure (HCC) I50.21    Nonischemic cardiomyopathy (HCC) I42.8    Stage 3a chronic kidney disease (HCC) N18.31    Tremor R25.1    LC (acute kidney injury) ([facility] [address] N17.9    Hyperlipidemia E78.5    Gastroesophageal reflux disease K21.9    S/P cardiac cath Z98.890    CAD, multiple vessel I25.10    Ischemic cardiomyopathy I25.5    ETOH abuse F10.10    S/P CABG x 2 Z95.1    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    Acute pyelonephritis N10    Bacteremia due to Enterobacter species R78.81, B96.89    Acute CVA (cerebrovascular accident) ([facility] [address] I63.9 Urinary retention R33.9         ASSESSMENT/PLAN     UTI(emphysematous): Continue current treatment   Hx of CVA  Deconditioning: Plan for inpatient rehab pending insurance approval.  Continue current treatment.     Michael Taylor MD, MD, FACP 12/21/2022 3:57 PM

## 2022-12-21 NOTE — PROGRESS NOTES
[id number]. Reported episode of significant chest pain and dyspnea early AM of [id number]. Reproducible on palpation, troponins negative. Cardiology following, chest pain likely incisional  Continue metoprolol and continue statin as above. Consider increasing BB or adding amiodarone if he has further episodes of   Start baclofen 10mg TID  Continue to monitor for NSVT  [facility] at discharge, already has at home. Bacteremia, secondary to emphysematous cystitis. Treatment as above. Emphysematous cystitis, secondary to chronic indwelling Crum. On CT Abd/Pelvis at admit. S/p urinary retention with chronic crum after CABG in November. UA growing gram negative bacilli. Treatment as above. Bilateral hydronephrosis, with bilateral perinephric stranding, secondary to urine retention. Noted lateral lobe hypertrophy and moderately obstructive prostate on recent cystoscopy. US renal ordered  Urology following  Repeat cystoscopy in 6-8 weeks after discharge  Continue Crum while inpatient  Voiding trial(s) prior to dc  Continue flomax  Urine retention, s/p CABG [id number] in setting of moderate BPH on recent cystoscopy. Failed multiple void trials during most recent hospitalization and again after Crum removal on [id number]. Subsequently replaced, currently productive of yellow urine with pale sediment. Flomax initially held d/t hypotension, restarted [id number]. Per urology, patient likely has a component of neurogenic bladder on existing moderate BPH and possible trauma during prior admit. Urology signed off  Plan for repeat cystoscopy [id number]  Avoid voiding trials   Discharge with Crum  Continue Flomax  LC, BUN 36/Cr 1.7 on admit. BUN/Cr improved to 29/1.3 [id number]. Continue gentle fluids as needed with careful attention to fluid status. Hyponatremia, Na 137 on admit, 132 [id number]. Continue to monitor with daily BMP and evening sodium  Gentle IVFs as tolerated  HFrEF, compensated.  Secondary to ischemic cardiomyopathy. LVEF 25-30% per echo [id number]. Presented with Jose C Lechuga with heart failure clinic. Dry and hypotensive on initial presentation. Metoprolol initially held d/t hypotension  Cardiology consulted  Strict I/Os  Daily weights  Continue metoprolol  CAD s/p CABG x 2, CABG performed [id number]. Follows with Dr. Fernandez Birch. Troponin elevation 0.011 and EKG changes noted [id number]. Metoprolol held d/t hypotension, restarted at lower dose. Continue treatment as above. NIDDM2, poorly controlled. HbA1c 7.6 [id number]. On trulicity, amaryl, and jardiance outpatient. Blood glucose 275 on admit. Holding home meds. Inpatient blood glucose goal 140-180, currently maintaining. MD SSI in place, but has not required insulin during this admit  Hypoglycemic protocol in place  HTN, essential. Hypotensive on admit, remains hypotensive. Initially held home metoprolol, restarted at decreased dose d/t hypotension. Continue to closely monitor BP. Expected discharge date:  1-3 days    Disposition: TBD - strong preference for IPR  [] Home  [] TCU  [] Rehab  [] Psych  [] SNF  [] [place]  [] Other-    ===================================================================      Chief Complaint: fall/weakness    Hospital Course:   Per original HPI:  \"63 y.o. male who presented to [facility] for evaluation of fall. PMHx significant for ischemic cardiomyopathy (echo EF 25-30%), CAD s/p CABG x2 (11/2022), primary hypertension, NIDDM type II, urinary retention. Patient presents for evaluation of a fall. Patient fell after getting up rom the toilet in the bathroom. He felt dizzy and lightheaded. He did not lose consciousness or hit his head. He was on the ground for approximately 20 minutes. His wife called EMS and patient was brought to the hospital.      History provided by patient and wife at bedside.   Patient had CABG X2 on 11/7/22 at [facility] and developed urinary retention post CABG needing insertion of a Bella. Patient was previously hospitalized 10/28/22 - 11/15/22 and underwent CABG x2 on 11/7/22. For the past week patient has been getting weaker and weaker, experiencing fatigue. He went to Dr. Isaías Aldrich on 12/13/22 for cystoscopy and removal of Bella. Since removal of Bella patient has not been able to urinate well. He has not been drinking and eating much in the last week. States that he feels \" tired\". He denies chest pain or shortness of breath. Initial vital signs reveal temp 98.4, respiratory rate 18, heart rate 112, blood pressure 95/68, SPO2 100 on room air. Lab work significant for sodium 132, chloride 97, bicarb 20, BUN 48, creatinine 2.1, blood glucose 275, WBC 22.3, hemoglobin 10.6. Urinalysis revealed positive for nitrite, small leukocyte esterase. CT chest reveals chronic rib fractures but no acute. CT cervical spine reveals no fractures. CT thorax reveals no acute fracture of the thoracic spine. CT abdomen pelvis reveals emphysematous cystitis, moderate bilateral hydronephrosis with mild bilateral perinephric stranding. ED treatment, patient was given Norco, 2 L of IV fluid, levofloxacin. \"      [id number] - experienced right-sided weakness and dysarthria with uncertain last known well time. Code stroke called, initial CT without acute changes, but MRI showed acute infarct in the left parietal region/corona radiata and possible smaller acute stroke in right cerebellar hemisphere. Concern for cardioembolic etiology. [id number] - Pending precert to [facility]. Subjective (past 24 hours):   Continues to work hard toward rehab. Daily progress noted in dysarthria and right-sided weakness. Denies headache, dizziness, n/v, chest pain, dyspnea, abdominal pain, changes in bowels/bladder, skin changes, or edema. ROS: reviewed complete ROS unchanged unless otherwise stated in hospital course/subjective portion.        Medications:  Reviewed    Infusion Medications    sodium chloride      [Held by provider] sodium chloride Stopped (12/18/22 1622)    dextrose       Scheduled Medications    baclofen  5 mg Oral TID    [START ON 12/21/2022] metoprolol succinate  12.5 mg Oral Daily    meropenem  1,000 mg IntraVENous Q8H    [START ON 12/21/2022] empagliflozin  25 mg Oral Daily    [START ON 12/21/2022] glipiZIDE  10 mg Oral QAM AC    apixaban  5 mg Oral BID    tamsulosin  0.8 mg Oral Daily    sodium chloride flush  5-40 mL IntraVENous 2 times per day    atorvastatin  40 mg Oral Daily    buPROPion  150 mg Oral QAM    insulin lispro  0-8 Units SubCUTAneous TID WC    insulin lispro  0-4 Units SubCUTAneous Nightly    pantoprazole  40 mg Oral QAM AC     PRN Meds: oxyCODONE, sodium chloride flush, sodium chloride, ondansetron **OR** ondansetron, polyethylene glycol, acetaminophen **OR** acetaminophen, glucose, dextrose bolus **OR** dextrose bolus, glucagon (rDNA), dextrose        Intake/Output Summary (Last 24 hours) at 12/20/2022 1910  Last data filed at 12/20/2022 1337  Gross per 24 hour   Intake 2101.33 ml   Output 3575 ml   Net -1473.67 ml       Exam:  BP (!) 100/51   Pulse 86   Temp 97.5 °F (36.4 °C) (Oral)   Resp 16   Ht 5' 10\" (1.778 m)   Wt 140 lb 3.4 oz (63.6 kg)   SpO2 96%   BMI 20.12 kg/m²     General: elderly  male, seated in chair on arrival, alert, cooperative  Eyes:  Nonicteric, no conjunctivitis, EOMs intact. HENT: Normocephalic, atraumatic. Nares normal. Oral mucosa moist.  Hearing grossly intact. Neck: Supple, with full range of motion. No gross JVD appreciated. Respiratory:  Normal effort. Clear to auscultation, without rales or wheezes or rhonchi. Cardiovascular: RRR, good S1/S2, rubs, gallops, or murmurs. No lower extremity edema. Abdomen: Soft, non-distended. Ongoing suprapubic tenderness to palpation. Bowel sounds present. Musculoskeletal: No joint swelling or tenderness. Normal tone. No abnormal movements. Skin: Warm and dry.  No rashes or lesions. Neurologic:  Continued improvement in dysarthria, right-sided facial droop and right-sided upper and lower extremity weakness. Ongoing significant right upper extremity fine motor deficit. Psychiatric: Alert and oriented, normal insight and thought content. Capillary Refill: Brisk,< 3 seconds. Peripheral Pulses: +2 palpable, equal bilaterally. Labs:   Recent Labs     12/18/22  0333 12/19/22  0459 12/20/22  0343   WBC 6.1 7.4 7.0   HGB 9.3* 10.2* 10.1*   HCT 29.1* 31.3* 31.3*    260 279     Recent Labs     12/18/22  0333 12/19/22  0459 12/20/22  0343   * 132* 136   K 4.3 4.4 4.2    98 97*   CO2 20* 21* 24   BUN 31* 29* 29*   CREATININE 1.8* 1.3* 1.2   CALCIUM 8.6 8.3* 8.4*   PHOS 2.9  --   --      No results for input(s): AST, ALT, BILIDIR, BILITOT, ALKPHOS in the last 72 hours. No results for input(s): INR in the last 72 hours. No results for input(s): [place] in the last 72 hours. No results for input(s): PROCAL in the last 72 hours. Lab Results   Component Value Date/Time    NITRU POSITIVE 12/15/2022 05:19 AM    WBCUA > 200 12/15/2022 05:19 AM    BACTERIA MANY 12/15/2022 05:19 AM    RBCUA > 200 12/15/2022 05:19 AM    BLOODU MODERATE 12/15/2022 05:19 AM    SPECGRAV 1.007 11/06/2022 09:55 AM    GLUCOSEU >= 1000 12/15/2022 05:19 AM       Radiology (48 hours):  CT ABDOMEN PELVIS WO CONTRAST Additional Contrast? None    Result Date: 12/15/2022  1. The urinary bladder is dilated with significant mucosal thickening. Air within the mucosa of the urinary bladder. Inflammatory stranding surrounds the urinary bladder. An air-fluid level within the lumen of the bladder. 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 Recommend correlation with urinalysis. 2. Moderate bilateral hydronephrosis with mild bilateral perinephric stranding.  Evaluation for infectious process including pyelonephritis limited without IV contrast, recommend correlation with urinalysis. Mild bilateral hydroureter throughout the course of the bilateral ureters without ureterolithiasis. 3. Moderate fecal burden throughout the colon greatest involving the ascending and transverse colon, favor constipation. 4. Perirectal and presacral stranding. Circumferential mucosal thickening of the distal rectum best identified on axial image 73 of series 2. Recommend correlation with direct visualization/colonoscopy to exclude underlying mucosal lesion. 5. Severe mucosal thickening of the stomach at the fundus and mid body which could represent changes of gastritis in the correct clinical setting. This document has been electronically signed by: Samuel Bauman DO on 12/15/2022 05:03 AM All CTs at this facility use dose modulation techniques and iterative reconstructions, and/or weight-based dosing when appropriate to reduce radiation to a low as reasonably achievable. CT HEAD WO CONTRAST    Result Date: 12/15/2022  1. No acute intracranial abnormality. The pertinent finding(s) was called to patient's nurse  Itzel Mercedes RN at [phone] hours on 12/15/2022 by Dr. Khanh Nicholson. Verbal acknowledgment and readback was given. 2. Chronic findings as described above. **This report has been created using voice recognition software. It may contain minor errors which are inherent in voice recognition technology. ** Final report electronically signed by Dr Richard Honeycutt on 12/15/2022 4:46 PM    CT CHEST WO CONTRAST    Result Date: 12/15/2022  1. No definitive acute rib fracture. 2. Chronic left seventh posterior rib fracture with callus formation. Likely chronic left eighth posterior rib fracture, mild sclerosis at the margins of this chronic appearing fracture. Metallic device external to the patient and medially posterior to the left eighth rib fracture partially limits evaluation at this region. 3. Moderate/severe bilateral hydronephrosis. No nephrolithiasis.  Recommend consideration for dedicated cross-sectional imaging of the abdomen/pelvis for further evaluation of this finding. This document has been electronically signed by: Jacob Wheeler DO on 12/15/2022 03:05 AM All CTs at this facility use dose modulation techniques and iterative reconstructions, and/or weight-based dosing when appropriate to reduce radiation to a low as reasonably achievable. CT CERVICAL SPINE WO CONTRAST    Result Date: 12/15/2022  1. Study is partially limited due to motion artifact and technique. Within this limitation, no acute fracture of the cervical spine. 2. Multilevel facet and uncovertebral joint arthropathy. This document has been electronically signed by: Jacob Wheeler DO on 12/15/2022 02:45 AM All CTs at this facility use dose modulation techniques and iterative reconstructions, and/or weight-based dosing when appropriate to reduce radiation to a low as reasonably achievable. MRA HEAD WO CONTRAST    Result Date: 12/15/2022  Impression: Normal MRA of the brain. This document has been electronically signed by: Olivia Turcios MD on 12/15/2022 08:10 PM 3D Post-processing was performed on this study. CT THORACIC RECONSTRUCTION WO POST PROCESS    Result Date: 12/15/2022  1. No acute fracture of the thoracic spine. 2. Mild multilevel spondylosis of the thoracic spine. This document has been electronically signed by: Jacob Wheeler DO on 12/15/2022 02:58 AM All CTs at this facility use dose modulation techniques and iterative reconstructions, and/or weight-based dosing when appropriate to reduce radiation to a low as reasonably achievable. MRA NECK WO CONTRAST    Result Date: 12/15/2022  Impression: Mild disease bilateral carotid bulbs. This document has been electronically signed by: Olivia Turcios MD on 12/15/2022 08:20 PM Carotid stenosis and measurements are in accordance with NASCET criteria. 3D Post-processing was performed on this study.     MRI BRAIN WO CONTRAST    Result Date: 12/15/2022  Impression: Acute ischemic event left parietal region coronal radiata. Possible small additional acute ischemic event within the right lobe of the cerebellum. Moderate nonspecific periventricular and deep white matter disease. This most often can ascribed to chronic small vessel ischemic change. This document has been electronically signed by: France Mcgowan MD on 12/15/2022 08:14 PM        DVT prophylaxis:    [] Lovenox  [] SCDs  [] SQ Heparin  [] Encourage ambulation   [x] Already on Anticoagulation - eliquis 5mg BID       Diet: ADULT DIET; Easy to Chew; 3 carb choices (45 gm/meal)  Code Status: Full Code  PT/OT: Yes  Tele: Yes  IVF: Holding NS 75ml/h. Continue frequent fluid status reassessments. Electronically signed by Willam Reid MD, IM-[id number] on 12/20/2022 at 7:10 PM    Case was discussed with Attending, Dr. Itzel Medrano.

## 2022-12-21 NOTE — CARE COORDINATION
Collaborative Discharge Planning    Isacc Key  :  [id number]  MRN:  [id number]    ADMIT DATE:  12/15/2022      Discharge Planning Discharge Planning  Type of Residence: House  Living Arrangements: Spouse/Significant Other  Support Systems: Spouse/Significant Other  Current Services Prior To Admission: None  Potential Assistance Needed: N/A  Potential DME Needed: Fatmata Estrada  DME Ordered?: No  Potential Assistance Purchasing Medications: No  Type of Home Care Services: None  Patient expects to be discharged to[de-identified] Acute rehab  Follow Up Appointment: Best Day/Time :  (PM)  One/Two Story Residence: One story  White Board Notes /Social Work Whiteboard Notes  /Social Work Whiteboard: ; SW - From home with spouse. Await PT/OT recs and clinical course.     Discharge Plan Rehab  lives w spouse Abelino Baker; prefers new IPR (precert) w new crum; therapy following; Physiatry following, has [facility] PTA; plans PO AB at discharge  Discharge Milestones and Delays: Clinical status    Emphysematous Cystitis/Fall/CVA/Bacteremia/UTI  History: recent CABG , DM, Ischemic Cardiomyopathy  12/15 Code Stroke/Acute CVA   Renal US: B/L Hydronephrosis    Blood ID  Enterobacter Cloacae, Klebsiella Pneumoniae     IV Merem; ID Plans PO AB at discharge     Await IPR precet (day 2)    Client transferred to [facility]; hand-off given to Merry [address] [place] [address]       SIGNED:  Muriel Hodgkin, RN   2022, 8:00 AM

## 2022-12-21 NOTE — PROGRESS NOTES
[facility]  INPATIENT PHYSICAL THERAPY  DAILY NOTE  STR ONC MED 5K - 5K-20/020-A    Time In: 8245  Time Out: 1117  Timed Code Treatment Minutes: 29 Minutes  Minutes: 29          Date: 2022  Patient Name: Christine España,  Gender:  male        MRN: [id number]  : 1960  (63 y.o.)     Referring Practitioner: Dr. Cantu See  Diagnosis: emphysematous cystitis  Additional Pertinent Hx: Christine España who is a 63 y.o. male with a history of hypertension, diabetes, CAD on  daily, recent CABG in 2022, ischemic cardiomyopathy is admitted to [facility] for sepsis, emphysematous cystitis and acute kidney injury on CKD. During admission exam patient stated that his right arm feldman has been weak and that he has had some slurred speech. Stroke alert was called for right-sided weakness and dysarthria on 12/15. On arrival patient's NIH was 4 for right upper extremity, right lower extremity weakness, 1 limb ataxia and dysarthria. On further questioning, patient and wife state that the right arm weakness actually started last night. Patient's wife noticed when patient was trying to get up from the toilet that he was unable to push himself up with his right arm. Last known well 2330 on 2022. Patient taken to imaging for stat CT head which revealed no ICH, midline shift, mass-effect. CTA head and neck deferred due to patient's LC. Decision for no tPA was made due to patient's time since last known well. Patient with relatively low NIH and symptoms which are not consistent with LVO, therefore no thrombectomy/neuro intervention at this time. Patient had stat MRI brain and MRA head and neck without contrast.  MRI showed acute ischemic stroke of left parietal region.      Prior Level of Function:  Lives With: Spouse  Type of Home: House  Home Layout: One level  Home Access: Stairs to enter without rails, Stairs to enter with rails  Entrance Stairs - Number of Steps: 3 AMOR  Entrance Stairs - Rails: Both  Home Equipment: Nyoka Feil, rolling   Bathroom Shower/Tub: Tub/Shower unit  Bathroom Toilet: Standard  Bathroom Equipment: Tub transfer bench    Receives Help From: Family  ADL Assistance: Independent  Homemaking Assistance: Independent  Ambulation Assistance: Independent  Transfer Assistance: Independent  Active : No    Restrictions/Precautions:  Restrictions/Precautions: Up as Tolerated, Fall Risk  Position Activity Restriction  Other position/activity restrictions: right side hemiparesis, recent CABG 11/22     SUBJECTIVE: RN approved session. Patient in bed upon arrival and agreeable to therapy. PAIN: Not Rated    Vitals: Vitals not assessed per clinical judgement, see nursing flowsheet    OBJECTIVE:  Bed Mobility:  Supine to Sit: Minimal Assistance, X 1, with head of bed raised, with rail, with verbal cues , with increased time for completion  Scooting: Contact Guard Assistance    Transfers:  Sit to Stand: [employer], with increased time for completion, cues for hand placement, x3 trials; x 1 EOB, x 2 from chair  Stand to [place] [id number]    Ambulation:  Contact Guard Assistance, X 1, with verbal cues , with increased time for completion  Distance: 40 ft  Surface: Level Tile  Device:Rolling Walker  Gait Deviations: Forward Flexed Posture, Slow Jada, Decreased Step Length Bilaterally, Decreased Gait Speed, and Unsteady Gait at times  -Pt needed cues for R foot placement to stay within walker. One occurences of LOB due to R LE being outside of walker. Balance:  Dynamic Sitting Balance: Stand By Assistance  Static Standing Balance: Contact Guard Assistance  -No UE support x30 sec EO  Tandem Stance: x30 sec with each LE in front and no UE support  -Pt had increased R lean with tandem standing. Exercise:  Patient was guided in 1 set(s) 10 reps of exercise to both lower extremities. Seated marches and Long arc quads.   Exercises were completed for increased independence with functional mobility. Functional Outcome Measures: Completed  AM-PAC Inpatient Mobility Raw Score : 17  AM-PAC Inpatient T-Scale Score : 42.13    ASSESSMENT:  Assessment: Patient progressing toward established goals. Activity Tolerance:  Patient tolerance of  treatment: good. Equipment Recommendations: Other: cont to assess needs  Discharge Recommendations: Inpatient Rehabilitation and Patient would benefit from continued PT at discharge  Plan: Specific Instructions for Next Treatment: therex and mobility, pregait and balance activities  General Plan:  (6X N)  Specific Instructions for Next Treatment: therex and mobility, pregait and balance activities    Patient Education  Patient Education: Plan of Care, Bed Mobility, Transfers, Gait, Up in Chair for All Meals, Verbal Exercise Instruction    Goals:  Patient Goals : do aggressive rehab to get independence back  Short Term Goals  Time Frame for Short Term Goals: by discharge  Short Term Goal 1: bed mobility with SBA to get in/out of bed  Short Term Goal 2: transfer with SBA to get in/out of chairs  Short Term Goal 3: amb >30'x1 with LRAD and SBA to walk safely to bathroom  Long Term Goals  Time Frame for Long Term Goals : no LTGs set secondary to short ELOS    Following session, patient left in safe position with all fall risk precautions in place.

## 2022-12-21 NOTE — PROGRESS NOTES
[facility]  INPATIENT SPEECH THERAPY  STRZ ICU STEPDOWN TELEMETRY 4K  DAILY NOTE    TIME   SLP Individual Minutes  Time In: 2654  Time Out: 8432  Minutes: 49  Timed Code Treatment Minutes: 41 Minutes       Cognitive tx: 41 minutes  Dysphagia tx: 8 minutes    Date: 2022  Patient Name: Kian Little      CSN: [id number]   : 1960  (63 y.o.)  Gender: male   Referring Physician:  Ashley Cardoso PA-C  Diagnosis: Emphysematous cystitis  Precautions: Fall Risk   Current Diet: Easy to chew with thin liquids  Swallowing Strategies: Standard Universal Swallow Precautions  Date of Last MBS/FEES: N/A    Pain:  No pain reported. Subjective:  Patient seen sitting upright in recliner, pleasant and cooperative. No family present. Short-Term Goals:  SHORT TERM GOAL #1:  Goal 1: Patient will complete complex executive functioning tasks (i.e., medication management, complex reasoning/deductive reasoning, etc.) with 80% accuracy and minimal cuing in order to allow for safe return to [facility]. INTERVENTIONS:  Delayed Recall (24 hours)   Excellent recall of 3/3 speech strategies (slow rate, opening mouth more, speaking up) and implementation of speech strategies throughout session this date with speech intelligibility approximating 100% across all domains. Deductive Reasoning Puzzle #1  7/10 independently, 2/10 given min cues, 1/10 given mod cues   *good success with tasks     Money Management - [employer]   4/5 independently, 1/5 given min cues   *good mental math computation and utilization of pen/paper to improve thought organization/math computation. SHORT TERM GOAL #2:  Goal 2: Patient will complete complex attention tasks with no more than two errros in three minutes in order to improve completion of ADLs/IADLs. INTERVENTIONS:Did not address due to focus on other goals.      SHORT TERM GOAL #3:  Goal 3: Patient will consume an easy to chew diet and thin liquids with stable pulmonary status and adequate endurance to assist with meeting nutrition/hydration needs across 1-2  skilled dietary analysis  INTERVENTIONS: Completed trials of hard/coarse texture and thin liquids. Patient with evidence of adequate bolus control and manipulation, prolonged mastication with munch chew pattern that is effective for adequate textural breakdown when given additional time, and complete bolus clearance. Patient with consistent swallow initiation. Patient presence of immediate cough x1 during consumption of hard/coarse texture. Likely s/t talking while chewing. Provided education re: safe swallow strategies; verbal receptiveness noted. Long-Term Goals:  No long term goals established due to estimated length of stay. EDUCATION:  Learner: Patient  Education:  Reviewed diet and strategies, Reviewed ST goals and Plan of Care, and Education Related to [employer] [facility] Wellness   Evaluation of Education: [employer] understanding    ASSESSMENT/PLAN:  Activity Tolerance:  Patient tolerance of  treatment: good. Appropriate participation      Assessment/Plan: Patient progressing toward established goals. Continues to require skilled care of licensed speech pathologist to progress toward achievement of established goals and plan of care. .     Plan for Next Session: Higher level cognition and dietary analysis   [facility]ations:  [facility] [address]

## 2022-12-21 NOTE — PROGRESS NOTES
[facility] 5K  Occupational Therapy  Daily Note  Time:   Time In: 1300  Time Out: 1328  Timed Code Treatment Minutes: 28 Minutes  Minutes: 28          Date: 2022  Patient Name: Marion Fonseca,   Gender: male      Room: [facility]020-A  MRN: [id number]  : 1960  (63 y.o.)  Referring Practitioner: Alex Avalos MD  Diagnosis: emphysematous cycstitis  Additional Pertinent Hx: Marion Fonseca who is a 63 y.o. male with a history of hypertension, diabetes, CAD on  daily, recent CABG in 2022, ischemic cardiomyopathy is admitted to [facility] for sepsis, emphysematous cystitis and acute kidney injury on CKD. During admission exam patient stated that his right arm feldman has been weak and that he has had some slurred speech. Stroke alert was called for right-sided weakness and dysarthria on 12/15. On arrival patient's NIH was 4 for right upper extremity, right lower extremity weakness, 1 limb ataxia and dysarthria. On further questioning, patient and wife state that the right arm weakness actually started last night. Patient's wife noticed when patient was trying to get up from the toilet that he was unable to push himself up with his right arm. Last known well 2330 on 2022. Patient taken to imaging for stat CT head which revealed no ICH, midline shift, mass-effect. CTA head and neck deferred due to patient's LC. Decision for no tPA was made due to patient's time since last known well. Patient with relatively low NIH and symptoms which are not consistent with LVO, therefore no thrombectomy/neuro intervention at this time. Patient had stat MRI brain and MRA head and neck without contrast.  MRI showed acute ischemic stroke of left parietal region.     Restrictions/Precautions:  Restrictions/Precautions: Up as Tolerated, Fall Risk  Position Activity Restriction  Other position/activity restrictions: right side hemiparesis, recent CABG 11/22     SUBJECTIVE: RN approved session, patient seated up in recliner upon OT arrival and agreeable to tx with MIN encouragement discussing requirements for IPR. Patient states he is fatigued from not sleeping well due to being a new room for him being transferred from another floor last night. Patient A & O x 3. PAIN: 0/10:     Vitals: Vitals not assessed per clinical judgement, see nursing flowsheet    COGNITION: Tangential and talkative    ADL:   Grooming: Stand By Assistance. To stand at sink to complete oral care routine. Cues for 2 w/w placement  Lower Extremity Dressing: Stand By Assistance. To doff/don slipper sock seated in recliner . BALANCE:  Sitting Balance:  Stand By Assistance. Standing Balance: Contact Guard Assistance. With use of 2 w/w for support    BED MOBILITY:  Sit to Supine: Stand By Assistance cues for tech, use of hercules to pull up into bed    TRANSFERS:  Sit to Stand:  Stand By Assistance. Cues for tech x 2 trials with first trial patient unable to fully stand and sat back down in recliner. Cues for repositioning and tech with patient able to complete after tech    FUNCTIONAL MOBILITY:  Assistive Device: Rolling Walker  Assist Level:  Contact Guard Assistance. Distance: To and from bathroom  Slow gait, ataxic movements, shuffling        ASSESSMENT:  Activity Tolerance:  Patient tolerance of  treatment: good. Discharge Recommendations: Continue to assess pending progress, Inpatient Rehabilitation, and Patient would benefit from continued OT at discharge  Equipment Recommendations: Equipment Needed: Yes  Other: TBD. Pt's wife reports she would like a walker but pt is not safe to use walker at this time. Will continue to assess for needs  Plan: Times Per Week: 6x  Times Per Day:  Once a day  Current Treatment Recommendations: Strengthening, Balance training, Self-Care / ADL, Equipment evaluation, education, & procurement, Safety education & training, Endurance training, Functional mobility training, Neuromuscular re-education    Patient Education  Patient Education: Role of OT, Plan of Care, ADL's, Precautions, Reviewed Prior Education, Importance of Increasing Activity, and Assistive Device Safety    Goals  Short Term Goals  Time Frame for Short Term Goals: until discharge  Short Term Goal 1: Pt will complete stand pivot transfer with Min A x1 to improve access with ADLs  Short Term Goal 2: Pt will demonstrate functional mobility walking short distances while using any AD needed with no > than MIN A and cues to attend to his RUE for preparing to do self care while out of bed. Short Term Goal 3: Pt will improve R UE strength to 3+/5 to improve participation with ADLs. Short Term Goal 4: Pt will improve dynamic sitting balance and tolerance to SBA x10 minutes to improve trunk control required for transfers and ADLs. Short Term Goal 5: Pt will perofrm UB ADls and grooming with Min A. Following session, patient left in safe position with all fall risk precautions in place.

## 2022-12-21 NOTE — PLAN OF CARE
Problem: Discharge Planning  Goal: Discharge to home or other facility with appropriate resources  12/21/2022 1056 by Hilton Mcgarry RN  Outcome: Progressing     Problem: ABCDS Injury Assessment  Goal: Absence of physical injury  12/21/2022 1056 by Hilton Mcgarry RN  Outcome: Progressing     Problem: Safety - Adult  Goal: Free from fall injury  12/21/2022 1056 by Hilton Mcgarry RN  Outcome: Progressing     Problem: Chronic Conditions and Co-morbidities  Goal: Patient's chronic conditions and co-morbidity symptoms are monitored and maintained or improved  12/21/2022 1056 by Hilton Mcgarry RN  Outcome: Progressing     Problem: Pain  Goal: Verbalizes/displays adequate comfort level or baseline comfort level  12/21/2022 1056 by Hilton Mcgarry RN  Outcome: Progressing     Problem: Skin/Tissue Integrity  Goal: Absence of new skin breakdown  Description: 1. Monitor for areas of redness and/or skin breakdown  2. Assess vascular access sites hourly  3. Every 4-6 hours minimum:  Change oxygen saturation probe site  4. Every 4-6 hours:  If on nasal continuous positive airway pressure, respiratory therapy assess nares and determine need for appliance change or resting period. 12/21/2022 1056 by Hilton Mcgarry RN  Outcome: Progressing     Problem: Neurosensory - Adult  Goal: Achieves stable or improved neurological status  12/21/2022 1056 by Hilton Mcgarry RN  Outcome: Progressing     Problem: Neurosensory - Adult  Goal: Achieves maximal functionality and self care  12/21/2022 1056 by Hilton Mcgarry RN  Outcome: Progressing   Care plan reviewed with patient and . Patient and  verbalize understanding of the plan of care and contribute to goal setting.

## 2022-12-22 ENCOUNTER — APPOINTMENT (OUTPATIENT)
Dept: ULTRASOUND IMAGING | Age: 62
End: 2022-12-22
Payer: COMMERCIAL

## 2022-12-22 LAB
ANION GAP SERPL CALCULATED.3IONS-SCNC: 8 MEQ/L (ref 8–16)
BUN BLDV-MCNC: 31 MG/DL (ref 7–22)
CALCIUM SERPL-MCNC: 8.6 MG/DL (ref 8.5–10.5)
CHLORIDE BLD-SCNC: 103 MEQ/L (ref 98–111)
CO2: 29 MEQ/L (ref 23–33)
CREAT SERPL-MCNC: 1.3 MG/DL (ref 0.4–1.2)
ERYTHROCYTE [DISTWIDTH] IN BLOOD BY AUTOMATED COUNT: 13.5 % (ref 11.5–14.5)
ERYTHROCYTE [DISTWIDTH] IN BLOOD BY AUTOMATED COUNT: 42.5 FL (ref 35–45)
GFR SERPL CREATININE-BSD FRML MDRD: > 60 ML/MIN/1.73M2
GLUCOSE BLD-MCNC: 161 MG/DL (ref 70–108)
GLUCOSE BLD-MCNC: 166 MG/DL (ref 70–108)
GLUCOSE BLD-MCNC: 184 MG/DL (ref 70–108)
GLUCOSE BLD-MCNC: 188 MG/DL (ref 70–108)
GLUCOSE BLD-MCNC: 224 MG/DL (ref 70–108)
HCT VFR BLD CALC: 31.3 % (ref 42–52)
HEMOGLOBIN: 10 GM/DL (ref 14–18)
MCH RBC QN AUTO: 27.4 PG (ref 26–33)
MCHC RBC AUTO-ENTMCNC: 31.9 GM/DL (ref 32.2–35.5)
MCV RBC AUTO: 85.8 FL (ref 80–94)
PLATELET # BLD: 373 THOU/MM3 (ref 130–400)
PMV BLD AUTO: 9.7 FL (ref 9.4–12.4)
POTASSIUM SERPL-SCNC: 5 MEQ/L (ref 3.5–5.2)
RBC # BLD: 3.65 MILL/MM3 (ref 4.7–6.1)
SODIUM BLD-SCNC: 140 MEQ/L (ref 135–145)
WBC # BLD: 8.2 THOU/MM3 (ref 4.8–10.8)

## 2022-12-22 PROCEDURE — 6370000000 HC RX 637 (ALT 250 FOR IP): Performed by: STUDENT IN AN ORGANIZED HEALTH CARE EDUCATION/TRAINING PROGRAM

## 2022-12-22 PROCEDURE — 92526 ORAL FUNCTION THERAPY: CPT

## 2022-12-22 PROCEDURE — 6360000002 HC RX W HCPCS: Performed by: STUDENT IN AN ORGANIZED HEALTH CARE EDUCATION/TRAINING PROGRAM

## 2022-12-22 PROCEDURE — 82948 REAGENT STRIP/BLOOD GLUCOSE: CPT

## 2022-12-22 PROCEDURE — 97129 THER IVNTJ 1ST 15 MIN: CPT

## 2022-12-22 PROCEDURE — 6370000000 HC RX 637 (ALT 250 FOR IP)

## 2022-12-22 PROCEDURE — 2580000003 HC RX 258: Performed by: STUDENT IN AN ORGANIZED HEALTH CARE EDUCATION/TRAINING PROGRAM

## 2022-12-22 PROCEDURE — 76775 US EXAM ABDO BACK WALL LIM: CPT | Performed by: STUDENT IN AN ORGANIZED HEALTH CARE EDUCATION/TRAINING PROGRAM

## 2022-12-22 PROCEDURE — 1200000000 HC SEMI PRIVATE

## 2022-12-22 PROCEDURE — 80048 BASIC METABOLIC PNL TOTAL CA: CPT

## 2022-12-22 PROCEDURE — 85027 COMPLETE CBC AUTOMATED: CPT

## 2022-12-22 PROCEDURE — 97130 THER IVNTJ EA ADDL 15 MIN: CPT

## 2022-12-22 PROCEDURE — 36415 COLL VENOUS BLD VENIPUNCTURE: CPT

## 2022-12-22 PROCEDURE — 6370000000 HC RX 637 (ALT 250 FOR IP): Performed by: PHYSICAL MEDICINE & REHABILITATION

## 2022-12-22 RX ORDER — SENNA PLUS 8.6 MG/1
2 TABLET ORAL NIGHTLY
Status: CANCELLED | OUTPATIENT
Start: 2022-12-22

## 2022-12-22 RX ORDER — CIPROFLOXACIN 500 MG/1
500 TABLET, FILM COATED ORAL EVERY 12 HOURS SCHEDULED
Status: DISCONTINUED | OUTPATIENT
Start: 2022-12-22 | End: 2022-12-23

## 2022-12-22 RX ORDER — BISACODYL 10 MG
10 SUPPOSITORY, RECTAL RECTAL DAILY PRN
Status: DISCONTINUED | OUTPATIENT
Start: 2022-12-22 | End: 2022-12-23 | Stop reason: HOSPADM

## 2022-12-22 RX ORDER — SODIUM PHOSPHATE, DIBASIC AND SODIUM PHOSPHATE, MONOBASIC 7; 19 G/133ML; G/133ML
133 ENEMA RECTAL
Status: COMPLETED | OUTPATIENT
Start: 2022-12-22 | End: 2022-12-22

## 2022-12-22 RX ORDER — CIPROFLOXACIN 500 MG/1
500 TABLET, FILM COATED ORAL EVERY 12 HOURS SCHEDULED
Status: CANCELLED | OUTPATIENT
Start: 2022-12-22 | End: 2022-12-29

## 2022-12-22 RX ORDER — POLYETHYLENE GLYCOL 3350 17 G/17G
17 POWDER, FOR SOLUTION ORAL DAILY
Status: CANCELLED | OUTPATIENT
Start: 2022-12-23

## 2022-12-22 RX ORDER — DOCUSATE SODIUM 100 MG/1
100 CAPSULE, LIQUID FILLED ORAL 2 TIMES DAILY
Status: DISCONTINUED | OUTPATIENT
Start: 2022-12-22 | End: 2022-12-23 | Stop reason: HOSPADM

## 2022-12-22 RX ORDER — SENNA PLUS 8.6 MG/1
2 TABLET ORAL NIGHTLY
Status: DISCONTINUED | OUTPATIENT
Start: 2022-12-22 | End: 2022-12-23 | Stop reason: HOSPADM

## 2022-12-22 RX ADMIN — DOCUSATE SODIUM 100 MG: 100 CAPSULE, LIQUID FILLED ORAL at 11:43

## 2022-12-22 RX ADMIN — METOPROLOL SUCCINATE 12.5 MG: 25 TABLET, EXTENDED RELEASE ORAL at 09:10

## 2022-12-22 RX ADMIN — CIPROFLOXACIN 500 MG: 500 TABLET, FILM COATED ORAL at 11:43

## 2022-12-22 RX ADMIN — EMPAGLIFLOZIN 25 MG: 25 TABLET, FILM COATED ORAL at 09:11

## 2022-12-22 RX ADMIN — BISACODYL 10 MG: 10 SUPPOSITORY RECTAL at 14:10

## 2022-12-22 RX ADMIN — BACLOFEN 5 MG: 10 TABLET ORAL at 16:09

## 2022-12-22 RX ADMIN — MEROPENEM 1000 MG: 1 INJECTION, POWDER, FOR SOLUTION INTRAVENOUS at 05:16

## 2022-12-22 RX ADMIN — CIPROFLOXACIN 500 MG: 500 TABLET, FILM COATED ORAL at 20:10

## 2022-12-22 RX ADMIN — GLIPIZIDE 10 MG: 10 TABLET ORAL at 06:51

## 2022-12-22 RX ADMIN — SODIUM CHLORIDE, PRESERVATIVE FREE 10 ML: 5 INJECTION INTRAVENOUS at 20:13

## 2022-12-22 RX ADMIN — APIXABAN 5 MG: 5 TABLET, FILM COATED ORAL at 20:10

## 2022-12-22 RX ADMIN — INSULIN LISPRO 2 UNITS: 100 INJECTION, SOLUTION INTRAVENOUS; SUBCUTANEOUS at 12:07

## 2022-12-22 RX ADMIN — APIXABAN 5 MG: 5 TABLET, FILM COATED ORAL at 09:11

## 2022-12-22 RX ADMIN — ATORVASTATIN CALCIUM 40 MG: 40 TABLET, FILM COATED ORAL at 20:10

## 2022-12-22 RX ADMIN — BACLOFEN 5 MG: 10 TABLET ORAL at 20:11

## 2022-12-22 RX ADMIN — DOCUSATE SODIUM 100 MG: 100 CAPSULE, LIQUID FILLED ORAL at 20:11

## 2022-12-22 RX ADMIN — BUPROPION HYDROCHLORIDE 150 MG: 150 TABLET, FILM COATED, EXTENDED RELEASE ORAL at 09:10

## 2022-12-22 RX ADMIN — BACLOFEN 5 MG: 10 TABLET ORAL at 09:11

## 2022-12-22 RX ADMIN — PANTOPRAZOLE SODIUM 40 MG: 40 TABLET, DELAYED RELEASE ORAL at 06:51

## 2022-12-22 RX ADMIN — MAGNESIUM HYDROXIDE 30 ML: 400 SUSPENSION ORAL at 15:16

## 2022-12-22 RX ADMIN — SODIUM PHOSPHATE 1 ENEMA: 7; 19 ENEMA RECTAL at 16:05

## 2022-12-22 RX ADMIN — TAMSULOSIN HYDROCHLORIDE 0.8 MG: 0.4 CAPSULE ORAL at 09:10

## 2022-12-22 RX ADMIN — SENNOSIDES 17.2 MG: 8.6 TABLET, FILM COATED ORAL at 20:11

## 2022-12-22 ASSESSMENT — PAIN SCALES - GENERAL: PAINLEVEL_OUTOF10: 0

## 2022-12-22 NOTE — NURSE NAVIGATOR
Neuro Nurse Navigator Follow Up    This RN completed mRS score prior to patient discharge to [facility].

## 2022-12-22 NOTE — PROGRESS NOTES
[address] [facility]    Date of Admission: 12/15/2022  1:08 AM    Patient Name: Destini Talavera      MRN: [id number]    : 1960  (63 y.o.)  Gender: male     Payor Source: Payor: [facility] / Plan: [address] [place] [address] / Product Type: *No Product type* /   Secondary Payor Source:      Isolation Status: No active isolations    Inpatient Rehabilitation Admission Status: Planning admission to acute inpatient rehabilitation. Spoke with patient and primary RN Cristy regarding need for patient to have bowel movement prior to admission to [facility]. Patient to be admitted to [facility] room 7e87 please call [phone] for report.

## 2022-12-22 NOTE — PROGRESS NOTES
Progress note: Infectious diseases    Patient - Connor Bose,  Age - 63 y.o.    - 1960      Room Number - 5K-20/020-A   MRN -  [id number]   Acct # - [de-identified]  Date of Admission -  12/15/2022  1:08 AM    SUBJECTIVE:   No new issues. Plan for inpatient rehabilitation. OBJECTIVE   VITALS    height is 5' 10\" (1.778 m) and weight is 140 lb 3.4 oz (63.6 kg). His oral temperature is 98.4 °F (36.9 °C). His blood pressure is 110/58 (abnormal) and his pulse is 76. His respiration is 18 and oxygen saturation is 98%. Wt Readings from Last 3 Encounters:   22 140 lb 3.4 oz (63.6 kg)   22 142 lb 9.6 oz (64.7 kg)   22 142 lb (64.4 kg)       I/O (24 Hours)    Intake/Output Summary (Last 24 hours) at 2022 1211  Last data filed at 2022 7675  Gross per 24 hour   Intake 420 ml   Output 3300 ml   Net -2880 ml         General Appearance  Awake, alert, oriented,  not  In acute distress  HEENT - normocephalic, atraumatic, pink conjunctiva,  anicteric sclera  Neck - Supple, no mass.   Lungs -  Bilateral  air entry, no rhonchi, no wheeze  Cardiovascular - Heart sounds are normal.     Abdomen - soft, not distended, nontender,   Neurologic -oriented  Skin - No bruising or bleeding  Extremities - No edema, no cyanosis, clubbing   Bella in place  MEDICATIONS:      ciprofloxacin  500 mg Oral 2 times per day    docusate sodium  100 mg Oral BID    senna  2 tablet Oral Nightly    baclofen  5 mg Oral TID    metoprolol succinate  12.5 mg Oral Daily    empagliflozin  25 mg Oral Daily    glipiZIDE  10 mg Oral QAM AC    apixaban  5 mg Oral BID    tamsulosin  0.8 mg Oral Daily    sodium chloride flush  5-40 mL IntraVENous 2 times per day    atorvastatin  40 mg Oral Daily    buPROPion  150 mg Oral QAM    insulin lispro  0-8 Units SubCUTAneous TID WC    insulin lispro  0-4 Units SubCUTAneous Nightly    pantoprazole  40 mg Oral QAM AC      sodium chloride      [Held by provider] sodium chloride Stopped (12/18/22 1622)    dextrose       bisacodyl, oxyCODONE, sodium chloride flush, sodium chloride, ondansetron **OR** ondansetron, polyethylene glycol, acetaminophen **OR** acetaminophen, glucose, dextrose bolus **OR** dextrose bolus, glucagon (rDNA), dextrose      LABS:     CBC:   Recent Labs     12/20/22  0343 12/21/22  0528 12/22/22  0523   WBC 7.0 7.8 8.2   HGB 10.1* 10.3* 10.0*    316 373       BMP:    Recent Labs     12/20/22  0343 12/21/22  0528 12/22/22  0523    137 140   K 4.2 4.5 5.0   CL 97* 102 103   CO2 24 25 29   BUN 29* 31* 31*   CREATININE 1.2 1.2 1.3*   GLUCOSE 180* 215* 184*       Calcium:  Recent Labs     12/22/22  0523   CALCIUM 8.6       Recent Labs     12/21/22  1952 12/22/22  0750 12/22/22  1131   POCGLU 186* 161* 224*           Problem list of patient:     Patient Active Problem List   Diagnosis Code    Syncope R55    Facial injury S09. 93XA    Tobacco abuse Z72.0    Cranial facial fractures ([facility] [address] S02. 91XA, S02. 92XA    Diabetes mellitus type 2 in nonobese (HCC) E11.9    Fungal toenail infection B35.1    Golfer's elbow M77.00    Medical non-compliance Z91.199    Erectile dysfunction N52.9    NAVARRO (generalized anxiety disorder) F41.1    Impacted cerumen of right ear H61.21    Essential hypertension I10    Uncontrolled type 2 diabetes mellitus with hyperglycemia (HCC) E11.65    Thoracic arthritis M47.814    New onset of congestive heart failure (HCC) P33.9    Acute systolic congestive heart failure (HCC) I50.21    Nonischemic cardiomyopathy (HCC) I42.8    Stage 3a chronic kidney disease (HCC) N18.31    Tremor R25.1    LC (acute kidney injury) ([facility] [address] N17.9    Hyperlipidemia E78.5    Gastroesophageal reflux disease K21.9    S/P cardiac cath Z98.890    CAD, multiple vessel I25.10    Ischemic cardiomyopathy I25.5    ETOH abuse F10.10    S/P CABG x 2 Z95.1    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    Acute pyelonephritis N10    Bacteremia due to Enterobacter species R78.81, B96.89    Acute CVA (cerebrovascular accident) ([facility] [address] I63.9    Urinary retention R33.9         ASSESSMENT/PLAN     UTI(emphysematous): antibiotic changed to oral  Hx of CVA  Deconditioning: Plan for inpatient rehab    Will do follow up scan of the bladder: evaluate the wall of the bladder due to recent hx of emphysematous cystits   Continue current treatment.     Kayden Malcolm MD, MD, FACP 12/22/2022 12:11 PM

## 2022-12-22 NOTE — PROGRESS NOTES
Spoke with ROSA Driscoll regarding patient last bowel movement documented as 12/14. Update  7637  ROSA Driscoll called back stated that patient reported no bowel movement since admission. Yamila states she will work with Primary RN and reach out to provider for recommendation for bowel medication.

## 2022-12-22 NOTE — PROGRESS NOTES
[facility]  Acute Inpatient Rehab Preadmission Assessment    Patient Name: Kian Little        Ethnicity:Not of , Celestia Pilar, or Kiswahili origin  Race:White  MRN: [id number]    : 1960  (63 y.o.)  Gender: male     Admitted from:[address]  Initial Assessment    Date of admission to the hospital: 12/15/2022  1:08 AM  Date patient eligible for admission:2022    Primary Diagnosis: Right hemiparesis secondary to stroke      Did patient have surgery?  no    Physicians: Keke Medina MD, Dr. Bozena Trujillo, Dr. Michaela Hood, Dr. Dayna Gutierrez, Dr. Yesenia Salazar, Dr. Reynaldo Kim for clinical complications/co-morbidities:   Past Medical History:   Diagnosis Date    Coronary artery disease     Diabetes mellitus ([facility] [address]     Hypertension     Ischemic cardiomyopathy     Urinary retention        Financial Information  Primary insurance:  [address] [place] [address] [employer]:  None    Has the patient had two or more falls in the past year or any fall with injury in the past year? yes    Did the patient have major surgery during the 100 days prior to admission?   yes    Procedure:  2022  1) Coronary artery bypass grafting x 2, with the left internal mammary artery grafted to the left anterior descending artery, a reversed greater saphenous aortocoronary vein graft to the  ramus intermedius coronary artery  2) Endoscopic greater saphenous vein harvest  3) Intraoperative coronary angiography of all distal anastomoses  4) Insertion of a left common femoral intra-aortic balloon pump     Precautions: Restrictions/Precautions: Up as Tolerated, Fall Risk  Other position/activity restrictions: right side hemiparesis, recent CABG       Isolation Precautions: None       Physiatrist: Dr. Bozena Trujillo    Patients Occupation: Employed full time    Reviewed Lab and Diagnostic reports from Current Admission: Yes    Patients Prior Functional  Level: Prior Function  Receives Help From: Family  ADL Assistance: Independent  Homemaking Assistance: Independent  Ambulation Assistance: Independent  Transfer Assistance: Independent    Current functional status for upper extremity ADLs: Grooming: Stand By Assistance. To stand at sink to complete oral care routine. Cues for 2 w/w placement    Current functional status for lower extremity ADLs: Lower Extremity Dressing: Stand By Assistance. To doff/don slipper sock seated in recliner . Current functional status for bed, chair, wheelchair transfers: Sit to Stand:  Stand By Assistance. Cues for tech x 2 trials with first trial patient unable to fully stand and sat back down in recliner. Cues for repositioning and tech with patient able to complete after tech     Current functional status for toilet transfers: Stand by assistance     Current functional status for locomotion: Contact Guard Assistance, X 1, with verbal cues , with increased time for completion  Distance: 40 ft  Surface: Level Tile  Device:Rolling Walker  Gait Deviations: Forward Flexed Posture, Slow Jada, Decreased Step Length Bilaterally, Decreased Gait Speed, and Unsteady Gait at times  -Pt needed cues for R foot placement to stay within walker. One occurences of LOB due to R LE being outside of walker.      Current functional status for bladder management: Maximal Assistance    Current functional status for bowel management:Moderate assistance    Current functional status for comprehension: Complete independence    Current functional status for expression: Complete independence    Current functional status for social interaction: Complete independence    Current functional status for problem solving: Complete independence    Current functional status for memory: Complete independence    Expected level of Improvement in Self-Care:  Modified independence    Expected level of Improvement in Sphincter Control:  Modified independence    Expected level of Improvement in Transfers: Modified independence    Expected level of Improvement in Locomotion:  Modified independence    Expected level of Improvement in Communication and Social Cognition: Complete independence    Expected length of time to achieve that level of improvement: 2 weeks    Current rehab issues: ADL dysfunction,bladder management, bowel management,carry over of therapy techniques, discharge planning, disease and co-morbidity management, gait/mobility dysfunction, medication management, nutrition and hydration management, Ongoing assessment of safety, Pain management, Patient and family education, Prevention of secondary complications, Skin Integrity. Required therapy: Physical Therapy, Occupational Therapy and Speech Therapy 3 hours per day, 5-6 days per week. Recreational Therapy 1 hour per week. Expected Discharge Destination: Home    Expected Post Discharge Treatments: Home Care    Other information relevant to the care needs:   Lives With: Spouse  Type of Home: House  Home Layout: One level  Home Access: Stairs to enter without rails, Stairs to enter with rails  Entrance Stairs - Number of Steps: 3 AMOR  Entrance Stairs - Rails: Both  Bathroom Shower/Tub: Tub/Shower unit  Bathroom Toilet: Standard  Bathroom Equipment: Tub transfer bench  Home Equipment: Walker, rolling  Has the patient had two or more falls in the past year or any fall with injury in the past year?: Yes (2 falls since October)  Receives Help From: Family  ADL Assistance: Independent  Homemaking Assistance: Independent  Ambulation Assistance: Independent  Transfer Assistance: Independent  Active : No  Mode of Transportation: Car  Occupation: Full time employment  Type of Occupation: currently still on short term disability from CABG in November 2022    [address] provided to patient. Patient verbalized understanding.      I have reviewed and concur with the findings and results of the pre-admission screening assessment completed by the Inpatient Rehabilitation Admissions Coordinator.     Sydney Kaufman MD

## 2022-12-22 NOTE — DISCHARGE SUMMARY
Hospital Medicine Discharge Summary      Patient Identification:   Isacc Key   : 1960  MRN: [id number]   Account: [de-identified]      Patient's PCP: BLACK Andrade CNP    Admit Date: 12/15/2022     Discharge Date:   22    Admitting Physician: Raymond Shin MD     Discharge Physician: Pawan Richardson DO     Discharge Diagnoses:    Acute ischemic stroke in the left parietal corona radiata region and right cerebellar hemisphere  Sepsis secondary to Klebsiella pneumonia and Enterobacter cloacae bacteremia and emphysematous cystitis  Bilateral hydronephrosis  Urinary retention  Constipation, resolved  LC  Hyponatremia, resolved  Compensated HFrEF  CAD s/p CABG x2  NIDDM 2  Primary hypertension    The patient was seen and examined on day of discharge and this discharge summary is in conjunction with any daily progress note from day of discharge. Hospital Course:   Isacc Key is a 63 y.o. male admitted to [address] [place] [address] on 12/15/2022 for evaluation of fall. PMHx significant for ischemic cardiomyopathy (echo EF 25-30%), CAD s/p CABG x2 (2022), primary hypertension, NIDDM type II, urinary retention. Patient presents for evaluation of a fall. Patient fell after getting up rom the toilet in the bathroom. He felt dizzy and lightheaded. He did not lose consciousness or hit his head. He was on the ground for approximately 20 minutes. His wife called EMS and patient was brought to the hospital.   History provided by patient and wife at bedside. Patient had CABG X2 on 22 at [facility] and developed urinary retention post CABG needing insertion of a Bella. Patient was previously hospitalized 10/28/22 - 11/15/22 and underwent CABG x2 on 22. For the past week patient has been getting weaker and weaker, experiencing fatigue. He went to Dr. Wendy Krishnan on 22 for cystoscopy and removal of Bella. Since removal of Bella patient has not been able to urinate well.   He has not been drinking and eating much in the last week. States that he feels \" tired\". He denies chest pain or shortness of breath. Initial vital signs reveal temp 98.4, respiratory rate 18, heart rate 112, blood pressure 95/68, SPO2 100 on room air. Lab work significant for sodium 132, chloride 97, bicarb 20, BUN 48, creatinine 2.1, blood glucose 275, WBC 22.3, hemoglobin 10.6. Urinalysis revealed positive for nitrite, small leukocyte esterase. CT chest reveals chronic rib fractures but no acute. CT cervical spine reveals no fractures. CT thorax reveals no acute fracture of the thoracic spine. CT abdomen pelvis reveals emphysematous cystitis, moderate bilateral hydronephrosis with mild bilateral perinephric stranding. ED treatment, patient was given Norco, 2 L of IV fluid, levofloxacin. \"      [id number] - experienced right-sided weakness and dysarthria with uncertain last known well time. Code stroke called, initial CT without acute changes, but MRI showed acute infarct in the left parietal region/corona radiata and possible smaller acute stroke in right cerebellar hemisphere. Concern for cardioembolic etiology. Acute ischemic stroke, code stroke called [id number] for right sided weakness and dysarthria. Initial NIHSS 4. Last known well uncertain. CT Head unremarkable for any acute events, however MRI showed acute ischemic stroke in the left parietal corona radiata region and right cerebellar hemisphere, concerning for cardioembolic stroke. Ongoing right-sided deficits, mildly improved from yesterday. JOY performed [id number], found LVEF 30%, severe global hypokinesis of the LV, moderately dilted LA, and no defect or patent foramen ovale of the atrial septum, no thrombus in LA or in LA appendage.   Neurology recommended   Maintain <130/80  Start eliquis 5mg BID  Continue telemetry  NIHSS every shift  Cardiology consulted  Continue metoprolol 25mg daily  Continue statin  Speech Recommend easy to chew with thin liquids  Sepsis, secondary to klebsiella pneumoniae/enterobacter cloacae bacteremia. On admit met SIRS 2/4 (HR >90, WBC 22.3) with purulent UA and imaging showing emphysematous cystitis. Received 2L IVFs and levofloxacin in the ED. LA 1.3 [id number]. Blood cultures positive x2 for gram negative bacilli. Preliminary urine culture results show gram negative bacilli. Initially started on meropenem and vancomycin. Biofire positive for klebsiella pneumoniae and enterobacter cloacae complex, vancomycin discontinued. Blood culture sensitivities show organisms are both sensitive to cefepime and ciprofloxacin. UA positive for klebsiella pneumoniae. Received IV vancomycin 12/15, levofloxacin of 12/15, meropenem 12//22. De-escalated to p.o. ciprofloxacin 500mg q12h for 7 days on discharge. Follow up bladder ultrasound prior to discharge today. MSK chest pain, s/p CAD s/p CABG x 2, CABG performed [id number]. Reported episode of significant chest pain and dyspnea early AM of [id number]. Reproducible on palpation, troponins negative. Chest pain improved s/p addition of baclofen and resolution of hiccups. Cardiology following, chest pain likely incisional  Continue metoprolol and continue statin as above. Continue baclofen 10mg TID  Continue to monitor for NSVT  [employer] already has at home. Bacteremia, secondary to emphysematous cystitis. Treatment as above. Emphysematous cystitis, secondary to chronic indwelling Crum. On CT Abd/Pelvis at admit. S/p urinary retention with chronic crum after CABG in November. UA growing gram negative bacilli. Treatment as above. Discharged with Crum catheter per urology recommendations. Bilateral hydronephrosis, with bilateral perinephric stranding, secondary to urine retention. Noted lateral lobe hypertrophy and moderately obstructive prostate on recent cystoscopy. US renal showed bilateral hydronephrosis, improving.   Urology following  Repeat cystoscopy in 6-8 weeks after discharge  Continue Bella and Flomax  Urine retention, s/p CABG [id number] in setting of moderate BPH on recent cystoscopy. Failed multiple void trials during most recent hospitalization and again after Bella removal on [id number]. Subsequently replaced, currently productive of yellow urine with pale sediment. Flomax initially held d/t hypotension, restarted [id number]. Per urology, patient likely has a component of neurogenic bladder on existing moderate BPH and possible trauma during prior admit. Plan for repeat cystoscopy [id number]  Avoid voiding trials   Discharge with Bella  Continue Flomax  LC, improved  Hyponatremia, improved  HFrEF, compensated. Secondary to ischemic cardiomyopathy. LVEF 25-30% per echo [id number]. Presented with Darrin Martino with heart failure clinic. Dry and hypotensive on initial presentation. Metoprolol initially held d/t hypotension  Strict I/Os  Daily weights  Continue metoprolol  CAD s/p CABG x 2, CABG performed [id number]. Follows with Dr. Yudelka Rosas. Troponin elevation 0.011 and EKG changes noted [id number]. Metoprolol held d/t hypotension, restarted at lower dose. Continue treatment as above. NIDDM2, poorly controlled. HbA1c 7.6 [id number]. On trulicity, amaryl, and jardiance outpatient. Blood glucose 275 on admit. Holding home meds. Inpatient blood glucose goal 140-180, currently maintaining. MD SSI in place, but has not required insulin during this admit  Hypoglycemic protocol in place  HTN, essential. Hypotensive on admit, remains hypotensive. Initially held home metoprolol, restarted at decreased dose d/t hypotension. Continue to closely monitor BP.      Exam:     Vitals:  Vitals:    12/21/22 1941 12/21/22 2359 12/22/22 0340 12/22/22 0745   BP: 125/60 (!) 107/57 (!) 108/57 (!) 110/58   Pulse: 79 89 75 76   Resp: 18 19 18 18   Temp: 98.3 °F (36.8 °C) 98.4 °F (36.9 °C) 98.8 °F (37.1 °C) 98.4 °F (36.9 °C)   TempSrc: Oral Oral Oral Oral   SpO2: 98% 96% 98% 98%   Weight: Height:         Weight: Weight: 140 lb 3.4 oz (63.6 kg)     24 hour intake/output:  Intake/Output Summary (Last 24 hours) at 12/22/2022 1034  Last data filed at 12/22/2022 2174  Gross per 24 hour   Intake 420 ml   Output 3300 ml   Net -2880 ml         General appearance:  No apparent distress, appears stated age and cooperative. Chronically ill-appearing male. HEENT:  Normal cephalic, atraumatic without obvious deformity. Pupils equal, round, and reactive to light. Extra ocular muscles intact. Conjunctivae/corneas clear. Neck: Supple, with full range of motion. No jugular venous distention. Trachea midline. Respiratory:  Normal respiratory effort. Clear to auscultation, bilaterally without Rales/Wheezes/Rhonchi. Cardiovascular:  Regular rate and rhythm with normal S1/S2 without murmurs, rubs or gallops. Abdomen: Soft, non-tender, non-distended with normal bowel sounds. Musculoskeletal:  No clubbing, cyanosis or edema bilaterally. Full range of motion without deformity. Skin: Skin color, texture, turgor normal.  No rashes or lesions. Neurologic:  Continued improvement in dysarthria, right-sided facial droop and right-sided upper and lower extremity weakness. Ongoing significant right upper extremity fine motor deficit. Psychiatric:  Alert and oriented, thought content appropriate, normal insight  Capillary Refill: Brisk,< 3 seconds   Peripheral Pulses: +2 palpable, equal bilaterally       Labs:  For convenience and continuity at follow-up the following most recent labs are provided:      CBC:    Lab Results   Component Value Date/Time    WBC 8.2 12/22/2022 05:23 AM    HGB 10.0 12/22/2022 05:23 AM    HCT 31.3 12/22/2022 05:23 AM     12/22/2022 05:23 AM       Renal:    Lab Results   Component Value Date/Time     12/22/2022 05:23 AM    K 5.0 12/22/2022 05:23 AM    K 4.3 10/28/2022 10:00 PM     12/22/2022 05:23 AM    CO2 29 12/22/2022 05:23 AM    BUN 31 12/22/2022 05:23 AM    CREATININE 1.3 12/22/2022 05:23 AM    CALCIUM 8.6 12/22/2022 05:23 AM    PHOS 2.9 12/18/2022 03:33 AM         Significant Diagnostic Studies    Radiology:   US RENAL LIMITED   Final Result   Bilateral hydronephrosis as described, significantly improved vs prior US. This document has been electronically signed by: Melody Camargo MD on    12/19/2022 05:30 AM      MRA HEAD WO CONTRAST   Final Result   Impression:   Normal MRA of the brain. This document has been electronically signed by: Arash Sharma MD on    12/15/2022 08:10 PM      3D Post-processing was performed on this study. MRA NECK WO CONTRAST   Final Result   Impression:   Mild disease bilateral carotid bulbs. This document has been electronically signed by: Arash Sharma MD on    12/15/2022 08:20 PM      Carotid stenosis and measurements are in accordance with NASCET criteria. 3D Post-processing was performed on this study. MRI BRAIN WO CONTRAST   Final Result   Impression:   Acute ischemic event left parietal region coronal radiata. Possible small additional acute ischemic event within the right lobe of    the cerebellum. Moderate nonspecific periventricular and deep white matter disease. This    most often can ascribed to chronic small vessel ischemic change. This document has been electronically signed by: Arash Sharma MD on    12/15/2022 08:14 PM         CT HEAD WO CONTRAST   Final Result   1. No acute intracranial abnormality. The pertinent finding(s) was called to patient's nurse  Thompson Simms RN at [phone] hours on 12/15/2022 by Dr. Lauren Rogers. Verbal acknowledgment and readback was given. 2. Chronic findings as described above. **This report has been created using voice recognition software. It may contain minor errors which are inherent in voice recognition technology. **      Final report electronically signed by Dr John Snow on 12/15/2022 4:46 PM      CT ABDOMEN PELVIS WO CONTRAST Additional Contrast? None   Final Result   1. The urinary bladder is dilated with significant mucosal thickening. Air    within the mucosa of the urinary bladder. Inflammatory stranding surrounds    the urinary bladder. An air-fluid level within the lumen of the bladder. 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 Recommend    correlation with urinalysis. 2. Moderate bilateral hydronephrosis with mild bilateral perinephric    stranding. Evaluation for infectious process including pyelonephritis    limited without IV contrast, recommend correlation with urinalysis. Mild    bilateral hydroureter throughout the course of the bilateral ureters    without ureterolithiasis. 3. Moderate fecal burden throughout the colon greatest involving the    ascending and transverse colon, favor constipation. 4. Perirectal and presacral stranding. Circumferential mucosal thickening    of the distal rectum best identified on axial image 73 of series 2. Recommend correlation with direct visualization/colonoscopy to exclude    underlying mucosal lesion. 5. Severe mucosal thickening of the stomach at the fundus and mid body    which could represent changes of gastritis in the correct clinical setting. This document has been electronically signed by: Lara Milton DO on    12/15/2022 05:03 AM      All CTs at this facility use dose modulation techniques and iterative    reconstructions, and/or weight-based dosing   when appropriate to reduce radiation to a low as reasonably achievable. CT CERVICAL SPINE WO CONTRAST   Final Result   1. Study is partially limited due to motion artifact and technique. Within    this limitation, no acute fracture of the cervical spine. 2. Multilevel facet and uncovertebral joint arthropathy.       This document has been electronically signed by: Lara Milton DO on    12/15/2022 02:45 AM      All CTs at this facility use dose modulation techniques and iterative reconstructions, and/or weight-based dosing   when appropriate to reduce radiation to a low as reasonably achievable. CT CHEST WO CONTRAST   Final Result   1. No definitive acute rib fracture. 2. Chronic left seventh posterior rib fracture with callus formation. Likely chronic left eighth posterior rib fracture, mild sclerosis at the    margins of this chronic appearing fracture. Metallic device external to    the patient and medially posterior to the left eighth rib fracture    partially limits evaluation at this region. 3. Moderate/severe bilateral hydronephrosis. No nephrolithiasis. Recommend    consideration for dedicated cross-sectional imaging of the abdomen/pelvis    for further evaluation of this finding. This document has been electronically signed by: Arcelia Alvarenga DO on    12/15/2022 03:05 AM      All CTs at this facility use dose modulation techniques and iterative    reconstructions, and/or weight-based dosing   when appropriate to reduce radiation to a low as reasonably achievable. CT THORACIC RECONSTRUCTION WO POST PROCESS   Final Result   1. No acute fracture of the thoracic spine. 2. Mild multilevel spondylosis of the thoracic spine. This document has been electronically signed by: Arcelia Alvarenga DO on    12/15/2022 02:58 AM      All CTs at this facility use dose modulation techniques and iterative    reconstructions, and/or weight-based dosing   when appropriate to reduce radiation to a low as reasonably achievable.              Consults:     IP CONSULT TO UROLOGY  IP CONSULT TO PHARMACY  IP CONSULT TO SOCIAL WORK  IP CONSULT TO CARDIOLOGY  IP CONSULT TO NEUROLOGY  IP CONSULT TO REHAB/TCU ADMISSION COORDINATOR  IP CONSULT TO PHYSICAL MEDICINE REHAB  IP CONSULT TO PHYSICAL MEDICINE REHAB  IP CONSULT TO DIETITIAN  IP CONSULT TO SOCIAL WORK  IP CONSULT TO RECREATION THERAPY  IP CONSULT TO FAMILY MEDICINE    Disposition: Rehab  Condition at Discharge: Stable    Code Status: Full Code      Patient Instructions:    Discharge lab work: BMP to monitor Cr  Activity: activity as tolerated  Diet: ADULT DIET; Easy to Chew; 3 carb choices (45 gm/meal)      Follow-up visits:   Lois Felix MD  [address] [place] [address]  [phone]    Follow up in 1 month(s)  stroke         Discharge Medications:        Medication List        ASK your doctor about these medications      acetaminophen 500 MG tablet  Commonly known as: TYLENOL     aspirin 325 MG EC tablet  Take 1 tablet by mouth daily     atorvastatin 40 MG tablet  Commonly known as: Lipitor  Take 1 tablet by mouth daily     buPROPion 150 MG extended release tablet  Commonly known as: Wellbutrin XL  Take 1 tablet by mouth every morning     empagliflozin 25 MG tablet  Commonly known as: Jardiance  Take 1 tablet by mouth daily     glimepiride 4 MG tablet  Commonly known as: Amaryl  Take 1 tablet by mouth every morning (before breakfast)     metoprolol succinate 25 MG extended release tablet  Commonly known as: Toprol XL  Take 1 tablet by mouth daily     multivitamin Tabs tablet  Take 1 tablet by mouth daily (with breakfast)     omeprazole 40 MG delayed release capsule  Commonly known as: PRILOSEC  Take 1 capsule by mouth daily     tamsulosin 0.4 MG capsule  Commonly known as: FLOMAX  Take 1 capsule by mouth daily     Trulicity 1.5 SD/0.6SR SC injection  Generic drug: dulaglutide  Inject 1.5 mg into the skin once a week              Time Spent on discharge is more than 1 hour in the examination, evaluation, counseling and review of medications and discharge plan. Signed: Thank you BLACK Wallace CNP for the opportunity to be involved in this patient's care.     Electronically signed by Emperatriz Blair DO on 12/22/2022 at 10:34 AM

## 2022-12-22 NOTE — CARE COORDINATION

## 2022-12-22 NOTE — PLAN OF CARE
Problem: Discharge Planning  Goal: Discharge to home or other facility with appropriate resources  Outcome: Progressing     Problem: ABCDS Injury Assessment  Goal: Absence of physical injury  Outcome: Progressing     Problem: Safety - Adult  Goal: Free from fall injury  Outcome: Progressing     Problem: Chronic Conditions and Co-morbidities  Goal: Patient's chronic conditions and co-morbidity symptoms are monitored and maintained or improved  Outcome: Progressing     Problem: Pain  Goal: Verbalizes/displays adequate comfort level or baseline comfort level  Outcome: Progressing

## 2022-12-23 ENCOUNTER — HOSPITAL ENCOUNTER (INPATIENT)
Age: 62
LOS: 21 days | Discharge: HOME HEALTH | End: 2023-01-13
Attending: PHYSICAL MEDICINE & REHABILITATION | Admitting: PHYSICAL MEDICINE & REHABILITATION
Payer: COMMERCIAL

## 2022-12-23 VITALS
RESPIRATION RATE: 20 BRPM | WEIGHT: 130.95 LBS | TEMPERATURE: 98 F | BODY MASS INDEX: 18.75 KG/M2 | HEART RATE: 87 BPM | OXYGEN SATURATION: 97 % | SYSTOLIC BLOOD PRESSURE: 111 MMHG | DIASTOLIC BLOOD PRESSURE: 88 MMHG | HEIGHT: 70 IN

## 2022-12-23 DIAGNOSIS — R33.9 URINARY RETENTION: ICD-10-CM

## 2022-12-23 DIAGNOSIS — N18.31 STAGE 3A CHRONIC KIDNEY DISEASE (HCC): ICD-10-CM

## 2022-12-23 DIAGNOSIS — N17.9 AKI (ACUTE KIDNEY INJURY) (HCC): ICD-10-CM

## 2022-12-23 DIAGNOSIS — R78.81 BACTEREMIA DUE TO ENTEROBACTER SPECIES: ICD-10-CM

## 2022-12-23 DIAGNOSIS — E11.9 DIABETES MELLITUS TYPE 2 IN NONOBESE (HCC): ICD-10-CM

## 2022-12-23 DIAGNOSIS — I95.1 ORTHOSTATIC HYPOTENSION: ICD-10-CM

## 2022-12-23 DIAGNOSIS — Z95.1 S/P CABG X 2: ICD-10-CM

## 2022-12-23 DIAGNOSIS — N30.80 EMPHYSEMATOUS CYSTITIS: ICD-10-CM

## 2022-12-23 DIAGNOSIS — N10 ACUTE PYELONEPHRITIS: ICD-10-CM

## 2022-12-23 DIAGNOSIS — E11.65 UNCONTROLLED TYPE 2 DIABETES MELLITUS WITH HYPERGLYCEMIA (HCC): ICD-10-CM

## 2022-12-23 DIAGNOSIS — E78.5 HYPERLIPIDEMIA, UNSPECIFIED HYPERLIPIDEMIA TYPE: ICD-10-CM

## 2022-12-23 DIAGNOSIS — R53.81 DEBILITY: ICD-10-CM

## 2022-12-23 DIAGNOSIS — B96.89 BACTEREMIA DUE TO ENTEROBACTER SPECIES: ICD-10-CM

## 2022-12-23 DIAGNOSIS — K21.9 GASTROESOPHAGEAL REFLUX DISEASE WITHOUT ESOPHAGITIS: ICD-10-CM

## 2022-12-23 DIAGNOSIS — I10 ESSENTIAL HYPERTENSION: ICD-10-CM

## 2022-12-23 DIAGNOSIS — I63.9 ACUTE STROKE DUE TO ISCHEMIA (HCC): ICD-10-CM

## 2022-12-23 DIAGNOSIS — I69.351 HEMIPARESIS AFFECTING RIGHT SIDE AS LATE EFFECT OF STROKE (HCC): Primary | ICD-10-CM

## 2022-12-23 PROBLEM — N39.0 UTI DUE TO KLEBSIELLA SPECIES: Status: ACTIVE | Noted: 2022-12-23

## 2022-12-23 PROBLEM — R27.8 COORDINATION IMPAIRMENT: Status: ACTIVE | Noted: 2022-12-23

## 2022-12-23 LAB
ANION GAP SERPL CALCULATED.3IONS-SCNC: 10 MEQ/L (ref 8–16)
BUN BLDV-MCNC: 36 MG/DL (ref 7–22)
CALCIUM SERPL-MCNC: 9.1 MG/DL (ref 8.5–10.5)
CHLORIDE BLD-SCNC: 99 MEQ/L (ref 98–111)
CO2: 28 MEQ/L (ref 23–33)
CREAT SERPL-MCNC: 1.5 MG/DL (ref 0.4–1.2)
GFR SERPL CREATININE-BSD FRML MDRD: 53 ML/MIN/1.73M2
GLUCOSE BLD-MCNC: 115 MG/DL (ref 70–108)
GLUCOSE BLD-MCNC: 122 MG/DL (ref 70–108)
GLUCOSE BLD-MCNC: 149 MG/DL (ref 70–108)
GLUCOSE BLD-MCNC: 164 MG/DL (ref 70–108)
GLUCOSE BLD-MCNC: 249 MG/DL (ref 70–108)
MAGNESIUM: 2.4 MG/DL (ref 1.6–2.4)
PHOSPHORUS: 3.4 MG/DL (ref 2.4–4.7)
POTASSIUM SERPL-SCNC: 5 MEQ/L (ref 3.5–5.2)
SODIUM BLD-SCNC: 137 MEQ/L (ref 135–145)

## 2022-12-23 PROCEDURE — 97530 THERAPEUTIC ACTIVITIES: CPT

## 2022-12-23 PROCEDURE — 6370000000 HC RX 637 (ALT 250 FOR IP): Performed by: STUDENT IN AN ORGANIZED HEALTH CARE EDUCATION/TRAINING PROGRAM

## 2022-12-23 PROCEDURE — 97112 NEUROMUSCULAR REEDUCATION: CPT

## 2022-12-23 PROCEDURE — 36415 COLL VENOUS BLD VENIPUNCTURE: CPT

## 2022-12-23 PROCEDURE — 80048 BASIC METABOLIC PNL TOTAL CA: CPT

## 2022-12-23 PROCEDURE — 93270 REMOTE 30 DAY ECG REV/REPORT: CPT

## 2022-12-23 PROCEDURE — 83735 ASSAY OF MAGNESIUM: CPT

## 2022-12-23 PROCEDURE — 84100 ASSAY OF PHOSPHORUS: CPT

## 2022-12-23 PROCEDURE — 1180000000 HC REHAB R&B

## 2022-12-23 PROCEDURE — 82948 REAGENT STRIP/BLOOD GLUCOSE: CPT

## 2022-12-23 PROCEDURE — 2580000003 HC RX 258: Performed by: PHYSICAL MEDICINE & REHABILITATION

## 2022-12-23 PROCEDURE — 2580000003 HC RX 258: Performed by: STUDENT IN AN ORGANIZED HEALTH CARE EDUCATION/TRAINING PROGRAM

## 2022-12-23 PROCEDURE — 99222 1ST HOSP IP/OBS MODERATE 55: CPT | Performed by: PHYSICAL MEDICINE & REHABILITATION

## 2022-12-23 PROCEDURE — 6370000000 HC RX 637 (ALT 250 FOR IP): Performed by: PHYSICAL MEDICINE & REHABILITATION

## 2022-12-23 PROCEDURE — 97110 THERAPEUTIC EXERCISES: CPT

## 2022-12-23 PROCEDURE — 6370000000 HC RX 637 (ALT 250 FOR IP)

## 2022-12-23 RX ORDER — INSULIN LISPRO 100 [IU]/ML
0-8 INJECTION, SOLUTION INTRAVENOUS; SUBCUTANEOUS
Status: DISCONTINUED | OUTPATIENT
Start: 2022-12-23 | End: 2022-12-24

## 2022-12-23 RX ORDER — BACLOFEN 10 MG/1
5 TABLET ORAL 3 TIMES DAILY
Status: DISCONTINUED | OUTPATIENT
Start: 2022-12-23 | End: 2022-12-23

## 2022-12-23 RX ORDER — OXYCODONE HYDROCHLORIDE 5 MG/1
2.5 TABLET ORAL EVERY 6 HOURS PRN
Status: DISCONTINUED | OUTPATIENT
Start: 2022-12-23 | End: 2023-01-13 | Stop reason: HOSPADM

## 2022-12-23 RX ORDER — OXYCODONE HYDROCHLORIDE 5 MG/1
5 TABLET ORAL EVERY 6 HOURS PRN
Status: DISCONTINUED | OUTPATIENT
Start: 2022-12-23 | End: 2023-01-13 | Stop reason: HOSPADM

## 2022-12-23 RX ORDER — ACETAMINOPHEN 325 MG/1
650 TABLET ORAL EVERY 4 HOURS PRN
Status: DISCONTINUED | OUTPATIENT
Start: 2022-12-23 | End: 2023-01-13 | Stop reason: HOSPADM

## 2022-12-23 RX ORDER — BACLOFEN 10 MG/1
5 TABLET ORAL EVERY 12 HOURS
Status: DISCONTINUED | OUTPATIENT
Start: 2022-12-24 | End: 2022-12-27

## 2022-12-23 RX ORDER — METOPROLOL SUCCINATE 25 MG/1
12.5 TABLET, EXTENDED RELEASE ORAL DAILY
Status: DISCONTINUED | OUTPATIENT
Start: 2022-12-24 | End: 2023-01-13 | Stop reason: HOSPADM

## 2022-12-23 RX ORDER — ATORVASTATIN CALCIUM 40 MG/1
40 TABLET, FILM COATED ORAL DAILY
Status: DISCONTINUED | OUTPATIENT
Start: 2022-12-23 | End: 2023-01-13 | Stop reason: HOSPADM

## 2022-12-23 RX ORDER — BISACODYL 10 MG
10 SUPPOSITORY, RECTAL RECTAL DAILY PRN
Status: DISCONTINUED | OUTPATIENT
Start: 2022-12-23 | End: 2023-01-13 | Stop reason: HOSPADM

## 2022-12-23 RX ORDER — PANTOPRAZOLE SODIUM 40 MG/1
40 TABLET, DELAYED RELEASE ORAL
Status: DISCONTINUED | OUTPATIENT
Start: 2022-12-24 | End: 2023-01-13 | Stop reason: HOSPADM

## 2022-12-23 RX ORDER — INSULIN LISPRO 100 [IU]/ML
0-4 INJECTION, SOLUTION INTRAVENOUS; SUBCUTANEOUS NIGHTLY
Status: DISCONTINUED | OUTPATIENT
Start: 2022-12-23 | End: 2022-12-24

## 2022-12-23 RX ORDER — TAMSULOSIN HYDROCHLORIDE 0.4 MG/1
0.8 CAPSULE ORAL DAILY
Status: DISCONTINUED | OUTPATIENT
Start: 2022-12-24 | End: 2023-01-13 | Stop reason: HOSPADM

## 2022-12-23 RX ORDER — DOCUSATE SODIUM 100 MG/1
100 CAPSULE, LIQUID FILLED ORAL 2 TIMES DAILY
Status: DISCONTINUED | OUTPATIENT
Start: 2022-12-23 | End: 2023-01-13 | Stop reason: HOSPADM

## 2022-12-23 RX ORDER — DEXTROSE MONOHYDRATE 100 MG/ML
INJECTION, SOLUTION INTRAVENOUS CONTINUOUS PRN
Status: DISCONTINUED | OUTPATIENT
Start: 2022-12-23 | End: 2023-01-13 | Stop reason: HOSPADM

## 2022-12-23 RX ORDER — POLYETHYLENE GLYCOL 3350 17 G/17G
17 POWDER, FOR SOLUTION ORAL DAILY
Status: DISCONTINUED | OUTPATIENT
Start: 2022-12-23 | End: 2022-12-23 | Stop reason: HOSPADM

## 2022-12-23 RX ORDER — GLIPIZIDE 10 MG/1
10 TABLET ORAL
Status: DISCONTINUED | OUTPATIENT
Start: 2022-12-24 | End: 2023-01-13 | Stop reason: HOSPADM

## 2022-12-23 RX ORDER — SODIUM CHLORIDE 0.9 % (FLUSH) 0.9 %
5-40 SYRINGE (ML) INJECTION PRN
Status: DISCONTINUED | OUTPATIENT
Start: 2022-12-23 | End: 2022-12-24

## 2022-12-23 RX ORDER — CIPROFLOXACIN 500 MG/1
500 TABLET, FILM COATED ORAL EVERY 12 HOURS SCHEDULED
Status: DISCONTINUED | OUTPATIENT
Start: 2022-12-23 | End: 2022-12-23

## 2022-12-23 RX ORDER — LANOLIN ALCOHOL/MO/W.PET/CERES
3 CREAM (GRAM) TOPICAL NIGHTLY
Status: DISCONTINUED | OUTPATIENT
Start: 2022-12-23 | End: 2022-12-28

## 2022-12-23 RX ORDER — TRAZODONE HYDROCHLORIDE 50 MG/1
50 TABLET ORAL NIGHTLY PRN
Status: DISCONTINUED | OUTPATIENT
Start: 2022-12-23 | End: 2023-01-13 | Stop reason: HOSPADM

## 2022-12-23 RX ORDER — SODIUM CHLORIDE 9 MG/ML
INJECTION, SOLUTION INTRAVENOUS PRN
Status: DISCONTINUED | OUTPATIENT
Start: 2022-12-23 | End: 2022-12-24

## 2022-12-23 RX ORDER — POLYETHYLENE GLYCOL 3350 17 G/17G
17 POWDER, FOR SOLUTION ORAL DAILY PRN
Status: DISCONTINUED | OUTPATIENT
Start: 2022-12-23 | End: 2023-01-13 | Stop reason: HOSPADM

## 2022-12-23 RX ORDER — BUPROPION HYDROCHLORIDE 150 MG/1
150 TABLET ORAL EVERY MORNING
Status: DISCONTINUED | OUTPATIENT
Start: 2022-12-24 | End: 2023-01-13 | Stop reason: HOSPADM

## 2022-12-23 RX ORDER — MULTIVITAMIN WITH IRON
1 TABLET ORAL
Status: DISCONTINUED | OUTPATIENT
Start: 2022-12-24 | End: 2023-01-13 | Stop reason: HOSPADM

## 2022-12-23 RX ORDER — SODIUM CHLORIDE 0.9 % (FLUSH) 0.9 %
5-40 SYRINGE (ML) INJECTION EVERY 12 HOURS SCHEDULED
Status: DISCONTINUED | OUTPATIENT
Start: 2022-12-23 | End: 2022-12-24

## 2022-12-23 RX ORDER — SENNA PLUS 8.6 MG/1
2 TABLET ORAL NIGHTLY
Status: DISCONTINUED | OUTPATIENT
Start: 2022-12-23 | End: 2023-01-13 | Stop reason: HOSPADM

## 2022-12-23 RX ORDER — ONDANSETRON 4 MG/1
4 TABLET, ORALLY DISINTEGRATING ORAL EVERY 8 HOURS PRN
Status: DISCONTINUED | OUTPATIENT
Start: 2022-12-23 | End: 2023-01-13 | Stop reason: HOSPADM

## 2022-12-23 RX ADMIN — CIPROFLOXACIN HYDROCHLORIDE 750 MG: 250 TABLET, FILM COATED ORAL at 21:29

## 2022-12-23 RX ADMIN — APIXABAN 5 MG: 5 TABLET, FILM COATED ORAL at 21:29

## 2022-12-23 RX ADMIN — Medication 3 MG: at 21:27

## 2022-12-23 RX ADMIN — BUPROPION HYDROCHLORIDE 150 MG: 150 TABLET, FILM COATED, EXTENDED RELEASE ORAL at 09:10

## 2022-12-23 RX ADMIN — SODIUM CHLORIDE, PRESERVATIVE FREE 10 ML: 5 INJECTION INTRAVENOUS at 21:30

## 2022-12-23 RX ADMIN — ACETAMINOPHEN 650 MG: 325 TABLET ORAL at 15:16

## 2022-12-23 RX ADMIN — CIPROFLOXACIN 750 MG: 500 TABLET, FILM COATED ORAL at 09:13

## 2022-12-23 RX ADMIN — SODIUM CHLORIDE, PRESERVATIVE FREE 10 ML: 5 INJECTION INTRAVENOUS at 09:13

## 2022-12-23 RX ADMIN — DOCUSATE SODIUM 100 MG: 100 CAPSULE, LIQUID FILLED ORAL at 09:10

## 2022-12-23 RX ADMIN — GLIPIZIDE 10 MG: 10 TABLET ORAL at 05:15

## 2022-12-23 RX ADMIN — PANTOPRAZOLE SODIUM 40 MG: 40 TABLET, DELAYED RELEASE ORAL at 05:15

## 2022-12-23 RX ADMIN — DOCUSATE SODIUM 100 MG: 100 CAPSULE, LIQUID FILLED ORAL at 21:26

## 2022-12-23 RX ADMIN — ATORVASTATIN CALCIUM 40 MG: 40 TABLET, FILM COATED ORAL at 21:29

## 2022-12-23 RX ADMIN — TAMSULOSIN HYDROCHLORIDE 0.8 MG: 0.4 CAPSULE ORAL at 09:10

## 2022-12-23 RX ADMIN — EMPAGLIFLOZIN 25 MG: 25 TABLET, FILM COATED ORAL at 09:10

## 2022-12-23 RX ADMIN — BACLOFEN 5 MG: 10 TABLET ORAL at 09:11

## 2022-12-23 RX ADMIN — METOPROLOL SUCCINATE 12.5 MG: 25 TABLET, EXTENDED RELEASE ORAL at 09:10

## 2022-12-23 RX ADMIN — APIXABAN 5 MG: 5 TABLET, FILM COATED ORAL at 09:10

## 2022-12-23 RX ADMIN — POLYETHYLENE GLYCOL 3350 17 G: 17 POWDER, FOR SOLUTION ORAL at 09:10

## 2022-12-23 ASSESSMENT — ENCOUNTER SYMPTOMS
EYE PAIN: 0
VOMITING: 0
BACK PAIN: 0
CONSTIPATION: 0
WHEEZING: 0
NAUSEA: 0
DIARRHEA: 0
TROUBLE SWALLOWING: 0
ABDOMINAL PAIN: 0
EYE DISCHARGE: 0
SORE THROAT: 0
RHINORRHEA: 0
SHORTNESS OF BREATH: 0
COUGH: 0

## 2022-12-23 ASSESSMENT — PAIN DESCRIPTION - ORIENTATION: ORIENTATION: MID

## 2022-12-23 ASSESSMENT — PAIN SCALES - GENERAL
PAINLEVEL_OUTOF10: 0
PAINLEVEL_OUTOF10: 1
PAINLEVEL_OUTOF10: 5

## 2022-12-23 ASSESSMENT — LIFESTYLE VARIABLES: HOW OFTEN DO YOU HAVE A DRINK CONTAINING ALCOHOL: NEVER

## 2022-12-23 ASSESSMENT — PAIN DESCRIPTION - LOCATION: LOCATION: HEAD

## 2022-12-23 ASSESSMENT — PAIN DESCRIPTION - DESCRIPTORS: DESCRIPTORS: ACHING;DISCOMFORT

## 2022-12-23 ASSESSMENT — PAIN - FUNCTIONAL ASSESSMENT: PAIN_FUNCTIONAL_ASSESSMENT: ACTIVITIES ARE NOT PREVENTED

## 2022-12-23 NOTE — PROGRESS NOTES
Focus Note  Full Admission Note Completed: yes,   Discharge Pain Assessment may be completed between 1 and 2 (2 days prior to discharge or day of discharge)     Education Focus: Prevention, detection, and treatment of chronic complications  Verbalized understanding    Wound: no   Last date of picture/measure: n/a   Family member to be included in wound care education:  spouse    Bowel Management: Continent: yes,    Management: miralax, colace    Bladder Management: Continent: yes,    Management: crum    Medication Education:   Home med list and hospital med list compared for new meds: yes,   New medication identified: no  Coumadin education and dosing post-discharge plan: N/A  Diabetes management education: carb counting diet and oral medications  IV fluids r/t high BUN and creatinine  12/27  Pain Management:    Non-pharmacological strategies for pain: emotional support    Management of Braces/Splints: type of splint or brace n/a   Management includes :n/a    Discharge Needs:   Caregiver/Support identified: spouse   Equipment needs: unknown   Barriers expressed by patient or family: n/a   Follow up appointments needed:

## 2022-12-23 NOTE — PROGRESS NOTES
Internal Medicine Resident Progress Note    Patient:  Tonny Miner    YOB: 1960  Unit/Bed:5K-20/020-A  MRN: [id number]    Acct: [de-identified]   PCP: BLACK Severnio CNP    Date of Admission: 12/15/2022      Assessment/Plan:  Acute ischemic stroke, code stroke called [id number] for right sided weakness and dysarthria. Initial NIHSS 4. Last known well uncertain. CT Head unremarkable for any acute events, however MRI showed acute ischemic stroke in the left parietal corona radiata region and right cerebellar hemisphere, concerning for cardioembolic stroke. Ongoing right-sided deficits, mildly improved from yesterday. JOY performed [id number], found LVEF 30%, severe global hypokinesis of the LV, moderately dilted LA, and no defect or patent foramen ovale of the atrial septum, no thrombus in LA or in LA appendage. Neurology recommended   Maintain <130/80  Start eliquis 5mg BID  Continue telemetry  NIHSS every shift  Cardiology consulted  Continue metoprolol 25mg daily  Continue statin  Speech Recommend easy to chew with thin liquids  Sepsis, secondary to klebsiella pneumoniae/enterobacter cloacae bacteremia. On admit met SIRS 2/4 (HR >90, WBC 22.3) with purulent UA and imaging showing emphysematous cystitis. Received 2L IVFs and levofloxacin in the ED. LA 1.3 [id number]. Blood cultures positive x2 for gram negative bacilli. Preliminary urine culture results show gram negative bacilli. Initially started on meropenem and vancomycin. Biofire positive for klebsiella pneumoniae and enterobacter cloacae complex, vancomycin discontinued. Blood culture sensitivities show organisms are both sensitive to cefepime and ciprofloxacin. UA positive for klebsiella pneumoniae. Received IV vancomycin 12/15, levofloxacin of 12/15, meropenem 12/15-12/22. De-escalated to p.o. ciprofloxacin 500mg q12h for 7 days on discharge. Constipation   Laxatives scheduled and prn. Fleet enema x1 on 12/22.   MSK chest pain, s/p CAD s/p CABG x 2, CABG performed [id number]. Reported episode of significant chest pain and dyspnea early AM of [id number]. Reproducible on palpation, troponins negative. Chest pain improved s/p addition of baclofen and resolution of hiccups. Cardiology following, chest pain likely incisional  Continue metoprolol and continue statin as above. Continue baclofen 10mg TID  Continue to monitor for NSVT  [employer] already has at home. Bacteremia, secondary to emphysematous cystitis. Treatment as above. Emphysematous cystitis, secondary to chronic indwelling Crum. On CT Abd/Pelvis at admit. S/p urinary retention with chronic crum after CABG in November. UA growing gram negative bacilli. Repeat US bladder 12/12 showing residual gas in bladder wall. Bilateral hydronephrosis, with bilateral perinephric stranding, secondary to urine retention. Noted lateral lobe hypertrophy and moderately obstructive prostate on recent cystoscopy. US renal showed bilateral hydronephrosis, improving. Urology following  Repeat cystoscopy in 6-8 weeks after discharge  Continue Crum and Flomax  Urine retention, s/p CABG [id number] in setting of moderate BPH on recent cystoscopy. Failed multiple void trials during most recent hospitalization and again after Crum removal on [id number]. Subsequently replaced, currently productive of yellow urine with pale sediment. Flomax initially held d/t hypotension, restarted [id number]. Per urology, patient likely has a component of neurogenic bladder on existing moderate BPH and possible trauma during prior admit. Plan for repeat cystoscopy [id number]  Avoid voiding trials   Discharge with Crum  Continue Flomax  LC, improved  Hyponatremia, improved  HFrEF, compensated. Secondary to ischemic cardiomyopathy. LVEF 25-30% per echo [id number]. Presented with Liliana Sacaton with heart failure clinic. Dry and hypotensive on initial presentation.  Metoprolol initially held d/t hypotension  Strict I/Os  Daily weights  Continue metoprolol  CAD s/p CABG x 2, CABG performed [id number]. Follows with Dr. Harleen Bean. Troponin elevation 0.011 and EKG changes noted [id number]. Metoprolol held d/t hypotension, restarted at lower dose. Continue treatment as above. NIDDM2, poorly controlled. HbA1c 7.6 [id number]. On trulicity, amaryl, and jardiance outpatient. Blood glucose 275 on admit. Holding home meds. Inpatient blood glucose goal 140-180, currently maintaining. MD SSI in place, but has not required insulin during this admit  Hypoglycemic protocol in place  HTN, essential. Hypotensive on admit, remains hypotensive. Initially held home metoprolol, restarted at decreased dose d/t hypotension. Continue to closely monitor BP. Expected discharge date:  1-2 days    Disposition:   [] Home  [] TCU  [x] Rehab - IPR  [] Psych  [] SNF  [] [facility]  [] Other-    ===================================================================      Chief Complaint: fall/weakness    Hospital Course:   Per original HPI:  \"63 y.o. male who presented to [facility] for evaluation of fall. PMHx significant for ischemic cardiomyopathy (echo EF 25-30%), CAD s/p CABG x2 (11/2022), primary hypertension, NIDDM type II, urinary retention. Patient presents for evaluation of a fall. Patient fell after getting up rom the toilet in the bathroom. He felt dizzy and lightheaded. He did not lose consciousness or hit his head. He was on the ground for approximately 20 minutes. His wife called EMS and patient was brought to the hospital.      History provided by patient and wife at bedside. Patient had CABG X2 on 11/7/22 at [facility] and developed urinary retention post CABG needing insertion of a Bella. Patient was previously hospitalized 10/28/22 - 11/15/22 and underwent CABG x2 on 11/7/22. For the past week patient has been getting weaker and weaker, experiencing fatigue. He went to Dr. Artemio Ibarra on 12/13/22 for cystoscopy and removal of Bella. Since removal of Bella patient has not been able to urinate well. He has not been drinking and eating much in the last week. States that he feels \" tired\". He denies chest pain or shortness of breath. Initial vital signs reveal temp 98.4, respiratory rate 18, heart rate 112, blood pressure 95/68, SPO2 100 on room air. Lab work significant for sodium 132, chloride 97, bicarb 20, BUN 48, creatinine 2.1, blood glucose 275, WBC 22.3, hemoglobin 10.6. Urinalysis revealed positive for nitrite, small leukocyte esterase. CT chest reveals chronic rib fractures but no acute. CT cervical spine reveals no fractures. CT thorax reveals no acute fracture of the thoracic spine. CT abdomen pelvis reveals emphysematous cystitis, moderate bilateral hydronephrosis with mild bilateral perinephric stranding. ED treatment, patient was given Norco, 2 L of IV fluid, levofloxacin. \"      [id number] - experienced right-sided weakness and dysarthria with uncertain last known well time. Code stroke called, initial CT without acute changes, but MRI showed acute infarct in the left parietal region/corona radiata and possible smaller acute stroke in right cerebellar hemisphere. Concern for cardioembolic etiology. [id number] - Pending precert to [facility]. [id number] - IPR approved for 7 days, [id number]. Plan to discharge to [facility] tomorrow. Subjective (past 24 hours):   Patient assessed sitting upright in chair. In good spirits, feels good, no acute complaints or distress. Still has not had a BM. Denies chest pain, SOB, N/V/D, abdominal pain. ROS: reviewed complete ROS unchanged unless otherwise stated in hospital course/subjective portion.        Medications:  Reviewed    Infusion Medications    sodium chloride      [Held by provider] sodium chloride Stopped (12/18/22 1622)    dextrose       Scheduled Medications    polyethylene glycol  17 g Oral Daily    ciprofloxacin  500 mg Oral 2 times per day    docusate sodium 100 mg Oral BID    senna  2 tablet Oral Nightly    baclofen  5 mg Oral TID    metoprolol succinate  12.5 mg Oral Daily    empagliflozin  25 mg Oral Daily    glipiZIDE  10 mg Oral QAM AC    apixaban  5 mg Oral BID    tamsulosin  0.8 mg Oral Daily    sodium chloride flush  5-40 mL IntraVENous 2 times per day    atorvastatin  40 mg Oral Daily    buPROPion  150 mg Oral QAM    insulin lispro  0-8 Units SubCUTAneous TID WC    insulin lispro  0-4 Units SubCUTAneous Nightly    pantoprazole  40 mg Oral QAM AC     PRN Meds: bisacodyl, magnesium hydroxide, magnesium citrate, oxyCODONE, sodium chloride flush, sodium chloride, ondansetron **OR** ondansetron, acetaminophen **OR** acetaminophen, glucose, dextrose bolus **OR** dextrose bolus, glucagon (rDNA), dextrose        Intake/Output Summary (Last 24 hours) at 12/23/2022 0788  Last data filed at 12/23/2022 0311  Gross per 24 hour   Intake 200 ml   Output 1000 ml   Net -800 ml       Exam:  /60   Pulse 88   Temp 98.1 °F (36.7 °C) (Oral)   Resp 18   Ht 5' 10\" (1.778 m)   Wt 130 lb 15.3 oz (59.4 kg)   SpO2 98%   BMI 18.79 kg/m²        General appearance:  No apparent distress, appears stated age and cooperative. Chronically ill-appearing male. HEENT:  Normal cephalic, atraumatic without obvious deformity. Pupils equal, round, and reactive to light. Extra ocular muscles intact. Conjunctivae/corneas clear. Neck: Supple, with full range of motion. No jugular venous distention. Trachea midline. Respiratory:  Normal respiratory effort. Clear to auscultation, bilaterally without Rales/Wheezes/Rhonchi. Cardiovascular:  Regular rate and rhythm with normal S1/S2 without murmurs, rubs or gallops. Abdomen: Soft, non-tender, non-distended with normal bowel sounds. Musculoskeletal:  No clubbing, cyanosis or edema bilaterally. Full range of motion without deformity. Skin: Skin color, texture, turgor normal.  No rashes or lesions.   Neurologic:  Continued improvement in dysarthria, right-sided facial droop and right-sided upper and lower extremity weakness. Ongoing significant right upper extremity fine motor deficit. Psychiatric:  Alert and oriented, thought content appropriate, normal insight  Capillary Refill: Brisk,< 3 seconds   Peripheral Pulses: +2 palpable, equal bilaterally         Labs:   Recent Labs     12/21/22 0528 12/22/22 0523   WBC 7.8 8.2   HGB 10.3* 10.0*   HCT 31.6* 31.3*    373     Recent Labs     12/21/22 0528 12/22/22 0523    140   K 4.5 5.0    103   CO2 25 29   BUN 31* 31*   CREATININE 1.2 1.3*   CALCIUM 8.6 8.6     No results for input(s): AST, ALT, BILIDIR, BILITOT, ALKPHOS in the last 72 hours. No results for input(s): INR in the last 72 hours. No results for input(s): Lalla Ill in the last 72 hours. No results for input(s): PROCAL in the last 72 hours. Lab Results   Component Value Date/Time    NITRU POSITIVE 12/15/2022 05:19 AM    WBCUA > 200 12/15/2022 05:19 AM    BACTERIA MANY 12/15/2022 05:19 AM    RBCUA > 200 12/15/2022 05:19 AM    BLOODU MODERATE 12/15/2022 05:19 AM    SPECGRAV 1.007 11/06/2022 09:55 AM    GLUCOSEU >= 1000 12/15/2022 05:19 AM       Radiology (48 hours):  CT ABDOMEN PELVIS WO CONTRAST Additional Contrast? None    Result Date: 12/15/2022  1. The urinary bladder is dilated with significant mucosal thickening. Air within the mucosa of the urinary bladder. Inflammatory stranding surrounds the urinary bladder. An air-fluid level within the lumen of the bladder. 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 Recommend correlation with urinalysis. 2. Moderate bilateral hydronephrosis with mild bilateral perinephric stranding. Evaluation for infectious process including pyelonephritis limited without IV contrast, recommend correlation with urinalysis. Mild bilateral hydroureter throughout the course of the bilateral ureters without ureterolithiasis.  3. Moderate fecal burden throughout the colon greatest involving the ascending and transverse colon, favor constipation. 4. Perirectal and presacral stranding. Circumferential mucosal thickening of the distal rectum best identified on axial image 73 of series 2. Recommend correlation with direct visualization/colonoscopy to exclude underlying mucosal lesion. 5. Severe mucosal thickening of the stomach at the fundus and mid body which could represent changes of gastritis in the correct clinical setting. This document has been electronically signed by: Sushila Tomas DO on 12/15/2022 05:03 AM All CTs at this facility use dose modulation techniques and iterative reconstructions, and/or weight-based dosing when appropriate to reduce radiation to a low as reasonably achievable. CT HEAD WO CONTRAST    Result Date: 12/15/2022  1. No acute intracranial abnormality. The pertinent finding(s) was called to patient's nurse  Berta Sanchez RN at [phone] hours on 12/15/2022 by Dr. Fam Savage. Verbal acknowledgment and readback was given. 2. Chronic findings as described above. **This report has been created using voice recognition software. It may contain minor errors which are inherent in voice recognition technology. ** Final report electronically signed by Dr Manny Mcduffie on 12/15/2022 4:46 PM    CT CHEST WO CONTRAST    Result Date: 12/15/2022  1. No definitive acute rib fracture. 2. Chronic left seventh posterior rib fracture with callus formation. Likely chronic left eighth posterior rib fracture, mild sclerosis at the margins of this chronic appearing fracture. Metallic device external to the patient and medially posterior to the left eighth rib fracture partially limits evaluation at this region. 3. Moderate/severe bilateral hydronephrosis. No nephrolithiasis. Recommend consideration for dedicated cross-sectional imaging of the abdomen/pelvis for further evaluation of this finding.  This document has been electronically signed by: Sushila Tomas DO on 12/15/2022 03:05 AM All CTs at this facility use dose modulation techniques and iterative reconstructions, and/or weight-based dosing when appropriate to reduce radiation to a low as reasonably achievable. CT CERVICAL SPINE WO CONTRAST    Result Date: 12/15/2022  1. Study is partially limited due to motion artifact and technique. Within this limitation, no acute fracture of the cervical spine. 2. Multilevel facet and uncovertebral joint arthropathy. This document has been electronically signed by: Mone Mathew DO on 12/15/2022 02:45 AM All CTs at this facility use dose modulation techniques and iterative reconstructions, and/or weight-based dosing when appropriate to reduce radiation to a low as reasonably achievable. MRA HEAD WO CONTRAST    Result Date: 12/15/2022  Impression: Normal MRA of the brain. This document has been electronically signed by: Jarred Obrien MD on 12/15/2022 08:10 PM 3D Post-processing was performed on this study. CT THORACIC RECONSTRUCTION WO POST PROCESS    Result Date: 12/15/2022  1. No acute fracture of the thoracic spine. 2. Mild multilevel spondylosis of the thoracic spine. This document has been electronically signed by: Mone Mathew DO on 12/15/2022 02:58 AM All CTs at this facility use dose modulation techniques and iterative reconstructions, and/or weight-based dosing when appropriate to reduce radiation to a low as reasonably achievable. MRA NECK WO CONTRAST    Result Date: 12/15/2022  Impression: Mild disease bilateral carotid bulbs. This document has been electronically signed by: Jarred Obrien MD on 12/15/2022 08:20 PM Carotid stenosis and measurements are in accordance with NASCET criteria. 3D Post-processing was performed on this study. MRI BRAIN WO CONTRAST    Result Date: 12/15/2022  Impression: Acute ischemic event left parietal region coronal radiata. Possible small additional acute ischemic event within the right lobe of the cerebellum.  Moderate nonspecific periventricular and deep white matter disease. This most often can ascribed to chronic small vessel ischemic change. This document has been electronically signed by: Jeanne Bland MD on 12/15/2022 08:14 PM        DVT prophylaxis:    [] Lovenox  [] SCDs  [] SQ Heparin  [] Encourage ambulation   [x] Already on Anticoagulation - eliquis 5mg BID       Diet: ADULT DIET; Easy to Chew; 3 carb choices (45 gm/meal)  Code Status: Full Code  PT/OT: Yes  Tele: Yes  IVF: Holding NS 75ml/h. Continue frequent fluid status reassessments. Electronically signed by Kory Da Silva DO, [id number] on 12/23/2022 at 7:48 AM    Case was discussed with Attending, Dr. Diamante Salas.

## 2022-12-23 NOTE — PROGRESS NOTES
Called and spoke with Stacia Siddiqi, Primary RN regarding planned admission to [facility] today. Patient is a discharge/readmit to [phone], please call report to [phone].

## 2022-12-23 NOTE — PROGRESS NOTES
[facility]  Individualized Disclosure Statement      Patient: Tonyn Miner      Scope of Service  [facility] provides 24 hour individualized service to patients with functional limitations due to, but not limited to: stroke, brain injury, spinal cord injury, major multiple trauma, fractures, amputation, and neurological disorders. The [address] [place] [address] provides rehabilitative nursing and medical services as well as physical, occupational, speech, and recreation therapies. [address] [facility] is fully accredited by the Commission on Accreditation of Rehabilitation Facilities (CARF) as a comprehensive provider of rehabilitation services. Patients admitted to the [address] [place] [address] receive a minimum of three hours of therapy per day, at least six days per week, with a revised therapy schedule on weekends and holidays. Physical therapy, occupational therapy, and speech therapy are provided seven days per week including holidays. Other therapeutic services are available on weekends and evenings as needed or scheduled. Intensity of Treatment  Your treatment program will consist of Nursing Care and:  1.5 hours of Physical Therapy, per day  1.5 hours of Occupational Therapy, per day   30-60 minutes of Speech Therapy, per day  1 hour of Recreational Therapy, per week    [facility] maintains contracts with most insurance plans. Depending on the type of coverage, the insurance may impose limits on the coverage for rehabilitation care. Coverage is based on the premise that you are able to fully participate in the rehabilitation program and show continued progress. Please verify your own insurance information A copy of this was given to the patient/ family on this date.   Insurance Coverage  Your insurance company has made the following determination relative to the length of your stay:   Your estimated length of stay is 14 days   Your insurance Coverage has been verified as follows:    Primary Insurance: Payor: Gurvinder Vazquez /  /  /    Deductible:   $1556.00 Coverage: Active  Secondary Insurance:   None  secondary insurance policies often cover co-pay amounts, but to ensure payment please contact your insurance company.     Alternative Resources: Please ask the  for more information [phone]

## 2022-12-23 NOTE — PROGRESS NOTES
Patient discharged at this time. IV left in place per request from Catalina. Discharge instructions, medication changes and follow up appointments explained at this time. All questions answered at this time. AVS given to patient and paperwork signed with this RN. All patient belongings returned. Chart broken down and placed in yellow bin. Transport took patient to [phone] in wheelchair. Wife escorted.

## 2022-12-23 NOTE — PROGRESS NOTES
Admitted to the Inpatient Rehabilitation Unit via wheelchair. Patient was then oriented to room and unit. Education provided on the rehabilitation routine: three hours of therapy five days per week. Explained patients right to have family, representative or physician notified of their admission. Patient has Declined for physician to be notified. Patient has Declined for family/representative to be notified. Admitting medication orders compared with acute stay medications; home medication list reviewed with patient/family. Medication issues identified No  Medication issue: N/A  If yes, physician notified Dr Poppy Martinez. Transportation:   Has transportation kept you from medical appointments, meetings, work, or from getting things needed for daily living? (Check all that apply)  No.      Health Literacy:   How often do you need to have someone help you when you read instructions, pamphlets, or other written material from your doctor or pharmacy? 0. - Never    Social Isolation:  How often do you feel lonely or isolated from those around you? 1. Rarely                  Trouble concen        Pain:  Pain Effect on Sleep    Ask patient: \"Over the past 5 days, how much of the time has pain made it hard for you to sleep at night?\" 1.  Rarely or not at all     If no pain is reported, Stop here. If pain is reported, continue with the additional questions. Pain Interference with Therapy Activities   Ask patient: Mikala Sandy the past 5 days, how often have you limited your participation in rehabilitation therapy sessions due to pain?\" 1.  Rarely or not at all       Moving or speaking so slowly that other people could have noticed.    Or the opposite-being so fidgety or restless that you have been moving around a lot more than usual         Bladder and Bowel Function Assessment:  Prior history of bladder problems: retention  Number of pads used per day: 2  Frequency of night time voiding: twice  Fluid intake volume and pattern: adequatre  Last BM: 12/23  Bowel problems (prior or current) Yes      Incontinence      Frequent diarrhea   yes   No BM in 3 days this stay or history of constipation      Hemorrhoids      Diverticulitis      Bowel Surgery     Two nurse skin assessment performed by Catalina HAYES  and Mariann ARROYO . Weight: 134.8 lb      Care plan was created with patient's input and goals were agreed upon. Admission folder provided with education regarding patients diagnoses, fall prevention, and skin care. \"Data Collection Information Summary for Patients in Inpatient Rehabilitation Facilities\" and \"Privacy Act Statement - Health Care Records\" provided. Please refer to the admission navigator for further information.

## 2022-12-23 NOTE — PROCEDURES
30 Day Cardiac Event Monitor was applied to patient. Instructions were given and skin/monitor prep and application was demonstrated. Patient was instructed to remove monitor on 1/22 and mail back to [employer].

## 2022-12-23 NOTE — PLAN OF CARE
Problem: Discharge Planning  Goal: Discharge to home or other facility with appropriate resources  Outcome: Progressing  Flowsheets (Taken 12/22/2022 1935)  Discharge to home or other facility with appropriate resources:   Identify barriers to discharge with patient and caregiver   Arrange for needed discharge resources and transportation as appropriate   Identify discharge learning needs (meds, wound care, etc)   Arrange for interpreters to assist at discharge as needed   Refer to discharge planning if patient needs post-hospital services based on physician order or complex needs related to functional status, cognitive ability or social support system     Problem: ABCDS Injury Assessment  Goal: Absence of physical injury  Outcome: Progressing  Flowsheets (Taken 12/22/2022 2328)  Absence of Physical Injury: Implement safety measures based on patient assessment     Problem: Safety - Adult  Goal: Free from fall injury  Outcome: Progressing  Flowsheets (Taken 12/22/2022 2328)  Free From Fall Injury: Instruct family/caregiver on patient safety     Problem: Chronic Conditions and Co-morbidities  Goal: Patient's chronic conditions and co-morbidity symptoms are monitored and maintained or improved  Outcome: Progressing  Flowsheets (Taken 12/22/2022 1935)  Care Plan - Patient's Chronic Conditions and Co-Morbidity Symptoms are Monitored and Maintained or Improved:   Monitor and assess patient's chronic conditions and comorbid symptoms for stability, deterioration, or improvement   Collaborate with multidisciplinary team to address chronic and comorbid conditions and prevent exacerbation or deterioration   Update acute care plan with appropriate goals if chronic or comorbid symptoms are exacerbated and prevent overall improvement and discharge     Problem: Pain  Goal: Verbalizes/displays adequate comfort level or baseline comfort level  Outcome: Progressing-Patient has no pain yet this shift, will continue to monitor. Problem: Skin/Tissue Integrity  Goal: Absence of new skin breakdown  Description: 1. Monitor for areas of redness and/or skin breakdown  2. Assess vascular access sites hourly  3. Every 4-6 hours minimum:  Change oxygen saturation probe site  4. Every 4-6 hours:  If on nasal continuous positive airway pressure, respiratory therapy assess nares and determine need for appliance change or resting period.   Outcome: Progressing     Problem: Neurosensory - Adult  Goal: Achieves stable or improved neurological status  Outcome: Progressing  Flowsheets (Taken 12/22/2022 1935)  Achieves stable or improved neurological status:   Assess for and report changes in neurological status   Initiate measures to prevent increased intracranial pressure  Goal: Achieves maximal functionality and self care  Outcome: Progressing  Flowsheets (Taken 12/22/2022 1935)  Achieves maximal functionality and self care: Monitor swallowing and airway patency with patient fatigue and changes in neurological status     Problem: Neurosensory - Adult  Goal: Achieves maximal functionality and self care  Outcome: Progressing  Flowsheets (Taken 12/22/2022 1935)  Achieves maximal functionality and self care: Monitor swallowing and airway patency with patient fatigue and changes in neurological status

## 2022-12-23 NOTE — H&P
Physical Medicine & Rehabilitation Admission History and Physical    Impression:  Acute left parietal corona radiata and right cerebellar ischemic stroke causing right hemiparesis with impaired coordination, and dysarthria  Debility/physical decondition due to Klebsiella pneumonia urinary tract infection with emphysematous cystitis and bilateral hydronephrosis complicated by Klebsiella pneumonia bacteremia/sepsis  Urinary retention  Klebsiella pneumonia urinary tract infection with emphysematous cystitis and bilateral hydronephrosis  Klebsiella pneumoniae bacteremia/sepsis  Coronary artery disease with ischemic cardiomyopathy requiring two-vessel coronary artery bypass graft surgery on 11/7/2022  Hypertension  Diabetes mellitus type 2       Plan:   Admit to the inpatient rehabilitation unit. The patient demonstrates good potential to participate in an inpatient rehabilitation program involving at least 3 hours per day, 5 days per week of intensive rehabilitation. Rehabilitation services will include PT, OT, and SLP/RT in order to improve functional status prior to discharge. Family education and training will be completed. Equipment evaluations and recommendations will be completed as appropriate. Rehabilitation nursing will be involved for bowel, bladder, skin, and pain management. Nursing will also provide education and training to patient and family. Prophylaxis:  DVT: Patient on Eliquis, JONAH stocking, intermittent pneumatic compression device. GI: Colace, Senokot, GlycoLax as needed, milk of magnesia as needed, Dulcolax suppository as needed.   Pain: Tylenol as needed  Continue Lipitor for CVA and CAD  Continue Toprol-XL for hypertension  Continue baclofen for spasticity  Continue Flomax for urinary retention  Continue Protonix for gastric protection  Continue Jardiance, Glucotrol, Humalog insulin coverage for diabetes mellitus  Continue Wellbutrin XL for smoking cessation  Continue 7 days course oral Cipro for Klebsiella pneumonia UTI/bacteremia  Start melatonin, trazodone as needed for insomnia  Continue multivitamin supplement  Nutrition:  Consultation to dietician for nutritional counseling and recommendations. Prealbumin will be checked on admission. Bladder: Monitoring signs or symptoms of UTI  Bowel: Monitoring signs or symptoms of constipation   and case management consultations for coordination of care and discharge planning    The main medical problem(s) and comorbidities being actively managed by the physicians and requiring 24 hour rehabilitation nursing care during this stay include CVA, CAD, cardiomyopathy, hypertension, diabetes mellitus, urinary retention with UTI/sepsis/bacteremia. The domains of functional impairment present in this patient which will require an intensive and interdisciplinary rehabilitation environment include self care, motor dysfunction, bowel/bladder management, safety, and cognitive function. Estimated length of stay for this admission : probably 2~3 weeks    Anticipated disposition: Home.   The potential to achieve that is good.    ==========================================================================================================================      Chief Complaint and Reason for Rehabilitation Admission:   Right-sided weakness due to stroke    History of Present Illness:  Oseas Wilkinson  is a 63 y.o. left-handed  male with history of hypertension, diabetes mellitus type 2, coronary artery disease with ischemic cardiomyopathy requiring two-vessel CABG, urinary retention with several failed void trial, status post tonsillectomy, is admitted to the inpatient rehabilitation unit on 12/23/2022 for intensive inpatient rehabilitation treatment of impaired ADL and ambulation secondary to right hemiparesis due to acute left parietal corona radiata and right cerebellar ischemic stroke, and debility/physical deconditioning due to Klebsiella pneumonia urinary tract infection with emphysematous cystitis and bilateral hydronephrosis complicated by Klebsiella pneumonia bacteremia/sepsis. The patient was previously hospitalized at [facility] from 10/28/2022 to 11/15/2022 for coronary artery disease and ischemic cardiomyopathy requiring two-vessel CABG on 11/7/2022. His postoperative course was complicated by urinary retention with failed void trial.  The patient has been experiencing progressive weakness after he was discharged to home. The Bella was later removed on 12/13/2022 but the patient continued having difficulty voiding. On 12/14/2022 approximately 11:30 PM his wife noticed the patient had slight slurred speech with right arm weakness. On 12/15/2022 the patient suddenly felt dizzy and lightheaded while he was trying to get up from sitting on toilet and fell into the ground. He says he did not lose consciousness and did not hit his head. His wife called 46. He was transported to [address] [place] [address] ER for evaluation by EMS. He complains of neck pain and upper back pain. He was found to be tachycardic. His WBC was found to be 22.3. Bella was placed in ER and a rush of air followed by dark brown and bright red color urine came out. He was put on IV meropenem and admitted. Urology was consulted. CT of cervical spine revealed only multilevel facet and uncovertebral joint arthropathy. CT of head done on 12/15/2020 revealed no acute intracranial abnormality. CT of the chest, abdomen and pelvis done on 12/15/2022 revealed emphysematous cystitis, bilateral hydronephrosis, and constipation. Because of right arm weakness, stroke code was called on 12/15/2022. MRI of brain done on 12/15/2022 revealed acute ischemic stroke at the left parietal region corona radiata, and possible small additional acute ischemic stroke within right cerebellum.   Neurology service start patient on aspirin and Plavix combination for the stroke. MRA of the neck and head done on 12/15/2022 showed only mild bilateral carotid bulb disease. Transesophageal echocardiogram was done on 12/16/2022 showing severe global hypokinesis of left ventricle with estimated ejection fraction of 30%, and no thrombus identified. 2 blood culture and urine culture done on 12/15/2022 revealed Klebsiella pneumonia. Infectious disease had been consulted. Oral Cipro was started on 12/22/2022 after IV meropenem was completed. Patient's hospital course was also complicated by constipation which resolved this morning. At the present time the patient still has persistent weakness at his right upper and right lower extremities with impaired coordination. He denies having any numbness or tingling feeling. He denies having any painful symptom. He is still on Bella catheter for urinary retention and hematuria. He is scheduled to be follow-up with urologist in urology clinic on 1/24/2022 for scheduled cystoscopy. He denies having dizziness, lightheadedness, headache, vision disturbance, nausea or vomiting, difficulty swallowing or speaking, chest pain, shortness of breath, abdominal pain, diarrhea or constipation. Most Recent Rehabilitation Assessments:  PT:    (12/21/2022) : Bed Mobility:  Supine to Sit: Minimal Assistance, X 1, with head of bed raised, with rail, with verbal cues , with increased time for completion  Scooting: Contact Guard Assistance     Transfers:  Sit to Stand: [employer], with increased time for completion, cues for hand placement, x3 trials; x 1 EOB, x 2 from chair  Stand to [place] [id number]     Ambulation:  Contact Guard Assistance, X 1, with verbal cues , with increased time for completion  Distance: 40 ft  Surface: Level Tile  Device:Rolling Walker  Gait Deviations:   Forward Flexed Posture, Slow Jada, Decreased Step Length Bilaterally, Decreased Gait Speed, and Unsteady Gait at times  -Pt needed cues for R foot placement to stay within walker. One occurences of LOB due to R LE being outside of walker. Balance:  Dynamic Sitting Balance: Stand By Assistance  Static Standing Balance: Contact Guard Assistance  -No UE support x30 sec EO  Tandem Stance: x30 sec with each LE in front and no UE support  -Pt had increased R lean with tandem standing. OT:    (12/23/2022) :  ADL:   No ADL's completed this session. Patient declined to complete any ADLs; stated he was bathed and completed oral care early this a.m.  . BALANCE:  Sitting Balance:  Stand By Assistance. Standing Balance: Contact Guard Assistance. With use of 2 w/w for support     BED MOBILITY:  Supine to Sit: Stand By Assistance with increased time     TRANSFERS:  Sit to Stand:  Stand By Assistance. With no LOB or unsteadiness     FUNCTIONAL MOBILITY:  Assistive Device: Rolling Walker  Assist Level:  Contact Guard Assistance. Distance:  from EOB, around bed and to recliner  Patient declined to walk in hallway      ADDITIONAL ACTIVITIES:  Completed 1 set x 10-15 reps of UB exer with 1# free wt within sternal precautions with rest breaks. Patient c/o R hand/UE weakness and edu on importance of using and challenging R UE to improve function. Patient demo understanding. Completed 1 set x 10 reps of R hand  strengthening with rolled wash cloth to increase R hand  for opening grooming items. Completed 5 reps of opening 3 different grooming lids each using different grasps with R hand to increase R hand strength and [facility] for self care routine. Patient had difficulty manipulating each cap and would occasionally become frustrated due to R hand weakness and the fact he is L hand dominant. Edu on importance and benefit for (B) UE tasks with patient demo understanding. ST:    (12/22/2022) : Medication Label  100% independently     Deductive Reasoning- Kitchen Shelves  70% independent increasing to 100% with minimal cues.      Not formally addressed but patient was able to maintain focus on activity with multiple interruptions from other hospital staff members. Patient tolerated trials of thin liquids by straw without overt s/s of laryngeal penetration/aspiration. This clinician reviewed the IDDSI diet chart with the patient. The patient is presently on an IDDSI level 7 Easy to [employer]. Patient reported that due to his dentition, he does not eat certain foods such as apples or salads because they are difficult to chew. He is happy with his current diet level and is not interested in advanced texture trials at this time. Past Medical History:      Diagnosis Date    Coronary artery disease 2022    Diabetes mellitus ([facility] [address] 2020    Hypertension     Ischemic cardiomyopathy 2022    Urinary retention 2022       Primary care provider: BLACK Noriega - CNP       Past Surgical History:      Procedure Laterality Date    CORONARY ARTERY BYPASS GRAFT N/A 11/7/2022    CABG X2 JOY WITH BALLOON PUMP performed by Michelle Issa MD at [address]      TRANSESOPHAGEAL ECHOCARDIOGRAM N/A 12/16/2022    TRANSESOPHAGEAL ECHOCARDIOGRAM WITH ANESTHESIA performed by Eloy Beard MD at [facility]       Allergies:     Allergies   Allergen Reactions    Metformin And Related Nausea And Vomiting        Current Medications:    Current Facility-Administered Medications   Medication Dose Route Frequency Provider Last Rate Last Admin    sodium chloride flush 0.9 % injection 5-40 mL  5-40 mL IntraVENous 2 times per day Michael Gonzalez MD        sodium chloride flush 0.9 % injection 5-40 mL  5-40 mL IntraVENous PRN Michael Gonzalez MD        0.9 % sodium chloride infusion   IntraVENous PRN Michael Gonzalez MD        atorvastatin (LIPITOR) tablet 40 mg  40 mg Oral Daily MD Ashley Salcedo ON 12/24/2022] buPROPion (WELLBUTRIN XL) extended release tablet 150 mg  150 mg Oral QAM Michael Gonzalez MD        insulin lispro (HUMALOG) injection vial 0-8 Units  0-8 Units SubCUTAneous TID  Sanket Tracy MD        insulin lispro (HUMALOG) injection vial 0-4 Units  0-4 Units SubCUTAneous Nightly Sanket Tracy MD        glucose chewable tablet 16 g  4 tablet Oral PRN Sanket Tracy MD        dextrose bolus 10% 125 mL  125 mL IntraVENous PRN Sanket Tracy MD        Or    dextrose bolus 10% 250 mL  250 mL IntraVENous PRN Sanket Tracy MD        glucagon (rDNA) injection 1 mg  1 mg SubCUTAneous PRN Sanket Tracy MD        dextrose 10 % infusion   IntraVENous Continuous PRN Sanket Tracy MD        [START ON 12/24/2022] pantoprazole (PROTONIX) tablet 40 mg  40 mg Oral QAM AC Sanket Tracy MD        [START ON 12/24/2022] tamsulosin [facility]) capsule 0.8 mg  0.8 mg Oral Daily Sanket Tracy MD        apixaban [facility] Saida) tablet 5 mg  5 mg Oral BID Sanket Tracy MD        baclofen (LIORESAL) tablet 5 mg  5 mg Oral TID Sanket Tracy MD        [START ON 12/24/2022] metoprolol succinate (TOPROL XL) extended release tablet 12.5 mg  12.5 mg Oral Daily MD Keke Royal ON 12/24/2022] glipiZIDE (GLUCOTROL) tablet 10 mg  10 mg Oral QAM AC Sanket Tracy MD        acetaminophen (TYLENOL) tablet 650 mg  650 mg Oral Q4H PRN Sanket Tracy MD        [START ON 12/24/2022] empagliflozin (JARDIANCE) tablet 25 mg  25 mg Oral Daily Sanket Tracy MD        ondansetron (ZOFRAN-ODT) disintegrating tablet 4 mg  4 mg Oral Q8H PRN Sanket Tracy MD        oxyCODONE (ROXICODONE) immediate release tablet 5 mg  5 mg Oral Q6H PRN Sanket Tracy MD        Or    oxyCODONE (ROXICODONE) immediate release tablet 2.5 mg  2.5 mg Oral Q6H PRN Sanket Tracy MD        docusate sodium (COLACE) capsule 100 mg  100 mg Oral BID Sanket Tracy MD        bisacodyl (DULCOLAX) suppository 10 mg  10 mg Rectal Daily PRN Sanket Tracy MD        magnesium hydroxide (MILK OF MAGNESIA) 400 MG/5ML suspension 15 mL  15 mL Oral Daily PRN Sanket Tracy MD melatonin tablet 3 mg  3 mg Oral Nightly Jamie Veras MD        traZODone (DESYREL) tablet 50 mg  50 mg Oral Nightly PRN Jamie Veras MD        [START ON 2022] multivitamin 1 tablet  1 tablet Oral Daily with breakfast Jamie Veras MD        polyethylene glycol [facility]) packet 17 g  17 g Oral Daily PRN Jamie Veras MD        [facility]) tablet 17.2 mg  2 tablet Oral Nightly Jamie Veras MD        ciprofloxacin (CIPRO) tablet 750 mg  750 mg Oral 2 times per day Jamie Veras MD            Social History:  Social History     Socioeconomic History    Marital status:      Spouse name: Not on file    Number of children: Not on file    Years of education: Not on file    Highest education level: Not on file   Occupational History    Not on file   Tobacco Use    Smoking status: Former     Packs/day: 1.00     Years: 33.00     Pack years: 33.00     Types: Cigarettes     Start date:      Quit date:      Years since quittin.9    Smokeless tobacco: Never   Vaping Use    Vaping Use: Never used   Substance and Sexual Activity    Alcohol use: Not Currently     Comment: Uses during three 24 ounce beer per week in the past; stopped alcoholic beverage usage after he was found to have cardiac disease    Drug use: No    Sexual activity: Yes     Partners: Female   Other Topics Concern    Not on file   Social History Narrative    Not on file     Social Determinants of Health     Financial Resource Strain: Low Risk     Difficulty of Paying Living Expenses: Not hard at all   Food Insecurity: No Food Insecurity    Worried About Running Out of Food in the Last Year: Never true    Ran Out of Food in the Last Year: Never true   Transportation Needs: No Transportation Needs    Lack of Transportation (Medical): No    Lack of Transportation (Non-Medical):  No   Physical Activity: Not on file   Stress: Not on file   Social Connections: Not on file   Intimate Partner Violence: Not on file Housing Stability: Not on file     Occupation: Last work in early October 2022 as [employer]   Lives with: His wife, his 15years old son and 21years old daughter  Home setup: 1 level house with a 2+1/2 step outside front door with bilateral handrail  Previous level of independence: Independent in all ADLs, community ambulation without using any assistive walking device; patient does not drive      Family History:       Problem Relation Age of Onset    Diabetes Father     Stroke Father     Diabetes Brother     Diabetes Brother     Alcohol Abuse Paternal Grandfather     Heart Attack Paternal Grandfather        Review of Systems:  Review of Systems   Constitutional:  Negative for chills, diaphoresis, fatigue and fever. HENT:  Negative for ear discharge, ear pain, hearing loss, rhinorrhea, sneezing, sore throat, tinnitus and trouble swallowing. Eyes:  Negative for pain, discharge and visual disturbance. Respiratory:  Negative for cough, shortness of breath and wheezing. Cardiovascular:  Negative for chest pain, palpitations and leg swelling. Gastrointestinal:  Negative for abdominal pain, constipation, diarrhea, nausea and vomiting. Endocrine: Negative for cold intolerance and heat intolerance. Genitourinary:  Positive for difficulty urinating. Negative for dysuria. Musculoskeletal:  Positive for gait problem. Negative for arthralgias, back pain, joint swelling, myalgias and neck pain. Skin:  Negative for rash. Allergic/Immunologic: Negative for food allergies. Neurological:  Positive for weakness (Right upper and lower extremities). Negative for dizziness, tremors, facial asymmetry, speech difficulty, light-headedness, numbness and headaches. Hematological:  Does not bruise/bleed easily. Psychiatric/Behavioral:  Negative for dysphoric mood, hallucinations and sleep disturbance. The patient is not nervous/anxious.          Physical Exam:  /65 Pulse 70   Temp 97.9 °F (36.6 °C) (Oral)   Resp 16   Ht 5' 10\" (1.778 m)   Wt 134 lb 8 oz (61 kg)   SpO2 99%   BMI 19.30 kg/m²   General:  well-developed, well nourished  male; in no acute distress ; appropriate affect & mood; lying on bed comfortably  Eyes: pupil equally round ; extra-ocular motion intact bilaterally  Head, Ear, Nose, Mouth & Throat : normocephalic ; no tenderness at the face or head scalp; no discharge from ears or nose ; no deformity ; no facial swelling ; oral mucosa pink   Neck :  supple ; no tenderness ; mild muscle spasm at bilateral cervical paraspinal muscles  Cardiovascular : Presence of newly healed vertical surgical scar at sternum with mild tenderness; regular rate & rhythm ; normal S1 & S2 heart sound ; no murmur ; normal peripheral pulse at the bilateral upper extremities; reduced pulse strength at the bilateral lower extremities  Pulmonary : Present breath sounds at the bilateral lung fields; no wheezing ; no rale; no crackle  Gastrointestinal : soft, slightly protruded abdomen without tenderness ; normal bowel sound present   Back : no tenderness; no muscle spasm  Skin: no skin lesion or rash ; no pitting edema at all 4 extremities  Musculoskeletal : no limb asymmetry; no limb deformity; no tenderness at bilateral upper & lower extremities; no palpable mass at limbs ; no joints laxity or crepitation ; shoulder flexion and abduction passive ROM reaching 170 degrees bilaterally; hip flexion passive ROM reaching 130 degrees bilaterally; normal functional joints ROM at bilateral upper & lower extremities  Cerebral :  alert ; awake ; oriented to place, person and time; follow two-step verbal command  Cerebellum : Dysmetria with the right finger-to-nose test; no dysmetria with left finger-to-nose test ; very mild dysmetria with right heel-to-shin test; no dysmetria with the left heel-to-shin test; presence of right dysdiadochokinesia with rapid bilateral synchronous supination/pronation  Cranial Nerves : Tongue protrudes slightly to her right  (CN XII) ; mild right upper trapezius muscle weakness (CN XI) : grossly intact other CN II to X function  Sensory : intact light touch and pin prick sensation at bilateral upper & lower extremities  Motor : Slightly reduced muscle tone at right upper and right lower extremities; normal muscle tone at left upper & lower extremities ; 4+/5 to 5/5 muscle strength at right shoulder flexion and abduction; 4+/5 to 5/5 muscle strength at right elbow flexion and extension; 4+/5 muscle strength at right wrist extension; 4-/5 muscle strain at right finger extension; 4-/5 to 4+/5 muscle strength at the right finger flexion and handgrip; 3+/5 to 4-/5 muscle strength at right finger abduction; 4-/5 to 4+/5 muscle strength at the right hip flexion; 4-/5 muscle strength at right knee flexion; 4+/5 muscle strength at the right knee extension; 4+/5 muscle strength at right ankle dorsiflexion; 4+/5 to 5/5 muscle strength at the right ankle plantarflexion; normal 5/5 muscle strength at left upper & lower extremities  Reflex : 1+ left biceps, left brachioradialis, left knee reflexes; 0 right biceps, bilateral triceps, right brachioradialis, right knee and bilateral ankle reflexes  Pathological Reflex :  No Ion's sign ; no Babinski sign ; no ankle clonus  Gait : Not assessed       Diagnostics:  Recent Results (from the past 24 hour(s))   POCT Glucose    Collection Time: 12/22/22  5:18 PM   Result Value Ref Range    POC Glucose 166 (H) 70 - 108 mg/dl   POCT Glucose    Collection Time: 12/22/22  8:13 PM   Result Value Ref Range    POC Glucose 188 (H) 70 - 108 mg/dl   POCT Glucose    Collection Time: 12/23/22  7:35 AM   Result Value Ref Range    POC Glucose 122 (H) 70 - 108 mg/dl   Basic Metabolic Panel    Collection Time: 12/23/22  8:11 AM   Result Value Ref Range    Sodium 137 135 - 145 meq/L    Potassium 5.0 3.5 - 5.2 meq/L    Chloride 99 98 - 111 meq/L CO2 28 23 - 33 meq/L    Glucose 115 (H) 70 - 108 mg/dL    BUN 36 (H) 7 - 22 mg/dL    Creatinine 1.5 (H) 0.4 - 1.2 mg/dL    Calcium 9.1 8.5 - 10.5 mg/dL   Magnesium    Collection Time: 12/23/22  8:11 AM   Result Value Ref Range    Magnesium 2.4 1.6 - 2.4 mg/dL   Phosphorus    Collection Time: 12/23/22  8:11 AM   Result Value Ref Range    Phosphorus 3.4 2.4 - 4.7 mg/dL   Anion Gap    Collection Time: 12/23/22  8:11 AM   Result Value Ref Range    Anion Gap 10.0 8.0 - 16.0 meq/L   Glomerular Filtration Rate, Estimated    Collection Time: 12/23/22  8:11 AM   Result Value Ref Range    Est, Glom Filt Rate 53 (A) >60 ml/min/1.73m2   POCT Glucose    Collection Time: 12/23/22 12:08 PM   Result Value Ref Range    POC Glucose 164 (H) 70 - 108 mg/dl        Latest Reference Range & Units 12/21/22 05:28 12/22/22 05:23 12/23/22 08:11   Sodium 135 - 145 meq/L 137 140 137   Potassium 3.5 - 5.2 meq/L 4.5 5.0 5.0   Chloride 98 - 111 meq/L 102 103 99   CO2 23 - 33 meq/L 25 29 28   BUN,BUNPL 7 - 22 mg/dL 31 (H) 31 (H) 36 (H)   Creatinine 0.4 - 1.2 mg/dL 1.2 1.3 (H) 1.5 (H)   Anion Gap 8.0 - 16.0 meq/L 10.0 8.0 10.0   Est, Glom Filt Rate >60 ml/min/1.73m2 >60 >60 53 !    Magnesium 1.6 - 2.4 mg/dL   2.4   Glucose, Random 70 - 108 mg/dL 215 (H) 184 (H) 115 (H)   CALCIUM, SERUM, [id number] 8.5 - 10.5 mg/dL 8.6 8.6 9.1   (H): Data is abnormally high  !: Data is abnormal       Latest Reference Range & Units 12/22/22 17:18 12/22/22 20:13 12/23/22 07:35 12/23/22 12:08   POC Glucose 70 - 108 mg/dl 166 (H) 188 (H) 122 (H) 164 (H)   (H): Data is abnormally high       Latest Reference Range & Units 12/15/22 01:30   Hemoglobin A1C 4.4 - 6.4 % 7.6 (H)   (H): Data is abnormally high       Latest Reference Range & Units 12/16/22 05:09   CHOLESTEROL, TOTAL, [id number] 100 - 199 mg/dL 99 (L)   HDL Cholesterol mg/dL 45   LDL Calculated mg/dL 42   Triglycerides 0 - 199 mg/dL 62   (L): Data is abnormally low       Latest Reference Range & Units 10/5/22 06:30 11/1/22 04:32 12/15/22 01:30   Albumin 3.5 - 5.1 g/dL 4.0 3.7 4.0   Alk Phos 38 - 126 U/L 77 64 96   ALT 11 - 66 U/L 9 (L) 6 (L) 10 (L)   AST 5 - 40 U/L 13 10 15   Bilirubin 0.3 - 1.2 mg/dL 0.4 0.3 0.8   Total Protein 6.1 - 8.0 g/dL 6.6 5.8 (L) 6.7   (L): Data is abnormally low       Latest Reference Range & Units 12/20/22 03:43 12/21/22 05:28 12/22/22 05:23   WBC 4.8 - 10.8 thou/mm3 7.0 7.8 8.2   RBC 4.70 - 6.10 mill/mm3 3.70 (L) 3.71 (L) 3.65 (L)   Hemoglobin Quant 14.0 - 18.0 gm/dl 10.1 (L) 10.3 (L) 10.0 (L)   Hematocrit 42.0 - 52.0 % 31.3 (L) 31.6 (L) 31.3 (L)   MCV 80.0 - 94.0 fL 84.6 85.2 85.8   MCH 26.0 - 33.0 pg 27.3 27.8 27.4   MCHC 32.2 - 35.5 gm/dl 32.3 32.6 31.9 (L)   MPV 9.4 - 12.4 fL 10.1 9.8 9.7   RDW-CV 11.5 - 14.5 % 13.5 13.6 13.5   RDW-SD 35.0 - 45.0 fL 41.4 42.8 42.5   Platelet Count 260 - 400 thou/mm3 279 316 373   (L): Data is abnormally low       Latest Reference Range & Units 11/6/22 09:55 12/15/22 05:19   Color, UA STRAW-YELLOW  YELLOW BROWN ! Glucose, UA NEGATIVE mg/dl  >= 1000 ! Bilirubin, Urine NEGATIVE  NEGATIVE SMALL ! Ketones, Urine NEGATIVE  NEGATIVE NEGATIVE   Specific Gravity, UA 1.002 - 1.030  1.007    Blood, Urine NEGATIVE  LARGE ! MODERATE !   pH, UA 5.0 - 9.0  6.5 5.5   Protein, UA NEGATIVE  300 ! 100 ! Urobilinogen, Urine 0.0 - 1.0 eu/dl 0.2 0.2   Nitrite, Urine NEGATIVE  NEGATIVE POSITIVE ! Leukocyte Esterase, Urine NEGATIVE   SMALL ! Leukocytes, UA NEGATIVE  TRACE !     Glucose, Urine NEGATIVE mg/dl NEGATIVE    Casts NONE SEEN /lpf  /lpf 4-8 HYALINE  NONE SEEN    Casts UA NONE SEEN /lpf  NONE SEEN   CASTS 2 NONE SEEN /lpf  NONE SEEN   Mucus, UA NONE SEEN/THREA   NONE SEEN   WBC, UA 0-4/hpf /hpf 5-9 > 200   RBC, UA 0-2/hpf /hpf > 200 > 200   Epithelial Cells, UA 3-5/hpf /hpf 3-5 5-10   Renal Epithelial, UA NONE SEEN  NONE SEEN NONE   Bacteria, UA FEW/NONE SEEN /hpf NONE SEEN MANY   Amorphous, UA NONE SEEN   NONE SEEN   Yeast, Urine NONE SEEN  NONE SEEN NONE SEEN Crystals NONE SEEN  NONE SEEN    Crystals, UA NONE SEEN   NONE SEEN   NONE SEEN  NONE SEEN   Ictotest NEGATIVE   NEGATIVE   Character, Urine CLEAR-SL CLOUD  CLEAR TURBID !   !: Data is abnormal       Blood culture 1 (12/15/2022)  Component 12/15/22 1938    Organism Klebsiella pneumoniae Abnormal     Organism Enterobacter cloacae complex Abnormal     Blood Culture, Routine Enterobacter species which may be initially susceptible to third generation cephalosporins may develop resistance within three to four days of initiation of this antimicrobial therapy. Susceptibility  Klebsiella pneumoniae (1)  Antibiotic Interpretation Microscan   Method Status     cefTRIAXone Sensitive <=1 mcg/mL BACTERIAL SUSCEPTIBILITY PANEL BY DILIP Final     cefepime Sensitive <=1 mcg/mL BACTERIAL SUSCEPTIBILITY PANEL BY DILIP Final     ampicillin Resistant >=32 mcg/mL BACTERIAL SUSCEPTIBILITY PANEL BY DILIP Final     meropenem Sensitive <=0.25 mcg/mL BACTERIAL SUSCEPTIBILITY PANEL BY DILIP Final     trimethoprim-sulfamethoxazole Sensitive <=20 mcg/mL BACTERIAL SUSCEPTIBILITY PANEL BY DILIP Final     gentamicin Sensitive <=1 mcg/mL BACTERIAL SUSCEPTIBILITY PANEL BY DILIP Final     ciprofloxacin Sensitive <=0.25 mcg/mL BACTERIAL SUSCEPTIBILITY PANEL BY DILIP Final     Enterobacter cloacae complex (2)  Antibiotic Interpretation Microscan   Method Status     cefepime Sensitive <=1 mcg/mL BACTERIAL SUSCEPTIBILITY PANEL BY DILIP Final     meropenem Sensitive <=0.25 mcg/mL BACTERIAL SUSCEPTIBILITY PANEL BY DILIP Final     trimethoprim-sulfamethoxazole Sensitive <=20 mcg/mL BACTERIAL SUSCEPTIBILITY PANEL BY DILIP Final     gentamicin Sensitive <=1 mcg/mL BACTERIAL SUSCEPTIBILITY PANEL BY DILIP Final     ciprofloxacin Sensitive <=0.25 mcg/mL BACTERIAL SUSCEPTIBILITY PANEL BY DILIP Final           Blood culture 2 (12/15/2022) :   Component 12/15/22 4158    Organism Klebsiella pneumoniae Abnormal     Blood Culture, Routine sensitivity done-previous specimen; see blood culture drawn on 12/15/2022 at 04:10          Urine culture (12/15/2022) : Component 12/15/22 0519    Organism Klebsiella pneumoniae Abnormal     Urine Culture Reflex Schuyler count: >100,000 CFU/mL    Susceptibility  Klebsiella pneumoniae (1)  Antibiotic Interpretation Microscan   Method Status     amoxicillin-clavulanate Sensitive <=2 mcg/mL BACTERIAL SUSCEPTIBILITY PANEL BY DILIP Final     cefOXitin Sensitive <=4 mcg/mL BACTERIAL SUSCEPTIBILITY PANEL BY DILIP Final     cefTRIAXone Sensitive <=1 mcg/mL BACTERIAL SUSCEPTIBILITY PANEL BY DILIP Final     ampicillin Resistant >=32 mcg/mL BACTERIAL SUSCEPTIBILITY PANEL BY DILIP Final     meropenem Sensitive <=0.25 mcg/mL BACTERIAL SUSCEPTIBILITY PANEL BY DILIP Final     trimethoprim-sulfamethoxazole Sensitive <=20 mcg/mL BACTERIAL SUSCEPTIBILITY PANEL BY DILIP Final     gentamicin Sensitive <=1 mcg/mL BACTERIAL SUSCEPTIBILITY PANEL BY DILIP Final     tetracycline Sensitive <=1 mcg/mL BACTERIAL SUSCEPTIBILITY PANEL BY DILIP Final     nitrofurantoin Resistant 128 mcg/mL BACTERIAL SUSCEPTIBILITY PANEL BY DILIP Final           CT of cervical spine without contrast (12/15/2022) : Impression   1. Study is partially limited due to motion artifact and technique. Within this limitation, no acute fracture of the cervical spine. 2. Multilevel facet and uncovertebral joint arthropathy. CT of chest without contrast (12/15/2022) : Impression   1. No definitive acute rib fracture. 2. Chronic left seventh posterior rib fracture with callus formation. Likely chronic left eighth posterior rib fracture, mild sclerosis at the margins of this chronic appearing fracture. Metallic device external to the patient and medially posterior to the left eighth rib fracture partially limits evaluation at this region. 3. Moderate/severe bilateral hydronephrosis. No nephrolithiasis.  Recommend consideration for dedicated cross-sectional imaging of the abdomen/pelvis for further evaluation of this finding. CT of thoracic spine reconstruction (12/15/2022) : Impression   1. No acute fracture of the thoracic spine. 2. Mild multilevel spondylosis of the thoracic spine. CT of abdomen and pelvis without contrast (12/15/2022) : Impression   1. The urinary bladder is dilated with significant mucosal thickening. Air within the mucosa of the urinary bladder. Inflammatory stranding surrounds the urinary bladder. An air-fluid level within the lumen of the bladder. 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 Recommend correlation with urinalysis. 2. Moderate bilateral hydronephrosis with mild bilateral perinephric stranding. Evaluation for infectious process including pyelonephritis limited without IV contrast, recommend correlation with urinalysis. Mild bilateral hydroureter throughout the course of the bilateral ureters without ureterolithiasis. 3. Moderate fecal burden throughout the colon greatest involving the ascending and transverse colon, favor constipation. 4. Perirectal and presacral stranding. Circumferential mucosal thickening of the distal rectum best identified on axial image 73 of series 2. Recommend correlation with direct visualization/colonoscopy to exclude underlying mucosal lesion. 5. Severe mucosal thickening of the stomach at the fundus and mid body which could represent changes of gastritis in the correct clinical setting. CT of the head without contrast (12/15/2022) : Impression   1. No acute intracranial abnormality. 2. Chronic findings as described above. MRI of brain without contrast (12/15/2022) : Impression   Acute ischemic event left parietal region coronal radiata. Possible small additional acute ischemic event within the right lobe of the cerebellum. Moderate nonspecific periventricular and deep white matter disease. This most often can ascribed to chronic small vessel ischemic change.            MRA of the neck without contrast (12/15/2022) : Impression   Mild disease bilateral carotid bulbs. MRA of the head without contrast (12/15/2022) : Impression   Normal MRA of the brain. Limited renal ultrasound (12/19/2022) : Impression   Bilateral hydronephrosis as described, significantly improved vs prior US. Urinary bladder ultrasound (12/22/2022) : Impression   There is persistence of a small amount of gas within the wall of the urinary bladder. Transesophageal echocardiogram (12/16/2020) :  Conclusions Summary  Left ventricle size is normal.   No asymmetrical left ventricular hypertrophy was seen. There was severe global hypokinesis of the left ventricle. Ejection fraction is visually estimated at 30%. Left atrial size was moderately dilated with no thrombus identified. ATRIAL SEPTUM: No defect or patent foramen ovale was identified. The left atrial appendage size was normal with no thrombus identified. The post admission physician evaluation (LETY) is consistent with the pre-admission assessment. See above findings to reflect the elements required in the LETY. Patient's admitting condition is consistent with the findings of the preadmission assessment by the rehabilitation admissions coordinator.     Odin Sauer MD

## 2022-12-23 NOTE — PLAN OF CARE
Problem: Discharge Planning  Goal: Discharge to home or other facility with appropriate resources  Note:   [facility]  Physical Medicine Case Management Assessment    [x] Inpatient Rehabilitation Unit    Patient Name: Natalie Saenz        MRN: [id number]    : 1960  (63 y.o.)  Gender: male   Date of Admission: 2022 11:24 AM    Prior to admission, patient was living with his wife, Bijan Lazo, and their two children who are 15and 21years old. Patient was independent at home. Patient was completing his ADLs, housekeeping, meal prep and finances. Patient reports that he does not drive due to not having a license. Family helps patient with transportation. Patient was working full time at [employer] on the [employer]. Patient's supports include Bijan Lazo, who works full time at [employer] as well. Patient's family physician is BLACK Sandoval. Patient was not on blood thinners prior to admission. Patient prefers [address] Patient has no assistive devices at home. Patient reports services 13 years in the [place], but does not receive VA benefits. Patient is motivated to participate in therapy and return to his prior level of functioning. Family/Social/Home Environment: Patient lives at home with Bijan Lazo and their two children. Contact/Guardian Information: Ta Garcia (spouse) [phone]    [employer] Utilized: Patient was not using community resources prior to admission. Sexuality/Intimacy: Patient did not disclose sexuality/intimacy concerns during SW assessment. Complementary Health Approaches: Patient did not disclose desires towards complementary health approaches during SW assessment. Anticipated Needs/Discharge Plans: SW will follow and maintain involvement in discharge planning. SW met with patient and his wife, Biajn Lazo, on this date to introduce self, complete SW assessment and initiate discharge planning.  Prior to admission, patient was living with his wife, Jasmyn Nixon, and their two children who are 15and 21years old. Patient was independent at home. Patient was completing his ADLs, housekeeping, meal prep and finances. Patient reports that he does not drive due to not having a license. Family helps patient with transportation. Patient was working full time at [employer] on the [employer]. Patient's supports include Jasmyn Nixon, who works full time at [employer] as well. Patient's family physician is BLACK Oakley. Patient was not on blood thinners prior to admission. Patient prefers [facility] ADILSON SHANE. Patient has no assistive devices at home. Patient reports services 13 years in the [place], but does not receive VA benefits. Patient is motivated to participate in therapy and return to his prior level of functioning. SW educated patient on Wednesday, 12/28 care conference. SW will follow and maintain involvement in discharge planning.        Read-Only, Retired: Discharge Planning  Expected Discharge Date:  (Undetermined)      WILMER Qureshi 12/23/2022 12:38 PM

## 2022-12-23 NOTE — PROGRESS NOTES
[facility]  Occupational Therapy  Daily Note  Time:   Time In: 5551  Time Out: 1000  Timed Code Treatment Minutes: 38 Minutes  Minutes: 38          Date: 2022  Patient Name: Jennifer Gaona,   Gender: male      Room: [facility]-A  MRN: [id number]  : 1960  (63 y.o.)  Referring Practitioner: Shirley lGeason MD  Diagnosis: emphysematous cycstitis  Additional Pertinent Hx: Jennifer Gaona who is a 63 y.o. male with a history of hypertension, diabetes, CAD on  daily, recent CABG in 2022, ischemic cardiomyopathy is admitted to [facility] for sepsis, emphysematous cystitis and acute kidney injury on CKD. During admission exam patient stated that his right arm feldman has been weak and that he has had some slurred speech. Stroke alert was called for right-sided weakness and dysarthria on 12/15. On arrival patient's NIH was 4 for right upper extremity, right lower extremity weakness, 1 limb ataxia and dysarthria. On further questioning, patient and wife state that the right arm weakness actually started last night. Patient's wife noticed when patient was trying to get up from the toilet that he was unable to push himself up with his right arm. Last known well 2330 on 2022. Patient taken to imaging for stat CT head which revealed no ICH, midline shift, mass-effect. CTA head and neck deferred due to patient's LC. Decision for no tPA was made due to patient's time since last known well. Patient with relatively low NIH and symptoms which are not consistent with LVO, therefore no thrombectomy/neuro intervention at this time. Patient had stat MRI brain and MRA head and neck without contrast.  MRI showed acute ischemic stroke of left parietal region.     Restrictions/Precautions:  Restrictions/Precautions: Up as Tolerated, Fall Risk  Position Activity Restriction  Other position/activity restrictions: right side hemiparesis, recent CABG 11/22     SUBJECTIVE: RN approved session, patient supine in bed upon OT arrival and agreeable to tx. Patient A & O x 4. PAIN: 0/10:     Vitals: Vitals not assessed per clinical judgement, see nursing flowsheet    COGNITION: Decreased Insight, Decreased Problem Solving, and Tangential    ADL:   No ADL's completed this session. Patient declined to complete any ADLs; stated he was bathed and completed oral care early this a.m.  . BALANCE:  Sitting Balance:  Stand By Assistance. Standing Balance: Contact Guard Assistance. With use of 2 w/w for supprot    BED MOBILITY:  Supine to Sit: Stand By Assistance with increased time    TRANSFERS:  Sit to Stand:  Stand By Assistance. With no LOB or unsteadiness    FUNCTIONAL MOBILITY:  Assistive Device: Rolling Walker  Assist Level:  Contact Guard Assistance. Distance:  from EOB, around bed and to recliner  Patient declined to walk in hallway     ADDITIONAL ACTIVITIES:  Completed 1 set x 10-15 reps of UB exer with 1# free wt within sternal precautions with rest breaks. Patient c/o R hand/UE weakness and edu on importance of using and challenging R UE to improve function. Patient demo understanding. Completed 1 set x 10 reps of R hand  strengthening with rolled wash cloth to increase R hand  for opening grooming items. Completed 5 reps of opening 3 different grooming lids each using different grasps with R hand to increase R hand strength and [address] [place] for self care routine. Patient had difficulty manipulating each cap and would occasionally become frustrated due to R hand weakness and the fact he is L hand dominant. Edu on importance and benefit for (B) UE tasks with patient demo understanding. ASSESSMENT:  Activity Tolerance:  Patient tolerance of  treatment: good.        Discharge Recommendations: Continue to assess pending progress, Inpatient Rehabilitation, and Patient would benefit from continued OT at discharge  Equipment Recommendations: Equipment Needed: Yes  Other: TBD. Pt's wife reports she would like a walker but pt is not safe to use walker at this time. Will continue to assess for needs  Plan: Times Per Week: 6x  Times Per Day: Once a day  Current Treatment Recommendations: Strengthening, Balance training, Self-Care / ADL, Equipment evaluation, education, & procurement, Safety education & training, Endurance training, Functional mobility training, Neuromuscular re-education    Patient Education  Patient Education: Role of OT, Plan of Care, Home Exercise Program, Precautions, Hemibody Awareness/Attention, Reviewed Prior Education, Importance of Increasing Activity, and Assistive Device Safety    Goals  Short Term Goals  Time Frame for Short Term Goals: until discharge  Short Term Goal 1: Pt will complete stand pivot transfer with Min A x1 to improve access with ADLs  Short Term Goal 2: Pt will demonstrate functional mobility walking short distances while using any AD needed with no > than MIN A and cues to attend to his RUE for preparing to do self care while out of bed. Short Term Goal 3: Pt will improve R UE strength to 3+/5 to improve participation with ADLs. Short Term Goal 4: Pt will improve dynamic sitting balance and tolerance to SBA x10 minutes to improve trunk control required for transfers and ADLs. Short Term Goal 5: Pt will perofrm UB ADls and grooming with Min A. Following session, patient left in safe position with all fall risk precautions in place.

## 2022-12-23 NOTE — PLAN OF CARE
I have reviewed the patient's plan of care and based on this review, the patient treatment plan has been updated. Problem: Discharge Planning  Goal: Discharge to home or other facility with appropriate resources  2022 1453 by Norma Live RN  Outcome: Progressing  Flowsheets (Taken 2022 1309)  Discharge to home or other facility with appropriate resources:   Identify barriers to discharge with patient and caregiver   Arrange for needed discharge resources and transportation as appropriate  Note: Working toward goal of discharge to home. 2022 1237 by WILMER Snyder  Note:   [facility]  Physical Medicine Case Management Assessment    [x] Inpatient Rehabilitation Unit    Patient Name: Matthew Valderrama        MRN: [id number]    : 1960  (63 y.o.)  Gender: male   Date of Admission: 2022 11:24 AM    Prior to admission, patient was living with his wife, [employer], and their two children who are 15and 21years old. Patient was independent at home. Patient was completing his ADLs, housekeeping, meal prep and finances. Patient reports that he does not drive due to not having a license. Family helps patient with transportation. Patient was working full time at [employer] on the [employer]. Patient's supports include [employer], who works full time at [employer] as well. Patient's family physician is BLACK Lei. Patient was not on blood thinners prior to admission. Patient prefers [facility] ADILSON SHANE. Patient has no assistive devices at home. Patient reports services 13 years in the [place], but does not receive VA benefits. Patient is motivated to participate in therapy and return to his prior level of functioning. Family/Social/Home Environment: Patient lives at home with [employer] and their two children.     Contact/Guardian Information: Klever Parker (spouse) [phone]    [employer] Utilized: Patient was not using [employer] resources prior to admission. Sexuality/Intimacy: Patient did not disclose sexuality/intimacy concerns during SW assessment. Complementary Health Approaches: Patient did not disclose desires towards complementary health approaches during SW assessment. Anticipated Needs/Discharge Plans: SW will follow and maintain involvement in discharge planning. SW met with patient and his wife, Izzy Zambrano, on this date to introduce self, complete SW assessment and initiate discharge planning. Prior to admission, patient was living with his wife, Izzy Zambrano, and their two children who are 15and 21years old. Patient was independent at home. Patient was completing his ADLs, housekeeping, meal prep and finances. Patient reports that he does not drive due to not having a license. Family helps patient with transportation. Patient was working full time at [employer] on the [employer]. Patient's supports include Izzy Zambrano, who works full time at [employer] as well. Patient's family physician is BLACK Jama Do. Patient was not on blood thinners prior to admission. Patient prefers [address] Patient has no assistive devices at home. Patient reports services 13 years in the [place], but does not receive VA benefits. Patient is motivated to participate in therapy and return to his prior level of functioning. SW educated patient on Wednesday, 12/28 care conference. SW will follow and maintain involvement in discharge planning.        Read-Only, Retired: Discharge Planning  Expected Discharge Date:  (Undetermined)      WILMER Gray 12/23/2022 12:38 PM            Problem: Chronic Conditions and Co-morbidities  Goal: Patient's chronic conditions and co-morbidity symptoms are monitored and maintained or improved  Outcome: Progressing     Problem: Safety - Adult  Goal: Free from fall injury  Outcome: Progressing  Flowsheets (Taken 12/23/2022 1453)  Free From Fall Injury: Instruct family/caregiver on patient safety  Note: Patient verbalizes understanding of fall precautions, uses call light appropriately. Problem: ABCDS Injury Assessment  Goal: Absence of physical injury  Outcome: Progressing  Flowsheets (Taken 12/23/2022 1300)  Absence of Physical Injury: Implement safety measures based on patient assessment     Problem: Skin/Tissue Integrity  Goal: Absence of new skin breakdown  Description: 1. Monitor for areas of redness and/or skin breakdown  2. Assess vascular access sites hourly  3. Every 4-6 hours minimum:  Change oxygen saturation probe site  4. Every 4-6 hours:  If on nasal continuous positive airway pressure, respiratory therapy assess nares and determine need for appliance change or resting period. Outcome: Progressing  Note: Skin remains clean dry and intact.

## 2022-12-23 NOTE — CARE COORDINATION

## 2022-12-23 NOTE — PROGRESS NOTES
[facility]  Acute Inpatient Rehab Preadmission Assessment     Patient Name: Sanford Reilly        Ethnicity:Not of , Keshia Paget, or [place] origin  Race:White  MRN:   [id number]    : 1960  (63 y.o.)  Gender: male      Admitted from:[address]  Initial Assessment     Date of admission to the hospital: 12/15/2022  1:08 AM  Date patient eligible for admission:2022     Primary Diagnosis: Right hemiparesis secondary to stroke      Did patient have surgery?  no     Physicians: Bubba Quinn MD, Dr. Jluis Huggins, Dr. Renee Shannon, Dr. Erin Gaines, Dr. Paulino Jennings, Dr. Michelle Don for clinical complications/co-morbidities:   Past Medical History        Past Medical History:   Diagnosis Date    Coronary artery disease     Diabetes mellitus ([facility] [address]     Hypertension      Ischemic cardiomyopathy     Urinary retention             Financial Information  Primary insurance:  [address] [place] [address] [employer]:  None     Has the patient had two or more falls in the past year or any fall with injury in the past year? yes     Did the patient have major surgery during the 100 days prior to admission?   yes     Procedure:  2022  1) Coronary artery bypass grafting x 2, with the left internal mammary artery grafted to the left anterior descending artery, a reversed greater saphenous aortocoronary vein graft to the  ramus intermedius coronary artery  2) Endoscopic greater saphenous vein harvest  3) Intraoperative coronary angiography of all distal anastomoses  4) Insertion of a left common femoral intra-aortic balloon pump     Precautions: Restrictions/Precautions: Up as Tolerated, Fall Risk  Other position/activity restrictions: right side hemiparesis, recent CABG        Isolation Precautions: None                  Physiatrist: Dr. Jluis Huggins     Patients Occupation: Employed full time     Reviewed Lab and Diagnostic reports from Current Admission: Yes     Patients Prior Functional Level: Prior Function  Receives Help From: Family  ADL Assistance: Independent  Homemaking Assistance: Independent  Ambulation Assistance: Independent  Transfer Assistance: Independent     Current functional status for upper extremity ADLs: Grooming: Stand By Assistance. To stand at sink to complete oral care routine. Cues for 2 w/w placement     Current functional status for lower extremity ADLs: Lower Extremity Dressing: Stand By Assistance. To doff/don slipper sock seated in recliner . Current functional status for bed, chair, wheelchair transfers: Sit to Stand:  Stand By Assistance. Cues for tech x 2 trials with first trial patient unable to fully stand and sat back down in recliner. Cues for repositioning and tech with patient able to complete after tech     Current functional status for toilet transfers: Stand by assistance      Current functional status for locomotion: Contact Guard Assistance, X 1, with verbal cues , with increased time for completion  Distance: 40 ft  Surface: Level Tile  Device:Rolling Walker  Gait Deviations: Forward Flexed Posture, Slow Jada, Decreased Step Length Bilaterally, Decreased Gait Speed, and Unsteady Gait at times  -Pt needed cues for R foot placement to stay within walker. One occurences of LOB due to R LE being outside of walker.      Current functional status for bladder management: Maximal Assistance     Current functional status for bowel management:Moderate assistance     Current functional status for comprehension: Complete independence     Current functional status for expression: Complete independence     Current functional status for social interaction: Complete independence     Current functional status for problem solving: Complete independence     Current functional status for memory: Complete independence     Expected level of Improvement in Self-Care:  Modified independence     Expected level of Improvement in Sphincter Control:  Modified independence     Expected level of Improvement in Transfers: Modified independence     Expected level of Improvement in Locomotion:  Modified independence     Expected level of Improvement in Communication and Social Cognition: Complete independence     Expected length of time to achieve that level of improvement: 2 weeks     Current rehab issues: ADL dysfunction,bladder management, bowel management,carry over of therapy techniques, discharge planning, disease and co-morbidity management, gait/mobility dysfunction, medication management, nutrition and hydration management, Ongoing assessment of safety, Pain management, Patient and family education, Prevention of secondary complications, Skin Integrity. Required therapy: Physical Therapy, Occupational Therapy and Speech Therapy 3 hours per day, 5-6 days per week. Recreational Therapy 1 hour per week. Expected Discharge Destination: Home     Expected Post Discharge Treatments: Home Care     Other information relevant to the care needs:   Lives With: Spouse  Type of Home: House  Home Layout: One level  Home Access: Stairs to enter without rails, Stairs to enter with rails  Entrance Stairs - Number of Steps: 3 AMOR  Entrance Stairs - Rails: Both  Bathroom Shower/Tub: Tub/Shower unit  Bathroom Toilet: Standard  Bathroom Equipment: Tub transfer bench  Home Equipment: Walker, rolling  Has the patient had two or more falls in the past year or any fall with injury in the past year?: Yes (2 falls since October)  Receives Help From: Family  ADL Assistance: Independent  Homemaking Assistance: Independent  Ambulation Assistance: Independent  Transfer Assistance: Independent  Active : No  Mode of Transportation: Car  Occupation: Full time employment  Type of Occupation: currently still on short term disability from CABG in November 2022     M[address] Qui[address]ones 28 provided to patient. Patient verbalized understanding.       I have reviewed and concur with the findings and results of the pre-admission screening assessment completed by the Inpatient Rehabilitation Admissions Coordinator.      Judge Nohemy MD                 Revision History

## 2022-12-24 LAB
ALBUMIN SERPL-MCNC: 3.6 G/DL (ref 3.5–5.1)
ALP BLD-CCNC: 102 U/L (ref 38–126)
ALT SERPL-CCNC: 10 U/L (ref 11–66)
ANION GAP SERPL CALCULATED.3IONS-SCNC: 10 MEQ/L (ref 8–16)
AST SERPL-CCNC: 13 U/L (ref 5–40)
BASOPHILS # BLD: 0.7 %
BASOPHILS ABSOLUTE: 0.1 THOU/MM3 (ref 0–0.1)
BILIRUB SERPL-MCNC: 0.5 MG/DL (ref 0.3–1.2)
BUN BLDV-MCNC: 36 MG/DL (ref 7–22)
CALCIUM SERPL-MCNC: 9.6 MG/DL (ref 8.5–10.5)
CHLORIDE BLD-SCNC: 98 MEQ/L (ref 98–111)
CO2: 29 MEQ/L (ref 23–33)
CREAT SERPL-MCNC: 1.5 MG/DL (ref 0.4–1.2)
EOSINOPHIL # BLD: 4.6 %
EOSINOPHILS ABSOLUTE: 0.4 THOU/MM3 (ref 0–0.4)
ERYTHROCYTE [DISTWIDTH] IN BLOOD BY AUTOMATED COUNT: 13.8 % (ref 11.5–14.5)
ERYTHROCYTE [DISTWIDTH] IN BLOOD BY AUTOMATED COUNT: 43.9 FL (ref 35–45)
GFR SERPL CREATININE-BSD FRML MDRD: 53 ML/MIN/1.73M2
GLUCOSE BLD-MCNC: 111 MG/DL (ref 70–108)
GLUCOSE BLD-MCNC: 116 MG/DL (ref 70–108)
GLUCOSE BLD-MCNC: 162 MG/DL (ref 70–108)
HCT VFR BLD CALC: 35.7 % (ref 42–52)
HEMOGLOBIN: 11.2 GM/DL (ref 14–18)
IMMATURE GRANS (ABS): 0.13 THOU/MM3 (ref 0–0.07)
IMMATURE GRANULOCYTES: 1.4 %
LYMPHOCYTES # BLD: 19.1 %
LYMPHOCYTES ABSOLUTE: 1.8 THOU/MM3 (ref 1–4.8)
MCH RBC QN AUTO: 27.3 PG (ref 26–33)
MCHC RBC AUTO-ENTMCNC: 31.4 GM/DL (ref 32.2–35.5)
MCV RBC AUTO: 86.9 FL (ref 80–94)
MONOCYTES # BLD: 4.9 %
MONOCYTES ABSOLUTE: 0.5 THOU/MM3 (ref 0.4–1.3)
NUCLEATED RED BLOOD CELLS: 0 /100 WBC
PLATELET # BLD: 536 THOU/MM3 (ref 130–400)
PMV BLD AUTO: 9.4 FL (ref 9.4–12.4)
POTASSIUM REFLEX MAGNESIUM: 4.8 MEQ/L (ref 3.5–5.2)
PREALBUMIN: 21.1 MG/DL (ref 20–40)
RBC # BLD: 4.11 MILL/MM3 (ref 4.7–6.1)
SEG NEUTROPHILS: 69.3 %
SEGMENTED NEUTROPHILS ABSOLUTE COUNT: 6.7 THOU/MM3 (ref 1.8–7.7)
SODIUM BLD-SCNC: 137 MEQ/L (ref 135–145)
TOTAL PROTEIN: 6.8 G/DL (ref 6.1–8)
WBC # BLD: 9.6 THOU/MM3 (ref 4.8–10.8)

## 2022-12-24 PROCEDURE — 2580000003 HC RX 258: Performed by: PHYSICAL MEDICINE & REHABILITATION

## 2022-12-24 PROCEDURE — 1180000000 HC REHAB R&B

## 2022-12-24 PROCEDURE — 92523 SPEECH SOUND LANG COMPREHEN: CPT

## 2022-12-24 PROCEDURE — 92507 TX SP LANG VOICE COMM INDIV: CPT

## 2022-12-24 PROCEDURE — 82948 REAGENT STRIP/BLOOD GLUCOSE: CPT

## 2022-12-24 PROCEDURE — 92610 EVALUATE SWALLOWING FUNCTION: CPT

## 2022-12-24 PROCEDURE — 97530 THERAPEUTIC ACTIVITIES: CPT

## 2022-12-24 PROCEDURE — 85025 COMPLETE CBC W/AUTO DIFF WBC: CPT

## 2022-12-24 PROCEDURE — 97166 OT EVAL MOD COMPLEX 45 MIN: CPT

## 2022-12-24 PROCEDURE — 36415 COLL VENOUS BLD VENIPUNCTURE: CPT

## 2022-12-24 PROCEDURE — 97116 GAIT TRAINING THERAPY: CPT

## 2022-12-24 PROCEDURE — 84134 ASSAY OF PREALBUMIN: CPT

## 2022-12-24 PROCEDURE — 97535 SELF CARE MNGMENT TRAINING: CPT

## 2022-12-24 PROCEDURE — 80053 COMPREHEN METABOLIC PANEL: CPT

## 2022-12-24 PROCEDURE — 97162 PT EVAL MOD COMPLEX 30 MIN: CPT

## 2022-12-24 PROCEDURE — 6370000000 HC RX 637 (ALT 250 FOR IP): Performed by: PHYSICAL MEDICINE & REHABILITATION

## 2022-12-24 RX ADMIN — APIXABAN 5 MG: 5 TABLET, FILM COATED ORAL at 08:32

## 2022-12-24 RX ADMIN — BACLOFEN 5 MG: 10 TABLET ORAL at 17:51

## 2022-12-24 RX ADMIN — SODIUM CHLORIDE, PRESERVATIVE FREE 10 ML: 5 INJECTION INTRAVENOUS at 08:35

## 2022-12-24 RX ADMIN — EMPAGLIFLOZIN 25 MG: 25 TABLET, FILM COATED ORAL at 08:32

## 2022-12-24 RX ADMIN — Medication 3 MG: at 20:35

## 2022-12-24 RX ADMIN — CIPROFLOXACIN HYDROCHLORIDE 750 MG: 250 TABLET, FILM COATED ORAL at 20:35

## 2022-12-24 RX ADMIN — SODIUM CHLORIDE, PRESERVATIVE FREE 10 ML: 5 INJECTION INTRAVENOUS at 20:36

## 2022-12-24 RX ADMIN — DOCUSATE SODIUM 100 MG: 100 CAPSULE, LIQUID FILLED ORAL at 08:32

## 2022-12-24 RX ADMIN — TAMSULOSIN HYDROCHLORIDE 0.8 MG: 0.4 CAPSULE ORAL at 08:32

## 2022-12-24 RX ADMIN — BUPROPION HYDROCHLORIDE 150 MG: 150 TABLET, FILM COATED, EXTENDED RELEASE ORAL at 08:32

## 2022-12-24 RX ADMIN — Medication 1 TABLET: at 08:33

## 2022-12-24 RX ADMIN — METOPROLOL SUCCINATE 12.5 MG: 25 TABLET, EXTENDED RELEASE ORAL at 08:33

## 2022-12-24 RX ADMIN — PANTOPRAZOLE SODIUM 40 MG: 40 TABLET, DELAYED RELEASE ORAL at 05:02

## 2022-12-24 RX ADMIN — GLIPIZIDE 10 MG: 10 TABLET ORAL at 05:02

## 2022-12-24 RX ADMIN — SENNOSIDES 17.2 MG: 8.6 TABLET, FILM COATED ORAL at 20:36

## 2022-12-24 RX ADMIN — CIPROFLOXACIN HYDROCHLORIDE 750 MG: 250 TABLET, FILM COATED ORAL at 08:32

## 2022-12-24 RX ADMIN — ATORVASTATIN CALCIUM 40 MG: 40 TABLET, FILM COATED ORAL at 20:36

## 2022-12-24 RX ADMIN — APIXABAN 5 MG: 5 TABLET, FILM COATED ORAL at 20:35

## 2022-12-24 RX ADMIN — DOCUSATE SODIUM 100 MG: 100 CAPSULE, LIQUID FILLED ORAL at 20:35

## 2022-12-24 ASSESSMENT — 9 HOLE PEG TEST
TEST_RESULT: FUNCTIONAL
TESTTIME_SECONDS: 41.5
TEST_RESULT: IMPAIRED
TESTTIME_SECONDS: 108.2

## 2022-12-24 ASSESSMENT — PAIN SCALES - GENERAL: PAINLEVEL_OUTOF10: 0

## 2022-12-24 NOTE — PROGRESS NOTES
Admitted to the Inpatient Rehabilitation Unit via wheelchair. Patient was then oriented to room and unit. Education provided on the rehabilitation routine: three hours of therapy five days per week. Explained patients right to have family, representative or physician notified of their admission. Patient has Declined for physician to be notified. Patient has Declined for family/representative to be notified. Admitting medication orders compared with acute stay medications; home medication list reviewed with patient/family. Medication issues identified No    Transportation:   Has transportation kept you from medical appointments, meetings, work, or from getting things needed for daily living? (Check all that apply)  No.      Health Literacy:   How often do you need to have someone help you when you read instructions, pamphlets, or other written material from your doctor or pharmacy? 0. - Never    Social Isolation:  How often do you feel lonely or isolated from those around you? 1. Rarely    Patient Mood Interview (PHQ-2 to 9) (from [employer]©)   Say to Patient: \"Over the last 2 weeks, have you been bothered by any of the following problems? \"   If symptom is present, enter yes in column 1 (Symptom Presence)  If yes in column 1, then ask the patient: About how often have you been bothered by this?  Indicate response in column 2, Symptom Frequency. Symptom Presence  No    Yes   9. No response  Symptom Frequency  Never or 1 day  2-6 days (several days)  7-11 days (half or more of the days)  12-14 days (nearly every day)    Symptom Presence Symptom Frequency   Little interest or pleasure in doing things 0. No 0. Never or 1 day   Feeling down, depressed, or hopeless 0. No 0. Never or 1 day   If either A or B above has symptom frequency coded 2 or 3, CONTINUE asking questions below. If not, END the interview  and right click on next table to delete.                Pain:  Pain Effect on Sleep    Ask patient: Rosalinda Herring the past 5 days, how much of the time has pain made it hard for you to sleep at night?\" 1.  Rarely or not at all     If no pain is reported, Stop here. If pain is reported, continue with the additional questions. Pain Interference with Therapy Activities   Ask patient: Rosalinda Herring the past 5 days, how often have you limited your participation in rehabilitation therapy sessions due to pain?\" 1.  Rarely or not at all   Pain Interference with Day to Day Activities   Ask patient: Rosalinda Herring the past 5 days, how often have you limited your day to day activities (excluding rehabilitation therapy sessions) because of pain?\" 1.  Rarely or not at all         Bladder and Bowel Function Assessment:  Prior history of bladder problems: retention  Number of pads used per day: 2  Frequency of night time voiding: twice  Fluid intake volume and pattern: Adequate  Last BM: 12/23/22  Bowel problems (prior or current) Yes      Incontinence      Frequent diarrhea   X   No BM in 3 days this stay or history of constipation      Hemorrhoids      Diverticulitis      Bowel Surgery     Two nurse skin assessment performed by ABIGAIL Arnold  and Roe Bal LPN . Weight: 134.8 lbs      Care plan was created with patient's input and goals were agreed upon. Admission folder provided with education regarding patients diagnoses, fall prevention, and skin care. \"Data Collection Information Summary for Patients in Inpatient Rehabilitation Facilities\" and \"Privacy Act Statement - Health Care Records\" provided. Please refer to the admission navigator for further information.

## 2022-12-24 NOTE — PROGRESS NOTES
[place]  Speech - Language - Cognitive Evaluation + Clinical Swallow Evaluation + Speech treatment    SLP Individual Minutes  Time In: 3128  Time Out: 1000  Minutes: 65  Timed Code Treatment Minutes: 0 Minutes     Speech, Language, Cognitive Evaluation: 47 minutes   Clinical Swallow Evaluation: 10 minutes   Speech treatment: 8 minutes     Date: 2022  Patient Name: Elizabeth Nicholson      CSN: [id number]   : 1960  ([address] y.o[address]  Gender: male   Referring Physician:  Dr. Jovana Ball   Diagnosis: Acute stroke due to ischemia, right hemiparesis secondary to stroke   Precautions: aspiration, fall risk   History of Present Illness/Injury: Patient admitted to [facility] IPR with above dx. Per H&P, Navneet Greene  is a [address] [facility] y.o. left-handed  male with history of hypertension, diabetes mellitus type 2, coronary artery disease with ischemic cardiomyopathy requiring two-vessel CABG, urinary retention with several failed void trial, status post tonsillectomy, is admitted to the inpatient rehabilitation unit on 2022 for intensive inpatient rehabilitation treatment of impaired ADL and ambulation secondary to right hemiparesis due to acute left parietal corona radiata and right cerebellar ischemic stroke, and debility/physical deconditioning due to Klebsiella pneumonia urinary tract infection with emphysematous cystitis and bilateral hydronephrosis complicated by Klebsiella pneumonia bacteremia/sepsis. The patient was previously hospitalized at [facility] from 10/28/2022 to 11/15/2022 for coronary artery disease and ischemic cardiomyopathy requiring two-vessel CABG on 2022. His postoperative course was complicated by urinary retention with failed void trial.  The patient has been experiencing progressive weakness after he was discharged to home.   The Bella was later removed on 2022 but the patient continued having difficulty voiding. On 12/14/2022 approximately 11:30 PM his wife noticed the patient had slight slurred speech with right arm weakness. On 12/15/2022 the patient suddenly felt dizzy and lightheaded while he was trying to get up from sitting on toilet and fell into the ground. He says he did not lose consciousness and did not hit his head. His wife called 46. He was transported to [address] [place] [address] ER for evaluation by EMS. He complains of neck pain and upper back pain. He was found to be tachycardic. His WBC was found to be 22.3. Bella was placed in ER and a rush of air followed by dark brown and bright red color urine came out. He was put on IV meropenem and admitted. Urology was consulted. CT of cervical spine revealed only multilevel facet and uncovertebral joint arthropathy. CT of head done on 12/15/2020 revealed no acute intracranial abnormality. CT of the chest, abdomen and pelvis done on 12/15/2022 revealed emphysematous cystitis, bilateral hydronephrosis, and constipation. Because of right arm weakness, stroke code was called on 12/15/2022. MRI of brain done on 12/15/2022 revealed acute ischemic stroke at the left parietal region corona radiata, and possible small additional acute ischemic stroke within right cerebellum. Neurology service start patient on aspirin and Plavix combination for the stroke. MRA of the neck and head done on 12/15/2022 showed only mild bilateral carotid bulb disease. Transesophageal echocardiogram was done on 12/16/2022 showing severe global hypokinesis of left ventricle with estimated ejection fraction of 30%, and no thrombus identified. 2 blood culture and urine culture done on 12/15/2022 revealed Klebsiella pneumonia. Infectious disease had been consulted. Oral Cipro was started on 12/22/2022 after IV meropenem was completed. Patient's hospital course was also complicated by constipation which resolved this morning. \"     MRI of brain completed 12/15  Acute ischemic event left parietal region coronal radiata. Possible small additional acute ischemic event within the right lobe of    the cerebellum. Moderate nonspecific periventricular and deep white matter disease. This    most often can ascribed to chronic small vessel ischemic change. ST following on acute; ST consulted upon admission to [facility] to complete clinical swallow evaluation d/t concerns for dysphagia post CVA and speech/language/cognitive evaluation d/t concerns for higher level cognitive deficits and ongoing patient reporting dysarthria. Past Medical History:   Diagnosis Date    Coronary artery disease 2022    Diabetes mellitus ([facility] [address] 2020    Hypertension     Ischemic cardiomyopathy 2022    Urinary retention 2022       Pain: No pain reported. Subjective:  Patient seen sitting upright in chair, alert and very pleasant. No family present. SOCIAL HISTORY:   Living Arrangements: wife of 15 years; 3 step daughters ( 1 step daughter (21y/o) in which lives at home) and 15year old son at home  Work History:  Working 24/hour week at [employer], wife also working at [employer] Level: Some college   Driving Status: Does not drive  Finance Management:  Wife completes  Medication Management: Independent and wife assists with refilling/pharmacy    ADL's: Independent.    Hobbies:  Reading; patient reporting reading 150 books a year, also previous education in linguistics and knows multiple languages   Vision Status: WFL  Hearing: WFL  Type of Home: House  Home Layout: One level  Home Access: Stairs to enter with rails  Entrance Stairs - Number of Steps: 2 + threshold  Entrance Stairs - Rails: Both  Home Equipment:  (None used PTA)    SPEECH / VOICE:  Respiration:   Breathing Pattern: Diaphragmatic/Abdominal  Respiration/Phonation Coordination: WFL  Breath Support: WFL      Resonance: WFL    Articulation: Impaired: Decreased Articulatory Precision, [employer], and Decreased Articulatory Coordination  DDK Rates (Norm Rates 4.5-7.5)  Rate:    WFL  Rhythm: . WFL  Precision:   Impaired  Word Level: Impaired  Sentence Level: Impaired  Paragraph Level: Impaired    Conversation:    75%-100% Patient with self report, \"my speech is a little fuzzier this morning and it is hard to think of certain words, like blood pressure I might say blood sugar. \" ST did note patient with decreased articulatory precision of multisylabic words (Ex. Twenty-twenty two, sixty-three, photography) . Patient self rating speech to be at 70% at this time when at baseline he rates his speech at 100%       LANGUAGE/COGNITION:  Completed the Scales of Cognitive and Communicative Ability for Neurorehabilitation with the following results:     Subtests completed:  Oral Expression: . Typical Functioning 100%  Orientation: . Typical Functioning 100%  Memory: . Mild Impairment 84%   Speech Comprehension: . Typical Functioning 100%  Reading Comprehension: . Typical Functioning 100%  Writin/7. Typical Functioning 100%  Attention: . Typical Functioning  Problem Solvin/23. Typical Functioning 100%    Typical Functioning = 87-94  Mild Impairment = 69-86  Moderate Impairment = 47-68  Severe Impairment = 0-46     Overall Degree of Severity:  Typical Functioning overall; mild deficits noted specifically within memory   *patient does report, \"some baseline memory deficits such as recall of names\"       SWALLOWING:    Respiratory Status: Room Air      Behavioral Observation: Alert    CRANIAL NERVE ASSESSMENT   CN V (Trigeminal) Closes and Opens Mandible WFL    Rotary Jaw Movement WFL      CN VII (Facial) Cheeks Hold Food out of Sulci WFL    Opens, Closes/Seals, Protrudes, Retracts Lips WFL    General Appearance WFL    Sensation WFL      CN X (Vagus - Pharyngeal) Raises Back of Tongue WFL      CN XI (Accessory) Lifts Soft Palate WFL      CN XII (Hypoglossal) Elevates Tongue Up and Back [facility] Protrusion   WFL    Lateralizes Tongue WFL    Sensation WFL      Other Observations Dentition Edentulous     Vocal Quality WFL    Cough DNT     PATIENT WAS EVALUATED USING:  Thin Liquids and Coarse Solids    ORAL PHASE:  WFL    PHARYNGEAL PHASE:  WFL:  Pharyngeal phase appears WFL but cannot rule out pharyngeal phase deficits from a bedside swallowing evaluation alone. SIGNS AND SYMPTOMS OF LARYNGEAL PENETRATION / ASPIRATION:  No signs/symptoms of aspiration evident in this evaluation, but cannot rule out silent aspiration. INSTRUMENTAL EVALUATION: Instrumental evaluation not indicated at this time. DIET RECOMMENDATIONS:  Regular diet with thin liquids    STRATEGIES: Full Upright Position and Small Bite/Sip        Comprehension: 6 - Complex ideas 90% or device (hearing aid or glasses- if patient is primarily a visual learner)  Expression: 7 - Patient expresses complex ideas/needs  Social Interaction: 7 - Patient has appropriate behavior/relations 100% of the time  Problem Solvin - Patient independent with complex tasks  Memory: 5 - Patient requires prompting with stress/unfamiliar situations    RECOMMENDATIONS/ASSESSMENT:  DIAGNOSTIC IMPRESSIONS:  Clinical Swallow Evaluation: Patient presents with oral phase of swallow function that is essentially [facility] with inability to fully discern potential presence of pharyngeal phase deficits without formal instrumentation. Oral phase highly unremarkable during consumption of hard/textured solids with patient demonstrating adequate mastication pattern for textural breakdown, cohesive bolus formation, and manipulation. Thin liquids consumed without overt difficulty and with suspected control/containment of fluid bolus. NO overt s/s aspiration exhibited across all consistencies/trials consumed, certainly not able to exclude pharyngeal phase dysfunction and/or airway invasion events in its entirety at bedside alone.  Patient's swallow physiology does appear appropriate to support PO intake without distress with instrumental evaluation not warranted. Patient does report baseline edentulous dentition; reporting \"I can eat anything but apples, nuts, ect\" I just need to chew it a bit longer. I don't see any thing different in regards to my chewing and swallowing since the stroke. \"     Recommend continuation of regular diet with thin liquids. No further ST services are warranted at this time r/t dysphagia management given baseline status of swallow function achieved; please re-consult for repeat clinical swallow evaluation should new concerns be identified. Speech, Language, Cognitive Evaluation: Patient presents with WFL/Typical functioning cognitive/linguistic functioning as outlined per the standardized score of the Scales of Cognitive and Communicative Ability for Neurorehabilitation [facility]). Patient did demonstrate with mild memory deficits. Patient does report, \"I am not good remembering names at all and I use my voice mail to remember appointments and such. \" Patient is demonstrating with higher level word findings deficits within informal conversation; patient aware and selects at times appropriate substitutions. Patient is also demonstrating with mild dysarthria as evidence by blended word boundaries and decreased articulatory precision for multi-syllabic words, especially within informal conversation. Patient with self report, \"my speech is a little fuzzier this morning and it is hard to think of certain words, like blood pressure I might say blood sugar. \" ST did note patient with decreased articulatory precision of multisylabic words (Ex. Twenty-twenty two, sixty-three, photography) . Patient self rating speech to be at 70% at this time when at baseline he rates his speech at 100%. Patient fully independent prior to hospitalization, working 40/hour week and caring for 15year old son.  Patient would greatly benefit from ongoing skilled ST services to target deficits with goals to return to independent living and eventual return to work. ST anticipating short term speech therapy course while on IPR; will continue to evaluate for ongoing speech therapy services with each treatment session. Rehabilitation Potential: excellent  Discharge Recommendations:  Home, OP vs HEP to be determined within future therapy sessions/IPR course     EDUCATION:  Learner: Patient  Education:  Reviewed results and recommendations of this evaluation, Reviewed diet and strategies, Reviewed signs, symptoms and risks of aspiration, Reviewed ST goals and Plan of Care, Reviewed recommendations for follow-up, and Education Related to Potential Risks and Complications Due to Impairment/Illness/Injury  Evaluation of Education: Verbalizes understanding, Demonstrates with assistance, and Family not present    PLAN:  Skilled SLP intervention on IP Rehab 30 minutes per day 5 days per week. Specific interventions for next session may include: dysarthria/speech treatment    PATIENT GOAL:    Did not state. Will further assess during treatment. SHORT TERM GOALS:  Short Term Goals  Time Frame for Short Term Goals: 1 week  Goal 1: Patient will complete complex executive functioning tasks (i.e., medication management, complex reasoning/deductive reasoning, etc.) with 90% accuracy and minimal cuing in order to allow for safe return to work (40/hours week). Goal 2: Patient will complete higher level recall/short term memory and working memory tasks with 80% accuracy provided independent use of compensatory strategies in order to improve overall recall of newly learned theraputic information and anticipation to return to work (Mary Carranza in [facility] [employer]). Goal 3: Patient will demonstrate independent use of compensatory strategies (S-speak up, O-open mouth, S-slow down) within strucutred and unstrucutred speech tasks in order to improve overall articulatory precision.   INTERVENTIONS: Skilled dysarthria treatment completed to follow evaluation for duration of 8 minutes; patient provided written handout of compensatory speech strategies (s-speak up, o-open mouth, s-slow down). ST provided specific examples and rational to implement strategies in attempt to improve overall speech intelligibility. ST provided HEP for patient to read books aloud and self record to provide bio-feedback. Patient very receptive with multiple questions/comments and verbalized understanding and agreement      LONG TERM GOALS:  Long Term Goals  Time Frame for Long Term Goals: 2 weeks from time of evaluation  Goal 1: Patient will improve overall cognitive function to return to independent living with wife and careing for children (16 year old). Goal 2: Patient will improve overall speech intelligablity and clearity of speech production within informal conversation in order to return to baseline speech to improve successful ability to return to work, speaking with co-works and medical staff and family/friends.       Eloisa Berg M.S. [place]

## 2022-12-24 NOTE — PROGRESS NOTES
[address] THERAPY  [facility]  EVALUATION    Time:   Time In: 1055  Time Out: 1225  Timed Code Treatment Minutes: 75 Minutes  Minutes: 90          Date: 2022  Patient Name: Riccardo Velasquez,   Gender: male      MRN: [id number]  : 1960  (63 y.o.)  Referring Practitioner: Ordering & Attending: Chelsea Bettencourt MD  Diagnosis: Acute Stroke due to Ischemia  Additional Pertinent Hx: Riccardo Velasquez who is a 63 y.o. male with a history of hypertension, diabetes, CAD on  daily, recent CABG in 2022, ischemic cardiomyopathy is admitted to [facility] for sepsis, emphysematous cystitis and acute kidney injury on CKD. During admission exam patient stated that his right arm feldman has been weak and that he has had some slurred speech. Stroke alert was called for right-sided weakness and dysarthria on 12/15. On arrival patient's NIH was 4 for right upper extremity, right lower extremity weakness, 1 limb ataxia and dysarthria. On further questioning, patient and wife state that the right arm weakness actually started last night. Patient's wife noticed when patient was trying to get up from the toilet that he was unable to push himself up with his right arm. Last known well 2330 on 2022. Patient taken to imaging for stat CT head which revealed no ICH, midline shift, mass-effect. CTA head and neck deferred due to patient's LC. Decision for no tPA was made due to patient's time since last known well. Patient with relatively low NIH and symptoms which are not consistent with LVO, therefore no thrombectomy/neuro intervention at this time. Patient had stat MRI brain and MRA head and neck without contrast.  MRI showed acute ischemic stroke of left parietal region. Pt admitted to IP rehab on 22.     Restrictions/Precautions:  Restrictions/Precautions: General Precautions, Fall Risk  Position Activity Restriction  Other position/activity restrictions: right side hemiparesis, recent CABG -minimize arm use ( s/p 5 wks), life vest    Subjective  Chart Reviewed: Yes, Orders, Progress Notes, History and Physical  Patient assessed for rehabilitation services?: Yes    Subjective: Upon arrival patient was sitting up in recliner. Pt reports he is tired from PT this AM. Pt was agreeable to OT session. Pain: 0/10: Pt denies     Vitals: Vitals not assessed per clinical judgement, see nursing flowsheet    Social/Functional History:  Lives With: Spouse, Family (16 y.o son, 21 y.o step dtr.)  Type of Home: House  Home Layout: One level  Home Access: Stairs to enter with rails  Entrance Stairs - Number of Steps: 2 + threshold  Entrance Stairs - Rails: Both  Home Equipment:  (None used PTA)   Bathroom Shower/Tub: Tub/Shower unit  Bathroom Toilet: Standard  Bathroom Equipment: Tub transfer bench  Bathroom Accessibility: Accessible     ADL Assistance: Independent  Homemaking Assistance: Independent  Homemaking Responsibilities: Yes  Ambulation Assistance: Independent  Transfer Assistance: Independent    Active : No  Patient's  Info: Spouse Drives  Occupation: Full time employment  Type of Occupation: currently still on short term disability from CABG in 2022. Works at [employer].        VISION:WFL    HEARING:  WFL    COGNITION: Decreased Problem Solving, Decreased Safety Awareness, Tangential, and talkative    RANGE OF MOTION:  Right Upper Extremity: Impaired - Deccrease R FMC  Left Upper Extremity:  WFL    STRENGTH:  Right Upper Extremity: Impaired - Grossly 3/5   Left Upper Extremity:  WFL    HAND ASSESSMENT    Hand Dominance: Left    Left Hand Strength -  (lbs)  Handle Setting 2: -65#, 70#, 65# Av.67 #    Right Hand Strength -  (lbs)  Handle Setting 2: : 18#, 20#, 40# Avg 26 #    Fine Motor Skills  Left 9-Hole Peg Test: Functional  Left 9 Hole Peg Test Time (secs): 41.5  Right 9-Hole Peg Test: Impaired  Right 9 Hole Peg Test Time (secs): 108. 2         ADL:   EATING:Independent. Supriya Rita CARE Score: 6. ORAL HYGIENE:Supervision or touching assistance. Supriya Rita CARE Score: 4. TOILETING HYGIENE:Partial/moderate assistance. Supriya Rita CARE Score: 3. SHOWERING/BATHING:Partial/moderate assistance. Supriya Rita CARE Score: 3.     UPPER BODY DRESSING:Supervision or touching assistance. Supriya Rita CARE Score: 4. LOWER BODY DRESSING:Partial/moderate assistance. Supriya Rita CARE Score: 3. FOOTWEAR:Partial/moderate assistance   . CARE Score: 3.     TOILET TRANSFER: Partial/moderate assistance. Supriya Rita CARE Score: 3. BALANCE:  Sitting Balance:  Modified Independent. Sitting forward in recliner. Standing Balance: Contact Guard Assistance, Minimal Assistance, with cues for safety, with verbal cues . With 2 UE release from walker. Trunk sway noted. BED MOBILITY:  Not Tested    TRANSFERS:  Sit to Stand:  Minimal Assistance, X 1, with increased time for completion, cues for hand placement. Stand to Sit: Minimal Assistance, X 1.      FUNCTIONAL MOBILITY:  Assistive Device: Rolling Walker  Assist Level:  Minimal Assistance. Distance: To and from bathroom  Slow pace, unsteady. Exercise:  Patient completed orange theraball exercises this date x20 reps x1 set in R hand in order to increase [address] and strength for the completion of ADL tasks such as opening grooming supplies and buttoning shirts/pants. min difficulty noted. Activity Tolerance:  Patient tolerance of  treatment: good. Assessment: This 63year old male was admitted to [address] [place] [address] rehab on 12/23/22. Pt presents s/p CVA with Right hemiparesis and s/p CABG aprox 5 weeks ago. Pt demonstrates decreased RUE , RUE FMC, standing balance, decreased endurance, and decrease functional mobility. Pt requires skilled OT intervention to increase indep and endurance with all self cares, transfers, mobility, and IADLs to return to Indep PLOF. Without skilled OT intervention pt is at increased risk for falls and caregiver burden. Performance deficits / Impairments: Decreased functional mobility , Decreased cognition, Decreased ADL status, Decreased strength, Decreased endurance, Decreased safe awareness, Decreased balance, Decreased high-level IADLs, Decreased fine motor control  Prognosis: Good  REQUIRES OT FOLLOW-UP: No    Treatment Initiated: Treatment and education initiated within context of evaluation. Evaluation time included review of current medical information, gathering information related to past medical, social and functional history, completion of standardized testing, formal and informal observation of tasks, assessment of data and development of plan of care and goals. Treatment time included skilled education and facilitation of tasks to increase safety and independence with ADL's for improved functional independence and quality of life. Discharge Recommendations:  Continue to assess pending progress, Home with Home health OT    Patient Education:     Patient Education  Education Given To: Patient  Education Provided: Role of Therapy, Plan of Care, Precautions, ADL Adaptive Strategies, Transfer Training  Education Method: Demonstration, Verbal  Barriers to Learning: None  Education Outcome: Verbalized understanding, Continued education needed    Equipment Recommendations:  Equipment Needed: Yes  Other: Monitor need for RW, and BSC. Plan:  Times Per Week: 5x/week x 90 minutes and 1x/week x 30 minutes  Current Treatment Recommendations: Strengthening, Balance training, Self-Care / ADL, Equipment evaluation, education, & procurement, Safety education & training, Endurance training, Functional mobility training, Neuromuscular re-education, Patient/Caregiver education & training, Home management training. See long-term goal time frame for expected duration of plan of care.   If no long-term goals established, a short length of stay is anticipated. Short-Term Goals  Short Term Goals  Time Frame for Short Term Goals: 1 week  Short Term Goal 1: Pt will complete functional ambulation to/from BR and HH distances with SBA and min vcs for safety to increase indep with toileting. Short Term Goal 2: Pt will decrease RUE 9 hole peg test by 20 seconds and RUE  strength by 5 pounds to increase indep with fasteners in UB dressing. Short Term Goal 3: Pt will complete dynamic standing task x 5 minutes with 1-2 UE release and SBA to increase indep with sinkside grooming. Short Term Goal 4: Pt will complete UB dressing with Set up to increase indep within home environment. Short Term Goal 5: Pt will complete LB dresing with S and min vcs for safety to increase indep and endurance within home environment. Additional Goals?: No  Long Term Goals  Time Frame for Long Term Goals : 3 weeks  Long Term Goal 1: Pt will complete BADL routine including shower transfer Mod Indep to increase indep and safety in home environment. Long Term Goal 2: Pt will complete simple IADL task with S and 0 vcs for safety to increase indep and endurance in home environment. Following session, patient left in safe position with all fall risk precautions in place.

## 2022-12-24 NOTE — PLAN OF CARE
Problem: Discharge Planning  Goal: Discharge to home or other facility with appropriate resources  Outcome: Progressing  Flowsheets  Taken 12/24/2022 0828 by Carolina Sutton RN  Discharge to home or other facility with appropriate resources:   Identify barriers to discharge with patient and caregiver   Identify discharge learning needs (meds, wound care, etc)  Taken 12/23/2022 2337 by Pawan Haywood RN  Discharge to home or other facility with appropriate resources: Identify barriers to discharge with patient and caregiver     Problem: Chronic Conditions and Co-morbidities  Goal: Patient's chronic conditions and co-morbidity symptoms are monitored and maintained or improved  12/24/2022 1119 by Carolina Sutton RN  Outcome: Progressing  Flowsheets (Taken 12/24/2022 0828)  Care Plan - Patient's Chronic Conditions and Co-Morbidity Symptoms are Monitored and Maintained or Improved: Monitor and assess patient's chronic conditions and comorbid symptoms for stability, deterioration, or improvement     Problem: Safety - Adult  Goal: Free from fall injury  12/24/2022 1119 by Carolina Sutton RN  Outcome: Progressing  Falling star prevention in place. Bed and chair alarms in use. Call light in reach. Purposeful hourly rounding. Problem: ABCDS Injury Assessment  Goal: Absence of physical injury  12/24/2022 1119 by Carolina Sutton RN  Outcome: Progressing  No physical injury noted this shift. Problem: Skin/Tissue Integrity  Goal: Absence of new skin breakdown  Description: 1. Monitor for areas of redness and/or skin breakdown  2. Assess vascular access sites hourly  3. Every 4-6 hours minimum:  Change oxygen saturation probe site  4. Every 4-6 hours:  If on nasal continuous positive airway pressure, respiratory therapy assess nares and determine need for appliance change or resting period.   12/24/2022 1119 by Carolina Sutton RN  Outcome: Progressing     Problem: Pain  Goal: Verbalizes/displays adequate comfort level or baseline comfort level  12/24/2022 1119 by Anne Marie Sousa, RN  Outcome: Progressing  Flowsheets (Taken 12/24/2022 2096)  Verbalizes/displays adequate comfort level or baseline comfort level:   Encourage patient to monitor pain and request assistance   Assess pain using appropriate pain scale   Implement non-pharmacological measures as appropriate and evaluate response   Administer analgesics based on type and severity of pain and evaluate response

## 2022-12-24 NOTE — PROGRESS NOTES
[address] [address]  INPATIENT PHYSICAL THERAPY  EVALUATION  [address] [facility] - 7E-53/053-A    Time In: 0715  Time Out: 5731  Timed Code Treatment Minutes: 44 Minutes  Minutes: 62          Date: 2022  Patient Name: Julio Corrales,  Gender:  male        MRN: [id number]  : 1960  (63 y.o.)      Referring Practitioner: Dr. Alexandru Logan  Diagnosis: Acute stroke due to ischemia  Additional Pertinent Hx: Pt admitted through ED due to RUE weakness and slurred speech,  Also noted to have sepsis, LC, emphysematous cystitis. Hx:  HTN, Db, CAD, recent CABG (). MRI + for acute ischemic stroke Lt parietal region     Restrictions/Precautions:  Restrictions/Precautions: General Precautions, Fall Risk  Position Activity Restriction  Other position/activity restrictions: right side hemiparesis, recent CABG  ( s/p 5 wks), life vest    Subjective:  Chart Reviewed: Yes  Patient assessed for rehabilitation services?: Yes  Subjective: Pt resting in bed. Nsg notified for application of life vest.  Pt is pleasant and cooperative, but somewhat argumentative when given recommendations and direction on moving. May be somewhat fearful.     General:     Vision: Within Functional Limits  Hearing: Within functional limits       Pain: 0/10:     Vitals: Vitals not assessed per clinical judgement, see nursing flowsheet    Social/Functional History:    Lives With: Spouse, Family (16 y.o son, 21 y.o step dtr.)  Type of Home: House  Home Layout: One level  Home Access: Stairs to enter with rails  Entrance Stairs - Number of Steps: 2 + threshold  Entrance Stairs - Rails: Both  Home Equipment:  (None used PTA)                   Ambulation Assistance: Independent  Transfer Assistance: Independent       Occupation: Full time employment  Type of Occupation: currently still on short term disability from CABG in 2022       OBJECTIVE:  Range of Motion:  Right Lower Extremity: [facility]  Left Lower Extremity: [facility]    Strength:  Right Lower Extremity: hip ABD 3+, flexion 4-, others WFL  Left Lower Extremity: WFL    Balance:  Static Sitting Balance:  Modified Independent  Static Standing Balance: Minimal Assistance, to CGA without UE support. Wide base noted with trunk sway    Bed Mobility:  Rolling to Left: Minimal Assistance, to complete the roll as pt was fearful of falling, but had 6-7\" of clearance yet noted   Rolling to Right: Contact Guard Assistance   Supine to Sit: Minimal Assistance  Sit to Supine: Minimal Assistance   Cues for decreased reliance on BUes    Transfers:  Sit to Stand: Minimal Assistance, cues for hand placement, trials from bed, w/c.    Stand to Sit:Minimal Assistance, for technique and to use less UE support  To/From Bed and Chair: Minimal Assistance, without AD for balance. Pt moving very tentatively with trunk tremor/sway noted. Ambulation:  Minimal Assistance, with RW. Unsafe to perform without RW  Distance: 12 ft, 21 ft, 60 ft  Surface: Level Tile  Device:Rolling Walker  Gait Deviations:  Slow Jada, Decreased Step Length Bilaterally, Decreased Gait Speed, and recurvatum RLE with stance, trunk tremoring/sway  Stairs:  Minimal Assistance  Number of Steps: 1 (x2)  Height: 6\" step with Parallel Bars       Functional Outcome Measures: Completed  Tinetti:  Balance Score: 7  Gait Score: 5  Tinetti Total Score: 12  Risk Indicators:  Less than/equal to 18 = high risk  19-23 Moderate risk  Greater than/equal to 24 = low risk     ASSESSMENT:  Activity Tolerance:  Patient tolerance of  treatment: good. Treatment Initiated: Treatment and education initiated within context of evaluation. Evaluation time included review of current medical information, gathering information related to past medical, social and functional history, completion of standardized testing, formal and informal observation of tasks, assessment of data and development of plan of care and goals.   Treatment time included skilled education and facilitation of tasks to increase safety and independence with functional mobility for improved independence and quality of life. Assessment:  Assessment: Pt presents  decreased endurance, strength and balance (Tinetti 12/28) as result of recent CABG (approx 5 wks post) and CVA with rt hemiparesis. Pt demonstrates minimal Rt neglect, jaime RUE. Overall pt requiring min assist for bed mobility, transfers and gait with RW. Pt will benefit from skilled PT to advance in these areas for improved functional mobility and safety. Therapy Prognosis: Good         Discharge Recommendations:  Discharge Recommendations: Continue to assess pending progress, Patient would benefit from continued therapy after discharge    Patient Education:  Education  Education Given To: Patient  Education Provided: Role of Therapy, Plan of Care, Precautions, Equipment, Transfer Training, Mobility Training  Education Method: Demonstration, Verbal  Barriers to Learning: None  Education Outcome: Continued education needed, Verbalized understanding   . Equipment Recommendations:  Equipment Needed: Yes  Other: RW    Plan:  Current Treatment Recommendations: Strengthening, ROM, Balance training, Functional mobility training, Transfer training, Endurance training, Neuromuscular re-education, Gait training, Stair training, Safety education & training, Equipment evaluation, education, & procurement, Home exercise program, Therapeutic activities, Patient/Caregiver education & training  General Plan:  (5x/ wk 90 min, 1x/ wk 30 min)    Goals:  Patient Goals : get back to my normal way of doing things  Short Term Goals  Time Frame for Short Term Goals: 1 wk  Short Term Goal 1: Pt to go supine <->sit, minimal use of UEs, SBA to get in/out of bed. Short Term Goal 2: Pt to get up/down from various seated surfaces, CGA, to get up to walk.   Short Term Goal 3: Pt to walk with RW >= 50 ft, demonstrating step through gait pattern, recurvatum < 10% of steps on RT, CGA to progress to home mobility  Short Term Goal 4: Pt to ascend/descend 2 steps with gerber rails, CGA to progress to home entry  Short Term Goal 5: Pt to get in/out of car, CGA for transportation needs. Additional Goals?: Yes  Short Term Goal 6: Pt to perform Tinetti >= 18/28, demonstrating improved balance. Long Term Goals  Time Frame for Long Term Goals : 3 wks from evaluation  Long Term Goal 1: Pt to go supine <->sit, minimal use of UEs, Mod I, to get in/out of bed. Long Term Goal 2: Pt to get up/down from various seated surfaces, Mod I, to get up to walk. Long Term Goal 3: Pt to walk with RW >= 50 ft, demonstrating step through gait pattern, recurvatum < 10% of steps on RT, Mod I, for home mobility  Long Term Goal 4: Pt to perform Tinetti >= 24/28, demonstrating improved balance. Following session, patient left in safe position with all fall risk precautions in place.

## 2022-12-24 NOTE — PLAN OF CARE
Problem: Chronic Conditions and Co-morbidities  Goal: Patient's chronic conditions and co-morbidity symptoms are monitored and maintained or improved  12/23/2022 2345 by Demi Roa RN  Outcome: Progressing  Flowsheets (Taken 12/23/2022 2337)  Care Plan - Patient's Chronic Conditions and Co-Morbidity Symptoms are Monitored and Maintained or Improved: Monitor and assess patient's chronic conditions and comorbid symptoms for stability, deterioration, or improvement     Problem: Safety - Adult  Goal: Free from fall injury  12/23/2022 2345 by Demi Roa RN  Outcome: Progressing     Problem: ABCDS Injury Assessment  Goal: Absence of physical injury  12/23/2022 2345 by Demi Roa RN  Outcome: Progressing     Problem: Skin/Tissue Integrity  Goal: Absence of new skin breakdown  Description: 1. Monitor for areas of redness and/or skin breakdown  2. Assess vascular access sites hourly  3. Every 4-6 hours minimum:  Change oxygen saturation probe site  4. Every 4-6 hours:  If on nasal continuous positive airway pressure, respiratory therapy assess nares and determine need for appliance change or resting period.   12/23/2022 2345 by Demi Roa RN  Outcome: Progressing     Problem: Pain  Goal: Verbalizes/displays adequate comfort level or baseline comfort level  Outcome: Progressing

## 2022-12-25 LAB — GLUCOSE BLD-MCNC: 225 MG/DL (ref 70–108)

## 2022-12-25 PROCEDURE — 1180000000 HC REHAB R&B

## 2022-12-25 PROCEDURE — 6370000000 HC RX 637 (ALT 250 FOR IP): Performed by: PHYSICAL MEDICINE & REHABILITATION

## 2022-12-25 PROCEDURE — 82948 REAGENT STRIP/BLOOD GLUCOSE: CPT

## 2022-12-25 PROCEDURE — 93270 REMOTE 30 DAY ECG REV/REPORT: CPT

## 2022-12-25 RX ADMIN — OXYCODONE 5 MG: 5 TABLET ORAL at 06:27

## 2022-12-25 RX ADMIN — METOPROLOL SUCCINATE 12.5 MG: 25 TABLET, EXTENDED RELEASE ORAL at 09:51

## 2022-12-25 RX ADMIN — Medication 1 TABLET: at 09:49

## 2022-12-25 RX ADMIN — DOCUSATE SODIUM 100 MG: 100 CAPSULE, LIQUID FILLED ORAL at 20:50

## 2022-12-25 RX ADMIN — BACLOFEN 5 MG: 10 TABLET ORAL at 17:47

## 2022-12-25 RX ADMIN — BACLOFEN 5 MG: 10 TABLET ORAL at 06:27

## 2022-12-25 RX ADMIN — APIXABAN 5 MG: 5 TABLET, FILM COATED ORAL at 20:49

## 2022-12-25 RX ADMIN — PANTOPRAZOLE SODIUM 40 MG: 40 TABLET, DELAYED RELEASE ORAL at 06:28

## 2022-12-25 RX ADMIN — APIXABAN 5 MG: 5 TABLET, FILM COATED ORAL at 09:49

## 2022-12-25 RX ADMIN — TRAZODONE HYDROCHLORIDE 50 MG: 50 TABLET ORAL at 20:50

## 2022-12-25 RX ADMIN — SENNOSIDES 17.2 MG: 8.6 TABLET, FILM COATED ORAL at 20:49

## 2022-12-25 RX ADMIN — CIPROFLOXACIN HYDROCHLORIDE 750 MG: 250 TABLET, FILM COATED ORAL at 20:50

## 2022-12-25 RX ADMIN — Medication 3 MG: at 20:49

## 2022-12-25 RX ADMIN — DOCUSATE SODIUM 100 MG: 100 CAPSULE, LIQUID FILLED ORAL at 09:53

## 2022-12-25 RX ADMIN — CIPROFLOXACIN HYDROCHLORIDE 750 MG: 250 TABLET, FILM COATED ORAL at 09:50

## 2022-12-25 RX ADMIN — EMPAGLIFLOZIN 25 MG: 25 TABLET, FILM COATED ORAL at 09:51

## 2022-12-25 RX ADMIN — TAMSULOSIN HYDROCHLORIDE 0.8 MG: 0.4 CAPSULE ORAL at 09:50

## 2022-12-25 RX ADMIN — GLIPIZIDE 10 MG: 10 TABLET ORAL at 06:27

## 2022-12-25 RX ADMIN — BUPROPION HYDROCHLORIDE 150 MG: 150 TABLET, FILM COATED, EXTENDED RELEASE ORAL at 09:51

## 2022-12-25 RX ADMIN — ATORVASTATIN CALCIUM 40 MG: 40 TABLET, FILM COATED ORAL at 20:49

## 2022-12-25 RX ADMIN — ACETAMINOPHEN 650 MG: 325 TABLET ORAL at 17:48

## 2022-12-25 ASSESSMENT — PAIN SCALES - GENERAL
PAINLEVEL_OUTOF10: 6
PAINLEVEL_OUTOF10: 7
PAINLEVEL_OUTOF10: 5
PAINLEVEL_OUTOF10: 0

## 2022-12-25 ASSESSMENT — PAIN DESCRIPTION - DESCRIPTORS
DESCRIPTORS: ACHING;CRAMPING;DISCOMFORT
DESCRIPTORS: ACHING;DISCOMFORT

## 2022-12-25 ASSESSMENT — PAIN DESCRIPTION - LOCATION
LOCATION: BACK
LOCATION: BACK;SHOULDER

## 2022-12-25 ASSESSMENT — PAIN DESCRIPTION - ORIENTATION: ORIENTATION: MID

## 2022-12-25 ASSESSMENT — PAIN - FUNCTIONAL ASSESSMENT
PAIN_FUNCTIONAL_ASSESSMENT: ACTIVITIES ARE NOT PREVENTED
PAIN_FUNCTIONAL_ASSESSMENT: PREVENTS OR INTERFERES SOME ACTIVE ACTIVITIES AND ADLS

## 2022-12-25 NOTE — PLAN OF CARE
Problem: Discharge Planning  Goal: Discharge to home or other facility with appropriate resources  Outcome: Progressing  Flowsheets (Taken 12/24/2022 2200)  Discharge to home or other facility with appropriate resources:   Arrange for needed discharge resources and transportation as appropriate   Identify barriers to discharge with patient and caregiver   Identify discharge learning needs (meds, wound care, etc)   Refer to discharge planning if patient needs post-hospital services based on physician order or complex needs related to functional status, cognitive ability or social support system     Problem: Chronic Conditions and Co-morbidities  Goal: Patient's chronic conditions and co-morbidity symptoms are monitored and maintained or improved  Outcome: Progressing  Flowsheets (Taken 12/24/2022 2200)  Care Plan - Patient's Chronic Conditions and Co-Morbidity Symptoms are Monitored and Maintained or Improved:   Collaborate with multidisciplinary team to address chronic and comorbid conditions and prevent exacerbation or deterioration   Monitor and assess patient's chronic conditions and comorbid symptoms for stability, deterioration, or improvement     Problem: Safety - Adult  Goal: Free from fall injury  Outcome: Progressing  Flowsheets (Taken 12/25/2022 0255)  Free From Fall Injury: Instruct family/caregiver on patient safety     Problem: ABCDS Injury Assessment  Goal: Absence of physical injury  Outcome: Progressing  Flowsheets (Taken 12/25/2022 0255)  Absence of Physical Injury: Implement safety measures based on patient assessment     Problem: Skin/Tissue Integrity  Goal: Absence of new skin breakdown  Description: 1. Monitor for areas of redness and/or skin breakdown  2. Assess vascular access sites hourly  3. Every 4-6 hours minimum:  Change oxygen saturation probe site  4.   Every 4-6 hours:  If on nasal continuous positive airway pressure, respiratory therapy assess nares and determine need for appliance change or resting period. Outcome: Progressing  Note: No skin breakdown this shift. Patient turns self. Patients states understanding of repositioning every two hours. Problem: Pain  Goal: Verbalizes/displays adequate comfort level or baseline comfort level  Outcome: Progressing  Flowsheets  Taken 12/25/2022 4718 by Lisa Eddy RN  Verbalizes/displays adequate comfort level or baseline comfort level:   Encourage patient to monitor pain and request assistance   Assess pain using appropriate pain scale   Implement non-pharmacological measures as appropriate and evaluate response   Administer analgesics based on type and severity of pain and evaluate response  Care plan reviewed with patient. Patient verbalized understanding of the plan of care and contribute to goal setting.

## 2022-12-25 NOTE — PROGRESS NOTES
Patient: Destini Talavera  Unit/Bed: 7E-53/053-A  YOB: 1960  MRN: [id number] Acct: [de-identified]   Admitting Diagnosis: Acute stroke due to ischemia [facility]) [I63.9]  Admit Date:  12/23/2022  Hospital Day: 2    Assessment:     Principal Problem:    Acute stroke due to ischemia [facility])  Active Problems:    Ischemic cardiomyopathy    S/P CABG x 2    Emphysematous cystitis    Bacteremia due to Enterobacter species    Urinary retention    Hemiparesis affecting right side as late effect of stroke (HCC)    Coordination impairment    Debility    UTI due to Klebsiella species    Diabetes mellitus type 2 in nonobese [facility])    Essential hypertension  Resolved Problems:    * No resolved hospital problems. *      Plan:     Follow the CS for now        Subjective:     Patient has no complaint of CP or SOB. .   Medication side effects: none    Scheduled Meds:   atorvastatin  40 mg Oral Daily    buPROPion  150 mg Oral QAM    pantoprazole  40 mg Oral QAM AC    tamsulosin  0.8 mg Oral Daily    apixaban  5 mg Oral BID    metoprolol succinate  12.5 mg Oral Daily    glipiZIDE  10 mg Oral QAM AC    empagliflozin  25 mg Oral Daily    docusate sodium  100 mg Oral BID    melatonin  3 mg Oral Nightly    multivitamin  1 tablet Oral Daily with breakfast    senna  2 tablet Oral Nightly    ciprofloxacin  750 mg Oral 2 times per day    baclofen  5 mg Oral Q12H     Continuous Infusions:   dextrose       PRN Meds:glucose, dextrose bolus **OR** dextrose bolus, glucagon (rDNA), dextrose, acetaminophen, ondansetron, oxyCODONE **OR** oxyCODONE, bisacodyl, magnesium hydroxide, traZODone, polyethylene glycol    Review of Systems  Pertinent items are noted in HPI. Objective:     Patient Vitals for the past 8 hrs:   BP Temp Temp src Pulse Resp SpO2 Weight   12/25/22 0834 127/61 97.5 °F (36.4 °C) Oral 73 16 99 % --   12/25/22 0657 -- -- -- -- 15 -- --   12/25/22 0627 -- -- -- -- 15 -- 136 lb 6.4 oz (61.9 kg)     I/O last 3 completed shifts:   In: 915 [P.O.:900; I.V.:15]  Out: 4125 [Urine:4125]  No intake/output data recorded.     /61   Pulse 73   Temp 97.5 °F (36.4 °C) (Oral)   Resp 16   Ht 5' 10\" (1.778 m)   Wt 136 lb 6.4 oz (61.9 kg)   SpO2 99%   BMI 19.57 kg/m²     General appearance: alert, appears stated age, and cooperative  Head: Normocephalic, without obvious abnormality, atraumatic  Lungs: clear to auscultation bilaterally  Chest wall: no tenderness  Heart: regular rate and rhythm, S1, S2 normal, no murmur, click, rub or gallop  Abdomen: soft, non-tender; bowel sounds normal; no masses,  no organomegaly  Extremities: extremities normal, atraumatic, no cyanosis or edema  Skin: Skin color, texture, turgor normal. No rashes or lesions  Neurologic:  weak    Data Review:    Latest Reference Range & Units 12/23/22 20:37 12/24/22 07:18 12/24/22 11:55 12/25/22 07:27   POC Glucose 70 - 108 mg/dl 249 (H) 111 (H) 162 (H) 225 (H)   (H): Data is abnormally high    Electronically signed by Tabitha Lemon MD on 12/25/2022 at 12:53 PM

## 2022-12-25 NOTE — PLAN OF CARE
Individualized Plan of [address] [facility] Inpatient Rehabilitation Unit    Rehabilitation physician: Dr. Abby Polk Date: 12/23/2022     Rehabilitation Diagnosis: Acute stroke due to ischemia [facility]) [I63.9]      Rehabilitation impairments: self care, motor dysfunction, bowel/bladder management, safety, cognitive function, and communication    Factors facilitating achievement of predicted outcomes: Family support, Motivated, Cooperative, and Pleasant  Barriers to the achievement of predicted outcomes: Cognitive deficit, Limited safety awareness, Limited insight into deficits, Communication deficit, Decreased endurance, Upper extremity weakness, Lower extremity weakness, Incontinence of bladder, Medical complications, Stairs at home, Skin Care, and Medication managment    Patient Goals: Improve independence with mobility, Improvement of mobility at a wheelchair level, Increase overall strength and endurance, Increase balance, Increase endurance, Increase independence with activities of daily living, Improve cognition, Increase self-awareness, Increase safety awareness, Increase community integration, Increase socialization, Functional communication with caregivers, Integrate appropriate pain management plan, Assure adequate nutritional option for discharge, Continence of bowel and bladder, and Provide appropriate patient and family education      NURSING:  Nursing goals for Mira Melendez while on the rehabilitation unit will include:  Adequate number of bowel movements, Urinate with no urinary retention >300ml in bladder, Maintain O2 SATs at an acceptable level during stay, Effective pain management while on the rehabilitation unit, Establish adequate pain control plan for discharge, Absence of skin breakdown while on the rehabilitation unit, Improved skin integrity via assessments including wound measurements, Avoidance of any hospital acquired infections, No signs/symptoms of infection at the wound site, Freedom from injury during hospitalization, and Complete education with patient/family with understanding demonstrated regarding disease process and resultant impairment     In order to achieve these goals, nursing interventions may include bowel/bladder training, education for medical assistive devices, medication education, O2 saturation management, energy conservation, stress management techniques, fall prevention, alarms protocol, seating and positioning, skin/wound care, pressure relief instruction, dressing changes, infection protection, DVT prophylaxis, assistance with safe transfers , and/or assistance with bathroom activities and hygiene. PHYSICAL THERAPY:  Goals:        Short Term Goals  Time Frame for Short Term Goals: 1 wk  Short Term Goal 1: Pt to go supine <->sit, minimal use of UEs, SBA to get in/out of bed. Short Term Goal 2: Pt to get up/down from various seated surfaces, CGA, to get up to walk. Short Term Goal 3: Pt to walk with RW >= 50 ft, demonstrating step through gait pattern, recurvatum < 10% of steps on RT, CGA to progress to home mobility  Short Term Goal 4: Pt to ascend/descend 2 steps with gerber rails, CGA to progress to home entry  Short Term Goal 5: Pt to get in/out of car, CGA for transportation needs. Additional Goals?: Yes  Short Term Goal 6: Pt to perform Tinetti >= 18/28, demonstrating improved balance. Long Term Goals  Time Frame for Long Term Goals : 3 wks from evaluation  Long Term Goal 1: Pt to go supine <->sit, minimal use of UEs, Mod I, to get in/out of bed. Long Term Goal 2: Pt to get up/down from various seated surfaces, Mod I, to get up to walk. Long Term Goal 3: Pt to walk with RW >= 50 ft, demonstrating step through gait pattern, recurvatum < 10% of steps on RT, Mod I, for home mobility  Long Term Goal 4: Pt to perform Tinetti >= 24/28, demonstrating improved balance.     Plan of Care: Patient to be seen by physical therapy services  (5x/ wk 90 min, 1x/ wk 30 min)       Anticipated interventions may include therapeutic exercises, gait training, neuromuscular re-ed, transfer training, community reintegration, bed mobility, w/c mobility and training. OCCUPATIONAL THERAPY:  Goals:             Short Term Goals  Time Frame for Short Term Goals: 1 week  Short Term Goal 1: Pt will complete functional ambulation to/from BR and HH distances with SBA and min vcs for safety to increase indep with toileting. Short Term Goal 2: Pt will decrease RUE 9 hole peg test by 20 seconds and RUE  strength by 5 pounds to increase indep with fasteners in UB dressing. Short Term Goal 3: Pt will complete dynamic standing task x 5 minutes with 1-2 UE release and SBA to increase indep with sinkside grooming. Short Term Goal 4: Pt will complete UB dressing with Set up to increase indep within home environment. Short Term Goal 5: Pt will complete LB dresing with S and min vcs for safety to increase indep and endurance within home environment. Additional Goals?: No       Plan of Care: Patient to be seen by occupational therapy services 5x/week x 90 minutes and 1x/week x 30 minutes     Anticipated interventions may include ADL and IADL retraining, strengthening, safety education and training, patient/caregiver education and training, equipment evaluation/ training/procurement, neuromuscular reeducation, wheelchair mobility training. SPEECH THERAPY:   Short Term Goals  Time Frame for Short Term Goals: 1 week  Goal 1: Patient will complete complex executive functioning tasks (i.e., medication management, complex reasoning/deductive reasoning, etc.) with 90% accuracy and minimal cuing in order to allow for safe return to work (40/hours week).   Goal 2: Patient will complete higher level recall/short term memory and working memory tasks with 80% accuracy provided independent use of compensatory strategies in order to improve overall recall of newly learned theraputic information and anticipation to return to work (Latia Ruiz in [employer]). Goal 3: Patient will demonstrate independent use of compensatory strategies (S-speak up, O-open mouth, S-slow down) within strucutred and unstrucutred speech tasks in order to improve overall articulatory precision. Goal 4: .    Plan of Care: Pt to be seen by speech therapy services 30 minutes per day Monday through Friday . Anticipated interventions may include speech/language/communication therapy, cognitive training, group therapy, education, and/or dysphagia therapy based on the above goals. CASE MANAGEMENT:  Goals:   Assist patient/family with discharge planning, patient/family counseling,  and coordination with insurance during the inpatient rehabilitation stay. Other members of the multidisciplinary rehabilitation team that will be involved in the patient's plan of care include recreational therapy, dietary, respiratory therapy, and neuropsychology. Medical issues being managed closely and that require 24 hour availability of a physician:  Swallowing precautions, Bowel/Bladder function, Pain management, Infection protection, DVT prophylaxis, Fall precautions, Fluid/Electrolyte balance, Nutritional status, Anemia, and History of heart disease                                           Physician anticipated functional outcomes: Improved independence with functional measures   Estimated length of stay for this admission : probably 2~3 weeks  Medical Prognosis: Good  Anticipated disposition: Home. The potential to achieve the above medical and rehabilitative goals is good. This plan of care has been developed with the assistance and input of the multidisciplinary rehabilitation team.  The plan was reviewed with the patient.   The patient has had the opportunity to provide input to the therapy team.    I have reviewed this Individualized Plan of Care and agree with its contents. Above documentation has been expanded, modified, adjusted to reflect the findings of my evaluations and goals for the patient.     Physician:  Eriberto Quintero MD

## 2022-12-25 NOTE — CONSULTS
Department of Family Practice  Consult Note        Reason for Consult:  Medical management while on the Inpatient Rehab unit. Requesting Physician:  Dr Genny Watts:   The need to continue the intensive time with therapies following the acute hospital stay. History Obtained From:  patient, EMR    HISTORY OF PRESENT ILLNESS:              The patient is a 63 y.o. male with significant past medical history of       Diagnosis Date    Coronary artery disease 2022    Diabetes mellitus ([facility] [address] 2020    Hypertension     Ischemic cardiomyopathy 2022    Urinary retention 2022      who presents with a recent CABG that had some urinary bladder dysfunction issues associated with it. He came to the ED following a fall at home and was admitted. He then had an acute CVA in the hospital which did not require any intervention. He was found during the acute stay to have a significant UTI that required several days of IV ATB. Eventually he was found to be medically ready to come to IPR so has come to the Inpatient Rehab Unit to continue the time with therapies prior to a discharge disposition being made.       Past Medical History:        Diagnosis Date    Coronary artery disease 2022    Diabetes mellitus ([facility] [address] 2020    Hypertension     Ischemic cardiomyopathy 2022    Urinary retention 2022     Past Surgical History:        Procedure Laterality Date    CORONARY ARTERY BYPASS GRAFT N/A 11/7/2022    CABG X2 JOY WITH BALLOON PUMP performed by Kalee Chaidez MD at [address] [facility] [facility]      TRANSESOPHAGEAL ECHOCARDIOGRAM N/A 12/16/2022    TRANSESOPHAGEAL ECHOCARDIOGRAM WITH ANESTHESIA performed by Marion David MD at [facility]     Current Medications:   Current Facility-Administered Medications: atorvastatin (LIPITOR) tablet 40 mg, 40 mg, Oral, Daily  buPROPion (WELLBUTRIN XL) extended release tablet 150 mg, 150 mg, Oral, QAM  glucose chewable tablet 16 g, 4 tablet, Oral, PRN  dextrose bolus 10% 125 mL, 125 mL, IntraVENous, PRN **OR** dextrose bolus 10% 250 mL, 250 mL, IntraVENous, PRN  glucagon (rDNA) injection 1 mg, 1 mg, SubCUTAneous, PRN  dextrose 10 % infusion, , IntraVENous, Continuous PRN  pantoprazole (PROTONIX) tablet 40 mg, 40 mg, Oral, QAM AC  tamsulosin (FLOMAX) capsule 0.8 mg, 0.8 mg, Oral, Daily  apixaban (ELIQUIS) tablet 5 mg, 5 mg, Oral, BID  metoprolol succinate (TOPROL XL) extended release tablet 12.5 mg, 12.5 mg, Oral, Daily  glipiZIDE (GLUCOTROL) tablet 10 mg, 10 mg, Oral, QAM AC  acetaminophen (TYLENOL) tablet 650 mg, 650 mg, Oral, Q4H PRN  empagliflozin (JARDIANCE) tablet 25 mg, 25 mg, Oral, Daily  ondansetron (ZOFRAN-ODT) disintegrating tablet 4 mg, 4 mg, Oral, Q8H PRN  oxyCODONE (ROXICODONE) immediate release tablet 5 mg, 5 mg, Oral, Q6H PRN **OR** oxyCODONE (ROXICODONE) immediate release tablet 2.5 mg, 2.5 mg, Oral, Q6H PRN  docusate sodium (COLACE) capsule 100 mg, 100 mg, Oral, BID  bisacodyl (DULCOLAX) suppository 10 mg, 10 mg, Rectal, Daily PRN  magnesium hydroxide (MILK OF MAGNESIA) 400 MG/5ML suspension 15 mL, 15 mL, Oral, Daily PRN  melatonin tablet 3 mg, 3 mg, Oral, Nightly  traZODone (DESYREL) tablet 50 mg, 50 mg, Oral, Nightly PRN  multivitamin 1 tablet, 1 tablet, Oral, Daily with breakfast  polyethylene glycol (GLYCOLAX) packet 17 g, 17 g, Oral, Daily PRN  senna (SENOKOT) tablet 17.2 mg, 2 tablet, Oral, Nightly  ciprofloxacin (CIPRO) tablet 750 mg, 750 mg, Oral, 2 times per day  baclofen (LIORESAL) tablet 5 mg, 5 mg, Oral, Q12H  Allergies:  Metformin and related    Social History:   MARITAL STATUS:      Family History:       Problem Relation Age of Onset    Diabetes Father     Stroke Father     Diabetes Brother     Diabetes Brother     Alcohol Abuse Paternal Grandfather     Heart Attack Paternal Grandfather      REVIEW OF SYSTEMS:    CONSTITUTIONAL:  positive for  fatigue  EYES:  positive for  glasses  HEENT:  negative for  epistaxis  RESPIRATORY:  negative for dyspnea  CARDIOVASCULAR:  negative for  chest pain  GASTROINTESTINAL:  negative for diarrhea  GENITOURINARY:  positive for bladder dysfunction and need for crum cath.   INTEGUMENT/BREAST:  negative for dryness  HEMATOLOGIC/LYMPHATIC:  negative for petechiae  ALLERGIC/IMMUNOLOGIC:  negative for anaphylaxis  ENDOCRINE:  negative for diabetic symptoms including polyphagia  MUSCULOSKELETAL:  positive for  myalgias, arthralgias, and muscle weakness  NEUROLOGICAL:  positive for coordination problems, gait problems, and weakness  BEHAVIOR/PSYCH:  negative for increased agitation  PHYSICAL EXAM:      Vitals:    BP (!) 113/53   Pulse 74   Temp 98.2 °F (36.8 °C) (Oral)   Resp 17   Ht 5' 10\" (1.778 m)   Wt 134 lb 8 oz (61 kg)   SpO2 99%   BMI 19.30 kg/m²     Well developed well nourished white male who is awake alert and cooperative  Skin atrophic  Membranes moist  Head normocephalic  Neck without mass  Chest symmetrical expansion  Heart S1S2 without murmur  Lungs CTA  Abd soft, non tender, normoactive BS and no mass  Ext without edema  Neuro weak  Psy pleasant   DATA:     Latest Reference Range & Units 12/23/22 16:07 12/23/22 20:37 12/24/22 07:18 12/24/22 11:55   POC Glucose 70 - 108 mg/dl 149 (H) 249 (H) 111 (H) 162 (H)   (H): Data is abnormally high    IMPRESSION/RECOMMENDATIONS:      Active Hospital Problems    Diagnosis Date Noted    Ischemic cardiomyopathy [I25.5] 11/05/2022     Priority: High    Acute stroke due to ischemia (HCC) [I63.9] 12/23/2022     Priority: Medium    Hemiparesis affecting right side as late effect of stroke ([facility] [address] [I69.351] 12/23/2022     Priority: Medium    Coordination impairment [R27.8] 12/23/2022     Priority: Medium    Debility [R53.81] 12/23/2022     Priority: Medium    UTI due to Klebsiella species [N39.0, B96.89] 12/23/2022     Priority: Medium    Urinary retention [R33.9] 12/16/2022     Priority: Medium    Bacteremia due to Enterobacter species [R78.81, B96.89] 12/16/2022 Priority: Medium    Emphysematous cystitis [N30.80] 12/15/2022     Priority: Medium    S/P CABG x 2 [Z95.1] 11/07/2022     Priority: Medium    Essential hypertension [I10] 08/10/2021    Diabetes mellitus type 2 in nonobese [facility]) [E11.9] 09/22/2013      Will stop the correction insulin scale

## 2022-12-26 PROBLEM — E44.0 MODERATE MALNUTRITION (HCC): Status: ACTIVE | Noted: 2022-12-26

## 2022-12-26 LAB — GLUCOSE BLD-MCNC: 146 MG/DL (ref 70–108)

## 2022-12-26 PROCEDURE — 97535 SELF CARE MNGMENT TRAINING: CPT

## 2022-12-26 PROCEDURE — 1180000000 HC REHAB R&B

## 2022-12-26 PROCEDURE — 97530 THERAPEUTIC ACTIVITIES: CPT

## 2022-12-26 PROCEDURE — 97110 THERAPEUTIC EXERCISES: CPT

## 2022-12-26 PROCEDURE — 97116 GAIT TRAINING THERAPY: CPT

## 2022-12-26 PROCEDURE — 97112 NEUROMUSCULAR REEDUCATION: CPT

## 2022-12-26 PROCEDURE — 99232 SBSQ HOSP IP/OBS MODERATE 35: CPT | Performed by: PHYSICAL MEDICINE & REHABILITATION

## 2022-12-26 PROCEDURE — 92507 TX SP LANG VOICE COMM INDIV: CPT

## 2022-12-26 PROCEDURE — 82948 REAGENT STRIP/BLOOD GLUCOSE: CPT

## 2022-12-26 PROCEDURE — 6370000000 HC RX 637 (ALT 250 FOR IP): Performed by: PHYSICAL MEDICINE & REHABILITATION

## 2022-12-26 RX ADMIN — TAMSULOSIN HYDROCHLORIDE 0.8 MG: 0.4 CAPSULE ORAL at 08:24

## 2022-12-26 RX ADMIN — Medication 3 MG: at 22:18

## 2022-12-26 RX ADMIN — PANTOPRAZOLE SODIUM 40 MG: 40 TABLET, DELAYED RELEASE ORAL at 05:56

## 2022-12-26 RX ADMIN — CIPROFLOXACIN HYDROCHLORIDE 750 MG: 250 TABLET, FILM COATED ORAL at 08:24

## 2022-12-26 RX ADMIN — CIPROFLOXACIN HYDROCHLORIDE 750 MG: 250 TABLET, FILM COATED ORAL at 22:19

## 2022-12-26 RX ADMIN — ACETAMINOPHEN 650 MG: 325 TABLET ORAL at 17:46

## 2022-12-26 RX ADMIN — TRAZODONE HYDROCHLORIDE 50 MG: 50 TABLET ORAL at 22:19

## 2022-12-26 RX ADMIN — DOCUSATE SODIUM 100 MG: 100 CAPSULE, LIQUID FILLED ORAL at 22:19

## 2022-12-26 RX ADMIN — ATORVASTATIN CALCIUM 40 MG: 40 TABLET, FILM COATED ORAL at 22:19

## 2022-12-26 RX ADMIN — GLIPIZIDE 10 MG: 10 TABLET ORAL at 05:57

## 2022-12-26 RX ADMIN — Medication 1 TABLET: at 08:24

## 2022-12-26 RX ADMIN — BACLOFEN 5 MG: 10 TABLET ORAL at 05:56

## 2022-12-26 RX ADMIN — SENNOSIDES 17.2 MG: 8.6 TABLET, FILM COATED ORAL at 22:18

## 2022-12-26 RX ADMIN — APIXABAN 5 MG: 5 TABLET, FILM COATED ORAL at 22:19

## 2022-12-26 RX ADMIN — EMPAGLIFLOZIN 25 MG: 25 TABLET, FILM COATED ORAL at 08:24

## 2022-12-26 RX ADMIN — METOPROLOL SUCCINATE 12.5 MG: 25 TABLET, EXTENDED RELEASE ORAL at 08:24

## 2022-12-26 RX ADMIN — BUPROPION HYDROCHLORIDE 150 MG: 150 TABLET, FILM COATED, EXTENDED RELEASE ORAL at 08:24

## 2022-12-26 RX ADMIN — APIXABAN 5 MG: 5 TABLET, FILM COATED ORAL at 08:23

## 2022-12-26 RX ADMIN — BACLOFEN 5 MG: 10 TABLET ORAL at 17:46

## 2022-12-26 RX ADMIN — DOCUSATE SODIUM 100 MG: 100 CAPSULE, LIQUID FILLED ORAL at 08:23

## 2022-12-26 ASSESSMENT — ENCOUNTER SYMPTOMS
DIARRHEA: 0
VOMITING: 0
COUGH: 0
ABDOMINAL PAIN: 0
CONSTIPATION: 0
NAUSEA: 0
TROUBLE SWALLOWING: 0
RHINORRHEA: 0
SORE THROAT: 0
SHORTNESS OF BREATH: 0
WHEEZING: 0

## 2022-12-26 ASSESSMENT — PAIN SCALES - GENERAL
PAINLEVEL_OUTOF10: 4
PAINLEVEL_OUTOF10: 3

## 2022-12-26 ASSESSMENT — PAIN DESCRIPTION - ORIENTATION: ORIENTATION: RIGHT;LEFT;UPPER

## 2022-12-26 ASSESSMENT — PAIN DESCRIPTION - LOCATION: LOCATION: BACK;SHOULDER

## 2022-12-26 ASSESSMENT — PAIN DESCRIPTION - DESCRIPTORS: DESCRIPTORS: ACHING

## 2022-12-26 NOTE — PROGRESS NOTES
Comprehensive Nutrition Assessment    Type and Reason for Visit:  Initial, Consult (Nutritional assessment)    Nutrition Recommendations/Plan:   Recommend diet as per SLP. Trial Magic Cups BID. Recommend continue MVI. Encouraged po, good nutrition at best efforts for healing. Denies any questions on diet. Malnutrition Assessment:  Malnutrition Status: Moderate malnutrition (12/26/22 1200)    Context:  Acute Illness     Findings of the 6 clinical characteristics of malnutrition:  Energy Intake:  Mild decrease in energy intake (Comment)  Weight Loss:   (difficult to evaluate with fluid; but appears to have lost some weight per EMR)     Body Fat Loss:  Mild body fat loss Orbital, Buccal region   Muscle Mass Loss:  Mild muscle mass loss Temples (temporalis)  Fluid Accumulation:  Unable to assess     Strength:  Not Performed    Nutrition Assessment:    Pt. moderately malnourished AEB criteria as listed above. At risk for further nutrition compromise r/t admit w/ stroke, decreased appetite at times and underlying medical condition (hx CAD, DM, CABG 11/7/22, HTN). Nutrition Related Findings:      Wound Type: None     Pt. Report/Treatments/Miscellaneous: pt. Seen - states appetite is so-so; denies any trouble tolerating diet; dislikes Glucerna; denies any questions on diet (educated 10/31/22)  GI Status: BM 12/23  Pertinent Labs: 12/24: Glucose 116, BUN 36, Cr 1.5; 12/15/22: HgA1c 7.6%  Pertinent Meds: MVI, PPI, Cipro, Glycolax, Senokot, Jardiance      Current Nutrition Intake & Therapies:    Average Meal Intake: %     ADULT ORAL NUTRITION SUPPLEMENT; Breakfast, Dinner; Frozen Oral Supplement  ADULT DIET;  Regular; 4 carb choices (60 gm/meal)    Anthropometric Measures:  Height: 5' 10\" (177.8 cm)  Ideal Body Weight (IBW): 166 lbs (75 kg)    Admission Body Weight: 136 lb 6.4 oz (61.9 kg) (12/25 no edema)  Current Body Weight: 137 lb 14.4 oz (62.6 kg) (12/26 no edema),      Current BMI (kg/m2): 19.8  Usual Body Weight:  (per pt. 140# but states it varies; per EMR: 8/10/21: 150# 3 oz, 5/24/22: 144# 10 oz, 10/30/22: 141# 14 oz (w/ edema))                       BMI Categories: Normal Weight (BMI 18.5-24. 9)    Estimated Daily Nutrient Needs:  Energy Requirements Based On: Kcal/kg  Weight Used for Energy Requirements: Other (Comment) (63)  Energy (kcal/day): 1855-5205 kcals (28-30)  Weight Used for Protein Requirements: Other (Comment) (63)  Protein (g/day): 76+ grams (1.2+)       Nutrition Diagnosis:   Moderate malnutrition related to inadequate protein-energy intake as evidenced by Criteria as identified in malnutrition assessment    Nutrition Interventions:   Food and/or Nutrient Delivery: Continue Current Diet, Modify Oral Nutrition Supplement  Nutrition Education/Counseling: Education initiated (12/26 Encouraged po, good nutrition at best efforts. Denies any questions on diet at this time.)  Coordination of Nutrition Care: Continue to monitor while inpatient       Goals:     Goals: PO intake 75% or greater, by next RD assessment       Nutrition Monitoring and Evaluation:      Food/Nutrient Intake Outcomes: Diet Advancement/Tolerance, Food and Nutrient Intake, Supplement Intake  Physical Signs/Symptoms Outcomes: Biochemical Data, Chewing or Swallowing, GI Status, Fluid Status or Edema, Nutrition Focused Physical Findings, Skin, Weight    Discharge Planning:     Too soon to determine     Giancarlo Farrell RD, LD  Contact: [phone]

## 2022-12-26 NOTE — PLAN OF CARE
Problem: Discharge Planning  Goal: Discharge to home or other facility with appropriate resources  Outcome: Progressing  Flowsheets (Taken 12/26/2022 1600)  Discharge to home or other facility with appropriate resources:   Identify barriers to discharge with patient and caregiver   Identify discharge learning needs (meds, wound care, etc)  Note: Patients conference is later this week and patient plan will be determined. Patient will go home with life vest and crum so would benefit from home health. Problem: Safety - Adult  Goal: Free from fall injury  Outcome: Progressing  Flowsheets (Taken 12/26/2022 1600)  Free From Fall Injury: Instruct family/caregiver on patient safety  Note: Patient remains free from falls at this time     Problem: ABCDS Injury Assessment  Goal: Absence of physical injury  Outcome: Progressing  Flowsheets (Taken 12/26/2022 1600)  Absence of Physical Injury: Implement safety measures based on patient assessment  Note: Patient remains free from injury at this time     Problem: Skin/Tissue Integrity  Goal: Absence of new skin breakdown  Description: 1. Monitor for areas of redness and/or skin breakdown  2. Assess vascular access sites hourly  3. Every 4-6 hours minimum:  Change oxygen saturation probe site  4. Every 4-6 hours:  If on nasal continuous positive airway pressure, respiratory therapy assess nares and determine need for appliance change or resting period.   Outcome: Progressing  Note: Patient remains free from any new skin breakdown     Problem: Pain  Goal: Verbalizes/displays adequate comfort level or baseline comfort level  Outcome: Progressing  Flowsheets (Taken 12/26/2022 1600)  Verbalizes/displays adequate comfort level or baseline comfort level:   Encourage patient to monitor pain and request assistance   Assess pain using appropriate pain scale   Administer analgesics based on type and severity of pain and evaluate response  Note: Patients pain os controlled with the current pain regimen     Problem: Nutrition Deficit:  Goal: Optimize nutritional status  12/26/2022 1209 by Wyatt Hopper RD, LD  Flowsheets (Taken 12/26/2022 1209)  Nutrient intake appropriate for improving, restoring, or maintaining nutritional needs:   Assess nutritional status and recommend course of action   Monitor oral intake, labs, and treatment plans   Recommend appropriate diets, oral nutritional supplements, and vitamin/mineral supplements

## 2022-12-26 NOTE — PROGRESS NOTES
[address] [facility] THERAPY  [facility]  DAILY NOTE     TIME   SLP Individual Minutes  Time In: 0730  Time Out: 0800  Minutes: 30  Timed Code Treatment Minutes: 0 Minutes       Date: 2022  Patient Name: Geena Torres      CSN: [id number]   : 1960  (63 y.o.)  Gender: male   Referring Physician:  Dr. Sofya Martinez   Diagnosis: Acute stroke due to ischemia, right hemiparesis secondary to stroke   Precautions: aspiration, fall risk   Current Diet: Regular diet with thin liquids   Swallowing Strategies: Standard Universal Swallow Precautions  Date of Last MBS/FEES: Not Applicable    Pain:  No pain reported. Subjective:  Patient seen sitting upright in chair, alert and very pleasant. No family present. Short-Term Goals:  SHORT TERM GOAL #1:  Goal 1: Patient will complete complex executive functioning tasks (i.e., medication management, complex reasoning/deductive reasoning, etc.) with 90% accuracy and minimal cuing in order to allow for safe return to work (40/hours week). INTERVENTIONS:DNT secondary to focus on speech treatment      SHORT TERM GOAL #2:  Goal 2: Patient will complete higher level recall/short term memory and working memory tasks with 80% accuracy provided independent use of compensatory strategies in order to improve overall recall of newly learned theraputic information and anticipation to return to work ([employer] in [employer]). INTERVENTIONS:DNT secondary to focus on speech treatment     SHORT TERM GOAL #3:  Goal 3: Patient will demonstrate independent use of compensatory strategies (S-speak up, O-open mouth, S-slow down) within strucutred and unstrucutred speech tasks in order to improve overall articulatory precision. INTERVENTIONS: ST reviewed compensatory speech strategies provided by ST on previous therapy date ; patient independently recalled 3/3 strategies understanding all concepts previously introduced and reviewed.  ST then presented patient with the following structured and unstructured speech tasks     Task 1: Reading functional phrases 10/10 100% speech intelligibility, appropriate articulatory precision and rate   Task 2: Open ended questions; 5/5 overall 100% speech intelligibility, appropriate articulatory precision and rate. Throughout task 2; ST collected specific 1-3 word phrases in which articulatory precision could have been improved. ST then presented patient with a list of these specific phrases to read aloud      Task 3: Reading specific/collected phrases aloud: 10/10 100% speech intelligibility, appropriate articulatory precision and rate   *patient with increased awareness of \"sh, s, ch\" sounds are presenting with decreased articulatory precision    Patient continues to rate speech at 70%/100% at baseline. ST anticipation ST to be short term overall to work towards establishing HEP    Long-Term Goals:  Time Frame for Long Term Goals: 2 weeks from time of evaluation    LONG TERM GOAL #1:  Goal 1: Patient will improve overall cognitive function to return to independent living with wife and careing for children (16 year old). LONG TERM GOAL #2:  Goal 2: Patient will improve overall speech intelligablity and clearity of speech production within informal conversation in order to return to baseline speech to improve successful ability to return to work, speaking with co-works and medical staff and family/friends.        Comprehension: 7 - Patient understands complex ideas (math/planning)  Expression: 7 - Patient expresses complex ideas/needs  Social Interaction: 7 - Patient has appropriate behavior/relations 100% of the time  Problem Solvin - Patient independent with complex tasks  Memory: 6 - Patient requires device to recall (e.g. memory book)    EDUCATION:  Learner: Patient  Education:  Reviewed results and recommendations of this evaluation, Reviewed ST goals and Plan of Care, and Reviewed recommendations for follow-up  Evaluation of Education: Verbalizes understanding, Demonstrates with assistance, and Family not present    ASSESSMENT/PLAN:  Activity Tolerance:  Patient tolerance of  treatment: good. Assessment/Plan: Patient progressing toward established goals. Continues to require skilled care of licensed speech pathologist to progress toward achievement of established goals and plan of care. .     Plan for Next Session: High level cognitive treatment  Discharge Recommendations: Home with [facility] [address] [place] [address]. [place] [id number]

## 2022-12-26 NOTE — PROGRESS NOTES
[address]  Occupational Therapy  Daily Note  Time:   Time In: 1400  Time Out: 1430  Timed Code Treatment Minutes: 30 Minutes  Minutes: 30          Date: 2022  Patient Name: Toño Davis,   Gender: male      Room: [facility]/053-A  MRN: [id number]  : 1960  (63 y.o.)  Referring Practitioner: Ordering & Attending: Eloy Landau, MD  Diagnosis: Acute Stroke due to Ischemia  Additional Pertinent Hx: Toño Davis who is a 63 y.o. male with a history of hypertension, diabetes, CAD on  daily, recent CABG in 2022, ischemic cardiomyopathy is admitted to [facility] for sepsis, emphysematous cystitis and acute kidney injury on CKD. During admission exam patient stated that his right arm feldman has been weak and that he has had some slurred speech. Stroke alert was called for right-sided weakness and dysarthria on 12/15. On arrival patient's NIH was 4 for right upper extremity, right lower extremity weakness, 1 limb ataxia and dysarthria. On further questioning, patient and wife state that the right arm weakness actually started last night. Patient's wife noticed when patient was trying to get up from the toilet that he was unable to push himself up with his right arm. Last known well 2330 on 2022. Patient taken to imaging for stat CT head which revealed no ICH, midline shift, mass-effect. CTA head and neck deferred due to patient's LC. Decision for no tPA was made due to patient's time since last known well. Patient with relatively low NIH and symptoms which are not consistent with LVO, therefore no thrombectomy/neuro intervention at this time. Patient had stat MRI brain and MRA head and neck without contrast.  MRI showed acute ischemic stroke of left parietal region. Pt admitted to IP rehab on 22.     Restrictions/Precautions:  Restrictions/Precautions: General Precautions, Fall Risk  Position Activity Restriction  Other position/activity restrictions: right side hemiparesis, recent CABG 11/22-minimize arm use ( s/p 5 wks), life vest     SUBJECTIVE: Pt supine in bed, agreeable to Tx. PAIN: does not report pain     Vitals: Vitals not assessed per clinical judgement, see nursing flowsheet    COGNITION: Decreased Safety Awareness    ADL:   Grooming: Contact Guard Assistance and with increased time for completion. Engaged in oral care. standing with 2ww, demo'd unsteadiness. Lower Extremity Dressing: Modified Independent. Sitting up in bed donned socks . BALANCE:  Sitting Balance:  Supervision. Standing Balance: Contact Guard Assistance. Demo'd unsteadiness     BED MOBILITY:  Supine to Sit: Supervision, with increased time for completion    Sit to Supine: Supervision, with increased time for completion      TRANSFERS:  Sit to Stand:  CGA\, with increased time for completion. Recalled hand placement   Stand to Sit: Contact Guard Assistance. Recalled hand placement. Incrae time to complete. FUNCTIONAL MOBILITY:  Assistive Device: Rolling Walker  Assist Level:  Contact Guard Assistance. Distance: To and from bathroom and To and from therapy gym  Demo's unsteadiness, R side neglect with navigating walker, ran into wall. VC's to increase awareness. Pt demo's R knee hyperextension during mobility      ADDITIONAL ACTIVITIES:  Engaged in seated BUE ex's with 2#DB completed bicep curls and cross body curls, 1x15 ea to increase strength for transfers. Engaged in [address] [place] [address] shoulder flexion and chest press 1x15 ROM to increase safety and (I) with self care task. ASSESSMENT:     Activity Tolerance:  Patient tolerance of  treatment: good. Discharge Recommendations: Continue to assess pending progress  Equipment Recommendations: Equipment Needed: Yes  Other: Monitor need for RW, and BSC.   Plan: Times Per Week: 5x/week x 90 minutes and 1x/week x 30 minutes  Current Treatment Recommendations: Strengthening, Balance training, Self-Care / ADL, Equipment evaluation, education, & procurement, Safety education & training, Endurance training, Functional mobility training, Neuromuscular re-education, Patient/Caregiver education & training, Home management training    Patient Education  Patient Education: Role of OT, Plan of Care, ADL's, Hemibody Awareness/Attention, and Importance of Increasing Activity     Goals  Short Term Goals  Time Frame for Short Term Goals: 1 week  Short Term Goal 1: Pt will complete functional ambulation to/from BR and HH distances with SBA and min vcs for safety to increase indep with toileting. Short Term Goal 2: Pt will decrease RUE 9 hole peg test by 20 seconds and RUE  strength by 5 pounds to increase indep with fasteners in UB dressing. Short Term Goal 3: Pt will complete dynamic standing task x 5 minutes with 1-2 UE release and SBA to increase indep with sinkside grooming. Short Term Goal 4: Pt will complete UB dressing with Set up to increase indep within home environment. Short Term Goal 5: Pt will complete LB dresing with S and min vcs for safety to increase indep and endurance within home environment. Additional Goals?: No    Following session, patient left in safe position with all fall risk precautions in place.

## 2022-12-26 NOTE — PROGRESS NOTES
Physical Medicine & Rehabilitation Progress Note    Chief Complaint:  Right-sided weakness due to stroke    Subjective:    Jackelyn Nj is a 63 y.o.  left-handed  male with history of hypertension, diabetes mellitus type 2, coronary artery disease with ischemic cardiomyopathy requiring two-vessel CABG, urinary retention with several failed void trial, status post tonsillectomy, was admitted to the inpatient rehabilitation unit on 12/23/2022 for intensive inpatient management of impairment & disability secondary to right hemiparesis due to acute left parietal corona radiata and right cerebellar ischemic stroke, and debility/physical deconditioning due to Klebsiella pneumonia urinary tract infection with emphysematous cystitis and bilateral hydronephrosis complicated by Klebsiella pneumonia bacteremia/sepsis. The patient was previously hospitalized at [facility] from 10/28/2022 to 11/15/2022 for coronary artery disease and ischemic cardiomyopathy requiring two-vessel CABG on 11/7/2022. His postoperative course was complicated by urinary retention with failed void trial.  The patient has been experiencing progressive weakness after he was discharged to home. The Bella was later removed on 12/13/2022 but the patient continued having difficulty voiding. On 12/14/2022 approximately 11:30 PM his wife noticed the patient had slight slurred speech with right arm weakness. On 12/15/2022 the patient suddenly felt dizzy and lightheaded while he was trying to get up from sitting on toilet and fell into the ground. He says he did not lose consciousness and did not hit his head. His wife called 46. He was transported to [address] [place] [address] ER for evaluation by EMS. He complains of neck pain and upper back pain. He was found to be tachycardic. His WBC was found to be 22.3. Bella was placed in ER and a rush of air followed by dark brown and bright red color urine came out.   He was put on IV meropenem and admitted. Urology was consulted. CT of cervical spine revealed only multilevel facet and uncovertebral joint arthropathy. CT of head done on 12/15/2020 revealed no acute intracranial abnormality. CT of the chest, abdomen and pelvis done on 12/15/2022 revealed emphysematous cystitis, bilateral hydronephrosis, and constipation. Because of right arm weakness, stroke code was called on 12/15/2022. MRI of brain done on 12/15/2022 revealed acute ischemic stroke at the left parietal region corona radiata, and possible small additional acute ischemic stroke within right cerebellum. Neurology service start patient on aspirin and Plavix combination for the stroke. MRA of the neck and head done on 12/15/2022 showed only mild bilateral carotid bulb disease. Transesophageal echocardiogram was done on 12/16/2022 showing severe global hypokinesis of left ventricle with estimated ejection fraction of 30%, and no thrombus identified. 2 blood culture and urine culture done on 12/15/2022 revealed Klebsiella pneumonia. Infectious disease had been consulted. Oral Cipro was started on 12/22/2022 after IV meropenem was completed. Patient's hospital course was also complicated by constipation which resolved this morning. The patient says feels dizzy when he was sitting or standing during the rehab therapy today morning. Therapist noticed that the patient's BP dropped from 120/65 in sitting position down to 93/72 in standing position. Blood test with CBC and BMP were ordered. The patient denies having any painful symptom. He continues having some weakness at right upper and lower extremities which remains unchanged according to patient. However he says he has slightly more difficulty controlling the right side limbs movement. He denies having numbness or tingling feeling. He is still on Bella catheter. He says he is tolerating the intensive rehabilitation treatment well.       Rehabilitation:  PT: Reviewed. Bed Mobility:  Rolling to Right: Contact Guard Assistance, to fully roll to Rt with upper trunk, guiding LUE across midline to edge of mat   Supine to Sit: Minimal Assistance, at trunk to elevate coming up from Rt side. Sit to Supine: Stand By Assistance, with verbal cues , to align once in supine. Transfers:  Sit to Stand: Minimal Assistance, to CGA  Stand to [facility] 68, to min assist.    Cues, demo and guidance for more forward translation of wt over base. Numerous trials performed for more consistency. Improved initial standing balance now noted     Ambulation:  Contact Guard Assistance  Distance: 75 ft, various shorter distances to destination in room  Surface: Level Tile  Device:Rolling Walker  Gait Deviations:  Decreased Step Length on Right, Decreased Step Length Bilaterally, and Decreased Gait Speed    Stairs:  Contact Guard Assistance  Number of Steps: 1 (x2)  Height: 6\" step with Parallel Bars   Cues and demo to pause after ea step  NEUROFACILITATION:  Standing with RW support:  tapping on 1/2 pods in sequence of 1 and then progressed to 2. CGA. Occasional cues for correct foot. Mn use of support on RW needed for balance. OT: Reviewed. Vitals:  Sittin/65  Standin/72  Nurse made aware of ortho statics. ADL:   Feeding: mod I As breakfast arrived towards end of session. Pt. Able to use R hand to stabilize items while left hand managed items and L hand utilized utensil as pt L hand dominant. Grooming: Contact Guard Assistance. To complete oral care standing sinkside with 1 verbal cue for AD placement to reduce risk of falls. Bathing: Overall Min A for sponge bath as pt prefers sponge bath rather then getting in the shower. Upper Extremity Dressing: Stand By Assistance and with increased time for completion. Lower Extremity Dressing: Minimal Assistance. With increased time and assistance to thread cath.    Toileting: Contact Guard Assistance. For clothing management and sonia care standing   Toilet Transfer: Contact Guard Assistance. Completed x2 trials. BALANCE:  Sitting Balance:  Supervision. Standing Balance: Contact Guard Assistance. BED MOBILITY:  Supine to Sit: Stand By Assistance, with verbal cues , with increased time for completion    Sit to Supine: Supervision, with increased time for completion      TRANSFERS:  Sit to Stand:  [address], with increased time for completion, cues for hand placement. Stand to Sit: [address], with increased time for completion, cues for hand placement. Completed multiple times throughout the session. FUNCTIONAL MOBILITY:  Assistive Device: Rolling Walker   Assist Level:  Contact Guard Assistance and Minimal Assistance. Distance:   Completed functional mobility to therapy gym and within therapy kitchen at slow pace, no LOB noted. Pt requires min cues for walker safety- lengthy seated rest break after trial of mobility, mod fatigue noted. Assistive Device: Rolling Walker  Assist Level:  Contact Guard Assistance. Distance: To and from bathroom and To and from therapy gym  Demo's unsteadiness, R side neglect with navigating walker, ran into wall. VC's to increase awareness. Pt demo's R knee hyperextension during mobility      Assistive Device: Rolling Walker  Assist Level:  Contact Guard Assistance. Distance: To and from bathroom  Increased time required due to reduced pace. Cues for upright positioning required. HAND ASSESSMENT  Hand Dominance: Left     Right Hand Strength -  (lbs)  Handle Setting 2: 12/24/22: av.  26# 12/27/22 (24, 24, 27 av.25#)  Fine Motor Skills     Right 9-Hole Peg Test: Impaired  Right 9 Hole Peg Test Time (secs):  (1 min 44 seconds)     ADDITIONAL ACTIVITIES:  Pt identified personal goal of increasing R hand functional use for specified meaningful activities such as opening pop bottles/jars for cooking and self managing buttons. Pt engaged in functional [address] activities for in-hand manipulation and simple rotation. Also completed hand strengthening activity to maintain spherical grasp against \"power web\" resistance completed x10 reps x2 set with graded lids sizes to increase intrinsic/extrinsic hand musculature. Pt participated in IADL task to ambulate within therapy kitchen with use of RW to stand sink washing dishes. Pt required Shashank for 2 UE release and CGA with 1 UE release for balance and mod cues for R sided attention or walker safety while reaching outside of RODNEY. Completed to increase kitchen safety and facilitate functional reaching required for simple meal prep tasks. Engaged in seated BUE ex's with 2#DB completed bicep curls and cross body curls, 1x15 ea to increase strength for transfers. Engaged in [address] [place] [address] shoulder flexion and chest press 1x15 ROM to increase safety and (I) with self care task. ST: Reviewed. ST reviewed compensatory speech strategies provided by ST on previous therapy date 12/24; patient independently recalled 3/3 strategies understanding all concepts previously introduced and reviewed. ST then presented patient with the following structured and unstructured speech tasks      Task 1: Reading functional phrases 10/10 100% speech intelligibility, appropriate articulatory precision and rate   Task 2: Open ended questions; 5/5 overall 100% speech intelligibility, appropriate articulatory precision and rate. Throughout task 2; ST collected specific 1-3 word phrases in which articulatory precision could have been improved.  ST then presented patient with a list of these specific phrases to read aloud      Task 3: Reading specific/collected phrases aloud: 10/10 100% speech intelligibility, appropriate articulatory precision and rate   *patient with increased awareness of \"sh, s, ch\" sounds are presenting with decreased articulatory precision     Patient continues to rate speech at 70%/100% at baseline. ST anticipation ST to be short term overall to work towards establishing HEP      Review of Systems:  Review of Systems   Constitutional:  Positive for fatigue. Negative for chills, diaphoresis and fever. HENT:  Negative for hearing loss, rhinorrhea, sneezing, sore throat and trouble swallowing. Eyes:  Negative for visual disturbance. Respiratory:  Negative for cough, shortness of breath and wheezing. Cardiovascular:  Negative for chest pain and palpitations. Gastrointestinal:  Negative for abdominal pain, constipation, diarrhea, nausea and vomiting. Genitourinary:  Positive for difficulty urinating. Negative for dysuria. Musculoskeletal:  Positive for gait problem. Negative for arthralgias, myalgias and neck pain. Skin:  Negative for rash. Neurological:  Positive for dizziness and weakness (Right upper and right lower extremities). Negative for tremors, facial asymmetry, speech difficulty, light-headedness, numbness and headaches. Psychiatric/Behavioral:  Negative for dysphoric mood, hallucinations and sleep disturbance. The patient is not nervous/anxious.          Objective:  /66   Pulse 69   Temp 97.5 °F (36.4 °C) (Oral)   Resp 14   Ht 5' 2\" (1.575 m)   Wt 128 lb 9.6 oz (58.3 kg)   SpO2 98%   BMI 23.52 kg/m²   Physical Exam   General:  well-developed, well nourished  male; in no acute distress ; appropriate affect & mood; lying on bed comfortably  Eyes: pupil equally round ; extra-ocular motion intact bilaterally  Head, Ear, Nose, Mouth & Throat : normocephalic ; no discharge from ears or nose ; no deformity ; no facial swelling ; oral mucosa pink   Neck :  supple ; no tenderness ; mild muscle spasm at bilateral cervical paraspinal muscles  Cardiovascular : Presence of newly healed vertical surgical scar at sternum with mild tenderness; regular rate & rhythm ; normal S1 & S2 heart sound ; no murmur ; normal peripheral pulse at the bilateral upper extremities; reduced pulse strength at the bilateral lower extremities  Pulmonary : Present breath sounds at the bilateral lung fields; no wheezing ; no rale; no crackle  Gastrointestinal : soft, slightly protruded abdomen without tenderness ; normal bowel sound present    : Bella catheter in place draining light yellowish urine  Back : no tenderness; no muscle spasm  Skin: no skin lesion or rash ; no pitting edema at all 4 extremities  Musculoskeletal : no limb asymmetry; no limb deformity; no tenderness at bilateral upper & lower extremities; no palpable mass at limbs ; no joints laxity or crepitation ; shoulder flexion and abduction passive ROM reaching 170 degrees bilaterally; hip flexion passive ROM reaching 130 degrees bilaterally; normal functional joints ROM at bilateral upper & lower extremities  Cerebral :  alert ; awake ; oriented to place, person and time; follow two-step verbal command  Cerebellum : Dysmetria with the right finger-to-nose test; no dysmetria with left finger-to-nose test ; very mild dysmetria with right heel-to-shin test; no dysmetria with the left heel-to-shin test  Cranial Nerves : Tongue protrudes slightly to her right  (CN XII) ; grossly intact other CN II to XI function  Sensory : intact light touch and pin prick sensation at bilateral upper & lower extremities  Motor : Slightly reduced muscle tone at right upper and right lower extremities; normal muscle tone at left upper & lower extremities ; 4+/5 to 5/5 muscle strength at right shoulder flexion and abduction; 4+/5 to 5/5 muscle strength at right elbow flexion and extension; 4+/5 to 5/5 muscle strength at right wrist extension; 4+/5 muscle strength at right finger extension; 4-/5 to 4+/5 muscle strength at the right finger flexion and handgrip; 4-/5 muscle strength at right finger abduction; 4+/5 muscle strength at the right hip flexion; 4-/5 to 4+/5 muscle strength at right knee flexion; 4+/5 to 5/5 muscle strength at the right knee extension; 4+/5 muscle strength at right ankle dorsiflexion; 4+/5 to 5/5 muscle strength at the right ankle plantarflexion; normal 5/5 muscle strength at left upper & lower extremities  Reflex : 1+ left biceps, left brachioradialis, left knee reflexes; 0 right biceps, right brachioradialis, right knee reflexes  Pathological Reflex :  No Ion's sign ; no ankle clonus  Gait : Not assessed      Diagnostics:   Recent Results (from the past 24 hour(s))   POCT Glucose    Collection Time: 12/27/22  6:59 AM   Result Value Ref Range    POC Glucose 151 (H) 70 - 108 mg/dl   Basic Metabolic Panel w/ Reflex to MG    Collection Time: 12/27/22  8:21 AM   Result Value Ref Range    Sodium 135 135 - 145 meq/L    Potassium reflex Magnesium 5.2 3.5 - 5.2 meq/L    Chloride 97 (L) 98 - 111 meq/L    CO2 24 23 - 33 meq/L    Glucose 155 (H) 70 - 108 mg/dL    BUN 37 (H) 7 - 22 mg/dL    Creatinine 2.1 (H) 0.4 - 1.2 mg/dL    Calcium 9.2 8.5 - 10.5 mg/dL   CBC with Auto Differential    Collection Time: 12/27/22  8:21 AM   Result Value Ref Range    WBC 10.3 4.8 - 10.8 thou/mm3    RBC 3.81 (L) 4.70 - 6.10 mill/mm3    Hemoglobin 10.5 (L) 14.0 - 18.0 gm/dl    Hematocrit 33.2 (L) 42.0 - 52.0 %    MCV 87.1 80.0 - 94.0 fL    MCH 27.6 26.0 - 33.0 pg    MCHC 31.6 (L) 32.2 - 35.5 gm/dl    RDW-CV 13.7 11.5 - 14.5 %    RDW-SD 43.5 35.0 - 45.0 fL    Platelets 125 (H) 831 - 400 thou/mm3    MPV 9.4 9.4 - 12.4 fL    Seg Neutrophils 66.2 %    Lymphocytes 21.1 %    Monocytes 6.9 %    Eosinophils 4.0 %    Basophils 1.0 %    Immature Granulocytes 0.8 %    Segs Absolute 6.8 1.8 - 7.7 thou/mm3    Lymphocytes Absolute 2.2 1.0 - 4.8 thou/mm3    Monocytes Absolute 0.7 0.4 - 1.3 thou/mm3    Eosinophils Absolute 0.4 0.0 - 0.4 thou/mm3    Basophils Absolute 0.1 0.0 - 0.1 thou/mm3    Immature Grans (Abs) 0.08 (H) 0.00 - 0.07 thou/mm3    nRBC 0 /100 wbc   Anion Gap    Collection Time: 12/27/22  8:21 AM   Result Value Ref Range    Anion Gap 14.0 8.0 - 16.0 meq/L   Glomerular Filtration Rate, Estimated    Collection Time: 12/27/22  8:21 AM   Result Value Ref Range    Est, Glom Filt Rate 35 (A) >60 ml/min/1.73m2        Latest Reference Range & Units 12/22/22 05:23 12/23/22 08:11 12/24/22 07:46 12/27/22 08:21   Sodium 135 - 145 meq/L 140 137 137 135   Potassium 3.5 - 5.2 meq/L 5.0 5.0 4.8 5.2   Chloride 98 - 111 meq/L 103 99 98 97 (L)   CO2 23 - 33 meq/L 29 28 29 24   BUN,BUNPL 7 - 22 mg/dL 31 (H) 36 (H) 36 (H) 37 (H)   Creatinine 0.4 - 1.2 mg/dL 1.3 (H) 1.5 (H) 1.5 (H) 2.1 (H)   Anion Gap 8.0 - 16.0 meq/L 8.0 10.0 10.0 14.0   Est, Glom Filt Rate >60 ml/min/1.73m2 >60 53 ! 53 ! 35 !    Magnesium 1.6 - 2.4 mg/dL  2.4     Glucose, Random 70 - 108 mg/dL 184 (H) 115 (H) 116 (H) 155 (H)   CALCIUM, SERUM, [id number] 8.5 - 10.5 mg/dL 8.6 9.1 9.6 9.2   (L): Data is abnormally low  (H): Data is abnormally high  !: Data is abnormal     Latest Reference Range & Units 12/22/22 05:23 12/24/22 07:46 12/27/22 08:21   WBC 4.8 - 10.8 thou/mm3 8.2 9.6 10.3   RBC 4.70 - 6.10 mill/mm3 3.65 (L) 4.11 (L) 3.81 (L)   Hemoglobin Quant 14.0 - 18.0 gm/dl 10.0 (L) 11.2 (L) 10.5 (L)   Hematocrit 42.0 - 52.0 % 31.3 (L) 35.7 (L) 33.2 (L)   MCV 80.0 - 94.0 fL 85.8 86.9 87.1   MCH 26.0 - 33.0 pg 27.4 27.3 27.6   MCHC 32.2 - 35.5 gm/dl 31.9 (L) 31.4 (L) 31.6 (L)   MPV 9.4 - 12.4 fL 9.7 9.4 9.4   RDW-CV 11.5 - 14.5 % 13.5 13.8 13.7   RDW-SD 35.0 - 45.0 fL 42.5 43.9 43.5   Platelet Count 469 - 400 thou/mm3 373 536 (H) 560 (H)   Lymphocytes Absolute 1.0 - 4.8 thou/mm3  1.8 2.2   Monocytes Absolute 0.4 - 1.3 thou/mm3  0.5 0.7   Eosinophils Absolute 0.0 - 0.4 thou/mm3  0.4 0.4   Basophils Absolute 0.0 - 0.1 thou/mm3  0.1 0.1   Seg Neutrophils %  69.3 66.2   Segs Absolute 1.8 - 7.7 thou/mm3  6.7 6.8   Lymphocytes %  19.1 21.1   Monocytes %  4.9 6.9   Eosinophils %  4.6 4.0   Basophils %  0.7 1.0   Immature Grans (Abs) 0.00 - 0.07 thou/mm3  0.13 (H) 0.08 (H)   Immature Granulocytes %  1.4 0.8   (L): Data is abnormally low  (H): Data is abnormally high      Impression:  Acute left parietal corona radiata and right cerebellar ischemic stroke causing right hemiparesis with impaired coordination, and dysarthria  Debility/physical decondition due to Klebsiella pneumonia urinary tract infection with emphysematous cystitis and bilateral hydronephrosis complicated by Klebsiella pneumonia bacteremia/sepsis  Urinary retention  Klebsiella pneumonia urinary tract infection with emphysematous cystitis and bilateral hydronephrosis  Klebsiella pneumoniae bacteremia/sepsis  Acute kidney injury possibly due to dehydration with orthostatic hypotension  Coronary artery disease with ischemic cardiomyopathy requiring two-vessel coronary artery bypass graft surgery on 11/7/2022  Hypertension  Diabetes mellitus type 2    The patient's kidney function is deteriorating. His BUNs/creatinine has increased from 31/1.3 on 12/22/2022, up to 36/1.5 on 12/24/2022 and further up to 37/2.1 on 12/27/2022. His estimated GFR also deteriorated from >60 on 12/22/2022 down to 53 on 12/24/2022 and further down to 35 on 12/27/2022. I will request consultation with nephrology service for the deterioration of renal function. At the meantime I will also start patient on IV normal saline begins with 250 mL bolus followed by 75 ml/hr for 24 hours. I encouraged the patient to increase oral fluid intake. The patient continues to have mild weakness at his right upper and the lower extremities with impaired coordination. He is still on Bella catheter for the urinary retention, emphysematous cystitis and bilateral hydronephrosis. He has been tolerating the intensive inpatient rehabilitation treatment well. His function has been improving slowly. Plan:  Continues intensive PT/OT/SLP/RT inpatient rehabilitation program at least 3 hours per day, 5 days per week in order to improve functional status prior to discharge.   Family education and training will be completed. Equipment evaluations and recommendations will be completed as appropriate. Rehabilitation nursing continues to be involved for bowel, bladder, skin, and pain management. Nursing will also provide education and training to patient and family. Prophylaxis:  DVT: Patient on Eliquis, JONAH stocking, intermittent pneumatic compression device. GI: Colace, Senokot, GlycoLax as needed, milk of magnesia as needed, Dulcolax suppository as needed.   Pain: Tylenol as needed  Start IV fluid with normal saline begins with 250 mL bolus followed by 75 mL/h for 24 hours  Nephrology consultation for deterioration of renal function  Continue Lipitor for CVA and CAD  Continue Toprol-XL for hypertension  Continue baclofen for spasticity  Continue Flomax for urinary retention  Continue Protonix for gastric protection  Continue Jardiance, Glucotrol, Humalog insulin coverage for diabetes mellitus  Continue Wellbutrin XL for smoking cessation  Continue 7 days course oral Cipro for Klebsiella pneumonia UTI/bacteremia  Continue melatonin, trazodone as needed for insomnia  Continue multivitamin supplement  Nutrition: Continue current diet   Bladder: Monitoring signs or symptoms of UTI  Bowel: Monitoring signs or symptoms of constipation   and case management for coordination of care and discharge planning      Missed Therapy Time:  None      Eriberto Quintero MD

## 2022-12-26 NOTE — PROGRESS NOTES
[address]  Occupational Therapy  Daily Note  Time:   Time In: 1030  Time Out: 1130  Timed Code Treatment Minutes: 60 Minutes  Minutes: 60          Date: 2022  Patient Name: Tonny Miner,   Gender: male      Room: [facility]53/053-A  MRN: [id number]  : 1960  (63 y.o.)  Referring Practitioner: Ordering & Attending: Mayelin Mir MD  Diagnosis: Acute Stroke due to Ischemia  Additional Pertinent Hx: Tonny Miner who is a 63 y.o. male with a history of hypertension, diabetes, CAD on  daily, recent CABG in 2022, ischemic cardiomyopathy is admitted to [facility] for sepsis, emphysematous cystitis and acute kidney injury on CKD. During admission exam patient stated that his right arm feldman has been weak and that he has had some slurred speech. Stroke alert was called for right-sided weakness and dysarthria on 12/15. On arrival patient's NIH was 4 for right upper extremity, right lower extremity weakness, 1 limb ataxia and dysarthria. On further questioning, patient and wife state that the right arm weakness actually started last night. Patient's wife noticed when patient was trying to get up from the toilet that he was unable to push himself up with his right arm. Last known well 2330 on 2022. Patient taken to imaging for stat CT head which revealed no ICH, midline shift, mass-effect. CTA head and neck deferred due to patient's LC. Decision for no tPA was made due to patient's time since last known well. Patient with relatively low NIH and symptoms which are not consistent with LVO, therefore no thrombectomy/neuro intervention at this time. Patient had stat MRI brain and MRA head and neck without contrast.  MRI showed acute ischemic stroke of left parietal region. Pt admitted to IP rehab on 22.     Restrictions/Precautions:  Restrictions/Precautions: General Precautions, Fall Risk  Position Activity Restriction  Other position/activity restrictions: right side hemiparesis, recent CABG 11/22-minimize arm use ( s/p 5 wks), life vest     SUBJECTIVE: Pt seated in bedside chair upon arrival, agreeable to OT session. PAIN: Denies    Vitals: Vitals not assessed per clinical judgement, see nursing flowsheet    COGNITION: Slow Processing, Decreased Insight, Inattention, Decreased Problem Solving, and Decreased Safety Awareness    ADL:   No ADL's completed this session. Kiya Rocha BALANCE:  Sitting Balance:  Supervision. Standing Balance: Contact Guard Assistance, Minimal Assistance. X3 minutes with IADL    BED MOBILITY:  Not Tested    TRANSFERS:  Sit to Stand:  Stand By Assistance. To light CGA  Stand to Sit: Stand By Assistance. Comment: To/from various surfaces    FUNCTIONAL MOBILITY:  Assistive Device: Rolling Walker   Assist Level:  Contact Guard Assistance and Minimal Assistance. Distance:   Completed functional mobility to therapy gym and within therapy kitchen at slow pace, no LOB noted. Pt requires min cues for walker safety- lengthy seated rest break after trial of mobility, mod fatigue noted. ADDITIONAL ACTIVITIES:  Pt identified personal goal of increasing R hand functional use for specified meaningful activities such as opening pop bottles/jars for cooking and self managing buttons. Pt engaged in functional [facility] activities for in-hand manipulation and simple rotation. Also completed hand strengthening activity to maintain spherical grasp against \"power web\" resistance completed x10 reps x2 set with graded lids sizes to increase intrinsic/extrinsic hand musculature. Pt participated in IADL task to ambulate within therapy kitchen with use of RW to stand sink washing dishes. Pt required Shashank for 2 UE release and CGA with 1 UE release for balance and mod cues for R sided attention or walker safety while reaching outside of RODNEY.  Completed to increase kitchen safety and facilitate functional reaching required for simple meal prep tasks. ASSESSMENT:     Activity Tolerance:  Patient tolerance of  treatment: good. Discharge Recommendations: Home with Home Health OT and Patient would benefit from continued OT at discharge  Equipment Recommendations: Equipment Needed: Yes  Other: Monitor need for RW, and BSC. Plan: Times Per Week: 5x/week x 90 minutes and 1x/week x 30 minutes  Current Treatment Recommendations: Strengthening, Balance training, Self-Care / ADL, Equipment evaluation, education, & procurement, Safety education & training, Endurance training, Functional mobility training, Neuromuscular re-education, Patient/Caregiver education & training, Home management training    Patient Education  Patient Education: IADL's, Home Exercise Program, Hemibody Awareness/Attention, Importance of Increasing Activity, Assistive Device Safety, and Safety with transfers and mobility. Goals  Short Term Goals  Time Frame for Short Term Goals: 1 week  Short Term Goal 1: Pt will complete functional ambulation to/from BR and HH distances with SBA and min vcs for safety to increase indep with toileting. Short Term Goal 2: Pt will decrease RUE 9 hole peg test by 20 seconds and RUE  strength by 5 pounds to increase indep with fasteners in UB dressing. Short Term Goal 3: Pt will complete dynamic standing task x 5 minutes with 1-2 UE release and SBA to increase indep with sinkside grooming. Short Term Goal 4: Pt will complete UB dressing with Set up to increase indep within home environment. Short Term Goal 5: Pt will complete LB dresing with S and min vcs for safety to increase indep and endurance within home environment. Additional Goals?: No    Following session, patient left in safe position with all fall risk precautions in place.

## 2022-12-26 NOTE — PROGRESS NOTES
[address] [place] [address]  INPATIENT PHYSICAL THERAPY  DAILY NOTE  [facility] - [id number]    Time In: 0830  Time Out: 0930  Timed Code Treatment Minutes: 60 Minutes  Minutes: 60          Date: 2022  Patient Name: Oseas Wilkinson,  Gender:  male        MRN: [id number]  : 1960  (63 y.o.)     Referring Practitioner: Dr. Naomy Huggins  Diagnosis: Acute stroke due to ischemia  Additional Pertinent Hx: Pt admitted through ED due to RUE weakness and slurred speech,  Also noted to have sepsis, LC, emphysematous cystitis. Hx:  HTN, Db, CAD, recent CABG (). MRI + for acute ischemic stroke Lt parietal region     Prior Level of Function:  Lives With: Spouse, Family (16 y.o son, 21 y.o step dtr.)  Type of Home: House  Home Layout: One level  Home Access: Stairs to enter with rails  Entrance Stairs - Number of Steps: 2 + threshold  Entrance Stairs - Rails: Both  Home Equipment:  (None used PTA)   Bathroom Shower/Tub: Tub/Shower unit  Bathroom Toilet: Standard  Bathroom Equipment: Tub transfer bench  Bathroom Accessibility: Accessible    ADL Assistance: Independent  Homemaking Assistance: Independent  Homemaking Responsibilities: Yes  Ambulation Assistance: Independent  Transfer Assistance: Independent  Active : No    Restrictions/Precautions:  Restrictions/Precautions: General Precautions, Fall Risk  Position Activity Restriction  Other position/activity restrictions: right side hemiparesis, recent CABG -minimize arm use ( s/p 5 wks), life vest     SUBJECTIVE: Pt seated in recliner. Pleasant and cooperative. PAIN: 0/10:     Vitals: Vitals not assessed per clinical judgement, see nursing flowsheet    OBJECTIVE:  Bed Mobility:  Rolling to Right: Contact Guard Assistance, to fully roll to Rt with upper trunk, guiding LUE across midline to edge of mat   Supine to Sit: Minimal Assistance, at trunk to elevate coming up from Rt side.    Sit to Supine: Stand By Assistance, with verbal cues , to align once in supine. Transfers:  Sit to Stand: Minimal Assistance, to CGA  Stand to [facility] 68, to min assist.    Cues, demo and guidance for more forward translation of wt over base. Numerous trials performed for more consistency. Improved initial standing balance now noted    Ambulation:  Contact Guard Assistance  Distance: 75 ft, various shorter distances to destination in room  Surface: Level Tile  Device:Rolling Walker  Gait Deviations:  Decreased Step Length on Right, Decreased Step Length Bilaterally, and Decreased Gait Speed  Stairs:  Contact Guard Assistance  Number of Steps: 1 (x2)  Height: 6\" step with Parallel Bars   Cues and demo to pause after ea step  NEUROFACILITATION:  Standing with RW support:  tapping on 1/2 pods in sequence of 1 and then progressed to 2. CGA. Occasional cues for correct foot. Mn use of support on RW needed for balance. Exercise:    Exercises were completed for increased independence with functional mobility. Supine- bridges and low trunk rotation with BUEs across chest, BLEs on therapy ball for rotation. Manual cues for control and technique, BUE diagonals to ea direction, manually resisted Rt hip ABD and PNF D1 x10 reps ea. Functional Outcome Measures: Not completed       ASSESSMENT:  Assessment: Patient progressing toward established goals. Activity Tolerance:  Patient tolerance of  treatment: good.       Equipment Recommendations:Equipment Needed: Yes  Other: RW  Discharge Recommendations: Continue to assess pending progress and Patient would benefit from continued PT at discharge  Plan: Current Treatment Recommendations: Strengthening, ROM, Balance training, Functional mobility training, Transfer training, Endurance training, Neuromuscular re-education, Gait training, Stair training, Safety education & training, Equipment evaluation, education, & procurement, Home exercise program, Therapeutic activities, Patient/Caregiver education & training  General Plan:  (5x/ wk 90 min, 1x/ wk 30 min)    Patient Education  Patient Education: Home Exercise Program, Bed Mobility, Transfers, Gait, Stairs, Education Related to Potential Risks and Complications Due to Impairment/Illness/Injury,  - Patient Verbalized Understanding, - Patient Requires Continued Education    Goals:  Patient Goals : get back to my normal way of doing things  Short Term Goals  Time Frame for Short Term Goals: 1 wk  Short Term Goal 1: Pt to go supine <->sit, minimal use of UEs, SBA to get in/out of bed. Short Term Goal 2: Pt to get up/down from various seated surfaces, CGA, to get up to walk. Short Term Goal 3: Pt to walk with RW >= 50 ft, demonstrating step through gait pattern, recurvatum < 10% of steps on RT, CGA to progress to home mobility  Short Term Goal 4: Pt to ascend/descend 2 steps with gerber rails, CGA to progress to home entry  Short Term Goal 5: Pt to get in/out of car, CGA for transportation needs. Additional Goals?: Yes  Short Term Goal 6: Pt to perform Tinetti >= 18/28, demonstrating improved balance. Long Term Goals  Time Frame for Long Term Goals : 3 wks from evaluation  Long Term Goal 1: Pt to go supine <->sit, minimal use of UEs, Mod I, to get in/out of bed. Long Term Goal 2: Pt to get up/down from various seated surfaces, Mod I, to get up to walk. Long Term Goal 3: Pt to walk with RW >= 50 ft, demonstrating step through gait pattern, recurvatum < 10% of steps on RT, Mod I, for home mobility  Long Term Goal 4: Pt to perform Tinetti >= 24/28, demonstrating improved balance. Following session, patient left in safe position with all fall risk precautions in place.

## 2022-12-26 NOTE — PROGRESS NOTES
Physical Medicine & Rehabilitation Progress Note    Chief Complaint:  Right-sided weakness due to stroke    Subjective:    Ari Da Silva is a 63 y.o.  left-handed  male with history of hypertension, diabetes mellitus type 2, coronary artery disease with ischemic cardiomyopathy requiring two-vessel CABG, urinary retention with several failed void trial, status post tonsillectomy, was admitted to the inpatient rehabilitation unit on 12/23/2022 for intensive inpatient management of impairment & disability secondary to right hemiparesis due to acute left parietal corona radiata and right cerebellar ischemic stroke, and debility/physical deconditioning due to Klebsiella pneumonia urinary tract infection with emphysematous cystitis and bilateral hydronephrosis complicated by Klebsiella pneumonia bacteremia/sepsis. The patient was previously hospitalized at [facility] from 10/28/2022 to 11/15/2022 for coronary artery disease and ischemic cardiomyopathy requiring two-vessel CABG on 11/7/2022. His postoperative course was complicated by urinary retention with failed void trial.  The patient has been experiencing progressive weakness after he was discharged to home. The Bella was later removed on 12/13/2022 but the patient continued having difficulty voiding. On 12/14/2022 approximately 11:30 PM his wife noticed the patient had slight slurred speech with right arm weakness. On 12/15/2022 the patient suddenly felt dizzy and lightheaded while he was trying to get up from sitting on toilet and fell into the ground. He says he did not lose consciousness and did not hit his head. His wife called 46. He was transported to [address] [place] [address] ER for evaluation by EMS. He complains of neck pain and upper back pain. He was found to be tachycardic. His WBC was found to be 22.3. Bella was placed in ER and a rush of air followed by dark brown and bright red color urine came out.   He was put on IV meropenem and admitted. Urology was consulted. CT of cervical spine revealed only multilevel facet and uncovertebral joint arthropathy. CT of head done on 12/15/2020 revealed no acute intracranial abnormality. CT of the chest, abdomen and pelvis done on 12/15/2022 revealed emphysematous cystitis, bilateral hydronephrosis, and constipation. Because of right arm weakness, stroke code was called on 12/15/2022. MRI of brain done on 12/15/2022 revealed acute ischemic stroke at the left parietal region corona radiata, and possible small additional acute ischemic stroke within right cerebellum. Neurology service start patient on aspirin and Plavix combination for the stroke. MRA of the neck and head done on 12/15/2022 showed only mild bilateral carotid bulb disease. Transesophageal echocardiogram was done on 12/16/2022 showing severe global hypokinesis of left ventricle with estimated ejection fraction of 30%, and no thrombus identified. 2 blood culture and urine culture done on 12/15/2022 revealed Klebsiella pneumonia. Infectious disease had been consulted. Oral Cipro was started on 12/22/2022 after IV meropenem was completed. Patient's hospital course was also complicated by constipation which resolved this morning. The patient says he feels well. He continues having some weakness at right upper and lower extremities with some difficulty controlling the right side limbs movement. He denies having any painful symptom. He also denies having numbness or tingling feeling. He is still on Bella catheter. He says he is tolerating the intensive rehabilitation treatment well but he still has some hesitancy to do some of the movement therapist asked him to do due to his fear of falling. Rehabilitation:  PT: Reviewed.     Bed Mobility:  Rolling to Left: Minimal Assistance, to complete the roll as pt was fearful of falling, but had 6-7\" of clearance yet noted   Rolling to Right: Contact Guard Assistance, to fully roll to Rt with upper trunk, guiding LUE across midline to edge of mat   Supine to Sit: Minimal Assistance, at trunk to elevate coming up from Rt side. Sit to Supine: Stand By Assistance, with verbal cues , to align once in supine. Transfers:  Sit to Stand: Minimal Assistance, to CGA  Stand to [facility] 68, to min assist.    Cues, demo and guidance for more forward translation of wt over base. Numerous trials performed for more consistency. Improved initial standing balance now noted  To/From Bed and Chair: Minimal Assistance, without AD for balance. Pt moving very tentatively with trunk tremor/sway noted. Ambulation:  Minimal Assistance, with RW. Unsafe to perform without RW  Distance: 12 ft, 21 ft, 60 ft  Surface: Level Tile  Device:Rolling Walker  Gait Deviations:  Slow Jada, Decreased Step Length Bilaterally, Decreased Gait Speed, and recurvatum RLE with stance, trunk tremoring/sway    Contact Guard Assistance  Distance: 75 ft, various shorter distances to destination in room  Surface: Level Tile  Device:Rolling Walker  Gait Deviations:  Decreased Step Length on Right, Decreased Step Length Bilaterally, and Decreased Gait Speed    Stairs:  Minimal Assistance  Number of Steps: 1 (x2)  Height: 6\" step with Parallel Bars     Contact Guard Assistance  Number of Steps: 1 (x2)  Height: 6\" step with Parallel Bars   Cues and demo to pause after ea step  NEUROFACILITATION:  Standing with RW support:  tapping on 1/2 pods in sequence of 1 and then progressed to 2. CGA. Occasional cues for correct foot. Mn use of support on RW needed for balance. OT: Reviewed. HAND ASSESSMENT   Hand Dominance: Left   Left Hand Strength -  (lbs)  Handle Setting 2: -65#, 70#, 65# Av.67 #   Right Hand Strength -  (lbs)  Handle Setting 2: : 18#, 20#, 40# Avg 26 #     ADL:   EATING:Independent. Ramiro Henriquez CARE Score: 6. ORAL HYGIENE:Supervision or touching assistance. Ramiro Henriquez CARE Score: 4. TOILETING HYGIENE:Partial/moderate assistance. Maddie Chata CARE Score: 3. SHOWERING/BATHING:Partial/moderate assistance. Maddie [place] CARE Score: 3.     UPPER BODY DRESSING:Supervision or touching assistance. Maddie [place] CARE Score: 4. LOWER BODY DRESSING:Partial/moderate assistance. Maddie [place] CARE Score: 3. FOOTWEAR:Partial/moderate assistance   . CARE Score: 3.     TOILET TRANSFER: Partial/moderate assistance. Maddie [place] CARE Score: 3. BALANCE:  Sitting Balance:  Modified Independent. Sitting forward in recliner. Standing Balance: Contact Guard Assistance, Minimal Assistance, with cues for safety, with verbal cues . With 2 UE release from walker. Trunk sway noted. BED MOBILITY:  Not Tested     TRANSFERS:  Sit to Stand:  Minimal Assistance, X 1, with increased time for completion, cues for hand placement. Stand to Sit: Minimal Assistance, X 1.       FUNCTIONAL MOBILITY:  Assistive Device: Rolling Walker  Assist Level:  Minimal Assistance. Distance: To and from bathroom  Slow pace, unsteady. ST: Reviewed. DIAGNOSTIC IMPRESSIONS:    Clinical Swallow Evaluation: Patient presents with oral phase of swallow function that is essentially [facility] with inability to fully discern potential presence of pharyngeal phase deficits without formal instrumentation. Oral phase highly unremarkable during consumption of hard/textured solids with patient demonstrating adequate mastication pattern for textural breakdown, cohesive bolus formation, and manipulation. Thin liquids consumed without overt difficulty and with suspected control/containment of fluid bolus. NO overt s/s aspiration exhibited across all consistencies/trials consumed, certainly not able to exclude pharyngeal phase dysfunction and/or airway invasion events in its entirety at bedside alone. Patient's swallow physiology does appear appropriate to support PO intake without distress with instrumental evaluation not warranted.  Patient does report baseline edentulous dentition; reporting \"I can eat anything but apples, nuts, ect\" I just need to chew it a bit longer. I don't see any thing different in regards to my chewing and swallowing since the stroke. \"      Recommend continuation of regular diet with thin liquids. No further ST services are warranted at this time r/t dysphagia management given baseline status of swallow function achieved; please re-consult for repeat clinical swallow evaluation should new concerns be identified. Speech, Language, Cognitive Evaluation: Patient presents with WFL/Typical functioning cognitive/linguistic functioning as outlined per the standardized score of the Scales of Cognitive and Communicative Ability for Neurorehabilitation [facility]). Patient did demonstrate with mild memory deficits. Patient does report, \"I am not good remembering names at all and I use my voice mail to remember appointments and such. \" Patient is demonstrating with higher level word findings deficits within informal conversation; patient aware and selects at times appropriate substitutions. Patient is also demonstrating with mild dysarthria as evidence by blended word boundaries and decreased articulatory precision for multi-syllabic words, especially within informal conversation. Patient with self report, \"my speech is a little fuzzier this morning and it is hard to think of certain words, like blood pressure I might say blood sugar. \" ST did note patient with decreased articulatory precision of multisylabic words (Ex. Twenty-twenty two, sixty-three, photography) . Patient self rating speech to be at 70% at this time when at baseline he rates his speech at 100%. Patient fully independent prior to hospitalization, working 40/hour week and caring for 15year old son. Patient would greatly benefit from ongoing skilled ST services to target deficits with goals to return to independent living and eventual return to work.  ST anticipating short term speech therapy course while on IPR; will continue to evaluate for ongoing speech therapy services with each treatment session. Review of Systems:  Review of Systems   Constitutional:  Negative for chills, diaphoresis, fatigue and fever. HENT:  Negative for hearing loss, rhinorrhea, sneezing, sore throat and trouble swallowing. Eyes:  Negative for visual disturbance. Respiratory:  Negative for cough, shortness of breath and wheezing. Cardiovascular:  Negative for chest pain and palpitations. Gastrointestinal:  Negative for abdominal pain, constipation, diarrhea, nausea and vomiting. Genitourinary:  Positive for difficulty urinating. Negative for dysuria. Musculoskeletal:  Positive for gait problem. Negative for arthralgias, myalgias and neck pain. Skin:  Negative for rash. Neurological:  Positive for weakness (Right upper and right lower extremities). Negative for dizziness, tremors, facial asymmetry, speech difficulty, light-headedness, numbness and headaches. Psychiatric/Behavioral:  Negative for dysphoric mood, hallucinations and sleep disturbance. The patient is not nervous/anxious.          Objective:  BP (!) 115/58   Pulse 65   Temp 97.7 °F (36.5 °C) (Oral)   Resp 16   Ht 5' 10\" (1.778 m)   Wt 137 lb 14.4 oz (62.6 kg)   SpO2 100%   BMI 19.79 kg/m²   Physical Exam   General:  well-developed, well nourished  male; in no acute distress ; appropriate affect & mood; sitting on reclining chair comfortably  Eyes: pupil equally round ; extra-ocular motion intact bilaterally  Head, Ear, Nose, Mouth & Throat : normocephalic ; no discharge from ears or nose ; no deformity ; no facial swelling ; oral mucosa pink   Neck :  supple ; no tenderness ; mild muscle spasm at bilateral cervical paraspinal muscles  Cardiovascular : Presence of newly healed vertical surgical scar at sternum with mild tenderness; regular rate & rhythm ; normal S1 & S2 heart sound ; no murmur ; normal peripheral pulse at the bilateral upper extremities; reduced pulse strength at the bilateral lower extremities  Pulmonary : Present breath sounds at the bilateral lung fields; no wheezing ; no rale; no crackle  Gastrointestinal : soft, slightly protruded abdomen without tenderness ; normal bowel sound present    : Bella catheter in place draining yellowish urine  Back : no tenderness; no muscle spasm  Skin: no skin lesion or rash ; no pitting edema at all 4 extremities  Musculoskeletal : no limb asymmetry; no limb deformity; no tenderness at bilateral upper & lower extremities; no palpable mass at limbs ; no joints laxity or crepitation ; shoulder flexion and abduction passive ROM reaching 170 degrees bilaterally; hip flexion passive ROM reaching 130 degrees bilaterally; normal functional joints ROM at bilateral upper & lower extremities  Cerebral :  alert ; awake ; oriented to place, person and time; follow two-step verbal command  Cerebellum : Dysmetria with the right finger-to-nose test; no dysmetria with left finger-to-nose test ; very mild dysmetria with right heel-to-shin test; no dysmetria with the left heel-to-shin test  Cranial Nerves : Tongue protrudes slightly to her right  (CN XII) ; grossly intact other CN II to XI function  Sensory : intact light touch and pin prick sensation at bilateral upper & lower extremities  Motor : Slightly reduced muscle tone at right upper and right lower extremities; normal muscle tone at left upper & lower extremities ; 4+/5 to 5/5 muscle strength at right shoulder flexion and abduction; 4+/5 to 5/5 muscle strength at right elbow flexion and extension; 4+/5 to 5/5 muscle strength at right wrist extension; 4+/5 muscle strength at right finger extension; 4-/5 to 4+/5 muscle strength at the right finger flexion and handgrip; 4-/5 muscle strength at right finger abduction; 4+/5 muscle strength at the right hip flexion; 4-/5 to 4+/5 muscle strength at right knee flexion; 4+/5 to 5/5 muscle strength at the right knee extension; 4+/5 muscle strength at right ankle dorsiflexion; 4+/5 to 5/5 muscle strength at the right ankle plantarflexion; normal 5/5 muscle strength at left upper & lower extremities  Reflex : 1+ left biceps, left brachioradialis, left knee reflexes; 0 right biceps, right brachioradialis, right knee reflexes  Pathological Reflex :  No Ion's sign ; no ankle clonus  Gait : Not assessed      Diagnostics:   Recent Results (from the past 24 hour(s))   POCT Glucose    Collection Time: 12/26/22  8:07 AM   Result Value Ref Range    POC Glucose 146 (H) 70 - 108 mg/dl        Latest Reference Range & Units 12/22/22 05:23 12/23/22 08:11 12/24/22 07:46   Sodium 135 - 145 meq/L 140 137 137   Potassium 3.5 - 5.2 meq/L 5.0 5.0 4.8   Chloride 98 - 111 meq/L 103 99 98   CO2 23 - 33 meq/L 29 28 29   BUN,BUNPL 7 - 22 mg/dL 31 (H) 36 (H) 36 (H)   Creatinine 0.4 - 1.2 mg/dL 1.3 (H) 1.5 (H) 1.5 (H)   Anion Gap 8.0 - 16.0 meq/L 8.0 10.0 10.0   Est, Glom Filt Rate >60 ml/min/1.73m2 >60 53 ! 53 !    Magnesium 1.6 - 2.4 mg/dL  2.4    Glucose, Random 70 - 108 mg/dL 184 (H) 115 (H) 116 (H)   CALCIUM, SERUM, 270061 8.5 - 10.5 mg/dL 8.6 9.1 9.6   (H): Data is abnormally high  !: Data is abnormal       Latest Reference Range & Units 12/24/22 07:18 12/24/22 11:55 12/25/22 07:27 12/26/22 08:07   POC Glucose 70 - 108 mg/dl 111 (H) 162 (H) 225 (H) 146 (H)   (H): Data is abnormally high       Latest Reference Range & Units 12/21/22 05:28 12/22/22 05:23 12/24/22 07:46   WBC 4.8 - 10.8 thou/mm3 7.8 8.2 9.6   RBC 4.70 - 6.10 mill/mm3 3.71 (L) 3.65 (L) 4.11 (L)   Hemoglobin Quant 14.0 - 18.0 gm/dl 10.3 (L) 10.0 (L) 11.2 (L)   Hematocrit 42.0 - 52.0 % 31.6 (L) 31.3 (L) 35.7 (L)   MCV 80.0 - 94.0 fL 85.2 85.8 86.9   MCH 26.0 - 33.0 pg 27.8 27.4 27.3   MCHC 32.2 - 35.5 gm/dl 32.6 31.9 (L) 31.4 (L)   MPV 9.4 - 12.4 fL 9.8 9.7 9.4   RDW-CV 11.5 - 14.5 % 13.6 13.5 13.8   RDW-SD 35.0 - 45.0 fL 42.8 42.5 43.9 Platelet Count 682 - 400 thou/mm3 316 373 536 (H)   Lymphocytes Absolute 1.0 - 4.8 thou/mm3   1.8   Monocytes Absolute 0.4 - 1.3 thou/mm3   0.5   Eosinophils Absolute 0.0 - 0.4 thou/mm3   0.4   Basophils Absolute 0.0 - 0.1 thou/mm3   0.1   Seg Neutrophils %   69.3   Segs Absolute 1.8 - 7.7 thou/mm3   6.7   Lymphocytes %   19.1   Monocytes %   4.9   Eosinophils %   4.6   Basophils %   0.7   Immature Grans (Abs) 0.00 - 0.07 thou/mm3   0.13 (H)   Immature Granulocytes %   1.4   (L): Data is abnormally low  (H): Data is abnormally high        Impression:  Acute left parietal corona radiata and right cerebellar ischemic stroke causing right hemiparesis with impaired coordination, and dysarthria  Debility/physical decondition due to Klebsiella pneumonia urinary tract infection with emphysematous cystitis and bilateral hydronephrosis complicated by Klebsiella pneumonia bacteremia/sepsis  Urinary retention  Klebsiella pneumonia urinary tract infection with emphysematous cystitis and bilateral hydronephrosis  Klebsiella pneumoniae bacteremia/sepsis  Coronary artery disease with ischemic cardiomyopathy requiring two-vessel coronary artery bypass graft surgery on 11/7/2022  Hypertension  Diabetes mellitus type 2    The patient's condition is stable. He still have right upper and lower extremities weakness but the muscle strength continues to improve slowly. He still demonstrated some degree of dysmetria with his right upper extremity movement. He is tolerating the intensive inpatient rehabilitation treatment well. His function is improving slowly. He is still on Bella catheter for the urinary retention, emphysematous cystitis and bilateral hydronephrosis. Plan:  Continues intensive PT/OT/SLP/RT inpatient rehabilitation program at least 3 hours per day, 5 days per week in order to improve functional status prior to discharge. Family education and training will be completed.   Equipment evaluations and recommendations will be completed as appropriate. Rehabilitation nursing continues to be involved for bowel, bladder, skin, and pain management. Nursing will also provide education and training to patient and family. Prophylaxis:  DVT: Patient on Eliquis, JONAH stocking, intermittent pneumatic compression device. GI: Colace, Senokot, GlycoLax as needed, milk of magnesia as needed, Dulcolax suppository as needed.   Pain: Tylenol as needed  Continue Lipitor for CVA and CAD  Continue Toprol-XL for hypertension  Continue baclofen for spasticity  Continue Flomax for urinary retention  Continue Protonix for gastric protection  Continue Jardiance, Glucotrol, Humalog insulin coverage for diabetes mellitus  Continue Wellbutrin XL for smoking cessation  Continue 7 days course oral Cipro for Klebsiella pneumonia UTI/bacteremia  Continue melatonin, trazodone as needed for insomnia  Continue multivitamin supplement  Nutrition: Continue current diet   Bladder: Monitoring signs or symptoms of UTI  Bowel: Monitoring signs or symptoms of constipation   and case management consultations for coordination of care and discharge planning      Missed Therapy Time:  None      Nory Willingham MD

## 2022-12-27 LAB
ANION GAP SERPL CALCULATED.3IONS-SCNC: 14 MEQ/L (ref 8–16)
BASOPHILS # BLD: 1 %
BASOPHILS ABSOLUTE: 0.1 THOU/MM3 (ref 0–0.1)
BUN BLDV-MCNC: 37 MG/DL (ref 7–22)
CALCIUM SERPL-MCNC: 9.2 MG/DL (ref 8.5–10.5)
CHLORIDE BLD-SCNC: 97 MEQ/L (ref 98–111)
CO2: 24 MEQ/L (ref 23–33)
CREAT SERPL-MCNC: 2.1 MG/DL (ref 0.4–1.2)
EOSINOPHIL # BLD: 4 %
EOSINOPHILS ABSOLUTE: 0.4 THOU/MM3 (ref 0–0.4)
ERYTHROCYTE [DISTWIDTH] IN BLOOD BY AUTOMATED COUNT: 13.7 % (ref 11.5–14.5)
ERYTHROCYTE [DISTWIDTH] IN BLOOD BY AUTOMATED COUNT: 43.5 FL (ref 35–45)
GFR SERPL CREATININE-BSD FRML MDRD: 35 ML/MIN/1.73M2
GLUCOSE BLD-MCNC: 151 MG/DL (ref 70–108)
GLUCOSE BLD-MCNC: 155 MG/DL (ref 70–108)
HCT VFR BLD CALC: 33.2 % (ref 42–52)
HEMOGLOBIN: 10.5 GM/DL (ref 14–18)
IMMATURE GRANS (ABS): 0.08 THOU/MM3 (ref 0–0.07)
IMMATURE GRANULOCYTES: 0.8 %
LYMPHOCYTES # BLD: 21.1 %
LYMPHOCYTES ABSOLUTE: 2.2 THOU/MM3 (ref 1–4.8)
MCH RBC QN AUTO: 27.6 PG (ref 26–33)
MCHC RBC AUTO-ENTMCNC: 31.6 GM/DL (ref 32.2–35.5)
MCV RBC AUTO: 87.1 FL (ref 80–94)
MONOCYTES # BLD: 6.9 %
MONOCYTES ABSOLUTE: 0.7 THOU/MM3 (ref 0.4–1.3)
NUCLEATED RED BLOOD CELLS: 0 /100 WBC
PLATELET # BLD: 560 THOU/MM3 (ref 130–400)
PMV BLD AUTO: 9.4 FL (ref 9.4–12.4)
POTASSIUM REFLEX MAGNESIUM: 5.2 MEQ/L (ref 3.5–5.2)
RBC # BLD: 3.81 MILL/MM3 (ref 4.7–6.1)
SEG NEUTROPHILS: 66.2 %
SEGMENTED NEUTROPHILS ABSOLUTE COUNT: 6.8 THOU/MM3 (ref 1.8–7.7)
SODIUM BLD-SCNC: 135 MEQ/L (ref 135–145)
WBC # BLD: 10.3 THOU/MM3 (ref 4.8–10.8)

## 2022-12-27 PROCEDURE — 97129 THER IVNTJ 1ST 15 MIN: CPT | Performed by: SPEECH-LANGUAGE PATHOLOGIST

## 2022-12-27 PROCEDURE — 6370000000 HC RX 637 (ALT 250 FOR IP): Performed by: PHYSICAL MEDICINE & REHABILITATION

## 2022-12-27 PROCEDURE — 82948 REAGENT STRIP/BLOOD GLUCOSE: CPT

## 2022-12-27 PROCEDURE — 97535 SELF CARE MNGMENT TRAINING: CPT

## 2022-12-27 PROCEDURE — 80048 BASIC METABOLIC PNL TOTAL CA: CPT

## 2022-12-27 PROCEDURE — 97110 THERAPEUTIC EXERCISES: CPT

## 2022-12-27 PROCEDURE — 36415 COLL VENOUS BLD VENIPUNCTURE: CPT

## 2022-12-27 PROCEDURE — 97530 THERAPEUTIC ACTIVITIES: CPT

## 2022-12-27 PROCEDURE — 97130 THER IVNTJ EA ADDL 15 MIN: CPT | Performed by: SPEECH-LANGUAGE PATHOLOGIST

## 2022-12-27 PROCEDURE — 85025 COMPLETE CBC W/AUTO DIFF WBC: CPT

## 2022-12-27 PROCEDURE — 99223 1ST HOSP IP/OBS HIGH 75: CPT | Performed by: INTERNAL MEDICINE

## 2022-12-27 PROCEDURE — 1180000000 HC REHAB R&B

## 2022-12-27 PROCEDURE — 2580000003 HC RX 258: Performed by: PHYSICAL MEDICINE & REHABILITATION

## 2022-12-27 PROCEDURE — 97116 GAIT TRAINING THERAPY: CPT

## 2022-12-27 PROCEDURE — 99232 SBSQ HOSP IP/OBS MODERATE 35: CPT | Performed by: PHYSICAL MEDICINE & REHABILITATION

## 2022-12-27 RX ORDER — BACLOFEN 10 MG/1
5 TABLET ORAL DAILY
Status: COMPLETED | OUTPATIENT
Start: 2022-12-28 | End: 2022-12-30

## 2022-12-27 RX ORDER — 0.9 % SODIUM CHLORIDE 0.9 %
250 INTRAVENOUS SOLUTION INTRAVENOUS ONCE
Status: COMPLETED | OUTPATIENT
Start: 2022-12-27 | End: 2022-12-27

## 2022-12-27 RX ORDER — SODIUM CHLORIDE 9 MG/ML
INJECTION, SOLUTION INTRAVENOUS CONTINUOUS
Status: ACTIVE | OUTPATIENT
Start: 2022-12-27 | End: 2022-12-29

## 2022-12-27 RX ADMIN — BACLOFEN 5 MG: 10 TABLET ORAL at 06:23

## 2022-12-27 RX ADMIN — Medication 3 MG: at 21:17

## 2022-12-27 RX ADMIN — TAMSULOSIN HYDROCHLORIDE 0.8 MG: 0.4 CAPSULE ORAL at 13:02

## 2022-12-27 RX ADMIN — SODIUM CHLORIDE: 9 INJECTION, SOLUTION INTRAVENOUS at 13:01

## 2022-12-27 RX ADMIN — SODIUM CHLORIDE 250 ML: 9 INJECTION, SOLUTION INTRAVENOUS at 10:48

## 2022-12-27 RX ADMIN — Medication 1 TABLET: at 13:03

## 2022-12-27 RX ADMIN — GLIPIZIDE 10 MG: 10 TABLET ORAL at 06:23

## 2022-12-27 RX ADMIN — DOCUSATE SODIUM 100 MG: 100 CAPSULE, LIQUID FILLED ORAL at 13:04

## 2022-12-27 RX ADMIN — BUPROPION HYDROCHLORIDE 150 MG: 150 TABLET, FILM COATED, EXTENDED RELEASE ORAL at 13:02

## 2022-12-27 RX ADMIN — ATORVASTATIN CALCIUM 40 MG: 40 TABLET, FILM COATED ORAL at 21:17

## 2022-12-27 RX ADMIN — PANTOPRAZOLE SODIUM 40 MG: 40 TABLET, DELAYED RELEASE ORAL at 06:24

## 2022-12-27 RX ADMIN — APIXABAN 5 MG: 5 TABLET, FILM COATED ORAL at 21:17

## 2022-12-27 RX ADMIN — DOCUSATE SODIUM 100 MG: 100 CAPSULE, LIQUID FILLED ORAL at 21:17

## 2022-12-27 RX ADMIN — CIPROFLOXACIN HYDROCHLORIDE 750 MG: 250 TABLET, FILM COATED ORAL at 21:17

## 2022-12-27 RX ADMIN — APIXABAN 5 MG: 5 TABLET, FILM COATED ORAL at 13:03

## 2022-12-27 RX ADMIN — CIPROFLOXACIN HYDROCHLORIDE 750 MG: 250 TABLET, FILM COATED ORAL at 13:05

## 2022-12-27 RX ADMIN — SODIUM CHLORIDE: 9 INJECTION, SOLUTION INTRAVENOUS at 22:39

## 2022-12-27 RX ADMIN — EMPAGLIFLOZIN 25 MG: 25 TABLET, FILM COATED ORAL at 13:04

## 2022-12-27 ASSESSMENT — ENCOUNTER SYMPTOMS
WHEEZING: 0
NAUSEA: 0
RHINORRHEA: 0
SORE THROAT: 0
DIARRHEA: 0
CONSTIPATION: 0
TROUBLE SWALLOWING: 0
ABDOMINAL PAIN: 0
COUGH: 0
SHORTNESS OF BREATH: 0
VOMITING: 0

## 2022-12-27 ASSESSMENT — PAIN SCALES - GENERAL
PAINLEVEL_OUTOF10: 0

## 2022-12-27 NOTE — PROGRESS NOTES
[address] [place] [facility]  [facility]  PROGRESS NOTE     TIME   SLP Individual Minutes  Time In: 1100  Time Out: 1130  Minutes: 30  Timed Code Treatment Minutes: 30 Minutes       Date: 2022  Patient Name: Matthew Valderrama      CSN: [id number]   : 1960  (63 y.o.)  Gender: male   Referring Physician:  Dr. Sudheer Clark   Diagnosis: Acute stroke due to ischemia, right hemiparesis secondary to stroke   Precautions: aspiration, fall risk   Current Diet: Regular diet with thin liquids   Swallowing Strategies: Standard Universal Swallow Precautions  Date of Last MBS/FEES: Not Applicable    Pain:  No pain reported. Subjective:  Patient resting in bed upon SLP arrival, pleasant and cooperative. No family present. *Wife does all the driving, patient does not drive. Short-Term Goals:  SHORT TERM GOAL #1:  Goal 1: Patient will complete complex executive functioning tasks (i.e., medication management, complex reasoning/deductive reasoning, etc.) with 90% accuracy and minimal cuing in order to allow for safe return to work (40/hours week). GOAL MET- CONTINUE FOR CONSISTENCY  INTERVENTIONS:Briefly discussed patient's work responsibilities. 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 Calendar organization task: 10/10 indep  *Excellent organization and attention to detail. Time word problems:  10/10 indep  *Excellent math reasoning and math computation. Medication Management- Review of MAR. Patient with minimal recall of baseline medications. Stating that wife takes care of ordering them and understanding what they are for and he just takes them. Will continue medication review next session.       SHORT TERM GOAL #2:  Goal 2: Patient will complete higher level recall/short term memory and working memory tasks with 80% accuracy provided independent use of compensatory strategies in order to improve overall recall of newly learned theraputic information and anticipation to return to work ([employer] in [employer]). GOAL MET- CONTINUE FOR CONSISTENCY  INTERVENTIONS: Functional memory: Reviewed compensatory memory strategies (write it, repeat it, associate it, picture it) with explanation of each and examples for how to implement each into daily routines. Provided patient with a list of errands: (Oil change, Grocery's, Bank, Mall for [employer]ath[employer] gift for wife)  -Immediate recall- 4/4 indep  -Recall following 10 minutes- 4/4 indep    SHORT TERM GOAL #3:  Goal 3: Patient will demonstrate independent use of compensatory strategies (S-speak up, O-open mouth, S-slow down) within strucutred and unstrucutred speech tasks in order to improve overall articulatory precision. GOAL MET- CONTINUE FOR CONSISTENCY  INTERVENTIONS: Did not address due to focus on other goals. PREVIOUS SESSION:   ST reviewed compensatory speech strategies provided by ST on previous therapy date 12/24; patient independently recalled 3/3 strategies understanding all concepts previously introduced and reviewed. ST then presented patient with the following structured and unstructured speech tasks     Task 1: Reading functional phrases 10/10 100% speech intelligibility, appropriate articulatory precision and rate   Task 2: Open ended questions; 5/5 overall 100% speech intelligibility, appropriate articulatory precision and rate. Throughout task 2; ST collected specific 1-3 word phrases in which articulatory precision could have been improved.  ST then presented patient with a list of these specific phrases to read aloud      Task 3: Reading specific/collected phrases aloud: 10/10 100% speech intelligibility, appropriate articulatory precision and rate   *patient with increased awareness of \"sh, s, ch\" sounds are presenting with decreased articulatory precision    Patient continues to rate speech at 70%/100% at baseline. ST anticipation ST to be short term overall to work towards establishing HEP    Long-Term Goals:  Time Frame for Long Term Goals: 2 weeks from time of evaluation    LONG TERM GOAL #1:  Goal 1: Patient will improve overall cognitive function to return to independent living with wife and careing for children (16 year old). GOAL MET- CONTINUE FOR CONSISTENCY     LONG TERM GOAL #2:  Goal 2: Patient will improve overall speech intelligablity and clearity of speech production within informal conversation in order to return to baseline speech to improve successful ability to return to work, speaking with co-works and medical staff and family/friends. GOAL MET- CONTINUE FOR CONSISTENCY        Comprehension: 7 - Patient understands complex ideas (math/planning)  Expression: 6 - Device used to express complex ideas/needs  Social Interaction: 7 - Patient has appropriate behavior/relations 100% of the time  Problem Solvin - Independent with device (e.g. notes, schedules)  Memory: 6 - Patient requires device to recall (e.g. memory book)    EDUCATION:  Learner: Patient  Education:  Reviewed results and recommendations of this evaluation, Reviewed ST goals and Plan of Care, and Reviewed recommendations for follow-up  Evaluation of Education: Verbalizes understanding, Demonstrates with assistance, and Family not present    ASSESSMENT/PLAN:  SUMMARY: Patient met 3/3 short term goals and 2/2 long term goals this reporting period. Overall score on the Scales of Cognitive and Communicative Ability for Neurorehabilitation [facility]) was WNL with only mild impairment evident within the memory subtest. Patient demonstrating excellent success with functional thought organization, selective attention and time management skills.  Plan to further address higher level tasks in future sessions, but anticipate that patient WILL NOT require speech therapy services upon discharge. With regards to speech intelligibility, patient with mild intermittent dysarthria, highly dependent on fatigue levels. Overall speech intelligibly during structured tasks has been at 100%. Activity Tolerance:  Patient tolerance of  treatment: good. Assessment/Plan: Patient progressing toward established goals. Continues to require skilled care of licensed speech pathologist to progress toward achievement of established goals and plan of care. .     Plan for Next Session: High level cognitive treatment- deductive thinking, memory, medication   Discharge Recommendations: Home with [address] [place] [address]   [address] [address]

## 2022-12-27 NOTE — PROGRESS NOTES
[facility]  Recreational Therapy  Inpatient Rehabilitation Evaluation        Time Spent with Patient: 23 minutes    Date:  12/27/2022       Patient Name: Ari Da Silva      MRN: [id number]       YOB: 1960 (63 y.o.)       Gender: male  Diagnosis: Acute Stroke due to Ischemia  Referring Practitioner: Ordering & Attending: Francisco Milner MD    RESTRICTIONS/PRECAUTIONS:  Restrictions/Precautions: General Precautions, Fall Risk     Hearing: Within functional limits    PAIN: 0    SUBJECTIVE-  pt lives with wife-he has a 15 yr old son and a 21 yr old stepchild at home    VISION:  Within Normal Limit    HEARING: Within Normal Limit    LEISURE INTERESTS:   Pt works at the [employer] in [address] [place] [address]. is recovering from a CABG in November and here now for a CVA-he has his phone in his room that he can play games on, watch movies or read from-he is content in his room -pleasant and social     BARRIERS TO LEISURE INTERESTS:    Decreased endurance        Patient Educationep  New Education Provided: Importance of Leisure, RT Plan of Care    Plan:  Continue to follow patient through this admission  See patient individually    Electronically signed by: AMBER Larry  Date: 12/27/2022

## 2022-12-27 NOTE — PROGRESS NOTES
[facility]  [facility]  Occupational Therapy  Progress Note  Time:   Time In: 0730  Time Out: 0830  Timed Code Treatment Minutes: 60 Minutes  Minutes: 60          Date: 2022  Patient Name: Sangeetha Daugherty,   Gender: male      Room: [facility]53/053-A  MRN: [id number]  : 1960  (63 y.o.)  Referring Practitioner: Ordering & Attending: Toni Corbin MD  Diagnosis: Acute Stroke due to Ischemia  Additional Pertinent Hx: Sangeetha Daugherty who is a 63 y.o. male with a history of hypertension, diabetes, CAD on  daily, recent CABG in 2022, ischemic cardiomyopathy is admitted to [facility] for sepsis, emphysematous cystitis and acute kidney injury on CKD. During admission exam patient stated that his right arm feldman has been weak and that he has had some slurred speech. Stroke alert was called for right-sided weakness and dysarthria on 12/15. On arrival patient's NIH was 4 for right upper extremity, right lower extremity weakness, 1 limb ataxia and dysarthria. On further questioning, patient and wife state that the right arm weakness actually started last night. Patient's wife noticed when patient was trying to get up from the toilet that he was unable to push himself up with his right arm. Last known well 2330 on 2022. Patient taken to imaging for stat CT head which revealed no ICH, midline shift, mass-effect. CTA head and neck deferred due to patient's LC. Decision for no tPA was made due to patient's time since last known well. Patient with relatively low NIH and symptoms which are not consistent with LVO, therefore no thrombectomy/neuro intervention at this time. Patient had stat MRI brain and MRA head and neck without contrast.  MRI showed acute ischemic stroke of left parietal region. Pt admitted to IP rehab on 22.     Restrictions/Precautions:  Restrictions/Precautions: General Precautions, Fall Risk  Position Activity Restriction  Other position/activity restrictions: right side hemiparesis, recent CABG -minimize arm use ( s/p 5 wks), life vest    SUBJECTIVE: Upon arrival pt supine in bed resting and consents to OT session. PAIN: Pt. Did not give numeric pain rating. Vitals:  Sittin/65  Standin/72  Nurse made aware of ortho statics. COGNITION: Decreased Insight, Decreased Problem Solving, and Decreased Safety Awareness    ADL:   Feeding: mod I As breakfast arrived towards end of session. Pt. Able to use R hand to stabilize items while left hand managed items and L hand utilized utensil as pt L hand dominant. Grooming: Contact Guard Assistance. To complete oral care standing sinkside with 1 verbal cue for AD placement to reduce risk of falls. Bathing: Overall Min A for sponge bath as pt prefers sponge bath rather then getting in the shower. Upper Extremity Dressing: Stand By Assistance and with increased time for completion. Lower Extremity Dressing: Minimal Assistance. With increased time and assistance to thread cath. Toileting: Contact Guard Assistance. For clothing management and sonia care standing   Toilet Transfer: Contact Guard Assistance. Completed x2 trials. BALANCE:  Sitting Balance:  Supervision. Standing Balance: Contact Guard Assistance. BED MOBILITY:  Supine to Sit: Stand By Assistance, with verbal cues , with increased time for completion      TRANSFERS:  Sit to Stand:  [employer], with increased time for completion, cues for hand placement. Stand to Sit: [employer], with increased time for completion, cues for hand placement. Completed multiple times throughout the session. FUNCTIONAL MOBILITY:  Assistive Device: Rolling Walker  Assist Level:  Contact Guard Assistance. Distance: To and from bathroom  Increased time required due to reduced pace. Cues for upright positioning required.       HAND ASSESSMENT  Hand Dominance: Left    Right Hand Strength -  (lbs)  Handle Setting 2: 12/24/22: av. 26# 12/27/22 (24, 24, 27 av.25#)  Fine Motor Skills    Right 9-Hole Peg Test: Impaired  Right 9 Hole Peg Test Time (secs):  (1 min 44 seconds)      ASSESSMENT:  Activity Tolerance:  Patient tolerance of  treatment: fair. Assessment: Assessment: Pt. is an active particpant in OT sessions to date, pt is making progress however has not fully met goals at this time due to short LOS. Pt. requires Min A for LB dressing, CGA standing sinkside for grooming, CGA toileting transfers, Min A sponge bath, Mod I self feed, SBA UB dressing. Pt. requires cues for initiation, problem solving and safety throughout session. Pt. demo 4 second improvement on 9 HPT in R hand and 1# less on  strength in R hand. Pt. requires continued OT services to adapt/modify daily routines as appropriate without continued services pt is at risk of falls, further decline in functioning and increased dependency on others. Discharge Recommendations: Continue to assess pending progress, Home with Home health OT  Equipment Recommendations: Equipment Needed: Yes  Other: Monitor need for RW, and BSC. Plan: Times Per Week: 5x/week x 90 minutes and 1x/week x 30 minutes  Current Treatment Recommendations: Strengthening, Balance training, Self-Care / ADL, Equipment evaluation, education, & procurement, Safety education & training, Endurance training, Functional mobility training, Neuromuscular re-education, Patient/Caregiver education & training, Home management training    Patient Education  Patient Education: ADL's, Importance of Increasing Activity, and Assistive Device Safety    Goals  Short Term Goals  Time Frame for Short Term Goals: 1 week  Short Term Goal 1: Pt will complete functional ambulation to/from BR and HH distances with SBA and min vcs for safety to increase indep with toileting.  GOAL NOT MET, CONTINUE    Short Term Goal 2: Pt will decrease RUE 9 hole peg test by 20 seconds and RUE  strength by 5 pounds to increase indep with fasteners in UB dressing. GOAL NOT MET, CONTINUE    Short Term Goal 3: Pt will complete dynamic standing task x 5 minutes with 1-2 UE release and SBA to increase indep with sinkside grooming. GOAL NOT MET, CONTINUE    Short Term Goal 4: Pt will complete UB dressing with Set up to increase indep within home environment. GOAL NOT MET, CONTINUE    Short Term Goal 5: Pt will complete LB dresing with S and min vcs for safety to increase indep and endurance within home environment. GOAL NOT MET, CONTINUE  Additional Goals?: No    Long Term Goals  Time Frame for Long Term Goals : 3 weeks  Long Term Goal 1: Pt will complete BADL routine including shower transfer Mod Indep to increase indep and safety in home environment. GOAL NOT MET, CONTINUE    Long Term Goal 2: Pt will complete simple IADL task with S and 0 vcs for safety to increase indep and endurance in home environment. GOAL NOT MET, CONTINUE    Revised Short-Term Goals  Short Term Goals  Time Frame for Short Term Goals: 1 week  Short Term Goal 1: Pt will complete functional ambulation to/from BR and HH distances with SBA and min vcs for safety to increase indep with toileting. Short Term Goal 2: Pt will decrease RUE 9 hole peg test by 20 seconds and RUE  strength by 5 pounds to increase indep with fasteners in UB dressing. Short Term Goal 3: Pt will complete dynamic standing task x 5 minutes with 1-2 UE release and SBA to increase indep with sinkside grooming. Short Term Goal 4: Pt will complete UB dressing with Set up to increase indep within home environment. Short Term Goal 5: Pt will complete LB dresing with S and min vcs for safety to increase indep and endurance within home environment.   Additional Goals?: No  Long Term Goals  Time Frame for Long Term Goals : 3 weeks  Long Term Goal 1: Pt will complete BADL routine including shower transfer Mod Indep to increase indep and safety in home environment. Long Term Goal 2: Pt will complete simple IADL task with S and 0 vcs for safety to increase indep and endurance in home environment. Following session, patient left in safe position with all fall risk precautions in place.

## 2022-12-27 NOTE — PROGRESS NOTES
Patient: Lisbeth Schuster  Unit/Bed: 7E-53/053-A  YOB: 1960  MRN: [id number] Acct: [de-identified]   Admitting Diagnosis: Acute stroke due to ischemia [facility]) [I63.9]  Admit Date:  12/23/2022  Hospital Day: 4    Assessment:     Principal Problem:    Acute stroke due to ischemia [facility])  Active Problems:    Ischemic cardiomyopathy    S/P CABG x 2    Emphysematous cystitis    Bacteremia due to Enterobacter species    Urinary retention    Hemiparesis affecting right side as late effect of stroke (HCC)    Coordination impairment    Debility    UTI due to Klebsiella species    Moderate malnutrition ([facility])    Diabetes mellitus type 2 in nonobese [facility])    Essential hypertension  Resolved Problems:    * No resolved hospital problems. *      Plan:     Nephrology seeing now also  The CS are acceptable        Subjective:     Patient has no complaint of CP or SOB. .   Medication side effects: none    Scheduled Meds:   [START ON 12/28/2022] baclofen  5 mg Oral Daily    atorvastatin  40 mg Oral Daily    buPROPion  150 mg Oral QAM    pantoprazole  40 mg Oral QAM AC    tamsulosin  0.8 mg Oral Daily    apixaban  5 mg Oral BID    metoprolol succinate  12.5 mg Oral Daily    glipiZIDE  10 mg Oral QAM AC    empagliflozin  25 mg Oral Daily    docusate sodium  100 mg Oral BID    melatonin  3 mg Oral Nightly    multivitamin  1 tablet Oral Daily with breakfast    senna  2 tablet Oral Nightly    ciprofloxacin  750 mg Oral 2 times per day     Continuous Infusions:   sodium chloride 75 mL/hr at 12/27/22 1301    dextrose       PRN Meds:glucose, dextrose bolus **OR** dextrose bolus, glucagon (rDNA), dextrose, acetaminophen, ondansetron, oxyCODONE **OR** oxyCODONE, bisacodyl, magnesium hydroxide, traZODone, polyethylene glycol    Review of Systems  Pertinent items are noted in HPI. Objective:     Patient Vitals for the past 8 hrs:   BP Pulse   12/27/22 1303 -- 72   12/27/22 1245 103/62 --     I/O last 3 completed shifts:   In: 2608 [P.O.:1285]  Out: 2900 [Urine:2900]  I/O this shift:  In: 240 [I.V.:240]  Out: -     /62   Pulse 72   Temp 97.5 °F (36.4 °C) (Oral)   Resp 14   Ht 5' 2\" (1.575 m)   Wt 128 lb 9.6 oz (58.3 kg)   SpO2 98%   BMI 23.52 kg/m²     General appearance: alert, appears stated age, and cooperative  Head: Normocephalic, without obvious abnormality, atraumatic  Lungs: clear to auscultation bilaterally  Chest wall: no tenderness  Heart: regular rate and rhythm, S1, S2 normal, no murmur, click, rub or gallop  Abdomen: soft, non-tender; bowel sounds normal; no masses,  no organomegaly  Extremities: extremities normal, atraumatic, no cyanosis or edema  Skin: Skin color, texture, turgor normal. No rashes or lesions  Neurologic:  weak    Data Review:    Latest Reference Range & Units 12/24/22 11:55 12/25/22 07:27 12/26/22 08:07 12/27/22 06:59   POC Glucose 70 - 108 mg/dl 162 (H) 225 (H) 146 (H) 151 (H)   (H): Data is abnormally high    Electronically signed by Keily Oates MD on 12/27/2022 at 5:48 PM

## 2022-12-27 NOTE — PROGRESS NOTES
[address] NOTE    Conference Date: 2022  Admit Date:  2022 11:24 AM  Patient Name: Shawn Low    MRN: [id number]    : 1960  (63 y.o.)  Rehabilitation Admitting Diagnosis:  Acute stroke due to ischemia [facility]) [I63.9]  Referring Practitioner: Dr. Jessee Mayberry issues/needs regarding patient and family discharge status: Prior to admission, patient was living with his wife, Marcus Gardiner, and their two children who are 15and 21years old. Patient was independent at home. Patient was completing his ADLs, housekeeping, meal prep and finances. Patient reports that he does not drive due to not having a license. Family helps patient with transportation. Patient was working full time at [employer] on the [employer]. Patient's supports include Marcus Gardiner, who works full time at [employer] as well. Patient's family physician is BLACK Gabriel. Patient was not on blood thinners prior to admission. Patient prefers [address] Patient has no assistive devices at home. Patient reports services 13 years in the [place], but does not receive VA benefits. Patient is motivated to participate in therapy and return to his prior level of functioning. PHYSICAL THERAPY   Assessment: Patient progressing toward established goals, meeting 1 short term goal.  He continues to demonstrate deficits in Strength, Balance (Tinetti:  , indicating high fall risk ), Coordination, and Gait mechanics and would benefit from continued skilled PT to address these impairments to return to [facility]. Equipment Needed: Yes  Other: RW    SPEECH THERAPY  Patient met 3/3 short term goals and 2/2 long term goals this reporting period.  Overall score on the Scales of Cognitive and Communicative Ability for Neurorehabilitation [facility]) was WNL with only mild impairment evident within the memory subtest. Patient demonstrating excellent success with functional thought organization, selective attention and time management skills. Plan to further address higher level tasks in future sessions, but anticipate that patient WILL NOT require speech therapy services upon discharge. With regards to speech intelligibility, patient with mild intermittent dysarthria, highly dependent on fatigue levels. Overall speech intelligibly during structured tasks has been at 100%. OCCUPATIONAL THERAPY  Pt. is an active particpant in OT sessions to date, pt is making progress however has not fully met goals at this time due to short LOS. Pt. requires Min A for LB dressing, CGA standing sinkside for grooming, CGA toileting transfers, Min A sponge bath, Mod I self feed, SBA UB dressing. Pt. requires cues for initiation, problem solving and safety throughout session. Pt. demo 4 second improvement on 9 HPT in R hand and 1# less on  strength in R hand. Pt. requires continued OT services to adapt/modify daily routines as appropriate without continued services pt is at risk of falls, further decline in functioning and increased dependency on others. Equipment Needed: Yes  Other: Monitor need for RW, and BSC. RECREATIONAL THERAPY  Patient has been offered participation in recreational therapy activities and participates as able. Pt works at the [employer] in [address] [place] [address]. [facility] 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 Current diet: ADULT ORAL NUTRITION SUPPLEMENT; Breakfast, Dinner; Frozen Oral Supplement  ADULT DIET; Regular; 4 carb choices (60 gm/meal)  Please see nutrition note for details. NURSING  Continent of Bowel: Yes. Frequency: every few days.   Management: Colace,Senna scheduled and PRN Miralax, MOM, dulcolax suppository  . Continent of Bladder: Yes. Frequency: Bella. Management: Flomax. Pain is Managed:  Yes. Management: Tylenol. Frequency of Intervention: daily. Adequately Controlled: Yes  Sleep: Adequate  Signs and Symptoms of Infection:  Yes. Site of Infection: UTI. Antibiotic: Cipro. Signs and Symptoms of Skin Breakdown:  No.   Injury and/or Falls during Inpatient Rehabilitation Admission: No  Anticoagulants: Elequis   Diabetic: Yes: Home Meds: Jardiance. Hospital Meds: Jardiance, Glipizide. Educational Needs: none, the patient has been a diabetic for years. Consultations/Labs/X-rays: no  Oxygen while on IP Rehab:  No Currently using  n/a liters per n/a  . Home oxygen: No  Patient/Family Education Focus: Bella cath care   Barriers to Education: none    Recent Labs     12/26/22  0807 12/27/22  0659 12/28/22  0732   POCGLU 146* 151* 153*       Lab Results   Component Value Date    LDLCALC 42 12/16/2022    LDLCHOLESTEROL 108 05/24/2022         Vitals:    12/27/22 1245 12/27/22 1303 12/27/22 1936 12/28/22 0759   BP: 103/62  113/75 109/70   Pulse:  72 76 79   Resp:   16 17   Temp:   98.2 °F (36.8 °C) 97.6 °F (36.4 °C)   TempSrc:   Oral Oral   SpO2:   98% 100%   Weight:       Height:              Family Education: Family available and participating in education   Fall Risk:  Falling star program initiated  Is the patient appropriate for a stay in the functional apartment? no    Discharge Plan   Estimated Discharge Date: Continue to assess   Destination: home health and discharge home with supervision  Services at Discharge: [address], Occupational Therapy, Nursing, and HHA 3x week  Is patient appropriate for an outpatient driving evaluation? No ; patient does not drive  Equipment at Discharge: Other: Monitor need for RW, and BSC.   Other: RW  Factors facilitating achievement of predicted outcomes: Family support, Motivated, Cooperative, and Pleasant  Barriers to the achievement of predicted outcomes: Limited safety awareness, Limited insight into deficits, Unrealistic expectations, Decreased endurance, Decreased proprioception, Upper extremity weakness, Lower extremity weakness, Medical complications, Stairs at home, and Skin Care  Follow up with physiatrist? yes,   If yes, what timeframe? About 1~2 months after discharge    Team Members Present at Conference:  Rodrigo Kanabnadine Santos, ALEKW, MSW   Occupational Therapist:Odalis Bautista OTR/L [id number]  Physical Therapist:Charity Ceron, [facility]  [address], [address], [address]  Dario Barrera, ABIGAIL  Psychologist: Lay Macias, PhD.    I approve the established interdisciplinary plan of care as documented within the medical record of THE [facility].     Judge Nohemy MD

## 2022-12-27 NOTE — PROGRESS NOTES
Physical Medicine & Rehabilitation Progress Note    Chief Complaint:  Right-sided weakness due to stroke    Subjective:    Manisha Chester is a 63 y.o.  left-handed  male with history of hypertension, diabetes mellitus type 2, coronary artery disease with ischemic cardiomyopathy requiring two-vessel CABG, urinary retention with several failed void trial, status post tonsillectomy, was admitted to the inpatient rehabilitation unit on 12/23/2022 for intensive inpatient management of impairment & disability secondary to right hemiparesis due to acute left parietal corona radiata and right cerebellar ischemic stroke, and debility/physical deconditioning due to Klebsiella pneumonia urinary tract infection with emphysematous cystitis and bilateral hydronephrosis complicated by Klebsiella pneumonia bacteremia/sepsis. The patient was previously hospitalized at [facility] from 10/28/2022 to 11/15/2022 for coronary artery disease and ischemic cardiomyopathy requiring two-vessel CABG on 11/7/2022. His postoperative course was complicated by urinary retention with failed void trial.  The patient has been experiencing progressive weakness after he was discharged to home. The Bella was later removed on 12/13/2022 but the patient continued having difficulty voiding. On 12/14/2022 approximately 11:30 PM his wife noticed the patient had slight slurred speech with right arm weakness. On 12/15/2022 the patient suddenly felt dizzy and lightheaded while he was trying to get up from sitting on toilet and fell into the ground. He says he did not lose consciousness and did not hit his head. His wife called 46. He was transported to [address] [place] [address] ER for evaluation by EMS. He complains of neck pain and upper back pain. He was found to be tachycardic. His WBC was found to be 22.3. Bella was placed in ER and a rush of air followed by dark brown and bright red color urine came out.   He was put on IV meropenem and admitted. Urology was consulted. CT of cervical spine revealed only multilevel facet and uncovertebral joint arthropathy. CT of head done on 12/15/2020 revealed no acute intracranial abnormality. CT of the chest, abdomen and pelvis done on 12/15/2022 revealed emphysematous cystitis, bilateral hydronephrosis, and constipation. Because of right arm weakness, stroke code was called on 12/15/2022. MRI of brain done on 12/15/2022 revealed acute ischemic stroke at the left parietal region corona radiata, and possible small additional acute ischemic stroke within right cerebellum. Neurology service start patient on aspirin and Plavix combination for the stroke. MRA of the neck and head done on 12/15/2022 showed only mild bilateral carotid bulb disease. Transesophageal echocardiogram was done on 12/16/2022 showing severe global hypokinesis of left ventricle with estimated ejection fraction of 30%, and no thrombus identified. 2 blood culture and urine culture done on 12/15/2022 revealed Klebsiella pneumonia. Infectious disease had been consulted. Oral Cipro was started on 12/22/2022 after IV meropenem was completed. Patient's hospital course was also complicated by constipation which resolved this morning. The patient says he feels slightly better today. He says he has less dizziness when he is standing or sitting. He still has right side weakness with some difficulty controlling limbs movement on the right side. Nephrology note appreciated. The patient is still on IV fluid. He denies having any numbness or tingling sensation, or any painful symptom. He is still on Bella catheter. He says his sleep last night was not well because he frequently was woken up by the IV drip machine alarm. He continues tolerating the intensive rehabilitation treatment well. Rehabilitation:  PT: Reviewed.     Bed Mobility:  Rolling to Left: Stand By Assistance   Supine to Sit: Minimal Assistance  Sit to Supine: Stand By Assistance   Scooting: Stand By Assistance, toward [facility]    Transfers:  Sit to Stand: Minimal Assistance  Stand to [place] [id number], Minimal Assistance  Pt. Completed 5x sit<>stand transfers from [facility] with arms across chest and min A provided. Verbal and tactile cues provided for increased anterior trunk lean and quad facilitation (R>L). Activity completed to increase B LE strength and improve transfer technique, decreasing reliance on UEs. Rest break required after each rep. Stand Pivot:Contact Guard Assistance, Minimal Assistance, with increased time for completion, with verbal cues  To/From Bed and Chair: Minimal Assistance  Car:Minimal Assistance  Cues required throughout all transfers to limit UE use as able. Pt. Also requiring cues for anterior weight shifting with sit<>stand transfers. Ambulation:  Contact Guard Assistance, Minimal Assistance  Distance: 20' x 1, 5' x 1, 3' x 2  Surface: Level Tile  Device: Rolling Walker  Gait Deviations: Forward Flexed Posture, Slow Jada, Decreased Step Length Bilaterally, Decreased Weight Shift Bilaterally, Decreased Trunk Rotation, Decreased Gait Speed, Decreased Heel Strike Bilaterally, Narrow Base of Support, Mild Path Deviations, Unsteady Gait, and Increased reliance on walker    Contact Guard Assistance  Distance: 50' x 1, 15' x 1  Surface: Level Tile  Device: Rolling Walker  Gait Deviations:  Slow Jada, Decreased Step Length Bilaterally, Decreased Gait Speed, Decreased Heel Strike Bilaterally, Decreased Quad Control Right, Narrow Base of Support, and inconsistent step length. Minimal Assistance  Distance: 4' x 2  Surface: Level Tile  Device: Rolling Walker  Gait Deviations:  Slow Jada, Decreased Step Length on Right, Decreased Gait Speed, Decreased Heel Strike Bilaterally, Decreased Foot Clearance Right, Decreased Foot Clearance Left, Ataxia, and Unsteady Gait with mild posterior lean.     Stairs:  Stairs: 6\" steps X 3 using Bilateral Handrails and Minimal Assistance, with verbal cues , with increased time for completion. Balance:  Dynamic Standing Balance: Contact Guard Assistance while completing clothing management. OT: Reviewed. ADL:   Feeding: mod I As breakfast arrived towards end of session. Pt. Able to use R hand to stabilize items while left hand managed items and L hand utilized utensil as pt L hand dominant. Grooming: Contact Guard Assistance. To complete oral care standing sinkside with 1 verbal cue for AD placement to reduce risk of falls. Bathing: Overall Min A for sponge bath as pt prefers sponge bath rather then getting in the shower. Upper Extremity Dressing: Stand By Assistance and with increased time for completion. Lower Extremity Dressing: Minimal Assistance. With increased time and assistance to thread cath. Toileting: Contact Guard Assistance. For clothing management and sonia care standing   Toilet Transfer: Contact Guard Assistance. Completed x2 trials. BALANCE:  Sitting Balance:  Supervision. Standing Balance: Contact Guard Assistance. BED MOBILITY:  Supine to Sit: Stand By Assistance, with verbal cues , with increased time for completion       TRANSFERS:  Sit to Stand:  [address], with increased time for completion, cues for hand placement. Stand to Sit: [address], with increased time for completion, cues for hand placement. Completed multiple times throughout the session. FUNCTIONAL MOBILITY:  Assistive Device: Rolling Walker  Assist Level:  Contact Guard Assistance. Distance: To and from bathroom  Increased time required due to reduced pace. Cues for upright positioning required. HAND ASSESSMENT  Hand Dominance: Left     Right Hand Strength -  (lbs)  Handle Setting 2: 12/24/22: av.  26# 12/27/22 (24, 24, 27 av.25#)  Fine Motor Skills     Right 9-Hole Peg Test: Impaired  Right 9 Hole Peg Test Time (secs):  (1 min 44 seconds)    ADDITIONAL ACTIVITIES:  Pt. Engaged in BUE strengthening with use of mild resistive theraband x15 reps in all available directions with min vc for proper technique and pace to ensure full ROM activated/achieved. Task performed to further advance pt's performance with daily occupations. OTR provided pt with theraputty HEP and instructed pt to go through HEP, pt able to retrieve small beads (10 total) with mod difficulty and increased time, pt able to complete 3 exercises in total until time of session up. Plan to continue to educate pt on HEP to promote improved /FMC in RUE. ST: Reviewed. Briefly discussed patient's work responsibilities. 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 Calendar organization task: 10/10 indep  *Excellent organization and attention to detail. Time word problems:  10/10 indep  *Excellent math reasoning and math computation. Medication Management- Review of MAR. Patient with minimal recall of baseline medications. Stating that wife takes care of ordering them and understanding what they are for and he just takes them. Will continue medication review next session. Functional memory: Reviewed compensatory memory strategies (write it, repeat it, associate it, picture it) with explanation of each and examples for how to implement each into daily routines. Provided patient with a list of errands: (Oil change, Grocery's, Bank, Mall for [place] gift for wife)  -Immediate recall- 4/4 indep  -Recall following 10 minutes- 4/4 indep    ST reviewed compensatory speech strategies provided by ST on previous therapy date 12/24; patient independently recalled 3/3 strategies understanding all concepts previously introduced and reviewed.  ST then presented patient with the following structured and unstructured speech tasks      Task 1: Reading functional phrases 10/10 100% speech intelligibility, appropriate articulatory precision and rate   Task 2: Open ended questions; 5/5 overall 100% speech intelligibility, appropriate articulatory precision and rate. Throughout task 2; ST collected specific 1-3 word phrases in which articulatory precision could have been improved. ST then presented patient with a list of these specific phrases to read aloud      Task 3: Reading specific/collected phrases aloud: 10/10 100% speech intelligibility, appropriate articulatory precision and rate   *patient with increased awareness of \"sh, s, ch\" sounds are presenting with decreased articulatory precision     Patient continues to rate speech at 70%/100% at baseline. ST anticipation ST to be short term overall to work towards establishing HEP      Review of Systems:  Review of Systems   Constitutional:  Positive for fatigue. Negative for chills, diaphoresis and fever. HENT:  Negative for hearing loss, rhinorrhea, sneezing, sore throat and trouble swallowing. Eyes:  Negative for visual disturbance. Respiratory:  Negative for cough, shortness of breath and wheezing. Cardiovascular:  Negative for chest pain and palpitations. Gastrointestinal:  Negative for abdominal pain, constipation, diarrhea, nausea and vomiting. Genitourinary:  Positive for difficulty urinating. Negative for dysuria. Musculoskeletal:  Positive for gait problem. Negative for arthralgias, myalgias and neck pain. Skin:  Negative for rash. Neurological:  Positive for dizziness and weakness (Right upper and right lower extremities). Negative for tremors, facial asymmetry, speech difficulty, light-headedness, numbness and headaches. Psychiatric/Behavioral:  Negative for dysphoric mood, hallucinations and sleep disturbance. The patient is not nervous/anxious.          Objective:  /75   Pulse 76   Temp 98.2 °F (36.8 °C) (Oral)   Resp 16 Ht 5' 2\" (1.575 m)   Wt 128 lb 9.6 oz (58.3 kg)   SpO2 98%   BMI 23.52 kg/m²   Physical Exam   General:  well-developed, well nourished  male; in no acute distress ; appropriate affect & mood; sitting on reclining chair comfortably  Eyes: pupil equally round ; extra-ocular motion intact bilaterally  Head, Ear, Nose, Mouth & Throat : normocephalic ; no discharge from ears or nose ; no deformity ; no facial swelling ; oral mucosa pink   Neck :  supple ; no tenderness ; mild muscle spasm at bilateral cervical paraspinal muscles  Cardiovascular : Presence of healed vertical surgical scar at sternum ; regular rate & rhythm ; normal S1 & S2 heart sound ; no murmur ; normal peripheral pulse at the bilateral upper extremities; reduced pulse strength at the bilateral lower extremities  Pulmonary : Present breath sounds at the bilateral lung fields; no wheezing ; no rale; no crackle  Gastrointestinal : soft, slightly protruded abdomen without tenderness ; normal bowel sound present    : Bella catheter in place draining light yellowish urine  Back : no tenderness; no muscle spasm  Skin: no skin lesion or rash ; no pitting edema at all 4 extremities  Musculoskeletal : no limb asymmetry; no limb deformity; no tenderness at bilateral upper & lower extremities; no palpable mass at limbs ; no joints laxity or crepitation ; shoulder flexion and abduction passive ROM reaching 170 degrees bilaterally; hip flexion passive ROM reaching 130 degrees bilaterally; normal functional joints ROM at bilateral upper & lower extremities  Cerebral :  alert ; awake ; oriented to place, person and time; follow two-step verbal command  Cerebellum : Dysmetria with the right finger-to-nose test; no dysmetria with left finger-to-nose test ; very mild dysmetria with right heel-to-shin test; no dysmetria with the left heel-to-shin test  Cranial Nerves : Tongue protrudes slightly to the right  (CN XII) ; grossly intact other CN II to XI function  Sensory : intact light touch and pin prick sensation at bilateral upper & lower extremities  Motor : Slightly reduced muscle tone at right upper and right lower extremities; normal muscle tone at left upper & lower extremities ; 4+/5 muscle strength at right shoulder abduction; 4-/5 to 4+/5 muscle strength at the right shoulder flexion; 4-/5 to 4+/5 muscle strength at right elbow flexion and extension; 4+/5 to 5/5 muscle strength at right wrist extension; 4+/5 muscle strength at right finger extension; 4-/5 muscle strength at the right finger flexion and handgrip; 3+/5 to 4-/5 muscle strength at right finger abduction; 4-/5 to 4+/5 muscle strength at the right hip flexion; 4+/5 muscle strength at right knee flexion; 4+/5 to 5/5 muscle strength at the right knee extension; 4+/5 to 5/5 muscle strength at right ankle dorsiflexion; 4+/5 to 5/5 muscle strength at the right ankle plantarflexion; normal 5/5 muscle strength at left upper & lower extremities  Reflex : 1+ left biceps, left brachioradialis, left knee reflexes; 0 right biceps, right brachioradialis, right knee reflexes  Pathological Reflex :  No Ion's sign ; no ankle clonus  Gait : Not assessed      Diagnostics:   Recent Results (from the past 24 hour(s))   Basic Metabolic Panel w/ Reflex to MG    Collection Time: 12/27/22  8:21 AM   Result Value Ref Range    Sodium 135 135 - 145 meq/L    Potassium reflex Magnesium 5.2 3.5 - 5.2 meq/L    Chloride 97 (L) 98 - 111 meq/L    CO2 24 23 - 33 meq/L    Glucose 155 (H) 70 - 108 mg/dL    BUN 37 (H) 7 - 22 mg/dL    Creatinine 2.1 (H) 0.4 - 1.2 mg/dL    Calcium 9.2 8.5 - 10.5 mg/dL   CBC with Auto Differential    Collection Time: 12/27/22  8:21 AM   Result Value Ref Range    WBC 10.3 4.8 - 10.8 thou/mm3    RBC 3.81 (L) 4.70 - 6.10 mill/mm3    Hemoglobin 10.5 (L) 14.0 - 18.0 gm/dl    Hematocrit 33.2 (L) 42.0 - 52.0 %    MCV 87.1 80.0 - 94.0 fL    MCH 27.6 26.0 - 33.0 pg    MCHC 31.6 (L) 32.2 - 35.5 gm/dl RDW-CV 13.7 11.5 - 14.5 %    RDW-SD 43.5 35.0 - 45.0 fL    Platelets 267 (H) 167 - 400 thou/mm3    MPV 9.4 9.4 - 12.4 fL    Seg Neutrophils 66.2 %    Lymphocytes 21.1 %    Monocytes 6.9 %    Eosinophils 4.0 %    Basophils 1.0 %    Immature Granulocytes 0.8 %    Segs Absolute 6.8 1.8 - 7.7 thou/mm3    Lymphocytes Absolute 2.2 1.0 - 4.8 thou/mm3    Monocytes Absolute 0.7 0.4 - 1.3 thou/mm3    Eosinophils Absolute 0.4 0.0 - 0.4 thou/mm3    Basophils Absolute 0.1 0.0 - 0.1 thou/mm3    Immature Grans (Abs) 0.08 (H) 0.00 - 0.07 thou/mm3    nRBC 0 /100 wbc   Anion Gap    Collection Time: 12/27/22  8:21 AM   Result Value Ref Range    Anion Gap 14.0 8.0 - 16.0 meq/L   Glomerular Filtration Rate, Estimated    Collection Time: 12/27/22  8:21 AM   Result Value Ref Range    Est, Glom Filt Rate 35 (A) >60 OL/WAV/2.90U6   Basic Metabolic Panel w/ Reflex to MG    Collection Time: 12/28/22  6:21 AM   Result Value Ref Range    Sodium 138 135 - 145 meq/L    Potassium reflex Magnesium 5.1 3.5 - 5.2 meq/L    Chloride 102 98 - 111 meq/L    CO2 25 23 - 33 meq/L    Glucose 153 (H) 70 - 108 mg/dL    BUN 38 (H) 7 - 22 mg/dL    Creatinine 1.9 (H) 0.4 - 1.2 mg/dL    Calcium 9.1 8.5 - 10.5 mg/dL   Anion Gap    Collection Time: 12/28/22  6:21 AM   Result Value Ref Range    Anion Gap 11.0 8.0 - 16.0 meq/L   Glomerular Filtration Rate, Estimated    Collection Time: 12/28/22  6:21 AM   Result Value Ref Range    Est, Glom Filt Rate 40 (A) >60 ml/min/1.73m2   POCT Glucose    Collection Time: 12/28/22  7:32 AM   Result Value Ref Range    POC Glucose 153 (H) 70 - 108 mg/dl        Latest Reference Range & Units 12/24/22 07:46 12/27/22 08:21 12/28/22 06:21   Sodium 135 - 145 meq/L 137 135 138   Potassium 3.5 - 5.2 meq/L 4.8 5.2 5.1   Chloride 98 - 111 meq/L 98 97 (L) 102   CO2 23 - 33 meq/L 29 24 25   BUN,BUNPL 7 - 22 mg/dL 36 (H) 37 (H) 38 (H)   Creatinine 0.4 - 1.2 mg/dL 1.5 (H) 2.1 (H) 1.9 (H)   Anion Gap 8.0 - 16.0 meq/L 10.0 14.0 11.0   Est, Glom Filt Rate >60 ml/min/1.73m2 53 ! 35 ! 40 ! Glucose, Random 70 - 108 mg/dL 116 (H) 155 (H) 153 (H)   CALCIUM, SERUM, 264883 8.5 - 10.5 mg/dL 9.6 9.2 9.1   (L): Data is abnormally low  (H): Data is abnormally high  !: Data is abnormal      Impression:  Acute left parietal corona radiata and right cerebellar ischemic stroke causing right hemiparesis with impaired coordination, and dysarthria  Debility/physical decondition due to Klebsiella pneumonia urinary tract infection with emphysematous cystitis and bilateral hydronephrosis complicated by Klebsiella pneumonia bacteremia/sepsis  Urinary retention  Klebsiella pneumonia urinary tract infection with emphysematous cystitis and bilateral hydronephrosis  Klebsiella pneumoniae bacteremia/sepsis  Acute kidney injury possibly due to dehydration with orthostatic hypotension  Coronary artery disease with ischemic cardiomyopathy requiring two-vessel coronary artery bypass graft surgery on 11/7/2022  Hypertension  Diabetes mellitus type 2    The patient's creatinine and GFR is slightly better this morning. His blood pressure remains in the low normal range. I will continue IV hydration for additional 24-hour and recheck his BMP tomorrow. He continues to have right-sided weakness. The right extremity weakness is slightly more this morning. He says his sleep is not a good last night. I will increase melatonin dosage from 3 mg to 6 mg. He continues tolerating the intensive inpatient rehabilitation treatment well. His function is improving very slowly. Plan:  Continues intensive PT/OT/SLP/RT inpatient rehabilitation program at least 3 hours per day, 5 days per week in order to improve functional status prior to discharge. Family education and training will be completed. Equipment evaluations and recommendations will be completed as appropriate. Rehabilitation nursing continues to be involved for bowel, bladder, skin, and pain management.   Nursing will also provide education and training to patient and family. Prophylaxis:  DVT: Patient on Eliquis, JONAH stocking, intermittent pneumatic compression device. GI: Colace, Senokot, GlycoLax as needed, milk of magnesia as needed, Dulcolax suppository as needed.   Pain: Tylenol as needed  Continue normal saline IV fluid infusion at 75 mL/h for additional 24 hours  Continue Lipitor for CVA and CAD  Continue Toprol-XL for hypertension  Continue baclofen for spasticity  Continue Flomax for urinary retention  Continue Protonix for gastric protection  Continue Jardiance, Glucotrol, Humalog insulin coverage for diabetes mellitus  Continue Wellbutrin XL for smoking cessation  Continue 7 days course oral Cipro for Klebsiella pneumonia UTI/bacteremia  Continue melatonin, trazodone as needed for insomnia; increase melatonin dosage to 6 mg  Continue multivitamin supplement  Nutrition: Continue current diet   Bladder: Monitoring signs or symptoms of UTI  Bowel: Monitoring signs or symptoms of constipation   and case management for coordination of care and discharge planning      Missed Therapy Time:  None      Maye Friedman MD

## 2022-12-27 NOTE — PROGRESS NOTES
[address]  Occupational Therapy  Daily Note  Time:   Time In: 1330  Time Out: 1400  Timed Code Treatment Minutes: 30 Minutes  Minutes: 30          Date: 2022  Patient Name: Suha Goff,   Gender: male      Room: [facility]/053-A  MRN: [id number]  : 1960  (63 y.o.)  Referring Practitioner: Ordering & Attending: Sanket Tracy MD  Diagnosis: Acute Stroke due to Ischemia  Additional Pertinent Hx: Suha Goff who is a 63 y.o. male with a history of hypertension, diabetes, CAD on  daily, recent CABG in 2022, ischemic cardiomyopathy is admitted to [facility] for sepsis, emphysematous cystitis and acute kidney injury on CKD. During admission exam patient stated that his right arm feldman has been weak and that he has had some slurred speech. Stroke alert was called for right-sided weakness and dysarthria on 12/15. On arrival patient's NIH was 4 for right upper extremity, right lower extremity weakness, 1 limb ataxia and dysarthria. On further questioning, patient and wife state that the right arm weakness actually started last night. Patient's wife noticed when patient was trying to get up from the toilet that he was unable to push himself up with his right arm. Last known well 2330 on 2022. Patient taken to imaging for stat CT head which revealed no ICH, midline shift, mass-effect. CTA head and neck deferred due to patient's LC. Decision for no tPA was made due to patient's time since last known well. Patient with relatively low NIH and symptoms which are not consistent with LVO, therefore no thrombectomy/neuro intervention at this time. Patient had stat MRI brain and MRA head and neck without contrast.  MRI showed acute ischemic stroke of left parietal region. Pt admitted to IP rehab on 22.     Restrictions/Precautions:  Restrictions/Precautions: General Precautions, Fall Risk  Position Activity Restriction  Other position/activity restrictions: right side hemiparesis, recent CABG 11/22-minimize arm use ( s/p 5 wks), life vest      SUBJECTIVE: Pt cooperative and pleasant during session. Pt. Now on IV due to abnormal lab values per pt report. PAIN: 0/10: as pt denies pain    Vitals: Vitals not assessed per clinical judgement, see nursing flowsheet    COGNITION: Difficulty Following Commands    ADL:   No ADL's completed this session. Donald Acosta BALANCE:  Sitting Balance:  Supervision. ADDITIONAL ACTIVITIES:  Pt. Engaged in BUE strengthening with use of mild resistive theraband x15 reps in all available directions with min vc for proper technique and pace to ensure full ROM activated/achieved. Task performed to further advance pt's performance with daily occupations. OTR provided pt with theraputty HEP and instructed pt to go through HEP, pt able to retrieve small beads (10 total) with mod difficulty and increased time, pt able to complete 3 exercises in total until time of session up. Plan to continue to educate pt on HEP to promote improved /FMC in RUE. ASSESSMENT:  Assessment: Pt. is an active particpant in OT sessions to date, pt is making progress however has not fully met goals at this time due to short LOS. Pt. requires Min A for LB dressing, CGA standing sinkside for grooming, CGA toileting transfers, Min A sponge bath, Mod I self feed, SBA UB dressing. Pt. requires cues for initiation, problem solving and safety throughout session. Pt. demo 4 second improvement on 9 HPT in R hand and 1# less on  strength in R hand. Pt. requires continued OT services to adapt/modify daily routines as appropriate without continued services pt is at risk of falls, further decline in functioning and increased dependency on others. Activity Tolerance:  Patient tolerance of  treatment: fair.         Discharge Recommendations: Continue to assess pending progress and Patient would benefit from continued OT at discharge  Equipment Recommendations: Equipment Needed: Yes  Other: Monitor need for RW, and BSC. Plan: Times Per Week: 5x/week x 90 minutes and 1x/week x 30 minutes  Current Treatment Recommendations: Strengthening, Balance training, Self-Care / ADL, Equipment evaluation, education, & procurement, Safety education & training, Endurance training, Functional mobility training, Neuromuscular re-education, Patient/Caregiver education & training, Home management training    Patient Education  Patient Education: Home Exercise Program    Goals  Short Term Goals  Time Frame for Short Term Goals: 1 week  Short Term Goal 1: Pt will complete functional ambulation to/from BR and HH distances with SBA and min vcs for safety to increase indep with toileting. Short Term Goal 2: Pt will decrease RUE 9 hole peg test by 20 seconds and RUE  strength by 5 pounds to increase indep with fasteners in UB dressing. Short Term Goal 3: Pt will complete dynamic standing task x 5 minutes with 1-2 UE release and SBA to increase indep with sinkside grooming. Short Term Goal 4: Pt will complete UB dressing with Set up to increase indep within home environment. Short Term Goal 5: Pt will complete LB dresing with S and min vcs for safety to increase indep and endurance within home environment. Additional Goals?: No  Long Term Goals  Time Frame for Long Term Goals : 3 weeks  Long Term Goal 1: Pt will complete BADL routine including shower transfer Mod Indep to increase indep and safety in home environment. Long Term Goal 2: Pt will complete simple IADL task with S and 0 vcs for safety to increase indep and endurance in home environment. Following session, patient left in safe position with all fall risk precautions in place.

## 2022-12-27 NOTE — PROGRESS NOTES
[facility]  INPATIENT PHYSICAL THERAPY  Daily Note  [facility] - 7E-53/053-A    Time In: 1400  Time Out: 1430  Timed Code Treatment Minutes: 30 Minutes  Minutes: 30          Date: 2022  Patient Name: Shawn Low,  Gender:  male        MRN: [id number]  : 1960  (63 y.o.)     Referring Practitioner: Dr. Eva Tafoya  Diagnosis: Acute stroke due to ischemia  Additional Pertinent Hx: Pt admitted through ED due to RUE weakness and slurred speech,  Also noted to have sepsis, LC, emphysematous cystitis. Hx:  HTN, Db, CAD, recent CABG (). MRI + for acute ischemic stroke Lt parietal region     Prior Level of Function:  Lives With: Spouse, Family (16 y.o son, 21 y.o step dtr.)  Type of Home: House  Home Layout: One level  Home Access: Stairs to enter with rails  Entrance Stairs - Number of Steps: 2 + threshold  Entrance Stairs - Rails: Both  Home Equipment:  (None used PTA)   Bathroom Shower/Tub: Tub/Shower unit  Bathroom Toilet: Standard  Bathroom Equipment: Tub transfer bench  Bathroom Accessibility: Accessible    ADL Assistance: Independent  Homemaking Assistance: Independent  Homemaking Responsibilities: Yes  Ambulation Assistance: Independent  Transfer Assistance: Independent  Active : No    Restrictions/Precautions:  Restrictions/Precautions: General Precautions, Fall Risk  Position Activity Restriction  Other position/activity restrictions: right side hemiparesis, recent CABG -minimize arm use ( s/p 5 wks), life vest     SUBJECTIVE: Pt. Seated on his BS chair and pleasantly agrees to therapy session. RN requests for pt. To try using restroom after gait training this session. Pt. Unable to have successful BM. Pt. Then requests to return to bed. PAIN: Not rated: Buttocks    Vitals:   Patient Vitals for the past 8 hrs:   BP Patient Position Temp Temp src Pulse Resp Orthostatic B/P and Pulse?  Blood Pressure Sitting Blood Pressure Standing SpO2 12/27/22 1303 -- -- -- -- 72 -- -- -- -- --   12/27/22 1245 103/62 Sitting;Up in chair -- -- -- -- -- -- -- --   12/27/22 0800 122/66 Sitting;Up in chair 97.5 °F (36.4 °C) Oral 69 14 -- -- -- 98 %   12/27/22 0750 -- Sitting;Up in chair -- -- -- -- Yes 120/65 93/72 --        OBJECTIVE:  Bed Mobility:  Sit to Supine: Stand By Assistance   Scooting: Stand By Assistance, toward [facility]    Transfers:  Sit to Stand: Contact Guard Assistance  Stand to [facility] Assistance    Ambulation:  Contact Guard Assistance  Distance: 50' x 1, 15' x 1  Surface: Level Tile  Device: Rolling Walker  Gait Deviations:  Slow Jada, Decreased Step Length Bilaterally, Decreased Gait Speed, Decreased Heel Strike Bilaterally, Decreased Quad Control Right, Narrow Base of Support, and inconsistent step length. Stairs:  None    Balance:  Dynamic Standing Balance: Contact Guard Assistance while completing clothing management. Neuromuscular Re-education  None    Exercise:  Patient was guided in 1 set(s) 10 reps of exercises: Upper trunk rotations, Shoulder horizontal abduction/adduction, Shoulder rolls, Seated marches, Seated hamstring curls, Seated heel/toe raises, Long arc quads, Seated isometric hip adduction, Seated abduction/adduction and Scapular retraction,Quad sets, Heelslides, Hip abduction/adduction, and Straight leg raises. Exercises were completed for increased independence with functional mobility. Functional Outcome Measures:   Not completed    ASSESSMENT:  Assessment: Patient progressing toward established goals. Activity Tolerance:  Patient tolerance of  treatment: good.      Equipment Recommendations:Equipment Needed: Yes  Other: RW  Discharge Recommendations: Continue to assess pending progress, Patient would benefit from continued therapy after discharge     Plan: Current Treatment Recommendations: Strengthening, ROM, Balance training, Functional mobility training, Transfer training, Endurance training, Neuromuscular re-education, Gait training, Stair training, Safety education & training, Equipment evaluation, education, & procurement, Home exercise program, Therapeutic activities, Patient/Caregiver education & training  General Plan:  (5x/ wk 90 min, 1x/ wk 30 min)    Patient Education  Patient Education: Plan of Care, Functional Mobility, Reviewed Prior Education, Health Promotion and Wellness Education, Safety, Verbal Exercise Instruction    Goals:  Patient Goals : get back to my normal way of doing things  Short Term Goals  Time Frame for Short Term Goals: 1 wk  Short Term Goal 1: Pt to go supine <->sit, minimal use of UEs, SBA to get in/out of bed. Short Term Goal 2: Pt to get up/down from various seated surfaces, CGA, to get up to walk. Short Term Goal 3: Pt to walk with RW >= 50 ft, demonstrating step through gait pattern, recurvatum < 10% of steps on RT, CGA to progress to home mobility  Short Term Goal 4: Pt to ascend/descend 2 steps with gerber rails, CGA to progress to home entry  Short Term Goal 5: Pt to get in/out of car, CGA for transportation needs. Additional Goals?: Yes  Short Term Goal 6: Pt to perform Tinetti >= 18/28, demonstrating improved balance. Long Term Goals  Time Frame for Long Term Goals : 3 wks from evaluation  Long Term Goal 1: Pt to go supine <->sit, minimal use of UEs, Mod I, to get in/out of bed. Long Term Goal 2: Pt to get up/down from various seated surfaces, Mod I, to get up to walk. Long Term Goal 3: Pt to walk with RW >= 50 ft, demonstrating step through gait pattern, recurvatum < 10% of steps on RT, Mod I, for home mobility  Long Term Goal 4: Pt to perform Tinetti >= 24/28, demonstrating improved balance. Following session, patient left in safe position with all fall risk precautions in place.

## 2022-12-27 NOTE — PROGRESS NOTES
[facility]  INPATIENT PHYSICAL THERAPY  Progress Note  [facility] - 7E-53/053-A    Time In: 1130  Time Out: 1230  Timed Code Treatment Minutes: 60 Minutes  Minutes: 60          Date: 2022  Patient Name: Armen Rose,  Gender:  male        MRN: [id number]  : 1960  (63 y.o.)     Referring Practitioner: Dr. Maria De Jesus Chilel  Diagnosis: Acute stroke due to ischemia  Additional Pertinent Hx: Pt admitted through ED due to RUE weakness and slurred speech,  Also noted to have sepsis, LC, emphysematous cystitis. Hx:  HTN, Db, CAD, recent CABG (). MRI + for acute ischemic stroke Lt parietal region     Prior Level of Function:  Lives With: Spouse, Family (16 y.o son, 21 y.o step dtr.)  Type of Home: House  Home Layout: One level  Home Access: Stairs to enter with rails  Entrance Stairs - Number of Steps: 2 + threshold  Entrance Stairs - Rails: Both  Home Equipment:  (None used PTA)   Bathroom Shower/Tub: Tub/Shower unit  Bathroom Toilet: Standard  Bathroom Equipment: Tub transfer bench  Bathroom Accessibility: Accessible    ADL Assistance: Independent  Homemaking Assistance: Independent  Homemaking Responsibilities: Yes  Ambulation Assistance: Independent  Transfer Assistance: Independent  Active : No    Restrictions/Precautions:  Restrictions/Precautions: General Precautions, Fall Risk  Position Activity Restriction  Other position/activity restrictions: right side hemiparesis, recent CABG -minimize arm use ( s/p 5 wks), life vest     SUBJECTIVE: Pt. Laying in his bed and pleasantly agrees to therapy session. Pt. With IV fluids running throughout session. Pt. Motivated for therapy. PAIN: None indicated    Vitals:   Patient Vitals for the past 8 hrs:   BP Patient Position Temp Temp src Pulse Resp Orthostatic B/P and Pulse?  Blood Pressure Sitting Blood Pressure Standing SpO2   22 1245 103/62 Sitting;Up in chair -- -- -- -- -- -- -- --   22 0800 122/66 Sitting;Up in chair 97.5 °F (36.4 °C) Oral 69 14 -- -- -- 98 %   12/27/22 0750 -- Sitting;Up in chair -- -- -- -- Yes 120/65 93/72 --        OBJECTIVE:  Bed Mobility:  Rolling to Left: Stand By Assistance   Supine to Sit: Minimal Assistance  Scooting: Minimal Assistance    Transfers:  Sit to Stand: Contact Guard Assistance, with verbal cues  Stand to [facility] 68, with increased time for completion  Stand Pivot:Contact Guard Assistance, Minimal Assistance, with increased time for completion, with verbal cues  To/From Bed and Chair: Minimal Assistance  Car:Minimal Assistance  Cues required throughout all transfers to limit UE use as able. Pt. Also requiring cues for anterior weight shifting with sit<>stand transfers. Ambulation:  Contact Guard Assistance, Minimal Assistance  Distance: 20' x 1, 5' x 1, 3' x 2  Surface: Level Tile  Device: Rolling Walker  Gait Deviations: Forward Flexed Posture, Slow Jada, Decreased Step Length Bilaterally, Decreased Weight Shift Bilaterally, Decreased Trunk Rotation, Decreased Gait Speed, Decreased Heel Strike Bilaterally, Narrow Base of Support, Mild Path Deviations, Unsteady Gait, and Increased reliance on walker    Stairs:  Stairs: 6\" steps X 3 using Bilateral Handrails and Minimal Assistance, with verbal cues , with increased time for completion. Balance:  See Functional outcomes    Neuromuscular Re-education  None    Exercise:  Patient was guided in 1 set(s) 10 reps of exercises: Glut sets, Seated marches, Seated hamstring curls, Seated heel/toe raises, Long arc quads, Seated isometric hip adduction, Seated abduction/adduction, and abdominal isometrics. Exercises were completed for increased independence with functional mobility. Functional Outcome Measures:   Completed.     Tinetti Balance    Sitting Balance: Leans or slides in chair  Arises: Able, uses arms to help  Attempts to Arise: Able, requires more than one attempt  Immediate Standing Balance (First 5 Seconds): Steady but uses walker or other support  Standing Balance: Steady but wide stance, uses cane or other support  Nudged: Steady without walker or other support  Eyes Closed: Unsteady  Turned 360 Degrees: Steadiness: Unsteady (grabs, staggers)  Turned 360 Degrees: Continuity of Steps: Discontinuous steps  Sitting Down: Uses arms or not a smooth motion  Balance Score: 7/16 Initiation of Gait: No hesitancy  Step Height: R Swing Foot: Right foot complete clears floor  Step Length: R Swing Foot: Does not pass left stance foot with step  Step Height: L Swing Foot: Left foot complete clears floor  Step Length: L Swing Foot: Does not pass right stance foot with step  Step Symmetry: Right and left step appear equal  Step Continuity: Stopping or discontinuity between steps  Path: Mild/moderate deviation or uses walking aid  Trunk: Marked sway or uses walking aid  Walking Time: Heels almost touching while walking  Gait Score: 6/12     Current Score: 12 / 28 (Date: 12/27/2022)    Interpretation of Score: Tinetti is  into a gait score and balance score. The higher the patient's score, the more independent/lower fall risk. A total score of 27 or more indicates low fall risk, 20-26 is moderate fall risk, and 19 or less is indicative of high fall risk. ASSESSMENT:  Assessment: Patient progressing toward established goals, meeting 1 short term goal.  He continues to demonstrate deficits in Strength, Balance (Tinetti:  12/28, indicating high fall risk ), Coordination, and Gait mechanics and would benefit from continued skilled PT to address these impairments to return to PLOF. Activity Tolerance:  Patient tolerance of  treatment: good.      Equipment Recommendations:Equipment Needed: Yes  Other: RW  Discharge Recommendations: Continue to assess pending progress, Patient would benefit from continued therapy after discharge     Plan: Current Treatment Recommendations: Strengthening, ROM, Balance training, Functional mobility training, Transfer training, Endurance training, Neuromuscular re-education, Gait training, Stair training, Safety education & training, Equipment evaluation, education, & procurement, Home exercise program, Therapeutic activities, Patient/Caregiver education & training  General Plan:  (5x/ wk 90 min, 1x/ wk 30 min)    Patient Education  Patient Education: Plan of Care, Functional Mobility, Reviewed Prior Education, Health Promotion and Wellness Education, Safety, Verbal Exercise Instruction    Goals:  Patient Goals : get back to my normal way of doing things    Short Term Goals  Time Frame for Short Term Goals: 1 wk  Short Term Goal 1: Pt to go supine <->sit, minimal use of UEs, SBA to get in/out of bed. GOAL NOT MET  Short Term Goal 2: Pt to get up/down from various seated surfaces, CGA, to get up to walk. GOAL MET, SEE LTG  Short Term Goal 3: Pt to walk with RW >= 50 ft, demonstrating step through gait pattern, recurvatum < 10% of steps on RT, CGA to progress to home mobility GOAL NOT MET  Short Term Goal 4: Pt to ascend/descend 2 steps with gerber rails, CGA to progress to home entry GOAL NOT MET  Short Term Goal 5: Pt to get in/out of car, CGA for transportation needs. GOAL NOT MET  Short Term Goal 6: Pt to perform Tinetti >= 18/28, demonstrating improved balance. GOAL NOT MET              Long Term Goals  Time Frame for Long Term Goals : 3 wks from evaluation  Long Term Goal 1: Pt to go supine <->sit, minimal use of UEs, Mod I, to get in/out of bed. GOAL NOT MET  Long Term Goal 2: Pt to get up/down from various seated surfaces, Mod I, to get up to walk. GOAL NOT MET  Long Term Goal 3: Pt to walk with RW >= 50 ft, demonstrating step through gait pattern, recurvatum < 10% of steps on RT, Mod I, for home mobility GOAL NOT MET  Long Term Goal 4: Pt to perform Tinetti >= 24/28, demonstrating improved balance.  GOAL NOT MET          Following session, patient left in safe position with all fall risk precautions in place. Treatment session and note completed by Helena Rodrigez PTA.   Assessment and goal revision of Progress Note completed by Shari Moran PT.

## 2022-12-27 NOTE — CONSULTS
[facility]          [facility]        [address]        [place], Radha Amaya [facility]        [phone]           Inpatient Initial consult note         12/27/2022 12:09 PM      Patient Name:   Dennis Velasquez  YOB: 1960  Primary Care Physician:  BLACK Romero CNP   Admit Date:    12/23/2022 11:24 AM    Consultation requested by : Slade Dickson MD    Reason for Consult : Nephrology following for LC/CKD. History of presenting illness :Dennis Velasquez is a 63 y.o.   male with Past Medical History as stated below presented with a chief complaint of No chief complaint on file. on 12/23/2022 . Patient initially presented with chief complaints of weakness, right-sided on 12/15, associated with some dizziness when he was trying to get up from sitting position on the toilet he had some associated neck pain and upper back pain. No specific modifying factors    He was found to be tachycardic and elevated WBC had a Bella placed in the ER which had dark brown and bright red color urine he was treated on antibiotics and was seen by urology multiple CT scans did not show any acute abnormality however the MRI of brain from 12/15 showed acute ischemic stroke in the left parietal region he was subsequently seen by neurology. He has positive blood and urine cultures with Klebsiella seen by infectious disease and received antibiotics.     Patient's creatinine was running around 1.5 which has gradually rising to 2.1 and so nephrology has been consulted for further evaluation and management    Past History:  Past Medical History:   Diagnosis Date    Coronary artery disease 2022    Diabetes mellitus ([address] 2020    Hypertension     Ischemic cardiomyopathy 2022    Urinary retention 2022     Past Surgical History:   Procedure Laterality Date    CORONARY ARTERY BYPASS GRAFT N/A 11/7/2022    CABG X2 JOY WITH BALLOON PUMP performed by Eneida Potter MD at [facility] TRANSESOPHAGEAL ECHOCARDIOGRAM N/A 2022    TRANSESOPHAGEAL ECHOCARDIOGRAM WITH ANESTHESIA performed by Jorge Burgess MD at [facility] History     Socioeconomic History    Marital status:      Spouse name: Not on file    Number of children: Not on file    Years of education: Not on file    Highest education level: Not on file   Occupational History    Not on file   Tobacco Use    Smoking status: Former     Packs/day: 1.00     Years: 33.00     Pack years: 33.00     Types: Cigarettes     Start date: 38     Quit date:      Years since quittin.9    Smokeless tobacco: Never   Vaping Use    Vaping Use: Never used   Substance and Sexual Activity    Alcohol use: Not Currently     Comment: Uses during three 24 ounce beer per week in the past; stopped alcoholic beverage usage after he was found to have cardiac disease    Drug use: No    Sexual activity: Yes     Partners: Female   Other Topics Concern    Not on file   Social History Narrative    Not on file     Social Determinants of Health     Financial Resource Strain: Low Risk     Difficulty of Paying Living Expenses: Not hard at all   Food Insecurity: No Food Insecurity    Worried About Running Out of Food in the Last Year: Never true    [address] in the Last Year: Never true   Transportation Needs: No Transportation Needs    Lack of Transportation (Medical): No    Lack of Transportation (Non-Medical): No   Physical Activity: Not on file   Stress: Not on file   Social Connections: Not on file   Intimate Partner Violence: Not on file   Housing Stability: Not on file     Family History   Problem Relation Age of Onset    Diabetes Father     Stroke Father     Diabetes Brother     Diabetes Brother     Alcohol Abuse Paternal Grandfather     Heart Attack Paternal Grandfather        Medications & Allergies :  Prior to Admission medications    Medication Sig Start Date End Date Taking?  Authorizing Provider   atorvastatin (LIPITOR) 40 MG tablet Take 1 tablet by mouth daily 11/22/22   BLACK Grace CNP   metoprolol succinate (TOPROL XL) 25 MG extended release tablet Take 1 tablet by mouth daily 11/22/22   BLACK Schultz CNP   aspirin 325 MG EC tablet Take 1 tablet by mouth daily  Patient not taking: Reported on 12/23/2022 11/15/22   Lori Renee PA-C   Multiple Vitamin (MULTIVITAMIN) TABS tablet Take 1 tablet by mouth daily (with breakfast)  Patient not taking: No sig reported 11/15/22   Lori Renee PA-C   tamsulosin [facility]) 0.4 MG capsule Take 1 capsule by mouth daily  Patient not taking: No sig reported 11/15/22   Lori Renee PA-C   acetaminophen (TYLENOL) 500 MG tablet Take 500 mg by mouth every 6 hours as needed for Pain As needed  Patient not taking: No sig reported    Historical Provider, MD   buPROPion (WELLBUTRIN XL) 150 MG extended release tablet Take 1 tablet by mouth every morning 10/12/22   BLACK Grace CNP   omeprazole (PRILOSEC) 40 MG delayed release capsule Take 1 capsule by mouth daily 10/12/22   BLACK Grace CNP   Dulaglutide (TRULICITY) 1.5 PS/3.9LS SOPN Inject 1.5 mg into the skin once a week 8/18/22   BLACK Grace CNP   glimepiride (AMARYL) 4 MG tablet Take 1 tablet by mouth every morning (before breakfast) 6/28/22   BLACK Grace CNP   empagliflozin (JARDIANCE) 25 MG tablet Take 1 tablet by mouth daily 5/24/22   BLACK Grace CNP     Allergies: Metformin and related  IP meds : Scheduled Meds:   [START ON 12/28/2022] baclofen  5 mg Oral Daily    sodium chloride  250 mL IntraVENous Once    atorvastatin  40 mg Oral Daily    buPROPion  150 mg Oral QAM    pantoprazole  40 mg Oral QAM AC    tamsulosin  0.8 mg Oral Daily    apixaban  5 mg Oral BID    metoprolol succinate  12.5 mg Oral Daily    glipiZIDE  10 mg Oral QAM AC    empagliflozin  25 mg Oral Daily    docusate sodium  100 mg Oral BID    melatonin  3 mg Oral Nightly    multivitamin  1 tablet Oral Daily with breakfast    senna  2 tablet Oral Nightly    ciprofloxacin  750 mg Oral 2 times per day     Continuous Infusions:   sodium chloride      dextrose         Review of Systems  Review of Systems Physical Exam  Physical Exam     Labs, Radiology and Tests :  CBC:   Recent Labs     12/27/22  0821   WBC 10.3   HGB 10.5*   HCT 33.2*   *     CMP:  Recent Labs     12/27/22  0821      K 5.2   CL 97*   CO2 24   BUN 37*   CREATININE 2.1*   GLUCOSE 155*   CALCIUM 9.2     /66   Pulse 69   Temp 97.5 °F (36.4 °C) (Oral)   Resp 14   Ht 5' 2\" (1.575 m)   Wt 128 lb 9.6 oz (58.3 kg)   SpO2 98%   BMI 23.52 kg/m²     Radiology : Renal ultrasound scan 12/22 shows bilateral hydronephrosis    Old lab data have been reviewed and noted patient's baseline creatinine is around 1.5-1.7    Other : Ejection fraction 12/16/2022 shows ejection fraction 30%    Assessment and Plan:  Renal -acute kidney injury on chronic kidney disease with baseline creatinine around 1.5-1.7  Exact etiology not clear at this time he does look slightly dry though  Making decent urine output has a Bella in place  Agree with continuation of IV fluids he was not on any nephrotoxic agents. He does take some antibiotics  For now monitor BMP. Electrolytes -within normal limits  Acid-base status appears to be stable  Essential hypertension stable  Hx of diabetes mellitus  Hx of sepsis due to UTI from Klebsiella pneumonia on antibiotics  CAD status post CABG 11/22  Meds reviewed and discussed with patient and nursing staff    Efrem Flores MD  [facility]    This report has been created using voice recognition software.  It may contain minor errors which are inherent in voice recognition technology

## 2022-12-28 ENCOUNTER — TELEPHONE (OUTPATIENT)
Dept: NEUROLOGY | Age: 62
End: 2022-12-28

## 2022-12-28 LAB
ANION GAP SERPL CALCULATED.3IONS-SCNC: 11 MEQ/L (ref 8–16)
BUN BLDV-MCNC: 38 MG/DL (ref 7–22)
CALCIUM SERPL-MCNC: 9.1 MG/DL (ref 8.5–10.5)
CHLORIDE BLD-SCNC: 102 MEQ/L (ref 98–111)
CO2: 25 MEQ/L (ref 23–33)
CREAT SERPL-MCNC: 1.9 MG/DL (ref 0.4–1.2)
GFR SERPL CREATININE-BSD FRML MDRD: 40 ML/MIN/1.73M2
GLUCOSE BLD-MCNC: 153 MG/DL (ref 70–108)
GLUCOSE BLD-MCNC: 153 MG/DL (ref 70–108)
POTASSIUM REFLEX MAGNESIUM: 5.1 MEQ/L (ref 3.5–5.2)
SODIUM BLD-SCNC: 138 MEQ/L (ref 135–145)

## 2022-12-28 PROCEDURE — 97530 THERAPEUTIC ACTIVITIES: CPT

## 2022-12-28 PROCEDURE — 97129 THER IVNTJ 1ST 15 MIN: CPT

## 2022-12-28 PROCEDURE — 6370000000 HC RX 637 (ALT 250 FOR IP): Performed by: PHYSICAL MEDICINE & REHABILITATION

## 2022-12-28 PROCEDURE — 1180000000 HC REHAB R&B

## 2022-12-28 PROCEDURE — 36415 COLL VENOUS BLD VENIPUNCTURE: CPT

## 2022-12-28 PROCEDURE — 97130 THER IVNTJ EA ADDL 15 MIN: CPT

## 2022-12-28 PROCEDURE — 97535 SELF CARE MNGMENT TRAINING: CPT

## 2022-12-28 PROCEDURE — 82948 REAGENT STRIP/BLOOD GLUCOSE: CPT

## 2022-12-28 PROCEDURE — 80048 BASIC METABOLIC PNL TOTAL CA: CPT

## 2022-12-28 PROCEDURE — 99232 SBSQ HOSP IP/OBS MODERATE 35: CPT | Performed by: INTERNAL MEDICINE

## 2022-12-28 PROCEDURE — 2580000003 HC RX 258: Performed by: PHYSICAL MEDICINE & REHABILITATION

## 2022-12-28 PROCEDURE — 99232 SBSQ HOSP IP/OBS MODERATE 35: CPT | Performed by: PHYSICAL MEDICINE & REHABILITATION

## 2022-12-28 PROCEDURE — 97110 THERAPEUTIC EXERCISES: CPT

## 2022-12-28 PROCEDURE — 97116 GAIT TRAINING THERAPY: CPT

## 2022-12-28 PROCEDURE — 97112 NEUROMUSCULAR REEDUCATION: CPT

## 2022-12-28 RX ORDER — LANOLIN ALCOHOL/MO/W.PET/CERES
6 CREAM (GRAM) TOPICAL NIGHTLY
Status: DISCONTINUED | OUTPATIENT
Start: 2022-12-28 | End: 2023-01-13 | Stop reason: HOSPADM

## 2022-12-28 RX ADMIN — BUPROPION HYDROCHLORIDE 150 MG: 150 TABLET, FILM COATED, EXTENDED RELEASE ORAL at 08:03

## 2022-12-28 RX ADMIN — DOCUSATE SODIUM 100 MG: 100 CAPSULE, LIQUID FILLED ORAL at 08:05

## 2022-12-28 RX ADMIN — BACLOFEN 5 MG: 10 TABLET ORAL at 08:03

## 2022-12-28 RX ADMIN — TAMSULOSIN HYDROCHLORIDE 0.8 MG: 0.4 CAPSULE ORAL at 08:04

## 2022-12-28 RX ADMIN — APIXABAN 5 MG: 5 TABLET, FILM COATED ORAL at 22:32

## 2022-12-28 RX ADMIN — ACETAMINOPHEN 650 MG: 325 TABLET ORAL at 16:48

## 2022-12-28 RX ADMIN — GLIPIZIDE 10 MG: 10 TABLET ORAL at 08:04

## 2022-12-28 RX ADMIN — EMPAGLIFLOZIN 25 MG: 25 TABLET, FILM COATED ORAL at 08:04

## 2022-12-28 RX ADMIN — METOPROLOL SUCCINATE 12.5 MG: 25 TABLET, EXTENDED RELEASE ORAL at 08:04

## 2022-12-28 RX ADMIN — Medication 6 MG: at 22:32

## 2022-12-28 RX ADMIN — CIPROFLOXACIN HYDROCHLORIDE 750 MG: 250 TABLET, FILM COATED ORAL at 08:03

## 2022-12-28 RX ADMIN — PANTOPRAZOLE SODIUM 40 MG: 40 TABLET, DELAYED RELEASE ORAL at 05:41

## 2022-12-28 RX ADMIN — DOCUSATE SODIUM 100 MG: 100 CAPSULE, LIQUID FILLED ORAL at 22:32

## 2022-12-28 RX ADMIN — CIPROFLOXACIN HYDROCHLORIDE 750 MG: 250 TABLET, FILM COATED ORAL at 22:32

## 2022-12-28 RX ADMIN — SENNOSIDES 17.2 MG: 8.6 TABLET, FILM COATED ORAL at 22:32

## 2022-12-28 RX ADMIN — SODIUM CHLORIDE: 9 INJECTION, SOLUTION INTRAVENOUS at 13:24

## 2022-12-28 RX ADMIN — APIXABAN 5 MG: 5 TABLET, FILM COATED ORAL at 08:04

## 2022-12-28 RX ADMIN — Medication 1 TABLET: at 08:04

## 2022-12-28 ASSESSMENT — PAIN - FUNCTIONAL ASSESSMENT: PAIN_FUNCTIONAL_ASSESSMENT: ACTIVITIES ARE NOT PREVENTED

## 2022-12-28 ASSESSMENT — ENCOUNTER SYMPTOMS
SORE THROAT: 0
NAUSEA: 0
RHINORRHEA: 0
TROUBLE SWALLOWING: 0
ABDOMINAL PAIN: 0
WHEEZING: 0
DIARRHEA: 0
SHORTNESS OF BREATH: 0
COUGH: 0
VOMITING: 0
CONSTIPATION: 0

## 2022-12-28 ASSESSMENT — PAIN SCALES - GENERAL
PAINLEVEL_OUTOF10: 4
PAINLEVEL_OUTOF10: 5

## 2022-12-28 ASSESSMENT — PAIN DESCRIPTION - LOCATION: LOCATION: SHOULDER

## 2022-12-28 ASSESSMENT — PAIN DESCRIPTION - ORIENTATION: ORIENTATION: RIGHT;LEFT

## 2022-12-28 ASSESSMENT — PAIN DESCRIPTION - DESCRIPTORS: DESCRIPTORS: ACHING

## 2022-12-28 NOTE — PROGRESS NOTES
[address]  Occupational Therapy  Daily Note  Time:   Time In: 1130  Time Out: 1200  Timed Code Treatment Minutes: 30 Minutes  Minutes: 30          Date: 2022  Patient Name: Marion Fonseca,   Gender: male      Room: [facility]/053-A  MRN: [id number]  : 1960  (63 y.o.)  Referring Practitioner: Ordering & Attending: Marcelline Merlin, MD  Diagnosis: Acute Stroke due to Ischemia  Additional Pertinent Hx: Marion Fonseca who is a 63 y.o. male with a history of hypertension, diabetes, CAD on  daily, recent CABG in 2022, ischemic cardiomyopathy is admitted to [facility] for sepsis, emphysematous cystitis and acute kidney injury on CKD. During admission exam patient stated that his right arm feldman has been weak and that he has had some slurred speech. Stroke alert was called for right-sided weakness and dysarthria on 12/15. On arrival patient's NIH was 4 for right upper extremity, right lower extremity weakness, 1 limb ataxia and dysarthria. On further questioning, patient and wife state that the right arm weakness actually started last night. Patient's wife noticed when patient was trying to get up from the toilet that he was unable to push himself up with his right arm. Last known well 2330 on 2022. Patient taken to imaging for stat CT head which revealed no ICH, midline shift, mass-effect. CTA head and neck deferred due to patient's LC. Decision for no tPA was made due to patient's time since last known well. Patient with relatively low NIH and symptoms which are not consistent with LVO, therefore no thrombectomy/neuro intervention at this time. Patient had stat MRI brain and MRA head and neck without contrast.  MRI showed acute ischemic stroke of left parietal region. Pt admitted to IP rehab on 22.     Restrictions/Precautions:  Restrictions/Precautions: General Precautions, Fall Risk  Position Activity Restriction  Other position/activity restrictions: right side hemiparesis, recent CABG 11/22-minimize arm use ( s/p 5 wks), life vest     SUBJECTIVE: Upon arrival pt seated in bedside chair and agreeable to OT session. Pt's spouse present during session. Pt. Denies any dizziness this date. PAIN: 0/10:      Vitals: Vitals not assessed per clinical judgement, see nursing flowsheet    COGNITION: Decreased Insight and Decreased Safety Awareness    ADL:   Grooming: Contact Guard Assistance and with increased time for completion. Standing sinkside to engage in oral care tasks . BALANCE:  Sitting Balance:  Supervision. Standing Balance: Contact Guard Assistance. BED MOBILITY:  Not Tested    TRANSFERS:  Sit to Stand:  [employer], with verbal cues. Stand to Sit: [employer], with verbal cues. FUNCTIONAL MOBILITY:  Assistive Device: Rolling Walker  Assist Level:  Contact Guard Assistance, with verbal cues , and with increased time for completion. Distance: To and from bathroom        ADDITIONAL ACTIVITIES:  As pt's spouse present, OTR provided education on pt's performance with self care tasks thus far and continued need for services. Verbal understanding obtained. OTR answered questions to best of ability. OTR instructed pt through theraputty HEP and picked up where session left off prior date to ensure full understanding of HEP, pt required min vc for proper technique and pt completed 10 reps each. Education provided to continue to complete outside of OT sessions to improve FMC/strength in RUE for ease with ADLs. ASSESSMENT:     Activity Tolerance:  Patient tolerance of  treatment: fair. Discharge Recommendations: Home with Home Health OT and Patient would benefit from continued OT at discharge  Equipment Recommendations: Equipment Needed: Yes  Other: OT staff to continue to monitor needs throughout OT POC.   Plan: Times Per Week: 5x/week x 90 minutes and 1x/week x 30 minutes  Current Treatment Recommendations: Strengthening, Balance training, Self-Care / ADL, Equipment evaluation, education, & procurement, Safety education & training, Endurance training, Functional mobility training, Neuromuscular re-education, Patient/Caregiver education & training, Home management training    Patient Education  Patient Education: Home Exercise Program, Hemibody Awareness/Attention, Importance of Increasing Activity, and Assistive Device Safety    Goals  Short Term Goals  Time Frame for Short Term Goals: 1 week  Short Term Goal 1: Pt will complete functional ambulation to/from BR and HH distances with SBA and min vcs for safety to increase indep with toileting. Short Term Goal 2: Pt will decrease RUE 9 hole peg test by 20 seconds and RUE  strength by 5 pounds to increase indep with fasteners in UB dressing. Short Term Goal 3: Pt will complete dynamic standing task x 5 minutes with 1-2 UE release and SBA to increase indep with sinkside grooming. Short Term Goal 4: Pt will complete UB dressing with Set up to increase indep within home environment. Short Term Goal 5: Pt will complete LB dresing with S and min vcs for safety to increase indep and endurance within home environment. Additional Goals?: No  Long Term Goals  Time Frame for Long Term Goals : 4 weeks from IPR eval  Long Term Goal 1: Pt will complete BADL routine including shower transfer Mod Indep to increase indep and safety in home environment. Long Term Goal 2: Pt will complete simple IADL task with S and 0 vcs for safety to increase indep and endurance in home environment. Following session, patient left in safe position with all fall risk precautions in place.

## 2022-12-28 NOTE — PROGRESS NOTES
[address] [place] [address]  Recreational Therapy  Daily Note  [address]    Time Spent with Patient: 10 minutes    Date:  12/28/2022       Patient Name: Mira Melendez      MRN: [id number]      YOB: 1960 (63 y.o.)       Gender: male  Diagnosis: Acute Stroke due to Ischemia  Referring Practitioner: Ordering & Attending: Ish Toscano MD    Patient enjoyed spending time with our pet therapy dogs.  Pt enjoyed petting our pet therapy dogs Maribel and Tanya-states he likes cats better but did enjoy visiting and conversing with us-affect bright and social     Electronically signed by: Karyle Manas, CTRS  Date: 12/28/2022

## 2022-12-28 NOTE — PLAN OF CARE
Problem: Discharge Planning  Goal: Discharge to home or other facility with appropriate resources  12/28/2022 1436 by Danuta Espinoza RN  Outcome: Progressing  Weekly team conference held to assess readiness for discharge. All PT and OT discharge goals must me met for patient to be considered safe to discharge. Problem: Chronic Conditions and Co-morbidities  Goal: Patient's chronic conditions and co-morbidity symptoms are monitored and maintained or improved  12/28/2022 1436 by Danuta Espinoza RN  Outcome: Progressing  Flowsheets (Taken 12/28/2022 0754)  Care Plan - Patient's Chronic Conditions and Co-Morbidity Symptoms are Monitored and Maintained or Improved:   Monitor and assess patient's chronic conditions and comorbid symptoms for stability, deterioration, or improvement   Collaborate with multidisciplinary team to address chronic and comorbid conditions and prevent exacerbation or deterioration     Problem: Safety - Adult  Goal: Free from fall injury  12/28/2022 1436 by Danuta Espinoza RN  Outcome: Progressing  Falling star prevention in place. Bed and chair alarms in use. Call light in reach. Purposeful hourly rounding. Problem: ABCDS Injury Assessment  Goal: Absence of physical injury  Outcome: Progressing  No physical injury noted this shift. Problem: Skin/Tissue Integrity  Goal: Absence of new skin breakdown  Description: 1. Monitor for areas of redness and/or skin breakdown  2. Assess vascular access sites hourly  3. Every 4-6 hours minimum:  Change oxygen saturation probe site  4. Every 4-6 hours:  If on nasal continuous positive airway pressure, respiratory therapy assess nares and determine need for appliance change or resting period.   Outcome: Progressing     Problem: Pain  Goal: Verbalizes/displays adequate comfort level or baseline comfort level  12/28/2022 1436 by Danuta Espinoza RN  Outcome: Progressing  Flowsheets (Taken 12/28/2022 2324)  Verbalizes/displays adequate comfort level or baseline comfort level:   Encourage patient to monitor pain and request assistance   Assess pain using appropriate pain scale   Implement non-pharmacological measures as appropriate and evaluate response   Administer analgesics based on type and severity of pain and evaluate response     Problem: Nutrition Deficit:  Goal: Optimize nutritional status  Outcome: Progressing  Tolerating current diet and po fluids well.

## 2022-12-28 NOTE — PLAN OF CARE
Problem: Discharge Planning  Goal: Discharge to home or other facility with appropriate resources  Flowsheets  Taken 12/28/2022 0759 by Benjamin Byrd RN  Discharge to home or other facility with appropriate resources:   Identify barriers to discharge with patient and caregiver   Identify discharge learning needs (meds, wound care, etc)  Taken 12/27/2022 2120 by Sujey Mcgee RN  Discharge to home or other facility with appropriate resources: Identify barriers to discharge with patient and caregiver  Note: Team conference held Wednesday, 12/28. Recommendations of the team were explained to the patient by Dr Naya Ascencio and SW. Team is recommending that patient continue on acute inpatient rehab for PT, OT and ST, with an undetermined discharge date. Following discharge, team is recommending home health services for RN, PT, OT and HHA. Care plan reviewed with patient. Patient verbalized understanding of the plan of care and contributed to goal setting. SW met with patient and Nathan Mac to follow up on care conference. aNthan Mac was in agreement with the plan and wants patient to receive as much therapy as possible. Nathan Draft reported that patient was experiencing weakness before his hospitalizations and feels continued intensive therapy is best for him. SW to follow and maintain involvement in discharge planning.

## 2022-12-28 NOTE — PROGRESS NOTES
[address]  INPATIENT PHYSICAL THERAPY  Daily Note  [address] [facility] - [id number]/053-A    Time In: 1000  Time Out: 1100  Timed Code Treatment Minutes: 60 Minutes  Minutes: 60          Date: 2022  Patient Name: Isacc Key,  Gender:  male        MRN: [id number]  : 1960  (63 y.o.)     Referring Practitioner: Dr. Perla Haines  Diagnosis: Acute stroke due to ischemia  Additional Pertinent Hx: Pt admitted through ED due to RUE weakness and slurred speech,  Also noted to have sepsis, LC, emphysematous cystitis. Hx:  HTN, Db, CAD, recent CABG (). MRI + for acute ischemic stroke Lt parietal region     Prior Level of Function:  Lives With: Spouse, Family (16 y.o son, 21 y.o step dtr.)  Type of Home: House  Home Layout: One level  Home Access: Stairs to enter with rails  Entrance Stairs - Number of Steps: 2 + threshold  Entrance Stairs - Rails: Both  Home Equipment:  (None used PTA)   Bathroom Shower/Tub: Tub/Shower unit  Bathroom Toilet: Standard  Bathroom Equipment: Tub transfer bench  Bathroom Accessibility: Accessible    ADL Assistance: Independent  Homemaking Assistance: Independent  Homemaking Responsibilities: Yes  Ambulation Assistance: Independent  Transfer Assistance: Independent  Active : No    Restrictions/Precautions:  Restrictions/Precautions: General Precautions, Fall Risk  Position Activity Restriction  Other position/activity restrictions: right side hemiparesis, recent CABG -minimize arm use ( s/p 5 wks), life vest     SUBJECTIVE: Pt. Seated on his BS chair and pleasantly agrees to therapy session. Spouse present at beginning of session however, not attending session.      PAIN: None indicated    Vitals:   Patient Vitals for the past 8 hrs:   BP Patient Position Temp Temp src Pulse Resp SpO2 O2 Device   22 0759 109/70 Sitting;Up in chair 97.6 °F (36.4 °C) Oral 79 17 100 % None (Room air)        OBJECTIVE:  Bed Mobility:  Not Tested    Transfers:  Sit to Stand: [employer], Minimal Assistance, with increased time for completion, with verbal cues  Stand to [place] [address], with verbal cues  To/From Bed and Chair: [employer], with increased time for completion, with verbal cues    Ambulation:  [employer], with verbal cues , with increased time for completion  Distance: 65' x 1, 10' x 1, multiple shorter distances. Surface: Level Tile  Device: Rolling Walker  Gait Deviations:  Slow Jada, Decreased Step Length Bilaterally, Decreased Gait Speed, Decreased Heel Strike on Right, Decreased Quad Control Right, Narrow Base of Support, and genu recurvatum on R. Stairs:  None    Balance:  None    Neuromuscular Re-education  Pt. Completed standing and dynamic gait activities using Bilateral UE support on Rolling Walker to improve R quad facilitation, proprioception, coordination, gait mechanics, and lateral weight shifting for improved functional mobility. Exercise:  Patient was guided in 1 set(s) 10 reps of exercises: Glut sets, Seated marches, Seated hamstring curls, Seated heel/toe raises, Long arc quads, Seated isometric hip adduction, Seated abduction/adduction, and abdominal isometrics, Standing heel raises, Standing marches, and Mini squats. Exercises were completed for increased independence with functional mobility. Functional Outcome Measures:   Not completed    ASSESSMENT:  Assessment: Patient progressing toward established goals. Activity Tolerance:  Patient tolerance of  treatment: good.      Equipment Recommendations:Equipment Needed: Yes  Other: RW  Discharge Recommendations: Continue to assess pending progress, Patient would benefit from continued therapy after discharge     Plan: Current Treatment Recommendations: Strengthening, ROM, Balance training, Functional mobility training, Transfer training, Endurance training, Neuromuscular re-education, Gait training, Stair training, Safety education & training, Equipment evaluation, education, & procurement, Home exercise program, Therapeutic activities, Patient/Caregiver education & training  General Plan:  (5x/ wk 90 min, 1x/ wk 30 min)    Patient Education  Patient Education: Plan of Care, Functional Mobility, Reviewed Prior Education, Health Promotion and Wellness Education, Safety, Verbal Exercise Instruction    Goals:  Patient Goals : get back to my normal way of doing things  Short Term Goals  Time Frame for Short Term Goals: 1 wk  Short Term Goal 1: Pt to go supine <->sit, minimal use of UEs, SBA to get in/out of bed. Short Term Goal 2: Pt to get up/down from various seated surfaces, CGA, to get up to walk. Short Term Goal 3: Pt to walk with RW >= 50 ft, demonstrating step through gait pattern, recurvatum < 10% of steps on RT, CGA to progress to home mobility  Short Term Goal 4: Pt to ascend/descend 2 steps with gerber rails, CGA to progress to home entry  Short Term Goal 5: Pt to get in/out of car, CGA for transportation needs. Additional Goals?: Yes  Short Term Goal 6: Pt to perform Tinetti >= 18/28, demonstrating improved balance. Long Term Goals  Time Frame for Long Term Goals : 3 wks from evaluation  Long Term Goal 1: Pt to go supine <->sit, minimal use of UEs, Mod I, to get in/out of bed. Long Term Goal 2: Pt to get up/down from various seated surfaces, Mod I, to get up to walk. Long Term Goal 3: Pt to walk with RW >= 50 ft, demonstrating step through gait pattern, recurvatum < 10% of steps on RT, Mod I, for home mobility  Long Term Goal 4: Pt to perform Tinetti >= 24/28, demonstrating improved balance. Following session, patient left in safe position with all fall risk precautions in place.

## 2022-12-28 NOTE — NURSE NAVIGATOR
Neuro Nurse Navigator Follow Up    This RN attended care conference and was updated on pt progress, poc and anticipated length of stay.

## 2022-12-28 NOTE — PROGRESS NOTES
[address]  Occupational Therapy  Daily Note  Time:   Time In: 1400  Time Out: 1500  Timed Code Treatment Minutes: 60 Minutes  Minutes: 60          Date: 2022  Patient Name: Julio Corrales,   Gender: male      Room: [facility]53/053-A  MRN: [id number]  : 1960  (63 y.o.)  Referring Practitioner: Ordering & Attending: Alcides Madison MD  Diagnosis: Acute Stroke due to Ischemia  Additional Pertinent Hx: Julio Corrales who is a 63 y.o. male with a history of hypertension, diabetes, CAD on  daily, recent CABG in 2022, ischemic cardiomyopathy is admitted to [facility] for sepsis, emphysematous cystitis and acute kidney injury on CKD. During admission exam patient stated that his right arm feldman has been weak and that he has had some slurred speech. Stroke alert was called for right-sided weakness and dysarthria on 12/15. On arrival patient's NIH was 4 for right upper extremity, right lower extremity weakness, 1 limb ataxia and dysarthria. On further questioning, patient and wife state that the right arm weakness actually started last night. Patient's wife noticed when patient was trying to get up from the toilet that he was unable to push himself up with his right arm. Last known well 2330 on 2022. Patient taken to imaging for stat CT head which revealed no ICH, midline shift, mass-effect. CTA head and neck deferred due to patient's LC. Decision for no tPA was made due to patient's time since last known well. Patient with relatively low NIH and symptoms which are not consistent with LVO, therefore no thrombectomy/neuro intervention at this time. Patient had stat MRI brain and MRA head and neck without contrast.  MRI showed acute ischemic stroke of left parietal region. Pt admitted to IP rehab on 22.     Restrictions/Precautions:  Restrictions/Precautions: General Precautions, Fall Risk  Position Activity Restriction  Other position/activity restrictions: right side hemiparesis, recent CABG 11/22-minimize arm use ( s/p 5 wks), life vest     SUBJECTIVE: Upon arrival pt resting in bed and agreeable to OT session. PAIN: Pt . Denies pain throughout. Vitals: Vitals not assessed per clinical judgement, see nursing flowsheet    COGNITION: Decreased Insight, Decreased Safety Awareness, and Impulsive    ADL:   Pt. Reports discomfort from cath and OTR noted pt did not have leg hold donned, and nurse made aware. OTR provided CGA while pt engaged in clothing management with OT providing cues for upright positioning while nursing secured cath tubing to leg. BALANCE:  Sitting Balance:  Supervision. Standing balance: CGA    BED MOBILITY:  Supine to Sit: Minimal Assistance, with verbal cues , with increased time for completion      TRANSFERS:  Sit to Stand:  [employer], with increased time for completion, cues for hand placement, with verbal cues. Stand to Sit: [employer], with increased time for completion, cues for hand placement, with verbal cues. FUNCTIONAL MOBILITY:  Assistive Device: Rolling Walker  Assist Level:  Contact Guard Assistance. Distance: within room, within therapy apartment     ADDITIONAL ACTIVITIES:  Pt. Required min encouragement to engage in IADL task of preparing an egg. OTR graded task down by having egg, skillet, oil and spatula in reach. Pt. Able to complete task with CGA and able to stand up to 8 minutes before rest break required. Pt. Required verbal cues to widen base of support to improve positioning/posture. Pt. Engaged velcro resistive task with wide wooden roll and small wooden roll x15 reps each to pull to/from midline on table top in RUE to improve performance with ADL task completion. Pt.  Then set up with various locks/household objects, pt able to complete 5/6 with min difficulty but did have mod difficulty with lock and key with instruction to complete primarily with RUE to manage items with L hand providing support. ASSESSMENT:     Activity Tolerance:  Patient tolerance of  treatment: fair. Increased time to complete tasks due to reduced activity pace. Discharge Recommendations: Home with Home Health OT and Patient would benefit from continued OT at discharge  Equipment Recommendations: Equipment Needed: Yes  Other: OT staff to continue to monitor needs throughout OT POC. Plan: Times Per Week: 5x/week x 90 minutes and 1x/week x 30 minutes  Current Treatment Recommendations: Strengthening, Balance training, Self-Care / ADL, Equipment evaluation, education, & procurement, Safety education & training, Endurance training, Functional mobility training, Neuromuscular re-education, Patient/Caregiver education & training, Home management training    Patient Education  Patient Education: ADL's, IADL's, Hemibody Awareness/Attention, Importance of Increasing Activity, and Assistive Device Safety    Goals  Short Term Goals  Time Frame for Short Term Goals: 1 week  Short Term Goal 1: Pt will complete functional ambulation to/from BR and HH distances with SBA and min vcs for safety to increase indep with toileting. Short Term Goal 2: Pt will decrease RUE 9 hole peg test by 20 seconds and RUE  strength by 5 pounds to increase indep with fasteners in UB dressing. Short Term Goal 3: Pt will complete dynamic standing task x 5 minutes with 1-2 UE release and SBA to increase indep with sinkside grooming. Short Term Goal 4: Pt will complete UB dressing with Set up to increase indep within home environment. Short Term Goal 5: Pt will complete LB dresing with S and min vcs for safety to increase indep and endurance within home environment.   Additional Goals?: No  Long Term Goals  Time Frame for Long Term Goals : 4 weeks from IPR eval  Long Term Goal 1: Pt will complete BADL routine including shower transfer Mod Indep to increase indep and safety in home environment. Long Term Goal 2: Pt will complete simple IADL task with S and 0 vcs for safety to increase indep and endurance in home environment. Following session, patient left in safe position with all fall risk precautions in place.

## 2022-12-28 NOTE — PROGRESS NOTES
[address] [place] [address]  INPATIENT PHYSICAL THERAPY  Daily Note  [facility] - [id number]/[id number]    Time In: 0700  Time Out: 0730  Timed Code Treatment Minutes: 30 Minutes  Minutes: 30          Date: 2022  Patient Name: Dalton Riggs,  Gender:  male        MRN: [id number]  : 1960  (63 y.o.)     Referring Practitioner: Dr. Stevenson Hopkins  Diagnosis: Acute stroke due to ischemia  Additional Pertinent Hx: Pt admitted through ED due to RUE weakness and slurred speech,  Also noted to have sepsis, LC, emphysematous cystitis. Hx:  HTN, Db, CAD, recent CABG (). MRI + for acute ischemic stroke Lt parietal region     Prior Level of Function:  Lives With: Spouse, Family (16 y.o son, 21 y.o step dtr.)  Type of Home: House  Home Layout: One level  Home Access: Stairs to enter with rails  Entrance Stairs - Number of Steps: 2 + threshold  Entrance Stairs - Rails: Both  Home Equipment:  (None used PTA)   Bathroom Shower/Tub: Tub/Shower unit  Bathroom Toilet: Standard  Bathroom Equipment: Tub transfer bench  Bathroom Accessibility: Accessible    ADL Assistance: Independent  Homemaking Assistance: Independent  Homemaking Responsibilities: Yes  Ambulation Assistance: Independent  Transfer Assistance: Independent  Active : No    Restrictions/Precautions:  Restrictions/Precautions: General Precautions, Fall Risk  Position Activity Restriction  Other position/activity restrictions: right side hemiparesis, recent CABG -minimize arm use ( s/p 5 wks), life vest     SUBJECTIVE: Pt. Seated on his BS chair and pleasantly agrees to therapy session. Pt. Motivated for session. Pt. Receiving IV fluids throughout session. PAIN: None indicated    Vitals:   No data found. OBJECTIVE:  Bed Mobility:  Not Tested    Transfers:  Sit to Stand: Minimal Assistance  Stand to [place] [address], Minimal Assistance  Pt.  Completed 5x sit<>stand transfers from [facility] with arms across chest and min A provided. Verbal and tactile cues provided for increased anterior trunk lean and quad facilitation (R>L). Activity completed to increase B LE strength and improve transfer technique, decreasing reliance on UEs. Rest break required after each rep. Ambulation:  Minimal Assistance  Distance: 4' x 2  Surface: Level Tile  Device: Rolling Walker  Gait Deviations:  Slow Jada, Decreased Step Length on Right, Decreased Gait Speed, Decreased Heel Strike Bilaterally, Decreased Foot Clearance Right, Decreased Foot Clearance Left, Ataxia, and Unsteady Gait with mild posterior lean. Stairs:  None    Balance:  None    Neuromuscular Re-education  None    Exercise:  Patient was guided in 1 set(s) 10 reps of exercises: Glut sets, Seated marches, Seated hamstring curls, Seated heel/toe raises, Long arc quads, Seated isometric hip adduction, Seated abduction/adduction, and abdominal isometrics,. Exercises were completed for increased independence with functional mobility. Functional Outcome Measures:   Not completed    ASSESSMENT:  Assessment: Patient progressing toward established goals. Activity Tolerance:  Patient tolerance of  treatment: good.      Equipment Recommendations:Equipment Needed: Yes  Other: RW  Discharge Recommendations: Continue to assess pending progress, Patient would benefit from continued therapy after discharge     Plan: Current Treatment Recommendations: Strengthening, ROM, Balance training, Functional mobility training, Transfer training, Endurance training, Neuromuscular re-education, Gait training, Stair training, Safety education & training, Equipment evaluation, education, & procurement, Home exercise program, Therapeutic activities, Patient/Caregiver education & training  General Plan:  (5x/ wk 90 min, 1x/ wk 30 min)    Patient Education  Patient Education: Plan of Care, Functional Mobility, Reviewed Prior Education, Health Promotion and Wellness Education, Safety, Verbal Exercise Instruction    Goals:  Patient Goals : get back to my normal way of doing things  Short Term Goals  Time Frame for Short Term Goals: 1 wk  Short Term Goal 1: Pt to go supine <->sit, minimal use of UEs, SBA to get in/out of bed. Short Term Goal 2: Pt to get up/down from various seated surfaces, CGA, to get up to walk. Short Term Goal 3: Pt to walk with RW >= 50 ft, demonstrating step through gait pattern, recurvatum < 10% of steps on RT, CGA to progress to home mobility  Short Term Goal 4: Pt to ascend/descend 2 steps with egrber rails, CGA to progress to home entry  Short Term Goal 5: Pt to get in/out of car, CGA for transportation needs. Additional Goals?: Yes  Short Term Goal 6: Pt to perform Tinetti >= 18/28, demonstrating improved balance. Long Term Goals  Time Frame for Long Term Goals : 3 wks from evaluation  Long Term Goal 1: Pt to go supine <->sit, minimal use of UEs, Mod I, to get in/out of bed. Long Term Goal 2: Pt to get up/down from various seated surfaces, Mod I, to get up to walk. Long Term Goal 3: Pt to walk with RW >= 50 ft, demonstrating step through gait pattern, recurvatum < 10% of steps on RT, Mod I, for home mobility  Long Term Goal 4: Pt to perform Tinetti >= 24/28, demonstrating improved balance. Following session, patient left in safe position with all fall risk precautions in place.

## 2022-12-28 NOTE — PROGRESS NOTES
[address] [facility] [place] THERAPY  254 [facility]  DAILY NOTE     TIME   SLP Individual Minutes  Time In: 0830  Time Out: 0900  Minutes: 30  Timed Code Treatment Minutes: 30 Minutes       Date: 2022  Patient Name: Funmilayo Foley      CSN: [id number]   : 1960  (63 y.o.)  Gender: male   Referring Physician:  Dr. Idalia Carmona   Diagnosis: Acute stroke due to ischemia, right hemiparesis secondary to stroke   Precautions: aspiration, fall risk   Current Diet: Regular diet with thin liquids   Swallowing Strategies: Standard Universal Swallow Precautions  Date of Last MBS/FEES: Not Applicable    Pain:  No pain reported. Subjective:  Patient sitting upright in recliner finishing breakfast meal upon ST arrival. Agreeable to skilled ST services. Pleasant, cooperative, and engaged throughout. Short-Term Goals:  SHORT TERM GOAL #1:  Goal 1: Patient will complete complex executive functioning tasks (i.e., medication management, complex reasoning/deductive reasoning, etc.) with 90% accuracy and minimal cuing in order to allow for safe return to work (40/hours week).    INTERVENTIONS:  Medication Management - Pill Box Organization   Prescription 1 - take 1 tablet daily in the evening   Comprehension: min cues to ID time of day   Completion: independent     Prescription 2 - take 1 tablet daily in the morning   Comprehension: independent   Completion: independent     Prescription 3 - take 1 tablet, 3x/day for 5 days  Comprehension: independent   Completion: independent     Prescription 4 - take 2 tablets, 2x/day  Comprehension: min cues to organize for whole week; patient only organized prescription for 2 days  Completion: min cues     Prescription 5 - take 1 tablet ever 6-8 hours as needed for 7 days   Comprehension: independent   -Patient independently identified 3/3 next available dose times  *overall good success with task this date    SHORT TERM GOAL #2:  Goal 2: Patient will complete higher level recall/short term memory and working memory tasks with 80% accuracy provided independent use of compensatory strategies in order to improve overall recall of newly learned theraputic information and anticipation to return to work ([employer] in [employer]). INTERVENTIONS:  Provided patient with a list of errands: (Oil change, Grocery[employer], Bank, Mall for [place] gift for wife)  -Recall following 24 hours- / indep    SHORT TERM GOAL #3:  Goal 3: Patient will demonstrate independent use of compensatory strategies (S-speak up, O-open mouth, S-slow down) within strucutred and unstrucutred speech tasks in order to improve overall articulatory precision. INTERVENTIONS: Did not address due to focus on other goals. Long-Term Goals:  Time Frame for Long Term Goals: 2 weeks from time of evaluation    LONG TERM GOAL #1:  Goal 1: Patient will improve overall cognitive function to return to independent living with wife and careing for children (16 year old). LONG TERM GOAL #2:  Goal 2: Patient will improve overall speech intelligablity and clearity of speech production within informal conversation in order to return to baseline speech to improve successful ability to return to work, speaking with co-works and medical staff and family/friends.         Comprehension: 7 - Patient understands complex ideas (math/planning)  Expression: 6 - Device used to express complex ideas/needs  Social Interaction: 7 - Patient has appropriate behavior/relations 100% of the time  Problem Solvin - Independent with device (e.g. notes, schedules)  Memory: 6 - Patient requires device to recall (e.g. memory book)    EDUCATION:  Learner: Patient  Education:  Reviewed results and recommendations of this evaluation, Reviewed ST goals and Plan of Care, and Reviewed recommendations for follow-up  Evaluation of Education: Verbalizes understanding, Demonstrates with assistance, and Family not present    ASSESSMENT/PLAN:  Activity Tolerance:  Patient tolerance of  treatment: good. Assessment/Plan: Patient progressing toward established goals. Continues to require skilled care of licensed speech pathologist to progress toward achievement of established goals and plan of care. .     Plan for Next Session: High level cognitive treatment- deductive thinking, memory  Discharge Recommendations: Home with [facility] ([facility]) MARI Gillespie., [address]

## 2022-12-28 NOTE — PROGRESS NOTES
[facility]   Nephrology progress note  12/28/2022, 9:24 AM      Pt Name:    Armen Rose  MRN:     [id number]     YOB: 1960  Admit Date:    12/23/2022 11:24 AM    Chief Complaint: Nephrology following for LC/CKD. Subjective:  Patient seen and examined  No chest pain or shortness of breath  Feels okay    Objective:  24HR INTAKE/OUTPUT:    Intake/Output Summary (Last 24 hours) at 12/28/2022 0924  Last data filed at 12/28/2022 0550  Gross per 24 hour   Intake 240 ml   Output 350 ml   Net -110 ml      Admission weight: 134 lb 8 oz (61 kg)  Wt Readings from Last 3 Encounters:   12/27/22 128 lb 9.6 oz (58.3 kg)   12/23/22 130 lb 15.3 oz (59.4 kg)   12/13/22 142 lb 9.6 oz (64.7 kg)        Vitals :   Vitals:    12/27/22 1245 12/27/22 1303 12/27/22 1936 12/28/22 0759   BP: 103/62  113/75 109/70   Pulse:  72 76 79   Resp:   16 17   Temp:   98.2 °F (36.8 °C) 97.6 °F (36.4 °C)   TempSrc:   Oral Oral   SpO2:   98% 100%   Weight:       Height:           Physical examination  General Appearance:  Well developed.  No distress  Mouth/Throat:  Oral mucosa moist  Neck:  Supple, no JVD  Lungs:  Breath sounds: clear  Heart[de-identified]  S1,S2 heard  Abdomen:  Soft, non - tender  Musculoskeletal:  Edema -no edema noted    Medications:  Infusion:    sodium chloride 75 mL/hr at 12/27/22 2239    dextrose       Meds:    melatonin  6 mg Oral Nightly    baclofen  5 mg Oral Daily    atorvastatin  40 mg Oral Daily    buPROPion  150 mg Oral QAM    pantoprazole  40 mg Oral QAM AC    tamsulosin  0.8 mg Oral Daily    apixaban  5 mg Oral BID    metoprolol succinate  12.5 mg Oral Daily    glipiZIDE  10 mg Oral QAM AC    empagliflozin  25 mg Oral Daily    docusate sodium  100 mg Oral BID    multivitamin  1 tablet Oral Daily with breakfast    senna  2 tablet Oral Nightly    ciprofloxacin  750 mg Oral 2 times per day       Lab Data :  CBC:   Recent Labs     12/27/22  0821   WBC 10.3   HGB 10.5*   HCT 33.2*   * CMP:  Recent Labs     12/27/22  0821 12/28/22  0621    138   K 5.2 5.1   CL 97* 102   CO2 24 25   BUN 37* 38*   CREATININE 2.1* 1.9*   GLUCOSE 155* 153*   CALCIUM 9.2 9.1     Hepatic: No results for input(s): LABALBU, AST, ALT, ALB, BILITOT, ALKPHOS in the last 72 hours. Assessment and Plan:  Renal -acute kidney injury on chronic kidney disease with baseline creatinine around 1.5-1.7  Exact etiology not clear at this time he does look slightly dry though  Making decent urine output has a Bella in place  Agree with continuation of IV fluids he was not on any nephrotoxic agents. He does take some antibiotics  For now creatinine is getting better continue IV fluids and monitor BMP  Electrolytes -within normal limits  Acid-base status appears to be stable  Essential hypertension stable  Hx of diabetes mellitus  Hx of sepsis due to UTI from Klebsiella pneumonia on antibiotics  CAD status post CABG 11/22  Meds reviewed and discussed with patient     Anthony Mario MD  [facility]    This report has been created using voice recognition software.  It may contain minor errors which are inherent in voice recognition technology

## 2022-12-28 NOTE — PLAN OF CARE
Problem: Chronic Conditions and Co-morbidities  Goal: Patient's chronic conditions and co-morbidity symptoms are monitored and maintained or improved  Outcome: Progressing     Problem: Safety - Adult  Goal: Free from fall injury  Outcome: Progressing     Problem: Pain  Goal: Verbalizes/displays adequate comfort level or baseline comfort level  Outcome: Progressing  Note:  Patient denies any pain at this time. No s/s of pain noted. Patient understands pain scale and will use to relate intensity of pain.

## 2022-12-29 LAB
ANION GAP SERPL CALCULATED.3IONS-SCNC: 15 MEQ/L (ref 8–16)
BASOPHILS # BLD: 1 %
BASOPHILS ABSOLUTE: 0.1 THOU/MM3 (ref 0–0.1)
BUN BLDV-MCNC: 33 MG/DL (ref 7–22)
CALCIUM SERPL-MCNC: 9 MG/DL (ref 8.5–10.5)
CHLORIDE BLD-SCNC: 105 MEQ/L (ref 98–111)
CO2: 20 MEQ/L (ref 23–33)
CREAT SERPL-MCNC: 1.6 MG/DL (ref 0.4–1.2)
EOSINOPHIL # BLD: 4.3 %
EOSINOPHILS ABSOLUTE: 0.4 THOU/MM3 (ref 0–0.4)
ERYTHROCYTE [DISTWIDTH] IN BLOOD BY AUTOMATED COUNT: 13.9 % (ref 11.5–14.5)
ERYTHROCYTE [DISTWIDTH] IN BLOOD BY AUTOMATED COUNT: 43.5 FL (ref 35–45)
GFR SERPL CREATININE-BSD FRML MDRD: 49 ML/MIN/1.73M2
GLUCOSE BLD-MCNC: 128 MG/DL (ref 70–108)
GLUCOSE BLD-MCNC: 155 MG/DL (ref 70–108)
HCT VFR BLD CALC: 30.5 % (ref 42–52)
HEMOGLOBIN: 9.6 GM/DL (ref 14–18)
IMMATURE GRANS (ABS): 0.07 THOU/MM3 (ref 0–0.07)
IMMATURE GRANULOCYTES: 0.8 %
LYMPHOCYTES # BLD: 20.4 %
LYMPHOCYTES ABSOLUTE: 1.8 THOU/MM3 (ref 1–4.8)
MCH RBC QN AUTO: 27.2 PG (ref 26–33)
MCHC RBC AUTO-ENTMCNC: 31.5 GM/DL (ref 32.2–35.5)
MCV RBC AUTO: 86.4 FL (ref 80–94)
MONOCYTES # BLD: 9.1 %
MONOCYTES ABSOLUTE: 0.8 THOU/MM3 (ref 0.4–1.3)
NUCLEATED RED BLOOD CELLS: 0 /100 WBC
PLATELET # BLD: 513 THOU/MM3 (ref 130–400)
PMV BLD AUTO: 9.5 FL (ref 9.4–12.4)
POTASSIUM REFLEX MAGNESIUM: 4.9 MEQ/L (ref 3.5–5.2)
POTASSIUM SERPL-SCNC: 4.9 MEQ/L (ref 3.5–5.2)
RBC # BLD: 3.53 MILL/MM3 (ref 4.7–6.1)
SEG NEUTROPHILS: 64.4 %
SEGMENTED NEUTROPHILS ABSOLUTE COUNT: 5.6 THOU/MM3 (ref 1.8–7.7)
SODIUM BLD-SCNC: 140 MEQ/L (ref 135–145)
WBC # BLD: 8.7 THOU/MM3 (ref 4.8–10.8)

## 2022-12-29 PROCEDURE — 82948 REAGENT STRIP/BLOOD GLUCOSE: CPT

## 2022-12-29 PROCEDURE — 97130 THER IVNTJ EA ADDL 15 MIN: CPT

## 2022-12-29 PROCEDURE — 85025 COMPLETE CBC W/AUTO DIFF WBC: CPT

## 2022-12-29 PROCEDURE — 97530 THERAPEUTIC ACTIVITIES: CPT

## 2022-12-29 PROCEDURE — 97110 THERAPEUTIC EXERCISES: CPT

## 2022-12-29 PROCEDURE — 99232 SBSQ HOSP IP/OBS MODERATE 35: CPT | Performed by: PHYSICAL MEDICINE & REHABILITATION

## 2022-12-29 PROCEDURE — 97112 NEUROMUSCULAR REEDUCATION: CPT

## 2022-12-29 PROCEDURE — 1180000000 HC REHAB R&B

## 2022-12-29 PROCEDURE — 97129 THER IVNTJ 1ST 15 MIN: CPT

## 2022-12-29 PROCEDURE — 6370000000 HC RX 637 (ALT 250 FOR IP): Performed by: PHYSICAL MEDICINE & REHABILITATION

## 2022-12-29 PROCEDURE — 97116 GAIT TRAINING THERAPY: CPT

## 2022-12-29 PROCEDURE — 36415 COLL VENOUS BLD VENIPUNCTURE: CPT

## 2022-12-29 PROCEDURE — 2580000003 HC RX 258: Performed by: PHYSICAL MEDICINE & REHABILITATION

## 2022-12-29 PROCEDURE — 99232 SBSQ HOSP IP/OBS MODERATE 35: CPT | Performed by: INTERNAL MEDICINE

## 2022-12-29 PROCEDURE — 80048 BASIC METABOLIC PNL TOTAL CA: CPT

## 2022-12-29 RX ORDER — FERROUS SULFATE 325(65) MG
325 TABLET ORAL
Status: DISCONTINUED | OUTPATIENT
Start: 2022-12-29 | End: 2023-01-13 | Stop reason: HOSPADM

## 2022-12-29 RX ADMIN — BUPROPION HYDROCHLORIDE 150 MG: 150 TABLET, FILM COATED, EXTENDED RELEASE ORAL at 09:06

## 2022-12-29 RX ADMIN — APIXABAN 5 MG: 5 TABLET, FILM COATED ORAL at 22:06

## 2022-12-29 RX ADMIN — ATORVASTATIN CALCIUM 40 MG: 40 TABLET, FILM COATED ORAL at 22:06

## 2022-12-29 RX ADMIN — CIPROFLOXACIN HYDROCHLORIDE 750 MG: 250 TABLET, FILM COATED ORAL at 09:05

## 2022-12-29 RX ADMIN — EMPAGLIFLOZIN 25 MG: 25 TABLET, FILM COATED ORAL at 09:04

## 2022-12-29 RX ADMIN — FERROUS SULFATE TAB 325 MG (65 MG ELEMENTAL FE) 325 MG: 325 (65 FE) TAB at 12:27

## 2022-12-29 RX ADMIN — ATORVASTATIN CALCIUM 40 MG: 40 TABLET, FILM COATED ORAL at 00:50

## 2022-12-29 RX ADMIN — DOCUSATE SODIUM 100 MG: 100 CAPSULE, LIQUID FILLED ORAL at 22:06

## 2022-12-29 RX ADMIN — Medication 6 MG: at 22:06

## 2022-12-29 RX ADMIN — BACLOFEN 5 MG: 10 TABLET ORAL at 09:04

## 2022-12-29 RX ADMIN — CIPROFLOXACIN HYDROCHLORIDE 750 MG: 250 TABLET, FILM COATED ORAL at 22:06

## 2022-12-29 RX ADMIN — SODIUM CHLORIDE: 9 INJECTION, SOLUTION INTRAVENOUS at 02:14

## 2022-12-29 RX ADMIN — PANTOPRAZOLE SODIUM 40 MG: 40 TABLET, DELAYED RELEASE ORAL at 05:18

## 2022-12-29 RX ADMIN — DOCUSATE SODIUM 100 MG: 100 CAPSULE, LIQUID FILLED ORAL at 09:06

## 2022-12-29 RX ADMIN — GLIPIZIDE 10 MG: 10 TABLET ORAL at 09:04

## 2022-12-29 RX ADMIN — Medication 1 TABLET: at 09:05

## 2022-12-29 RX ADMIN — METOPROLOL SUCCINATE 12.5 MG: 25 TABLET, EXTENDED RELEASE ORAL at 09:08

## 2022-12-29 RX ADMIN — APIXABAN 5 MG: 5 TABLET, FILM COATED ORAL at 09:06

## 2022-12-29 RX ADMIN — TAMSULOSIN HYDROCHLORIDE 0.8 MG: 0.4 CAPSULE ORAL at 09:05

## 2022-12-29 RX ADMIN — SENNOSIDES 17.2 MG: 8.6 TABLET, FILM COATED ORAL at 22:06

## 2022-12-29 ASSESSMENT — ENCOUNTER SYMPTOMS
WHEEZING: 0
TROUBLE SWALLOWING: 0
NAUSEA: 0
VOMITING: 0
SORE THROAT: 0
CONSTIPATION: 0
SHORTNESS OF BREATH: 0
DIARRHEA: 0
RHINORRHEA: 0
COUGH: 0
ABDOMINAL PAIN: 0

## 2022-12-29 ASSESSMENT — PAIN SCALES - GENERAL
PAINLEVEL_OUTOF10: 0
PAINLEVEL_OUTOF10: 0

## 2022-12-29 NOTE — PROGRESS NOTES
Physical Medicine & Rehabilitation Progress Note    Chief Complaint:  Right-sided weakness due to stroke    Subjective:    Julio Corrales is a 63 y.o.  left-handed  male with history of hypertension, diabetes mellitus type 2, coronary artery disease with ischemic cardiomyopathy requiring two-vessel CABG, urinary retention with several failed void trial, status post tonsillectomy, was admitted to the inpatient rehabilitation unit on 12/23/2022 for intensive inpatient management of impairment & disability secondary to right hemiparesis due to acute left parietal corona radiata and right cerebellar ischemic stroke, and debility/physical deconditioning due to Klebsiella pneumonia urinary tract infection with emphysematous cystitis and bilateral hydronephrosis complicated by Klebsiella pneumonia bacteremia/sepsis. The patient was previously hospitalized at [facility] from 10/28/2022 to 11/15/2022 for coronary artery disease and ischemic cardiomyopathy requiring two-vessel CABG on 11/7/2022. His postoperative course was complicated by urinary retention with failed void trial.  The patient has been experiencing progressive weakness after he was discharged to home. The Bella was later removed on 12/13/2022 but the patient continued having difficulty voiding. On 12/14/2022 approximately 11:30 PM his wife noticed the patient had slight slurred speech with right arm weakness. On 12/15/2022 the patient suddenly felt dizzy and lightheaded while he was trying to get up from sitting on toilet and fell into the ground. He says he did not lose consciousness and did not hit his head. His wife called 46. He was transported to [address] [place] [address] ER for evaluation by EMS. He complains of neck pain and upper back pain. He was found to be tachycardic. His WBC was found to be 22.3. Bella was placed in ER and a rush of air followed by dark brown and bright red color urine came out.   He was put on IV meropenem and admitted. Urology was consulted. CT of cervical spine revealed only multilevel facet and uncovertebral joint arthropathy. CT of head done on 12/15/2020 revealed no acute intracranial abnormality. CT of the chest, abdomen and pelvis done on 12/15/2022 revealed emphysematous cystitis, bilateral hydronephrosis, and constipation. Because of right arm weakness, stroke code was called on 12/15/2022. MRI of brain done on 12/15/2022 revealed acute ischemic stroke at the left parietal region corona radiata, and possible small additional acute ischemic stroke within right cerebellum. Neurology service start patient on aspirin and Plavix combination for the stroke. MRA of the neck and head done on 12/15/2022 showed only mild bilateral carotid bulb disease. Transesophageal echocardiogram was done on 12/16/2022 showing severe global hypokinesis of left ventricle with estimated ejection fraction of 30%, and no thrombus identified. 2 blood culture and urine culture done on 12/15/2022 revealed Klebsiella pneumonia. Infectious disease had been consulted. Oral Cipro was started on 12/22/2022 after IV meropenem was completed. Patient's hospital course was also complicated by constipation which resolved this morning. The patient says he feels well this morning. However notes report that the patient has hypotension with BP of 86/55. The patient denied feeling dizzy or lightheadedness. He denies having chest pain or shortness of breath. He continues having weakness on his right side but no more than usual.  He denies having any painful symptom or numbness tingling feeling. He says he is drinking lots of fluid. CBC, BMP and proBNP were ordered and done. The patient continues tolerating the intensive rehabilitation treatment well. Rehabilitation:  PT: Reviewed.     Bed Mobility:  Supine to Sit: Minimal Assistance at trunk due to posterior lean     Transfers:  Sit to Stand: [employer], Minimal Assistance, with verbal cues  Stand to Sit:Contact Guard Assistance     Ambulation:  Contact Guard Assistance, Minimal Assistance  Distance: 35'x2, 5'  Surface: Level Tile  Device:Rolling Walker  Gait Deviations: Forward Flexed Posture, Slow Jada, Decreased Step Length on Right, Decreased Gait Speed, Decreased Heel Strike Bilaterally, and Ataxia  *Verbal cues for increased step length on right, verbal cues for heel strike, verbal cues for posture. Patient stood at walker, instructed to advance right foot forward, demonstrate heel strike and shift weight onto right foot. Verbal cues for muscle contraction right quad. Completed with left LE following. X10 each LE    Contact Guard Assistance  Distance: 3', 8', 9'  Surface: Level Tile  Device:Rolling Walker  Gait Deviations: Forward Flexed Posture, Slow Jada, Decreased Step Length Bilaterally, Decreased Gait Speed, and Decreased Heel Strike Bilaterally, genu recurvatum right     Contact Guard Assistance  Distance: 60' x 1, 130' x 1  Surface: Level Tile  Device: Rolling Walker  Gait Deviations: Forward Flexed Posture, Slow Jaad, Decreased Step Length Bilaterally, Decreased Gait Speed, Decreased Heel Strike Bilaterally, Decreased Quad Control Right, and Increased reliance on walker    Balance:  Static Standing Balance: Contact Guard Assistance  Dynamic Standing Balance: Contact Guard Assistance    Neuromuscular education:   Patient completed sit < > stand 5 reps x2 sets from mat table at 22.5\" with no UE support. Verbal cues for increased anterior weight shift with sit to stand. Patient completed partial stand (from sitting to long-term standing) x5 reps with verbal cues for anterior weight shift  Anterior pod taps with 3 colored/numbered balance pods and booyah stick right UE for R UE facilitation. Verbal cues for lateral weight shift to stance leg and increased muscle contraction right LE for stability.   Pt very anxious with this activities, requiring encouragement. Completed pod taps, as completed above with RW support. Patient completed heel taps to opposite knee x10 each. Completed to improve coordination. *Activities above completed to improve SLS, lateral and anterior weight shift, coordination and LE proprioception to reduce pt risk for falls and increase pt independence and safety. OT: Reviewed. BALANCE:  Sitting Balance:  Supervision. Standing Balance: Contact Guard Assistance. With 1-2 UE release     BED MOBILITY:  Sit to Supine: Stand By Assistance with increased time, good recall of safe hand placement  Scooting: Stand By Assistance       TRANSFERS:  Sit to Stand:  Contact Guard Assistance. From recliner and chair with arms, good recall of hand placement  Stand to Sit: [address] FUNCTIONAL MOBILITY:  Assistive Device: Rolling Walker  Assist Level:  Contact Guard Assistance. Distance: To and from therapy gym  1 minor LOB, requiring Min A for correction      Assistive Device: Rolling Walker  Assist Level:  Contact Guard Assistance. Distance: To and from therapy apartment and within therapy apartment  Very slow pace, slightly unsteady at times when pt began to demo increased fatigue. ADDITIONAL ACTIVITIES:  Patient completed IADL laundry task that facilitated retrieving linens from graded planes and heights, including floor level, while using a reacher. Task completed to challenge RW safety, standing endurance / balance, and R FMC/ strength through mgmt of reacher with R hand only - pt required CGA - brief periods of Min A  for balance and demo a standing endurance of 8 minutes. Skilled education completed on RW safety techniques and fall prevention strategies with patient demo'ing good carry over, requiring additional min cues for RW safety. Pt required 1 seated rest break thoroughout task. Patient completed dynamic standing IADL task of folding linens while standing at washer and dryer. Task completed to facilitate B hand release and supportive response training in both core and B LE. Patient required CGA - Min A for standing balance with fatigue, and demo'ed an endurance of 6 minutes. Demo good tolerance with 1 seated rest breaks. Standing task completed to challenge endurance and balance required for ADL and IADL skills. Patient identified difficulty with fine motor coordination, reporting a personal goal of being able to self manage buttons / zippers with clothing management for ADLs and open own cooking supplies during IADLs. [facility] task this date completed with green medium theraputty, utilized R hand in pincer grasp, 3 jaw delilah, lateral key pinch and then utilized pincer grasp to retreive small beads from within putty, completing with mod difficulty and increased time. Pt identified personal goal of increasing R hand functional use for specified meaningful activities such as opening pop bottles/jars for cooking and self managing buttons. Pt engaged in hand strengthening activity using \"power web\" resistance. Pt completed isolated digit flexion, MCP flx/ext, spherical grasp, and supination/pronation. completed x10 reps x1 set with graded lids sizes to increase intrinsic/extrinsic hand musculature. ST: Reviewed. Medication Management - MAR - Scheduled Medications   ID total number of pills/day: 14/14 independently   ID when pills would be taken: 12/14 independently, 1/14 given min cues, 1/14 given mod cues   *Patient required mod cues for problem solving x2. Poor insight re: need to take medications with food to prevent upset stomach. Patient with dis-regard to antibiotic frequency 1 pill every 12 hours - patient reporting he would take medication in the morning before work and at noon because otherwise he would forget. Mod cues required to ID setting alarm to remind patient to take antibiotic.       Time Management - Word Problems   9/9 independently   *good success  *excellent mental manipulation and math computation of time variables. Mental Manipulation - 5 word Attribute Inclusion   12/12 independently  *excellent success with task      Review of Systems:  Review of Systems   Constitutional:  Negative for chills, diaphoresis, fatigue and fever. HENT:  Negative for hearing loss, rhinorrhea, sneezing, sore throat and trouble swallowing. Eyes:  Negative for visual disturbance. Respiratory:  Negative for cough, shortness of breath and wheezing. Cardiovascular:  Negative for chest pain and palpitations. Gastrointestinal:  Negative for abdominal pain, constipation, diarrhea, nausea and vomiting. Genitourinary:  Positive for difficulty urinating. Negative for dysuria. Musculoskeletal:  Positive for gait problem. Negative for arthralgias, myalgias and neck pain. Skin:  Negative for rash. Neurological:  Positive for weakness (Right upper and right lower extremities). Negative for dizziness, tremors, facial asymmetry, speech difficulty, light-headedness, numbness and headaches. Psychiatric/Behavioral:  Negative for dysphoric mood, hallucinations and sleep disturbance. The patient is not nervous/anxious.          Objective:  BP (!) 86/55   Pulse 88   Temp 97.5 °F (36.4 °C) (Oral)   Resp 16   Ht 5' 2\" (1.575 m)   Wt 142 lb 8 oz (64.6 kg)   SpO2 100%   BMI 26.06 kg/m²   Physical Exam   General:  well-developed, well nourished  male; in no acute distress ; appropriate affect & mood; sitting on reclining chair comfortably  Eyes: pupil equally round ; extra-ocular motion intact bilaterally  Head, Ear, Nose, Mouth & Throat : normocephalic ; no discharge from ears or nose ; no deformity ; no facial swelling ; oral mucosa pink   Neck :  supple ; no tenderness ; mild muscle spasm at bilateral cervical paraspinal muscles  Cardiovascular : Presence of healed vertical surgical scar at sternum ; regular rate & rhythm ; normal S1 & S2 heart sound ; no murmur ; normal peripheral pulse at the bilateral upper extremities; reduced pulse strength at the bilateral lower extremities  Pulmonary : Present breath sounds at the bilateral lung fields; no wheezing ; no rale; no crackle  Gastrointestinal : soft, slightly protruded abdomen without tenderness ; normal bowel sound present    : Bella catheter in place draining light yellowish urine  Back : no tenderness; no muscle spasm  Skin: no skin lesion or rash ; no pitting edema at all 4 extremities  Musculoskeletal : no limb asymmetry; no limb deformity; no tenderness at bilateral upper & lower extremities; no palpable mass at limbs ; no joints laxity or crepitation ; shoulder flexion and abduction passive ROM reaching 170 degrees bilaterally; hip flexion passive ROM reaching 130 degrees bilaterally; normal functional joints ROM at bilateral upper & lower extremities  Cerebral :  alert ; awake ; oriented to place, person and time; follow two-step verbal command  Cerebellum : mild dysmetria with the right finger-to-nose test; no dysmetria with left finger-to-nose test ; no dysmetria with bilateral heel-to-shin test  Cranial Nerves : Tongue protrudes slightly to the right  (CN XII) ; grossly intact other CN II to XI function  Sensory : intact light touch and pin prick sensation at bilateral upper & lower extremities  Motor : Slightly reduced muscle tone at right upper and right lower extremities; normal muscle tone at left upper & lower extremities ; 4+/5 to 5/5 muscle strength at right shoulder abduction; 4+/5 muscle strength at the right shoulder flexion; 4+/5 muscle strength at right elbow flexion; 4+/5 to 5/5 muscle strength at the right elbow extension; 4+/5 to 5/5 muscle strength at right wrist extension; 4+/5 to 5/5 muscle strength at right finger extension; 4-/5 to 4+/5 muscle strength at the right finger flexion and handgrip; 4-/5 muscle strength at right finger abduction; 4+/5 muscle strength at the right hip flexion; 4+/5 to 5/5 muscle strength at the left hip flexion; 4+/5 to 5/5 muscle strength at right knee flexion; 4+/5 to 5/5 muscle strength at right ankle dorsiflexion; normal 5/5 muscle strength at left upper & lower extremities  Reflex : 1+ left biceps, left brachioradialis, left knee reflexes; 0 right biceps, right brachioradialis, right knee reflexes  Pathological Reflex :  No Ion's sign ; no ankle clonus  Gait : Not assessed      Diagnostics:   Recent Results (from the past 24 hour(s))   POCT Glucose    Collection Time: 12/30/22  7:40 AM   Result Value Ref Range    POC Glucose 132 (H) 70 - 108 mg/dl   Brain Natriuretic Peptide    Collection Time: 12/30/22  7:53 AM   Result Value Ref Range    Pro-BNP 3912.0 (H) 0.0 - 124.0 pg/mL   Basic Metabolic Panel w/ Reflex to MG    Collection Time: 12/30/22  7:53 AM   Result Value Ref Range    Sodium 138 135 - 145 meq/L    Potassium reflex Magnesium 5.0 3.5 - 5.2 meq/L    Chloride 103 98 - 111 meq/L    CO2 24 23 - 33 meq/L    Glucose 136 (H) 70 - 108 mg/dL    BUN 31 (H) 7 - 22 mg/dL    Creatinine 1.7 (H) 0.4 - 1.2 mg/dL    Calcium 9.2 8.5 - 10.5 mg/dL   CBC with Auto Differential    Collection Time: 12/30/22  7:53 AM   Result Value Ref Range    WBC 8.6 4.8 - 10.8 thou/mm3    RBC 3.79 (L) 4.70 - 6.10 mill/mm3    Hemoglobin 10.3 (L) 14.0 - 18.0 gm/dl    Hematocrit 32.8 (L) 42.0 - 52.0 %    MCV 86.5 80.0 - 94.0 fL    MCH 27.2 26.0 - 33.0 pg    MCHC 31.4 (L) 32.2 - 35.5 gm/dl    RDW-CV 13.9 11.5 - 14.5 %    RDW-SD 43.9 35.0 - 45.0 fL    Platelets 312 (H) 439 - 400 thou/mm3    MPV 9.3 (L) 9.4 - 12.4 fL    Seg Neutrophils 62.0 %    Lymphocytes 23.2 %    Monocytes 8.3 %    Eosinophils 4.6 %    Basophils 1.3 %    Immature Granulocytes 0.6 %    Segs Absolute 5.3 1.8 - 7.7 thou/mm3    Lymphocytes Absolute 2.0 1.0 - 4.8 thou/mm3    Monocytes Absolute 0.7 0.4 - 1.3 thou/mm3    Eosinophils Absolute 0.4 0.0 - 0.4 thou/mm3    Basophils Absolute 0.1 0.0 - 0.1 thou/mm3    Immature Grans (Abs) 0.05 0.00 - 0.07 thou/mm3    nRBC 0 /100 wbc   Anion Gap    Collection Time: 12/30/22  7:53 AM   Result Value Ref Range    Anion Gap 11.0 8.0 - 16.0 meq/L   Glomerular Filtration Rate, Estimated    Collection Time: 12/30/22  7:53 AM   Result Value Ref Range    Est, Glom Filt Rate 45 (A) >60 ml/min/1.73m2        Latest Reference Range & Units 12/28/22 06:21 12/29/22 06:18 12/30/22 07:53   Sodium 135 - 145 meq/L 138 140 138   Potassium 3.5 - 5.2 meq/L 5.1 4.9  4.9 5.0   Chloride 98 - 111 meq/L 102 105 103   CO2 23 - 33 meq/L 25 20 (L) 24   BUN,BUNPL 7 - 22 mg/dL 38 (H) 33 (H) 31 (H)   Creatinine 0.4 - 1.2 mg/dL 1.9 (H) 1.6 (H) 1.7 (H)   Anion Gap 8.0 - 16.0 meq/L 11.0 15.0 11.0   Est, Glom Filt Rate >60 ml/min/1.73m2 40 ! 49 ! 45 !    Glucose, Random 70 - 108 mg/dL 153 (H) 128 (H) 136 (H)   CALCIUM, SERUM, 155443 8.5 - 10.5 mg/dL 9.1 9.0 9.2   (L): Data is abnormally low  (H): Data is abnormally high  !: Data is abnormal       Latest Reference Range & Units 11/1/22 04:32 11/4/22 03:58 12/30/22 07:53   Pro-BNP 0.0 - 124.0 pg/mL 3862.0 (H) 3340.0 (H) 3912.0 (H)   (H): Data is abnormally high       Latest Reference Range & Units 12/27/22 08:21 12/29/22 06:18 12/30/22 07:53   WBC 4.8 - 10.8 thou/mm3 10.3 8.7 8.6   RBC 4.70 - 6.10 mill/mm3 3.81 (L) 3.53 (L) 3.79 (L)   Hemoglobin Quant 14.0 - 18.0 gm/dl 10.5 (L) 9.6 (L) 10.3 (L)   Hematocrit 42.0 - 52.0 % 33.2 (L) 30.5 (L) 32.8 (L)   MCV 80.0 - 94.0 fL 87.1 86.4 86.5   MCH 26.0 - 33.0 pg 27.6 27.2 27.2   MCHC 32.2 - 35.5 gm/dl 31.6 (L) 31.5 (L) 31.4 (L)   MPV 9.4 - 12.4 fL 9.4 9.5 9.3 (L)   RDW-CV 11.5 - 14.5 % 13.7 13.9 13.9   RDW-SD 35.0 - 45.0 fL 43.5 43.5 43.9   Platelet Count 432 - 400 thou/mm3 560 (H) 513 (H) 529 (H)   Lymphocytes Absolute 1.0 - 4.8 thou/mm3 2.2 1.8 2.0   Monocytes Absolute 0.4 - 1.3 thou/mm3 0.7 0.8 0.7   Eosinophils Absolute 0.0 - 0.4 thou/mm3 0.4 0.4 0.4   Basophils Absolute 0.0 - 0.1 thou/mm3 0.1 0.1 0.1   Seg Neutrophils % 66.2 64.4 62.0   Segs Absolute 1.8 - 7.7 thou/mm3 6.8 5.6 5.3   Lymphocytes % 21.1 20.4 23.2   Monocytes % 6.9 9.1 8.3   Eosinophils % 4.0 4.3 4.6   Basophils % 1.0 1.0 1.3   Immature Grans (Abs) 0.00 - 0.07 thou/mm3 0.08 (H) 0.07 0.05   Immature Granulocytes % 0.8 0.8 0.6   (L): Data is abnormally low  (H): Data is abnormally high      Impression:  Acute left parietal corona radiata and right cerebellar ischemic stroke causing right hemiparesis with impaired coordination, and dysarthria  Debility/physical decondition due to Klebsiella pneumonia urinary tract infection with emphysematous cystitis and bilateral hydronephrosis complicated by Klebsiella pneumonia bacteremia/sepsis  Urinary retention  Klebsiella pneumonia urinary tract infection with emphysematous cystitis and bilateral hydronephrosis  Klebsiella pneumoniae bacteremia/sepsis  Acute kidney injury possibly due to dehydration with orthostatic hypotension  Hypotension probably due to side effect from high-dose Flomax in combination with Toprol XL  Coronary artery disease with ischemic cardiomyopathy requiring two-vessel coronary artery bypass graft surgery on 11/7/2022  Hypertension  Diabetes mellitus type 2    The patient has hypotension this morning. However he is asymptomatic. It is most likely due to side effect from high-dose Flomax in combination with Toprol-XL usage. His dehydration is not worser than yesterday. His hemoglobin and hematocrit are stable. I will hold his Toprol XL for now. The patient is encouraged to continue increase oral fluid intake. He still has mild weakness on his right side with impaired coordination dominantly at upper extremity. He continues tolerating the intensive inpatient rehabilitation treatment well. His function continue to improve slowly. Plan:  Continues intensive PT/OT/SLP/RT inpatient rehabilitation program at least 3 hours per day, 5 days per week in order to improve functional status prior to discharge.   Family education and training will be completed. Equipment evaluations and recommendations will be completed as appropriate. Rehabilitation nursing continues to be involved for bowel, bladder, skin, and pain management. Nursing will also provide education and training to patient and family. Prophylaxis:  DVT: Patient on Eliquis, JONAH stocking, intermittent pneumatic compression device. GI: Colace, Senokot, GlycoLax as needed, milk of magnesia as needed, Dulcolax suppository as needed.   Pain: Tylenol as needed  Continue normal saline IV fluid infusion at 75 mL/h for additional 24 hours  Continue Lipitor for CVA and CAD  Holding Toprol-XL for hypertension  Continue baclofen for spasticity  Continue Flomax for urinary retention  Continue Protonix for gastric protection  Continue Jardiance, Glucotrol, Humalog insulin coverage for diabetes mellitus  Continue Wellbutrin XL for smoking cessation  Continue melatonin, trazodone as needed for insomnia  Continue multivitamin supplement  Nutrition: Continue current diet   Bladder: Monitoring signs or symptoms of UTI  Bowel: Monitoring signs or symptoms of constipation   and case management for coordination of care and discharge planning      Missed Therapy Time:  None      Chayo Maria MD

## 2022-12-29 NOTE — PROGRESS NOTES
[address] [facility] THERAPY  [address] [facility]  DAILY NOTE     TIME   SLP Individual Minutes  Time In: 1330  Time Out: 1400  Minutes: 30  Timed Code Treatment Minutes: 30 Minutes       Date: 2022  Patient Name: Denise Castro      CSN: [id number]   : 1960  (63 y.o.)  Gender: male   Referring Physician:  Dr. Francy Horowitz   Diagnosis: Acute stroke due to ischemia, right hemiparesis secondary to stroke   Precautions: aspiration, fall risk   Current Diet: Regular diet with thin liquids   Swallowing Strategies: Standard Universal Swallow Precautions  Date of Last MBS/FEES: Not Applicable    Pain:  No pain reported. Subjective:  Patient sitting upright in recliner upon ST arrival. Patient reporting catheter tubing tangled up in recliner when patient repositioned into upright position - RN aware and reporting proper catheter placement. Short-Term Goals:  SHORT TERM GOAL #1:  Goal 1: Patient will complete complex executive functioning tasks (i.e., medication management, complex reasoning/deductive reasoning, etc.) with 90% accuracy and minimal cuing in order to allow for safe return to work (40/hours week). INTERVENTIONS:  Medication Management - MAR - Scheduled Medications   ID total number of pills/day:  independently   ID when pills would be taken:  independently,  given min cues,  given mod cues   *Patient required mod cues for problem solving x2. Poor insight re: need to take medications with food to prevent upset stomach. Patient with dis-regard to antibiotic frequency 1 pill every 12 hours - patient reporting he would take medication in the morning before work and at noon because otherwise he would forget. Mod cues required to ID setting alarm to remind patient to take antibiotic. Time Management - Word Problems    independently   *good success  *excellent mental manipulation and math computation of time variables.      SHORT TERM GOAL #2:  Goal 2: Patient will complete higher level recall/short term memory and working memory tasks with 80% accuracy provided independent use of compensatory strategies in order to improve overall recall of newly learned theraputic information and anticipation to return to work ([employer] Mejia in [employer]). INTERVENTIONS:  Mental Manipulation - 5 word Attribute Inclusion    independently  *excellent success with task      SHORT TERM GOAL #3:  Goal 3: Patient will demonstrate independent use of compensatory strategies (S-speak up, O-open mouth, S-slow down) within strucutred and unstrucutred speech tasks in order to improve overall articulatory precision. INTERVENTIONS: Did not address due to focus on other goals. Long-Term Goals:  Time Frame for Long Term Goals: 2 weeks from time of evaluation    LONG TERM GOAL #1:  Goal 1: Patient will improve overall cognitive function to return to independent living with wife and careing for children (16 year old). LONG TERM GOAL #2:  Goal 2: Patient will improve overall speech intelligablity and clearity of speech production within informal conversation in order to return to baseline speech to improve successful ability to return to work, speaking with co-works and medical staff and family/friends.         Comprehension: 7 - Patient understands complex ideas (math/planning)  Expression: 6 - Device used to express complex ideas/needs  Social Interaction: 7 - Patient has appropriate behavior/relations 100% of the time  Problem Solvin - Independent with device (e.g. notes, schedules)  Memory: 6 - Patient requires device to recall (e.g. memory book)    EDUCATION:  Learner: Patient  Education:  Reviewed results and recommendations of this evaluation, Reviewed ST goals and Plan of Care, and Reviewed recommendations for follow-up  Evaluation of Education: Verbalizes understanding, Demonstrates with assistance, and Family not present    ASSESSMENT/PLAN:  Activity Tolerance: Patient tolerance of  treatment: good. Assessment/Plan: Patient progressing toward established goals. Continues to require skilled care of licensed speech pathologist to progress toward achievement of established goals and plan of care. .     Plan for Next Session: High level cognitive treatment- deductive thinking, memory  Discharge Recommendations: Home with [facility] ([place] [address]) [address] M.A[address]

## 2022-12-29 NOTE — PLAN OF CARE
Problem: Safety - Adult  Goal: Free from fall injury  12/29/2022 0211 by Randi Rouse RN  Outcome: Progressing     Problem: Skin/Tissue Integrity  Goal: Absence of new skin breakdown  Description: 1. Monitor for areas of redness and/or skin breakdown  2. Assess vascular access sites hourly  3. Every 4-6 hours minimum:  Change oxygen saturation probe site  4. Every 4-6 hours:  If on nasal continuous positive airway pressure, respiratory therapy assess nares and determine need for appliance change or resting period.   12/29/2022 0211 by Randi Rouse RN  Outcome: Progressing  Note: No new skin breakdown noted     Problem: Pain  Goal: Verbalizes/displays adequate comfort level or baseline comfort level  12/29/2022 0211 by Randi Rouse RN  Outcome: Progressing     Problem: Metabolic/Fluid and Electrolytes - Adult  Goal: Electrolytes maintained within normal limits  Outcome: Progressing  Flowsheets (Taken 12/29/2022 0211)  Electrolytes maintained within normal limits:   Monitor labs and assess patient for signs and symptoms of electrolyte imbalances   Administer electrolyte replacement as ordered   Monitor response to electrolyte replacements, including repeat lab results as appropriate  Note: INT LH with 03/29/23 NS running at 75 ml/hour

## 2022-12-29 NOTE — PROGRESS NOTES
Patient: Samantha Kwon  Unit/Bed: 7E-53/053-A  YOB: 1960  MRN: [id number] Acct: [de-identified]   Admitting Diagnosis: Acute stroke due to ischemia [facility]) [I63.9]  Admit Date:  12/23/2022  Hospital Day: 6    Assessment:     Principal Problem:    Acute stroke due to ischemia [facility])  Active Problems:    Ischemic cardiomyopathy    S/P CABG x 2    Emphysematous cystitis    Bacteremia due to Enterobacter species    Urinary retention    Hemiparesis affecting right side as late effect of stroke (HCC)    Coordination impairment    Debility    UTI due to Klebsiella species    Moderate malnutrition (HCC)    Diabetes mellitus type 2 in nonobese [facility])    Essential hypertension  Resolved Problems:    * No resolved hospital problems. *      Plan: Will lower the available carbs per meal to see if it helps with the CS management. Subjective:     Patient has no complaint of CP or SOB. .   Medication side effects: none    Scheduled Meds:   ferrous sulfate  325 mg Oral Daily with breakfast    melatonin  6 mg Oral Nightly    baclofen  5 mg Oral Daily    atorvastatin  40 mg Oral Daily    buPROPion  150 mg Oral QAM    pantoprazole  40 mg Oral QAM AC    tamsulosin  0.8 mg Oral Daily    apixaban  5 mg Oral BID    metoprolol succinate  12.5 mg Oral Daily    glipiZIDE  10 mg Oral QAM AC    empagliflozin  25 mg Oral Daily    docusate sodium  100 mg Oral BID    multivitamin  1 tablet Oral Daily with breakfast    senna  2 tablet Oral Nightly    ciprofloxacin  750 mg Oral 2 times per day     Continuous Infusions:   dextrose       PRN Meds:glucose, dextrose bolus **OR** dextrose bolus, glucagon (rDNA), dextrose, acetaminophen, ondansetron, oxyCODONE **OR** oxyCODONE, bisacodyl, magnesium hydroxide, traZODone, polyethylene glycol    Review of Systems  Pertinent items are noted in HPI. Objective:     No data found. I/O last 3 completed shifts: In: 1301 [P.O.:1301]  Out: 0440 [[id number]]  I/O this shift:   In: 3938 [P.O.:800;  I.V.:4382]  Out: 1900 [Urine:1900]    /63   Pulse 91   Temp 97.9 °F (36.6 °C) (Oral)   Resp 16   Ht 5' 2\" (1.575 m)   Wt 141 lb 8 oz (64.2 kg)   SpO2 100%   BMI 25.88 kg/m²     General appearance: alert, appears stated age, and cooperative  Head: Normocephalic, without obvious abnormality, atraumatic  Lungs: clear to auscultation bilaterally  Chest wall: no tenderness  Heart: regular rate and rhythm, S1, S2 normal, no murmur, click, rub or gallop  Abdomen: soft, non-tender; bowel sounds normal; no masses,  no organomegaly  Extremities: extremities normal, atraumatic, no cyanosis or edema  Skin: Skin color, texture, turgor normal. No rashes or lesions    Data Review:    Latest Reference Range & Units 12/26/22 08:07 12/27/22 06:59 12/28/22 07:32 12/29/22 08:30   POC Glucose 70 - 108 mg/dl 146 (H) 151 (H) 153 (H) 155 (H)   (H): Data is abnormally high    Electronically signed by Ciaran Vallecillo MD on 12/29/2022 at 6:28 PM

## 2022-12-29 NOTE — PROGRESS NOTES
[address]  Occupational Therapy  Daily Note  Time:    Time In: 1400  Time Out: 1430  Timed Code Treatment Minutes: 30 Minutes  Minutes: 30          Date: 2022  Patient Name: Dennis Velasquez,   Gender: male      Room: [facility]/053-A  MRN: [id number]  : 1960  (63 y.o.)  Referring Practitioner: Ordering & Attending: Slade Dickson MD  Diagnosis: Acute Stroke due to Ischemia  Additional Pertinent Hx: Dennis Velasquez who is a 63 y.o. male with a history of hypertension, diabetes, CAD on  daily, recent CABG in 2022, ischemic cardiomyopathy is admitted to [facility] for sepsis, emphysematous cystitis and acute kidney injury on CKD. During admission exam patient stated that his right arm feldman has been weak and that he has had some slurred speech. Stroke alert was called for right-sided weakness and dysarthria on 12/15. On arrival patient's NIH was 4 for right upper extremity, right lower extremity weakness, 1 limb ataxia and dysarthria. On further questioning, patient and wife state that the right arm weakness actually started last night. Patient's wife noticed when patient was trying to get up from the toilet that he was unable to push himself up with his right arm. Last known well 2330 on 2022. Patient taken to imaging for stat CT head which revealed no ICH, midline shift, mass-effect. CTA head and neck deferred due to patient's LC. Decision for no tPA was made due to patient's time since last known well. Patient with relatively low NIH and symptoms which are not consistent with LVO, therefore no thrombectomy/neuro intervention at this time. Patient had stat MRI brain and MRA head and neck without contrast.  MRI showed acute ischemic stroke of left parietal region. Pt admitted to IP rehab on 22.     Restrictions/Precautions:  Restrictions/Precautions: General Precautions, Fall Risk  Position Activity Restriction  Other position/activity restrictions: right side hemiparesis, recent CABG 11/22-minimize arm use ( s/p 5 wks), life vest     SUBJECTIVE: Pt was up in recliner upon arrival, pleasant and agreeable to OT. PAIN: Pain at site of catheter insertion as he reported the tubing got stuck when putting leg rest of recliner down. Vitals: Vitals not assessed per clinical judgement, see nursing flowsheet    COGNITION: Decreased Insight, Decreased Safety Awareness, and Impulsive     ADL:   No ADL's completed this session. Ruba Ornelas BALANCE:  Sitting Balance:  Supervision. Standing Balance: Contact Guard Assistance. With 1-2 UE release    BED MOBILITY:  Sit to Supine: Stand By Assistance with increased time, good recall of safe hand placement  Scooting: Stand By Assistance      TRANSFERS:  Sit to Stand:  Contact Guard Assistance. From recliner and chair with arms, good recall of hand placement  Stand to Sit: [address] FUNCTIONAL MOBILITY:  Assistive Device: Rolling Walker  Assist Level:  Contact Guard Assistance. Distance: To and from therapy gym  1 minor LOB, requiring Min A for correction     ADDITIONAL ACTIVITIES:  Pt identified personal goal of increasing R hand functional use for specified meaningful activities such as opening pop bottles/jars for cooking and self managing buttons. Pt engaged in hand strengthening activity using \"power web\" resistance. Pt completed isolated digit flexion, MCP flx/ext, spherical grasp, and supination/pronation. completed x10 reps x1 set with graded lids sizes to increase intrinsic/extrinsic hand musculature. ASSESSMENT:     Activity Tolerance:  Patient tolerance of  treatment: good. Discharge Recommendations: Home with Home Health OT  Equipment Recommendations: Equipment Needed: Yes  Other: OT staff to continue to monitor needs throughout OT POC.   Plan: Times Per Week: 5x/week x 90 minutes and 1x/week x 30 minutes  Current Treatment Recommendations: Strengthening, Balance training, Self-Care / ADL, Equipment evaluation, education, & procurement, Safety education & training, Endurance training, Functional mobility training, Neuromuscular re-education, Patient/Caregiver education & training, Home management training    Patient Education  Patient Education: Plan of Care, Reviewed Prior Education, and Importance of Increasing Activity    Goals  Short Term Goals  Time Frame for Short Term Goals: 1 week  Short Term Goal 1: Pt will complete functional ambulation to/from BR and HH distances with SBA and min vcs for safety to increase indep with toileting. Short Term Goal 2: Pt will decrease RUE 9 hole peg test by 20 seconds and RUE  strength by 5 pounds to increase indep with fasteners in UB dressing. Short Term Goal 3: Pt will complete dynamic standing task x 5 minutes with 1-2 UE release and SBA to increase indep with sinkside grooming. Short Term Goal 4: Pt will complete UB dressing with Set up to increase indep within home environment. Short Term Goal 5: Pt will complete LB dresing with S and min vcs for safety to increase indep and endurance within home environment. Additional Goals?: No  Long Term Goals  Time Frame for Long Term Goals : 4 weeks from [facility] eval  Long Term Goal 1: Pt will complete BADL routine including shower transfer Mod Indep to increase indep and safety in home environment. Long Term Goal 2: Pt will complete simple IADL task with S and 0 vcs for safety to increase indep and endurance in home environment. Following session, patient left in safe position with all fall risk precautions in place.

## 2022-12-29 NOTE — PROGRESS NOTES
[facility]   Nephrology progress note  12/29/2022, 2:59 PM      Pt Name:    Paul Saenz  MRN:     [id number]     YOB: 1960  Admit Date:    12/23/2022 11:24 AM    Chief Complaint: Nephrology following for LC/CKD. Subjective:  Patient seen and examined  No chest pain or shortness of breath  Feels much better    Objective:  24HR INTAKE/OUTPUT:    Intake/Output Summary (Last 24 hours) at 12/29/2022 1459  Last data filed at 12/29/2022 1405  Gross per 24 hour   Intake 5532 ml   Output 5000 ml   Net 532 ml        Admission weight: 134 lb 8 oz (61 kg)  Wt Readings from Last 3 Encounters:   12/29/22 141 lb 8 oz (64.2 kg)   12/23/22 130 lb 15.3 oz (59.4 kg)   12/13/22 142 lb 9.6 oz (64.7 kg)        Vitals :   Vitals:    12/28/22 2006 12/29/22 0523 12/29/22 0830 12/29/22 0908   BP: 123/70  101/63 101/63   Pulse: 79  91 91   Resp: 18  16    Temp: 98.2 °F (36.8 °C)  97.9 °F (36.6 °C)    TempSrc: Oral  Oral    SpO2: 98%  100%    Weight:  141 lb 8 oz (64.2 kg)     Height:           Physical examination  General Appearance:  Well developed.  No distress  Mouth/Throat:  Oral mucosa moist  Neck:  Supple, no JVD  Lungs:  Breath sounds: clear  Heart[de-identified]  S1,S2 heard  Abdomen:  Soft, non - tender  Musculoskeletal:  Edema -no edema noted    Medications:  Infusion:    dextrose       Meds:    ferrous sulfate  325 mg Oral Daily with breakfast    melatonin  6 mg Oral Nightly    baclofen  5 mg Oral Daily    atorvastatin  40 mg Oral Daily    buPROPion  150 mg Oral QAM    pantoprazole  40 mg Oral QAM AC    tamsulosin  0.8 mg Oral Daily    apixaban  5 mg Oral BID    metoprolol succinate  12.5 mg Oral Daily    glipiZIDE  10 mg Oral QAM AC    empagliflozin  25 mg Oral Daily    docusate sodium  100 mg Oral BID    multivitamin  1 tablet Oral Daily with breakfast    senna  2 tablet Oral Nightly    ciprofloxacin  750 mg Oral 2 times per day       Lab Data :  CBC:   Recent Labs     12/27/22  0821 12/29/22  5253 WBC 10.3 8.7   HGB 10.5* 9.6*   HCT 33.2* 30.5*   * 513*       CMP:  Recent Labs     12/27/22  0821 12/28/22  0621 12/29/22  0618    138 140   K 5.2 5.1 4.9  4.9   CL 97* 102 105   CO2 24 25 20*   BUN 37* 38* 33*   CREATININE 2.1* 1.9* 1.6*   GLUCOSE 155* 153* 128*   CALCIUM 9.2 9.1 9.0       Hepatic: No results for input(s): LABALBU, AST, ALT, ALB, BILITOT, ALKPHOS in the last 72 hours. Assessment and Plan:  Renal -acute kidney injury on chronic kidney disease with baseline creatinine around 1.5-1.7  Exact etiology not clear at this time he does look slightly dry though  Making decent urine output has a Bella in place  Overall creatinine improved with IV hydration we will discontinue for now and monitor  Electrolytes -within normal limits  Acid-base status appears to be stable  Essential hypertension stable  Hx of diabetes mellitus  Hx of sepsis due to UTI from Klebsiella pneumonia on antibiotics  CAD status post CABG 11/22  Meds reviewed and discussed with patient     Rumaldo Schilder, MD  [facility]    This report has been created using voice recognition software.  It may contain minor errors which are inherent in voice recognition technology

## 2022-12-29 NOTE — PROGRESS NOTES
[facility]  [address] [facility]  Occupational Therapy  Daily Note  Time:    Time In: 1000  Time Out: 1100  Timed Code Treatment Minutes: 60 Minutes  Minutes: 60          Date: 2022  Patient Name: Dalton Riggs,   Gender: male      Room: [facility]/053-A  MRN: [id number]  : 1960  (63 y.o.)  Referring Practitioner: Ordering & Attending: Johan Spann MD  Diagnosis: Acute Stroke due to Ischemia  Additional Pertinent Hx: Dalton Riggs who is a 63 y.o. male with a history of hypertension, diabetes, CAD on  daily, recent CABG in 2022, ischemic cardiomyopathy is admitted to [facility] for sepsis, emphysematous cystitis and acute kidney injury on CKD. During admission exam patient stated that his right arm feldman has been weak and that he has had some slurred speech. Stroke alert was called for right-sided weakness and dysarthria on 12/15. On arrival patient's NIH was 4 for right upper extremity, right lower extremity weakness, 1 limb ataxia and dysarthria. On further questioning, patient and wife state that the right arm weakness actually started last night. Patient's wife noticed when patient was trying to get up from the toilet that he was unable to push himself up with his right arm. Last known well 2330 on 2022. Patient taken to imaging for stat CT head which revealed no ICH, midline shift, mass-effect. CTA head and neck deferred due to patient's LC. Decision for no tPA was made due to patient's time since last known well. Patient with relatively low NIH and symptoms which are not consistent with LVO, therefore no thrombectomy/neuro intervention at this time. Patient had stat MRI brain and MRA head and neck without contrast.  MRI showed acute ischemic stroke of left parietal region. Pt admitted to IP rehab on 22.     Restrictions/Precautions:  Restrictions/Precautions: General Precautions, Fall Risk  Position Activity Restriction  Other position/activity restrictions: right side hemiparesis, recent CABG 11/22-minimize arm use ( s/p 5 wks), life vest      SUBJECTIVE: Pt was up in recliner upon arrival, pleasant and agreeable to OT. PAIN: Denies pain    Vitals: Vitals not assessed per clinical judgement, see nursing flowsheet    COGNITION: Decreased Insight, Decreased Safety Awareness, and Impulsive    ADL:   No ADL's completed this session. Mercedes Joseph BALANCE:  Sitting Balance:  Supervision. Standing Balance: Contact Guard Assistance. With 1-2 UE release    BED MOBILITY:  Not Tested    TRANSFERS:  Sit to Stand:  Minimal Assistance. From various surfaces, good recall on hand placement  Stand to Sit: Contact Guard Assistance, Minimal Assistance. To various surfaces    FUNCTIONAL MOBILITY:  Assistive Device: Rolling Walker  Assist Level:  Contact Guard Assistance. Distance: To and from therapy apartment and within therapy apartment  Very slow pace, slightly unsteady at times when pt began to demo increased fatigue. ADDITIONAL ACTIVITIES:  Patient completed IADL laundry task that facilitated retrieving linens from graded planes and heights, including floor level, while using a reacher. Task completed to challenge RW safety, standing endurance / balance, and R FMC/ strength through mgmt of reacher with R hand only - pt required CGA - brief periods of Min A  for balance and demo a standing endurance of 8 minutes. Skilled education completed on RW safety techniques and fall prevention strategies with patient demo'ing good carry over, requiring additional min cues for RW safety. Pt required 1 seated rest break thoroughout task. Patient completed dynamic standing IADL task of folding linens while standing at washer and dryer. Task completed to facilitate B hand release and supportive response training in both core and B LE. Patient required CGA - Min A for standing balance with fatigue, and demo'ed an endurance of 6 minutes. Demo good tolerance with 1 seated rest breaks. Standing task completed to challenge endurance and balance required for ADL and IADL skills. Patient identified difficulty with fine motor coordination, reporting a personal goal of being able to self manage buttons / zippers with clothing management for ADLs and open own cooking supplies during IADLs. [address] task this date completed with green medium theraputty, utilized R hand in pincer grasp, 3 jaw delilah, lateral key pinch and then utilized pincer grasp to retreive small beads from within putty, completing with mod difficulty and increased time. ASSESSMENT:     Activity Tolerance:  Patient tolerance of  treatment: good. Discharge Recommendations: Home with Home Health OT and Patient would benefit from continued OT at discharge  Equipment Recommendations: Equipment Needed: Yes  Other: OT staff to continue to monitor needs throughout OT POC. Plan: Times Per Week: 5x/week x 90 minutes and 1x/week x 30 minutes  Current Treatment Recommendations: Strengthening, Balance training, Self-Care / ADL, Equipment evaluation, education, & procurement, Safety education & training, Endurance training, Functional mobility training, Neuromuscular re-education, Patient/Caregiver education & training, Home management training    Patient Education  Patient Education: ADL's, Home Exercise Program, Reviewed Prior Education, and Importance of Increasing Activity    Goals  Short Term Goals  Time Frame for Short Term Goals: 1 week  Short Term Goal 1: Pt will complete functional ambulation to/from BR and HH distances with SBA and min vcs for safety to increase indep with toileting. Short Term Goal 2: Pt will decrease RUE 9 hole peg test by 20 seconds and RUE  strength by 5 pounds to increase indep with fasteners in UB dressing.   Short Term Goal 3: Pt will complete dynamic standing task x 5 minutes with 1-2 UE release and SBA to increase indep with sinkside grooming. Short Term Goal 4: Pt will complete UB dressing with Set up to increase indep within home environment. Short Term Goal 5: Pt will complete LB dresing with S and min vcs for safety to increase indep and endurance within home environment. Additional Goals?: No  Long Term Goals  Time Frame for Long Term Goals : 4 weeks from IPR eval  Long Term Goal 1: Pt will complete BADL routine including shower transfer Mod Indep to increase indep and safety in home environment. Long Term Goal 2: Pt will complete simple IADL task with S and 0 vcs for safety to increase indep and endurance in home environment. Following session, patient left in safe position with all fall risk precautions in place.

## 2022-12-29 NOTE — PROGRESS NOTES
[facility]  INPATIENT PHYSICAL THERAPY  DAILY NOTE  [address] [facility] - [id number]    Time In: 1130  Time Out: 1200  Timed Code Treatment Minutes: 30 Minutes  Minutes: 30          Date: 2022  Patient Name: Kimberly Velazco,  Gender:  male        MRN: [id number]  : 1960  (63 y.o.)     Referring Practitioner: Dr. Morena Ernandez  Diagnosis: Acute stroke due to ischemia  Additional Pertinent Hx: Pt admitted through ED due to RUE weakness and slurred speech,  Also noted to have sepsis, LC, emphysematous cystitis. Hx:  HTN, Db, CAD, recent CABG (). MRI + for acute ischemic stroke Lt parietal region     Prior Level of Function:  Lives With: Spouse, Family (16 y.o son, 21 y.o step dtr.)  Type of Home: House  Home Layout: One level  Home Access: Stairs to enter with rails  Entrance Stairs - Number of Steps: 2 + threshold  Entrance Stairs - Rails: Both  Home Equipment:  (None used PTA)   Bathroom Shower/Tub: Tub/Shower unit  Bathroom Toilet: Standard  Bathroom Equipment: Tub transfer bench  Bathroom Accessibility: Accessible    ADL Assistance: Independent  Homemaking Assistance: Independent  Homemaking Responsibilities: Yes  Ambulation Assistance: Independent  Transfer Assistance: Independent  Active : No    Restrictions/Precautions:  Restrictions/Precautions: General Precautions, Fall Risk  Position Activity Restriction  Other position/activity restrictions: right side hemiparesis, recent CABG -minimize arm use ( s/p 5 wks), life vest     SUBJECTIVE: Patient in room in recliner, agreeable to PT. Pt cooperative and pleasant.     PAIN: no complaints    Vitals: Vitals not assessed per clinical judgement, see nursing flowsheet    OBJECTIVE:  Bed Mobility:  Not Tested    Transfers:  Sit to Stand: Contact Guard Assistance  Stand to Sit:Contact Guard Assistance--verbal cues for hand placement    Ambulation:  Contact Guard Assistance  Distance: 3', 8', 9'  Surface: Level Tile  Device:Rolling Walker  Gait Deviations: Forward Flexed Posture, Slow Jada, Decreased Step Length Bilaterally, Decreased Gait Speed, and Decreased Heel Strike Bilaterally, genu recurvatum right     Balance:  Static Standing Balance: Contact Guard Assistance  Dynamic Standing Balance: Contact Guard Assistance    Exercise:  Patient was guided in 1 set(s) 10 reps of exercise to both lower extremities. Step ups on 4\" step x5 each LE's with bilateral hand rails  Standing: terminal knee extension with yellow theraband right LE only. Seated: quad set with yellow theraband resistance right LE only. Seated on mat: trunk extension onto blue physioball. Seated on mat: cervical rotation, posterior shoulder roll. Exercises were completed for increased independence with functional mobility. Functional Outcome Measures: Not completed       ASSESSMENT:  Assessment: Patient progressing toward established goals. Activity Tolerance:  Patient tolerance of  treatment: good.       Equipment Recommendations:Equipment Needed: Yes  Other: RW  Discharge Recommendations: Continue to assess pending progress and Patient would benefit from continued PT at discharge  Plan: Current Treatment Recommendations: Strengthening, ROM, Balance training, Functional mobility training, Transfer training, Endurance training, Neuromuscular re-education, Gait training, Stair training, Safety education & training, Equipment evaluation, education, & procurement, Home exercise program, Therapeutic activities, Patient/Caregiver education & training  General Plan:  (5x/ wk 90 min, 1x/ wk 30 min)    Patient Education  Patient Education: Transfers, Verbal Exercise Instruction,  - Patient Verbalized Understanding, - Patient Requires Continued Education    Goals:  Patient Goals : get back to my normal way of doing things  Short Term Goals  Time Frame for Short Term Goals: 1 wk  Short Term Goal 1: Pt to go supine <->sit, minimal use of UEs, SBA to get in/out of bed. Short Term Goal 2: Pt to get up/down from various seated surfaces, CGA, to get up to walk. Short Term Goal 3: Pt to walk with RW >= 50 ft, demonstrating step through gait pattern, recurvatum < 10% of steps on RT, CGA to progress to home mobility  Short Term Goal 4: Pt to ascend/descend 2 steps with gerber rails, CGA to progress to home entry  Short Term Goal 5: Pt to get in/out of car, CGA for transportation needs. Additional Goals?: Yes  Short Term Goal 6: Pt to perform Tinetti >= 18/28, demonstrating improved balance. Long Term Goals  Time Frame for Long Term Goals : 3 wks from evaluation  Long Term Goal 1: Pt to go supine <->sit, minimal use of UEs, Mod I, to get in/out of bed. Long Term Goal 2: Pt to get up/down from various seated surfaces, Mod I, to get up to walk. Long Term Goal 3: Pt to walk with RW >= 50 ft, demonstrating step through gait pattern, recurvatum < 10% of steps on RT, Mod I, for home mobility  Long Term Goal 4: Pt to perform Tinetti >= 24/28, demonstrating improved balance. Following session, patient left in safe position with all fall risk precautions in place.

## 2022-12-29 NOTE — PROGRESS NOTES
[facility]  Diagnosis List for Inpatient Rehab facility (IRF) - Patient Assessment Instrument (KAYLIN)    Patient Name: Diamond Jose        MRN: [id number]    : 1960  (63 y.o.)  Gender: male     Primary impairment requiring rehabilitation: 1.2 CVA, Right body involvement     Etiologic Diagnosis that led to the condition: Acute left parietal corona radiata and right cerebellar ischemic stroke    Comorbid conditions affecting rehabilitation:  Acute left parietal corona radiata and right cerebellar ischemic stroke causing right hemiparesis with impaired coordination, and dysarthria  Debility/physical decondition due to Klebsiella pneumonia urinary tract infection with emphysematous cystitis and bilateral hydronephrosis complicated by Klebsiella pneumonia bacteremia/sepsis  Urinary retention  Klebsiella pneumonia urinary tract infection with emphysematous cystitis and bilateral hydronephrosis  Klebsiella pneumoniae bacteremia/sepsis  Coronary artery disease with ischemic cardiomyopathy requiring two-vessel coronary artery bypass graft surgery on 2022  Hypertension  Diabetes mellitus type 2 with hyperglycemia     Lupe Andujar MD

## 2022-12-29 NOTE — PROGRESS NOTES
[facility]  INPATIENT PHYSICAL THERAPY  DAILY NOTE  [address] [facility] - [id number]    Time In: 725  Time Out: 826  Timed Code Treatment Minutes: 61 Minutes  Minutes: 61          Date: 2022  Patient Name: Isacc Key,  Gender:  male        MRN: [id number]  : 1960  (63 y.o.)     Referring Practitioner: Dr. Perla Haines  Diagnosis: Acute stroke due to ischemia  Additional Pertinent Hx: Pt admitted through ED due to RUE weakness and slurred speech,  Also noted to have sepsis, LC, emphysematous cystitis. Hx:  HTN, Db, CAD, recent CABG (). MRI + for acute ischemic stroke Lt parietal region     Prior Level of Function:  Lives With: Spouse, Family (16 y.o son, 21 y.o step dtr.)  Type of Home: House  Home Layout: One level  Home Access: Stairs to enter with rails  Entrance Stairs - Number of Steps: 2 + threshold  Entrance Stairs - Rails: Both  Home Equipment:  (None used PTA)   Bathroom Shower/Tub: Tub/Shower unit  Bathroom Toilet: Standard  Bathroom Equipment: Tub transfer bench  Bathroom Accessibility: Accessible    ADL Assistance: Independent  Homemaking Assistance: Independent  Homemaking Responsibilities: Yes  Ambulation Assistance: Independent  Transfer Assistance: Independent  Active : No    Restrictions/Precautions:  Restrictions/Precautions: General Precautions, Fall Risk  Position Activity Restriction  Other position/activity restrictions: right side hemiparesis, recent CABG -minimize arm use ( s/p 5 wks), life vest     SUBJECTIVE: Patient in room in bed, agreeable to PT. Pt apologizing throughout entire session for various things, although he had nothing to apologize for for.      PAIN: denies    Vitals: Vitals not assessed per clinical judgement, see nursing flowsheet    OBJECTIVE:  Bed Mobility:  Supine to Sit: Minimal Assistance at trunk due to posterior lean    Transfers:  Sit to Stand: [address], Minimal Assistance  [facility]  *Verbal cues for increased anterior weight shift with sit to stand and verbal cues for foot positioning    Neuromuscular education:    Patient completed sit < > stand 5 reps x2 sets from mat table at 22.5\" with no UE support. Verbal cues for increased anterior weight shift with sit to stand. Patient completed partial stand (from sitting to long-term standing) x5 reps with verbal cues for anterior weight shift  Anterior pod taps with 3 colored/numbered balance pods and booyah stick right UE for R UE facilitation. Verbal cues for lateral weight shift to stance leg and increased muscle contraction right LE for stability. Pt very anxious with this activities, requiring encouragement. Completed pod taps, as completed above with RW support. Patient completed heel taps to opposite knee x10 each. Completed to improve coordination. *Activities above completed to improve SLS, lateral and anterior weight shift, coordination and LE proprioception to reduce pt risk for falls and increase pt independence and safety. Ambulation:  Contact Guard Assistance, Minimal Assistance  Distance: 35'x2, 5'  Surface: Level Tile  Device:Rolling Walker  Gait Deviations: Forward Flexed Posture, Slow Jada, Decreased Step Length on Right, Decreased Gait Speed, Decreased Heel Strike Bilaterally, and Ataxia  *Verbal cues for increased step length on right, verbal cues for heel strike, verbal cues for posture. Patient stood at walker, instructed to advance right foot forward, demonstrate heel strike and shift weight onto right foot. Verbal cues for muscle contraction right quad. Completed with left LE following. X10 each LE    Balance:  Static Standing Balance: Contact Guard Assistance  Dynamic Standing Balance: Contact Guard Assistance    Functional Outcome Measures: Not completed       ASSESSMENT:  Assessment: Patient progressing toward established goals.   Activity Tolerance:  Patient tolerance of treatment: good. Equipment Recommendations:Equipment Needed: Yes  Other: RW  Discharge Recommendations: Continue to assess pending progress and Patient would benefit from continued PT at discharge  Plan: Current Treatment Recommendations: Strengthening, ROM, Balance training, Functional mobility training, Transfer training, Endurance training, Neuromuscular re-education, Gait training, Stair training, Safety education & training, Equipment evaluation, education, & procurement, Home exercise program, Therapeutic activities, Patient/Caregiver education & training  General Plan:  (5x/ wk 90 min, 1x/ wk 30 min)    Patient Education  Patient Education: Bed Mobility, Transfers, Gait, Verbal Exercise Instruction, Health Promotion and Wellness Education,  - Patient Verbalized Understanding, - Patient Requires Continued Education    Goals:  Patient Goals : get back to my normal way of doing things  Short Term Goals  Time Frame for Short Term Goals: 1 wk  Short Term Goal 1: Pt to go supine <->sit, minimal use of UEs, SBA to get in/out of bed. Short Term Goal 2: Pt to get up/down from various seated surfaces, CGA, to get up to walk. Short Term Goal 3: Pt to walk with RW >= 50 ft, demonstrating step through gait pattern, recurvatum < 10% of steps on RT, CGA to progress to home mobility  Short Term Goal 4: Pt to ascend/descend 2 steps with gerber rails, CGA to progress to home entry  Short Term Goal 5: Pt to get in/out of car, CGA for transportation needs. Additional Goals?: Yes  Short Term Goal 6: Pt to perform Tinetti >= 18/28, demonstrating improved balance. Long Term Goals  Time Frame for Long Term Goals : 3 wks from evaluation  Long Term Goal 1: Pt to go supine <->sit, minimal use of UEs, Mod I, to get in/out of bed. Long Term Goal 2: Pt to get up/down from various seated surfaces, Mod I, to get up to walk.   Long Term Goal 3: Pt to walk with RW >= 50 ft, demonstrating step through gait pattern, recurvatum < 10% of steps on RT, Mod I, for home mobility  Long Term Goal 4: Pt to perform Tinetti >= 24/28, demonstrating improved balance. Following session, patient left in safe position with all fall risk precautions in place.

## 2022-12-29 NOTE — PROGRESS NOTES
Physical Medicine & Rehabilitation Progress Note    Chief Complaint:  Right-sided weakness due to stroke    Subjective:    Maria Guadalupe Rush is a 63 y.o.  left-handed  male with history of hypertension, diabetes mellitus type 2, coronary artery disease with ischemic cardiomyopathy requiring two-vessel CABG, urinary retention with several failed void trial, status post tonsillectomy, was admitted to the inpatient rehabilitation unit on 12/23/2022 for intensive inpatient management of impairment & disability secondary to right hemiparesis due to acute left parietal corona radiata and right cerebellar ischemic stroke, and debility/physical deconditioning due to Klebsiella pneumonia urinary tract infection with emphysematous cystitis and bilateral hydronephrosis complicated by Klebsiella pneumonia bacteremia/sepsis. The patient was previously hospitalized at [facility] from 10/28/2022 to 11/15/2022 for coronary artery disease and ischemic cardiomyopathy requiring two-vessel CABG on 11/7/2022. His postoperative course was complicated by urinary retention with failed void trial.  The patient has been experiencing progressive weakness after he was discharged to home. The Bella was later removed on 12/13/2022 but the patient continued having difficulty voiding. On 12/14/2022 approximately 11:30 PM his wife noticed the patient had slight slurred speech with right arm weakness. On 12/15/2022 the patient suddenly felt dizzy and lightheaded while he was trying to get up from sitting on toilet and fell into the ground. He says he did not lose consciousness and did not hit his head. His wife called 46. He was transported to [address] [place] [address] ER for evaluation by EMS. He complains of neck pain and upper back pain. He was found to be tachycardic. His WBC was found to be 22.3. Bella was placed in ER and a rush of air followed by dark brown and bright red color urine came out.   He was put on IV meropenem and admitted. Urology was consulted. CT of cervical spine revealed only multilevel facet and uncovertebral joint arthropathy. CT of head done on 12/15/2020 revealed no acute intracranial abnormality. CT of the chest, abdomen and pelvis done on 12/15/2022 revealed emphysematous cystitis, bilateral hydronephrosis, and constipation. Because of right arm weakness, stroke code was called on 12/15/2022. MRI of brain done on 12/15/2022 revealed acute ischemic stroke at the left parietal region corona radiata, and possible small additional acute ischemic stroke within right cerebellum. Neurology service start patient on aspirin and Plavix combination for the stroke. MRA of the neck and head done on 12/15/2022 showed only mild bilateral carotid bulb disease. Transesophageal echocardiogram was done on 12/16/2022 showing severe global hypokinesis of left ventricle with estimated ejection fraction of 30%, and no thrombus identified. 2 blood culture and urine culture done on 12/15/2022 revealed Klebsiella pneumonia. Infectious disease had been consulted. Oral Cipro was started on 12/22/2022 after IV meropenem was completed. Patient's hospital course was also complicated by constipation which resolved this morning. The patient says he feels much better today. He denies having any dizziness or lightheadedness when he is standing or sitting. He says the right-sided weakness is getting better. He denies having any painful symptom, or numbness tingling feeling. The IV fluid infusion has completed. He continues tolerating the intensive rehabilitation treatment well. Rehabilitation:  PT: Reviewed.     Bed Mobility:  Supine to Sit: Minimal Assistance at trunk due to posterior lean     Transfers:  Sit to Stand: Contact Guard Assistance, Minimal Assistance  Stand to Sit:Contact [employer]  *Verbal cues for increased anterior weight shift with sit to stand and verbal cues for foot positioning  To/From Bed and Chair: [employer], with increased time for completion, with verbal cues     Ambulation:  Contact Guard Assistance, Minimal Assistance  Distance: 35'x2, 5'  Surface: Level Tile  Device:Rolling Walker  Gait Deviations: Forward Flexed Posture, Slow Jada, Decreased Step Length on Right, Decreased Gait Speed, Decreased Heel Strike Bilaterally, and Ataxia  *Verbal cues for increased step length on right, verbal cues for heel strike, verbal cues for posture. Patient stood at walker, instructed to advance right foot forward, demonstrate heel strike and shift weight onto right foot. Verbal cues for muscle contraction right quad. Completed with left LE following. X10 each LE    Minimal Assistance  Distance: 4' x 2  Surface: Level Tile  Device: Rolling Walker  Gait Deviations:  Slow Jada, Decreased Step Length on Right, Decreased Gait Speed, Decreased Heel Strike Bilaterally, Decreased Foot Clearance Right, Decreased Foot Clearance Left, Ataxia, and Unsteady Gait with mild posterior lean. Contact Guard Assistance, with verbal cues , with increased time for completion  Distance: 72' x 1, 10' x 1, multiple shorter distances. Surface: Level Tile  Device: Rolling Walker  Gait Deviations:  Slow Jada, Decreased Step Length Bilaterally, Decreased Gait Speed, Decreased Heel Strike on Right, Decreased Quad Control Right, Narrow Base of Support, and genu recurvatum on R    Neuromuscular Re-education  Pt. Completed standing and dynamic gait activities using Bilateral UE support on Rolling Walker to improve R quad facilitation, proprioception, coordination, gait mechanics, and lateral weight shifting for improved functional mobility. Patient completed sit < > stand 5 reps x2 sets from mat table at 22.5\" with no UE support. Verbal cues for increased anterior weight shift with sit to stand.   Patient completed partial stand (from sitting to correction standing) x5 reps with verbal cues for anterior weight shift  Anterior pod taps with 3 colored/numbered balance pods and booyah stick right UE for R UE facilitation. Verbal cues for lateral weight shift to stance leg and increased muscle contraction right LE for stability. Pt very anxious with this activities, requiring encouragement. Completed pod taps, as completed above with RW support. Patient completed heel taps to opposite knee x10 each. Completed to improve coordination. *Activities above completed to improve SLS, lateral and anterior weight shift, coordination and LE proprioception to reduce pt risk for falls and increase pt independence and safety. Balance:  Static Standing Balance: Contact Guard Assistance  Dynamic Standing Balance: Contact Guard Assistance      OT: Reviewed. ADL:   Grooming: Contact Guard Assistance and with increased time for completion. Standing sinkside to engage in oral care tasks . Pt. Reports discomfort from cath and OTR noted pt did not have leg hold donned, and nurse made aware. OTR provided CGA while pt engaged in clothing management with OT providing cues for upright positioning while nursing secured cath tubing to leg. BALANCE:  Sitting Balance:  Supervision. Standing Balance: Contact Guard Assistance. With 1-2 UE release     BED MOBILITY:  Supine to Sit: Minimal Assistance, with verbal cues , with increased time for completion       TRANSFERS:  Sit to Stand:  Minimal Assistance. From various surfaces, good recall on hand placement  Stand to Sit: Contact Guard Assistance, Minimal Assistance. To various surfaces     FUNCTIONAL MOBILITY:  Assistive Device: Rolling Walker  Assist Level:  Contact Guard Assistance, with verbal cues , and with increased time for completion. Distance: To and from bathroom    Assistive Device: [address] Level:  [facility].    Distance: within room, within therapy apartment     Assistive Device: Rolling Walker  Assist Level: Contact Guard Assistance. Distance: To and from therapy apartment and within therapy apartment  Very slow pace, slightly unsteady at times when pt began to demo increased fatigue. ADDITIONAL ACTIVITIES:  As pt's spouse present, OTR provided education on pt's performance with self care tasks thus far and continued need for services. Verbal understanding obtained. OTR answered questions to best of ability. OTR instructed pt through theraputty HEP and picked up where session left off prior date to ensure full understanding of HEP, pt required min vc for proper technique and pt completed 10 reps each. Education provided to continue to complete outside of OT sessions to improve FMC/strength in RUE for ease with ADLs. Pt. Required min encouragement to engage in IADL task of preparing an egg. OTR graded task down by having egg, skillet, oil and spatula in reach. Pt. Able to complete task with CGA and able to stand up to 8 minutes before rest break required. Pt. Required verbal cues to widen base of support to improve positioning/posture. Pt. Engaged velcro resistive task with wide wooden roll and small wooden roll x15 reps each to pull to/from midline on table top in RUE to improve performance with ADL task completion. Pt. Then set up with various locks/household objects, pt able to complete 5/6 with min difficulty but did have mod difficulty with lock and key with instruction to complete primarily with RUE to manage items with L hand providing support. Patient completed IADL laundry task that facilitated retrieving linens from graded planes and heights, including floor level, while using a reacher. Task completed to challenge RW safety, standing endurance / balance, and R FMC/ strength through mgmt of reacher with R hand only - pt required CGA - brief periods of Min A  for balance and demo a standing endurance of 8 minutes.  Skilled education completed on RW safety techniques and fall prevention strategies with patient demo'ing good carry over, requiring additional min cues for RW safety. Pt required 1 seated rest break thoroughout task. Patient completed dynamic standing IADL task of folding linens while standing at washer and dryer. Task completed to facilitate B hand release and supportive response training in both core and B LE. Patient required CGA - Min A for standing balance with fatigue, and demo'ed an endurance of 6 minutes. Demo good tolerance with 1 seated rest breaks. Standing task completed to challenge endurance and balance required for ADL and IADL skills. Patient identified difficulty with fine motor coordination, reporting a personal goal of being able to self manage buttons / zippers with clothing management for ADLs and open own cooking supplies during IADLs. [facility] task this date completed with green medium theraputty, utilized R hand in pincer grasp, 3 jaw delilah, lateral key pinch and then utilized pincer grasp to retreive small beads from within putty, completing with mod difficulty and increased time. ST: Reviewed.     Medication Management - Pill Box Organization   Prescription 1 - take 1 tablet daily in the evening   Comprehension: min cues to ID time of day   Completion: independent      Prescription 2 - take 1 tablet daily in the morning   Comprehension: independent   Completion: independent      Prescription 3 - take 1 tablet, 3x/day for 5 days  Comprehension: independent   Completion: independent      Prescription 4 - take 2 tablets, 2x/day  Comprehension: min cues to organize for whole week; patient only organized prescription for 2 days  Completion: min cues      Prescription 5 - take 1 tablet ever 6-8 hours as needed for 7 days   Comprehension: independent   -Patient independently identified 3/3 next available dose times  *overall good success with task this date    Provided patient with a list of errands: (Oil change, Grocery[employer], Bank, Mall for [employer] gift for wife)  -Recall following 24 hours- 4/4 indep      Review of Systems:  Review of Systems   Constitutional:  Negative for chills, diaphoresis, fatigue and fever. HENT:  Negative for hearing loss, rhinorrhea, sneezing, sore throat and trouble swallowing. Eyes:  Negative for visual disturbance. Respiratory:  Negative for cough, shortness of breath and wheezing. Cardiovascular:  Negative for chest pain and palpitations. Gastrointestinal:  Negative for abdominal pain, constipation, diarrhea, nausea and vomiting. Genitourinary:  Positive for difficulty urinating. Negative for dysuria. Musculoskeletal:  Positive for gait problem. Negative for arthralgias, myalgias and neck pain. Skin:  Negative for rash. Neurological:  Positive for weakness (Right upper and right lower extremities). Negative for dizziness, tremors, facial asymmetry, speech difficulty, light-headedness, numbness and headaches. Psychiatric/Behavioral:  Negative for dysphoric mood, hallucinations and sleep disturbance. The patient is not nervous/anxious.          Objective:  /63   Pulse 91   Temp 97.9 °F (36.6 °C) (Oral)   Resp 16   Ht 5' 2\" (1.575 m)   Wt 141 lb 8 oz (64.2 kg)   SpO2 100%   BMI 25.88 kg/m²   Physical Exam   General:  well-developed, well nourished  male; in no acute distress ; appropriate affect & mood; sitting on reclining chair comfortably  Eyes: pupil equally round ; extra-ocular motion intact bilaterally  Head, Ear, Nose, Mouth & Throat : normocephalic ; no discharge from ears or nose ; no deformity ; no facial swelling ; oral mucosa pink   Neck :  supple ; no tenderness ; mild muscle spasm at bilateral cervical paraspinal muscles  Cardiovascular : Presence of healed vertical surgical scar at sternum ; regular rate & rhythm ; normal S1 & S2 heart sound ; no murmur ; normal peripheral pulse at the bilateral upper extremities; reduced pulse strength at the bilateral lower extremities  Pulmonary : Present breath sounds at the bilateral lung fields; no wheezing ; no rale; no crackle  Gastrointestinal : soft, slightly protruded abdomen without tenderness ; normal bowel sound present    : Bella catheter in place draining light yellowish urine  Back : no tenderness; no muscle spasm  Skin: no skin lesion or rash ; no pitting edema at all 4 extremities  Musculoskeletal : no limb asymmetry; no limb deformity; no tenderness at bilateral upper & lower extremities; no palpable mass at limbs ; no joints laxity or crepitation ; shoulder flexion and abduction passive ROM reaching 170 degrees bilaterally; hip flexion passive ROM reaching 130 degrees bilaterally; normal functional joints ROM at bilateral upper & lower extremities  Cerebral :  alert ; awake ; oriented to place, person and time; follow two-step verbal command  Cerebellum : mild dysmetria with the right finger-to-nose test; no dysmetria with left finger-to-nose test ; no dysmetria with bilateral heel-to-shin test  Cranial Nerves : Tongue protrudes slightly to the right  (CN XII) ; grossly intact other CN II to XI function  Sensory : intact light touch and pin prick sensation at bilateral upper & lower extremities  Motor : Slightly reduced muscle tone at right upper and right lower extremities; normal muscle tone at left upper & lower extremities ; 4+/5 to 5/5 muscle strength at right shoulder abduction; 4+/5 muscle strength at the right shoulder flexion; 4+/5 muscle strength at right elbow flexion; 4+/5 to 5/5 muscle strength at the right elbow extension; 4+/5 to 5/5 muscle strength at right wrist extension; 4+/5 to 5/5 muscle strength at right finger extension; 4-/5 to 4+/5 muscle strength at the right finger flexion and handgrip; 4-/5 muscle strength at right finger abduction; 4+/5 muscle strength at the right hip flexion; 4+/5 to 5/5 muscle strength at right knee flexion; 4+/5 muscle strength at right ankle dorsiflexion; 4+/5 to 5/5 muscle strength at the right ankle plantarflexion; normal 5/5 muscle strength at left upper & lower extremities  Reflex : 1+ left biceps, left brachioradialis, left knee reflexes; 0 right biceps, right brachioradialis, right knee reflexes  Pathological Reflex :  No Ion's sign ; no ankle clonus  Gait : Not assessed      Diagnostics:   Recent Results (from the past 24 hour(s))   Basic Metabolic Panel w/ Reflex to MG    Collection Time: 12/29/22  6:18 AM   Result Value Ref Range    Sodium 140 135 - 145 meq/L    Potassium reflex Magnesium 4.9 3.5 - 5.2 meq/L    Chloride 105 98 - 111 meq/L    CO2 20 (L) 23 - 33 meq/L    Glucose 128 (H) 70 - 108 mg/dL    BUN 33 (H) 7 - 22 mg/dL    Creatinine 1.6 (H) 0.4 - 1.2 mg/dL    Calcium 9.0 8.5 - 10.5 mg/dL   CBC with Auto Differential    Collection Time: 12/29/22  6:18 AM   Result Value Ref Range    WBC 8.7 4.8 - 10.8 thou/mm3    RBC 3.53 (L) 4.70 - 6.10 mill/mm3    Hemoglobin 9.6 (L) 14.0 - 18.0 gm/dl    Hematocrit 30.5 (L) 42.0 - 52.0 %    MCV 86.4 80.0 - 94.0 fL    MCH 27.2 26.0 - 33.0 pg    MCHC 31.5 (L) 32.2 - 35.5 gm/dl    RDW-CV 13.9 11.5 - 14.5 %    RDW-SD 43.5 35.0 - 45.0 fL    Platelets 915 (H) 403 - 400 thou/mm3    MPV 9.5 9.4 - 12.4 fL    Seg Neutrophils 64.4 %    Lymphocytes 20.4 %    Monocytes 9.1 %    Eosinophils 4.3 %    Basophils 1.0 %    Immature Granulocytes 0.8 %    Segs Absolute 5.6 1.8 - 7.7 thou/mm3    Lymphocytes Absolute 1.8 1.0 - 4.8 thou/mm3    Monocytes Absolute 0.8 0.4 - 1.3 thou/mm3    Eosinophils Absolute 0.4 0.0 - 0.4 thou/mm3    Basophils Absolute 0.1 0.0 - 0.1 thou/mm3    Immature Grans (Abs) 0.07 0.00 - 0.07 thou/mm3    nRBC 0 /100 wbc   Basic Metabolic Panel    Collection Time: 12/29/22  6:18 AM   Result Value Ref Range    Potassium 4.9 3.5 - 5.2 meq/L   Anion Gap    Collection Time: 12/29/22  6:18 AM   Result Value Ref Range    Anion Gap 15.0 8.0 - 16.0 meq/L   Glomerular Filtration Rate, Estimated    Collection Time: 12/29/22  6:18 AM Result Value Ref Range    Est, Glom Filt Rate 49 (A) >60 ml/min/1.73m2   POCT Glucose    Collection Time: 12/29/22  8:30 AM   Result Value Ref Range    POC Glucose 155 (H) 70 - 108 mg/dl       Impression:  Acute left parietal corona radiata and right cerebellar ischemic stroke causing right hemiparesis with impaired coordination, and dysarthria  Debility/physical decondition due to Klebsiella pneumonia urinary tract infection with emphysematous cystitis and bilateral hydronephrosis complicated by Klebsiella pneumonia bacteremia/sepsis  Urinary retention  Klebsiella pneumonia urinary tract infection with emphysematous cystitis and bilateral hydronephrosis  Klebsiella pneumoniae bacteremia/sepsis  Acute kidney injury possibly due to dehydration with orthostatic hypotension  Coronary artery disease with ischemic cardiomyopathy requiring two-vessel coronary artery bypass graft surgery on 11/7/2022  Hypertension  Diabetes mellitus type 2    The patient's condition is stable. His renal function continues to improve with reducing BUN and creatinine and increasing GFR. IV fluid infusion had completed. The patient is encouraged to continue increase oral fluid intake he still has mild weakness on his right side with slightly impaired coordination. He continues tolerating the intensive inpatient rehabilitation treatment well. His function continue to improve slowly. Plan:  Continues intensive PT/OT/SLP/RT inpatient rehabilitation program at least 3 hours per day, 5 days per week in order to improve functional status prior to discharge. Family education and training will be completed. Equipment evaluations and recommendations will be completed as appropriate. Rehabilitation nursing continues to be involved for bowel, bladder, skin, and pain management. Nursing will also provide education and training to patient and family.     Prophylaxis:  DVT: Patient on Eliquis, JONAH stocking, intermittent pneumatic compression device. GI: Colace, Senokot, GlycoLax as needed, milk of magnesia as needed, Dulcolax suppository as needed.   Pain: Tylenol as needed  Continue normal saline IV fluid infusion at 75 mL/h for additional 24 hours  Continue Lipitor for CVA and CAD  Continue Toprol-XL for hypertension  Continue baclofen for spasticity  Continue Flomax for urinary retention  Continue Protonix for gastric protection  Continue Jardiance, Glucotrol, Humalog insulin coverage for diabetes mellitus  Continue Wellbutrin XL for smoking cessation  Continue 7 days course oral Cipro for Klebsiella pneumonia UTI/bacteremia  Continue melatonin, trazodone as needed for insomnia; increase melatonin dosage to 6 mg  Continue multivitamin supplement  Nutrition: Continue current diet   Bladder: Monitoring signs or symptoms of UTI  Bowel: Monitoring signs or symptoms of constipation   and case management for coordination of care and discharge planning      Missed Therapy Time:  None      Skyler Hu MD

## 2022-12-30 LAB
ANION GAP SERPL CALCULATED.3IONS-SCNC: 11 MEQ/L (ref 8–16)
BASOPHILS # BLD: 1.3 %
BASOPHILS ABSOLUTE: 0.1 THOU/MM3 (ref 0–0.1)
BUN BLDV-MCNC: 31 MG/DL (ref 7–22)
CALCIUM SERPL-MCNC: 9.2 MG/DL (ref 8.5–10.5)
CHLORIDE BLD-SCNC: 103 MEQ/L (ref 98–111)
CO2: 24 MEQ/L (ref 23–33)
CREAT SERPL-MCNC: 1.7 MG/DL (ref 0.4–1.2)
EOSINOPHIL # BLD: 4.6 %
EOSINOPHILS ABSOLUTE: 0.4 THOU/MM3 (ref 0–0.4)
ERYTHROCYTE [DISTWIDTH] IN BLOOD BY AUTOMATED COUNT: 13.9 % (ref 11.5–14.5)
ERYTHROCYTE [DISTWIDTH] IN BLOOD BY AUTOMATED COUNT: 43.9 FL (ref 35–45)
GFR SERPL CREATININE-BSD FRML MDRD: 45 ML/MIN/1.73M2
GLUCOSE BLD-MCNC: 132 MG/DL (ref 70–108)
GLUCOSE BLD-MCNC: 136 MG/DL (ref 70–108)
HCT VFR BLD CALC: 32.8 % (ref 42–52)
HEMOGLOBIN: 10.3 GM/DL (ref 14–18)
IMMATURE GRANS (ABS): 0.05 THOU/MM3 (ref 0–0.07)
IMMATURE GRANULOCYTES: 0.6 %
LYMPHOCYTES # BLD: 23.2 %
LYMPHOCYTES ABSOLUTE: 2 THOU/MM3 (ref 1–4.8)
MCH RBC QN AUTO: 27.2 PG (ref 26–33)
MCHC RBC AUTO-ENTMCNC: 31.4 GM/DL (ref 32.2–35.5)
MCV RBC AUTO: 86.5 FL (ref 80–94)
MONOCYTES # BLD: 8.3 %
MONOCYTES ABSOLUTE: 0.7 THOU/MM3 (ref 0.4–1.3)
NUCLEATED RED BLOOD CELLS: 0 /100 WBC
PLATELET # BLD: 529 THOU/MM3 (ref 130–400)
PMV BLD AUTO: 9.3 FL (ref 9.4–12.4)
POTASSIUM REFLEX MAGNESIUM: 5 MEQ/L (ref 3.5–5.2)
PRO-BNP: 3912 PG/ML (ref 0–124)
RBC # BLD: 3.79 MILL/MM3 (ref 4.7–6.1)
SEG NEUTROPHILS: 62 %
SEGMENTED NEUTROPHILS ABSOLUTE COUNT: 5.3 THOU/MM3 (ref 1.8–7.7)
SODIUM BLD-SCNC: 138 MEQ/L (ref 135–145)
WBC # BLD: 8.6 THOU/MM3 (ref 4.8–10.8)

## 2022-12-30 PROCEDURE — 97116 GAIT TRAINING THERAPY: CPT

## 2022-12-30 PROCEDURE — 85025 COMPLETE CBC W/AUTO DIFF WBC: CPT

## 2022-12-30 PROCEDURE — 97130 THER IVNTJ EA ADDL 15 MIN: CPT

## 2022-12-30 PROCEDURE — 97112 NEUROMUSCULAR REEDUCATION: CPT

## 2022-12-30 PROCEDURE — 82948 REAGENT STRIP/BLOOD GLUCOSE: CPT

## 2022-12-30 PROCEDURE — 99232 SBSQ HOSP IP/OBS MODERATE 35: CPT | Performed by: INTERNAL MEDICINE

## 2022-12-30 PROCEDURE — 1180000000 HC REHAB R&B

## 2022-12-30 PROCEDURE — 97110 THERAPEUTIC EXERCISES: CPT

## 2022-12-30 PROCEDURE — 2580000003 HC RX 258: Performed by: PHYSICAL MEDICINE & REHABILITATION

## 2022-12-30 PROCEDURE — 6370000000 HC RX 637 (ALT 250 FOR IP): Performed by: PHYSICAL MEDICINE & REHABILITATION

## 2022-12-30 PROCEDURE — 97530 THERAPEUTIC ACTIVITIES: CPT

## 2022-12-30 PROCEDURE — 97535 SELF CARE MNGMENT TRAINING: CPT

## 2022-12-30 PROCEDURE — 83880 ASSAY OF NATRIURETIC PEPTIDE: CPT

## 2022-12-30 PROCEDURE — 97129 THER IVNTJ 1ST 15 MIN: CPT

## 2022-12-30 PROCEDURE — 99232 SBSQ HOSP IP/OBS MODERATE 35: CPT | Performed by: PHYSICAL MEDICINE & REHABILITATION

## 2022-12-30 PROCEDURE — 80048 BASIC METABOLIC PNL TOTAL CA: CPT

## 2022-12-30 PROCEDURE — 36415 COLL VENOUS BLD VENIPUNCTURE: CPT

## 2022-12-30 RX ORDER — 0.9 % SODIUM CHLORIDE 0.9 %
250 INTRAVENOUS SOLUTION INTRAVENOUS ONCE
Status: COMPLETED | OUTPATIENT
Start: 2022-12-30 | End: 2022-12-30

## 2022-12-30 RX ORDER — SODIUM CHLORIDE 1000 MG
1 TABLET, SOLUBLE MISCELLANEOUS 2 TIMES DAILY WITH MEALS
Status: DISCONTINUED | OUTPATIENT
Start: 2022-12-30 | End: 2022-12-31

## 2022-12-30 RX ADMIN — DOCUSATE SODIUM 100 MG: 100 CAPSULE, LIQUID FILLED ORAL at 07:48

## 2022-12-30 RX ADMIN — TAMSULOSIN HYDROCHLORIDE 0.8 MG: 0.4 CAPSULE ORAL at 07:47

## 2022-12-30 RX ADMIN — SODIUM CHLORIDE 250 ML: 9 INJECTION, SOLUTION INTRAVENOUS at 13:29

## 2022-12-30 RX ADMIN — Medication 6 MG: at 23:41

## 2022-12-30 RX ADMIN — EMPAGLIFLOZIN 25 MG: 25 TABLET, FILM COATED ORAL at 07:47

## 2022-12-30 RX ADMIN — APIXABAN 5 MG: 5 TABLET, FILM COATED ORAL at 07:48

## 2022-12-30 RX ADMIN — SENNOSIDES 17.2 MG: 8.6 TABLET, FILM COATED ORAL at 23:41

## 2022-12-30 RX ADMIN — SODIUM CHLORIDE 1 G: 1 TABLET ORAL at 15:47

## 2022-12-30 RX ADMIN — BACLOFEN 5 MG: 10 TABLET ORAL at 07:46

## 2022-12-30 RX ADMIN — PANTOPRAZOLE SODIUM 40 MG: 40 TABLET, DELAYED RELEASE ORAL at 05:57

## 2022-12-30 RX ADMIN — ATORVASTATIN CALCIUM 40 MG: 40 TABLET, FILM COATED ORAL at 23:41

## 2022-12-30 RX ADMIN — DOCUSATE SODIUM 100 MG: 100 CAPSULE, LIQUID FILLED ORAL at 23:41

## 2022-12-30 RX ADMIN — GLIPIZIDE 10 MG: 10 TABLET ORAL at 07:46

## 2022-12-30 RX ADMIN — Medication 1 TABLET: at 07:46

## 2022-12-30 RX ADMIN — BUPROPION HYDROCHLORIDE 150 MG: 150 TABLET, FILM COATED, EXTENDED RELEASE ORAL at 07:46

## 2022-12-30 RX ADMIN — FERROUS SULFATE TAB 325 MG (65 MG ELEMENTAL FE) 325 MG: 325 (65 FE) TAB at 07:46

## 2022-12-30 RX ADMIN — APIXABAN 5 MG: 5 TABLET, FILM COATED ORAL at 23:41

## 2022-12-30 ASSESSMENT — ENCOUNTER SYMPTOMS
RHINORRHEA: 0
CONSTIPATION: 0
WHEEZING: 0
SORE THROAT: 0
NAUSEA: 0
DIARRHEA: 0
COUGH: 0
VOMITING: 0
SHORTNESS OF BREATH: 0
ABDOMINAL PAIN: 0
TROUBLE SWALLOWING: 0

## 2022-12-30 NOTE — PROGRESS NOTES
[address]  Occupational Therapy  Daily Note  Time:   Time In: 1200  Time Out: 1230  Timed Code Treatment Minutes: 30 Minutes  Minutes: 30    Date: 2022  Patient Name: Toño Davis,   Gender: male      Room: [facility]053-A  MRN: [id number]  : 1960  (63 y.o.)  Referring Practitioner: Ordering & Attending: Eloy Landau, MD  Diagnosis: Acute Stroke due to Ischemia  Additional Pertinent Hx: Toño Davis who is a 63 y.o. male with a history of hypertension, diabetes, CAD on  daily, recent CABG in 2022, ischemic cardiomyopathy is admitted to [facility] for sepsis, emphysematous cystitis and acute kidney injury on CKD. During admission exam patient stated that his right arm feldman has been weak and that he has had some slurred speech. Stroke alert was called for right-sided weakness and dysarthria on 12/15. On arrival patient's NIH was 4 for right upper extremity, right lower extremity weakness, 1 limb ataxia and dysarthria. On further questioning, patient and wife state that the right arm weakness actually started last night. Patient's wife noticed when patient was trying to get up from the toilet that he was unable to push himself up with his right arm. Last known well 2330 on 2022. Patient taken to imaging for stat CT head which revealed no ICH, midline shift, mass-effect. CTA head and neck deferred due to patient's LC. Decision for no tPA was made due to patient's time since last known well. Patient with relatively low NIH and symptoms which are not consistent with LVO, therefore no thrombectomy/neuro intervention at this time. Patient had stat MRI brain and MRA head and neck without contrast.  MRI showed acute ischemic stroke of left parietal region. Pt admitted to IP rehab on 22.     Restrictions/Precautions:  Restrictions/Precautions: General Precautions, Fall Risk  Position Activity Restriction  Other position/activity restrictions: right side hemiparesis, recent CABG 11/22-minimize arm use ( s/p 5 wks), life vest     SUBJECTIVE: Patient pleasant and cooperative, agreeable to OT. PAIN: Denies     Vitals: Vitals not assessed per clinical judgement, see nursing flowsheet    COGNITION: Slow Processing and Decreased Recall    ADL:   No ADL's completed this session. Gina Simmons BALANCE:  Sitting Balance:  Modified Independent. Standing Balance: Contact Guard Assistance. BED MOBILITY:  Supine to Sit: Supervision      TRANSFERS:  Sit to Stand:  Contact Guard Assistance. EOB, chair without arm rests - cues for RUE engagement   Stand to Sit: [employer]. Cues for BUE placement     FUNCTIONAL MOBILITY:  Assistive Device: Rolling Walker  Assist Level:  Contact Guard Assistance and Minimal Assistance. Distance: To and from therapy gym  CGA consistently but did require light min A x1 event when turning towards L, cues for posture and B scapular depression      ADDITIONAL ACTIVITIES:  Pt engaged into neuro based supportive response training on RUE as preparatory warm-up, following completed closed chain activities in shoulder flex and figure 8 patterns to challenge [address] with good tolerance with several repetitions. Progressed to open chain tasks using RUE in sperical grasp while completing dynamic reaching in graded planes to also challenge targeting. Pt did demo min ataxia when reaching outside RODNEY and mod incoordination. Completed several repetitions for improved motor planning and neuro re-ed. Tolerated well overall. All for RUE return for ADL tasks. Pt completed [facility] task that facilitated: pincer grasp and in hand manipulation of simple rotational and finger to palm / palm to finger translation skills. Pt demo mod difficulty and min increased time required. All for R [facility] for return to tying shoe laces / opening IADL items.      ASSESSMENT:     Activity Tolerance:  Patient tolerance of treatment: good. Discharge Recommendations: Home with Outpatient OT if pt has transportation, vs HH OT - requires further skilled OT   Equipment Recommendations: Equipment Needed: Yes  Other: OT staff to continue to monitor needs throughout OT POC. Plan: Times Per Week: 5x/week x 90 minutes and 1x/week x 30 minutes  Current Treatment Recommendations: Strengthening, Balance training, Self-Care / ADL, Equipment evaluation, education, & procurement, Safety education & training, Endurance training, Functional mobility training, Neuromuscular re-education, Patient/Caregiver education & training, Home management training    Patient Education  Patient Education:  RUE attn, neuro re-ed     Goals  Short Term Goals  Time Frame for Short Term Goals: 1 week  Short Term Goal 1: Pt will complete functional ambulation to/from BR and HH distances with SBA and min vcs for safety to increase indep with toileting. Short Term Goal 2: Pt will decrease RUE 9 hole peg test by 20 seconds and RUE  strength by 5 pounds to increase indep with fasteners in UB dressing. Short Term Goal 3: Pt will complete dynamic standing task x 5 minutes with 1-2 UE release and SBA to increase indep with sinkside grooming. Short Term Goal 4: Pt will complete UB dressing with Set up to increase indep within home environment. Short Term Goal 5: Pt will complete LB dresing with S and min vcs for safety to increase indep and endurance within home environment. Additional Goals?: No  Long Term Goals  Time Frame for Long Term Goals : 4 weeks from IPR eval  Long Term Goal 1: Pt will complete BADL routine including shower transfer Mod Indep to increase indep and safety in home environment. Long Term Goal 2: Pt will complete simple IADL task with S and 0 vcs for safety to increase indep and endurance in home environment. Following session, patient left in safe position with all fall risk precautions in place.

## 2022-12-30 NOTE — PLAN OF CARE
Problem: Discharge Planning  Goal: Discharge to home or other facility with appropriate resources  Recent Flowsheet Documentation  Taken 12/29/2022 0830 by Domonique Andrea RN  Discharge to home or other facility with appropriate resources: Identify barriers to discharge with patient and caregiver     Problem: Chronic Conditions and Co-morbidities  Goal: Patient's chronic conditions and co-morbidity symptoms are monitored and maintained or improved  Recent Flowsheet Documentation  Taken 12/29/2022 0830 by Domonique Andrea RN  Care Plan - Patient's Chronic Conditions and Co-Morbidity Symptoms are Monitored and Maintained or Improved: Monitor and assess patient's chronic conditions and comorbid symptoms for stability, deterioration, or improvement   Bella catheter remains patent and catheter care provided using CHG wipes and educated patient of the importance of completing task 2 times a day, no s/s of infection

## 2022-12-30 NOTE — PROGRESS NOTES
[address]  Occupational Therapy  Daily Note  Time:   Time In: 0830  Time Out: 0930  Timed Code Treatment Minutes: 60 Minutes  Minutes: 60    Date: 2022  Patient Name: Esteban Jaquez,   Gender: male      Room: [facility]/053-A  MRN: [id number]  : 1960  (63 y.o.)  Referring Practitioner: Ordering & Attending: Monica Kessler MD  Diagnosis: Acute Stroke due to Ischemia  Additional Pertinent Hx: Esteban Jaquez who is a 63 y.o. male with a history of hypertension, diabetes, CAD on  daily, recent CABG in 2022, ischemic cardiomyopathy is admitted to [facility] for sepsis, emphysematous cystitis and acute kidney injury on CKD. During admission exam patient stated that his right arm feldman has been weak and that he has had some slurred speech. Stroke alert was called for right-sided weakness and dysarthria on 12/15. On arrival patient's NIH was 4 for right upper extremity, right lower extremity weakness, 1 limb ataxia and dysarthria. On further questioning, patient and wife state that the right arm weakness actually started last night. Patient's wife noticed when patient was trying to get up from the toilet that he was unable to push himself up with his right arm. Last known well 2330 on 2022. Patient taken to imaging for stat CT head which revealed no ICH, midline shift, mass-effect. CTA head and neck deferred due to patient's LC. Decision for no tPA was made due to patient's time since last known well. Patient with relatively low NIH and symptoms which are not consistent with LVO, therefore no thrombectomy/neuro intervention at this time. Patient had stat MRI brain and MRA head and neck without contrast.  MRI showed acute ischemic stroke of left parietal region. Pt admitted to IP rehab on 22.     Restrictions/Precautions:  Restrictions/Precautions: General Precautions, Fall Risk  Position Activity Restriction  Other position/activity restrictions: right side hemiparesis, recent CABG 11/22-minimize arm use ( s/p 5 wks), life vest     SUBJECTIVE: Patient pleasant and cooperative. Agreeable to OT. PAIN: denies pain     Vitals: Vitals not assessed per clinical judgement, see nursing flowsheet    COGNITION: Slow Processing, Decreased Recall, and Impaired Attention    ADL:   Grooming: with set-up. In shower to wash face. Bathing: Contact Guard Assistance. Standing briefly for bottom care. Min difficulty noted with gross grasp on body wash bottle with RUE. Pt did demo good initiation of using RUE throughout. Upper Extremity Dressing: with set-up. Donning t-shirt. Lower Extremity Dressing: Contact Guard Assistance. During clothing mgmt. Min increased time for crum mgmt, although pt able to complete. Pt able to doff and carli socks using figure 4, required light min A to initiate TEDs donning  Tub Transfer: [address]. Stepping over tub with use of grab bars with min cues for safe technique . BALANCE:  Sitting Balance:  Modified Independent, Supervision. Supervision with more dynamic tasks. Standing Balance: Contact Guard Assistance. BED MOBILITY:  Not Tested    TRANSFERS:  Sit to Stand:  Contact Guard Assistance. Recliner, sh chair. Cues for hand placement + grab bar awareness. X1 cue to integrate RUE. Stand to Sit: Contact Guard Assistance. FUNCTIONAL MOBILITY:  Assistive Device: Rolling Walker  Assist Level:  Contact Guard Assistance. Distance: To and from bathroom and To and from shower room  Slow pace, cues for posture, cues for fwd gaze      ASSESSMENT:     Activity Tolerance:  Patient tolerance of  treatment: good.        Discharge Recommendations: Home with Outpatient OT if pt has adequate transportation, vs HH with if no transportation, requires further skilled OT   Equipment Recommendations: Equipment Needed: Yes  Other: OT staff to continue to monitor needs throughout OT POC. Plan: Times Per Week: 5x/week x 90 minutes and 1x/week x 30 minutes  Current Treatment Recommendations: Strengthening, Balance training, Self-Care / ADL, Equipment evaluation, education, & procurement, Safety education & training, Endurance training, Functional mobility training, Neuromuscular re-education, Patient/Caregiver education & training, Home management training    Patient Education  Patient Education: ADL's, Hemibody Awareness/Attention, Reviewed Prior Education, and Assistive Device Safety    Goals  Short Term Goals  Time Frame for Short Term Goals: 1 week  Short Term Goal 1: Pt will complete functional ambulation to/from BR and HH distances with SBA and min vcs for safety to increase indep with toileting. Short Term Goal 2: Pt will decrease RUE 9 hole peg test by 20 seconds and RUE  strength by 5 pounds to increase indep with fasteners in UB dressing. Short Term Goal 3: Pt will complete dynamic standing task x 5 minutes with 1-2 UE release and SBA to increase indep with sinkside grooming. Short Term Goal 4: Pt will complete UB dressing with Set up to increase indep within home environment. Short Term Goal 5: Pt will complete LB dresing with S and min vcs for safety to increase indep and endurance within home environment. Additional Goals?: No  Long Term Goals  Time Frame for Long Term Goals : 4 weeks from IPR eval  Long Term Goal 1: Pt will complete BADL routine including shower transfer Mod Indep to increase indep and safety in home environment. Long Term Goal 2: Pt will complete simple IADL task with S and 0 vcs for safety to increase indep and endurance in home environment. Following session, patient left in safe position with all fall risk precautions in place.

## 2022-12-30 NOTE — PROGRESS NOTES
[facility]  INPATIENT PHYSICAL THERAPY  Daily Note  [facility] - 7E-53/053-A    Time In: 1030  Time Out: 1100  Timed Code Treatment Minutes: 30 Minutes  Minutes: 30          Date: 2022  Patient Name: Ryanne Montalvo,  Gender:  male        MRN: [id number]  : 1960  (63 y.o.)     Referring Practitioner: Dr. Liseth Almaraz  Diagnosis: Acute stroke due to ischemia  Additional Pertinent Hx: Pt admitted through ED due to RUE weakness and slurred speech,  Also noted to have sepsis, LC, emphysematous cystitis. Hx:  HTN, Db, CAD, recent CABG (). MRI + for acute ischemic stroke Lt parietal region     Prior Level of Function:  Lives With: Spouse, Family (16 y.o son, 21 y.o step dtr.)  Type of Home: House  Home Layout: One level  Home Access: Stairs to enter with rails  Entrance Stairs - Number of Steps: 2 + threshold  Entrance Stairs - Rails: Both  Home Equipment:  (None used PTA)   Bathroom Shower/Tub: Tub/Shower unit  Bathroom Toilet: Standard  Bathroom Equipment: Tub transfer bench  Bathroom Accessibility: Accessible    ADL Assistance: Independent  Homemaking Assistance: Independent  Homemaking Responsibilities: Yes  Ambulation Assistance: Independent  Transfer Assistance: Independent  Active : No    Restrictions/Precautions:  Restrictions/Precautions: General Precautions, Fall Risk  Position Activity Restriction  Other position/activity restrictions: right side hemiparesis, recent CABG -minimize arm use ( s/p 5 wks), life vest     SUBJECTIVE: Pt. Seated in his WC and pleasantly agrees to therapy session. Pt. Motivated for session. Pt. Requests to lay in bed at end of session.      PAIN: None indicated    Vitals:   Patient Vitals for the past 8 hrs:   BP Patient Position Temp Temp src Pulse Resp SpO2   22 0730 (!) 86/55 Sitting;Up in chair 97.5 °F (36.4 °C) Oral 88 16 100 %        OBJECTIVE:  Bed Mobility:  Sit to Supine: Supervision Transfers:  Sit to Stand: Contact Guard Assistance, Minimal Assistance  Stand to Sit:Stand By Assistance, Contact Guard Assistance  Pt. Completed multiple sit to stand transfers with various foot placements to facilitate R quads. Pt. Completed sit to stand transfers while balancing tennis ball on cone to challenge core strength. Ambulation:  Contact Guard Assistance  Distance: 8' x1  Surface: Level Tile  Device: Rolling Walker  Gait Deviations:  Slow Jada, Decreased Step Length Bilaterally, Decreased Trunk Rotation, Decreased Gait Speed, Decreased Heel Strike on Right, Decreased Foot Clearance Right, and Decreased quad control R    Stairs:  None    Balance:  None    Neuromuscular Re-education  Pt. Completed  modified SLS (single leg on ball) using Single UE support on Rolling Walker to improve core stability, hip/quad stability, and lateral weight shifting for improved functional mobility. Pt. Unsteady throughout activity requiring min-max A to correct LOBs. Exercise:  None    Functional Outcome Measures:   Not completed    ASSESSMENT:  Assessment: Patient progressing toward established goals. Activity Tolerance:  Patient tolerance of  treatment: good.      Equipment Recommendations:Equipment Needed: Yes  Other: RW  Discharge Recommendations: Continue to assess pending progress, Patient would benefit from continued therapy after discharge     Plan: Current Treatment Recommendations: Strengthening, ROM, Balance training, Functional mobility training, Transfer training, Endurance training, Neuromuscular re-education, Gait training, Stair training, Safety education & training, Equipment evaluation, education, & procurement, Home exercise program, Therapeutic activities, Patient/Caregiver education & training  General Plan:  (5x/ wk 90 min, 1x/ wk 30 min)    Patient Education  Patient Education: Plan of Care, Functional Mobility, Reviewed Prior Education, Health Promotion and Wellness Education, Safety    Goals:  Patient Goals : get back to my normal way of doing things  Short Term Goals  Time Frame for Short Term Goals: 1 wk  Short Term Goal 1: Pt to go supine <->sit, minimal use of UEs, SBA to get in/out of bed. Short Term Goal 2: Pt to get up/down from various seated surfaces, CGA, to get up to walk. Short Term Goal 3: Pt to walk with RW >= 50 ft, demonstrating step through gait pattern, recurvatum < 10% of steps on RT, CGA to progress to home mobility  Short Term Goal 4: Pt to ascend/descend 2 steps with gerber rails, CGA to progress to home entry  Short Term Goal 5: Pt to get in/out of car, CGA for transportation needs. Additional Goals?: Yes  Short Term Goal 6: Pt to perform Tinetti >= 18/28, demonstrating improved balance. Long Term Goals  Time Frame for Long Term Goals : 3 wks from evaluation  Long Term Goal 1: Pt to go supine <->sit, minimal use of UEs, Mod I, to get in/out of bed. Long Term Goal 2: Pt to get up/down from various seated surfaces, Mod I, to get up to walk. Long Term Goal 3: Pt to walk with RW >= 50 ft, demonstrating step through gait pattern, recurvatum < 10% of steps on RT, Mod I, for home mobility  Long Term Goal 4: Pt to perform Tinetti >= 24/28, demonstrating improved balance. Following session, patient left in safe position with all fall risk precautions in place.

## 2022-12-30 NOTE — PROGRESS NOTES
Physical Medicine & Rehabilitation Progress Note    Chief Complaint:  Right-sided weakness due to stroke    Subjective:    Lisbeth Schuster is a 63 y.o.  left-handed  male with history of hypertension, diabetes mellitus type 2, coronary artery disease with ischemic cardiomyopathy requiring two-vessel CABG, urinary retention with several failed void trial, status post tonsillectomy, was admitted to the inpatient rehabilitation unit on 12/23/2022 for intensive inpatient management of impairment & disability secondary to right hemiparesis due to acute left parietal corona radiata and right cerebellar ischemic stroke, and debility/physical deconditioning due to Klebsiella pneumonia urinary tract infection with emphysematous cystitis and bilateral hydronephrosis complicated by Klebsiella pneumonia bacteremia/sepsis. The patient was previously hospitalized at [facility] from 10/28/2022 to 11/15/2022 for coronary artery disease and ischemic cardiomyopathy requiring two-vessel CABG on 11/7/2022. His postoperative course was complicated by urinary retention with failed void trial.  The patient has been experiencing progressive weakness after he was discharged to home. The Bella was later removed on 12/13/2022 but the patient continued having difficulty voiding. On 12/14/2022 approximately 11:30 PM his wife noticed the patient had slight slurred speech with right arm weakness. On 12/15/2022 the patient suddenly felt dizzy and lightheaded while he was trying to get up from sitting on toilet and fell into the ground. He says he did not lose consciousness and did not hit his head. His wife called [phone]. He was transported to [address] [place] [address] ER for evaluation by EMS. He complains of neck pain and upper back pain. He was found to be tachycardic. His WBC was found to be 22.3. Bella was placed in ER and a rush of air followed by dark brown and bright red color urine came out.   He was put on IV meropenem and admitted. Urology was consulted. CT of cervical spine revealed only multilevel facet and uncovertebral joint arthropathy. CT of head done on 12/15/2020 revealed no acute intracranial abnormality. CT of the chest, abdomen and pelvis done on 12/15/2022 revealed emphysematous cystitis, bilateral hydronephrosis, and constipation. Because of right arm weakness, stroke code was called on 12/15/2022. MRI of brain done on 12/15/2022 revealed acute ischemic stroke at the left parietal region corona radiata, and possible small additional acute ischemic stroke within right cerebellum. Neurology service start patient on aspirin and Plavix combination for the stroke. MRA of the neck and head done on 12/15/2022 showed only mild bilateral carotid bulb disease. Transesophageal echocardiogram was done on 12/16/2022 showing severe global hypokinesis of left ventricle with estimated ejection fraction of 30%, and no thrombus identified. 2 blood culture and urine culture done on 12/15/2022 revealed Klebsiella pneumonia. Infectious disease had been consulted. Oral Cipro was started on 12/22/2022 after IV meropenem was completed. Patient's hospital course was also complicated by constipation which resolved this morning. Yesterday the patient had significant hypotension in 80s/50s. Bilateral lower extremities compressive stocking and abdominal binder application were initiated. The patient was given a bolus dosage of 250 mL IV fluid. Salt tablet twice daily was added. The patient's blood pressure later become better. The patient says he feels well this morning. He denied feeling dizzy or having lightheadedness. He also denies having chest pain or shortness of breath. He still has some weakness at his right side particularly his right upper extremity. He denies having any painful symptom. He denies having numbness or tingling feeling.   He continues tolerating the intensive rehabilitation treatment well.  He is still on Crum catheter. Rehabilitation:  PT: Reviewed. Bed Mobility:  Sit to Supine: Supervision      Transfers:  Sit to Stand: Contact Guard Assistance, Minimal Assistance  Stand to Sit:Stand By Assistance, Contact Guard Assistance  Pt. Completed multiple sit to stand transfers with various foot placements to facilitate R quads. Pt. Completed sit to stand transfers while balancing tennis ball on cone to challenge core strength. Ambulation:  Contact Guard Assistance  Distance: 60' x 1, 130' x 1  Surface: Level Tile  Device: Rolling Walker  Gait Deviations: Forward Flexed Posture, Slow Jada, Decreased Step Length Bilaterally, Decreased Gait Speed, Decreased Heel Strike Bilaterally, Decreased Quad Control Right, and Increased reliance on walker    Contact Guard Assistance  Distance: 8' x1  Surface: Level Tile  Device: Rolling Walker  Gait Deviations:  Slow Jada, Decreased Step Length Bilaterally, Decreased Trunk Rotation, Decreased Gait Speed, Decreased Heel Strike on Right, Decreased Foot Clearance Right, and Decreased quad control R    Neuromuscular Re-education  Pt. Completed  modified SLS (single leg on ball) using Single UE support on Rolling Walker to improve core stability, hip/quad stability, and lateral weight shifting for improved functional mobility. Pt. Unsteady throughout activity requiring min-max A to correct LOBs. OT: Reviewed. ADL:   Grooming: with set-up. In shower to wash face. Bathing: Contact Guard Assistance. Standing briefly for bottom care. Min difficulty noted with gross grasp on body wash bottle with RUE. Pt did demo good initiation of using RUE throughout. Upper Extremity Dressing: with set-up. Donning t-shirt. Lower Extremity Dressing: Contact Guard Assistance. During clothing mgmt. Min increased time for crum mgmt, although pt able to complete.  Pt able to doff and carli socks using figure 4, required light min A to initiate TEDs donning  Tub Transfer: [employer]. Stepping over tub with use of grab bars with min cues for safe technique . BALANCE:  Sitting Balance:  Modified Independent. Standing Balance: Contact Guard Assistance. With occasional min assist with dynamic BUE tasks occasional unsteadiness with one LOB min A to correct     BED MOBILITY:  Supine to Sit: Supervision       TRANSFERS:  Sit to Stand:  Contact Guard Assistance. From EOB and chair with arms  Stand to Sit: Contact Guard Assistance. To EOB and to recliner and chair with arms     FUNCTIONAL MOBILITY:  Assistive Device: Rolling Walker  Assist Level:  Contact Guard Assistance. Distance: To and from bathroom and To and from shower room  Slow pace, cues for posture, cues for fwd gaze      Assistive Device: Rolling Walker  Assist Level:  Contact Guard Assistance and Minimal Assistance. Distance: To and from therapy gym  CGA consistently but did require light min A x1 event when turning towards L, cues for posture and B scapular depression      Assistive Device: Rolling Walker  Assist Level:  Contact Guard Assistance, Minimal Assistance, and with verbal cues . Distance: To and from therapy apartment  Vcs for safe walker placement, Pt. Attempting to place self too far up in the walker demo increased risk of falling. ADDITIONAL ACTIVITIES:  Pt engaged into neuro based supportive response training on RUE as preparatory warm-up, following completed closed chain activities in shoulder flex and figure 8 patterns to challenge [address] with good tolerance with several repetitions. Progressed to open chain tasks using RUE in sperical grasp while completing dynamic reaching in graded planes to also challenge targeting. Pt did demo min ataxia when reaching outside RODNEY and mod incoordination. Completed several repetitions for improved motor planning and neuro re-ed. Tolerated well overall. All for RUE return for ADL tasks.       Pt completed [facility] task that facilitated: pincer grasp and in hand manipulation of simple rotational and finger to palm / palm to finger translation skills. Pt demo mod difficulty and min increased time required. All for [facility] for return to tying shoe laces / opening IADL items. Pt. Engaged in IADL task this date of completing simple meal meal prep activity of making toast. Pt. Retrieved items with CGA with vcs for safe walker placement and self stabilization while reaching for items due to unsteadiness with BUE use during a functional task at countertop. Pt. Stood for 8 mins to retrieve items from upper and lower cupboards and the refrigerator while making toast and applying butter at counter top. Pt. Participated in clean up and instructed on sliding method across counter for transport of items if had a plate of food or more than one item to maneuver. Pt. Susan Rick increased LOB requiring min A to correct when initiated applying the butter to the toast.  Pt. Educated on fall prevention tactics in the kitchen and issued a walker bag with education on use and transport of items. Pt. Provided good verbal return feedback. ST: Reviewed. Hospital Navigation Task   Patient completed hospital wide navigation task this date. Patient independently navigated 10/10 directions. Patient located/answered target information 6/7 independently and 1/7 given min cues. Patient with appropriate visual scanning, reasoning, and attention throughout task this date. Review of Systems:  Review of Systems   Constitutional:  Negative for chills, diaphoresis, fatigue and fever. HENT:  Negative for hearing loss, rhinorrhea, sneezing, sore throat and trouble swallowing. Eyes:  Negative for visual disturbance. Respiratory:  Negative for cough, shortness of breath and wheezing. Cardiovascular:  Negative for chest pain and palpitations. Gastrointestinal:  Negative for abdominal pain, constipation, diarrhea, nausea and vomiting.    Genitourinary:  Positive for difficulty urinating. Negative for dysuria. Musculoskeletal:  Positive for gait problem. Negative for arthralgias, myalgias and neck pain. Skin:  Negative for rash. Neurological:  Positive for weakness (Right upper and right lower extremities). Negative for dizziness, tremors, facial asymmetry, speech difficulty, light-headedness, numbness and headaches. Psychiatric/Behavioral:  Negative for dysphoric mood, hallucinations and sleep disturbance. The patient is not nervous/anxious.          Objective:  /62   Pulse 76   Temp 97.5 °F (36.4 °C) (Oral)   Resp 16   Ht 5' 2\" (1.575 m)   Wt 142 lb 8 oz (64.6 kg)   SpO2 100%   BMI 26.06 kg/m²   Physical Exam   General:  well-developed, well nourished  male; in no acute distress ; appropriate affect & mood; sitting on reclining chair comfortably  Eyes: pupil equally round ; extra-ocular motion intact bilaterally  Head, Ear, Nose, Mouth & Throat : normocephalic ; no discharge from ears or nose ; no deformity ; no facial swelling ; oral mucosa pink   Neck :  supple ; no tenderness ; mild muscle spasm at bilateral cervical paraspinal muscles  Cardiovascular : Presence of healed vertical surgical scar at sternum ; regular rate & rhythm ; normal S1 & S2 heart sound ; no murmur ; normal peripheral pulse at the bilateral upper extremities; reduced pulse strength at the bilateral lower extremities  Pulmonary : Present breath sounds at the bilateral lung fields; no wheezing ; no rale; no crackle  Gastrointestinal : soft, slightly protruded abdomen without tenderness ; normal bowel sound present    : Bella catheter in place draining light yellowish urine  Back : no tenderness; no muscle spasm  Skin: no skin lesion or rash ; no pitting edema at all 4 extremities  Musculoskeletal : no limb asymmetry; no limb deformity; no tenderness at bilateral upper & lower extremities; no palpable mass at limbs ; no joints laxity or crepitation ; shoulder flexion and abduction passive ROM reaching 170 degrees bilaterally; hip flexion passive ROM reaching 130 degrees bilaterally; normal functional joints ROM at bilateral upper & lower extremities  Cerebral :  alert ; awake ; oriented to place, person and time; follow two-step verbal command  Cerebellum : mild dysmetria with the right finger-to-nose test; no dysmetria with left finger-to-nose test but with a slight tremor; no dysmetria with bilateral heel-to-shin test  Cranial Nerves : Tongue protrudes slightly to the right  (CN XII) ; grossly intact other CN II to XI function  Sensory : intact light touch and pin prick sensation at bilateral upper & lower extremities  Motor : normal muscle tone at bilateral upper & lower extremities ; 4+/5 to 5/5 muscle strength at right shoulder abduction & flexion; 4+/5 muscle strength at right elbow flexion; 4-/5 to 4+/5 muscle strength at the right elbow extension; 4+/5 to 5/5 muscle strength at right wrist extension; 4+/5 muscle strength at right finger extension; 4-/5 to 4+/5 muscle strength at the right finger flexion and handgrip; 4-/5 muscle strength at right finger abduction; 4+/5 muscle strength at the right hip flexion; 4+/5 to 5/5 muscle strength at right knee flexion; 4+/5 to 5/5 muscle strength at right ankle dorsiflexion; normal 5/5 muscle strength at left upper & lower extremities  Reflex : 1+ left biceps, left brachioradialis, left knee reflexes; 0 right biceps, right brachioradialis, right knee reflexes  Pathological Reflex :  No Ion's sign ; no ankle clonus  Gait : Not assessed      Diagnostics:   Recent Results (from the past 24 hour(s))   POCT Glucose    Collection Time: 12/30/22  7:40 AM   Result Value Ref Range    POC Glucose 132 (H) 70 - 108 mg/dl   Brain Natriuretic Peptide    Collection Time: 12/30/22  7:53 AM   Result Value Ref Range    Pro-BNP 3912.0 (H) 0.0 - 124.0 pg/mL   Basic Metabolic Panel w/ Reflex to MG    Collection Time: 12/30/22  7:53 AM   Result Value Ref Range    Sodium 138 135 - 145 meq/L    Potassium reflex Magnesium 5.0 3.5 - 5.2 meq/L    Chloride 103 98 - 111 meq/L    CO2 24 23 - 33 meq/L    Glucose 136 (H) 70 - 108 mg/dL    BUN 31 (H) 7 - 22 mg/dL    Creatinine 1.7 (H) 0.4 - 1.2 mg/dL    Calcium 9.2 8.5 - 10.5 mg/dL   CBC with Auto Differential    Collection Time: 12/30/22  7:53 AM   Result Value Ref Range    WBC 8.6 4.8 - 10.8 thou/mm3    RBC 3.79 (L) 4.70 - 6.10 mill/mm3    Hemoglobin 10.3 (L) 14.0 - 18.0 gm/dl    Hematocrit 32.8 (L) 42.0 - 52.0 %    MCV 86.5 80.0 - 94.0 fL    MCH 27.2 26.0 - 33.0 pg    MCHC 31.4 (L) 32.2 - 35.5 gm/dl    RDW-CV 13.9 11.5 - 14.5 %    RDW-SD 43.9 35.0 - 45.0 fL    Platelets 406 (H) 261 - 400 thou/mm3    MPV 9.3 (L) 9.4 - 12.4 fL    Seg Neutrophils 62.0 %    Lymphocytes 23.2 %    Monocytes 8.3 %    Eosinophils 4.6 %    Basophils 1.3 %    Immature Granulocytes 0.6 %    Segs Absolute 5.3 1.8 - 7.7 thou/mm3    Lymphocytes Absolute 2.0 1.0 - 4.8 thou/mm3    Monocytes Absolute 0.7 0.4 - 1.3 thou/mm3    Eosinophils Absolute 0.4 0.0 - 0.4 thou/mm3    Basophils Absolute 0.1 0.0 - 0.1 thou/mm3    Immature Grans (Abs) 0.05 0.00 - 0.07 thou/mm3    nRBC 0 /100 wbc   Anion Gap    Collection Time: 12/30/22  7:53 AM   Result Value Ref Range    Anion Gap 11.0 8.0 - 16.0 meq/L   Glomerular Filtration Rate, Estimated    Collection Time: 12/30/22  7:53 AM   Result Value Ref Range    Est, Glom Filt Rate 45 (A) >60 ml/min/1.73m2        Latest Reference Range & Units 12/28/22 06:21 12/29/22 06:18 12/30/22 07:53 12/31/22 06:38   Sodium 135 - 145 meq/L 138 140 138 138   Potassium 3.5 - 5.2 meq/L 5.1 4.9  4.9 5.0 4.7   Chloride 98 - 111 meq/L 102 105 103 103   CO2 23 - 33 meq/L 25 20 (L) 24 22 (L)   BUN,BUNPL 7 - 22 mg/dL 38 (H) 33 (H) 31 (H) 32 (H)   Creatinine 0.4 - 1.2 mg/dL 1.9 (H) 1.6 (H) 1.7 (H) 1.5 (H)   Anion Gap 8.0 - 16.0 meq/L 11.0 15.0 11.0 13.0   Est, Glom Filt Rate >60 ml/min/1.73m2 40 ! 49 ! 45 ! 53 !    Glucose, Random 70 - 108 mg/dL 153 (H) 128 (H) 136 (H) 118 (H)   CALCIUM, SERUM, 036590 8.5 - 10.5 mg/dL 9.1 9.0 9.2 9.0   (L): Data is abnormally low  (H): Data is abnormally high  !: Data is abnormal      Impression:  Acute left parietal corona radiata and right cerebellar ischemic stroke causing right hemiparesis with impaired coordination, and dysarthria  Debility/physical decondition due to Klebsiella pneumonia urinary tract infection with emphysematous cystitis and bilateral hydronephrosis complicated by Klebsiella pneumonia bacteremia/sepsis  Urinary retention  Klebsiella pneumonia urinary tract infection with emphysematous cystitis and bilateral hydronephrosis  Klebsiella pneumoniae bacteremia/sepsis  Acute kidney injury possibly due to dehydration with orthostatic hypotension  Hypotension probably due to side effect from high-dose Flomax in combination with Toprol XL  Coronary artery disease with ischemic cardiomyopathy requiring two-vessel coronary artery bypass graft surgery on 11/7/2022  Hypertension  Diabetes mellitus type 2    The patient had hypotension yesterday morning. The blood pressure is better after JONAH stockings and abdominal binder application, 1 bolus IV fluid, and addition of salt tablet. The patient is free from symptom of hypotension. His creatinine level and GFR is improve has improved since yesterday. He is encouraged to continue increasing his fluid intake. The patient still has mild weakness at his right upper and lower extremities especially upper extremity. He continues tolerating the intensive inpatient rehabilitation treatment well. His function continue to improve slowly. Plan:  Continues intensive PT/OT/SLP/RT inpatient rehabilitation program at least 3 hours per day, 5 days per week in order to improve functional status prior to discharge. Family education and training will be completed. Equipment evaluations and recommendations will be completed as appropriate.        Rehabilitation nursing continues to be involved for bowel, bladder, skin, and pain management. Nursing will also provide education and training to patient and family. Prophylaxis:  DVT: Patient on Eliquis, JONAH stocking, intermittent pneumatic compression device. GI: Colace, Senokot, GlycoLax as needed, milk of magnesia as needed, Dulcolax suppository as needed.   Pain: Tylenol as needed  Continue normal saline IV fluid infusion at 75 mL/h for additional 24 hours  Continue Lipitor for CVA and CAD  Holding Toprol-XL for hypertension  Continue Flomax for urinary retention  Continue Protonix for gastric protection  Continue Jardiance, Glucotrol, Humalog insulin coverage for diabetes mellitus  Continue Wellbutrin XL for smoking cessation  Continue melatonin, trazodone as needed for insomnia  Continue multivitamin supplement  Nutrition: Continue current diet   Bladder: Monitoring signs or symptoms of UTI  Bowel: Monitoring signs or symptoms of constipation   and case management for coordination of care and discharge planning      Missed Therapy Time:  None      Gricel Nieves MD

## 2022-12-30 NOTE — PROGRESS NOTES
Outpatient Medication Coverage       Eliquis:  Copay: $0 per month       Please call Magi Preciado with Medication Assistance at [phone] with any questions.

## 2022-12-30 NOTE — PROGRESS NOTES
[address] [address]  INPATIENT PHYSICAL THERAPY  DAILY NOTE  [facility] - [id number]    Time In: 1400  Time Out: 1430  Timed Code Treatment Minutes: 30 Minutes  Minutes: 30          Date: 2022  Patient Name: Kym Sandoval,  Gender:  male        MRN: [id number]  : 1960  (63 y.o.)     Referring Practitioner: Dr. Chelsey Mendez  Diagnosis: Acute stroke due to ischemia  Additional Pertinent Hx: Pt admitted through ED due to RUE weakness and slurred speech,  Also noted to have sepsis, LC, emphysematous cystitis. Hx:  HTN, Db, CAD, recent CABG (). MRI + for acute ischemic stroke Lt parietal region     Prior Level of Function:  Lives With: Spouse, Family (16 y.o son, 21 y.o step dtr.)  Type of Home: House  Home Layout: One level  Home Access: Stairs to enter with rails  Entrance Stairs - Number of Steps: 2 + threshold  Entrance Stairs - Rails: Both  Home Equipment:  (None used PTA)   Bathroom Shower/Tub: Tub/Shower unit  Bathroom Toilet: Standard  Bathroom Equipment: Tub transfer bench  Bathroom Accessibility: Accessible    ADL Assistance: Independent  Homemaking Assistance: Independent  Homemaking Responsibilities: Yes  Ambulation Assistance: Independent  Transfer Assistance: Independent  Active : No    Restrictions/Precautions:  Restrictions/Precautions: General Precautions, Fall Risk  Position Activity Restriction  Other position/activity restrictions: right side hemiparesis, recent CABG -minimize arm use ( s/p 5 wks), life vest     SUBJECTIVE: Patient in room in bed, agreeable to PT. Per nursing pt reported lightheadedness and blood pressure low.       PAIN: no complaints of pain    Vitals: 85/55 at 1407   88/50 mid session   92/49 at end of session    OBJECTIVE:  Bed Mobility:  Not Tested    Transfers:  Not Tested    Ambulation:  Not safe to attempt    Balance:  Not tested    Exercise:  Patient was guided in 1 set(s) 10 reps of exercise to both lower extremities. Supine: glut sets 5\" hold, quad sets 5\" hold, ankle pumps x20, heel slides, hip abduction, abdominal isometric, resisted ankle plantarflexion, resisted knee extension (from knee in hip and knee flexion), reaching UE's to knee--alternating, pt sliding hands up thighs to knees contraction abdominal muscles. Exercises were completed for increased independence with functional mobility. Functional Outcome Measures: Not completed       ASSESSMENT:  Assessment: Patient progressing toward established goals. Activity Tolerance:  Patient tolerance of  treatment: fair. Limited by lightheadedness/low blood pressure     Equipment Recommendations:Equipment Needed: Yes  Other: RW  Discharge Recommendations: Continue to assess pending progress and Patient would benefit from continued PT at discharge  Plan: Current Treatment Recommendations: Strengthening, ROM, Balance training, Functional mobility training, Transfer training, Endurance training, Neuromuscular re-education, Gait training, Stair training, Safety education & training, Equipment evaluation, education, & procurement, Home exercise program, Therapeutic activities, Patient/Caregiver education & training  General Plan:  (5x/ wk 90 min, 1x/ wk 30 min)    Patient Education  Patient Education: Verbal Exercise Instruction, Health Promotion and Wellness Education,  - Patient Verbalized Understanding, - Patient Requires Continued Education  Encouraged pt to increase fluid intake    Goals:  Patient Goals : get back to my normal way of doing things  Short Term Goals  Time Frame for Short Term Goals: 1 wk  Short Term Goal 1: Pt to go supine <->sit, minimal use of UEs, SBA to get in/out of bed. Short Term Goal 2: Pt to get up/down from various seated surfaces, CGA, to get up to walk.   Short Term Goal 3: Pt to walk with RW >= 50 ft, demonstrating step through gait pattern, recurvatum < 10% of steps on RT, CGA to progress to home mobility  Short Term Goal 4: Pt to ascend/descend 2 steps with gerber rails, CGA to progress to home entry  Short Term Goal 5: Pt to get in/out of car, CGA for transportation needs. Additional Goals?: Yes  Short Term Goal 6: Pt to perform Tinetti >= 18/28, demonstrating improved balance. Long Term Goals  Time Frame for Long Term Goals : 3 wks from evaluation  Long Term Goal 1: Pt to go supine <->sit, minimal use of UEs, Mod I, to get in/out of bed. Long Term Goal 2: Pt to get up/down from various seated surfaces, Mod I, to get up to walk. Long Term Goal 3: Pt to walk with RW >= 50 ft, demonstrating step through gait pattern, recurvatum < 10% of steps on RT, Mod I, for home mobility  Long Term Goal 4: Pt to perform Tinetti >= 24/28, demonstrating improved balance. Following session, patient left in safe position with all fall risk precautions in place.

## 2022-12-30 NOTE — PLAN OF CARE
Problem: Chronic Conditions and Co-morbidities  Goal: Patient's chronic conditions and co-morbidity symptoms are monitored and maintained or improved  Outcome: Progressing     Problem: Safety - Adult  Goal: Free from fall injury  Description: Patient absent of falls this shift. Bed in lowest position, side rails up 2/4. Call-light within reach. Patient instructed to use call-light to get up. Non-skid footwear in place.        Outcome: Progressing     Problem: Skin/Tissue Integrity  Goal: Absence of new skin breakdown  Description:  Monitor for areas of redness and/or skin breakdown, no skin breakdown      Outcome: Progressing     Problem: Pain  Goal: Verbalizes/displays adequate comfort level or baseline comfort level  Description: Patient denies pain  Outcome: Progressing  Flowsheets (Taken 12/29/2022 2200)  Verbalizes/displays adequate comfort level or baseline comfort level:   Encourage patient to monitor pain and request assistance   Assess pain using appropriate pain scale   Administer analgesics based on type and severity of pain and evaluate response

## 2022-12-30 NOTE — PROGRESS NOTES
[facility]      Date:  12/30/2022            Patient Name: Isacc Key           MRN: [id number]  Acct: [de-identified]          YOB: 1960 (63 y.o.)       Gender: male   Diagnosis: Acute Stroke due to Ischemia  Physician: Referring Practitioner: Ordering & Attending: Matteo Hernandez MD    REASON FOR MISSED TREATMENT:  Pt resting in bed this afternoon with low blood pressure-has his phone and states he has all he needs    Reina Oates, CTRS    12/30/2022

## 2022-12-30 NOTE — PROGRESS NOTES
[facility]  INPATIENT PHYSICAL THERAPY  Daily Note  [address] [facility] - 7E-53/053-A    Time In: 0700  Time Out: 0730  Timed Code Treatment Minutes: 30 Minutes  Minutes: 30          Date: 2022  Patient Name: Isacc Key,  Gender:  male        MRN: [id number]  : 1960  (63 y.o.)     Referring Practitioner: Dr. Perla Haines  Diagnosis: Acute stroke due to ischemia  Additional Pertinent Hx: Pt admitted through ED due to RUE weakness and slurred speech,  Also noted to have sepsis, LC, emphysematous cystitis. Hx:  HTN, Db, CAD, recent CABG (). MRI + for acute ischemic stroke Lt parietal region     Prior Level of Function:  Lives With: Spouse, Family (16 y.o son, 21 y.o step dtr.)  Type of Home: House  Home Layout: One level  Home Access: Stairs to enter with rails  Entrance Stairs - Number of Steps: 2 + threshold  Entrance Stairs - Rails: Both  Home Equipment:  (None used PTA)   Bathroom Shower/Tub: Tub/Shower unit  Bathroom Toilet: Standard  Bathroom Equipment: Tub transfer bench  Bathroom Accessibility: Accessible    ADL Assistance: Independent  Homemaking Assistance: Independent  Homemaking Responsibilities: Yes  Ambulation Assistance: Independent  Transfer Assistance: Independent  Active : No    Restrictions/Precautions:  Restrictions/Precautions: General Precautions, Fall Risk  Position Activity Restriction  Other position/activity restrictions: right side hemiparesis, recent CABG -minimize arm use ( s/p 5 wks), life vest     SUBJECTIVE: Pt. Seated on EOB and pleasantly agrees to therapy session. Pt. Motivated for session.     PAIN: None indicated    Vitals:   Patient Vitals for the past 8 hrs:   BP Patient Position Temp Temp src Pulse Resp SpO2   22 0730 (!) 86/55 Sitting;Up in chair 97.5 °F (36.4 °C) Oral 88 16 100 %        OBJECTIVE:  Bed Mobility:  Not Tested    Transfers:  Sit to Stand: [employer], Minimal Assistance, with verbal cues  Stand to Sit:Contact Guard Assistance    Ambulation:  Contact Guard Assistance  Distance: 60' x 1, 130' x 1  Surface: Level Tile  Device: Rolling Walker  Gait Deviations: Forward Flexed Posture, Slow Jada, Decreased Step Length Bilaterally, Decreased Gait Speed, Decreased Heel Strike Bilaterally, Decreased Quad Control Right, and Increased reliance on walker    Stairs:  None    Balance:  None    Neuromuscular Re-education  None    Exercise:  Patient was guided in 1 set(s) 10 reps of exercises: Glut sets, Seated marches, Seated hamstring curls, Seated heel/toe raises, Long arc quads, Seated isometric hip adduction, Seated abduction/adduction, and abdominal isometrics,Standing marches. Exercises were completed for increased independence with functional mobility. Functional Outcome Measures:   Not completed    ASSESSMENT:  Assessment: Patient progressing toward established goals. Activity Tolerance:  Patient tolerance of  treatment: good.      Equipment Recommendations:Equipment Needed: Yes  Other: RW  Discharge Recommendations: Continue to assess pending progress, Patient would benefit from continued therapy after discharge     Plan: Current Treatment Recommendations: Strengthening, ROM, Balance training, Functional mobility training, Transfer training, Endurance training, Neuromuscular re-education, Gait training, Stair training, Safety education & training, Equipment evaluation, education, & procurement, Home exercise program, Therapeutic activities, Patient/Caregiver education & training  General Plan:  (5x/ wk 90 min, 1x/ wk 30 min)    Patient Education  Patient Education: Plan of Care, Functional Mobility, Reviewed Prior Education, Health Promotion and Wellness Education, Safety, Verbal Exercise Instruction    Goals:  Patient Goals : get back to my normal way of doing things  Short Term Goals  Time Frame for Short Term Goals: 1 wk  Short Term Goal 1: Pt to go supine <->sit, minimal use of UEs, SBA to get in/out of bed. Short Term Goal 2: Pt to get up/down from various seated surfaces, CGA, to get up to walk. Short Term Goal 3: Pt to walk with RW >= 50 ft, demonstrating step through gait pattern, recurvatum < 10% of steps on RT, CGA to progress to home mobility  Short Term Goal 4: Pt to ascend/descend 2 steps with gerber rails, CGA to progress to home entry  Short Term Goal 5: Pt to get in/out of car, CGA for transportation needs. Additional Goals?: Yes  Short Term Goal 6: Pt to perform Tinetti >= 18/28, demonstrating improved balance. Long Term Goals  Time Frame for Long Term Goals : 3 wks from evaluation  Long Term Goal 1: Pt to go supine <->sit, minimal use of UEs, Mod I, to get in/out of bed. Long Term Goal 2: Pt to get up/down from various seated surfaces, Mod I, to get up to walk. Long Term Goal 3: Pt to walk with RW >= 50 ft, demonstrating step through gait pattern, recurvatum < 10% of steps on RT, Mod I, for home mobility  Long Term Goal 4: Pt to perform Tinetti >= 24/28, demonstrating improved balance. Following session, patient left in safe position with all fall risk precautions in place.

## 2022-12-30 NOTE — PROGRESS NOTES
[facility]   Nephrology progress note  12/30/2022, 11:37 AM      Pt Name:    Suha Goff  MRN:     [id number]     YOB: 1960  Admit Date:    12/23/2022 11:24 AM    Chief Complaint: Nephrology following for LC/CKD. Subjective:  Patient seen and examined  No chest pain or shortness of breath  Feels much better  Excellent urine output    Objective:  24HR INTAKE/OUTPUT:    Intake/Output Summary (Last 24 hours) at 12/30/2022 1137  Last data filed at 12/30/2022 0951  Gross per 24 hour   Intake 800 ml   Output 3400 ml   Net -2600 ml        Admission weight: 134 lb 8 oz (61 kg)  Wt Readings from Last 3 Encounters:   12/30/22 142 lb 8 oz (64.6 kg)   12/23/22 130 lb 15.3 oz (59.4 kg)   12/13/22 142 lb 9.6 oz (64.7 kg)        Vitals :   Vitals:    12/29/22 0908 12/29/22 2200 12/30/22 0005 12/30/22 0730   BP: 101/63 118/62  (!) 86/55   Pulse: 91 78  88   Resp:  18  16   Temp:  98.1 °F (36.7 °C)  97.5 °F (36.4 °C)   TempSrc:  Oral  Oral   SpO2:  100%  100%   Weight:   142 lb 8 oz (64.6 kg)    Height:           Physical examination  General Appearance:  Well developed.  No distress  Mouth/Throat:  Oral mucosa moist  Neck:  Supple, no JVD  Lungs:  Breath sounds: clear  Heart[de-identified]  S1,S2 heard  Abdomen:  Soft, non - tender  Musculoskeletal:  Edema -no edema noted    Medications:  Infusion:    dextrose       Meds:    ferrous sulfate  325 mg Oral Daily with breakfast    melatonin  6 mg Oral Nightly    atorvastatin  40 mg Oral Daily    buPROPion  150 mg Oral QAM    pantoprazole  40 mg Oral QAM AC    tamsulosin  0.8 mg Oral Daily    apixaban  5 mg Oral BID    [Held by provider] metoprolol succinate  12.5 mg Oral Daily    glipiZIDE  10 mg Oral QAM AC    empagliflozin  25 mg Oral Daily    docusate sodium  100 mg Oral BID    multivitamin  1 tablet Oral Daily with breakfast    senna  2 tablet Oral Nightly       Lab Data :  CBC:   Recent Labs     12/29/22  0618 12/30/22  0753   WBC 8.7 8.6   HGB 9.6* 10.3*   HCT 30.5* 32.8*   * 529*       CMP:  Recent Labs     12/28/22  0621 12/29/22  0618 12/30/22  0753    140 138   K 5.1 4.9  4.9 5.0    105 103   CO2 25 20* 24   BUN 38* 33* 31*   CREATININE 1.9* 1.6* 1.7*   GLUCOSE 153* 128* 136*   CALCIUM 9.1 9.0 9.2       Hepatic: No results for input(s): LABALBU, AST, ALT, ALB, BILITOT, ALKPHOS in the last 72 hours. Assessment and Plan:  Renal -acute kidney injury on chronic kidney disease with baseline creatinine around 1.5-1.7  Exact etiology not clear at this time he does look slightly dry though  Making decent urine output has a Bella in place  Overall creatinine is better but fluctuating he is having a lot of urine output  We will challenge him with IV fluids if continues to get worse and cleared the patient to drink at least 2 L of fluids a day  Electrolytes -within normal limits  Acid-base status appears to be stable  Essential hypertension stable  Hx of diabetes mellitus  Hx of sepsis due to UTI from Klebsiella pneumonia on antibiotics  CAD status post CABG 11/22  Meds reviewed and discussed with patient     Romi Goyal MD  [facility]    This report has been created using voice recognition software.  It may contain minor errors which are inherent in voice recognition technology

## 2022-12-30 NOTE — PLAN OF CARE
Problem: Discharge Planning  Goal: Discharge to home or other facility with appropriate resources  12/30/2022 1032 by WILMER Hall  Note: SW notified of patient's need for Eliquis at discharge. SW spoke with Delma and Mary in Prescription Assistance. Delma and Herleslievina reported that patient has a $0 copay for Eliquis at this time, but it could change as of 1/1. $10 copay card was provided. SW met with patient to explain copay for Eliquis and copay card. Patient reported understanding. SW will follow and maintain involvement in discharge planning.

## 2022-12-31 LAB
ANION GAP SERPL CALCULATED.3IONS-SCNC: 13 MEQ/L (ref 8–16)
BUN BLDV-MCNC: 32 MG/DL (ref 7–22)
CALCIUM SERPL-MCNC: 9 MG/DL (ref 8.5–10.5)
CHLORIDE BLD-SCNC: 103 MEQ/L (ref 98–111)
CO2: 22 MEQ/L (ref 23–33)
CREAT SERPL-MCNC: 1.5 MG/DL (ref 0.4–1.2)
GFR SERPL CREATININE-BSD FRML MDRD: 53 ML/MIN/1.73M2
GLUCOSE BLD-MCNC: 118 MG/DL (ref 70–108)
GLUCOSE BLD-MCNC: 126 MG/DL (ref 70–108)
POTASSIUM REFLEX MAGNESIUM: 4.7 MEQ/L (ref 3.5–5.2)
POTASSIUM SERPL-SCNC: 4.7 MEQ/L (ref 3.5–5.2)
SODIUM BLD-SCNC: 138 MEQ/L (ref 135–145)

## 2022-12-31 PROCEDURE — 6370000000 HC RX 637 (ALT 250 FOR IP): Performed by: PHYSICAL MEDICINE & REHABILITATION

## 2022-12-31 PROCEDURE — 97112 NEUROMUSCULAR REEDUCATION: CPT

## 2022-12-31 PROCEDURE — 99232 SBSQ HOSP IP/OBS MODERATE 35: CPT | Performed by: PHYSICAL MEDICINE & REHABILITATION

## 2022-12-31 PROCEDURE — 97535 SELF CARE MNGMENT TRAINING: CPT

## 2022-12-31 PROCEDURE — 97116 GAIT TRAINING THERAPY: CPT

## 2022-12-31 PROCEDURE — 82948 REAGENT STRIP/BLOOD GLUCOSE: CPT

## 2022-12-31 PROCEDURE — 36415 COLL VENOUS BLD VENIPUNCTURE: CPT

## 2022-12-31 PROCEDURE — 99232 SBSQ HOSP IP/OBS MODERATE 35: CPT | Performed by: INTERNAL MEDICINE

## 2022-12-31 PROCEDURE — 80048 BASIC METABOLIC PNL TOTAL CA: CPT

## 2022-12-31 PROCEDURE — 1180000000 HC REHAB R&B

## 2022-12-31 RX ADMIN — PANTOPRAZOLE SODIUM 40 MG: 40 TABLET, DELAYED RELEASE ORAL at 06:03

## 2022-12-31 RX ADMIN — BUPROPION HYDROCHLORIDE 150 MG: 150 TABLET, FILM COATED, EXTENDED RELEASE ORAL at 09:25

## 2022-12-31 RX ADMIN — Medication 1 TABLET: at 09:25

## 2022-12-31 RX ADMIN — TAMSULOSIN HYDROCHLORIDE 0.8 MG: 0.4 CAPSULE ORAL at 21:22

## 2022-12-31 RX ADMIN — Medication 6 MG: at 21:22

## 2022-12-31 RX ADMIN — DOCUSATE SODIUM 100 MG: 100 CAPSULE, LIQUID FILLED ORAL at 21:22

## 2022-12-31 RX ADMIN — SENNOSIDES 17.2 MG: 8.6 TABLET, FILM COATED ORAL at 21:22

## 2022-12-31 RX ADMIN — APIXABAN 5 MG: 5 TABLET, FILM COATED ORAL at 09:26

## 2022-12-31 RX ADMIN — GLIPIZIDE 10 MG: 10 TABLET ORAL at 09:25

## 2022-12-31 RX ADMIN — EMPAGLIFLOZIN 25 MG: 25 TABLET, FILM COATED ORAL at 09:26

## 2022-12-31 RX ADMIN — FERROUS SULFATE TAB 325 MG (65 MG ELEMENTAL FE) 325 MG: 325 (65 FE) TAB at 09:26

## 2022-12-31 RX ADMIN — ATORVASTATIN CALCIUM 40 MG: 40 TABLET, FILM COATED ORAL at 21:22

## 2022-12-31 RX ADMIN — APIXABAN 5 MG: 5 TABLET, FILM COATED ORAL at 21:22

## 2022-12-31 RX ADMIN — DOCUSATE SODIUM 100 MG: 100 CAPSULE, LIQUID FILLED ORAL at 09:29

## 2022-12-31 RX ADMIN — SODIUM CHLORIDE 1 G: 1 TABLET ORAL at 09:25

## 2022-12-31 RX ADMIN — ACETAMINOPHEN 650 MG: 325 TABLET ORAL at 11:05

## 2022-12-31 ASSESSMENT — PAIN - FUNCTIONAL ASSESSMENT: PAIN_FUNCTIONAL_ASSESSMENT: PREVENTS OR INTERFERES SOME ACTIVE ACTIVITIES AND ADLS

## 2022-12-31 ASSESSMENT — PAIN DESCRIPTION - DESCRIPTORS: DESCRIPTORS: ACHING;DISCOMFORT

## 2022-12-31 ASSESSMENT — PAIN DESCRIPTION - LOCATION: LOCATION: GENERALIZED

## 2022-12-31 ASSESSMENT — PAIN SCALES - GENERAL: PAINLEVEL_OUTOF10: 5

## 2022-12-31 NOTE — PROGRESS NOTES
Patient: Kimberly Velazco  Unit/Bed: 7E-53/053-A  YOB: 1960  MRN: [id number] Acct: [de-identified]   Admitting Diagnosis: Acute stroke due to ischemia [facility]) [I63.9]  Admit Date:  12/23/2022  Hospital Day: 7    Assessment:     Principal Problem:    Acute stroke due to ischemia [facility])  Active Problems:    Ischemic cardiomyopathy    S/P CABG x 2    Emphysematous cystitis    Bacteremia due to Enterobacter species    Urinary retention    Hemiparesis affecting right side as late effect of stroke (HCC)    Coordination impairment    Debility    UTI due to Klebsiella species    Moderate malnutrition (HCC)    Diabetes mellitus type 2 in nonobese [facility])    Essential hypertension  Resolved Problems:    * No resolved hospital problems. *      Plan:     The BNP is at his baseline. The CS are acceptable  Continue the binder and sodium tablets for now        Subjective:     Patient has no complaint of CP or SOB and denies diarrhea and vomiting. .   Medication side effects: none    Scheduled Meds:   sodium chloride  1 g Oral BID WC    ferrous sulfate  325 mg Oral Daily with breakfast    melatonin  6 mg Oral Nightly    atorvastatin  40 mg Oral Daily    buPROPion  150 mg Oral QAM    pantoprazole  40 mg Oral QAM AC    tamsulosin  0.8 mg Oral Daily    apixaban  5 mg Oral BID    [Held by provider] metoprolol succinate  12.5 mg Oral Daily    glipiZIDE  10 mg Oral QAM AC    empagliflozin  25 mg Oral Daily    docusate sodium  100 mg Oral BID    multivitamin  1 tablet Oral Daily with breakfast    senna  2 tablet Oral Nightly     Continuous Infusions:   dextrose       PRN Meds:glucose, dextrose bolus **OR** dextrose bolus, glucagon (rDNA), dextrose, acetaminophen, ondansetron, oxyCODONE **OR** oxyCODONE, bisacodyl, magnesium hydroxide, traZODone, polyethylene glycol    Review of Systems  Pertinent items are noted in HPI.     Objective:     Patient Vitals for the past 8 hrs:   BP Temp Temp src Pulse   12/30/22 2015 118/67 97.9 °F (36.6 °C) Oral 71   12/30/22 1530 118/62 -- -- 76     I/O last 3 completed shifts: In: 2501 [P.O.:1040; I.V.:4382; IV Piggyback:247]  Out: 4250 [Urine:4250]  No intake/output data recorded.     /67   Pulse 71   Temp 97.9 °F (36.6 °C) (Oral)   Resp 16   Ht 5' 2\" (1.575 m)   Wt 142 lb 8 oz (64.6 kg)   SpO2 100%   BMI 26.06 kg/m²     General appearance: alert, appears stated age, and cooperative  Head: Normocephalic, without obvious abnormality, atraumatic  Lungs: clear to auscultation bilaterally  Chest wall: no tenderness  Heart: regular rate and rhythm, S1, S2 normal, no murmur, click, rub or gallop  Abdomen: soft, non-tender; bowel sounds normal; no masses,  no organomegaly  Extremities: extremities normal, atraumatic, no cyanosis or edema  Skin: Skin color, texture, turgor normal. No rashes or lesions  Neurologic:  weak    Data Review:    Latest Reference Range & Units 12/28/22 07:32 12/29/22 08:30 12/30/22 07:40   POC Glucose 70 - 108 mg/dl 153 (H) 155 (H) 132 (H)   (H): Data is abnormally high    Electronically signed by Kimberly Vasquez MD on 12/30/2022 at 10:27 PM

## 2022-12-31 NOTE — PROGRESS NOTES
[facility]   Nephrology progress note  12/31/2022, 12:57 PM      Pt Name:    Shawn Low  MRN:     [id number]     YOB: 1960  Admit Date:    12/23/2022 11:24 AM    Chief Complaint: Nephrology following for LC/CKD. Subjective:  Patient seen and examined  No chest pain or shortness of breath  Feels much better  Excellent urine output in fact too much urine output    Objective:  24HR INTAKE/OUTPUT:    Intake/Output Summary (Last 24 hours) at 12/31/2022 1257  Last data filed at 12/31/2022 0612  Gross per 24 hour   Intake 757 ml   Output 3600 ml   Net -2843 ml        Admission weight: 134 lb 8 oz (61 kg)  Wt Readings from Last 3 Encounters:   12/31/22 138 lb 3.2 oz (62.7 kg)   12/23/22 130 lb 15.3 oz (59.4 kg)   12/13/22 142 lb 9.6 oz (64.7 kg)        Vitals :   Vitals:    12/30/22 2015 12/30/22 2341 12/31/22 0600 12/31/22 0800   BP: 118/67 130/67  116/65   Pulse: 71 85  85   Resp:  16  16   Temp: 97.9 °F (36.6 °C) 97.7 °F (36.5 °C)  97.4 °F (36.3 °C)   TempSrc: Oral Oral  Oral   SpO2:  100%  98%   Weight:   138 lb 3.2 oz (62.7 kg)    Height:           Physical examination  General Appearance:  Well developed.  No distress  Mouth/Throat:  Oral mucosa moist  Neck:  Supple, no JVD  Lungs:  Breath sounds: clear  Heart[de-identified]  S1,S2 heard  Abdomen:  Soft, non - tender  Musculoskeletal:  Edema -no edema noted    Medications:  Infusion:    dextrose       Meds:    sodium chloride  1 g Oral BID WC    ferrous sulfate  325 mg Oral Daily with breakfast    melatonin  6 mg Oral Nightly    atorvastatin  40 mg Oral Daily    buPROPion  150 mg Oral QAM    pantoprazole  40 mg Oral QAM AC    tamsulosin  0.8 mg Oral Daily    apixaban  5 mg Oral BID    [Held by provider] metoprolol succinate  12.5 mg Oral Daily    glipiZIDE  10 mg Oral QAM AC    empagliflozin  25 mg Oral Daily    docusate sodium  100 mg Oral BID    multivitamin  1 tablet Oral Daily with breakfast    senna  2 tablet Oral Nightly       Lab Data :  CBC:   Recent Labs     12/29/22  0618 12/30/22  0753   WBC 8.7 8.6   HGB 9.6* 10.3*   HCT 30.5* 32.8*   * 529*       CMP:  Recent Labs     12/29/22  0618 12/30/22  0753 12/31/22  0638    138 138   K 4.9  4.9 5.0 4.7  4.7    103 103   CO2 20* 24 22*   BUN 33* 31* 32*   CREATININE 1.6* 1.7* 1.5*   GLUCOSE 128* 136* 118*   CALCIUM 9.0 9.2 9.0       Hepatic: No results for input(s): LABALBU, AST, ALT, ALB, BILITOT, ALKPHOS in the last 72 hours. Assessment and Plan:  Renal -acute kidney injury on chronic kidney disease with baseline creatinine around 1.5-1.7  Exact etiology not clear at this time he does look slightly dry though  Making decent urine output has a Bella in place  Overall creatinine is better but fluctuating he is having a lot of urine output  We will challenge him with IV fluids if continues to get worse and cleared the patient to drink at least 2 L of fluids a day  Electrolytes -within normal limits stop the salt tablet  Acid-base status appears to be stable  Essential hypertension now running low, may be due to the increased urine output again on salt tablets which I will discontinue at this time Melo Reynosoks may be contributing if continues we will have to hold Jardiance  Hx of diabetes mellitus  Hx of sepsis due to UTI from Klebsiella pneumonia on antibiotics  CAD status post CABG 11/22  Meds reviewed and discussed with patient     Alexey Hinojosa MD  [facility]    This report has been created using voice recognition software.  It may contain minor errors which are inherent in voice recognition technology

## 2022-12-31 NOTE — PROGRESS NOTES
[address] [address]  INPATIENT PHYSICAL THERAPY  DAILY NOTE  [address] [facility] - 7E-53/053-A    Time In: 1030  Time Out: 1100  Timed Code Treatment Minutes: 30 Minutes  Minutes: 30          Date: 2022  Patient Name: Diamond Jose,  Gender:  male        MRN: [id number]  : 1960  (63 y.o.)     Referring Practitioner: Dr. Maxi Arreola  Diagnosis: Acute stroke due to ischemia  Additional Pertinent Hx: Pt admitted through ED due to RUE weakness and slurred speech,  Also noted to have sepsis, LC, emphysematous cystitis. Hx:  HTN, Db, CAD, recent CABG (). MRI + for acute ischemic stroke Lt parietal region     Prior Level of Function:  Lives With: Spouse, Family (16 y.o son, 21 y.o step dtr.)  Type of Home: House  Home Layout: One level  Home Access: Stairs to enter with rails  Entrance Stairs - Number of Steps: 2 + threshold  Entrance Stairs - Rails: Both  Home Equipment:  (None used PTA)   Bathroom Shower/Tub: Tub/Shower unit  Bathroom Toilet: Standard  Bathroom Equipment: Tub transfer bench  Bathroom Accessibility: Accessible    ADL Assistance: Independent  Homemaking Assistance: Independent  Homemaking Responsibilities: Yes  Ambulation Assistance: Independent  Transfer Assistance: Independent  Active : No    Restrictions/Precautions:  Restrictions/Precautions: General Precautions, Fall Risk  Position Activity Restriction  Other position/activity restrictions: right side hemiparesis, recent CABG -minimize arm use ( s/p 5 wks), life vest     SUBJECTIVE: Pt in chair upon arrival, pleasant and agreeable to therapy. PAIN: Pt reported discomfort but did not quantify.     Vitals: Vitals not assessed per clinical judgement, see nursing flowsheet    OBJECTIVE:  Bed Mobility:  Not Tested    Transfers:  Sit to Stand: [employer], with increased time for completion  -Pt was able to accomplish sit to stand transfers without UE support but at times needed multiple attempts. Stand to Sit:Stand By Assistance    Ambulation:  Stand By Assistance, Clay Woody Assistance  Distance: 120 ft x2  Surface: Level Tile  Device:Rolling Walker  Gait Deviations:  Slow Jada, Decreased Step Length Bilaterally, Decreased Gait Speed, and Unsteady Gait at times  -When pt became unsteady, he was able to stop and correct    Balance:  Static Standing Balance: Contact Guard Assistance  -Standing on dynamic foam without UE support x30 sec  Dynamic Standing Balance: Contact Guard Assistance  -Standing on dynamic foam and reaching out of RODNEY to grasp ring then toss ring    Exercise:  Not performed on this date    Functional Outcome Measures: Not completed       ASSESSMENT:  Assessment: Patient progressing toward established goals. Activity Tolerance:  Patient tolerance of  treatment: good. Equipment Recommendations:Equipment Needed: Yes  Other: RW  Discharge Recommendations: Continue to assess pending progress and Patient would benefit from continued PT at discharge  Plan: Current Treatment Recommendations: Strengthening, ROM, Balance training, Functional mobility training, Transfer training, Endurance training, Neuromuscular re-education, Gait training, Stair training, Safety education & training, Equipment evaluation, education, & procurement, Home exercise program, Therapeutic activities, Patient/Caregiver education & training  General Plan:  (5x/ wk 90 min, 1x/ wk 30 min)    Patient Education  Patient Education: Plan of Care, Transfers, Gait    Goals:  Patient Goals : get back to my normal way of doing things  Short Term Goals  Time Frame for Short Term Goals: 1 wk  Short Term Goal 1: Pt to go supine <->sit, minimal use of UEs, SBA to get in/out of bed. Short Term Goal 2: Pt to get up/down from various seated surfaces, CGA, to get up to walk.   Short Term Goal 3: Pt to walk with RW >= 50 ft, demonstrating step through gait pattern, recurvatum < 10% of steps on RT, CGA to progress to home mobility  Short Term Goal 4: Pt to ascend/descend 2 steps with gerber rails, CGA to progress to home entry  Short Term Goal 5: Pt to get in/out of car, CGA for transportation needs. Additional Goals?: Yes  Short Term Goal 6: Pt to perform Tinetti >= 18/28, demonstrating improved balance. Long Term Goals  Time Frame for Long Term Goals : 3 wks from evaluation  Long Term Goal 1: Pt to go supine <->sit, minimal use of UEs, Mod I, to get in/out of bed. Long Term Goal 2: Pt to get up/down from various seated surfaces, Mod I, to get up to walk. Long Term Goal 3: Pt to walk with RW >= 50 ft, demonstrating step through gait pattern, recurvatum < 10% of steps on RT, Mod I, for home mobility  Long Term Goal 4: Pt to perform Tinetti >= 24/28, demonstrating improved balance. Following session, patient left in safe position with all fall risk precautions in place.

## 2022-12-31 NOTE — PROGRESS NOTES
Focus Note  Full Admission Note Completed: yes,   Discharge Pain Assessment may be completed between 1/11 and 1/12 days prior to discharge or day of discharge)     Education Focus: Monitoring blood glucose/ Interpreting and using results  Verbalized understanding. Apartment stay went well, All goals met. Bowel Management: Continent: yes,    Management: colace prn    Bladder Management: Continent: yes,    Management: ability to complete crum care independentaly, ability to identify s/s of infection with catheter. Complete    Medication Education:   Home med list and hospital med list compared for new meds: yes,   New medication identified: no     Diabetes management education: yes, glipizide and AM chem    Pain Management:    Non-pharmacological strategies for pain: relaxation, repositioning    Management of Device: life vest, and 30 day event monitor   Management includes, verbal and demonstration.     Discharge Needs:   Caregiver/Support identified: spouse   Equipment needs: walker   Barriers expressed by patient or family: n/a   Follow up appointments needed: neuro, cardiac, urology

## 2022-12-31 NOTE — PROGRESS NOTES
Pt is a 62y. o. male, propped up in bed watching television, in 11E-53; his wife is also in the room. Felicity Guevara shared that he is doing well and that he should be going home by next weekend. He shared that his desire is to walk out of rehabilitation, is determined and progressing to be able to do that. While admittedly not [employer], they welcomed prayer- provided active listening, prayer, encouragement and conversation-met with gratitude by Felicity Guevara and his wife. 12/30/22 2018   Encounter Summary   Encounter Overview/Reason  Spiritual/Emotional Needs   Service Provided For: Patient and family together   Referral/Consult From: [facility], [place]   Support System Spouse   Last Encounter  12/30/22   Complexity of Encounter Low   Begin Time 1835   End Time  1845   Total Time Calculated 10 min   Spiritual/Emotional needs   Type Spiritual Support   Assessment/Intervention/Outcome   Assessment Calm;Coping; Hopeful;Peaceful   Intervention Active listening;Discussed illness injury and its impact;Prayer (assurance of)/[place];Explored/Affirmed feelings, thoughts, concerns;Nurtured Hope   Outcome Engaged in conversation;Expressed feelings, needs, and concerns;Expressed Gratitude;Receptive;Coping

## 2022-12-31 NOTE — PROGRESS NOTES
[address]  Occupational Therapy  Daily Note  Time:   Time In: 0800  Time Out: 1781  Timed Code Treatment Minutes: 37 Minutes  Minutes: 37          Date: 2022  Patient Name: Natalie Saenz,   Gender: male      Room: [facility]53/053-A  MRN: [id number]  : 1960  (63 y.o.)  Referring Practitioner: Ordering & Attending: Maninder Cisneros MD  Diagnosis: Acute Stroke due to Ischemia  Additional Pertinent Hx: Natalie Saenz who is a 63 y.o. male with a history of hypertension, diabetes, CAD on  daily, recent CABG in 2022, ischemic cardiomyopathy is admitted to [facility] for sepsis, emphysematous cystitis and acute kidney injury on CKD. During admission exam patient stated that his right arm feldman has been weak and that he has had some slurred speech. Stroke alert was called for right-sided weakness and dysarthria on 12/15. On arrival patient's NIH was 4 for right upper extremity, right lower extremity weakness, 1 limb ataxia and dysarthria. On further questioning, patient and wife state that the right arm weakness actually started last night. Patient's wife noticed when patient was trying to get up from the toilet that he was unable to push himself up with his right arm. Last known well 2330 on 2022. Patient taken to imaging for stat CT head which revealed no ICH, midline shift, mass-effect. CTA head and neck deferred due to patient's LC. Decision for no tPA was made due to patient's time since last known well. Patient with relatively low NIH and symptoms which are not consistent with LVO, therefore no thrombectomy/neuro intervention at this time. Patient had stat MRI brain and MRA head and neck without contrast.  MRI showed acute ischemic stroke of left parietal region. Pt admitted to IP rehab on 22.     Restrictions/Precautions:  Restrictions/Precautions: General Precautions, Fall Risk  Position Activity Restriction  Other position/activity restrictions: right side hemiparesis, recent CABG 11/22-minimize arm use ( s/p 5 wks), life vest     SUBJECTIVE: Pt. In bed upon arrival pleasant and agreeable to participate in OT session. PAIN: no pain voiced    Vitals: Nurse checked vitals prior to session    COGNITION: Slow Processing and Decreased Recall    ADL:   No ADL's completed this session. Rachell Balling BALANCE:  Sitting Balance:  Modified Independent. Standing Balance: Contact Guard Assistance. With occasional min assist with dynamic BUE tasks occasional unsteadiness with one LOB min A to correct    BED MOBILITY:  Supine to Sit: Supervision      TRANSFERS:  Sit to Stand:  Contact Guard Assistance. From EOB and chair with arms  Stand to Sit: Contact Guard Assistance. To EOB and to recliner and chair with arms    FUNCTIONAL MOBILITY:  Assistive Device: Rolling Walker  Assist Level:  Contact Guard Assistance, Minimal Assistance, and with verbal cues . Distance: To and from therapy apartment  Vcs for safe walker placement, Pt. Attempting to place self too far up in the walker demo increased risk of falling. ADDITIONAL ACTIVITIES:  Pt. Engaged in IADL task this date of completing simple meal meal prep activity of making toast. Pt. Retrieved items with CGA with vcs for safe walker placement and self stabilization while reaching for items due to unsteadiness with BUE use during a functional task at countertop. Pt. Stood for 8 mins to retrieve items from upper and lower cupboards and the refrigerator while making toast and applying butter at counter top. Pt. Participated in clean up and instructed on sliding method across counter for transport of items if had a plate of food or more than one item to maneuver.  Pt. Alisa Duran increased LOB requiring min A to correct when initiated applying the butter to the toast.  Pt. Educated on fall prevention tactics in the kitchen and issued a walker bag with education on use and transport of items. Pt. Provided good verbal return feedback. ASSESSMENT:     Activity Tolerance:  Patient tolerance of  treatment: good. Discharge Recommendations: Home with Outpatient OT and (if has transportation) vs HHOT  Equipment Recommendations: Equipment Needed: Yes  Other: OT staff to continue to monitor needs throughout OT POC. Plan: Times Per Week: 5x/week x 90 minutes and 1x/week x 30 minutes  Current Treatment Recommendations: Strengthening, Balance training, Self-Care / ADL, Equipment evaluation, education, & procurement, Safety education & training, Endurance training, Functional mobility training, Neuromuscular re-education, Patient/Caregiver education & training, Home management training    Patient Education  Patient Education: IADL's, Home Safety, Importance of Increasing Activity, and Assistive Device Safety and safety with functional mobility and transfers. Goals  Short Term Goals  Time Frame for Short Term Goals: 1 week  Short Term Goal 1: Pt will complete functional ambulation to/from BR and HH distances with SBA and min vcs for safety to increase indep with toileting. Short Term Goal 2: Pt will decrease RUE 9 hole peg test by 20 seconds and RUE  strength by 5 pounds to increase indep with fasteners in UB dressing. Short Term Goal 3: Pt will complete dynamic standing task x 5 minutes with 1-2 UE release and SBA to increase indep with sinkside grooming. Short Term Goal 4: Pt will complete UB dressing with Set up to increase indep within home environment. Short Term Goal 5: Pt will complete LB dresing with S and min vcs for safety to increase indep and endurance within home environment. Additional Goals?: No  Long Term Goals  Time Frame for Long Term Goals : 4 weeks from IPR eval  Long Term Goal 1: Pt will complete BADL routine including shower transfer Mod Indep to increase indep and safety in home environment.   Long Term Goal 2: Pt will complete simple IADL task with S and 0 vcs for safety to increase indep and endurance in home environment. Following session, patient left in safe position with all fall risk precautions in place.

## 2022-12-31 NOTE — PLAN OF CARE
I have reviewed the patient's plan of care and based on this review, the patient treatment plan has been updated. Problem: Discharge Planning  Goal: Discharge to home or other facility with appropriate resources  Outcome: Progressing  Flowsheets (Taken 12/31/2022 1022)  Discharge to home or other facility with appropriate resources: Identify barriers to discharge with patient and caregiver  Note: Working toward goal of discharge to home. Problem: Safety - Adult  Goal: Free from fall injury  Description: Patient absent of falls this shift. Bed in lowest position, side rails up 2/4. Call-light within reach. Patient instructed to use call-light to get up. Non-skid footwear in place. Outcome: Progressing  Flowsheets (Taken 12/31/2022 1039)  Free From Fall Injury: Instruct family/caregiver on patient safety  Note: Patient verbalizes understanding of fall precautions, uses call light appropriately.       Problem: ABCDS Injury Assessment  Goal: Absence of physical injury  Description: Patient is free from physical injury  Outcome: Progressing  Flowsheets (Taken 12/31/2022 1039)  Absence of Physical Injury: Implement safety measures based on patient assessment     Problem: Pain  Goal: Verbalizes/displays adequate comfort level or baseline comfort level  Description: Patient denies pain  Outcome: Progressing  Flowsheets (Taken 12/31/2022 0800)  Verbalizes/displays adequate comfort level or baseline comfort level: Encourage patient to monitor pain and request assistance  Note: Patient satisfied with pain control     Problem: Nutrition Deficit:  Goal: Optimize nutritional status  Outcome: Progressing     Problem: Genitourinary - Adult  Goal: Urinary catheter remains patent  Description: Patient catheter remains patent and draining light yellow urine, cath care completed this shift and education provided to patient   Flowsheets (Taken 12/31/2022 1022)  Urinary catheter remains patent: Assess patency of urinary catheter  Note: Urinary catheter remain patent.

## 2023-01-01 LAB — GLUCOSE BLD-MCNC: 110 MG/DL (ref 70–108)

## 2023-01-01 PROCEDURE — 6370000000 HC RX 637 (ALT 250 FOR IP): Performed by: PHYSICAL MEDICINE & REHABILITATION

## 2023-01-01 PROCEDURE — 82948 REAGENT STRIP/BLOOD GLUCOSE: CPT

## 2023-01-01 PROCEDURE — 1180000000 HC REHAB R&B

## 2023-01-01 PROCEDURE — 99232 SBSQ HOSP IP/OBS MODERATE 35: CPT | Performed by: INTERNAL MEDICINE

## 2023-01-01 RX ADMIN — ATORVASTATIN CALCIUM 40 MG: 40 TABLET, FILM COATED ORAL at 21:20

## 2023-01-01 RX ADMIN — FERROUS SULFATE TAB 325 MG (65 MG ELEMENTAL FE) 325 MG: 325 (65 FE) TAB at 08:27

## 2023-01-01 RX ADMIN — Medication 1 TABLET: at 08:26

## 2023-01-01 RX ADMIN — APIXABAN 5 MG: 5 TABLET, FILM COATED ORAL at 08:27

## 2023-01-01 RX ADMIN — Medication 6 MG: at 21:21

## 2023-01-01 RX ADMIN — BUPROPION HYDROCHLORIDE 150 MG: 150 TABLET, FILM COATED, EXTENDED RELEASE ORAL at 08:27

## 2023-01-01 RX ADMIN — TAMSULOSIN HYDROCHLORIDE 0.8 MG: 0.4 CAPSULE ORAL at 21:20

## 2023-01-01 RX ADMIN — PANTOPRAZOLE SODIUM 40 MG: 40 TABLET, DELAYED RELEASE ORAL at 05:34

## 2023-01-01 RX ADMIN — EMPAGLIFLOZIN 25 MG: 25 TABLET, FILM COATED ORAL at 08:27

## 2023-01-01 RX ADMIN — GLIPIZIDE 10 MG: 10 TABLET ORAL at 08:26

## 2023-01-01 RX ADMIN — APIXABAN 5 MG: 5 TABLET, FILM COATED ORAL at 21:19

## 2023-01-01 ASSESSMENT — PAIN SCALES - GENERAL: PAINLEVEL_OUTOF10: 0

## 2023-01-01 NOTE — PLAN OF CARE
Problem: Chronic Conditions and Co-morbidities  Goal: Patient's chronic conditions and co-morbidity symptoms are monitored and maintained or improved  1/1/2023 0109 by Dinah Troncoso RN  Outcome: Progressing  Flowsheets (Taken 12/31/2022 2139)  Care Plan - Patient's Chronic Conditions and Co-Morbidity Symptoms are Monitored and Maintained or Improved: Monitor and assess patient's chronic conditions and comorbid symptoms for stability, deterioration, or improvement     Problem: Safety - Adult  Goal: Free from fall injury  Description: Patient absent of falls this shift. Bed in lowest position, side rails up 2/4. Call-light within reach. Patient instructed to use call-light to get up. Non-skid footwear in place.            1/1/2023 0109 by Dinah Troncoso RN  Outcome: Progressing  Flowsheets (Taken 12/31/2022 1039 by Nithya Hansen RN)  Free From Fall Injury: Instruct family/caregiver on patient safety     Problem: ABCDS Injury Assessment  Goal: Absence of physical injury  Description: Patient is free from physical injury  1/1/2023 0109 by Dinah Troncoso RN  Outcome: Progressing     Problem: Skin/Tissue Integrity  Goal: Absence of new skin breakdown  Description:  Monitor for areas of redness and/or skin breakdown, no skin breakdown     1/1/2023 0109 by Dinah Troncoso RN  Outcome: Progressing     Problem: Pain  Goal: Verbalizes/displays adequate comfort level or baseline comfort level  Description: Patient denies pain  1/1/2023 0109 by Dinah Troncoso RN  Outcome: Progressing  Flowsheets (Taken 1/1/2023 0109)  Verbalizes/displays adequate comfort level or baseline comfort level:   Encourage patient to monitor pain and request assistance   Assess pain using appropriate pain scale   Administer analgesics based on type and severity of pain and evaluate response     Problem: Metabolic/Fluid and Electrolytes - Adult  Goal: Electrolytes maintained within normal limits  1/1/2023 0109 by Dinah Troncoso RN  Outcome: Progressing  Flowsheets (Taken 12/31/2022 2139)  Electrolytes maintained within normal limits: Monitor labs and assess patient for signs and symptoms of electrolyte imbalances     Problem: Genitourinary - Adult  Goal: Urinary catheter remains patent  Description: Patient catheter remains patent and draining light yellow urine, cath care completed this shift and education provided to patient   1/1/2023 0109 by Pawan Haywood RN  Outcome: Progressing  Flowsheets  Taken 1/1/2023 0109  Urinary catheter remains patent: Assess patency of urinary catheter  Taken 12/31/2022 2139  Urinary catheter remains patent: Assess patency of urinary catheter

## 2023-01-01 NOTE — PROGRESS NOTES
[facility]   Nephrology progress note  1/1/2023, 4:52 PM      Pt Name:    Marion Fonseca  MRN:     [id number]     YOB: 1960  Admit Date:    12/23/2022 11:24 AM    Chief Complaint: Nephrology following for LC/CKD. Subjective:  Patient seen and examined  No chest pain or shortness of breath  Feels much better  Decent urine output somewhat decreased from prior    Objective:  24HR INTAKE/OUTPUT:    Intake/Output Summary (Last 24 hours) at 1/1/2023 1652  Last data filed at 1/1/2023 1352  Gross per 24 hour   Intake 1080 ml   Output 1250 ml   Net -170 ml        Admission weight: 134 lb 8 oz (61 kg)  Wt Readings from Last 3 Encounters:   01/01/23 140 lb (63.5 kg)   12/23/22 130 lb 15.3 oz (59.4 kg)   12/13/22 142 lb 9.6 oz (64.7 kg)        Vitals :   Vitals:    12/31/22 1940 01/01/23 0653 01/01/23 0800 01/01/23 0832   BP: 104/65  (!) 105/55 (!) 105/55   Pulse: 71  87 87   Resp: 18  18 18   Temp: 98.2 °F (36.8 °C)  97.9 °F (36.6 °C) 97.9 °F (36.6 °C)   TempSrc: Oral  Oral Oral   SpO2: 99%  99% 99%   Weight:  140 lb (63.5 kg)     Height:           Physical examination  General Appearance:  Well developed.  No distress  Mouth/Throat:  Oral mucosa moist  Neck:  Supple, no JVD  Lungs:  Breath sounds: clear  Heart[de-identified]  S1,S2 heard  Abdomen:  Soft, non - tender  Musculoskeletal:  Edema -no edema noted    Medications:  Infusion:    dextrose       Meds:    ferrous sulfate  325 mg Oral Daily with breakfast    melatonin  6 mg Oral Nightly    atorvastatin  40 mg Oral Daily    buPROPion  150 mg Oral QAM    pantoprazole  40 mg Oral QAM AC    tamsulosin  0.8 mg Oral Daily    apixaban  5 mg Oral BID    [Held by provider] metoprolol succinate  12.5 mg Oral Daily    glipiZIDE  10 mg Oral QAM AC    empagliflozin  25 mg Oral Daily    docusate sodium  100 mg Oral BID    multivitamin  1 tablet Oral Daily with breakfast    senna  2 tablet Oral Nightly       Lab Data :  CBC:   Recent Labs     12/30/22  0755 WBC 8.6   HGB 10.3*   HCT 32.8*   *       CMP:  Recent Labs     12/30/22  0753 12/31/22  0638    138   K 5.0 4.7  4.7    103   CO2 24 22*   BUN 31* 32*   CREATININE 1.7* 1.5*   GLUCOSE 136* 118*   CALCIUM 9.2 9.0       Hepatic: No results for input(s): LABALBU, AST, ALT, ALB, BILITOT, ALKPHOS in the last 72 hours. Assessment and Plan:  Renal -acute kidney injury on chronic kidney disease with baseline creatinine around 1.5-1.7  Exact etiology not clear at this time he does look slightly dry though  Making decent urine output has a Bella in place  Overall creatinine improved with hydration currently maintaining at 1.5 and no new labs available today. No labs today we will check labs in the morning  Electrolytes -within normal limits stop the salt tablet  Acid-base status appears to be stable  Essential hypertension now running low, may be due to the increased urine output again on salt tablets which I will discontinue at this time [place] may be contributing if continues we will have to hold Jardiance  Hx of diabetes mellitus  Hx of sepsis due to UTI from Klebsiella pneumonia on antibiotics  CAD status post CABG 11/22  Meds reviewed and discussed with patient     Swathi Isaacs MD  [facility]    This report has been created using voice recognition software.  It may contain minor errors which are inherent in voice recognition technology

## 2023-01-01 NOTE — PLAN OF CARE
Problem: Discharge Planning  Goal: Discharge to home or other facility with appropriate resources  Outcome: Progressing  Flowsheets  Taken 1/1/2023 1025 by Jaiden Tran RN  Discharge to home or other facility with appropriate resources:   Identify barriers to discharge with patient and caregiver   Arrange for needed discharge resources and transportation as appropriate   Identify discharge learning needs (meds, wound care, etc)  Taken 12/31/2022 2139 by Francie Curling, RN  Discharge to home or other facility with appropriate resources: Identify barriers to discharge with patient and caregiver     Problem: Chronic Conditions and Co-morbidities  Goal: Patient's chronic conditions and co-morbidity symptoms are monitored and maintained or improved  1/1/2023 0109 by Francie Curling, RN  Outcome: Progressing  Flowsheets (Taken 12/31/2022 2139)  Care Plan - Patient's Chronic Conditions and Co-Morbidity Symptoms are Monitored and Maintained or Improved: Monitor and assess patient's chronic conditions and comorbid symptoms for stability, deterioration, or improvement     Problem: Safety - Adult  Goal: Free from fall injury  Description: Patient absent of falls this shift. Bed in lowest position, side rails up 2/4. Call-light within reach. Patient instructed to use call-light to get up. Non-skid footwear in place.            1/1/2023 1025 by Jaiden Tran RN  Outcome: Progressing  Flowsheets (Taken 1/1/2023 1025)  Free From Fall Injury: Instruct family/caregiver on patient safety  Note: Patient remains free from falls  1/1/2023 0109 by Francie Curling, RN  Outcome: Progressing  Flowsheets (Taken 12/31/2022 1039 by Edelmira Nayak RN)  Free From Fall Injury: Instruct family/caregiver on patient safety     Problem: ABCDS Injury Assessment  Goal: Absence of physical injury  Description: Patient is free from physical injury  1/1/2023 1025 by Jaiden Tran RN  Outcome: Progressing  Flowsheets (Taken 1/1/2023 1025)  Absence of Physical Injury: Implement safety measures based on patient assessment  Note: Patient remains free from injury  1/1/2023 0109 by Gretchen Truong RN  Outcome: Progressing     Problem: Skin/Tissue Integrity  Goal: Absence of new skin breakdown  Description:  Monitor for areas of redness and/or skin breakdown, no skin breakdown     1/1/2023 1025 by Bernardo Anders RN  Outcome: Progressing  Note: Patient remains free from skin breakdown  1/1/2023 0109 by Gretchen Truong RN  Outcome: Progressing     Problem: Pain  Goal: Verbalizes/displays adequate comfort level or baseline comfort level  Description: Patient denies pain  1/1/2023 1025 by Bernardo Anders RN  Outcome: Progressing  Flowsheets (Taken 1/1/2023 1025)  Verbalizes/displays adequate comfort level or baseline comfort level:   Encourage patient to monitor pain and request assistance   Assess pain using appropriate pain scale  Note: Patient denies pain  1/1/2023 0109 by Gretchen Truong RN  Outcome: Progressing  Flowsheets (Taken 1/1/2023 0109)  Verbalizes/displays adequate comfort level or baseline comfort level:   Encourage patient to monitor pain and request assistance   Assess pain using appropriate pain scale   Administer analgesics based on type and severity of pain and evaluate response     Problem: Metabolic/Fluid and Electrolytes - Adult  Goal: Electrolytes maintained within normal limits  1/1/2023 0109 by Gretchen Truong RN  Outcome: Progressing  Flowsheets (Taken 12/31/2022 2139)  Electrolytes maintained within normal limits: Monitor labs and assess patient for signs and symptoms of electrolyte imbalances     Problem: Genitourinary - Adult  Goal: Urinary catheter remains patent  Description: Patient catheter remains patent and draining light yellow urine, cath care completed this shift and education provided to patient   1/1/2023 1025 by Bernardo Anders RN  Outcome: Progressing  Flowsheets (Taken 1/1/2023 1025)  Urinary catheter remains patent: Assess patency of urinary catheter  Note: Bella remains patent at this time  1/1/2023 0109 by Janna Garibay RN  Outcome: Progressing  Flowsheets  Taken 1/1/2023 0109  Urinary catheter remains patent: Assess patency of urinary catheter  Taken 12/31/2022 2139  Urinary catheter remains patent: Assess patency of urinary catheter

## 2023-01-02 LAB
ANION GAP SERPL CALCULATED.3IONS-SCNC: 15 MEQ/L (ref 8–16)
BUN BLDV-MCNC: 36 MG/DL (ref 7–22)
CALCIUM SERPL-MCNC: 9 MG/DL (ref 8.5–10.5)
CHLORIDE BLD-SCNC: 104 MEQ/L (ref 98–111)
CO2: 21 MEQ/L (ref 23–33)
CREAT SERPL-MCNC: 1.6 MG/DL (ref 0.4–1.2)
EKG ATRIAL RATE: 91 BPM
EKG P AXIS: 82 DEGREES
EKG P-R INTERVAL: 144 MS
EKG Q-T INTERVAL: 354 MS
EKG QRS DURATION: 78 MS
EKG QTC CALCULATION (BAZETT): 435 MS
EKG R AXIS: 88 DEGREES
EKG T AXIS: 112 DEGREES
EKG VENTRICULAR RATE: 91 BPM
GFR SERPL CREATININE-BSD FRML MDRD: 49 ML/MIN/1.73M2
GLUCOSE BLD-MCNC: 110 MG/DL (ref 70–108)
GLUCOSE BLD-MCNC: 114 MG/DL (ref 70–108)
POTASSIUM SERPL-SCNC: 5 MEQ/L (ref 3.5–5.2)
SODIUM BLD-SCNC: 140 MEQ/L (ref 135–145)

## 2023-01-02 PROCEDURE — 93005 ELECTROCARDIOGRAM TRACING: CPT | Performed by: PHYSICAL MEDICINE & REHABILITATION

## 2023-01-02 PROCEDURE — 97116 GAIT TRAINING THERAPY: CPT

## 2023-01-02 PROCEDURE — 97110 THERAPEUTIC EXERCISES: CPT

## 2023-01-02 PROCEDURE — 82948 REAGENT STRIP/BLOOD GLUCOSE: CPT

## 2023-01-02 PROCEDURE — 97530 THERAPEUTIC ACTIVITIES: CPT

## 2023-01-02 PROCEDURE — 97130 THER IVNTJ EA ADDL 15 MIN: CPT

## 2023-01-02 PROCEDURE — 97535 SELF CARE MNGMENT TRAINING: CPT

## 2023-01-02 PROCEDURE — 80048 BASIC METABOLIC PNL TOTAL CA: CPT

## 2023-01-02 PROCEDURE — 97129 THER IVNTJ 1ST 15 MIN: CPT

## 2023-01-02 PROCEDURE — 6370000000 HC RX 637 (ALT 250 FOR IP): Performed by: PHYSICAL MEDICINE & REHABILITATION

## 2023-01-02 PROCEDURE — 99232 SBSQ HOSP IP/OBS MODERATE 35: CPT | Performed by: INTERNAL MEDICINE

## 2023-01-02 PROCEDURE — 97112 NEUROMUSCULAR REEDUCATION: CPT

## 2023-01-02 PROCEDURE — 36415 COLL VENOUS BLD VENIPUNCTURE: CPT

## 2023-01-02 PROCEDURE — 93010 ELECTROCARDIOGRAM REPORT: CPT | Performed by: NUCLEAR MEDICINE

## 2023-01-02 PROCEDURE — 1180000000 HC REHAB R&B

## 2023-01-02 RX ADMIN — APIXABAN 5 MG: 5 TABLET, FILM COATED ORAL at 07:39

## 2023-01-02 RX ADMIN — APIXABAN 5 MG: 5 TABLET, FILM COATED ORAL at 21:25

## 2023-01-02 RX ADMIN — EMPAGLIFLOZIN 25 MG: 25 TABLET, FILM COATED ORAL at 07:38

## 2023-01-02 RX ADMIN — Medication 6 MG: at 21:25

## 2023-01-02 RX ADMIN — GLIPIZIDE 10 MG: 10 TABLET ORAL at 07:38

## 2023-01-02 RX ADMIN — TAMSULOSIN HYDROCHLORIDE 0.8 MG: 0.4 CAPSULE ORAL at 21:24

## 2023-01-02 RX ADMIN — ATORVASTATIN CALCIUM 40 MG: 40 TABLET, FILM COATED ORAL at 21:24

## 2023-01-02 RX ADMIN — BUPROPION HYDROCHLORIDE 150 MG: 150 TABLET, FILM COATED, EXTENDED RELEASE ORAL at 07:38

## 2023-01-02 RX ADMIN — FERROUS SULFATE TAB 325 MG (65 MG ELEMENTAL FE) 325 MG: 325 (65 FE) TAB at 07:39

## 2023-01-02 RX ADMIN — Medication 1 TABLET: at 07:38

## 2023-01-02 RX ADMIN — PANTOPRAZOLE SODIUM 40 MG: 40 TABLET, DELAYED RELEASE ORAL at 05:41

## 2023-01-02 ASSESSMENT — ENCOUNTER SYMPTOMS
WHEEZING: 0
SHORTNESS OF BREATH: 0
TROUBLE SWALLOWING: 0
RHINORRHEA: 0
COUGH: 0
CONSTIPATION: 0
VOMITING: 0
DIARRHEA: 0
SORE THROAT: 0
NAUSEA: 0
ABDOMINAL PAIN: 0

## 2023-01-02 ASSESSMENT — PAIN SCALES - GENERAL: PAINLEVEL_OUTOF10: 0

## 2023-01-02 NOTE — PROGRESS NOTES
[address]  Occupational Therapy  Daily Note  Time:   Time In: 1030  Time Out: 1200  Timed Code Treatment Minutes: 90 Minutes  Minutes: 90          Date: 2023  Patient Name: Geena Torres,   Gender: male      Room: [facility]/053-A  MRN: [id number]  : 1960  (63 y.o.)  Referring Practitioner: Ordering & Attending: Jamie Veras MD  Diagnosis: Acute Stroke due to Ischemia  Additional Pertinent Hx: Geena Torres who is a 63 y.o. male with a history of hypertension, diabetes, CAD on  daily, recent CABG in 2022, ischemic cardiomyopathy is admitted to [facility] for sepsis, emphysematous cystitis and acute kidney injury on CKD. During admission exam patient stated that his right arm feldman has been weak and that he has had some slurred speech. Stroke alert was called for right-sided weakness and dysarthria on 12/15. On arrival patient's NIH was 4 for right upper extremity, right lower extremity weakness, 1 limb ataxia and dysarthria. On further questioning, patient and wife state that the right arm weakness actually started last night. Patient's wife noticed when patient was trying to get up from the toilet that he was unable to push himself up with his right arm. Last known well 2330 on 2022. Patient taken to imaging for stat CT head which revealed no ICH, midline shift, mass-effect. CTA head and neck deferred due to patient's LC. Decision for no tPA was made due to patient's time since last known well. Patient with relatively low NIH and symptoms which are not consistent with LVO, therefore no thrombectomy/neuro intervention at this time. Patient had stat MRI brain and MRA head and neck without contrast.  MRI showed acute ischemic stroke of left parietal region. Pt admitted to IP rehab on 22.     Restrictions/Precautions:  Restrictions/Precautions: General Precautions, Fall Risk  Position Activity Restriction  Other position/activity restrictions: right side hemiparesis, recent CABG 11/22-minimize arm use ( s/p 5 wks), life vest     SUBJECTIVE: Pt reports his HR was elevated earlier this AM and EKG completed. Pt. Pleasant and cooperative throughout session. PAIN: Pt. Denies pain. Vitals: BP: 115/60, HR: 92-95 BPM, O2: 100% SPO2 at room air. COGNITION: Decreased Safety Awareness and Impulsive    ADL:   Pt. reports Self care completed prior to session. However, pt agreeable to demonstrate performance with toilet transfer with SBA performed throughout with good technique. BALANCE:  Sitting Balance:  Modified Independent. Standing Balance: Stand By Assistance, [employer]. Varied on task    BED MOBILITY:  Not Tested    TRANSFERS:  Sit to Stand:  Stand By Assistance. Stand to Sit: Stand By Assistance. Completed multiple times throughout session. FUNCTIONAL MOBILITY:  Assistive Device: Rolling Walker  Assist Level:  Contact Guard Assistance primarily, with one account of min A due to slight loss of balance to recover. Distance: To and from therapy apartment and within apartment and room. ADDITIONAL ACTIVITIES:  Pt engaged in [address] task of utilizing affect RUE to manage nuts/bolts, pt able to complete 3/6 large ones with ease and as nuts/bolts were smaller slight difficulty noted with the remainder 3/6. Task performed to advance [address] for ease with clothing management/fastener management. Pt. Engaged in BUE strengthening with use of 2# weighted dowel x20 reps in all available planes/directions to improve pt's performance with bilateral integration tasks and to improve strengthening for ease with IADL/ADL task completion. Pt. Required 2 vc for proper technique/pace throughout. Pt. Instructed to engage in IADL task completion of preparing instant oatmeal. Pt able to complete with CGA when reaching OBOS to gather all necessary items. Pt.  Then stood sinkside to engage in washing dishes s/p simple meal prep task with CGA when B hand release and close SBA when 1 hand release due to reduced balance. Pt. Did demo good ability to transport items in walker bag with no cues for initiation. For remainder 5 minutes of session, pt instructed to retreive 5 beads from moderate resistive theraputty, pt able to complete with set up assistance. Task performed to improve hand strength/FMC for managing IADL containers. ASSESSMENT:     Activity Tolerance:  Patient tolerance of  treatment: fair. Increased time to complete tasks due to distractibility and activity pace. Discharge Recommendations: Continue to assess pending progress and Patient would benefit from continued OT at discharge  Equipment Recommendations: Equipment Needed: Yes  Other: OT staff to continue to monitor needs throughout OT POC. Plan: Times Per Week: 5x/week x 90 minutes and 1x/week x 30 minutes  Current Treatment Recommendations: Strengthening, Balance training, Self-Care / ADL, Equipment evaluation, education, & procurement, Safety education & training, Endurance training, Functional mobility training, Neuromuscular re-education, Patient/Caregiver education & training, Home management training    Patient Education  Patient Education: IADL's, Importance of Increasing Activity, and Assistive Device Safety    Goals  Short Term Goals  Time Frame for Short Term Goals: 1 week  Short Term Goal 1: Pt will complete functional ambulation to/from BR and HH distances with SBA and min vcs for safety to increase indep with toileting. Short Term Goal 2: Pt will decrease RUE 9 hole peg test by 20 seconds and RUE  strength by 5 pounds to increase indep with fasteners in UB dressing. Short Term Goal 3: Pt will complete dynamic standing task x 5 minutes with 1-2 UE release and SBA to increase indep with sinkside grooming.   Short Term Goal 4: Pt will complete UB dressing with Set up to increase indep within home environment. Short Term Goal 5: Pt will complete LB dresing with S and min vcs for safety to increase indep and endurance within home environment. Additional Goals?: No  Long Term Goals  Time Frame for Long Term Goals : 4 weeks from IPR eval  Long Term Goal 1: Pt will complete BADL routine including shower transfer Mod Indep to increase indep and safety in home environment. Long Term Goal 2: Pt will complete simple IADL task with S and 0 vcs for safety to increase indep and endurance in home environment. Following session, patient left in safe position with all fall risk precautions in place.

## 2023-01-02 NOTE — PLAN OF CARE
Problem: Chronic Conditions and Co-morbidities  Goal: Patient's chronic conditions and co-morbidity symptoms are monitored and maintained or improved  Outcome: Progressing  Flowsheets (Taken 1/1/2023 2131)  Care Plan - Patient's Chronic Conditions and Co-Morbidity Symptoms are Monitored and Maintained or Improved: Monitor and assess patient's chronic conditions and comorbid symptoms for stability, deterioration, or improvement     Problem: Safety - Adult  Goal: Free from fall injury  Description: Patient absent of falls this shift. Bed in lowest position, side rails up 2/4. Call-light within reach. Patient instructed to use call-light to get up. Non-skid footwear in place.         Problem: ABCDS Injury Assessment  Goal: Absence of physical injury  Description: Patient is free from physical injury  Outcome: Progressing     Problem: Pain  Goal: Verbalizes/displays adequate comfort level or baseline comfort level  Description: Patient denies pain  Outcome: Progressing  Flowsheets (Taken 1/1/2023 2115)  Verbalizes/displays adequate comfort level or baseline comfort level: Encourage patient to monitor pain and request assistance

## 2023-01-02 NOTE — PROGRESS NOTES
[address] [place] [address]  INPATIENT PHYSICAL THERAPY  DAILY NOTE  [facility] - [id number]    Time In: 1400  Time Out: 1430  Timed Code Treatment Minutes: 30 Minutes  Minutes: 30          Date: 2023  Patient Name: Kimberly Velazco,  Gender:  male        MRN: [id number]  : 1960  (63 y.o.)     Referring Practitioner: Dr. Morena Ernandez  Diagnosis: Acute stroke due to ischemia  Additional Pertinent Hx: Pt admitted through ED due to RUE weakness and slurred speech,  Also noted to have sepsis, LC, emphysematous cystitis. Hx:  HTN, Db, CAD, recent CABG (). MRI + for acute ischemic stroke Lt parietal region     Prior Level of Function:  Lives With: Spouse, Family (16 y.o son, 21 y.o step dtr.)  Type of Home: House  Home Layout: One level  Home Access: Stairs to enter with rails  Entrance Stairs - Number of Steps: 2 + threshold  Entrance Stairs - Rails: Both  Home Equipment:  (None used PTA)   Bathroom Shower/Tub: Tub/Shower unit  Bathroom Toilet: Standard  Bathroom Equipment: Tub transfer bench  Bathroom Accessibility: Accessible    ADL Assistance: Independent  Homemaking Assistance: Independent  Homemaking Responsibilities: Yes  Ambulation Assistance: Independent  Transfer Assistance: Independent  Active : No    Restrictions/Precautions:  Restrictions/Precautions: General Precautions, Fall Risk  Position Activity Restriction  Other position/activity restrictions: right side hemiparesis, recent CABG -minimize arm use ( s/p 5 wks), life vest     SUBJECTIVE: Patient seated in BS chair upon PTA arrival. Patient pleasant and agreeable to therapy treatment. Patient with request to return to bed at end of therapy treatment this date. PAIN: 0/10: Patient denies pain    Vitals: Oxygen: 97-99% throughout therapy treatment  Heart Rate: 98 bpm at rest, 105 bpm with light activity.     OBJECTIVE:  Bed Mobility:  Sit to Supine: Stand By Assistance, [employer] Transfers:  Sit to Stand: Stand By Assistance, [address], to/from chair without arms  Stand to Sit:Stand By Assistance, [address], to/from chair without arms    Ambulation:  Stand By [employer], [address]  Distance: 125 feet x 2  Surface: Level Tile  Device:Rolling Walker  Gait Deviations: Forward Flexed Posture, Slow Jada, Decreased Step Length Bilaterally, Decreased Weight Shift Right, Decreased Gait Speed, Decreased Heel Strike Bilaterally, Narrow Base of Support, and Unsteady Gait    Balance:  Dynamic Standing Balance: Contact Guard Assistance Patient completing dynamic standing on balance foam completing light reaching activity and completing ring toss. Patient demonstrates difficulty achieving initial standing balance on foam without UE support however, patient able to maintain balance throughout activity. Patient unsteadiness noted, no LOB. Exercise:  None    Functional Outcome Measures: Not completed       ASSESSMENT:  Assessment: Patient progressing toward established goals. Activity Tolerance:  Patient tolerance of  treatment: good.       Equipment Recommendations:Equipment Needed: Yes  Other: RW  Discharge Recommendations: Continue to assess pending progress  Plan: Current Treatment Recommendations: Strengthening, ROM, Balance training, Functional mobility training, Transfer training, Endurance training, Neuromuscular re-education, Gait training, Stair training, Safety education & training, Equipment evaluation, education, & procurement, Home exercise program, Therapeutic activities, Patient/Caregiver education & training  General Plan:  (5x/ wk 90 min, 1x/ wk 30 min)    Patient Education  Patient Education: Plan of Care, Bed Mobility, Transfers, Gait,  - Patient Verbalized Understanding    Goals:  Patient Goals : get back to my normal way of doing things  Short Term Goals  Time Frame for Short Term Goals: 1 wk  Short Term Goal 1: Pt to go supine <->sit, minimal use of UEs, SBA to get in/out of bed. Short Term Goal 2: Pt to get up/down from various seated surfaces, CGA, to get up to walk. Short Term Goal 3: Pt to walk with RW >= 50 ft, demonstrating step through gait pattern, recurvatum < 10% of steps on RT, CGA to progress to home mobility  Short Term Goal 4: Pt to ascend/descend 2 steps with gerber rails, CGA to progress to home entry  Short Term Goal 5: Pt to get in/out of car, CGA for transportation needs. Additional Goals?: Yes  Short Term Goal 6: Pt to perform Tinetti >= 18/28, demonstrating improved balance. Long Term Goals  Time Frame for Long Term Goals : 3 wks from evaluation  Long Term Goal 1: Pt to go supine <->sit, minimal use of UEs, Mod I, to get in/out of bed. Long Term Goal 2: Pt to get up/down from various seated surfaces, Mod I, to get up to walk. Long Term Goal 3: Pt to walk with RW >= 50 ft, demonstrating step through gait pattern, recurvatum < 10% of steps on RT, Mod I, for home mobility  Long Term Goal 4: Pt to perform Tinetti >= 24/28, demonstrating improved balance. Following session, patient left in safe position with all fall risk precautions in place.

## 2023-01-02 NOTE — PROGRESS NOTES
[address] [place] [address]  INPATIENT PHYSICAL THERAPY  DAILY NOTE  [address] [facility] - [id number]    Time In: 0830  Time Out: 0930  Timed Code Treatment Minutes: 61 Minutes  Minutes: 60          Date: 2023  Patient Name: Destini Talavera,  Gender:  male        MRN: [id number]  : 1960  (63 y.o.)     Referring Practitioner: Dr. Francisco Goldberg  Diagnosis: Acute stroke due to ischemia  Additional Pertinent Hx: Pt admitted through ED due to RUE weakness and slurred speech,  Also noted to have sepsis, LC, emphysematous cystitis. Hx:  HTN, Db, CAD, recent CABG (). MRI + for acute ischemic stroke Lt parietal region     Prior Level of Function:  Lives With: Spouse, Family (16 y.o son, 21 y.o step dtr.)  Type of Home: House  Home Layout: One level  Home Access: Stairs to enter with rails  Entrance Stairs - Number of Steps: 2 + threshold  Entrance Stairs - Rails: Both  Home Equipment:  (None used PTA)   Bathroom Shower/Tub: Tub/Shower unit  Bathroom Toilet: Standard  Bathroom Equipment: Tub transfer bench  Bathroom Accessibility: Accessible    ADL Assistance: Independent  Homemaking Assistance: Independent  Homemaking Responsibilities: Yes  Ambulation Assistance: Independent  Transfer Assistance: Independent  Active : No    Restrictions/Precautions:  Restrictions/Precautions: General Precautions, Fall Risk  Position Activity Restriction  Other position/activity restrictions: right side hemiparesis, recent CABG -minimize arm use ( s/p 5 wks), life vest     SUBJECTIVE: Patient seated in BS chair upon PTA arrival. Patient pleasant and agreeable to therapy treatment. RN approved therapy session this date and reports she has been monitoring BP and HR. PAIN: 0/10: Patient denies pain.     Vitals: Oxygen: 98% at rest 95% with activity  Heart Rate: 108 at rest, 115 with activity    OBJECTIVE:  Bed Mobility:  Supine to Sit: Minimal Assistance, from mat table  Sit to Supine: Stand By Assistance     Transfers:  Sit to Stand: Contact Guard Assistance, Minimal Assistance, to/from chair without arms  Stand to [employer], to/from chair without arms    Ambulation:  Stand By Assistance, Contact Guard Assistance  Distance: 120 feet x 2  Surface: Level Tile  Device:Rolling Walker  Gait Deviations:  Slow Jada, Decreased Step Length Bilaterally, Decreased Gait Speed, and Unsteady Gait    Balance:  Not tested    Exercise:  Patient was guided in 1 set(s) 15 reps of exercise to both lower extremities. Ankle pumps, Glut sets, Quad sets, Heelslides, Short arc quads, Hip abduction/adduction, Straight leg raises, Seated marches, Seated hamstring curls, Seated heel/toe raises, Long arc quads, Seated isometric hip adduction, and Seated abduction/adduction. Exercises were completed for increased independence with functional mobility. Functional Outcome Measures: Not completed       ASSESSMENT:  Assessment: Patient progressing toward established goals. Activity Tolerance:  Patient tolerance of  treatment: good.       Equipment Recommendations:Equipment Needed: Yes  Other: RW  Discharge Recommendations: Continue to assess pending progress  Plan: Current Treatment Recommendations: Strengthening, ROM, Balance training, Functional mobility training, Transfer training, Endurance training, Neuromuscular re-education, Gait training, Stair training, Safety education & training, Equipment evaluation, education, & procurement, Home exercise program, Therapeutic activities, Patient/Caregiver education & training  General Plan:  (5x/ wk 90 min, 1x/ wk 30 min)    Patient Education  Patient Education: Plan of Care, Bed Mobility, Transfers, Gait, Verbal Exercise Instruction,  - Patient Verbalized Understanding    Goals:  Patient Goals : get back to my normal way of doing things  Short Term Goals  Time Frame for Short Term Goals: 1 wk  Short Term Goal 1: Pt to go supine <->sit, minimal use of UEs, SBA to get in/out of bed. Short Term Goal 2: Pt to get up/down from various seated surfaces, CGA, to get up to walk. Short Term Goal 3: Pt to walk with RW >= 50 ft, demonstrating step through gait pattern, recurvatum < 10% of steps on RT, CGA to progress to home mobility  Short Term Goal 4: Pt to ascend/descend 2 steps with gerber rails, CGA to progress to home entry  Short Term Goal 5: Pt to get in/out of car, CGA for transportation needs. Additional Goals?: Yes  Short Term Goal 6: Pt to perform Tinetti >= 18/28, demonstrating improved balance. Long Term Goals  Time Frame for Long Term Goals : 3 wks from evaluation  Long Term Goal 1: Pt to go supine <->sit, minimal use of UEs, Mod I, to get in/out of bed. Long Term Goal 2: Pt to get up/down from various seated surfaces, Mod I, to get up to walk. Long Term Goal 3: Pt to walk with RW >= 50 ft, demonstrating step through gait pattern, recurvatum < 10% of steps on RT, Mod I, for home mobility  Long Term Goal 4: Pt to perform Tinetti >= 24/28, demonstrating improved balance. Following session, patient left in safe position with all fall risk precautions in place.

## 2023-01-02 NOTE — PROGRESS NOTES
[facility]   Nephrology progress note  1/2/2023, 12:01 PM      Pt Name:    Keri Sales  MRN:     [id number]     YOB: 1960  Admit Date:    12/23/2022 11:24 AM    Chief Complaint: Nephrology following for LC/CKD. Subjective:  Patient seen and examined  No chest pain or shortness of breath  Feels much better  Having a lot of UOP - polyuria    Objective:  24HR INTAKE/OUTPUT:    Intake/Output Summary (Last 24 hours) at 1/2/2023 1201  Last data filed at 1/2/2023 0445  Gross per 24 hour   Intake 240 ml   Output 3350 ml   Net -3110 ml        Admission weight: 134 lb 8 oz (61 kg)  Wt Readings from Last 3 Encounters:   01/02/23 141 lb 14.4 oz (64.4 kg)   12/23/22 130 lb 15.3 oz (59.4 kg)   12/13/22 142 lb 9.6 oz (64.7 kg)        Vitals :   Vitals:    01/01/23 2115 01/02/23 0330 01/02/23 0600 01/02/23 0730   BP: (!) 113/58   (!) 90/59   Pulse: 70   99   Resp: 18   17   Temp: 97.9 °F (36.6 °C)   97.7 °F (36.5 °C)   TempSrc: Oral   Oral   SpO2: 98%   100%   Weight:  141 lb 9.6 oz (64.2 kg) 141 lb 14.4 oz (64.4 kg)    Height:           Physical examination  General Appearance:  Well developed.  No distress  Mouth/Throat:  Oral mucosa moist  Neck:  Supple, no JVD  Lungs:  Breath sounds: clear  Heart[de-identified]  S1,S2 heard  Abdomen:  Soft, non - tender  Musculoskeletal:  Edema -no edema noted    Medications:  Infusion:    dextrose       Meds:    ferrous sulfate  325 mg Oral Daily with breakfast    melatonin  6 mg Oral Nightly    atorvastatin  40 mg Oral Daily    buPROPion  150 mg Oral QAM    pantoprazole  40 mg Oral QAM AC    tamsulosin  0.8 mg Oral Daily    apixaban  5 mg Oral BID    [Held by provider] metoprolol succinate  12.5 mg Oral Daily    glipiZIDE  10 mg Oral QAM AC    empagliflozin  25 mg Oral Daily    docusate sodium  100 mg Oral BID    multivitamin  1 tablet Oral Daily with breakfast    senna  2 tablet Oral Nightly       Lab Data :  CBC:   No results for input(s): WBC, HGB, HCT, PLT in the last 72 hours. CMP:  Recent Labs     12/31/22  0638 01/02/23  0619    140   K 4.7  4.7 5.0    104   CO2 22* 21*   BUN 32* 36*   CREATININE 1.5* 1.6*   GLUCOSE 118* 114*   CALCIUM 9.0 9.0       Hepatic: No results for input(s): LABALBU, AST, ALT, ALB, BILITOT, ALKPHOS in the last 72 hours. Assessment and Plan:  Renal -acute kidney injury on chronic kidney disease with baseline creatinine around 1.5-1.7  Exact etiology not clear at this time   Making decent urine output has a Bella in place  Overall creatinine improved with hydration currently maintaining at 1.6   Electrolytes -within normal limits stop the salt tablet  Acid-base status appears to be stable  Essential hypertension now running low, may be due to the increased urine output again . hold jardiance and see, may consider midodrine as well. Hx of diabetes mellitus  Hx of sepsis due to UTI from Klebsiella pneumonia on antibiotics  CAD status post CABG 11/22  Meds reviewed and discussed with patient     Mancil Boas, MD  [facility]    This report has been created using voice recognition software.  It may contain minor errors which are inherent in voice recognition technology

## 2023-01-02 NOTE — PROGRESS NOTES
[address] [place] [facility]  [facility]  DAILY NOTE     TIME   SLP Individual Minutes  Time In: 0930  Time Out: 1000  Minutes: 30  Timed Code Treatment Minutes: 30 Minutes       Date: 2023  Patient Name: Oseas Wilkinson      CSN: [id number]   : 1960  (63 y.o.)  Gender: male   Referring Physician:  Dr. Azeb Bee   Diagnosis: Acute stroke due to ischemia, right hemiparesis secondary to stroke   Precautions: aspiration, fall risk   Current Diet: Regular diet with thin liquids   Swallowing Strategies: Standard Universal Swallow Precautions  Date of Last MBS/FEES: Not Applicable    Pain:  No pain reported. Subjective:  Patient awake in recliner upon ST arrival. Patient agreeable to ST services this date. Pleasant, cooperative, and actively engaged throughout. Short-Term Goals:  SHORT TERM GOAL #1:  Goal 1: Patient will complete complex executive functioning tasks (i.e., medication management, complex reasoning/deductive reasoning, etc.) with 90% accuracy and minimal cuing in order to allow for safe return to work (40/hours week). INTERVENTIONS:  Schedule Reasoning Task - [facility]  100% independently    Lorenzo Valentin 1277 #2:  Goal 2: Patient will complete higher level recall/short term memory and working memory tasks with 80% accuracy provided independent use of compensatory strategies in order to improve overall recall of newly learned theraputic information and anticipation to return to work (Amos Pat in [facility]). INTERVENTIONS:   Reviewed WRAP strategies this date.     Delayed Recall (10 minutes) of 7 locations from [place] [address]. task- 100% independently    Categorization- 15 words  -sorted into categories, reviewed WRAP strategies  -immediately recalled 11/15 (73%) words independently    SHORT TERM GOAL #3:  Goal 3: Patient will demonstrate independent use of compensatory strategies (S-speak up, O-open mouth, S-slow down) within strucutred and unstrucutred speech tasks in order to improve overall articulatory precision. INTERVENTIONS: Did not address due to focus on other goals. Long-Term Goals:  Time Frame for Long Term Goals: 2 weeks from time of evaluation    LONG TERM GOAL #1:  Goal 1: Patient will improve overall cognitive function to return to independent living with wife and careing for children (16 year old). LONG TERM GOAL #2:  Goal 2: Patient will improve overall speech intelligablity and clearity of speech production within informal conversation in order to return to baseline speech to improve successful ability to return to work, speaking with co-works and medical staff and family/friends. Comprehension: 7 - Patient understands complex ideas (math/planning)  Expression: 6 - Device used to express complex ideas/needs  Social Interaction: 7 - Patient has appropriate behavior/relations 100% of the time  Problem Solvin - Independent with device (e.g. notes, schedules)  Memory: 6 - Patient requires device to recall (e.g. memory book)    EDUCATION:  Learner: Patient  Education:  Reviewed ST goals and Plan of Care and Reviewed recommendations for follow-up  Evaluation of Education: Verbalizes understanding, Demonstrates with assistance, and Family not present    ASSESSMENT/PLAN:  Activity Tolerance:  Patient tolerance of  treatment: good. Assessment/Plan: Patient progressing toward established goals. Continues to require skilled care of licensed speech pathologist to progress toward achievement of established goals and plan of care. .     Plan for Next Session: High level cognitive treatment- deductive thinking, memory  Discharge Recommendations: Home with Home Exercise Program     Lisbet KAY CCC-SLP

## 2023-01-03 LAB
ANION GAP SERPL CALCULATED.3IONS-SCNC: 15 MEQ/L (ref 8–16)
BUN BLDV-MCNC: 37 MG/DL (ref 7–22)
CALCIUM SERPL-MCNC: 9.4 MG/DL (ref 8.5–10.5)
CHLORIDE BLD-SCNC: 102 MEQ/L (ref 98–111)
CO2: 22 MEQ/L (ref 23–33)
CREAT SERPL-MCNC: 1.7 MG/DL (ref 0.4–1.2)
GFR SERPL CREATININE-BSD FRML MDRD: 45 ML/MIN/1.73M2
GLUCOSE BLD-MCNC: 135 MG/DL (ref 70–108)
GLUCOSE BLD-MCNC: 162 MG/DL (ref 70–108)
POTASSIUM SERPL-SCNC: 5.1 MEQ/L (ref 3.5–5.2)
SODIUM BLD-SCNC: 139 MEQ/L (ref 135–145)

## 2023-01-03 PROCEDURE — 97130 THER IVNTJ EA ADDL 15 MIN: CPT | Performed by: SPEECH-LANGUAGE PATHOLOGIST

## 2023-01-03 PROCEDURE — 97112 NEUROMUSCULAR REEDUCATION: CPT

## 2023-01-03 PROCEDURE — 97530 THERAPEUTIC ACTIVITIES: CPT

## 2023-01-03 PROCEDURE — 80048 BASIC METABOLIC PNL TOTAL CA: CPT

## 2023-01-03 PROCEDURE — 82948 REAGENT STRIP/BLOOD GLUCOSE: CPT

## 2023-01-03 PROCEDURE — 97535 SELF CARE MNGMENT TRAINING: CPT

## 2023-01-03 PROCEDURE — 36415 COLL VENOUS BLD VENIPUNCTURE: CPT

## 2023-01-03 PROCEDURE — 97116 GAIT TRAINING THERAPY: CPT

## 2023-01-03 PROCEDURE — 6370000000 HC RX 637 (ALT 250 FOR IP): Performed by: PHYSICAL MEDICINE & REHABILITATION

## 2023-01-03 PROCEDURE — 1180000000 HC REHAB R&B

## 2023-01-03 PROCEDURE — 97129 THER IVNTJ 1ST 15 MIN: CPT | Performed by: SPEECH-LANGUAGE PATHOLOGIST

## 2023-01-03 PROCEDURE — 99232 SBSQ HOSP IP/OBS MODERATE 35: CPT | Performed by: PHYSICAL MEDICINE & REHABILITATION

## 2023-01-03 PROCEDURE — 97110 THERAPEUTIC EXERCISES: CPT

## 2023-01-03 PROCEDURE — 99232 SBSQ HOSP IP/OBS MODERATE 35: CPT | Performed by: INTERNAL MEDICINE

## 2023-01-03 RX ADMIN — APIXABAN 5 MG: 5 TABLET, FILM COATED ORAL at 08:15

## 2023-01-03 RX ADMIN — TAMSULOSIN HYDROCHLORIDE 0.8 MG: 0.4 CAPSULE ORAL at 21:58

## 2023-01-03 RX ADMIN — PANTOPRAZOLE SODIUM 40 MG: 40 TABLET, DELAYED RELEASE ORAL at 05:43

## 2023-01-03 RX ADMIN — ATORVASTATIN CALCIUM 40 MG: 40 TABLET, FILM COATED ORAL at 21:58

## 2023-01-03 RX ADMIN — FERROUS SULFATE TAB 325 MG (65 MG ELEMENTAL FE) 325 MG: 325 (65 FE) TAB at 08:15

## 2023-01-03 RX ADMIN — Medication 6 MG: at 21:58

## 2023-01-03 RX ADMIN — GLIPIZIDE 10 MG: 10 TABLET ORAL at 08:15

## 2023-01-03 RX ADMIN — BUPROPION HYDROCHLORIDE 150 MG: 150 TABLET, FILM COATED, EXTENDED RELEASE ORAL at 08:15

## 2023-01-03 RX ADMIN — Medication 1 TABLET: at 08:15

## 2023-01-03 RX ADMIN — APIXABAN 5 MG: 5 TABLET, FILM COATED ORAL at 21:58

## 2023-01-03 ASSESSMENT — ENCOUNTER SYMPTOMS
SORE THROAT: 0
VOMITING: 0
ABDOMINAL PAIN: 0
WHEEZING: 0
DIARRHEA: 0
CONSTIPATION: 0
TROUBLE SWALLOWING: 0
COUGH: 0
SHORTNESS OF BREATH: 0
RHINORRHEA: 0
NAUSEA: 0

## 2023-01-03 ASSESSMENT — PAIN SCALES - WONG BAKER: WONGBAKER_NUMERICALRESPONSE: 0

## 2023-01-03 ASSESSMENT — 9 HOLE PEG TEST: TESTTIME_SECONDS: 49

## 2023-01-03 NOTE — PROGRESS NOTES
[address] [facility]ERAP[facility]  [facility]  PROGRESS NOTE     TIME   SLP Individual Minutes  Time In: 0730  Time Out: 0800  Minutes: 30  Timed Code Treatment Minutes: 30 Minutes       Date: 1/3/2023  Patient Name: Evelyn Matthews      CSN: [id number]   : 1960  (63 y.o.)  Gender: male   Referring Physician:  Dr. Maame Mccormick   Diagnosis: Acute stroke due to ischemia, right hemiparesis secondary to stroke   Precautions: aspiration, fall risk   Current Diet: Regular diet with thin liquids   Swallowing Strategies: Standard Universal Swallow Precautions  Date of Last MBS/FEES: Not Applicable    Pain:  No pain reported. Subjective:  Patient awake in recliner upon ST arrival, having just finished OT session. Alert and pleasant throughout duration of session. Short-Term Goals:  SHORT TERM GOAL #1:  Goal 1: Patient will complete complex executive functioning tasks (i.e., medication management, complex reasoning/deductive reasoning, etc.) with 90% accuracy and minimal cuing in order to allow for safe return to work (40/hours week). GOAL NOT MET- CONTINUE  INTERVENTIONS:  Evaluating pill box for errors:   -Prescription 1- Mod cues for comprehension and ID of errors  -Prescription 2- Min cues to ID error with organization  -Prescription 3- Indep  -Prescription 4- Min cues to ID missing dose  -Prescription 5- Indep  -Prescription 6- Indep  -Prescription 7- Indep  *Increased success as task progressed. Deductive Thinking (Organizing Garden plots):  indep  *Excellent success with deductive thinking and organization     Deduction by exclusion:  indep, 2/ with mod cues  *Some confusion evident with comprehension of exclusion criteria and when to liliya off dates.      SHORT TERM GOAL #2:  Goal 2: Patient will complete higher level recall/short term memory and working memory tasks with 80% accuracy provided independent use of compensatory strategies in order to improve overall recall of newly learned theraputic information and anticipation to return to work (Mason Upton in [facility]). GOAL MET- CONTINUE FOR CONSISTENCY   INTERVENTIONS:   Working memory:  (4 unit)-  9/10 indep, 1/10 with min cues   *Appropriate mental retention and manipulation     SHORT TERM GOAL #3:  Goal 3: Patient will demonstrate independent use of compensatory strategies (S-speak up, O-open mouth, S-slow down) within strucutred and unstrucutred speech tasks in order to improve overall articulatory precision. GOAL MET- CONTINUE FOR CONSISTENCY   INTERVENTIONS:   Patients' perceptual speech rating this date- \"7 or 8\"/10 (*10 being baseline speech)  *Patient with report of breakdowns in speech clarity over the course of conversation due to lack of muscle endurance. Discussed focusing on use of compensatory speech strategies (S-Speak up, O-open mouth, S-slow down) when this occurs and need to continue structured speech tasks to assist with building endurance. Long-Term Goals:  Time Frame for Long Term Goals: 2 weeks from time of evaluation    LONG TERM GOAL #1:  Goal 1: Patient will improve overall cognitive function to return to independent living with wife and careing for children (16 year old). 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  GOAL MET- CONTINUE FOR CONSISTENCY        Comprehension: 6 - Complex ideas 90% or device (hearing aid or glasses- if patient is primarily a visual learner)  Expression: 6 - Device used to express complex ideas/needs  Social Interaction: 7 - Patient has appropriate behavior/relations 100% of the time  Problem Solvin - Patient able to solve simple/routine tasks  Memory: 6 - Patient requires device to recall (e.g. memory book)    EDUCATION:  Learner: Patient  Education:  Reviewed ST goals and Plan of Care and Reviewed recommendations for follow-up  Evaluation of Education: Verbalizes understanding, Demonstrates with assistance, and Family not present    ASSESSMENT/PLAN:  SUMMARY: Patient met 2/3 short term goals and 1/2 long term goals over the course of his rehab stay. Improvements noted with working memory, delayed recall and deductive thinking. Mild impairments persist with higher level executive functioning and thought organization. Impairments remain mild with improvements evident across cognitive domains. Anticipate no further ST needs at discharge, with plan for ST HEP. Recommend continued skilled speech therapy services during IPR stay to improve higher level cognition and provide instruction on compensatory home strategies and activities to promote cognition and speech clarity. Activity Tolerance:  Patient tolerance of  treatment: good. Assessment/Plan: Patient progressing toward established goals. Continues to require skilled care of licensed speech pathologist to progress toward achievement of established goals and plan of care. .     Plan for Next Session: High level cognitive treatment- deductive thinking, memory  Discharge Recommendations: Home with Home Exercise Program     Katelyn THAKUR  [address] [facility] [address], [address]

## 2023-01-03 NOTE — PROGRESS NOTES
[facility]   Nephrology progress note  1/3/2023, 10:36 AM      Pt Name:    Isacc Key  MRN:     [id number]     YOB: 1960  Admit Date:    12/23/2022 11:24 AM    Chief Complaint: Nephrology following for LC/CKD. Subjective:  Patient seen and examined  No chest pain or shortness of breath  Feels much better  Having a lot of UOP - polyuria appears better yesterday    Objective:  24HR INTAKE/OUTPUT:    Intake/Output Summary (Last 24 hours) at 1/3/2023 1036  Last data filed at 1/3/2023 0928  Gross per 24 hour   Intake 240 ml   Output 2425 ml   Net -2185 ml        Admission weight: 134 lb 8 oz (61 kg)  Wt Readings from Last 3 Encounters:   01/03/23 137 lb 8 oz (62.4 kg)   12/23/22 130 lb 15.3 oz (59.4 kg)   12/13/22 142 lb 9.6 oz (64.7 kg)        Vitals :   Vitals:    01/02/23 0730 01/02/23 2118 01/03/23 0530 01/03/23 0800   BP: (!) 90/59 (!) 119/59  110/77   Pulse: 99 78  95   Resp: 17 14  16   Temp: 97.7 °F (36.5 °C) 98.2 °F (36.8 °C)  97.9 °F (36.6 °C)   TempSrc: Oral Oral  Oral   SpO2: 100% 99%  99%   Weight:   137 lb 8 oz (62.4 kg)    Height:           Physical examination  General Appearance:  Well developed.  No distress  Mouth/Throat:  Oral mucosa moist  Neck:  Supple, no JVD  Lungs:  Breath sounds: clear  Heart[de-identified]  S1,S2 heard  Abdomen:  Soft, non - tender  Musculoskeletal:  Edema -no edema noted    Medications:  Infusion:    dextrose       Meds:    ferrous sulfate  325 mg Oral Daily with breakfast    melatonin  6 mg Oral Nightly    atorvastatin  40 mg Oral Daily    buPROPion  150 mg Oral QAM    pantoprazole  40 mg Oral QAM AC    tamsulosin  0.8 mg Oral Daily    apixaban  5 mg Oral BID    [Held by provider] metoprolol succinate  12.5 mg Oral Daily    glipiZIDE  10 mg Oral QAM AC    [Held by provider] empagliflozin  25 mg Oral Daily    docusate sodium  100 mg Oral BID    multivitamin  1 tablet Oral Daily with breakfast    senna  2 tablet Oral Nightly       Lab Data :  CBC: No results for input(s): WBC, HGB, HCT, PLT in the last 72 hours. CMP:  Recent Labs     01/02/23  0619 01/03/23  0654    139   K 5.0 5.1    102   CO2 21* 22*   BUN 36* 37*   CREATININE 1.6* 1.7*   GLUCOSE 114* 135*   CALCIUM 9.0 9.4       Hepatic: No results for input(s): LABALBU, AST, ALT, ALB, BILITOT, ALKPHOS in the last 72 hours. Assessment and Plan:  Renal -acute kidney injury on chronic kidney disease with baseline creatinine around 1.5-1.7  Exact etiology not clear at this time   Making decent urine output has a Bella in place  Overall creatinine improved with hydration currently slowly rising again to 1.7  Jardiance yesterday we will see how he responds encouraged oral intake of liquids  Electrolytes -within normal limits stop the salt tablet  Acid-base status appears to be stable  Essential hypertension now running low, may be due to the increased urine output again. And Jardiance he is on hold. May need midodrine  Hx of diabetes mellitus  Hx of sepsis due to UTI from Klebsiella pneumonia on antibiotics  CAD status post CABG 11/22  Meds reviewed and discussed with patient     Shon Call MD  [facility]    This report has been created using voice recognition software.  It may contain minor errors which are inherent in voice recognition technology

## 2023-01-03 NOTE — PROGRESS NOTES
[address] NOTE    Conference Date: 2023  Admit Date:  2022 11:24 AM  Patient Name: Manisha Chester    MRN: [id number]    : 1960  (63 y.o.)  Rehabilitation Admitting Diagnosis:  Acute stroke due to ischemia [facility]) [I63.9]  Referring Practitioner: Dr. Justus Chakraborty issues/needs regarding patient and family discharge status: Patient continues to be motivated and progressing with therapy. SW will follow and maintain involvement in discharge planning. PHYSICAL THERAPY  Patient met 3/6 STG's and 0/4 LTG's. Mr Vito Anaya has been demonstrating god progress in PT over the last week, progressing car transfer from min assist to CGA, and improved gait from CGA/min assist but with continued weakness on his right LE demonstrating continued genu recurvatum especially with fatigue. Patient also demonstrated improvements with his tinetti score over the last week, progressing from 12 to 16/28, although this continues to indicate a high risk for falls. Patient was independent at [facility] and working full time. Pt will be home alone during the day at discharge and will continue to benefit from skilled PT services to continue to progress his strength and balance to allow pt to return to independent PLOF, reduce his risk for falls and allow pt to return to home safely. Equipment Needed: Yes  Other: RW    SPEECH THERAPY  Patient met 2/3 short term goals and 1/2 long term goals over the course of his rehab stay. Improvements noted with working memory, delayed recall and deductive thinking. Mild impairments persist with higher level executive functioning and thought organization. Impairments remain mild with improvements evident across cognitive domains. Anticipate no further ST needs at discharge, with plan for ST HEP.  Recommend continued skilled speech therapy services during IPR stay to improve higher level cognition and provide instruction on compensatory home strategies and activities to promote cognition and speech clarity. OCCUPATIONAL THERAPY  Carolina Lakhani is making steady progress on IP Rehab and is motivated to continue to progress. Carolina Lakhani can complete his LB ADLs with SBA for standing balance but requires min cues for crum mgmt and increased time to don TEDs due to impaired /FMC of RUE. Carolina Lakhani can complete UB ADLs in sitting with supervision but requires SBA to intermittent CGA for standing portions. He demo min unsteadiness with his standing routines, ranging from SBA to CGA for standing balance during ADL / IADLs. He requires CGA for tub transfers to step over tub but can complete sit <> stands with SBA. He is limited by his problem solving, safety and attention at times and do recommend direct supervision / A with IADLs. His RUE function has improved greatly, progressing his  strength to 48.3# and his [address] to 49 sec AEB 9HPT (last week was 25# and 1 min 44 sec respectively) which is a significant improvement. Carolina Lakhani is a fall risk and without further OT, Carolina Lakhani is at a higher risk of falls / future injury and cont to require skilled OT to progress in ADL / IADL safety + independence and neuro re-ed for improved return of RUE. Equipment Needed: Yes  Other: OT staff to continue to monitor needs throughout OT POC. RECREATIONAL THERAPY  Patient has been offered participation in recreational therapy activities and participates as able. Pt enjoyed petting our pet therapy dogs Maribel and Tanya-states he likes cats better but did enjoy visiting and conversing with us-affect bright and social  -Pt states his therapy is going well-he is getting stronger and thinks he may be here another week yet-he has his phone in room that he can play games on, read and watch movies on-pt content in room-very pleasant and social-no leisure needs      NUTRITION  Weight: 138 lb (62.6 kg) / Body mass index is 25.24 kg/m².   Current diet: ADULT ORAL NUTRITION SUPPLEMENT; Breakfast, Dinner; Frozen Oral Supplement  ADULT DIET; Regular; 3 carb choices (45 gm/meal)  Please see nutrition note for details. NURSING  Continent of Bowel: Yes. Frequency: Q 2-3 days. Management: Colace, Senna, MOM, Glycolax, Dulcolax. Continent of Bladder: Yes. Frequency: Crum catheter. Management: Crum catheter for urinary retention. Pain is Managed:  Yes. Management: Tylenol and Oxy IR. Frequency of Intervention: Denies pain. Has not taken pain meds. Adequately Controlled: Yes  Sleep: Adequate and Interventions to Support Sleep: Melatonin and Trazodone  Signs and Symptoms of Infection:  No.   Signs and Symptoms of Skin Breakdown:  No.   Injury and/or Falls during Inpatient Rehabilitation Admission: No  Anticoagulants: Eliquis  Diabetic: Yes: Home Meds: Trulicity, Jardiance, Glimepiride. Hospital Meds: Jardiance and Glipizide. Educational Needs: None. Consultations/Labs/X-rays: Urology for crum; Oxygen while on IP Rehab:  No Home oxygen: No  Patient/Family Education Focus: Stroke Education   Barriers to Education: None    Recent Labs     01/02/23  0707 01/03/23  0806 01/04/23  0721   POCGLU 110* 162* 128*       Lab Results   Component Value Date    LDLCALC 42 12/16/2022    LDLCHOLESTEROL 108 05/24/2022         Vitals:    01/03/23 2200 01/04/23 0546 01/04/23 0556 01/04/23 0811   BP: 113/69   112/66   Pulse: 77   97   Resp: 16   16   Temp: 97.9 °F (36.6 °C)   97.9 °F (36.6 °C)   TempSrc: Oral   Oral   SpO2: 99%   98%   Weight:  136 lb (61.7 kg) 138 lb (62.6 kg)    Height:              Family Education: Family available and participating in education   Fall Risk:  Falling star program initiated  Is the patient appropriate for a stay in the functional apartment?  Yes probably during the weekend of 1/7/23~1/8/23      Discharge Plan   Estimated Discharge Date:  1/13/2023    Destination: home health and discharge home with supervision  Services at Discharge: [address] Therapy, Occupational Therapy, Nursing, and HHA 3x week  Is patient appropriate for an outpatient driving evaluation? No (patient has no license)  Equipment at Discharge: Other: OT staff to continue to monitor needs throughout OT POC. Other: RW  Factors facilitating achievement of predicted outcomes: Family support, Motivated, Cooperative, and Pleasant  Barriers to the achievement of predicted outcomes: Decreased endurance, Upper extremity weakness, Lower extremity weakness, Incontinence of bladder, Medical complications, and Stairs at home  Follow up with physiatrist? Yes  If yes, what timeframe? About 1~2 months after discharge    Team Members Present at Conference:  Rodrigo Lerner [place], MSW   Occupational Therapist:Odalis Bautista OTR/L [id number]  Physical Therapist:Charity Moulton, [address] [place] [address]  [address], [place] [address], [address] [facility], RN  Psychologist: Mark Robledo, PhD.    I approve the established interdisciplinary plan of care as documented within the medical record of [facility].     Apolonia Lai MD

## 2023-01-03 NOTE — PROGRESS NOTES
Physical Medicine & Rehabilitation Progress Note    Chief Complaint:  Right-sided weakness due to stroke    Subjective:    Marion Fonseca is a 63 y.o.  left-handed  male with history of hypertension, diabetes mellitus type 2, coronary artery disease with ischemic cardiomyopathy requiring two-vessel CABG, urinary retention with several failed void trial, status post tonsillectomy, was admitted to the inpatient rehabilitation unit on 12/23/2022 for intensive inpatient management of impairment & disability secondary to right hemiparesis due to acute left parietal corona radiata and right cerebellar ischemic stroke, and debility/physical deconditioning due to Klebsiella pneumonia urinary tract infection with emphysematous cystitis and bilateral hydronephrosis complicated by Klebsiella pneumonia bacteremia/sepsis. The patient was previously hospitalized at [facility] from 10/28/2022 to 11/15/2022 for coronary artery disease and ischemic cardiomyopathy requiring two-vessel CABG on 11/7/2022. His postoperative course was complicated by urinary retention with failed void trial.  The patient has been experiencing progressive weakness after he was discharged to home. The Bella was later removed on 12/13/2022 but the patient continued having difficulty voiding. On 12/14/2022 approximately 11:30 PM his wife noticed the patient had slight slurred speech with right arm weakness. On 12/15/2022 the patient suddenly felt dizzy and lightheaded while he was trying to get up from sitting on toilet and fell into the ground. He says he did not lose consciousness and did not hit his head. His wife called 46. He was transported to [address] [place] [address] ER for evaluation by EMS. He complains of neck pain and upper back pain. He was found to be tachycardic. His WBC was found to be 22.3. Bella was placed in ER and a rush of air followed by dark brown and bright red color urine came out.   He was put on IV meropenem and admitted. Urology was consulted. CT of cervical spine revealed only multilevel facet and uncovertebral joint arthropathy. CT of head done on 12/15/2020 revealed no acute intracranial abnormality. CT of the chest, abdomen and pelvis done on 12/15/2022 revealed emphysematous cystitis, bilateral hydronephrosis, and constipation. Because of right arm weakness, stroke code was called on 12/15/2022. MRI of brain done on 12/15/2022 revealed acute ischemic stroke at the left parietal region corona radiata, and possible small additional acute ischemic stroke within right cerebellum. Neurology service start patient on aspirin and Plavix combination for the stroke. MRA of the neck and head done on 12/15/2022 showed only mild bilateral carotid bulb disease. Transesophageal echocardiogram was done on 12/16/2022 showing severe global hypokinesis of left ventricle with estimated ejection fraction of 30%, and no thrombus identified. 2 blood culture and urine culture done on 12/15/2022 revealed Klebsiella pneumonia. Infectious disease had been consulted. Oral Cipro was started on 12/22/2022 after IV meropenem was completed. Patient's hospital course was also complicated by constipation which resolved this morning. The patient says he feels well today. He had a good sleep last night. He denies having dizziness or lightheadedness. He says the right side weakness is improving and he is able to use his right upper extremity to perform task better during the therapeutic session. He denies having any painful symptom, or numbness tingling feeling. He continues tolerating the intensive rehabilitation treatment well. He is still on Bella catheter. Rehabilitation:  PT: Reviewed.     Bed Mobility:  Supine to Sit: Minimal Assistance, from mat table  Sit to Supine: Stand By Assistance, [employer] Assistance      Transfers:  Sit to Stand: Stand By Assistance, [employer], to/from chair without arms  Stand to Sit:Stand By Assistance, [employer], to/from chair without arms     Ambulation:  Stand By Assistance, Contact Guard Assistance  Distance: 120 feet x 2  Surface: Level Tile  Device:Rolling Walker  Gait Deviations:  Slow Jada, Decreased Step Length Bilaterally, Decreased Gait Speed, and Unsteady Gait    Stand By Assistance, [employer] Assistance  Distance: 125 feet x 2  Surface: Level Tile  Device:Rolling Walker  Gait Deviations: Forward Flexed Posture, Slow Jada, Decreased Step Length Bilaterally, Decreased Weight Shift Right, Decreased Gait Speed, Decreased Heel Strike Bilaterally, Narrow Base of Support, and Unsteady Gait     Balance:  Dynamic Standing Balance: Contact Guard Assistance Patient completing dynamic standing on balance foam completing light reaching activity and completing ring toss. Patient demonstrates difficulty achieving initial standing balance on foam without UE support however, patient able to maintain balance throughout activity. Patient unsteadiness noted, no LOB. OT: Reviewed. ADL:   Pt. reports Self care completed prior to session. However, pt agreeable to demonstrate performance with toilet transfer with SBA performed throughout with good technique. BALANCE:  Sitting Balance:  Modified Independent. Standing Balance: Stand By Assistance, [employer]. Varied on task     BED MOBILITY:  Not Tested     TRANSFERS:  Sit to Stand:  Stand By Assistance. Stand to Sit: Stand By Assistance. Completed multiple times throughout session. FUNCTIONAL MOBILITY:  Assistive Device: Rolling Walker  Assist Level:  Contact Guard Assistance primarily, with one account of min A due to slight loss of balance to recover. Distance: To and from therapy apartment and within apartment and room.        ADDITIONAL ACTIVITIES:  Pt engaged in [facility] task of utilizing affect RUE to manage nuts/bolts, pt able to complete 3/6 large ones with ease and as nuts/bolts were smaller slight difficulty noted with the remainder 3/6. Task performed to advance [address] for ease with clothing management/fastener management. Pt. Engaged in BUE strengthening with use of 2# weighted dowel x20 reps in all available planes/directions to improve pt's performance with bilateral integration tasks and to improve strengthening for ease with IADL/ADL task completion. Pt. Required 2 vc for proper technique/pace throughout. Pt. Instructed to engage in IADL task completion of preparing instant oatmeal. Pt able to complete with CGA when reaching OBOS to gather all necessary items. Pt. Then stood sinkside to engage in washing dishes s/p simple meal prep task with CGA when B hand release and close SBA when 1 hand release due to reduced balance. Pt. Did demo good ability to transport items in walker bag with no cues for initiation. For remainder 5 minutes of session, pt instructed to retreive 5 beads from moderate resistive theraputty, pt able to complete with set up assistance. Task performed to improve hand strength/FMC for managing IADL containers. ST: Reviewed. Schedule Reasoning Task - tour of [place]  100% independently    Reviewed WRAP strategies this date. Delayed Recall (10 minutes) of 7 locations from [facility] 81. task- 100% independently     Categorization- 15 words  -sorted into categories, reviewed WRAP strategies  -immediately recalled 11/15 (73%) words independently      Review of Systems:  Review of Systems   Constitutional:  Negative for chills, diaphoresis, fatigue and fever. HENT:  Negative for hearing loss, rhinorrhea, sneezing, sore throat and trouble swallowing. Eyes:  Negative for visual disturbance. Respiratory:  Negative for cough, shortness of breath and wheezing. Cardiovascular:  Negative for chest pain and palpitations. Gastrointestinal:  Negative for abdominal pain, constipation, diarrhea, nausea and vomiting.    Genitourinary: Positive for difficulty urinating. Negative for dysuria. Musculoskeletal:  Positive for gait problem. Negative for arthralgias, myalgias and neck pain. Skin:  Negative for rash. Neurological:  Positive for weakness (Right upper and right lower extremities). Negative for dizziness, tremors, facial asymmetry, speech difficulty, light-headedness, numbness and headaches. Psychiatric/Behavioral:  Negative for dysphoric mood, hallucinations and sleep disturbance. The patient is not nervous/anxious.          Objective:  /77   Pulse 95   Temp 97.9 °F (36.6 °C) (Oral)   Resp 16   Ht 5' 2\" (1.575 m)   Wt 137 lb 8 oz (62.4 kg)   SpO2 99%   BMI 25.15 kg/m²   Physical Exam   General:  well-developed, well nourished  male; in no acute distress ; appropriate affect & mood; sitting on reclining chair comfortably  Eyes: pupil equally round ; extra-ocular motion intact bilaterally  Head, Ear, Nose, Mouth & Throat : normocephalic ; no discharge from ears or nose ; no deformity ; no facial swelling ; oral mucosa pink   Neck :  supple ; no tenderness ; mild muscle spasm at bilateral cervical paraspinal muscles  Cardiovascular : Presence of healed vertical surgical scar at sternum ; regular rate & rhythm ; normal S1 & S2 heart sound ; no murmur ; normal peripheral pulse at the bilateral upper extremities; reduced pulse strength at the bilateral lower extremities  Pulmonary : Present breath sounds at the bilateral lung fields; no wheezing ; no rale; no crackle  Gastrointestinal : soft, slightly protruded abdomen without tenderness ; normal bowel sound present    : Bella catheter in place draining light yellowish urine  Back : no tenderness; no muscle spasm  Skin: no skin lesion or rash ; no pitting edema at all 4 extremities  Musculoskeletal : no limb asymmetry; no limb deformity; no tenderness at bilateral upper & lower extremities; no palpable mass at limbs ; no joints laxity or crepitation ; shoulder flexion and abduction passive ROM reaching 170 degrees bilaterally; hip flexion passive ROM reaching 130 degrees bilaterally; normal functional joints ROM at bilateral upper & lower extremities  Cerebral :  alert ; awake ; oriented to place, person and time; follow two-step verbal command  Cerebellum : mild dysmetria with the right finger-to-nose test; no dysmetria with left finger-to-nose test but with a slight tremor; no dysmetria with bilateral heel-to-shin test  Cranial Nerves : Tongue protrudes slightly to the right  (CN XII) ; grossly intact other CN II to XI function  Sensory : intact light touch and pin prick sensation at bilateral upper & lower extremities  Motor : normal muscle tone at bilateral upper & lower extremities ; 4+/5 to 5/5 muscle strength at right shoulder abduction & flexion; 4+/5 to 5/5 muscle strength at right elbow flexion; 4+/5 muscle strength at the right elbow extension; 4+/5 to 5/5 muscle strength at right wrist extension; 4+/5 muscle strength at right finger extension; 4+/5 muscle strength at the right finger flexion and handgrip; 4-/5 muscle strength at right finger abduction; 4+/5 to 5/5 muscle strength at the right hip flexion; 4+/5 to 5/5 muscle strength at right knee flexion; normal 5/5 muscle strength at left upper & lower extremities  Reflex : 2+ right biceps, right brachioradialis and right knee reflexes; 1+ left biceps, left brachioradialis and left knee reflexes  Pathological Reflex :  No Ion's sign ; no ankle clonus  Gait : Not assessed      Diagnostics:   Recent Results (from the past 24 hour(s))   EKG 12 Lead    Collection Time: 01/02/23 10:19 AM   Result Value Ref Range    Ventricular Rate 91 BPM    Atrial Rate 91 BPM    P-R Interval 144 ms    QRS Duration 78 ms    Q-T Interval 354 ms    QTc Calculation (Bazett) 435 ms    P Axis 82 degrees    R Axis 88 degrees    T Axis 112 degrees   Basic Metabolic Panel    Collection Time: 01/03/23  6:54 AM   Result Value Ref Range Sodium 139 135 - 145 meq/L    Potassium 5.1 3.5 - 5.2 meq/L    Chloride 102 98 - 111 meq/L    CO2 22 (L) 23 - 33 meq/L    Glucose 135 (H) 70 - 108 mg/dL    BUN 37 (H) 7 - 22 mg/dL    Creatinine 1.7 (H) 0.4 - 1.2 mg/dL    Calcium 9.4 8.5 - 10.5 mg/dL   Anion Gap    Collection Time: 01/03/23  6:54 AM   Result Value Ref Range    Anion Gap 15.0 8.0 - 16.0 meq/L   Glomerular Filtration Rate, Estimated    Collection Time: 01/03/23  6:54 AM   Result Value Ref Range    Est, Glom Filt Rate 45 (A) >60 ml/min/1.73m2         Impression:  Acute left parietal corona radiata and right cerebellar ischemic stroke causing right hemiparesis with impaired coordination, and dysarthria  Debility/physical decondition due to Klebsiella pneumonia urinary tract infection with emphysematous cystitis and bilateral hydronephrosis complicated by Klebsiella pneumonia bacteremia/sepsis  Urinary retention  Klebsiella pneumonia urinary tract infection with emphysematous cystitis and bilateral hydronephrosis  Klebsiella pneumoniae bacteremia/sepsis  Acute kidney injury possibly due to dehydration with orthostatic hypotension  Hypotension probably due to side effect from high-dose Flomax in combination with Toprol XL  Coronary artery disease with ischemic cardiomyopathy requiring two-vessel coronary artery bypass graft surgery on 11/7/2022  Hypertension  Diabetes mellitus type 2    The patient's condition is stable with stable vital sign now. His right side weakness continue to persist but improving slowly. His creatinine remains elevated but somewhat stable. Nephrology service is still following the patient. The patient is encouraged to continue increased oral fluid intake. He continues tolerating the intensive inpatient rehabilitation treatment well. His function continue to improve slowly.       Plan:  Continues intensive PT/OT/SLP/RT inpatient rehabilitation program at least 3 hours per day, 5 days per week in order to improve functional status prior to discharge. Family education and training will be completed. Equipment evaluations and recommendations will be completed as appropriate. Rehabilitation nursing continues to be involved for bowel, bladder, skin, and pain management. Nursing will also provide education and training to patient and family. Prophylaxis:  DVT: Patient on Eliquis, JONAH stocking, intermittent pneumatic compression device. GI: Colace, Senokot, GlycoLax as needed, milk of magnesia as needed, Dulcolax suppository as needed.   Pain: Tylenol as needed  Continue Lipitor for CVA and CAD  Holding Toprol-XL for hypertension  Continue Flomax for urinary retention  Continue Protonix for gastric protection  Continue Jardiance, Glucotrol, Humalog insulin coverage for diabetes mellitus  Continue Wellbutrin XL for smoking cessation  Continue melatonin, trazodone as needed for insomnia  Continue multivitamin supplement  Nutrition: Continue current diet   Bladder: Monitoring signs or symptoms of UTI  Bowel: Monitoring signs or symptoms of constipation   and case management for coordination of care and discharge planning      Missed Therapy Time:  None      Popeye Cano MD

## 2023-01-03 NOTE — PROGRESS NOTES
[address]  Occupational Therapy  Daily Note  Time:   Time In: 0700  Time Out: 0730  Timed Code Treatment Minutes: 30 Minutes  Minutes: 30    Date: 1/3/2023  Patient Name: Kian Little,   Gender: male      Room: [facility]/053-A  MRN: [id number]  : 1960  (63 y.o.)  Referring Practitioner: Ordering & Attending: Franklyn Wilder MD  Diagnosis: Acute Stroke due to Ischemia  Additional Pertinent Hx: Kian Little who is a 63 y.o. male with a history of hypertension, diabetes, CAD on  daily, recent CABG in 2022, ischemic cardiomyopathy is admitted to [facility] for sepsis, emphysematous cystitis and acute kidney injury on CKD. During admission exam patient stated that his right arm feldman has been weak and that he has had some slurred speech. Stroke alert was called for right-sided weakness and dysarthria on 12/15. On arrival patient's NIH was 4 for right upper extremity, right lower extremity weakness, 1 limb ataxia and dysarthria. On further questioning, patient and wife state that the right arm weakness actually started last night. Patient's wife noticed when patient was trying to get up from the toilet that he was unable to push himself up with his right arm. Last known well 2330 on 2022. Patient taken to imaging for stat CT head which revealed no ICH, midline shift, mass-effect. CTA head and neck deferred due to patient's LC. Decision for no tPA was made due to patient's time since last known well. Patient with relatively low NIH and symptoms which are not consistent with LVO, therefore no thrombectomy/neuro intervention at this time. Patient had stat MRI brain and MRA head and neck without contrast.  MRI showed acute ischemic stroke of left parietal region. Pt admitted to IP rehab on 22.     Restrictions/Precautions:  Restrictions/Precautions: General Precautions, Fall Risk  Position Activity Restriction  Other position/activity restrictions: right side hemiparesis, recent CABG 11/22-minimize arm use ( s/p 5 wks), life vest     SUBJECTIVE: Patient pleasant and cooperative, agreeable to OT. PAIN: Denies pain      Vitals: Vitals not assessed per clinical judgement, see nursing flowsheet    COGNITION: Slow Processing and Decreased Insight    ADL:   No ADL's completed this session. Gina Simmons BALANCE:  Sitting Balance:  Supervision. Standing Balance: Stand By Assistance. BED MOBILITY:  Not Tested    TRANSFERS:  Sit to Stand:  Stand By Assistance. Recliner   Stand to Sit: Stand By Assistance. FUNCTIONAL MOBILITY:  Assistive Device: Rolling Walker  Assist Level:  Stand By Assistance. Distance:  within room only       ADDITIONAL ACTIVITIES:  Patient completed simple IADL task of retrieving clothing to prep for later BADL session. Pt was able to ambulate to closet with RW and stand 1 min to retrieve items from drawers, completing with SBA and no UE support demo no LOB. Using pain as a guide, Patient completed BUE strengthening exercises with skilled education on HEP: completed x10 reps x1 set with a medium resistive band in all joints and all planes in order to improve UE strength and activity tolerance required for BADL routine and toilet / shower transfers. Patient tolerated well, requiring min rest breaks. Patient also required min cues for technique. Pt with no c/o pain. HAND ASSESSMENT  Hand Dominance: Left  Right Hand Strength -  (lbs)  Handle Setting 2: 12/24/22 av. 26#, 12/27/22 (24, 24, 27 av.25#), 1/3/23 (51, 49, 45 Av: 48.3#)  Fine Motor Skills  Right 9-Hole Peg Test: Impaired  Right 9 Hole Peg Test Time (secs): 49 ( compared to 1 min 44 sec last week)\"         ASSESSMENT:     Activity Tolerance:  Patient tolerance of  treatment: good.        Discharge Recommendations: Home with Outpatient OT if he has transportation, vs [facility] OT  Equipment Recommendations: Equipment Needed: Yes  Other: OT staff to continue to monitor needs throughout OT POC. Plan: Times Per Week: 5x/week x 90 minutes and 1x/week x 30 minutes  Current Treatment Recommendations: Strengthening, Balance training, Self-Care / ADL, Equipment evaluation, education, & procurement, Safety education & training, Endurance training, Functional mobility training, Neuromuscular re-education, Patient/Caregiver education & training, Home management training    Patient Education  Patient Education: IADL's, Home Exercise Program, and Reviewed Prior Education    Goals  Short Term Goals  Time Frame for Short Term Goals: 1 week  Short Term Goal 1: Pt will complete functional ambulation to/from BR and HH distances with SBA and min vcs for safety to increase indep with toileting. Short Term Goal 2: Pt will decrease RUE 9 hole peg test by 20 seconds and RUE  strength by 5 pounds to increase indep with fasteners in UB dressing. Short Term Goal 3: Pt will complete dynamic standing task x 5 minutes with 1-2 UE release and SBA to increase indep with sinkside grooming. Short Term Goal 4: Pt will complete UB dressing with Set up to increase indep within home environment. Short Term Goal 5: Pt will complete LB dresing with S and min vcs for safety to increase indep and endurance within home environment. Additional Goals?: No  Long Term Goals  Time Frame for Long Term Goals : 4 weeks from IPR eval  Long Term Goal 1: Pt will complete BADL routine including shower transfer Mod Indep to increase indep and safety in home environment. Long Term Goal 2: Pt will complete simple IADL task with S and 0 vcs for safety to increase indep and endurance in home environment. Following session, patient left in safe position with all fall risk precautions in place.

## 2023-01-03 NOTE — PROGRESS NOTES
[facility]  INPATIENT PHYSICAL THERAPY  DAILY NOTE  [facility] - [id number]    Time In: 1430  Time Out: 1500  Timed Code Treatment Minutes: 30 Minutes  Minutes: 30          Date: 1/3/2023  Patient Name: Armen Rose,  Gender:  male        MRN: [id number]  : 1960  (63 y.o.)     Referring Practitioner: Dr. Maria De Jesus Chilel  Diagnosis: Acute stroke due to ischemia  Additional Pertinent Hx: Pt admitted through ED due to RUE weakness and slurred speech,  Also noted to have sepsis, LC, emphysematous cystitis. Hx:  HTN, Db, CAD, recent CABG (). MRI + for acute ischemic stroke Lt parietal region     Prior Level of Function:  Lives With: Spouse, Family (16 y.o son, 21 y.o step dtr.)  Type of Home: House  Home Layout: One level  Home Access: Stairs to enter with rails  Entrance Stairs - Number of Steps: 2 + threshold  Entrance Stairs - Rails: Both  Home Equipment:  (None used PTA)   Bathroom Shower/Tub: Tub/Shower unit  Bathroom Toilet: Standard  Bathroom Equipment: Tub transfer bench  Bathroom Accessibility: Accessible    ADL Assistance: Independent  Homemaking Assistance: Independent  Homemaking Responsibilities: Yes  Ambulation Assistance: Independent  Transfer Assistance: Independent  Active : No    Restrictions/Precautions:  Restrictions/Precautions: General Precautions, Fall Risk  Position Activity Restriction  Other position/activity restrictions: right side hemiparesis, recent CABG -minimize arm use ( s/p 5 wks), life vest     SUBJECTIVE: Patient in room in recliner, agreeable to PT. Pt cooperative and pleasant.     PAIN: no complaints of pain    Vitals: Vitals not assessed per clinical judgement, see nursing flowsheet    OBJECTIVE:  Bed Mobility:  Sit to Supine: Supervision     Transfers:  Sit to Stand: Contact Guard Assistance  Stand to Sit:Contact Guard Assistance    Ambulation:  Contact Guard Assistance  Distance: 5', 75', ~50' weaving in and out of cones  Surface: Level Tile  Device:Rolling Walker  Gait Deviations: Forward Flexed Posture, Slow Jada, Decreased Step Length Bilaterally, Decreased Weight Shift Right, Decreased Gait Speed, and Decreased Heel Strike Bilaterally  *Verbal cues for upright posture, verbal cues for increased stride length with change in directions. Patient weaved in and out of cones as shown above, verbal cues for increased step length. Balance:  Dynamic Standing Balance: Contact Guard Assistance, Minimal Assistance    Neuromuscular education:  Patient instructed to ambulate, keeping RW and feet on outside of fly swatters on ground and to reach for post-it notes located on the right side. Completed to encourage pt to reach outside base of support and weight shift to right LE. Patient completed forward steps over fly swatter, initially one foot only. Focused on heel strike and and weight shift onto leg which was advanced forward. Completed 5 times each LE. Progressed to stepping over with both feet and stepping back. Completed with each foot leading forward/back x5. *Completed to improve SLS, lateral and anterior weight shift and coordination    Functional Outcome Measures: Not completed    ASSESSMENT:  Assessment: Patient progressing toward established goals. Activity Tolerance:  Patient tolerance of  treatment: good.       Equipment Recommendations:Equipment Needed: Yes  Other: RW  Discharge Recommendations: Continue to assess pending progress and Patient would benefit from continued PT at discharge  Plan: Current Treatment Recommendations: Strengthening, ROM, Balance training, Functional mobility training, Transfer training, Endurance training, Neuromuscular re-education, Gait training, Stair training, Safety education & training, Equipment evaluation, education, & procurement, Home exercise program, Therapeutic activities, Patient/Caregiver education & training  General Plan:  (5x/ wk 90 min, 1x/ wk 30 min)    Patient Education  Patient Education: Transfers, Gait, Verbal Exercise Instruction,  - Patient Verbalized Understanding, - Patient Requires Continued Education    Goals:  Patient Goals : get back to my normal way of doing things  Short Term Goals  Time Frame for Short Term Goals: 1 wk  Short Term Goal 1: Pt to go supine <->sit, minimal use of UEs, SBA to get in/out of bed. Short Term Goal 2: Pt to get up/down from various seated surfaces, SBA, to get up to walk. Short Term Goal 3: Pt to walk with RW >= 50 ft, demonstrating step through gait pattern, recurvatum < 10% of steps on RT, CGA to progress to home mobility  Short Term Goal 4: Pt to ascend/descend 2 steps with gerber rails, CGA to progress to home entry. GOAL MET, SEE LTG  Short Term Goal 5: Pt to get in/out of car, SBA for transportation needs. Additional Goals?: Yes  Short Term Goal 6: Pt to perform Tinetti >= 18/28, demonstrating improved balance. Long Term Goals  Time Frame for Long Term Goals : 3 wks from evaluation  Long Term Goal 1: Pt to go supine <->sit, minimal use of UEs, Mod I, to get in/out of bed. Long Term Goal 2: Pt to get up/down from various seated surfaces, Mod I, to get up to walk. Long Term Goal 3: Pt to walk with RW >= 50 ft, demonstrating step through gait pattern, recurvatum < 10% of steps on RT, Mod I, for home mobility  Long Term Goal 4: Pt to perform Tinetti >= 24/28, demonstrating improved balance. Long Term Goal 5: Patient will ascend/descend 2 steps with bilateral hand rails with SBA for safe home entry. Additional Goals?: Yes  Long term goal 6: Patient will complete car transfer with modified independence with safe technique to transfer in and out of vehicle safely. Following session, patient left in safe position with all fall risk precautions in place.

## 2023-01-03 NOTE — PROGRESS NOTES
Comprehensive Nutrition Assessment    Type and Reason for Visit:  Reassess    Nutrition Recommendations/Plan:   Recommend increase diet to 4 CHO (60 grams)/meal to provide additional kcals (pt. Has CKD, CAD and c/o not getting enough food to support therapy). Will continue Magic Cups BID (pt. Dislikes Glucerna). Recommend MVI. Encouraged po, good nutrition at best efforts. Pt. Denies any questions on diet (received education on past admit). Malnutrition Assessment:  Malnutrition Status: Moderate malnutrition (12/26/22 1200)    Context:  Acute Illness     Findings of the 6 clinical characteristics of malnutrition:  Energy Intake:  Mild decrease in energy intake (Comment)  Weight Loss:   (difficult to evaluate with fluid; but appears to have lost some weight per EMR)     Body Fat Loss:  Mild body fat loss Orbital, Buccal region   Muscle Mass Loss:  Mild muscle mass loss Temples (temporalis)  Fluid Accumulation:  Unable to assess     Strength:  Not Performed    Nutrition Assessment:     Pt. Slightly improving from a nutritional standpoint AEB consuming % of meals; acceptance of ONS however c/o not getting enough food to give him energy for therapy. At risk for further nutrition compromise r/t moderate malnutrition, admit w/ stroke, decreased appetite at times and underlying medical condition (hx CAD, DM, CABG 11/7/22, HTN). Nutrition Related Findings:      Wound Type: None     Pt.  Report/Treatments/Miscellaneous: pt. Seen - reports appetite is \"getting there\"; states acceptance of ONS; c/o not getting enough to eat on current diet (MD decreased diet 12/29 for blood sugar control - 12/29 Glucose 155)  GI Status: BM today per pt  Pertinent Labs: 1/3: Glucose 135, BUN 37, Cr 1.7, Potassium 5.1  Pertinent Meds: Glucotrol, Jardiance, Glycolax, Colace, Wellbutrin, Zofran      Current Nutrition Intake & Therapies:    Average Meal Intake: %  Average Supplements Intake:  (reports acceptance)  ADULT ORAL NUTRITION SUPPLEMENT; Breakfast, Dinner; Frozen Oral Supplement  ADULT DIET; Regular; 3 carb choices (45 gm/meal)    Anthropometric Measures:  Height: 5' 2\" (157.5 cm) (last admit ht listed as 5'10\"; pt. appears closer to 5'10\")  Ideal Body Weight (IBW): 118 lbs (54 kg)    Admission Body Weight: 136 lb 6.4 oz (61.9 kg) (12/25 no edema)  Current Body Weight: 137 lb 8 oz (62.4 kg) (1/3 no edema),       Current BMI (kg/m2): 25.1  Usual Body Weight:  (per pt. 140# but states it varies; per EMR: 8/10/21: 150# 3 oz, 5/24/22: 144# 10 oz, 10/30/22: 141# 14 oz (w/ edema))                       BMI Categories: Normal Weight (BMI 18.5-24. 9)    Estimated Daily Nutrient Needs:  Energy Requirements Based On: Kcal/kg  Weight Used for Energy Requirements: Other (Comment) (63)  Energy (kcal/day): 1213-9302 kcals (28-30)  Weight Used for Protein Requirements: Other (Comment) (63)  Protein (g/day): 76+ grams (1.2+)       Nutrition Diagnosis:   Moderate malnutrition related to inadequate protein-energy intake as evidenced by Criteria as identified in malnutrition assessment    Nutrition Interventions:   Food and/or Nutrient Delivery: Continue Oral Nutrition Supplement  Nutrition Education/Counseling: Education initiated (12/26 Encouraged po, good nutrition at best efforts. Denies any questions on diet at this time.)  Coordination of Nutrition Care: Continue to monitor while inpatient       Goals:  Previous Goal Met: Progressing toward Goal(s)  Goals: PO intake 75% or greater, by next RD assessment       Nutrition Monitoring and Evaluation:      Food/Nutrient Intake Outcomes: Diet Advancement/Tolerance, Food and Nutrient Intake, Supplement Intake  Physical Signs/Symptoms Outcomes: Biochemical Data, Chewing or Swallowing, GI Status, Fluid Status or Edema, Nutrition Focused Physical Findings, Skin, Weight    Discharge Planning:     Too soon to determine     Wyatt Hopper RD, LD  Contact: [phone]

## 2023-01-03 NOTE — PROGRESS NOTES
[address] [address]  Inpatient Rehabilitation  Occupational Therapy  Progress Note  Time:  Time In: 0830  Time Out: 930  Timed Code Treatment Minutes: 60 Minutes  Minutes: 60    Date: 1/3/2023  Patient Name: Jackelyn Nj,   Gender: male      Room: [id number]-A  MRN: [id number]  : 1960  (63 y.o.)  Referring Practitioner: Ordering & Attending: Alie Deutsch MD  Diagnosis: Acute Stroke due to Ischemia  Additional Pertinent Hx: Jackelyn Nj who is a 63 y.o. male with a history of hypertension, diabetes, CAD on  daily, recent CABG in 2022, ischemic cardiomyopathy is admitted to [facility] for sepsis, emphysematous cystitis and acute kidney injury on CKD. During admission exam patient stated that his right arm feldman has been weak and that he has had some slurred speech. Stroke alert was called for right-sided weakness and dysarthria on 12/15. On arrival patient's NIH was 4 for right upper extremity, right lower extremity weakness, 1 limb ataxia and dysarthria. On further questioning, patient and wife state that the right arm weakness actually started last night. Patient's wife noticed when patient was trying to get up from the toilet that he was unable to push himself up with his right arm. Last known well 2330 on 2022. Patient taken to imaging for stat CT head which revealed no ICH, midline shift, mass-effect. CTA head and neck deferred due to patient's LC. Decision for no tPA was made due to patient's time since last known well. Patient with relatively low NIH and symptoms which are not consistent with LVO, therefore no thrombectomy/neuro intervention at this time. Patient had stat MRI brain and MRA head and neck without contrast.  MRI showed acute ischemic stroke of left parietal region. Pt admitted to IP rehab on 22.     Restrictions/Precautions:  Restrictions/Precautions: General Precautions, Fall Risk  Position Activity Restriction  Other position/activity restrictions: right side hemiparesis, recent CABG 11/22-minimize arm use ( s/p 5 wks), life vest    SUBJECTIVE: Patient pleasant and cooperative. Agreeable to OT. PAIN: Denies     Vitals: Vitals not assessed per clinical judgement, see nursing flowsheet    COGNITION: Decreased Recall and Decreased Insight    ADL:   Grooming: Stand By Assistance. 1 min for oral care   Bathing: Stand By Assistance. Close SBA in tub shower combo while standing briefly. Pt was able to initiate RUE to complete tasks without cue'ing. Upper Extremity Dressing: with set-up. Donning t-shirt. Min increased time for life vest due to difficulty with [facility] with hook. Lower Extremity Dressing: Stand By Assistance. Close SBA, min increase time to thread crum and for TEDs   Toileting: Stand By Assistance. During clothing mgmt   Toilet Transfer: Stand By Assistance. STS  Tub Transfer: Contact Guard Assistance. Stepping over tub with use of grab bar . BALANCE:  Sitting Balance:  Modified Independent. Standing Balance: Stand By Assistance, [employer]. Ranges pending on task. BED MOBILITY:  Not Tested    TRANSFERS:  Sit to Stand:  Stand By Assistance. Recliner, STS. Stand to Sit: Stand By Assistance. Requires CGA when stepping over tub with use of grab bar     FUNCTIONAL MOBILITY:  Assistive Device: Rolling Walker  Assist Level:  Contact Guard Assistance. Distance: To and from therapy gym  Min unsteadiness with x1 slight lateral LOB requiring CGA for correction     ASSESSMENT:  Activity Tolerance:  Patient tolerance of  treatment: good. Assessment: Assessment: Calderon Aguirre is making steady progress on IP Rehab and is motivated to continue to progress. Calderon Aguirre can complete his LB ADLs with SBA for standing balance but requires min cues for crum mgmt and increased time to don TEDs due to impaired /FMC of RUE.  Calderon Aguirre can complete UB ADLs in sitting with supervision but requires SBA to intermittent CGA for standing portions. He demo min unsteadiness with his standing routines, ranging from SBA to CGA for standing balance during ADL / IADLs. He requires CGA for tub transfers to step over tub but can complete sit <> stands with SBA. He is limited by his problem solving, safety and attention at times and do recommend direct supervision / A with IADLs. His RUE function has improved greatly, progressing his  strength to 48.3# and his [address] to 49 sec AEB 9HPT (last week was 25# and 1 min 44 sec respectively) which is a significant improvement. Ketty Riggins is a fall risk and without further OT, Ketty Riggins is at a higher risk of falls / future injury and cont to require skilled OT to progress in ADL / IADL safety + independence and neuro re-ed for improved return of RUE. Discharge Recommendations: Continue to assess pending progress, Home with Home health OT, Home with nursing aide, Patient would benefit from continued therapy after discharge  Equipment Recommendations: Equipment Needed: Yes  Other: OT staff to continue to monitor needs throughout OT POC. Plan: Times Per Week: 5x/week x 90 minutes and 1x/week x 30 minutes  Current Treatment Recommendations: Strengthening, Balance training, Self-Care / ADL, Equipment evaluation, education, & procurement, Safety education & training, Endurance training, Functional mobility training, Neuromuscular re-education, Patient/Caregiver education & training, Home management training    Patient Education  Patient Education: ADL's, Equipment Education, Hemibody Awareness/Attention, Reviewed Prior Education, Home Safety, and Assistive Device Safety    Goals  Short Term Goals  Time Frame for Short Term Goals: 1 week  Short Term Goal 1: Pt will complete functional ambulation to/from BR and HH distances with SBA and min vcs for safety to increase indep with toileting.   GOAL NOT MET CONSISTENTLY ,CONT   Short Term Goal 2: Pt will decrease RUE 9 hole peg test by 20 seconds and RUE  strength by 5 pounds to increase indep with fasteners in UB dressing. GOAL MET, REVISE   Short Term Goal 3: Pt will complete dynamic standing task x 5 minutes with 1-2 UE release and SBA to increase indep with sinkside grooming. GOAL MET, REVISE   Short Term Goal 4: Pt will complete UB dressing with Set up to increase indep within home environment. GOAL MET, REVISE   Short Term Goal 5: Pt will complete LB dresing with S and min vcs for safety to increase indep and endurance within home environment. GOAL NOT MET, CONT   Additional Goals?: No  Long Term Goals  Time Frame for Long Term Goals : 4 weeks from IPR eval  Long Term Goal 1: Pt will complete BADL routine including shower transfer Mod Indep to increase indep and safety in home environment. GOAL NOT MET, CONT   Long Term Goal 2: Pt will complete simple IADL task with S and 0 vcs for safety to increase indep and endurance in home environment. GOAL NOT MET, CONT     Revised Short-Term Goals  Short Term Goals  Time Frame for Short Term Goals: 1 week  Short Term Goal 1: Pt will complete functional ambulation to/from BR and HH distances with SBA and min vcs for safety to increase indep with toileting. Short Term Goal 2: Pt will decrease RUE 9 hole peg test by 10 seconds (49 on 1/3), and RUE  strength by 5 pounds (48.3# on 1/3) to increase indep with fasteners in UB dressing. Short Term Goal 3: Pt will complete dynamic standing task x 5 minutes with 1-2 UE release and Supervision to increase indep with sinkside grooming. Short Term Goal 4: Pt will complete UB dressing with mod I to increase indep within home environment. Short Term Goal 5: Pt will complete LB dresing with S and min vcs for safety to increase indep and endurance within home environment.   Additional Goals?: No  Long Term Goals  Time Frame for Long Term Goals : 4 weeks from IPR eval  Long Term Goal 1: Pt will complete BADL routine including shower transfer Mod Indep to increase indep and safety in home environment. Long Term Goal 2: Pt will complete simple IADL task with S and 0 vcs for safety to increase indep and endurance in home environment. Following session, patient left in safe position with all fall risk precautions in place.

## 2023-01-03 NOTE — PROGRESS NOTES
[facility]  INPATIENT PHYSICAL THERAPY  Progress Note  [facility] - [id number]    Time In: 1000  Time Out: 1100  Timed Code Treatment Minutes: 60 Minutes  Minutes: 60          Date: 1/3/2023  Patient Name: Kym Sandoval,  Gender:  male        MRN: [id number]  : 1960  (63 y.o.)     Referring Practitioner: Dr. Chelsey Mendez  Diagnosis: Acute stroke due to ischemia  Additional Pertinent Hx: Pt admitted through ED due to RUE weakness and slurred speech,  Also noted to have sepsis, LC, emphysematous cystitis. Hx:  HTN, Db, CAD, recent CABG (). MRI + for acute ischemic stroke Lt parietal region     Prior Level of Function:  Lives With: Spouse, Family (16 y.o son, 21 y.o step dtr.)  Type of Home: House  Home Layout: One level  Home Access: Stairs to enter with rails  Entrance Stairs - Number of Steps: 2 + threshold  Entrance Stairs - Rails: Both  Home Equipment:  (None used PTA)    Vitals: Blood Pressure: 114/55 seated, no complaints of     Restrictions/Precautions:  Restrictions/Precautions: General Precautions, Fall Risk  Position Activity Restriction  Other position/activity restrictions: right side hemiparesis, recent CABG -minimize arm use ( s/p 5 wks), life vest    SUBJECTIVE: Patient in room in recliner, agreeable to PT. Pt cooperative and pleasant. Pt's blood pressure noted to be improved over weekend. PT encouraged continued increased water intake. PAIN: no complaints of pain     OBJECTIVE:  Bed Mobility:  Rolling to Left: Stand By Assistance   Supine to Sit: Minimal Assistance  Sit to Supine: Stand By Assistance   *No bed rail, head of bed flat    Transfers:  Sit to Stand: Contact Guard Assistance--requires greater than one attempt frequently  Stand to Sit:Contact Wellington Schwab  Car:Contact Guard Assistance--verbal cues for sequencing, pt attempting to place foot inside of car before sitting down initially.   Verbal cues for hand placement to \"stay within the tube. \"  *Patient completed sit < > stand transfers from various surfaces including bed, car, wheelchair, standard chair and recliner in apartment. Ambulation:  Contact Guard Assistance  Distance: 5', 5', 70', 8'  Surface: Level Tile  Device:Rolling Walker  Gait Deviations: Forward Flexed Posture, Slow Jada, Decreased Step Length Bilaterally, Decreased Gait Speed, and Decreased Heel Strike Bilaterally, Decreased SLS phase on right  *Verbal cues for upright posture  *Pt demonstrates genu recurvatum right LE at least 25% of the time, especially with fatigue. Verbal and tactile cues for increased muscle contraction during stance phase and upright posture. Improvement noted with cues. Stairs:  Contact Guard Assistance  Number of Steps: 4  Height: 6\" step with Bilateral Handrails   *non-reciprocal pattern    Neuromuscular education:  Pt stood on DropThought balance pad initially with ~8\" base of support, no UE support, reaching for rings and tossing rings to target. Rings placed in various directions to encourage anterior weight shift and to challenge balance. Verbal cues for upright posture and increased weight shift to the right LE. Progressed to standing with ~1.5 inch base of support. Patient stood on level floor with no UE support, completing anterior foot taps to colored/numbered balance pods, alternating between right and left. Verbal cues for increased weight shift to stance leg, especially with single leg stance on the right LE. Tactile cues at times to right LE for facilitation and PT providing manual support at left LE with fatigue for safety. Verbal cues for upright posture. Added mirror for visual feedback as pt tending to lean to left even with left LE advancement and SLS on the right. Improvement with midline posture with visual and verbal cues. Trialled use of booyah stick in right UE, however patient felt that it was a distraction and no improvement noted.   *activities above completed to improve SLS, coordination, proprioception of trunk and LE's. Functional Outcome Measures: Completed  Balance Score: 8  Gait Score: 8  Tinetti Total Score: 16/28  Risk Indicators:  Less than/equal to 18 = high risk  19-23 Moderate risk  Greater than/equal to 24 = low risk     ASSESSMENT:  Assessment:   . Patient met 3/6 STG's and 0/4 LTG's. Mr Mikey Licona has been demonstrating god progress in PT over the last week, progressing car transfer from min assist to CGA, and improved gait from CGA/min assist but with continued weakness on his right LE demonstrating continued genu recurvatum especially with fatigue. Patient also demonstrated improvements with his tinetti score over the last week, progressing from 12 to 16/28, although this continues to indicate a high risk for falls. Patient was independent at [facility] and working full time. Pt will be home alone during the day at discharge and will continue to benefit from skilled PT services to continue to progress his strength and balance to allow pt to return to independent PLOF, reduce his risk for falls and allow pt to return to home safely. Activity Tolerance:  Patient tolerance of  treatment: good.       Equipment Recommendations:Equipment Needed: Yes  Other: RW  Discharge Recommendations:  Discharge Recommendations: Continue to assess pending progress, Patient would benefit from continued therapy after discharge    Plan: Current Treatment Recommendations: Strengthening, ROM, Balance training, Functional mobility training, Transfer training, Endurance training, Neuromuscular re-education, Gait training, Stair training, Safety education & training, Equipment evaluation, education, & procurement, Home exercise program, Therapeutic activities, Patient/Caregiver education & training  General Plan:  (5x/ wk 90 min, 1x/ wk 30 min)    Patient Education  Patient Education: Bed Mobility, Transfers, Gait, Stairs, Car Transfers, Verbal Exercise Instruction, Health Promotion and Wellness Education,  - Patient Verbalized Understanding, - Patient Requires Continued Education    Goals:  Patient Goals : get back to my normal way of doing things  Short Term Goals  Time Frame for Short Term Goals: 1 wk  Short Term Goal 1: Pt to go supine <->sit, minimal use of UEs, SBA to get in/out of bed. NOT MET, CONTINUE  Short Term Goal 2: Pt to get up/down from various seated surfaces, CGA, to get up to walk. GOAL MET  Short Term Goal 3: Pt to walk with RW >= 50 ft, demonstrating step through gait pattern, recurvatum < 10% of steps on RT, CGA to progress to home mobility. GOAL NOT MET, CONTINUE  Short Term Goal 4: Pt to ascend/descend 2 steps with gerber rails, CGA to progress to home entry. GOAL MET, SEE LTG  Short Term Goal 5: Pt to get in/out of car, CGA for transportation needs. GOAL MET  Additional Goals?: Yes  Short Term Goal 6: Pt to perform Tinetti >= 18/28, demonstrating improved balance. GOAL NOT MET, CONTINUE  Long Term Goals  Time Frame for Long Term Goals : 3 wks from evaluation  Long Term Goal 1: Pt to go supine <->sit, minimal use of UEs, Mod I, to get in/out of bed. Long Term Goal 2: Pt to get up/down from various seated surfaces, Mod I, to get up to walk. GOAL NOT MET, CONTINUE  Long Term Goal 3: Pt to walk with RW >= 50 ft, demonstrating step through gait pattern, recurvatum < 10% of steps on RT, Mod I, for home mobility  Long Term Goal 4: Pt to perform Tinetti >= 24/28, demonstrating improved balance. Revised Short-Term Goals:    Short Term Goals  Time Frame for Short Term Goals: 1 wk  Short Term Goal 1: Pt to go supine <->sit, minimal use of UEs, SBA to get in/out of bed. Short Term Goal 2: Pt to get up/down from various seated surfaces, SBA, to get up to walk.   Short Term Goal 3: Pt to walk with RW >= 50 ft, demonstrating step through gait pattern, recurvatum < 10% of steps on RT, CGA to progress to home mobility  Short Term Goal 4: Pt to ascend/descend 2 steps with gerber rails, CGA to progress to home entry. GOAL MET, SEE LTG  Short Term Goal 5: Pt to get in/out of car, SBA for transportation needs. Additional Goals?: Yes  Short Term Goal 6: Pt to perform Tinetti >= 18/28, demonstrating improved balance. Revised Long-Term Goals  Long Term Goals  Time Frame for Long Term Goals : 3 wks from evaluation  Long Term Goal 1: Pt to go supine <->sit, minimal use of UEs, Mod I, to get in/out of bed. Long Term Goal 2: Pt to get up/down from various seated surfaces, Mod I, to get up to walk. Long Term Goal 3: Pt to walk with RW >= 50 ft, demonstrating step through gait pattern, recurvatum < 10% of steps on RT, Mod I, for home mobility  Long Term Goal 4: Pt to perform Tinetti >= 24/28, demonstrating improved balance. Long Term Goal 5: Patient will ascend/descend 2 steps with bilateral hand rails with SBA for safe home entry. Additional Goals?: Yes  Long term goal 6: Patient will complete car transfer with modified independence with safe technique to transfer in and out of vehicle safely.

## 2023-01-03 NOTE — PROGRESS NOTES
Physical Medicine & Rehabilitation Progress Note    Chief Complaint:  Right-sided weakness due to stroke    Subjective:    Kimberly Velazco is a 63 y.o.  left-handed  male with history of hypertension, diabetes mellitus type 2, coronary artery disease with ischemic cardiomyopathy requiring two-vessel CABG, urinary retention with several failed void trial, status post tonsillectomy, was admitted to the inpatient rehabilitation unit on 12/23/2022 for intensive inpatient management of impairment & disability secondary to right hemiparesis due to acute left parietal corona radiata and right cerebellar ischemic stroke, and debility/physical deconditioning due to Klebsiella pneumonia urinary tract infection with emphysematous cystitis and bilateral hydronephrosis complicated by Klebsiella pneumonia bacteremia/sepsis. The patient was previously hospitalized at [address] [place] [address] from 10/28/2022 to 11/15/2022 for coronary artery disease and ischemic cardiomyopathy requiring two-vessel CABG on 11/7/2022. His postoperative course was complicated by urinary retention with failed void trial.  The patient has been experiencing progressive weakness after he was discharged to home. The Bella was later removed on 12/13/2022 but the patient continued having difficulty voiding. On 12/14/2022 approximately 11:30 PM his wife noticed the patient had slight slurred speech with right arm weakness. On 12/15/2022 the patient suddenly felt dizzy and lightheaded while he was trying to get up from sitting on toilet and fell into the ground. He says he did not lose consciousness and did not hit his head. His wife called 46. He was transported to [address] [place] [address] ER for evaluation by EMS. He complains of neck pain and upper back pain. He was found to be tachycardic. His WBC was found to be 22.3. Bella was placed in ER and a rush of air followed by dark brown and bright red color urine came out.   He was put on IV meropenem and admitted. Urology was consulted. CT of cervical spine revealed only multilevel facet and uncovertebral joint arthropathy. CT of head done on 12/15/2020 revealed no acute intracranial abnormality. CT of the chest, abdomen and pelvis done on 12/15/2022 revealed emphysematous cystitis, bilateral hydronephrosis, and constipation. Because of right arm weakness, stroke code was called on 12/15/2022. MRI of brain done on 12/15/2022 revealed acute ischemic stroke at the left parietal region corona radiata, and possible small additional acute ischemic stroke within right cerebellum. Neurology service start patient on aspirin and Plavix combination for the stroke. MRA of the neck and head done on 12/15/2022 showed only mild bilateral carotid bulb disease. Transesophageal echocardiogram was done on 12/16/2022 showing severe global hypokinesis of left ventricle with estimated ejection fraction of 30%, and no thrombus identified. 2 blood culture and urine culture done on 12/15/2022 revealed Klebsiella pneumonia. Infectious disease had been consulted. Oral Cipro was started on 12/22/2022 after IV meropenem was completed. Patient's hospital course was also complicated by constipation which resolved this morning. The patient says he feels well. He says his right upper and lower extremities are getting stronger with better control. He denies having any headache, dizziness, lightheadedness, painful symptom, or numbness tingling feeling. He continues tolerating the intensive rehabilitation treatment well. He is still on Bella catheter. Rehabilitation:  PT: Reviewed.     Bed Mobility:  Rolling to Left: Stand By Assistance   Supine to Sit: Minimal Assistance  Sit to Supine: Supervision      Transfers:  Sit to Stand: Contact Guard Assistance  Stand to [place] [id number]  Car:Contact Guard Assistance--verbal cues for sequencing, pt attempting to place foot inside of car before sitting down initially. Verbal cues for hand placement to \"stay within the tube. \"  *Patient completed sit < > stand transfers from various surfaces including bed, car, wheelchair, standard chair and recliner in apartment. Ambulation:  Contact Guard Assistance  Distance: 5', 5', 70', 8'  Surface: Level Tile  Device:Rolling Walker  Gait Deviations: Forward Flexed Posture, Slow Jada, Decreased Step Length Bilaterally, Decreased Gait Speed, and Decreased Heel Strike Bilaterally, Decreased SLS phase on right  *Verbal cues for upright posture  *Pt demonstrates genu recurvatum right LE at least 25% of the time, especially with fatigue. Verbal and tactile cues for increased muscle contraction during stance phase and upright posture. Improvement noted with cues. Contact Guard Assistance  Distance: 5', 75', ~50' weaving in and out of cones  Surface: Level Tile  Device:Rolling Walker  Gait Deviations: Forward Flexed Posture, Slow Jada, Decreased Step Length Bilaterally, Decreased Weight Shift Right, Decreased Gait Speed, and Decreased Heel Strike Bilaterally  *Verbal cues for upright posture, verbal cues for increased stride length with change in directions. Patient weaved in and out of cones as shown above, verbal cues for increased step length. Balance:  Dynamic Standing Balance: Contact Guard Assistance, Minimal Assistance    Stairs:  Contact Guard Assistance  Number of Steps: 4  Height: 6\" step with Bilateral Handrails   *non-reciprocal pattern    Neuromuscular education:  Pt stood on airex balance pad initially with ~8\" base of support, no UE support, reaching for rings and tossing rings to target. Rings placed in various directions to encourage anterior weight shift and to challenge balance. Verbal cues for upright posture and increased weight shift to the right LE. Progressed to standing with ~1.5 inch base of support.   Patient stood on level floor with no UE support, completing anterior foot taps to colored/numbered balance pods, alternating between right and left. Verbal cues for increased weight shift to stance leg, especially with single leg stance on the right LE. Tactile cues at times to right LE for facilitation and PT providing manual support at left LE with fatigue for safety. Verbal cues for upright posture. Added mirror for visual feedback as pt tending to lean to left even with left LE advancement and SLS on the right. Improvement with midline posture with visual and verbal cues. Trialled use of booyah stick in right UE, however patient felt that it was a distraction and no improvement noted. *activities above completed to improve SLS, coordination, proprioception of trunk and LE's. Patient instructed to ambulate, keeping RW and feet on outside of fly swatters on ground and to reach for post-it notes located on the right side. Completed to encourage pt to reach outside base of support and weight shift to right LE. Patient completed forward steps over fly swatter, initially one foot only. Focused on heel strike and weight shift onto leg which was advanced forward. Completed 5 times each LE. Progressed to stepping over with both feet and stepping back. Completed with each foot leading forward/back x5. *Completed to improve SLS, lateral and anterior weight shift and coordination      OT: Reviewed. ADL:   Grooming: Stand By Assistance. 1 min for oral care   Bathing: Stand By Assistance. Close SBA in tub shower combo while standing briefly. Pt was able to initiate RUE to complete tasks without cue'ing. Upper Extremity Dressing: with set-up. Donning t-shirt. Min increased time for life vest due to difficulty with [facility] with hook. Lower Extremity Dressing: Stand By Assistance. Close SBA, min increase time to thread crum and for TEDs   Toileting: Stand By Assistance. During clothing mgmt   Toilet Transfer: Stand By Assistance. STS  Tub Transfer: Contact Guard Assistance.   Stepping over tub with use of grab bar . BALANCE:  Sitting Balance:  Modified Independent. Standing Balance: Stand By Assistance, [employer]. Ranges pending on task. TRANSFERS:  Sit to Stand:  Stand By Assistance. Recliner, STS. Stand to Sit: Stand By Assistance. Requires CGA when stepping over tub with use of grab bar      FUNCTIONAL MOBILITY:  Assistive Device: Rolling Walker  Assist Level:  Contact Guard Assistance. Distance: To and from therapy gym  Min unsteadiness with x1 slight lateral LOB requiring CGA for correction      Assistive Device: Rolling Walker  Assist Level:  Stand By Assistance. Distance:  within room only        ADDITIONAL ACTIVITIES:  Patient completed simple IADL task of retrieving clothing to prep for later BADL session. Pt was able to ambulate to closet with RW and stand 1 min to retrieve items from drawers, completing with SBA and no UE support demo no LOB. Using pain as a guide, Patient completed BUE strengthening exercises with skilled education on HEP: completed x10 reps x1 set with a medium resistive band in all joints and all planes in order to improve UE strength and activity tolerance required for BADL routine and toilet / shower transfers. Patient tolerated well, requiring min rest breaks. Patient also required min cues for technique. Pt with no c/o pain. ST: Reviewed. Evaluating pill box for errors:   -Prescription 1- Mod cues for comprehension and ID of errors  -Prescription 2- Min cues to ID error with organization  -Prescription 3- Indep  -Prescription 4- Min cues to ID missing dose  -Prescription 5- Indep  -Prescription 6- Indep  -Prescription 7- Indep  *Increased success as task progressed.       Deductive Thinking (Organizing Garden plots): 12/12 indep  *Excellent success with deductive thinking and organization      Deduction by exclusion: 4/6 indep, 2/6 with mod cues  *Some confusion evident with comprehension of exclusion criteria and when to liliya off dates. Working memory:  (4 unit)-  9/10 indep, 1/10 with min cues   *Appropriate mental retention and manipulation     Patients' perceptual speech rating this date- \"7 or 8\"/10 (*10 being baseline speech)  *Patient with report of breakdowns in speech clarity over the course of conversation due to lack of muscle endurance. Discussed focusing on use of compensatory speech strategies (S-Speak up, O-open mouth, S-slow down) when this occurs and need to continue structured speech tasks to assist with building endurance. Review of Systems:  Review of Systems   Constitutional:  Negative for chills, diaphoresis, fatigue and fever. HENT:  Negative for hearing loss, rhinorrhea, sneezing, sore throat and trouble swallowing. Eyes:  Negative for visual disturbance. Respiratory:  Negative for cough, shortness of breath and wheezing. Cardiovascular:  Negative for chest pain and palpitations. Gastrointestinal:  Negative for abdominal pain, constipation, diarrhea, nausea and vomiting. Genitourinary:  Positive for difficulty urinating. Negative for dysuria. Musculoskeletal:  Positive for gait problem. Negative for arthralgias, myalgias and neck pain. Skin:  Negative for rash. Neurological:  Positive for weakness (Right upper and right lower extremities). Negative for dizziness, tremors, facial asymmetry, speech difficulty, light-headedness, numbness and headaches. Psychiatric/Behavioral:  Negative for dysphoric mood, hallucinations and sleep disturbance. The patient is not nervous/anxious.          Objective:  /66   Pulse 97   Temp 97.9 °F (36.6 °C) (Oral)   Resp 16   Ht 5' 2\" (1.575 m) Comment: last admit ht listed as 5'10\"; pt. appears closer to 5'10\"  Wt 138 lb (62.6 kg)   SpO2 98%   BMI 25.24 kg/m²   Physical Exam   General:  well-developed, well nourished  male; in no acute distress ; appropriate affect & mood; sitting on reclining chair comfortably  Eyes: pupil equally round ; extra-ocular motion intact bilaterally  Head, Ear, Nose, Mouth & Throat : normocephalic ; no discharge from ears or nose ; no deformity ; no facial swelling ; oral mucosa pink   Neck :  supple ; no tenderness ; mild muscle spasm at bilateral cervical paraspinal muscles  Cardiovascular : Presence of healed vertical surgical scar at sternum ; regular rate & rhythm ; normal S1 & S2 heart sound ; no murmur ; normal peripheral pulse at the bilateral upper extremities; reduced pulse strength at the bilateral lower extremities  Pulmonary : Present breath sounds at the bilateral lung fields; no wheezing ; no rale; no crackle  Gastrointestinal : soft, slightly protruded abdomen without tenderness ; normal bowel sound present    : Bella catheter in place draining light yellowish urine  Back : no tenderness; no muscle spasm  Skin: no skin lesion or rash ; no pitting edema at all 4 extremities  Musculoskeletal : no limb asymmetry; no limb deformity; no tenderness at bilateral upper & lower extremities; no palpable mass at limbs ; no joints laxity or crepitation ; shoulder flexion and abduction passive ROM reaching 170 degrees bilaterally; hip flexion passive ROM reaching 130 degrees bilaterally; normal functional joints ROM at bilateral upper & lower extremities  Cerebral :  alert ; awake ; oriented to place, person and time; follow two-step verbal command  Cerebellum : mild dysmetria with the right finger-to-nose test; no dysmetria with left finger-to-nose test but with slight intentional tremor; no dysmetria with bilateral heel-to-shin test  Cranial Nerves : Tongue protrudes slightly to the right  (CN XII) ; grossly intact other CN II to XI function  Sensory : intact light touch and pin prick sensation at bilateral upper & lower extremities  Motor : normal muscle tone at bilateral upper & lower extremities ; 4+/5 to 5/5 muscle strength at right shoulder flexion; 4+/5 to 5/5 muscle strength at the right elbow extension; 4+/5 muscle strength at right finger extension; 4+/5 muscle strength at the right finger flexion and handgrip; 4-/5 to 4+/5 muscle strength at right finger abduction; 4+/5 to 5/5 muscle strength at the right hip flexion; normal 5/5 muscle strength at the rest of bilateral upper & lower extremities  Reflex : 2+ right biceps, right brachioradialis and right knee reflexes; 1+ left biceps, left brachioradialis and left knee reflexes  Pathological Reflex :  No Ion's sign ; no ankle clonus  Gait : Not assessed      Diagnostics:   Recent Results (from the past 24 hour(s))   POCT Glucose    Collection Time: 01/04/23  7:21 AM   Result Value Ref Range    POC Glucose 128 (H) 70 - 108 mg/dl         Impression:  Acute left parietal corona radiata and right cerebellar ischemic stroke causing right hemiparesis with impaired coordination, and dysarthria  Debility/physical decondition due to Klebsiella pneumonia urinary tract infection with emphysematous cystitis and bilateral hydronephrosis complicated by Klebsiella pneumonia bacteremia/sepsis  Urinary retention  Klebsiella pneumonia urinary tract infection with emphysematous cystitis and bilateral hydronephrosis  Klebsiella pneumoniae bacteremia/sepsis  Acute kidney injury possibly due to dehydration with orthostatic hypotension  Hypotension probably due to side effect from high-dose Flomax in combination with Toprol XL  Coronary artery disease with ischemic cardiomyopathy requiring two-vessel coronary artery bypass graft surgery on 11/7/2022  Hypertension  Diabetes mellitus type 2    The patient's condition remains stable. His blood pressure is well maintained now without symptom of hypertension. His right upper and lower extremities muscle strength and control are improving. He continues tolerating the intensive inpatient rehabilitation treatment well. His function continue to improve slowly.       Plan:  Continues intensive PT/OT/SLP/RT inpatient rehabilitation program at least 3 hours per day, 5 days per week in order to improve functional status prior to discharge. Family education and training will be completed. Equipment evaluations and recommendations will be completed as appropriate. Rehabilitation nursing continues to be involved for bowel, bladder, skin, and pain management. Nursing will also provide education and training to patient and family. Prophylaxis:  DVT: Patient on Eliquis, JONAH stocking, intermittent pneumatic compression device. GI: Colace, Senokot, GlycoLax as needed, milk of magnesia as needed, Dulcolax suppository as needed.   Pain: Tylenol as needed  Continue Lipitor for CVA and CAD  Continues holding Toprol-XL for hypertension  Continue Flomax for urinary retention  Continue Protonix for gastric protection  Continue Jardiance, Glucotrol, Humalog insulin coverage for diabetes mellitus  Continue Wellbutrin XL for smoking cessation  Continue melatonin, trazodone as needed for insomnia  Continue multivitamin supplement  Nutrition: Continue current diet   Bladder: Monitoring signs or symptoms of UTI  Bowel: Monitoring signs or symptoms of constipation   and case management for coordination of care and discharge planning      Missed Therapy Time:  None      Joceline Saenz MD

## 2023-01-03 NOTE — NURSE NAVIGATOR
Neuro Nurse Navigator Follow Up    This RN in to follow up with patient. Pt sitting up in chair when this RN enters room. Pt reports he is doing well without any complaints at this time. He explains that he has met his goals in both ST and OT and he is working toward his goals in [id number] reports his speech is not yet back to baseline, but he voices understanding that he may not ever return to baseline. Encouragement and support given to patient. Pt reports he is unsure of discharge timeline but expects within the next week or so. He states he isn't in a hurry to get back home, as he feels he is continuing to see such good improvements while here in rehab. This RN contact info given to patient. Will continue to follow as able.

## 2023-01-03 NOTE — PROGRESS NOTES
[facility]  Recreational Therapy  Daily Note  [address]    Time Spent with Patient: 15 minutes    Date:  1/3/2023       Patient Name: Denise Castro      MRN: [id number]      YOB: 1960 (63 y.o.)       Gender: male  Diagnosis: Acute Stroke due to Ischemia  Referring Practitioner: Ordering & Attending: Debbie Rowe MD    RESTRICTIONS/PRECAUTIONS:  Restrictions/Precautions: General Precautions, Fall Risk     Hearing: Within functional limits    PAIN: 0-no c/o pain     SUBJECTIVE:  I have all that I need    OBJECTIVE:  Pt states his therapy is going well-he is getting stronger and thinks he may be here another week yet-he has his phone in room that he can play games on, read and watch movies on-pt content in room-very pleasant and social-no leisure needs          Patient Education  New Education Provided: Importance of Leisure,     Electronically signed by: Michalene Cheadle, CTRS  Date: 1/3/2023

## 2023-01-03 NOTE — PLAN OF CARE
Problem: Discharge Planning  Goal: Discharge to home or other facility with appropriate resources  Outcome: Progressing  Flowsheets (Taken 1/3/2023 0800)  Discharge to home or other facility with appropriate resources:   Identify barriers to discharge with patient and caregiver   Identify discharge learning needs (meds, wound care, etc)     Problem: Chronic Conditions and Co-morbidities  Goal: Patient's chronic conditions and co-morbidity symptoms are monitored and maintained or improved  Outcome: Progressing  Flowsheets (Taken 1/3/2023 0800)  Care Plan - Patient's Chronic Conditions and Co-Morbidity Symptoms are Monitored and Maintained or Improved:   Monitor and assess patient's chronic conditions and comorbid symptoms for stability, deterioration, or improvement   Collaborate with multidisciplinary team to address chronic and comorbid conditions and prevent exacerbation or deterioration   Update acute care plan with appropriate goals if chronic or comorbid symptoms are exacerbated and prevent overall improvement and discharge     Problem: Safety - Adult  Goal: Free from fall injury  Description: Patient absent of falls this shift. Bed in lowest position, side rails up 2/4. Call-light within reach. Patient instructed to use call-light to get up. Non-skid footwear in place. Outcome: Progressing  Falling star prevention in place. Bed and chair alarms in use. Call light in reach. Purposeful hourly rounding. Problem: ABCDS Injury Assessment  Goal: Absence of physical injury  Description: Patient is free from physical injury  Outcome: Progressing  No physical injury noted this shift. Problem: Skin/Tissue Integrity  Goal: Absence of new skin breakdown  Description:  Monitor for areas of redness and/or skin breakdown, no skin breakdown   Outcome: Progressing  No new skin breakdown noted since admission.     Problem: Pain  Goal: Verbalizes/displays adequate comfort level or baseline comfort level  Description: Patient denies pain  Outcome: Progressing  Flowsheets (Taken 1/3/2023 0800)  Verbalizes/displays adequate comfort level or baseline comfort level:   Encourage patient to monitor pain and request assistance   Assess pain using appropriate pain scale   Implement non-pharmacological measures as appropriate and evaluate response   Administer analgesics based on type and severity of pain and evaluate response     Problem: Nutrition Deficit:  Goal: Optimize nutritional status  1/3/2023 1505 by Joelle Raygoza RN  Outcome: Progressing  Tolerating current diet and po fluids well.     Problem: Metabolic/Fluid and Electrolytes - Adult  Goal: Electrolytes maintained within normal limits  Outcome: Progressing  Flowsheets (Taken 1/3/2023 0800)  Electrolytes maintained within normal limits:   Monitor labs and assess patient for signs and symptoms of electrolyte imbalances   Administer electrolyte replacement as ordered   Monitor response to electrolyte replacements, including repeat lab results as appropriate     Problem: Genitourinary - Adult  Goal: Urinary catheter remains patent  Description: Patient catheter remains patent and draining light yellow urine, cath care completed this shift and education provided to patient   Outcome: Progressing  Flowsheets (Taken 1/3/2023 0800)  Urinary catheter remains patent: Assess patency of urinary catheter

## 2023-01-03 NOTE — CARE COORDINATION
Isacc Key was evaluated today and a DME order was entered for a wheeled walker because he requires this to successfully complete daily living tasks of ambulating, dressing lower body, and meal preparation. A wheeled walker is necessary due to the patient's unsteady gait, upper body weakness, and inability to  an ambulation device; and he can ambulate only by pushing a walker instead of a lesser assistive device such as a cane, crutch, or standard walker. The need for this equipment was discussed with the patient and he understands and is in agreement.

## 2023-01-04 LAB
CHLORIDE, URINE: 55 MEQ/L
GLUCOSE BLD-MCNC: 128 MG/DL (ref 70–108)
OSMOLALITY URINE: 366 MOSMOL/KG (ref 250–750)
POTASSIUM, URINE: 32.7 MEQ/L
SODIUM URINE: 73 MEQ/L

## 2023-01-04 PROCEDURE — 6370000000 HC RX 637 (ALT 250 FOR IP): Performed by: PHYSICAL MEDICINE & REHABILITATION

## 2023-01-04 PROCEDURE — 97535 SELF CARE MNGMENT TRAINING: CPT

## 2023-01-04 PROCEDURE — 97130 THER IVNTJ EA ADDL 15 MIN: CPT | Performed by: SPEECH-LANGUAGE PATHOLOGIST

## 2023-01-04 PROCEDURE — 97129 THER IVNTJ 1ST 15 MIN: CPT | Performed by: SPEECH-LANGUAGE PATHOLOGIST

## 2023-01-04 PROCEDURE — 1180000000 HC REHAB R&B

## 2023-01-04 PROCEDURE — 83935 ASSAY OF URINE OSMOLALITY: CPT

## 2023-01-04 PROCEDURE — 82436 ASSAY OF URINE CHLORIDE: CPT

## 2023-01-04 PROCEDURE — 97530 THERAPEUTIC ACTIVITIES: CPT

## 2023-01-04 PROCEDURE — 84133 ASSAY OF URINE POTASSIUM: CPT

## 2023-01-04 PROCEDURE — 99232 SBSQ HOSP IP/OBS MODERATE 35: CPT | Performed by: INTERNAL MEDICINE

## 2023-01-04 PROCEDURE — 97112 NEUROMUSCULAR REEDUCATION: CPT

## 2023-01-04 PROCEDURE — 84300 ASSAY OF URINE SODIUM: CPT

## 2023-01-04 PROCEDURE — 97116 GAIT TRAINING THERAPY: CPT

## 2023-01-04 PROCEDURE — 99232 SBSQ HOSP IP/OBS MODERATE 35: CPT | Performed by: PHYSICAL MEDICINE & REHABILITATION

## 2023-01-04 PROCEDURE — 82948 REAGENT STRIP/BLOOD GLUCOSE: CPT

## 2023-01-04 RX ADMIN — Medication 1 TABLET: at 08:13

## 2023-01-04 RX ADMIN — APIXABAN 5 MG: 5 TABLET, FILM COATED ORAL at 08:13

## 2023-01-04 RX ADMIN — BUPROPION HYDROCHLORIDE 150 MG: 150 TABLET, FILM COATED, EXTENDED RELEASE ORAL at 08:13

## 2023-01-04 RX ADMIN — Medication 6 MG: at 20:57

## 2023-01-04 RX ADMIN — TAMSULOSIN HYDROCHLORIDE 0.8 MG: 0.4 CAPSULE ORAL at 20:57

## 2023-01-04 RX ADMIN — FERROUS SULFATE TAB 325 MG (65 MG ELEMENTAL FE) 325 MG: 325 (65 FE) TAB at 08:13

## 2023-01-04 RX ADMIN — ATORVASTATIN CALCIUM 40 MG: 40 TABLET, FILM COATED ORAL at 20:57

## 2023-01-04 RX ADMIN — GLIPIZIDE 10 MG: 10 TABLET ORAL at 08:13

## 2023-01-04 RX ADMIN — APIXABAN 5 MG: 5 TABLET, FILM COATED ORAL at 20:57

## 2023-01-04 ASSESSMENT — PAIN SCALES - WONG BAKER: WONGBAKER_NUMERICALRESPONSE: 0

## 2023-01-04 ASSESSMENT — ENCOUNTER SYMPTOMS
RHINORRHEA: 0
NAUSEA: 0
CONSTIPATION: 0
SHORTNESS OF BREATH: 0
TROUBLE SWALLOWING: 0
DIARRHEA: 0
ABDOMINAL PAIN: 0
VOMITING: 0
WHEEZING: 0
SORE THROAT: 0
COUGH: 0

## 2023-01-04 ASSESSMENT — PAIN SCALES - GENERAL
PAINLEVEL_OUTOF10: 0
PAINLEVEL_OUTOF10: 0

## 2023-01-04 NOTE — PLAN OF CARE
Problem: Discharge Planning  Goal: Discharge to home or other facility with appropriate resources  1/4/2023 1007 by WILMER Wright  Note: Team conference held Wednesday, 1/4. Recommendations of the team were explained to the patient and his wife, Jihan Patel by Dr Francy Pacheco and SW. Team is recommending that patient continue on acute inpatient rehab for PT, OT and ST, with expected discharge date of Friday, 1/13. Following discharge, team is recommending home health services for RN, PT, OT and HHA. Care plan reviewed with patient and Jihan Petit. Family education scheduled for 1/6 with apartment stay on 1/7. Patient and Jihan Petit verbalized understanding of the plan of care and contributed to goal setting. SW to follow and maintain involvement in discharge planning.

## 2023-01-04 NOTE — PLAN OF CARE
Problem: Discharge Planning  Goal: Discharge to home or other facility with appropriate resources  1/3/2023 2334 by Di Huerta RN  Outcome: Progressing  Flowsheets (Taken 1/3/2023 2200)  Discharge to home or other facility with appropriate resources: Identify barriers to discharge with patient and caregiver      Problem: Chronic Conditions and Co-morbidities  Goal: Patient's chronic conditions and co-morbidity symptoms are monitored and maintained or improved  1/3/2023 2334 by Di Huerta RN  Outcome: Progressing  Flowsheets (Taken 1/3/2023 2200)  Care Plan - Patient's Chronic Conditions and Co-Morbidity Symptoms are Monitored and Maintained or Improved: Monitor and assess patient's chronic conditions and comorbid symptoms for stability, deterioration, or improvement    Problem: Safety - Adult  Goal: Free from fall injury  Description: Patient absent of falls this shift. Bed in lowest position, side rails up 2/4. Call-light within reach. Patient instructed to use call-light to get up. Non-skid footwear in place.    1/3/2023 2334 by Di Huerta RN  Outcome: Progressing      Problem: Pain  Goal: Verbalizes/displays adequate comfort level or baseline comfort level  Description: Patient denies pain  1/3/2023 2334 by Di Huerta RN  Outcome: Progressing  Flowsheets (Taken 1/3/2023 2200)  Verbalizes/displays adequate comfort level or baseline comfort level:   Encourage patient to monitor pain and request assistance   Assess pain using appropriate pain scale   Administer analgesics based on type and severity of pain and evaluate response

## 2023-01-04 NOTE — PLAN OF CARE
Problem: Discharge Planning  Goal: Discharge to home or other facility with appropriate resources  1/4/2023 1036 by Anyi Petersen RN  Outcome: Progressing  Note: Patient planned to have an apartment stay over the weekend of 1/7-1/8 with a possible discharge of Friday 1/13. Problem: Safety - Adult  Goal: Free from fall injury  1/4/2023 1036 by Anyi Petersen RN  Outcome: Progressing  Note: Patient will continue to use call light for assistance to prevent falls and promote patient safety. Problem: Skin/Tissue Integrity  Goal: Absence of new skin breakdown  Description:  Monitor for areas of redness and/or skin breakdown, no skin breakdown     1/4/2023 1038 by Anyi Petersen RN  Outcome: Progressing  Note: Patient able to complete his own crum care and recognize s/s of infection.

## 2023-01-04 NOTE — PROGRESS NOTES
[address]  Occupational Therapy  Daily Note  Time:   Time In: 0830  Time Out: 0930  Timed Code Treatment Minutes: 60 Minutes  Minutes: 60    Date: 2023  Patient Name: Kimberly Velazco,   Gender: male      Room: [facility]/053-A  MRN: [id number]  : 1960  (63 y.o.)  Referring Practitioner: Ordering & Attending: Zaida Rosen MD  Diagnosis: Acute Stroke due to Ischemia  Additional Pertinent Hx: Kimberly Velazco who is a 63 y.o. male with a history of hypertension, diabetes, CAD on  daily, recent CABG in 2022, ischemic cardiomyopathy is admitted to [facility] for sepsis, emphysematous cystitis and acute kidney injury on CKD. During admission exam patient stated that his right arm feldman has been weak and that he has had some slurred speech. Stroke alert was called for right-sided weakness and dysarthria on 12/15. On arrival patient's NIH was 4 for right upper extremity, right lower extremity weakness, 1 limb ataxia and dysarthria. On further questioning, patient and wife state that the right arm weakness actually started last night. Patient's wife noticed when patient was trying to get up from the toilet that he was unable to push himself up with his right arm. Last known well 2330 on 2022. Patient taken to imaging for stat CT head which revealed no ICH, midline shift, mass-effect. CTA head and neck deferred due to patient's LC. Decision for no tPA was made due to patient's time since last known well. Patient with relatively low NIH and symptoms which are not consistent with LVO, therefore no thrombectomy/neuro intervention at this time. Patient had stat MRI brain and MRA head and neck without contrast.  MRI showed acute ischemic stroke of left parietal region. Pt admitted to IP rehab on 22.     Restrictions/Precautions:  Restrictions/Precautions: General Precautions, Fall Risk  Position Activity Restriction  Other position/activity restrictions: right side hemiparesis, recent CABG 11/22-minimize arm use ( s/p 5 wks), life vest     SUBJECTIVE: Pt pleasant and cooperative. Agreeable to OT     PAIN: Denies     Vitals: Patient Vitals for the past 8 hrs:   BP Temp Temp src Pulse Resp SpO2 O2 Device   01/04/23 0811 112/66 97.9 °F (36.6 °C) Oral 97 16 98 % None (Room air)         COGNITION: Decreased Insight    ADL:   No ADL's completed this session. Katelin Ruffing BALANCE:  Sitting Balance:  Modified Independent. Standing Balance: Stand By Assistance. BED MOBILITY:  Not Tested    TRANSFERS:  Sit to Stand:  Stand By Assistance. Recliner, standard chair   Stand to Sit: Stand By Assistance. FUNCTIONAL MOBILITY:  Assistive Device: Rolling Walker  Assist Level:  Contact Guard Assistance. Distance: To and from therapy gym and To and from therapy apartment     ADDITIONAL ACTIVITIES:  Pt completed IADL task of washing dishes, graded to promote functional use of RUE for pt to complete gross grasp of detergent bottle and wash cloth. Pt was able to functionally complete with RUE without any cues to initiate. Pt then used RUE to wash down countertops to improve functional use of RUE with closed chain WBing tasks. Pt demo endurance 8 min throughout with SBA. Pt completed recreational targeting task this date with RUE while in standing, standing 7 min with SBA while using B integration to complete task with RUE completing gross grasp and pincer grasp as well. Pt demo min difficulty with targeting. Demo no LOB throughout and min fatigue post task. Pt completed preparatory WBing through RUE and then progressed to using RUE in open chain using task oriented approach, of using RUE to reach in graded planes to retrieve small items to enforce pincer grasp. Pt completed multiple reps for improved motor planning. Min fatigue post task.      Pt completed functional [address] task of using RUE in various functional grasp patterns to complete knob, lock, plug and key management 2 reps each with min difficulty and min increased time required     Pt completed neuro based dowel angelica therex with 3# dowel angelica - 10 reps 1 set in all joints and all planes with a focus on equalizing force production between RUE and LUE, as well as for weighted proprioceptive input in RUE, all for neuro re-ed for return to UE function for ADL     ASSESSMENT:     Activity Tolerance:  Patient tolerance of  treatment: good. Discharge Recommendations: Home with Home Health OT  Equipment Recommendations: Equipment Needed: Yes  Other: OT staff to continue to monitor needs throughout OT POC. Plan: Times Per Week: 5x/week x 90 minutes and 1x/week x 30 minutes  Current Treatment Recommendations: Strengthening, Balance training, Self-Care / ADL, Equipment evaluation, education, & procurement, Safety education & training, Endurance training, Functional mobility training, Neuromuscular re-education, Patient/Caregiver education & training, Home management training    Patient Education  Patient Education: IADL's, Home Exercise Program, Hemibody Awareness/Attention, Reviewed Prior Education, and Assistive Device Safety    Goals  Short Term Goals  Time Frame for Short Term Goals: 1 week  Short Term Goal 1: Pt will complete functional ambulation to/from BR and HH distances with SBA and min vcs for safety to increase indep with toileting. Short Term Goal 2: Pt will decrease RUE 9 hole peg test by 10 seconds (49 on 1/3), and RUE  strength by 5 pounds (48.3# on 1/3) to increase indep with fasteners in UB dressing. Short Term Goal 3: Pt will complete dynamic standing task x 5 minutes with 1-2 UE release and Supervision to increase indep with sinkside grooming. Short Term Goal 4: Pt will complete UB dressing with mod I to increase indep within home environment.   Short Term Goal 5: Pt will complete LB dresing with S and min vcs for safety to increase indep and endurance within home environment. Additional Goals?: No  Long Term Goals  Time Frame for Long Term Goals : 4 weeks from IPR eval  Long Term Goal 1: Pt will complete BADL routine including shower transfer Mod Indep to increase indep and safety in home environment. Long Term Goal 2: Pt will complete simple IADL task with S and 0 vcs for safety to increase indep and endurance in home environment. Following session, patient left in safe position with all fall risk precautions in place.

## 2023-01-04 NOTE — PROGRESS NOTES
[facility]   Nephrology progress note  1/4/2023, 2:53 PM      Pt Name:    Dalton Riggs  MRN:     [id number]     YOB: 1960  Admit Date:    12/23/2022 11:24 AM    Chief Complaint: Nephrology following for LC/CKD. Subjective:  Patient seen and examined  No chest pain or shortness of breath  Feels much better  Having a lot of UOP - 2.8 yesterday and 1.5 today so far    Objective:  24HR INTAKE/OUTPUT:    Intake/Output Summary (Last 24 hours) at 1/4/2023 1453  Last data filed at 1/4/2023 1326  Gross per 24 hour   Intake 540 ml   Output 4350 ml   Net -3810 ml        Admission weight: 134 lb 8 oz (61 kg)  Wt Readings from Last 3 Encounters:   01/04/23 138 lb (62.6 kg)   12/23/22 130 lb 15.3 oz (59.4 kg)   12/13/22 142 lb 9.6 oz (64.7 kg)        Vitals :   Vitals:    01/03/23 2200 01/04/23 0546 01/04/23 0556 01/04/23 0811   BP: 113/69   112/66   Pulse: 77   97   Resp: 16   16   Temp: 97.9 °F (36.6 °C)   97.9 °F (36.6 °C)   TempSrc: Oral   Oral   SpO2: 99%   98%   Weight:  136 lb (61.7 kg) 138 lb (62.6 kg)    Height:           Physical examination  General Appearance:  Well developed.  No distress, infact cachectic  Mouth/Throat:  Oral mucosa moist  Neck:  Supple, no JVD  Lungs:  Breath sounds: clear  Heart[de-identified]  S1,S2 heard  Abdomen:  Soft, non - tender  Musculoskeletal:  Edema -no edema noted    Medications:  Infusion:    dextrose       Meds:    ferrous sulfate  325 mg Oral Daily with breakfast    melatonin  6 mg Oral Nightly    atorvastatin  40 mg Oral Daily    buPROPion  150 mg Oral QAM    pantoprazole  40 mg Oral QAM AC    tamsulosin  0.8 mg Oral Daily    apixaban  5 mg Oral BID    [Held by provider] metoprolol succinate  12.5 mg Oral Daily    glipiZIDE  10 mg Oral QAM AC    [Held by provider] empagliflozin  25 mg Oral Daily    docusate sodium  100 mg Oral BID    multivitamin  1 tablet Oral Daily with breakfast    senna  2 tablet Oral Nightly       Lab Data :  CBC:   No results for input(s): WBC, HGB, HCT, PLT in the last 72 hours. CMP:  Recent Labs     01/02/23  0619 01/03/23  0654    139   K 5.0 5.1    102   CO2 21* 22*   BUN 36* 37*   CREATININE 1.6* 1.7*   GLUCOSE 114* 135*   CALCIUM 9.0 9.4       Hepatic: No results for input(s): LABALBU, AST, ALT, ALB, BILITOT, ALKPHOS in the last 72 hours. Assessment and Plan:  Renal -acute kidney injury on chronic kidney disease with baseline creatinine around 1.5-1.7  Exact etiology not clear at this time   Making decent urine output has a Bella in place  Overall creatinine improved with hydration currently slowly rising again to 1.7. no new labs  Jardiance held . Continues to have high UOP. BMP in AM  Electrolytes -within normal limits   Acid-base status appears to be stable  Essential hypertension now running low, may be due to the increased urine output again. And Jardiance he is on hold. May need midodrine  Hx of diabetes mellitus  Hx of sepsis due to UTI from Klebsiella pneumonia on antibiotics  CAD status post CABG 11/22  Polyuria - send urine osm and urine electrolytes  Meds reviewed and discussed with patient       Soha Longo MD  [facility]    This report has been created using voice recognition software.  It may contain minor errors which are inherent in voice recognition technology

## 2023-01-04 NOTE — PROGRESS NOTES
Physical Medicine & Rehabilitation Progress Note    Chief Complaint:  Right-sided weakness due to stroke    Subjective:    Destini Talavera is a 63 y.o.  left-handed  male with history of hypertension, diabetes mellitus type 2, coronary artery disease with ischemic cardiomyopathy requiring two-vessel CABG, urinary retention with several failed void trial, status post tonsillectomy, was admitted to the inpatient rehabilitation unit on 12/23/2022 for intensive inpatient management of impairment & disability secondary to right hemiparesis due to acute left parietal corona radiata and right cerebellar ischemic stroke, and debility/physical deconditioning due to Klebsiella pneumonia urinary tract infection with emphysematous cystitis and bilateral hydronephrosis complicated by Klebsiella pneumonia bacteremia/sepsis. The patient was previously hospitalized at [facility] from 10/28/2022 to 11/15/2022 for coronary artery disease and ischemic cardiomyopathy requiring two-vessel CABG on 11/7/2022. His postoperative course was complicated by urinary retention with failed void trial.  The patient has been experiencing progressive weakness after he was discharged to home. The Bella was later removed on 12/13/2022 but the patient continued having difficulty voiding. On 12/14/2022 approximately 11:30 PM his wife noticed the patient had slight slurred speech with right arm weakness. On 12/15/2022 the patient suddenly felt dizzy and lightheaded while he was trying to get up from sitting on toilet and fell into the ground. He says he did not lose consciousness and did not hit his head. His wife called 46. He was transported to [address] [place] [address] ER for evaluation by EMS. He complains of neck pain and upper back pain. He was found to be tachycardic. His WBC was found to be 22.3. Bella was placed in ER and a rush of air followed by dark brown and bright red color urine came out.   He was put on IV meropenem and admitted. Urology was consulted. CT of cervical spine revealed only multilevel facet and uncovertebral joint arthropathy. CT of head done on 12/15/2020 revealed no acute intracranial abnormality. CT of the chest, abdomen and pelvis done on 12/15/2022 revealed emphysematous cystitis, bilateral hydronephrosis, and constipation. Because of right arm weakness, stroke code was called on 12/15/2022. MRI of brain done on 12/15/2022 revealed acute ischemic stroke at the left parietal region corona radiata, and possible small additional acute ischemic stroke within right cerebellum. Neurology service start patient on aspirin and Plavix combination for the stroke. MRA of the neck and head done on 12/15/2022 showed only mild bilateral carotid bulb disease. Transesophageal echocardiogram was done on 12/16/2022 showing severe global hypokinesis of left ventricle with estimated ejection fraction of 30%, and no thrombus identified. 2 blood culture and urine culture done on 12/15/2022 revealed Klebsiella pneumonia. Infectious disease had been consulted. Oral Cipro was started on 12/22/2022 after IV meropenem was completed. Patient's hospital course was also complicated by constipation which resolved this morning. The patient says he feels well. He says he had good sleep last night. He says his condition is getting better with increasing muscle strength at his right side. However he still notices his right side being more weaker than the left side. He denies having dizziness, lightheadedness, painful sensation, or numbness or tingling feeling. He continues tolerating the intensive rehabilitation treatment well. He is still on Bella catheter. The patient currently is projected to be ready for discharge on 1/13/2023. Rehabilitation:  PT: Reviewed. Transfers:  Sit to Stand: Stand By Assistance, [employer], pt. Occasionally requiring 2 attempts.   Stand to Sit:Stand By Assistance  To/From Bed and Chair: Contact Guard Assistance     Ambulation:  Minimal Assistance  Distance:10' from mat to chair, ~50' weaving cones, ~50' x3 during gait training  Surface: Level Tile  Device:Rolling Walker  Gait Deviations: Forward Flexed Posture, Slow Monique, Decreased Step Length Bilaterally, Decreased Weight Shift Right, Decreased Gait Speed, and Decreased Heel Strike Bilaterally  *during gait training, noted decreased weight-bearing on R during stance phase d/t to decreased anterior pelvic glide. This resulted in trunk shortening and genu recurvatum. To correct this, pt received manual cueing to facilitate loading through the R hip. Pt compensated with a step-to pattern, which was corrected with verbal and manual cueing and increased monique to enforce gait continuity. Contact Guard Assistance, with verbal cues   Distance: 150' x 1, 15' x 2  Surface: Level Tile  Device: Rolling Walker  Gait Deviations:  Decreased Weight Shift, Decreased Gait Speed, Decreased Heel Strike Bilaterally, and genu recurvatum R as pt. Fatigues. Decreased anterior pelvic glide with slight improvement noted after verbal cues. Neuromuscular Re-education  Pt attempted weaving through cones (~25) but was demonstrating excess recurvatum on R  Patient completed forward steps over fly swatter, initially one foot only. Focused on heel strike and weight shift onto leg which was advanced forward. Completed 5 times each LE. Progressed to stepping over with both feet and stepping back. Completed with each foot leading forward/back x5. Completed lateral weight shifts in parallel bars, 5x each leg. Pt performed lateral steps in parallel bars, 2 laps each direction  Pt performed lateral step ups on to 2\" step in parallel bars to facilitate lateral weight shift and strengthening; 5x each leg    Pt.  Completed standing, Stepping, and multi-tasking activities using Intermittent UE support to improve core stability, gait mechanics, hip/quad stability, and lateral weight shifting for improved functional mobility. Pt. Tossing bean bags with fwd stepping to promote increased trunk extension, anterior pelvic glide, and weight shifting over stance limb. Pt. Also completing stepping activity at staircase tapping single foot on 1st, 2nd, and back to 1st then ground level to promote hip and quad stability during SLS and improve weight shifting. OT: Reviewed. BALANCE:  Sitting Balance:  Modified Independent. Standing Balance: Stand By Assistance. TRANSFERS:  Sit to Stand:  Stand By Assistance. Recliner, standard chair   Stand to Sit: Stand By Assistance. FUNCTIONAL MOBILITY:  Assistive Device: Rolling Walker  Assist Level:  Contact Guard Assistance. Distance: To and from therapy gym and To and from therapy apartment     Assistive Device: Endosense 298 Level:  [address]. Distance: To/from therapy market, market to car, car to gym      ADDITIONAL ACTIVITIES:  Pt completed IADL task of washing dishes, graded to promote functional use of RUE for pt to complete gross grasp of detergent bottle and wash cloth. Pt was able to functionally complete with RUE without any cues to initiate. Pt then used RUE to wash down countertops to improve functional use of RUE with closed chain WBing tasks. Pt demo endurance 8 min throughout with SBA. Pt completed recreational targeting task this date with RUE while in standing, standing 7 min with SBA while using B integration to complete task with RUE completing gross grasp and pincer grasp as well. Pt demo min difficulty with targeting. Demo no LOB throughout and min fatigue post task. Pt completed preparatory WBing through RUE and then progressed to using RUE in open chain using task oriented approach, of using RUE to reach in graded planes to retrieve small items to enforce pincer grasp. Pt completed multiple reps for improved motor planning.  Leonid Baker fatigue post task. Pt completed functional [address] task of using RUE in various functional grasp patterns to complete knob, lock, plug and key management 2 reps each with min difficulty and min increased time required      Pt completed neuro based dowel angelica therex with 3# dowel angelica - 10 reps 1 set in all joints and all planes with a focus on equalizing force production between RUE and LUE, as well as for weighted proprioceptive input in RUE, all for neuro re-ed for return to UE function for ADL     Pt completed neuro based IADL grocery market task, using RUE in various grasp patterns to retrieve and transport items on floor level to promote trunk rotation towards R ladonna body as well. Completed several reps for improved motor planning. Progressed to using bag to carry weighted groceries with R hand for proprioceptive input and then ambulating with cart to car to place in backseat using RUE to open door handles, manage bags, etc. Demo mod fatigue but no LOB. All for neuro re-ed for return of RUE for IADL       ST: Reviewed. Completed discussion regarding patient's plan for discharge and any further barriers he feels he has at this time. Patient pleased with his progress, stating that the knows he will not be back to 100% when he leaves, but that he feels he will be able to safely function in the home. Time word problems: 10/10 indep  *Timely completion  *Intact math reasoning and math computation. Complex visual reasoning task (Map constructions): 11/12 indep, 1/12 with min cues  *Good success with reading comprehension, visual reasoning and deductive thinking. Review of Systems:  Review of Systems   Constitutional:  Negative for chills, diaphoresis, fatigue and fever. HENT:  Negative for hearing loss, rhinorrhea, sneezing, sore throat and trouble swallowing. Eyes:  Negative for visual disturbance. Respiratory:  Negative for cough, shortness of breath and wheezing.     Cardiovascular: Negative for chest pain and palpitations. Gastrointestinal:  Negative for abdominal pain, constipation, diarrhea, nausea and vomiting. Genitourinary:  Positive for difficulty urinating. Negative for dysuria. Musculoskeletal:  Positive for gait problem. Negative for arthralgias, myalgias and neck pain. Skin:  Negative for rash. Neurological:  Positive for weakness (Right upper and right lower extremities). Negative for dizziness, tremors, facial asymmetry, speech difficulty, light-headedness, numbness and headaches. Psychiatric/Behavioral:  Negative for dysphoric mood, hallucinations and sleep disturbance. The patient is not nervous/anxious.          Objective:  /64   Pulse 65   Temp 98.6 °F (37 °C) (Oral)   Resp 18   Ht 5' 2\" (1.575 m) Comment: last admit ht listed as 5'10\"; pt. appears closer to 5'10\"  Wt 135 lb 9 oz (61.5 kg)   SpO2 100%   BMI 24.79 kg/m²   Physical Exam   General:  well-developed, well nourished  male; in no acute distress ; appropriate affect & mood; sitting on reclining chair comfortably  Eyes: pupil equally round ; extra-ocular motion intact bilaterally  Head, Ear, Nose, Mouth & Throat : normocephalic ; no discharge from ears or nose ; no deformity ; no facial swelling ; oral mucosa pink   Neck :  supple ; no tenderness ; mild muscle spasm at bilateral cervical paraspinal muscles  Cardiovascular : Presence of healed vertical surgical scar at sternum ; regular rate & rhythm ; normal S1 & S2 heart sound ; no murmur ; normal peripheral pulse at the bilateral upper extremities; reduced pulse strength at the bilateral lower extremities  Pulmonary : Present breath sounds at the bilateral lung fields; no wheezing ; no rale; no crackle  Gastrointestinal : soft, slightly protruded abdomen without tenderness ; normal bowel sound present    : Bella catheter in place draining light yellowish urine  Back : no tenderness; no muscle spasm  Skin: no skin lesion or rash ; no pitting edema at all 4 extremities  Musculoskeletal : no limb asymmetry; no limb deformity; no tenderness at bilateral upper & lower extremities; no palpable mass at limbs ; no joints laxity or crepitation ; shoulder flexion and abduction passive ROM reaching 170 degrees bilaterally; hip flexion passive ROM reaching 130 degrees bilaterally; normal functional joints ROM at bilateral upper & lower extremities  Cerebral :  alert ; awake ; oriented to place, person and time; follow two-step verbal command  Cerebellum : mild dysmetria with the right finger-to-nose test; no dysmetria with left finger-to-nose test but with slight intentional tremor; no dysmetria with bilateral heel-to-shin test  Cranial Nerves : Tongue protrudes slightly to the right  (CN XII) ; grossly intact other CN II to XI function  Sensory : intact light touch and pin prick sensation at bilateral upper & lower extremities  Motor : normal muscle tone at bilateral upper & lower extremities ; 4+/5 to 5/5 muscle strength at right shoulder flexion; 4+/5 muscle strength at the right elbow extension; 4+/5 to 5/5 muscle strength at the right elbow flexion; 4+/5 muscle strength at right finger extension; 4+/5 muscle strength at the right finger flexion and handgrip; 4-/5 to 4+/5 muscle strength at right finger abduction; 4+/5 to 5/5 muscle strength at the right hip flexion; normal 5/5 muscle strength at the rest of bilateral upper & lower extremities  Reflex : 2+ right biceps, right brachioradialis and right knee reflexes; 1+ left biceps, left brachioradialis and left knee reflexes  Pathological Reflex :  No Ion's sign ; no ankle clonus  Gait : Not assessed      Diagnostics:   Recent Results (from the past 24 hour(s))   Osmolality, Urine    Collection Time: 01/04/23  5:20 PM   Result Value Ref Range    Osmolality, Ur 366 250 - 750 mosmol/kg   Electrolytes urine random    Collection Time: 01/04/23  5:20 PM   Result Value Ref Range    Sodium, Ur 73 meq/l Potassium, Urine 32.7 meq/l    Chloride, Urine 55.0 meq/l   Basic Metabolic Panel    Collection Time: 01/05/23  6:00 AM   Result Value Ref Range    Sodium 138 135 - 145 meq/L    Potassium 4.9 3.5 - 5.2 meq/L    Chloride 102 98 - 111 meq/L    CO2 23 23 - 33 meq/L    Glucose 151 (H) 70 - 108 mg/dL    BUN 36 (H) 7 - 22 mg/dL    Creatinine 1.7 (H) 0.4 - 1.2 mg/dL    Calcium 9.4 8.5 - 10.5 mg/dL   Anion Gap    Collection Time: 01/05/23  6:00 AM   Result Value Ref Range    Anion Gap 13.0 8.0 - 16.0 meq/L   Glomerular Filtration Rate, Estimated    Collection Time: 01/05/23  6:00 AM   Result Value Ref Range    Est, Glom Filt Rate 45 (A) >60 ml/min/1.73m2   POCT Glucose    Collection Time: 01/05/23  7:26 AM   Result Value Ref Range    POC Glucose 146 (H) 70 - 108 mg/dl         Impression:  Acute left parietal corona radiata and right cerebellar ischemic stroke causing right hemiparesis with impaired coordination, and dysarthria  Debility/physical decondition due to Klebsiella pneumonia urinary tract infection with emphysematous cystitis and bilateral hydronephrosis complicated by Klebsiella pneumonia bacteremia/sepsis  Urinary retention  Klebsiella pneumonia urinary tract infection with emphysematous cystitis and bilateral hydronephrosis  Klebsiella pneumoniae bacteremia/sepsis  Acute kidney injury possibly due to dehydration with orthostatic hypotension  Hypotension probably due to side effect from high-dose Flomax in combination with Toprol XL  Coronary artery disease with ischemic cardiomyopathy requiring two-vessel coronary artery bypass graft surgery on 11/7/2022  Hypertension  Diabetes mellitus type 2    The patient's condition remains stable. He still has mild weakness at his right upper and lower extremities. His blood pressure continue to be well maintained. He experienced no symptoms or sign of hypotension. He is still on Bella catheter for urinary retention.   Nephrology service is still following the patient. He continues tolerating the intensive inpatient rehabilitation treatment well. His function continue to improve slowly. The patient currently is projected to be ready for discharge on 1/13/2023. Plan:  Continues intensive PT/OT/SLP/RT inpatient rehabilitation program at least 3 hours per day, 5 days per week in order to improve functional status prior to discharge. Family education and training will be completed. Equipment evaluations and recommendations will be completed as appropriate. Rehabilitation nursing continues to be involved for bowel, bladder, skin, and pain management. Nursing will also provide education and training to patient and family. Prophylaxis:  DVT: Patient on Eliquis, JONAH stocking, intermittent pneumatic compression device. GI: Colace, Senokot, GlycoLax as needed, milk of magnesia as needed, Dulcolax suppository as needed.   Pain: Tylenol as needed  Continue Lipitor for CVA and CAD  Continues holding Toprol-XL for hypertension  Continue Flomax for urinary retention  Continue Protonix for gastric protection  Continue Jardiance (on hold by renal service), Glucotrol, Humalog insulin coverage for diabetes mellitus  Continue Wellbutrin XL for smoking cessation  Continue melatonin, trazodone as needed for insomnia  Continue multivitamin supplement  Nutrition: Continue current diet   Bladder: Monitoring signs or symptoms of UTI  Bowel: Monitoring signs or symptoms of constipation   and case management for coordination of care and discharge planning      Missed Therapy Time:  None      Popeye Cano MD

## 2023-01-04 NOTE — PROGRESS NOTES
[address] [address]  INPATIENT PHYSICAL THERAPY  DAILY NOTE  [facility] - [id number]    Time In: 1130  Time Out: 1230  Timed Code Treatment Minutes: 60 Minutes  Minutes: 60          Date: 2023  Patient Name: Julio Corrales,  Gender:  male        MRN: [id number]  : 1960  (63 y.o.)     Referring Practitioner: Dr. Alexandru Logan  Diagnosis: Acute stroke due to ischemia  Additional Pertinent Hx: Pt admitted through ED due to RUE weakness and slurred speech,  Also noted to have sepsis, LC, emphysematous cystitis. Hx:  HTN, Db, CAD, recent CABG (). MRI + for acute ischemic stroke Lt parietal region     Prior Level of Function:  Lives With: Spouse, Family (16 y.o son, 21 y.o step dtr.)  Type of Home: House  Home Layout: One level  Home Access: Stairs to enter with rails  Entrance Stairs - Number of Steps: 2 + threshold  Entrance Stairs - Rails: Both  Home Equipment:  (None used PTA)   Bathroom Shower/Tub: Tub/Shower unit  Bathroom Toilet: Standard  Bathroom Equipment: Tub transfer bench  Bathroom Accessibility: Accessible    ADL Assistance: Independent  Homemaking Assistance: Independent  Homemaking Responsibilities: Yes  Ambulation Assistance: Independent  Transfer Assistance: Independent  Active : No    Restrictions/Precautions:  Restrictions/Precautions: General Precautions, Fall Risk  Position Activity Restriction  Other position/activity restrictions: right side hemiparesis, recent CABG -minimize arm use ( s/p 5 wks), life vest     SUBJECTIVE: Pt began his session in the rehab gym following his OT session. His wife was present and observed the session. Pt reports no pain and was willing to participate in all activities.     PAIN: pt reports no pain this session    Vitals: Vitals not assessed per clinical judgement, see nursing flowsheet    OBJECTIVE:  Bed Mobility:  Not Tested    Transfers:  Sit to Stand: Contact Guard Assistance  Stand to [facility] Assistance  *pt crosses arms across chest to maintain sternal precautions, occasionally needs 2 attempt to stand, d/t decreased forward weight shift    Ambulation:  Minimal Assistance  Distance:10' from mat to chair, ~50' weaving cones, ~50' x3 during gait training  Surface: Level Tile  Device:Rolling Walker  Gait Deviations: Forward Flexed Posture, Slow Monique, Decreased Step Length Bilaterally, Decreased Weight Shift Right, Decreased Gait Speed, and Decreased Heel Strike Bilaterally  *during gait training, noted decreased weight-bearing on R during stance phase d/t to decreased anterior pelvic glide. This resulted in trunk shortening and genu recurvatum. To correct this, pt received manual cueing to facilitate loading through the R hip. Pt compensated with a step-to pattern, which was corrected with verbal and manual cueing and increased monique to enforce gait continuity. Neuromuscular education:  Pt attempted weaving through cones (~25) but was demonstrating exess recurvatum on R  Patient completed forward steps over fly swatter, initially one foot only. Focused on heel strike and and weight shift onto leg which was advanced forward. Completed 5 times each LE. Progressed to stepping over with both feet and stepping back. Completed with each foot leading forward/back x5. Completed lateral weight shifts in parallel bars, 5x each leg. Pt performed lateral steps in parallel bars, 2 laps each direction  Pt performed lateral step ups on to 2\" step in parallel bars to facilitate lateral weight shift and strengthening; 5x each leg    Functional Outcome Measures: Not completed       ASSESSMENT:  Assessment: Patient progressing toward established goals. Activity Tolerance:  Patient tolerance of  treatment: good.       Equipment Recommendations:Equipment Needed: Yes  Other: RW  Discharge Recommendations: Continue to assess pending progress and Patient would benefit from continued PT at discharge  Plan: Current Treatment Recommendations: Strengthening, ROM, Balance training, Functional mobility training, Transfer training, Endurance training, Neuromuscular re-education, Gait training, Stair training, Safety education & training, Equipment evaluation, education, & procurement, Home exercise program, Therapeutic activities, Patient/Caregiver education & training  General Plan:  (5x/ wk 90 min, 1x/ wk 30 min)    Patient Education  Patient Education: Transfers, Gait, Verbal Exercise Instruction,  - Patient Verbalized Understanding, - Patient Requires Continued Education    Goals:  Patient Goals : get back to my normal way of doing things  Short Term Goals  Time Frame for Short Term Goals: 1 wk  Short Term Goal 1: Pt to go supine <->sit, minimal use of UEs, SBA to get in/out of bed. Short Term Goal 2: Pt to get up/down from various seated surfaces, SBA, to get up to walk. Short Term Goal 3: Pt to walk with RW >= 50 ft, demonstrating step through gait pattern, recurvatum < 10% of steps on RT, CGA to progress to home mobility  Short Term Goal 4: Pt to ascend/descend 2 steps with gerber rails, CGA to progress to home entry. GOAL MET, SEE LTG  Short Term Goal 5: Pt to get in/out of car, SBA for transportation needs. Additional Goals?: Yes  Short Term Goal 6: Pt to perform Tinetti >= 18/28, demonstrating improved balance. Long Term Goals  Time Frame for Long Term Goals : 3 wks from evaluation  Long Term Goal 1: Pt to go supine <->sit, minimal use of UEs, Mod I, to get in/out of bed. Long Term Goal 2: Pt to get up/down from various seated surfaces, Mod I, to get up to walk. Long Term Goal 3: Pt to walk with RW >= 50 ft, demonstrating step through gait pattern, recurvatum < 10% of steps on RT, Mod I, for home mobility  Long Term Goal 4: Pt to perform Tinetti >= 24/28, demonstrating improved balance. Long Term Goal 5: Patient will ascend/descend 2 steps with bilateral hand rails with SBA for safe home entry.   Additional Goals?: Yes  Long term goal 6: Patient will complete car transfer with modified independence with safe technique to transfer in and out of vehicle safely. Following session, patient left in safe position with all fall risk precautions in place.

## 2023-01-04 NOTE — PROGRESS NOTES
Patient: Lisbeth Schuster  Unit/Bed: 7E-53/053-A  YOB: 1960  MRN: [id number] Acct: [de-identified]   Admitting Diagnosis: Acute stroke due to ischemia [facility]) [I63.9]  Admit Date:  12/23/2022  Hospital Day: 12    Assessment:     Principal Problem:    Acute stroke due to ischemia [facility])  Active Problems:    Ischemic cardiomyopathy    S/P CABG x 2    Emphysematous cystitis    Bacteremia due to Enterobacter species    Urinary retention    Hemiparesis affecting right side as late effect of stroke ([facility] [address]    Coordination impairment    Debility    UTI due to Klebsiella species    Moderate malnutrition ([facility] [address]    Diabetes mellitus type 2 in nonobese [facility])    Essential hypertension  Resolved Problems:    * No resolved hospital problems. *      Plan:     CS stable  Cr varies but is similar to older results        Subjective:     Patient has no complaint of CP or SOB. .   Medication side effects: none    Scheduled Meds:   ferrous sulfate  325 mg Oral Daily with breakfast    melatonin  6 mg Oral Nightly    atorvastatin  40 mg Oral Daily    buPROPion  150 mg Oral QAM    pantoprazole  40 mg Oral QAM AC    tamsulosin  0.8 mg Oral Daily    apixaban  5 mg Oral BID    [Held by provider] metoprolol succinate  12.5 mg Oral Daily    glipiZIDE  10 mg Oral QAM AC    [Held by provider] empagliflozin  25 mg Oral Daily    docusate sodium  100 mg Oral BID    multivitamin  1 tablet Oral Daily with breakfast    senna  2 tablet Oral Nightly     Continuous Infusions:   dextrose       PRN Meds:glucose, dextrose bolus **OR** dextrose bolus, glucagon (rDNA), dextrose, acetaminophen, ondansetron, oxyCODONE **OR** oxyCODONE, bisacodyl, magnesium hydroxide, traZODone, polyethylene glycol    Review of Systems  Pertinent items are noted in HPI.     Objective:     Patient Vitals for the past 8 hrs:   BP Temp Temp src Pulse Resp SpO2 Weight   01/04/23 0811 112/66 97.9 °F (36.6 °C) Oral 97 16 98 % --   01/04/23 0556 -- -- -- -- -- -- 138 lb (62.6 kg) 01/04/23 0546 -- -- -- -- -- -- 136 lb (61.7 kg)     I/O last 3 completed shifts:   In: 5 [P.O.:720]  Out: 5275 [Urine:5275]  I/O this shift:  In: 240 [P.O.:240]  Out: -     /66   Pulse 97   Temp 97.9 °F (36.6 °C) (Oral)   Resp 16   Ht 5' 2\" (1.575 m) Comment: last admit ht listed as 5'10\"; pt. appears closer to 5'10\"  Wt 138 lb (62.6 kg)   SpO2 98%   BMI 25.24 kg/m²     General appearance: alert, appears stated age, and cooperative  Head: Normocephalic, without obvious abnormality, atraumatic  Lungs: clear to auscultation bilaterally  Chest wall: no tenderness  Heart: regular rate and rhythm, S1, S2 normal, no murmur, click, rub or gallop  Abdomen: soft, non-tender; bowel sounds normal; no masses,  no organomegaly  Extremities: extremities normal, atraumatic, no cyanosis or edema  Skin: Skin color, texture, turgor normal. No rashes or lesions  Neurologic:  weak    Data Review:    Latest Reference Range & Units 1/2/23 07:07 1/3/23 08:06 1/4/23 07:21   POC Glucose 70 - 108 mg/dl 110 (H) 162 (H) 128 (H)   (H): Data is abnormally high     Latest Reference Range & Units 12/31/22 06:38 1/2/23 06:19 1/3/23 06:54   Creatinine 0.4 - 1.2 mg/dL 1.5 (H) 1.6 (H) 1.7 (H)   (H): Data is abnormally high    Electronically signed by Francesco Regalado MD on 1/4/2023 at 9:20 AM

## 2023-01-04 NOTE — PROGRESS NOTES
[facility]      Date:  1/4/2023            Patient Name: Funmilayo Foley           MRN: [id number]  Acct: [de-identified]          YOB: 1960 (63 y.o.)       Gender: male   Diagnosis: Acute Stroke due to Ischemia  Physician: Referring Practitioner: Ordering & Attending: Severa Grime, MD    REASON FOR MISSED TREATMENT:  Pt declined newspaper and pet therapy visit today-    Bill Lambert    1/4/2023

## 2023-01-04 NOTE — PROGRESS NOTES
[address]  Occupational Therapy  Daily Note  Time:   Time In: 1100  Time Out: 1130  Timed Code Treatment Minutes: 30 Minutes  Minutes: 30    Date: 2023  Patient Name: Julio Corrales,   Gender: male      Room: [facility]/053-A  MRN: [id number]  : 1960  (63 y.o.)  Referring Practitioner: Ordering & Attending: Alcides Madison MD  Diagnosis: Acute Stroke due to Ischemia  Additional Pertinent Hx: Julio Corrales who is a 63 y.o. male with a history of hypertension, diabetes, CAD on  daily, recent CABG in 2022, ischemic cardiomyopathy is admitted to [facility] for sepsis, emphysematous cystitis and acute kidney injury on CKD. During admission exam patient stated that his right arm feldman has been weak and that he has had some slurred speech. Stroke alert was called for right-sided weakness and dysarthria on 12/15. On arrival patient's NIH was 4 for right upper extremity, right lower extremity weakness, 1 limb ataxia and dysarthria. On further questioning, patient and wife state that the right arm weakness actually started last night. Patient's wife noticed when patient was trying to get up from the toilet that he was unable to push himself up with his right arm. Last known well 2330 on 2022. Patient taken to imaging for stat CT head which revealed no ICH, midline shift, mass-effect. CTA head and neck deferred due to patient's LC. Decision for no tPA was made due to patient's time since last known well. Patient with relatively low NIH and symptoms which are not consistent with LVO, therefore no thrombectomy/neuro intervention at this time. Patient had stat MRI brain and MRA head and neck without contrast.  MRI showed acute ischemic stroke of left parietal region. Pt admitted to IP rehab on 22.     Restrictions/Precautions:  Restrictions/Precautions: General Precautions, Fall Risk  Position Activity Restriction  Other position/activity restrictions: right side hemiparesis, recent CABG 11/22-minimize arm use ( s/p 5 wks), life vest     SUBJECTIVE: Pt pleasant and cooperative. Agreeable to OT     PAIN: Denies     Vitals: Patient Vitals for the past 8 hrs:   BP Temp Temp src Pulse Resp SpO2 O2 Device   01/04/23 0811 112/66 97.9 °F (36.6 °C) Oral 97 16 98 % None (Room air)       COGNITION: Decreased Insight    ADL:   No ADL's completed this session. Author Angeles BALANCE:  Sitting Balance:  Modified Independent. Standing Balance: Stand By Assistance. BED MOBILITY:  Not Tested    TRANSFERS:  Sit to Stand:  Stand By Assistance. Recliner, standard chair   Stand to Sit: Stand By Assistance. FUNCTIONAL MOBILITY:  Assistive Device: Rolling Walker  Assist Level:  Contact Guard Assistance. Distance: To/from therapy market, market to car, car to gym     ADDITIONAL ACTIVITIES:  Pt completed neuro based IADL grocery market task, usign RUE in various grasp patterns to retrieve and transport items on floor level to promote trunk rotation towards R ladonna body as well. Completed several reps for improved motor planning. Progressed to using bag to carry weighted groceries with R hand for proprioceptive input and then ambulating with cart to car to place in backseat using RUE to open door handles, manage bags, etc. Demo mod fatigue but no LOB. All for neuro re-ed for return of RUE for IADL     ASSESSMENT:     Activity Tolerance:  Patient tolerance of  treatment: good. Discharge Recommendations: Home with Home Health OT  Equipment Recommendations: Equipment Needed: Yes  Other: OT staff to continue to monitor needs throughout OT POC.   Plan: Times Per Week: 5x/week x 90 minutes and 1x/week x 30 minutes  Current Treatment Recommendations: Strengthening, Balance training, Self-Care / ADL, Equipment evaluation, education, & procurement, Safety education & training, Endurance training, Functional mobility training, Neuromuscular re-education, Patient/Caregiver education & training, Home management training    Patient Education  Patient Education: IADL's, Home Exercise Program, Hemibody Awareness/Attention, Reviewed Prior Education, and Assistive Device Safety    Goals  Short Term Goals  Time Frame for Short Term Goals: 1 week  Short Term Goal 1: Pt will complete functional ambulation to/from BR and HH distances with SBA and min vcs for safety to increase indep with toileting. Short Term Goal 2: Pt will decrease RUE 9 hole peg test by 10 seconds (49 on 1/3), and RUE  strength by 5 pounds (48.3# on 1/3) to increase indep with fasteners in UB dressing. Short Term Goal 3: Pt will complete dynamic standing task x 5 minutes with 1-2 UE release and Supervision to increase indep with sinkside grooming. Short Term Goal 4: Pt will complete UB dressing with mod I to increase indep within home environment. Short Term Goal 5: Pt will complete LB dresing with S and min vcs for safety to increase indep and endurance within home environment. Additional Goals?: No  Long Term Goals  Time Frame for Long Term Goals : 4 weeks from IPR eval  Long Term Goal 1: Pt will complete BADL routine including shower transfer Mod Indep to increase indep and safety in home environment. Long Term Goal 2: Pt will complete simple IADL task with S and 0 vcs for safety to increase indep and endurance in home environment. Following session, patient left in safe position with all fall risk precautions in place.

## 2023-01-04 NOTE — PROGRESS NOTES
[address] [facility] THERAPY  [facility]  DAILY NOTE     TIME   SLP Individual Minutes  Time In: 0730  Time Out: 0800  Minutes: 30  Timed Code Treatment Minutes: 30 Minutes       Date: 2023  Patient Name: Riccardo Velasquez      CSN: [id number]   : 1960  (63 y.o.)  Gender: male   Referring Physician:  Dr. Debora Staley   Diagnosis: Acute stroke due to ischemia, right hemiparesis secondary to stroke   Precautions: aspiration, fall risk   Current Diet: Regular diet with thin liquids   Swallowing Strategies: Standard Universal Swallow Precautions  Date of Last MBS/FEES: Not Applicable    Pain:  No pain reported. Subjective:   Upon arrival, patient sitting upright in chair, pleasant and cooperative. No family present. Short-Term Goals:  SHORT TERM GOAL #1:  Goal 1: Patient will complete complex executive functioning tasks (i.e., medication management, complex reasoning/deductive reasoning, etc.) with 90% accuracy and minimal cuing in order to allow for safe return to work (40/hours week). INTERVENTIONS: Completed discussion regarding patient's plan for discharge and any further barriers he feels he has at this time. Patient pleased with his progress, stating that the knows he will not be back to 100% when he leaves, but that he feels he will be able to safely function in the home. Time word problems: 10/10 indep  *Timely completion  *Intact math reasoning and math computation. Complex visual reasoning task (Map constructions):  indep,  with min cues  *Good success with reading comprehension, visual reasoning and deductive thinking. SHORT TERM GOAL #2:  Goal 2: Patient will complete higher level recall/short term memory and working memory tasks with 80% accuracy provided independent use of compensatory strategies in order to improve overall recall of newly learned theraputic information and anticipation to return to work (Shiela Loud in [employer]). INTERVENTIONS: Did not address due to focus on other goals. SHORT TERM GOAL #3:  Goal 3: Patient will demonstrate independent use of compensatory strategies (S-speak up, O-open mouth, S-slow down) within strucutred and unstrucutred speech tasks in order to improve overall articulatory precision. INTERVENTIONS: Did not address due to focus on other goals. Long-Term Goals:  Time Frame for Long Term Goals: 2 weeks from time of evaluation    LONG TERM GOAL #1:  Goal 1: Patient will improve overall cognitive function to return to independent living with wife and careing for children (16 year old). 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 GOAL MET- CONTINUE FOR CONSISTENCY        Comprehension: 6 - Complex ideas 90% or device (hearing aid or glasses- if patient is primarily a visual learner)  Expression: 6 - Device used to express complex ideas/needs  Social Interaction: 7 - Patient has appropriate behavior/relations 100% of the time  Problem Solvin - Patient able to solve simple/routine tasks  Memory: 6 - Patient requires device to recall (e.g. memory book)    EDUCATION:  Learner: Patient  Education:  Reviewed ST goals and Plan of Care and Reviewed recommendations for follow-up  Evaluation of Education: Verbalizes understanding, Demonstrates with assistance, and Family not present    ASSESSMENT/PLAN:  Activity Tolerance:  Patient tolerance of  treatment: good. Assessment/Plan: Patient progressing toward established goals. Continues to require skilled care of licensed speech pathologist to progress toward achievement of established goals and plan of care. .     Plan for Next Session: High level cognitive treatment- deductive thinking, memory, Review of SOS strategies   Discharge Recommendations: Home with Home Exercise Program     Bruce THAKUR  [address], [address], [address]

## 2023-01-04 NOTE — PROGRESS NOTES
[address] [place] [address]  INPATIENT PHYSICAL THERAPY  Daily Note  [facility] - 7E-53/053-A    Time In: 1330  Time Out: 1400  Timed Code Treatment Minutes: 30 Minutes  Minutes: 30          Date: 2023  Patient Name: Toño Davis,  Gender:  male        MRN: [id number]  : 1960  (63 y.o.)     Referring Practitioner: Dr. Angeles Parra  Diagnosis: Acute stroke due to ischemia  Additional Pertinent Hx: Pt admitted through ED due to RUE weakness and slurred speech,  Also noted to have sepsis, LC, emphysematous cystitis. Hx:  HTN, Db, CAD, recent CABG (). MRI + for acute ischemic stroke Lt parietal region     Prior Level of Function:  Lives With: Spouse, Family (16 y.o son, 21 y.o step dtr.)  Type of Home: House  Home Layout: One level  Home Access: Stairs to enter with rails  Entrance Stairs - Number of Steps: 2 + threshold  Entrance Stairs - Rails: Both  Home Equipment:  (None used PTA)   Bathroom Shower/Tub: Tub/Shower unit  Bathroom Toilet: Standard  Bathroom Equipment: Tub transfer bench  Bathroom Accessibility: Accessible    ADL Assistance: Independent  Homemaking Assistance: Independent  Homemaking Responsibilities: Yes  Ambulation Assistance: Independent  Transfer Assistance: Independent  Active : No    Restrictions/Precautions:  Restrictions/Precautions: General Precautions, Fall Risk  Position Activity Restriction  Other position/activity restrictions: right side hemiparesis, recent CABG -minimize arm use ( s/p 5 wks), life vest     SUBJECTIVE: Pt. Seated on his BS chair and pleasantly agrees to therapy session. Spouse present but not attending session. Pt. Reports increased fatigue this afternoon.      PAIN: None indicated    Vitals:   Patient Vitals for the past 8 hrs:   BP Patient Position Temp Temp src Pulse Resp SpO2 O2 Device   23 0811 112/66 Sitting 97.9 °F (36.6 °C) Oral 97 16 98 % None (Room air)        OBJECTIVE:  Bed Mobility:  Not Tested    Transfers:  Sit to Stand: Stand By Assistance, [employer], pt. Occasionally requiring 2 attempts. Stand to Sit:Stand By Assistance  To/From Bed and Chair: Contact Guard Assistance    Ambulation:  Contact Guard Assistance, with verbal cues   Distance: 150' x 1, 15' x 2  Surface: Level Tile  Device: Rolling Walker  Gait Deviations:  Decreased Weight Shift, Decreased Gait Speed, Decreased Heel Strike Bilaterally, and genu recurvatum R as pt. Fatigues. Decreased anterior pelvic glide with slight improvement noted after verbal cues. Stairs:  None    Balance:  None    Neuromuscular Re-education  Pt. Completed standing, Stepping, and multi-tasking activities using Intermittent UE support to improve core stability, gait mechanics, hip/quad stability, and lateral weight shifting for improved functional mobility. Pt. Tossing bean bags with fwd stepping to promote increased trunk extension, anterior pelvic glide, and weight shifting over stance limb. Pt. Also completing stepping activity at staircase tapping single foot on 1st, 2nd, and back to 1st then ground level to promote hip and quad stability during SLS and improve weight shifting. Exercise:  None    Functional Outcome Measures:   Not completed    ASSESSMENT:  Assessment: Patient progressing toward established goals. Activity Tolerance:  Patient tolerance of  treatment: good.      Equipment Recommendations:Equipment Needed: Yes  Other: RW  Discharge Recommendations: Continue to assess pending progress, Patient would benefit from continued therapy after discharge     Plan: Current Treatment Recommendations: Strengthening, ROM, Balance training, Functional mobility training, Transfer training, Endurance training, Neuromuscular re-education, Gait training, Stair training, Safety education & training, Equipment evaluation, education, & procurement, Home exercise program, Therapeutic activities, Patient/Caregiver education & training  General Plan:  (5x/ wk 90 min, 1x/ wk 30 min)    Patient Education  Patient Education: Plan of Care, Functional Mobility, Reviewed Prior Education, Health Promotion and Wellness Education, Safety    Goals:  Patient Goals : get back to my normal way of doing things  Short Term Goals  Time Frame for Short Term Goals: 1 wk  Short Term Goal 1: Pt to go supine <->sit, minimal use of UEs, SBA to get in/out of bed. Short Term Goal 2: Pt to get up/down from various seated surfaces, SBA, to get up to walk. Short Term Goal 3: Pt to walk with RW >= 50 ft, demonstrating step through gait pattern, recurvatum < 10% of steps on RT, CGA to progress to home mobility  Short Term Goal 4: Pt to ascend/descend 2 steps with gerber rails, CGA to progress to home entry. GOAL MET, SEE LTG  Short Term Goal 5: Pt to get in/out of car, SBA for transportation needs. Additional Goals?: Yes  Short Term Goal 6: Pt to perform Tinetti >= 18/28, demonstrating improved balance. Long Term Goals  Time Frame for Long Term Goals : 3 wks from evaluation  Long Term Goal 1: Pt to go supine <->sit, minimal use of UEs, Mod I, to get in/out of bed. Long Term Goal 2: Pt to get up/down from various seated surfaces, Mod I, to get up to walk. Long Term Goal 3: Pt to walk with RW >= 50 ft, demonstrating step through gait pattern, recurvatum < 10% of steps on RT, Mod I, for home mobility  Long Term Goal 4: Pt to perform Tinetti >= 24/28, demonstrating improved balance. Long Term Goal 5: Patient will ascend/descend 2 steps with bilateral hand rails with SBA for safe home entry. Additional Goals?: Yes  Long term goal 6: Patient will complete car transfer with modified independence with safe technique to transfer in and out of vehicle safely. Following session, patient left in safe position with all fall risk precautions in place.

## 2023-01-05 LAB
ANION GAP SERPL CALCULATED.3IONS-SCNC: 13 MEQ/L (ref 8–16)
BUN BLDV-MCNC: 36 MG/DL (ref 7–22)
CALCIUM SERPL-MCNC: 9.4 MG/DL (ref 8.5–10.5)
CHLORIDE BLD-SCNC: 102 MEQ/L (ref 98–111)
CO2: 23 MEQ/L (ref 23–33)
CREAT SERPL-MCNC: 1.7 MG/DL (ref 0.4–1.2)
GFR SERPL CREATININE-BSD FRML MDRD: 45 ML/MIN/1.73M2
GLUCOSE BLD-MCNC: 146 MG/DL (ref 70–108)
GLUCOSE BLD-MCNC: 151 MG/DL (ref 70–108)
POTASSIUM SERPL-SCNC: 4.9 MEQ/L (ref 3.5–5.2)
SODIUM BLD-SCNC: 138 MEQ/L (ref 135–145)

## 2023-01-05 PROCEDURE — 6370000000 HC RX 637 (ALT 250 FOR IP): Performed by: PHYSICAL MEDICINE & REHABILITATION

## 2023-01-05 PROCEDURE — 97116 GAIT TRAINING THERAPY: CPT

## 2023-01-05 PROCEDURE — 97129 THER IVNTJ 1ST 15 MIN: CPT | Performed by: SPEECH-LANGUAGE PATHOLOGIST

## 2023-01-05 PROCEDURE — 97112 NEUROMUSCULAR REEDUCATION: CPT

## 2023-01-05 PROCEDURE — 99232 SBSQ HOSP IP/OBS MODERATE 35: CPT | Performed by: PHYSICAL MEDICINE & REHABILITATION

## 2023-01-05 PROCEDURE — 97130 THER IVNTJ EA ADDL 15 MIN: CPT | Performed by: SPEECH-LANGUAGE PATHOLOGIST

## 2023-01-05 PROCEDURE — 97110 THERAPEUTIC EXERCISES: CPT

## 2023-01-05 PROCEDURE — 97530 THERAPEUTIC ACTIVITIES: CPT

## 2023-01-05 PROCEDURE — 82948 REAGENT STRIP/BLOOD GLUCOSE: CPT

## 2023-01-05 PROCEDURE — 36415 COLL VENOUS BLD VENIPUNCTURE: CPT

## 2023-01-05 PROCEDURE — 99232 SBSQ HOSP IP/OBS MODERATE 35: CPT | Performed by: INTERNAL MEDICINE

## 2023-01-05 PROCEDURE — 1180000000 HC REHAB R&B

## 2023-01-05 PROCEDURE — 97535 SELF CARE MNGMENT TRAINING: CPT

## 2023-01-05 PROCEDURE — 80048 BASIC METABOLIC PNL TOTAL CA: CPT

## 2023-01-05 RX ADMIN — TAMSULOSIN HYDROCHLORIDE 0.8 MG: 0.4 CAPSULE ORAL at 19:48

## 2023-01-05 RX ADMIN — SENNOSIDES 17.2 MG: 8.6 TABLET, FILM COATED ORAL at 19:48

## 2023-01-05 RX ADMIN — ACETAMINOPHEN 650 MG: 325 TABLET ORAL at 18:57

## 2023-01-05 RX ADMIN — PANTOPRAZOLE SODIUM 40 MG: 40 TABLET, DELAYED RELEASE ORAL at 05:03

## 2023-01-05 RX ADMIN — Medication 6 MG: at 19:48

## 2023-01-05 RX ADMIN — DOCUSATE SODIUM 100 MG: 100 CAPSULE, LIQUID FILLED ORAL at 19:48

## 2023-01-05 RX ADMIN — Medication 1 TABLET: at 08:24

## 2023-01-05 RX ADMIN — GLIPIZIDE 10 MG: 10 TABLET ORAL at 08:24

## 2023-01-05 RX ADMIN — APIXABAN 5 MG: 5 TABLET, FILM COATED ORAL at 08:25

## 2023-01-05 RX ADMIN — FERROUS SULFATE TAB 325 MG (65 MG ELEMENTAL FE) 325 MG: 325 (65 FE) TAB at 08:24

## 2023-01-05 RX ADMIN — BUPROPION HYDROCHLORIDE 150 MG: 150 TABLET, FILM COATED, EXTENDED RELEASE ORAL at 08:24

## 2023-01-05 RX ADMIN — ATORVASTATIN CALCIUM 40 MG: 40 TABLET, FILM COATED ORAL at 19:47

## 2023-01-05 RX ADMIN — APIXABAN 5 MG: 5 TABLET, FILM COATED ORAL at 19:47

## 2023-01-05 ASSESSMENT — ENCOUNTER SYMPTOMS
SORE THROAT: 0
SHORTNESS OF BREATH: 0
NAUSEA: 0
VOMITING: 0
RHINORRHEA: 0
COUGH: 0
ABDOMINAL PAIN: 0
TROUBLE SWALLOWING: 0
DIARRHEA: 0
CONSTIPATION: 0
WHEEZING: 0

## 2023-01-05 ASSESSMENT — PAIN DESCRIPTION - ORIENTATION: ORIENTATION: POSTERIOR

## 2023-01-05 ASSESSMENT — PAIN DESCRIPTION - DESCRIPTORS: DESCRIPTORS: ACHING

## 2023-01-05 ASSESSMENT — PAIN DESCRIPTION - LOCATION: LOCATION: NECK

## 2023-01-05 ASSESSMENT — PAIN SCALES - GENERAL: PAINLEVEL_OUTOF10: 5

## 2023-01-05 NOTE — PROGRESS NOTES
[address] [address]  INPATIENT PHYSICAL THERAPY  DAILY NOTE  [facility] - 7E-53/053-A    Time In: 1330  Time Out: 1415  Timed Code Treatment Minutes: 39 Minutes  Minutes: 45          Date: 2023  Patient Name: Marion Fonseca,  Gender:  male        MRN: [id number]  : 1960  (63 y.o.)     Referring Practitioner: Dr. Liz Nance  Diagnosis: Acute stroke due to ischemia  Additional Pertinent Hx: Pt admitted through ED due to RUE weakness and slurred speech,  Also noted to have sepsis, LC, emphysematous cystitis. Hx:  HTN, Db, CAD, recent CABG (). MRI + for acute ischemic stroke Lt parietal region     Prior Level of Function:  Lives With: Spouse, Family (16 y.o son, 21 y.o step dtr.)  Type of Home: House  Home Layout: One level  Home Access: Stairs to enter with rails  Entrance Stairs - Number of Steps: 2 + threshold  Entrance Stairs - Rails: Both  Home Equipment:  (None used PTA)   Bathroom Shower/Tub: Tub/Shower unit  Bathroom Toilet: Standard  Bathroom Equipment: Tub transfer bench  Bathroom Accessibility: Accessible    ADL Assistance: Independent  Homemaking Assistance: Independent  Homemaking Responsibilities: Yes  Ambulation Assistance: Independent  Transfer Assistance: Independent  Active : No    Restrictions/Precautions:  Restrictions/Precautions: General Precautions, Fall Risk  Position Activity Restriction  Other position/activity restrictions: right side hemiparesis, recent CABG -minimize arm use ( s/p 5 wks), life vest     SUBJECTIVE: Pt seated in recliner. Pleasant and cooperative. PAIN: none noted/10:     Vitals: Vitals not assessed per clinical judgement, see nursing flowsheet    OBJECTIVE:  Bed Mobility:  Not Tested    Transfers:  Sit to Stand: Minimal Assistance, to CGA. No UE support used primarily. X1 pushed from seat of chair with BUEs. [facility] to [facility], to [address], up to stand from seat. CGA to sit,  Pt able to get BLEs in/out without assist.    Ambulation:  Contact Guard Assistance, manual and verbal cues to avoid Rt knee recurvatum  Distance: 25 ft x4 with RW.  15 ft x1 without AD with pt noted to have a stronger recurvatum on RT, decreased speed, arms maintained tightly against thigh/hips. Surface: Level Tile and Carpet  Device:Rolling Walker  Gait Deviations:  Decreased Gait Speed and recurvatum noted on Rt. Manual cues and assist to prevent. Pt unable to \"feel\" either the correction or the recurvatum when it occurs. Will need to continue to trial this for improved control and awareness  Stairs:  Contact Guard Assistance  Number of Steps: 4  Height: 6\" step with Bilateral Handrails. Non reciprocal technique. Functional Outcome Measures: Not completed       ASSESSMENT:  Assessment: Patient progressing toward established goals. Activity Tolerance:  Patient tolerance of  treatment: good. Equipment Recommendations:Equipment Needed: Yes  Other: RW  Discharge Recommendations: Patient would benefit from continued PT at discharge  Plan: Current Treatment Recommendations: Strengthening, ROM, Balance training, Functional mobility training, Transfer training, Endurance training, Neuromuscular re-education, Gait training, Stair training, Safety education & training, Equipment evaluation, education, & procurement, Home exercise program, Therapeutic activities, Patient/Caregiver education & training  General Plan:  (5x/ wk 90 min, 1x/ wk 30 min)    Patient Education  Patient Education: Transfers, Gait, Stairs, Car Transfers,  - Patient Verbalized Understanding, - Patient Requires Continued Education    Goals:  Patient Goals : get back to my normal way of doing things  Short Term Goals  Time Frame for Short Term Goals: 1 wk  Short Term Goal 1: Pt to go supine <->sit, minimal use of UEs, SBA to get in/out of bed.   Short Term Goal 2: Pt to get up/down from various seated surfaces, SBA, to get up to walk. Short Term Goal 3: Pt to walk with RW >= 50 ft, demonstrating step through gait pattern, recurvatum < 10% of steps on RT, CGA to progress to home mobility  Short Term Goal 4: Pt to ascend/descend 2 steps with gerber rails, CGA to progress to home entry. GOAL MET, SEE LTG  Short Term Goal 5: Pt to get in/out of car, SBA for transportation needs. Additional Goals?: Yes  Short Term Goal 6: Pt to perform Tinetti >= 18/28, demonstrating improved balance. Long Term Goals  Time Frame for Long Term Goals : 3 wks from evaluation  Long Term Goal 1: Pt to go supine <->sit, minimal use of UEs, Mod I, to get in/out of bed. Long Term Goal 2: Pt to get up/down from various seated surfaces, Mod I, to get up to walk. Long Term Goal 3: Pt to walk with RW >= 50 ft, demonstrating step through gait pattern, recurvatum < 10% of steps on RT, Mod I, for home mobility  Long Term Goal 4: Pt to perform Tinetti >= 24/28, demonstrating improved balance. Long Term Goal 5: Patient will ascend/descend 2 steps with bilateral hand rails with SBA for safe home entry. Additional Goals?: Yes  Long term goal 6: Patient will complete car transfer with modified independence with safe technique to transfer in and out of vehicle safely. Following session, patient left in safe position with all fall risk precautions in place.

## 2023-01-05 NOTE — PROGRESS NOTES
[address]  Occupational Therapy  Daily Note  Time:   Time In: 1130  Time Out: 1200  Timed Code Treatment Minutes: 30 Minutes  Minutes: 30          Date: 2023  Patient Name: Esteban Jaquez,   Gender: male      Room: [facility]/053-A  MRN: [id number]  : 1960  (63 y.o.)  Referring Practitioner: Ordering & Attending: Monica Kessler MD  Diagnosis: Acute Stroke due to Ischemia  Additional Pertinent Hx: Esteban Jaquez who is a 63 y.o. male with a history of hypertension, diabetes, CAD on  daily, recent CABG in 2022, ischemic cardiomyopathy is admitted to [facility] for sepsis, emphysematous cystitis and acute kidney injury on CKD. During admission exam patient stated that his right arm feldman has been weak and that he has had some slurred speech. Stroke alert was called for right-sided weakness and dysarthria on 12/15. On arrival patient's NIH was 4 for right upper extremity, right lower extremity weakness, 1 limb ataxia and dysarthria. On further questioning, patient and wife state that the right arm weakness actually started last night. Patient's wife noticed when patient was trying to get up from the toilet that he was unable to push himself up with his right arm. Last known well 2330 on 2022. Patient taken to imaging for stat CT head which revealed no ICH, midline shift, mass-effect. CTA head and neck deferred due to patient's LC. Decision for no tPA was made due to patient's time since last known well. Patient with relatively low NIH and symptoms which are not consistent with LVO, therefore no thrombectomy/neuro intervention at this time. Patient had stat MRI brain and MRA head and neck without contrast.  MRI showed acute ischemic stroke of left parietal region. Pt admitted to IP rehab on 22.     Restrictions/Precautions:  Restrictions/Precautions: General Precautions, Fall Risk  Position Activity Restriction  Other position/activity restrictions: right side hemiparesis, recent CABG 11/22-minimize arm use ( s/p 5 wks), life vest     SUBJECTIVE: Pt. Cooperative during session, pt has no complaints of pain or discomfort throughout. Pt. Talkative throughout. PAIN: 0/10: as pt denies pain    Vitals: Vitals not assessed per clinical judgement, see nursing flowsheet    COGNITION: Decreased Safety Awareness and Impulsive    ADL:   No ADL's completed this session. Anuragyann Push BALANCE:  Sitting Balance:  Modified Independent. Standing Balance: Stand By Assistance for majority of session with 2 accounts of Contact Guard Assistance due to complexity of task. BED MOBILITY:  Not Tested    TRANSFERS:  Sit to Stand:  Stand By Assistance. Stand to Sit: Stand By Assistance. FUNCTIONAL MOBILITY:  Assistive Device: Rolling Walker  Assist Level:  Stand By Assistance for majority of task and two accounts of [employer] due to reduced balance and navigating within small/tight spaces. Distance:within room, to/from main floor gift shop  Pt. Required verbal cues to reduce pace as pt attempting to briskly walk throughout, pt demo fair+ carryover. ADDITIONAL ACTIVITIES:  To further advance performance with community reintegration tasks pt instructed to read signs within the hallway to navigate to/from gift shop on main level. Pt. Kavita Rhodes SBA-CGA during task (see details in functional mobility section). Pt. Instructed to locate items within gift shop, pt able to locate each item with no additional cues. Pt. Stood for ~15 minutes of duration of IADL task. Pt. Claude Foy he has reduced compliance with theraputty HEP, pt educated on importance of completing outside of OT sessions to further advance hand strength in RUE for ease with daily tasks, verbal understanding obtained and pt able to complete with intermittent cues for recall and technique.      ASSESSMENT:     Activity Tolerance:  Patient tolerance of treatment: fair. Discharge Recommendations: Home with Home Health OT and Patient would benefit from continued OT at discharge  Equipment Recommendations: Equipment Needed: Yes  Other: OT staff to continue to monitor needs throughout OT POC. Plan: Times Per Week: 5x/week x 90 minutes and 1x/week x 30 minutes  Current Treatment Recommendations: Strengthening, Balance training, Self-Care / ADL, Equipment evaluation, education, & procurement, Safety education & training, Endurance training, Functional mobility training, Neuromuscular re-education, Patient/Caregiver education & training, Home management training    Patient Education  Patient Education: IADL's, Home Exercise Program, Importance of Increasing Activity, and Assistive Device Safety    Goals  Short Term Goals  Time Frame for Short Term Goals: 1 week  Short Term Goal 1: Pt will complete functional ambulation to/from BR and HH distances with SBA and min vcs for safety to increase indep with toileting. Short Term Goal 2: Pt will decrease RUE 9 hole peg test by 10 seconds (49 on 1/3), and RUE  strength by 5 pounds (48.3# on 1/3) to increase indep with fasteners in UB dressing. Short Term Goal 3: Pt will complete dynamic standing task x 5 minutes with 1-2 UE release and Supervision to increase indep with sinkside grooming. Short Term Goal 4: Pt will complete UB dressing with mod I to increase indep within home environment. Short Term Goal 5: Pt will complete LB dresing with S and min vcs for safety to increase indep and endurance within home environment. Additional Goals?: No  Long Term Goals  Time Frame for Long Term Goals : 4 weeks from IPR eval  Long Term Goal 1: Pt will complete BADL routine including shower transfer Mod Indep to increase indep and safety in home environment. Long Term Goal 2: Pt will complete simple IADL task with S and 0 vcs for safety to increase indep and endurance in home environment.     Following session, patient left in safe position with all fall risk precautions in place.

## 2023-01-05 NOTE — PROGRESS NOTES
[address]  Occupational Therapy  Daily Note  Time:   Time In: 1304  Time Out: 8643  Timed Code Treatment Minutes: 60 Minutes  Minutes: 60          Date: 2023  Patient Name: Ryanne Montalvo,   Gender: male      Room: [facility]/053-A  MRN: [id number]  : 1960  (63 y.o.)  Referring Practitioner: Ordering & Attending: Lisa Peralta MD  Diagnosis: Acute Stroke due to Ischemia  Additional Pertinent Hx: Ryanne Montalvo who is a 63 y.o. male with a history of hypertension, diabetes, CAD on  daily, recent CABG in 2022, ischemic cardiomyopathy is admitted to [facility] for sepsis, emphysematous cystitis and acute kidney injury on CKD. During admission exam patient stated that his right arm feldman has been weak and that he has had some slurred speech. Stroke alert was called for right-sided weakness and dysarthria on 12/15. On arrival patient's NIH was 4 for right upper extremity, right lower extremity weakness, 1 limb ataxia and dysarthria. On further questioning, patient and wife state that the right arm weakness actually started last night. Patient's wife noticed when patient was trying to get up from the toilet that he was unable to push himself up with his right arm. Last known well 2330 on 2022. Patient taken to imaging for stat CT head which revealed no ICH, midline shift, mass-effect. CTA head and neck deferred due to patient's LC. Decision for no tPA was made due to patient's time since last known well. Patient with relatively low NIH and symptoms which are not consistent with LVO, therefore no thrombectomy/neuro intervention at this time. Patient had stat MRI brain and MRA head and neck without contrast.  MRI showed acute ischemic stroke of left parietal region. Pt admitted to IP rehab on 22.     Restrictions/Precautions:  Restrictions/Precautions: General Precautions, Fall Risk  Position Activity Restriction  Other position/activity restrictions: right side hemiparesis, recent CABG 11/22-minimize arm use ( s/p 5 wks), life vest     SUBJECTIVE: Pt. Motivated to engage in session, pt reports he is pleased his IV is removed and he can wear his watch. Pt. Talkative throughout session. PAIN: Pt. Denies pain. Vitals: Vitals not assessed per clinical judgement, see nursing flowsheet    COGNITION: Decreased Safety Awareness    ADL:   Upper Extremity Dressing: with set-up. To don/doff pull over shirt . Pt. Able to manage cath position on/off walker. BALANCE:  Sitting Balance:  Modified Independent. Standing Balance: Stand By Assistance. BED MOBILITY:  Not Tested    TRANSFERS:  Sit to Stand:  Stand By Assistance. Stand to Sit: Stand By Assistance. FUNCTIONAL MOBILITY:  Assistive Device: Rolling Walker  Assist Level:  Stand By Assistance. Distance: To and from therapy gym        ADDITIONAL ACTIVITIES:  In order to improve pt's use of BUE simultaneously and address WB body on arm tasks, pt instructed to complete table top exercises in circular motions (counter clockwise/counter clockwise), laterally, to/from midline, \"V\" shape, pt completed x20 reps each with no complaints of pain or discomfort. In order to improve R  strength for ease with daily task completion, pt instructed to engage in hand strengthening with use of moderate resistive powerweb of open/close fists, forearm supination/pronation, finger walks, pt completed 20 reps each. Pt. Then instructed to utilize 5# digiflex with R digits collectively then isolated 20 reps each, pt demo good tolerance. OTR instructed pt to use RUE to cross midline to grasp small items in bin and manipulate with in hand translaton (fingers <-> palm) to sort items by color to improve [facility] for improved performance on 9 HPT and managing clothing fasteners, pt completed with increased time with set up assistance. Pt.  Instructed to engage in community reintegration task to locate main elevators (A) on this floor, pt able to read signs on wall within the unit and hallway to locate appropriate elevator, pt able complete with SBA and negotiate walker with no cues this date. ASSESSMENT:     Activity Tolerance:  Patient tolerance of  treatment: fair. Discharge Recommendations: Home with Home Health OT and Patient would benefit from continued OT at discharge  Equipment Recommendations: Equipment Needed: Yes  Other: OT staff to continue to monitor needs throughout OT POC. Plan: Times Per Week: 5x/week x 90 minutes and 1x/week x 30 minutes  Current Treatment Recommendations: Strengthening, Balance training, Self-Care / ADL, Equipment evaluation, education, & procurement, Safety education & training, Endurance training, Functional mobility training, Neuromuscular re-education, Patient/Caregiver education & training, Home management training    Patient Education  Patient Education: Importance of Increasing Activity    Goals  Short Term Goals  Time Frame for Short Term Goals: 1 week  Short Term Goal 1: Pt will complete functional ambulation to/from BR and HH distances with SBA and min vcs for safety to increase indep with toileting. Short Term Goal 2: Pt will decrease RUE 9 hole peg test by 10 seconds (49 on 1/3), and RUE  strength by 5 pounds (48.3# on 1/3) to increase indep with fasteners in UB dressing. Short Term Goal 3: Pt will complete dynamic standing task x 5 minutes with 1-2 UE release and Supervision to increase indep with sinkside grooming. Short Term Goal 4: Pt will complete UB dressing with mod I to increase indep within home environment. Short Term Goal 5: Pt will complete LB dresing with S and min vcs for safety to increase indep and endurance within home environment.   Additional Goals?: No  Long Term Goals  Time Frame for Long Term Goals : 4 weeks from IPR eval  Long Term Goal 1: Pt will complete BADL routine including shower transfer Mod Indep to increase indep and safety in home environment. Long Term Goal 2: Pt will complete simple IADL task with S and 0 vcs for safety to increase indep and endurance in home environment. Following session, patient left in safe position with all fall risk precautions in place.

## 2023-01-05 NOTE — PROGRESS NOTES
[facility]  INPATIENT PHYSICAL THERAPY  DAILY NOTE  [facility] - 7E-53/053-A    Time In: 1341  Time Out: 1100  Timed Code Treatment Minutes: 39 Minutes  Minutes: 45          Date: 2023  Patient Name: Evelyn Matthews,  Gender:  male        MRN: [id number]  : 1960  (63 y.o.)     Referring Practitioner: Dr. Renetta Sebastian  Diagnosis: Acute stroke due to ischemia  Additional Pertinent Hx: Pt admitted through ED due to RUE weakness and slurred speech,  Also noted to have sepsis, LC, emphysematous cystitis. Hx:  HTN, Db, CAD, recent CABG (). MRI + for acute ischemic stroke Lt parietal region     Prior Level of Function:  Lives With: Spouse, Family (16 y.o son, 21 y.o step dtr.)  Type of Home: House  Home Layout: One level  Home Access: Stairs to enter with rails  Entrance Stairs - Number of Steps: 2 + threshold  Entrance Stairs - Rails: Both  Home Equipment:  (None used PTA)   Bathroom Shower/Tub: Tub/Shower unit  Bathroom Toilet: Standard  Bathroom Equipment: Tub transfer bench  Bathroom Accessibility: Accessible    ADL Assistance: Independent  Homemaking Assistance: Independent  Homemaking Responsibilities: Yes  Ambulation Assistance: Independent  Transfer Assistance: Independent  Active : No    Restrictions/Precautions:  Restrictions/Precautions: General Precautions, Fall Risk  Position Activity Restriction  Other position/activity restrictions: right side hemiparesis, recent CABG -minimize arm use ( s/p 5 wks), life vest     SUBJECTIVE: Pt seated in recliner. Pleasant and cooperative. PAIN: not rated/10: c/o overall m. Soreness due to his therapy workouts, but was not specific re:  area and denied need for pain meds.       Vitals: Vitals not assessed per clinical judgement, see nursing flowsheet    OBJECTIVE:  Bed Mobility:  Supine to Sit: Modified Independent  Sit to Supine: Modified Independent     Transfers:  Sit to Stand: Clay Woody Assistance, to SBA. Pt initially required 2 attempts to get up, minimal use of UEs or UEs across chest.  After therapeutic sit <->stand performed numerous reps with mirror for visual cues, cues and demo for more dynamic trunk with improved alignment and with more forward translation of wt over base, pt was able to perform consistently with only 1 attempt. Stand to [facility] 68, to SBA. Ambulation:  Contact Guard Assistance, with verbal cues , occasional manual cues at RT post pelvis  Distance: 25 ft x4 reps  Surface: Level Tile  Device:Rolling Walker  Gait Deviations:  Decreased Gait Speed and Pt used a combination of step through/step to (favoring decreased stance time on Rt) gait pattern. With cues was able to demonstrate more consistent step through pattern. Manual cues initially for more forward pelvic glide with swing and initial stance on RT with carryover noted with last walk. Exercise:    Exercises were completed for increased independence with functional mobility. Supine- bridges and low trunk rotation with BUEs across chest, BLEs on therapy ball for trunk rotation x10 reps. Cues to maintain stable lower trunk and Les position with bridges. Caesar hip ADD isometrics with cues for equal force as tends to show increased LLE strength and force, going off midline, manually resisted PNF D1 LLE and hip ABD x10 reps. BUE diagonals to ea direction x10 reps. Functional Outcome Measures: Not completed       ASSESSMENT:  Assessment: Patient progressing toward established goals. Activity Tolerance:  Patient tolerance of  treatment: good.       Equipment Recommendations:Equipment Needed: Yes  Other: RW  Discharge Recommendations: Continue to assess pending progress and Patient would benefit from continued PT at discharge  Plan: Current Treatment Recommendations: Strengthening, ROM, Balance training, Functional mobility training, Transfer training, Endurance training, Neuromuscular re-education, Gait training, Stair training, Safety education & training, Equipment evaluation, education, & procurement, Home exercise program, Therapeutic activities, Patient/Caregiver education & training  General Plan:  (5x/ wk 90 min, 1x/ wk 30 min)    Patient Education  Patient Education: Home Exercise Program, Transfers, Gait,  - Patient Verbalized Understanding, - Patient Requires Continued Education    Goals:  Patient Goals : get back to my normal way of doing things  Short Term Goals  Time Frame for Short Term Goals: 1 wk  Short Term Goal 1: Pt to go supine <->sit, minimal use of UEs, SBA to get in/out of bed. Short Term Goal 2: Pt to get up/down from various seated surfaces, SBA, to get up to walk. Short Term Goal 3: Pt to walk with RW >= 50 ft, demonstrating step through gait pattern, recurvatum < 10% of steps on RT, CGA to progress to home mobility  Short Term Goal 4: Pt to ascend/descend 2 steps with gerber rails, CGA to progress to home entry. GOAL MET, SEE LTG  Short Term Goal 5: Pt to get in/out of car, SBA for transportation needs. Additional Goals?: Yes  Short Term Goal 6: Pt to perform Tinetti >= 18/28, demonstrating improved balance. Long Term Goals  Time Frame for Long Term Goals : 3 wks from evaluation  Long Term Goal 1: Pt to go supine <->sit, minimal use of UEs, Mod I, to get in/out of bed. Long Term Goal 2: Pt to get up/down from various seated surfaces, Mod I, to get up to walk. Long Term Goal 3: Pt to walk with RW >= 50 ft, demonstrating step through gait pattern, recurvatum < 10% of steps on RT, Mod I, for home mobility  Long Term Goal 4: Pt to perform Tinetti >= 24/28, demonstrating improved balance. Long Term Goal 5: Patient will ascend/descend 2 steps with bilateral hand rails with SBA for safe home entry.   Additional Goals?: Yes  Long term goal 6: Patient will complete car transfer with modified independence with safe technique to transfer in and out of vehicle safely. Following session, patient left in safe position with all fall risk precautions in place.

## 2023-01-05 NOTE — PLAN OF CARE
Problem: Chronic Conditions and Co-morbidities  Goal: Patient's chronic conditions and co-morbidity symptoms are monitored and maintained or improved  Outcome: Progressing  Flowsheets (Taken 1/4/2023 2053)  Care Plan - Patient's Chronic Conditions and Co-Morbidity Symptoms are Monitored and Maintained or Improved: Monitor and assess patient's chronic conditions and comorbid symptoms for stability, deterioration, or improvement     Problem: Safety - Adult  Goal: Free from fall injury  Description: Patient absent of falls this shift. Bed in lowest position, side rails up 2/4. Call-light within reach. Patient instructed to use call-light to get up. Non-skid footwear in place.            1/4/2023 2303 by Janna Garibay RN  Outcome: Progressing     Problem: ABCDS Injury Assessment  Goal: Absence of physical injury  Description: Patient is free from physical injury  Outcome: Progressing     Problem: Skin/Tissue Integrity  Goal: Absence of new skin breakdown  Description:  Monitor for areas of redness and/or skin breakdown, no skin breakdown     1/4/2023 2303 by Janna Garibay RN  Outcome: Progressing     Problem: Pain  Goal: Verbalizes/displays adequate comfort level or baseline comfort level  Description: Patient denies pain  Outcome: Progressing     Problem: Metabolic/Fluid and Electrolytes - Adult  Goal: Electrolytes maintained within normal limits  Outcome: Progressing  Flowsheets (Taken 1/4/2023 2053)  Electrolytes maintained within normal limits:   Monitor labs and assess patient for signs and symptoms of electrolyte imbalances   Administer electrolyte replacement as ordered     Problem: Genitourinary - Adult  Goal: Urinary catheter remains patent  Description: Patient catheter remains patent and draining light yellow urine, cath care completed this shift and education provided to patient   Outcome: Progressing  Flowsheets (Taken 1/4/2023 2053)  Urinary catheter remains patent: Assess patency of urinary catheter

## 2023-01-05 NOTE — PROGRESS NOTES
[facility]   Nephrology progress note  1/5/2023, 11:22 AM      Pt Name:    Mira Melendez  MRN:     [id number]     YOB: 1960  Admit Date:    12/23/2022 11:24 AM    Chief Complaint: Nephrology following for LC/CKD. Subjective:  Patient seen and examined  No chest pain or shortness of breath  Feels much better  Having a lot of UOP but slightly down yesterday around 2200 mL    Objective:  24HR INTAKE/OUTPUT:    Intake/Output Summary (Last 24 hours) at 1/5/2023 1122  Last data filed at 1/5/2023 0458  Gross per 24 hour   Intake 1405 ml   Output 2200 ml   Net -795 ml        Admission weight: 134 lb 8 oz (61 kg)  Wt Readings from Last 3 Encounters:   01/05/23 135 lb 9 oz (61.5 kg)   12/23/22 130 lb 15.3 oz (59.4 kg)   12/13/22 142 lb 9.6 oz (64.7 kg)        Vitals :   Vitals:    01/04/23 0811 01/04/23 2051 01/05/23 0506 01/05/23 0821   BP: 112/66 115/64  (!) 104/55   Pulse: 97 65  99   Resp: 16 18     Temp: 97.9 °F (36.6 °C) 98.6 °F (37 °C)  98.2 °F (36.8 °C)   TempSrc: Oral Oral  Oral   SpO2: 98% 100%  100%   Weight:   135 lb 9 oz (61.5 kg)    Height:           Physical examination  General Appearance:  Well developed.  No distress, infact cachectic  Mouth/Throat:  Oral mucosa moist  Neck:  Supple, no JVD  Lungs:  Breath sounds: clear  Heart[de-identified]  S1,S2 heard  Abdomen:  Soft, non - tender  Musculoskeletal:  Edema -no edema noted    Medications:  Infusion:    dextrose       Meds:    ferrous sulfate  325 mg Oral Daily with breakfast    melatonin  6 mg Oral Nightly    atorvastatin  40 mg Oral Daily    buPROPion  150 mg Oral QAM    pantoprazole  40 mg Oral QAM AC    tamsulosin  0.8 mg Oral Daily    apixaban  5 mg Oral BID    [Held by provider] metoprolol succinate  12.5 mg Oral Daily    glipiZIDE  10 mg Oral QAM AC    [Held by provider] empagliflozin  25 mg Oral Daily    docusate sodium  100 mg Oral BID    multivitamin  1 tablet Oral Daily with breakfast    senna  2 tablet Oral Nightly Lab Data :  CBC:   No results for input(s): WBC, HGB, HCT, PLT in the last 72 hours. CMP:  Recent Labs     01/03/23  0654 01/05/23  0600    138   K 5.1 4.9    102   CO2 22* 23   BUN 37* 36*   CREATININE 1.7* 1.7*   GLUCOSE 135* 151*   CALCIUM 9.4 9.4       Hepatic: No results for input(s): LABALBU, AST, ALT, ALB, BILITOT, ALKPHOS in the last 72 hours. Assessment and Plan:  Renal -acute kidney injury on chronic kidney disease with baseline creatinine around 1.5-1.7  Exact etiology not clear at this time   Making a lot of urine output we will put him on a 2000 mm fluid restriction creatinine stable at 1.7  Urine lites and osmolality not much helpful  Continue to hold Jardiance and BMP in the morning  Electrolytes -within normal limits   Acid-base status appears to be stable  Essential hypertension now running low, may be due to the increased urine output again. And Marcha Erica is on hold. May need midodrine if continues to run low  Hx of diabetes mellitus  Hx of sepsis due to UTI from Klebsiella pneumonia on antibiotics  CAD status post CABG 11/22  Polyuria - send urine osm and urine electrolytes  Meds reviewed and discussed with patient and primary team      Janice Mata MD  [facility]    This report has been created using voice recognition software.  It may contain minor errors which are inherent in voice recognition technology

## 2023-01-05 NOTE — PROGRESS NOTES
[facility] THERAPY  [address]  DAILY NOTE     TIME   SLP Individual Minutes  Time In: 0740  Time Out: 3118  Minutes: 23  Timed Code Treatment Minutes: 23 Minutes       Date: 2023  Patient Name: Esteban Jaquez      CSN: [id number]   : 1960  (63 y.o.)  Gender: male   Referring Physician:  Dr. Delio Doran   Diagnosis: Acute stroke due to ischemia, right hemiparesis secondary to stroke   Precautions: aspiration, fall risk   Current Diet: Regular diet with thin liquids   Swallowing Strategies: Standard Universal Swallow Precautions  Date of Last MBS/FEES: Not Applicable    Pain:  No pain reported. Subjective:   Patient positioned upright in recliner, motivated and ready to initiate speech therapy tasks. Short-Term Goals:  SHORT TERM GOAL #1:  Goal 1: Patient will complete complex executive functioning tasks (i.e., medication management, complex reasoning/deductive reasoning, etc.) with 90% accuracy and minimal cuing in order to allow for safe return to work (40/hours week). INTERVENTIONS:     Complex visual reasoning task (Map constructions) *Continued from previous session:  indep,  with min cues  *Increased difficulty with more complex directions and visual reasoning for identifying specific streets. *Increased time needed for task completion, taking ~20 minutes to complete remaining 8 streets. SHORT TERM GOAL #2:  Goal 2: Patient will complete higher level recall/short term memory and working memory tasks with 80% accuracy provided independent use of compensatory strategies in order to improve overall recall of newly learned theraputic information and anticipation to return to work ([employer] in [employer]). INTERVENTIONS: Recall of discharge plan: 3/3 indep    Recall of sternal precautions:  Patient stating \"pain as guidance\" ONLY, no recall of \"move in the tube\" guidelines. Required reminders to keep elbows at his side.  Recommend ongoing education to build insight. SHORT TERM GOAL #3:  Goal 3: Patient will demonstrate independent use of compensatory strategies (S-speak up, O-open mouth, S-slow down) within strucutred and unstrucutred speech tasks in order to improve overall articulatory precision. INTERVENTIONS: Did not address due to focus on other goals. Long-Term Goals:  Time Frame for Long Term Goals: 2 weeks from time of evaluation    LONG TERM GOAL #1:  Goal 1: Patient will improve overall cognitive function to return to independent living with wife and careing for children (16 year old). LONG TERM GOAL #2:  Goal 2: Patient will improve overall speech intelligablity and clearity of speech production within informal conversation in order to return to baseline speech to improve successful ability to return to work, speaking with co-works and medical staff and family/friends. Comprehension: 6 - Complex ideas 90% or device (hearing aid or glasses- if patient is primarily a visual learner)  Expression: 6 - Device used to express complex ideas/needs  Social Interaction: 7 - Patient has appropriate behavior/relations 100% of the time  Problem Solvin - Patient able to solve simple/routine tasks  Memory: 6 - Patient requires device to recall (e.g. memory book)    EDUCATION:  Learner: Patient  Education:  Reviewed ST goals and Plan of Care and Reviewed recommendations for follow-up  Evaluation of Education: Verbalizes understanding, Demonstrates with assistance, and Family not present    ASSESSMENT/PLAN:  Activity Tolerance:  Patient tolerance of  treatment: good. Assessment/Plan: Patient progressing toward established goals. Continues to require skilled care of licensed speech pathologist to progress toward achievement of established goals and plan of care. .     Plan for Next Session: High level cognitive treatment- deductive thinking, memory, Review of SOS strategies   Discharge Recommendations: Home with Home Exercise Program     Toñito THAKUR  [address] [facility], [facility] [address], [address]

## 2023-01-05 NOTE — PROGRESS NOTES
Physical Medicine & Rehabilitation Progress Note    Chief Complaint:  Right-sided weakness due to stroke    Subjective:    Destini Talavera is a 63 y.o.  left-handed  male with history of hypertension, diabetes mellitus type 2, coronary artery disease with ischemic cardiomyopathy requiring two-vessel CABG, urinary retention with several failed void trial, status post tonsillectomy, was admitted to the inpatient rehabilitation unit on 12/23/2022 for intensive inpatient management of impairment & disability secondary to right hemiparesis due to acute left parietal corona radiata and right cerebellar ischemic stroke, and debility/physical deconditioning due to Klebsiella pneumonia urinary tract infection with emphysematous cystitis and bilateral hydronephrosis complicated by Klebsiella pneumonia bacteremia/sepsis. The patient was previously hospitalized at [facility] from 10/28/2022 to 11/15/2022 for coronary artery disease and ischemic cardiomyopathy requiring two-vessel CABG on 11/7/2022. His postoperative course was complicated by urinary retention with failed void trial.  The patient has been experiencing progressive weakness after he was discharged to home. The Bella was later removed on 12/13/2022 but the patient continued having difficulty voiding. On 12/14/2022 approximately 11:30 PM his wife noticed the patient had slight slurred speech with right arm weakness. On 12/15/2022 the patient suddenly felt dizzy and lightheaded while he was trying to get up from sitting on toilet and fell into the ground. He says he did not lose consciousness and did not hit his head. His wife called 46. He was transported to [address] [place] [address] ER for evaluation by EMS. He complains of neck pain and upper back pain. He was found to be tachycardic. His WBC was found to be 22.3. Bella was placed in ER and a rush of air followed by dark brown and bright red color urine came out.   He was put on IV meropenem and admitted. Urology was consulted. CT of cervical spine revealed only multilevel facet and uncovertebral joint arthropathy. CT of head done on 12/15/2020 revealed no acute intracranial abnormality. CT of the chest, abdomen and pelvis done on 12/15/2022 revealed emphysematous cystitis, bilateral hydronephrosis, and constipation. Because of right arm weakness, stroke code was called on 12/15/2022. MRI of brain done on 12/15/2022 revealed acute ischemic stroke at the left parietal region corona radiata, and possible small additional acute ischemic stroke within right cerebellum. Neurology service start patient on aspirin and Plavix combination for the stroke. MRA of the neck and head done on 12/15/2022 showed only mild bilateral carotid bulb disease. Transesophageal echocardiogram was done on 12/16/2022 showing severe global hypokinesis of left ventricle with estimated ejection fraction of 30%, and no thrombus identified. 2 blood culture and urine culture done on 12/15/2022 revealed Klebsiella pneumonia. Infectious disease had been consulted. Oral Cipro was started on 12/22/2022 after IV meropenem was completed. Patient's hospital course was also complicated by constipation which resolved this morning. The patient says he feels well. He denies having dizziness, lightheadedness or any painful sensation. He also denies having numbness or tingling feeling. He says the right upper and lower extremities weakness is better with improving control. He continues tolerating the intensive inpatient rehabilitation treatment well. The Bella catheter remains in place. The patient currently is projected to be ready for discharge on 1/13/2023. Rehabilitation:  PT: Reviewed. Bed Mobility:  Supine to Sit: Modified Independent  Sit to Supine: Modified Independent     Transfers:  Sit to Stand: Minimal Assistance, to CGA. No UE support used primarily. X1 pushed from seat of chair with BUEs. Stand to [facility], to [address], up to stand from seat. CGA to sit,  Pt able to get BLEs in/out without assist.     Ambulation:  Contact Guard Assistance, with verbal cues , occasional manual cues at RT post pelvis  Distance: 25 ft x4 reps  Surface: Level Tile  Device:Rolling Walker  Gait Deviations:  Decreased Gait Speed and Pt used a combination of step through/step to (favoring decreased stance time on Rt) gait pattern. With cues was able to demonstrate more consistent step through pattern. Manual cues initially for more forward pelvic glide with swing and initial stance on RT with carryover noted with last walk. Contact Guard Assistance, manual and verbal cues to avoid Rt knee recurvatum  Distance: 25 ft x4 with RW.  15 ft x1 without AD with pt noted to have a stronger recurvatum on RT, decreased speed, arms maintained tightly against thigh/hips. Surface: Level Tile and Carpet  Device:Rolling Walker  Gait Deviations:  Decreased Gait Speed and recurvatum noted on Rt. Manual cues and assist to prevent. Pt unable to \"feel\" either the correction or the recurvatum when it occurs. Will need to continue to trial this for improved control and awareness    Stairs:  Contact Guard Assistance  Number of Steps: 4  Height: 6\" step with Bilateral Handrails. Non reciprocal technique. OT: Reviewed. ADL:   Upper Extremity Dressing: with set-up. To don/doff pull over shirt . Pt. Able to manage cath position on/off walker. BALANCE:  Sitting Balance:  Modified Independent. Standing Balance: Stand By Assistance for majority of session with 2 accounts of Contact Guard Assistance due to complexity of task. TRANSFERS:  Sit to Stand:  Stand By Assistance. Stand to Sit: Stand By Assistance.        FUNCTIONAL MOBILITY:  Assistive Device: Rolling Walker  Assist Level:  Stand By Assistance for majority of task and two accounts of [address] due to reduced balance and navigating within small/tight spaces. Distance:within room, to/from main floor gift shop  Pt. Required verbal cues to reduce pace as pt attempting to briskly walk throughout, pt demo fair+ carryover. Assistive Device: Rolling Walker  Assist Level:  Stand By Assistance. Distance: To and from therapy gym     ADDITIONAL ACTIVITIES:  In order to improve pt's use of BUE simultaneously and address WB body on arm tasks, pt instructed to complete table top exercises in circular motions (counter clockwise/counter clockwise), laterally, to/from midline, \"V\" shape, pt completed x20 reps each with no complaints of pain or discomfort. In order to improve R  strength for ease with daily task completion, pt instructed to engage in hand strengthening with use of moderate resistive powerweb of open/close fists, forearm supination/pronation, finger walks, pt completed 20 reps each. Pt. Then instructed to utilize 5# digiflex with R digits collectively then isolated 20 reps each, pt demo good tolerance. OTR instructed pt to use RUE to cross midline to grasp small items in bin and manipulate with in hand translaton (fingers <-> palm) to sort items by color to improve [facility] for improved performance on 9 HPT and managing clothing fasteners, pt completed with increased time with set up assistance. Pt. Instructed to engage in community reintegration task to locate main elevators (A) on this floor, pt able to read signs on wall within the unit and hallway to locate appropriate elevator, pt able complete with SBA and negotiate walker with no cues this date. To further advance performance with community reintegration tasks pt instructed to read signs within the hallway to navigate to/from gift shop on main level. Pt. Lora Hernandez SBA-CGA during task (see details in functional mobility section). Pt. Instructed to locate items within gift shop, pt able to locate each item with no additional cues.   Pt. Stood for ~15 minutes of duration of IADL task. Pt. Smooth Ortega he has reduced compliance with theraputty HEP, pt educated on importance of completing outside of OT sessions to further advance hand strength in RUE for ease with daily tasks, verbal understanding obtained and pt able to complete with intermittent cues for recall and technique. ST: Reviewed. Complex visual reasoning task (Map constructions) *Continued from previous session: 11/12 indep, 1/12 with min cues  *Increased difficulty with more complex directions and visual reasoning for identifying specific streets. *Increased time needed for task completion, taking ~20 minutes to complete remaining 8 streets. Recall of discharge plan: 3/3 indep     Recall of sternal precautions:  Patient stating \"pain as guidance\" ONLY, no recall of \"move in the tube\" guidelines. Required reminders to keep elbows at his side. Recommend ongoing education to build insight. Review of Systems:  Review of Systems   Constitutional:  Negative for chills, diaphoresis, fatigue and fever. HENT:  Negative for hearing loss, rhinorrhea, sneezing, sore throat and trouble swallowing. Eyes:  Negative for visual disturbance. Respiratory:  Negative for cough, shortness of breath and wheezing. Cardiovascular:  Negative for chest pain and palpitations. Gastrointestinal:  Negative for abdominal pain, constipation, diarrhea, nausea and vomiting. Genitourinary:  Positive for difficulty urinating. Negative for dysuria. Musculoskeletal:  Positive for gait problem. Negative for arthralgias, myalgias and neck pain. Skin:  Negative for rash. Neurological:  Positive for weakness (Right upper and right lower extremities). Negative for dizziness, tremors, facial asymmetry, speech difficulty, light-headedness, numbness and headaches. Psychiatric/Behavioral:  Negative for dysphoric mood, hallucinations and sleep disturbance. The patient is not nervous/anxious. Objective:  BP (!) 102/58   Pulse 84   Temp 98.2 °F (36.8 °C) (Oral)   Resp 18   Ht 5' 2\" (1.575 m) Comment: last admit ht listed as 5'10\"; pt. appears closer to 5'10\"  Wt 135 lb 1 oz (61.3 kg)   SpO2 97%   BMI 24.70 kg/m²   Physical Exam   General:  well-developed, well nourished  male; in no acute distress ; appropriate affect & mood; sitting on reclining chair comfortably  Eyes: pupil equally round ; extra-ocular motion intact bilaterally  Head, Ear, Nose, Mouth & Throat : normocephalic ; no discharge from ears or nose ; no deformity ; no facial swelling ; oral mucosa pink   Neck :  supple ; no tenderness ; mild muscle spasm at bilateral cervical paraspinal muscles  Cardiovascular : Presence of healed vertical surgical scar at sternum ; regular rate & rhythm ; normal S1 & S2 heart sound ; no murmur ; normal peripheral pulse at the bilateral upper extremities; reduced pulse strength at the bilateral lower extremities  Pulmonary : Present breath sounds at the bilateral lung fields; no wheezing ; no rale; no crackle  Gastrointestinal : soft, slightly protruded abdomen without tenderness ; normal bowel sound present    : Bella catheter in place draining light yellowish urine  Back : no tenderness; no muscle spasm  Skin: no skin lesion or rash ; no pitting edema at all 4 extremities  Musculoskeletal : no limb asymmetry; no limb deformity; no tenderness at bilateral upper & lower extremities; no palpable mass at limbs ; no joints laxity or crepitation ; shoulder flexion and abduction passive ROM reaching 170 degrees bilaterally; hip flexion passive ROM reaching 130 degrees bilaterally; normal functional joints ROM at bilateral upper & lower extremities  Cerebral :  alert ; awake ; oriented to place, person and time; follow two-step verbal command  Cerebellum : mild dysmetria with the right finger-to-nose test; no dysmetria with left finger-to-nose test but with slight intentional tremor; no dysmetria with bilateral heel-to-shin test  Cranial Nerves : Tongue protrudes slightly to the right  (CN XII) ; grossly intact other CN II to XI function  Sensory : intact light touch and pin prick sensation at bilateral upper & lower extremities  Motor : normal muscle tone at bilateral upper & lower extremities ; 4+/5 to 5/5 muscle strength at the right elbow extension; 4+/5 to 5/5 muscle strength at the right elbow flexion; 4+/5 muscle strength at right finger extension; 4+/5 to 5/5 muscle strength at the right finger flexion and handgrip; 4+/5 muscle strength at right finger abduction; normal 5/5 muscle strength at the rest of bilateral upper & lower extremities  Reflex : 2+ right biceps, right brachioradialis and right knee reflexes; 1+ left biceps, left brachioradialis and left knee reflexes  Pathological Reflex :  No Ion's sign ; no ankle clonus  Gait : Not assessed      Diagnostics:   Recent Results (from the past 24 hour(s))   POCT Glucose    Collection Time: 01/06/23  7:43 AM   Result Value Ref Range    POC Glucose 133 (H) 70 - 108 mg/dl       Impression:  Acute left parietal corona radiata and right cerebellar ischemic stroke causing right hemiparesis with impaired coordination, and dysarthria  Debility/physical decondition due to Klebsiella pneumonia urinary tract infection with emphysematous cystitis and bilateral hydronephrosis complicated by Klebsiella pneumonia bacteremia/sepsis  Urinary retention  Klebsiella pneumonia urinary tract infection with emphysematous cystitis and bilateral hydronephrosis  Klebsiella pneumoniae bacteremia/sepsis  Acute kidney injury possibly due to dehydration with orthostatic hypotension  Hypotension probably due to side effect from high-dose Flomax in combination with Toprol XL  Coronary artery disease with ischemic cardiomyopathy requiring two-vessel coronary artery bypass graft surgery on 11/7/2022  Hypertension  Diabetes mellitus type 2    The patient's condition remains stable. His right-sided weakness has improved. The muscle strength at the lower extremities are almost equal now. He still has slight coordination issue especially with right upper extremity. He is still on Bella catheter for urinary retention. Nephrology service placed patient on fluid restriction of 2000 mL/day. He continues tolerating the intensive inpatient rehabilitation treatment well. His function continue to improve slowly. The patient currently is projected to be ready for discharge on 1/13/2023. Plan:  Continues intensive PT/OT/SLP/RT inpatient rehabilitation program at least 3 hours per day, 5 days per week in order to improve functional status prior to discharge. Family education and training will be completed. Equipment evaluations and recommendations will be completed as appropriate. Rehabilitation nursing continues to be involved for bowel, bladder, skin, and pain management. Nursing will also provide education and training to patient and family. Prophylaxis:  DVT: Patient on Eliquis, JONAH stocking, intermittent pneumatic compression device. GI: Colace, Senokot, GlycoLax as needed, milk of magnesia as needed, Dulcolax suppository as needed.   Pain: Tylenol as needed  Continue Lipitor for CVA and CAD  Continues holding Toprol-XL for hypertension  Continue Flomax for urinary retention  Continue Protonix for gastric protection  Continue Jardiance (on hold by renal service), Glucotrol, Humalog insulin coverage for diabetes mellitus  Continue Wellbutrin XL for smoking cessation  Continue melatonin, trazodone as needed for insomnia  Continue multivitamin supplement  Nutrition: Continue current diet   Bladder: Monitoring signs or symptoms of UTI  Bowel: Monitoring signs or symptoms of constipation   and case management for coordination of care and discharge planning      Missed Therapy Time:  None      Cherry Lowery MD

## 2023-01-06 LAB
ANION GAP SERPL CALCULATED.3IONS-SCNC: 11 MEQ/L (ref 8–16)
BUN BLDV-MCNC: 38 MG/DL (ref 7–22)
CALCIUM SERPL-MCNC: 9.2 MG/DL (ref 8.5–10.5)
CHLORIDE BLD-SCNC: 102 MEQ/L (ref 98–111)
CO2: 25 MEQ/L (ref 23–33)
CREAT SERPL-MCNC: 1.7 MG/DL (ref 0.4–1.2)
GFR SERPL CREATININE-BSD FRML MDRD: 45 ML/MIN/1.73M2
GLUCOSE BLD-MCNC: 133 MG/DL (ref 70–108)
GLUCOSE BLD-MCNC: 140 MG/DL (ref 70–108)
POTASSIUM SERPL-SCNC: 4.9 MEQ/L (ref 3.5–5.2)
SODIUM BLD-SCNC: 138 MEQ/L (ref 135–145)

## 2023-01-06 PROCEDURE — 97110 THERAPEUTIC EXERCISES: CPT

## 2023-01-06 PROCEDURE — 97116 GAIT TRAINING THERAPY: CPT

## 2023-01-06 PROCEDURE — 6370000000 HC RX 637 (ALT 250 FOR IP): Performed by: PHYSICAL MEDICINE & REHABILITATION

## 2023-01-06 PROCEDURE — 82948 REAGENT STRIP/BLOOD GLUCOSE: CPT

## 2023-01-06 PROCEDURE — 97535 SELF CARE MNGMENT TRAINING: CPT

## 2023-01-06 PROCEDURE — 97112 NEUROMUSCULAR REEDUCATION: CPT

## 2023-01-06 PROCEDURE — 97530 THERAPEUTIC ACTIVITIES: CPT

## 2023-01-06 PROCEDURE — 97129 THER IVNTJ 1ST 15 MIN: CPT | Performed by: SPEECH-LANGUAGE PATHOLOGIST

## 2023-01-06 PROCEDURE — 80048 BASIC METABOLIC PNL TOTAL CA: CPT

## 2023-01-06 PROCEDURE — 1180000000 HC REHAB R&B

## 2023-01-06 PROCEDURE — 99232 SBSQ HOSP IP/OBS MODERATE 35: CPT | Performed by: PHYSICAL MEDICINE & REHABILITATION

## 2023-01-06 PROCEDURE — 36415 COLL VENOUS BLD VENIPUNCTURE: CPT

## 2023-01-06 PROCEDURE — 97130 THER IVNTJ EA ADDL 15 MIN: CPT | Performed by: SPEECH-LANGUAGE PATHOLOGIST

## 2023-01-06 PROCEDURE — 99232 SBSQ HOSP IP/OBS MODERATE 35: CPT | Performed by: INTERNAL MEDICINE

## 2023-01-06 RX ADMIN — Medication 6 MG: at 21:03

## 2023-01-06 RX ADMIN — BUPROPION HYDROCHLORIDE 150 MG: 150 TABLET, FILM COATED, EXTENDED RELEASE ORAL at 09:49

## 2023-01-06 RX ADMIN — Medication 1 TABLET: at 09:49

## 2023-01-06 RX ADMIN — TAMSULOSIN HYDROCHLORIDE 0.8 MG: 0.4 CAPSULE ORAL at 21:02

## 2023-01-06 RX ADMIN — FERROUS SULFATE TAB 325 MG (65 MG ELEMENTAL FE) 325 MG: 325 (65 FE) TAB at 09:49

## 2023-01-06 RX ADMIN — GLIPIZIDE 10 MG: 10 TABLET ORAL at 09:49

## 2023-01-06 RX ADMIN — PANTOPRAZOLE SODIUM 40 MG: 40 TABLET, DELAYED RELEASE ORAL at 05:09

## 2023-01-06 RX ADMIN — APIXABAN 5 MG: 5 TABLET, FILM COATED ORAL at 21:03

## 2023-01-06 RX ADMIN — ATORVASTATIN CALCIUM 40 MG: 40 TABLET, FILM COATED ORAL at 21:03

## 2023-01-06 RX ADMIN — DOCUSATE SODIUM 100 MG: 100 CAPSULE, LIQUID FILLED ORAL at 09:50

## 2023-01-06 RX ADMIN — APIXABAN 5 MG: 5 TABLET, FILM COATED ORAL at 09:49

## 2023-01-06 NOTE — PROGRESS NOTES
Alert. Oriented to person, place, and time. Denies pain. SUMIT 3mm to 2mm, brisk. Speech clear. Mucous membranes pink, moist. Tongue midline. Smile symmetrical. Lung sounds clear anterior, posterior lateral. Respiration easy, unlabored. Heart sounds strong, regular. No cough noted. Bowel sounds active all four quadrants. Abdomen flat, soft non tender on palpation. 1-4-23 last bowel movement. Denies urination problems. Catheter care given. Urine clear yellow/straw no odor. Radial pulse strong bilateral. Hand grasps strong bilateral. Arm drift negative. Capillary refill less than three seconds, brisk. Skin turgor brisk. Skin clean, dry intact. Pedal pulses strong bilateral. Pedal push and pull strong bilateral. Bam sign negative bilateral. No edema noted. Leg lifts strong. Denies numbness or tingling. Calm, cooperative. Up in chair wheels locked. Call light and over bed table within reach. Voice no needs at this time.

## 2023-01-06 NOTE — PROGRESS NOTES
[facility] THERAPY  [facility]  DAILY NOTE     TIME   SLP Individual Minutes  Time In: 1400  Time Out: 1430  Minutes: 30  Timed Code Treatment Minutes: 30 Minutes       Date: 2023  Patient Name: Manisha Chester      CSN: [id number]   : 1960  (63 y.o.)  Gender: male   Referring Physician:  Dr. Saul Dillon   Diagnosis: Acute stroke due to ischemia, right hemiparesis secondary to stroke   Precautions: aspiration, fall risk   Current Diet: Regular diet with thin liquids   Swallowing Strategies: Standard Universal Swallow Precautions  Date of Last MBS/FEES: Not Applicable    Pain:  No pain reported. Subjective:   Patient seen in functional apartment with wife in preparation for apartment stay; patient and family receptive to education. *Reviewed goals and POC with patient and wife, as well as recommendations for apartment stay. Goals established for medication management and completion of structured speech tasks with use of compensatory strategies. Discussed use of a pill organizer and setting alarm on his phone to assist with managing medications. Also discussed goal for completing structured speech tasks via reading written print aloud with focus on use of slow rate, opening mouth more and speaking louder. Patient and wife expressing understanding. Short-Term Goals:  SHORT TERM GOAL #1:  Goal 1: Patient will complete complex executive functioning tasks (i.e., medication management, complex reasoning/deductive reasoning, etc.) with 90% accuracy and minimal cuing in order to allow for safe return to work (40/hours week). INTERVENTIONS:   Time word problems:  10/10 indep  *Excellent processing speed  *Appropriate math reasoning and math computation.      SHORT TERM GOAL #2:  Goal 2: Patient will complete higher level recall/short term memory and working memory tasks with 80% accuracy provided independent use of compensatory strategies in order to improve overall recall of newly learned theraputic information and anticipation to return to work ([employer] in [employer]). INTERVENTIONS: Did not address due to focus on other goals. SHORT TERM GOAL #3:  Goal 3: Patient will demonstrate independent use of compensatory strategies (S-speak up, O-open mouth, S-slow down) within strucutred and unstrucutred speech tasks in order to improve overall articulatory precision. INTERVENTIONS: Did not address due to focus on other goals. Long-Term Goals:  Time Frame for Long Term Goals: 2 weeks from time of evaluation    LONG TERM GOAL #1:  Goal 1: Patient will improve overall cognitive function to return to independent living with wife and careing for children (16 year old). LONG TERM GOAL #2:  Goal 2: Patient will improve overall speech intelligablity and clearity of speech production within informal conversation in order to return to baseline speech to improve successful ability to return to work, speaking with co-works and medical staff and family/friends. Comprehension: 6 - Complex ideas 90% or device (hearing aid or glasses- if patient is primarily a visual learner)  Expression: 6 - Device used to express complex ideas/needs  Social Interaction: 7 - Patient has appropriate behavior/relations 100% of the time  Problem Solvin - Patient able to solve simple/routine tasks  Memory: 6 - Patient requires device to recall (e.g. memory book)    EDUCATION:  Learner: Patient  Education:  Reviewed ST goals and Plan of Care and Reviewed recommendations for follow-up  Evaluation of Education: Verbalizes understanding, Demonstrates with assistance, and Family not present    ASSESSMENT/PLAN:  Activity Tolerance:  Patient tolerance of  treatment: good. Assessment/Plan: Patient progressing toward established goals. Continues to require skilled care of licensed speech pathologist to progress toward achievement of established goals and plan of care. .     Plan for Next Session: Review of apartment stay   Discharge Recommendations: Home with [address]   [address], [address], [address]

## 2023-01-06 NOTE — PROGRESS NOTES
[facility]   Nephrology progress note  1/6/2023, 11:43 AM      Pt Name:    Ari Da Silva  MRN:     [id number]     YOB: 1960  Admit Date:    12/23/2022 11:24 AM    Chief Complaint: Nephrology following for LC/CKD. Subjective:  Patient seen and examined  No chest pain or shortness of breath  Feels much better    Objective:  24HR INTAKE/OUTPUT:    Intake/Output Summary (Last 24 hours) at 1/6/2023 1143  Last data filed at 1/6/2023 1007  Gross per 24 hour   Intake 1650 ml   Output 1675 ml   Net -25 ml        Admission weight: 134 lb 8 oz (61 kg)  Wt Readings from Last 3 Encounters:   01/06/23 135 lb 1 oz (61.3 kg)   12/23/22 130 lb 15.3 oz (59.4 kg)   12/13/22 142 lb 9.6 oz (64.7 kg)        Vitals :   Vitals:    01/05/23 0821 01/05/23 1920 01/06/23 0605 01/06/23 0800   BP: (!) 104/55 115/62  (!) 102/58   Pulse: 99 85  84   Resp:  16  18   Temp: 98.2 °F (36.8 °C) 97.9 °F (36.6 °C)  98.2 °F (36.8 °C)   TempSrc: Oral Oral  Oral   SpO2: 100% 98%  97%   Weight:   135 lb 1 oz (61.3 kg)    Height:           Physical examination  General Appearance:  Well developed.  No distress, infact cachectic  Mouth/Throat:  Oral mucosa moist  Neck:  Supple, no JVD  Lungs:  Breath sounds: clear  Heart[de-identified]  S1,S2 heard  Abdomen:  Soft, non - tender  Musculoskeletal:  Edema -no edema noted    Medications:  Infusion:    dextrose       Meds:    ferrous sulfate  325 mg Oral Daily with breakfast    melatonin  6 mg Oral Nightly    atorvastatin  40 mg Oral Daily    buPROPion  150 mg Oral QAM    pantoprazole  40 mg Oral QAM AC    tamsulosin  0.8 mg Oral Daily    apixaban  5 mg Oral BID    [Held by provider] metoprolol succinate  12.5 mg Oral Daily    glipiZIDE  10 mg Oral QAM AC    [Held by provider] empagliflozin  25 mg Oral Daily    docusate sodium  100 mg Oral BID    multivitamin  1 tablet Oral Daily with breakfast    senna  2 tablet Oral Nightly       Lab Data :  CBC:   No results for input(s): WBC, HGB, HCT, PLT in the last 72 hours. CMP:  Recent Labs     01/05/23  0600 01/06/23  0748    138   K 4.9 4.9    102   CO2 23 25   BUN 36* 38*   CREATININE 1.7* 1.7*   GLUCOSE 151* 140*   CALCIUM 9.4 9.2       Hepatic: No results for input(s): LABALBU, AST, ALT, ALB, BILITOT, ALKPHOS in the last 72 hours. Assessment and Plan:  Renal -acute kidney injury on chronic kidney disease with baseline creatinine around 1.5-1.7  Exact etiology not clear at this time   Making a lot of urine output, improved after fluid restriction we will continue to monitor creatinine is at his baseline now at 1.7  Urine lites and osmolality not much helpful  Continue to hold Jardiance and BMP in the morning we will reintroduce the Jardiance next week and see what he does  Electrolytes -within normal limits   Acid-base status appears to be stable  Essential hypertension running reasonable  Hx of diabetes mellitus  Hx of sepsis due to UTI from Klebsiella pneumonia on antibiotics  CAD status post CABG 11/22  Polyuria - getting better  Meds reviewed and discussed with patient and primary team      Christin Vee MD  [facility]    This report has been created using voice recognition software.  It may contain minor errors which are inherent in voice recognition technology

## 2023-01-06 NOTE — PROGRESS NOTES
[address]  Occupational Therapy  Daily Note  Time:   Time In: 1574  Time Out: 0930  Timed Code Treatment Minutes: 44 Minutes  Minutes: 44          Date: 2023  Patient Name: Esteban Jaquez,   Gender: male      Room: [facility]/053-A  MRN: [id number]  : 1960  (63 y.o.)  Referring Practitioner: Ordering & Attending: Monica Kessler MD  Diagnosis: Acute Stroke due to Ischemia  Additional Pertinent Hx: Esteban Jaquez who is a 63 y.o. male with a history of hypertension, diabetes, CAD on  daily, recent CABG in 2022, ischemic cardiomyopathy is admitted to [facility] for sepsis, emphysematous cystitis and acute kidney injury on CKD. During admission exam patient stated that his right arm feldman has been weak and that he has had some slurred speech. Stroke alert was called for right-sided weakness and dysarthria on 12/15. On arrival patient's NIH was 4 for right upper extremity, right lower extremity weakness, 1 limb ataxia and dysarthria. On further questioning, patient and wife state that the right arm weakness actually started last night. Patient's wife noticed when patient was trying to get up from the toilet that he was unable to push himself up with his right arm. Last known well 2330 on 2022. Patient taken to imaging for stat CT head which revealed no ICH, midline shift, mass-effect. CTA head and neck deferred due to patient's LC. Decision for no tPA was made due to patient's time since last known well. Patient with relatively low NIH and symptoms which are not consistent with LVO, therefore no thrombectomy/neuro intervention at this time. Patient had stat MRI brain and MRA head and neck without contrast.  MRI showed acute ischemic stroke of left parietal region. Pt admitted to IP rehab on 22.     Restrictions/Precautions:  Restrictions/Precautions: General Precautions, Fall Risk  Position Activity Restriction  Other position/activity restrictions: right side hemiparesis, recent CABG 11/22-minimize arm use ( s/p 5 wks), life vest     SUBJECTIVE: On first attempt to see pt, pt's breakfast arrived late. OTR attempted back ~16 minutes later and pt finished with breakfast and agreeable to OT session. Pt. Declines bathing task this date. PAIN: 0/10 as pt denies pain throughout    Vitals: Vitals not assessed per clinical judgement, see nursing flowsheet    COGNITION: Decreased Safety Awareness and Impulsive    ADL:   Feeding: Independent. Grooming: Stand By Assistance. For mouthwash completion  Toilet Transfer: Stand By Assistance. To/from STS. Although pt did not need to utilize the bathroom, he was willing to engage in toilet transfer to assess performance/safety. Darrin Gonsalez BALANCE:  Sitting Balance:  Modified Independent. Standing Balance: Stand By Assistance. BED MOBILITY:  Not Tested    TRANSFERS:  Sit to Stand:  Stand By Assistance, with verbal cues. Stand to Sit: Stand By Assistance, with verbal cues. Completed x more than one trial during session. FUNCTIONAL MOBILITY:  Assistive Device: Rolling Walker  Assist Level:  Stand By Assistance. Distance: To and from therapy gym   1 verbal cue required for proper pace     ADDITIONAL ACTIVITIES:  In order to further improve pt's strength and ability to engage in bilateral integration tasks to promote neuro-relearning pt engaged in 20 reps of strengthening with use of 2# dowel angelica in all available directions, pt demo good carryover with technique and recall of each exercise. OTR challenged pt's balance during IADL task with pt instructed to make bed and retrieve 4 pillows to place on bed, pt able to complete with close SBA when reaching OBOS, no LOB noted. ASSESSMENT:     Activity Tolerance:  Patient tolerance of  treatment: fair.         Discharge Recommendations: Home with Home Health OT and Patient would benefit from continued OT at discharge  Equipment Recommendations: Equipment Needed: Yes  Other: OT staff to continue to monitor needs throughout OT POC. Plan: Times Per Week: 5x/week x 90 minutes and 1x/week x 30 minutes  Current Treatment Recommendations: Strengthening, Balance training, Self-Care / ADL, Equipment evaluation, education, & procurement, Safety education & training, Endurance training, Functional mobility training, Neuromuscular re-education, Patient/Caregiver education & training, Home management training    Patient Education  Patient Education: ADL's, Home Exercise Program, and Assistive Device Safety    Goals  Short Term Goals  Time Frame for Short Term Goals: 1 week  Short Term Goal 1: Pt will complete functional ambulation to/from BR and HH distances with SBA and min vcs for safety to increase indep with toileting. Short Term Goal 2: Pt will decrease RUE 9 hole peg test by 10 seconds (49 on 1/3), and RUE  strength by 5 pounds (48.3# on 1/3) to increase indep with fasteners in UB dressing. Short Term Goal 3: Pt will complete dynamic standing task x 5 minutes with 1-2 UE release and Supervision to increase indep with sinkside grooming. Short Term Goal 4: Pt will complete UB dressing with mod I to increase indep within home environment. Short Term Goal 5: Pt will complete LB dresing with S and min vcs for safety to increase indep and endurance within home environment. Additional Goals?: No  Long Term Goals  Time Frame for Long Term Goals : 4 weeks from IPR eval  Long Term Goal 1: Pt will complete BADL routine including shower transfer Mod Indep to increase indep and safety in home environment. Long Term Goal 2: Pt will complete simple IADL task with S and 0 vcs for safety to increase indep and endurance in home environment. Following session, patient left in safe position with all fall risk precautions in place.

## 2023-01-06 NOTE — PROGRESS NOTES
SW called [facility] with referral.  Patient discharging 1/13 needing RN, HHA, PT and OT.

## 2023-01-06 NOTE — PROGRESS NOTES
Patient: Toño Davis  Unit/Bed: 7E-53/053-A  YOB: 1960  MRN: [id number] Acct: [de-identified]   Admitting Diagnosis: Acute stroke due to ischemia [facility]) [I63.9]  Admit Date:  12/23/2022  Hospital Day: 13    Assessment:     Principal Problem:    Acute stroke due to ischemia [facility])  Active Problems:    Ischemic cardiomyopathy    S/P CABG x 2    Emphysematous cystitis    Bacteremia due to Enterobacter species    Urinary retention    Hemiparesis affecting right side as late effect of stroke ([facility] [address]    Coordination impairment    Debility    UTI due to Klebsiella species    Moderate malnutrition ([facility] [address]    Diabetes mellitus type 2 in nonobese [facility])    Essential hypertension  Resolved Problems:    * No resolved hospital problems. *      Plan:     The CS are acceptable  Continue to follow them with the jardiance on hold        Subjective:     Patient has no complaint of CP or  SOB. .   Medication side effects: none    Scheduled Meds:   ferrous sulfate  325 mg Oral Daily with breakfast    melatonin  6 mg Oral Nightly    atorvastatin  40 mg Oral Daily    buPROPion  150 mg Oral QAM    pantoprazole  40 mg Oral QAM AC    tamsulosin  0.8 mg Oral Daily    apixaban  5 mg Oral BID    [Held by provider] metoprolol succinate  12.5 mg Oral Daily    glipiZIDE  10 mg Oral QAM AC    [Held by provider] empagliflozin  25 mg Oral Daily    docusate sodium  100 mg Oral BID    multivitamin  1 tablet Oral Daily with breakfast    senna  2 tablet Oral Nightly     Continuous Infusions:   dextrose       PRN Meds:glucose, dextrose bolus **OR** dextrose bolus, glucagon (rDNA), dextrose, acetaminophen, ondansetron, oxyCODONE **OR** oxyCODONE, bisacodyl, magnesium hydroxide, traZODone, polyethylene glycol    Review of Systems  Pertinent items are noted in HPI. Objective:     Patient Vitals for the past 8 hrs:   BP Temp Temp src Pulse Resp SpO2   01/05/23 1920 115/62 97.9 °F (36.6 °C) Oral 85 16 98 %     I/O last 3 completed shifts:   In: 2125 [P.O.:2125]  Out: 4125 [Urine:4125]  No intake/output data recorded.     /62   Pulse 85   Temp 97.9 °F (36.6 °C) (Oral)   Resp 16   Ht 5' 2\" (1.575 m) Comment: last admit ht listed as 5'10\"; pt. appears closer to 5'10\"  Wt 135 lb 9 oz (61.5 kg)   SpO2 98%   BMI 24.79 kg/m²     General appearance: alert, appears stated age, and cooperative  Head: Normocephalic, without obvious abnormality, atraumatic  Lungs: clear to auscultation bilaterally  Chest wall: no tenderness  Heart: regular rate and rhythm, S1, S2 normal, no murmur, click, rub or gallop  Abdomen: soft, non-tender; bowel sounds normal; no masses,  no organomegaly  Extremities: extremities normal, atraumatic, no cyanosis or edema  Skin: Skin color, texture, turgor normal. No rashes or lesions  Neurologic:  weak    Data Review:    Latest Reference Range & Units 1/3/23 08:06 1/4/23 07:21 1/5/23 07:26   POC Glucose 70 - 108 mg/dl 162 (H) 128 (H) 146 (H)   (H): Data is abnormally high    Electronically signed by Jose Valente MD on 1/5/2023 at 7:25 PM

## 2023-01-06 NOTE — PROGRESS NOTES
[facility]  INPATIENT PHYSICAL THERAPY  DAILY NOTE  [address] [facility] - 7E-53/053-A    Time In: 9639  Time Out: 1402  Timed Code Treatment Minutes: 32 Minutes  Minutes: 31          Date: 2023  Patient Name: Shantelle Soto,  Gender:  male        MRN: [id number]  : 1960  (63 y.o.)     Referring Practitioner: Dr. Bladimir Nichols  Diagnosis: Acute stroke due to ischemia  Additional Pertinent Hx: Pt admitted through ED due to RUE weakness and slurred speech,  Also noted to have sepsis, LC, emphysematous cystitis. Hx:  HTN, Db, CAD, recent CABG (). MRI + for acute ischemic stroke Lt parietal region     Prior Level of Function:  Lives With: Spouse, Family (16 y.o son, 21 y.o step dtr.)  Type of Home: House  Home Layout: One level  Home Access: Stairs to enter with rails  Entrance Stairs - Number of Steps: 2 + threshold  Entrance Stairs - Rails: Both  Home Equipment:  (None used PTA)   Bathroom Shower/Tub: Tub/Shower unit  Bathroom Toilet: Standard  Bathroom Equipment: Tub transfer bench  Bathroom Accessibility: Accessible    ADL Assistance: Independent  Homemaking Assistance: Independent  Homemaking Responsibilities: Yes  Ambulation Assistance: Independent  Transfer Assistance: Independent  Active : No    Restrictions/Precautions:  Restrictions/Precautions: General Precautions, Fall Risk  Position Activity Restriction  Other position/activity restrictions: right side hemiparesis, recent CABG -minimize arm use ( s/p 5 wks), life vest     SUBJECTIVE: Patient in room in recliner, agreeable to PT. Pt reports wife is on the way. Wife arrived within 5 minutes of session starting.   Wife engaged and supportive during session    PAIN: no complaints of pain    Vitals: Vitals not assessed per clinical judgement, see nursing flowsheet    OBJECTIVE: *See apartment education below for details  Bed Mobility:  Supine to Sit: Stand By Assistance  Sit to Supine: Stand By Assistance     Transfers:  Sit to Stand: Contact Guard Assistance  Stand to [employer] Assistance    Ambulation:  Contact Guard Assistance  Distance: 5', 15', 35', 15'x2  Surface: Level Tile  Device:Rolling Walker  Gait Deviations: Forward Flexed Posture, Slow Jada, Decreased Step Length Bilaterally, Decreased Gait Speed, and Decreased Heel Strike Bilaterally    Balance:  Static Standing Balance: Contact Guard Assistance  Dynamic Standing Balance: Contact Guard Assistance    Apartment education:  Patient's spouse present throughout education in apartment. Oriented pt and pt's spouse to location of some items in room including call light, phone, apartment book, number to contact nursing, code blue button, bathroom and extra pillows for bed/pull out couch. Educated pt and pt's spouse that patient should have assistance from spouse with all mobility with gait belt. Educated pt's spouse how to carli gait belt and tighten gait belt. Demonstrated to spouse and removed and then spouse able to carli gait belt with cues only on how to tighten belt. PT ambulated with pt to bed and pt laid down in bed. Pt ambulated to bed a second trial with spouse providing CGA and laid down again. Reviewed sternal precautions and to especially pay attention to this when getting in and out of bed. Patient ambulated to and from bathroom with CGA. Pt has catheter but educated pt and pt's spouse that if pt were to need to have a bowel movement that he should be assisted to bathroom and off of toilet and back to where he wants to sit. Reviewed safety getting out of recliner when leg rest up as pt will likely need assist from spouse for this. Educated pt's spouse that brakes on wheelchair to be locked prior to pt sitting in chair or standing. Demonstrated how to move leg rests out of way and provided education that they should be moved out of the way if pt is getting out of or into wheelchair.   Educated pt and pt's spouse to contact RN with phone if they have questions/concerns that need addressed. Educated pt's spouse on providing verbal cues for posture with ambulation. Education on signs that pt may display if he is fatiguing when ambulating. Educated pt and pt's spouse on positioning of catheter bag and how to safely ambulate with catheter bag on walker. Functional Outcome Measures: Not completed       ASSESSMENT:  Assessment: Patient progressing toward established goals. Activity Tolerance:  Patient tolerance of  treatment: good. Equipment Recommendations:Equipment Needed: Yes  Other: RW  Discharge Recommendations: Continue to assess pending progress and Patient would benefit from continued PT at discharge  Plan: Current Treatment Recommendations: Strengthening, ROM, Balance training, Functional mobility training, Transfer training, Endurance training, Neuromuscular re-education, Gait training, Stair training, Safety education & training, Equipment evaluation, education, & procurement, Home exercise program, Therapeutic activities, Patient/Caregiver education & training  General Plan:  (5x/ wk 90 min, 1x/ wk 30 min)    Patient Education  Patient Education: Family Education, [employer] Mobility, Transfers, Gait, Health Promotion and Wellness Education, Home Safety Education,  - Patient Verbalized Understanding, - Patient Requires Continued Education    Goals:  Patient Goals : get back to my normal way of doing things  Short Term Goals  Time Frame for Short Term Goals: 1 wk  Short Term Goal 1: Pt to go supine <->sit, minimal use of UEs, SBA to get in/out of bed. Short Term Goal 2: Pt to get up/down from various seated surfaces, SBA, to get up to walk. Short Term Goal 3: Pt to walk with RW >= 50 ft, demonstrating step through gait pattern, recurvatum < 10% of steps on RT, CGA to progress to home mobility  Short Term Goal 4: Pt to ascend/descend 2 steps with gerber rails, CGA to progress to home entry.  GOAL MET, SEE LTG  Short Term Goal 5: Pt to get in/out of car, SBA for transportation needs. Additional Goals?: Yes  Short Term Goal 6: Pt to perform Tinetti >= 18/28, demonstrating improved balance. Long Term Goals  Time Frame for Long Term Goals : 3 wks from evaluation  Long Term Goal 1: Pt to go supine <->sit, minimal use of UEs, Mod I, to get in/out of bed. Long Term Goal 2: Pt to get up/down from various seated surfaces, Mod I, to get up to walk. Long Term Goal 3: Pt to walk with RW >= 50 ft, demonstrating step through gait pattern, recurvatum < 10% of steps on RT, Mod I, for home mobility  Long Term Goal 4: Pt to perform Tinetti >= 24/28, demonstrating improved balance. Long Term Goal 5: Patient will ascend/descend 2 steps with bilateral hand rails with SBA for safe home entry. Additional Goals?: Yes  Long term goal 6: Patient will complete car transfer with modified independence with safe technique to transfer in and out of vehicle safely. Following session, patient left in safe position with all fall risk precautions in place.

## 2023-01-06 NOTE — PLAN OF CARE
Problem: Discharge Planning  Goal: Discharge to home or other facility with appropriate resources  1/5/2023 2301 by Hiram Horvath RN  Outcome: Progressing  Flowsheets  Taken 1/5/2023 2301  Discharge to home or other facility with appropriate resources: Identify barriers to discharge with patient and caregiver  Taken 1/5/2023 1940  Discharge to home or other facility with appropriate resources: Identify barriers to discharge with patient and caregiver  Note: Ed on crum cath care and importance of preventing infection     Problem: Chronic Conditions and Co-morbidities  Goal: Patient's chronic conditions and co-morbidity symptoms are monitored and maintained or improved  1/5/2023 2301 by Hiram Horvath RN  Outcome: Progressing  Flowsheets (Taken 1/5/2023 1940)  Care Plan - Patient's Chronic Conditions and Co-Morbidity Symptoms are Monitored and Maintained or Improved: Monitor and assess patient's chronic conditions and comorbid symptoms for stability, deterioration, or improvement     Problem: Safety - Adult  Goal: Free from fall injury  Description: Patient absent of falls this shift. Bed in lowest position, side rails up 2/4. Call-light within reach. Patient instructed to use call-light to get up. Non-skid footwear in place.            1/5/2023 2301 by Hiram Horvath RN  Outcome: Progressing  Flowsheets (Taken 1/1/2023 1025 by Dong Coleman RN)  Free From Fall Injury: Instruct family/caregiver on patient safety     Problem: Skin/Tissue Integrity  Goal: Absence of new skin breakdown  Description:  Monitor for areas of redness and/or skin breakdown, no skin breakdown     1/5/2023 2301 by Hiram Horvath RN  Outcome: Progressing  1/5/2023 1651 by Thais Flor RN  Outcome: Progressing     Problem: Skin/Tissue Integrity  Goal: Absence of new skin breakdown  Description:  Monitor for areas of redness and/or skin breakdown, no skin breakdown     1/5/2023 2301 by Hiram Horvath RN  Outcome: Progressing Problem: Pain  Goal: Verbalizes/displays adequate comfort level or baseline comfort level  Description: Patient denies pain  1/5/2023 2301 by Hong Valente RN  Outcome: Progressing  Flowsheets (Taken 1/5/2023 1920)  Verbalizes/displays adequate comfort level or baseline comfort level:   Encourage patient to monitor pain and request assistance   Assess pain using appropriate pain scale   Administer analgesics based on type and severity of pain and evaluate response   Implement non-pharmacological measures as appropriate and evaluate response  Note: Denies having pain.   No observed or reported facial grimacing or c/o     Problem: Metabolic/Fluid and Electrolytes - Adult  Goal: Electrolytes maintained within normal limits  1/5/2023 1651 by Spencer Phelan RN  Outcome: Progressing  Flowsheets (Taken 1/5/2023 5284)  Electrolytes maintained within normal limits:   Monitor labs and assess patient for signs and symptoms of electrolyte imbalances   Administer electrolyte replacement as ordered     Problem: Genitourinary - Adult  Goal: Urinary catheter remains patent  Description: Patient catheter remains patent and draining light yellow urine, cath care completed this shift and education provided to patient   1/5/2023 2301 by Hong Valente RN  Outcome: Progressing  Flowsheets (Taken 1/5/2023 1940)  Urinary catheter remains patent: Assess patency of urinary catheter

## 2023-01-06 NOTE — PROGRESS NOTES
[facility]  [address] [facility]  Occupational Therapy  Daily Note  Time:   Time In: 1430  Time Out: 1512  Timed Code Treatment Minutes: 42 Minutes  Minutes: 42          Date: 2023  Patient Name: Tonny Miner,   Gender: male      Room: [facility]/053-A  MRN: [id number]  : 1960  (63 y.o.)  Referring Practitioner: Ordering & Attending: Mayelin Mir MD  Diagnosis: Acute Stroke due to Ischemia  Additional Pertinent Hx: Tonny Miner who is a 63 y.o. male with a history of hypertension, diabetes, CAD on  daily, recent CABG in 2022, ischemic cardiomyopathy is admitted to [facility] for sepsis, emphysematous cystitis and acute kidney injury on CKD. During admission exam patient stated that his right arm feldman has been weak and that he has had some slurred speech. Stroke alert was called for right-sided weakness and dysarthria on 12/15. On arrival patient's NIH was 4 for right upper extremity, right lower extremity weakness, 1 limb ataxia and dysarthria. On further questioning, patient and wife state that the right arm weakness actually started last night. Patient's wife noticed when patient was trying to get up from the toilet that he was unable to push himself up with his right arm. Last known well 2330 on 2022. Patient taken to imaging for stat CT head which revealed no ICH, midline shift, mass-effect. CTA head and neck deferred due to patient's LC. Decision for no tPA was made due to patient's time since last known well. Patient with relatively low NIH and symptoms which are not consistent with LVO, therefore no thrombectomy/neuro intervention at this time. Patient had stat MRI brain and MRA head and neck without contrast.  MRI showed acute ischemic stroke of left parietal region. Pt admitted to IP rehab on 22.     Restrictions/Precautions:  Restrictions/Precautions: General Precautions, Fall Risk  Position Activity Restriction  Other position/activity restrictions: right side hemiparesis, recent CABG 11/22-minimize arm use ( s/p 5 wks), life vest     SUBJECTIVE: Pt. And spouse present for apartment education. Pt. Cooperative throughout session. PAIN:  no pain noted    Vitals: Vitals not assessed per clinical judgement, see nursing flowsheet    COGNITION: Decreased Safety Awareness    ADL:   Toilet Transfer: Stand By Assistance. To/from ETS in therapy apartment . BALANCE:  Sitting Balance:  Modified Independent. Standing Balance: Stand By Assistance. BED MOBILITY:  Not Tested    TRANSFERS:  Sit to Stand:  Stand By Assistance. Stand to Sit: Stand By Assistance. FUNCTIONAL MOBILITY:  Assistive Device: Rolling Walker  Assist Level:  Stand By Assistance. Distance:  within apartment and back to room from apartment      ADDITIONAL ACTIVITIES:  Pt and and family were oriented and educated to the [address] [place] [address]. OT addressed the following:    - Location and use of the call light in the bathroom  - Education of toilet transfer and grab bar placement. SBA assistance needed to complete the toilet Transfer completed with pt and pt's spouse  - Orientation to placement of linen bags and bin to discard dirty towels and sheets. - Orientation to locations for washer/dryer, laundry soap, dish soap, dishwashing liquid. - Assisting the pt and family on selecting menu to prepare the meal as appropriate.  - Review the emergency fire procedure ( in binder). - Location of the fire extinguisher. OTR answered questions to best of ability during session and provided pt/spouse with education in regards to progression made with OT POC at this time and current level of assistance. Education provided in regards to to HEP completion and OT vended cane (to simulate dowel angelica) HEP. ASSESSMENT:     Activity Tolerance:  Patient tolerance of  treatment: fair.         Discharge Recommendations: Home with Home Health OT and Patient would benefit from continued OT at discharge  Equipment Recommendations: Equipment Needed: Yes  Other: OT staff to continue to monitor needs throughout OT POC. Plan: Times Per Week: 5x/week x 90 minutes and 1x/week x 30 minutes  Current Treatment Recommendations: Strengthening, Balance training, Self-Care / ADL, Equipment evaluation, education, & procurement, Safety education & training, Endurance training, Functional mobility training, Neuromuscular re-education, Patient/Caregiver education & training, Home management training    Patient Education  Patient Education: ADL's, IADL's, and Family Education    Goals  Short Term Goals  Time Frame for Short Term Goals: 1 week  Short Term Goal 1: Pt will complete functional ambulation to/from BR and HH distances with SBA and min vcs for safety to increase indep with toileting. Short Term Goal 2: Pt will decrease RUE 9 hole peg test by 10 seconds (49 on 1/3), and RUE  strength by 5 pounds (48.3# on 1/3) to increase indep with fasteners in UB dressing. Short Term Goal 3: Pt will complete dynamic standing task x 5 minutes with 1-2 UE release and Supervision to increase indep with sinkside grooming. Short Term Goal 4: Pt will complete UB dressing with mod I to increase indep within home environment. Short Term Goal 5: Pt will complete LB dresing with S and min vcs for safety to increase indep and endurance within home environment. Additional Goals?: No  Long Term Goals  Time Frame for Long Term Goals : 4 weeks from IPR eval  Long Term Goal 1: Pt will complete BADL routine including shower transfer Mod Indep to increase indep and safety in home environment. Long Term Goal 2: Pt will complete simple IADL task with S and 0 vcs for safety to increase indep and endurance in home environment. Following session, patient left in safe position with all fall risk precautions in place.

## 2023-01-06 NOTE — PROGRESS NOTES
Alert. Oriented to person, place, and time. Denies pain. SUMIT 3mm to 2mm, brisk. Speech clear. Mucous membrane pink, moist. Tongue midline. Smile symmetrical. Lung sounds clear anterior, posterior lateral. Respiration easy, unlabored. Heart sounds strong, regular. No cough noted. Bowel sounds active all four quadrants. Abdomen flat, soft, non tender. Last bowel movement today. No urination problems. Radial pulses strong, regular bilateral. Hand grasps strong bilateral. Arm drift negative. Capillary refill less than three seconds, brisk. Skin turgor brisk. Pedal pulses strong bilateral. Pedal push and pull strong bilateral. Bam sign negative bilateral. No edema noted. Leg lifts strong bilateral. Denies numbness or tingling. Calm, cooperative. Up in chair wheels locked. Call light and bed table within reach. Voice no needs at this time.

## 2023-01-07 LAB
ANION GAP SERPL CALCULATED.3IONS-SCNC: 9 MEQ/L (ref 8–16)
BUN BLDV-MCNC: 35 MG/DL (ref 7–22)
CALCIUM SERPL-MCNC: 9.2 MG/DL (ref 8.5–10.5)
CHLORIDE BLD-SCNC: 104 MEQ/L (ref 98–111)
CO2: 25 MEQ/L (ref 23–33)
CREAT SERPL-MCNC: 1.5 MG/DL (ref 0.4–1.2)
GFR SERPL CREATININE-BSD FRML MDRD: 53 ML/MIN/1.73M2
GLUCOSE BLD-MCNC: 105 MG/DL (ref 70–108)
GLUCOSE BLD-MCNC: 98 MG/DL (ref 70–108)
POTASSIUM SERPL-SCNC: 4.8 MEQ/L (ref 3.5–5.2)
SODIUM BLD-SCNC: 138 MEQ/L (ref 135–145)

## 2023-01-07 PROCEDURE — 99232 SBSQ HOSP IP/OBS MODERATE 35: CPT | Performed by: INTERNAL MEDICINE

## 2023-01-07 PROCEDURE — 82948 REAGENT STRIP/BLOOD GLUCOSE: CPT

## 2023-01-07 PROCEDURE — 36415 COLL VENOUS BLD VENIPUNCTURE: CPT

## 2023-01-07 PROCEDURE — 6370000000 HC RX 637 (ALT 250 FOR IP): Performed by: PHYSICAL MEDICINE & REHABILITATION

## 2023-01-07 PROCEDURE — 80048 BASIC METABOLIC PNL TOTAL CA: CPT

## 2023-01-07 PROCEDURE — 1180000000 HC REHAB R&B

## 2023-01-07 RX ADMIN — APIXABAN 5 MG: 5 TABLET, FILM COATED ORAL at 08:44

## 2023-01-07 RX ADMIN — GLIPIZIDE 10 MG: 10 TABLET ORAL at 08:44

## 2023-01-07 RX ADMIN — BUPROPION HYDROCHLORIDE 150 MG: 150 TABLET, FILM COATED, EXTENDED RELEASE ORAL at 08:44

## 2023-01-07 RX ADMIN — Medication 1 TABLET: at 08:44

## 2023-01-07 RX ADMIN — DOCUSATE SODIUM 100 MG: 100 CAPSULE, LIQUID FILLED ORAL at 08:44

## 2023-01-07 RX ADMIN — FERROUS SULFATE TAB 325 MG (65 MG ELEMENTAL FE) 325 MG: 325 (65 FE) TAB at 08:44

## 2023-01-07 NOTE — PROGRESS NOTES
Walked with patient and spouse to functional apartment. Reviewed medications and nursing station phone number with both, and they verbalized understanding of what was expected of them.

## 2023-01-07 NOTE — PLAN OF CARE
Problem: Chronic Conditions and Co-morbidities  Goal: Patient's chronic conditions and co-morbidity symptoms are monitored and maintained or improved  Outcome: Progressing  Flowsheets (Taken 1/6/2023 2100)  Care Plan - Patient's Chronic Conditions and Co-Morbidity Symptoms are Monitored and Maintained or Improved: Monitor and assess patient's chronic conditions and comorbid symptoms for stability, deterioration, or improvement     Problem: Safety - Adult  Goal: Free from fall injury  Description: Patient absent of falls this shift. Bed in lowest position, side rails up 2/4. Call-light within reach. Patient instructed to use call-light to get up. Non-skid footwear in place.     Outcome: Progressing    Problem: Skin/Tissue Integrity  Goal: Absence of new skin breakdown  Description:  Monitor for areas of redness and/or skin breakdown, no skin breakdown     Outcome: Progressing    Problem: Pain  Goal: Verbalizes/displays adequate comfort level or baseline comfort level  Description: Patient denies pain  Outcome: Progressing  Flowsheets (Taken 1/6/2023 2100)  Verbalizes/displays adequate comfort level or baseline comfort level: Encourage patient to monitor pain and request assistance        Problem: ABCDS Injury Assessment  Goal: Absence of physical injury  Description: Patient is free from physical injury  Outcome: Progressing

## 2023-01-07 NOTE — PLAN OF CARE
I have reviewed the patient's plan of care and based on this review, the patient treatment plan has been updated. Problem: Discharge Planning  Goal: Discharge to home or other facility with appropriate resources  1/7/2023 1532 by Emely Goss RN  Outcome: Progressing  Flowsheets (Taken 1/6/2023 2100 by Villa Rowley RN)  Discharge to home or other facility with appropriate resources: Identify barriers to discharge with patient and caregiver  Note: Patient and spouse spending the night in functional sarah. In order to help increase independence   1/7/2023 1431 by Emely Goss RN  Outcome: Progressing  Flowsheets (Taken 1/6/2023 2100 by Villa Rowley RN)  Discharge to home or other facility with appropriate resources: Identify barriers to discharge with patient and caregiver  Note: Spending thw night in the functional apartment to p transition to home. Problem: Chronic Conditions and Co-morbidities  Goal: Patient's chronic conditions and co-morbidity symptoms are monitored and maintained or improved  1/7/2023 1532 by Emely Goss RN  Outcome: Progressing  1/7/2023 1431 by Emely Goss RN  Outcome: Progressing  1/7/2023 0508 by Villa Rowley RN  Outcome: Progressing  Flowsheets (Taken 1/6/2023 2100)  Care Plan - Patient's Chronic Conditions and Co-Morbidity Symptoms are Monitored and Maintained or Improved: Monitor and assess patient's chronic conditions and comorbid symptoms for stability, deterioration, or improvement     Problem: Safety - Adult  Goal: Free from fall injury  Description: Patient absent of falls this shift. Bed in lowest position, side rails up 2/4. Call-light within reach. Patient instructed to use call-light to get up. Non-skid footwear in place.            1/7/2023 1532 by Emely Goss RN  Outcome: Progressing  Flowsheets (Taken 1/7/2023 0946)  Free From Fall Injury: Instruct family/caregiver on patient safety  Note: Patient verbalizes understanding of fall precautions, uses call light appropriately. 1/7/2023 1431 by Norma Live RN  Outcome: Progressing  Flowsheets (Taken 1/7/2023 0946)  Free From Fall Injury: Instruct family/caregiver on patient safety  1/7/2023 0508 by Daniel Humphrey RN  Outcome: Progressing     Problem: ABCDS Injury Assessment  Goal: Absence of physical injury  Description: Patient is free from physical injury  1/7/2023 1532 by Norma Live RN  Outcome: Progressing  1/7/2023 1431 by Norma Live RN  Outcome: Progressing  Flowsheets (Taken 1/7/2023 0946)  Absence of Physical Injury: Implement safety measures based on patient assessment  1/7/2023 0508 by Daniel Humphrey RN  Outcome: Progressing     Problem: Skin/Tissue Integrity  Goal: Absence of new skin breakdown  Description:  Monitor for areas of redness and/or skin breakdown, no skin breakdown     1/7/2023 1532 by Norma Live RN  Outcome: Progressing  Note: Skin remains clean dry and intact.    1/7/2023 1431 by Norma Live RN  Outcome: Progressing  1/7/2023 0508 by Daniel Humphrey RN  Outcome: Progressing     Problem: Pain  Goal: Verbalizes/displays adequate comfort level or baseline comfort level  Description: Patient denies pain  1/7/2023 1532 by Norma Live RN  Outcome: Progressing  Flowsheets (Taken 1/6/2023 2100 by Daniel Humphrey RN)  Verbalizes/displays adequate comfort level or baseline comfort level: Encourage patient to monitor pain and request assistance  Note: Patient satisfied with pain control  1/7/2023 1431 by Norma Live RN  Outcome: Progressing  1/7/2023 0508 by Daniel Humphrey RN  Outcome: Progressing  Flowsheets (Taken 1/6/2023 2100)  Verbalizes/displays adequate comfort level or baseline comfort level: Encourage patient to monitor pain and request assistance     Problem: Nutrition Deficit:  Goal: Optimize nutritional status  1/7/2023 1532 by Norma Live RN  Outcome: Progressing  1/7/2023 1431 by Norma Live RN  Outcome: Progressing  1/7/2023 0508 by Huey Benavides RN  Outcome: Progressing     Problem: Genitourinary - Adult  Goal: Urinary catheter remains patent  Description: Patient catheter remains patent and draining light yellow urine, cath care completed this shift and education provided to patient   1/7/2023 1532 by Christin Pena RN  Outcome: Progressing  Note: Educated patient on catheter care and emptying, verbalized understanding.   1/7/2023 1431 by Christin Pena RN  Outcome: Progressing  1/7/2023 0508 by Huey Benavides RN  Outcome: Progressing

## 2023-01-07 NOTE — PROGRESS NOTES
[facility]   Nephrology progress note  1/7/2023, 2:28 PM      Pt Name:    Funmilayo Pacheco  MRN:     [id number]     YOB: 1960  Admit Date:    12/23/2022 11:24 AM    Chief Complaint: Nephrology following for LC/CKD.     Subjective:  Patient seen and examined  Late entry  Sitting at his bedside chair  On room air  No chest pain or shortness of breath      Objective:  24HR INTAKE/OUTPUT:    Intake/Output Summary (Last 24 hours) at 1/7/2023 1428  Last data filed at 1/7/2023 3639  Gross per 24 hour   Intake 360 ml   Output 2200 ml   Net -1840 ml        Admission weight: 134 lb 8 oz (61 kg)  Wt Readings from Last 3 Encounters:   01/06/23 135 lb 1 oz (61.3 kg)   12/23/22 130 lb 15.3 oz (59.4 kg)   12/13/22 142 lb 9.6 oz (64.7 kg)        Vitals :   Vitals:    01/06/23 0800 01/06/23 1200 01/06/23 2100 01/07/23 0845   BP: (!) 102/58 (!) 108/58 (!) 102/54 110/64   Pulse: 84 82 80 84   Resp: 18 18 18 16   Temp: 98.2 °F (36.8 °C) 98.6 °F (37 °C) 98.1 °F (36.7 °C) 98.3 °F (36.8 °C)   TempSrc: Oral Oral Oral Oral   SpO2: 97% 98% 97% 98%   Weight:       Height:           Physical examination  General Appearance: No acute distress  Mouth/Throat:  Oral mucosa moist  Neck:  Supple, no JVD  Lungs:  Breath sounds: clear  Heart[de-identified]  S1,S2 heard  Abdomen:  Soft, non - tender  Musculoskeletal:  Edema -no edema noted    Medications:  Infusion:    dextrose       Meds:    ferrous sulfate  325 mg Oral Daily with breakfast    melatonin  6 mg Oral Nightly    atorvastatin  40 mg Oral Daily    buPROPion  150 mg Oral QAM    pantoprazole  40 mg Oral QAM AC    tamsulosin  0.8 mg Oral Daily    apixaban  5 mg Oral BID    [Held by provider] metoprolol succinate  12.5 mg Oral Daily    glipiZIDE  10 mg Oral QAM AC    [Held by provider] empagliflozin  25 mg Oral Daily    docusate sodium  100 mg Oral BID    multivitamin  1 tablet Oral Daily with breakfast    senna  2 tablet Oral Nightly       Lab Data :  CBC:   No results for input(s): WBC, HGB, HCT, PLT in the last 72 hours. CMP:  Recent Labs     01/05/23  0600 01/06/23  0748 01/07/23  0605    138 138   K 4.9 4.9 4.8    102 104   CO2 23 25 25   BUN 36* 38* 35*   CREATININE 1.7* 1.7* 1.5*   GLUCOSE 151* 140* 98   CALCIUM 9.4 9.2 9.2       Hepatic: No results for input(s): LABALBU, AST, ALT, ALB, BILITOT, ALKPHOS in the last 72 hours. Assessment and Plan:  Renal -acute kidney injury on chronic kidney disease  Baseline serum creatinine appears to be around 1.5-1.7. Overall stable and close to baseline at this time. Electrolytes -within normal limits   Acid-base status appears to be stable  Borderline hypotension. Will monitor  Hx of diabetes mellitus  CAD status post CABG 11/22  Polyuria - getting better        Dipesh Henry MD  [facility]    This report has been created using voice recognition software.  It may contain minor errors which are inherent in voice recognition technology

## 2023-01-08 LAB
ANION GAP SERPL CALCULATED.3IONS-SCNC: 9 MEQ/L (ref 8–16)
BUN BLDV-MCNC: 38 MG/DL (ref 7–22)
CALCIUM SERPL-MCNC: 9.1 MG/DL (ref 8.5–10.5)
CHLORIDE BLD-SCNC: 104 MEQ/L (ref 98–111)
CO2: 27 MEQ/L (ref 23–33)
CREAT SERPL-MCNC: 1.5 MG/DL (ref 0.4–1.2)
GFR SERPL CREATININE-BSD FRML MDRD: 53 ML/MIN/1.73M2
GLUCOSE BLD-MCNC: 129 MG/DL (ref 70–108)
GLUCOSE BLD-MCNC: 205 MG/DL (ref 70–108)
POTASSIUM SERPL-SCNC: 5 MEQ/L (ref 3.5–5.2)
SODIUM BLD-SCNC: 140 MEQ/L (ref 135–145)

## 2023-01-08 PROCEDURE — 97110 THERAPEUTIC EXERCISES: CPT

## 2023-01-08 PROCEDURE — 99232 SBSQ HOSP IP/OBS MODERATE 35: CPT | Performed by: INTERNAL MEDICINE

## 2023-01-08 PROCEDURE — 97116 GAIT TRAINING THERAPY: CPT

## 2023-01-08 PROCEDURE — 80048 BASIC METABOLIC PNL TOTAL CA: CPT

## 2023-01-08 PROCEDURE — 97530 THERAPEUTIC ACTIVITIES: CPT

## 2023-01-08 PROCEDURE — 36415 COLL VENOUS BLD VENIPUNCTURE: CPT

## 2023-01-08 PROCEDURE — 82948 REAGENT STRIP/BLOOD GLUCOSE: CPT

## 2023-01-08 PROCEDURE — 1180000000 HC REHAB R&B

## 2023-01-08 PROCEDURE — 6370000000 HC RX 637 (ALT 250 FOR IP): Performed by: PHYSICAL MEDICINE & REHABILITATION

## 2023-01-08 RX ADMIN — TAMSULOSIN HYDROCHLORIDE 0.8 MG: 0.4 CAPSULE ORAL at 21:59

## 2023-01-08 RX ADMIN — APIXABAN 5 MG: 5 TABLET, FILM COATED ORAL at 21:59

## 2023-01-08 RX ADMIN — DOCUSATE SODIUM 100 MG: 100 CAPSULE, LIQUID FILLED ORAL at 22:00

## 2023-01-08 RX ADMIN — Medication 6 MG: at 21:59

## 2023-01-08 RX ADMIN — ATORVASTATIN CALCIUM 40 MG: 40 TABLET, FILM COATED ORAL at 22:00

## 2023-01-08 ASSESSMENT — ENCOUNTER SYMPTOMS
SORE THROAT: 0
WHEEZING: 0
SHORTNESS OF BREATH: 0
RHINORRHEA: 0
DIARRHEA: 0
ABDOMINAL PAIN: 0
CONSTIPATION: 0
COUGH: 0
NAUSEA: 0
TROUBLE SWALLOWING: 0
VOMITING: 0

## 2023-01-08 ASSESSMENT — PAIN SCALES - WONG BAKER: WONGBAKER_NUMERICALRESPONSE: 0

## 2023-01-08 NOTE — PROGRESS NOTES
[facility]  INPATIENT PHYSICAL THERAPY  Daily Note  [address] [facility] - [id number]               Date: 2023  Patient Name: Matthew Valderrama,  Gender:  male        MRN: [id number]  : 1960  (63 y.o.)     Referring Practitioner: Dr. London Carroll  Diagnosis: Acute stroke due to ischemia  Additional Pertinent Hx: Pt admitted through ED due to RUE weakness and slurred speech,  Also noted to have sepsis, LC, emphysematous cystitis. Hx:  HTN, Db, CAD, recent CABG (). MRI + for acute ischemic stroke Lt parietal region     Prior Level of Function:  Lives With: Spouse, Family (16 y.o son, 21 y.o step dtr.)  Type of Home: House  Home Layout: One level  Home Access: Stairs to enter with rails  Entrance Stairs - Number of Steps: 2 + threshold  Entrance Stairs - Rails: Both  Home Equipment:  (None used PTA)   Bathroom Shower/Tub: Tub/Shower unit  Bathroom Toilet: Standard  Bathroom Equipment: Tub transfer bench  Bathroom Accessibility: Accessible    ADL Assistance: Independent  Homemaking Assistance: Independent  Homemaking Responsibilities: Yes  Ambulation Assistance: Independent  Transfer Assistance: Independent  Active : No    Restrictions/Precautions:  Restrictions/Precautions: General Precautions, Fall Risk  Position Activity Restriction  Other position/activity restrictions: right side hemiparesis, recent CABG -minimize arm use ( s/p 5 wks), life vest     SUBJECTIVE: Pt. Seated on his BS chair and pleasantly agrees to therapy session. Pt. Motivated for session. Pt. Indicates that apartment stay went well last night.     PAIN: None indicated    Vitals:   Patient Vitals for the past 8 hrs:   BP Temp Pulse SpO2   23 0956 (!) 144/67 97.9 °F (36.6 °C) 96 100 %        OBJECTIVE:  Bed Mobility:  Not Tested    Transfers:  Sit to Stand: Stand By Assistance  Stand to Sit:Stand By Assistance    Ambulation:  Contact Guard Assistance, Minimal Assistance  Distance: 48' x 1, 3' x 2  Surface: Level Tile  Device: Rolling Walker  Gait Deviations:  Slow Jada, Decreased Step Length Bilaterally, Decreased Gait Speed, Decreased Quad Control Right, and genu recurvatum of R LE. VCs and tactile cues provided for recurvatum with good carryover. Stairs:  None    Balance:  None    Neuromuscular Re-education  None    Exercise:  Patient was guided in 1 set(s) 10 reps of exercises: Glut sets, Seated marches, Seated hamstring curls, Seated heel/toe raises, Long arc quads, Seated isometric hip adduction, Seated abduction/adduction, and abdominal isometrics, standing modified single leg squats with L LE placed on step to facilitate R quad. Exercises were completed for increased independence with functional mobility. Functional Outcome Measures:   Not completed    ASSESSMENT:  Assessment: Patient progressing toward established goals. Activity Tolerance:  Patient tolerance of  treatment: good. Equipment Recommendations:Equipment Needed: Yes  Other: RW  Discharge Recommendations: Continue to assess pending progress, Patient would benefit from continued therapy after discharge     Plan: Current Treatment Recommendations: Strengthening, ROM, Balance training, Functional mobility training, Transfer training, Endurance training, Neuromuscular re-education, Gait training, Stair training, Safety education & training, Equipment evaluation, education, & procurement, Home exercise program, Therapeutic activities, Patient/Caregiver education & training  General Plan:  (5x/ wk 90 min, 1x/ wk 30 min)    Patient Education  Patient Education: Plan of Care, Functional Mobility, Reviewed Prior Education, Health Promotion and Wellness Education, Safety, Verbal Exercise Instruction    Goals:  Patient Goals : get back to my normal way of doing things  Short Term Goals  Time Frame for Short Term Goals: 1 wk  Short Term Goal 1: Pt to go supine <->sit, minimal use of UEs, SBA to get in/out of bed.   Short Term Goal 2: Pt to get up/down from various seated surfaces, SBA, to get up to walk. Short Term Goal 3: Pt to walk with RW >= 50 ft, demonstrating step through gait pattern, recurvatum < 10% of steps on RT, CGA to progress to home mobility  Short Term Goal 4: Pt to ascend/descend 2 steps with gerber rails, CGA to progress to home entry. GOAL MET, SEE LTG  Short Term Goal 5: Pt to get in/out of car, SBA for transportation needs. Additional Goals?: Yes  Short Term Goal 6: Pt to perform Tinetti >= 18/28, demonstrating improved balance. Long Term Goals  Time Frame for Long Term Goals : 3 wks from evaluation  Long Term Goal 1: Pt to go supine <->sit, minimal use of UEs, Mod I, to get in/out of bed. Long Term Goal 2: Pt to get up/down from various seated surfaces, Mod I, to get up to walk. Long Term Goal 3: Pt to walk with RW >= 50 ft, demonstrating step through gait pattern, recurvatum < 10% of steps on RT, Mod I, for home mobility  Long Term Goal 4: Pt to perform Tinetti >= 24/28, demonstrating improved balance. Long Term Goal 5: Patient will ascend/descend 2 steps with bilateral hand rails with SBA for safe home entry. Additional Goals?: Yes  Long term goal 6: Patient will complete car transfer with modified independence with safe technique to transfer in and out of vehicle safely. Following session, patient left in safe position with all fall risk precautions in place.

## 2023-01-08 NOTE — PROGRESS NOTES
[facility]   Nephrology progress note  1/8/2023, 1:20 PM      Pt Name:    Manisha Chester  MRN:     [id number]     YOB: 1960  Admit Date:    12/23/2022 11:24 AM    Chief Complaint: Nephrology following for LC/CKD. Subjective:  Patient seen and examined  Late entry  Sitting at his bedside chair  On room air  No chest pain or shortness of breath      Objective:  24HR INTAKE/OUTPUT:  No intake or output data in the 24 hours ending 01/08/23 1320     Admission weight: 134 lb 8 oz (61 kg)  Wt Readings from Last 3 Encounters:   01/06/23 135 lb 1 oz (61.3 kg)   12/23/22 130 lb 15.3 oz (59.4 kg)   12/13/22 142 lb 9.6 oz (64.7 kg)        Vitals :   Vitals:    01/06/23 2100 01/07/23 0800 01/07/23 0845 01/08/23 0956   BP: (!) 102/54  110/64 (!) 144/67   Pulse: 80  84 96   Resp: 18  16    Temp: 98.1 °F (36.7 °C)  98.3 °F (36.8 °C) 97.9 °F (36.6 °C)   TempSrc: Oral Oral Oral    SpO2: 97%  98% 100%   Weight:       Height:           Physical examination  General Appearance: No acute distress  Mouth/Throat:  Oral mucosa moist  Neck:  Supple, no JVD  Lungs:  Breath sounds: clear  Heart[de-identified]  S1,S2 heard  Abdomen:  Soft, non - tender  Musculoskeletal:  Edema -no edema noted    Medications:  Infusion:    dextrose       Meds:    ferrous sulfate  325 mg Oral Daily with breakfast    melatonin  6 mg Oral Nightly    atorvastatin  40 mg Oral Daily    buPROPion  150 mg Oral QAM    pantoprazole  40 mg Oral QAM AC    tamsulosin  0.8 mg Oral Daily    apixaban  5 mg Oral BID    [Held by provider] metoprolol succinate  12.5 mg Oral Daily    glipiZIDE  10 mg Oral QAM AC    [Held by provider] empagliflozin  25 mg Oral Daily    docusate sodium  100 mg Oral BID    multivitamin  1 tablet Oral Daily with breakfast    senna  2 tablet Oral Nightly       Lab Data :  CBC:   No results for input(s): WBC, HGB, HCT, PLT in the last 72 hours.     CMP:  Recent Labs     01/06/23  0748 01/07/23  0605 01/08/23  0618    138 140   K 4.9 4.8 5.0    104 104   CO2 25 25 27   BUN 38* 35* 38*   CREATININE 1.7* 1.5* 1.5*   GLUCOSE 140* 98 129*   CALCIUM 9.2 9.2 9.1       Hepatic: No results for input(s): LABALBU, AST, ALT, ALB, BILITOT, ALKPHOS in the last 72 hours. Assessment and Plan:  Renal -acute kidney injury on chronic kidney disease  Baseline serum creatinine appears to be around 1.5-1.7. Overall stable and close to baseline at this time. Clinically doing well  Stable renal function  Electrolytes -within normal limits, monitor potassium  Acid-base status appears to be stable  Borderline hypotension. Will monitor  Hx of diabetes mellitus  CAD status post CABG 11/22    Bernie Davis MD  [facility]    This report has been created using voice recognition software.  It may contain minor errors which are inherent in voice recognition technology

## 2023-01-08 NOTE — PROGRESS NOTES
[address]  Occupational Therapy  Daily Note  Time:   Time In: 1000  Time Out: 1030  Timed Code Treatment Minutes: 30 Minutes  Minutes: 30          Date: 2023  Patient Name: Elson Fleischer,   Gender: male      Room: [facility]/053-A  MRN: [id number]  : 1960  (63 y.o.)  Referring Practitioner: Ordering & Attending: Aman Armstrong MD  Diagnosis: Acute Stroke due to Ischemia  Additional Pertinent Hx: Elson Fleischer who is a 63 y.o. male with a history of hypertension, diabetes, CAD on  daily, recent CABG in 2022, ischemic cardiomyopathy is admitted to [facility] for sepsis, emphysematous cystitis and acute kidney injury on CKD. During admission exam patient stated that his right arm feldman has been weak and that he has had some slurred speech. Stroke alert was called for right-sided weakness and dysarthria on 12/15. On arrival patient's NIH was 4 for right upper extremity, right lower extremity weakness, 1 limb ataxia and dysarthria. On further questioning, patient and wife state that the right arm weakness actually started last night. Patient's wife noticed when patient was trying to get up from the toilet that he was unable to push himself up with his right arm. Last known well 2330 on 2022. Patient taken to imaging for stat CT head which revealed no ICH, midline shift, mass-effect. CTA head and neck deferred due to patient's LC. Decision for no tPA was made due to patient's time since last known well. Patient with relatively low NIH and symptoms which are not consistent with LVO, therefore no thrombectomy/neuro intervention at this time. Patient had stat MRI brain and MRA head and neck without contrast.  MRI showed acute ischemic stroke of left parietal region. Pt admitted to IP rehab on 22.     Restrictions/Precautions:  Restrictions/Precautions: General Precautions, Fall Risk  Position Activity Restriction  Other position/activity restrictions: right side hemiparesis, recent CABG 11/22-minimize arm use ( s/p 5 wks), life vest     SUBJECTIVE: Patient seated in bedside chair upon arrival with spouse present. Patient/spouse discuss overnight apartment stay and express concerns to address remainder of IPR to assist with transitioning home, therapist provided emotional support. Patient spouse expresses she feels therapy needs to help patient with patience and frustration when she tries to help noting patient states \"worst that can happen is I face plant on the floor. \" As wife notes patient B LE shaking as he was preparing breakfast this am.  Patient states \"I do not like asking for help and I know I need to learn patience\". PAIN: 3/10: BLE    Vitals: Vitals not assessed per clinical judgement, see nursing flowsheet    COGNITION: Decreased Problem Solving and Decreased Safety Awareness    ADL:   No ADL's completed this session. Verbal education regarding LB dressing technique with crum as patient expresses when he got dressed this am, RLE and crum became twisted making it difficult to get dressed, verbalizing understanding. Continue to review LB dressing . BALANCE:  Sitting Balance:  Modified Independent. Standing Balance: Stand By Assistance. RW, no LOB    BED MOBILITY:  Not Tested    TRANSFERS:  Sit to Stand:  Stand By Assistance, X 1.    Stand to Sit: Stand By Assistance. FUNCTIONAL MOBILITY:  Assistive Device: Rolling Walker  Assist Level:  Stand By Assistance. Distance: To and from therapy gym  Steady pace, no LOB     ADDITIONAL ACTIVITIES:  Patient completed BUE strengthening exercises with skilled education on HEP: completed x15 reps x1 set with a yellow weighted stick in all joints and all planes in order to improve UE strength and activity tolerance required for BADL routine and toilet / shower transfers. Patient tolerated good, requiring no rest breaks.  Patient also required minimal verbal/visual cues for technique. Patient/spouse provided education regarding ECT and safety within the home to complete ADLs/IADLs (ie: sitting to complete activities, frequent rest breaks, asking for help from spouse, allowing patient to complete activities to full ability). Patient/spouse verbalizing understanding and providing self feedback, appreciative this date. ASSESSMENT:     Activity Tolerance:  Patient tolerance of  treatment: good. Discharge Recommendations: Home with assist as needed and Home with Home Health OT  Equipment Recommendations: Equipment Needed: Yes  Other: OT staff to continue to monitor needs throughout OT POC. Plan: Times Per Week: 5x/week x 90 minutes and 1x/week x 30 minutes  Current Treatment Recommendations: Strengthening, Balance training, Self-Care / ADL, Equipment evaluation, education, & procurement, Safety education & training, Endurance training, Functional mobility training, Neuromuscular re-education, Patient/Caregiver education & training, Home management training    Patient Education  Patient Education: Role of OT, Plan of Care, ADL's, IADL's, Energy Conservation, Home Exercise Program, Family Education, Equipment Education, Home Safety, Importance of Increasing Activity, and Assistive Device Safety    Goals  Short Term Goals  Time Frame for Short Term Goals: 1 week  Short Term Goal 1: Pt will complete functional ambulation to/from BR and HH distances with SBA and min vcs for safety to increase indep with toileting. Short Term Goal 2: Pt will decrease RUE 9 hole peg test by 10 seconds (49 on 1/3), and RUE  strength by 5 pounds (48.3# on 1/3) to increase indep with fasteners in UB dressing. Short Term Goal 3: Pt will complete dynamic standing task x 5 minutes with 1-2 UE release and Supervision to increase indep with sinkside grooming. Short Term Goal 4: Pt will complete UB dressing with mod I to increase indep within home environment.   Short Term Goal 5: Pt will complete LB dresing with S and min vcs for safety to increase indep and endurance within home environment. Additional Goals?: No  Long Term Goals  Time Frame for Long Term Goals : 4 weeks from IPR eval  Long Term Goal 1: Pt will complete BADL routine including shower transfer Mod Indep to increase indep and safety in home environment. Long Term Goal 2: Pt will complete simple IADL task with S and 0 vcs for safety to increase indep and endurance in home environment. Following session, patient left in safe position with all fall risk precautions in place.

## 2023-01-08 NOTE — PROGRESS NOTES
Patient returned form overnight apartment stay, all goals met, spouse said it was a good experience for them. Patient took all of hid morning medications in self med form in the apartment.

## 2023-01-09 LAB
ANION GAP SERPL CALCULATED.3IONS-SCNC: 11 MEQ/L (ref 8–16)
BUN BLDV-MCNC: 32 MG/DL (ref 7–22)
CALCIUM SERPL-MCNC: 9.4 MG/DL (ref 8.5–10.5)
CHLORIDE BLD-SCNC: 105 MEQ/L (ref 98–111)
CO2: 25 MEQ/L (ref 23–33)
CREAT SERPL-MCNC: 1.4 MG/DL (ref 0.4–1.2)
GFR SERPL CREATININE-BSD FRML MDRD: 57 ML/MIN/1.73M2
GLUCOSE BLD-MCNC: 116 MG/DL (ref 70–108)
GLUCOSE BLD-MCNC: 131 MG/DL (ref 70–108)
POTASSIUM SERPL-SCNC: 4.8 MEQ/L (ref 3.5–5.2)
SODIUM BLD-SCNC: 141 MEQ/L (ref 135–145)

## 2023-01-09 PROCEDURE — 97110 THERAPEUTIC EXERCISES: CPT

## 2023-01-09 PROCEDURE — 36415 COLL VENOUS BLD VENIPUNCTURE: CPT

## 2023-01-09 PROCEDURE — 97530 THERAPEUTIC ACTIVITIES: CPT

## 2023-01-09 PROCEDURE — 97116 GAIT TRAINING THERAPY: CPT

## 2023-01-09 PROCEDURE — 99232 SBSQ HOSP IP/OBS MODERATE 35: CPT | Performed by: INTERNAL MEDICINE

## 2023-01-09 PROCEDURE — 80048 BASIC METABOLIC PNL TOTAL CA: CPT

## 2023-01-09 PROCEDURE — 97535 SELF CARE MNGMENT TRAINING: CPT

## 2023-01-09 PROCEDURE — 1180000000 HC REHAB R&B

## 2023-01-09 PROCEDURE — 99232 SBSQ HOSP IP/OBS MODERATE 35: CPT | Performed by: PHYSICAL MEDICINE & REHABILITATION

## 2023-01-09 PROCEDURE — 97130 THER IVNTJ EA ADDL 15 MIN: CPT

## 2023-01-09 PROCEDURE — 6370000000 HC RX 637 (ALT 250 FOR IP): Performed by: PHYSICAL MEDICINE & REHABILITATION

## 2023-01-09 PROCEDURE — 97129 THER IVNTJ 1ST 15 MIN: CPT

## 2023-01-09 PROCEDURE — 82948 REAGENT STRIP/BLOOD GLUCOSE: CPT

## 2023-01-09 RX ADMIN — PANTOPRAZOLE SODIUM 40 MG: 40 TABLET, DELAYED RELEASE ORAL at 05:55

## 2023-01-09 RX ADMIN — BUPROPION HYDROCHLORIDE 150 MG: 150 TABLET, FILM COATED, EXTENDED RELEASE ORAL at 08:20

## 2023-01-09 RX ADMIN — ATORVASTATIN CALCIUM 40 MG: 40 TABLET, FILM COATED ORAL at 22:33

## 2023-01-09 RX ADMIN — GLIPIZIDE 10 MG: 10 TABLET ORAL at 08:21

## 2023-01-09 RX ADMIN — Medication 1 TABLET: at 08:21

## 2023-01-09 RX ADMIN — APIXABAN 5 MG: 5 TABLET, FILM COATED ORAL at 08:20

## 2023-01-09 RX ADMIN — APIXABAN 5 MG: 5 TABLET, FILM COATED ORAL at 22:32

## 2023-01-09 RX ADMIN — FERROUS SULFATE TAB 325 MG (65 MG ELEMENTAL FE) 325 MG: 325 (65 FE) TAB at 08:21

## 2023-01-09 RX ADMIN — Medication 6 MG: at 22:33

## 2023-01-09 RX ADMIN — TAMSULOSIN HYDROCHLORIDE 0.8 MG: 0.4 CAPSULE ORAL at 22:33

## 2023-01-09 ASSESSMENT — ENCOUNTER SYMPTOMS
ABDOMINAL PAIN: 0
SORE THROAT: 0
NAUSEA: 0
TROUBLE SWALLOWING: 0
RHINORRHEA: 0
VOMITING: 0
COUGH: 0
WHEEZING: 0
DIARRHEA: 0
SHORTNESS OF BREATH: 0
CONSTIPATION: 0

## 2023-01-09 ASSESSMENT — PAIN SCALES - GENERAL
PAINLEVEL_OUTOF10: 0
PAINLEVEL_OUTOF10: 0

## 2023-01-09 ASSESSMENT — PAIN SCALES - WONG BAKER
WONGBAKER_NUMERICALRESPONSE: 0
WONGBAKER_NUMERICALRESPONSE: 0

## 2023-01-09 NOTE — PROGRESS NOTES
[address] NOTE    Conference Date: 2023  Admit Date:  2022 11:24 AM  Patient Name: Mira Melendez    MRN: [id number]    : 1960  (63 y.o.)  Rehabilitation Admitting Diagnosis:  Acute stroke due to ischemia [facility]) [I63.9]  Referring Practitioner: Dr. Arthur Bolden issues/needs regarding patient and family discharge status: Patient continues to be motivated and progressing with therapy. Patient plans to be discharged on Friday,  with home health services for RN, PT, OT and HHA. SW will follow and maintain involvement in discharge planning. PHYSICAL THERAPY  Mr Zenia Ramírez continues to make good progress towards goals in physical therapy, meeting 5/6 STG's and 2/6 LTG's. Over the last week patient has shown improvement with sit < > stand transfers, progressing from CGA to SBA, gait from CGA to SBA/CGA with a RW and stairs from CGA to SBA with bilateral hand rails. Patient also improved his tinetti score from 16 to 20/28 indicating decreased risk for falls. Pt continues to present with weakness right LE resulting in genu recurvatum and balance impairment requiring use of a RW. Patient would benefit from continued skilled PT services to maximize his safety and independence with functional mobility, reduce his risk for falls and allow patient to return to home safely. Equipment Needed: Yes  Other: RW    SPEECH THERAPY  Patient met 3/3 short term goals and 2/2 long term goals over this reporting period. Improvements noted with functional memory with independent use of compensatory strategies, as well as gains with executive functioning and higher level reasoning. Patient able to demonstrate independent management of medications, completing basic finances, organizing information onto a monthly calendar and examining visual information for errors.  With regards to speech production, speech remains 100% intelligible across contexts. Occasional dysarthria or distortions evident, but also may be due to lack of dentition and articulatory structures. Patient's self perceptual rating is a 8/10 (*10 being baseline). Improvement noted with precision when patient actively employing slower rate and increased focus on articulatory precision. Given overall success with performance, recommending ST HEP at discharge. Will begin education on HEP next session. OCCUPATIONAL THERAPY  Hunter Mae is making great progress on IP Rehab and is motivated to continue to make progress. Hunter Mae can complete his LB ADLs with supervision to SBA for balance and increased time to don TEDs. Hunter Mae can complete his UB ADLs without A including shirt and life vest jacket. Hunter Mae is able to step over the tub with SBA and can complete basic transfers with supervision. Hunter Mae can complete rote IADLs with supervision to SBA with RW, began to challenge pt without AD during some functional ADL/IADL tasks in which Hunter Mae requires closer CGA during tasks without AD. Hunter Mae at times can be limited by his safety awareness. His RUE function has improved AEB 9HPT to 42 seconds and dynanometer to 50# average. He cont to require skilled OT on IPR to improve safety and indep with BADL and IADL to decrease risk of fall / injury. Equipment Needed: Yes  Other: OT staff to continue to monitor needs throughout OT POC. RECREATIONAL THERAPY  Patient has been offered participation in recreational therapy activities and participates as able. Upon arrival pt resting in bed on his phone-asked for another pillow and some water and RT let nurse know he is requesting 2 tylenol-pt states he wants to be as independent as possible when he goes home Friday-is looking forward to getting back home- pleasant and social      NUTRITION  Weight: 134 lb 14.4 oz (61.2 kg) / Body mass index is 19.36 kg/m². Current diet: ADULT ORAL NUTRITION SUPPLEMENT; Breakfast, Dinner; Frozen Oral Supplement  ADULT DIET;  Regular; 3 carb choices (45 gm/meal); 2000 ml  Please see nutrition note for details. NURSING  Continent of Bowel: Yes. Frequency: Every 3-4 days. Management: Senokot, Colace, PRN MOM and Dulcolax. Continent of Bladder: Yes. Frequency: Bella catheter. Management: Bella/Flomax. Pain is Managed:  Yes. Management: Tylenol and Roxicodone. Frequency of Intervention: Hasn't taken any. Adequately Controlled: Yes  Sleep: Adequate and Interventions to Support Sleep: Melatonin and Trazodone  Signs and Symptoms of Infection:  No.   Signs and Symptoms of Skin Breakdown:  No.   Injury and/or Falls during Inpatient Rehabilitation Admission: No  Anticoagulants: Eliquis  Diabetic: Yes: Home Meds: Jardiance, Glimepiride, and [facility] Meds: Jardiance, Glipizide  Educational Needs: None. Consultations/Labs/X-rays: None  Oxygen while on IP Rehab:  No Home oxygen: No  Patient/Family Education Focus: Stroke Education   Barriers to Education: None    Recent Labs     01/09/23  0757 01/10/23  0803 01/11/23  0801   POCGLU 131* 179* 164*       Lab Results   Component Value Date    LDLCALC 42 12/16/2022    LDLCHOLESTEROL 108 05/24/2022         Vitals:    01/11/23 0539 01/11/23 0750 01/11/23 0800 01/11/23 0811   BP:  (!) 86/46 98/71 96/64   Pulse:   92 80   Resp:  16 18 16   Temp:   97.7 °F (36.5 °C)    TempSrc:   Oral    SpO2:   100%    Weight: 134 lb 14.4 oz (61.2 kg)      Height:              Family Education: Family available and participating in education   Fall Risk:  Falling star program initiated  Is the patient appropriate for a stay in the functional apartment? no    Discharge Plan   Estimated Discharge Date:  1/13/2023    Destination: home health and discharge home with supervision  Services at Discharge: [address], Occupational Therapy, Nursing, and HHA 3x week  Is patient appropriate for an outpatient driving evaluation? No (patent has no  license)  Equipment at Discharge:  Other: OT staff to continue to monitor needs throughout OT POC. Other: RW  Factors facilitating achievement of predicted outcomes: Family support, Motivated, Cooperative, and Pleasant  Barriers to the achievement of predicted outcomes: Unrealistic expectations, Decreased endurance, Upper extremity weakness, Lower extremity weakness, Medical complications, and Stairs at home  Follow up with physiatrist? yes,   If yes, what timeframe? About 1~2 months after discharge    Team Members Present at Conference:  Rodrigo Dowd, [facility], [facility] [place]   Occupational Therapist:Odalis Bautista OTR/L [id number]  Physical Therapist:Charity Álvarez, [address] [place] [address] [address], [address]  Nurse:Alexa Hsieh, ABIGAIL  Psychologist: Nani Baker, PhD.    I approve the established interdisciplinary plan of care as documented within the medical record of [facility].     Dolly Reed MD

## 2023-01-09 NOTE — PROGRESS NOTES
Physical Medicine & Rehabilitation Progress Note    Chief Complaint:  Right-sided weakness due to stroke    Subjective:    Jackelyn Nj is a 63 y.o.  left-handed  male with history of hypertension, diabetes mellitus type 2, coronary artery disease with ischemic cardiomyopathy requiring two-vessel CABG, urinary retention with several failed void trial, status post tonsillectomy, was admitted to the inpatient rehabilitation unit on 12/23/2022 for intensive inpatient management of impairment & disability secondary to right hemiparesis due to acute left parietal corona radiata and right cerebellar ischemic stroke, and debility/physical deconditioning due to Klebsiella pneumonia urinary tract infection with emphysematous cystitis and bilateral hydronephrosis complicated by Klebsiella pneumonia bacteremia/sepsis. The patient was previously hospitalized at [facility] from 10/28/2022 to 11/15/2022 for coronary artery disease and ischemic cardiomyopathy requiring two-vessel CABG on 11/7/2022. His postoperative course was complicated by urinary retention with failed void trial.  The patient has been experiencing progressive weakness after he was discharged to home. The Bella was later removed on 12/13/2022 but the patient continued having difficulty voiding. On 12/14/2022 approximately 11:30 PM his wife noticed the patient had slight slurred speech with right arm weakness. On 12/15/2022 the patient suddenly felt dizzy and lightheaded while he was trying to get up from sitting on toilet and fell into the ground. He says he did not lose consciousness and did not hit his head. His wife called 46. He was transported to [address] [place] [address] ER for evaluation by EMS. He complains of neck pain and upper back pain. He was found to be tachycardic. His WBC was found to be 22.3. Bella was placed in ER and a rush of air followed by dark brown and bright red color urine came out.   He was put on IV meropenem and admitted. Urology was consulted. CT of cervical spine revealed only multilevel facet and uncovertebral joint arthropathy. CT of head done on 12/15/2020 revealed no acute intracranial abnormality. CT of the chest, abdomen and pelvis done on 12/15/2022 revealed emphysematous cystitis, bilateral hydronephrosis, and constipation. Because of right arm weakness, stroke code was called on 12/15/2022. MRI of brain done on 12/15/2022 revealed acute ischemic stroke at the left parietal region corona radiata, and possible small additional acute ischemic stroke within right cerebellum. Neurology service start patient on aspirin and Plavix combination for the stroke. MRA of the neck and head done on 12/15/2022 showed only mild bilateral carotid bulb disease. Transesophageal echocardiogram was done on 12/16/2022 showing severe global hypokinesis of left ventricle with estimated ejection fraction of 30%, and no thrombus identified. 2 blood culture and urine culture done on 12/15/2022 revealed Klebsiella pneumonia. Infectious disease had been consulted. Oral Cipro was started on 12/22/2022 after IV meropenem was completed. Patient's hospital course was also complicated by constipation which resolved this morning. The patient says he feels well. He says he had a good sleep last night. The patient's face patient says his right-sided weakness is getting better. He has no complaint of pain, numbness or tingling sensation. He continues tolerating the intensive inpatient rehabilitation treatment well. He is still on Bella for urinary retention with failed void trial.    The patient currently is projected to be ready for discharge on 1/13/2023. Rehabilitation:  PT: Reviewed.     Transfers:  Sit to Stand: Stand By Assistance  Stand to Sit:Stand By Assistance     Ambulation:  Contact Guard Assistance, Minimal Assistance  Distance: 50' x 1, 3' x 2  Surface: Level Tile  Device: Rolling Walker  Gait Deviations:  Slow Jada, Decreased Step Length Bilaterally, Decreased Gait Speed, Decreased Quad Control Right, and genu recurvatum of R LE. VCs and tactile cues provided for recurvatum with good carryover. OT: Reviewed. ADL:   No ADL's completed this session. Verbal education regarding LB dressing technique with crum as patient expresses when he got dressed this am, RLE and crum became twisted making it difficult to get dressed, verbalizing understanding. Continue to review LB dressing . BALANCE:  Sitting Balance:  Modified Independent. Standing Balance: Stand By Assistance. RW, no LOB     TRANSFERS:  Sit to Stand:  Stand By Assistance, X 1.    Stand to Sit: Stand By Assistance. FUNCTIONAL MOBILITY:  Assistive Device: Rolling Walker  Assist Level:  Stand By Assistance. Distance: To and from therapy gym  Steady pace, no LOB      ADDITIONAL ACTIVITIES:  Patient completed BUE strengthening exercises with skilled education on HEP: completed x15 reps x1 set with a yellow weighted stick in all joints and all planes in order to improve UE strength and activity tolerance required for BADL routine and toilet / shower transfers. Patient tolerated good, requiring no rest breaks. Patient also required minimal verbal/visual cues for technique. Patient/spouse provided education regarding ECT and safety within the home to complete ADLs/IADLs (ie: sitting to complete activities, frequent rest breaks, asking for help from spouse, allowing patient to complete activities to full ability). Patient/spouse verbalizing understanding and providing self feedback, appreciative this date. ST: Reviewed. Time word problems:  10/10 indep  *Excellent processing speed  *Appropriate math reasoning and math computation. Review of Systems:  Review of Systems   Constitutional:  Negative for chills, diaphoresis, fatigue and fever.    HENT:  Negative for hearing loss, rhinorrhea, sneezing, sore throat and trouble swallowing. Eyes:  Negative for visual disturbance. Respiratory:  Negative for cough, shortness of breath and wheezing. Cardiovascular:  Negative for chest pain and palpitations. Gastrointestinal:  Negative for abdominal pain, constipation, diarrhea, nausea and vomiting. Genitourinary:  Positive for difficulty urinating. Negative for dysuria. Musculoskeletal:  Positive for gait problem. Negative for arthralgias, myalgias and neck pain. Skin:  Negative for rash. Neurological:  Positive for weakness (Right upper and right lower extremities). Negative for dizziness, tremors, facial asymmetry, speech difficulty, light-headedness, numbness and headaches. Psychiatric/Behavioral:  Negative for dysphoric mood, hallucinations and sleep disturbance. The patient is not nervous/anxious.          Objective:  /68   Pulse (!) 104   Temp 99 °F (37.2 °C) (Oral)   Resp 16   Ht 5' 2\" (1.575 m) Comment: last admit ht listed as 5'10\"; pt. appears closer to 5'10\"  Wt 138 lb 14.4 oz (63 kg)   SpO2 100%   BMI 25.41 kg/m²   Physical Exam   General:  well-developed, well nourished  male; in no acute distress ; appropriate affect & mood; sitting on reclining chair comfortably  Eyes: pupil equally round ; extra-ocular motion intact bilaterally  Head, Ear, Nose, Mouth & Throat : normocephalic ; no discharge from ears or nose ; no deformity ; no facial swelling ; oral mucosa pink   Neck :  supple ; no tenderness ; mild muscle spasm at bilateral cervical paraspinal muscles  Cardiovascular : Presence of healed vertical surgical scar at sternum ; regular rate & rhythm ; normal S1 & S2 heart sound ; no murmur ; normal peripheral pulse at the bilateral upper extremities; reduced pulse strength at the bilateral lower extremities  Pulmonary : Present breath sounds at the bilateral lung fields; no wheezing ; no rale; no crackle  Gastrointestinal : soft, slightly protruded abdomen without tenderness ; normal bowel sound present    : Bella catheter in place draining light yellowish urine  Back : no tenderness; no muscle spasm  Skin: no skin lesion or rash ; no pitting edema at all 4 extremities  Musculoskeletal : no limb asymmetry; no limb deformity; no tenderness at bilateral upper & lower extremities; no palpable mass at limbs ; no joints laxity or crepitation ; shoulder flexion and abduction passive ROM reaching 170 degrees bilaterally; hip flexion passive ROM reaching 130 degrees bilaterally; normal functional joints ROM at bilateral upper & lower extremities  Cerebral :  alert ; awake ; oriented to place, person and time; follow two-step verbal command  Cerebellum : mild dysmetria with the right finger-to-nose test; no dysmetria with left finger-to-nose test but with slight intentional tremor; no dysmetria with bilateral heel-to-shin test  Cranial Nerves : Tongue protrudes slightly to the right  (CN XII) ; grossly intact other CN II to XI function  Sensory : intact light touch and pin prick sensation at bilateral upper & lower extremities  Motor : normal muscle tone at bilateral upper & lower extremities ; 4+/5 to 5/5 muscle strength at the right elbow extension; 4+/5 to 5/5 muscle strength at the right elbow flexion; 4+/5 muscle strength at right finger extension; 4+/5 to 5/5 muscle strength at the right finger flexion and handgrip; 4+/5 muscle strength at right finger abduction; normal 5/5 muscle strength at the rest of bilateral upper & lower extremities  Reflex : 3+ right biceps, right brachioradialis and right knee reflexes; 2+ left knee reflexes; 1+ left biceps, left brachioradialis reflexes  Pathological Reflex :  No Ion's sign ; no ankle clonus  Gait : Not assessed      Diagnostics:   Recent Results (from the past 24 hour(s))   Basic Metabolic Panel    Collection Time: 01/09/23  6:26 AM   Result Value Ref Range    Sodium 141 135 - 145 meq/L    Potassium 4.8 3.5 - 5.2 meq/L    Chloride 105 98 - 111 meq/L    CO2 25 23 - 33 meq/L    Glucose 116 (H) 70 - 108 mg/dL    BUN 32 (H) 7 - 22 mg/dL    Creatinine 1.4 (H) 0.4 - 1.2 mg/dL    Calcium 9.4 8.5 - 10.5 mg/dL   Anion Gap    Collection Time: 01/09/23  6:26 AM   Result Value Ref Range    Anion Gap 11.0 8.0 - 16.0 meq/L   Glomerular Filtration Rate, Estimated    Collection Time: 01/09/23  6:26 AM   Result Value Ref Range    Est, Glom Filt Rate 57 (A) >60 ml/min/1.73m2   POCT Glucose    Collection Time: 01/09/23  7:57 AM   Result Value Ref Range    POC Glucose 131 (H) 70 - 108 mg/dl       Impression:  Acute left parietal corona radiata and right cerebellar ischemic stroke causing right hemiparesis with impaired coordination, and dysarthria  Debility/physical decondition due to Klebsiella pneumonia urinary tract infection with emphysematous cystitis and bilateral hydronephrosis complicated by Klebsiella pneumonia bacteremia/sepsis  Urinary retention  Klebsiella pneumonia urinary tract infection with emphysematous cystitis and bilateral hydronephrosis  Klebsiella pneumoniae bacteremia/sepsis  Acute kidney injury possibly due to dehydration with orthostatic hypotension  Hypotension probably due to side effect from high-dose Flomax in combination with Toprol XL  Coronary artery disease with ischemic cardiomyopathy requiring two-vessel coronary artery bypass graft surgery on 11/7/2022  Hypertension  Diabetes mellitus type 2    The patient's condition remains stable. His right upper extremity still has slight weakness with mild difficulty precisely controlling the motor movement. He is still on Bella catheter for urinary retention. He continues tolerating the intensive inpatient rehabilitation treatment well. His function continue to improve slowly. The patient currently is projected to be ready for discharge on 1/13/2023.       Plan:  Continues intensive PT/OT/SLP/RT inpatient rehabilitation program at least 3 hours per day, 5 days per week in order to improve functional status prior to discharge. Family education and training will be completed. Equipment evaluations and recommendations will be completed as appropriate. Rehabilitation nursing continues to be involved for bowel, bladder, skin, and pain management. Nursing will also provide education and training to patient and family. Prophylaxis:  DVT: Patient on Eliquis, JONAH stocking, intermittent pneumatic compression device. GI: Colace, Senokot, GlycoLax as needed, milk of magnesia as needed, Dulcolax suppository as needed.   Pain: Tylenol as needed  Continue Lipitor for CVA and CAD  Continues holding Toprol-XL for hypertension  Continue Flomax for urinary retention  Continue Protonix for gastric protection  Continue Jardiance (on hold by renal service), Glucotrol, Humalog insulin coverage for diabetes mellitus  Continue Wellbutrin XL for smoking cessation  Continue melatonin, trazodone as needed for insomnia  Continue multivitamin supplement  Nutrition: Continue current diet   Bladder: Monitoring signs or symptoms of UTI  Bowel: Monitoring signs or symptoms of constipation   and case management for coordination of care and discharge planning      Missed Therapy Time:  None      Gisel Alan MD

## 2023-01-09 NOTE — PLAN OF CARE
Problem: Discharge Planning  Goal: Discharge to home or other facility with appropriate resources  1/9/2023 0053 by Sonya Blackwell RN  Outcome: Progressing  Note: Working toward goal of discharge to home. Overnight      Problem: Chronic Conditions and Co-morbidities  Goal: Patient's chronic conditions and co-morbidity symptoms are monitored and maintained or improved  Outcome: Progressing  Flowsheets (Taken 1/8/2023 2215)  Care Plan - Patient's Chronic Conditions and Co-Morbidity Symptoms are Monitored and Maintained or Improved: Monitor and assess patient's chronic conditions and comorbid symptoms for stability, deterioration, or improvement     Problem: Safety - Adult  Goal: Free from fall injury  Description: Patient absent of falls this shift. Bed in lowest position, side rails up 2/4. Call-light within reach. Patient instructed to use call-light to get up. Non-skid footwear in place.     Outcome: Progressing     Problem: Pain  Goal: Verbalizes/displays adequate comfort level or baseline comfort level  Description: Patient denies pain  Outcome: Progressing     Problem: Genitourinary - Adult  Goal: Urinary catheter remains patent  Description: Patient catheter remains patent and draining light yellow urine, cath care completed this shift and education provided to patient   Outcome: Progressing  Flowsheets (Taken 1/8/2023 2215)  Urinary catheter remains patent: Assess patency of urinary catheter

## 2023-01-09 NOTE — PROGRESS NOTES
[facility] [facility]  [facility]  DAILY NOTE     TIME   SLP Individual Minutes  Time In: 0930  Time Out: 1000  Minutes: 30  Timed Code Treatment Minutes: 30 Minutes       Date: 2023  Patient Name: Destini Talavera      CSN: [id number]   : 1960  (63 y.o.)  Gender: male   Referring Physician:  Dr. Domonique Cespedes   Diagnosis: Acute stroke due to ischemia, right hemiparesis secondary to stroke   Precautions: aspiration, fall risk   Current Diet: Regular diet with thin liquids   Swallowing Strategies: Standard Universal Swallow Precautions  Date of Last MBS/FEES: Not Applicable    Pain:  No pain reported. Subjective:  Patient sitting upright in recliner, awake upon ST arrival. No family present. Patient agreeable to skilled ST services. Pleasant and cooperative throughout. *Reviewed goals and POC with patient re: remaining time on IPR to ensure safe discharge to home setting. Patient reports nearing baseline cognition; however, would like to continue with medication management, basic finances, recall, and other complex executive functioning tasks. 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 Patient fixes imperfections. Patient endorsing increased concerns r/t physical demands of job vs cognitive demands. Patient would benefit from education leading up to discharge re: HEP in order to keep brain active. Short-Term Goals:  SHORT TERM GOAL #1:  Goal 1: Patient will complete complex executive functioning tasks (i.e., medication management, complex reasoning/deductive reasoning, etc.) with 90% accuracy and minimal cuing in order to allow for safe return to work (40/hours week). INTERVENTIONS:   Review of Apartment Stay   ST reviewed apartment stay from  -  with patient this date.  Patient reporting good success with apartment stay indicating independent completion of cooking breakfast, completing dishes, medication management of AM/PM medications, and mobility. Patient reporting decreased insight to need for walker within apartment; however, receptive to wife providing verbal cue to utilize walker. In regards to goals established for apartment stay medication management and completion of structured speech tasks with use of compensatory strategies, patient reported he read book aloud to wife. Patient indicated good success; however, as patient fatigue increased, patient's wife endorsing increased slurred speech. Patient indicating that he did NOT utilize pillbox during apartment stay; however, patient and wife reviewed AM/PM medications together to ensure accuracy with dosage/frequency. SHORT TERM GOAL #2:  Goal 2: Patient will complete higher level recall/short term memory and working memory tasks with 80% accuracy provided independent use of compensatory strategies in order to improve overall recall of newly learned theraputic information and anticipation to return to work ([employer] in [employer] [facility] [employer]). INTERVENTIONS: Did not address due to focus on other goals. SHORT TERM GOAL #3:  Goal 3: Patient will demonstrate independent use of compensatory strategies (S-speak up, O-open mouth, S-slow down) within strucutred and unstrucutred speech tasks in order to improve overall articulatory precision. INTERVENTIONS: Did not address due to focus on other goals. Long-Term Goals:  Time Frame for Long Term Goals: 2 weeks from time of evaluation    LONG TERM GOAL #1:  Goal 1: Patient will improve overall cognitive function to return to independent living with wife and careing for children (16 year old). LONG TERM GOAL #2:  Goal 2: Patient will improve overall speech intelligablity and clearity of speech production within informal conversation in order to return to baseline speech to improve successful ability to return to work, speaking with co-works and medical staff and family/friends.         Comprehension: 6 - Complex ideas 90% or device (hearing aid or glasses- if patient is primarily a visual learner)  Expression: 6 - Device used to express complex ideas/needs  Social Interaction: 7 - Patient has appropriate behavior/relations 100% of the time  Problem Solvin - Patient able to solve simple/routine tasks  Memory: 6 - Patient requires device to recall (e.g. memory book)    EDUCATION:  Learner: Patient  Education:  Reviewed ST goals and Plan of Care and Reviewed recommendations for follow-up  Evaluation of Education: Verbalizes understanding, Demonstrates with assistance, and Family not present    ASSESSMENT/PLAN:  Activity Tolerance:  Patient tolerance of  treatment: good. Assessment/Plan: Patient progressing toward established goals. Continues to require skilled care of licensed speech pathologist to progress toward achievement of established goals and plan of care. .     Plan for Next Session: executive functioning, recall, review HEP  Discharge Recommendations: Home with [address] ([address]) MARI Gillespie., [address]

## 2023-01-09 NOTE — PROGRESS NOTES
Physical Medicine & Rehabilitation Progress Note    Chief Complaint:  Right-sided weakness due to stroke    Subjective:    Manisha Chester is a 63 y.o.  left-handed  male with history of hypertension, diabetes mellitus type 2, coronary artery disease with ischemic cardiomyopathy requiring two-vessel CABG, urinary retention with several failed void trial, status post tonsillectomy, was admitted to the inpatient rehabilitation unit on 12/23/2022 for intensive inpatient management of impairment & disability secondary to right hemiparesis due to acute left parietal corona radiata and right cerebellar ischemic stroke, and debility/physical deconditioning due to Klebsiella pneumonia urinary tract infection with emphysematous cystitis and bilateral hydronephrosis complicated by Klebsiella pneumonia bacteremia/sepsis. The patient was previously hospitalized at [facility] from 10/28/2022 to 11/15/2022 for coronary artery disease and ischemic cardiomyopathy requiring two-vessel CABG on 11/7/2022. His postoperative course was complicated by urinary retention with failed void trial.  The patient has been experiencing progressive weakness after he was discharged to home. The Bella was later removed on 12/13/2022 but the patient continued having difficulty voiding. On 12/14/2022 approximately 11:30 PM his wife noticed the patient had slight slurred speech with right arm weakness. On 12/15/2022 the patient suddenly felt dizzy and lightheaded while he was trying to get up from sitting on toilet and fell into the ground. He says he did not lose consciousness and did not hit his head. His wife called 46. He was transported to [address] [place] [address] ER for evaluation by EMS. He complains of neck pain and upper back pain. He was found to be tachycardic. His WBC was found to be 22.3. Bella was placed in ER and a rush of air followed by dark brown and bright red color urine came out.   He was put on IV meropenem and admitted. Urology was consulted. CT of cervical spine revealed only multilevel facet and uncovertebral joint arthropathy. CT of head done on 12/15/2020 revealed no acute intracranial abnormality. CT of the chest, abdomen and pelvis done on 12/15/2022 revealed emphysematous cystitis, bilateral hydronephrosis, and constipation. Because of right arm weakness, stroke code was called on 12/15/2022. MRI of brain done on 12/15/2022 revealed acute ischemic stroke at the left parietal region corona radiata, and possible small additional acute ischemic stroke within right cerebellum. Neurology service start patient on aspirin and Plavix combination for the stroke. MRA of the neck and head done on 12/15/2022 showed only mild bilateral carotid bulb disease. Transesophageal echocardiogram was done on 12/16/2022 showing severe global hypokinesis of left ventricle with estimated ejection fraction of 30%, and no thrombus identified. 2 blood culture and urine culture done on 12/15/2022 revealed Klebsiella pneumonia. Infectious disease had been consulted. Oral Cipro was started on 12/22/2022 after IV meropenem was completed. Patient's hospital course was also complicated by constipation which resolved this morning. The patient says he feels well. He says his right-sided weakness is getting better with increasing muscle strength. He denies having any painful symptom, numbness or tingling sensation. He continues tolerating the intensive inpatient rehabilitation treatment well. He is still on Bella for urinary retention with failed void trial.    The patient currently is projected to be ready for discharge on 1/13/2023. Rehabilitation:  PT: Reviewed.     Transfers:  Sit to Stand: Stand By Assistance, X 1  Stand to Sit:Stand By Assistance, [employer], X 1  Car:Stand By Assistance, [employer], X 1, with verbal cues     Ambulation:  Contact Guard Assistance  Distance: 45 ft  Surface: Level Tile  Device:No Device  Gait Deviations: Forward Flexed Posture, Slow Jada, Decreased Step Length Bilaterally, Decreased Arm Swing, Decreased Gait Speed, Mild Path Deviations, and veers to right, noted recurvatum R    Stand By Assistance, Contact Guard Assistance, X 1, with verbal cues , with increased time for completion  Distance: 4 ft, 20 ft  Surface: Level Tile  Device:Rolling Walker  Gait Deviations: Forward Flexed Posture, Slow Jada, Decreased Step Length Bilaterally, Decreased Gait Speed, and Decreased Heel Strike Bilaterally    Stand By Assistance, X 1  Distance: 52 ft, 20 ft  Surface: Level Tile  Device:Rolling Walker  Gait Deviations: Forward Flexed Posture, Decreased Step Length Bilaterally, Decreased Weight Shift Left, and Decreased Heel Strike Bilaterally     Stairs:  Stand By Assistance, [employer], X 1  Number of Steps: 1 (forward/lateral step ups leading R/L x 10 reps each)  Height: 6\" step with Bilateral Handrails     Balance:  Dynamic Sitting Balance: Modified Independent, edge of mat ~6 min core exercises  Dynamic Standing Balance: Contact Guard Assistance, X 1, stood on black foam with single UE support reaching/tossing outside RODNEY, ~4 min     Dynamic gait: CGA- Min A x1            -stepping forward over hurdles leading R/L and side-stepping over hurdles, using single UE support, verbal cues for proper foot placement, sequencing, minor LOB 2x required Min A to steady self      OT: Reviewed. ADL:   Grooming: SBA hand hygiene  Toileting: SBA, no LOB. Toilet Transfer: SBA, STS. BALANCE:  Sitting Balance:  Modified Independent. Standing Balance: Stand By Assistance. To supervision with static tasks with RW. CGA without RW. BED MOBILITY:  Not Tested     TRANSFERS:  Sit to Stand:  Supervision. Recliner, standard chair, STS  Stand to Sit: Supervision.        FUNCTIONAL MOBILITY:  Assistive Device: Rolling Walker  Assist Level:  Stand By Assistance. Distance: To and from therapy gym and To and from therapy apartment  Cues for fwd gaze and posture. Requires CGA without AD minimally during IADLs. ADDITIONAL ACTIVITIES:  Patient completed dynamic standing and [facility] task within tool shed this date of manipulating nuts and bolts using both screwdriver to challenge gross grasp of R hand with complex rotation and as well as using digits 1&2 to challenge simple rotational and pincer grasp. Pt demo min incoordination and fatigue. Stood x 8 min with SBA and no LOB. Overall good tolerance. Patient completed BUE strengthening exercises with skilled education on HEP: completed x15 reps x1 set with a medium resistive band in all joints and all planes in order to improve UE strength and activity tolerance required for BADL routine and toilet / shower transfers. Patient tolerated well, requiring min rest breaks. Patient also required min cues for technique. Patient completed IADL task of prepping a grilled cheese sandwich. Pt was able to recall where items were located and was able to locate items with SBA to intermittent CGA (without AD use). Patient did sequence prep appropriately, but began sandwich on high heat without having other ingredients prepped, thus burning the bread. When given one cue that the heat may be too high, pt looked at heat and said, it would be fine. Allowed bread to burn to help build insight on importance of planning ahead, however pt reported blame on not being familiar with stove, despite cues given. Once sandwich was on the stove, pt then ambulated away from stove to clean up his meal prep area, without turning off stove - demo'ing poor problem solving as he was already aware that the grilled cheese was burning. Pt did recall to turn off th stove after the sandwich was completed, and even double checked his work.  Reviewed concerns with pt with recommendations for cold meal prep / microwave / toaser meal prep at home (which he has demo ability to complete) when he is alone, and recommend direct supervision / A with stovetop/oven mgmt at this time. Pt voices understanding. Patient completed IADL task of washing dishes, standing 5 min with SBA and demo no LOB. Pt functionally integrated RUE without cue'ing. In order to be able to self manage buttons / zippers with clothing management for ADLs and open own cooking supplies during IADLs, patient completed [facility] task this date completed with firm green theraputty, utilized R hand in pincer grasp, 3 jaw delilah, lateral key pinch and digit extension , completing with min difficulty and min cues to recall HEP. ST: Reviewed. Review of Apartment Stay   ST reviewed apartment stay from 1/6 - 1/7 with patient this date. Patient reporting good success with apartment stay indicating independent completion of cooking breakfast, completing dishes, medication management of AM/PM medications, and mobility. Patient reporting decreased insight to need for walker within apartment; however, receptive to wife providing verbal cue to utilize walker. In regards to goals established for apartment stay medication management and completion of structured speech tasks with use of compensatory strategies, patient reported he read book aloud to wife. Patient indicated good success; however, as patient fatigue increased, patient's wife endorsing increased slurred speech. Patient indicating that he did NOT utilize pillbox during apartment stay; however, patient and wife reviewed AM/PM medications together to ensure accuracy with dosage/frequency. Review of Systems:  Review of Systems   Constitutional:  Negative for chills, diaphoresis, fatigue and fever. HENT:  Negative for hearing loss, rhinorrhea, sneezing, sore throat and trouble swallowing. Eyes:  Negative for visual disturbance. Respiratory:  Negative for cough, shortness of breath and wheezing.     Cardiovascular:  Negative for chest pain and palpitations. Gastrointestinal:  Negative for abdominal pain, constipation, diarrhea, nausea and vomiting. Genitourinary:  Positive for difficulty urinating. Negative for dysuria. Musculoskeletal:  Positive for gait problem. Negative for arthralgias, myalgias and neck pain. Skin:  Negative for rash. Neurological:  Positive for weakness (Right upper and right lower extremities). Negative for dizziness, tremors, facial asymmetry, speech difficulty, light-headedness, numbness and headaches. Psychiatric/Behavioral:  Negative for dysphoric mood, hallucinations and sleep disturbance. The patient is not nervous/anxious.          Objective:  /68   Pulse (!) 104   Temp 99 °F (37.2 °C) (Oral)   Resp 16   Ht 5' 2\" (1.575 m) Comment: last admit ht listed as 5'10\"; pt. appears closer to 5'10\"  Wt 138 lb 14.4 oz (63 kg)   SpO2 100%   BMI 25.41 kg/m²   Physical Exam   General:  well-developed, well nourished  male; in no acute distress ; appropriate affect & mood; sitting on reclining chair comfortably  Eyes: pupil equally round ; extra-ocular motion intact bilaterally  Head, Ear, Nose, Mouth & Throat : normocephalic ; no discharge from ears or nose ; no deformity ; no facial swelling ; oral mucosa pink   Neck :  supple ; no tenderness ; mild muscle spasm at bilateral cervical paraspinal muscles  Cardiovascular : Presence of healed vertical surgical scar at sternum ; regular rate & rhythm ; normal S1 & S2 heart sound ; no murmur ; normal peripheral pulse at the bilateral upper extremities; reduced pulse strength at the bilateral lower extremities  Pulmonary : Present breath sounds at the bilateral lung fields; no wheezing ; no rale; no crackle  Gastrointestinal : soft, slightly protruded abdomen without tenderness ; normal bowel sound present    : Bella catheter in place draining light yellowish urine  Back : no tenderness; no muscle spasm  Skin: no skin lesion or rash ; no pitting edema at all 4 extremities  Musculoskeletal : no limb asymmetry; no limb deformity; no tenderness at bilateral upper & lower extremities; no palpable mass at limbs ; no joints laxity or crepitation ; shoulder flexion and abduction passive ROM reaching 170 degrees bilaterally; hip flexion passive ROM reaching 130 degrees bilaterally; normal functional joints ROM at bilateral upper & lower extremities  Cerebral :  alert ; awake ; oriented to place, person and time; follow two-step verbal command  Cerebellum : mild dysmetria with the right finger-to-nose test; no dysmetria with left finger-to-nose test but with slight intentional tremor  Cranial Nerves : Tongue protrudes slightly to the right  (CN XII) ; grossly intact other CN II to XI function  Sensory : intact light touch and pin prick sensation at bilateral upper & lower extremities  Motor : normal muscle tone at bilateral upper & lower extremities ; 4+/5 to 5/5 muscle strength at right finger extension; 4+/5 muscle strength at right finger abduction; normal 5/5 muscle strength at the rest of bilateral upper & lower extremities  Reflex : 3+ right biceps, right brachioradialis and right knee reflexes; 2+ left knee reflexes; 1+ left biceps, left brachioradialis reflexes  Pathological Reflex :  No Ion's sign ; no ankle clonus  Gait : Not assessed      Diagnostics:   Recent Results (from the past 24 hour(s))   Basic Metabolic Panel    Collection Time: 01/09/23  6:26 AM   Result Value Ref Range    Sodium 141 135 - 145 meq/L    Potassium 4.8 3.5 - 5.2 meq/L    Chloride 105 98 - 111 meq/L    CO2 25 23 - 33 meq/L    Glucose 116 (H) 70 - 108 mg/dL    BUN 32 (H) 7 - 22 mg/dL    Creatinine 1.4 (H) 0.4 - 1.2 mg/dL    Calcium 9.4 8.5 - 10.5 mg/dL   Anion Gap    Collection Time: 01/09/23  6:26 AM   Result Value Ref Range    Anion Gap 11.0 8.0 - 16.0 meq/L   Glomerular Filtration Rate, Estimated    Collection Time: 01/09/23  6:26 AM   Result Value Ref Range    Est, Glom Filt Rate 57 (A) >60 ml/min/1.73m2   POCT Glucose    Collection Time: 01/09/23  7:57 AM   Result Value Ref Range    POC Glucose 131 (H) 70 - 108 mg/dl       Impression:  Acute left parietal corona radiata and right cerebellar ischemic stroke causing right hemiparesis with impaired coordination, and dysarthria  Debility/physical decondition due to Klebsiella pneumonia urinary tract infection with emphysematous cystitis and bilateral hydronephrosis complicated by Klebsiella pneumonia bacteremia/sepsis  Urinary retention  Klebsiella pneumonia urinary tract infection with emphysematous cystitis and bilateral hydronephrosis  Klebsiella pneumoniae bacteremia/sepsis  Acute kidney injury possibly due to dehydration with orthostatic hypotension  Hypotension probably due to side effect from high-dose Flomax in combination with Toprol XL  Coronary artery disease with ischemic cardiomyopathy requiring two-vessel coronary artery bypass graft surgery on 11/7/2022  Hypertension  Diabetes mellitus type 2    The patient's condition remains stable. His right hemiparesis has significantly improved with only mild weakness at left upper extremity mainly at the hand and fingers. He is still on Bella catheter for urinary retention. He continues tolerating the intensive inpatient rehabilitation treatment well. His function continue to improve slowly. The patient currently is projected to be ready for discharge on 1/13/2023. Plan:  Continues intensive PT/OT/SLP/RT inpatient rehabilitation program at least 3 hours per day, 5 days per week in order to improve functional status prior to discharge. Family education and training will be completed. Equipment evaluations and recommendations will be completed as appropriate. Rehabilitation nursing continues to be involved for bowel, bladder, skin, and pain management. Nursing will also provide education and training to patient and family.     Prophylaxis:  DVT: Patient on Eliquis, JONAH stocking, intermittent pneumatic compression device. GI: Colace, Senokot, GlycoLax as needed, milk of magnesia as needed, Dulcolax suppository as needed.   Pain: Tylenol as needed  Continue Lipitor for CVA and CAD  Continues holding Toprol-XL for hypertension  Continue Flomax for urinary retention  Continue Protonix for gastric protection  Continue Jardiance (on hold by renal service), Glucotrol, Humalog insulin coverage for diabetes mellitus  Continue Wellbutrin XL for smoking cessation  Continue melatonin, trazodone as needed for insomnia  Continue multivitamin supplement  Nutrition: Continue current diet   Bladder: Monitoring signs or symptoms of UTI  Bowel: Monitoring signs or symptoms of constipation   and case management for coordination of care and discharge planning      Missed Therapy Time:  None      Jr Eddy MD

## 2023-01-09 NOTE — PLAN OF CARE
Problem: Discharge Planning  Goal: Discharge to home or other facility with appropriate resources  Outcome: Progressing  Note: Patient plans to discharge 1/13 to home with home health. Apartment stay completed without complications. Will continue to assess discharge needs. Problem: Safety - Adult  Goal: Free from fall injury  Description: Patient absent of falls this shift. Bed in lowest position, side rails up 2/4. Call-light within reach. Patient instructed to use call-light to get up. Non-skid footwear in place. Outcome: Progressing  Note: Patient will continue to use call light for assistance to prevent falls and promote patient safety. Problem: Genitourinary - Adult  Goal: Urinary catheter remains patent  Description: Patient catheter remains patent and draining light yellow urine, cath care completed this shift and education provided to patient   Outcome: Progressing  Note: Patient is completing his own catheter care and can empty his own crum PRN. Uses a clean technique. Able to identify s/s of UTI.

## 2023-01-09 NOTE — PROGRESS NOTES
[address]  Occupational Therapy  Daily Note  Time:   Time In: 1401  Time Out: 1431  Timed Code Treatment Minutes: 30 Minutes  Minutes: 30      Date: 2023  Patient Name: Funmilayo Foley,   Gender: male      Room: [facility]53/053-A  MRN: [id number]  : 1960  (63 y.o.)  Referring Practitioner: Ordering & Attending: Severa Grime, MD  Diagnosis: Acute Stroke due to Ischemia  Additional Pertinent Hx: Funmilayo Folye who is a 63 y.o. male with a history of hypertension, diabetes, CAD on  daily, recent CABG in 2022, ischemic cardiomyopathy is admitted to [facility] for sepsis, emphysematous cystitis and acute kidney injury on CKD. During admission exam patient stated that his right arm feldman has been weak and that he has had some slurred speech. Stroke alert was called for right-sided weakness and dysarthria on 12/15. On arrival patient's NIH was 4 for right upper extremity, right lower extremity weakness, 1 limb ataxia and dysarthria. On further questioning, patient and wife state that the right arm weakness actually started last night. Patient's wife noticed when patient was trying to get up from the toilet that he was unable to push himself up with his right arm. Last known well 2330 on 2022. Patient taken to imaging for stat CT head which revealed no ICH, midline shift, mass-effect. CTA head and neck deferred due to patient's LC. Decision for no tPA was made due to patient's time since last known well. Patient with relatively low NIH and symptoms which are not consistent with LVO, therefore no thrombectomy/neuro intervention at this time. Patient had stat MRI brain and MRA head and neck without contrast.  MRI showed acute ischemic stroke of left parietal region. Pt admitted to IP rehab on 22.     Restrictions/Precautions:  Restrictions/Precautions: General Precautions, Fall Risk  Position Activity Restriction  Other position/activity restrictions: right side hemiparesis, recent CABG 11/22-minimize arm use ( s/p 5 wks), life vest     SUBJECTIVE: Patient pleasant and cooperative. Agreeable to OT     PAIN: Denies    Vitals: Vitals not assessed per clinical judgement, see nursing flowsheet    COGNITION: Decreased Recall and Decreased Insight    ADL:   No ADL's completed this session. BALANCE:  Sitting Balance:  Modified Independent. Standing Balance: Stand By Assistance. BED MOBILITY:  Not Tested    TRANSFERS:  Sit to Stand:  Stand By Assistance. Recliner, w/c.   Stand to Sit: Stand By Assistance. FUNCTIONAL MOBILITY:  Assistive Device: Rolling Walker for long distances, no AD for short mobility based distances with impaired quality noted with fatigue so resumed back to use of RW   Assist Level:  Stand By Assistance. Distance:  Gym > room       ADDITIONAL ACTIVITIES:  Patient completed dynamic standing and [facility] task within tool shed this date of manipulating nuts and bolts using both screwdriver to challenge gross grasp of R hand with complex rotation and as well as using digits 1&2 to challenge simple rotational and pincer grasp. Pt demo min incoordination and fatigue. Stood x 8 min with SBA and no LOB. Overall good tolerance. Patient completed BUE strengthening exercises with skilled education on HEP: completed x15 reps x1 set with a medium resistive band in all joints and all planes in order to improve UE strength and activity tolerance required for BADL routine and toilet / shower transfers. Patient tolerated well, requiring min rest breaks. Patient also required min cues for technique. ASSESSMENT:     Activity Tolerance:  Patient tolerance of  treatment: good. Discharge Recommendations: Home with Home Health OT  Equipment Recommendations: Equipment Needed: Yes  Other: OT staff to continue to monitor needs throughout OT POC.   Plan: Times Per Week: 5x/week x 90 minutes and 1x/week x 30 minutes  Current Treatment Recommendations: Strengthening, Balance training, Self-Care / ADL, Equipment evaluation, education, & procurement, Safety education & training, Endurance training, Functional mobility training, Neuromuscular re-education, Patient/Caregiver education & training, Home management training    Patient Education  Patient Education: Home Exercise Program, transfer safety, [address] tasks. Goals  Short Term Goals  Time Frame for Short Term Goals: 1 week  Short Term Goal 1: Pt will complete functional ambulation to/from BR and HH distances with SBA and min vcs for safety to increase indep with toileting. Short Term Goal 2: Pt will decrease RUE 9 hole peg test by 10 seconds (49 on 1/3), and RUE  strength by 5 pounds (48.3# on 1/3) to increase indep with fasteners in UB dressing. Short Term Goal 3: Pt will complete dynamic standing task x 5 minutes with 1-2 UE release and Supervision to increase indep with sinkside grooming. Short Term Goal 4: Pt will complete UB dressing with mod I to increase indep within home environment. Short Term Goal 5: Pt will complete LB dresing with S and min vcs for safety to increase indep and endurance within home environment. Additional Goals?: No  Long Term Goals  Time Frame for Long Term Goals : 4 weeks from IPR eval  Long Term Goal 1: Pt will complete BADL routine including shower transfer Mod Indep to increase indep and safety in home environment. Long Term Goal 2: Pt will complete simple IADL task with S and 0 vcs for safety to increase indep and endurance in home environment. Following session, patient left in safe position with all fall risk precautions in place.

## 2023-01-09 NOTE — PROGRESS NOTES
Patient: Sangeetha Daugherty  Unit/Bed: 7E-53/053-A  YOB: 1960  MRN: [id number] Acct: [de-identified]   Admitting Diagnosis: Acute stroke due to ischemia [facility]) [I63.9]  Admit Date:  12/23/2022  Hospital Day: 16    Assessment:     Principal Problem:    Acute stroke due to ischemia [facility])  Active Problems:    Ischemic cardiomyopathy    S/P CABG x 2    Emphysematous cystitis    Bacteremia due to Enterobacter species    Urinary retention    Hemiparesis affecting right side as late effect of stroke ([facility] [address]    Coordination impairment    Debility    UTI due to Klebsiella species    Moderate malnutrition ([facility] [address]    Diabetes mellitus type 2 in nonobese [facility])    Essential hypertension  Resolved Problems:    * No resolved hospital problems. *      Plan: Today's CS was taken following breakfast        Subjective:     Patient has no complaint of CP or SOB. .   Medication side effects: none    Scheduled Meds:   ferrous sulfate  325 mg Oral Daily with breakfast    melatonin  6 mg Oral Nightly    atorvastatin  40 mg Oral Daily    buPROPion  150 mg Oral QAM    pantoprazole  40 mg Oral QAM AC    tamsulosin  0.8 mg Oral Daily    apixaban  5 mg Oral BID    [Held by provider] metoprolol succinate  12.5 mg Oral Daily    glipiZIDE  10 mg Oral QAM AC    [Held by provider] empagliflozin  25 mg Oral Daily    docusate sodium  100 mg Oral BID    multivitamin  1 tablet Oral Daily with breakfast    senna  2 tablet Oral Nightly     Continuous Infusions:   dextrose       PRN Meds:glucose, dextrose bolus **OR** dextrose bolus, glucagon (rDNA), dextrose, acetaminophen, ondansetron, oxyCODONE **OR** oxyCODONE, bisacodyl, magnesium hydroxide, traZODone, polyethylene glycol    Review of Systems  Pertinent items are noted in HPI. Objective:     No data found. I/O last 3 completed shifts: In: 530 [P.O.:530]  Out: 1100 [Urine:1100]  No intake/output data recorded.     BP (!) 144/67   Pulse 96   Temp 97.9 °F (36.6 °C)   Resp 16   Ht 5' 2\" (1.575 m) Comment: last admit ht listed as 5'10\"; pt. appears closer to 5'10\"  Wt 135 lb 1 oz (61.3 kg)   SpO2 100%   BMI 24.70 kg/m²     General appearance: alert, appears stated age, and cooperative  Head: Normocephalic, without obvious abnormality, atraumatic  Lungs: clear to auscultation bilaterally  Chest wall: no tenderness  Heart: regular rate and rhythm, S1, S2 normal, no murmur, click, rub or gallop  Abdomen: soft, non-tender; bowel sounds normal; no masses,  no organomegaly  Extremities: extremities normal, atraumatic, no cyanosis or edema  Skin: Skin color, texture, turgor normal. No rashes or lesions  Neurologic:  weak    Electronically signed by Celso Thomas MD on 1/8/2023 at 7:58 PM

## 2023-01-09 NOTE — PROGRESS NOTES
[facility]  INPATIENT PHYSICAL THERAPY  DAILY NOTE  [facility] - 7E-53/053-A    Time In: 1100  Time Out: 1200  Timed Code Treatment Minutes: 60 Minutes  Minutes: 60          Date: 2023  Patient Name: Isacc Key,  Gender:  male        MRN: [id number]  : 1960  (63 y.o.)     Referring Practitioner: Dr. Perla Haines  Diagnosis: Acute stroke due to ischemia  Additional Pertinent Hx: Pt admitted through ED due to RUE weakness and slurred speech,  Also noted to have sepsis, LC, emphysematous cystitis. Hx:  HTN, Db, CAD, recent CABG (). MRI + for acute ischemic stroke Lt parietal region     Prior Level of Function:  Lives With: Spouse, Family (16 y.o son, 21 y.o step dtr.)  Type of Home: House  Home Layout: One level  Home Access: Stairs to enter with rails  Entrance Stairs - Number of Steps: 2 + threshold  Entrance Stairs - Rails: Both  Home Equipment:  (None used PTA)   Bathroom Shower/Tub: Tub/Shower unit  Bathroom Toilet: Standard  Bathroom Equipment: Tub transfer bench  Bathroom Accessibility: Accessible    ADL Assistance: Independent  Homemaking Assistance: Independent  Homemaking Responsibilities: Yes  Ambulation Assistance: Independent  Transfer Assistance: Independent  Active : No    Restrictions/Precautions:  Restrictions/Precautions: General Precautions, Fall Risk  Position Activity Restriction  Other position/activity restrictions: right side hemiparesis, recent CABG -minimize arm use ( s/p 5 wks), life vest     SUBJECTIVE:  Patient in chair upon arrival and agreeable to therapy.        PAIN: denies    Vitals: Vitals not assessed per clinical judgement, see nursing flowsheet    OBJECTIVE:  Bed Mobility:  Not Tested    Transfers:  Sit to Stand: Stand By Assistance, [employer], X 1, with increased time for completion, with verbal cues  Stand to Sit:Stand By Assistance, Contact Guard Assistance, X 1, with increased time for completion, with verbal cues    Ambulation:  Contact Guard Assistance  Distance: 45 ft  Surface: Level Tile  Device:No Device  Gait Deviations: Forward Flexed Posture, Slow Jada, Decreased Step Length Bilaterally, Decreased Arm Swing, Decreased Gait Speed, Mild Path Deviations, and veers to right, noted recurvatum R  Ambulation 2:  Stand By Assistance, Contact Guard Assistance, X 1, with verbal cues , with increased time for completion  Distance: 4 ft, 20 ft  Surface: Level Tile  Device:Rolling Walker  Gait Deviations: Forward Flexed Posture, Slow Jada, Decreased Step Length Bilaterally, Decreased Gait Speed, and Decreased Heel Strike Bilaterally    Stairs:  Stand By Assistance, [employer], X 1  Number of Steps: 1 (forward/lateral step ups leading R/L x 10 reps each)  Height: 6\" step with Bilateral Handrails    Balance:  Dynamic Sitting Balance: Modified Independent, edge of mat ~6 min core exercises  Dynamic Standing Balance: Contact Guard Assistance, X 1, level floor, narrow RODNEY, eyes open no UE support fairly steady, eyes closed mild trunk sway  Single Leg Balance: unable to complete with B UE release, required single UE support  Tandem Stance: L>R steady, able to complete without UE support, mildly unsteady with R foot behind    Dynamic gait: CGA, cues for sequencing            -toe taps onto cones and balance pods in given sequence (right foot, left foot, side step left/right)    Exercise:  Patient was guided in 1 set(s) 12 reps of exercise to both lower extremities. Seated marches, Seated hamstring curls, Seated heel/toe raises, Long arc quads, Seated isometric hip adduction with 2# ankle weights applied. Standing heel/toe raises, Standing marches, Standing hip abduction/adduction, Standing hamstring curls, Standing hip flexion, and Mini squats.    Core strengthening exercises  sitting edge of mat, 10 reps           -shoulder flex/ext, trunk flex, upper trunk rotation, chop/lift holding therapy ball   Exercises were completed for increased independence with functional mobility. Functional Outcome Measures: Not completed       ASSESSMENT:  Assessment: Patient progressing toward established goals. Activity Tolerance:  Patient tolerance of  treatment: good. Equipment Recommendations:Equipment Needed: Yes  Other: RW  Discharge Recommendations: Home with Home Health PT  Plan: Current Treatment Recommendations: Strengthening, ROM, Balance training, Functional mobility training, Transfer training, Endurance training, Neuromuscular re-education, Gait training, Stair training, Safety education & training, Equipment evaluation, education, & procurement, Home exercise program, Therapeutic activities, Patient/Caregiver education & training  General Plan:  (5x/ wk 90 min, 1x/ wk 30 min)    Patient Education  Patient Education: Plan of Care, Precautions/Restrictions, Transfers, Gait, Stairs, Up in Chair for All Meals, Verbal Exercise Instruction, dynamic balance    Goals:  Patient Goals : get back to my normal way of doing things  Short Term Goals  Time Frame for Short Term Goals: 1 wk  Short Term Goal 1: Pt to go supine <->sit, minimal use of UEs, SBA to get in/out of bed. Short Term Goal 2: Pt to get up/down from various seated surfaces, SBA, to get up to walk. Short Term Goal 3: Pt to walk with RW >= 50 ft, demonstrating step through gait pattern, recurvatum < 10% of steps on RT, CGA to progress to home mobility  Short Term Goal 4: Pt to ascend/descend 2 steps with gerber rails, CGA to progress to home entry. GOAL MET, SEE LTG  Short Term Goal 5: Pt to get in/out of car, SBA for transportation needs. Additional Goals?: Yes  Short Term Goal 6: Pt to perform Tinetti >= 18/28, demonstrating improved balance. Long Term Goals  Time Frame for Long Term Goals : 3 wks from evaluation  Long Term Goal 1: Pt to go supine <->sit, minimal use of UEs, Mod I, to get in/out of bed.   Long Term Goal 2: Pt to get up/down from various seated surfaces, Mod I, to get up to walk. Long Term Goal 3: Pt to walk with RW >= 50 ft, demonstrating step through gait pattern, recurvatum < 10% of steps on RT, Mod I, for home mobility  Long Term Goal 4: Pt to perform Tinetti >= 24/28, demonstrating improved balance. Long Term Goal 5: Patient will ascend/descend 2 steps with bilateral hand rails with SBA for safe home entry. Additional Goals?: Yes  Long term goal 6: Patient will complete car transfer with modified independence with safe technique to transfer in and out of vehicle safely. Following session, patient left in safe position with all fall risk precautions in place.

## 2023-01-09 NOTE — DISCHARGE INSTRUCTIONS
NO DRIVING. Check blood pressure at least once daily, and before taking midodrine. Record the blood pressure reading in a notebook with date & time. Bring the blood pressure record notebook when seeing family & kidney doctor to show to the doctors. Remember to do your crum care twice a day.

## 2023-01-09 NOTE — PROGRESS NOTES
Patient: Julio Corrales  Unit/Bed: 7E-53/053-A  YOB: 1960  MRN: [id number] Acct: [de-identified]   Admitting Diagnosis: Acute stroke due to ischemia [facility]) [I63.9]  Admit Date:  12/23/2022  Hospital Day: 17    Assessment:     Principal Problem:    Acute stroke due to ischemia [facility])  Active Problems:    Ischemic cardiomyopathy    S/P CABG x 2    Emphysematous cystitis    Bacteremia due to Enterobacter species    Urinary retention    Hemiparesis affecting right side as late effect of stroke ([facility] [address]    Coordination impairment    Debility    UTI due to Klebsiella species    Moderate malnutrition ([facility] [address]    Diabetes mellitus type 2 in nonobese [facility])    Essential hypertension  Resolved Problems:    * No resolved hospital problems. *      Plan:     The CS continue acceptable        Subjective:     Patient has no complaint of CP or SOB. .   Medication side effects: none    Scheduled Meds:   ferrous sulfate  325 mg Oral Daily with breakfast    melatonin  6 mg Oral Nightly    atorvastatin  40 mg Oral Daily    buPROPion  150 mg Oral QAM    pantoprazole  40 mg Oral QAM AC    tamsulosin  0.8 mg Oral Daily    apixaban  5 mg Oral BID    [Held by provider] metoprolol succinate  12.5 mg Oral Daily    glipiZIDE  10 mg Oral QAM AC    [Held by provider] empagliflozin  25 mg Oral Daily    docusate sodium  100 mg Oral BID    multivitamin  1 tablet Oral Daily with breakfast    senna  2 tablet Oral Nightly     Continuous Infusions:   dextrose       PRN Meds:glucose, dextrose bolus **OR** dextrose bolus, glucagon (rDNA), dextrose, acetaminophen, ondansetron, oxyCODONE **OR** oxyCODONE, bisacodyl, magnesium hydroxide, traZODone, polyethylene glycol    Review of Systems  Pertinent items are noted in HPI. Objective:     No data found. I/O last 3 completed shifts:   In: 770 [P.O.:770]  Out: 1700 [Urine:1700]  I/O this shift:  In: 240 [P.O.:240]  Out: -     /68   Pulse (!) 104   Temp 99 °F (37.2 °C) (Oral)   Resp 16   Ht 5' 2\" (1.575 m) Comment: last admit ht listed as 5'10\"; pt. appears closer to 5'10\"  Wt 138 lb 14.4 oz (63 kg)   SpO2 100%   BMI 25.41 kg/m²     General appearance: alert, appears stated age, and cooperative  Head: Normocephalic, without obvious abnormality, atraumatic  Lungs: clear to auscultation bilaterally  Chest wall: no tenderness, life vest on  Heart: regular rate and rhythm, S1, S2 normal, no murmur, click, rub or gallop  Abdomen: soft, non-tender; bowel sounds normal; no masses,  no organomegaly  Extremities: extremities normal, atraumatic, no cyanosis or edema  Skin: Skin color, texture, turgor normal. No rashes or lesions  Neurologic:  weak    Data Review:    Latest Reference Range & Units 1/7/23 07:15 1/8/23 07:42 1/9/23 07:57   POC Glucose 70 - 108 mg/dl 105 205 (H) 131 (H)   (H): Data is abnormally high    Electronically signed by Deuce Coffey MD on 1/9/2023 at 5:25 PM

## 2023-01-09 NOTE — PROGRESS NOTES
[address] [address]  INPATIENT PHYSICAL THERAPY  DAILY NOTE  [address] [facility] - 7E-53/053-A    Time In: 1330  Time Out: 1400  Timed Code Treatment Minutes: 30 Minutes  Minutes: 30          Date: 2023  Patient Name: Marion Fonseca,  Gender:  male        MRN: [id number]  : 1960  (63 y.o.)     Referring Practitioner: Dr. Liz Nance  Diagnosis: Acute stroke due to ischemia  Additional Pertinent Hx: Pt admitted through ED due to RUE weakness and slurred speech,  Also noted to have sepsis, LC, emphysematous cystitis. Hx:  HTN, Db, CAD, recent CABG ().   MRI + for acute ischemic stroke Lt parietal region     Prior Level of Function:  Lives With: Spouse, Family (16 y.o son, 21 y.o step dtr.)  Type of Home: House  Home Layout: One level  Home Access: Stairs to enter with rails  Entrance Stairs - Number of Steps: 2 + threshold  Entrance Stairs - Rails: Both  Home Equipment:  (None used PTA)   Bathroom Shower/Tub: Tub/Shower unit  Bathroom Toilet: Standard  Bathroom Equipment: Tub transfer bench  Bathroom Accessibility: Accessible    ADL Assistance: Independent  Homemaking Assistance: Independent  Homemaking Responsibilities: Yes  Ambulation Assistance: Independent  Transfer Assistance: Independent  Active : No    Restrictions/Precautions:  Restrictions/Precautions: General Precautions, Fall Risk  Position Activity Restriction  Other position/activity restrictions: right side hemiparesis, recent CABG -minimize arm use ( s/p 5 wks), life vest     SUBJECTIVE: Patient in chair upon arrival and agreeable to therapy    PAIN: denies, reports LE muscle soreness    Vitals: Vitals not assessed per clinical judgement, see nursing flowsheet    OBJECTIVE:  Bed Mobility:  Not Tested    Transfers:  Sit to Stand: Stand By Assistance, X 1  Stand to Sit:Stand By Assistance, [employer], X 1  Car:Stand By Assistance, [employer], X 1, with verbal cues    Ambulation:  Stand By Assistance, X 1  Distance: 52 ft, 20 ft  Surface: Level Tile  Device:Rolling Walker  Gait Deviations: Forward Flexed Posture, Decreased Step Length Bilaterally, Decreased Weight Shift Left, and Decreased Heel Strike Bilaterally    Balance:  Dynamic Standing Balance: Contact Guard Assistance, X 1, stood on black foam with single UE support reaching/tossing outside RODNEY, ~4 min    Dynamic gait: CGA- Min A x1            -stepping forward over hurdles leading R/L and side-stepping over hurdles, using single UE support, verbal cues for proper foot placement, sequencing, minor LOB 2x required Min A to steady self      Exercise:  None this session    Functional Outcome Measures: Not completed       ASSESSMENT:  Assessment: Patient progressing toward established goals. Activity Tolerance:  Patient tolerance of  treatment: good. Equipment Recommendations:Equipment Needed: Yes  Other: RW  Discharge Recommendations: Home with Home Health PT  Plan: Current Treatment Recommendations: Strengthening, ROM, Balance training, Functional mobility training, Transfer training, Endurance training, Neuromuscular re-education, Gait training, Stair training, Safety education & training, Equipment evaluation, education, & procurement, Home exercise program, Therapeutic activities, Patient/Caregiver education & training  General Plan:  (5x/ wk 90 min, 1x/ wk 30 min)    Patient Education  Patient Education: Plan of Care, Precautions/Restrictions, Transfers, Gait, Car Transfers, Dynamic Balance    Goals:  Patient Goals : get back to my normal way of doing things  Short Term Goals  Time Frame for Short Term Goals: 1 wk  Short Term Goal 1: Pt to go supine <->sit, minimal use of UEs, SBA to get in/out of bed. Short Term Goal 2: Pt to get up/down from various seated surfaces, SBA, to get up to walk.   Short Term Goal 3: Pt to walk with RW >= 50 ft, demonstrating step through gait pattern, recurvatum < 10% of steps on RT, CGA to progress to home mobility  Short Term Goal 4: Pt to ascend/descend 2 steps with gerber rails, CGA to progress to home entry. GOAL MET, SEE LTG  Short Term Goal 5: Pt to get in/out of car, SBA for transportation needs. Additional Goals?: Yes  Short Term Goal 6: Pt to perform Tinetti >= 18/28, demonstrating improved balance. Long Term Goals  Time Frame for Long Term Goals : 3 wks from evaluation  Long Term Goal 1: Pt to go supine <->sit, minimal use of UEs, Mod I, to get in/out of bed. Long Term Goal 2: Pt to get up/down from various seated surfaces, Mod I, to get up to walk. Long Term Goal 3: Pt to walk with RW >= 50 ft, demonstrating step through gait pattern, recurvatum < 10% of steps on RT, Mod I, for home mobility  Long Term Goal 4: Pt to perform Tinetti >= 24/28, demonstrating improved balance. Long Term Goal 5: Patient will ascend/descend 2 steps with bilateral hand rails with SBA for safe home entry. Additional Goals?: Yes  Long term goal 6: Patient will complete car transfer with modified independence with safe technique to transfer in and out of vehicle safely. Following session, patient left in safe position with all fall risk precautions in place.     Therapy session performed by Deandre Sylvester, under supervision of credentialed therapist. Deidre Escalante PTA

## 2023-01-09 NOTE — DISCHARGE INSTR - COC
Continuity of Care Form    Patient Name: Julio Corrales   :  1960  MRN:  [id number]    Admit date:  2022  Discharge date:  2022    Code Status Order: Full Code   Advance Directives:     Admitting Physician:  Alcides Madison MD  PCP: [facility] Room - CNP    Discharging Nurse: [facility] Unit/Room#: 6E-46/12-A  Discharging Unit Phone Number: [phone]    Emergency Contact:   Extended Emergency Contact Information  Primary Emergency Contact: Daisy Moncada Cell  Address: [address] [place], [place] [address] Lena Donato of [address]  Phone: [phone]  Relation: Spouse  Secondary Emergency Contact: Tod Stephenson  Address: [address] [place] Jovana Cope of [facility] Phone: [phone]  Relation: Other    Past Surgical History:  Past Surgical History:   Procedure Laterality Date    CORONARY ARTERY BYPASS GRAFT N/A 2022    CABG X2 JOY WITH BALLOON PUMP performed by Kalee Chaidez MD at [facility]      TRANSESOPHAGEAL ECHOCARDIOGRAM N/A 2022    TRANSESOPHAGEAL ECHOCARDIOGRAM WITH ANESTHESIA performed by Marion David MD at [facility]       Immunization History:   Immunization History   Administered Date(s) Administered    COVID-19, MODERNA BLUE border, Primary or Immunocompromised, (age 12y+), IM, 100 mcg/0.5mL 2021, 2021    Influenza, FLUCELVAX, (age 10 mo+), MDCK, PF, 0.5mL 10/26/2022    Tdap (Boostrix, Adacel) 10/26/2022       Active Problems:  Patient Active Problem List   Diagnosis Code    Syncope R55    Facial injury S09. 93XA    Tobacco abuse Z72.0    Cranial facial fractures ([address] S02. 91XA, S02. 92XA    Diabetes mellitus type 2 in nonobese (HCC) E11.9    Fungal toenail infection B35.1    Golfer's elbow M77.00    Medical non-compliance Z91.199    Erectile dysfunction N52.9    NAVARRO (generalized anxiety disorder) F41.1    Impacted cerumen of right ear H61.21    Essential hypertension I10    Uncontrolled type 2 diabetes mellitus with hyperglycemia (HCC) E11.65    Thoracic arthritis M47.814    New onset of congestive heart failure (HCC) T27.7    Acute systolic congestive heart failure (HCC) I50.21    Nonischemic cardiomyopathy (HCC) I42.8    Stage 3a chronic kidney disease (HCC) N18.31    Tremor R25.1    LC (acute kidney injury) (HCC) N17.9    Hyperlipidemia E78.5    Gastroesophageal reflux disease K21.9    S/P cardiac cath Z98.890    CAD, multiple vessel I25.10    Ischemic cardiomyopathy I25.5    ETOH abuse F10.10    S/P CABG x 2 Z95.1    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    Acute pyelonephritis N10    Bacteremia due to Enterobacter species R78.81, B96.89    Acute CVA (cerebrovascular accident) ([facility] I63.9    Urinary retention R33.9    Acute stroke due to ischemia [facility]) I63.9    Hemiparesis affecting right side as late effect of stroke ([facility] I69.351    Coordination impairment R27.8    Debility R53.81    UTI due to Klebsiella species N39.0, B96.89    Moderate malnutrition ([facility]) E44.0       Isolation/Infection:   Isolation            No Isolation          Patient Infection Status       Infection Onset Added Last Indicated Last Indicated By Review Planned Expiration Resolved Resolved By    None active    Resolved    COVID-19 (Rule Out) 07/29/21 07/29/21 07/29/21 COVID-19, Rapid (Ordered)   07/29/21 Rule-Out Test Resulted    COVID-19 (Rule Out) 02/01/21 02/01/21 02/01/21 COVID-19 Ambulatory (Ordered)   02/03/21 Rule-Out Test Resulted            Nurse Assessment:  Last Vital Signs: /68   Pulse (!) 104   Temp 99 °F (37.2 °C) (Oral)   Resp 16   Ht 5' 2\" (1.575 m) Comment: last admit ht listed as 5'10\"; pt. appears closer to 5'10\"  Wt 138 lb 14.4 oz (63 kg)   SpO2 100%   BMI 25.41 kg/m²     Last documented pain score (0-10 scale): Pain Level: 0  Last Weight:   Wt Readings from Last 1 Encounters:   01/09/23 138 lb 14.4 oz (63 kg)     Mental Status:  oriented    IV Access:  - None    Nursing Mobility/ADLs:  Walking Independent  Transfer  Independent  Bathing  Independent  Dressing  Assisted  Toileting  Independent  Feeding  Independent  Med Admin  Assisted  Med Delivery   [address] YENY MED Delivery:[id number]}    Wound Care Documentation and Therapy:        Elimination:  Continence: Bowel: {YES / AD:84806}  Bladder: {YES / XM:54059}  Urinary Catheter: {Urinary Catheter:199017508}   Colostomy/Ileostomy/Ileal Conduit: {YES / SW:84148}       Date of Last BM: ***    Intake/Output Summary (Last 24 hours) at 2023 1333  Last data filed at 2023 7155  Gross per 24 hour   Intake 960 ml   Output 1700 ml   Net -740 ml     I/O last 3 completed shifts:   In: 200 [P.O.:770]  Out: 1700 [Urine:1700]    Safety Concerns:     [address] YENY Safety Concerns:474759414}    Impairments/Disabilities:      [facility] Impairments/Disabilities:988694250}    Nutrition Therapy:  Current Nutrition Therapy:   [facility] Diet List:157518489}    Routes of Feeding: {P DME Other Feedings:126153048}  Liquids: {Slp liquid thickness:69882}  Daily Fluid Restriction: {CHP DME Yes amt example:027654251}  Last Modified Barium Swallow with Video (Video Swallowing Test): {Done Not Done ZARN:746282971}    Treatments at the Time of Hospital Discharge:   Respiratory Treatments: ***  Oxygen Therapy:  {Therapy; copd oxygen:91765}  Ventilator:    { CC Vent KFJV:321726463}    Rehab Therapies: {THERAPEUTIC INTERVENTION:3238556309}  Weight Bearing Status/Restrictions: [address] [place] [address] Weight Bearin}  Other Medical Equipment (for information only, NOT a DME order):  {EQUIPMENT:272538610}  Other Treatments: ***    Patient's personal belongings (please select all that are sent with patient):  {P DME Belongings:343697081}    RN SIGNATURE:  {Esignature:061961568}    PHYSICIAN SECTION    Prognosis: {Prognosis:4057184730}    Condition at Discharge: [address] Patient Condition:950791567}    Rehab Potential (if transferring to Rehab): {Prognosis:7416473366}    Recommended Labs or Other Treatments After Discharge: ***    Physician Certification: I certify the above information and transfer of Denise Castro  is necessary for the continuing treatment of the diagnosis listed and that he requires {Admit to Appropriate Level of Care:81101} for {GREATER/LESS:925417975} 30 days.      Update Admission H&P: {CHP DME Changes in VJODK:329643737}    PHYSICIAN SIGNATURE:  {Esignature:018938483}

## 2023-01-09 NOTE — PROGRESS NOTES
[address]  Occupational Therapy  Daily Note  Time:   Time In: 0830  Time Out: 0930  Timed Code Treatment Minutes: 60 Minutes  Minutes: 60    Date: 2023  Patient Name: Lisbeth Schuster,   Gender: male      Room: [facility]53/053-A  MRN: [id number]  : 1960  (63 y.o.)  Referring Practitioner: Ordering & Attending: Amrita Walsh MD  Diagnosis: Acute Stroke due to Ischemia  Additional Pertinent Hx: Lisbeth Schuster who is a 63 y.o. male with a history of hypertension, diabetes, CAD on  daily, recent CABG in 2022, ischemic cardiomyopathy is admitted to [facility] for sepsis, emphysematous cystitis and acute kidney injury on CKD. During admission exam patient stated that his right arm feldman has been weak and that he has had some slurred speech. Stroke alert was called for right-sided weakness and dysarthria on 12/15. On arrival patient's NIH was 4 for right upper extremity, right lower extremity weakness, 1 limb ataxia and dysarthria. On further questioning, patient and wife state that the right arm weakness actually started last night. Patient's wife noticed when patient was trying to get up from the toilet that he was unable to push himself up with his right arm. Last known well 2330 on 2022. Patient taken to imaging for stat CT head which revealed no ICH, midline shift, mass-effect. CTA head and neck deferred due to patient's LC. Decision for no tPA was made due to patient's time since last known well. Patient with relatively low NIH and symptoms which are not consistent with LVO, therefore no thrombectomy/neuro intervention at this time. Patient had stat MRI brain and MRA head and neck without contrast.  MRI showed acute ischemic stroke of left parietal region. Pt admitted to IP rehab on 22.     Restrictions/Precautions:  Restrictions/Precautions: General Precautions, Fall Risk  Position Activity Restriction  Other position/activity restrictions: right side hemiparesis, recent CABG 11/22-minimize arm use ( s/p 5 wks), life vest     SUBJECTIVE: Patient pleasant and cooperative. Agreeable to OT. Reports his apartment stay went well but does demo improved insight following, stating he will need to learn to accept help from his wife but also wants to make sure he can be independent while she's at work. Pt reports the IADLs went well and he completed vast majority of his meal prep and was able to complete his ADLs without A while seated sinkside. PAIN: Denies pain /     Vitals: Vitals not assessed per clinical judgement, see nursing flowsheet    COGNITION: Decreased Recall and Decreased Insight    ADL:   Grooming: SBA hand hygiene  Toileting: SBA, no LOB. Toilet Transfer: SBA, STS. BALANCE:  Sitting Balance:  Modified Independent. Standing Balance: Stand By Assistance. BED MOBILITY:  Not Tested    TRANSFERS:  Sit to Stand:  Stand By Assistance. Recliner, standard chair with and without arm rests   Stand to Sit: Stand By Assistance. FUNCTIONAL MOBILITY:  Assistive Device: Rolling Walker & None during fxl IADLs   Assist Level:  Stand By Assistance. To intermittent CGA without AD   Distance: To and from therapy gym and To and from therapy apartment, within apartment + garden area     ADDITIONAL ACTIVITIES:  Pt completed simple IADL item retrieval task of retrieving items from graded planes and heights from therapy apartment without use of AD to challenge balance/endurance. Pt was able to ambulate and reach inside and outside RODNEY including floor level with no LOB and min fatigue. Pt complete task with SBA and overall endurance of 5 min. Pt completed gardening task this date with a focus on engagement of RUE in various functional patterns and to challenge standing endurance and overall balance.  Pt used RUE to squeeze water bottle to water plants and used B integration to manage soil and gardening tools to pot new plants. Pt ambulated within gardenFreeLunched without use of AD without LOB and demo min fatigue requiring 2 seated rest breaks. Overall endurance 6 min and 5 min throughout. Pt demo no c/o pain throughout and edu on using pain as a guide for various tasks. Pt completed [facility] task that targeted simple in hand manipulation and simple rotational skills. Pt demo mod difficulty but was able to complete task with min increased time. Education on bringing items closer to body for improved ease with good success. Completed [facility] targeting and stacking task as well with min incoordination. All several reps for improved motor planning and all for [facility] for return to tying shoes. ASSESSMENT:     Activity Tolerance:  Patient tolerance of  treatment: good. Discharge Recommendations: home health OT   Equipment Recommendations: Equipment Needed: Yes  Other: OT staff to continue to monitor needs throughout OT POC. Plan: Times Per Week: 5x/week x 90 minutes and 1x/week x 30 minutes  Current Treatment Recommendations: Strengthening, Balance training, Self-Care / ADL, Equipment evaluation, education, & procurement, Safety education & training, Endurance training, Functional mobility training, Neuromuscular re-education, Patient/Caregiver education & training, Home management training    Patient Education  Patient Education: IADL's, Home Exercise Program, Hemibody Awareness/Attention, and Reviewed Prior Education    Goals  Short Term Goals  Time Frame for Short Term Goals: 1 week  Short Term Goal 1: Pt will complete functional ambulation to/from BR and HH distances with SBA and min vcs for safety to increase indep with toileting. Short Term Goal 2: Pt will decrease RUE 9 hole peg test by 10 seconds (49 on 1/3), and RUE  strength by 5 pounds (48.3# on 1/3) to increase indep with fasteners in UB dressing.   Short Term Goal 3: Pt will complete dynamic standing task x 5 minutes with 1-2 UE release and Supervision to increase indep with sinkside grooming. Short Term Goal 4: Pt will complete UB dressing with mod I to increase indep within home environment. Short Term Goal 5: Pt will complete LB dresing with S and min vcs for safety to increase indep and endurance within home environment. Additional Goals?: No  Long Term Goals  Time Frame for Long Term Goals : 4 weeks from IPR eval  Long Term Goal 1: Pt will complete BADL routine including shower transfer Mod Indep to increase indep and safety in home environment. Long Term Goal 2: Pt will complete simple IADL task with S and 0 vcs for safety to increase indep and endurance in home environment. Following session, patient left in safe position with all fall risk precautions in place.

## 2023-01-09 NOTE — PROGRESS NOTES
[facility]   Nephrology progress note  1/9/2023, 4:29 PM      Pt Name:    Maria Guadalupe Rush  MRN:     [id number]     YOB: 1960  Admit Date:    12/23/2022 11:24 AM    Chief Complaint: Nephrology following for LC/CKD. Subjective:  Patient seen and examined  No chest pain or shortness of breath  Feels well. Urine output also has improved    Objective:  24HR INTAKE/OUTPUT:    Intake/Output Summary (Last 24 hours) at 1/9/2023 1629  Last data filed at 1/9/2023 3351  Gross per 24 hour   Intake 960 ml   Output 1700 ml   Net -740 ml        Admission weight: 134 lb 8 oz (61 kg)  Wt Readings from Last 3 Encounters:   01/09/23 138 lb 14.4 oz (63 kg)   12/23/22 130 lb 15.3 oz (59.4 kg)   12/13/22 142 lb 9.6 oz (64.7 kg)        Vitals :   Vitals:    01/08/23 0956 01/08/23 2200 01/09/23 0005 01/09/23 0822   BP: (!) 144/67 (!) 120/58  114/68   Pulse: 96 83  (!) 104   Resp:  17  16   Temp: 97.9 °F (36.6 °C) 97.9 °F (36.6 °C)  99 °F (37.2 °C)   TempSrc:  Oral  Oral   SpO2: 100% 100%  100%   Weight:   138 lb 14.4 oz (63 kg)    Height:           Physical examination  General Appearance:  Well developed.  No distress, infact cachectic  Mouth/Throat:  Oral mucosa moist  Neck:  Supple, no JVD  Lungs:  Breath sounds: clear  Heart[de-identified]  S1,S2 heard  Abdomen:  Soft, non - tender  Musculoskeletal:  Edema -no edema noted    Medications:  Infusion:    dextrose       Meds:    ferrous sulfate  325 mg Oral Daily with breakfast    melatonin  6 mg Oral Nightly    atorvastatin  40 mg Oral Daily    buPROPion  150 mg Oral QAM    pantoprazole  40 mg Oral QAM AC    tamsulosin  0.8 mg Oral Daily    apixaban  5 mg Oral BID    [Held by provider] metoprolol succinate  12.5 mg Oral Daily    glipiZIDE  10 mg Oral QAM AC    [Held by provider] empagliflozin  25 mg Oral Daily    docusate sodium  100 mg Oral BID    multivitamin  1 tablet Oral Daily with breakfast    senna  2 tablet Oral Nightly       Lab Data :  CBC:   No results for input(s): WBC, HGB, HCT, PLT in the last 72 hours. CMP:  Recent Labs     01/07/23  0605 01/08/23  0618 01/09/23  0626    140 141   K 4.8 5.0 4.8    104 105   CO2 25 27 25   BUN 35* 38* 32*   CREATININE 1.5* 1.5* 1.4*   GLUCOSE 98 129* 116*   CALCIUM 9.2 9.1 9.4       Hepatic: No results for input(s): LABALBU, AST, ALT, ALB, BILITOT, ALKPHOS in the last 72 hours. Assessment and Plan:  Renal -acute kidney injury on chronic kidney disease with baseline creatinine around 1.5-1.7  Making a lot of urine output, improved after fluid restriction creatinine is down to 1.4  Continue to hold Jardiance BMP in the morning  Electrolytes -within normal limits   Acid-base status appears to be stable  Essential hypertension running reasonable  Hx of diabetes mellitus  Hx of sepsis due to UTI from Klebsiella pneumonia on antibiotics  CAD status post CABG 11/22  Polyuria -much improved with fluid restriction  Meds reviewed and discussed with patient       Isabella Conroy MD  [facility]    This report has been created using voice recognition software.  It may contain minor errors which are inherent in voice recognition technology

## 2023-01-10 LAB
ANION GAP SERPL CALCULATED.3IONS-SCNC: 11 MEQ/L (ref 8–16)
BUN BLDV-MCNC: 39 MG/DL (ref 7–22)
CALCIUM SERPL-MCNC: 9.3 MG/DL (ref 8.5–10.5)
CHLORIDE BLD-SCNC: 103 MEQ/L (ref 98–111)
CO2: 25 MEQ/L (ref 23–33)
CREAT SERPL-MCNC: 1.7 MG/DL (ref 0.4–1.2)
GFR SERPL CREATININE-BSD FRML MDRD: 45 ML/MIN/1.73M2
GLUCOSE BLD-MCNC: 179 MG/DL (ref 70–108)
GLUCOSE BLD-MCNC: 208 MG/DL (ref 70–108)
POTASSIUM SERPL-SCNC: 5.2 MEQ/L (ref 3.5–5.2)
SODIUM BLD-SCNC: 139 MEQ/L (ref 135–145)

## 2023-01-10 PROCEDURE — 99232 SBSQ HOSP IP/OBS MODERATE 35: CPT | Performed by: PHYSICAL MEDICINE & REHABILITATION

## 2023-01-10 PROCEDURE — 97530 THERAPEUTIC ACTIVITIES: CPT

## 2023-01-10 PROCEDURE — 97112 NEUROMUSCULAR REEDUCATION: CPT

## 2023-01-10 PROCEDURE — 99232 SBSQ HOSP IP/OBS MODERATE 35: CPT | Performed by: INTERNAL MEDICINE

## 2023-01-10 PROCEDURE — 97110 THERAPEUTIC EXERCISES: CPT

## 2023-01-10 PROCEDURE — 82948 REAGENT STRIP/BLOOD GLUCOSE: CPT

## 2023-01-10 PROCEDURE — 80048 BASIC METABOLIC PNL TOTAL CA: CPT

## 2023-01-10 PROCEDURE — 1180000000 HC REHAB R&B

## 2023-01-10 PROCEDURE — 6370000000 HC RX 637 (ALT 250 FOR IP): Performed by: PHYSICAL MEDICINE & REHABILITATION

## 2023-01-10 PROCEDURE — 97116 GAIT TRAINING THERAPY: CPT

## 2023-01-10 PROCEDURE — 97130 THER IVNTJ EA ADDL 15 MIN: CPT | Performed by: SPEECH-LANGUAGE PATHOLOGIST

## 2023-01-10 PROCEDURE — 97535 SELF CARE MNGMENT TRAINING: CPT

## 2023-01-10 PROCEDURE — 97129 THER IVNTJ 1ST 15 MIN: CPT | Performed by: SPEECH-LANGUAGE PATHOLOGIST

## 2023-01-10 PROCEDURE — 36415 COLL VENOUS BLD VENIPUNCTURE: CPT

## 2023-01-10 RX ADMIN — GLIPIZIDE 10 MG: 10 TABLET ORAL at 08:26

## 2023-01-10 RX ADMIN — TAMSULOSIN HYDROCHLORIDE 0.8 MG: 0.4 CAPSULE ORAL at 19:54

## 2023-01-10 RX ADMIN — ATORVASTATIN CALCIUM 40 MG: 40 TABLET, FILM COATED ORAL at 19:54

## 2023-01-10 RX ADMIN — PANTOPRAZOLE SODIUM 40 MG: 40 TABLET, DELAYED RELEASE ORAL at 06:29

## 2023-01-10 RX ADMIN — Medication 1 TABLET: at 08:26

## 2023-01-10 RX ADMIN — APIXABAN 5 MG: 5 TABLET, FILM COATED ORAL at 08:26

## 2023-01-10 RX ADMIN — TRAZODONE HYDROCHLORIDE 50 MG: 50 TABLET ORAL at 19:55

## 2023-01-10 RX ADMIN — APIXABAN 5 MG: 5 TABLET, FILM COATED ORAL at 19:54

## 2023-01-10 RX ADMIN — Medication 6 MG: at 19:55

## 2023-01-10 RX ADMIN — BUPROPION HYDROCHLORIDE 150 MG: 150 TABLET, FILM COATED, EXTENDED RELEASE ORAL at 08:25

## 2023-01-10 RX ADMIN — FERROUS SULFATE TAB 325 MG (65 MG ELEMENTAL FE) 325 MG: 325 (65 FE) TAB at 08:26

## 2023-01-10 ASSESSMENT — ENCOUNTER SYMPTOMS
SORE THROAT: 0
NAUSEA: 0
SHORTNESS OF BREATH: 0
ABDOMINAL PAIN: 0
WHEEZING: 0
VOMITING: 0
DIARRHEA: 0
TROUBLE SWALLOWING: 0
RHINORRHEA: 0
COUGH: 0
CONSTIPATION: 0

## 2023-01-10 ASSESSMENT — PAIN SCALES - GENERAL
PAINLEVEL_OUTOF10: 0
PAINLEVEL_OUTOF10: 0

## 2023-01-10 ASSESSMENT — 9 HOLE PEG TEST: TESTTIME_SECONDS: 42

## 2023-01-10 NOTE — PROGRESS NOTES
[address]  Occupational Therapy  Daily Note  Time:   Time In: 1100  Time Out: 7722  Timed Code Treatment Minutes: 27 Minutes  Minutes: 27    Date: 1/10/2023  Patient Name: Shawn Low,   Gender: male      Room: [facility]53/053-A  MRN: [id number]  : 1960  (63 y.o.)  Referring Practitioner: Ordering & Attending: Andres Thomas MD  Diagnosis: Acute Stroke due to Ischemia  Additional Pertinent Hx: Shawn Low who is a 63 y.o. male with a history of hypertension, diabetes, CAD on  daily, recent CABG in 2022, ischemic cardiomyopathy is admitted to [facility] for sepsis, emphysematous cystitis and acute kidney injury on CKD. During admission exam patient stated that his right arm feldman has been weak and that he has had some slurred speech. Stroke alert was called for right-sided weakness and dysarthria on 12/15. On arrival patient's NIH was 4 for right upper extremity, right lower extremity weakness, 1 limb ataxia and dysarthria. On further questioning, patient and wife state that the right arm weakness actually started last night. Patient's wife noticed when patient was trying to get up from the toilet that he was unable to push himself up with his right arm. Last known well 2330 on 2022. Patient taken to imaging for stat CT head which revealed no ICH, midline shift, mass-effect. CTA head and neck deferred due to patient's LC. Decision for no tPA was made due to patient's time since last known well. Patient with relatively low NIH and symptoms which are not consistent with LVO, therefore no thrombectomy/neuro intervention at this time. Patient had stat MRI brain and MRA head and neck without contrast.  MRI showed acute ischemic stroke of left parietal region. Pt admitted to IP rehab on 22.     Restrictions/Precautions:  Restrictions/Precautions: General Precautions, Fall Risk  Position Activity Restriction  Other position/activity restrictions: right side hemiparesis, recent CABG 11/22-minimize arm use ( s/p 5 wks), life vest     SUBJECTIVE: Patient pleasant and cooperative, agreeable to OT     PAIN: denies    Vitals: Vitals not assessed per clinical judgement, see nursing flowsheet    COGNITION:     ADL:   No ADL's completed this session. Salradhae Chain BALANCE:  Sitting Balance:  Modified Independent. Standing Balance: Stand By Assistance. BED MOBILITY:  Not Tested    TRANSFERS:  Sit to Stand:  Supervision. Recliner, standard chair   Stand to Sit: Supervision. FUNCTIONAL MOBILITY:  Assistive Device: Rolling Walker  Assist Level:  Stand By Assistance. Distance:  to/from [address]       ADDITIONAL ACTIVITIES:  Pt completed dynamic standing recreational task that facilitated: standing endurance, standing balance with 1UE release and engagement of RUE in pinch grasp patterns to complete targeting and stacking tasks Guevara Abel). Pt demo endurance 12 min with SBA and no LOB. Min incoordination noted but with increased time pt was able to complete. Patient completed BUE strengthening exercises with skilled education on HEP: completed x15 reps x1 set with a medium resistive band in all joints and all planes in order to improve UE strength and activity tolerance required for BADL routine and toilet / shower transfers. Completed also for heavy work for proprioceptive input through [address] as well. No c/o pain in sternum. Patient tolerated well, requiring min rest breaks. Patient also required min cues for technique. ASSESSMENT:  Assessment: Gerda Horner is making great progress on IP Rehab and is motivated to continue to make progress. Gerda Horner can complete his LB ADLs with supervision to SBA for balance and increased time to don TEDs. Gerda Horner can complete his UB ADLs without A including shirt and life vest jacket. Gerda Horner is able to step over the tub with SBA and can complete basic transfers with supervision.  Gerda Horner can complete rote IADLs with supervision to SBA with RW, began to challenge pt without AD during some functional ADL/IADL tasks in which The thaddeus requires closer CGA during tasks without AD. The thaddeus at times can be limited by his safety awareness. His RUE function has improved AEB 9HPT to 42 seconds and dynanometer to 50# average. He cont to require skilled OT on IPR to improve safety and indep with BADL and IADL to decrease risk of fall / injury. Activity Tolerance:  Patient tolerance of  treatment: good. Discharge Recommendations: Home with Home Health OT  Equipment Recommendations: Equipment Needed: Yes  Other: OT staff to continue to monitor needs throughout OT POC. Plan: Times Per Week: 5x/week x 90 minutes and 1x/week x 30 minutes  Current Treatment Recommendations: Strengthening, Balance training, Self-Care / ADL, Equipment evaluation, education, & procurement, Safety education & training, Endurance training, Functional mobility training, Neuromuscular re-education, Patient/Caregiver education & training, Home management training    Patient Education  Patient Education: Home Exercise Program, Hemibody Awareness/Attention, Reviewed Prior Education, and Assistive Device Safety    Goals  Short Term Goals  Time Frame for Short Term Goals: 1 week  Short Term Goal 1: Pt will complete functional ambulation to/from BR and HH distances with Supervision and min vcs for safety to increase indep with toileting. Short Term Goal 2: Pt will decrease RUE 9 hole peg test by 10 seconds (49 on 1/3), and RUE  strength by 5 pounds (48.3# on 1/3) to increase indep with fasteners in UB dressing. Short Term Goal 3: Pt will complete dynamic standing task x 5 minutes with 1-2 UE release and Supervision to increase indep with sinkside grooming. Short Term Goal 4: Pt will complete UB dressing with mod I to increase indep within home environment.   Short Term Goal 5: Pt will complete LB dresing with S and min vcs for safety to increase indep and endurance within home environment. Additional Goals?: No  Long Term Goals  Time Frame for Long Term Goals : 4 weeks from IPR eval  Long Term Goal 1: Pt will complete BADL routine including shower transfer Mod Indep to increase indep and safety in home environment. Long Term Goal 2: Pt will complete simple IADL task with S and 0 vcs for safety to increase indep and endurance in home environment. Following session, patient left in safe position with all fall risk precautions in place.

## 2023-01-10 NOTE — PROGRESS NOTES
[address] [address]  INPATIENT PHYSICAL THERAPY  Progress Note  [facility] - [id number]    Time In: 1000  Time Out: 1100  Timed Code Treatment Minutes: 60 Minutes  Minutes: 60          Date: 1/10/2023  Patient Name: Geena Torres,  Gender:  male        MRN: [id number]  : 1960  (63 y.o.)     Referring Practitioner: Dr. Bao Humphries  Diagnosis: Acute stroke due to ischemia  Additional Pertinent Hx: Pt admitted through ED due to RUE weakness and slurred speech,  Also noted to have sepsis, LC, emphysematous cystitis. Hx:  HTN, Db, CAD, recent CABG (). MRI + for acute ischemic stroke Lt parietal region     Prior Level of Function:  Lives With: Spouse, Family (16 y.o son, 21 y.o step dtr.)  Type of Home: House  Home Layout: One level  Home Access: Stairs to enter with rails  Entrance Stairs - Number of Steps: 2 + threshold  Entrance Stairs - Rails: Both  Home Equipment:  (None used PTA)   Bathroom Shower/Tub: Tub/Shower unit  Bathroom Toilet: Standard  Bathroom Equipment: Tub transfer bench  Bathroom Accessibility: Accessible    ADL Assistance: Independent  Homemaking Assistance: Independent  Homemaking Responsibilities: Yes  Ambulation Assistance: Independent  Transfer Assistance: Independent  Active : No    Restrictions/Precautions:  Restrictions/Precautions: General Precautions, Fall Risk  Position Activity Restriction  Other position/activity restrictions: right side hemiparesis, recent CABG -minimize arm use ( s/p 5 wks), life vest     SUBJECTIVE: Pt. Seated on his BS chair and pleasantly agrees to therapy session. Pt. Motivated.      PAIN: None indicated    Vitals:   Patient Vitals for the past 8 hrs:   BP Patient Position Temp Temp src Pulse Resp SpO2 O2 Device   01/10/23 0815 95/63 Sitting;Up in chair 97.8 °F (36.6 °C) Oral (!) 106 17 100 % None (Room air)       OBJECTIVE:  Bed Mobility:  Rolling to Right: Modified Independent   Supine to Sit: Modified Independent  Sit to Supine: Modified Independent     Transfers:  Sit to Stand: Supervision  Stand to Sit:Supervision  To/From Bed and Chair: Stand By Assistance  Car:Stand By Assistance    Ambulation:  Stand By Assistance  Distance: 50' x 2, 40' x 1,   Surface: Level Tile  Device: Rolling Walker  Gait Deviations:  Slow Jada, Decreased Step Length on Right, Decreased Weight Shift Left, Decreased Gait Speed, Decreased Quad Control Right, Mild Path Deviations, and Increased reliance on walker. Genu recurvatum noted in R LE for ~25% of gait. Cues to correct with good carryover. Pt. With one LOB during 40' bout but able to self correct. Pt. Also with hesitancy at times initializing gait. Stairs:  Stairs: 6\" steps X 4 using Bilateral Handrails and Stand By Assistance, with verbal cues . Balance:  None    Neuromuscular Re-education  Pt. Completed modified SLS activities using Single UE support and Bilateral UE support on Rolling Walker to improve coordination, postural awareness, and hip/quad stability for improved functional mobility. Exercise:  Patient was guided in 1 set(s) 10 reps of exercises: Glut sets, Seated marches, Seated hamstring curls, Seated heel/toe raises, Long arc quads, Seated isometric hip adduction, Seated abduction/adduction, and abdominal isometrics. Exercises were completed for increased independence with functional mobility. Functional Outcome Measures:   Completed.     Tinetti Balance    Sitting Balance: Steady, safe  Arises: Able without using arms  Attempts to Arise: Able to arise, one attempt  Immediate Standing Balance (First 5 Seconds): Steady without walker or other support  Standing Balance: Steady but wide stance, uses cane or other support  Nudged: Steady without walker or other support  Eyes Closed: Unsteady  Turned 360 Degrees: Steadiness: Steady  Turned 360 Degrees: Continuity of Steps: Continuous  Sitting Down: Safe, smooth motion  Balance Score: 14/16 Initiation of Gait: Any hesitancy or multiple attempts to start  Step Height: R Swing Foot: Right foot complete clears floor  Step Length: R Swing Foot: Passes left stance foot  Step Height: L Swing Foot: Left foot complete clears floor  Step Length: L Swing Foot: Passes right stance foot  Step Symmetry: Right and left step length not equal (estimate)  Step Continuity: Steps appear continuous  Path: Marked deviation  Trunk: Marked sway or uses walking aid  Walking Time: Heels almost touching while walking  Gait Score: 6/12     Current Score: 20 / 28 (Date: 1/10/2023)    Interpretation of Score: Tinetti is  into a gait score and balance score. The higher the patient's score, the more independent/lower fall risk. A total score of 27 or more indicates low fall risk, 20-26 is moderate fall risk, and 19 or less is indicative of high fall risk. ASSESSMENT:  Assessment: Patient progressing toward established goals. PT ASSESSMENT:  Mr Ranjan Garcia continues to make good progress towards goals in physical therapy, meeting 5/6 STG's and 2/6 LTG's. Over the last week patient has shown improvement with sit < > stand transfers, progressing from CGA to SBA, gait from CGA to SBA/CGA with a RW and stairs from CGA to SBA with bilateral hand rails. Patient also improved his tinetti score from 16 to 20/28 indicating decreased risk for falls. Pt continues to present with weakness right LE resulting in genu recurvatum and balance impairment requiring use of a RW. Patient would benefit from continued skilled PT services to maximize his safety and independence with functional mobility, reduce his risk for falls and allow patient to return to home safely. Activity Tolerance:  Patient tolerance of  treatment: good.      Equipment Recommendations:Equipment Needed: Yes  Other: RW  Discharge Recommendations: Continue to assess pending progress, Patient would benefit from continued therapy after discharge     Plan: Current Treatment Recommendations: Strengthening, ROM, Balance training, Functional mobility training, Transfer training, Endurance training, Neuromuscular re-education, Gait training, Stair training, Safety education & training, Equipment evaluation, education, & procurement, Home exercise program, Therapeutic activities, Patient/Caregiver education & training  General Plan:  (5x/ wk 90 min, 1x/ wk 30 min)  Patient Education  Patient Education: Plan of Care, Functional Mobility, Reviewed Prior Education, Health Promotion and Wellness Education, Safety, Verbal Exercise Instruction    Goals:  Patient Goals : get back to my normal way of doing things    Short Term Goals  Time Frame for Short Term Goals: 1 wk  Short Term Goal 1: Pt to go supine <->sit, minimal use of UEs, SBA to get in/out of bed. GOAL MET, SEE LTG  Short Term Goal 2: Pt to get up/down from various seated surfaces, SBA, to get up to walk. GOAL MET, SEE LTG  Short Term Goal 3: Pt to walk with RW >= 50 ft, demonstrating step through gait pattern, recurvatum < 10% of steps on RT, CGA to progress to home mobility GOAL NOT MET  Short Term Goal 4: Pt to ascend/descend 2 steps with gerber rails, CGA to progress to home entry. GOAL MET, SEE LTG   Short Term Goal 5: Pt to get in/out of car, SBA for transportation needs. GOAL MET, SEE LTG  Short Term Goal 6: Pt to perform Tinetti >= 18/28, demonstrating improved balance. GOAL MET, SEE LTG                  Long Term Goals  Time Frame for Long Term Goals : 3 wks from evaluation  Long Term Goal 1: Pt to go supine <->sit, minimal use of UEs, Mod I, to get in/out of bed. GOAL MET  Long Term Goal 2: Pt to get up/down from various seated surfaces, Mod I, to get up to walk. GOAL NOT MET  Long Term Goal 3: Pt to walk with RW >= 50 ft, demonstrating step through gait pattern, recurvatum < 10% of steps on RT, Mod I, for home mobility GOAL NOT MET  Long Term Goal 4: Pt to perform Tinetti >= 24/28, demonstrating improved balance.  GOAL NOT MET  Long Term Goal 5: Patient will ascend/descend 2 steps with bilateral hand rails with SBA for safe home entry. GOAL MET  Long term goal 6: Patient will complete car transfer with modified independence with safe technique to transfer in and out of vehicle safely. GOAL NOT MET    Revised Short-Term Goals:    Short Term Goals  Time Frame for Short Term Goals: 1 wk  Short Term Goal 1: Pt to go supine <->sit, minimal use of UEs, SBA to get in/out of bed. GOAL MET, SEE LTG  Short Term Goal 2: Pt to get up/down from various seated surfaces, SBA, to get up to walk. GOAL MET, SEE LTG  Short Term Goal 3: Pt to walk with RW >= 50 ft, demonstrating step through gait pattern, recurvatum < 10% of steps on RT, CGA to progress to home mobility  Short Term Goal 4: Pt to ascend/descend 2 steps with gerber rails, CGA to progress to home entry. GOAL MET, SEE LTG  Short Term Goal 5: Pt to get in/out of car, SBA for transportation needs. GOAL MET, SEE LTG  Additional Goals?: Yes  Short Term Goal 6: Pt to perform Tinetti >= 18/28, demonstrating improved balance. GOAL MET, SEE LTG   Revised Long-Term Goals  Long Term Goals  Time Frame for Long Term Goals : 3 wks from evaluation  Long Term Goal 1: Pt to go supine <->sit, minimal use of UEs, Mod I, to get in/out of bed. GOAL MET  Long Term Goal 2: Pt to get up/down from various seated surfaces, Mod I, to get up to walk. Long Term Goal 3: Pt to walk with RW >= 50 ft, demonstrating step through gait pattern, recurvatum < 10% of steps on RT, Mod I, for home mobility  Long Term Goal 4: Pt to perform Tinetti >= 24/28, demonstrating improved balance. Long Term Goal 5: Patient will ascend/descend 2 steps with bilateral hand rails with modified independence for safe home entry. Additional Goals?: Yes  Long term goal 6: Patient will complete car transfer with modified independence with safe technique to transfer in and out of vehicle safely.                        Following session, patient left in safe position with all fall risk precautions in place. The supervising physical therapist has reviewed the progress note, assessed goals, and updated the assessment to appropriately to reflect their plan of care.   This treatment was completed by Harish Rodrigez PTA

## 2023-01-10 NOTE — PLAN OF CARE
Problem: Discharge Planning  Goal: Discharge to home or other facility with appropriate resources  Outcome: Progressing  Flowsheets (Taken 1/10/2023 0815)  Discharge to home or other facility with appropriate resources:   Identify barriers to discharge with patient and caregiver   Identify discharge learning needs (meds, wound care, etc)     Problem: Chronic Conditions and Co-morbidities  Goal: Patient's chronic conditions and co-morbidity symptoms are monitored and maintained or improved  Outcome: Progressing  Flowsheets (Taken 1/10/2023 0815)  Care Plan - Patient's Chronic Conditions and Co-Morbidity Symptoms are Monitored and Maintained or Improved: Monitor and assess patient's chronic conditions and comorbid symptoms for stability, deterioration, or improvement     Problem: Safety - Adult  Goal: Free from fall injury  Description: Patient absent of falls this shift. Bed in lowest position, side rails up 2/4. Call-light within reach. Patient instructed to use call-light to get up. Non-skid footwear in place. Outcome: Progressing  Falling star prevention in place. Bed and chair alarms in use. Call light in reach. Purposeful hourly rounding. Problem: ABCDS Injury Assessment  Goal: Absence of physical injury  Description: Patient is free from physical injury  Outcome: Progressing  No physical injury noted this shift. Problem: Skin/Tissue Integrity  Goal: Absence of new skin breakdown  Description:  Monitor for areas of redness and/or skin breakdown, no skin breakdown     Outcome: Progressing     Problem: Pain  Goal: Verbalizes/displays adequate comfort level or baseline comfort level  Description: Patient denies pain  Outcome: Progressing  Pain decreases with rest, repositioning, ice application, and prn pain medications. Problem: Nutrition Deficit:  Goal: Optimize nutritional status  1/10/2023 1532 by Elvira Le RN  Outcome: Progressing  Tolerating current diet and po fluids well.     Problem: Metabolic/Fluid and Electrolytes - Adult  Goal: Electrolytes maintained within normal limits  Outcome: Progressing  Flowsheets (Taken 1/10/2023 0815)  Electrolytes maintained within normal limits:   Monitor labs and assess patient for signs and symptoms of electrolyte imbalances   Administer electrolyte replacement as ordered     Problem: Genitourinary - Adult  Goal: Urinary catheter remains patent  Description: Patient catheter remains patent and draining light yellow urine, cath care completed this shift and education provided to patient   Outcome: Progressing  Flowsheets (Taken 1/10/2023 0815)  Urinary catheter remains patent: Assess patency of urinary catheter

## 2023-01-10 NOTE — PROGRESS NOTES
[facility]  INPATIENT PHYSICAL THERAPY  DAILY NOTE  [address] [facility] - [id number]    Time In: 1330  Time Out: 1400  Timed Code Treatment Minutes: 30 Minutes  Minutes: 30          Date: 1/10/2023  Patient Name: Oseas Wilkinson,  Gender:  male        MRN: [id number]  : 1960  (63 y.o.)     Referring Practitioner: Dr. Naomy Huggins  Diagnosis: Acute stroke due to ischemia  Additional Pertinent Hx: Pt admitted through ED due to RUE weakness and slurred speech,  Also noted to have sepsis, LC, emphysematous cystitis. Hx:  HTN, Db, CAD, recent CABG (). MRI + for acute ischemic stroke Lt parietal region     Prior Level of Function:  Lives With: Spouse, Family (16 y.o son, 21 y.o step dtr.)  Type of Home: House  Home Layout: One level  Home Access: Stairs to enter with rails  Entrance Stairs - Number of Steps: 2 + threshold  Entrance Stairs - Rails: Both  Home Equipment:  (None used PTA)   Bathroom Shower/Tub: Tub/Shower unit  Bathroom Toilet: Standard  Bathroom Equipment: Tub transfer bench  Bathroom Accessibility: Accessible    ADL Assistance: Independent  Homemaking Assistance: Independent  Homemaking Responsibilities: Yes  Ambulation Assistance: Independent  Transfer Assistance: Independent  Active : No    Restrictions/Precautions:  Restrictions/Precautions: General Precautions, Fall Risk  Position Activity Restriction  Other position/activity restrictions: right side hemiparesis, recent CABG -minimize arm use ( s/p 5 wks), life vest     SUBJECTIVE: Pt seated in recliner, agrees for PT treatment. He requested to return to bed at end of session.      PAIN: 0/10 denies     Vitals: Vitals not assessed per clinical judgement, see nursing flowsheet    OBJECTIVE:  Bed Mobility:  Sit to Supine: Stand By Assistance     Transfers:  Sit to Stand: Stand By Assistance, [employer] Assistance  Stand to [facility] stood too quickly initial trial, cues to wait until this PT ready provided    Ambulation:  Contact Guard Assistance, Minimal Assistance, with verbal cues , with increased time for completion  Distance: 12', 10', 10' x2 side stepping with B UE support on rail  Surface: Level Tile  Device:Rolling Walker  Gait Deviations:  Slow Jada, Mild Path Deviations, Unsteady Gait, and Decreased Terminal Knee Extension    Pt noted to have decreased stability at R quad with ambulation with noted genu recurvatum. Cue and intermittent min A for tactile cue to increase quad activation to address this. Min A with side stepping at the railing for improved stability at the quad. Seated rest provided between trials. Wheelchair Mobility:  Stand By Assistance  Extremities Used: Bilateral Upper Extremities  Type of Chair:Manual  Surface: Level Tile  Distance: 48' with 2 turns   Quality: Slow Velocity  Cues provided for path and improved turn     Balance:  Static Sitting Balance:  Stand By Assistance  Dynamic Sitting Balance: Stand By Assistance  Static Standing Balance: Contact Guard Assistance  Dynamic Standing Balance: Minimal Assistance    Pt stood statically with B UE support on foam surface for ~30 seconds with no loss in stability. Completed stance with R LE on the ground and L LE on a ball to increase weight shift and stability in stance on R LE. Cues and tactile assist provided to prevent hyperextension or buckling at R quad with fair improvement. Completed 2 trials of ~30 seconds with this and seated rest between trials. Exercise:  Patient was guided in 1 set(s) 10 reps of exercise to both lower extremities. Step ups to a foam surface. Exercises were completed for increased independence with functional mobility. Cues and manual assist provided for improved stability at R quad with this to prevent genu recurvatum.  B UE support provided with this along with min A    Functional Outcome Measures: Not completed      ASSESSMENT:  Assessment: Patient progressing toward established goals. Activity Tolerance:  Patient tolerance of  treatment: good. Pt tolerated mobility well, required cues a few time to wait until this PT ready to initiate mobility. He required cues and min A intermittently to correct R LE quad instability. Equipment Recommendations:Equipment Needed: Yes  Other: RW  Discharge Recommendations: Continue to assess pending progress and Patient would benefit from continued PT at discharge  Plan: Current Treatment Recommendations: Strengthening, ROM, Balance training, Functional mobility training, Transfer training, Endurance training, Neuromuscular re-education, Gait training, Stair training, Safety education & training, Equipment evaluation, education, & procurement, Home exercise program, Therapeutic activities, Patient/Caregiver education & training  General Plan:  (5x/ wk 90 min, 1x/ wk 30 min)    Patient Education  Patient Education: Plan of Care, Transfers, Gait, Verbal Exercise Instruction, Activity Pacing    Goals:  Patient Goals : get back to my normal way of doing things  Short Term Goals  Time Frame for Short Term Goals: 1 wk  Short Term Goal 1: Pt to go supine <->sit, minimal use of UEs, SBA to get in/out of bed. Short Term Goal 2: Pt to get up/down from various seated surfaces, SBA, to get up to walk. Short Term Goal 3: Pt to walk with RW >= 50 ft, demonstrating step through gait pattern, recurvatum < 10% of steps on RT, CGA to progress to home mobility  Short Term Goal 4: Pt to ascend/descend 2 steps with gerber rails, CGA to progress to home entry. GOAL MET, SEE LTG  Short Term Goal 5: Pt to get in/out of car, SBA for transportation needs. Additional Goals?: Yes  Short Term Goal 6: Pt to perform Tinetti >= 18/28, demonstrating improved balance. Long Term Goals  Time Frame for Long Term Goals : 3 wks from evaluation  Long Term Goal 1: Pt to go supine <->sit, minimal use of UEs, Mod I, to get in/out of bed.   Long Term Goal 2: Pt to get up/down from various seated surfaces, Mod I, to get up to walk. Long Term Goal 3: Pt to walk with RW >= 50 ft, demonstrating step through gait pattern, recurvatum < 10% of steps on RT, Mod I, for home mobility  Long Term Goal 4: Pt to perform Tinetti >= 24/28, demonstrating improved balance. Long Term Goal 5: Patient will ascend/descend 2 steps with bilateral hand rails with SBA for safe home entry. Additional Goals?: Yes  Long term goal 6: Patient will complete car transfer with modified independence with safe technique to transfer in and out of vehicle safely. Following session, patient left in safe position with all fall risk precautions in place.

## 2023-01-10 NOTE — PROGRESS NOTES
Comprehensive Nutrition Assessment    Type and Reason for Visit:  Reassess    Nutrition Recommendations/Plan:   Continue diet as ordered and encourage PO intake at best efforts. Continue ONS as ordered: magic cup BID   Discussed high protein snack ideas with patient to ensure adequate nutrition is met to recovery and build muscles. Discussed CKD and importance of monitoring and protein intake to avoid further damage. Pt understanding and aware of need for balance. In EMR pt's ht was recorded as 5'2\"~ pt reports ht as 5' 10\". Changed in EMR. Monitor nutrition related lab values, PO intake, weights, medications,      Malnutrition Assessment:  Malnutrition Status: Moderate malnutrition (01/10/23 1331)    Context:  Acute Illness     Findings of the 6 clinical characteristics of malnutrition:  Energy Intake:   (good PO intake and appetite per pt)  Weight Loss:   (difficult to evaluate with fluid; but appears to have lost some weight per EMR)     Body Fat Loss:  Mild body fat loss Orbital, Buccal region   Muscle Mass Loss:  Mild muscle mass loss Temples (temporalis)  Fluid Accumulation:  No significant fluid accumulation     Strength:  Not Performed    Nutrition Assessment:     Pt. improving from a nutritional standpoint AEB consuming % of meals; acceptance of ONS. At risk for further nutrition compromise r/t moderate malnutrition, admit w/ stroke, decreased appetite at times and underlying medical condition (hx CAD, DM, CABG 11/7/22, HTN). Nutrition Related Findings:    Pt. Report/Treatments/Miscellaneous: pt reports overall good appetite and intake. Appreciative of review of snack options and information regarding CKD.    GI Status: last BM 1/10; denies abdominal pain  Pertinent Labs: Na 139, K 5.2, BUN 39, creatinine 1.7, glucose 208, A1C 7.6 (12/15/22)  Pertinent Meds: wellbutrin, ferrous sulfate, glucotrol, MVI, protonix, senna      Wound Type: None       Current Nutrition Intake & Therapies: Average Meal Intake: %  Average Supplements Intake: %  ADULT ORAL NUTRITION SUPPLEMENT; Breakfast, Dinner; Frozen Oral Supplement  ADULT DIET; Regular; 3 carb choices (45 gm/meal); 2000 ml    Anthropometric Measures:  Height: 5' 10\" (177.8 cm)  Ideal Body Weight (IBW): 166 lbs (75 kg)    Admission Body Weight: 136 lb 6.4 oz (61.9 kg) (12/25 no edema)  Current Body Weight: 138 lb 14.4 oz (63 kg) (1/9: no edema),   IBW. Weight Source: Bed Scale  Current BMI (kg/m2): 19.9  Usual Body Weight:  (per pt. 140# but states it varies; per EMR: 8/10/21: 150# 3 oz, 5/24/22: 144# 10 oz, 10/30/22: 141# 14 oz (w/ edema))                       BMI Categories: Normal Weight (BMI 18.5-24. 9)    Estimated Daily Nutrient Needs:  Energy Requirements Based On: Kcal/kg  Weight Used for Energy Requirements: Other (Comment) (63)  Energy (kcal/day): 3171-2061 kcals (28-30)  Weight Used for Protein Requirements: Other (Comment) (63)  Protein (g/day): 76+ grams (1.2+)      Nutrition Diagnosis:   Moderate malnutrition, In context of acute illness or injury related to inadequate protein-energy intake as evidenced by Criteria as identified in malnutrition assessment    Nutrition Interventions:   Food and/or Nutrient Delivery: Continue Current Diet, Continue Oral Nutrition Supplement  Nutrition Education/Counseling:  (Encouraged adequate PO intake at best efforts.)  Coordination of Nutrition Care: Continue to monitor while inpatient       Goals:  Previous Goal Met: Progressing toward Goal(s)  Goals: PO intake 75% or greater, by next RD assessment       Nutrition Monitoring and Evaluation:      Food/Nutrient Intake Outcomes: Diet Advancement/Tolerance, Food and Nutrient Intake, Supplement Intake  Physical Signs/Symptoms Outcomes: Biochemical Data, Chewing or Swallowing, GI Status, Fluid Status or Edema, Nutrition Focused Physical Findings, Skin, Weight    Discharge Planning:     Too soon to determine     Vj Tony RD  Contact: [phone]

## 2023-01-10 NOTE — PROGRESS NOTES
[address] [address]  Recreational Therapy  Daily Note  [address]    Time Spent with Patient: 15 minutes    Date:  1/10/2023       Patient Name: Dalton Riggs      MRN: [id number]      YOB: 1960 (63 y.o.)       Gender: male  Diagnosis: Acute Stroke due to Ischemia  Referring Practitioner: Ordering & Attending: Johan Spann MD    RESTRICTIONS/PRECAUTIONS:  Restrictions/Precautions: General Precautions, Fall Risk     Hearing: Within functional limits    PAIN: 0    SUBJECTIVE:  I could use 2 tylenol    OBJECTIVE:  Upon arrival pt resting in bed on his phone-asked for another pillow and some water and RT let nurse know he is requesting 2 tylenol-pt states he wants to be as independent as possible when he goes home Friday-is looking forward to getting back home- pleasant and social       Patient Education  New Education Provided: Importance of Leisure,     Electronically signed by: AMBER Olivas  Date: 1/10/2023

## 2023-01-10 NOTE — PROGRESS NOTES
[facility]   Nephrology progress note  1/10/2023, 8:54 AM      Pt Name:    Ari Da Silva  MRN:     [id number]     YOB: 1960  Admit Date:    12/23/2022 11:24 AM    Chief Complaint: Nephrology following for LC/CKD. Subjective:  Patient seen and examined  No chest pain or shortness of breath  Feels well. Urine output also has improved no more like 1500 mm    Objective:  24HR INTAKE/OUTPUT:    Intake/Output Summary (Last 24 hours) at 1/10/2023 0854  Last data filed at 1/10/2023 3945  Gross per 24 hour   Intake 880 ml   Output 1500 ml   Net -620 ml        Admission weight: 134 lb 8 oz (61 kg)  Wt Readings from Last 3 Encounters:   01/09/23 138 lb 14.4 oz (63 kg)   12/23/22 130 lb 15.3 oz (59.4 kg)   12/13/22 142 lb 9.6 oz (64.7 kg)        Vitals :   Vitals:    01/09/23 0005 01/09/23 0822 01/09/23 2227 01/10/23 0815   BP:  114/68 124/68 95/63   Pulse:  (!) 104 77 (!) 106   Resp:  16 16 17   Temp:  99 °F (37.2 °C) 98.2 °F (36.8 °C) 97.8 °F (36.6 °C)   TempSrc:  Oral Oral Oral   SpO2:  100% 100% 100%   Weight: 138 lb 14.4 oz (63 kg)      Height:           Physical examination  General Appearance:  Well developed.  No distress, infact cachectic  Mouth/Throat:  Oral mucosa moist  Neck:  Supple, no JVD  Lungs:  Breath sounds: clear  Heart[de-identified]  S1,S2 heard  Abdomen:  Soft, non - tender  Musculoskeletal:  Edema -no edema noted    Medications:  Infusion:    dextrose       Meds:    ferrous sulfate  325 mg Oral Daily with breakfast    melatonin  6 mg Oral Nightly    atorvastatin  40 mg Oral Daily    buPROPion  150 mg Oral QAM    pantoprazole  40 mg Oral QAM AC    tamsulosin  0.8 mg Oral Daily    apixaban  5 mg Oral BID    [Held by provider] metoprolol succinate  12.5 mg Oral Daily    glipiZIDE  10 mg Oral QAM AC    [Held by provider] empagliflozin  25 mg Oral Daily    docusate sodium  100 mg Oral BID    multivitamin  1 tablet Oral Daily with breakfast    senna  2 tablet Oral Nightly       Lab Data :  CBC:   No results for input(s): WBC, HGB, HCT, PLT in the last 72 hours. CMP:  Recent Labs     01/08/23  0618 01/09/23  0626    141   K 5.0 4.8    105   CO2 27 25   BUN 38* 32*   CREATININE 1.5* 1.4*   GLUCOSE 129* 116*   CALCIUM 9.1 9.4       Hepatic: No results for input(s): LABALBU, AST, ALT, ALB, BILITOT, ALKPHOS in the last 72 hours. Assessment and Plan:  Renal -acute kidney injury on chronic kidney disease with baseline creatinine around 1.5-1.7  Making a lot of urine output, improved after fluid restriction creatinine is down to 1.4  Continue to hold Jardiance BMP pending  Electrolytes -within normal limits   Acid-base status appears to be stable  Essential hypertension running reasonable  Hx of diabetes mellitus  Hx of sepsis due to UTI from Klebsiella pneumonia on antibiotics  CAD status post CABG 11/22  Polyuria -much improved with fluid restriction  Meds reviewed and discussed with patient       Efrem Flores MD  [facility]    This report has been created using voice recognition software.  It may contain minor errors which are inherent in voice recognition technology

## 2023-01-10 NOTE — PROGRESS NOTES
[address] [address]  Inpatient Rehabilitation  Occupational Therapy  Progress Note  Time:  Time In: 701  Time Out: 801  Timed Code Treatment Minutes: 60 Minutes  Minutes: 60      Date: 1/10/2023  Patient Name: Esteban Jaquez,   Gender: male      Room: [facility]/053-A  MRN: [id number]  : 1960  (63 y.o.)  Referring Practitioner: Ordering & Attending: Monica Kessler MD  Diagnosis: Acute Stroke due to Ischemia  Additional Pertinent Hx: Esteban Jaquez who is a 63 y.o. male with a history of hypertension, diabetes, CAD on  daily, recent CABG in 2022, ischemic cardiomyopathy is admitted to [facility] for sepsis, emphysematous cystitis and acute kidney injury on CKD. During admission exam patient stated that his right arm feldman has been weak and that he has had some slurred speech. Stroke alert was called for right-sided weakness and dysarthria on 12/15. On arrival patient's NIH was 4 for right upper extremity, right lower extremity weakness, 1 limb ataxia and dysarthria. On further questioning, patient and wife state that the right arm weakness actually started last night. Patient's wife noticed when patient was trying to get up from the toilet that he was unable to push himself up with his right arm. Last known well 2330 on 2022. Patient taken to imaging for stat CT head which revealed no ICH, midline shift, mass-effect. CTA head and neck deferred due to patient's LC. Decision for no tPA was made due to patient's time since last known well. Patient with relatively low NIH and symptoms which are not consistent with LVO, therefore no thrombectomy/neuro intervention at this time. Patient had stat MRI brain and MRA head and neck without contrast.  MRI showed acute ischemic stroke of left parietal region. Pt admitted to IP rehab on 22.     Restrictions/Precautions:  Restrictions/Precautions: General Precautions, Fall Risk  Position Activity Restriction  Other position/activity restrictions: right side hemiparesis, recent CABG 11/22-minimize arm use ( s/p 5 wks), life vest    SUBJECTIVE: Patient pleasant and cooperative, agreeable to OT. PAIN: Denies     Vitals: Vitals not assessed per clinical judgement, see nursing flowsheet    COGNITION: Slow Processing and Decreased Safety Awareness    ADL:   No ADL's completed this session. Tamy Nino BALANCE:  Sitting Balance:  Modified Independent. Standing Balance: Stand By Assistance. To supervision with static tasks with RW. CGA without RW. BED MOBILITY:  Not Tested    TRANSFERS:  Sit to Stand:  Supervision. Recliner, standard chair, STS  Stand to Sit: Supervision. FUNCTIONAL MOBILITY:  Assistive Device: Rolling Walker  Assist Level:  Stand By Assistance. Distance: To and from therapy gym and To and from therapy apartment  Cues for fwd gaze and posture. Requires CGA without AD minimally during IADLs. ADDITIONAL ACTIVITIES:  Patient completed IADL task of prepping a grilled cheese sandwich. Pt was able to recall where items were located and was able to locate items with SBA to intermittent CGA (without AD use). Patient did sequence prep appropriately, but began sandwich on high heat without having other ingredients prepped, thus burning the bread. When given one cue that the heat may be too high, pt looked at heat and said, it would be fine. Allowed bread to burn to help build insight on importance of planning ahead, however pt reported blame on not being familiar with stove, despite cues given. Once sandwich was on the stove, pt then ambulated away from stove to clean up his meal prep area, without turning off stove - demo'ing poor problem solving as he was already aware that the grilled cheese was burning. Pt did recall to turn off th stove after the sandwich was completed, and even double checked his work.  Reviewed concerns with pt with recommendations for cold meal prep / microwave / toaser meal prep at home (which he has demo ability to complete) when he is alone, and recommend direct supervision / A with stovetop/oven mgmt at this time. Pt voices understanding. Patient completed IADL task of washing dishes, standing 5 min with SBA and demo no LOB. Pt functionally integrated RUE without cue'ing. In order to be able to self manage buttons / zippers with clothing management for ADLs and open own cooking supplies during IADLs, patient completed [facility] task this date completed with firm green theraputty, utilized R hand in pincer grasp, 3 jaw delilah, lateral key pinch and digit extension , completing with min difficulty and min cues to recall HEP. HAND ASSESSMENT  Hand Dominance: Left  Right Hand Strength -  (lbs)  Handle Setting 2: 22 av. 26#, 22 (24, 24, 27 av.25#), 1/3/23 (51, 49, 45 Av: 48.3#), 1/10/23 (54, 49, 48 Av.3#)  Fine Motor Skills  Right 9-Hole Peg Test: Impaired  Right 9 Hole Peg Test Time (secs): 42         ASSESSMENT:  Activity Tolerance:  Patient tolerance of  treatment: good. Assessment: Assessment: Adama Wilkinson is making great progress on IP Rehab and is motivated to continue to make progress. Adama Wilkinson can complete his LB ADLs with supervision to SBA for balance and increased time to don TEDs. Adama Wilkinson can complete his UB ADLs without A including shirt and life vest jacket. Adama Wilkinson is able to step over the tub with SBA and can complete basic transfers with supervision. Adama Wilkinson can complete rote IADLs with supervision to SBA with RW, began to challenge pt without AD during some functional ADL/IADL tasks in which Adama Wilkinson requires closer CGA during tasks without AD. Adama Wilkinson at times can be limited by his safety awareness. His RUE function has improved AEB 9HPT to 42 seconds and dynanometer to 50# average. He cont to require skilled OT on IPR to improve safety and indep with BADL and IADL to decrease risk of fall / injury.   Discharge Recommendations: Home with Home health OT, Home with nursing aide, Patient would benefit from continued therapy after discharge  Equipment Recommendations: Equipment Needed: Yes  Other: OT staff to continue to monitor needs throughout OT POC. Plan: Times Per Week: 5x/week x 90 minutes and 1x/week x 30 minutes  Current Treatment Recommendations: Strengthening, Balance training, Self-Care / ADL, Equipment evaluation, education, & procurement, Safety education & training, Endurance training, Functional mobility training, Neuromuscular re-education, Patient/Caregiver education & training, Home management training    Patient Education  Patient Education: ADL's, IADL's, Home Exercise Program, Hemibody Awareness/Attention, and Reviewed Prior Education    Goals  Short Term Goals  Time Frame for Short Term Goals: 1 week  Short Term Goal 1: Pt will complete functional ambulation to/from BR and HH distances with SBA and min vcs for safety to increase indep with toileting. GOAL MET, REVISE   Short Term Goal 2: Pt will decrease RUE 9 hole peg test by 10 seconds (49 on 1/3), and RUE  strength by 5 pounds (48.3# on 1/3) to increase indep with fasteners in UB dressing. GOAL NOT MET, CONT   Short Term Goal 3: Pt will complete dynamic standing task x 5 minutes with 1-2 UE release and Supervision to increase indep with sinkside grooming. GOAL NOT MET CONSISTENTLY CONT   Short Term Goal 4: Pt will complete UB dressing with mod I to increase indep within home environment. GOAL MET, CONT   Short Term Goal 5: Pt will complete LB dresing with S and min vcs for safety to increase indep and endurance within home environment. GOAL NOT MET CONSISTENTLY, CONT   Additional Goals?: No  Long Term Goals  Time Frame for Long Term Goals : 4 weeks from IPR eval  Long Term Goal 1: Pt will complete BADL routine including shower transfer Mod Indep to increase indep and safety in home environment.  GOAL NOT MET, CONT   Long Term Goal 2: Pt will complete simple IADL task with S and 0 vcs for safety to increase indep and endurance in home environment. GOAL NOT MET CONSITENTLY, CONT       Revised Short-Term Goals  Short Term Goals  Time Frame for Short Term Goals: 1 week  Short Term Goal 1: Pt will complete functional ambulation to/from BR and HH distances with Supervision and min vcs for safety to increase indep with toileting. Short Term Goal 2: Pt will decrease RUE 9 hole peg test by 10 seconds (49 on 1/3), and RUE  strength by 5 pounds (48.3# on 1/3) to increase indep with fasteners in UB dressing. Short Term Goal 3: Pt will complete dynamic standing task x 5 minutes with 1-2 UE release and Supervision to increase indep with sinkside grooming. Short Term Goal 4: Pt will complete UB dressing with mod I to increase indep within home environment. Short Term Goal 5: Pt will complete LB dresing with S and min vcs for safety to increase indep and endurance within home environment. Additional Goals?: No  Long Term Goals  Time Frame for Long Term Goals : 4 weeks from IPR eval  Long Term Goal 1: Pt will complete BADL routine including shower transfer Mod Indep to increase indep and safety in home environment. Long Term Goal 2: Pt will complete simple IADL task with S and 0 vcs for safety to increase indep and endurance in home environment. Following session, patient left in safe position with all fall risk precautions in place.

## 2023-01-10 NOTE — PROGRESS NOTES
[facility]  INPATIENT SPEECH THERAPY  [address] [facility]  PROGRESS NOTE     TIME   SLP Individual Minutes  Time In: 0830  Time Out: 0900  Minutes: 30  Timed Code Treatment Minutes: 30 Minutes       Date: 1/10/2023  Patient Name: Geena Torres      CSN: [id number]   : 1960  (63 y.o.)  Gender: male   Referring Physician:  Dr. Sofya Martinez   Diagnosis: Acute stroke due to ischemia, right hemiparesis secondary to stroke   Precautions: aspiration, fall risk   Current Diet: Regular diet with thin liquids   Swallowing Strategies: Standard Universal Swallow Precautions  Date of Last MBS/FEES: Not Applicable    Pain:  No pain reported. Subjective:  Patient sitting upright in recliner, awake upon ST arrival. No family present. Patient agreeable to skilled ST services. Pleasant and cooperative throughout. Short-Term Goals:  SHORT TERM GOAL #1:  Goal 1: Patient will complete complex executive functioning tasks (i.e., medication management, complex reasoning/deductive reasoning, etc.) with 90% accuracy and minimal cuing in order to allow for safe return to work (40/hours week). GOAL MET- CONTINUE FOR CONSISTENCY   INTERVENTIONS: Deductive reasoning task [place] [employer]):  indep, 2/ with min assist to correct errors. *Overall good success, missing only one detail resulting in 2 stops being misplaced. SHORT TERM GOAL #2:  Goal 2: Patient will complete higher level recall/short term memory and working memory tasks with 80% accuracy provided independent use of compensatory strategies in order to improve overall recall of newly learned theraputic information and anticipation to return to work ([employer] in [employer]). GOAL MET- CONTINUE FOR CONSISTENCY   INTERVENTIONS: Recall of appointment information (3 appointments= total of 15 units of information):  *Patient independently making written notes regarding appointment details.  Had patient complete read back to ensure accuracy of written information, 100% accuracy.   -Following a 5 minute delay: 13/15 indep, 2/15 some discrepancy with times of appointment (all appointments very close in proximity). Patient independently utilizing written notes to verify appointment times. SHORT TERM GOAL #3:  Goal 3: Patient will demonstrate independent use of compensatory strategies (S-speak up, O-open mouth, S-slow down) within strucutred and unstrucutred speech tasks in order to improve overall articulatory precision. GOAL MET- CONTINUE FOR CONSISTENCY   INTERVENTIONS: Structured reading task from newspaper: 100% intelligibility, but some slurred speech/distortion, some of which attributed to lack of dentition. Issues with fricatives and affricates noted. Cues needed to slow rate and focus on articulatory precision. Second attempt following feedback with EXCELLENT utilization of slower rate and increased precision with interdentals, fricatives and affricates. Long-Term Goals:  Time Frame for Long Term Goals: 2 weeks from time of evaluation    LONG TERM GOAL #1:  Goal 1: Patient will improve overall cognitive function to return to independent living with wife and careing for children (16 year old). GOAL MET- CONTINUE FOR CONSISTENCY     LONG TERM GOAL #2:  Goal 2: Patient will improve overall speech intelligablity and clearity of speech production within informal conversation in order to return to baseline speech to improve successful ability to return to work, speaking with co-works and medical staff and family/friends.  GOAL MET- CONTINUE FOR CONSISTENCY        Comprehension: 6 - Complex ideas 90% or device (hearing aid or glasses- if patient is primarily a visual learner)  Expression: 6 - Device used to express complex ideas/needs  Social Interaction: 7 - Patient has appropriate behavior/relations 100% of the time  Problem Solvin - Independent with device (e.g. notes, schedules)  Memory: 6 - Patient requires device to recall (e.g. memory book)    EDUCATION:  Learner: Patient  Education:  Reviewed ST goals and Plan of Care and Reviewed recommendations for follow-up  Evaluation of Education: Verbalizes understanding, Demonstrates with assistance, and Family not present    ASSESSMENT/PLAN:  SUMMARY: Patient met 3/3 short term goals and 2/2 long term goals over this reporting period. Improvements noted with functional memory with independent use of compensatory strategies, as well as gains with executive functioning and higher level reasoning. Patient able to demonstrate independent management of medications, completing basic finances, organizing information onto a monthly calendar and examining visual information for errors. With regards to speech production, speech remains 100% intelligible across contexts. Occasional dysarthria or distortions evident, but also may be due to lack of dentition and articulatory structures. Patient's self perceptual rating is a 8/10 (*10 being baseline). Improvement noted with precision when patient actively employing slower rate and increased focus on articulatory precision. Given overall success with performance, recommending ST HEP at discharge. Will begin education on HEP next session. Activity Tolerance:  Patient tolerance of  treatment: good. Assessment/Plan: Patient progressing toward established goals. Continues to require skilled care of licensed speech pathologist to progress toward achievement of established goals and plan of care. .     Plan for Next Session: executive functioning, recall, review HEP  Discharge Recommendations: Home with Home Exercise Program       Marjorie Tavarez [address], [address]

## 2023-01-11 PROBLEM — I95.89 OTHER HYPOTENSION: Status: ACTIVE | Noted: 2023-01-11

## 2023-01-11 LAB
ANION GAP SERPL CALCULATED.3IONS-SCNC: 8 MEQ/L (ref 8–16)
BUN BLDV-MCNC: 36 MG/DL (ref 7–22)
CALCIUM SERPL-MCNC: 9.2 MG/DL (ref 8.5–10.5)
CHLORIDE BLD-SCNC: 104 MEQ/L (ref 98–111)
CO2: 27 MEQ/L (ref 23–33)
CREAT SERPL-MCNC: 1.6 MG/DL (ref 0.4–1.2)
GFR SERPL CREATININE-BSD FRML MDRD: 49 ML/MIN/1.73M2
GLUCOSE BLD-MCNC: 117 MG/DL (ref 70–108)
GLUCOSE BLD-MCNC: 164 MG/DL (ref 70–108)
POTASSIUM SERPL-SCNC: 5.2 MEQ/L (ref 3.5–5.2)
SODIUM BLD-SCNC: 139 MEQ/L (ref 135–145)

## 2023-01-11 PROCEDURE — 99232 SBSQ HOSP IP/OBS MODERATE 35: CPT | Performed by: INTERNAL MEDICINE

## 2023-01-11 PROCEDURE — 6370000000 HC RX 637 (ALT 250 FOR IP): Performed by: PHYSICAL MEDICINE & REHABILITATION

## 2023-01-11 PROCEDURE — 97116 GAIT TRAINING THERAPY: CPT

## 2023-01-11 PROCEDURE — 82948 REAGENT STRIP/BLOOD GLUCOSE: CPT

## 2023-01-11 PROCEDURE — 80048 BASIC METABOLIC PNL TOTAL CA: CPT

## 2023-01-11 PROCEDURE — 97110 THERAPEUTIC EXERCISES: CPT

## 2023-01-11 PROCEDURE — 1180000000 HC REHAB R&B

## 2023-01-11 PROCEDURE — 6370000000 HC RX 637 (ALT 250 FOR IP): Performed by: INTERNAL MEDICINE

## 2023-01-11 PROCEDURE — 99232 SBSQ HOSP IP/OBS MODERATE 35: CPT | Performed by: PHYSICAL MEDICINE & REHABILITATION

## 2023-01-11 PROCEDURE — 36415 COLL VENOUS BLD VENIPUNCTURE: CPT

## 2023-01-11 PROCEDURE — 97129 THER IVNTJ 1ST 15 MIN: CPT | Performed by: SPEECH-LANGUAGE PATHOLOGIST

## 2023-01-11 PROCEDURE — 97530 THERAPEUTIC ACTIVITIES: CPT

## 2023-01-11 PROCEDURE — 97130 THER IVNTJ EA ADDL 15 MIN: CPT | Performed by: SPEECH-LANGUAGE PATHOLOGIST

## 2023-01-11 PROCEDURE — 97112 NEUROMUSCULAR REEDUCATION: CPT

## 2023-01-11 PROCEDURE — 97535 SELF CARE MNGMENT TRAINING: CPT

## 2023-01-11 RX ORDER — MIDODRINE HYDROCHLORIDE 5 MG/1
5 TABLET ORAL EVERY 8 HOURS
Status: DISCONTINUED | OUTPATIENT
Start: 2023-01-11 | End: 2023-01-13 | Stop reason: HOSPADM

## 2023-01-11 RX ADMIN — APIXABAN 5 MG: 5 TABLET, FILM COATED ORAL at 09:02

## 2023-01-11 RX ADMIN — ATORVASTATIN CALCIUM 40 MG: 40 TABLET, FILM COATED ORAL at 20:57

## 2023-01-11 RX ADMIN — MIDODRINE HYDROCHLORIDE 5 MG: 5 TABLET ORAL at 11:09

## 2023-01-11 RX ADMIN — Medication 6 MG: at 20:57

## 2023-01-11 RX ADMIN — TAMSULOSIN HYDROCHLORIDE 0.8 MG: 0.4 CAPSULE ORAL at 20:57

## 2023-01-11 RX ADMIN — APIXABAN 5 MG: 5 TABLET, FILM COATED ORAL at 20:57

## 2023-01-11 RX ADMIN — DOCUSATE SODIUM 100 MG: 100 CAPSULE, LIQUID FILLED ORAL at 20:57

## 2023-01-11 RX ADMIN — DOCUSATE SODIUM 100 MG: 100 CAPSULE, LIQUID FILLED ORAL at 09:02

## 2023-01-11 RX ADMIN — BUPROPION HYDROCHLORIDE 150 MG: 150 TABLET, FILM COATED, EXTENDED RELEASE ORAL at 09:02

## 2023-01-11 RX ADMIN — Medication 1 TABLET: at 09:02

## 2023-01-11 RX ADMIN — PANTOPRAZOLE SODIUM 40 MG: 40 TABLET, DELAYED RELEASE ORAL at 06:46

## 2023-01-11 RX ADMIN — FERROUS SULFATE TAB 325 MG (65 MG ELEMENTAL FE) 325 MG: 325 (65 FE) TAB at 09:03

## 2023-01-11 RX ADMIN — GLIPIZIDE 10 MG: 10 TABLET ORAL at 09:02

## 2023-01-11 ASSESSMENT — ENCOUNTER SYMPTOMS
SORE THROAT: 0
VOMITING: 0
NAUSEA: 0
WHEEZING: 0
CONSTIPATION: 0
COUGH: 0
TROUBLE SWALLOWING: 0
RHINORRHEA: 0
DIARRHEA: 0
ABDOMINAL PAIN: 0
SHORTNESS OF BREATH: 0

## 2023-01-11 ASSESSMENT — PAIN SCALES - GENERAL
PAINLEVEL_OUTOF10: 0
PAINLEVEL_OUTOF10: 0

## 2023-01-11 NOTE — PROGRESS NOTES
[address] [address]  INPATIENT PHYSICAL THERAPY  DAILY NOTE  [address] [facility] - 7E-53/053-A    Time In: 1330  Time Out: 1400  Timed Code Treatment Minutes: 30 Minutes  Minutes: 30          Date: 2023  Patient Name: Jackelyn Nj,  Gender:  male        MRN: [id number]  : 1960  (63 y.o.)     Referring Practitioner: Dr. Bridget De Leon  Diagnosis: Acute stroke due to ischemia  Additional Pertinent Hx: Pt admitted through ED due to RUE weakness and slurred speech,  Also noted to have sepsis, LC, emphysematous cystitis. Hx:  HTN, Db, CAD, recent CABG (). MRI + for acute ischemic stroke Lt parietal region     Prior Level of Function:  Lives With: Spouse, Family (16 y.o son, 21 y.o step dtr.)  Type of Home: House  Home Layout: One level  Home Access: Stairs to enter with rails  Entrance Stairs - Number of Steps: 2 + threshold  Entrance Stairs - Rails: Both  Home Equipment:  (None used PTA)   Bathroom Shower/Tub: Tub/Shower unit  Bathroom Toilet: Standard  Bathroom Equipment: Tub transfer bench  Bathroom Accessibility: Accessible    ADL Assistance: Independent  Homemaking Assistance: Independent  Homemaking Responsibilities: Yes  Ambulation Assistance: Independent  Transfer Assistance: Independent  Active : No    Restrictions/Precautions:  Restrictions/Precautions: General Precautions, Fall Risk  Position Activity Restriction  Other position/activity restrictions: right side hemiparesis, recent CABG -minimize arm use ( s/p 5 wks), life vest     SUBJECTIVE: Patient seated in recliner upon arrival and agreeable to therapy.        PAIN: none at this time    Vitals: Vitals not assessed per clinical judgement, see nursing flowsheet    OBJECTIVE:  Bed Mobility:  Not Tested    Transfers:  Sit to Stand: Stand By Assistance, X 1  Stand to Sit:Stand By Assistance, X 1  Car:Stand By Assistance, X 1    Ambulation:  Stand By Assistance, Contact Guard Assistance, X 1  Distance: 60 ft  Surface: Level Tile  Device:Rolling Walker  Gait Deviations: Forward Flexed Posture, Decreased Step Length Bilaterally, Decreased Heel Strike Bilaterally, and demos improved R recurvatum    Balance:  Dynamic gait: SBA                     -weaving obstacles with RW            -toe taps on cones with RW  **Cues required for sequencing, positioning, and desired technique             Exercise:  Patient was guided in 1 set(s) 10 reps of exercise to both lower extremities. Mini squats and Lunges. Exercises were completed for increased independence with functional mobility. Functional Outcome Measures: Not completed       ASSESSMENT:  Assessment: Patient progressing toward established goals. Activity Tolerance:  Patient tolerance of  treatment: good. Equipment Recommendations:Equipment Needed: Yes  Other: RW  Discharge Recommendations: Home with Home Health PT and Patient would benefit from continued PT at discharge  Plan: Current Treatment Recommendations: Strengthening, ROM, Balance training, Functional mobility training, Transfer training, Endurance training, Neuromuscular re-education, Gait training, Stair training, Safety education & training, Equipment evaluation, education, & procurement, Home exercise program, Therapeutic activities, Patient/Caregiver education & training  General Plan:  (5x/ wk 90 min, 1x/ wk 30 min)    Patient Education  Patient Education: Plan of Care, Transfers, Gait, Car Transfers, Verbal Exercise Instruction    Goals:  Patient Goals : get back to my normal way of doing things  Short Term Goals  Time Frame for Short Term Goals: 1 wk  Short Term Goal 1: Pt to go supine <->sit, minimal use of UEs, SBA to get in/out of bed. GOAL MET, SEE LTG  Short Term Goal 2: Pt to get up/down from various seated surfaces, SBA, to get up to walk.  GOAL MET, SEE LTG  Short Term Goal 3: Pt to walk with RW >= 50 ft, demonstrating step through gait pattern, recurvatum < 10% of steps on RT, CGA to progress to home mobility  Short Term Goal 4: Pt to ascend/descend 2 steps with gerber rails, CGA to progress to home entry. GOAL MET, SEE LTG  Short Term Goal 5: Pt to get in/out of car, SBA for transportation needs. GOAL MET, SEE LTG  Additional Goals?: Yes  Short Term Goal 6: Pt to perform Tinetti >= 18/28, demonstrating improved balance. GOAL MET, SEE LTG  Long Term Goals  Time Frame for Long Term Goals : 3 wks from evaluation  Long Term Goal 1: Pt to go supine <->sit, minimal use of UEs, Mod I, to get in/out of bed. GOAL MET  Long Term Goal 2: Pt to get up/down from various seated surfaces, Mod I, to get up to walk. Long Term Goal 3: Pt to walk with RW >= 50 ft, demonstrating step through gait pattern, recurvatum < 10% of steps on RT, Mod I, for home mobility  Long Term Goal 4: Pt to perform Tinetti >= 24/28, demonstrating improved balance. Long Term Goal 5: Patient will ascend/descend 2 steps with bilateral hand rails with modified independence for safe home entry. Additional Goals?: Yes  Long term goal 6: Patient will complete car transfer with modified independence with safe technique to transfer in and out of vehicle safely. Following session, patient left in safe position with all fall risk precautions in place. Therapy session performed by Debora OAKES under supervision of credentialed therapist Maricarmen Burgos PTA.

## 2023-01-11 NOTE — PROGRESS NOTES
[address] [facility] THERAPY  [address] [facility]  DAILY NOTE     TIME   SLP Individual Minutes  Time In: 0900  Time Out: 30  Minutes: 30  Timed Code Treatment Minutes: 30 Minutes       Date: 2023  Patient Name: Toño Davis      CSN: [id number]   : 1960  (63 y.o.)  Gender: male   Referring Physician:  Dr. Bernie Lord   Diagnosis: Acute stroke due to ischemia, right hemiparesis secondary to stroke   Precautions: aspiration, fall risk   Current Diet: Regular diet with thin liquids   Swallowing Strategies: Standard Universal Swallow Precautions  Date of Last MBS/FEES: Not Applicable    Pain:  No pain reported. Subjective:  RN Miguel Guzman in initially to administer morning medication. Patient positioned in recliner, alert and pleasant throughout session. Planning for discharge home on 23. Short-Term Goals:  SHORT TERM GOAL #1:  Goal 1: Patient will complete complex executive functioning tasks (i.e., medication management, complex reasoning/deductive reasoning, etc.) with 90% accuracy and minimal cuing in order to allow for safe return to work (40/hours week). INTERVENTIONS:   Complex money management task [employer]):  indep,  with min cues to review and correct. *Excellent success with higher level selective attention, making comparisons, math reasoning and math computation. [place] :  Instructed patient on novel card game including game set up, rules and object. Overall excellent success with working memory and reasoning for logic behind game. Min cues needed x2 for selective attention to ID plays within the card play piles. Plan to assess recall of game set up, rules and play next session.        SHORT TERM GOAL #2:  Goal 2: Patient will complete higher level recall/short term memory and working memory tasks with 80% accuracy provided independent use of compensatory strategies in order to improve overall recall of newly learned theraputic information and anticipation to return to work ([employer] in [employer]). INTERVENTIONS:   Recall of appointment information (3 appointments= total of 15 units of information) from previous session:  Patient able to accurately recall dates of \"23, 24 & 25th\", stating \"the 23rd was f/u for  CHF, 24th urology appointment,and  25 th heart follow up\", all accurate. Patient also able to recall that, \"all appointments were between 8:00-9:00 am, but unable to provide specific times. \"  Staff had thrown away his written notes from his clip board, so he was not able to confirm the times of his appointments, but appropriately identified that this is the exact reason why he is sure to write all his appointment information down. SHORT TERM GOAL #3:  Goal 3: Patient will demonstrate independent use of compensatory strategies (S-speak up, O-open mouth, S-slow down) within strucutred and unstrucutred speech tasks in order to improve overall articulatory precision. INTERVENTIONS: Provided patient with written HEP outlining suggestions for home program including specific exercises, repetitions, strategies to focus on to optimize success and frequency of completion. Reviewed program at length with patient expressing understanding,       Long-Term Goals:  Time Frame for Long Term Goals: 2 weeks from time of evaluation    LONG TERM GOAL #1:  Goal 1: Patient will improve overall cognitive function to return to independent living with wife and careing for children (16 year old). LONG TERM GOAL #2:  Goal 2: Patient will improve overall speech intelligablity and clearity of speech production within informal conversation in order to return to baseline speech to improve successful ability to return to work, speaking with co-works and medical staff and family/friends.        Comprehension: 6 - Complex ideas 90% or device (hearing aid or glasses- if patient is primarily a visual learner)  Expression: 6 - Device used to express complex ideas/needs  Social Interaction: 7 - Patient has appropriate behavior/relations 100% of the time  Problem Solvin - Independent with device (e.g. notes, schedules)  Memory: 6 - Patient requires device to recall (e.g. memory book)    EDUCATION:  Learner: Patient  Education:  Reviewed ST goals and Plan of Care and Reviewed recommendations for follow-up  Evaluation of Education: Verbalizes understanding, Demonstrates with assistance, and Family not present    ASSESSMENT/PLAN:  Activity Tolerance:  Patient tolerance of  treatment: good. Assessment/Plan: Patient progressing toward established goals. Continues to require skilled care of licensed speech pathologist to progress toward achievement of established goals and plan of care. .     Plan for Next Session: executive functioning, recall, review HEP  Discharge Recommendations: Home with Home Exercise Program       Marjorie Resendiz [address] 89, [address]

## 2023-01-11 NOTE — PROGRESS NOTES
[address] [address]  INPATIENT PHYSICAL THERAPY  Daily Note  [facility] - [id number]    Time In: 1115  Time Out: 1215  Timed Code Treatment Minutes: 60 Minutes  Minutes: 60          Date: 2023  Patient Name: Keri Sales,  Gender:  male        MRN: [id number]  : 1960  (63 y.o.)     Referring Practitioner: Dr. Andra Machado  Diagnosis: Acute stroke due to ischemia  Additional Pertinent Hx: Pt admitted through ED due to RUE weakness and slurred speech,  Also noted to have sepsis, LC, emphysematous cystitis. Hx:  HTN, Db, CAD, recent CABG (). MRI + for acute ischemic stroke Lt parietal region     Prior Level of Function:  Lives With: Spouse, Family (16 y.o son, 21 y.o step dtr.)  Type of Home: House  Home Layout: One level  Home Access: Stairs to enter with rails  Entrance Stairs - Number of Steps: 2 + threshold  Entrance Stairs - Rails: Both  Home Equipment:  (None used PTA)   Bathroom Shower/Tub: Tub/Shower unit  Bathroom Toilet: Standard  Bathroom Equipment: Tub transfer bench  Bathroom Accessibility: Accessible    ADL Assistance: Independent  Homemaking Assistance: Independent  Homemaking Responsibilities: Yes  Ambulation Assistance: Independent  Transfer Assistance: Independent  Active : No    Restrictions/Precautions:  Restrictions/Precautions: General Precautions, Fall Risk  Position Activity Restriction  Other position/activity restrictions: right side hemiparesis, recent CABG -minimize arm use ( s/p 5 wks), life vest     SUBJECTIVE: Pt. Seated on his BS chair and pleasantly agrees to therapy session.      PAIN: None indicated    Vitals:   Patient Vitals for the past 8 hrs:   BP Patient Position Temp Temp src Pulse Resp SpO2   23 0811 96/64 Sitting -- -- 80 16 --   23 0800 98/71 Sitting 97.7 °F (36.5 °C) Oral 92 18 100 %   23 0750 (!) 86/46 Sitting -- -- -- 16 --        OBJECTIVE:  Bed Mobility:  Not Tested    Transfers:  Sit to Stand: Stand By Assistance  Stand to Sit:Stand By Assistance  Car:Stand By Assistance, with increased time for completion    Ambulation:  Contact Guard Assistance, Minimal Assistance  Distance: 72' x 1, 120' X 1, Multiple short distances. Surface: Level Tile  Device: Rolling Walker  Gait Deviations: Forward Flexed Posture, Slow Jada, Decreased Step Length Bilaterally, Decreased Gait Speed, Increased reliance on walker, and Genu recurvatum ~25% of time on R LE. Occasional buckling of R knee noted as well. Stairs:  None    Balance:  Pt. Completed standing dynamic balance activity: Ring toss with Narrow RODNEY on Airex balance pad and Single UE support on Rolling Walker with Contact Guard Assistance. Activity completed to improve balance, enhance functional mobility, and reduce risk of falls. Neuromuscular Re-education  Pt. Completed dynamic gait activities using Bilateral UE support on Rolling Walker to improve proprioception, gait mechanics, and Environmental awareness for improved functional mobility. Pt. Ambulating up/down incline with cues for quad control. Pt. Also completed modified SLS activities with L foot placed on ball for R quad facilitation and strengthening. Exercise:  Patient was guided in 1 set(s) 10 reps of exercises: Glut sets, Seated marches, Seated hamstring curls, Seated heel/toe raises, Long arc quads, Seated isometric hip adduction, Seated abduction/adduction, and abdominal isometrics,Standing heel/toe raises, Mini squats, and Lunges. Exercises were completed for increased independence with functional mobility. Functional Outcome Measures:   Not completed    ASSESSMENT:  Assessment: Patient progressing toward established goals. Activity Tolerance:  Patient tolerance of  treatment: good.      Equipment Recommendations:Equipment Needed: Yes  Other: RW  Discharge Recommendations: Continue to assess pending progress, Patient would benefit from continued therapy after discharge Plan: Current Treatment Recommendations: Strengthening, ROM, Balance training, Functional mobility training, Transfer training, Endurance training, Neuromuscular re-education, Gait training, Stair training, Safety education & training, Equipment evaluation, education, & procurement, Home exercise program, Therapeutic activities, Patient/Caregiver education & training  General Plan:  (5x/ wk 90 min, 1x/ wk 30 min)    Patient Education  Patient Education: Plan of Care, Functional Mobility, Reviewed Prior Education, Health Promotion and Wellness Education, Safety, Verbal Exercise Instruction    Goals:  Patient Goals : get back to my normal way of doing things  Short Term Goals  Time Frame for Short Term Goals: 1 wk  Short Term Goal 1: Pt to go supine <->sit, minimal use of UEs, SBA to get in/out of bed. GOAL MET, SEE LTG  Short Term Goal 2: Pt to get up/down from various seated surfaces, SBA, to get up to walk. GOAL MET, SEE LTG  Short Term Goal 3: Pt to walk with RW >= 50 ft, demonstrating step through gait pattern, recurvatum < 10% of steps on RT, CGA to progress to home mobility  Short Term Goal 4: Pt to ascend/descend 2 steps with gerber rails, CGA to progress to home entry. GOAL MET, SEE LTG  Short Term Goal 5: Pt to get in/out of car, SBA for transportation needs. GOAL MET, SEE LTG  Additional Goals?: Yes  Short Term Goal 6: Pt to perform Tinetti >= 18/28, demonstrating improved balance. GOAL MET, SEE LTG  Long Term Goals  Time Frame for Long Term Goals : 3 wks from evaluation  Long Term Goal 1: Pt to go supine <->sit, minimal use of UEs, Mod I, to get in/out of bed. GOAL MET  Long Term Goal 2: Pt to get up/down from various seated surfaces, Mod I, to get up to walk. Long Term Goal 3: Pt to walk with RW >= 50 ft, demonstrating step through gait pattern, recurvatum < 10% of steps on RT, Mod I, for home mobility  Long Term Goal 4: Pt to perform Tinetti >= 24/28, demonstrating improved balance.   Long Term Goal 5: Patient will ascend/descend 2 steps with bilateral hand rails with modified independence for safe home entry. Additional Goals?: Yes  Long term goal 6: Patient will complete car transfer with modified independence with safe technique to transfer in and out of vehicle safely. Following session, patient left in safe position with all fall risk precautions in place.

## 2023-01-11 NOTE — PLAN OF CARE
Problem: Chronic Conditions and Co-morbidities  Goal: Patient's chronic conditions and co-morbidity symptoms are monitored and maintained or improved  1/11/2023 0506 by Cherry Lowery RN  Outcome: Progressing     Problem: Safety - Adult  Goal: Free from fall injury  1/11/2023 0506 by Cherry Lowery RN  Outcome: Progressing

## 2023-01-11 NOTE — PLAN OF CARE
Problem: Discharge Planning  Goal: Discharge to home or other facility with appropriate resources  1/11/2023 1401 by Fartun Rubin RN  Outcome: Progressing  Flowsheets (Taken 1/11/2023 1401)  Discharge to home or other facility with appropriate resources:   Identify barriers to discharge with patient and caregiver   Arrange for needed discharge resources and transportation as appropriate   Identify discharge learning needs (meds, wound care, etc)  Note: Patients discharge is planned for 1/13 to go home with home health and assistance from wife. 1/11/2023 1102 by WILMER Moses  Note: Team conference held Wednesday, 1/11. Recommendations of the team were explained to the patient by Dr Soila Nam and SW. Team is recommending that patient continue on acute inpatient rehab for PT, OT and ST, with expected discharge date of Friday, 1/13. Following discharge, team is recommending home health services for RN, PT, OT and HHA. Referral made for [facility] per patient's preference. Dr Soila Nam encouraged patient to obtain a blood pressure cuff. Patient planning on purchasing one prior to discharge. Care plan reviewed with patient. Patient verbalized understanding of the plan of care and contributed to goal setting. SW to follow and maintain involvement in discharge planning. Problem: Chronic Conditions and Co-morbidities  Goal: Patient's chronic conditions and co-morbidity symptoms are monitored and maintained or improved  1/11/2023 1401 by Fartun Rubin RN  Outcome: Not Progressing  Flowsheets (Taken 1/11/2023 1401)  Care Plan - Patient's Chronic Conditions and Co-Morbidity Symptoms are Monitored and Maintained or Improved: Monitor and assess patient's chronic conditions and comorbid symptoms for stability, deterioration, or improvement  Note: Patient experienced low blood pressures while working with OT.  Midodrine added   1/11/2023 0506 by Sydney Kaufman RN  Outcome: Progressing     Problem: Safety - Adult  Goal: Free from fall injury  Description: Patient absent of falls this shift. Bed in lowest position, side rails up 2/4. Call-light within reach. Patient instructed to use call-light to get up. Non-skid footwear in place.            1/11/2023 1401 by Dong Coleman RN  Outcome: Progressing  Flowsheets (Taken 1/11/2023 1401)  Free From Fall Injury: Instruct family/caregiver on patient safety  Note: Patient remains free from falls at this  time   1/11/2023 0506 by Dain Zapata RN  Outcome: Progressing     Problem: ABCDS Injury Assessment  Goal: Absence of physical injury  Description: Patient is free from physical injury  Outcome: Progressing  Flowsheets (Taken 1/11/2023 1401)  Absence of Physical Injury: Implement safety measures based on patient assessment  Note: Patient remains free from injury     Problem: Skin/Tissue Integrity  Goal: Absence of new skin breakdown  Description:  Monitor for areas of redness and/or skin breakdown, no skin breakdown     Outcome: Progressing  Note: Patient remains free from any new skin breakdown     Problem: Pain  Goal: Verbalizes/displays adequate comfort level or baseline comfort level  Description: Patient denies pain  Outcome: Progressing  Flowsheets (Taken 1/11/2023 1401)  Verbalizes/displays adequate comfort level or baseline comfort level:   Encourage patient to monitor pain and request assistance   Assess pain using appropriate pain scale   Administer analgesics based on type and severity of pain and evaluate response  Note: Patient denies pain      Problem: Genitourinary - Adult  Goal: Urinary catheter remains patent  Description: Patient catheter remains patent and draining light yellow urine, cath care completed this shift and education provided to patient   Outcome: Progressing  Flowsheets (Taken 1/11/2023 1401)  Urinary catheter remains patent: Assess patency of urinary catheter  Note: Patients crum remains patient at this time     Problem: Chronic Conditions and Co-morbidities  Goal: Patient's chronic conditions and co-morbidity symptoms are monitored and maintained or improved  1/11/2023 1401 by Yoko Bhatt RN  Outcome: Not Progressing  Flowsheets (Taken 1/11/2023 1401)  Care Plan - Patient's Chronic Conditions and Co-Morbidity Symptoms are Monitored and Maintained or Improved: Monitor and assess patient's chronic conditions and comorbid symptoms for stability, deterioration, or improvement  Note: Patient experienced low blood pressures while working with OT.  Midodrine added   1/11/2023 0506 by Alina Ovalles, RN  Outcome: Progressing

## 2023-01-11 NOTE — PROGRESS NOTES
Physical Medicine & Rehabilitation Progress Note    Chief Complaint:  Right-sided weakness due to stroke    Subjective:    Shawn Low is a 63 y.o.  left-handed  male with history of hypertension, diabetes mellitus type 2, coronary artery disease with ischemic cardiomyopathy requiring two-vessel CABG, urinary retention with several failed void trial, status post tonsillectomy, was admitted to the inpatient rehabilitation unit on 12/23/2022 for intensive inpatient management of impairment & disability secondary to right hemiparesis due to acute left parietal corona radiata and right cerebellar ischemic stroke, and debility/physical deconditioning due to Klebsiella pneumonia urinary tract infection with emphysematous cystitis and bilateral hydronephrosis complicated by Klebsiella pneumonia bacteremia/sepsis. The patient was previously hospitalized at [facility] from 10/28/2022 to 11/15/2022 for coronary artery disease and ischemic cardiomyopathy requiring two-vessel CABG on 11/7/2022. His postoperative course was complicated by urinary retention with failed void trial.  The patient has been experiencing progressive weakness after he was discharged to home. The Bella was later removed on 12/13/2022 but the patient continued having difficulty voiding. On 12/14/2022 approximately 11:30 PM his wife noticed the patient had slight slurred speech with right arm weakness. On 12/15/2022 the patient suddenly felt dizzy and lightheaded while he was trying to get up from sitting on toilet and fell into the ground. He says he did not lose consciousness and did not hit his head. His wife called [phone]. He was transported to [address] [place] [address] ER for evaluation by EMS. He complains of neck pain and upper back pain. He was found to be tachycardic. His WBC was found to be 22.3. Bella was placed in ER and a rush of air followed by dark brown and bright red color urine came out.   He was put on IV meropenem and admitted. Urology was consulted. CT of cervical spine revealed only multilevel facet and uncovertebral joint arthropathy. CT of head done on 12/15/2020 revealed no acute intracranial abnormality. CT of the chest, abdomen and pelvis done on 12/15/2022 revealed emphysematous cystitis, bilateral hydronephrosis, and constipation. Because of right arm weakness, stroke code was called on 12/15/2022. MRI of brain done on 12/15/2022 revealed acute ischemic stroke at the left parietal region corona radiata, and possible small additional acute ischemic stroke within right cerebellum. Neurology service start patient on aspirin and Plavix combination for the stroke. MRA of the neck and head done on 12/15/2022 showed only mild bilateral carotid bulb disease. Transesophageal echocardiogram was done on 12/16/2022 showing severe global hypokinesis of left ventricle with estimated ejection fraction of 30%, and no thrombus identified. 2 blood culture and urine culture done on 12/15/2022 revealed Klebsiella pneumonia. Infectious disease had been consulted. Oral Cipro was started on 12/22/2022 after IV meropenem was completed. Patient's hospital course was also complicated by constipation which resolved this morning. The patient feels dizzy at the end of OT treatment this morning. He was found to have BP of 86/46. Nurse [facility] nephrology service for the symptomatic hypotension. The patient has been on 2000 mL fluid restriction since 1/5/2023 ordered by nephrology service. He says otherwise he feels fine with no other new complaint. He says his right side weakness is getting better with improving control of movement and muscle strength. He is still on Bella for urinary retention with failed void trial.  The current Bella catheter was placed on 12/15/2022. The patient is scheduled to be discharged on 1/13/2023. Rehabilitation:  PT: Reviewed.     Bed Mobility:  Rolling to Right: Modified Independent   Supine to Sit: Modified Independent  Sit to Supine: Stand By Assistance      Transfers:  Sit to Stand: Stand By Assistance, Contact Guard Assistance  Stand to [facility] 68  To/From [facility] and Chair: Stand By Assistance  Car:Stand By Assistance    Pt stood too quickly initial trial, cues to wait until this PT ready provided     Ambulation:  Stand By Assistance  Distance: 50' x 2, 40' x 1,   Surface: Level Tile  Device: Rolling Walker  Gait Deviations:  Slow Jada, Decreased Step Length on Right, Decreased Weight Shift Left, Decreased Gait Speed, Decreased Quad Control Right, Mild Path Deviations, and Increased reliance on walker. Genu recurvatum noted in R LE for ~25% of gait. Cues to correct with good carryover. Pt. With one LOB during 40' bout but able to self correct. Pt. Also with hesitancy at times initializing gait. Contact Guard Assistance, Minimal Assistance, with verbal cues , with increased time for completion  Distance: 12', 10', 10' x2 side stepping with B UE support on rail  Surface: Level Tile  Device:Rolling Walker  Gait Deviations:  Slow Jada, Mild Path Deviations, Unsteady Gait, and Decreased Terminal Knee Extension     Pt noted to have decreased stability at R quad with ambulation with noted genu recurvatum. Cue and intermittent min A for tactile cue to increase quad activation to address this. Min A with side stepping at the railing for improved stability at the quad. Seated rest provided between trials. Stairs:  Stairs: 6\" steps X 4 using Bilateral Handrails and Stand By Assistance, with verbal cues .     Wheelchair Mobility:  Stand By Assistance  Extremities Used: Bilateral Upper Extremities  Type of Chair:Manual  Surface: Level Tile  Distance: 48' with 2 turns   Quality: Slow Velocity  Cues provided for path and improved turn      Balance:  Static Sitting Balance:  Stand By Assistance  Dynamic Sitting Balance: Stand By Assistance  Static Standing Balance: Contact Guard Assistance  Dynamic Standing Balance: Minimal Assistance     Pt stood statically with B UE support on foam surface for ~30 seconds with no loss in stability. Completed stance with R LE on the ground and L LE on a ball to increase weight shift and stability in stance on R LE. Cues and tactile assist provided to prevent hyperextension or buckling at R quad with fair improvement. Completed 2 trials of ~30 seconds with this and seated rest between trials. Neuromuscular Re-education  Pt. Completed modified SLS activities using Single UE support and Bilateral UE support on Rolling Walker to improve coordination, postural awareness, and hip/quad stability for improved functional mobility. OT: Reviewed. BALANCE:  Sitting Balance:  Modified Independent. Standing Balance: Stand By Assistance. TRANSFERS:  Sit to Stand:  Supervision. Recliner, standard chair   Stand to Sit: Supervision. FUNCTIONAL MOBILITY:  Assistive Device: Rolling Walker  Assist Level:  Stand By Assistance. Distance: To and from therapy gym and To and from therapy apartment  Cues for fwd gaze and posture. Requires CGA without AD minimally during IADLs. Assistive Device: Rolling Walker  Assist Level:  Stand By Assistance. Distance:  to/from [address]        ADDITIONAL ACTIVITIES:  Patient completed IADL task of prepping a grilled cheese sandwich. Pt was able to recall where items were located and was able to locate items with SBA to intermittent CGA (without AD use). Patient did sequence prep appropriately, but began sandwich on high heat without having other ingredients prepped, thus burning the bread. When given one cue that the heat may be too high, pt looked at heat and said, it would be fine. Allowed bread to burn to help build insight on importance of planning ahead, however pt reported blame on not being familiar with stove, despite cues given.  Once sandwich was on the stove, pt then ambulated away from stove to clean up his meal prep area, without turning off stove - demo'ing poor problem solving as he was already aware that the grilled cheese was burning. Pt did recall to turn off th stove after the sandwich was completed, and even double checked his work. Reviewed concerns with pt with recommendations for cold meal prep / microwave / toaser meal prep at home (which he has demo ability to complete) when he is alone, and recommend direct supervision / A with stovetop/oven mgmt at this time. Pt voices understanding. Patient completed IADL task of washing dishes, standing 5 min with SBA and demo no LOB. Pt functionally integrated RUE without cue'ing. In order to be able to self manage buttons / zippers with clothing management for ADLs and open own cooking supplies during IADLs, patient completed [facility] task this date completed with firm green theraputty, utilized R hand in pincer grasp, 3 jaw delilah, lateral key pinch and digit extension , completing with min difficulty and min cues to recall HEP. Pt completed dynamic standing recreational task that facilitated: standing endurance, standing balance with 1UE release and engagement of RUE in pinch grasp patterns to complete targeting and stacking tasks Katie Oates). Pt demo endurance 12 min with SBA and no LOB. Min incoordination noted but with increased time pt was able to complete. Patient completed BUE strengthening exercises with skilled education on HEP: completed x15 reps x1 set with a medium resistive band in all joints and all planes in order to improve UE strength and activity tolerance required for BADL routine and toilet / shower transfers. Completed also for heavy work for proprioceptive input through [employer] as well. No c/o pain in sternum. Patient tolerated well, requiring min rest breaks. Patient also required min cues for technique. ST: Reviewed.     Deductive reasoning task [employer] [facility]): 5/7 indep, 2/7 with min assist to correct errors. *Overall good success, missing only one detail resulting in 2 stops being misplaced. Recall of appointment information (3 appointments= total of 15 units of information):  *Patient independently making written notes regarding appointment details. Had patient complete read back to ensure accuracy of written information, 100% accuracy.   -Following a 5 minute delay: 13/15 indep, 2/15 some discrepancy with times of appointment (all appointments very close in proximity). Patient independently utilizing written notes to verify appointment times. Structured reading task from newspaper: 100% intelligibility, but some slurred speech/distortion, some of which attributed to lack of dentition. Issues with fricatives and affricates noted. Cues needed to slow rate and focus on articulatory precision. Second attempt following feedback with EXCELLENT utilization of slower rate and increased precision with interdentals, fricatives and affricates. Review of Systems:  Review of Systems   Constitutional:  Negative for chills, diaphoresis, fatigue and fever. HENT:  Negative for hearing loss, rhinorrhea, sneezing, sore throat and trouble swallowing. Eyes:  Negative for visual disturbance. Respiratory:  Negative for cough, shortness of breath and wheezing. Cardiovascular:  Negative for chest pain and palpitations. Gastrointestinal:  Negative for abdominal pain, constipation, diarrhea, nausea and vomiting. Genitourinary:  Positive for difficulty urinating. Negative for dysuria. Musculoskeletal:  Positive for gait problem. Negative for arthralgias, myalgias and neck pain. Skin:  Negative for rash. Neurological:  Positive for dizziness and weakness (Right upper and right lower extremities). Negative for tremors, facial asymmetry, speech difficulty, light-headedness, numbness and headaches. Psychiatric/Behavioral:  Negative for dysphoric mood, hallucinations and sleep disturbance. The patient is not nervous/anxious.          Objective:  BP 98/71   Pulse 92   Temp 97.7 °F (36.5 °C) (Oral)   Resp 18   Ht 5' 10\" (1.778 m)   Wt 134 lb 14.4 oz (61.2 kg)   SpO2 100%   BMI 19.36 kg/m²   Physical Exam   General:  well-developed, well nourished  male; in no acute distress ; appropriate affect & mood; sitting on reclining chair comfortably  Eyes: pupil equally round ; extra-ocular motion intact bilaterally  Head, Ear, Nose, Mouth & Throat : normocephalic ; no discharge from ears or nose ; no deformity ; no facial swelling ; oral mucosa pink   Neck :  supple ; no tenderness ; no muscle spasm  Cardiovascular : Presence of healed vertical surgical scar at sternum ; regular rate & rhythm ; normal S1 & S2 heart sound ; no murmur ; normal peripheral pulse at the bilateral upper extremities; reduced pulse strength at the bilateral lower extremities  Pulmonary : Present breath sounds at the bilateral lung fields; no wheezing ; no rale; no crackle  Gastrointestinal : soft, slightly protruded abdomen without tenderness ; normal bowel sound present    : Bella catheter in place draining light yellowish urine  Back : no tenderness; no muscle spasm  Skin: no skin lesion or rash ; no pitting edema at all 4 extremities  Musculoskeletal : no limb asymmetry; no limb deformity; no tenderness at bilateral upper & lower extremities; no palpable mass at limbs ; no joints laxity or crepitation ; shoulder flexion and abduction passive ROM reaching 170 degrees bilaterally; hip flexion passive ROM reaching 130 degrees bilaterally; normal functional joints ROM at bilateral upper & lower extremities  Cerebral :  alert ; awake ; oriented to place, person and time; follow two-step verbal command  Cerebellum : mild dysmetria with the right finger-to-nose test; no dysmetria with left finger-to-nose test  Cranial Nerves : grossly intact CN II to XI function  Sensory : intact light touch and pin prick sensation at bilateral upper & lower extremities  Motor : normal muscle tone at bilateral upper & lower extremities ; 4+/5 to 5/5 muscle strength at right finger extension; 4+/5 muscle strength at right finger abduction; normal 5/5 muscle strength at the rest of bilateral upper & lower extremities  Reflex : 3+ right biceps, right brachioradialis and right knee reflexes; 2+ left knee reflexes; 1+ left biceps, left brachioradialis reflexes  Pathological Reflex :  No Ion's sign ; no ankle clonus  Gait : Not assessed      Diagnostics:   Recent Results (from the past 24 hour(s))   Basic Metabolic Panel    Collection Time: 01/10/23  8:32 AM   Result Value Ref Range    Sodium 139 135 - 145 meq/L    Potassium 5.2 3.5 - 5.2 meq/L    Chloride 103 98 - 111 meq/L    CO2 25 23 - 33 meq/L    Glucose 208 (H) 70 - 108 mg/dL    BUN 39 (H) 7 - 22 mg/dL    Creatinine 1.7 (H) 0.4 - 1.2 mg/dL    Calcium 9.3 8.5 - 10.5 mg/dL   Anion Gap    Collection Time: 01/10/23  8:32 AM   Result Value Ref Range    Anion Gap 11.0 8.0 - 16.0 meq/L   Glomerular Filtration Rate, Estimated    Collection Time: 01/10/23  8:32 AM   Result Value Ref Range    Est, Glom Filt Rate 45 (A) >60 MS/RTX/6.17I0   Basic Metabolic Panel    Collection Time: 01/11/23  5:59 AM   Result Value Ref Range    Sodium 139 135 - 145 meq/L    Potassium 5.2 3.5 - 5.2 meq/L    Chloride 104 98 - 111 meq/L    CO2 27 23 - 33 meq/L    Glucose 117 (H) 70 - 108 mg/dL    BUN 36 (H) 7 - 22 mg/dL    Creatinine 1.6 (H) 0.4 - 1.2 mg/dL    Calcium 9.2 8.5 - 10.5 mg/dL   Anion Gap    Collection Time: 01/11/23  5:59 AM   Result Value Ref Range    Anion Gap 8.0 8.0 - 16.0 meq/L   Glomerular Filtration Rate, Estimated    Collection Time: 01/11/23  5:59 AM   Result Value Ref Range    Est, Glom Filt Rate 49 (A) >60 ml/min/1.73m2       Impression:  Acute left parietal corona radiata and right cerebellar ischemic stroke causing right hemiparesis with impaired coordination, and dysarthria  Debility/physical decondition due to Klebsiella pneumonia urinary tract infection with emphysematous cystitis and bilateral hydronephrosis complicated by Klebsiella pneumonia bacteremia/sepsis  Urinary retention  Klebsiella pneumonia urinary tract infection with emphysematous cystitis and bilateral hydronephrosis  Klebsiella pneumoniae bacteremia/sepsis  Acute kidney injury possibly due to dehydration with orthostatic hypotension  Hypotension probably due to side effect from high-dose Flomax in combination with Toprol XL  Coronary artery disease with ischemic cardiomyopathy requiring two-vessel coronary artery bypass graft surgery on 11/7/2022  Hypertension  Diabetes mellitus type 2    The patient has another episode of orthostatic hypotension during the therapy session. Currently he is on 2000 mL/day fluid restriction as per renal service. Renal service will assessed the patient and make necessary change. Otherwise the patient's right hemiparesis is improving greatly. Since urology service want the patient to go home with the Bella and follow up at urology clinic, and the Bella catheter was placed on 12/15/2022, the Bella catheter need to be replaced with a new one before the patient is discharged. He continues tolerating the intensive inpatient rehabilitation treatment well. His function continue to improve. The patient is scheduled to be discharged on 1/13/2023. Plan:  Continues intensive PT/OT/SLP/RT inpatient rehabilitation program at least 3 hours per day, 5 days per week in order to improve functional status prior to discharge. Family education and training will be completed. Equipment evaluations and recommendations will be completed as appropriate. Rehabilitation nursing continues to be involved for bowel, bladder, skin, and pain management. Nursing will also provide education and training to patient and family.     Prophylaxis:  DVT: Patient on Eliquis, JONAH stocking, intermittent pneumatic compression device. GI: Colace, Senokot, GlycoLax as needed, milk of magnesia as needed, Dulcolax suppository as needed.   Pain: Tylenol as needed  Continue Lipitor for CVA and CAD  Continues holding Toprol-XL for hypertension  Continue Flomax for urinary retention  Continue Protonix for gastric protection  Continue Jardiance (on hold by renal service), Glucotrol, Humalog insulin coverage for diabetes mellitus  Continue Wellbutrin XL for smoking cessation  Continue melatonin, trazodone as needed for insomnia  Continue multivitamin supplement  Continue 2000 mL/day fluid restriction as per nephrology service  Plan to replace the Bella catheter before the patient is discharged  Nutrition: Continue current diet   Bladder: Monitoring signs or symptoms of UTI  Bowel: Monitoring signs or symptoms of constipation   and case management for coordination of care and discharge planning      Missed Therapy Time:  None      Leo Pompa MD

## 2023-01-11 NOTE — PROGRESS NOTES
[address]  Occupational Therapy  Daily Note  Time:   Time In: 0700  Time Out: 0800  Timed Code Treatment Minutes: 60 Minutes  Minutes: 60    Date: 2023  Patient Name: Connor Bose,   Gender: male      Room: [facility]/053-A  MRN: [id number]  : 1960  (63 y.o.)  Referring Practitioner: Ordering & Attending: Thanh Alarcon MD  Diagnosis: Acute Stroke due to Ischemia  Additional Pertinent Hx: Connor Bose who is a 63 y.o. male with a history of hypertension, diabetes, CAD on  daily, recent CABG in 2022, ischemic cardiomyopathy is admitted to [facility] for sepsis, emphysematous cystitis and acute kidney injury on CKD. During admission exam patient stated that his right arm feldman has been weak and that he has had some slurred speech. Stroke alert was called for right-sided weakness and dysarthria on 12/15. On arrival patient's NIH was 4 for right upper extremity, right lower extremity weakness, 1 limb ataxia and dysarthria. On further questioning, patient and wife state that the right arm weakness actually started last night. Patient's wife noticed when patient was trying to get up from the toilet that he was unable to push himself up with his right arm. Last known well 2330 on 2022. Patient taken to imaging for stat CT head which revealed no ICH, midline shift, mass-effect. CTA head and neck deferred due to patient's LC. Decision for no tPA was made due to patient's time since last known well. Patient with relatively low NIH and symptoms which are not consistent with LVO, therefore no thrombectomy/neuro intervention at this time. Patient had stat MRI brain and MRA head and neck without contrast.  MRI showed acute ischemic stroke of left parietal region. Pt admitted to IP rehab on 22.     Restrictions/Precautions:  Restrictions/Precautions: General Precautions, Fall Risk  Position Activity Restriction  Other position/activity restrictions: right side hemiparesis, recent CABG 11/22-minimize arm use ( s/p 5 wks), life vest     SUBJECTIVE: Patient pleasant and cooperative. Agreeable to OT. PAIN: Denies      Vitals: Towards EOS, pt began to c/o dizziness and required increased A demo'ing posterior leans. Notified ABIGAIL Soto immediately once BP taken. Blood Pressure: 126/110 1st check, 108/67 2nd check, 72/58 (manual)    COGNITION: Decreased Recall and Decreased Insight    ADL:   Grooming: with set-up. Washing face in shower. Bathing: Stand By Assistance. Close SBA while standing   Upper Extremity Dressing: with set-up. Donning t-shirt. Lower Extremity Dressing: Contact Guard Assistance. Posterior lean during clothing mgmt   Footwear Management: with set-up. Socks   Tub Transfer: Stand By Assistance. Stepping over tub . BALANCE:  Sitting Balance:  Independent. Standing Balance: Stand By Assistance. To CGA with posterior lean at EOS with pt c/o dizziness. BED MOBILITY:  Not Tested    TRANSFERS:  Sit to Stand:  Stand By Assistance, [employer]. SBA initially, required CGA  with fatigue   Stand to Sit: [employer]. FUNCTIONAL MOBILITY:  Assistive Device: Rolling Walker  Assist Level:  Contact Guard Assistance. Distance: To and from shower room     ASSESSMENT:     Activity Tolerance:  Patient tolerance of  treatment: good. Discharge Recommendations: Home with Home Health OT  Equipment Recommendations: Equipment Needed: Yes  Other: OT staff to continue to monitor needs throughout OT POC.   Plan: Times Per Week: 5x/week x 90 minutes and 1x/week x 30 minutes  Current Treatment Recommendations: Strengthening, Balance training, Self-Care / ADL, Equipment evaluation, education, & procurement, Safety education & training, Endurance training, Functional mobility training, Neuromuscular re-education, Patient/Caregiver education & training, Home management training    Patient Education  Patient Education: ADL's, Reviewed Prior Education, and Assistive Device Safety    Goals  Short Term Goals  Time Frame for Short Term Goals: 1 week  Short Term Goal 1: Pt will complete functional ambulation to/from BR and HH distances with Supervision and min vcs for safety to increase indep with toileting. Short Term Goal 2: Pt will decrease RUE 9 hole peg test by 10 seconds (49 on 1/3), and RUE  strength by 5 pounds (48.3# on 1/3) to increase indep with fasteners in UB dressing. Short Term Goal 3: Pt will complete dynamic standing task x 5 minutes with 1-2 UE release and Supervision to increase indep with sinkside grooming. Short Term Goal 4: Pt will complete UB dressing with mod I to increase indep within home environment. Short Term Goal 5: Pt will complete LB dresing with S and min vcs for safety to increase indep and endurance within home environment. Additional Goals?: No  Long Term Goals  Time Frame for Long Term Goals : 4 weeks from IPR eval  Long Term Goal 1: Pt will complete BADL routine including shower transfer Mod Indep to increase indep and safety in home environment. Long Term Goal 2: Pt will complete simple IADL task with S and 0 vcs for safety to increase indep and endurance in home environment. Following session, patient left in safe position with all fall risk precautions in place.

## 2023-01-11 NOTE — PROGRESS NOTES
At 7:50 Am OT Toni Willams brought patient back to his room due to dizziness during shower. Patients blood pressure 86/46 and heart rate 100 per monitor. Manual B/P 72/58.  notified and instructed to contact Dr. Carney Fell which this did. B/P at 8am per monitor 98/71 pulse 92.  Dr. Carney Fell in and orders received/

## 2023-01-11 NOTE — PROGRESS NOTES
Patient: Mira Melendez  Unit/Bed: 7E-53/053-A  YOB: 1960  MRN: [id number] Acct: [de-identified]   Admitting Diagnosis: Acute stroke due to ischemia [facility]) [I63.9]  Admit Date:  12/23/2022  Hospital Day: 18    Assessment:     Principal Problem:    Acute stroke due to ischemia [facility])  Active Problems:    Ischemic cardiomyopathy    S/P CABG x 2    Emphysematous cystitis    Bacteremia due to Enterobacter species    Urinary retention    Hemiparesis affecting right side as late effect of stroke ([facility] [address]    Coordination impairment    Debility    UTI due to Klebsiella species    Moderate malnutrition ([facility] [address]    Diabetes mellitus type 2 in nonobese [facility])    Essential hypertension  Resolved Problems:    * No resolved hospital problems. *      Plan: We reviewed the need to continue the diet at home as he doesn't tolerated metformin and to push the glipizide would further increase the chance of hypoglycemia. Subjective:     Patient has no complaint of CP or SOB. .   Medication side effects: none    Scheduled Meds:   ferrous sulfate  325 mg Oral Daily with breakfast    melatonin  6 mg Oral Nightly    atorvastatin  40 mg Oral Daily    buPROPion  150 mg Oral QAM    pantoprazole  40 mg Oral QAM AC    tamsulosin  0.8 mg Oral Daily    apixaban  5 mg Oral BID    [Held by provider] metoprolol succinate  12.5 mg Oral Daily    glipiZIDE  10 mg Oral QAM AC    [Held by provider] empagliflozin  25 mg Oral Daily    docusate sodium  100 mg Oral BID    multivitamin  1 tablet Oral Daily with breakfast    senna  2 tablet Oral Nightly     Continuous Infusions:   dextrose       PRN Meds:glucose, dextrose bolus **OR** dextrose bolus, glucagon (rDNA), dextrose, acetaminophen, ondansetron, oxyCODONE **OR** oxyCODONE, bisacodyl, magnesium hydroxide, traZODone, polyethylene glycol    Review of Systems  Pertinent items are noted in HPI.     Objective:     Patient Vitals for the past 8 hrs:   BP Temp Temp src Pulse Resp SpO2 Height 01/10/23 1946 116/73 98.2 °F (36.8 °C) Oral 74 16 99 % --   01/10/23 1215 -- -- -- -- -- -- 5' 10\" (1.778 m)     I/O last 3 completed shifts: In: 1600 [P.O.:1600]  Out: 1500 [Urine:1500]  No intake/output data recorded.     /73   Pulse 74   Temp 98.2 °F (36.8 °C) (Oral)   Resp 16   Ht 5' 10\" (1.778 m)   Wt 138 lb 14.4 oz (63 kg)   SpO2 99%   BMI 19.93 kg/m²     General appearance: alert, appears stated age, and cooperative  Head: Normocephalic, without obvious abnormality, atraumatic  Lungs: clear to auscultation bilaterally  Chest wall: no tenderness  Heart: regular rate and rhythm, S1, S2 normal, no murmur, click, rub or gallop  Abdomen: soft, non-tender; bowel sounds normal; no masses,  no organomegaly  Extremities: extremities normal, atraumatic, no cyanosis or edema  Skin: Skin color, texture, turgor normal. No rashes or lesions  Neurologic:  weak    Data Review:    Latest Reference Range & Units 1/9/23 07:57 1/10/23 08:03   POC Glucose 70 - 108 mg/dl 131 (H) 179 (H)   (H): Data is abnormally high    Electronically signed by Celso Thomas MD on 1/10/2023 at 8:00 PM

## 2023-01-11 NOTE — PROGRESS NOTES
[facility]   Nephrology progress note  1/11/2023, 9:42 AM      Pt Name:    Jackelyn Nj  MRN:     [id number]     YOB: 1960  Admit Date:    12/23/2022 11:24 AM    Chief Complaint: Nephrology following for LC/CKD. Subjective:  Patient seen and examined  No chest pain or shortness of breath  Feels well.   Some dizziness today blood pressure has been running on the lower side  Urine output decent    Objective:  24HR INTAKE/OUTPUT:    Intake/Output Summary (Last 24 hours) at 1/11/2023 0942  Last data filed at 1/11/2023 0539  Gross per 24 hour   Intake 200 ml   Output 1775 ml   Net -1575 ml        Admission weight: 134 lb 8 oz (61 kg)  Wt Readings from Last 3 Encounters:   01/11/23 134 lb 14.4 oz (61.2 kg)   12/23/22 130 lb 15.3 oz (59.4 kg)   12/13/22 142 lb 9.6 oz (64.7 kg)        Vitals :   Vitals:    01/11/23 0539 01/11/23 0750 01/11/23 0800 01/11/23 0811   BP:  (!) 86/46 98/71 96/64   Pulse:   92 80   Resp:  16 18 16   Temp:   97.7 °F (36.5 °C)    TempSrc:   Oral    SpO2:   100%    Weight: 134 lb 14.4 oz (61.2 kg)      Height:           Physical examination  General Appearance:   No distress, infact cachectic  Mouth/Throat:  Oral mucosa moist  Neck:  Supple, no JVD  Lungs:  Breath sounds: clear  Heart[de-identified]  S1,S2 heard  Abdomen:  Soft, non - tender  Musculoskeletal:  Edema -no edema noted    Medications:  Infusion:    dextrose       Meds:    ferrous sulfate  325 mg Oral Daily with breakfast    melatonin  6 mg Oral Nightly    atorvastatin  40 mg Oral Daily    buPROPion  150 mg Oral QAM    pantoprazole  40 mg Oral QAM AC    tamsulosin  0.8 mg Oral Daily    apixaban  5 mg Oral BID    [Held by provider] metoprolol succinate  12.5 mg Oral Daily    glipiZIDE  10 mg Oral QAM AC    [Held by provider] empagliflozin  25 mg Oral Daily    docusate sodium  100 mg Oral BID    multivitamin  1 tablet Oral Daily with breakfast    senna  2 tablet Oral Nightly       Lab Data :  CBC:   No results for input(s): WBC, HGB, HCT, PLT in the last 72 hours. CMP:  Recent Labs     01/09/23  0626 01/10/23  0832 01/11/23  0559    139 139   K 4.8 5.2 5.2    103 104   CO2 25 25 27   BUN 32* 39* 36*   CREATININE 1.4* 1.7* 1.6*   GLUCOSE 116* 208* 117*   CALCIUM 9.4 9.3 9.2       Hepatic: No results for input(s): LABALBU, AST, ALT, ALB, BILITOT, ALKPHOS in the last 72 hours. Assessment and Plan:  Renal -acute kidney injury on chronic kidney disease with baseline creatinine around 1.5-1.7  Overall creatinine stable close to baseline  Continue to hold Jardiance BMP pending  Electrolytes -within normal limits   Acid-base status appears to be stable  Essential hypertension now running on the lower side-Will add midodrine 5 mg 3 times daily  Hx of diabetes mellitus  Hx of sepsis due to UTI from Klebsiella pneumonia status post antibiotics  CAD status post CABG 11/22  Polyuria -much improved with fluid restriction  Meds reviewed and discussed with patient and nursing staff      Efrem Flores MD  [facility]    This report has been created using voice recognition software.  It may contain minor errors which are inherent in voice recognition technology

## 2023-01-11 NOTE — PLAN OF CARE
Problem: Discharge Planning  Goal: Discharge to home or other facility with appropriate resources  Note: Team conference held Wednesday, 1/11. Recommendations of the team were explained to the patient by Dr Domonique Cespedes and SW. Team is recommending that patient continue on acute inpatient rehab for PT, OT and ST, with expected discharge date of Friday, 1/13. Following discharge, team is recommending home health services for RN, PT, OT and HHA. Referral made for [facility] per patient's preference. Dr Domonique Cespedes encouraged patient to obtain a blood pressure cuff. Patient planning on purchasing one prior to discharge. Care plan reviewed with patient. Patient verbalized understanding of the plan of care and contributed to goal setting. SW to follow and maintain involvement in discharge planning.

## 2023-01-11 NOTE — PROGRESS NOTES
[address]  Occupational Therapy  Daily Note  Time:   Time In: 1400  Time Out: 1430  Timed Code Treatment Minutes: 30 Minutes  Minutes: 30    Date: 2023  Patient Name: Riccardo Velasquez,   Gender: male      Room: [facility]/053-A  MRN: [id number]  : 1960  (63 y.o.)  Referring Practitioner: Ordering & Attending: Chelsea Bettencourt MD  Diagnosis: Acute Stroke due to Ischemia  Additional Pertinent Hx: Riccardo Velasquez who is a 63 y.o. male with a history of hypertension, diabetes, CAD on  daily, recent CABG in 2022, ischemic cardiomyopathy is admitted to [facility] for sepsis, emphysematous cystitis and acute kidney injury on CKD. During admission exam patient stated that his right arm feldman has been weak and that he has had some slurred speech. Stroke alert was called for right-sided weakness and dysarthria on 12/15. On arrival patient's NIH was 4 for right upper extremity, right lower extremity weakness, 1 limb ataxia and dysarthria. On further questioning, patient and wife state that the right arm weakness actually started last night. Patient's wife noticed when patient was trying to get up from the toilet that he was unable to push himself up with his right arm. Last known well 2330 on 2022. Patient taken to imaging for stat CT head which revealed no ICH, midline shift, mass-effect. CTA head and neck deferred due to patient's LC. Decision for no tPA was made due to patient's time since last known well. Patient with relatively low NIH and symptoms which are not consistent with LVO, therefore no thrombectomy/neuro intervention at this time. Patient had stat MRI brain and MRA head and neck without contrast.  MRI showed acute ischemic stroke of left parietal region. Pt admitted to IP rehab on 22.     Restrictions/Precautions:  Restrictions/Precautions: General Precautions, Fall Risk  Position Activity Restriction  Other position/activity restrictions: right side hemiparesis, recent CABG 11/22-minimize arm use ( s/p 5 wks), life vest     SUBJECTIVE: Patient pleasant and cooperative. Agreeable to OT. PAIN: Denies     Vitals: Vitals not assessed per clinical judgement, see nursing flowsheet    COGNITION: Decreased Insight    ADL:   No ADL's completed this session. Andressa Esparza BALANCE:  Sitting Balance:  Modified Independent. Standing Balance: Stand By Assistance. BED MOBILITY:  Not Tested    TRANSFERS:  Sit to Stand:  Stand By Assistance. Recliner, w/c, couch. Stand to Sit: Stand By Assistance. FUNCTIONAL MOBILITY:  Assistive Device: Rolling Walker  Assist Level:  Stand By Assistance. Distance: To and from therapy apartment     ADDITIONAL ACTIVITIES:  Pt completed IADL task of retrieving laundry from dryer and then using B integration to fold linens. Pt stood with SBA x 9 min  no LOB. Good integration of RUE throughout. Patient completed BUE strengthening exercises with skilled education on HEP: completed x15 reps x1 set with a max resistive band in all joints and all planes in order to improve UE strength and activity tolerance required for BADL routine and toilet / shower transfers. Patient tolerated well, requiring min rest breaks. Patient also required min cues for technique. ASSESSMENT:     Activity Tolerance:  Patient tolerance of  treatment: good. Discharge Recommendations: Home with Home Health OT  Equipment Recommendations: Equipment Needed: Yes  Other: OT staff to continue to monitor needs throughout OT POC.   Plan: Times Per Week: 5x/week x 90 minutes and 1x/week x 30 minutes  Current Treatment Recommendations: Strengthening, Balance training, Self-Care / ADL, Equipment evaluation, education, & procurement, Safety education & training, Endurance training, Functional mobility training, Neuromuscular re-education, Patient/Caregiver education & training, Home management training    Patient Education  Patient Education: IADL's and Home Exercise Program    Goals  Short Term Goals  Time Frame for Short Term Goals: 1 week  Short Term Goal 1: Pt will complete functional ambulation to/from BR and HH distances with Supervision and min vcs for safety to increase indep with toileting. Short Term Goal 2: Pt will decrease RUE 9 hole peg test by 10 seconds (49 on 1/3), and RUE  strength by 5 pounds (48.3# on 1/3) to increase indep with fasteners in UB dressing. Short Term Goal 3: Pt will complete dynamic standing task x 5 minutes with 1-2 UE release and Supervision to increase indep with sinkside grooming. Short Term Goal 4: Pt will complete UB dressing with mod I to increase indep within home environment. Short Term Goal 5: Pt will complete LB dresing with S and min vcs for safety to increase indep and endurance within home environment. Additional Goals?: No  Long Term Goals  Time Frame for Long Term Goals : 4 weeks from IPR eval  Long Term Goal 1: Pt will complete BADL routine including shower transfer Mod Indep to increase indep and safety in home environment. Long Term Goal 2: Pt will complete simple IADL task with S and 0 vcs for safety to increase indep and endurance in home environment. Following session, patient left in safe position with all fall risk precautions in place.

## 2023-01-11 NOTE — PLAN OF CARE
Problem: Discharge Planning  Goal: Discharge to home or other facility with appropriate resources  1/11/2023 1718 by WILMER Rose  Note: SHALINI received a message from Facundo Rubi with [facility] regarding patient discharge disposition and date. SHALINI left a message with Facundo Rubi updating her on patient's discharge of 1/13 with home health services. No outstanding needs or concerns at this time.

## 2023-01-12 LAB
ANION GAP SERPL CALCULATED.3IONS-SCNC: 9 MEQ/L (ref 8–16)
BUN BLDV-MCNC: 39 MG/DL (ref 7–22)
CALCIUM SERPL-MCNC: 8.9 MG/DL (ref 8.5–10.5)
CHLORIDE BLD-SCNC: 101 MEQ/L (ref 98–111)
CO2: 27 MEQ/L (ref 23–33)
CREAT SERPL-MCNC: 1.8 MG/DL (ref 0.4–1.2)
GFR SERPL CREATININE-BSD FRML MDRD: 42 ML/MIN/1.73M2
GLUCOSE BLD-MCNC: 110 MG/DL (ref 70–108)
GLUCOSE BLD-MCNC: 173 MG/DL (ref 70–108)
POTASSIUM SERPL-SCNC: 5.1 MEQ/L (ref 3.5–5.2)
SODIUM BLD-SCNC: 137 MEQ/L (ref 135–145)

## 2023-01-12 PROCEDURE — 97116 GAIT TRAINING THERAPY: CPT

## 2023-01-12 PROCEDURE — 1180000000 HC REHAB R&B

## 2023-01-12 PROCEDURE — 97130 THER IVNTJ EA ADDL 15 MIN: CPT | Performed by: SPEECH-LANGUAGE PATHOLOGIST

## 2023-01-12 PROCEDURE — 97530 THERAPEUTIC ACTIVITIES: CPT

## 2023-01-12 PROCEDURE — 97110 THERAPEUTIC EXERCISES: CPT

## 2023-01-12 PROCEDURE — 6370000000 HC RX 637 (ALT 250 FOR IP): Performed by: PHYSICAL MEDICINE & REHABILITATION

## 2023-01-12 PROCEDURE — 97129 THER IVNTJ 1ST 15 MIN: CPT | Performed by: SPEECH-LANGUAGE PATHOLOGIST

## 2023-01-12 PROCEDURE — 97535 SELF CARE MNGMENT TRAINING: CPT

## 2023-01-12 PROCEDURE — 80048 BASIC METABOLIC PNL TOTAL CA: CPT

## 2023-01-12 PROCEDURE — 6370000000 HC RX 637 (ALT 250 FOR IP): Performed by: INTERNAL MEDICINE

## 2023-01-12 PROCEDURE — 99232 SBSQ HOSP IP/OBS MODERATE 35: CPT | Performed by: INTERNAL MEDICINE

## 2023-01-12 PROCEDURE — 99232 SBSQ HOSP IP/OBS MODERATE 35: CPT | Performed by: PHYSICAL MEDICINE & REHABILITATION

## 2023-01-12 PROCEDURE — 36415 COLL VENOUS BLD VENIPUNCTURE: CPT

## 2023-01-12 PROCEDURE — 82948 REAGENT STRIP/BLOOD GLUCOSE: CPT

## 2023-01-12 RX ORDER — GLIPIZIDE 10 MG/1
10 TABLET ORAL
Qty: 60 TABLET | Refills: 3 | Status: SHIPPED | OUTPATIENT
Start: 2023-01-13

## 2023-01-12 RX ORDER — TRAZODONE HYDROCHLORIDE 50 MG/1
50 TABLET ORAL NIGHTLY PRN
Qty: 30 TABLET | Refills: 3 | Status: SHIPPED | OUTPATIENT
Start: 2023-01-12

## 2023-01-12 RX ORDER — SENNA PLUS 8.6 MG/1
2 TABLET ORAL NIGHTLY PRN
Qty: 60 TABLET | Refills: 0 | Status: SHIPPED | OUTPATIENT
Start: 2023-01-12 | End: 2023-02-11

## 2023-01-12 RX ORDER — PSEUDOEPHEDRINE HCL 30 MG
100 TABLET ORAL 2 TIMES DAILY
Qty: 60 CAPSULE | Refills: 3 | Status: SHIPPED | OUTPATIENT
Start: 2023-01-12

## 2023-01-12 RX ORDER — TAMSULOSIN HYDROCHLORIDE 0.4 MG/1
0.8 CAPSULE ORAL DAILY
Qty: 30 CAPSULE | Refills: 3 | Status: SHIPPED | OUTPATIENT
Start: 2023-01-12

## 2023-01-12 RX ORDER — MIDODRINE HYDROCHLORIDE 5 MG/1
5 TABLET ORAL EVERY 8 HOURS
Qty: 90 TABLET | Refills: 3 | Status: SHIPPED | OUTPATIENT
Start: 2023-01-12

## 2023-01-12 RX ORDER — LANOLIN ALCOHOL/MO/W.PET/CERES
6 CREAM (GRAM) TOPICAL NIGHTLY
Qty: 60 TABLET | Refills: 3 | Status: SHIPPED | OUTPATIENT
Start: 2023-01-12

## 2023-01-12 RX ORDER — FERROUS SULFATE 325(65) MG
325 TABLET ORAL
Qty: 30 TABLET | Refills: 1 | Status: SHIPPED | OUTPATIENT
Start: 2023-01-13 | End: 2023-03-14

## 2023-01-12 RX ADMIN — Medication 1 TABLET: at 08:10

## 2023-01-12 RX ADMIN — PANTOPRAZOLE SODIUM 40 MG: 40 TABLET, DELAYED RELEASE ORAL at 06:03

## 2023-01-12 RX ADMIN — Medication 6 MG: at 20:31

## 2023-01-12 RX ADMIN — FERROUS SULFATE TAB 325 MG (65 MG ELEMENTAL FE) 325 MG: 325 (65 FE) TAB at 08:10

## 2023-01-12 RX ADMIN — BUPROPION HYDROCHLORIDE 150 MG: 150 TABLET, FILM COATED, EXTENDED RELEASE ORAL at 08:12

## 2023-01-12 RX ADMIN — APIXABAN 5 MG: 5 TABLET, FILM COATED ORAL at 20:32

## 2023-01-12 RX ADMIN — APIXABAN 5 MG: 5 TABLET, FILM COATED ORAL at 08:10

## 2023-01-12 RX ADMIN — MIDODRINE HYDROCHLORIDE 5 MG: 5 TABLET ORAL at 03:15

## 2023-01-12 RX ADMIN — TAMSULOSIN HYDROCHLORIDE 0.8 MG: 0.4 CAPSULE ORAL at 20:31

## 2023-01-12 RX ADMIN — GLIPIZIDE 10 MG: 10 TABLET ORAL at 08:11

## 2023-01-12 RX ADMIN — ATORVASTATIN CALCIUM 40 MG: 40 TABLET, FILM COATED ORAL at 20:31

## 2023-01-12 ASSESSMENT — PAIN SCALES - GENERAL: PAINLEVEL_OUTOF10: 0

## 2023-01-12 NOTE — PROGRESS NOTES
[address] [facility] THERAPY  [address] [facility]  DAILY NOTE     TIME   SLP Individual Minutes  Time In: 1400  Time Out: 1430  Minutes: 30  Timed Code Treatment Minutes: 23 Minutes  Cognitive tx- 23 minutes  Speech tx- 7 minutes (non-billable)       Date: 2023  Patient Name: Samantha Kwon      CSN: [id number]   : 1960  (63 y.o.)  Gender: male   Referring Physician:  Dr. Murray Sinclair   Diagnosis: Acute stroke due to ischemia, right hemiparesis secondary to stroke   Precautions: aspiration, fall risk   Current Diet: Regular diet with thin liquids   Swallowing Strategies: Standard Universal Swallow Precautions  Date of Last MBS/FEES: Not Applicable    Pain:  No pain reported. Subjective:  Patient seen sitting upright in chair, pleasant and cooperative. No family present. Short-Term Goals:  SHORT TERM GOAL #1:  Goal 1: Patient will complete complex executive functioning tasks (i.e., medication management, complex reasoning/deductive reasoning, etc.) with 90% accuracy and minimal cuing in order to allow for safe return to work (40/hours week). INTERVENTIONS:   *Left complex money management task for patient to complete as HEP through the evening. SHORT TERM GOAL #2:  Goal 2: Patient will complete higher level recall/short term memory and working memory tasks with 80% accuracy provided independent use of compensatory strategies in order to improve overall recall of newly learned theraputic information and anticipation to return to work (Meredith Mejia in [employer]). INTERVENTIONS:   Recall of card game rules from previous session:  4/5 indep, 1/5 with min cues    Recall of speech strategies: 2/3 indep, 1/3 with min cues. Working memory/selective attention for completion of card game (Anmol's in the corner): Overall good success with game play and where to place cards, however demonstrated x4 errors/mis opportunities for play due to reduced selective attention.  With min cues (reminders to re-scan the cards on the play piles), patient was able to ID play and adjust cards. SHORT TERM GOAL #3:  Goal 3: Patient will demonstrate independent use of compensatory strategies (S-speak up, O-open mouth, S-slow down) within strucutred and unstrucutred speech tasks in order to improve overall articulatory precision. INTERVENTIONS: Briefly reviewed speech strategies to optimize articulation. Structure speech drill task (sentence level reading with \"ch\" words): Completed x20 sentences with 100% intelligibility. Distortions noted with \"ch\" production, but appeared to be related to lack of dentition versus dysarthria. Patient with self perceptual rating of 8.5-9/10 (*10 being baseline production) for performance during this task. Discussed need to continue completion of HEP to assist with optimizing performance and habituating use of strategies within conversational speech. Long-Term Goals:  Time Frame for Long Term Goals: 2 weeks from time of evaluation    LONG TERM GOAL #1:  Goal 1: Patient will improve overall cognitive function to return to independent living with wife and careing for children (16 year old). LONG TERM GOAL #2:  Goal 2: Patient will improve overall speech intelligablity and clearity of speech production within informal conversation in order to return to baseline speech to improve successful ability to return to work, speaking with co-works and medical staff and family/friends.        Comprehension: 6 - Complex ideas 90% or device (hearing aid or glasses- if patient is primarily a visual learner)  Expression: 6 - Device used to express complex ideas/needs  Social Interaction: 7 - Patient has appropriate behavior/relations 100% of the time  Problem Solvin - Independent with device (e.g. notes, schedules)  Memory: 6 - Patient requires device to recall (e.g. memory book)    EDUCATION:  Learner: Patient  Education:  Reviewed ST goals and Plan of Care and Reviewed recommendations for follow-up  Evaluation of Education: Verbalizes understanding, Demonstrates with assistance, and Family not present    ASSESSMENT/PLAN:  Activity Tolerance:  Patient tolerance of  treatment: good. Assessment/Plan: Patient progressing toward established goals. Continues to require skilled care of licensed speech pathologist to progress toward achievement of established goals and plan of care. .     Plan for Next Session: review HEP, Discharge instrunctions  Discharge Recommendations: Home with Home Exercise Program       Marjorie Prasad [address], [address]

## 2023-01-12 NOTE — PROGRESS NOTES
[facility]  INPATIENT PHYSICAL THERAPY  Daily Note  [address] [facility] - 7E-53/053-A    Time In: 1330  Time Out: 1400  Timed Code Treatment Minutes: 30 Minutes  Minutes: 30          Date: 2023  Patient Name: Geena Torres,  Gender:  male        MRN: [id number]  : 1960  (63 y.o.)     Referring Practitioner: Dr. Bao Humphries  Diagnosis: Acute stroke due to ischemia  Additional Pertinent Hx: Pt admitted through ED due to RUE weakness and slurred speech,  Also noted to have sepsis, LC, emphysematous cystitis. Hx:  HTN, Db, CAD, recent CABG (). MRI + for acute ischemic stroke Lt parietal region     Prior Level of Function:  Lives With: Spouse, Family (16 y.o son, 21 y.o step dtr.)  Type of Home: House  Home Layout: One level  Home Access: Stairs to enter with rails  Entrance Stairs - Number of Steps: 2 + threshold  Entrance Stairs - Rails: Both  Home Equipment:  (None used PTA)   Bathroom Shower/Tub: Tub/Shower unit  Bathroom Toilet: Standard  Bathroom Equipment: Tub transfer bench  Bathroom Accessibility: Accessible    ADL Assistance: Independent  Homemaking Assistance: Independent  Homemaking Responsibilities: Yes  Ambulation Assistance: Independent  Transfer Assistance: Independent  Active : No    Restrictions/Precautions:  Restrictions/Precautions: General Precautions, Fall Risk  Position Activity Restriction  Other position/activity restrictions: right side hemiparesis, recent CABG -minimize arm use ( s/p 5 wks), life vest     SUBJECTIVE: Pt. Seated on his BS chair and pleasantly agrees to therapy session.      PAIN: None indicated    Vitals:   Patient Vitals for the past 8 hrs:   BP Patient Position Temp Temp src Pulse Resp   23 0800 (!) 110/58 Sitting 97.9 °F (36.6 °C) Oral 95 16        OBJECTIVE:  Bed Mobility:  Not Tested    Transfers:  Sit to Stand: Modified Independent  Stand to Sit:Modified Independent    Ambulation:  Supervision, Stand By Assistance  Distance: 150' x 2  Surface: Level Tile  Device: Rolling Walker  Gait Deviations:  Slow Jada, Decreased Step Length Bilaterally, Decreased Weight Shift Left, Decreased Gait Speed, and Decreased Quad Control Right at times. Stairs:  None    Balance:  Pt. Completed standing dynamic balance activity: tapping balloon back and forth with therapist with Normal RODNEY, Modified Tandem Stance on level tile and Single UE support and No UE support on Rolling Walker with Supervision, Stand By Assistance. Activity completed to improve balance, enhance functional mobility, and reduce risk of falls. Pt. Completed standing dynamic balance activity: rolling ball fwd/retro under L LE with  Modified SLS  on level tile and Single UE support on booyah stick with Contact Guard Assistance, Minimal Assistance. Activity completed to improve balance, enhance functional mobility, and reduce risk of falls. Neuromuscular Re-education  None    Exercise:  None    Functional Outcome Measures:   Not completed    ASSESSMENT:  Assessment: Patient progressing toward established goals. Activity Tolerance:  Patient tolerance of  treatment: good.      Equipment Recommendations:Equipment Needed: Yes  Other: RW  Discharge Recommendations: Continue to assess pending progress, Patient would benefit from continued therapy after discharge     Plan: Current Treatment Recommendations: Strengthening, ROM, Balance training, Functional mobility training, Transfer training, Endurance training, Neuromuscular re-education, Gait training, Stair training, Safety education & training, Equipment evaluation, education, & procurement, Home exercise program, Therapeutic activities, Patient/Caregiver education & training  General Plan:  (5x/ wk 90 min, 1x/ wk 30 min)    Patient Education  Patient Education: Plan of Care, Functional Mobility, Reviewed Prior Education, Health Promotion and Wellness Education, Safety    Goals:  Patient Goals : get back to my normal way of doing things  Short Term Goals  Time Frame for Short Term Goals: 1 wk  Short Term Goal 1: Pt to go supine <->sit, minimal use of UEs, SBA to get in/out of bed. GOAL MET, SEE LTG  Short Term Goal 2: Pt to get up/down from various seated surfaces, SBA, to get up to walk. GOAL MET, SEE LTG  Short Term Goal 3: Pt to walk with RW >= 50 ft, demonstrating step through gait pattern, recurvatum < 10% of steps on RT, CGA to progress to home mobility  Short Term Goal 4: Pt to ascend/descend 2 steps with gerber rails, CGA to progress to home entry. GOAL MET, SEE LTG  Short Term Goal 5: Pt to get in/out of car, SBA for transportation needs. GOAL MET, SEE LTG  Additional Goals?: Yes  Short Term Goal 6: Pt to perform Tinetti >= 18/28, demonstrating improved balance. GOAL MET, SEE LTG  Long Term Goals  Time Frame for Long Term Goals : 3 wks from evaluation  Long Term Goal 1: Pt to go supine <->sit, minimal use of UEs, Mod I, to get in/out of bed. GOAL MET  Long Term Goal 2: Pt to get up/down from various seated surfaces, Mod I, to get up to walk. Long Term Goal 3: Pt to walk with RW >= 50 ft, demonstrating step through gait pattern, recurvatum < 10% of steps on RT, Mod I, for home mobility  Long Term Goal 4: Pt to perform Tinetti >= 24/28, demonstrating improved balance. Long Term Goal 5: Patient will ascend/descend 2 steps with bilateral hand rails with modified independence for safe home entry. Additional Goals?: Yes  Long term goal 6: Patient will complete car transfer with modified independence with safe technique to transfer in and out of vehicle safely. Following session, patient left in safe position with all fall risk precautions in place.

## 2023-01-12 NOTE — PROCEDURES
Discharge pain assessment:    Complete within 3 days of discharge. Pain Effect on Sleep ()   Ask patient: Quique Chakraborty the past 5 days, how much of the time has pain made it hard for you to sleep at night?\" 1.  Rarely or not at all     If no pain is reported, end interview. If pain is reported, continue with the additional questions. Pain Interference with Therapy Activities   Ask patient: Quique Chakraborty the past 5 days, how often have you limited your participation in rehabilitation therapy sessions due to pain?\" 0.  Does not apply - I have not received rehabilitation therapy in the past 5 days   Pain Interference with Day to Day Activities   Ask patient: Quique Chakraborty the past 5 days, how often have you limited your day to day activities (excluding rehabilitation therapy sessions) because of pain?\" 1.  Rarely or not at all

## 2023-01-12 NOTE — PROGRESS NOTES
[facility]   Nephrology progress note  1/12/2023, 9:58 AM      Pt Name:    Maria Guadalupe Rush  MRN:     [id number]     YOB: 1960  Admit Date:    12/23/2022 11:24 AM    Chief Complaint: Nephrology following for LC/CKD.     Subjective:  Patient seen and examined  No chest pain or shortness of breath  No other complaints  Blood pressure seems to be doing better    Objective:  24HR INTAKE/OUTPUT:    Intake/Output Summary (Last 24 hours) at 1/12/2023 0958  Last data filed at 1/12/2023 0956  Gross per 24 hour   Intake 1565 ml   Output 2450 ml   Net -885 ml        Admission weight: 134 lb 8 oz (61 kg)  Wt Readings from Last 3 Encounters:   01/12/23 133 lb 9.6 oz (60.6 kg)   12/23/22 130 lb 15.3 oz (59.4 kg)   12/13/22 142 lb 9.6 oz (64.7 kg)        Vitals :   Vitals:    01/11/23 2100 01/12/23 0315 01/12/23 0317 01/12/23 0800   BP:  115/61  (!) 110/58   Pulse:    95   Resp:    16   Temp:    97.9 °F (36.6 °C)   TempSrc:    Oral   SpO2:       Weight: 133 lb 6.4 oz (60.5 kg)  133 lb 9.6 oz (60.6 kg)    Height:           Physical examination  General Appearance: Cachectic and malnourished  Mouth/Throat:  Oral mucosa moist  Neck:  Supple, no JVD  Lungs:  Breath sounds: clear  Heart[de-identified]  S1,S2 heard  Abdomen:  Soft, non - tender  Musculoskeletal:  Edema -no edema noted    Medications:  Infusion:    dextrose       Meds:    midodrine  5 mg Oral Q8H    ferrous sulfate  325 mg Oral Daily with breakfast    melatonin  6 mg Oral Nightly    atorvastatin  40 mg Oral Daily    buPROPion  150 mg Oral QAM    pantoprazole  40 mg Oral QAM AC    tamsulosin  0.8 mg Oral Daily    apixaban  5 mg Oral BID    [Held by provider] metoprolol succinate  12.5 mg Oral Daily    glipiZIDE  10 mg Oral QAM AC    [Held by provider] empagliflozin  25 mg Oral Daily    docusate sodium  100 mg Oral BID    multivitamin  1 tablet Oral Daily with breakfast    senna  2 tablet Oral Nightly       Lab Data :  CBC:   No results for input(s): WBC, HGB, HCT, PLT in the last 72 hours. CMP:  Recent Labs     01/10/23  0832 01/11/23  0559    139   K 5.2 5.2    104   CO2 25 27   BUN 39* 36*   CREATININE 1.7* 1.6*   GLUCOSE 208* 117*   CALCIUM 9.3 9.2       Hepatic: No results for input(s): LABALBU, AST, ALT, ALB, BILITOT, ALKPHOS in the last 72 hours. Assessment and Plan:  Renal -acute kidney injury on chronic kidney disease with baseline creatinine around 1.5-1.7  Overall creatinine stable close to baseline no new labs available today  We will check a BMP today and also tomorrow . Electrolytes -within normal limits   Acid-base status appears to be stable  Essential hypertension has been running lower continue midodrine for now  Hx of diabetes mellitus  Hx of sepsis due to UTI from Klebsiella pneumonia status post antibiotics  CAD status post CABG 11/22  Polyuria -much improved with fluid restriction  Meds reviewed and discussed with patient and nursing staff      Tracey Coy MD  [facility]    This report has been created using voice recognition software.  It may contain minor errors which are inherent in voice recognition technology

## 2023-01-12 NOTE — PROGRESS NOTES
[address]  Occupational Therapy  Daily Note  Time:   Time In: 0830  Time Out: 0930  Timed Code Treatment Minutes: 60 Minutes  Minutes: 60          Date: 2023  Patient Name: Manisha Chester,   Gender: male      Room: [facility]/053-A  MRN: [id number]  : 1960  (63 y.o.)  Referring Practitioner: Ordering & Attending: Marley Merida MD  Diagnosis: Acute Stroke due to Ischemia  Additional Pertinent Hx: Manisha Chester who is a 63 y.o. male with a history of hypertension, diabetes, CAD on  daily, recent CABG in 2022, ischemic cardiomyopathy is admitted to [facility] for sepsis, emphysematous cystitis and acute kidney injury on CKD. During admission exam patient stated that his right arm feldman has been weak and that he has had some slurred speech. Stroke alert was called for right-sided weakness and dysarthria on 12/15. On arrival patient's NIH was 4 for right upper extremity, right lower extremity weakness, 1 limb ataxia and dysarthria. On further questioning, patient and wife state that the right arm weakness actually started last night. Patient's wife noticed when patient was trying to get up from the toilet that he was unable to push himself up with his right arm. Last known well 2330 on 2022. Patient taken to imaging for stat CT head which revealed no ICH, midline shift, mass-effect. CTA head and neck deferred due to patient's LC. Decision for no tPA was made due to patient's time since last known well. Patient with relatively low NIH and symptoms which are not consistent with LVO, therefore no thrombectomy/neuro intervention at this time. Patient had stat MRI brain and MRA head and neck without contrast.  MRI showed acute ischemic stroke of left parietal region. Pt admitted to IP rehab on 22.     Restrictions/Precautions:  Restrictions/Precautions: General Precautions, Fall Risk  Position Activity Restriction  Other position/activity restrictions: right side hemiparesis, recent CABG 11/22-minimize arm use ( s/p 5 wks), life vest     SUBJECTIVE: Brett seated in bedside chair finishing breakfast upon OT arrival and agreeable to OT session. Pt. Report he has not had any other episodes of dizziness. PAIN: Pt denies pain    Vitals: Vitals not assessed per clinical judgement, see nursing flowsheet    COGNITION: Decreased Safety Awareness and Impulsive    ADL:     EATING:Independent. Lashonda Knutson CARE Score: 6. ORAL HYGIENE:Independent. No verbal cues required, pt completed standing sinkside. CARE Score: 6. TOILETING HYGIENE:Independent. to complete clothing management and sonia care s/p BM. CARE Score: 6. SHOWERING/BATHING:Setup or clean-up assistance. Pt. completed sponge bath this date per his request due to completing shower on previsous date. Lashonda Knutson CARE Score: 5.     UPPER BODY DRESSING:Independent. As pt able to retrieve items from dresser, don/doff with no cues for pull over shirt and life vest.. CARE Score: 6. LOWER BODY DRESSING:Independent. Pt. able to retrieve items from dresser, pt able to thread cath with no difficulty, thread BLE no cues and don a hips with Mod I. Pt. able to doff LB clothing with Mod I.. CARE Score: 6. FOOTWEAR:Independent  Mod I withi increasd time. CARE Score: 6. TOILET TRANSFER: Independent. to/from STS, no cues required. Lashonda Knutson CARE Score: 6. BALANCE:  Sitting Balance:  Independent. Standing Balance: Modified Independent. BED MOBILITY:  Not Tested    TRANSFERS:  Sit to Stand:  Modified Independent. Stand to Sit: Modified Independent. No cues required during functional transfers. FUNCTIONAL MOBILITY:  Assistive Device: Rolling Walker  Assist Level:  Modified Independent. Distance: To and from bathroom      Supervision within room. ADDITIONAL ACTIVITIES:  BIMs assessement and CAM completed with pt this date.      In order to improve BUE strength for ease with self care and IADL routines,  pt instructed to use hard resistive theraband x20 reps utilizing he teach back method, po demo good carryover. Theraband HEP provided to pt. ASSESSMENT:     Activity Tolerance:  Patient tolerance of  treatment: fair. Discharge Recommendations: Home with Home Health OT and Patient would benefit from continued OT at discharge  Equipment Recommendations: Equipment Needed: Yes  Other: OT staff to continue to monitor needs throughout OT POC. Plan: Times Per Week: 5x/week x 90 minutes and 1x/week x 30 minutes  Current Treatment Recommendations: Strengthening, Balance training, Self-Care / ADL, Equipment evaluation, education, & procurement, Safety education & training, Endurance training, Functional mobility training, Neuromuscular re-education, Patient/Caregiver education & training, Home management training    Patient Education  Patient Education: ADL's    Goals  Short Term Goals  Time Frame for Short Term Goals: 1 week  Short Term Goal 1: Pt will complete functional ambulation to/from BR and HH distances with Supervision and min vcs for safety to increase indep with toileting. Short Term Goal 2: Pt will decrease RUE 9 hole peg test by 10 seconds (49 on 1/3), and RUE  strength by 5 pounds (48.3# on 1/3) to increase indep with fasteners in UB dressing. Short Term Goal 3: Pt will complete dynamic standing task x 5 minutes with 1-2 UE release and Supervision to increase indep with sinkside grooming. Short Term Goal 4: Pt will complete UB dressing with mod I to increase indep within home environment. Short Term Goal 5: Pt will complete LB dresing with S and min vcs for safety to increase indep and endurance within home environment.   Additional Goals?: No  Long Term Goals  Time Frame for Long Term Goals : 4 weeks from IPR eval  Long Term Goal 1: Pt will complete BADL routine including shower transfer Mod Indep to increase indep and safety in home environment. Long Term Goal 2: Pt will complete simple IADL task with S and 0 vcs for safety to increase indep and endurance in home environment. Following session, patient left in safe position with all fall risk precautions in place.

## 2023-01-12 NOTE — PROGRESS NOTES
Physical Medicine & Rehabilitation Progress Note    Chief Complaint:  Right-sided weakness due to stroke    Subjective:    Ryanne Montalvo is a 63 y.o.  left-handed  male with history of hypertension, diabetes mellitus type 2, coronary artery disease with ischemic cardiomyopathy requiring two-vessel CABG, urinary retention with several failed void trial, status post tonsillectomy, was admitted to the inpatient rehabilitation unit on 12/23/2022 for intensive inpatient management of impairment & disability secondary to right hemiparesis due to acute left parietal corona radiata and right cerebellar ischemic stroke, and debility/physical deconditioning due to Klebsiella pneumonia urinary tract infection with emphysematous cystitis and bilateral hydronephrosis complicated by Klebsiella pneumonia bacteremia/sepsis. The patient was previously hospitalized at [facility] from 10/28/2022 to 11/15/2022 for coronary artery disease and ischemic cardiomyopathy requiring two-vessel CABG on 11/7/2022. His postoperative course was complicated by urinary retention with failed void trial.  The patient has been experiencing progressive weakness after he was discharged to home. The Bella was later removed on 12/13/2022 but the patient continued having difficulty voiding. On 12/14/2022 approximately 11:30 PM his wife noticed the patient had slight slurred speech with right arm weakness. On 12/15/2022 the patient suddenly felt dizzy and lightheaded while he was trying to get up from sitting on toilet and fell into the ground. He says he did not lose consciousness and did not hit his head. His wife called 46. He was transported to [address] [place] [address] ER for evaluation by EMS. He complains of neck pain and upper back pain. He was found to be tachycardic. His WBC was found to be 22.3. Bella was placed in ER and a rush of air followed by dark brown and bright red color urine came out.   He was put on IV meropenem and admitted. Urology was consulted. CT of cervical spine revealed only multilevel facet and uncovertebral joint arthropathy. CT of head done on 12/15/2020 revealed no acute intracranial abnormality. CT of the chest, abdomen and pelvis done on 12/15/2022 revealed emphysematous cystitis, bilateral hydronephrosis, and constipation. Because of right arm weakness, stroke code was called on 12/15/2022. MRI of brain done on 12/15/2022 revealed acute ischemic stroke at the left parietal region corona radiata, and possible small additional acute ischemic stroke within right cerebellum. Neurology service start patient on aspirin and Plavix combination for the stroke. MRA of the neck and head done on 12/15/2022 showed only mild bilateral carotid bulb disease. Transesophageal echocardiogram was done on 12/16/2022 showing severe global hypokinesis of left ventricle with estimated ejection fraction of 30%, and no thrombus identified. 2 blood culture and urine culture done on 12/15/2022 revealed Klebsiella pneumonia. Infectious disease had been consulted. Oral Cipro was started on 12/22/2022 after IV meropenem was completed. Patient's hospital course was also complicated by constipation which resolved this morning. The patient says he feels well. He had a good sleep last night. Nephrology Dr. Mckayla Shahid started patient on midodrine 5 mg 3 times daily for orthostatic hypotension. The patient denies experiencing any further episodes of dizziness or lightheadedness. He continues having very mild right side weakness with slight impaired motor control. His Bella catheter still is in place. The current Bella catheter was placed on 12/15/2022. The patient is scheduled to be discharged tomorrow, 1/13/2023. Rehabilitation:  PT: Reviewed.   Transfers:  Sit to Stand: Stand By Assistance, X 1  Stand to Sit:Stand By Assistance, X 1  Car:Stand By Assistance, X 1     Ambulation:  Contact Guard Assistance, Minimal Assistance  Distance: 72' x 1, 120' X 1, Multiple short distances. Surface: Level Tile  Device: Rolling Walker  Gait Deviations: Forward Flexed Posture, Slow Jada, Decreased Step Length Bilaterally, Decreased Gait Speed, Increased reliance on walker, and Genu recurvatum ~25% of time on R LE. Occasional buckling of R knee noted as well. Stand By Assistance, [employer], X 1  Distance: 60 ft  Surface: Level Tile  Device:Rolling Walker  Gait Deviations: Forward Flexed Posture, Decreased Step Length Bilaterally, Decreased Heel Strike Bilaterally, and demos improved R recurvatum     Balance:  Pt. Completed standing dynamic balance activity: Ring toss with Narrow RODNEY on Airex balance pad and Single UE support on Rolling Walker with Contact Guard Assistance. Activity completed to improve balance, enhance functional mobility, and reduce risk of falls. Dynamic gait: SBA                     -weaving obstacles with RW            -toe taps on cones with RW  **Cues required for sequencing, positioning, and desired technique    Neuromuscular Re-education  Pt. Completed dynamic gait activities using Bilateral UE support on Rolling Walker to improve proprioception, gait mechanics, and Environmental awareness for improved functional mobility. Pt. Ambulating up/down incline with cues for quad control. Pt. Also completed modified SLS activities with L foot placed on ball for R quad facilitation and strengthening. OT: Reviewed. ADL:   Grooming: with set-up. Washing face in shower. Bathing: Stand By Assistance. Close SBA while standing   Upper Extremity Dressing: with set-up. Donning t-shirt. Lower Extremity Dressing: Contact Guard Assistance. Posterior lean during clothing mgmt   Footwear Management: with set-up. Socks   Tub Transfer: Stand By Assistance. Stepping over tub . BALANCE:  Sitting Balance:  Modified Independent. Standing Balance: Stand By Assistance. TRANSFERS:  Sit to Stand:  Stand By Assistance. Recliner, w/c, couch. Stand to Sit: Stand By Assistance. FUNCTIONAL MOBILITY:  Assistive Device: Rolling Walker  Assist Level:  Contact Guard Assistance. Distance: To and from shower room     Assistive Device: Rolling Walker  Assist Level:  Stand By Assistance. Distance: To and from therapy apartment      ADDITIONAL ACTIVITIES:  Pt completed IADL task of retrieving laundry from dryer and then using B integration to fold linens. Pt stood with SBA x 9 min  no LOB. Good integration of RUE throughout. Patient completed BUE strengthening exercises with skilled education on HEP: completed x15 reps x1 set with a max resistive band in all joints and all planes in order to improve UE strength and activity tolerance required for BADL routine and toilet / shower transfers. Patient tolerated well, requiring min rest breaks. Patient also required min cues for technique. ST: Reviewed. Complex money management task [employer]): 4/5 indep, 1/5 with min cues to review and correct. *Excellent success with higher level selective attention, making comparisons, math reasoning and math computation. [place] the [place] :  Instructed patient on novel card game including game set up, rules and object. Overall excellent success with working memory and reasoning for logic behind game. Min cues needed x2 for selective attention to ID plays within the card play piles. Plan to assess recall of game set up, rules and play next session. Recall of appointment information (3 appointments= total of 15 units of information) from previous session:  Patient able to accurately recall dates of \"23, 24 & 25th\", stating \"the 23rd was f/u for  CHF, 24th urology appointment,and  25 th heart follow up\", all accurate. Patient also able to recall that, \"all appointments were between 8:00-9:00 am, but unable to provide specific times. \"  Staff had thrown away his written notes from his clip board, so he was not able to confirm the times of his appointments, but appropriately identified that this is the exact reason why he is sure to write all his appointment information down. Provided patient with written HEP outlining suggestions for home program including specific exercises, repetitions, strategies to focus on to optimize success and frequency of completion. Reviewed program at length with patient expressing understanding,      Review of Systems:  Review of Systems   Constitutional:  Negative for chills, diaphoresis, fatigue and fever. HENT:  Negative for hearing loss, rhinorrhea, sneezing, sore throat and trouble swallowing. Eyes:  Negative for visual disturbance. Respiratory:  Negative for cough, shortness of breath and wheezing. Cardiovascular:  Negative for chest pain and palpitations. Gastrointestinal:  Negative for abdominal pain, constipation, diarrhea, nausea and vomiting. Genitourinary:  Positive for difficulty urinating. Negative for dysuria. Musculoskeletal:  Positive for gait problem. Negative for arthralgias, myalgias and neck pain. Skin:  Negative for rash. Neurological:  Positive for weakness (Right upper and right lower extremities). Negative for dizziness, tremors, facial asymmetry, speech difficulty, light-headedness, numbness and headaches. Psychiatric/Behavioral:  Negative for dysphoric mood, hallucinations and sleep disturbance. The patient is not nervous/anxious.          Objective:  /61   Pulse 78   Temp 98.1 °F (36.7 °C) (Oral)   Resp 18   Ht 5' 10\" (1.778 m)   Wt 133 lb 9.6 oz (60.6 kg)   SpO2 98%   BMI 19.17 kg/m²   Physical Exam   General:  well-developed, well nourished  male; in no acute distress ; appropriate affect & mood; sitting on reclining chair comfortably  Eyes: pupil equally round ; extra-ocular motion intact bilaterally  Head, Ear, Nose, Mouth & Throat : normocephalic ; no discharge from ears or nose ; no deformity ; no facial swelling ; oral mucosa pink   Neck :  supple ; no tenderness ; no muscle spasm  Cardiovascular : Presence of healed vertical surgical scar at sternum ; regular rate & rhythm ; normal S1 & S2 heart sound ; no murmur ; normal peripheral pulse at the bilateral upper extremities; reduced pulse strength at the bilateral lower extremities  Pulmonary : Present breath sounds at the bilateral lung fields; no wheezing ; no rale; no crackle  Gastrointestinal : soft, slightly protruded abdomen without tenderness ; normal bowel sound present    : Bella catheter in place draining light yellowish urine  Back : no tenderness; no muscle spasm  Skin: no skin lesion or rash ; no pitting edema at all 4 extremities  Musculoskeletal : no limb asymmetry; no limb deformity; no tenderness at bilateral upper & lower extremities; no palpable mass at limbs ; no joints laxity or crepitation ; shoulder flexion and abduction passive ROM reaching 170 degrees bilaterally; hip flexion passive ROM reaching 130 degrees bilaterally; normal functional joints ROM at bilateral upper & lower extremities  Cerebral :  alert ; awake ; oriented to place, person and time; follow two-step verbal command  Cerebellum : mild dysmetria with the right finger-to-nose test; no dysmetria with left finger-to-nose test  Cranial Nerves : grossly intact CN II to XI function  Sensory : intact light touch and pin prick sensation at bilateral upper & lower extremities  Motor : normal muscle tone at bilateral upper & lower extremities ; 4+/5 to 5/5 muscle strength at the right elbow flexion and extension; 4+/5 to 5/5 muscle strength at right finger extension and handgrip; 4+/5 muscle strength at right finger abduction; normal 5/5 muscle strength at the rest of bilateral upper & lower extremities  Reflex : 3+ right biceps, right brachioradialis and right knee reflexes; 2+ left knee reflexes; 1+ left biceps, left brachioradialis reflexes  Pathological Reflex :  No Ion's sign ; no ankle clonus  Gait : Not assessed      Diagnostics:   Recent Results (from the past 24 hour(s))   POCT Glucose    Collection Time: 01/12/23  7:48 AM   Result Value Ref Range    POC Glucose 110 (H) 70 - 108 mg/dl       Impression:  Acute left parietal corona radiata and right cerebellar ischemic stroke causing right hemiparesis with impaired coordination, and dysarthria  Debility/physical decondition due to Klebsiella pneumonia urinary tract infection with emphysematous cystitis and bilateral hydronephrosis complicated by Klebsiella pneumonia bacteremia/sepsis  Urinary retention  Klebsiella pneumonia urinary tract infection with emphysematous cystitis and bilateral hydronephrosis  Klebsiella pneumoniae bacteremia/sepsis  Acute kidney injury possibly due to dehydration with orthostatic hypotension  Hypotension probably due to side effect from high-dose Flomax in combination with Toprol XL  Coronary artery disease with ischemic cardiomyopathy requiring two-vessel coronary artery bypass graft surgery on 11/7/2022  Hypertension  Intermittent orthostatic hypotension  Diabetes mellitus type 2    The patient's vital signs is stable. His BP is better after and midodrine started with no further episodes of hypotension associated with dizziness/lightheadedness. He still has very mild weakness at the right side especially right upper extremity with slight impaired coordination. His Bella need to be replaced either today or tomorrow before he is discharged to home tomorrow. He is tolerating the intensive inpatient rehabilitation treatment so far. His function continue to improve slowly. The patient is scheduled to be discharged tomorrow, 1/13/2023. Plan:  Continues intensive PT/OT/SLP/RT inpatient rehabilitation program at least 3 hours per day, 5 days per week in order to improve functional status prior to discharge.   Family education and training will be completed. Equipment evaluations and recommendations will be completed as appropriate. Rehabilitation nursing continues to be involved for bowel, bladder, skin, and pain management. Nursing will also provide education and training to patient and family. Prophylaxis:  DVT: Patient on Eliquis, JONAH stocking, intermittent pneumatic compression device. GI: Colace, Senokot, GlycoLax as needed, milk of magnesia as needed, Dulcolax suppository as needed.   Pain: Tylenol as needed  Continue Lipitor for CVA and CAD  Continue midodrine for symptomatic orthostatic hypotension  Continues holding Toprol-XL for hypertension  Continue Flomax for urinary retention  Continue Protonix for gastric protection  Continue Jardiance (on hold by renal service), Glucotrol, Humalog insulin coverage for diabetes mellitus  Continue Wellbutrin XL for smoking cessation  Continue melatonin, trazodone as needed for insomnia  Continue multivitamin supplement  Continue 2000 mL/day fluid restriction as per nephrology service  Replace the Bella catheter before the patient is discharged  Nutrition: Continue current diet   Bladder: Monitoring signs or symptoms of UTI  Bowel: Monitoring signs or symptoms of constipation   and case management for coordination of care and discharge planning      Missed Therapy Time:  None      Anisha Austin MD

## 2023-01-12 NOTE — PROGRESS NOTES
[address]  Occupational Therapy  Daily Note  Time:   Time In: 0091  Time Out: 1503  Timed Code Treatment Minutes: 30 Minutes  Minutes: 30          Date: 2023  Patient Name: Dennis Velasquez,   Gender: male      Room: [facility]/053-A  MRN: [id number]  : 1960  (63 y.o.)  Referring Practitioner: Ordering & Attending: Slade Dickson MD  Diagnosis: Acute Stroke due to Ischemia  Additional Pertinent Hx: Dennis Velasquez who is a 63 y.o. male with a history of hypertension, diabetes, CAD on  daily, recent CABG in 2022, ischemic cardiomyopathy is admitted to [facility] for sepsis, emphysematous cystitis and acute kidney injury on CKD. During admission exam patient stated that his right arm feldman has been weak and that he has had some slurred speech. Stroke alert was called for right-sided weakness and dysarthria on 12/15. On arrival patient's NIH was 4 for right upper extremity, right lower extremity weakness, 1 limb ataxia and dysarthria. On further questioning, patient and wife state that the right arm weakness actually started last night. Patient's wife noticed when patient was trying to get up from the toilet that he was unable to push himself up with his right arm. Last known well 2330 on 2022. Patient taken to imaging for stat CT head which revealed no ICH, midline shift, mass-effect. CTA head and neck deferred due to patient's LC. Decision for no tPA was made due to patient's time since last known well. Patient with relatively low NIH and symptoms which are not consistent with LVO, therefore no thrombectomy/neuro intervention at this time. Patient had stat MRI brain and MRA head and neck without contrast.  MRI showed acute ischemic stroke of left parietal region. Pt admitted to IP rehab on 22.     Restrictions/Precautions:  Restrictions/Precautions: General Precautions, Fall Risk  Position Activity Restriction  Other position/activity restrictions: right side hemiparesis, recent CABG 11/22-minimize arm use ( s/p 5 wks), life vest     SUBJECTIVE: Pt. Pleasant and cooperative thoroughout session and motivated to engage. PAIN: Pt. denies    Vitals: Vitals not assessed per clinical judgement, see nursing flowsheet    COGNITION: Decreased Safety Awareness    ADL:   No ADL's completed this session. Sammy Patella BALANCE:  Sitting Balance:  Modified Independent. Standing Balance: Modified Independent, Supervision. Varied on task. BED MOBILITY:  Sit to supine: Mod I    TRANSFERS:  Sit to Stand:  Modified Independent. Stand to Sit: Modified Independent. No cues required, completed several trials. FUNCTIONAL MOBILITY:  Assistive Device: Rolling Walker  Assist Level:  Modified Independent. Within room and within apartment,  supervision in hallway  Distance: To and from therapy apartment     ADDITIONAL ACTIVITIES:  Pt. Engaged in IADL task of taking various dishes/utensils from sinkside to bin 1 at a time to improve safety/performance within the kitchen, pt able to complete with overall Supervision, no LOB during. Pt. Engaged in further dynamic standing task with 1-2 hand release to grasp magnets from schedule board and move to the side pt demo no LOB during task. ASSESSMENT:     Activity Tolerance:  Patient tolerance of  treatment: good. Discharge Recommendations: Home with Home Health OT and Patient would benefit from continued OT at discharge  Equipment Recommendations: Equipment Needed: Yes  Other: OT staff to continue to monitor needs throughout OT POC.   Plan: Times Per Week: 5x/week x 90 minutes and 1x/week x 30 minutes  Current Treatment Recommendations: Strengthening, Balance training, Self-Care / ADL, Equipment evaluation, education, & procurement, Safety education & training, Endurance training, Functional mobility training, Neuromuscular re-education, Patient/Caregiver education & training, Home management training    Patient Education  Patient Education: IADL's and Assistive Device Safety    Goals  Short Term Goals  Time Frame for Short Term Goals: 1 week  Short Term Goal 1: Pt will complete functional ambulation to/from BR and HH distances with Supervision and min vcs for safety to increase indep with toileting. Short Term Goal 2: Pt will decrease RUE 9 hole peg test by 10 seconds (49 on 1/3), and RUE  strength by 5 pounds (48.3# on 1/3) to increase indep with fasteners in UB dressing. Short Term Goal 3: Pt will complete dynamic standing task x 5 minutes with 1-2 UE release and Supervision to increase indep with sinkside grooming. Short Term Goal 4: Pt will complete UB dressing with mod I to increase indep within home environment. Short Term Goal 5: Pt will complete LB dresing with S and min vcs for safety to increase indep and endurance within home environment. Additional Goals?: No  Long Term Goals  Time Frame for Long Term Goals : 4 weeks from IPR eval  Long Term Goal 1: Pt will complete BADL routine including shower transfer Mod Indep to increase indep and safety in home environment. Long Term Goal 2: Pt will complete simple IADL task with S and 0 vcs for safety to increase indep and endurance in home environment.     Following session, patient left in safe position with all fall risk precautions in place.    '

## 2023-01-12 NOTE — PROGRESS NOTES
[facility]  INPATIENT PHYSICAL THERAPY  Daily Note  [facility] - 7E-53/053-A    Time In: 1000  Time Out: 1100  Timed Code Treatment Minutes: 60 Minutes  Minutes: 60          Date: 2023  Patient Name: Natalie Saenz,  Gender:  male        MRN: [id number]  : 1960  (63 y.o.)     Referring Practitioner: Dr. Chelle Mo  Diagnosis: Acute stroke due to ischemia  Additional Pertinent Hx: Pt admitted through ED due to RUE weakness and slurred speech,  Also noted to have sepsis, LC, emphysematous cystitis. Hx:  HTN, Db, CAD, recent CABG (). MRI + for acute ischemic stroke Lt parietal region     Prior Level of Function:  Lives With: Spouse, Family (16 y.o son, 21 y.o step dtr.)  Type of Home: House  Home Layout: One level  Home Access: Stairs to enter with rails  Entrance Stairs - Number of Steps: 2 + threshold  Entrance Stairs - Rails: Both  Home Equipment:  (None used PTA)   Bathroom Shower/Tub: Tub/Shower unit  Bathroom Toilet: Standard  Bathroom Equipment: Tub transfer bench  Bathroom Accessibility: Accessible    ADL Assistance: Independent  Homemaking Assistance: Independent  Homemaking Responsibilities: Yes  Ambulation Assistance: Independent  Transfer Assistance: Independent  Active : No    Restrictions/Precautions:  Restrictions/Precautions: General Precautions, Fall Risk  Position Activity Restriction  Other position/activity restrictions: right side hemiparesis, recent CABG -minimize arm use ( s/p 5 wks), life vest     SUBJECTIVE: Pt. Seated on his BS chair and pleasantly agrees to therapy session.       PAIN: None indicated    Vitals:   Patient Vitals for the past 8 hrs:   BP Patient Position Temp Temp src Pulse Resp   23 0800 (!) 110/58 Sitting 97.9 °F (36.6 °C) Oral 95 16        OBJECTIVE:  Bed Mobility:  Rolling to Left: Modified Independent   Rolling to Right: Modified Independent   Supine to Sit: Modified Independent  Sit to Supine: Modified Independent     Transfers:  Sit to Stand: Modified Independent  Stand to Sit:Modified Independent  To/From Bed and Chair: Modified Independent  Car:Modified Independent    Ambulation:  Modified Independent  Distance: Short distances (<20')  Surface: Level Tile  Device: Rolling Walker  Gait Deviations:  Slow Jada, Decreased Step Length Bilaterally, Decreased Weight Shift Left, and Decreased Gait Speed    Stand By Assistance  Distance: 150' x 1, 75' x 2, up/down 10' ramp  Surface: Level Tile  Device:Rolling Walker  Gait Deviations:  Slow Jada, Decreased Step Length Bilaterally, Decreased Weight Shift Left, Decreased Gait Speed, Decreased Quad Control Right, and Mild Path Deviations    Stairs:  Stairs: 6\" steps X 16 using Bilateral Handrails and Modified Independent for 4 steps, SBA for 12 steps. .  Platform: 6\" platform X 1 using Rolling Walker and Modified Independent. Balance:  Pt. Able to  an object from floor using long handled reacher and Rolling walker for support with Modified Independent    Neuromuscular Re-education  None    Exercise:  Patient was guided in 1 set(s) 10 reps of exercises: Glut sets, Seated marches, Seated hamstring curls, Seated heel/toe raises, Long arc quads, Seated isometric hip adduction, Seated abduction/adduction, and abdominal isometrics. Provided HEP handout for completion at home post discharge. Exercises were completed for increased independence with functional mobility. Functional Outcome Measures:   Not completed    ASSESSMENT:  Assessment: Patient progressing toward established goals. Activity Tolerance:  Patient tolerance of  treatment: good.      Equipment Recommendations:Equipment Needed: Yes  Other: RW  Discharge Recommendations: Continue to assess pending progress, Patient would benefit from continued therapy after discharge     Plan: Current Treatment Recommendations: Strengthening, ROM, Balance training, Functional mobility training, Transfer training, Endurance training, Neuromuscular re-education, Gait training, Stair training, Safety education & training, Equipment evaluation, education, & procurement, Home exercise program, Therapeutic activities, Patient/Caregiver education & training  General Plan:  (5x/ wk 90 min, 1x/ wk 30 min)    Patient Education  Patient Education: Plan of Care, Functional Mobility, Reviewed Prior Education, Health Promotion and Wellness Education, Safety, Verbal Exercise Instruction, Home Exercise Program    Goals:  Patient Goals : get back to my normal way of doing things  Short Term Goals  Time Frame for Short Term Goals: 1 wk  Short Term Goal 1: Pt to go supine <->sit, minimal use of UEs, SBA to get in/out of bed. GOAL MET, SEE LTG  Short Term Goal 2: Pt to get up/down from various seated surfaces, SBA, to get up to walk. GOAL MET, SEE LTG  Short Term Goal 3: Pt to walk with RW >= 50 ft, demonstrating step through gait pattern, recurvatum < 10% of steps on RT, CGA to progress to home mobility  Short Term Goal 4: Pt to ascend/descend 2 steps with gerber rails, CGA to progress to home entry. GOAL MET, SEE LTG  Short Term Goal 5: Pt to get in/out of car, SBA for transportation needs. GOAL MET, SEE LTG  Additional Goals?: Yes  Short Term Goal 6: Pt to perform Tinetti >= 18/28, demonstrating improved balance. GOAL MET, SEE LTG  Long Term Goals  Time Frame for Long Term Goals : 3 wks from evaluation  Long Term Goal 1: Pt to go supine <->sit, minimal use of UEs, Mod I, to get in/out of bed. GOAL MET  Long Term Goal 2: Pt to get up/down from various seated surfaces, Mod I, to get up to walk. Long Term Goal 3: Pt to walk with RW >= 50 ft, demonstrating step through gait pattern, recurvatum < 10% of steps on RT, Mod I, for home mobility  Long Term Goal 4: Pt to perform Tinetti >= 24/28, demonstrating improved balance.   Long Term Goal 5: Patient will ascend/descend 2 steps with bilateral hand rails with modified independence for safe home entry. Additional Goals?: Yes  Long term goal 6: Patient will complete car transfer with modified independence with safe technique to transfer in and out of vehicle safely. Following session, patient left in safe position with all fall risk precautions in place.

## 2023-01-12 NOTE — PROGRESS NOTES
Discharge pain assessment:    Complete within 3 days of discharge. Pain Effect on Sleep ()   Ask patient: Francies Jayna the past 5 days, how much of the time has pain made it hard for you to sleep at night?\" 1.  Rarely or not at all     If no pain is reported, end interview. If pain is reported, continue with the additional questions.    Pain Interference with Therapy Activities   Ask patient: Francies Jyana the past 5 days, how often have you limited your participation in rehabilitation therapy sessions due to pain?\" 1.  Rarely or not at all   Pain Interference with Day to Day Activities   Ask patient: Francies Jayna the past 5 days, how often have you limited your day to day activities (excluding rehabilitation therapy sessions) because of pain?\" 1.  Rarely or not at all

## 2023-01-12 NOTE — DISCHARGE SUMMARY
Physical Medicine & Rehabilitation   Discharge Summary     Patient Identification:  Matthew Valderrama  : 1960  Admit date: 2022  Discharge date: 2023   Attending provider: Yasmani Contreras MD        Primary care provider: BLACK Stevens CNP     Discharge Diagnoses:   Acute left parietal corona radiata and right cerebellar ischemic stroke causing right hemiparesis with impaired coordination, and dysarthria  Debility/physical decondition due to Klebsiella pneumonia urinary tract infection with emphysematous cystitis and bilateral hydronephrosis complicated by Klebsiella pneumonia bacteremia/sepsis  Urinary retention  Klebsiella pneumonia urinary tract infection with emphysematous cystitis and bilateral hydronephrosis  Klebsiella pneumoniae bacteremia/sepsis  Acute kidney injury possibly due to dehydration with orthostatic hypotension  Orthostatic hypotension probably due high-dose Flomax   Coronary artery disease with ischemic cardiomyopathy requiring two-vessel coronary artery bypass graft surgery on 2022  Hypertension  Intermittent orthostatic hypotension  Diabetes mellitus type 2      Discharge Functional Status:    Physical therapy:  Bed Mobility:  Rolling to Left: Modified Independent   Rolling to Right: Modified Independent   Supine to Sit: Modified Independent  Sit to Supine: Modified Independent     Transfers:  Sit to Stand: Modified Independent  Stand to Sit:Modified Independent  To/From Bed and Chair: Modified Independent  Car:Modified Independent    Ambulation:  Modified Independent  Distance: Short distances (<20')  Surface: Level Tile  Device: Rolling Walker  Gait Deviations:  Slow Jada, Decreased Step Length Bilaterally, Decreased Weight Shift Left, and Decreased Gait Speed     Stand By Assistance  Distance: 150' x 1, 75' x 2, up/down 10' ramp  Surface: Level Tile  Device:Rolling Walker  Gait Deviations:  Slow Jada, Decreased Step Length Bilaterally, Decreased Weight Shift Left, Decreased Gait Speed, Decreased Quad Control Right, and Mild Path Deviations    Supervision, Stand By Assistance  Distance: 150' x 2  Surface: Level Tile  Device: Rolling Walker  Gait Deviations:  Slow Jada, Decreased Step Length Bilaterally, Decreased Weight Shift Left, Decreased Gait Speed, and Decreased Quad Control Right at times. Stairs:  Stairs: 6\" steps X 16 using Bilateral Handrails and Modified Independent for 4 steps, SBA for 12 steps. .  Platform: 6\" platform X 1 using Rolling Walker and Modified Independent. Balance:  Pt. Able to  an object from floor using long handled reacher and Rolling walker for support with Modified Independent    Pt. Completed standing dynamic balance activity: tapping balloon back and forth with therapist with Normal RODNEY, Modified Tandem Stance on level tile and Single UE support and No UE support on Rolling Walker with Supervision, Stand By Assistance. Activity completed to improve balance, enhance functional mobility, and reduce risk of falls. Pt. Completed standing dynamic balance activity: rolling ball fwd/retro under L LE with  Modified SLS  on level tile and Single UE support on booyah stick with Contact Guard Assistance, Minimal Assistance. Activity completed to improve balance, enhance functional mobility, and reduce risk of falls. PT Equipment Recommendations  Equipment Needed: Yes  Other: RW, Assessment: Pt presents  decreased endurance, strength and balance (Tinetti 12/28) as result of recent CABG (approx 5 wks post) and CVA with rt hemiparesis. Pt demonstrates minimal Rt neglect, jaime RUE. Overall pt requiring min assist for bed mobility, transfers and gait with RW. Pt will benefit from skilled PT to advance in these areas for improved functional mobility and safety. Occupational therapy:  ADL:  EATING:Independent. Yoli Quiver CARE Score: 6. ORAL HYGIENE:Independent. No verbal cues required, pt completed standing sinkside.   CARE Score: 6. TOILETING HYGIENE:Independent. to complete clothing management and sonia care s/p BM. CARE Score: 6. SHOWERING/BATHING:Setup or clean-up assistance. Pt. completed sponge bath this date per his request due to completing shower on previous date. Idania Dorsey CARE Score: 5.     UPPER BODY DRESSING:Independent. As pt able to retrieve items from dresser, don/doff with no cues for pull over shirt and life vest.. CARE Score: 6. LOWER BODY DRESSING:Independent. Pt. able to retrieve items from dresser, pt able to thread cath with no difficulty, thread BLE no cues and don a hips with Mod I. Pt. able to doff LB clothing with Mod I.. CARE Score: 6. FOOTWEAR:Independent  Mod I with increased time. CARE Score: 6. TOILET TRANSFER: Independent. to/from STS, no cues required. Idania Dorsey CARE Score: 6. BALANCE:  Sitting Balance:  Modified Independent. Standing Balance: Modified Independent. BED MOBILITY:  Sit to supine: Mod I     TRANSFERS:  Sit to Stand:  Modified Independent. Stand to Sit: Modified Independent. No cues required, completed several trials. FUNCTIONAL MOBILITY:  Assistive Device: Rolling Walker  Assist Level:  Modified Independent. Distance: To and from bathroom, within room, to therapy gym    Supervision within room. Assistive Device: Rolling Walker  Assist Level:  Modified Independent. Within room and within apartment,  supervision in hallway  Distance: To and from therapy apartment      ADDITIONAL ACTIVITIES:  BIMs assessment and CAM completed with pt this date. In order to improve BUE strength for ease with self care and IADL routines,  pt instructed to use hard resistive theraband x20 reps utilizing he teach back method, po demo good carryover. Theraband HEP provided to pt.     Pt. Engaged in IADL task of taking various dishes/utensils from sinkside to bin 1 at a time to improve safety/performance within the kitchen, pt able to complete with overall Supervision, no LOB during. Pt. Engaged in further dynamic standing task with 1-2 hand release to grasp magnets from schedule board and move to the side pt demo no LOB during task. OTR assessed 9 HPt performance in RUE and pt demo 45.10 seconds. R  strength assessed and pt demo 49, 46, 47 (47.33#)  Pt. Demo improved performance since [facility] with  and 9 HPT. Pt. Engaged in IADL task of packing room up in prep for d/c, pt able to reach OBOS and gather items with Mod I, no LOB noted. Pt. Stood >5 minutes during task with 1-2 hand release From RW.      Pt. Reports he completed his self care performance with Mod I to change clothes this date. Equipment Recommendations: Equipment Needed: Yes  Other: OT staff to continue to monitor needs throughout OT POC. Assessment: Modesto Catalan has progressed well with OT services within this setting, pt demo Mod I with ADLs, except set up with bathing routine. Pt. Has been provided with BUE HEPs to complete to further advance strength. Pt. To be d/c to home with [facility] services at this time. Speech therapy:  ST with complex money management task to be completed as HEP in previous session, however, patient with report, \"I am going to do that at home for homework. \"     Previous Session:  Complex money management task [employer]): 4/5 indep, 1/5 with min cues to review and correct. *Excellent success with higher level selective attention, making comparisons, math reasoning and math computation. [place] in the Corner :  Instructed patient on novel card game including game set up, rules and object. Overall excellent success with working memory and reasoning for logic behind game. Min cues needed x2 for selective attention to ID plays within the card play piles. Plan to assess recall of game set up, rules and play next session.      Recall of SOS Strategies: 3/3 independent     Previous Session:  Recall of card game rules from previous session:  4/5 indep, 1/5 with min cues     Recall of speech strategies: 2/3 indep, 1/3 with min cues. Working memory/selective attention for completion of card game (Anmol's in the corner): Overall good success with game play and where to place cards, however demonstrated x4 errors/mis opportunities for play due to reduced selective attention. With min cues (reminders to re-scan the cards on the play piles), patient was able to ID play and adjust cards. Previous Session:  Briefly reviewed speech strategies to optimize articulation. Structure speech drill task (sentence level reading with \"ch\" words): Completed x20 sentences with 100% intelligibility. Distortions noted with \"ch\" production, but appeared to be related to lack of dentition versus dysarthria. Patient with self perceptual rating of 8.5-9/10 (*10 being baseline production) for performance during this task. Discussed need to continue completion of HEP to assist with optimizing performance and habituating use of strategies within conversational speech.      Comprehension: 6 - Complex ideas 90% or device (hearing aid or glasses- if patient is primarily a visual learner)  Expression: 6 - Device used to express complex ideas/needs  Social Interaction: 7 - Patient has appropriate behavior/relations 100% of the time  Problem Solvin - Independent with device (e.g. notes, schedules)  Memory: 6 - Patient requires device to recall (e.g. memory book)      Inpatient Rehabilitation Course:   Dangelo Simon is a 63 y.o. left-handed  male with history of hypertension, diabetes mellitus type 2, coronary artery disease with ischemic cardiomyopathy requiring two-vessel CABG, urinary retention with several failed void trial, status post tonsillectomy, was admitted to inpatient rehabilitation on 2022 for intensive inpatient management of impairment & disability secondary to right hemiparesis due to acute left parietal corona radiata and right cerebellar ischemic stroke, and debility/physical deconditioning due to Klebsiella pneumonia urinary tract infection with emphysematous cystitis and bilateral hydronephrosis complicated by Klebsiella pneumonia bacteremia/sepsis. The patient participated in an aggressive multidisciplinary inpatient rehabilitation program involving 3 hours per day, 5 days per week of rehabilitation. Diabetic management was maximized with diet and medication options to attempt to achieve blood sugar control between . Hypertension management was undertaken with medication management on any patient with systolic blood pressure greater than 164 or diastolic blood pressure greater than 90. Hyperlipidemia was considered and the patient was started or continued on a statin medication for any LDL>100. Appropriate stroke prophylaxis was maintained throughout rehabilitation stay. Appropriate DVT prophylaxis options were considered throughout rehabilitation stay. Dr Lupe Avalos followed during the IPR stay for medical management    Because that the patient developed episodic orthostatic hypotension associated with dizziness/lightheaded sensation, and deterioration of renal function with elevated BUN and creatinine, and reduced GFR, therefore nephrology service was consulted. Toprol-XL was held. The patient's blood pressure was better afterward. However the patient had large amount of urine produced without reason. Therefore nephrology service with the patient on 2000 mL/day fluid restriction. The patient began having orthostatic hypotension again. Therefore midodrine was started. The patient's Bella catheter was maintained as per urology service direction. Bella catheter was replaced prior to his discharge. The patient's inpatient rehabilitation course otherwise was uneventful. He tolerated the intensive inpatient rehabilitation treatment well.   He gained functional improvement in performing ADLs and ambulation with increasing independence and improving tolerance. Patient was discharged Home with [place] in Stable condition.       Consults:   nephrology and urology      Significant Diagnostics:   CBC with Differential:    Lab Results   Component Value Date/Time    WBC 8.6 12/30/2022 07:53 AM    RBC 3.79 12/30/2022 07:53 AM    HGB 10.3 12/30/2022 07:53 AM    HCT 32.8 12/30/2022 07:53 AM     12/30/2022 07:53 AM    MCV 86.5 12/30/2022 07:53 AM    MCH 27.2 12/30/2022 07:53 AM    MCHC 31.4 12/30/2022 07:53 AM    RDW 12.5 05/24/2022 04:58 AM    NRBC 0 12/30/2022 07:53 AM    SEGSPCT 62.0 12/30/2022 07:53 AM    LYMPHOPCT 22 05/24/2022 04:58 AM    MONOPCT 8.3 12/30/2022 07:53 AM    BASOPCT 1 05/24/2022 04:58 AM    MONOSABS 0.7 12/30/2022 07:53 AM    LYMPHSABS 2.0 12/30/2022 07:53 AM    EOSABS 0.4 12/30/2022 07:53 AM    BASOSABS 0.1 12/30/2022 07:53 AM    DIFFTYPE NOT REPORTED 08/10/2021 11:23 PM     CMP:    Lab Results   Component Value Date/Time     01/13/2023 06:32 AM    K 4.9 01/13/2023 06:32 AM    K 4.7 12/31/2022 06:38 AM     01/13/2023 06:32 AM    CO2 24 01/13/2023 06:32 AM    BUN 35 01/13/2023 06:32 AM    CREATININE 1.5 01/13/2023 06:32 AM    GFRAA 58 05/24/2022 04:58 AM    LABGLOM 53 01/13/2023 06:32 AM    GLUCOSE 170 01/13/2023 06:32 AM    PROT 6.8 12/24/2022 07:46 AM    LABALBU 3.6 12/24/2022 07:46 AM    CALCIUM 9.4 01/13/2023 06:32 AM    BILITOT 0.5 12/24/2022 07:46 AM    ALKPHOS 102 12/24/2022 07:46 AM    AST 13 12/24/2022 07:46 AM    ALT 10 12/24/2022 07:46 AM     BMP:    Lab Results   Component Value Date/Time     01/13/2023 06:32 AM    K 4.9 01/13/2023 06:32 AM    K 4.7 12/31/2022 06:38 AM     01/13/2023 06:32 AM    CO2 24 01/13/2023 06:32 AM    BUN 35 01/13/2023 06:32 AM    LABALBU 3.6 12/24/2022 07:46 AM    CREATININE 1.5 01/13/2023 06:32 AM    CALCIUM 9.4 01/13/2023 06:32 AM    GFRAA 58 05/24/2022 04:58 AM    LABGLOM 53 01/13/2023 06:32 AM    GLUCOSE 170 01/13/2023 06:32 AM        Recent Labs 01/11/23  0801 01/12/23  0748 01/13/23  0809   POCGLU 164* 110* 165*       Cholesterol Panel:   Results in Past 30 Days  Result Component Current Result Ref Range Previous Result Ref Range   Cholesterol, Total 99 (L) (12/16/2022) 100 - 199 mg/dL Not in Time Range    HDL 45 (12/16/2022) mg/dL Not in Time Range    LDL Calculated 42 (12/16/2022) mg/dL Not in Time Range    Triglycerides 62 (12/16/2022) 0 - 199 mg/dL Not in Time Range        Patient Instructions: Follow-up visits: See after visit summary from hospitalization         Follow up with [address]/[facility]  Specialty: [address] [place] [address] for RN, PT, [place] and HHA. Provider will call to schedule evaluation. [address] [facility]  [phone]   Jan 23 Office Visit with Titus Shaffer [facility]  Monday Jan 23, 2023 9:00 [address]  [address] [facility]   Jan 24 Procedure with Dr. Anita Cespedes MD  Tuesday Jan 24, 2023 8:00 [address] [facility]  [address] [facility].   [address] [place] [id number]  [phone]   Jan 25 Office Visit with Ayanna Gaffney PA-C  Wednesday Jan 25, 2023 8:30 AM [facility]  [address] [facility] [address]  [phone]   Feb 9 Hospital Follow Up with Dr. Soha Longo MD  Thursday Feb 9, 2023 8:40 AM  Please arrive 15 minutes prior to appointment time, bring insurance card and photo ID. [facility]  [address] [facility]   [address] [place] [address] [id number] [phone]          Schedule an appointment with Dr. Soha Longo MD as soon as possible for a visit on 2/9/2023 Thursday Feb 9, 2023  Specialty: Nephrology  @8:40am [address] [address]  [phone]   Feb 22 New Patient Appointment with [facility]  Wednesday Feb 22, 2023 12:00 PM  Please complete digital registration via the [facility] sarah.      If unable to complete the visit pre-check, arrive 15 minutes early. Bring photo ID, insurance card(s), co-pay, medication bottles & completed forms. Controlled substances will not be prescribed for most New Patient Appointments. In addition, your new provider may not prescribe controlled substances but may offer alternative solutions or refer you to a specialist.     If you need to cancel/reschedule, please contact the practice at least 24 hours prior to the appointment. [address] [facility]  [facility] [address]   [facility] [address]  [phone]          Schedule an appointment with Dr. Radhika Matamoros MD as soon as possible for a visit on 2/22/2023 Wednesday Feb 22, 2023  Specialty: Neurology  time [address] [address]  [phone]   Feb 27 Echocardiogram  Monday Feb 27, 2023 9:30 AM  PATIENT PREPS:   * Arrive 15 minutes prior at the [address]  [address] [facility]  [phone]   Mar 14 Office Visit with Dr. Mnoet Nicole MD  Tuesday Mar 14, 2023 1:30  [facility]  [address] [facility] [address]   [facility] [address]  [phone]          Follow up with Dr. Monet Nicole MD  Tuesday Mar 14, 2023  Specialty: Physical Medicine and Rehab  Appointment time: 1:30 PM come 5 min early, list of medication, wear mask, bring co-pay idf any.  Oly 22       Discharge Medications:  Current Discharge Medication List             Details   apixaban (ELIQUIS) 5 MG TABS tablet Take 1 tablet by mouth 2 times daily  Qty: 60 tablet, Refills: 3      traZODone (DESYREL) 50 MG tablet Take 1 tablet by mouth nightly as needed for Sleep  Qty: 30 tablet, Refills: 3      glipiZIDE (GLUCOTROL) 10 MG tablet Take 1 tablet by mouth every morning (before breakfast)  Qty: 60 tablet, Refills: 3      ferrous sulfate (IRON 325) 325 (65 Fe) MG tablet Take 1 tablet by mouth daily (with breakfast)  Qty: 30 tablet, Refills: 1      docusate sodium (COLACE, DULCOLAX) 100 MG CAPS Take 100 mg by mouth 2 times daily  Qty: 60 capsule, Refills: 3      senna (SENOKOT) 8.6 MG tablet Take 2 tablets by mouth nightly as needed for Constipation  Qty: 60 tablet, Refills: 0      melatonin 3 MG TABS tablet Take 2 tablets by mouth at bedtime For insomnia  Qty: 60 tablet, Refills: 3      midodrine (PROAMATINE) 5 MG tablet Take 1 tablet by mouth in the morning and 1 tablet at noon and 1 tablet in the evening. Hold if systolic blood pressure is greater than 120. Qty: 90 tablet, Refills: 3                Details   tamsulosin (FLOMAX) 0.4 MG capsule Take 2 capsules by mouth daily  Qty: 30 capsule, Refills: 3                Details   atorvastatin (LIPITOR) 40 MG tablet Take 1 tablet by mouth daily  Qty: 30 tablet, Refills: 2    Associated Diagnoses: Uncontrolled type 2 diabetes mellitus with hyperglycemia (HCC)      Multiple Vitamin (MULTIVITAMIN) TABS tablet Take 1 tablet by mouth daily (with breakfast)  Qty: 30 tablet, Refills: 3      acetaminophen (TYLENOL) 500 MG tablet Take 500 mg by mouth every 6 hours as needed for Pain As needed      buPROPion (WELLBUTRIN XL) 150 MG extended release tablet Take 1 tablet by mouth every morning  Qty: 30 tablet, Refills: 1    Associated Diagnoses: Tobacco abuse      omeprazole (PRILOSEC) 40 MG delayed release capsule Take 1 capsule by mouth daily  Qty: 90 capsule, Refills: 3    Associated Diagnoses: Gastroesophageal reflux disease, unspecified whether esophagitis present              Controlled substances monitoring: not applicable.      45 minutes spent preparing the patient for discharge      Nory Willingham MD

## 2023-01-13 VITALS
TEMPERATURE: 98.2 F | HEART RATE: 78 BPM | BODY MASS INDEX: 19.13 KG/M2 | RESPIRATION RATE: 16 BRPM | DIASTOLIC BLOOD PRESSURE: 69 MMHG | WEIGHT: 133.6 LBS | SYSTOLIC BLOOD PRESSURE: 125 MMHG | OXYGEN SATURATION: 100 % | HEIGHT: 70 IN

## 2023-01-13 LAB
ANION GAP SERPL CALCULATED.3IONS-SCNC: 13 MEQ/L (ref 8–16)
BUN BLDV-MCNC: 35 MG/DL (ref 7–22)
CALCIUM SERPL-MCNC: 9.4 MG/DL (ref 8.5–10.5)
CHLORIDE BLD-SCNC: 102 MEQ/L (ref 98–111)
CO2: 24 MEQ/L (ref 23–33)
CREAT SERPL-MCNC: 1.5 MG/DL (ref 0.4–1.2)
GFR SERPL CREATININE-BSD FRML MDRD: 53 ML/MIN/1.73M2
GLUCOSE BLD-MCNC: 165 MG/DL (ref 70–108)
GLUCOSE BLD-MCNC: 170 MG/DL (ref 70–108)
POTASSIUM SERPL-SCNC: 4.9 MEQ/L (ref 3.5–5.2)
SODIUM BLD-SCNC: 139 MEQ/L (ref 135–145)

## 2023-01-13 PROCEDURE — 82948 REAGENT STRIP/BLOOD GLUCOSE: CPT

## 2023-01-13 PROCEDURE — 99232 SBSQ HOSP IP/OBS MODERATE 35: CPT | Performed by: INTERNAL MEDICINE

## 2023-01-13 PROCEDURE — 97530 THERAPEUTIC ACTIVITIES: CPT

## 2023-01-13 PROCEDURE — 97129 THER IVNTJ 1ST 15 MIN: CPT

## 2023-01-13 PROCEDURE — 36415 COLL VENOUS BLD VENIPUNCTURE: CPT

## 2023-01-13 PROCEDURE — 99239 HOSP IP/OBS DSCHRG MGMT >30: CPT | Performed by: PHYSICAL MEDICINE & REHABILITATION

## 2023-01-13 PROCEDURE — 80048 BASIC METABOLIC PNL TOTAL CA: CPT

## 2023-01-13 PROCEDURE — 6370000000 HC RX 637 (ALT 250 FOR IP): Performed by: PHYSICAL MEDICINE & REHABILITATION

## 2023-01-13 PROCEDURE — 97110 THERAPEUTIC EXERCISES: CPT

## 2023-01-13 PROCEDURE — 97116 GAIT TRAINING THERAPY: CPT

## 2023-01-13 RX ADMIN — GLIPIZIDE 10 MG: 10 TABLET ORAL at 08:20

## 2023-01-13 RX ADMIN — MIDODRINE HYDROCHLORIDE 5 MG: 5 TABLET ORAL at 12:35

## 2023-01-13 RX ADMIN — PANTOPRAZOLE SODIUM 40 MG: 40 TABLET, DELAYED RELEASE ORAL at 05:28

## 2023-01-13 RX ADMIN — Medication 1 TABLET: at 08:20

## 2023-01-13 RX ADMIN — BUPROPION HYDROCHLORIDE 150 MG: 150 TABLET, FILM COATED, EXTENDED RELEASE ORAL at 08:20

## 2023-01-13 RX ADMIN — APIXABAN 5 MG: 5 TABLET, FILM COATED ORAL at 08:20

## 2023-01-13 RX ADMIN — FERROUS SULFATE TAB 325 MG (65 MG ELEMENTAL FE) 325 MG: 325 (65 FE) TAB at 08:20

## 2023-01-13 NOTE — PLAN OF CARE
Problem: Discharge Planning  Goal: Discharge to home or other facility with appropriate resources  Note: Patient to be discharged on Friday, 1/13 to home. Patient will be under the supervision of his wife, Tasia Claude. Family education was provided on Friday, 1/6. Patient will be receiving services through [facility]. SHALINI provided information to Macey Caballero on 1/6 via Epic. SHALINI left a message for [facility] on this date to confirm discharge.

## 2023-01-13 NOTE — PROGRESS NOTES
[facility]  INPATIENT PHYSICAL THERAPY  Discharge Note  [address] [facility] - 1E-8-N    Time In: 6661  Time Out: 1091  Timed Code Treatment Minutes: 25 Minutes  Minutes: 25          Date: 2023  Patient Name: Keri Sales,  Gender:  male        MRN: [id number]  : 1960  (63 y.o.)     Referring Practitioner: Dr. Andra Machado  Diagnosis: Acute stroke due to ischemia  Additional Pertinent Hx: Pt admitted through ED due to RUE weakness and slurred speech,  Also noted to have sepsis, LC, emphysematous cystitis. Hx:  HTN, Db, CAD, recent CABG (). MRI + for acute ischemic stroke Lt parietal region     Prior Level of Function:  Lives With: Spouse, Family (16 y.o son, 21 y.o step dtr.)  Type of Home: House  Home Layout: One level  Home Access: Stairs to enter with rails  Entrance Stairs - Number of Steps: 2 + threshold  Entrance Stairs - Rails: Both  Home Equipment:  (None used PTA)   Bathroom Shower/Tub: Tub/Shower unit  Bathroom Toilet: Standard  Bathroom Equipment: Tub transfer bench  Bathroom Accessibility: Accessible    ADL Assistance: Independent  Homemaking Assistance: Independent  Homemaking Responsibilities: Yes  Ambulation Assistance: Independent  Transfer Assistance: Independent  Active : No    Restrictions/Precautions:  Restrictions/Precautions: General Precautions, Fall Risk  Position Activity Restriction  Other position/activity restrictions: right side hemiparesis, recent CABG -minimize arm use ( s/p 5 wks), life vest     SUBJECTIVE: Pt. Seated in his WC and pleasantly agrees to therapy session. PAIN: No pain noted.      Vitals:   Patient Vitals for the past 8 hrs:   BP Patient Position Temp Temp src Pulse Resp SpO2 O2 Device   23 0810 125/69 Sitting 98.2 °F (36.8 °C) Oral 78 16 100 % None (Room air)   23 0515 123/72 -- -- -- 85 -- -- --       OBJECTIVE:  Bed Mobility:  Not Tested    Transfers:  Sit to Stand: Modified Independent  Stand to Sit:Modified Independent    Ambulation:  Stand By Assistance, with verbal cues   Distance: 30 ft. X1; 150 ft. X1   Surface: Level Tile  Device: Rolling Walker  Gait Deviations: Forward Flexed Posture, Slow Jada, Decreased Step Length Bilaterally, Decreased Gait Speed, Decreased Heel Strike Bilaterally, and genu recurvatum noted on RLE during stance time towards the end of bout. Stairs:  Stairs: 6\" steps X 8 using Bilateral Handrail and Stand By Assistance, with verbal cues for non-reciprocal technique . Balance:  Static Standing Balance: Modified Independent  Dynamic Standing Balance: Supervision    Neuromuscular Re-education  None    Exercise:  Patient was guided in 1 set(s) 10-15 reps of exercises: Glut sets, Seated marches, Seated heel/toe raises, Long arc quads, Seated isometric hip adduction, and Seated abduction/adduction. Exercises were completed for increased independence with functional mobility. Functional Outcome Measures:   Completed.     Tinetti Balance    Sitting Balance: Steady, safe  Arises: Able without using arms  Attempts to Arise: Able to arise, one attempt  Immediate Standing Balance (First 5 Seconds): Steady but uses walker or other support  Standing Balance: Narrow stance without support  Nudged: Steady without walker or other support  Eyes Closed: Unsteady  Turned 360 Degrees: Steadiness: Steady  Turned 360 Degrees: Continuity of Steps: Continuous  Sitting Down: Safe, smooth motion  Balance Score: 14/16 Initiation of Gait: No hesitancy  Step Height: R Swing Foot: Right foot complete clears floor  Step Length: R Swing Foot: Passes left stance foot  Step Height: L Swing Foot: Left foot complete clears floor  Step Length: L Swing Foot: Passes right stance foot  Step Symmetry: Right and left step length not equal (estimate)  Step Continuity: Steps appear continuous  Path: Mild/moderate deviation or uses walking aid  Trunk: Marked sway or uses walking aid  Walking Time: Heels almost touching while walking  Gait Score: 8/12     Current Score: 22 / 28 (Date: 1/13/2023)    Interpretation of Score: Tinetti is  into a gait score and balance score. The higher the patient's score, the more independent/lower fall risk. A total score of 27 or more indicates low fall risk, 20-26 is moderate fall risk, and 19 or less is indicative of high fall risk. ASSESSMENT:  Assessment: Patient progressing toward established goals. PT ASSESSMENT:  At discharge Mr Veda Montgomery met 5/6 STG's and 3/6 LTG's. Patient did not meet goals for gait due to continued recurvatum noted with fatigue right LE, did not meet stair goal due to requiring SBA for safety and did not meet tinietti goal due to scoring 22/28, demonstrating impaired balance with eyes closed and the need for use of a RW. Although all goals not met, pt has demonstrated good progress in physical therapy since initial evaluation, progressing sit < > stand from min assist to modified independence, gait from min assist to SBA with a RW, bed mobility from CGA/min assist to modified independence and tinetti from 12 to 22/28 indicating decreased risk for falls. Patient is returning to home with spouse support and home health PT. Activity Tolerance:  Patient tolerance of  treatment: good. Equipment Recommendations:Equipment Needed: Yes  Other: RW    Plan: Pt. to be discharged home with home health PT    Patient Education  Patient Education: Plan of Care, Functional Mobility, Reviewed Prior Education, Health Promotion and Wellness Education, Safety, Verbal Exercise Instruction, Precautions/Restrictions, Transfers, Gait, Stairs, Home Safety Education,  - Patient Verbalized Understanding    Goals:  Patient Goals : get back to my normal way of doing things    Short Term Goals  Time Frame for Short Term Goals: 1 wk  Short Term Goal 1: Pt to go supine <->sit, minimal use of UEs, SBA to get in/out of bed.  GOAL MET, SEE LTG Short Term Goal 2: Pt to get up/down from various seated surfaces, SBA, to get up to walk. GOAL MET, SEE LTG   Short Term Goal 3: Pt to walk with RW >= 50 ft, demonstrating step through gait pattern, recurvatum < 10% of steps on RT, CGA to progress to home mobility GOAL NOT MET  Short Term Goal 4: Pt to ascend/descend 2 steps with gerber rails, CGA to progress to home entry. GOAL MET, SEE LTG   Short Term Goal 5: Pt to get in/out of car, SBA for transportation needs. GOAL MET, SEE LTG   Short Term Goal 6: Pt to perform Tinetti >= 18/28, demonstrating improved balance. GOAL MET, SEE LTG       Long Term Goals  Time Frame for Long Term Goals : 3 wks from evaluation    Long Term Goal 2: Pt to get up/down from various seated surfaces, Mod I, to get up to walk. GOAL MET  Long Term Goal 3: Pt to walk with RW >= 50 ft, demonstrating step through gait pattern, recurvatum < 10% of steps on RT, Mod I, for home mobility GOAL NOT MET  Long Term Goal 4: Pt to perform Tinetti >= 24/28, demonstrating improved balance. GOAL NOT MET  Long Term Goal 5: Patient will ascend/descend 2 steps with bilateral hand rails with modified independence for safe home entry. GOAL NOT MET  Long term goal 6: Patient will complete car transfer with modified independence with safe technique to transfer in and out of vehicle safely. GOAL MET      Following session, patient left in safe position with all fall risk precautions in place.

## 2023-01-13 NOTE — PROGRESS NOTES
[facility] THERAPY  [address]  DISCHARGE NOTE     TIME   SLP Individual Minutes  Time In: 0830  Time Out: 0840  Minutes: 10  Timed Code Treatment Minutes: 10 Minutes  Cognitive tx: 8 minutes    Date: 2023  Patient Name: Riccardo Velasquez      CSN: [id number]   : 1960  (63 y.o.)  Gender: male   Referring Physician:  Dr. Debora Staley   Diagnosis: Acute stroke due to ischemia, right hemiparesis secondary to stroke   Precautions: aspiration, fall risk   Current Diet: Regular diet with thin liquids   Swallowing Strategies: Standard Universal Swallow Precautions  Date of Last MBS/FEES: Not Applicable    Pain:  No pain reported. Subjective:  Patient seen sitting upright in chair, pleasant and cooperative. No family present. ST with provision of education/conversation re: HEP to be completed in home environment. Patient with independent generation of variety of activities to be completed as HEP with minimal cuing for additional, more complex tasks with verbal receptiveness noted. Short-Term Goals:  SHORT TERM GOAL #1:  Goal 1: Patient will complete complex executive functioning tasks (i.e., medication management, complex reasoning/deductive reasoning, etc.) with 90% accuracy and minimal cuing in order to allow for safe return to work (40/hours week). GOAL MET  INTERVENTIONS: ST with complex money management task to be completed as HEP in previous session, however, patient with report, \"I am going to do that at home for homework. \"    Previous Session:  Complex money management task [employer]): 4/5 indep, 1/5 with min cues to review and correct. *Excellent success with higher level selective attention, making comparisons, math reasoning and math computation. [place] in the [place] :  Instructed patient on novel card game including game set up, rules and object.  Overall excellent success with working memory and reasoning for [employer] behind game. Min cues needed x2 for selective attention to ID plays within the card play piles. Plan to assess recall of game set up, rules and play next session. SHORT TERM GOAL #2:  Goal 2: Patient will complete higher level recall/short term memory and working memory tasks with 80% accuracy provided independent use of compensatory strategies in order to improve overall recall of newly learned theraputic information and anticipation to return to work ([employer]be[employer] in [employer]). GOAL MET  INTERVENTIONS:  Recall of SOS Strategies: 3/3 independent    Previous Session:  Recall of card game rules from previous session:  4/5 indep, 1/5 with min cues     Recall of speech strategies: 2/3 indep, 1/3 with min cues. Working memory/selective attention for completion of card game ([employer] in the corner): Overall good success with game play and where to place cards, however demonstrated x4 errors/mis opportunities for play due to reduced selective attention. With min cues (reminders to re-scan the cards on the play piles), patient was able to ID play and adjust cards. SHORT TERM GOAL #3:  Goal 3: Patient will demonstrate independent use of compensatory strategies (S-speak up, O-open mouth, S-slow down) within strucutred and unstrucutred speech tasks in order to improve overall articulatory precision. GOAL MET  INTERVENTIONS: DNT due to focus on additional STGs. Previous Session:  Briefly reviewed speech strategies to optimize articulation. Structure speech drill task (sentence level reading with \"ch\" words): Completed x20 sentences with 100% intelligibility. Distortions noted with \"ch\" production, but appeared to be related to lack of dentition versus dysarthria. Patient with self perceptual rating of 8.5-9/10 (*10 being baseline production) for performance during this task. Discussed need to continue completion of HEP to assist with optimizing performance and habituating use of strategies within conversational speech. Long-Term Goals:  Time Frame for Long Term Goals: 2 weeks from time of evaluation    LONG TERM GOAL #1:  Goal 1: Patient will improve overall cognitive function to return to independent living with wife and careing for children (16 year old). GOAL MET    LONG TERM GOAL #2:  Goal 2: Patient will improve overall speech intelligablity and clearity of speech production within informal conversation in order to return to baseline speech to improve successful ability to return to work, speaking with co-works and medical staff and family/friends.  GOAL MET     ST FIM ASSESSMENT:     Admission score Current score Goal Status   COMMUNICATION 6 - Complex ideas 90% or device (hearing aid or glasses- if patient is primarily a visual learner)   6 - Complex ideas 90% or device (hearing aid or glasses- if patient is primarily a visual learner)   Stable   EXPRESSION 7 - Patient expresses complex ideas/needs     7 - Patient expresses complex ideas/needs   Stable   SOCIAL INTERACTION 7 - Patient has appropriate behavior/relations 100% of the time   7 - Patient has appropriate behavior/relations 100% of the time   Stable   PROBLEM SOLVING 7 - Patient independent with complex tasks   7 - Patient expresses complex ideas/needs   Stable   MEMORY 5 - Patient requires prompting with stress/unfamiliar situations   6 - Patient requires device to recall (e.g. memory book)   Progressing     Comprehension: 6 - Complex ideas 90% or device (hearing aid or glasses- if patient is primarily a visual learner)  Expression: 6 - Device used to express complex ideas/needs  Social Interaction: 7 - Patient has appropriate behavior/relations 100% of the time  Problem Solvin - Independent with device (e.g. notes, schedules)  Memory: 6 - Patient requires device to recall (e.g. memory book)    EDUCATION:  Learner: Patient  Education:  Reviewed ST goals and Plan of Care and Reviewed recommendations for follow-up  Evaluation of Education: Cyndi understanding, Demonstrates with assistance, and Family not present    ASSESSMENT/PLAN:  Summary: Patient met 3/3 short term goals and 2/2 long term this [facility] admission. Improvements noted with functional memory with independent use of compensatory strategies, as well as gains with executive functioning and higher level reasoning. Patient able to demonstrate independent management of medications, completing basic finances, organizing information onto a monthly calendar and examining visual information for errors. With regards to speech production, speech remains 100% intelligible across contexts. Occasional dysarthria or distortions evident, but also may be due to lack of dentition and articulatory structures. Patient's self perceptual rating is a 8.5-9/10 (*10 being baseline). Improvement noted with precision when patient actively employing slower rate and increased focus on articulatory precision. Given overall success with performance, recommending ST HEP at discharge. Activity Tolerance:  Patient tolerance of  treatment: good. Assessment/Plan: Patient discharged from Speech Therapy at this time due to discharge from [facility] and achievement of goals. Discharge Disposition: Home. Continued Speech Therapy Services recommended: No; ST HEP.      [facility]

## 2023-01-13 NOTE — PROGRESS NOTES
Patient: Denise Castro  Unit/Bed: 7E-53/053-A  YOB: 1960  MRN: [id number] Acct: [de-identified]   Admitting Diagnosis: Acute stroke due to ischemia [facility]) [I63.9]  Admit Date:  12/23/2022  Hospital Day: 20    Assessment:     Principal Problem:    Acute stroke due to ischemia [facility])  Active Problems:    Ischemic cardiomyopathy    S/P CABG x 2    Emphysematous cystitis    Bacteremia due to Enterobacter species    Urinary retention    Hemiparesis affecting right side as late effect of stroke (HCC)    Coordination impairment    Debility    UTI due to Klebsiella species    Moderate malnutrition ([facility])    Other hypotension    Diabetes mellitus type 2 in nonobese [facility])    Essential hypertension  Resolved Problems:    * No resolved hospital problems. *      Plan:     The CS continue to be acceptable. Subjective:     Patient has no complaint of CP or SOB. .   Medication side effects: none    Scheduled Meds:   midodrine  5 mg Oral Q8H    ferrous sulfate  325 mg Oral Daily with breakfast    melatonin  6 mg Oral Nightly    atorvastatin  40 mg Oral Daily    buPROPion  150 mg Oral QAM    pantoprazole  40 mg Oral QAM AC    tamsulosin  0.8 mg Oral Daily    apixaban  5 mg Oral BID    [Held by provider] metoprolol succinate  12.5 mg Oral Daily    glipiZIDE  10 mg Oral QAM AC    [Held by provider] empagliflozin  25 mg Oral Daily    docusate sodium  100 mg Oral BID    multivitamin  1 tablet Oral Daily with breakfast    senna  2 tablet Oral Nightly     Continuous Infusions:   dextrose       PRN Meds:glucose, dextrose bolus **OR** dextrose bolus, glucagon (rDNA), dextrose, acetaminophen, ondansetron, oxyCODONE **OR** oxyCODONE, bisacodyl, magnesium hydroxide, traZODone, polyethylene glycol    Review of Systems  Pertinent items are noted in HPI.     Objective:     Patient Vitals for the past 8 hrs:   BP Temp Temp src Pulse Resp SpO2   01/12/23 2021 129/69 97.9 °F (36.6 °C) Oral 76 16 100 %     I/O last 3 completed shifts: In: 2040 [P.O.:2040]  Out: 2750 [Urine:2750]  No intake/output data recorded.     /69   Pulse 76   Temp 97.9 °F (36.6 °C) (Oral)   Resp 16   Ht 5' 10\" (1.778 m)   Wt 133 lb 9.6 oz (60.6 kg)   SpO2 100%   BMI 19.17 kg/m²     General appearance: alert, appears stated age, and cooperative  Head: Normocephalic, without obvious abnormality, atraumatic  Lungs: clear to auscultation bilaterally  Chest wall: no tenderness  Heart: regular rate and rhythm, S1, S2 normal, no murmur, click, rub or gallop  Abdomen: soft, non-tender; bowel sounds normal; no masses,  no organomegaly  Extremities: extremities normal, atraumatic, no cyanosis or edema  Skin: Skin color, texture, turgor normal. No rashes or lesions  Neurologic: Grossly normal      Electronically signed by Rufus Pack MD on 1/12/2023 at 8:43 PM

## 2023-01-13 NOTE — PROGRESS NOTES
[facility]  Inpatient Rehabilitation  Occupational Therapy  Discharge Note  Time:  Time In:   Time Out: 0930  Timed Code Treatment Minutes: 27 Minutes  Minutes: 27          Date: 2023  Patient Name: Evelyn Matthews,   Gender: male      Room: [facility]/053-A  MRN: [id number]  : 1960  (63 y.o.)  Referring Practitioner: Ordering & Attending: Kristine Hester MD  Diagnosis: Acute Stroke due to Ischemia  Additional Pertinent Hx: Evelyn Matthews who is a 63 y.o. male with a history of hypertension, diabetes, CAD on  daily, recent CABG in 2022, ischemic cardiomyopathy is admitted to [facility] for sepsis, emphysematous cystitis and acute kidney injury on CKD. During admission exam patient stated that his right arm feldman has been weak and that he has had some slurred speech. Stroke alert was called for right-sided weakness and dysarthria on 12/15. On arrival patient's NIH was 4 for right upper extremity, right lower extremity weakness, 1 limb ataxia and dysarthria. On further questioning, patient and wife state that the right arm weakness actually started last night. Patient's wife noticed when patient was trying to get up from the toilet that he was unable to push himself up with his right arm. Last known well 2330 on 2022. Patient taken to imaging for stat CT head which revealed no ICH, midline shift, mass-effect. CTA head and neck deferred due to patient's LC. Decision for no tPA was made due to patient's time since last known well. Patient with relatively low NIH and symptoms which are not consistent with LVO, therefore no thrombectomy/neuro intervention at this time. Patient had stat MRI brain and MRA head and neck without contrast.  MRI showed acute ischemic stroke of left parietal region. Pt admitted to IP rehab on 22.     Restrictions/Precautions:  Restrictions/Precautions: General Precautions, Fall Risk  Position Activity Restriction  Other position/activity restrictions: right side hemiparesis, recent CABG 11/22-minimize arm use ( s/p 5 wks), life vest    SUBJECTIVE: Pt. Doris Maldonado to return home, pt pleasant throughout. PAIN: 0/10     Vitals: Vitals not assessed per clinical judgement, see nursing flowsheet    COGNITION: Decreased Safety Awareness    ADL:   No ADL's completed this session. Sebastian Woodward BALANCE:  Sitting Balance:  Modified Independent. Standing Balance: Modified Independent. BED MOBILITY:  Not Tested    TRANSFERS:  Sit to Stand:  Modified Independent. Stand to Sit: Modified Independent. FUNCTIONAL MOBILITY:  Assistive Device: Rolling Walker  Assist Level:  Modified Independent. Distance: To and from bathroom, within room, to therapy gym    ADDITIONAL ACTIVITIES:  OTR assessed 9 HPt performance in RUE and pt demo 45.10 seconds. R  strength assessed and pt demo 49, 46, 47 (47.33#)  Pt. Demo improved performance since [facility] with  and 9 HPT. Pt. Engaged in IADL task of packing room up in prep for d/c, pt able to reach OBOS and gather items with Mod I, no LOB noted. Pt. Stood >5 minutes during task with 1-2 hand release From RW.     Pt. Reports he completed his self care performance with Mod I to change clothes this date. ASSESSMENT:  Activity Tolerance:  Patient tolerance of  treatment: good. Assessment: Assessment: Deepika Webber has progressed well with OT services within this setting, pt demo Mod I with ADLs, except set up with bathing routine. Pt. Has been provided with BUE HEPs to complete to further advance strength. Pt. To be d/c to home with [facility] services at this time. Discharge Recommendations: Home with Home health OT, Home with nursing aide, Patient would benefit from continued therapy after discharge  Equipment Recommendations: Equipment Needed: Yes  Other: OT staff to continue to monitor needs throughout OT POC.   Plan: D/c from OT services on IPR unit    Patient Education  Patient Education: Plan of Care    Goals  Short Term Goals  Time Frame for Short Term Goals: 1 week  Short Term Goal 1: Pt will complete functional ambulation to/from BR and HH distances with Supervision and min vcs for safety to increase indep with toileting. GOAL MET  Short Term Goal 2: Pt will decrease RUE 9 hole peg test by 10 seconds (49 on 1/3), and RUE  strength by 5 pounds (48.3# on 1/3) to increase indep with fasteners in UB dressing. GOAL PARTIALLY MET (met from [facility], not met from recent testing)  Short Term Goal 3: Pt will complete dynamic standing task x 5 minutes with 1-2 UE release and Supervision to increase indep with sinkside grooming. GOAL MET  Short Term Goal 4: Pt will complete UB dressing with mod I to increase indep within home environment. GOAL MET  Short Term Goal 5: Pt will complete LB dresing with S and min vcs for safety to increase indep and endurance within home environment. GOAL MET  Additional Goals?: No  Long Term Goals  Time Frame for Long Term Goals : 4 weeks from IPR eval  Long Term Goal 1: Pt will complete BADL routine including shower transfer Mod Indep to increase indep and safety in home environment. GOAL PARTIALLY MET (Mod I for all ADLs, except s/u for bathing)  Long Term Goal 2: Pt will complete simple IADL task with S and 0 vcs for safety to increase indep and endurance in home environment. GOAL MET    Following session, patient left in safe position with all fall risk precautions in place.

## 2023-01-13 NOTE — PROGRESS NOTES
[address] [address]  Recreational Therapy  Discharge Note  Inpatient Rehabilitation Unit         Date:  1/13/2023       Patient Name: Marion Fonseca      MRN: [id number]       YOB: 1960 (63 y.o.)       Gender: male  Diagnosis: Acute Stroke due to Ischemia  Referring Practitioner: Ordering & Attending: Marcelline Merlin, MD    Patient discharged from Recreational Therapy at this time. See recreational therapy notes for details.     Electronically signed by: PIO WhitlockS  Date: 1/13/2023

## 2023-01-13 NOTE — NURSE NAVIGATOR
Neuro Nurse Navigator Follow Up    This RN in to follow up with patient. Deann Kline was seated in a recliner and was agreeable to speak with this RN. Deann Kline reports he is feeling good. He states he has made great progress in rehab and is being discharged today. Deann Kline states he will be discharged home with home health. No questions or concerns voiced at this time. Denies need for further stroke eduction. Advised pt that this RN will be calling for follow up, but if he need assistance prior to follow up call he can reach out to this RN. Deann Kline voiced understanding.

## 2023-01-13 NOTE — PROGRESS NOTES
[facility]   Nephrology progress note  1/13/2023, 10:31 AM      Pt Name:    Dennis Velasquez  MRN:     [id number]     YOB: 1960  Admit Date:    12/23/2022 11:24 AM    Chief Complaint: Nephrology following for LC/CKD.     Subjective:  Patient seen and examined  No chest pain or shortness of breath  No other complaints  Blood pressure seems to be doing better    Objective:  24HR INTAKE/OUTPUT:    Intake/Output Summary (Last 24 hours) at 1/13/2023 1031  Last data filed at 1/13/2023 0659  Gross per 24 hour   Intake 675 ml   Output 1200 ml   Net -525 ml        Admission weight: 134 lb 8 oz (61 kg)  Wt Readings from Last 3 Encounters:   01/12/23 133 lb 9.6 oz (60.6 kg)   12/23/22 130 lb 15.3 oz (59.4 kg)   12/13/22 142 lb 9.6 oz (64.7 kg)        Vitals :   Vitals:    01/12/23 0800 01/12/23 2021 01/13/23 0515 01/13/23 0810   BP: (!) 110/58 129/69 123/72 125/69   Pulse: 95 76 85 78   Resp: 16 16  16   Temp: 97.9 °F (36.6 °C) 97.9 °F (36.6 °C)  98.2 °F (36.8 °C)   TempSrc: Oral Oral  Oral   SpO2:  100%  100%   Weight:       Height:           Physical examination  General Appearance: Cachectic and malnourished  Mouth/Throat:  Oral mucosa moist  Neck:  Supple, no JVD  Lungs:  Breath sounds: clear  Heart[de-identified]  S1,S2 heard  Abdomen:  Soft, non - tender  Musculoskeletal:  Edema -no edema noted    Medications:  Infusion:    dextrose       Meds:    midodrine  5 mg Oral Q8H    ferrous sulfate  325 mg Oral Daily with breakfast    melatonin  6 mg Oral Nightly    atorvastatin  40 mg Oral Daily    buPROPion  150 mg Oral QAM    pantoprazole  40 mg Oral QAM AC    tamsulosin  0.8 mg Oral Daily    apixaban  5 mg Oral BID    [Held by provider] metoprolol succinate  12.5 mg Oral Daily    glipiZIDE  10 mg Oral QAM AC    [Held by provider] empagliflozin  25 mg Oral Daily    docusate sodium  100 mg Oral BID    multivitamin  1 tablet Oral Daily with breakfast    senna  2 tablet Oral Nightly       Lab Data :  CBC: No results for input(s): WBC, HGB, HCT, PLT in the last 72 hours. CMP:  Recent Labs     01/11/23  0559 01/12/23  1111 01/13/23  0632    137 139   K 5.2 5.1 4.9    101 102   CO2 27 27 24   BUN 36* 39* 35*   CREATININE 1.6* 1.8* 1.5*   GLUCOSE 117* 173* 170*   CALCIUM 9.2 8.9 9.4       Hepatic: No results for input(s): LABALBU, AST, ALT, ALB, BILITOT, ALKPHOS in the last 72 hours. Assessment and Plan:  Renal -acute kidney injury on chronic kidney disease with baseline creatinine around 1.5-1.7  Overall creatinine stable close to baseline no new labs available today  Check BMP daily. Electrolytes -within normal limits   Acid-base status appears to be stable  Essential hypertension has been running lower continue midodrine for now blood pressure is maintaining well  Hx of diabetes mellitus  Hx of sepsis due to UTI from Klebsiella pneumonia status post antibiotics  CAD status post CABG 11/22  Polyuria -much improved with fluid restriction  Meds reviewed and discussed with patient and nursing staff      Kim Morley MD  [facility]    This report has been created using voice recognition software.  It may contain minor errors which are inherent in voice recognition technology

## 2023-01-13 NOTE — PLAN OF CARE
Problem: Discharge Planning  Goal: Discharge to home or other facility with appropriate resources  1/13/2023 1014 by WILMER Rose  Note: Transportation:   Has transportation kept you from medical appointments, meetings, work, or from getting things needed for daily living? (Check all that apply)  No.      Health Literacy:   How often do you need to have someone help you when you read instructions, pamphlets, or other written material from your doctor or pharmacy? 0. - Never    Social Isolation:  How often do you feel lonely or isolated from those around you?  0. Never      Patient Mood Interview (PHQ-2 to 9) (from [employer]©)   Say to Patient: \"Over the last 2 weeks, have you been bothered by any of the following problems? \"   If symptom is present, enter 1 (yes) in column 1 (Symptom Presence)  If yes in column 1, then ask the patient: About how often have you been bothered by this?   Read and show the patient a card with the symptom frequency choices. Indicate response in column 2, Symptom Frequency. Symptom Presence  No (enter 0 in column 2)   Yes (enter 0-3 in column 2)  9. No response (leave column 2 blank) Symptom Frequency  Never or 1 day  2-6 days (several days)  7-11 days (half or more of the days)  12-14 days (nearly every day    Symptom Presence Symptom Frequency   Little interest or pleasure in doing things 0. No 0. Never or 1 day   Feeling down, depressed, or hopeless 0. No 0.  Never or 1 day

## 2023-01-16 NOTE — TELEPHONE ENCOUNTER
Patient was discharged from [facility] on 01/13/2023. Patient will need a follow up discharge call.

## 2023-01-16 NOTE — TELEPHONE ENCOUNTER
[facility] Transitions Initial Follow Up Call    Outreach made within 2 business days of discharge: Yes    Patient: Shania Gary Patient : 1960   MRN: [id number]  Reason for Admission: There are no discharge diagnoses documented for the most recent discharge. Discharge Date: 23       Spoke with: Arianna Stiles - Wife     Discharge department/facility: [facility] Interactive Patient Contact:  Was patient able to fill all prescriptions: Yes  Was patient instructed to bring all medications to the follow-up visit: Yes  Is patient taking all medications as directed in the discharge summary?  Yes  Does patient understand their discharge instructions: Yes  Does patient have questions or concerns that need addressed prior to 7-14 day follow up office visit: no    Scheduled appointment with PCP within 7-14 days    Follow Up  Future Appointments   Date Time Provider Sridhar Clements   2023  9:00 AM BLACK Mathews CNP N [facility]   2023  8:00 AM Josie Montes MD [address] [place] [address]   2023  8:30 AM Rolanda Bamberger, PA-C N [facility] Heart [address] [place] [address]   2023  8:40 AM Justina Penny MD N [facility]   2023 12:00 PM Tanner Aviles, [address] [place] [address] Neurology -   2023  9:30 AM STR ECHO RM2 STRZ ECHO [facility] \[facility]\""   3/14/2023  1:30 PM Luis Fernando Bennett MD N [facility] Pain [facility] [address] [place] [address]

## 2023-01-19 ENCOUNTER — OFFICE VISIT (OUTPATIENT)
Dept: FAMILY MEDICINE CLINIC | Age: 63
End: 2023-01-19

## 2023-01-19 VITALS
WEIGHT: 134 LBS | BODY MASS INDEX: 19.23 KG/M2 | RESPIRATION RATE: 16 BRPM | OXYGEN SATURATION: 98 % | SYSTOLIC BLOOD PRESSURE: 138 MMHG | HEART RATE: 86 BPM | DIASTOLIC BLOOD PRESSURE: 72 MMHG | TEMPERATURE: 97.1 F

## 2023-01-19 DIAGNOSIS — I10 ESSENTIAL (PRIMARY) HYPERTENSION: ICD-10-CM

## 2023-01-19 DIAGNOSIS — Z09 HOSPITAL DISCHARGE FOLLOW-UP: Primary | ICD-10-CM

## 2023-01-19 DIAGNOSIS — N18.31 STAGE 3A CHRONIC KIDNEY DISEASE (HCC): ICD-10-CM

## 2023-01-19 DIAGNOSIS — F51.01 PRIMARY INSOMNIA: ICD-10-CM

## 2023-01-19 DIAGNOSIS — Z86.73 HISTORY OF CVA IN ADULTHOOD: ICD-10-CM

## 2023-01-19 DIAGNOSIS — E78.2 MIXED HYPERLIPIDEMIA: ICD-10-CM

## 2023-01-19 ASSESSMENT — PATIENT HEALTH QUESTIONNAIRE - PHQ9
SUM OF ALL RESPONSES TO PHQ QUESTIONS 1-9: 0
SUM OF ALL RESPONSES TO PHQ QUESTIONS 1-9: 0
1. LITTLE INTEREST OR PLEASURE IN DOING THINGS: 0
2. FEELING DOWN, DEPRESSED OR HOPELESS: 0
SUM OF ALL RESPONSES TO PHQ9 QUESTIONS 1 & 2: 0
SUM OF ALL RESPONSES TO PHQ QUESTIONS 1-9: 0
SUM OF ALL RESPONSES TO PHQ QUESTIONS 1-9: 0

## 2023-01-19 NOTE — PROGRESS NOTES
Post-Discharge Transitional Care  Follow Up      Len Slater   YOB: 1960    Date of Office Visit:  1/19/2023  Date of Hospital Admission: 12/23/22  Date of Hospital Discharge: 1/13/23  Risk of hospital readmission (high >=14%. Medium >=10%) :Readmission Risk Score: 22.5      Care management risk score Rising risk (score 2-5) and Complex Care (Scores >=6): No Risk Score On File     Non face to face  following discharge, date last encounter closed (first attempt may have been earlier): *No documented post hospital discharge outreach found in the last 14 days    Call initiated 2 business days of discharge: *No response recorded in the last 14 days    ASSESSMENT/PLAN:   Hospital discharge follow-up  -     MS DISCHARGE MEDS RECONCILED W/ CURRENT OUTPATIENT MED LIST  -     MS DISCHARGE MEDS RECONCILED W/ CURRENT OUTPATIENT MED LIST      Medical Decision Making: moderate complexity  No follow-ups on file.           Subjective:   HPI:  Follow up of Hospital problems/diagnosis(es): CVA     States that he had a CABG  Was admitted for a CVA  Has right sided weakness has improved since discharge but still has some in right leg   Is using walker with ambulation  Bella catheter bag  Home health- is coming Saturday  Did do rehab for three weeks and had OT and PT  Is able to shower using a shower chair  Is able to microwave his meals  Wife is home with patient   Treatment- eliquis, lipitor    DM type 2, BS have been 120. Is taking glipizide  Mood is controlled wellbutrin and trazodone. Has stopped smoking     Inpatient course: Discharge summary reviewed- see chart.    Interval history/Current status: stable     Patient Active Problem List   Diagnosis    Syncope    Facial injury    Tobacco abuse    Cranial facial fractures (HCC)    Diabetes mellitus type 2 in nonobese (HCC)    Fungal toenail infection    Golfer's elbow    Medical non-compliance    Erectile dysfunction    NAVARRO (generalized anxiety disorder)     Impacted cerumen of right ear    Essential hypertension    Uncontrolled type 2 diabetes mellitus with hyperglycemia (HCC)    Thoracic arthritis    New onset of congestive heart failure (HCC)    Acute systolic congestive heart failure (HCC)    Nonischemic cardiomyopathy (HCC)    Stage 3a chronic kidney disease (HCC)    Tremor    LC (acute kidney injury) ([facility]    Hyperlipidemia    Gastroesophageal reflux disease    S/P cardiac cath    CAD, multiple vessel    Ischemic cardiomyopathy    ETOH abuse    S/P CABG x 2    Emphysematous cystitis    Acute pyelonephritis    Bacteremia due to Enterobacter species    Acute CVA (cerebrovascular accident) ([facility]    Urinary retention    Acute stroke due to ischemia [facility])    Hemiparesis affecting right side as late effect of stroke ([facility]    Coordination impairment    Debility    UTI due to Klebsiella species    Moderate malnutrition ([facility]    Other hypotension       Medications listed as ordered at the time of discharge from hospital     Medication List            Accurate as of January 19, 2023 10:27 AM. If you have any questions, ask your nurse or doctor. CONTINUE taking these medications      acetaminophen 500 MG tablet  Commonly known as: TYLENOL     apixaban 5 MG Tabs tablet  Commonly known as: ELIQUIS  Take 1 tablet by mouth 2 times daily     atorvastatin 40 MG tablet  Commonly known as: Lipitor  Take 1 tablet by mouth daily     buPROPion 150 MG extended release tablet  Commonly known as:  Wellbutrin XL  Take 1 tablet by mouth every morning     docusate 100 MG Caps  Commonly known as: COLACE, DULCOLAX  Take 100 mg by mouth 2 times daily     ferrous sulfate 325 (65 Fe) MG tablet  Commonly known as: IRON 325  Take 1 tablet by mouth daily (with breakfast)     glipiZIDE 10 MG tablet  Commonly known as: GLUCOTROL  Take 1 tablet by mouth every morning (before breakfast)     melatonin 3 MG Tabs tablet  Take 2 tablets by mouth at bedtime For insomnia     midodrine 5 MG tablet  Commonly known as: PROAMATINE  Take 1 tablet by mouth in the morning and 1 tablet at noon and 1 tablet in the evening. Hold if systolic blood pressure is greater than 120. multivitamin Tabs tablet  Take 1 tablet by mouth daily (with breakfast)     omeprazole 40 MG delayed release capsule  Commonly known as: PRILOSEC  Take 1 capsule by mouth daily     senna 8.6 MG tablet  Commonly known as: SENOKOT  Take 2 tablets by mouth nightly as needed for Constipation     tamsulosin 0.4 MG capsule  Commonly known as: FLOMAX  Take 2 capsules by mouth daily     traZODone 50 MG tablet  Commonly known as: DESYREL  Take 1 tablet by mouth nightly as needed for Sleep                Medications marked \"taking\" at this time  Outpatient Medications Marked as Taking for the 1/19/23 encounter (Office Visit) with BLACK Mcleod CNP   Medication Sig Dispense Refill    apixaban (ELIQUIS) 5 MG TABS tablet Take 1 tablet by mouth 2 times daily 60 tablet 3    traZODone (DESYREL) 50 MG tablet Take 1 tablet by mouth nightly as needed for Sleep 30 tablet 3    glipiZIDE (GLUCOTROL) 10 MG tablet Take 1 tablet by mouth every morning (before breakfast) 60 tablet 3    ferrous sulfate (IRON 325) 325 (65 Fe) MG tablet Take 1 tablet by mouth daily (with breakfast) 30 tablet 1    docusate sodium (COLACE, DULCOLAX) 100 MG CAPS Take 100 mg by mouth 2 times daily 60 capsule 3    senna (SENOKOT) 8.6 MG tablet Take 2 tablets by mouth nightly as needed for Constipation 60 tablet 0    melatonin 3 MG TABS tablet Take 2 tablets by mouth at bedtime For insomnia 60 tablet 3    tamsulosin (FLOMAX) 0.4 MG capsule Take 2 capsules by mouth daily 30 capsule 3    midodrine (PROAMATINE) 5 MG tablet Take 1 tablet by mouth in the morning and 1 tablet at noon and 1 tablet in the evening.  Hold if systolic blood pressure is greater than 120. 90 tablet 3    atorvastatin (LIPITOR) 40 MG tablet Take 1 tablet by mouth daily 30 tablet 2    Multiple Vitamin (MULTIVITAMIN) TABS tablet Take 1 tablet by mouth daily (with breakfast) 30 tablet 3    acetaminophen (TYLENOL) 500 MG tablet Take 500 mg by mouth every 6 hours as needed for Pain As needed      buPROPion (WELLBUTRIN XL) 150 MG extended release tablet Take 1 tablet by mouth every morning 30 tablet 1    omeprazole (PRILOSEC) 40 MG delayed release capsule Take 1 capsule by mouth daily 90 capsule 3        Medications patient taking as of now reconciled against medications ordered at time of hospital discharge: Yes        Objective:    /72   Pulse 86   Temp 97.1 °F (36.2 °C) (Temporal)   Resp 16   Wt 134 lb (60.8 kg)   SpO2 98%   BMI 19.23 kg/m²   General Appearance: alert and oriented to person, place and time, well developed and well- nourished, in no acute distress  Skin: warm and dry, no rash or erythema  Head: normocephalic and atraumatic  Eyes: pupils equal, round, and reactive to light, extraocular eye movements intact, conjunctivae normal  ENT: tympanic membrane, external ear and ear canal normal bilaterally, nose without deformity, nasal mucosa and turbinates normal without polyps  Neck: supple and non-tender without mass, no thyromegaly or thyroid nodules, no cervical lymphadenopathy  Pulmonary/Chest: clear to auscultation bilaterally- no wheezes, rales or rhonchi, normal air movement, no respiratory distress  Cardiovascular: normal rate, regular rhythm, normal S1 and S2, no murmurs, rubs, clicks, or gallops, distal pulses intact, no carotid bruits- life vest present   Abdomen: soft, non-tender, non-distended, normal bowel sounds, no masses or organomegaly  Extremities: no cyanosis, clubbing or edema  Musculoskeletal: uses wheel chair for ambulation   Neurologic: , coordination and speech normal    Hemoglobin A1C   Date Value Ref Range Status   12/15/2022 7.6 (H) 4.4 - 6.4 % Final       Narrative   CT chest without contrast.       Comparison: Chest x-ray December 13, 2022       Findings:    The thyroid is unremarkable. Normal caliber of the thoracic aorta. Atherosclerosis of the thoracic    aorta. The heart is normal size. Coronary bypass grafting. Moderate coronary    atherosclerosis. The mediastinum is intact. Mild paraseptal emphysematous disease involving the lung apices. Chronic left seventh posterior rib fracture with callus formation. Likely    chronic left eighth posterior rib fracture, mild sclerosis at the margins    of the fracture. Metallic device external to the patient and medially    posterior to the left eighth rib fracture partially limits evaluation at    this region. Median sternotomy wires. The stomach is non-distended. Moderate/severe bilateral hydronephrosis. No nephrolithiasis. Recommend    consideration for dedicated cross-sectional imaging of the abdomen/pelvis    for further evaluation of this finding. Impression   1. No definitive acute rib fracture. 2. Chronic left seventh posterior rib fracture with callus formation. Likely chronic left eighth posterior rib fracture, mild sclerosis at the    margins of this chronic appearing fracture. Metallic device external to    the patient and medially posterior to the left eighth rib fracture    partially limits evaluation at this region. 3. Moderate/severe bilateral hydronephrosis. No nephrolithiasis. Recommend    consideration for dedicated cross-sectional imaging of the abdomen/pelvis    for further evaluation of this finding. This document has been electronically signed by: Ruta Dance, DO on    12/15/2022 03:05 AM       All CTs at this facility use dose modulation techniques and iterative    reconstructions, and/or weight-based dosing   when appropriate to reduce radiation to a low as reasonably achievable.      An electronic signature was used to authenticate this note.  --BLACK Stock - CNP

## 2023-01-23 ENCOUNTER — OFFICE VISIT (OUTPATIENT)
Dept: CARDIOLOGY CLINIC | Age: 63
End: 2023-01-23
Payer: COMMERCIAL

## 2023-01-23 VITALS
SYSTOLIC BLOOD PRESSURE: 150 MMHG | HEART RATE: 80 BPM | BODY MASS INDEX: 19.54 KG/M2 | WEIGHT: 136.2 LBS | DIASTOLIC BLOOD PRESSURE: 80 MMHG | OXYGEN SATURATION: 100 %

## 2023-01-23 DIAGNOSIS — I50.23 ACUTE ON CHRONIC SYSTOLIC CONGESTIVE HEART FAILURE, NYHA CLASS 2 (HCC): Primary | ICD-10-CM

## 2023-01-23 DIAGNOSIS — I25.5 ISCHEMIC CARDIOMYOPATHY: ICD-10-CM

## 2023-01-23 DIAGNOSIS — F10.10 ETOH ABUSE: ICD-10-CM

## 2023-01-23 PROCEDURE — 99214 OFFICE O/P EST MOD 30 MIN: CPT | Performed by: NURSE PRACTITIONER

## 2023-01-23 PROCEDURE — 3079F DIAST BP 80-89 MM HG: CPT | Performed by: NURSE PRACTITIONER

## 2023-01-23 PROCEDURE — 3077F SYST BP >= 140 MM HG: CPT | Performed by: NURSE PRACTITIONER

## 2023-01-23 ASSESSMENT — ENCOUNTER SYMPTOMS
NAUSEA: 0
ABDOMINAL DISTENTION: 0
COUGH: 0
VOMITING: 0
SHORTNESS OF BREATH: 0

## 2023-01-23 NOTE — PATIENT INSTRUCTIONS
You may receive a survey regarding the care you received during your visit. Your input is valuable to us. We encourage you to complete and return your survey. We hope you will choose us in the future for your healthcare needs. Continue:  Continue current medications  Daily weights and record  Fluid restriction of 2 Liters per day  Limit sodium in diet to around 7589-6267 mg/day  Monitor BP  Activity as tolerated     Call the [facility] for any of the following symptoms: [phone]  Weight gain of 2-3 pounds in 1 day or 5 pounds in 1 week  Increased shortness of breath  Shortness of breath while laying down  Cough  Chest pain  Swelling in feet, ankles or legs  Tenderness or bloating in the abdomen  Fatigue   Decreased appetite or nausea   Confusion        ECHO 2/27    Monitor BP/HR prior to taking your midodrine. Take if top number is less than 100 or if you are very tired, feeling lightheaded or dizzy. We will call you in 1 week for your readings. Continue diet/fluid adherence  Continue daily wts.   F/U w/ Cardiology  F/U in clinic in 2 months

## 2023-01-23 NOTE — PROGRESS NOTES
Heart Failure Clinic       Visit Date: 1/23/2023  Cardiologist:  Dr. Lizz Perry  Primary Care Physician: Dr. Analia Vuong, APRN - CNP    Taniya Baumann is a 63 y.o. male who presents today for:  Chief Complaint   Patient presents with    Congestive Heart Failure       HPI:     TYPE HF: HFrEF 25-30%    Cause: ischemic s/p CABG 11/2022, hx of alcohol abuse  Device: life vest  HX: HTN, CAD s/p CABG 11/2022, smoker  Dry Wt:  142 on 11/22/22, 136 on 1/23/23      Taniya Baumann is a 63 y.o. male who presents to the office for a f/u patient visit in the heart failure clinic. Concerns today: here today for his 6 week f/u. He has been in the hospital twice since his last visit w/ me d/t strokes. He is now on elquis. His jardiance and his toprol was stopped. Pt doing well over all considering. Still cannot optimize d/t hypotension. STOP taking: (hospital visit on 12/23/22)  aspirin 325 MG EC tablet  empagliflozin 25 MG tablet (Jardiance)  glimepiride 4 MG tablet (Amaryl)  metoprolol succinate 25 MG extended release  tablet (Toprol XL)  Trulicity 1.5 SE/2.6ZS SC injection (dulaglutide)      Visit on 11/22/22: here today as a new pt. S/P CABG and new dx of ischemic cardiomyopathy. Doing well, only complaint is fatigue. Not on a diuretic. Had been on midodrine, not on it now but still hypotensive. Patient follows:      Hospitalization:        Date of Admission: 10/28/2022  Reason for Consultation:  CHF        History Of Present Illness:    63 y.o. pleasant male c hx of DM and HTN who presented to the hospital with complaints of shortness of breath. As per patient the shortness of breath started 1-2 days prior to presentation. It progressively gotten worsen so he decided to seek medication attention. Patient states that he used to drink alcohol regularly several beers for many years. He has been smoking 1 pack/day for many years recently has cut down to half a pack a day while being on Wellbutrin.   He has had poorly controlled diabetes with A1c as high as 15 recently it was brought back down to 7.5. His laboratory work-up shows mildly elevated troponins with a flat trend. Creatinine is 1.4.  proBNP was elevated at 8508. His electrocardiogram shows sinus tachycardia at 105 bpm with nonspecific ST-T wave changes. His echocardiogram from today showed EF of 25-30% with large pleural effusion. Cardiology was consulted for acute CHF exacerbation     All labs, EKG's, diagnostic testing and images as well as cardiac cath, stress testing were reviewed during this encounter.       Activity: ADLs performed  Diet: educated     Patient has:  Chest Pain: no  SOB: no  Orthopnea/PND: no  CONSTANZA: never tested  Edema: no  Fatigue: yes  Abdominal bloating: no  Cough: no  Appetite: good      Past Medical History:   Diagnosis Date    Coronary artery disease     Diabetes mellitus ([facility] [address]     Hypertension     Ischemic cardiomyopathy     Urinary retention      Past Surgical History:   Procedure Laterality Date    CORONARY ARTERY BYPASS GRAFT N/A 2022    CABG X2 JOY WITH BALLOON PUMP performed by Thor Topete MD at [address] [facility]      TRANSESOPHAGEAL ECHOCARDIOGRAM N/A 2022    TRANSESOPHAGEAL ECHOCARDIOGRAM WITH ANESTHESIA performed by Richelle Powell MD at [address] [facility]     Family History   Problem Relation Age of Onset    Diabetes Father     Stroke Father     Diabetes Brother     Diabetes Brother     Alcohol Abuse Paternal Grandfather     Heart Attack Paternal Grandfather      Social History     Tobacco Use    Smoking status: Former     Packs/day: 1.00     Years: 33.00     Pack years: 33.00     Types: Cigarettes     Start date: 38     Quit date:      Years since quittin.0    Smokeless tobacco: Never   Substance Use Topics    Alcohol use: Not Currently     Comment: Uses during three 24 ounce beer per week in the past; stopped alcoholic beverage usage after he was found to have cardiac disease     Current Outpatient Medications   Medication Sig Dispense Refill    apixaban (ELIQUIS) 5 MG TABS tablet Take 1 tablet by mouth 2 times daily 60 tablet 3    traZODone (DESYREL) 50 MG tablet Take 1 tablet by mouth nightly as needed for Sleep 30 tablet 3    glipiZIDE (GLUCOTROL) 10 MG tablet Take 1 tablet by mouth every morning (before breakfast) 60 tablet 3    ferrous sulfate (IRON 325) 325 (65 Fe) MG tablet Take 1 tablet by mouth daily (with breakfast) 30 tablet 1    tamsulosin (FLOMAX) 0.4 MG capsule Take 2 capsules by mouth daily 30 capsule 3    midodrine (PROAMATINE) 5 MG tablet Take 1 tablet by mouth in the morning and 1 tablet at noon and 1 tablet in the evening. Hold if systolic blood pressure is greater than 120. (Patient taking differently: Take 5 mg by mouth in the morning and at bedtime Hold if systolic blood pressure is greater than 120) 90 tablet 3    atorvastatin (LIPITOR) 40 MG tablet Take 1 tablet by mouth daily 30 tablet 2    Multiple Vitamin (MULTIVITAMIN) TABS tablet Take 1 tablet by mouth daily (with breakfast) 30 tablet 3    buPROPion (WELLBUTRIN XL) 150 MG extended release tablet Take 1 tablet by mouth every morning 30 tablet 1    omeprazole (PRILOSEC) 40 MG delayed release capsule Take 1 capsule by mouth daily 90 capsule 3    Handicap Placard MISC by Does not apply route Expires 5 year from date of prescription 1 each 0    acetaminophen (TYLENOL) 500 MG tablet Take 500 mg by mouth every 6 hours as needed for Pain As needed       No current facility-administered medications for this visit.     Allergies   Allergen Reactions    Metformin And Related Nausea And Vomiting       SUBJECTIVE:   Review of Systems   Constitutional:  Positive for fatigue. Negative for activity change, appetite change and diaphoresis.   Respiratory:  Negative for cough and shortness of breath.    Cardiovascular:  Negative for chest pain, palpitations and leg swelling.   Gastrointestinal:  Negative for abdominal  distention, nausea and vomiting. Neurological:  Negative for weakness, light-headedness and headaches. Hematological:  Negative for adenopathy. Psychiatric/Behavioral:  Negative for sleep disturbance. OBJECTIVE:   Today's Vitals:  BP (!) 150/80   Pulse 80   Wt 136 lb 3.2 oz (61.8 kg)   SpO2 100%   BMI 19.54 kg/m²     Physical Exam  Vitals reviewed. Constitutional:       General: He is not in acute distress. Appearance: Normal appearance. He is well-developed. He is not diaphoretic. HENT:      Head: Normocephalic and atraumatic. Eyes:      Conjunctiva/sclera: Conjunctivae normal.   Cardiovascular:      Rate and Rhythm: Normal rate and regular rhythm. Heart sounds: Normal heart sounds. No murmur heard. Pulmonary:      Effort: Pulmonary effort is normal. No respiratory distress. Breath sounds: Normal breath sounds. No wheezing or rales. Abdominal:      General: Bowel sounds are normal. There is no distension. Palpations: Abdomen is soft. Tenderness: There is no abdominal tenderness. Musculoskeletal:         General: Normal range of motion. Cervical back: Normal range of motion and neck supple. Right lower leg: No edema. Left lower leg: No edema. Skin:     General: Skin is warm and dry. Capillary Refill: Capillary refill takes less than 2 seconds. Neurological:      Mental Status: He is alert and oriented to person, place, and time. Coordination: Coordination normal.   Psychiatric:         Behavior: Behavior normal.       Wt Readings from Last 3 Encounters:   01/23/23 136 lb 3.2 oz (61.8 kg)   01/19/23 134 lb (60.8 kg)   01/12/23 133 lb 9.6 oz (60.6 kg)     BP Readings from Last 3 Encounters:   01/23/23 (!) 150/80   01/19/23 138/72   01/13/23 125/69     Pulse Readings from Last 3 Encounters:   01/23/23 80   01/19/23 86   01/13/23 78     Body mass index is 19.54 kg/m².     ECHO:         Conclusions      Summary   Left ventricle size is normal.   No asymmetrical left ventricular hypertrophy was seen. There was severe global hypokinesis of the left ventricle. Ejection fraction is visually estimated at 30%. Left atrial size was moderately dilated with no thrombus identified. ATRIAL SEPTUM: No defect or patent foramen ovale was identified. The left atrial appendage size was normal with no thrombus identified. Signature      ----------------------------------------------------------------   Electronically signed by Jean-Claude Lechuga MD (Interpreting   physician) on 12/20/2022 at 07:16 AM    Conclusions      Summary   Left ventricular size is normal and systolic function is severely reduced. Ejection fraction was estimated at 25-30%. LV wall thickness is within   normal limits. There was severe global hypokinesis of the left ventricle. The right ventricular size appears normal with normal systolic function   and wall thickness. Large pleural effusion      Signature      ----------------------------------------------------------------   Electronically signed by Destiney Mcgee MD (Interpreting   physician) on 10/29/2022 at 01:59 PM   ----------------------------------------------------------------       CATH/STRESS:   IMPRESSION:  1. Severe ischemic cardiomyopathy, ejection fraction 15-20% on left  ventriculogram.  2.  Acute systolic congestive heart failure. Left ventricular  end-diastolic pressure 31 mmHg. 3.  Severe coronary artery disease involving the ostium of the LAD,  first diagonal branch and the ramus intermedius artery. RECOMMENDATIONS:  Optimize the volume status. Resume Lasix. Stop IV  fluids. Daily weight. Strict intake and output. The patient has  chronic kidney disease, monitor daily basic metabolic panel, Nephrology  is following. Apparently, a Bella catheter was placed last night due to  concern for urinary retention. Hematuria was reported after Bella  placement. Urology consult is recommended.   Monitor CBC.  Continue on  aspirin and Lipitor. Monitor the patient on telemetry. Consult  Cardiovascular Surgery. Heart team discussion regarding  revascularization approach is warranted. Options are coronary artery  bypass graft surgery versus high-risk PCI with Impella support. Findings and plan of care were discussed with the patient and his  family, they are agreeable to the plan. Aixa Jacobo MD     D: 11/01/2022 13:06:38       T: 11/01/2022 13:10:35     [email or web address]  Job#: [id number]     Doc#: [id number]     CC:         Results reviewed:  BNP: No results found for: BNP  CBC:   Lab Results   Component Value Date/Time    WBC 8.6 12/30/2022 07:53 AM    RBC 3.79 12/30/2022 07:53 AM    HGB 10.3 12/30/2022 07:53 AM    HCT 32.8 12/30/2022 07:53 AM     12/30/2022 07:53 AM     CMP:    Lab Results   Component Value Date/Time     01/13/2023 06:32 AM    K 4.9 01/13/2023 06:32 AM    K 4.7 12/31/2022 06:38 AM     01/13/2023 06:32 AM    CO2 24 01/13/2023 06:32 AM    BUN 35 01/13/2023 06:32 AM    CREATININE 1.5 01/13/2023 06:32 AM    GFRAA 58 05/24/2022 04:58 AM    LABGLOM 53 01/13/2023 06:32 AM    GLUCOSE 170 01/13/2023 06:32 AM    CALCIUM 9.4 01/13/2023 06:32 AM     Hepatic Function Panel:    Lab Results   Component Value Date/Time    ALKPHOS 102 12/24/2022 07:46 AM    ALT 10 12/24/2022 07:46 AM    AST 13 12/24/2022 07:46 AM    PROT 6.8 12/24/2022 07:46 AM    BILITOT 0.5 12/24/2022 07:46 AM    LABALBU 3.6 12/24/2022 07:46 AM     Magnesium:    Lab Results   Component Value Date/Time    MG 2.4 12/23/2022 08:11 AM     PT/INR:    Lab Results   Component Value Date/Time    INR 1.23 11/08/2022 04:30 AM     Lipids:    Lab Results   Component Value Date/Time    TRIG 62 12/16/2022 05:09 AM    HDL 45 12/16/2022 05:09 AM    LDLCALC 42 12/16/2022 05:09 AM       ASSESSMENT AND PLAN:   The patient's condition/symptoms are stable        Diagnosis Orders   1.  Acute on chronic systolic congestive heart failure, NYHA class 2 ([address]        2. Ischemic cardiomyopathy        3. ETOH abuse            Continue:  GDMT:   ACE/ARB/ARNI - Hypotensive   BB - no   Diuretic - no  AA - no, hypotensive  SGLT2 -  Jardiance (stopped at discharge, UTI w/ bacteremia)  Vasodilator - no  Other - ASA, eliquis, iron      Ischemic cardiomyopathy, EF 25-30%, life vest on   S/P CABG 11/2022    Bella in place d/t urinary retention    Stable, no fluid on exam. Will try to get pt off of midodrine and go from there    GDMT: unable d/t hypotension, does not tolerate jardiance    Lab reviewed - Cr 1.5 K 4.9 mag 2.4    ECHO 2022: no valvular disease or atrial dilation  CATH 2022: LVEDP 31, severe ischemic cardiomyopathy    ECHO 2/27    Monitor BP/HR prior to taking your midodrine. Take if top number is less than 100 or if you are very tired, feeling lightheaded or dizzy. We will call you in 1 week for your readings. Continue diet/fluid adherence  Continue daily wts. F/U w/ Cardiology  F/U in clinic in 2 months      Tolerating above noted HF meds, no ill side effects noted. Will continue to monitor kidney function and electrolytes. Will optimize as tolerated. Pt is compliant w/ medications. Total visit time of 30 minutes has been spent with patient on education of symptoms, management, medication, and plan of care; as well as review of chart: labs, ECHO, radiology reports, etc.   I personally spent more then 50% of the appt time face to face with the patient. Daily weights  Fluid restriction of 2 Liters per day  Limit sodium in diet to around 9967-7475 mg/day  Monitor BP  Activity as tolerated     Patient was instructed to call the [facility] for any changes in symptoms as noted in AVS.      Return in about 2 months (around 3/23/2023). or sooner if needed     Patient given educational materials - see patient instructions. We discussed the importance of weighing oneself and recording daily.  We also discussed the importance of a low sodium diet, higher sodium foods to avoid and better low sodium food options. Patient verbalizes understanding of plan of care using teach back method, and is agreeable to the treatment plan.        Electronically signed by BLACK Almaguer CNP on 1/23/2023 at 9:57 AM

## 2023-01-24 ENCOUNTER — PROCEDURE VISIT (OUTPATIENT)
Dept: UROLOGY | Age: 63
End: 2023-01-24
Payer: COMMERCIAL

## 2023-01-24 VITALS — HEIGHT: 70 IN | RESPIRATION RATE: 18 BRPM | WEIGHT: 136 LBS | BODY MASS INDEX: 19.47 KG/M2

## 2023-01-24 DIAGNOSIS — R31.9 HEMATURIA, UNSPECIFIED TYPE: Primary | ICD-10-CM

## 2023-01-24 PROCEDURE — 52000 CYSTOURETHROSCOPY: CPT | Performed by: UROLOGY

## 2023-01-24 RX ORDER — CEFDINIR 300 MG/1
300 CAPSULE ORAL 2 TIMES DAILY
Qty: 14 CAPSULE | Refills: 0 | Status: SHIPPED | OUTPATIENT
Start: 2023-01-24 | End: 2023-01-31

## 2023-01-24 NOTE — PROGRESS NOTES
Patient has given me verbal consent to perform crum removal  Yes    5 cc of water deflated from crum balloon. 16 Fr crum removed without difficulty. Foreskin reduced back down?   Yes

## 2023-01-24 NOTE — PROGRESS NOTES
Cystoscopy    Operative Note    Patient:  Kristal Mojica  MRN: [id number]  YOB: 1960    Date: 01/24/23  Surgeon: Jamia Flores MD  Anesthesia: Glo Gerber Local  Indications: gross hematuria, retention after cabg. Position: Supine  EBL: 0 ml    Findings:   The patient was prepped and draped in the usual sterile fashion. The flexible cystoscope was advanced through the urethra and into the bladder. The bladder was thoroughly inspected and the following was noted:    Residual Urine: significant\" \" . urine cloudy and concerning for infection  Urethra: No abnormalities of the urethra are noted. Urethral dilation was not performed. Prostate: lateral lobe hypertrophy + present, prostate moderately obstructing, intravesical extension of prostate not present. There was no previous TURP defect. Bladder:  catheter cystitis vs tumor  . Moderate trabeculation noted. small bladder diverticulum. Ureters: Orifices with normal configuration and location. The cystoscope was removed. The patient tolerated the procedure well. Postop retention after cabg  Failed voiding trials  Leave catheter out today again per pt preference  Abx for a few days empirically    Needs TURBT (posterior wall) 15 and PVP (bph with failed voiding trials). Need cards clearance    Cysto turbt pvp (45).  Needs cards clearance on blood thinners

## 2023-01-25 ENCOUNTER — HOSPITAL ENCOUNTER (EMERGENCY)
Age: 63
Discharge: HOME | End: 2023-01-25
Attending: EMERGENCY MEDICINE
Payer: COMMERCIAL

## 2023-01-25 ENCOUNTER — TELEPHONE (OUTPATIENT)
Dept: CARDIOLOGY CLINIC | Age: 63
End: 2023-01-25

## 2023-01-25 VITALS
TEMPERATURE: 97.7 F | DIASTOLIC BLOOD PRESSURE: 61 MMHG | HEART RATE: 109 BPM | RESPIRATION RATE: 20 BRPM | OXYGEN SATURATION: 99 % | SYSTOLIC BLOOD PRESSURE: 129 MMHG

## 2023-01-25 DIAGNOSIS — R33.8 BENIGN PROSTATIC HYPERPLASIA WITH URINARY RETENTION: ICD-10-CM

## 2023-01-25 DIAGNOSIS — N40.1 BENIGN PROSTATIC HYPERPLASIA WITH URINARY RETENTION: ICD-10-CM

## 2023-01-25 DIAGNOSIS — R31.9 URINARY TRACT INFECTION WITH HEMATURIA, SITE UNSPECIFIED: Primary | ICD-10-CM

## 2023-01-25 DIAGNOSIS — Z86.73 HISTORY OF CVA (CEREBROVASCULAR ACCIDENT): ICD-10-CM

## 2023-01-25 DIAGNOSIS — Z79.01 CHRONIC ANTICOAGULATION: ICD-10-CM

## 2023-01-25 DIAGNOSIS — D72.829 LEUKOCYTOSIS, UNSPECIFIED TYPE: ICD-10-CM

## 2023-01-25 DIAGNOSIS — N39.0 URINARY TRACT INFECTION WITH HEMATURIA, SITE UNSPECIFIED: Primary | ICD-10-CM

## 2023-01-25 LAB
ALBUMIN SERPL BCG-MCNC: 4.2 G/DL (ref 3.5–5.1)
ALP SERPL-CCNC: 92 U/L (ref 38–126)
ALT SERPL W/O P-5'-P-CCNC: 9 U/L (ref 11–66)
ANION GAP SERPL CALC-SCNC: 11 MEQ/L (ref 8–16)
AST SERPL-CCNC: 11 U/L (ref 5–40)
BACTERIA URNS QL MICRO: ABNORMAL /HPF
BASOPHILS ABSOLUTE: 0.1 THOU/MM3 (ref 0–0.1)
BASOPHILS NFR BLD AUTO: 0.3 %
BILIRUB SERPL-MCNC: 0.8 MG/DL (ref 0.3–1.2)
BILIRUB UR QL STRIP.AUTO: NEGATIVE
BUN SERPL-MCNC: 31 MG/DL (ref 7–22)
CALCIUM SERPL-MCNC: 9.6 MG/DL (ref 8.5–10.5)
CASTS #/AREA URNS LPF: ABNORMAL /LPF
CHARACTER UR: ABNORMAL
CHLORIDE SERPL-SCNC: 102 MEQ/L (ref 98–111)
CO2 SERPL-SCNC: 25 MEQ/L (ref 23–33)
COLOR: ABNORMAL
CREAT SERPL-MCNC: 1.3 MG/DL (ref 0.4–1.2)
CRYSTALS URNS MICRO: ABNORMAL
DEPRECATED RDW RBC AUTO: 41.7 FL (ref 35–45)
EKG ATRIAL RATE: 105 BPM
EKG P AXIS: 79 DEGREES
EKG P-R INTERVAL: 136 MS
EKG Q-T INTERVAL: 332 MS
EKG QRS DURATION: 72 MS
EKG QTC CALCULATION (BAZETT): 438 MS
EKG R AXIS: 87 DEGREES
EKG T AXIS: 93 DEGREES
EKG VENTRICULAR RATE: 105 BPM
EOSINOPHIL NFR BLD AUTO: 0.7 %
EOSINOPHILS ABSOLUTE: 0.1 THOU/MM3 (ref 0–0.4)
EPITHELIAL CELLS, UA: ABNORMAL /HPF
ERYTHROCYTE [DISTWIDTH] IN BLOOD BY AUTOMATED COUNT: 13.9 % (ref 11.5–14.5)
GFR SERPL CREATININE-BSD FRML MDRD: > 60 ML/MIN/1.73M2
GLUCOSE SERPL-MCNC: 234 MG/DL (ref 70–108)
GLUCOSE UR QL STRIP.AUTO: 250 MG/DL
HCT VFR BLD AUTO: 38.6 % (ref 42–52)
HGB BLD-MCNC: 12.4 GM/DL (ref 14–18)
HGB UR QL STRIP.AUTO: ABNORMAL
IMM GRANULOCYTES # BLD AUTO: 0.08 THOU/MM3 (ref 0–0.07)
IMM GRANULOCYTES NFR BLD AUTO: 0.5 %
KETONES UR QL STRIP.AUTO: NEGATIVE
LACTATE SERPL-SCNC: 2 MMOL/L (ref 0.5–2)
LYMPHOCYTES ABSOLUTE: 0.9 THOU/MM3 (ref 1–4.8)
LYMPHOCYTES NFR BLD AUTO: 5.2 %
MCH RBC QN AUTO: 27 PG (ref 26–33)
MCHC RBC AUTO-ENTMCNC: 32.1 GM/DL (ref 32.2–35.5)
MCV RBC AUTO: 84.1 FL (ref 80–94)
MONOCYTES ABSOLUTE: 1.3 THOU/MM3 (ref 0.4–1.3)
MONOCYTES NFR BLD AUTO: 7.9 %
NEUTROPHILS NFR BLD AUTO: 85.4 %
NITRITE UR QL STRIP: POSITIVE
NRBC BLD AUTO-RTO: 0 /100 WBC
OSMOLALITY SERPL CALC.SUM OF ELEC: 289.8 MOSMOL/KG (ref 275–300)
PH UR STRIP.AUTO: 6.5 [PH] (ref 5–9)
PLATELET # BLD AUTO: 260 THOU/MM3 (ref 130–400)
PMV BLD AUTO: 10.1 FL (ref 9.4–12.4)
POTASSIUM SERPL-SCNC: 4.5 MEQ/L (ref 3.5–5.2)
PROT SERPL-MCNC: 7.3 G/DL (ref 6.1–8)
PROT UR STRIP.AUTO-MCNC: >= 300 MG/DL
RBC # BLD AUTO: 4.59 MILL/MM3 (ref 4.7–6.1)
RBC URINE: > 200 /HPF
SEGMENTED NEUTROPHILS ABSOLUTE COUNT: 14.3 THOU/MM3 (ref 1.8–7.7)
SODIUM SERPL-SCNC: 138 MEQ/L (ref 135–145)
SP GR UR REFRACT.AUTO: 1.02 (ref 1–1.03)
UROBILINOGEN, URINE: 0.2 EU/DL (ref 0–1)
WBC # BLD AUTO: 16.7 THOU/MM3 (ref 4.8–10.8)
WBC #/AREA URNS HPF: ABNORMAL /HPF
WBC #/AREA URNS HPF: ABNORMAL /[HPF]

## 2023-01-25 PROCEDURE — 99284 EMERGENCY DEPT VISIT MOD MDM: CPT

## 2023-01-25 PROCEDURE — 93005 ELECTROCARDIOGRAM TRACING: CPT | Performed by: EMERGENCY MEDICINE

## 2023-01-25 PROCEDURE — 36415 COLL VENOUS BLD VENIPUNCTURE: CPT

## 2023-01-25 PROCEDURE — 96365 THER/PROPH/DIAG IV INF INIT: CPT

## 2023-01-25 PROCEDURE — 6360000002 HC RX W HCPCS: Performed by: EMERGENCY MEDICINE

## 2023-01-25 PROCEDURE — 87186 SC STD MICRODIL/AGAR DIL: CPT

## 2023-01-25 PROCEDURE — 85025 COMPLETE CBC W/AUTO DIFF WBC: CPT

## 2023-01-25 PROCEDURE — 2580000003 HC RX 258: Performed by: EMERGENCY MEDICINE

## 2023-01-25 PROCEDURE — 83605 ASSAY OF LACTIC ACID: CPT

## 2023-01-25 PROCEDURE — 87040 BLOOD CULTURE FOR BACTERIA: CPT

## 2023-01-25 PROCEDURE — 87086 URINE CULTURE/COLONY COUNT: CPT

## 2023-01-25 PROCEDURE — 87077 CULTURE AEROBIC IDENTIFY: CPT

## 2023-01-25 PROCEDURE — 81001 URINALYSIS AUTO W/SCOPE: CPT

## 2023-01-25 PROCEDURE — 93010 ELECTROCARDIOGRAM REPORT: CPT | Performed by: INTERNAL MEDICINE

## 2023-01-25 PROCEDURE — 80053 COMPREHEN METABOLIC PANEL: CPT

## 2023-01-25 RX ORDER — CEFDINIR 300 MG/1
300 CAPSULE ORAL 2 TIMES DAILY
Qty: 20 CAPSULE | Refills: 0 | Status: SHIPPED | OUTPATIENT
Start: 2023-01-25 | End: 2023-02-04

## 2023-01-25 RX ADMIN — PIPERACILLIN AND TAZOBACTAM 4500 MG: 4; .5 INJECTION, POWDER, FOR SOLUTION INTRAVENOUS at 18:53

## 2023-01-25 NOTE — ED NOTES
Pt to the ED via self with family. Patient presents with complaints of hematuria. Patient states that he had a crum catheter in and they removed it yesterday to see how he would tolerate it. Patient unable to provide timeframe of blood in the urine and when it started. Patient expresses a history of stroke and CABG procedure 3 months ago. Patient is currently wearing a lifevest. EKG done, urine collected. Patient is alert and oriented x 4. Respirations are regular and unlabored. Patient refused blanket. Family at the bedside and call light within reach.      Angelika Sutton RN  01/25/23 [id number]

## 2023-01-25 NOTE — PROGRESS NOTES
Pharmacy Note - Extended Infusion Beta-Lactam Adjustment    Piperacillin/Tazobactam  3375 mg x 1  for treatment of Urinary tract infection. Per [facility] Extended Infusion Beta-Lactam Policy, piperacillin/tazobactam will be changed to  4500 mg x1 loading dose      Please call with any questions.     Thank you,    Pearley Bumpers, [facility]

## 2023-01-25 NOTE — ED NOTES
Patient lying in bed awaiting possible admission at this time. Patient also provided an update on the plan of care at this time. Patient denies further questions. Patient VSS. Respirs are easy and no acute distress noted. Call light lisa gay. Will continue to monitor.        Diego Caballero RN  01/25/23 [id number]

## 2023-01-25 NOTE — ED NOTES
Patient in bed lying on side with eyes closed. Patient appears to be resting at this time. Patient respirations are regular and unlabored. Patient vital signs are stable and respirations are noted. Will continue to monitor patient and call light placed within patients reach.        Jose Vitale RN  01/25/23 [id number]

## 2023-01-25 NOTE — ED PROVIDER NOTES
[address] [facility] [address] [address] EMERGENCY DEPT      EMERGENCY MEDICINE     Room # 21/021A    Pt Name: Lolita Cottrell  MRN: [id number]  Birthdate 1960  Date of evaluation: 1/25/2023  Provider: Gloria Gardiner MD    CHIEF COMPLAINT       Chief Complaint   Patient presents with    Hematuria     HISTORY OF PRESENT ILLNESS   Lolita Cottrell is a pleasant 63 y.o. male who presents to the emergency department from from home, by private vehicle for evaluation of hematuria noted last night and again this morning. The patient had a known history of CABG x2  11/14/22 and developed stroke December and was admitted, went to rehab and subsequently discharged with a Bella catheter as he had enlarged prostate also a kidney infection in addition to kidney failure. The patient did well had a Bella catheter for 1 month and had a follow-up with his urologist yesterday, his Bella cath was taken out however patient had difficulty of urinating las t night and this morning he has been urinating every 20 minutes with dark red tinge urine 10 to 15 mL of urine. Patient denies any fever, no chills no shortness of breath or chest pain, patient is on Eliquis because of his stroke. Patient denies any flank pain no nausea or vomiting, she has suprapubic discomfort    PASTMEDICAL HISTORY     Past Medical History:   Diagnosis Date    Coronary artery disease 2022    Diabetes mellitus ([address] 2020    Hypertension     Ischemic cardiomyopathy 2022    Urinary retention 2022       Patient Active Problem List   Diagnosis Code    Syncope R55    Facial injury S09. 93XA    Tobacco abuse Z72.0    Cranial facial fractures ([address] S02. 91XA, S02. 92XA    Diabetes mellitus type 2 in nonobese (HCC) E11.9    Fungal toenail infection B35.1    Golfer's elbow M77.00    Medical non-compliance Z91.199    Erectile dysfunction N52.9    NAVARRO (generalized anxiety disorder) F41.1    Impacted cerumen of right ear H61.21    Essential hypertension I10    Uncontrolled type 2 diabetes mellitus with hyperglycemia ([facility] E11.65    Thoracic arthritis M47.814    New onset of congestive heart failure (HCC) I97.6    Acute systolic congestive heart failure (HCC) I50.21    Nonischemic cardiomyopathy (HCC) I42.8    Stage 3a chronic kidney disease (HCC) N18.31    Tremor R25.1    LC (acute kidney injury) (HCC) N17.9    Hyperlipidemia E78.5    Gastroesophageal reflux disease K21.9    S/P cardiac cath Z98.890    CAD, multiple vessel I25.10    Ischemic cardiomyopathy I25.5    ETOH abuse F10.10    S/P CABG x 2 Z95.1    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    Acute pyelonephritis N10    Bacteremia due to Enterobacter species R78.81, B96.89    Acute CVA (cerebrovascular accident) ([facility] I63.9    Urinary retention R33.9    Acute stroke due to ischemia [facility]) I63.9    Hemiparesis affecting right side as late effect of stroke ([facility] I69.351    Coordination impairment R27.8    Debility R53.81    UTI due to Klebsiella species N39.0, B96.89    Moderate malnutrition ([facility]) E44.0    Other hypotension I95.89     SURGICAL HISTORY       Past Surgical History:   Procedure Laterality Date    CORONARY ARTERY BYPASS GRAFT N/A 11/7/2022    CABG X2 JOY WITH BALLOON PUMP performed by Dottie Byrd MD at [address] [place] [address]      TRANSESOPHAGEAL ECHOCARDIOGRAM N/A 12/16/2022    TRANSESOPHAGEAL ECHOCARDIOGRAM WITH ANESTHESIA performed by Padmini Gutierrez MD at [facility]       CURRENT MEDICATIONS       Previous Medications    ACETAMINOPHEN (TYLENOL) 500 MG TABLET    Take 500 mg by mouth every 6 hours as needed for Pain As needed    APIXABAN (ELIQUIS) 5 MG TABS TABLET    Take 1 tablet by mouth 2 times daily    ATORVASTATIN (LIPITOR) 40 MG TABLET    Take 1 tablet by mouth daily    BUPROPION (WELLBUTRIN XL) 150 MG EXTENDED RELEASE TABLET    Take 1 tablet by mouth every morning    CEFDINIR (OMNICEF) 300 MG CAPSULE    Take 1 capsule by mouth 2 times daily for 7 days    FERROUS SULFATE (IRON 325) 325 (65 FE) MG TABLET    Take 1 tablet by mouth daily (with breakfast)    GLIPIZIDE (GLUCOTROL) 10 MG TABLET    Take 1 tablet by mouth every morning (before breakfast)    HANDICAP PLACARD MISC    by Does not apply route Expires 5 year from date of prescription    MIDODRINE (PROAMATINE) 5 MG TABLET    Take 1 tablet by mouth in the morning and 1 tablet at noon and 1 tablet in the evening. Hold if systolic blood pressure is greater than 120. MULTIPLE VITAMIN (MULTIVITAMIN) TABS TABLET    Take 1 tablet by mouth daily (with breakfast)    OMEPRAZOLE (PRILOSEC) 40 MG DELAYED RELEASE CAPSULE    Take 1 capsule by mouth daily    TAMSULOSIN (FLOMAX) 0.4 MG CAPSULE    Take 2 capsules by mouth daily    TRAZODONE (DESYREL) 50 MG TABLET    Take 1 tablet by mouth nightly as needed for Sleep       ALLERGIES     is allergic to metformin and related. FAMILY HISTORY     He indicated that his mother is . He indicated that his father is . He indicated that his sister is alive. He indicated that only one of his two brothers is alive. He indicated that his paternal grandfather is .        SOCIAL HISTORY       Social History     Tobacco Use    Smoking status: Former     Packs/day: 1.00     Years: 33.00     Pack years: 33.00     Types: Cigarettes     Start date:      Quit date:      Years since quittin.0    Smokeless tobacco: Never   Vaping Use    Vaping Use: Never used   Substance Use Topics    Alcohol use: Not Currently     Comment: Uses during three 24 ounce beer per week in the past; stopped alcoholic beverage usage after he was found to have cardiac disease    Drug use: No       PHYSICAL EXAM       ED Triage Vitals [23 1438]   BP Temp Temp Source Heart Rate Resp SpO2 Height Weight   136/74 97.7 °F (36.5 °C) Oral (!) 106 22 99 % -- --       Additional Vital Signs:  /61   Pulse (!) 109   Temp 97.7 °F (36.5 °C) (Oral)   Resp 20   SpO2 99% Estimated body mass index is 19.51 kg/m² as calculated from the following:    Height as of 23: 5' 10\" (1.778 m). Weight as of 1/24/23: 136 lb (61.7 kg). Physical Exam  Vitals reviewed. Constitutional:       Appearance: He is well-developed. HENT:      Head: Normocephalic and atraumatic. Right Ear: External ear normal.      Left Ear: External ear normal.      Nose: Nose normal.   Eyes:      General: No scleral icterus. Conjunctiva/sclera: Conjunctivae normal.      Pupils: Pupils are equal, round, and reactive to light. Neck:      Thyroid: No thyromegaly. Vascular: No JVD. Cardiovascular:      Rate and Rhythm: Normal rate and regular rhythm. Heart sounds: No murmur heard. No friction rub. Pulmonary:      Effort: Pulmonary effort is normal.      Breath sounds: Decreased breath sounds present. No wheezing or rales. Chest:      Chest wall: No tenderness. Abdominal:      General: Bowel sounds are normal.      Palpations: Abdomen is soft. There is no mass. Tenderness: There is abdominal tenderness (Lower abdomen showed that the bladder is distended and firm on the suprapubic area). There is no right CVA tenderness or left CVA tenderness. Musculoskeletal:      Cervical back: Normal range of motion and neck supple. Lymphadenopathy:      Cervical: No cervical adenopathy. Skin:     Findings: No rash. Neurological:      Mental Status: He is alert and oriented to person, place, and time. Psychiatric:         Behavior: Behavior is cooperative. FORMAL DIAGNOSTIC RESULTS     RADIOLOGY: Interpretation per the Radiologist below, if available at the time of this note (none if blank):     No orders to display       LABS: (none if blank)  Labs Reviewed   CBC WITH AUTO DIFFERENTIAL - Abnormal; Notable for the following components:       Result Value    WBC 16.7 (*)     RBC 4.59 (*)     Hemoglobin 12.4 (*)     Hematocrit 38.6 (*)     MCHC 32.1 (*)     Segs Absolute 14.3 (*)     Lymphocytes Absolute 0.9 (*)     Immature Grans (Abs) 0.08 (*)     All other components within normal limits   COMPREHENSIVE METABOLIC PANEL - Abnormal; Notable for the following components:    Glucose 234 (*)     Creatinine 1.3 (*)     BUN 31 (*)     ALT 9 (*)     All other components within normal limits   URINE WITH REFLEXED MICRO - Abnormal; Notable for the following components:    Glucose, Ur 250 (*)     Blood, Urine LARGE (*)     Protein, UA >= 300 (*)     Nitrite, Urine POSITIVE (*)     Leukocyte Esterase, Urine TRACE (*)     Color, UA RED (*)     Character, Urine TURBID (*)     All other components within normal limits   CULTURE, REFLEXED, URINE    Narrative:     Source: urine, clean catch       Site:           Current Antibiotics: none   CULTURE, BLOOD 2   CULTURE, BLOOD 1   ANION GAP   GLOMERULAR FILTRATION RATE, ESTIMATED   OSMOLALITY   LACTIC ACID       (Any cultures that may have been sent were not resulted at the time of this patient visit)    [facility] / ED COURSE:     1) Number and Complexity of Problems            Problem List This Visit:         Chief Complaint   Patient presents with    Hematuria   UTI pyelonephritis, chronic anticoagulation        Differential Diagnosis includes (but not limited to):  Gross hematuria from chronic anticoagulation Eliquis used for his recent stroke and heart bypass, UTI pyelonephritis unlikely sepsis patient does not have any symptoms of fever chills or other system failure, BPH's with urinary retention        Diagnoses Considered but I have low suspicion of:   Pulmonary emboli             Pertinent Comorbid Conditions:    Recent heart bypass and recent stroke    2)  Data Reviewed (none if left blank)          My Independent interpretations:     EKG:                  Rhythm: Sinus tachycardia          Rate: normal          Axis: normal          Ectopy: none          Conduction: normal          ST Segments: no acute change          T Waves: no acute change          Q Waves: none           Clinical Impression: Sinus tachycardia, possible left atrial enlargement, possible lateral infarct abnormal EKG    Imaging: None    Labs:      Leukocytosis and significant UTI                 Decision Rules/Clinical Scores utilized:  None            External Documentation Reviewed:         Previous patient encounter documents & history available on EMR was reviewed last  hospitalization and urology visit             See Formal Diagnostic Results above for the lab and radiology tests and orders. 3)  Treatment and Disposition:         ED Medications administered this visit:  (None if blank)         Medications   piperacillin-tazobactam (ZOSYN) 4,500 mg in sodium chloride 0.9 % 100 mL IVPB (mini-bag) (4,500 mg IntraVENous New Bag 1/25/23 1853)            ED Reassessment: Patient hemodynamically stable feeling well, low abdominal discomfort/bladder is much better         Case discussed with consulting clinician: Urology services Dr. Alec Helm covered  by NP Radha Kan. Urology reviewed the patient's case and charts and they recommend patient can go home with a Bella catheter and continue antibiotic. Shared Decision-Making was performed and disposition discussed with the        Patient/Family and questions answered          Social determinants of health impacting treatment or disposition:  NA         Code Status:  Full Code      Summary of Patient Presentation:      MDM  /   Oragustin Wayne Reviewed:    Vitals:    01/25/23 1438 01/25/23 1634 01/25/23 1740 01/25/23 1850   BP: 136/74 137/75 97/60 129/61   Pulse: (!) 106 (!) 113 (!) 109 (!) 109   Resp: 22 14 18 20   Temp: 97.7 °F (36.5 °C)      TempSrc: Oral      SpO2: 99% 99% 99% 99%       The patient was seen and examined. Appropriate diagnostic testing was performed and results reviewed with the patient . Patient had a Bella catheter placement and immediately were able to collect 600 mL of tea colored urine, there is no blood clot, no bright red blood urine, urine is flowing well.   Patient had a good relief of his bladder discomfort. Patient was given IV antibiotic. I offered patient to be admitted however patient declined and wants to go home and follow-up with urology services      The results of pertinent diagnostic studies and exam findings were discussed. The patients provisional diagnosis and plan of care were discussed with the patient and present family who expressed understanding. Any medications were reviewed and indications and risks of medications were discussed with the patient /family present. Strict verbal and written return precautions, instructions and appropriate follow-up provided to  the patient . PROCEDURES: (None if blank)  Procedures:     CRITICAL CARE:  None      FINAL IMPRESSION      1. Urinary tract infection with hematuria, site unspecified    2. Benign prostatic hyperplasia with urinary retention    3. Leukocytosis, unspecified type    4. Chronic anticoagulation    5. History of CVA and CABG (cerebrovascular accident)          DISPOSITION/PLAN   DISPOSITION Decision To Discharge 01/25/2023 06:54:05 PM      PATIENT REFERRED TO:  Cinthya Hart MD  [address] [facility] [address] [phone]    In 1 day      Heidy Avila, APRN - CNP  [address] [facility]  [phone]    In 2 days      [address] [place] [address] EMERGENCY DEPT  [address]  [phone]    As needed  DISCHARGE MEDICATIONS:  New Prescriptions    CEFDINIR (OMNICEF) 300 MG CAPSULE    Take 1 capsule by mouth 2 times daily for 10 days         (Please note that portions of this note were completed with a voice recognition program and electronically transcribed. Efforts were made to edit the dictations but occasionally words are mis-transcribed . The transcription may contain errors not detected in proofreading.   This transcription was electronically signed.)     01/25/23 7:10 PM      Debbi Lopez MD      Emergency room physician             Debbi Lopez MD  01/25/23 1069

## 2023-01-25 NOTE — ED NOTES
Patient lying in bed with blankets watching tv at this time. Patient respirations are regular and unlabored. Patient appears to be in no current distress. Patient VSS. Call light is within reach. Patient expresses no needs at this time.        Emily Huizar RN  01/25/23 4759

## 2023-01-25 NOTE — TELEPHONE ENCOUNTER
Patient called and canceled todays appointment due to snow and would like a call back to reschedule.  Please advise [phone]

## 2023-01-26 ENCOUNTER — TELEPHONE (OUTPATIENT)
Dept: UROLOGY | Age: 63
End: 2023-01-26

## 2023-01-26 NOTE — TELEPHONE ENCOUNTER
Dr. Duane Eddy was consulted by me yesterday. Crum to stay in until surgery. Patient to continue antibiotics as prescribed. Advise to drink plenty of fluids. Encourage patient to call office if crum is not draining well.

## 2023-01-26 NOTE — TELEPHONE ENCOUNTER
Patient wife Caesar Reyes call to get patient in for ER fu appt for her . He has a cysto done on 1-24 by Dr Sarai Baker. Went into ER on 1-25 for hematuria they put a cath in and consulted urology [facility]) . Checking on ER fu and to see if you need to see next week or just come in for lab/ma and remove cath. Looks like his note from 1-24 shows he is  to have surg cysto turbt? When? Please advise. Thank you.

## 2023-01-27 ENCOUNTER — PATIENT MESSAGE (OUTPATIENT)
Dept: UROLOGY | Age: 63
End: 2023-01-27

## 2023-01-27 LAB
BACTERIA BLD AEROBE CULT: NORMAL
BACTERIA BLD AEROBE CULT: NORMAL
BACTERIA UR CULT: ABNORMAL
ORGANISM: ABNORMAL

## 2023-01-27 NOTE — TELEPHONE ENCOUNTER
Patient urine culture positive for Klebsiella pneumonia. Continue on Omnicef as prescribed. Advise to drink plenty of fluids.

## 2023-01-27 NOTE — TELEPHONE ENCOUNTER
Left message for patient to return call. Advised on VM if we have not heard from him by the end of the day we will cancel appt so that someone else can be seen.

## 2023-01-27 NOTE — TELEPHONE ENCOUNTER
From: Larisa Richmond  To: Dr. Yamila Campos: 1/27/2023 10:24 AM EST  Subject: Question regarding CULTURE, REFLEXED, URINE    Do I need to do anything more about this, than the antibiotics I am on? I had this before, when I went into the hospital in December. They gave me some hardcore super bug antibiotics, and it had also ended up in my blood. I am concerned.

## 2023-01-27 NOTE — TELEPHONE ENCOUNTER
Patient wife left a message to check if anything else needs to be completed. She seen his lab results mychart.

## 2023-01-27 NOTE — TELEPHONE ENCOUNTER
Caesar Reyes will call the office back to schedule the lab visit if his surgery is not scheduled before 02/22/2023.

## 2023-01-27 NOTE — TELEPHONE ENCOUNTER
Charolet Hamman advised to continue omnicef and push fluids. She voiced understanding. When should he have a follow up?

## 2023-01-27 NOTE — TELEPHONE ENCOUNTER
Patient to get surgical clearance for surgery. Bella cathter will need changed every 4 weeks pending surgery scheduled. Will need lab/ma visit 2/22/2023 for catheter exchange.

## 2023-01-28 NOTE — PROGRESS NOTES
Pharmacy Note  ED Culture Follow-up    Venkat Phelan is a 63 y.o. male. Allergies: Metformin and related     Current antimicrobials:   Reviewed patient's urine culture - culture positive for Klebsiella pneumoniae. Patient was discharged on cefdinir 300 mg BID x 10 days, and culture is sensitive to prescribed medication. Antibiotic prescribed at discharge is appropriate - no changes made to antibiotic regimen. Please call with any questions.  Mil Murdock, [facility], PharmD 2:56 PM 1/28/2023

## 2023-01-30 ENCOUNTER — TELEPHONE (OUTPATIENT)
Dept: UROLOGY | Age: 63
End: 2023-01-30

## 2023-01-30 ENCOUNTER — TELEPHONE (OUTPATIENT)
Dept: CARDIOLOGY CLINIC | Age: 63
End: 2023-01-30

## 2023-01-30 ENCOUNTER — OFFICE VISIT (OUTPATIENT)
Dept: CARDIOLOGY CLINIC | Age: 63
End: 2023-01-30
Payer: COMMERCIAL

## 2023-01-30 VITALS
WEIGHT: 141 LBS | HEART RATE: 94 BPM | SYSTOLIC BLOOD PRESSURE: 129 MMHG | HEIGHT: 70 IN | DIASTOLIC BLOOD PRESSURE: 73 MMHG | BODY MASS INDEX: 20.19 KG/M2

## 2023-01-30 DIAGNOSIS — I25.10 CAD, MULTIPLE VESSEL: ICD-10-CM

## 2023-01-30 DIAGNOSIS — Z01.818 PRE-OP TESTING: ICD-10-CM

## 2023-01-30 DIAGNOSIS — E11.9 DIABETES MELLITUS TYPE 2 IN NONOBESE (HCC): ICD-10-CM

## 2023-01-30 DIAGNOSIS — N40.1 BPH WITH URINARY OBSTRUCTION: ICD-10-CM

## 2023-01-30 DIAGNOSIS — I10 ESSENTIAL HYPERTENSION: ICD-10-CM

## 2023-01-30 DIAGNOSIS — N13.8 BPH WITH URINARY OBSTRUCTION: ICD-10-CM

## 2023-01-30 DIAGNOSIS — Z95.1 S/P CABG X 2: ICD-10-CM

## 2023-01-30 DIAGNOSIS — N18.31 STAGE 3A CHRONIC KIDNEY DISEASE (HCC): ICD-10-CM

## 2023-01-30 DIAGNOSIS — I63.9 ACUTE CVA (CEREBROVASCULAR ACCIDENT) (HCC): ICD-10-CM

## 2023-01-30 DIAGNOSIS — I25.5 ISCHEMIC CARDIOMYOPATHY: Primary | ICD-10-CM

## 2023-01-30 DIAGNOSIS — R31.9 HEMATURIA, UNSPECIFIED TYPE: Primary | ICD-10-CM

## 2023-01-30 PROCEDURE — 3078F DIAST BP <80 MM HG: CPT | Performed by: NURSE PRACTITIONER

## 2023-01-30 PROCEDURE — 3074F SYST BP LT 130 MM HG: CPT | Performed by: NURSE PRACTITIONER

## 2023-01-30 PROCEDURE — 99213 OFFICE O/P EST LOW 20 MIN: CPT | Performed by: NURSE PRACTITIONER

## 2023-01-30 NOTE — TELEPHONE ENCOUNTER
Last OV 1/30/23-Christina  Last Echo 12/16/22-JOY  Last Cath 11/1/22, CABG 11/7/22    Is patient on blood thinners Eliquis  Hold Meds/how many days ?     Wearing Life-vest

## 2023-01-30 NOTE — TELEPHONE ENCOUNTER
On way to an appt.  Will send picture of readings through [employer] when they get home

## 2023-01-30 NOTE — TELEPHONE ENCOUNTER
DO NOT TAKE  FISH OIL, IBUPROFEN, MOTRIN-LIKE DRUGS AND ANY MULTIVITAMINS OR OVER THE COUNTER SUPPLEMENTS 14 DAYS PRIOR TO SURGERY. HOLD ELIQUIS FOR 3 DAYS PRIOR TO SURGERY. MUST HAVE AN ADULT OVER THE AGE OF 18 WITH YOU AT THE TIME OF THE PROCEDURE AND WITH YOU AT HOME AFTER THE PROCEDURE FOR 24 HOURS       Hunter Melo [facility] 1960 Diagnosis:     Surgical Physician: Dr. Rajeev Morgan have been scheduled for the procedure marked below:      Surgery: Cystoscopy, Transurethral resection of bladder tumor, Greenlight Photo Vaporization of Prostate         Date: 3/3/2023     Anesthesia: Anesthesiologist (General/Spinal)     Place of Service: [address] [place] [address] Second Floor Same Day Surgery         Arrive to same day surgery by:  9:30am  (Surgery time is subject to change)      INSTRUCTIONS AS MARKED BELOW:    1.  DO NOT eat or drink anything after midnight before surgery. 2.  We prefer you shower or bathe with an antibacterial soap (Dial) the morning of surgery. 3  Please bring a current medication list, photo ID and insurance card(s) with you  4. Okay to take Tylenol  5. If you take Glucophage, Metformin or Janumet, hold 48-hours prior to surgery  6. Take blood pressure or heart medication as directed, if taken in the morning take with a small sip of water  7. The office will call you in 1-2 days after your procedure to schedule a follow up. DATE SENSITIVE TESTING-DO ON THE DATE LISTED*WALK IN *(OUTPATIENT [facility] TESTING IN [facility]) NO APPOINTMENT    DO URINE CULTURE AND CBC ON 2/17/2023; ORDERS INCLUDED.         Date: 1/30/2023

## 2023-01-30 NOTE — PROGRESS NOTES
[facility] PROFESSIONAL SERVICES  [facility]   [address] [facility] [address].[facility] [address]   Dept: [phone]   Dept Fax: [phone]: [phone]      Chief Complaint   Patient presents with    [address] [place] [address] follow-up    Cardiologist:  Dr. Veronica Kamara    CHF clinic 1/23/23  Concerns today: here today for his 6 week f/u. He has been in the hospital twice since his last visit w/ me d/t strokes. He is now on elquis. His jardiance and his toprol was stopped. Pt doing well over all considering. Still cannot optimize d/t hypotension. STOP taking: (hospital visit on 12/23/22)  aspirin 325 MG EC tablet  empagliflozin 25 MG tablet (Jardiance)  glimepiride 4 MG tablet (Amaryl)  metoprolol succinate 25 MG extended release  tablet (Toprol XL)  Trulicity 1.5 WO/1.8LG SC injection (dulaglutide)   GDMT:              ACE/ARB/ARNI - Hypotensive              BB - no              Diuretic - no  AA - no, hypotensive  SGLT2 -  Jardiance (stopped at discharge, UTI w/ bacteremia)  Vasodilator - no  Other - ASA, eliquis, iron   Ischemic cardiomyopathy, EF 25-30%, life vest on   S/P CABG 11/2022   Bella in place d/t urinary retention   Stable, no fluid on exam. Will try to get pt off of midodrine and go from there   GDMT: unable d/t hypotension, does not tolerate jardiance   Lab reviewed - Cr 1.5 K 4.9 mag 2.4   ECHO 2022: no valvular disease or atrial dilation  CATH 2022: LVEDP 31, severe ischemic cardiomyopathy   ECHO 2/27       ED on 12/15/22 with fall and back pain; admitted with acute pyelonephritis  Code stroke on 12/15/22.  MRI (+) acute ischemic L parietal stroke  Cardiology consulted for JOY with bubble study to assess for embolic source  Discharged 12/22/22 to IPR  Acute ischemic stroke in the left parietal corona radiata region and right cerebellar hemisphere  Sepsis secondary to Klebsiella pneumonia and Enterobacter cloacae bacteremia and emphysematous cystitis  Discharged IPR 1/13/23 to home with [facility]    CHF clinic on 11/22/22     TYPE HF: HFrEF 25-30%    Cause: ischemic s/p CABG 11/2022, hx of alcohol abuse  Device: life vest  HX: HTN, CAD s/p CABG 11/2022, smoker  Dry Wt:  142 on 11/22/22  1. Acute on chronic systolic congestive heart failure, NYHA class 2 ([address]          2. Ischemic cardiomyopathy          3. History of alcohol abuse             Continue:  GDMT:              ACE/ARB/ARNI - Hypotensive              BB - no? Diuretic - no  AA - no, hypotensive  SGLT2 -  Jardiance  Vasodilator - no  Other - ASA  Ischemic cardiomyopathy, EF 25-30%, life vest on   S/P CABG 11/2022  Sutures removed x3 at chest tube sites. No erythema, healed and closed  Bella in place d/t urinary retention   Stable, no fluid on exam, diminished sounds of left lower lobe (improved pleural effusion post op). GDMT: unable d/t hypotension  Lab reviewed - Cr 1.4 K 4.4 mag 2.4   ECHO 2022: no valvular disease or atrial dilation  CATH 2022: LVEDP 31, severe ischemic cardiomyopathy   ECHO in 3 months   Start taking Toprol 25mg daily  Monitor BP/HR    Seen in hospital on 10/28/22 per Dr. Nando Nicholson for CHF. 63 y.o. pleasant male c hx of DM and HTN who presented to the hospital with complaints of shortness of breath. As per patient the shortness of breath started 1-2 days prior to presentation. It progressively gotten worsen so he decided to seek medication attention. Patient states that he used to drink alcohol regularly several beers for many years. He has been smoking 1 pack/day for many years recently has cut down to half a pack a day while being on Wellbutrin. He has had poorly controlled diabetes with A1c as high as 15 recently it was brought back down to 7.5. His laboratory work-up shows mildly elevated troponins with a flat trend. Creatinine is 1.4.  proBNP was elevated at 8508. His electrocardiogram shows sinus tachycardia at 105 bpm with nonspecific ST-T wave changes.   His echocardiogram from today showed EF of 25-30% with large pleural effusion. Cardiology was consulted for acute CHF exacerbation  Cath 11/1/22; MV CAD; poor candidate for PCI (ostial LAD; large RI and LCX); off-pump CABG with IABP  PET scan demonstrated non-viable myocardium  EF 25%; ICDMP (55% 2013)  CABG 11/7/22  Discharged 11/15/22      Today's visit:   Subjective:  March 3 planning to have Prostate surgery - OP procedure  Not feeling too bad  Better today than last week - had another episode hematuria; wobbly and shakey - unsteady - had been evaluated in ED and sent home  Seems to be more steady last few days  Hematuria has resolved  Continued abx for known Klebsiella pneumonia and Enterobacter cloacae bacteremia and emphysematous cystitis  No chest pain or pressure - soreness R chest wall after fall over walker day after leaving rehab - improving  Breathing good  No swelling issues  Mild intermittent dizziness if tries to move too fast; no syncope or near syncope  Still with fatigue - about the same - seems to be improving and then will have a little set-back  Live vest on - wearing as should; no shocks      General:   No fever, no chills, No worsening fatigue or weight loss/gain  Pulmonary:    No dyspnea, no wheezing  Cardiac:    Denies recent chest discomfort or palpitations  GI:     No nausea or vomiting, no abdominal pain, no black or tarry stools, no blood in stools  Neuro:       No worsening dizziness or light headedness  Musculoskeletal:  No recent active issues, no new musculoskeletal pain  Extremities:   No swelling or claudication symptoms      Past Medical History:   Diagnosis Date    Coronary artery disease 2022    Diabetes mellitus ([facility] [address] 2020    Hypertension     Ischemic cardiomyopathy 2022    Urinary retention 2022       Allergies   Allergen Reactions    Metformin And Related Nausea And Vomiting       Current Outpatient Medications   Medication Sig Dispense Refill    cefdinir (OMNICEF) 300 MG capsule Take 1 capsule by mouth 2 times daily for 10 days 20 capsule 0    cefdinir (OMNICEF) 300 MG capsule Take 1 capsule by mouth 2 times daily for 7 days 14 capsule 0    Handicap Placard MISC by Does not apply route Expires 5 year from date of prescription 1 each 0    apixaban (ELIQUIS) 5 MG TABS tablet Take 1 tablet by mouth 2 times daily 60 tablet 3    traZODone (DESYREL) 50 MG tablet Take 1 tablet by mouth nightly as needed for Sleep 30 tablet 3    glipiZIDE (GLUCOTROL) 10 MG tablet Take 1 tablet by mouth every morning (before breakfast) 60 tablet 3    ferrous sulfate (IRON 325) 325 (65 Fe) MG tablet Take 1 tablet by mouth daily (with breakfast) 30 tablet 1    tamsulosin (FLOMAX) 0.4 MG capsule Take 2 capsules by mouth daily 30 capsule 3    midodrine (PROAMATINE) 5 MG tablet Take 1 tablet by mouth in the morning and 1 tablet at noon and 1 tablet in the evening. Hold if systolic blood pressure is greater than 120. (Patient taking differently: Take 5 mg by mouth in the morning and at bedtime Hold if systolic blood pressure is greater than 120) 90 tablet 3    atorvastatin (LIPITOR) 40 MG tablet Take 1 tablet by mouth daily 30 tablet 2    Multiple Vitamin (MULTIVITAMIN) TABS tablet Take 1 tablet by mouth daily (with breakfast) 30 tablet 3    acetaminophen (TYLENOL) 500 MG tablet Take 500 mg by mouth every 6 hours as needed for Pain As needed      buPROPion (WELLBUTRIN XL) 150 MG extended release tablet Take 1 tablet by mouth every morning 30 tablet 1    omeprazole (PRILOSEC) 40 MG delayed release capsule Take 1 capsule by mouth daily 90 capsule 3     No current facility-administered medications for this visit.        Social History     Socioeconomic History    Marital status:      Spouse name: None    Number of children: None    Years of education: None    Highest education level: None   Tobacco Use    Smoking status: Former     Packs/day: 1.00     Years: 33.00     Pack years: 33.00     Types: Cigarettes Start date:      Quit date:      Years since quittin.0    Smokeless tobacco: Never   Vaping Use    Vaping Use: Never used   Substance and Sexual Activity    Alcohol use: Not Currently     Comment: Uses during three 24 ounce beer per week in the past; stopped alcoholic beverage usage after he was found to have cardiac disease    Drug use: No    Sexual activity: Yes     Partners: Female     Social Determinants of Health     Financial Resource Strain: Low Risk     Difficulty of Paying Living Expenses: Not hard at all   Food Insecurity: No Food Insecurity    Worried About Running Out of Food in the Last Year: Never true    Ran Out of Food in the Last Year: Never true   Transportation Needs: No Transportation Needs    Lack of Transportation (Medical): No    Lack of Transportation (Non-Medical): No       Family History   Problem Relation Age of Onset    Diabetes Father     Stroke Father     Diabetes Brother     Diabetes Brother     Alcohol Abuse Paternal Grandfather     Heart Attack Paternal Grandfather        Blood pressure 129/73, pulse 94, height 5' 10\" (1.778 m), weight 141 lb (64 kg).    General:   Well developed, fairly well nourished; appears as has underlying chronic illness  Lungs:    Clear to auscultation  Heart:     regular rhythm, normal rate, Normal S1 S2, No murmur, rubs, or gallops. Live Vest on.  Abdomen:   Soft, non tender, no organomegalies, positive bowel sounds  Extremities:   No edema, no cyanosis, good peripheral pulses  Neurological:   Awake, alert, oriented. No obvious focal deficits  Musculoskeletal:  No obvious deformities    EK23 14:35:05 [facility] ROUTINE RECORD  Sinus tachycardia  Possible Left atrial enlargement  Possible Lateral infarct (cited on or before 28-OCT-2022)  Abnormal ECG  When compared with ECG of 2023 10:19,  Non-specific change in ST segment in Anterior leads  Nonspecific T wave abnormality now evident in Anterior  leads  Confirmed by [facility] MD, [id number] [facility] ([id number]) on 1/25/2023 3:52:37 PM    Event: 12/23/22  TEST TYPE:  Cardiac event monitor. DATE OF ENROLLMENT: 12/23/2022 until 01/21/2023. INDICATION: History of cerebral infarction. FINDINGS:  Monitoring period was between 12/23/2022 and 01/21/2023. Baseline underlying rhythm was consistent with normal sinus rhythm. The  average heart rate during the study duration was 76 beats per minute. There was no evidence for prolonged pauses or profound bradycardia. No  evidence for atrioventricular block. Only rare premature ectopies were  noted. No evidence for sustained arrhythmia. IMPRESSION:  Normal sinus rhythm. Abdon Reyes MD     JOY: 12/16/22   Summary   Left ventricle size is normal.   No asymmetrical left ventricular hypertrophy was seen. There was severe global hypokinesis of the left ventricle. Ejection fraction is visually estimated at 30%. Left atrial size was moderately dilated with no thrombus identified. ATRIAL SEPTUM: No defect or patent foramen ovale was identified. The left atrial appendage size was normal with no thrombus identified. Signature    ----------------------------------------------------------------   Electronically signed by Silvino Ellison MD (Interpreting   physician) on 12/20/2022 at 07:16 AM    Echo: 10/29/22  Indications:Heart Failure. Additional Medical History:smoker, congestive heart failure, diabetes,  hypertension, GERD      Summary   Left ventricular size is normal and systolic function is severely reduced. Ejection fraction was estimated at 25-30%. LV wall thickness is within   normal limits. There was severe global hypokinesis of the left ventricle. The right ventricular size appears normal with normal systolic function   and wall thickness.    Large pleural effusion    Signature    ----------------------------------------------------------------   Electronically signed by Rupali Martel MD (Interpreting physician) on 10/29/2022 at 01:59 PM    CAB22  DATE: 22     PATIENT: Connor Bose     MRN: [id number]                                  Acct: [de-identified]     PREOP DIAGNOSIS:   Coronary artery disease     Postop diagnosis:  Coronary artery disease     Procedure:  1) Coronary artery bypass grafting x 2, with the left internal mammary artery grafted to the left anterior descending artery, a reversed greater saphenous aortocoronary vein graft to the  ramus intermedius coronary artery  2) Endoscopic greater saphenous vein harvest  3) Intraoperative coronary angiography of all distal anastomoses  4) Insertion of a left common femoral intra-aortic balloon pump     SURGEON:  Alvarez Iyer MD    Cath: 22  DATE OF PROCEDURE:  2022     INDICATION FOR PROCEDURE:  Acute systolic congestive heart failure. New-onset severe cardiomyopathy. Ejection fraction on echocardiogram  was 25-30%. PROCEDURES PERFORMED:  1. Left cardiac catheterization with selective coronary angiogram.  2.  Left ventriculogram.  3.  Abdominal aortogram.     DESCRIPTION OF PROCEDURE AND DETAILS:  After informed consent, the  patient was brought to the cardiac catheterization room. He was prepped  and draped in a sterile fashion. 2% lidocaine was injected in the skin  and subcutaneous tissue overlying the right radial artery. Under  ultrasound guidance using modified Seldinger technique, access was  obtained in the right radial artery. 5/6 Slender sheath was inserted. Standard antithrombotic/antispasmodic medications were given. I used  6-Malagasy JR4, 6-Malagasy JL3.5 diagnostic catheters to complete the  coronary angiogram.  For the left ventriculogram and the abdominal  aortogram, I used to the 6-Malagasy pigtail catheter. FINDINGS:  ABDOMINAL AORTOGRAM:  Distal abdominal aorta is patent without  significant stenosis or aneurysmal dilation. There is evidence for  20-30% stenosis in the right common iliac artery. Right external iliac  and internal iliac arteries are patent. No significant stenotic lesions  on the left common iliac, internal iliac and external iliac arteries. LEFT VENTRICULOGRAM:  There is evidence for severe global hypokinesis  with akinetic movement of the anterior wall. Ejection fraction  estimated at 15-20%. HEMODYNAMICS:  Left ventricular end-diastolic pressure 31 mmHg. No  significant pressure gradient across the aortic valve upon pullback. CORONARY ANGIOGRAM:  1. The left main coronary artery has 10-20% stenosis in the distal  segment, otherwise patent without evidence for obstructive disease. Gives rise to left circumflex, left anterior descending and ramus  intermedius arteries. 2.  Ramus intermedius has 90-95% stenosis in the proximal segment. 3.  Left circumflex artery. Luminal irregularities were noticed without  evidence for obstructive disease. 4.  Left anterior descending artery. The ostium of the LAD has a 90%  stenosis. Proximal LAD with luminal irregularities, otherwise LAD is  patent with no significant stenotic lesions. D1 has 99% subtotal  occlusion. 5.  Right coronary artery. The proximal RCA has luminal irregularities. Mid RCA has 40-50% stenosis. Distal RCA is patent. DOMINANCE:  Codominant system. MEDICATIONS:  See MAR. COMPLICATIONS:  None. ESTIMATED BLOOD LOSS:  Less than 50 mL. ACCESS:  Right radial artery access. Vasc Band was applied. Hemostasis  was achieved. IMPRESSION:  1. Severe ischemic cardiomyopathy, ejection fraction 15-20% on left  ventriculogram.  2.  Acute systolic congestive heart failure. Left ventricular  end-diastolic pressure 31 mmHg. 3.  Severe coronary artery disease involving the ostium of the LAD,  first diagonal branch and the ramus intermedius artery. RECOMMENDATIONS:  Optimize the volume status. Resume Lasix. Stop IV  fluids. Daily weight. Strict intake and output.   The patient has  chronic kidney disease, monitor daily basic metabolic panel, Nephrology  is following. Apparently, a Bella catheter was placed last night due to  concern for urinary retention. Hematuria was reported after Bella  placement. Urology consult is recommended. Monitor CBC. Continue on  aspirin and Lipitor. Monitor the patient on telemetry. Consult  Cardiovascular Surgery. Heart team discussion regarding  revascularization approach is warranted. Options are coronary artery  bypass graft surgery versus high-risk PCI with Impella support. Findings and plan of care were discussed with the patient and his  family, they are agreeable to the plan. Aniya Holcomb MD   D: 11/01/2022      Diagnosis Orders   1. Ischemic cardiomyopathy        2. CAD, multiple vessel        3. S/P CABG x 2        4. Essential hypertension        5. Acute CVA (cerebrovascular accident) ([facility] [address]        6. Diabetes mellitus type 2 in nonobese (HCC)        7. Stage 3a chronic kidney disease ([facility] [address]          Doing well; continues to recover from CABG and recent CVA. Needs prostate surgery - planning for March. No anginal sx. Tolerating meds  HFrEF; wearing LV; no shocks. No evidence of decompensated HF. Repeat echo 2/2023. Follows with CHF clinic and Nephrology. Continues with therapies    Continue Dr Jeison Beth current treatment plan  Patient Instructions   Continue current medications as prescribed. Follow-up with CHF clinic in March as scheduled. Continue to follow CHF guidelines. Have the echo done in February as scheduled. Continue the Life Vest until we get results of the echo. Follow-up with your PCP as scheduled. Follow-up with Dr. Steve Crow in 3 months as scheduled or sooner if need. Return in about 3 months (around 4/30/2023), or if symptoms worsen or fail to improve, for Dr. Steve Crow.       Christina Pierson, APRN - CNP

## 2023-01-30 NOTE — TELEPHONE ENCOUNTER
----- Message from BLACK Tang CNP sent at 1/23/2023 10:00 AM EST -----  Regarding: bp log  Monitor BP/HR prior to taking your midodrine. Take if top number is less than 100 or if you are very tired, feeling lightheaded or dizzy. We will call you in 1 week for your readings.

## 2023-01-30 NOTE — TELEPHONE ENCOUNTER
SURGERY 6  [address] [place] [address] [address] [facility], [address]      Phone *[phone] *[phone]   Surgical Scheduling Direct Line Phone *[phone] Fax *[phone]      Kian Little 1960 male    [place] [address] [facility]   Marital Status:          Home Phone: [phone]      Cell Phone:    Telephone Information:   Mobile [phone]          Surgeon: Dr. Samy Murguia Surgery Date: 3/3/2023   Time: 11:30am    Procedure: Cystoscopy, Transurethral resection of bladder tumor, Greenlight Photo Vaporization of prostate    Diagnosis: BPH with obstruction, hematuria     Important Medical History:  In Epic    Special Inst/Equip:     CPT Codes:    32415, 28650  Latex Allergy: No     Cardiac Device:  No    Anesthesia:  General          Admission Type:  Same Day                        Admit Prior to Day of Surgery: No    Case Location:  Main OR            Preadmission Testing:  Phone Call          PAT Date and Time:______________________________________________________    PAT Confirmation #: ______________________________________________________    Post Op Visit: ___________________________________________________________    Need Preop Cardiac Clearance: Yes    Does Patient have Cardiologist/physician?      Dr. Flor Glass    Surgery Confirmation #: __________________________________________________    Hersche Part: ________________________   Date: __________________________     [employer] Name: Mindy Tinajero

## 2023-01-30 NOTE — TELEPHONE ENCOUNTER
Patient is scheduled with Dr. Nehemias Powell on 3/3/23. Surgery consent to be done upon arrival.  Dr. Justin Arcos to clear. Patient to do urine culture and CBC on 2/17/23; orders mailed to patient. .  Surgery instructions mailed to patient. Patient will have an adult over the age of 25 with them at discharge and 24 hours after procedure.         [facility] conf# [id number]

## 2023-01-30 NOTE — TELEPHONE ENCOUNTER
Patient is scheduled for a Cystoscopy, Transurethral resection of bladder tumor, greenlight photo vaporization of prostate with Dr. Sharon Bence on 3/3/2023. We are requesting clearance and direction on holding Eliquis from Dr. Donald Cerrato. Thank you.

## 2023-01-30 NOTE — PATIENT INSTRUCTIONS
Continue current medications as prescribed. Follow-up with CHF clinic in March as scheduled. Continue to follow CHF guidelines. Have the echo done in February as scheduled. Continue the Life Vest until we get results of the echo. Follow-up with your PCP as scheduled. Follow-up with Dr. Rubia Chawla in 3 months as scheduled or sooner if need.

## 2023-02-09 ENCOUNTER — OFFICE VISIT (OUTPATIENT)
Dept: NEPHROLOGY | Age: 63
End: 2023-02-09
Payer: COMMERCIAL

## 2023-02-09 VITALS
WEIGHT: 141 LBS | DIASTOLIC BLOOD PRESSURE: 82 MMHG | HEART RATE: 95 BPM | BODY MASS INDEX: 20.23 KG/M2 | SYSTOLIC BLOOD PRESSURE: 136 MMHG | OXYGEN SATURATION: 99 %

## 2023-02-09 DIAGNOSIS — E11.21 DIABETIC NEPHROPATHY WITH PROTEINURIA (HCC): ICD-10-CM

## 2023-02-09 DIAGNOSIS — N18.2 CKD (CHRONIC KIDNEY DISEASE), STAGE II: Primary | ICD-10-CM

## 2023-02-09 DIAGNOSIS — I10 ESSENTIAL HYPERTENSION: ICD-10-CM

## 2023-02-09 PROCEDURE — 99213 OFFICE O/P EST LOW 20 MIN: CPT | Performed by: INTERNAL MEDICINE

## 2023-02-09 PROCEDURE — 3079F DIAST BP 80-89 MM HG: CPT | Performed by: INTERNAL MEDICINE

## 2023-02-09 PROCEDURE — 3075F SYST BP GE 130 - 139MM HG: CPT | Performed by: INTERNAL MEDICINE

## 2023-02-09 NOTE — PROGRESS NOTES
[facility]        Outpatient Follow-Up note         2/9/2023 8:58 AM    Patient Name:   Shanique Reeves  YOB: 1960  Primary Care Physician:  BLACK Carrero - CNP   [facility] [place] [facility]  [facility]  [address]  Dept: [phone]  Loc: [phone]     Chief Complaint / Reason for follow-up : Follow Up of CKD and recent hospital dischargw     Interval History :  Patient seen and examined by me. Feels well no chest pain no shortness of breath.   Patient has been in the hospital twice once for cardiac surgery and subsequently had a stroke  He was having polyuria and hypotensive and renal function was getting worse  Blood pressure is now better and he is taken off the midodrine     Past History :  Past Medical History:   Diagnosis Date    Coronary artery disease 2022    Diabetes mellitus ([address] 2020    Hypertension     Ischemic cardiomyopathy 2022    Urinary retention 2022     Past Surgical History:   Procedure Laterality Date    CORONARY ARTERY BYPASS GRAFT N/A 11/7/2022    CABG X2 JOY WITH BALLOON PUMP performed by Mamta Coe MD at [address] [facility]      TRANSESOPHAGEAL ECHOCARDIOGRAM N/A 12/16/2022    TRANSESOPHAGEAL ECHOCARDIOGRAM WITH ANESTHESIA performed by Gely Metz MD at [facility]        Medications :     Outpatient Medications Marked as Taking for the 2/9/23 encounter (Office Visit) with Nehemiah Braden MD   Medication Sig Dispense Refill    Handicap Placard MISC by Does not apply route Expires 5 year from date of prescription 1 each 0    apixaban (ELIQUIS) 5 MG TABS tablet Take 1 tablet by mouth 2 times daily 60 tablet 3    traZODone (DESYREL) 50 MG tablet Take 1 tablet by mouth nightly as needed for Sleep 30 tablet 3    glipiZIDE (GLUCOTROL) 10 MG tablet Take 1 tablet by mouth every morning (before breakfast) 60 tablet 3    ferrous sulfate (IRON 325) 325 (65 Fe) MG tablet Take 1 tablet by mouth daily (with breakfast) 30 tablet 1    tamsulosin (FLOMAX) 0.4 MG capsule Take 2 capsules by mouth daily 30 capsule 3    atorvastatin (LIPITOR) 40 MG tablet Take 1 tablet by mouth daily 30 tablet 2    Multiple Vitamin (MULTIVITAMIN) TABS tablet Take 1 tablet by mouth daily (with breakfast) 30 tablet 3    acetaminophen (TYLENOL) 500 MG tablet Take 500 mg by mouth every 6 hours as needed for Pain As needed      buPROPion (WELLBUTRIN XL) 150 MG extended release tablet Take 1 tablet by mouth every morning 30 tablet 1    omeprazole (PRILOSEC) 40 MG delayed release capsule Take 1 capsule by mouth daily 90 capsule 3       Vitals     /82 (Site: Right Upper Arm, Position: Sitting, Cuff Size: Medium Adult)   Pulse 95   Wt 141 lb (64 kg)   SpO2 99%   BMI 20.23 kg/m²  Wt Readings from Last 3 Encounters:   02/09/23 141 lb (64 kg)   01/30/23 141 lb (64 kg)   01/24/23 136 lb (61.7 kg)        Physical Exam     General -- no distress  Lungs -- clear  Heart -- S1, S2 heard, JVD - no  Abdomen - soft, non-tender  Extremities -- no edema  CNS - awake and alert    Labs, Radiology and Tests    Labs -    1/23           Potassium 4.5           BUN 31           Creatinine 1.3           eGFR >60                       UPCR 330           UMCR                          Renal Ultrasound Scan --10/22  Right kidney 11.5 cm left kidney 11.1 cm  Some postvoid residual noted and bilateral hydronephrosis     Assessment    Renal -renal function appears to be stable we will have to see where it stabilizes  -He may be having some stage II CKD due to presence of some protein  -We will reevaluate once the catheter is out and his prostate has been fixed  Electrolytes within normal limits  Essential hypertension running very well he was hypotensive in the hospital was on midodrine which was stopped recently  Hx of diabetes mellitus with possible diabetic nephropathy  Bladder outlet obstruction mostly due to prostate seeing urology will be having surgery done soon. Now with a crum  Status post CABG with valve replacement. meds reviewed and D/W patient and wife  Follow-up in 6 months    Tests and orders placed this Encounter   No orders of the defined types were placed in this encounter. Chris Richards M.D  [facility].

## 2023-02-10 ENCOUNTER — TELEPHONE (OUTPATIENT)
Dept: UROLOGY | Age: 63
End: 2023-02-10

## 2023-02-20 ENCOUNTER — HOSPITAL ENCOUNTER (EMERGENCY)
Age: 63
Discharge: HOME | End: 2023-02-21
Attending: EMERGENCY MEDICINE
Payer: COMMERCIAL

## 2023-02-20 ENCOUNTER — APPOINTMENT (OUTPATIENT)
Dept: GENERAL RADIOLOGY | Age: 63
End: 2023-02-20
Payer: COMMERCIAL

## 2023-02-20 DIAGNOSIS — U07.1 COVID-19: Primary | ICD-10-CM

## 2023-02-20 LAB
ALBUMIN SERPL BCG-MCNC: 4.4 G/DL (ref 3.5–5.1)
ALP SERPL-CCNC: 85 U/L (ref 38–126)
ALT SERPL W/O P-5'-P-CCNC: 8 U/L (ref 11–66)
ANION GAP SERPL CALC-SCNC: 12 MEQ/L (ref 8–16)
AST SERPL-CCNC: 9 U/L (ref 5–40)
BASE EXCESS BLDA CALC-SCNC: 2.8 MMOL/L (ref -2–3)
BASOPHILS ABSOLUTE: 0 THOU/MM3 (ref 0–0.1)
BASOPHILS NFR BLD AUTO: 0.5 %
BILIRUB SERPL-MCNC: 0.5 MG/DL (ref 0.3–1.2)
BUN SERPL-MCNC: 22 MG/DL (ref 7–22)
CA-I BLD ISE-SCNC: 1.21 MMOL/L (ref 1.12–1.32)
CALCIUM SERPL-MCNC: 9.6 MG/DL (ref 8.5–10.5)
CHLORIDE BLD-SCNC: 102 MEQ/L (ref 98–109)
CHLORIDE SERPL-SCNC: 97 MEQ/L (ref 98–111)
CO2 SERPL-SCNC: 25 MEQ/L (ref 23–33)
COLLECTED BY:: ABNORMAL
CREAT SERPL-MCNC: 1.4 MG/DL (ref 0.4–1.2)
DEPRECATED RDW RBC AUTO: 43 FL (ref 35–45)
EOSINOPHIL NFR BLD AUTO: 3.1 %
EOSINOPHILS ABSOLUTE: 0.2 THOU/MM3 (ref 0–0.4)
ERYTHROCYTE [DISTWIDTH] IN BLOOD BY AUTOMATED COUNT: 13.7 % (ref 11.5–14.5)
FLUAV RNA RESP QL NAA+PROBE: NOT DETECTED
FLUBV RNA RESP QL NAA+PROBE: NOT DETECTED
GFR SERPL CREATININE-BSD FRML MDRD: 57 ML/MIN/1.73M2
GLUCOSE BLD-MCNC: 282 MG/DL (ref 70–108)
GLUCOSE SERPL-MCNC: 365 MG/DL (ref 70–108)
HCO3 BLDA-SCNC: 29 MMOL/L (ref 23–28)
HCT VFR BLD AUTO: 40.3 % (ref 42–52)
HGB BLD-MCNC: 12.6 GM/DL (ref 14–18)
IMM GRANULOCYTES # BLD AUTO: 0.04 THOU/MM3 (ref 0–0.07)
IMM GRANULOCYTES NFR BLD AUTO: 0.5 %
LACTATE BLD-SCNC: 1.6 MMOL/L (ref 0.5–1.9)
LYMPHOCYTES ABSOLUTE: 0.6 THOU/MM3 (ref 1–4.8)
LYMPHOCYTES NFR BLD AUTO: 7.9 %
MAGNESIUM SERPL-MCNC: 1.7 MG/DL (ref 1.6–2.4)
MCH RBC QN AUTO: 26.8 PG (ref 26–33)
MCHC RBC AUTO-ENTMCNC: 31.3 GM/DL (ref 32.2–35.5)
MCV RBC AUTO: 85.7 FL (ref 80–94)
MONOCYTES ABSOLUTE: 0.7 THOU/MM3 (ref 0.4–1.3)
MONOCYTES NFR BLD AUTO: 8.4 %
NEUTROPHILS NFR BLD AUTO: 79.6 %
NRBC BLD AUTO-RTO: 0 /100 WBC
NT-PROBNP SERPL IA-MCNC: 5672 PG/ML (ref 0–124)
OSMOLALITY SERPL CALC.SUM OF ELEC: 286.4 MOSMOL/KG (ref 275–300)
PCO2 TEMP ADJ BLDMV: 53 MMHG (ref 41–51)
PH BLDMV: 7.36 [PH] (ref 7.31–7.41)
PLATELET # BLD AUTO: 243 THOU/MM3 (ref 130–400)
PMV BLD AUTO: 10 FL (ref 9.4–12.4)
PO2 BLDMV: 22 MMHG (ref 25–40)
POC CREATININE WHOLE BLOOD: 1.2 MG/DL (ref 0.5–1.2)
POTASSIUM BLD-SCNC: 4.3 MEQ/L (ref 3.5–4.9)
POTASSIUM SERPL-SCNC: 4.1 MEQ/L (ref 3.5–5.2)
PROT SERPL-MCNC: 7.4 G/DL (ref 6.1–8)
RBC # BLD AUTO: 4.7 MILL/MM3 (ref 4.7–6.1)
SAO2 % BLDMV: 35 %
SARS-COV-2 RNA RESP QL NAA+PROBE: DETECTED
SEGMENTED NEUTROPHILS ABSOLUTE COUNT: 6.3 THOU/MM3 (ref 1.8–7.7)
SODIUM BLD-SCNC: 133 MEQ/L (ref 138–146)
SODIUM SERPL-SCNC: 134 MEQ/L (ref 135–145)
WBC # BLD AUTO: 7.9 THOU/MM3 (ref 4.8–10.8)

## 2023-02-20 PROCEDURE — 82947 ASSAY GLUCOSE BLOOD QUANT: CPT

## 2023-02-20 PROCEDURE — 82330 ASSAY OF CALCIUM: CPT

## 2023-02-20 PROCEDURE — 82565 ASSAY OF CREATININE: CPT

## 2023-02-20 PROCEDURE — 83605 ASSAY OF LACTIC ACID: CPT

## 2023-02-20 PROCEDURE — 2580000003 HC RX 258

## 2023-02-20 PROCEDURE — 99285 EMERGENCY DEPT VISIT HI MDM: CPT

## 2023-02-20 PROCEDURE — 85025 COMPLETE CBC W/AUTO DIFF WBC: CPT

## 2023-02-20 PROCEDURE — 82435 ASSAY OF BLOOD CHLORIDE: CPT

## 2023-02-20 PROCEDURE — 87636 SARSCOV2 & INF A&B AMP PRB: CPT

## 2023-02-20 PROCEDURE — 36415 COLL VENOUS BLD VENIPUNCTURE: CPT

## 2023-02-20 PROCEDURE — 84132 ASSAY OF SERUM POTASSIUM: CPT

## 2023-02-20 PROCEDURE — 82803 BLOOD GASES ANY COMBINATION: CPT

## 2023-02-20 PROCEDURE — 84295 ASSAY OF SERUM SODIUM: CPT

## 2023-02-20 PROCEDURE — 6370000000 HC RX 637 (ALT 250 FOR IP)

## 2023-02-20 PROCEDURE — 80053 COMPREHEN METABOLIC PANEL: CPT

## 2023-02-20 PROCEDURE — 83880 ASSAY OF NATRIURETIC PEPTIDE: CPT

## 2023-02-20 PROCEDURE — 83735 ASSAY OF MAGNESIUM: CPT

## 2023-02-20 PROCEDURE — 71045 X-RAY EXAM CHEST 1 VIEW: CPT

## 2023-02-20 PROCEDURE — 93005 ELECTROCARDIOGRAM TRACING: CPT

## 2023-02-20 RX ORDER — ACETAMINOPHEN 500 MG
1000 TABLET ORAL ONCE
Status: COMPLETED | OUTPATIENT
Start: 2023-02-20 | End: 2023-02-20

## 2023-02-20 RX ORDER — 0.9 % SODIUM CHLORIDE 0.9 %
1000 INTRAVENOUS SOLUTION INTRAVENOUS ONCE
Status: DISCONTINUED | OUTPATIENT
Start: 2023-02-20 | End: 2023-02-20

## 2023-02-20 RX ADMIN — ACETAMINOPHEN 1000 MG: 500 TABLET ORAL at 21:59

## 2023-02-20 RX ADMIN — SODIUM CHLORIDE 1000 ML: 9 INJECTION, SOLUTION INTRAVENOUS at 21:46

## 2023-02-20 ASSESSMENT — PAIN - FUNCTIONAL ASSESSMENT
PAIN_FUNCTIONAL_ASSESSMENT: 0-10
PAIN_FUNCTIONAL_ASSESSMENT: NONE - DENIES PAIN

## 2023-02-20 ASSESSMENT — PAIN DESCRIPTION - LOCATION: LOCATION: HEAD

## 2023-02-20 ASSESSMENT — PAIN SCALES - GENERAL: PAINLEVEL_OUTOF10: 5

## 2023-02-21 VITALS
DIASTOLIC BLOOD PRESSURE: 66 MMHG | HEIGHT: 70 IN | RESPIRATION RATE: 20 BRPM | TEMPERATURE: 98.9 F | OXYGEN SATURATION: 100 % | SYSTOLIC BLOOD PRESSURE: 114 MMHG | WEIGHT: 140 LBS | BODY MASS INDEX: 20.04 KG/M2 | HEART RATE: 112 BPM

## 2023-02-21 PROCEDURE — 93010 ELECTROCARDIOGRAM REPORT: CPT | Performed by: NUCLEAR MEDICINE

## 2023-02-21 ASSESSMENT — PAIN - FUNCTIONAL ASSESSMENT: PAIN_FUNCTIONAL_ASSESSMENT: NONE - DENIES PAIN

## 2023-02-21 NOTE — ED NOTES
PT and VS assessed after returning back to bed. Respirations even and unlabored, call light in reach. PT denies further needs.      Ketty Sanchez RN  02/21/23 6457

## 2023-02-21 NOTE — ED NOTES
PT ambulated around room with use of walker without complication> SpO2 remained remained 97%-100% wand pulse 118. Respirations even and unlabored.       Nic Melendez RN  02/21/23 4621

## 2023-02-21 NOTE — ED PROVIDER NOTES
[facility] [address] [address] EMERGENCY DEPT      EMERGENCY MEDICINE     Pt Name: Tonny Miner  MRN: [id number]  [place] 1960  Date of evaluation: 2/20/2023  Resident Physician: Stacie Landau, DPM  Supervising Physician: Martin Zapata DO    CHIEF COMPLAINT       Chief Complaint   Patient presents with    Shortness of Breath     HISTORY OF PRESENT ILLNESS   Tonny Miner is a pleasant 63 y.o. male who presents to the emergency department from from home, as a walk in to the ED lobby for evaluation of shortness of breath. Patient relates that for the past day he has felt short of breath and that his hands and feet are tingling. He relates that his close family have been dealing with a cold type illness for several days and thinks he may have picked that up. He admits to mild nonproductive coughing. He denies any fevers, chills, diarrhea, abdominal pain. He has a chronic indwelling Bella catheter, but denies any discomfort or pain to the pubic area or his genitals. He admits to some mild chest pain however he had a CABG in 11/2022 and states that he has consistent pain after that. PASTMEDICAL HISTORY     Past Medical History:   Diagnosis Date    Coronary artery disease 2022    Diabetes mellitus ([facility] [address] [address] 2020    Hypertension     Ischemic cardiomyopathy 2022    Urinary retention 2022       Patient Active Problem List   Diagnosis Code    Syncope R55    Facial injury S09. 93XA    Tobacco abuse Z72.0    Cranial facial fractures ([facility] [address] S02. 91XA, S02. 92XA    Diabetes mellitus type 2 in nonobese (HCC) E11.9    Fungal toenail infection B35.1    Golfer's elbow M77.00    Medical non-compliance Z91.199    Erectile dysfunction N52.9    NAVARRO (generalized anxiety disorder) F41.1    Impacted cerumen of right ear H61.21    Essential hypertension I10    Uncontrolled type 2 diabetes mellitus with hyperglycemia (HCC) E11.65    Thoracic arthritis M47.814    New onset of congestive heart failure (HCC) E44.9    Acute systolic congestive heart failure (HCC) I50.21    Nonischemic cardiomyopathy (HCC) I42.8    Stage 3a chronic kidney disease (HCC) N18.31    Tremor R25.1    LC (acute kidney injury) ([facility] N17.9    Hyperlipidemia E78.5    Gastroesophageal reflux disease K21.9    S/P cardiac cath Z98.890    CAD, multiple vessel I25.10    Ischemic cardiomyopathy I25.5    ETOH abuse F10.10    S/P CABG x 2 Z95.1    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    Acute pyelonephritis N10    Bacteremia due to Enterobacter species R78.81, B96.89    Acute CVA (cerebrovascular accident) ([facility] I63.9    Urinary retention R33.9    Acute stroke due to ischemia ([facility] I63.9    Hemiparesis affecting right side as late effect of stroke ([facility] I69.351    Coordination impairment R27.8    Debility R53.81    UTI due to Klebsiella species N39.0, B96.89    Moderate malnutrition (HCC) E44.0    Other hypotension I95.89     SURGICAL HISTORY       Past Surgical History:   Procedure Laterality Date    CORONARY ARTERY BYPASS GRAFT N/A 11/7/2022    CABG X2 JOY WITH BALLOON PUMP performed by Raphael Ding MD at [address] [facility] [facility]      TRANSESOPHAGEAL ECHOCARDIOGRAM N/A 12/16/2022    TRANSESOPHAGEAL ECHOCARDIOGRAM WITH ANESTHESIA performed by Kelly Sarkar MD at [address] [facility]       Discharge Medication List as of 2/21/2023 12:44 AM        CONTINUE these medications which have NOT CHANGED    Details   Handicap [facility] Starting Thu 1/19/2023, Disp-1 each, R-0, PrintExpires 5 year from date of prescription      apixaban (ELIQUIS) 5 MG TABS tablet Take 1 tablet by mouth 2 times daily, Disp-60 tablet, R-3Normal      traZODone (DESYREL) 50 MG tablet Take 1 tablet by mouth nightly as needed for Sleep, Disp-30 tablet, R-3Normal      glipiZIDE (GLUCOTROL) 10 MG tablet Take 1 tablet by mouth every morning (before breakfast), Disp-60 tablet, R-3Normal      ferrous sulfate (IRON 325) 325 (65 Fe) MG tablet Take 1 tablet by mouth daily (with breakfast), Disp-30 tablet, R-1Normal      tamsulosin (FLOMAX) 0.4 MG capsule Take 2 capsules by mouth daily, Disp-30 capsule, R-3Normal      midodrine (PROAMATINE) 5 MG tablet Take 1 tablet by mouth in the morning and 1 tablet at noon and 1 tablet in the evening. Hold if systolic blood pressure is greater than 120., Disp-90 tablet, R-3Normal      atorvastatin (LIPITOR) 40 MG tablet Take 1 tablet by mouth daily, Disp-30 tablet, R-2Normal      Multiple Vitamin (MULTIVITAMIN) TABS tablet Take 1 tablet by mouth daily (with breakfast), Disp-30 tablet, R-3Normal      acetaminophen (TYLENOL) 500 MG tablet Take 500 mg by mouth every 6 hours as needed for Pain As neededHistorical Med      buPROPion (WELLBUTRIN XL) 150 MG extended release tablet Take 1 tablet by mouth every morning, Disp-30 tablet, R-1Normal      omeprazole (PRILOSEC) 40 MG delayed release capsule Take 1 capsule by mouth daily, Disp-90 capsule, R-3Normal             ALLERGIES     is allergic to metformin and related. FAMILY HISTORY     He indicated that his mother is . He indicated that his father is . He indicated that his sister is alive. He indicated that only one of his two brothers is alive. He indicated that his paternal grandfather is .        SOCIAL HISTORY       Social History     Tobacco Use    Smoking status: Former     Packs/day: 1.00     Years: 33.00     Pack years: 33.00     Types: Cigarettes     Start date:      Quit date:      Years since quittin.1    Smokeless tobacco: Never   Vaping Use    Vaping Use: Never used   Substance Use Topics    Alcohol use: Not Currently     Comment: Uses during three 24 ounce beer per week in the past; stopped alcoholic beverage usage after he was found to have cardiac disease    Drug use: No       PHYSICAL EXAM       ED Triage Vitals [23]   BP Temp Temp Source Heart Rate Resp SpO2 Height Weight   (!) 163/85 98.9 °F (37.2 °C) Oral (!) 109 17 100 % 5' 10\" (1.778 m) 140 lb (63.5 kg) Additional Vital Signs:  Vitals:    02/21/23 0032   BP: 114/66   Pulse: (!) 112   Resp:    Temp:    SpO2: 100%     Physical Exam    FORMAL DIAGNOSTIC RESULTS     RADIOLOGY: Interpretation per the Radiologist below, if available at the time of this note (none if blank):    XR CHEST PORTABLE   Final Result   No acute findings.       This document has been electronically signed by: Rashad Russell MD on    02/20/2023 09:17 PM          LABS: (none if blank)  Labs Reviewed   COVID-19 & INFLUENZA COMBO - Abnormal; Notable for the following components:       Result Value    SARS-CoV-2 RNA, RT PCR DETECTED (*)     All other components within normal limits   CBC WITH AUTO DIFFERENTIAL - Abnormal; Notable for the following components:    Hemoglobin 12.6 (*)     Hematocrit 40.3 (*)     MCHC 31.3 (*)     Lymphocytes Absolute 0.6 (*)     All other components within normal limits   COMPREHENSIVE METABOLIC PANEL - Abnormal; Notable for the following components:    Glucose 365 (*)     Creatinine 1.4 (*)     Sodium 134 (*)     Chloride 97 (*)     ALT 8 (*)     All other components within normal limits   GLOMERULAR FILTRATION RATE, ESTIMATED - Abnormal; Notable for the following components:    Est, Glom Filt Rate 57 (*)     All other components within normal limits   BRAIN NATRIURETIC PEPTIDE - Abnormal; Notable for the following components:    Pro-BNP 5672.0 (*)     All other components within normal limits   BLOOD GAS, VENOUS - Abnormal; Notable for the following components:    PCO2, MIXED VENOUS 53 (*)     PO2, Mixed 22 (*)     HCO3, Mixed 29 (*)     All other components within normal limits   SODIUM, WHOLE BLOOD - Abnormal; Notable for the following components:    Sodium, Whole Blood 133 (*)     All other components within normal limits   GLUCOSE, WHOLE BLOOD - Abnormal; Notable for the following components:    Glucose, Whole Blood 282 (*)     All other components within normal limits   MAGNESIUM   ANION GAP   OSMOLALITY POTASSIUM, WHOLE BLOOD   CHLORIDE, WHOLE BLOOD   CALCIUM IONIZED SERUM   POC LACTIC ACID   POCT CREATININE       (Any cultures that may have been sent were not resulted at the time of this patient visit)    [address] / ED COURSE:     1) Number and Complexity of Problems            Problem List This Visit:         Chief Complaint   Patient presents with    Shortness of Breath            Differential Diagnosis includes (but not limited to):   URI, COPD exacerbation, asthma, pneumothorax, anaphylaxis, anxiety, PE, CHF, atelectasis, lower airway obstruction, aspiration        Diagnoses Considered but I have low suspicion of:   ACS/MI             Pertinent Comorbid Conditions:    HTN    2)  Data Reviewed (none if left blank)          My Independent interpretations:     EKG:          Imaging: See ED course    Labs:      See ED course                 Decision Rules/Clinical Scores utilized:              External Documentation Reviewed:         Previous patient encounter documents & history available on EMR was reviewed. See Formal Diagnostic Results above for the lab and radiology tests and orders. 3)  Treatment and Disposition         ED Reassessment: Patient resting comfortably on cot in no acute distress. Relates that he feels much better and more alert and that he feels like he could cough up phlegm. Patient was reassessed with walking pulse ox saturations did not drop below 97%. Case discussed with consulting clinician:           Shared Decision-Making was performed and disposition discussed with the        Patient/Family and questions answered          Social determinants of health impacting treatment or disposition:           Code Status: Full code      Summary of Patient Presentation:        Medical Decision Making  71-year-old male patient presented to the emergency department chief complaint shortness of breath.   Patient states that the symptoms have been going on for the past few days, and that his hands and feet tingle at times. He denies any nausea, vomiting, fevers, chills,  but admits to pain in his chest as he has had prior CABG in November last year. He states that pains in his chest are consistent with what he normally feels and are not new. Patient relates that several his family members that he lives with have had cold-like symptoms for the past few days. Physical exam nonconcerning, chest x-ray no acute findings. Considering URI, patient did test positive for COVID-19. His heart rate and blood pressure  were elevated on presentation, was given a approximately 500 mL normal saline fluid bolus and 1 g of oral Tylenol. Upon reassessment these appeared to relieve patient's symptoms quite a bit, stating that he felt much better. Other lab work-up was only concerning for elevated glucose, this decreased after fluid bolus. Patient was reassessed with walking pulse oximeter with his saturations only dipping to 97% at the lowest.  Patient was given a home pulse oximeter with instructions on checking throughout the day, and if he notices the saturation dropping below 90% for any of the time he is to return to the emergency department. Patient was given handout on supportive care for COVID-19, and advised that he follow-up with his primary care within 2-3 days for further evaluation management. Patient was discharged from the emergency department with vital signs stable. /  ED Course as of 02/21/23 0103   Mon Feb 20, 2023 2138 Patient is COVID-19 positive. [AA]   2138 Chest x-ray returned with no acute findings. [AA]   2142 Reassessed patient. He is complaining of headache that he did not mention earlier. Neuro work-up fine. Patient denies any loss of consciousness. We will give 1g Tylenol p.o. once, and start 1 L normal saline bolus of fluids to help correct heart rate. [AA]   2238 Discontinued fluid bolus.  [AA]   2350 Will check walking pulse ox [AA]      ED Course User Index  [AA] Lina Regalado DPM     Vitals Reviewed:    Vitals:    02/20/23 2146 02/20/23 2231 02/20/23 2336 02/21/23 0032   BP: (!) 151/75 (!) 155/79 123/67 114/66   Pulse: (!) 102 99 (!) 108 (!) 112   Resp: 20 17 20    Temp:       TempSrc:       SpO2: 100% 100% 100% 100%   Weight:       Height:           The patient was seen and examined. Appropriate diagnostic testing was performed and results reviewed with the patient. The results of pertinent diagnostic studies and exam findings were discussed. The patients provisional diagnosis and plan of care were discussed with the patient and present family who expressed understanding. Any medications were reviewed and indications and risks of medications were discussed with the patient /family present. Strict verbal and written return precautions, instructions and appropriate follow-up provided to  the patient . ED Medications administered this visit:  (None if blank)  Medications   acetaminophen (TYLENOL) tablet 1,000 mg (1,000 mg Oral Given 2/20/23 2159)         PROCEDURES: (None if blank)  Procedures:     CRITICAL CARE: (None if blank)      DISCHARGE PRESCRIPTIONS: (None if blank)  Discharge Medication List as of 2/21/2023 12:44 AM          FINAL IMPRESSION      1. COVID-19          DISPOSITION/PLAN   DISPOSITION Decision To Discharge 02/21/2023 12:44:33 AM        OUTPATIENT FOLLOW UP THE PATIENT:  No follow-up provider specified.     RAFIA Gómez DPM  Resident  02/21/23 8772

## 2023-02-21 NOTE — ED NOTES
PT resting in bed. PT and VS assessed. PT states he is \"feeling a lot better\". Respirations even and unlabored. Call light in reach. RN will notify provider.      Nic Melendez RN  02/20/23 3026

## 2023-02-21 NOTE — ED NOTES
PT resting in bed. Respirations even and unlabored. PT and VS assessed.  PT denies current needs       Denton Veras RN  02/20/23 5520

## 2023-02-21 NOTE — DISCHARGE INSTRUCTIONS
You were seen in the emergency department for shortness of breath. You are diagnosed with COVID-19. Be sure to read the attached information, and use pulse oximeter to monitor as directed. If it reads less than 90% for any length of time please return the emergency room. You may follow-up with your primary care provider within 2-3 days for follow-up care. If symptoms worsen please return to the emergency department.

## 2023-02-21 NOTE — ED TRIAGE NOTES
PT to the ED with complaint of shortness of breath starting today. PT states it is worse when he is moving. PT states he also started having some tingling is his hands and feet. PT states his family has had a cold and he has had a cough. Respirations are even and unlabored upon arrival and SpO2 is 100% on room air. PT denies any abnormal chest pain other than when he coughs.

## 2023-02-22 LAB
EKG ATRIAL RATE: 102 BPM
EKG P AXIS: 72 DEGREES
EKG P-R INTERVAL: 136 MS
EKG Q-T INTERVAL: 344 MS
EKG QRS DURATION: 78 MS
EKG QTC CALCULATION (BAZETT): 448 MS
EKG R AXIS: 87 DEGREES
EKG T AXIS: 102 DEGREES
EKG VENTRICULAR RATE: 102 BPM

## 2023-02-27 NOTE — PROGRESS NOTES
Instructed wife to call Dr Domenica Tierney office back then call us back if the plan is to continue with surgery.

## 2023-02-27 NOTE — PROGRESS NOTES
Pt is sleeping and wife didn't want to wake pt up at this time.  They will call back tomorrow per wife

## 2023-02-27 NOTE — PROGRESS NOTES
Called gab with Dr Abbe Russell to inform them that pt was covid positive 2/20/23 and when attempted to make phone call wife said pt was sleeping and unable to come to phone. Jazmine Veras said she will call pt to ask questions about Covid.

## 2023-02-27 NOTE — PROGRESS NOTES
Wife called back said he isn't having any problems breathing,. He didn't run any fever. No coughing at this time. Wife states she has COVID she will be out of isolation on 3-1-23.

## 2023-02-28 ENCOUNTER — TELEPHONE (OUTPATIENT)
Dept: CARDIOLOGY CLINIC | Age: 63
End: 2023-02-28

## 2023-02-28 NOTE — TELEPHONE ENCOUNTER
Patient missed echo 2*/27  Tested + for COVID 2/20  Called to patient to r/s   No answer   [facility]

## 2023-03-03 ENCOUNTER — HOSPITAL ENCOUNTER (OUTPATIENT)
Dept: NON INVASIVE DIAGNOSTICS | Age: 63
Discharge: HOME | End: 2023-03-03
Payer: COMMERCIAL

## 2023-03-03 DIAGNOSIS — I50.23 ACUTE ON CHRONIC SYSTOLIC CONGESTIVE HEART FAILURE, NYHA CLASS 2 (HCC): ICD-10-CM

## 2023-03-03 DIAGNOSIS — I25.5 ISCHEMIC CARDIOMYOPATHY: ICD-10-CM

## 2023-03-03 LAB
LV EF: 23 %
LVEF MODALITY: NORMAL

## 2023-03-03 PROCEDURE — 93307 TTE W/O DOPPLER COMPLETE: CPT

## 2023-03-06 ENCOUNTER — TELEPHONE (OUTPATIENT)
Dept: CARDIOLOGY CLINIC | Age: 63
End: 2023-03-06

## 2023-03-06 NOTE — TELEPHONE ENCOUNTER
Patient notified   He has appointment on Fri and is going to think about it and discuss further on Friday

## 2023-03-06 NOTE — TELEPHONE ENCOUNTER
----- Message from BLACK Lau CNP sent at 3/6/2023  5:57 AM EST -----  EF has worsened. Was at 30% now at 20-25%. Patient does have lifevest based my last note. He did have Covid. This could have worsened the EF. At this point we can refer to Dr. Edilberto Jordan or he can wait an additional three months due to recently having Covid.

## 2023-03-10 ENCOUNTER — OFFICE VISIT (OUTPATIENT)
Dept: CARDIOLOGY CLINIC | Age: 63
End: 2023-03-10
Payer: COMMERCIAL

## 2023-03-10 VITALS
BODY MASS INDEX: 19.67 KG/M2 | OXYGEN SATURATION: 99 % | SYSTOLIC BLOOD PRESSURE: 126 MMHG | HEIGHT: 70 IN | HEART RATE: 107 BPM | WEIGHT: 137.4 LBS | DIASTOLIC BLOOD PRESSURE: 70 MMHG

## 2023-03-10 DIAGNOSIS — I50.23 ACUTE ON CHRONIC SYSTOLIC CONGESTIVE HEART FAILURE, NYHA CLASS 2 (HCC): ICD-10-CM

## 2023-03-10 DIAGNOSIS — Z95.1 S/P CABG X 2: ICD-10-CM

## 2023-03-10 DIAGNOSIS — Z91.89 AT RISK FOR FLUID VOLUME OVERLOAD: ICD-10-CM

## 2023-03-10 DIAGNOSIS — I25.5 ISCHEMIC CARDIOMYOPATHY: Primary | ICD-10-CM

## 2023-03-10 DIAGNOSIS — Z95.1 S/P CABG (CORONARY ARTERY BYPASS GRAFT): ICD-10-CM

## 2023-03-10 PROCEDURE — 3074F SYST BP LT 130 MM HG: CPT | Performed by: NURSE PRACTITIONER

## 2023-03-10 PROCEDURE — 99214 OFFICE O/P EST MOD 30 MIN: CPT | Performed by: NURSE PRACTITIONER

## 2023-03-10 PROCEDURE — 3078F DIAST BP <80 MM HG: CPT | Performed by: NURSE PRACTITIONER

## 2023-03-10 RX ORDER — METOPROLOL SUCCINATE 25 MG/1
25 TABLET, EXTENDED RELEASE ORAL DAILY
Qty: 30 TABLET | Refills: 3 | Status: SHIPPED | OUTPATIENT
Start: 2023-03-10

## 2023-03-10 ASSESSMENT — ENCOUNTER SYMPTOMS
SHORTNESS OF BREATH: 0
VOMITING: 0
NAUSEA: 0
ABDOMINAL DISTENTION: 0
COUGH: 0

## 2023-03-10 NOTE — PATIENT INSTRUCTIONS
You may receive a survey regarding the care you received during your visit. Your input is valuable to us. We encourage you to complete and return your survey. We hope you will choose us in the future for your healthcare needs. Continue:  Continue current medications  Daily weights and record  Fluid restriction of 2 Liters per day  Limit sodium in diet to around 3053-3787 mg/day  Monitor BP  Activity as tolerated     Call the [facility] for any of the following symptoms: [phone]  Weight gain of 2-3 pounds in 1 day or 5 pounds in 1 week  Increased shortness of breath  Shortness of breath while laying down  Cough  Chest pain  Swelling in feet, ankles or legs  Tenderness or bloating in the abdomen  Fatigue   Decreased appetite or nausea   Confusion          Ordering cardiac rehab. Start taking Toprol 25mg daily    Calling in [facility] today to check the cost.     Blood work two weeks from starting [facility]    Still making referral to Dr. Bhavin Olivera d/t continued drop in EF. Continue diet/fluid adherence  Continue daily wts.   F/U w/ Cardiology  F/U in clinic in 3 month

## 2023-03-10 NOTE — PROGRESS NOTES
Heart Failure Clinic       Visit Date: 3/10/2023  Cardiologist:  Dr. Martín Moseley  Primary Care Physician: Dr. Gaetano Cuevas, APRN - CNP    Larisa Richmond is a 63 y.o. male who presents today for:  Chief Complaint   Patient presents with    Congestive Heart Failure       HPI:     TYPE HF: HFrEF 25-30%    Cause: ischemic s/p CABG 11/2022, hx of alcohol abuse  Device: life vest at home - pt not wearing  HX: HTN, CAD s/p CABG 11/2022, smoker  Dry Wt:  142 on 11/22/22, 136 on 1/23/23, 137 on 3/10/23      Larisa Richmond is a 63 y.o. male who presents to the office for a f/u patient visit in the heart failure clinic. Concerns today: here today for his 6 week f/u. Walked in w/ his walker. Bella still in place. Weight is stable. Repeat ECHO was done and his EF went from 30% down to 20/25%. He is here today to discuss life vest vs ICD referral as he did have COVID during his 90 day recovery as well. He still does not have a BP cuff at home to monitor his BP but he does state he has not been taking his midodrine and his BP's in the office have been normal to high. Denies bloating, swelling, or weight gain. Has not been wearing his life vest for about two days. Visit on 1/23/23: here today for his 6 week f/u. He has been in the hospital twice since his last visit w/ me d/t strokes. He is now on elquis. His jardiance and his toprol were stopped. Pt doing well over all considering. Still cannot optimize d/t hypotension. STOP taking: (hospital visit on 12/23/22)  aspirin 325 MG EC tablet  empagliflozin 25 MG tablet (Jardiance)  glimepiride 4 MG tablet (Amaryl)  metoprolol succinate 25 MG extended release  tablet (Toprol XL)  Trulicity 1.5 XT/6.0UW SC injection (dulaglutide)      Visit on 11/22/22: here today as a new pt. S/P CABG and new dx of ischemic cardiomyopathy. Doing well, only complaint is fatigue. Not on a diuretic. Had been on midodrine, not on it now but still hypotensive.        Patient follows:      Hospitalization:        Date of Admission: 10/28/2022  Reason for Consultation:  CHF        History Of Present Illness:    63 y.o. pleasant male c hx of DM and HTN who presented to the hospital with complaints of shortness of breath. As per patient the shortness of breath started 1-2 days prior to presentation. It progressively gotten worsen so he decided to seek medication attention. Patient states that he used to drink alcohol regularly several beers for many years. He has been smoking 1 pack/day for many years recently has cut down to half a pack a day while being on Wellbutrin. He has had poorly controlled diabetes with A1c as high as 15 recently it was brought back down to 7.5. His laboratory work-up shows mildly elevated troponins with a flat trend. Creatinine is 1.4.  proBNP was elevated at 8508. His electrocardiogram shows sinus tachycardia at 105 bpm with nonspecific ST-T wave changes. His echocardiogram from today showed EF of 25-30% with large pleural effusion. Cardiology was consulted for acute CHF exacerbation     All labs, EKG's, diagnostic testing and images as well as cardiac cath, stress testing were reviewed during this encounter.       Activity: ADLs performed  Diet: educated     Patient has:  Chest Pain: no  SOB: no  Orthopnea/PND: no  CONSTANZA: never tested  Edema: no  Fatigue: yes  Abdominal bloating: no  Cough: no  Appetite: good      Past Medical History:   Diagnosis Date    Coronary artery disease 2022    Diabetes mellitus ([address] 2020    Hypertension     Ischemic cardiomyopathy 2022    Urinary retention 2022     Past Surgical History:   Procedure Laterality Date    CORONARY ARTERY BYPASS GRAFT N/A 11/7/2022    CABG X2 JOY WITH BALLOON PUMP performed by Florence Ritchie MD at [address] [place] [address]      TRANSESOPHAGEAL ECHOCARDIOGRAM N/A 12/16/2022    TRANSESOPHAGEAL ECHOCARDIOGRAM WITH ANESTHESIA performed by Lauren Beth MD at [facility]     Family History   Problem Relation Age of Onset    Diabetes Father     Stroke Father     Diabetes Brother     Diabetes Brother     Alcohol Abuse Paternal Grandfather     Heart Attack Paternal Grandfather      Social History     Tobacco Use    Smoking status: Former     Packs/day: 1.00     Years: 33.00     Pack years: 33.00     Types: Cigarettes     Start date: 38     Quit date:      Years since quittin.1    Smokeless tobacco: Never   Substance Use Topics    Alcohol use: Not Currently     Comment: Uses during three 24 ounce beer per week in the past; stopped alcoholic beverage usage after he was found to have cardiac disease     Current Outpatient Medications   Medication Sig Dispense Refill    Handicap Placard MISC by Does not apply route Expires 5 year from date of prescription 1 each 0    apixaban (ELIQUIS) 5 MG TABS tablet Take 1 tablet by mouth 2 times daily 60 tablet 3    traZODone (DESYREL) 50 MG tablet Take 1 tablet by mouth nightly as needed for Sleep 30 tablet 3    glipiZIDE (GLUCOTROL) 10 MG tablet Take 1 tablet by mouth every morning (before breakfast) 60 tablet 3    ferrous sulfate (IRON 325) 325 (65 Fe) MG tablet Take 1 tablet by mouth daily (with breakfast) 30 tablet 1    tamsulosin (FLOMAX) 0.4 MG capsule Take 2 capsules by mouth daily 30 capsule 3    midodrine (PROAMATINE) 5 MG tablet Take 1 tablet by mouth in the morning and 1 tablet at noon and 1 tablet in the evening. Hold if systolic blood pressure is greater than 120.  (Patient not taking: Reported on 2023) 90 tablet 3    atorvastatin (LIPITOR) 40 MG tablet Take 1 tablet by mouth daily 30 tablet 2    Multiple Vitamin (MULTIVITAMIN) TABS tablet Take 1 tablet by mouth daily (with breakfast) 30 tablet 3    acetaminophen (TYLENOL) 500 MG tablet Take 500 mg by mouth every 6 hours as needed for Pain As needed      buPROPion (WELLBUTRIN XL) 150 MG extended release tablet Take 1 tablet by mouth every morning 30 tablet 1    omeprazole (PRILOSEC) 40 MG delayed release capsule Take 1 capsule by mouth daily 90 capsule 3     No current facility-administered medications for this visit. Allergies   Allergen Reactions    Metformin And Related Nausea And Vomiting       SUBJECTIVE:   Review of Systems   Constitutional:  Positive for fatigue. Negative for activity change, appetite change and diaphoresis. Respiratory:  Negative for cough and shortness of breath. Cardiovascular:  Negative for chest pain, palpitations and leg swelling. Gastrointestinal:  Negative for abdominal distention, nausea and vomiting. Neurological:  Negative for weakness, light-headedness and headaches. Hematological:  Negative for adenopathy. Psychiatric/Behavioral:  Negative for sleep disturbance. OBJECTIVE:   Today's Vitals:  /70   Pulse (!) 107   Ht 5' 10\" (1.778 m)   Wt 137 lb 6.4 oz (62.3 kg)   SpO2 99%   BMI 19.71 kg/m²     Physical Exam  Vitals reviewed. Constitutional:       General: He is not in acute distress. Appearance: Normal appearance. He is well-developed. He is not diaphoretic. HENT:      Head: Normocephalic and atraumatic. Eyes:      Conjunctiva/sclera: Conjunctivae normal.   Cardiovascular:      Rate and Rhythm: Normal rate and regular rhythm. Heart sounds: Normal heart sounds. No murmur heard. Pulmonary:      Effort: Pulmonary effort is normal. No respiratory distress. Breath sounds: Normal breath sounds. No wheezing or rales. Abdominal:      General: Bowel sounds are normal. There is no distension. Palpations: Abdomen is soft. Tenderness: There is no abdominal tenderness. Musculoskeletal:         General: Normal range of motion. Cervical back: Normal range of motion and neck supple. Right lower leg: No edema. Left lower leg: No edema. Skin:     General: Skin is warm and dry. Capillary Refill: Capillary refill takes less than 2 seconds.    Neurological:      Mental Status: He is alert and oriented to person, place, and time. Coordination: Coordination normal.   Psychiatric:         Behavior: Behavior normal.       Wt Readings from Last 3 Encounters:   03/10/23 137 lb 6.4 oz (62.3 kg)   02/20/23 140 lb (63.5 kg)   02/09/23 141 lb (64 kg)     BP Readings from Last 3 Encounters:   03/10/23 126/70   02/21/23 114/66   02/09/23 136/82     Pulse Readings from Last 3 Encounters:   03/10/23 (!) 107   02/21/23 (!) 112   02/09/23 95     Body mass index is 19.71 kg/m². ECHO:      Summary   Technically difficult examination. Left ventricular size is mildly increased and systolic function is   severely reduced. Ejection fraction was estimated at 20-25%. LV wall   thickness is within normal limits. Dyskinetic apical wall. The right ventricular size was normal with normal systolic function and   wall thickness. Signature      ----------------------------------------------------------------   Electronically signed by Andres Springer MD (Interpreting   physician) on 03/03/2023 at 12:04 PM   ----------------------------------------------------------------       Conclusions      Summary   Left ventricle size is normal.   No asymmetrical left ventricular hypertrophy was seen. There was severe global hypokinesis of the left ventricle. Ejection fraction is visually estimated at 30%. Left atrial size was moderately dilated with no thrombus identified. ATRIAL SEPTUM: No defect or patent foramen ovale was identified. The left atrial appendage size was normal with no thrombus identified. Signature      ----------------------------------------------------------------   Electronically signed by Jocelyn Wilde MD (Interpreting   physician) on 12/20/2022 at 07:16 AM    Conclusions      Summary   Left ventricular size is normal and systolic function is severely reduced. Ejection fraction was estimated at 25-30%. LV wall thickness is within   normal limits. There was severe global hypokinesis of the left ventricle. The right ventricular size appears normal with normal systolic function   and wall thickness.   Large pleural effusion      Signature      ----------------------------------------------------------------   Electronically signed by Luciano Álvarez MD (Interpreting   physician) on 10/29/2022 at 01:59 PM   ----------------------------------------------------------------       CATH/STRESS:   IMPRESSION:  1.  Severe ischemic cardiomyopathy, ejection fraction 15-20% on left  ventriculogram.  2.  Acute systolic congestive heart failure.  Left ventricular  end-diastolic pressure 31 mmHg.  3.  Severe coronary artery disease involving the ostium of the LAD,  first diagonal branch and the ramus intermedius artery.     RECOMMENDATIONS:  Optimize the volume status.  Resume Lasix.  Stop IV  fluids.  Daily weight.  Strict intake and output.  The patient has  chronic kidney disease, monitor daily basic metabolic panel, Nephrology  is following.  Apparently, a Bella catheter was placed last night due to  concern for urinary retention.  Hematuria was reported after Bella  placement.  Urology consult is recommended.  Monitor CBC.  Continue on  aspirin and Lipitor.  Monitor the patient on telemetry.  Consult  Cardiovascular Surgery.  Heart team discussion regarding  revascularization approach is warranted.  Options are coronary artery  bypass graft surgery versus high-risk PCI with Impella support.   Findings and plan of care were discussed with the patient and his  family, they are agreeable to the plan.           DREW POLLACK MD     D: 11/01/2022 13:06:38       T: 11/01/2022 13:10:35     [id number]  Job#: [id number]     Doc#: [id number]     CC:         Results reviewed:  BNP: No results found for: BNP  CBC:   Lab Results   Component Value Date/Time    WBC 7.9 02/20/2023 08:40 PM    RBC 4.70 02/20/2023 08:40 PM    HGB 12.6 02/20/2023 08:40 PM    HCT 40.3 02/20/2023 08:40 PM     02/20/2023 08:40 PM     CMP:    Lab Results  Component Value Date/Time     02/20/2023 11:05 PM     02/20/2023 08:40 PM    K 4.3 02/20/2023 11:05 PM    K 4.1 02/20/2023 08:40 PM    K 4.7 12/31/2022 06:38 AM    CL 97 02/20/2023 08:40 PM    CO2 25 02/20/2023 08:40 PM    BUN 22 02/20/2023 08:40 PM    CREATININE 1.4 02/20/2023 08:40 PM    GFRAA 58 05/24/2022 04:58 AM    LABGLOM 57 02/20/2023 08:40 PM    GLUCOSE 365 02/20/2023 08:40 PM    CALCIUM 9.6 02/20/2023 08:40 PM     Hepatic Function Panel:    Lab Results   Component Value Date/Time    ALKPHOS 85 02/20/2023 08:40 PM    ALT 8 02/20/2023 08:40 PM    AST 9 02/20/2023 08:40 PM    PROT 7.4 02/20/2023 08:40 PM    BILITOT 0.5 02/20/2023 08:40 PM    LABALBU 4.4 02/20/2023 08:40 PM     Magnesium:    Lab Results   Component Value Date/Time    MG 1.7 02/20/2023 08:40 PM     PT/INR:    Lab Results   Component Value Date/Time    INR 1.23 11/08/2022 04:30 AM     Lipids:    Lab Results   Component Value Date/Time    TRIG 62 12/16/2022 05:09 AM    HDL 45 12/16/2022 05:09 AM    LDLCALC 42 12/16/2022 05:09 AM       ASSESSMENT AND PLAN:   The patient's condition/symptoms are stable        Diagnosis Orders   1. Ischemic cardiomyopathy        2. Acute on chronic systolic congestive heart failure, NYHA class 2 ([address]        3. S/P CABG x 2            Continue:  GDMT:   ACE/ARB/ARNI - Hypotensive   BB - Toprol 25mg daily   Diuretic - no  AA - no, hypotensive  SGLT2 -  Jardiance (stopped at discharge, UTI w/ bacteremia)  Vasodilator - no  Other - ASA, eliquis, iron, midodrine,       HFrEF 20-25% 3/2023 (30% 12/2022), life vest at home (warned of risks)  Ischemic cardiomyopathy  S/P CABG 11/2022 - pt now willing to do cardiac rehab    Bella in place d/t urinary retention    Stable, no fluid on exam. Pt has not been taking his midodrine and doing ok. Adding back Toprol today. Will call in entresto to check the cost. We will try top optimize still but pt would like to see Dr. Gretta Renteria.      GDMT: unable d/t hypotension, does not tolerate jardiance d/t UTI w/ bacteremia    Lab reviewed - Cr 1.4 K 4.1 mag 1.7    ECHO 2023: no valvular disease, LA mod dialted  CATH 2022: LVEDP 31, severe ischemic cardiomyopathy    Ordering cardiac rehab. Start taking Toprol 25mg daily    Calling in [facility] today to check the cost.     [facility]    Still making referral to [facility] d/t continued drop in EF. Continue diet/fluid adherence  Continue daily wts. F/U w/ Cardiology  F/U in clinic in 3 month      Tolerating above noted HF meds, no ill side effects noted. Will continue to monitor kidney function and electrolytes. Will optimize as tolerated. Pt is compliant w/ medications. Total visit time of 30 minutes has been spent with patient on education of symptoms, management, medication, and plan of care; as well as review of chart: labs, ECHO, radiology reports, etc.   I personally spent more then 50% of the appt time face to face with the patient. Daily weights  Fluid restriction of 2 Liters per day  Limit sodium in diet to around 0687-6491 mg/day  Monitor BP  Activity as tolerated     Patient was instructed to call the [employer] [facility] for any changes in symptoms as noted in AVS.      No follow-ups on file. or sooner if needed     Patient given educational materials - see patient instructions. We discussed the importance of weighing oneself and recording daily. We also discussed the importance of a low sodium diet, higher sodium foods to avoid and better low sodium food options. Patient verbalizes understanding of plan of care using teach back method, and is agreeable to the treatment plan.        Electronically signed by BLACK Gary CNP on 3/10/2023 at 9:41 AM

## 2023-03-14 ENCOUNTER — NURSE ONLY (OUTPATIENT)
Dept: UROLOGY | Age: 63
End: 2023-03-14
Payer: COMMERCIAL

## 2023-03-14 DIAGNOSIS — R33.9 URINARY RETENTION: Primary | ICD-10-CM

## 2023-03-14 PROCEDURE — 51702 INSERT TEMP BLADDER CATH: CPT | Performed by: UROLOGY

## 2023-03-14 NOTE — PROGRESS NOTES
Patient has given me verbal consent to perform catheter change Yes    Does patient have latex allergy? No  Does patient have shellfish or betadine allergy? No      Following Dr. Jena Bingham plan of care. 18 Fr Catheter changed without difficulty. Once balloon was deflated, removed catheter without difficulty. Cleansed urethral opening with betadine swab. 18 Fr regular crum was inserted using sterile water-soluble lubricant without difficulty. Urine caught in specimen cup to send for culture. Catheter was flushed with 35cc water ensuring return and inflated balloon with 10 ml of water. Crum Catheter was hooked up to overnight bag with straps. Foreskin reduced back down? N/a    Patient instructed on how to drain catheter bags. If patient was given a leg bag, patient was instructed to keep bag above the knee to prevent pulling on catheter. Pt notified if catheter is pulled on may cause pain and bleeding. Patient was also instructed on how to switch from leg bag to overnight bag.           Supplies were provided by office

## 2023-03-16 ENCOUNTER — TELEPHONE (OUTPATIENT)
Dept: CARDIOLOGY CLINIC | Age: 63
End: 2023-03-16

## 2023-03-16 ENCOUNTER — TELEPHONE (OUTPATIENT)
Dept: CARDIOTHORACIC SURGERY | Age: 63
End: 2023-03-16

## 2023-03-16 ENCOUNTER — HOSPITAL ENCOUNTER (OUTPATIENT)
Dept: CARDIAC REHAB | Age: 63
Setting detail: THERAPIES SERIES
Discharge: HOME | End: 2023-03-16
Payer: COMMERCIAL

## 2023-03-16 VITALS — WEIGHT: 131.13 LBS | HEIGHT: 70 IN | BODY MASS INDEX: 18.77 KG/M2

## 2023-03-16 LAB
BACTERIA UR CULT: ABNORMAL
ORGANISM: ABNORMAL

## 2023-03-16 PROCEDURE — G0422 INTENS CARDIAC REHAB W/EXERC: HCPCS

## 2023-03-16 PROCEDURE — G0423 INTENS CARDIAC REHAB NO EXER: HCPCS

## 2023-03-16 ASSESSMENT — PATIENT HEALTH QUESTIONNAIRE - PHQ9
2. FEELING DOWN, DEPRESSED OR HOPELESS: 1
3. TROUBLE FALLING OR STAYING ASLEEP: 1
8. MOVING OR SPEAKING SO SLOWLY THAT OTHER PEOPLE COULD HAVE NOTICED. OR THE OPPOSITE, BEING SO FIGETY OR RESTLESS THAT YOU HAVE BEEN MOVING AROUND A LOT MORE THAN USUAL: 0
SUM OF ALL RESPONSES TO PHQ QUESTIONS 1-9: 10
4. FEELING TIRED OR HAVING LITTLE ENERGY: 1
1. LITTLE INTEREST OR PLEASURE IN DOING THINGS: 1
SUM OF ALL RESPONSES TO PHQ QUESTIONS 1-9: 10
5. POOR APPETITE OR OVEREATING: 3
9. THOUGHTS THAT YOU WOULD BE BETTER OFF DEAD, OR OF HURTING YOURSELF: 0
10. IF YOU CHECKED OFF ANY PROBLEMS, HOW DIFFICULT HAVE THESE PROBLEMS MADE IT FOR YOU TO DO YOUR WORK, TAKE CARE OF THINGS AT HOME, OR GET ALONG WITH OTHER PEOPLE: 3
SUM OF ALL RESPONSES TO PHQ QUESTIONS 1-9: 10
7. TROUBLE CONCENTRATING ON THINGS, SUCH AS READING THE NEWSPAPER OR WATCHING TELEVISION: 0
SUM OF ALL RESPONSES TO PHQ QUESTIONS 1-9: 10
6. FEELING BAD ABOUT YOURSELF - OR THAT YOU ARE A FAILURE OR HAVE LET YOURSELF OR YOUR FAMILY DOWN: 3
SUM OF ALL RESPONSES TO PHQ9 QUESTIONS 1 & 2: 2

## 2023-03-16 NOTE — TELEPHONE ENCOUNTER
Left message for patient to call office   Is patient coming back for rehab? Or can he make trip up for copay card?

## 2023-03-16 NOTE — PLAN OF CARE
[address] [place] [address] Facility-Based Program  Individualized Cardiac Treatment Plan    Patient Name:  Mira Melendez  :  1960  Age:  63 y.o. MRN:  [id number]  Diagnosis: CABG  Date of Event: 23   Physician:  Eloy Kamara CNP/Dr. Álvarez  Next Office Visit:  23 w/Mago  Date Entered Program: 3/16/23  Risk Stratifications: [] Low [] Intermediate [x] High  Allergies: Allergies   Allergen Reactions    Metformin And Related Nausea And Vomiting       Individual Cardiac Treatment Plan -EXERCISE  INITIAL 30 DAY 60 DAY 90  DAY FINAL DAY   EXERCISE  ASSESSMENT/PLAN EXERCISE  REASSESSMENT EXERCISE   REASSESSMENT EXERCISE   REASSESSMENT EXERCISE   REASSESSMENT EXERCISE  DISCHARGE/FOLLOW-UP   Date: 3/16/23 Date: Date: Date: Date: Date:   Session #1   Total Session #   First Exercise Session:   Total Session #  Total Session #  Total Session #  Total Session #   Last Exercise Session:     Stages of Change Stages of Change Stages of Change Stages of Change Stages of Change Stages of Change   [x] Pre Contemplation  [] Contemplation  [] Preparation  [] Action  [] Maintenance  [] Relapse [] Pre Contemplation  [] Contemplation  [] Preparation  [] Action  [] Maintenance  [] Relapse [] Pre Contemplation  [] Contemplation  [] Preparation  [] Action  [] Maintenance  [] Relapse [] Pre Contemplation  [] Contemplation  [] Preparation  [] Action  [] Maintenance  [] Relapse [] Pre Contemplation  [] Contemplation  [] Preparation  [] Action  [] Maintenance  [] Relapse [] Pre Contemplation  [] Contemplation  [] Preparation  [] Action  [] Maintenance  [] Relapse   EXERCISE ASSESSMENT EXERCISE ASSESSMENT EXERCISE ASSESSMENT EXERCISE ASSESSMENT EXERCISE ASSESSMENT EXERCISE ASSESSMENT   6 Min Walk Test  6 min on NS completed due to poor balance and deconditioning. [address].   2.6 METs  Max HR:135 BPM      RPE:  13    THR:  133-142 without metoprolol  Rhythm:  Sinus Tach     6 Min Walk Test  Distance walked:    miles   ft   METs  Max HR: BPM      RPE:    %Change ft=     Rhythm:     DASI: 3.3METs DASI:  METs DASI:  METs DASI:  METs DASI:  METs DASI:  METs   Return to Work  Automatic Data on returning to work? 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 [] Yes    [] No    Return to work date set? [] Yes   [] No    Edward Aviles is doing  at work. Return to work:  Has Edward Aviles returned to work? [] Yes    [] No    Return to work date set? [] Yes    [] No    Edward Aviles is doing  at work. Return to work:  Has Edward Aviles returned to work? [] Yes    [] No    Return to work date set? [] Yes    [] No    Edward Aviles is doing  at work. Return to work:  Has Edward Aviles returned to work? [] Yes    [] No    Return to work date set? [] Yes    [] No    Edward Aviles is doing  at work. Return to work:  Has Edward Aviles returned to work? [] Yes    [] No    Return to work? [] Yes    [] No    *Required MET Level achieved for job duties?    [] Yes    [] No   Orthopedic Limitations/  [] Yes    [x] No  If yes please list:  multiple broken bones in [address] [facility] Limitations  *If patient has orthopedic issue:   Actions/  accomodations needed to make Edward Aviles successful : Orthopedic Limitations   Orthopedic Limitations   Orthopedic Limitations Orthopedic Limitations     Fall Risk    [x]Assessed as a fall risk  [] Not a fall risk      [x] Balance Issues       [x] Walker        [] Cane       [] Wheelchair  Actions needed:  will start on NS until builds strength to use TM    [x] Safety issues reviewed      Fall Risk  *If patient is a fall risk, action needed to accommodate:  [facility] Extreme Reach Exercise  [] Yes    [x] No   Home Exercise  [] Yes    [] No  Type:   Frequency:  Duration:  Home Exercise  [] Yes    [] No  Type:   Frequency:   Duration:  Home Exercise  [] Yes    [] No  Type:   Frequency:   Duration:  Home Exercise  [] Yes    [] No  Type:   Frequency:   Duration:  Home Exercise  [] Yes    [] No  Type:   Frequency:   Duration:    Angina with Activity? [] Yes    [x] No   Angina with Activity? [] Yes    [] No  Angina Management:  Angina with Activity? [] Yes    [] No  Angina Management:  Angina with Activity? [] Yes    [] No  Angina Management:  Angina with Activity? [] Yes    [] No  Angina Management:  Angina with Activity?   [] Yes    [] No  Angina Management:    EXERCISE PLAN EXERCISE PLAN EXERCISE PLAN EXERCISE PLAN EXERCISE PLAN EXERCISE PLAN   *Interventions* *Interventions* *Interventions* *Interventions* *Interventions* *Interventions*   Exercise Prescription  (per physician & CR staff) Exercise Prescription  (per physician & CR staff) Exercise Prescription  (per physician & CR staff) Exercise Prescription  (per physician & CR staff) Exercise Prescription  (per physician & CR staff) Exercise Prescription  (per physician & CR staff)   Cardiovascular Cardiovascular Cardiovascular Cardiovascular Cardiovascular Cardiovascular   Mode:    [] Treadmill (TM)  [] Schwinn Airdyne (AD)  [x] Arms Ergometer (AE)  [x] NuStep  [] Elliptical (E) MODE:    [] Treadmill (TM)  [] Schwinn Airdyne (AD)  [] Arms Ergometer (AE)  [] NuStep  [] Elliptical (E) MODE:    [] Treadmill (TM)  [] Schwinn Airdyne (AD)  [] Arms Ergometer (AE)  [] NuStep  [] Elliptical (E) MODE:    [] Treadmill (TM)  [] Schwinn Airdyne (AD)  [] Arms Ergometer (AE)  [] NuStep  [] Elliptical (E) MODE:    [] Treadmill (TM)  [] Schwinn Airdyne (AD)  [] Arms Ergometer (AE)  [] NuStep  [] Elliptical (E) MODE:    [] Treadmill (TM)  [] Schwinn Airdyne (AD)  [] Arms Ergometer (AE)  [] NuStep  [] Elliptical (E)   Initial Workloads  NS: 50  Stringer= 2.7 METs  AE: 24 level = 1.9 METs 2.7 Current Workloads  TM:  @ %=  METs  AD:  level =  METs  NS: Stringer=  METs  AE:  level =  METs Current Workloads  TM:  @ %=  METs  AD:  level =  METs  NS:   Stringer=  METs  AE:  level =  METs Current Workloads  TM:  @ %=  METs  AD:  level =  METs  NS:   Stringer=  METs  AE:  level =  METs Current Workloads  TM:  @ %=  METs  AD:  level =  METs  NS:   Stringer=  METs  AE:  level =  METs     Frequency:    ICR: 2-3x/week  Home: 2-3x/wk Frequency:   ICR: 2-3x/week  Home: 3x/wk Frequency:  ICR: 2-3x/week  Home: 3-4x/wk Frequency:  ICR: 2-3x/week  Home: 3-4x/wk Frequency:  ICR: 2-3x/week  Home: 3-4x/wk Frequency:  [address] will continue exercise at  5-7 days/week   Duration:   Total aerobic exercise = 30-45 min    5-8 min/mode Duration:  Total aerobic exercises = 30-45 min      min/mode Duration:  Total aerobic exercises = 30-45 min      min/mode Duration:  Total aerobic exercises = 30-45 min      min/mode Duration:  Total aerobic exercises = 30-45 min      min/mode Duration:  Total erobic exercise =  60-90 min   Intensity:   MET Level = 2.7  RPE = 12-15 Intensity:  Max MET Level =   RPE = 12-15 Intensity:  Max MET Level =   RPE = 12-15 Intensity:  Max MET Level =   RPE = 12-15 Intensity:  Max MET Level =   RPE = 12-15 Intensity:  Max MET Level =  RPE = 12-15   Progression: increase aerobic activity up to 15 min over next 4 weeks by increasing time 2-3 min/week. Progression:   Progression:   Progression: Progression: Progression:  Increase time/intensity when RPE <13, and HR is in [facility]   [x] Yes      [] No  Upper and Lower body strength training 2x/wk    Wt: 2#       Reps:  8-15    *Increase wt. after completing 15 reps with an RPE of <12/13. [] Yes      [] No  Upper and Lower body strength training 2x/wk    Wt: #       Reps:  8-15    *Increase wt. after completing 15 reps with an RPE of <12/13.  [] Yes      [] No  Upper and Lower body strength training 2x/wk    Wt: #       Reps: 8-15    *Increase wt. after completing 15 reps with an RPE of <12/13. [] Yes      [] No  Upper and Lower body strength training 2x/wk    Wt: #       Reps:  8-15    *Increase wt. after completing 15 reps with an RPE of <12/13. [] Yes      [] No  Upper and Lower body strength training 2x/wk    Wt: #       Reps:  8-15    *Increase wt. after completing 15 reps with an RPE of <12/13. Continue Strength Training at home   [] Exercise Log & Strength training handout given     Wt: #       Reps:  8-15    *Increase wt. after completing 15 reps with an RPE of <12/13. Flexibility Flexibility Flexibility Flexibility Flexibility Flexibility   [x] Yes      [] No  25 min session of Core Strength & Flexibility 1x/per week  Attends Core Strength & Flexibility   [] Yes      [] No Attends Core Strength & Flexibility   [] Yes      [] No Attends Core Strength & Flexibility   [] Yes      [] No Attends Core Strength & Flexibility   [] Yes      [] No Continue Core Strength & Flexibility at home   Home Exercise  *Antwan Floyd verbalizes planning to initiate walking 2-3 days/week for 10-15 min on days not in rehab. Home Exercise  *Len verbalizes planning to ** ** days/week for ** min on days not in rehab. Home Exercise  *Len verbalizes planning to ** ** days/week for ** min on days not in rehab. Home Exercise  *Len verbalizes planning to ** ** days/week for ** min on days not in rehab. Home Exercise  *Len verbalizes planning to ** ** days/week for ** min on days not in rehab.  Home Exercise  *Marialuisa Sam his/her plan to ** ** days/week for ** min @ **   *Education* *Education* *Education* *Education* *Education* *Education*   RPE Scale  [x] Yes      [] No  Exercise Safety  [x] Yes      [] No  Equipment Orientation  [x] Yes      [] No  S/S to Report  [x] Yes      [] No  Warm Up/Cool Down  [x] Yes      [] No  Home Exercise  [x] Yes      [] No     All Exercise Education Completed  [] Yes      [] No   Exercise Education Recommended    Workshops  [x] Benefits of Exercise  [x] Balance Training & Fall Prevention  [x] Heart Disease Risk Reduction  [x] Yoga & Heart Health Exercise Education Attended/Date Exercise Education Attended/Date Exercise Education Attended/Date Exercise Education Attended/Date All Sessions Completed? [] Yes  [] No   *Goals* *Goals* *Goals* *Goals* *Goals* *Goals*   Initial Exercise Goals Exercise Goals  Exercise Goals   Exercise Goals  Exercise Goals  Exercise Goals   Len plans to:  [x] Attend exercise sessions 3x/wk  [x] initiate home exercise 2-3x/wk for 10-20 min  [x] Increase 6 min walk distance by 10%  [x] Attend Exercise workshops Yusuf Alvarenga plans to:  [x] Attend exercise sessions 3x/wk  [x] continue home exercise 2-3x/wk for 20-30 min  [x] Attend Exercise workshops Len's plans to:  [x] Attend exercise sessions 3x/wk  [x] continue home exercise 3-4x/wk for 30-45 min  [x] Determine plan of exercise following rehab  [x] Attend Exercise workshops Len's plans to:  [x] Attend exercise sessions 3x/wk  [x] continue home exercise 3-4x/wk for 30-45 min  [x] Determine plan of exercise following rehab  [x] Attend Exercise workshops Len's plans to:  [x] Attend exercise sessions 3x/wk  [x] continue home exercise 3-4x/wk for 30-45 min  [x] Determine plan of exercise following rehab  [x] Attend Exercise workshops Yusuf Alvarenga achieved exercise goals? []  Yes    [] No  If no, why?  **  [] Increased 6 min walk distance by 10%  [] Currently exercising 30-60 min/day, 5-7days/wk   [] Plans to continue exercise on own  [] Plans to join a local fitness center to continue exercise  [] Does not plan to continue to exercise after rehab   Exercise goals:  [address] wants to build his strength so he can walk without walker, and return to work.  Progress towards goals:  Yusuf Alvarenga   Progress towards  goals:  Yusuf Alvarenga   Progress towards goals:  Yusuf Alvarenga  Progress towards goals:  Yusuf Alvarenga   Progress towards goals:  Yusuf Alvarenga MET level Achieved:  METs     Individual Cardiac Treatment Plan - Nutrition  NUTRITION  ASSESSMENT/PLAN NUTRITION  REASSESSMENT NUTRITION   REASSESSMENT NUTRITION   REASSESSMENT NUTRITION  DISCHARGE/FOLLOW-UP NUTRITION  DISCHARGE/FOLLOW-UP   Stages of Change Stages of Change Stages of Change Stages of Change Stages of Change Stages of Change   [] Pre Contemplation  [x] Contemplation  [] Preparation  [] Action  [] Maintenance  [] Relapse [] Pre Contemplation  [] Contemplation  [] Preparation  [] Action  [] Maintenance  [] Relapse [] Pre Contemplation  [] Contemplation  [] Preparation  [] Action  [] Maintenance  [] Relapse [] Pre Contemplation  [] Contemplation  [] Preparation  [] Action  [] Maintenance  [] Relapse [] Pre Contemplation  [] Contemplation  [] Preparation  [] Action  [] Maintenance  [] Relapse [] Pre Contemplation  [] Contemplation  [] Preparation  [] Action  [] Maintenance  [] Relapse   NUTRITION ASSESSMENT NUTRITION ASSESSMENT NUTRITION ASSESSMENT NUTRITION ASSESSMENT NUTRITION ASSESSMENT NUTRITION ASSESSMENT   Weight Management  Weight: 131.2        Height: 5'10   BMI: 18.9  Weight Management  Weight:                      Weight Management  Weight:                   Weight Management  Weight:  Weight Management  Weight:  Weight Management  Weight:                   BMI:    Eating Plan  Current eating habits: \"eats normally\" Eating Plan  Changes: Eating Plan  Changes: Eating Plan  Changes: Eating Plan  Changes: Eating Plan Improvements:   Alcohol Use  [x] none          [] daily  [] weekly      [] special         Diet Assessment Tool:  RATE YOUR PLATE  *Given to patient to complete and return.  Diet Assessment Tool:    Score:  /72        Diet Assessment Tool: RATE YOUR PLATE  Score:  /31   NUTRITION PLAN NUTRITION PLAN NUTRITION PLAN NUTRITION PLAN NUTRITION PLAN NUTRITION PLAN   *Interventions* *Interventions* *Interventions* *Interventions* *Interventions* *Interventions*   Initial Survey given Goal Setting Discussion:   [] Yes      [] No        Follow Up Survey Reviewed & Goals Updated:     Professional Referral  Please check if needed. [] Dietitian Consult   [] Wt. Management Referral  [] Other:  Professional Referral  Please check if needed. [] Dietitian Consult   [] Wt. Management Referral  [] Other: Professional Referral  Please check if needed. [] Dietitian Consult   [] Wt. Management Referral  [] Other: Professional Referral  Please check if needed. [] Dietitian Consult   [] Wt. Management Referral  [] Other: Professional Referral  Please check if needed. [] Dietitian Consult   [] Wt. Management Referral  [] Other: Professional Referral  Please check if needed. [] Dietitian Consult   [] Wt. Management Referral  [] Other:   *Education* *Education* *Education* *Education* *Education* *Education*   Nutritional Education Recommended    [x] 1:1 Registered Dietitian    Workshops  [x] Label Reading   [x] Menu  [x] Targeting Nutrition Priorities  [x] Mindful Eating   Nutritional Education Attended/Date Nutritional Education Attended/Date Nutritional Education Attended/Date Nutritional Education Attended/Date All Sessions Completed? [] Yes  [] No   Cooking School  Recommended     [x] Adding Flavor  [x] Fast & Healthy     Breakfasts  [x] Salads & Dressings  [x] Savory Soups  [x] Simple Sides & Sauces  [x] Appetizers &     Snacks  [x] Delicious Desserts  [x] Plant-Based Proteins  [x] Fast Evening Meals  [x] Weekend Breakfasts  [x] Efficiency Cooking  [x] One-Pot [employer]  Sessions Completed   [facility]  Sessions Completed [facility]  Sessions Completed     [facility] [employer]  Sessions Completed Cooking School    # of sessions completed:     *Goals* *Goals* *Goals* *Goals* *Goals* *Goals*   Nutrition Goals:  Len's nutrition goals are to attend nutrition classes to learn about heart healthy eating, and starting to make a couple small dietary changes.   Graham Ramirez would also like to maintain or even gain some weight. Progress towards goals:  Len Progress towards goals:  Len' Progress towards goals:  Len  Progress towards goals:  Len  Progress towards goals:  Len       Individual Cardiac Treatment Plan - Psychosocial  PSYCHOSOCIAL  ASSESSMENT/PLAN PSYCHOSOCIAL  REASSESSMENT PSYCHOSOCIAL   REASSESSMENT PSYCHOSOCIAL   REASSESSMENT PSYCHOSOCIAL  DISCHARGE/FOLLOW-UP PSYCHOSOCIAL  DISCHARGE/FOLLOW-UP   Stages of Change Stages of Change Stages of Change Stages of Change Stages of Change Stages of Change   [] Pre Contemplation  [] Contemplation  [x] Preparation  [] Action  [] Maintenance  [] Relapse [] Pre Contemplation  [] Contemplation  [] Preparation  [] Action  [] Maintenance  [] Relapse [] Pre Contemplation  [] Contemplation  [] Preparation  [] Action  [] Maintenance  [] Relapse [] Pre Contemplation  [] Contemplation  [] Preparation  [] Action  [] Maintenance  [] Relapse [] Pre Contemplation  [] Contemplation  [] Preparation  [] Action  [] Maintenance  [] Relapse [] Pre Contemplation  [] Contemplation  [] Preparation  [] Action  [] Maintenance  [] Relapse   PSYCHOSOCIAL ASSESSMENT PSYCHOSOCIAL ASSESSMENT PSYCHOSOCIAL ASSESSMENT PSYCHOSOCIAL ASSESSMENT PSYCHOSOCIAL ASSESSMENT PSYCHOSOCIAL ASSESSMENT   Behavioral Outcomes Behavioral Outcomes Behavioral Outcomes Behavioral Outcomes Behavioral Outcomes Behavioral Outcomes   PHQ-9 score 10  Depression Severity  []Minimal  []Mild   [x]Moderate  []Moderately Severe  []Severe     PHQ-9 score   Depression Severity  []Minimal  []Mild   []Moderate  []Moderately Severe []Severe   Does patient have Family Support?  [x] Yes      [] No  No signs of marital/family distress        Within the Past Month:  *Have you wished you were dead or wished you could go to sleep and not wake up?  [] Yes      [x] No  *Have you had any thoughts of killing yourself?  [] Yes      [x] No          Using a scale of 0-10, 0=none, 10=very:   Rate your depression: 5    Rate  your anxiety:  5  Using a scale of 0-10, 0=none, 10=very:   Rate your depression:     Rate your anxiety:   Using a scale of 0-10, 0=none, 10=very:   Rate your depression:    Rate your anxiety:   Using a scale of 0-10, 0=none, 10=very:   Rate your depression:     Rate your anxiety:   Using a scale of 0-10, 0=none, 10=very:   Rate your depression:     Rate your anxiety:   Using a scale of 0-10, 0=none, 10=very:   Rate your depression:     Rate your anxiety:     Signs and Symptoms of Depression Present? [x] Yes      [] No  If yes, please explain:  It has not been a great few months, since November he has had several medical issues. Signs and Symptoms of Depression Present? [] Yes      [] No  If yes, please explain:   Signs and Symptoms of Depression Present? [] Yes      [] No  If yes, please explain:   Signs and Symptoms of Depression Present? [] Yes      [] No  If yes, please explain:   Signs and Symptoms of Depression Present? [] Yes      [] No  If yes, please explain:   Signs and Symptoms of Depression Present? [] Yes      [] No  If yes, please explain:     Signs and Symptoms of Anxiety Present? [x] Yes      [] No  If yes, please explain:  Due to all of his recent medical issues. Signs and Symptoms of Anxiety Present? [] Yes      [] No  If yes, please explain:   Signs and Symptoms of Anxiety Present? [] Yes      [] No  If yes, please explain:   Signs and Symptoms of Anxiety Present? [] Yes      [] No  If yes, please explain:   Signs and Symptoms of Anxiety Present? [] Yes      [] No  If yes, please explain:   Signs and Symptoms of Anxiety Present? [] Yes      [] No  If yes, please explain:     [] Patient has poor eye contact   [] Flat affect present. [] Signs of anxiety, anger or hostility    [] Signs social isolation present.    []  Signs of alcohol or substance abuse        PSYCHOSOCIAL PLAN PSYCHOSOCIAL PLAN PSYCHOSOCIAL PLAN PSYCHOSOCIAL PLAN PSYCHOSOCIAL PLAN PSYCHOSOCIAL PLAN   *Interventions* *Interventions* *Interventions* *Interventions* *Interventions* *Interventions*   *Please check if needed  [] Psych Consult  [] Physician Referral  [x] Stress Management Skills *Please check if needed  [] Psych Consult  [] Physician Referral  [] Stress Management Skills *Please check if needed  [] Psych Consult  [] Physician Referral  [] Stress Management Skills *Please check if needed  [] Psych Consult  [] Physician Referral  [] Stress Management Skills *Please check if needed  [] Psych Consult  [] Physician Referral  [] Stress Management Skills *Please check if needed  [] Psych Consult  [] Physician Referral  [] Stress Management Skills   Is patient currently taking anti-depressant or anti-anxiety medications? [x] Yes      [] No  If yes, please list medications:  wellbutrin, trazodone Change in anti-depressant or anti-anxiety medications? [] Yes      [] No  If yes, please list medications:   Change in anti-depressant or anti-anxiety medications? [] Yes      [] No  If yes, please list medications:   Change in anti-depressant or anti-anxiety medications? [] Yes      [] No  If yes, please list medications:   Change in anti-depressant or anti-anxiety medications? [] Yes      [] No  If yes, please list medications:   Change in anti-depressant or anti-anxiety medications?   [] Yes      [] No  If yes, please list medications:     *Education* *Education* *Education* *Education* *Education* *Education*   Healthy Mind-Set Workshops Recommended  [x] Stress & Health  [x] Taking Charge of Stress  [x] New Thoughts, New Behaviors  [x] Managing Moods & Relationships Healthy Mind-Set Workshops Attended/Date Healthy Mind-Set Workshops Attended/Date Healthy Mind-Set Workshops Attended/Date Healthy Mind-Set Workshops  Completed  [] Yes      [] No Healthy Mind-Set Workshops  Completed  [] Yes      [] No   *Goals* *Goals* *Goals* *Goals* *Goals* *Goals*   Psychosocial Goals:  Len's psychosocial goals are to attend healthy minds set classes. Using knowledge gained to help reduce depression and anxiety. This will hopefully assist in improving depression severity by at lest 1 level. Progress towards goals:  Loyda Shaffer Progress towards goals:  Loyda Shaffer Progress towards goals:  Loyda Shaffer Progress towards goals:  Loyda Shaffer Progress towards goals:  Loyda Shaffer     Individual Cardiac Treatment Plan - Other:  Risk Factor/Core Components  RISK FACTOR  ASSESSMENT/PLAN RISK FACTOR  REASSESSMENT  RISK FACTOR  REASSESSMENT RISK FACTOR  REASSESSMENT RISK FACTOR   DISCHARGE/FOLLOW-UP RISK FACTOR   DISCHARGE/FOLLOW-UP   Stages of Change Stages of Change Stages of Change Stages of Change Stages of Change Stages of Change   [] Pre Contemplation  [] Contemplation  [x] Preparation  [] Action  [] Maintenance  [] Relapse [] Pre Contemplation  [] Contemplation  [] Preparation  [] Action  [] Maintenance  [] Relapse [] Pre Contemplation  [] Contemplation  [] Preparation  [] Action  [] Maintenance  [] Relapse [] Pre Contemplation  [] Contemplation  [] Preparation  [] Action  [] Maintenance  [] Relapse [] Pre Contemplation  [] Contemplation  [] Preparation  [] Action  [] Maintenance  [] Relapse [] Pre Contemplation  [] Contemplation  [] Preparation  [] Action  [] Maintenance  [] Relapse   RISK FACTOR/EDUCATION ASSESSMENT RISK FACTOR/EDUCATION ASSESSMENT RISK FACTOR/EDUCATION ASSESSMENT RISK FACTOR/EDUCATION ASSESSMENT RISK FACTOR /EDUCATION ASSESSMENT RISK FACTOR /EDUCATION ASSESSMENT   Hypertension  [x] Yes      [] No    Resting BP: 120/76  Peak Ex BP:124/60  Medication: metoprolol succinate only takes is blood pressure is over 120  However, he does not have a BP cuff to take pressure to verify. Currently not taking med. Spoke with Govind Turner from CHF clinic, and he is to be taking medication daily as prescribed. Ermelinda Morales expressed understanding.  Hypertension  Resting BP:   Peak Ex BP:  Medication Changes:  [] Yes      [] No Hypertension  Resting BP:   Peak Ex BP:  Medication Changes:  [] Yes      [] No Hypertension  Resting BP:   Peak Ex BP:  Medication Changes:  [] Yes      [] No Hypertension  Resting BP:   Peak Ex BP:  Medication Changes:  [] Yes      [] No Hypertension  Resting BP:   Peak Ex BP:  Medication Changes:  [] Yes      [] No   Lipids  HLD/DLD  [] Yes      [x] No  TOTAL CHOL: 99  HDL:  45  LDL:  42  TRI  Medication: atorvastatin Lipids  Medication Changes:  [] Yes      [] No     Lipids  Medication Changes:  [] Yes      [] No     Lipids  Medication Changes:  [] Yes      [] No     Lipids  Medication Changes:  [] Yes      [] No Lipids    TOTAL CHOL:   HDL:    LDL:    TRIG:    Medication Changes:  [] Yes      [] No   Diabetes  [x] Yes      [] No  FBS: 365           HbA1C:  unknown       Monitor BS @ home:   [] Yes      [x] No  Frequency:   Medication: glipizide Diabetes  Most Recent BS:  BS have been in range  [] Yes      [] No  Medication Changes  [] Yes      [] No   Diabetes  Most Recent BS:  BS have been in range  [] Yes      [] No  Medication Changes  [] Yes      [] No     Diabetes  Most Recent BS:  BS have been in range  [] Yes      [] No  Medication Changes  [] Yes      [] No     Diabetes  Most Recent BS:  BS have been in range  [] Yes      [] No  Medication Changes  [] Yes      [] No Diabetes  Most Recent BS:  BS have been in range  [] Yes      [] No  Medication Changes  [] Yes      [] No       Tobacco Use  [] Current  [x] Former  [] Never    Years smoked: 42    Date Quit: 2022    Smokeless Tobacco use:   [] Yes      [x] No   Tobacco Use  Change in smoking status   [] Yes      [] No    Quit date:    Tobacco Use  Change in smoking status   [] Yes      [] No    Quit date:    Tobacco Use  Change in smoking status   [] Yes      [] No    Quit date:  Tobacco Use  Change in smoking status   [] Yes      [] No    Quit date:  Tobacco Use  Change in smoking status   [] Yes      [] No    Quit date:              Learning Barriers  Please select one:  []  Speech  [] Literacy  [] Hearing  [] Cognitive  [] Vision  [x] Ready to Learn Learning Barriers Addressed:   [] Yes      [] No   Learning Barriers Addressed:   [] Yes      [] No   Learning Barriers Addressed:  [] Yes      [] No Learning Barriers Addressed:  [] Yes      [] No Learning Barriers Addressed:  [] Yes      [] No     RISK FACTOR/EDUCATION PLAN RISK FACTOR/EDUCATION PLAN RISK FACTOR/EDUCATION PLAN RISK FACTOR/EDUCATION PLAN RISK FACTOR/EDUCATION PLAN RISK FACTOR/EDUCATION PLAN   *Interventions* *Interventions* *Interventions* *Interventions* *Interventions* *Interventions*   Recommended Educational Videos    [x] Overview of The Pritikin Eating Plan  [x] Heart Disease Risk Reduction  [x] Move It! [x] Calorie Density  [x] Healthy Minds, Bodies, Hearts  [x] Label Reading  [x] Metabolic Syndrome & Belly   Or  [] How Our Thoughts Can Heal our Heart  [x] Dining Out-Part 1  [x] Biomechanical Limitations  [x] Facts on Fat  [x] Hypertension & Heart Disease  [x] Diseases of Our Times-Focusing on Diabetes  [x] Body Composition  [x] Nurtition Action Plan  [x] Exercise Action Plan   Completed Videos/Date      3/16/2023  Heart Disease Risk Reduction Completed Videos/Date Completed Videos/Date Completed Videos/Date Recommended Educational Videos Completed    [] Yes      [] No    **If not completed, Why? Smoking Cessation/Relaspe Prevention Intervention needed? [x] Yes      [] No  *Pt verbalizes and agrees to attend intervention Smoking Cessation/Relapse Prevention Scheduled? [] Yes      [] No  Date:   Smoking Cessation/Relapse Prevention completed? [] Yes      [] No  Date:  Smoking Cessation/Relapse Prevention completed? [] Yes      [] No  Date:  Smoking Cessation/Relapse Prevention completed? [] Yes      [] No  Date:  Smoking Cessation Counseling attended  [] Yes      [] No  **If not completed, Why?     Professional Referrals:  *Please check if needed  [] Diabetes Clinic  [] Lipid Clinic   [] Other: Professional Referrals:  *Please check if needed  [] Diabetes Clinic  [] Lipid Clinic   [] Other:   Preventative Medication Preventative Medication Preventative Medication Preventative Medication Preventative Medication Preventative Medication   Aspirin  [] Yes    [x] No  Blood Thinner: Clopidogrel/Effient/Brillinta  [x] Yes    [] No  Beta Blocker  [x] Yes    [] No  Not taking  Ace Inhibitor  [] Yes    [x] No  Statin/Lipid Lowering  [x] Yes    [] No Medication Changes? [] Yes    [] No Medication Changes? [] Yes    [] No Medication Changes? [] Yes    [] No Medication Changes? [] Yes    [] No Medication Changes? [] Yes    [] No   *Education* *Education* *Education* *Education* *Education* *Education*   Does [address] require any additional education? [] Yes    [x] No   Does [address] require any additional education? [] Yes    [] No Does [address] require any additional education? [] Yes    [] No Does [address] require any additional education? [] Yes    [] No Does [address] require any additional education? [] Yes    [] No Does [address] require any additional education?   [] Yes    [] No   Additional Educational Videos    Exercise  [] Improve Performance    Medical  [] Aging Enhancing Your QoL  [] Biology of Weight Control  [] Decoding Lab Results  [] Diseases of Our Time - Overview  [] Sleep Disorders    Nutrition  [] Dining Out - Part 2  [] Fueling a Healthy Body  [] Menu Workshop  [] Planning Your Eating Strategy  [] Targeting Your Nutrition Priorities  [] Vitamins & Minerals    Healthy Mind-Set  [x] Smoking Cessation    Culinary  []Becoming a Pritikin    [] Cooking - Breakfast & Snacks  [] Cooking -[place]alhty Salads & Dressing  [] Cooking -Dinner & Sides  [] Cooking -Soups & Desserts    Overview  [] The Pritikin Solution Additional Educational Videos Completed Additional Educational Videos Completed Additional Educational Videos Completed Additional Educational Videos Completed Additional Educational Videos Completed    [] Yes    [] No   *Goals* *Goals* *Goals* *Goals* *Goals* *Goals*   Risk Factor/Core Component Goals:  Len's risk factor/core component goals are to start taking medications as prescribed 100% of the time. Also to continue to wear life vest at all times. Deepika Webber states understanding and will contact [employer] to get a vest that fits him properly.    Progress towards goals:  Usha Gatica Progress towards goals:  Usha Gatica Progress towards goals:  Usha Gatica Progress towards goals:  Usha Gatica Progress towards goals:  Micheal telemetry has revealed Sinus Tach    [] documented arrhythmia at increasing workloads  [] associated symptoms  Monitored telemetry has revealed     [] documented arrhythmia at increasing workloads  [] associated symptoms  Monitored telemetry has revealed    [] documented arrhythmia at increasing workloads  [] associated symptoms  Monitored telemetry has revealed     [] documented arrhythmia at increasing workloads  [] associated symptoms  Monitored telemetry has revealed     [] documented arrhythmia at increasing workloads  [] associated symptoms  Monitored telemetry has revealed     [] documented arrhythmia at increasing workloads  [] associated symptoms    Physician Response    [x] Cardiac rehab is reasonably and medically necessary for continuous cardiac monitoring surveillance  of patient's cardiac activity  [x] Initiate continuous telemetry monitoring and notify me with any concerns  [] Other   Physician Response    [x] Cardiac rehab is reasonably and medically necessary for continuous cardiac monitoring surveillance  of patient's cardiac activity  [x] Continue continuous telemetry monitoring and notify me with any concerns  [] Other     Physician Response    [x] Cardiac rehab is reasonably and medically necessary for continuous cardiac monitoring surveillance  of patient's cardiac activity  [x] Continue continuous telemetry monitoring and notify me with any concerns   [] Other     Physician Response    [x] Cardiac rehab is reasonably and medically necessary for continuous cardiac monitoring surveillance  of patient's cardiac activity  [x] Continue continuous telemetry monitoring and notify me with any concerns   [] Other     Physician Response    [x] Cardiac rehab is reasonably and medically necessary for continuous cardiac monitoring surveillance  of patient's cardiac activity  [x] Continue continuous telemetry monitoring and notify me with any concerns   [] Other

## 2023-03-16 NOTE — PROGRESS NOTES
Vasyl Phipps had his evaluation for cardiac rehab today. He was not wearing his life vest.  He stated he was not wearing it because the vest does not fit correctly, and said he was told he only had to wear it for 3 months. Nobody told him he had to keep wearing it. I told him we cannot allow him to exercise if he does not have his life vest on. I called Vito Mejia in CHF clinic to verify if he is supposed to be wearing it. She stated Js Warner was never told to discontinue it. I relayed to Travisdeisi Warner that he is still to be wearing it, and needs to call [employer] if his vest does not fit. He expressed understanding. Js Warner also is not taking his metoprolol, and his resting HR was 120 bpm.  He states he was told to only take it if his blood pressure is below 120. However, he does not have a blood pressure cuff at home. I spoke with Vito Mejia about this as well. She stated his is to take his Metoprolol daily as prescribed. I relayed this information to Js Warner as well, and he expressed understanding.

## 2023-03-16 NOTE — TELEPHONE ENCOUNTER
LM for pt to call office back . [employer] forms were faxed to our office for short term disability , who has patient off work ? Forms in box.

## 2023-03-16 NOTE — TELEPHONE ENCOUNTER
Spoke with pt. Pt states he is currently wearing a LifeVest.  Pt also states he walks with a walker- his employer will not let him work if he needs a walker.

## 2023-03-16 NOTE — PROGRESS NOTES
Video Education Report - ICR/CR  Name:  Manisha Chester     Date:  3/16/2023  MRN: [id number]     Session #:  1  Session Length: 40 min    Core Videos        [x]Heart Disease Risk Reduction       []Overview of Pritikin Eating Plan      []Move it          []Calorie Density         []Healthy Minds, Bodies, Hearts        []Label Reading - Part 1       []Metabolic Syndrome and Belly Fat        []How Our Thoughts Can Heal Our Hearts   []Dining Out - Part 1      []Biomechanical Limitations  []Facts on Fat        []Hypertension & Heart Disease    []Diseases of Our Time - Focusing on Diabetes   []Body Composition  []Nutrition Action Plan    []Exercise Action Plan      Comments:  Video completed, discussion

## 2023-03-16 NOTE — TELEPHONE ENCOUNTER
[employer] co pay $75/30day  Patient has commercial insurance and will qualify for co pay card  Please advise

## 2023-03-17 NOTE — TELEPHONE ENCOUNTER
Will  co pay card Monday. Blood work ordered for 2 weeks after starting per Angy Wilkins  Asked about STD papers. Not off of work from [address] [facility].  Advised to reach out to PCP- also encounter for Dr Kierra Kimble already started

## 2023-03-17 NOTE — TELEPHONE ENCOUNTER
LMOM to inform patient. Spoke with Santi Meza with CHF clinic - she advised defer to cardiology or PCP for RTW status.
Please advise. 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 Claudell Manila. Last OV was 12/13/22 - following with CTS as needed. Patient was to go back to work on 2/7/23. Not sure who has him off? Patient just recently saw Concepción Yates in CHF clinic and Dr. Chano Mann in May.
Pt and wife stopped by  and pt needs an out of work note that has his diagnosis stated on it and potential  back to work date     Faxed to: [address] [place] [address] Drive: Marisa Delacruz   Fax number: [phone]
None

## 2023-03-18 ENCOUNTER — HOSPITAL ENCOUNTER (EMERGENCY)
Age: 63
Discharge: HOME | End: 2023-03-18
Attending: FAMILY MEDICINE
Payer: COMMERCIAL

## 2023-03-18 ENCOUNTER — APPOINTMENT (OUTPATIENT)
Dept: GENERAL RADIOLOGY | Age: 63
End: 2023-03-18
Payer: COMMERCIAL

## 2023-03-18 VITALS
RESPIRATION RATE: 17 BRPM | OXYGEN SATURATION: 100 % | HEIGHT: 70 IN | WEIGHT: 130 LBS | BODY MASS INDEX: 18.61 KG/M2 | DIASTOLIC BLOOD PRESSURE: 71 MMHG | HEART RATE: 87 BPM | TEMPERATURE: 98 F | SYSTOLIC BLOOD PRESSURE: 123 MMHG

## 2023-03-18 DIAGNOSIS — R73.9 HYPERGLYCEMIA: ICD-10-CM

## 2023-03-18 DIAGNOSIS — R06.00 DYSPNEA AND RESPIRATORY ABNORMALITIES: Primary | ICD-10-CM

## 2023-03-18 DIAGNOSIS — R06.89 DYSPNEA AND RESPIRATORY ABNORMALITIES: Primary | ICD-10-CM

## 2023-03-18 LAB
ALBUMIN SERPL BCG-MCNC: 4 G/DL (ref 3.5–5.1)
ALP SERPL-CCNC: 89 U/L (ref 38–126)
ALT SERPL W/O P-5'-P-CCNC: 8 U/L (ref 11–66)
ANION GAP SERPL CALC-SCNC: 9 MEQ/L (ref 8–16)
AST SERPL-CCNC: 10 U/L (ref 5–40)
BASOPHILS ABSOLUTE: 0.1 THOU/MM3 (ref 0–0.1)
BASOPHILS NFR BLD AUTO: 0.9 %
BILIRUB SERPL-MCNC: 0.3 MG/DL (ref 0.3–1.2)
BUN SERPL-MCNC: 20 MG/DL (ref 7–22)
CALCIUM SERPL-MCNC: 9.2 MG/DL (ref 8.5–10.5)
CHLORIDE SERPL-SCNC: 98 MEQ/L (ref 98–111)
CO2 SERPL-SCNC: 28 MEQ/L (ref 23–33)
CREAT SERPL-MCNC: 1.3 MG/DL (ref 0.4–1.2)
DEPRECATED RDW RBC AUTO: 42.6 FL (ref 35–45)
EOSINOPHIL NFR BLD AUTO: 3.5 %
EOSINOPHILS ABSOLUTE: 0.2 THOU/MM3 (ref 0–0.4)
ERYTHROCYTE [DISTWIDTH] IN BLOOD BY AUTOMATED COUNT: 13.7 % (ref 11.5–14.5)
FLUAV RNA RESP QL NAA+PROBE: NOT DETECTED
FLUBV RNA RESP QL NAA+PROBE: NOT DETECTED
GFR SERPL CREATININE-BSD FRML MDRD: > 60 ML/MIN/1.73M2
GLUCOSE SERPL-MCNC: 411 MG/DL (ref 70–108)
HCT VFR BLD AUTO: 41.1 % (ref 42–52)
HGB BLD-MCNC: 12.7 GM/DL (ref 14–18)
IMM GRANULOCYTES # BLD AUTO: 0.02 THOU/MM3 (ref 0–0.07)
IMM GRANULOCYTES NFR BLD AUTO: 0.3 %
LYMPHOCYTES ABSOLUTE: 1.8 THOU/MM3 (ref 1–4.8)
LYMPHOCYTES NFR BLD AUTO: 31 %
MCH RBC QN AUTO: 26.4 PG (ref 26–33)
MCHC RBC AUTO-ENTMCNC: 30.9 GM/DL (ref 32.2–35.5)
MCV RBC AUTO: 85.4 FL (ref 80–94)
MONOCYTES ABSOLUTE: 0.6 THOU/MM3 (ref 0.4–1.3)
MONOCYTES NFR BLD AUTO: 10.4 %
NEUTROPHILS NFR BLD AUTO: 53.9 %
NRBC BLD AUTO-RTO: 0 /100 WBC
NT-PROBNP SERPL IA-MCNC: 2806 PG/ML (ref 0–124)
OSMOLALITY SERPL CALC.SUM OF ELEC: 290.1 MOSMOL/KG (ref 275–300)
PLATELET # BLD AUTO: 290 THOU/MM3 (ref 130–400)
PMV BLD AUTO: 10.7 FL (ref 9.4–12.4)
POTASSIUM SERPL-SCNC: 4.2 MEQ/L (ref 3.5–5.2)
PROT SERPL-MCNC: 7.3 G/DL (ref 6.1–8)
RBC # BLD AUTO: 4.81 MILL/MM3 (ref 4.7–6.1)
SARS-COV-2 RNA RESP QL NAA+PROBE: NOT DETECTED
SEGMENTED NEUTROPHILS ABSOLUTE COUNT: 3.1 THOU/MM3 (ref 1.8–7.7)
SODIUM SERPL-SCNC: 135 MEQ/L (ref 135–145)
TROPONIN T: < 0.01 NG/ML
WBC # BLD AUTO: 5.8 THOU/MM3 (ref 4.8–10.8)

## 2023-03-18 PROCEDURE — 71046 X-RAY EXAM CHEST 2 VIEWS: CPT

## 2023-03-18 PROCEDURE — 83880 ASSAY OF NATRIURETIC PEPTIDE: CPT

## 2023-03-18 PROCEDURE — 87636 SARSCOV2 & INF A&B AMP PRB: CPT

## 2023-03-18 PROCEDURE — 85025 COMPLETE CBC W/AUTO DIFF WBC: CPT

## 2023-03-18 PROCEDURE — 36415 COLL VENOUS BLD VENIPUNCTURE: CPT

## 2023-03-18 PROCEDURE — 93005 ELECTROCARDIOGRAM TRACING: CPT | Performed by: FAMILY MEDICINE

## 2023-03-18 PROCEDURE — 99285 EMERGENCY DEPT VISIT HI MDM: CPT

## 2023-03-18 PROCEDURE — 80053 COMPREHEN METABOLIC PANEL: CPT

## 2023-03-18 PROCEDURE — 84484 ASSAY OF TROPONIN QUANT: CPT

## 2023-03-18 ASSESSMENT — PAIN - FUNCTIONAL ASSESSMENT: PAIN_FUNCTIONAL_ASSESSMENT: NONE - DENIES PAIN

## 2023-03-18 NOTE — ED PROVIDER NOTES
[address] [facility] [address] [address] EMERGENCY DEPT    EMERGENCY MEDICINE     Pt Name: Kimberly Velazco  MRN: [id number]  [place] 1960  Date of evaluation: 3/18/2023  Resident Physician: Rosalina Gomez MD  Supervising Physician: Harvey Staley MD    CHIEF COMPLAINT       Chief Complaint   Patient presents with    Shortness of Breath     HISTORY OF PRESENT ILLNESS   Kimberly Velazco is a pleasant 63 y.o. male who presents to the emergency department from home with wife for complaints of difficulty breathing. Patient states onset was this evening when he lieD down to go to bed. He typically sleeps flat but sometimes does have to prop himself up. He has also had dyspnea on exertion, becomes short of breath walking from the living room to bedroom. He does not wear oxygen at home. He denies fevers, cough, sore throat, chest pain, abdominal pain, diarrhea, leg swelling. He was told by CHF clinic that he will start to \"feeling in his chest before he has leg swelling\". He is concerned that this is an exacerbation of his CHF and that he may have \"fluid on his lungs. \"  Patient states that he was diagnosed with CHF in October with an EF of 30% after having similar symptoms of inability to lie flat. He had a CABG performed in November, then a stroke in December, and then COVID a few weeks ago. He is also been having frequent UTIs and has an indwelling Bella catheter per urology. He is to have a prostate procedure performed soon. PASTMEDICAL HISTORY     Past Medical History:   Diagnosis Date    Coronary artery disease 2022    Diabetes mellitus ([facility] 2020    Hypertension     Ischemic cardiomyopathy 2022    Urinary retention 2022       Patient Active Problem List   Diagnosis Code    Syncope R55    Facial injury S09. 93XA    Tobacco abuse Z72.0    Cranial facial fractures ([address] S02. 91XA, S02. 92XA    Diabetes mellitus type 2 in nonobese (HCC) E11.9    Fungal toenail infection B35.1    Golfer's elbow M77.00    Medical non-compliance Z91.199    Erectile dysfunction N52.9    NAVARRO (generalized anxiety disorder) F41.1    Impacted cerumen of right ear H61.21    Essential hypertension I10    Uncontrolled type 2 diabetes mellitus with hyperglycemia ([facility]) E11.65    Thoracic arthritis M47.814    New onset of congestive heart failure ([facility]) O28.3    Acute systolic congestive heart failure ([facility]) I50.21    Nonischemic cardiomyopathy ([facility]) I42.8    Stage 3a chronic kidney disease ([facility]) N18.31    Tremor R25.1    LC (acute kidney injury) ([facility]) N17.9    Hyperlipidemia E78.5    Gastroesophageal reflux disease K21.9    S/P cardiac cath Z98.890    CAD, multiple vessel I25.10    Ischemic cardiomyopathy I25.5    ETOH abuse F10.10    S/P CABG x 2 Z95.1    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    Acute pyelonephritis N10    Bacteremia due to Enterobacter species R78.81, B96.89    Acute CVA (cerebrovascular accident) ([facility] [address] I63.9    Urinary retention R33.9    Acute stroke due to ischemia ([facility]) I63.9    Hemiparesis affecting right side as late effect of stroke ([facility] I69.351    Coordination impairment R27.8    Debility R53.81    UTI due to Klebsiella species N39.0, B96.89    Moderate malnutrition ([facility]) E44.0    Other hypotension I95.89     SURGICAL HISTORY       Past Surgical History:   Procedure Laterality Date    CORONARY ARTERY BYPASS GRAFT N/A 11/7/2022    CABG X2 JOY WITH BALLOON PUMP performed by Lata Holland MD at [address] [facility]      TRANSESOPHAGEAL ECHOCARDIOGRAM N/A 12/16/2022    TRANSESOPHAGEAL ECHOCARDIOGRAM WITH ANESTHESIA performed by Jorge Burgess MD at [address] [facility]       Current Discharge Medication List        CONTINUE these medications which have NOT CHANGED    Details   metoprolol succinate (TOPROL XL) 25 MG extended release tablet Take 1 tablet by mouth daily  Qty: 30 tablet, Refills: 3    Associated Diagnoses: Ischemic cardiomyopathy; Acute on chronic systolic congestive heart failure, NYHA class 2 ([facility]) sacubitril-valsartan (ENTRESTO) 24-26 MG per tablet Take 1 tablet by mouth 2 times daily  Qty: 180 tablet, Refills: 3      Handicap Placard MISC by Does not apply route Expires 5 year from date of prescription  Qty: 1 each, Refills: 0    Associated Diagnoses: History of CVA in adulthood      apixaban (ELIQUIS) 5 MG TABS tablet Take 1 tablet by mouth 2 times daily  Qty: 60 tablet, Refills: 3      traZODone (DESYREL) 50 MG tablet Take 1 tablet by mouth nightly as needed for Sleep  Qty: 30 tablet, Refills: 3      glipiZIDE (GLUCOTROL) 10 MG tablet Take 1 tablet by mouth every morning (before breakfast)  Qty: 60 tablet, Refills: 3      ferrous sulfate (IRON 325) 325 (65 Fe) MG tablet Take 1 tablet by mouth daily (with breakfast)  Qty: 30 tablet, Refills: 1      tamsulosin (FLOMAX) 0.4 MG capsule Take 2 capsules by mouth daily  Qty: 30 capsule, Refills: 3      midodrine (PROAMATINE) 5 MG tablet Take 1 tablet by mouth in the morning and 1 tablet at noon and 1 tablet in the evening. Hold if systolic blood pressure is greater than 120. Qty: 90 tablet, Refills: 3      atorvastatin (LIPITOR) 40 MG tablet Take 1 tablet by mouth daily  Qty: 30 tablet, Refills: 2    Associated Diagnoses: Uncontrolled type 2 diabetes mellitus with hyperglycemia (HCC)      Multiple Vitamin (MULTIVITAMIN) TABS tablet Take 1 tablet by mouth daily (with breakfast)  Qty: 30 tablet, Refills: 3      acetaminophen (TYLENOL) 500 MG tablet Take 500 mg by mouth every 6 hours as needed for Pain As needed      buPROPion (WELLBUTRIN XL) 150 MG extended release tablet Take 1 tablet by mouth every morning  Qty: 30 tablet, Refills: 1    Associated Diagnoses: Tobacco abuse      omeprazole (PRILOSEC) 40 MG delayed release capsule Take 1 capsule by mouth daily  Qty: 90 capsule, Refills: 3    Associated Diagnoses: Gastroesophageal reflux disease, unspecified whether esophagitis present             ALLERGIES     is allergic to metformin and related.     FAMILY HISTORY     He indicated that his mother is . He indicated that his father is . He indicated that his sister is alive. He indicated that only one of his two brothers is alive. He indicated that his paternal grandfather is . SOCIAL HISTORY       Social History     Tobacco Use    Smoking status: Former     Packs/day: 1.00     Years: 33.00     Pack years: 33.00     Types: Cigarettes     Start date: 38     Quit date:      Years since quittin.2    Smokeless tobacco: Never   Vaping Use    Vaping Use: Never used   Substance Use Topics    Alcohol use: Not Currently     Comment: Uses during three 24 ounce beer per week in the past; stopped alcoholic beverage usage after he was found to have cardiac disease    Drug use: No       PHYSICAL EXAM       ED Triage Vitals [23 0342]   BP Temp Temp Source Heart Rate Resp SpO2 Height Weight   (!) 155/82 98 °F (36.7 °C) Oral (!) 105 18 100 % 5' 10\" (1.778 m) 130 lb (59 kg)       Physical Exam  Vitals and nursing note reviewed. Constitutional:       General: He is not in acute distress. Appearance: He is normal weight. HENT:      Right Ear: External ear normal.      Left Ear: External ear normal.      Nose: Nose normal. No congestion. Mouth/Throat:      Mouth: Mucous membranes are moist.      Pharynx: No posterior oropharyngeal erythema. Eyes:      General: No scleral icterus. Extraocular Movements: Extraocular movements intact. Pupils: Pupils are equal, round, and reactive to light. Cardiovascular:      Rate and Rhythm: Normal rate and regular rhythm. Pulses: Normal pulses. Heart sounds: Normal heart sounds. No murmur heard. No friction rub. No gallop. Pulmonary:      Effort: Pulmonary effort is normal. No respiratory distress. Breath sounds: Normal breath sounds. No wheezing, rhonchi or rales. Abdominal:      General: Abdomen is flat. There is no distension. Palpations: Abdomen is soft. Tenderness: There is no abdominal tenderness. There is no guarding or rebound. Musculoskeletal:      Cervical back: Neck supple. No rigidity. Right lower leg: No edema. Left lower leg: No edema. Skin:     General: Skin is warm. Capillary Refill: Capillary refill takes less than 2 seconds. Findings: No bruising, erythema or rash. Neurological:      General: No focal deficit present. Mental Status: He is alert and oriented to person, place, and time. Psychiatric:         Mood and Affect: Mood normal.         Behavior: Behavior normal.       FORMAL DIAGNOSTIC RESULTS     RADIOLOGY: Interpretation per the Radiologist below, if available at the time of this note (none if blank):    XR CHEST (2 VW)   Final Result   1. No acute cardiopulmonary process.       This document has been electronically signed by: Mone Mathew DO on    03/18/2023 04:36 AM          LABS: (none if blank)  Labs Reviewed   CBC WITH AUTO DIFFERENTIAL - Abnormal; Notable for the following components:       Result Value    Hemoglobin 12.7 (*)     Hematocrit 41.1 (*)     MCHC 30.9 (*)     All other components within normal limits   COMPREHENSIVE METABOLIC PANEL W/ REFLEX TO MG FOR LOW K - Abnormal; Notable for the following components:    Glucose 411 (*)     Creatinine 1.3 (*)     ALT 8 (*)     All other components within normal limits   BRAIN NATRIURETIC PEPTIDE - Abnormal; Notable for the following components:    Pro-BNP 2806.0 (*)     All other components within normal limits   COVID-19 & INFLUENZA COMBO   TROPONIN   ANION GAP   GLOMERULAR FILTRATION RATE, ESTIMATED   OSMOLALITY       (Any cultures that may have been sent were not resulted at the time of this patient visit)    [address] / ED COURSE:     1) Number and Complexity of Problems            Problem List This Visit:         Chief Complaint   Patient presents with    Shortness of Breath           Differential Diagnosis includes (but not limited to):  CHF        Diagnoses Considered but I have low suspicion of:   COPD, PNA, COVID, flu, PE is considered less likely as the patient is compliant with his Eliquis             Pertinent Comorbid Conditions:    CHF, CAD s/p CABG, CVA on Eliquis    2)  Data Reviewed (none if left blank)          My Independent interpretations:     EKG:      Sinus rhythm, rate 88, QRS 80, QTc 433. No signs of acute ischemia. Similar to prior dated 2/20/2023    Imaging: CXR normal, no consolidations or effusions    Labs:      No leukocytosis, hemoglobin stable. Hyperglycemia 411. Creatinine stable at 1.3. BNP elevated however prior. Troponin negative. COVID flu negative. Decision Rules/Clinical Scores utilized: N/A            External Documentation Reviewed:         Previous patient encounter documents & history available on EMR was reviewed, including echo from 3/3/2023 showing EF of 20 to 25%. See Formal Diagnostic Results above for the lab and radiology tests and orders. 3)  Treatment and Disposition         ED Reassessment: Patient remained hemodynamically stable throughout his time in the emergency department. He was saturating well on room air. He was in no respiratory distress. Case discussed with consulting clinician: N/A         Shared Decision-Making was performed and disposition discussed with the patient and family and questions answered. Summary of Patient Presentation:  73-year-old male with history of CHF (last EF 20 to 25%), CAD s/p CABG, presenting with shortness of breath with lying flat and dyspnea on exertion. Recently started on midodrine. Patient is concerned for CHF exacerbation. Physical exam fairly unremarkable, lungs are clear to auscultation. Vitals normal, saturating 100% on room air. Chest x-ray without effusions or vascular congestion. BNP is less than prior. Troponin negative.   Hyperglycemic to 400, discussed with patient and instructed to continue his home medications and follow-up with his primary care provider early next week for further management. Will need to follow-up with cardiology. Discharged to home. MDM     Amount and/or Complexity of Data Reviewed  Clinical lab tests: ordered and reviewed  Tests in the radiology section of CPT®: ordered and reviewed  Tests in the medicine section of CPT®: reviewed and ordered  Obtain history from someone other than the patient: yes  Review and summarize past medical records: yes  Independent visualization of images, tracings, or specimens: yes    /   Vitals Reviewed:    Vitals:    03/18/23 0342 03/18/23 0436   BP: (!) 155/82 123/71   Pulse: (!) 105 87   Resp: 18 17   Temp: 98 °F (36.7 °C)    TempSrc: Oral    SpO2: 100% 100%   Weight: 130 lb (59 kg)    Height: 5' 10\" (1.778 m)        The patient was seen and examined. Appropriate diagnostic testing was performed and results reviewed with the patient. The results of pertinent diagnostic studies and exam findings were discussed. The patients provisional diagnosis and plan of care were discussed with the patient and present family who expressed understanding. Any medications were reviewed and indications and risks of medications were discussed with the patient /family present. Strict verbal and written return precautions, instructions and appropriate follow-up provided to  the patient. ED Medications administered this visit:  (None if blank)  Medications - No data to display      PROCEDURES: (None if blank)  Procedures:       DISCHARGE PRESCRIPTIONS: (None if blank)  Current Discharge Medication List          FINAL IMPRESSION      1. Dyspnea and respiratory abnormalities    2. Hyperglycemia          DISPOSITION/PLAN   DISPOSITION Decision To Discharge 03/18/2023 05:23:05 AM      OUTPATIENT FOLLOW UP THE PATIENT:  BLACK Yañez - CNP  [address] [facility]  [phone]    Schedule an appointment as soon as possible for a visit   ED follow-up for SOB, blood sugar 411    Rodney Staton MD, PGY-1 EM Resident       Rodney Staton MD  Resident  03/18/23 [address] [facility] Nell Reynoso MD  Resident  03/18/23 8407

## 2023-03-18 NOTE — DISCHARGE INSTRUCTIONS
You were seen in the emergency department today. Your EKG was similar to prior. Your chest x-ray did not show any fluid on your lungs. Your labs were similar to prior. Your blood sugar was very high, please continue taking your medication as directed and follow-up with your primary care provider about this within the next week. Take your medication as indicated and prescribed. Avoid eating foods that are rich in salt (sodium). If you use salt substitutes, be careful with the amount of extra potassium that you take in. PLEASE RETURN TO THE EMERGENCY DEPARTMENT IMMEDIATELY for worsening symptoms of increasing pain, shortness of breath, feeling of your heart fluttering or racing, swelling to your feet, unable to lay flat or increase in the number of pillows you sleep on, or if you develop any concerning symptoms such as: high fever not relieved by acetaminophen (Tylenol) and/or ibuprofen (Motrin / Advil), chills, persistent nausea and/or vomiting, loss of consciousness, numbness, weakness or tingling in the arms or legs or change in color of the extremities, changes in mental status, persistent headache, blurry vision, loss of bladder / bowel control, unable to follow up with your physician, or other any other care or concern.

## 2023-03-18 NOTE — ED NOTES
Patient resting in bed. Respirations easy and unlabored. No distress noted. Call light within reach.      Pedro Avendano RN  03/18/23 [id number]

## 2023-03-18 NOTE — ED TRIAGE NOTES
Patient presents to the ED with chief complaint of shortness of breath. Patient states this started just before he laid down for bed several hours ago. Patient denies having chest pain. Patient reports having double by-pass surgery last October. Patient resting in bed with wife at bedside. Respirations easy and unlabored. No distress noted. Call light within reach.

## 2023-03-19 LAB
EKG ATRIAL RATE: 88 BPM
EKG P AXIS: 75 DEGREES
EKG P-R INTERVAL: 134 MS
EKG Q-T INTERVAL: 358 MS
EKG QRS DURATION: 80 MS
EKG QTC CALCULATION (BAZETT): 433 MS
EKG R AXIS: 87 DEGREES
EKG T AXIS: 109 DEGREES
EKG VENTRICULAR RATE: 88 BPM

## 2023-03-19 PROCEDURE — 93010 ELECTROCARDIOGRAM REPORT: CPT | Performed by: INTERNAL MEDICINE

## 2023-03-20 ENCOUNTER — HOSPITAL ENCOUNTER (OUTPATIENT)
Dept: CARDIAC REHAB | Age: 63
Setting detail: THERAPIES SERIES
End: 2023-03-20
Payer: COMMERCIAL

## 2023-03-20 ENCOUNTER — HOSPITAL ENCOUNTER (OUTPATIENT)
Dept: CARDIAC REHAB | Age: 63
Setting detail: THERAPIES SERIES
Discharge: HOME | End: 2023-03-20
Payer: COMMERCIAL

## 2023-03-20 ENCOUNTER — OFFICE VISIT (OUTPATIENT)
Dept: FAMILY MEDICINE CLINIC | Age: 63
End: 2023-03-20
Payer: COMMERCIAL

## 2023-03-20 ENCOUNTER — HOSPITAL ENCOUNTER (OUTPATIENT)
Age: 63
Setting detail: SPECIMEN
Discharge: HOME | End: 2023-03-20

## 2023-03-20 VITALS
DIASTOLIC BLOOD PRESSURE: 68 MMHG | TEMPERATURE: 97.3 F | WEIGHT: 134 LBS | HEART RATE: 86 BPM | BODY MASS INDEX: 19.23 KG/M2 | SYSTOLIC BLOOD PRESSURE: 116 MMHG | OXYGEN SATURATION: 96 % | RESPIRATION RATE: 16 BRPM

## 2023-03-20 DIAGNOSIS — E11.65 UNCONTROLLED TYPE 2 DIABETES MELLITUS WITH HYPERGLYCEMIA (HCC): ICD-10-CM

## 2023-03-20 DIAGNOSIS — Z12.11 SCREENING FOR COLON CANCER: ICD-10-CM

## 2023-03-20 DIAGNOSIS — K21.9 GASTROESOPHAGEAL REFLUX DISEASE, UNSPECIFIED WHETHER ESOPHAGITIS PRESENT: ICD-10-CM

## 2023-03-20 DIAGNOSIS — I10 ESSENTIAL (PRIMARY) HYPERTENSION: Primary | ICD-10-CM

## 2023-03-20 DIAGNOSIS — E78.2 MIXED HYPERLIPIDEMIA: ICD-10-CM

## 2023-03-20 DIAGNOSIS — N18.31 STAGE 3A CHRONIC KIDNEY DISEASE (HCC): ICD-10-CM

## 2023-03-20 DIAGNOSIS — F51.01 PRIMARY INSOMNIA: ICD-10-CM

## 2023-03-20 PROCEDURE — G0423 INTENS CARDIAC REHAB NO EXER: HCPCS

## 2023-03-20 PROCEDURE — G0422 INTENS CARDIAC REHAB W/EXERC: HCPCS

## 2023-03-20 PROCEDURE — 99214 OFFICE O/P EST MOD 30 MIN: CPT | Performed by: NURSE PRACTITIONER

## 2023-03-20 PROCEDURE — 3078F DIAST BP <80 MM HG: CPT | Performed by: NURSE PRACTITIONER

## 2023-03-20 PROCEDURE — 3074F SYST BP LT 130 MM HG: CPT | Performed by: NURSE PRACTITIONER

## 2023-03-20 SDOH — ECONOMIC STABILITY: HOUSING INSECURITY
IN THE LAST 12 MONTHS, WAS THERE A TIME WHEN YOU DID NOT HAVE A STEADY PLACE TO SLEEP OR SLEPT IN A SHELTER (INCLUDING NOW)?: NO

## 2023-03-20 SDOH — ECONOMIC STABILITY: FOOD INSECURITY: WITHIN THE PAST 12 MONTHS, YOU WORRIED THAT YOUR FOOD WOULD RUN OUT BEFORE YOU GOT MONEY TO BUY MORE.: NEVER TRUE

## 2023-03-20 SDOH — ECONOMIC STABILITY: INCOME INSECURITY: HOW HARD IS IT FOR YOU TO PAY FOR THE VERY BASICS LIKE FOOD, HOUSING, MEDICAL CARE, AND HEATING?: VERY HARD

## 2023-03-20 SDOH — ECONOMIC STABILITY: FOOD INSECURITY: WITHIN THE PAST 12 MONTHS, THE FOOD YOU BOUGHT JUST DIDN'T LAST AND YOU DIDN'T HAVE MONEY TO GET MORE.: NEVER TRUE

## 2023-03-20 ASSESSMENT — ENCOUNTER SYMPTOMS
CHEST TIGHTNESS: 0
VOMITING: 0
COUGH: 0
CONSTIPATION: 0
EYE DISCHARGE: 0
ABDOMINAL PAIN: 0
DIARRHEA: 0
SHORTNESS OF BREATH: 0

## 2023-03-20 NOTE — PROGRESS NOTES
Video Education Report - ICR/CR  Name:  Noelia Dakin     Date:  3/20/2023  MRN: [id number]     Session #:  2  Session Length: 40 min    Core Videos        []Heart Disease Risk Reduction       [x]Overview of Pritikin Eating Plan      []Move it          []Calorie Density         []Healthy Minds, Bodies, Hearts        []Label Reading - Part 1       []Metabolic Syndrome and Belly Fat        []How Our Thoughts Can Heal Our Hearts   []Dining Out - Part 1      []Biomechanical Limitations  []Facts on Fat        []Hypertension & Heart Disease    []Diseases of Our Time - Focusing on Diabetes   []Body Composition  []Nutrition Action Plan    []Exercise Action Plan        Comments:  Video completed, group discussion

## 2023-03-20 NOTE — PROGRESS NOTES
[facility] [address] [facility], [facility] [address]. [facility] [address]  Dept: [phone]  Dept Fax: [phone]    Visit type: Established patient    Reason for Visit: Diabetes (2mo fu ), Health Maintenance (Vaccines Margreta Oats exam ), and Letter (Disability paperwork )         Assessment and Plan       1. Essential (primary) hypertension  2. Stage 3a chronic kidney disease ([address]  3. Mixed hyperlipidemia  4. Primary insomnia  5. Uncontrolled type 2 diabetes mellitus with hyperglycemia (HCC)  -     Hemoglobin A1C; Future  -     Lipid Panel; Future  6. Gastroesophageal reflux disease, unspecified whether esophagitis present  7. Screening for colon cancer  -     Fecal DNA Colorectal cancer screening (Cologuard)  Reviewed last labs completed- due for a1c and lipid panel   Is following with cardiology and urology  Did discuss disability papers- but does not have with him today  Need to monitor BS and BP at home        Return in about 3 months (around 6/20/2023) for DM, chronic issues - labs today . Subjective       History of CHF- is following with cardiology    Is going to have a a TURP procedure for enlarged prostate  Has crum cath now   Is following with urology   Is taking flomax    DM  Type 2  Not monitor BS  Is taking glucotrol,     Gerd onset over a year ago. Symptoms controlled with prilosec    Mood has been controlled with the wellbutrin. This helps with tobacco cessation   Is sleeping well with the trazodone     History of stroke  Is using walker        Review of Systems   Constitutional:  Negative for activity change, appetite change and fever. Eyes:  Negative for discharge and visual disturbance. Respiratory:  Negative for cough, chest tightness and shortness of breath. Cardiovascular:  Negative for chest pain and palpitations. Gastrointestinal:  Negative for abdominal pain, constipation, diarrhea and vomiting. Genitourinary:  Positive for difficulty urinating.

## 2023-03-21 ENCOUNTER — OFFICE VISIT (OUTPATIENT)
Dept: PHYSICAL MEDICINE AND REHAB | Age: 63
End: 2023-03-21
Payer: COMMERCIAL

## 2023-03-21 VITALS
SYSTOLIC BLOOD PRESSURE: 152 MMHG | HEIGHT: 70 IN | WEIGHT: 134 LBS | BODY MASS INDEX: 19.18 KG/M2 | DIASTOLIC BLOOD PRESSURE: 78 MMHG

## 2023-03-21 DIAGNOSIS — I69.322 DYSARTHRIA AS LATE EFFECT OF STROKE: ICD-10-CM

## 2023-03-21 DIAGNOSIS — I69.351 HEMIPARESIS AFFECTING RIGHT SIDE AS LATE EFFECT OF STROKE (HCC): Primary | ICD-10-CM

## 2023-03-21 LAB
CHOLEST SERPL-MCNC: 181 MG/DL
CHOLESTEROL/HDL RATIO: 3.2
EST. AVERAGE GLUCOSE BLD GHB EST-MCNC: 232 MG/DL
HBA1C MFR BLD: 9.7 % (ref 4–6)
HDLC SERPL-MCNC: 57 MG/DL
LDLC SERPL CALC-MCNC: 84 MG/DL (ref 0–130)
TRIGL SERPL-MCNC: 199 MG/DL

## 2023-03-21 PROCEDURE — 3078F DIAST BP <80 MM HG: CPT | Performed by: PHYSICAL MEDICINE & REHABILITATION

## 2023-03-21 PROCEDURE — 99213 OFFICE O/P EST LOW 20 MIN: CPT | Performed by: PHYSICAL MEDICINE & REHABILITATION

## 2023-03-21 PROCEDURE — 3077F SYST BP >= 140 MM HG: CPT | Performed by: PHYSICAL MEDICINE & REHABILITATION

## 2023-03-21 ASSESSMENT — ENCOUNTER SYMPTOMS
WHEEZING: 0
DIARRHEA: 0
BACK PAIN: 0
COUGH: 0
CONSTIPATION: 0
VOMITING: 0
TROUBLE SWALLOWING: 0
SINUS PRESSURE: 0
SORE THROAT: 0
NAUSEA: 0
RHINORRHEA: 0
ABDOMINAL PAIN: 0
SHORTNESS OF BREATH: 0

## 2023-03-21 NOTE — PROGRESS NOTES
maintained as per urology service direction. Bella catheter was replaced prior to his discharge. The patient's inpatient rehabilitation course otherwise was uneventful. He tolerated the intensive inpatient rehabilitation treatment well. He gained functional improvement in performing ADLs and ambulation with increasing independence and improving tolerance. The patient says he feels well. He says his right side is still feeling slightly weak but the muscle strength is increasing. He denies having any numbness or tingling feeling. He said his speech capacity is much better now. He is still on Bella catheter because he continues having difficulty voiding with hematuria when he had a void trial.  He denies having any painful symptom. He says he can perform getting in/out of bed, self-feeding, grooming, upper and the lower body dressings, toileting and bathing/showering without need for assistance from other. He is still using front-wheel rolling walker to assist walking mainly because he has numbers of cats jumping around at home. However he was able to walk down steps and walk to the car today without using rolling walker. He says he refused home health service without realized that the rehab therapies were provided by home care service. Therefore he did not receive any rehab intervention after he was discharged home. However he is starting cardiac rehab treatment starting this week. He says he is still on Eliquis and Lipitor. He says urology service is planning to do procedure for prostate enlargement.       Medication:  Current Outpatient Medications   Medication Sig Dispense Refill    metoprolol succinate (TOPROL XL) 25 MG extended release tablet Take 1 tablet by mouth daily 30 tablet 3    sacubitril-valsartan (ENTRESTO) 24-26 MG per tablet Take 1 tablet by mouth 2 times daily 180 tablet 3    Handicap Placard MISC by Does not apply route Expires 5 year from date of prescription 1 each 0    apixaban

## 2023-03-22 ENCOUNTER — HOSPITAL ENCOUNTER (OUTPATIENT)
Dept: CARDIAC REHAB | Age: 63
Setting detail: THERAPIES SERIES
Discharge: HOME | End: 2023-03-22
Payer: COMMERCIAL

## 2023-03-22 PROCEDURE — G0423 INTENS CARDIAC REHAB NO EXER: HCPCS

## 2023-03-22 PROCEDURE — G0422 INTENS CARDIAC REHAB W/EXERC: HCPCS

## 2023-03-22 NOTE — PROGRESS NOTES
Za CHERRY.:  1960    Acct Number: [de-identified]   MRN:  [id number]                             [facility] NUTRITION WORKSHOP             Date: 3/22/2023        Session # _______    Eather Litter class covered:    [x]   Fueling a Healthy Body  []   Mindful Eating  []   Targeting Nutrition Priorities  []   Dining Out :  Making the Most of a Menu  []   Label Reading    Readiness to change:    []  Pre-contemplative   []  Contemplative - ambivalent about change    [x]  Action - ready to set action plan and implement   []  Maintenance - has made change and is trying, and or practicing different alternative         behaviors     Louie Lozoya was in the Workshop with the Dietitian for 45 minutes. The content was presented via Powerpoint, lecture, and patient participation based format. Motivational interviewing was utilized when needed, to promote change. Patient voiced understanding.     Electronically signed by Elio Hernandez RD on 3/22/2023 at 11:56 AM

## 2023-03-22 NOTE — TELEPHONE ENCOUNTER
Dr Cain Jimenez, are you willing to do FMLA for patient? Pt is doing cardiac rehab, comes back to see Dr Cain Jimenez 5/11. Okay to write off until that appt?

## 2023-03-24 ENCOUNTER — APPOINTMENT (OUTPATIENT)
Dept: CARDIAC REHAB | Age: 63
End: 2023-03-24
Payer: COMMERCIAL

## 2023-03-24 ENCOUNTER — HOSPITAL ENCOUNTER (OUTPATIENT)
Dept: CARDIAC REHAB | Age: 63
Setting detail: THERAPIES SERIES
End: 2023-03-24
Payer: COMMERCIAL

## 2023-03-27 ENCOUNTER — HOSPITAL ENCOUNTER (OUTPATIENT)
Dept: CARDIAC REHAB | Age: 63
Setting detail: THERAPIES SERIES
Discharge: HOME | End: 2023-03-27
Payer: COMMERCIAL

## 2023-03-27 PROCEDURE — G0422 INTENS CARDIAC REHAB W/EXERC: HCPCS

## 2023-03-27 PROCEDURE — G0423 INTENS CARDIAC REHAB NO EXER: HCPCS

## 2023-03-27 NOTE — TELEPHONE ENCOUNTER
PT WIFE STATES THEY ALREADY HAVE WHEN HE WAS FIRST OFF   THEY JUST NEED IT TO SAY HE CAN BE OFF UNTIL 5/11 FOR NOW   FORMS OUT FOR SIGNATURE

## 2023-03-27 NOTE — PROGRESS NOTES
Video Education Report - ICR/CR  Name:  Nikita Phelps     Date:  3/27/2023  MRN: [id number]     Session #:  4  Session Length: 40 min    Core Videos        []Heart Disease Risk Reduction       []Overview of Pritikin Eating Plan      [x]Move it          []Calorie Density         []Healthy Minds, Bodies, Hearts        []Label Reading - Part 1       []Metabolic Syndrome and Belly Fat        []How Our Thoughts Can Heal Our Hearts   []Dining Out - Part 1      []Biomechanical Limitations  []Facts on Fat        []Hypertension & Heart Disease    []Diseases of Our Time - Focusing on Diabetes   []Body Composition  []Nutrition Action Plan    []Exercise Action Plan      Comments:  Video completed, group discussion

## 2023-03-28 ENCOUNTER — TELEPHONE (OUTPATIENT)
Dept: UROLOGY | Age: 63
End: 2023-03-28

## 2023-03-28 ENCOUNTER — PATIENT MESSAGE (OUTPATIENT)
Dept: FAMILY MEDICINE CLINIC | Age: 63
End: 2023-03-28

## 2023-03-28 RX ORDER — DOXYCYCLINE HYCLATE 100 MG
100 TABLET ORAL 2 TIMES DAILY
Qty: 20 TABLET | Refills: 0 | Status: SHIPPED | OUTPATIENT
Start: 2023-03-28 | End: 2023-04-07

## 2023-03-28 NOTE — PROGRESS NOTES
Notified urology office of pt abnormal EF and lifevest use after the clearance from [facility]. Notified of pt elevated glucose and high A1C and urine +Klebsiella. Case sent to anesthesia for review.

## 2023-03-28 NOTE — TELEPHONE ENCOUNTER
From: Carmen Slater  To: Brittani Puri  Sent: 3/28/2023 7:12 AM EDT  Subject: Medical forms    I was curious as to if you have filled out my medical forms to be off work?

## 2023-03-28 NOTE — PROGRESS NOTES
PAT call attempted, patient unavailable, left message to please call us back at your earliest convenience; [phone]

## 2023-03-28 NOTE — TELEPHONE ENCOUNTER
Please review urine culture on 3/14/2023. Surgery with DR Halie Pickens on 4/6/2023 for a CYSTO TURBT GREENLIGHT. Thanks.

## 2023-03-28 NOTE — TELEPHONE ENCOUNTER
Attempted to call the patient. Voicemail left stating prescription was sent and to return the call to the office to confirm message received.

## 2023-03-28 NOTE — PROGRESS NOTES
PAT Call Date: [facility] 3/28   Surgery Date: 4/6    Surgeon: Bennett Sanchez   Surgery: Nandini Moreno    Is patient from a nursing home? No   Any Isolation Precautions? No   Any Pacemaker or ICD? No If YES, has it been checked recently and where? Has the rep been notified? No     On Snapboard?  No  LIFEVEST   Hard Copy on Chart  In EPIC Pending/Notes   Consent -   Within 30 days; signed, dated & timed by patient and physician     [] On Arrival     [] Blood    Additional Consent Needs:     H&P - Within 30 days    [] Physician To Do     [] H&P Update - If H&P is older then 24 hours    Clearance -  Medical, Cardiac, Pulmonary, etc.   1/31 Nallu-mod Eliquis 2d   Orders - Signed and Dated    Copy Sent to Pharm []    [] Physician To Do    Labs - Within 3 months   3/18          3/14  [x] CBC-OK    [x] CMP GLUCOSE 411   [x] GFR-OK   [] INR    [] PTT    [x] Urine +KLEB   [] Liver Enzymes    [] Kidney Function    [] MRSA Nasal   [] MSSA      Others: 3/20 hgb a1c 9.7   Radiology Studies-   Within 1 year  3/18  [x] Chest X-Ray-OK   [] MRI    [] CT    EKG -   Within 1 year, unless hx of HTN  3/18 abnormal   Cardiac Workup -   Stress Test, Echo, Cath within 18 months  11/1    3/3  1/13  [x] Cath                                [] Stress Test                      [x] Echo 23%   [x] Holter Monitor    [] JOY

## 2023-03-29 ENCOUNTER — APPOINTMENT (OUTPATIENT)
Dept: CARDIAC REHAB | Age: 63
End: 2023-03-29
Payer: COMMERCIAL

## 2023-03-29 ENCOUNTER — HOSPITAL ENCOUNTER (OUTPATIENT)
Dept: CARDIAC REHAB | Age: 63
Setting detail: THERAPIES SERIES
End: 2023-03-29
Payer: COMMERCIAL

## 2023-03-29 DIAGNOSIS — E11.65 UNCONTROLLED TYPE 2 DIABETES MELLITUS WITH HYPERGLYCEMIA (HCC): Primary | ICD-10-CM

## 2023-03-29 RX ORDER — DULAGLUTIDE 0.75 MG/.5ML
0.75 INJECTION, SOLUTION SUBCUTANEOUS WEEKLY
Qty: 12 ADJUSTABLE DOSE PRE-FILLED PEN SYRINGE | Refills: 3 | Status: SHIPPED | OUTPATIENT
Start: 2023-03-29

## 2023-03-30 NOTE — TELEPHONE ENCOUNTER
Charolet Hamman on HIPPA advised of the urine results and doxycycline was sent for treatment. She voiced understanding.

## 2023-03-31 ENCOUNTER — HOSPITAL ENCOUNTER (OUTPATIENT)
Dept: CARDIAC REHAB | Age: 63
Setting detail: THERAPIES SERIES
Discharge: HOME | End: 2023-03-31
Payer: COMMERCIAL

## 2023-03-31 PROCEDURE — G0422 INTENS CARDIAC REHAB W/EXERC: HCPCS

## 2023-03-31 PROCEDURE — G0423 INTENS CARDIAC REHAB NO EXER: HCPCS

## 2023-03-31 NOTE — PROGRESS NOTES
Celine CHERRY.:  1960  Acct Number: [de-identified]  MRN:  [id number]                  [facility] COOKING SCHOOL WORKSHOP             Date: 3/31/2023        Session # ________   Charolett Hollow class covered:    [facility]    [] Adding Flavor - Sodium-Free  [] Fast & Healthy Breakfasts    [x] Powerhouse Plant-Based Proteins [] Satisfying Salads and Dressings    [] Simple Sides & Sauces   [] Personalizing Your Plate    [] Delicious Desserts    [] Savory Soups    [] Efficiency Cooking - Meals in a Snap [] Tasty Appetizers & Snacks     [] Comforting Weekend Breakfasts  [] One-Pot Wonders    [] Fast Evening Meals   [] Easy Entertaining    []  International Cuisine Spotlight on the Blue Zone          Patients were shown how to choose, prep, and cook; substitutions and other options were given. Samples were offered. Recipes were given and questions answered. The patient above was in the [employer] for 40 minutes.       Electronically signed by Hannah Pugh RD on 3/31/2023 at 12:14 PM

## 2023-04-03 ENCOUNTER — HOSPITAL ENCOUNTER (OUTPATIENT)
Dept: CARDIAC REHAB | Age: 63
Setting detail: THERAPIES SERIES
Discharge: HOME | End: 2023-04-03
Payer: COMMERCIAL

## 2023-04-03 PROCEDURE — G0423 INTENS CARDIAC REHAB NO EXER: HCPCS

## 2023-04-03 PROCEDURE — G0422 INTENS CARDIAC REHAB W/EXERC: HCPCS

## 2023-04-03 NOTE — PROGRESS NOTES
Lisbeth CHERRY.:  1960    MRN:  [id number]    Date: 4/3/2023      Session Length:  50 min   Session # _______    EXERCISE WORKSHOP:  Heart Disease Risk Reduction                        Todays class reviewed the anatomy and physiology of the heart. Additionally, we reviewed how the three Pritikin pillars impact risk factors, the progression, and the management of heart disease. Readiness to change:    ( ) Pre-contemplative   ( ) Contemplative - ambivalent about change    ( x) Action - ready to set action plan and implement   ( ) Maintenance - has made change and is trying, and or practicing different alternative behaviors     Additional Notes:      Chance Baxter was in the Workshop with the Exercise Physiologist for 50 minutes. The content was presented via Powerpoint, lecture, and patient participation based format. Motivational interviewing was utilized when needed, to promote change. Patient voiced understanding.     Electronically signed by ASHOK Stern on 4/3/2023 at 11:07 AM

## 2023-04-05 ENCOUNTER — HOSPITAL ENCOUNTER (OUTPATIENT)
Dept: CARDIAC REHAB | Age: 63
Setting detail: THERAPIES SERIES
End: 2023-04-05
Payer: COMMERCIAL

## 2023-04-05 ENCOUNTER — APPOINTMENT (OUTPATIENT)
Dept: CARDIAC REHAB | Age: 63
End: 2023-04-05
Payer: COMMERCIAL

## 2023-04-06 ENCOUNTER — ANESTHESIA EVENT (OUTPATIENT)
Dept: OPERATING ROOM | Age: 63
End: 2023-04-06
Payer: COMMERCIAL

## 2023-04-06 ENCOUNTER — ANESTHESIA (OUTPATIENT)
Dept: OPERATING ROOM | Age: 63
End: 2023-04-06
Payer: COMMERCIAL

## 2023-04-06 ENCOUNTER — HOSPITAL ENCOUNTER (OUTPATIENT)
Age: 63
Setting detail: OUTPATIENT SURGERY
Discharge: HOME | End: 2023-04-06
Attending: UROLOGY | Admitting: UROLOGY
Payer: COMMERCIAL

## 2023-04-06 VITALS
OXYGEN SATURATION: 100 % | SYSTOLIC BLOOD PRESSURE: 143 MMHG | DIASTOLIC BLOOD PRESSURE: 74 MMHG | WEIGHT: 134.6 LBS | BODY MASS INDEX: 19.27 KG/M2 | RESPIRATION RATE: 16 BRPM | TEMPERATURE: 96.8 F | HEIGHT: 70 IN | HEART RATE: 94 BPM

## 2023-04-06 DIAGNOSIS — G89.18 POSTOPERATIVE PAIN: Primary | ICD-10-CM

## 2023-04-06 DIAGNOSIS — N40.1 BPH WITH URINARY OBSTRUCTION: ICD-10-CM

## 2023-04-06 DIAGNOSIS — R31.0 GROSS HEMATURIA: ICD-10-CM

## 2023-04-06 DIAGNOSIS — N13.8 BPH WITH URINARY OBSTRUCTION: ICD-10-CM

## 2023-04-06 LAB
GLUCOSE BLD STRIP.AUTO-MCNC: 245 MG/DL (ref 70–108)
GLUCOSE BLD STRIP.AUTO-MCNC: 270 MG/DL (ref 70–108)
INR PPP: 1.09 (ref 0.85–1.13)

## 2023-04-06 PROCEDURE — 7100000001 HC PACU RECOVERY - ADDTL 15 MIN: Performed by: UROLOGY

## 2023-04-06 PROCEDURE — 7100000010 HC PHASE II RECOVERY - FIRST 15 MIN: Performed by: UROLOGY

## 2023-04-06 PROCEDURE — 3700000001 HC ADD 15 MINUTES (ANESTHESIA): Performed by: UROLOGY

## 2023-04-06 PROCEDURE — 88305 TISSUE EXAM BY PATHOLOGIST: CPT

## 2023-04-06 PROCEDURE — 3600000013 HC SURGERY LEVEL 3 ADDTL 15MIN: Performed by: UROLOGY

## 2023-04-06 PROCEDURE — 2500000003 HC RX 250 WO HCPCS: Performed by: NURSE ANESTHETIST, CERTIFIED REGISTERED

## 2023-04-06 PROCEDURE — 7100000000 HC PACU RECOVERY - FIRST 15 MIN: Performed by: UROLOGY

## 2023-04-06 PROCEDURE — 6360000002 HC RX W HCPCS: Performed by: NURSE ANESTHETIST, CERTIFIED REGISTERED

## 2023-04-06 PROCEDURE — 2709999900 HC NON-CHARGEABLE SUPPLY: Performed by: UROLOGY

## 2023-04-06 PROCEDURE — 2580000003 HC RX 258: Performed by: UROLOGY

## 2023-04-06 PROCEDURE — 3600000003 HC SURGERY LEVEL 3 BASE: Performed by: UROLOGY

## 2023-04-06 PROCEDURE — 82948 REAGENT STRIP/BLOOD GLUCOSE: CPT

## 2023-04-06 PROCEDURE — 36415 COLL VENOUS BLD VENIPUNCTURE: CPT

## 2023-04-06 PROCEDURE — 6370000000 HC RX 637 (ALT 250 FOR IP): Performed by: ANESTHESIOLOGY

## 2023-04-06 PROCEDURE — 2720000010 HC SURG SUPPLY STERILE: Performed by: UROLOGY

## 2023-04-06 PROCEDURE — 3700000000 HC ANESTHESIA ATTENDED CARE: Performed by: UROLOGY

## 2023-04-06 PROCEDURE — 85610 PROTHROMBIN TIME: CPT

## 2023-04-06 PROCEDURE — 7100000011 HC PHASE II RECOVERY - ADDTL 15 MIN: Performed by: UROLOGY

## 2023-04-06 PROCEDURE — 6360000002 HC RX W HCPCS: Performed by: UROLOGY

## 2023-04-06 RX ORDER — LABETALOL HYDROCHLORIDE 5 MG/ML
10 INJECTION, SOLUTION INTRAVENOUS
Status: DISCONTINUED | OUTPATIENT
Start: 2023-04-06 | End: 2023-04-06 | Stop reason: HOSPADM

## 2023-04-06 RX ORDER — ROCURONIUM BROMIDE 10 MG/ML
INJECTION, SOLUTION INTRAVENOUS PRN
Status: DISCONTINUED | OUTPATIENT
Start: 2023-04-06 | End: 2023-04-06 | Stop reason: SDUPTHER

## 2023-04-06 RX ORDER — FENTANYL CITRATE 50 UG/ML
50 INJECTION, SOLUTION INTRAMUSCULAR; INTRAVENOUS EVERY 5 MIN PRN
Status: DISCONTINUED | OUTPATIENT
Start: 2023-04-06 | End: 2023-04-06 | Stop reason: HOSPADM

## 2023-04-06 RX ORDER — SODIUM CHLORIDE 9 MG/ML
25 INJECTION, SOLUTION INTRAVENOUS PRN
Status: DISCONTINUED | OUTPATIENT
Start: 2023-04-06 | End: 2023-04-06 | Stop reason: HOSPADM

## 2023-04-06 RX ORDER — IPRATROPIUM BROMIDE AND ALBUTEROL SULFATE 2.5; .5 MG/3ML; MG/3ML
1 SOLUTION RESPIRATORY (INHALATION)
Status: DISCONTINUED | OUTPATIENT
Start: 2023-04-06 | End: 2023-04-06 | Stop reason: HOSPADM

## 2023-04-06 RX ORDER — SODIUM CHLORIDE 0.9 % (FLUSH) 0.9 %
5-40 SYRINGE (ML) INJECTION EVERY 12 HOURS SCHEDULED
Status: DISCONTINUED | OUTPATIENT
Start: 2023-04-06 | End: 2023-04-06 | Stop reason: HOSPADM

## 2023-04-06 RX ORDER — VASOPRESSIN 20 U/ML
INJECTION PARENTERAL PRN
Status: DISCONTINUED | OUTPATIENT
Start: 2023-04-06 | End: 2023-04-06 | Stop reason: SDUPTHER

## 2023-04-06 RX ORDER — ONDANSETRON 2 MG/ML
INJECTION INTRAMUSCULAR; INTRAVENOUS PRN
Status: DISCONTINUED | OUTPATIENT
Start: 2023-04-06 | End: 2023-04-06 | Stop reason: SDUPTHER

## 2023-04-06 RX ORDER — CEPHALEXIN 500 MG/1
500 CAPSULE ORAL 3 TIMES DAILY
Qty: 30 CAPSULE | Refills: 0 | Status: SHIPPED | OUTPATIENT
Start: 2023-04-06 | End: 2023-04-16

## 2023-04-06 RX ORDER — ONDANSETRON 2 MG/ML
4 INJECTION INTRAMUSCULAR; INTRAVENOUS
Status: DISCONTINUED | OUTPATIENT
Start: 2023-04-06 | End: 2023-04-06 | Stop reason: HOSPADM

## 2023-04-06 RX ORDER — DIPHENHYDRAMINE HYDROCHLORIDE 50 MG/ML
12.5 INJECTION INTRAMUSCULAR; INTRAVENOUS
Status: DISCONTINUED | OUTPATIENT
Start: 2023-04-06 | End: 2023-04-06 | Stop reason: HOSPADM

## 2023-04-06 RX ORDER — LORAZEPAM 2 MG/ML
0.5 INJECTION INTRAMUSCULAR
Status: DISCONTINUED | OUTPATIENT
Start: 2023-04-06 | End: 2023-04-06 | Stop reason: HOSPADM

## 2023-04-06 RX ORDER — MORPHINE SULFATE 2 MG/ML
2 INJECTION, SOLUTION INTRAMUSCULAR; INTRAVENOUS EVERY 5 MIN PRN
Status: DISCONTINUED | OUTPATIENT
Start: 2023-04-06 | End: 2023-04-06 | Stop reason: HOSPADM

## 2023-04-06 RX ORDER — DROPERIDOL 2.5 MG/ML
0.62 INJECTION, SOLUTION INTRAMUSCULAR; INTRAVENOUS
Status: DISCONTINUED | OUTPATIENT
Start: 2023-04-06 | End: 2023-04-06 | Stop reason: HOSPADM

## 2023-04-06 RX ORDER — SODIUM CHLORIDE 0.9 % (FLUSH) 0.9 %
5-40 SYRINGE (ML) INJECTION PRN
Status: DISCONTINUED | OUTPATIENT
Start: 2023-04-06 | End: 2023-04-06 | Stop reason: HOSPADM

## 2023-04-06 RX ORDER — PROPOFOL 10 MG/ML
INJECTION, EMULSION INTRAVENOUS PRN
Status: DISCONTINUED | OUTPATIENT
Start: 2023-04-06 | End: 2023-04-06 | Stop reason: SDUPTHER

## 2023-04-06 RX ORDER — FENTANYL CITRATE 50 UG/ML
INJECTION, SOLUTION INTRAMUSCULAR; INTRAVENOUS PRN
Status: DISCONTINUED | OUTPATIENT
Start: 2023-04-06 | End: 2023-04-06 | Stop reason: SDUPTHER

## 2023-04-06 RX ORDER — HYDROCODONE BITARTRATE AND ACETAMINOPHEN 5; 325 MG/1; MG/1
1 TABLET ORAL EVERY 6 HOURS PRN
Qty: 18 TABLET | Refills: 0 | Status: SHIPPED | OUTPATIENT
Start: 2023-04-06 | End: 2023-04-11

## 2023-04-06 RX ORDER — SODIUM CHLORIDE 9 MG/ML
INJECTION, SOLUTION INTRAVENOUS PRN
Status: DISCONTINUED | OUTPATIENT
Start: 2023-04-06 | End: 2023-04-06 | Stop reason: HOSPADM

## 2023-04-06 RX ORDER — HYDRALAZINE HYDROCHLORIDE 20 MG/ML
10 INJECTION INTRAMUSCULAR; INTRAVENOUS
Status: DISCONTINUED | OUTPATIENT
Start: 2023-04-06 | End: 2023-04-06 | Stop reason: HOSPADM

## 2023-04-06 RX ORDER — DEXAMETHASONE SODIUM PHOSPHATE 10 MG/ML
INJECTION, EMULSION INTRAMUSCULAR; INTRAVENOUS PRN
Status: DISCONTINUED | OUTPATIENT
Start: 2023-04-06 | End: 2023-04-06 | Stop reason: SDUPTHER

## 2023-04-06 RX ORDER — PHENYLEPHRINE HYDROCHLORIDE 10 MG/ML
INJECTION INTRAVENOUS PRN
Status: DISCONTINUED | OUTPATIENT
Start: 2023-04-06 | End: 2023-04-06 | Stop reason: SDUPTHER

## 2023-04-06 RX ADMIN — PHENYLEPHRINE HYDROCHLORIDE 100 MCG: 10 INJECTION INTRAVENOUS at 12:33

## 2023-04-06 RX ADMIN — INSULIN HUMAN 4 UNITS: 100 INJECTION, SOLUTION PARENTERAL at 13:37

## 2023-04-06 RX ADMIN — ONDANSETRON 4 MG: 2 INJECTION INTRAMUSCULAR; INTRAVENOUS at 13:15

## 2023-04-06 RX ADMIN — PHENYLEPHRINE HYDROCHLORIDE 200 MCG: 10 INJECTION INTRAVENOUS at 12:39

## 2023-04-06 RX ADMIN — PROPOFOL 50 MG: 10 INJECTION, EMULSION INTRAVENOUS at 12:24

## 2023-04-06 RX ADMIN — SUGAMMADEX 200 MG: 100 INJECTION, SOLUTION INTRAVENOUS at 13:16

## 2023-04-06 RX ADMIN — SODIUM CHLORIDE: 9 INJECTION, SOLUTION INTRAVENOUS at 11:31

## 2023-04-06 RX ADMIN — FENTANYL CITRATE 25 MCG: 50 INJECTION, SOLUTION INTRAMUSCULAR; INTRAVENOUS at 12:44

## 2023-04-06 RX ADMIN — Medication 100 MG: at 12:24

## 2023-04-06 RX ADMIN — PHENYLEPHRINE HYDROCHLORIDE 200 MCG: 10 INJECTION INTRAVENOUS at 12:54

## 2023-04-06 RX ADMIN — ROCURONIUM BROMIDE 30 MG: 10 INJECTION INTRAVENOUS at 12:24

## 2023-04-06 RX ADMIN — VASOPRESSIN 2 UNITS: 20 INJECTION INTRAVENOUS at 13:01

## 2023-04-06 RX ADMIN — Medication 2000 MG: at 12:30

## 2023-04-06 RX ADMIN — VASOPRESSIN 2 UNITS: 20 INJECTION INTRAVENOUS at 12:56

## 2023-04-06 RX ADMIN — PHENYLEPHRINE HYDROCHLORIDE 200 MCG: 10 INJECTION INTRAVENOUS at 12:53

## 2023-04-06 RX ADMIN — DEXAMETHASONE SODIUM PHOSPHATE 5 MG: 10 INJECTION, EMULSION INTRAMUSCULAR; INTRAVENOUS at 12:32

## 2023-04-06 ASSESSMENT — PAIN - FUNCTIONAL ASSESSMENT: PAIN_FUNCTIONAL_ASSESSMENT: 0-10

## 2023-04-06 NOTE — PROGRESS NOTES
Back to \[facility]\"" from PACU Alert. wife at bedside. Ice cream and diet pepsi given. Side rails up bed in low position.  Call light in reach

## 2023-04-06 NOTE — PROGRESS NOTES
[address] [address]  INPATIENT SPEECH THERAPY  STRZ ICU STEPDOWN TELEMETRY 4K  DAILY NOTE    TIME   SLP Individual Minutes  Time In: 1033  Time Out: 6791  Minutes: 54  Timed Code Treatment Minutes: 45 Minutes       Cognitive tx: 45 minutes  Dysphagia tx: 9 minutes    Date: 2022  Patient Name: Shawn Low      CSN: [id number]   : 1960  (63 y.o.)  Gender: male   Referring Physician:  Margaret Telles PA-C  Diagnosis: Emphysematous cystitis  Precautions: Fall Risk   Current Diet: Easy to chew with thin liquids  Swallowing Strategies: Standard Universal Swallow Precautions  Date of Last MBS/FEES: N/A    Pain:  No pain reported. Subjective:  Patient seen sitting upright in bed. He pleasant and cooperative. No family present. He reported he will be transferred to the inpatient rehab unit later this date. This session was interrupted by staff including nutrition and nursing several times but the patient was able to maintain focus on therapy tasks. Patient informed the clinician that he is an avid reader typically reading more than 100 books each year. He has also published several stories. 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 Short-Term Goals:  SHORT TERM GOAL #1:  Goal 1: Patient will complete complex executive functioning tasks (i.e., medication management, complex reasoning/deductive reasoning, etc.) with 80% accuracy and minimal cuing in order to allow for safe return to OF. INTERVENTIONS:  Medication Label  100% independently    Deductive Reasoning- Kitchen Shelves  70% independent increasing to 100% with minimal cues. SHORT TERM GOAL #2:  Goal 2: Patient will complete complex attention tasks with no more than two errros in three minutes in order to improve completion of ADLs/IADLs.   INTERVENTIONS:Not formally addressed but patient was able to maintain focus on activity with multiple interruptions from other hospital staff members. SHORT TERM GOAL #3:  Goal 3: Patient will consume an easy to chew diet and thin liquids with stable pulmonary status and adequate endurance to assist with meeting nutrition/hydration needs across 1-2  skilled dietary analysis  INTERVENTIONS:   Patient tolerated trials of thin liquids by straw without overt s/s of laryngeal penetration/aspiration. This clinician reviewed the IDDSI diet chart with the patient. The patient is presently on an IDDSI level 7 Easy to [employer]. Patient reported that due to his dentition, he does not eat certain foods such as apples or salads because they are difficult to chew. He is happy with his current diet level and is not interested in advanced texture trials at this time. Long-Term Goals:  No long term goals established due to estimated length of stay. EDUCATION:  Learner: Patient  Education:  Reviewed diet and strategies, Reviewed ST goals and Plan of Care, and Education Related to [employer] and Wellness   Evaluation of Education: Davidson Amaya understanding    ASSESSMENT/PLAN:  Activity Tolerance:  Patient tolerance of  treatment: good. Appropriate participation      Assessment/Plan: Patient progressing toward established goals. Continues to require skilled care of licensed speech pathologist to progress toward achievement of established goals and plan of care. .     Plan for Next Session: Higher level cognition and dietary analysis   Discharge Recommendations:  Martha Gong M.A.  CCC-SLP [de-identified] : Linsey Hurtado is a 52 yr old female, presenting today for an initial Surgical Oncology consultation regarding a splenic mass. \par \par She was diagnosed at age 32 with polycystic kidney disease and has been monitoring her kidney and liver for cysts ever since with yearly ultrasounds. \par \par She underwent a CT scan in 2003 for abdominal pain at [facility] which revealed multiple bilateral renal and hepatic cysts. We have the report but the CT is no longer available. \par \par An US performed on 6/6/2022 revealed hepatomegaly and innumerable hepatic and renal cysts in addition to a splenic lesion measuring 6.2 x 6.7 x 6.3 cm, not seen in prior study (7/28/2020). \par \par She subsequently underwent an MRI on 6/15/2022 which showed borderline in size spleen (13cm), mass involving the posterior aspect of the spleen measuring 7 x 6 cm, which does not show typical enhancement features of hemangioma. \par \par Repeat US on 2/6/23 to asses for growth/stability of the splenic lesion revealed a hyperechoic lesion within the spleen measuring 7 x 5.4 x 5.8 cm, previously measured 7.4 x 5.5 x 6.6 cm. \par \par She is feeling well, denies any abdominal pain. 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 She lives alone and is able to walk about a mile without SOB or CP. She doesn’t smoke or drink. She has never had a colonoscopy. She is being followed by Dr Fracisco Hammonds (general surgeon) for her polycystic disease. She doesn’t have a cardiologist but is seen by the cardiology department for her mild mitral valve regurgitation at [employer]. Latest Echo was in 2019, per patient was normal.  \par \par

## 2023-04-06 NOTE — PROGRESS NOTES
Discharge instructions given to patient and wife. verbalized understanding. Patient discharged with wife and walker.   Refused a wheelchair states he would rather walk

## 2023-04-06 NOTE — ANESTHESIA POSTPROCEDURE EVALUATION
Department of Anesthesiology  Postprocedure Note    Patient: Funmilayo Foley  MRN: [id number]  YOB: 1960  Date of evaluation: 4/6/2023      Procedure Summary     Date: 04/06/23 Room / Location: [address] CRY[place]AL [place]    Anesthesia Start: 9212 Anesthesia Stop: 5831    Procedure: CYSTO, TURBT, GREENLIGHT PHOTO VAPORIZATION OF PROSTATE Diagnosis:       BPH with urinary obstruction      Gross hematuria      (BPH with urinary obstruction [N40.1, N13.8])      (Gross hematuria [R31.0])    Surgeons: Alf Knutson MD Responsible Provider: Bernardo Gastelum MD    Anesthesia Type: general ASA Status: 4          Anesthesia Type: No value filed.     Eliza Phase I: Eliza Score: 10    Eliza Phase II:        Anesthesia Post Evaluation    Complications: no

## 2023-04-06 NOTE — DISCHARGE INSTRUCTIONS
Pt ok to discharge home in good condition  No heavy lifting, >10 lbs for today  Pt should avoid strenuous activity for today  Pt should walk moderately at home  Pt ok to shower   Pt may resume diet as tolerated  Pt should take Rx as directed  No driving while on narcotics  Please call attending physician or hospital  with questions  Call or Present to ED if fever (> 101F), intractable nausea vomiting or pain.   Rx in chart    Pt should follow up with Kamille Gomez MD, in 4 weeks, call to confirm appointment

## 2023-04-06 NOTE — PROGRESS NOTES
1325 Awake and oriented on arrival to PACU , accuccheck 245 , denies any pain or nausea  1337 Regular insulin 4 units given for 245 accucheck   1345 continues to deny pain or nausea   1355 pt continues to deny pain or nausea   1400 meets criteria for discharge , transported to [facility]

## 2023-04-06 NOTE — H&P
Temporal (!) 110 18 94 % 5' 10\" (1.778 m) 134 lb 9.6 oz (61.1 kg)     Constitutional: Patient in no acute distress; Neuro: alert and oriented to person place and time. Psych: Mood and affect normal.  Skin: Normal  Lungs: Respiratory effort normal, CTA  Cardiovascular:  Normal peripheral pulses; no murmur. Normal rhythm  Abdomen: Soft, non-tender, non-distended with no CVA, flank pain, hepatosplenomegaly or hernia. Kidneys normal.  Bladder non-tender and not distended. LABS:   No results for input(s): WBC, HGB, HCT, MCV, PLT in the last 72 hours. No results for input(s): NA, K, CL, CO2, PHOS, BUN, CREATININE, CA in the last 72 hours. Lab Results   Component Value Date    PSA 0.21 05/24/2022         Urinalysis: No results for input(s): COLORU, PHUR, LABCAST, WBCUA, RBCUA, MUCUS, TRICHOMONAS, YEAST, BACTERIA, CLARITYU, SPECGRAV, LEUKOCYTESUR, UROBILINOGEN, Trevor Alley in the last 72 hours.     Invalid input(s): NITRATE, GLUCOSEUKETONESUAMORPHOUS     -----------------------------------------------------------------      Assessment and Plan     Impression:    Patient Active Problem List   Diagnosis    Syncope    Facial injury    Tobacco abuse    Cranial facial fractures (HCC)    Diabetes mellitus type 2 in nonobese (HCC)    Fungal toenail infection    Golfer's elbow    Medical non-compliance    Erectile dysfunction    NAVARRO (generalized anxiety disorder)    Impacted cerumen of right ear    Essential hypertension    Uncontrolled type 2 diabetes mellitus with hyperglycemia ([address]    Thoracic arthritis    New onset of congestive heart failure (HCC)    Acute systolic congestive heart failure (HCC)    Nonischemic cardiomyopathy (HCC)    Stage 3a chronic kidney disease (HCC)    Tremor    LC (acute kidney injury) ([address]    Hyperlipidemia    Gastroesophageal reflux disease    S/P cardiac cath    CAD, multiple vessel    Ischemic cardiomyopathy    ETOH abuse    S/P CABG x 2    Emphysematous cystitis    Acute

## 2023-04-07 ENCOUNTER — HOSPITAL ENCOUNTER (OUTPATIENT)
Dept: CARDIAC REHAB | Age: 63
Setting detail: THERAPIES SERIES
End: 2023-04-07
Payer: COMMERCIAL

## 2023-04-07 ENCOUNTER — APPOINTMENT (OUTPATIENT)
Dept: CARDIAC REHAB | Age: 63
End: 2023-04-07
Payer: COMMERCIAL

## 2023-04-07 NOTE — BRIEF OP NOTE
Brief Postoperative Note      Patient: Elizabeth Nicholson  YOB: 1960  MRN: [id number]    Date of Procedure: 4/6/2023    Pre-Op Diagnosis: BPH with urinary obstruction [N40.1, N13.8]  Gross hematuria [R31.0]    Post-Op Diagnosis: Same       Procedure(s):  CYSTO, TURBT, GREENLIGHT PHOTO VAPORIZATION OF PROSTATE    Surgeon(s):  Anita Cespedes MD    Assistant:  * No surgical staff found *    Anesthesia: General    Estimated Blood Loss (mL): Minimal    Complications: None    Specimens:   ID Type Source Tests Collected by Time Destination   A : Bladder Lesion Tissue Bladder SURGICAL PATHOLOGY Anita Cespedes MD 4/6/2023 1314        Implants:  * No implants in log *      Drains:   Urinary Catheter 04/06/23 3 Way (Active)   $ Urethral catheter insertion Inserted for procedure 04/06/23 1325   Catheter Indications Perioperative use for selected surgical procedures 04/06/23 1355   Urine Color Pink 04/06/23 1355   Urine Appearance Clear 04/06/23 1355   Collection Container Standard 04/06/23 1355       [REMOVED] Urinary Catheter 03/18/23 (Removed)       Findings: keep crum for one week. Crum removal and path with janay in 1 week (earlier am preferred).  Janay please call me day of visit    Electronically signed by Tesfaye Clark MD on 4/6/2023 at 8:48 PM

## 2023-04-10 ENCOUNTER — APPOINTMENT (OUTPATIENT)
Dept: CARDIAC REHAB | Age: 63
End: 2023-04-10
Payer: COMMERCIAL

## 2023-04-10 ENCOUNTER — HOSPITAL ENCOUNTER (OUTPATIENT)
Dept: CARDIAC REHAB | Age: 63
Setting detail: THERAPIES SERIES
End: 2023-04-10
Payer: COMMERCIAL

## 2023-04-12 ENCOUNTER — APPOINTMENT (OUTPATIENT)
Dept: CARDIAC REHAB | Age: 63
End: 2023-04-12
Payer: COMMERCIAL

## 2023-04-13 ENCOUNTER — TELEPHONE (OUTPATIENT)
Dept: CARDIOLOGY CLINIC | Age: 63
End: 2023-04-13

## 2023-04-14 ENCOUNTER — APPOINTMENT (OUTPATIENT)
Dept: CARDIAC REHAB | Age: 63
End: 2023-04-14
Payer: COMMERCIAL

## 2023-04-14 ENCOUNTER — HOSPITAL ENCOUNTER (OUTPATIENT)
Dept: CARDIAC REHAB | Age: 63
Setting detail: THERAPIES SERIES
End: 2023-04-14
Payer: COMMERCIAL

## 2023-04-17 ENCOUNTER — APPOINTMENT (OUTPATIENT)
Dept: CARDIAC REHAB | Age: 63
End: 2023-04-17
Payer: COMMERCIAL

## 2023-04-17 ENCOUNTER — HOSPITAL ENCOUNTER (OUTPATIENT)
Dept: CARDIAC REHAB | Age: 63
Setting detail: THERAPIES SERIES
End: 2023-04-17
Payer: COMMERCIAL

## 2023-04-18 NOTE — OP NOTE
orifices were patent in the orthotopic location. The obturator was removed and the resectoscope was placed through the sheath. Systematically the tumor was removed until all visible tumor was removed. Hemostasis was achieved. The bladder was re-surveyed and there was no evidence of bladder perforation. A Urovac was used to remove all specimen. Repeat cystoscopy demonstrated no further free-floating specimen, no evidence of residual tumor, and no evidence of bleeding. the ureteral orifices were very close to the bladder neck    The prostate was surveyed. the lateral lobes were noted to be significantly obstructing. . There was no median lobe present. Enucleation of the median lobe was not performed. The vaporization was started proximal with a power setting of 80W. Both the left and right lateral lobes were vaporized in their entirety down to the level of the surgical capsule up to a power of 180 lane. With this complete, the apical dissection was carried out delicately. All of the obstructing adenoma was vaporized. Exquisite care was taken to ensure the integrity of the urinary sphincter. Once completed, the sphincter was evaluated and found to be clear of the resection bed, and completely intact. The anterior adenoma was the last tissue to be addressed. The scope was rotated for ease of resection. One last evaluation of the prostatic urethra demonstrated complete vaporization of the gland to the surgical capsule circumferentially. The scope was advanced into the bladder. The ureteral orifices were re-surveyed and noted to be in pristine condition, free of laser scatter. The cystoscope was then removed, and a 22F 3-way Bella catheter was placed into the bladder. When urine returned, the balloon was instilled with sterile water. The catheter was attached to gentle bladder irrigation using 0.9% normal saline. The catheter was fixed to the leg using a Armen strap.  The patient was then awakened and

## 2023-04-19 ENCOUNTER — HOSPITAL ENCOUNTER (OUTPATIENT)
Dept: CARDIAC REHAB | Age: 63
Setting detail: THERAPIES SERIES
Discharge: HOME | End: 2023-04-19
Payer: COMMERCIAL

## 2023-04-19 PROCEDURE — G0423 INTENS CARDIAC REHAB NO EXER: HCPCS

## 2023-04-19 PROCEDURE — G0422 INTENS CARDIAC REHAB W/EXERC: HCPCS

## 2023-04-19 NOTE — PROGRESS NOTES
Lazarus Jumbo YOB: 1960    Acct Number: [de-identified]   MRN:  [id number]                             [facility] HEALTHY MIND-SET WORKSHOP             Date: 2023        Session #________    Saravanan Loud class covered:    [x]  New Thoughts New Behaviors Workshop:  Patient will learn and practice techniques for developing effective health and lifestyle goals. Patient will be able to effectively apply the goal setting process learned to develop at least one new personal goal.     []  Managing Moods & Relationships Workshop:  Patient will learn how emotional and chronic stress factors can impact their hearts. They will learn healthy ways to handle stress and utilize positive coping mechanisms. In addition, [facility] patient will learn ways to improve communication skills. []  Healthy Sleep for a healthy Heart:  Patients will learn the importance of sleep to overall health, well-being, and quality of life. They will understand the symptoms of, and treatments for, common sleep disorders. Patients will also be able to identify daytime and nighttime behaviors which impact sleep, and they will be able to apply these tools to help manage sleep-related challenges. []  Recognizing and Reducing Stress:  Patients will learn about stress and how to recognize stress. Patients will gain insight into the toll that chronic stress takes on their health, both emotionally and physically. Patients will learn and practice a variety of stress management techniques. Patients will be able to effectively apply coping mechanisms in perceived stressful situations. Hellen Petty actively participated and verbalized understanding. Total time in the Healthy Mind-Set class was 60 minutes.     Electronically signed by SYDNEY Brumfield on 2023 at 11:58 AM

## 2023-04-21 ENCOUNTER — HOSPITAL ENCOUNTER (OUTPATIENT)
Dept: CARDIAC REHAB | Age: 63
Setting detail: THERAPIES SERIES
Discharge: HOME | End: 2023-04-21
Payer: COMMERCIAL

## 2023-04-21 PROCEDURE — G0422 INTENS CARDIAC REHAB W/EXERC: HCPCS

## 2023-04-24 ENCOUNTER — HOSPITAL ENCOUNTER (OUTPATIENT)
Dept: CARDIAC REHAB | Age: 63
Setting detail: THERAPIES SERIES
Discharge: HOME | End: 2023-04-24
Payer: COMMERCIAL

## 2023-04-24 ENCOUNTER — HOSPITAL ENCOUNTER (OUTPATIENT)
Dept: CARDIAC REHAB | Age: 63
Setting detail: THERAPIES SERIES
End: 2023-04-24
Payer: COMMERCIAL

## 2023-04-24 PROCEDURE — G0422 INTENS CARDIAC REHAB W/EXERC: HCPCS

## 2023-04-26 ENCOUNTER — APPOINTMENT (OUTPATIENT)
Dept: CARDIAC REHAB | Age: 63
End: 2023-04-26
Payer: COMMERCIAL

## 2023-04-27 ENCOUNTER — OFFICE VISIT (OUTPATIENT)
Dept: UROLOGY | Age: 63
End: 2023-04-27
Payer: COMMERCIAL

## 2023-04-27 VITALS — HEIGHT: 70 IN | BODY MASS INDEX: 19.33 KG/M2 | RESPIRATION RATE: 16 BRPM | WEIGHT: 135 LBS

## 2023-04-27 DIAGNOSIS — R33.8 BENIGN PROSTATIC HYPERPLASIA WITH URINARY RETENTION: Primary | ICD-10-CM

## 2023-04-27 DIAGNOSIS — N40.1 BENIGN PROSTATIC HYPERPLASIA WITH URINARY RETENTION: Primary | ICD-10-CM

## 2023-04-27 LAB
BILIRUBIN, POC: NEGATIVE
BLOOD URINE, POC: NORMAL
CLARITY, POC: NORMAL
COLOR, POC: YELLOW
GLUCOSE URINE, POC: >=1000
KETONES, POC: NEGATIVE
LEUKOCYTE EST, POC: NORMAL
NITRITE, POC: POSITIVE
PH, POC: 5.5
POST VOID RESIDUAL (PVR): 0 ML
PROTEIN, POC: >=300
SPECIFIC GRAVITY, POC: 1.02
UROBILINOGEN, POC: 0.2

## 2023-04-27 PROCEDURE — 99024 POSTOP FOLLOW-UP VISIT: CPT | Performed by: NURSE PRACTITIONER

## 2023-04-27 PROCEDURE — 81003 URINALYSIS AUTO W/O SCOPE: CPT | Performed by: NURSE PRACTITIONER

## 2023-04-27 PROCEDURE — 51798 US URINE CAPACITY MEASURE: CPT | Performed by: NURSE PRACTITIONER

## 2023-04-27 RX ORDER — SULFAMETHOXAZOLE AND TRIMETHOPRIM 800; 160 MG/1; MG/1
1 TABLET ORAL 2 TIMES DAILY
Qty: 28 TABLET | Refills: 0 | Status: SHIPPED | OUTPATIENT
Start: 2023-04-27 | End: 2023-05-11

## 2023-04-27 ASSESSMENT — ENCOUNTER SYMPTOMS
ABDOMINAL PAIN: 0
NAUSEA: 0
BACK PAIN: 0
VOMITING: 0

## 2023-04-27 NOTE — PROGRESS NOTES
[address] [facility] [id number]  Dept: [phone]  Loc: [phone]    Visit Date: 4/27/2023        HPI:     Funmilayo Foley is a 66 y.o. male who presents today for:  Chief Complaint   Patient presents with    Benign Prostatic Hypertrophy       HPI  Pt seen in follow up after catheter removal following PVP. Mr. Promise Etienne underwent cystoscopy, TURBT, greenlight PVP by Dr. Artemio Ibarra on 4/6/23. Pathology negative for malignancy and noted scant extensively cauterized and denuded urothelial mucosa with marked acute and chronic inflammation. Pt had significant urinary retention preoperatively failing multiple voiding trials with recurrent significant infections, emphysematous cystitis, and gross hematuria associated with his urinary retention. There is some suspicion of possible neurogenic bladder as well and pt and family member aware. Bella catheter removed 4/13/23     Waking 2-3 x per night to urinate. Some urinary frequency. Reports stream adequate. Feels overall symptoms improved. Urine sample in office opaque and significant for blood, leuks, nitrites. Current Outpatient Medications   Medication Sig Dispense Refill    empagliflozin (JARDIANCE) 25 MG tablet Take 1 tablet by mouth daily 30 tablet 3    metoprolol succinate (TOPROL XL) 25 MG extended release tablet Take 1 tablet by mouth daily 30 tablet 3    Handicap Placard MISC by Does not apply route Expires 5 year from date of prescription 1 each 0    apixaban (ELIQUIS) 5 MG TABS tablet Take 1 tablet by mouth 2 times daily 60 tablet 3    glipiZIDE (GLUCOTROL) 10 MG tablet Take 1 tablet by mouth every morning (before breakfast) 60 tablet 3    midodrine (PROAMATINE) 5 MG tablet Take 1 tablet by mouth in the morning and 1 tablet at noon and 1 tablet in the evening.  Hold if systolic blood pressure is greater than 120. 90 tablet 3    atorvastatin (LIPITOR) 40 MG

## 2023-04-28 ENCOUNTER — APPOINTMENT (OUTPATIENT)
Dept: CARDIAC REHAB | Age: 63
End: 2023-04-28
Payer: COMMERCIAL

## 2023-04-28 ENCOUNTER — HOSPITAL ENCOUNTER (OUTPATIENT)
Dept: CARDIAC REHAB | Age: 63
Setting detail: THERAPIES SERIES
End: 2023-04-28
Payer: COMMERCIAL

## 2023-04-28 LAB
BACTERIA UR CULT: ABNORMAL
ORGANISM: ABNORMAL

## 2023-05-01 ENCOUNTER — HOSPITAL ENCOUNTER (OUTPATIENT)
Dept: CARDIAC REHAB | Age: 63
Setting detail: THERAPIES SERIES
Discharge: HOME | End: 2023-05-01
Payer: COMMERCIAL

## 2023-05-01 ENCOUNTER — TELEPHONE (OUTPATIENT)
Dept: UROLOGY | Age: 63
End: 2023-05-01

## 2023-05-01 PROCEDURE — G0422 INTENS CARDIAC REHAB W/EXERC: HCPCS

## 2023-05-01 PROCEDURE — G0423 INTENS CARDIAC REHAB NO EXER: HCPCS

## 2023-05-01 NOTE — TELEPHONE ENCOUNTER
----- Message from [facility] - CNP sent at 5/1/2023  2:27 PM EDT -----  Continue Bactrim to completion.

## 2023-05-01 NOTE — PROGRESS NOTES
Video Education Report - ICR/CR  Name:  Norma Garnett     Date:  5/1/2023  MRN: [id number]     Session #:    Session Length: 40 min    Core Videos        []Heart Disease Risk Reduction       []Overview of Pritikin Eating Plan      []Move it          []Calorie Density         []Healthy Minds, Bodies, Hearts        []Label Reading - Part 1       []Metabolic Syndrome and Belly Fat        []How Our Thoughts Can Heal Our Hearts   []Dining Out - Part 1      []Biomechanical Limitations  []Facts on Fat        []Hypertension & Heart Disease    []Diseases of Our Time - Focusing on Diabetes   [x]Body Composition  []Nutrition Action Plan    []Exercise Action Plan    Comments:  Video completed, group discussion

## 2023-05-02 ENCOUNTER — TELEPHONE (OUTPATIENT)
Dept: FAMILY MEDICINE CLINIC | Age: 63
End: 2023-05-02

## 2023-05-02 NOTE — TELEPHONE ENCOUNTER
Patients insurance company has not given a response to Trulicity 5.92QC/4.4EK prior authorization in 24 business days. Please advise on Rx change.

## 2023-05-02 NOTE — TELEPHONE ENCOUNTER
Prior Authorization Not Updated Recently    This prior authorization request has not been updated for 24 business days.      Waiting for Payer Response  PA Detail   The payer has not yet made a decision on the prior authorization request. Case ID: [id number]      Payer:  Micki Gauthier and [address] [place] [address]    [phone]     [phone]    Prior auth initiated by: Stacia Madrid LPN   View History

## 2023-05-03 ENCOUNTER — HOSPITAL ENCOUNTER (OUTPATIENT)
Dept: CARDIAC REHAB | Age: 63
Setting detail: THERAPIES SERIES
Discharge: HOME | End: 2023-05-03
Payer: COMMERCIAL

## 2023-05-03 PROCEDURE — G0422 INTENS CARDIAC REHAB W/EXERC: HCPCS

## 2023-05-03 PROCEDURE — G0423 INTENS CARDIAC REHAB NO EXER: HCPCS

## 2023-05-03 NOTE — TELEPHONE ENCOUNTER
Sol East on HIPPA advised of the urine results and to complete the bactrim. She voiced understanding.

## 2023-05-03 NOTE — PROGRESS NOTES
Laura MOREO.B.:  1960    Acct Number: [de-identified]   MRN:  [id number]                             [facility] NUTRITION WORKSHOP             Date: 5/3/2023        Session # _______    Chantelle Rodrigues class covered:    []   Fueling a Healthy Body  []   Mindful Eating  [x]   Targeting Nutrition Priorities  []   Dining Out :  Making the Most of a Menu  []   Label Reading    Readiness to change:    []  Pre-contemplative   []  Contemplative - ambivalent about change    []  Action - ready to set action plan and implement   []  Maintenance - has made change and is trying, and or practicing different alternative         behaviors     Inés Johnson was in the Workshop with the Dietitian for 45 minutes. The content was presented via Powerpoint, lecture, and patient participation based format. Motivational interviewing was utilized when needed, to promote change. Patient voiced understanding.     Electronically signed by Edu Montgomery RD on 5/3/2023 at 11:46 AM

## 2023-05-04 NOTE — TELEPHONE ENCOUNTER
I spoke to the patient wife, okay per HIPAA and she states she is not sure if Trulicity was picked up from the pharmacy. Patient wife states she will check with the patient and notify the office if there was any issues with getting Rx from the pharmacy.

## 2023-05-05 ENCOUNTER — HOSPITAL ENCOUNTER (OUTPATIENT)
Dept: CARDIAC REHAB | Age: 63
Setting detail: THERAPIES SERIES
End: 2023-05-05
Payer: COMMERCIAL

## 2023-05-05 ENCOUNTER — APPOINTMENT (OUTPATIENT)
Dept: CARDIAC REHAB | Age: 63
End: 2023-05-05
Payer: COMMERCIAL

## 2023-05-08 ENCOUNTER — HOSPITAL ENCOUNTER (OUTPATIENT)
Dept: CARDIAC REHAB | Age: 63
Setting detail: THERAPIES SERIES
Discharge: HOME | End: 2023-05-08
Payer: COMMERCIAL

## 2023-05-08 ENCOUNTER — APPOINTMENT (OUTPATIENT)
Dept: GENERAL RADIOLOGY | Age: 63
End: 2023-05-08
Payer: COMMERCIAL

## 2023-05-08 ENCOUNTER — HOSPITAL ENCOUNTER (OUTPATIENT)
Dept: CARDIAC REHAB | Age: 63
Setting detail: THERAPIES SERIES
End: 2023-05-08
Payer: COMMERCIAL

## 2023-05-08 ENCOUNTER — HOSPITAL ENCOUNTER (INPATIENT)
Age: 63
LOS: 4 days | Discharge: HOME | End: 2023-05-12
Attending: EMERGENCY MEDICINE | Admitting: INTERNAL MEDICINE
Payer: COMMERCIAL

## 2023-05-08 DIAGNOSIS — I50.23 ACUTE ON CHRONIC SYSTOLIC CONGESTIVE HEART FAILURE, NYHA CLASS 2 (HCC): ICD-10-CM

## 2023-05-08 DIAGNOSIS — I13.10 CARDIORENAL SYNDROME WITH RENAL FAILURE: Primary | ICD-10-CM

## 2023-05-08 DIAGNOSIS — I25.5 ISCHEMIC CARDIOMYOPATHY: ICD-10-CM

## 2023-05-08 DIAGNOSIS — I50.23 ACUTE ON CHRONIC SYSTOLIC CONGESTIVE HEART FAILURE (HCC): ICD-10-CM

## 2023-05-08 PROBLEM — I50.43 CHF (CONGESTIVE HEART FAILURE), NYHA CLASS I, ACUTE ON CHRONIC, COMBINED (HCC): Status: ACTIVE | Noted: 2023-05-08

## 2023-05-08 LAB
ALBUMIN SERPL BCG-MCNC: 4.3 G/DL (ref 3.5–5.1)
ALP SERPL-CCNC: 72 U/L (ref 38–126)
ALT SERPL W/O P-5'-P-CCNC: 8 U/L (ref 11–66)
ANION GAP SERPL CALC-SCNC: 14 MEQ/L (ref 8–16)
ANION GAP SERPL CALC-SCNC: 14 MEQ/L (ref 8–16)
AST SERPL-CCNC: 10 U/L (ref 5–40)
BACTERIA URNS QL MICRO: ABNORMAL /HPF
BILIRUB SERPL-MCNC: 0.3 MG/DL (ref 0.3–1.2)
BILIRUB UR QL STRIP.AUTO: NEGATIVE
BUN SERPL-MCNC: 29 MG/DL (ref 7–22)
BUN SERPL-MCNC: 30 MG/DL (ref 7–22)
CA-I BLD ISE-SCNC: 1.28 MMOL/L (ref 1.12–1.32)
CALCIUM SERPL-MCNC: 9.8 MG/DL (ref 8.5–10.5)
CALCIUM SERPL-MCNC: 9.8 MG/DL (ref 8.5–10.5)
CASTS #/AREA URNS LPF: ABNORMAL /LPF
CASTS 2: ABNORMAL /LPF
CHARACTER UR: ABNORMAL
CHLORIDE SERPL-SCNC: 101 MEQ/L (ref 98–111)
CHLORIDE SERPL-SCNC: 103 MEQ/L (ref 98–111)
CO2 SERPL-SCNC: 19 MEQ/L (ref 23–33)
CO2 SERPL-SCNC: 20 MEQ/L (ref 23–33)
COLOR: YELLOW
CREAT SERPL-MCNC: 2 MG/DL (ref 0.4–1.2)
CREAT SERPL-MCNC: 2.1 MG/DL (ref 0.4–1.2)
CRYSTALS URNS MICRO: ABNORMAL
DEPRECATED MEAN GLUCOSE BLD GHB EST-ACNC: 279 MG/DL (ref 70–126)
DEPRECATED RDW RBC AUTO: 43.9 FL (ref 35–45)
EPITHELIAL CELLS, UA: ABNORMAL /HPF
ERYTHROCYTE [DISTWIDTH] IN BLOOD BY AUTOMATED COUNT: 13.9 % (ref 11.5–14.5)
GFR SERPL CREATININE-BSD FRML MDRD: 35 ML/MIN/1.73M2
GFR SERPL CREATININE-BSD FRML MDRD: 37 ML/MIN/1.73M2
GLUCOSE BLD STRIP.AUTO-MCNC: 115 MG/DL (ref 70–108)
GLUCOSE BLD STRIP.AUTO-MCNC: 149 MG/DL (ref 70–108)
GLUCOSE BLD STRIP.AUTO-MCNC: 282 MG/DL (ref 70–108)
GLUCOSE BLD STRIP.AUTO-MCNC: 295 MG/DL (ref 70–108)
GLUCOSE BLD STRIP.AUTO-MCNC: 323 MG/DL (ref 70–108)
GLUCOSE BLD STRIP.AUTO-MCNC: 355 MG/DL (ref 70–108)
GLUCOSE BLD STRIP.AUTO-MCNC: 363 MG/DL (ref 70–108)
GLUCOSE SERPL-MCNC: 319 MG/DL (ref 70–108)
GLUCOSE SERPL-MCNC: 85 MG/DL (ref 70–108)
GLUCOSE UR QL STRIP.AUTO: >= 1000 MG/DL
HBA1C MFR BLD HPLC: 11.3 % (ref 4.4–6.4)
HCT VFR BLD AUTO: 38.6 % (ref 42–52)
HGB BLD-MCNC: 11.7 GM/DL (ref 14–18)
HGB UR QL STRIP.AUTO: ABNORMAL
KETONES UR QL STRIP.AUTO: NEGATIVE
LACTATE SERPL-SCNC: 1 MMOL/L (ref 0.5–2)
LIPASE SERPL-CCNC: 49.3 U/L (ref 5.6–51.3)
MAGNESIUM SERPL-MCNC: 1.8 MG/DL (ref 1.6–2.4)
MCH RBC QN AUTO: 26.4 PG (ref 26–33)
MCHC RBC AUTO-ENTMCNC: 30.3 GM/DL (ref 32.2–35.5)
MCV RBC AUTO: 87.1 FL (ref 80–94)
MISCELLANEOUS 2: ABNORMAL
NITRITE UR QL STRIP: POSITIVE
NT-PROBNP SERPL IA-MCNC: 1607 PG/ML (ref 0–124)
OSMOLALITY SERPL CALC.SUM OF ELEC: 286.3 MOSMOL/KG (ref 275–300)
PH UR STRIP.AUTO: 5.5 [PH] (ref 5–9)
PLATELET # BLD AUTO: 354 THOU/MM3 (ref 130–400)
PMV BLD AUTO: 10.1 FL (ref 9.4–12.4)
POTASSIUM SERPL-SCNC: 4.8 MEQ/L (ref 3.5–5.2)
POTASSIUM SERPL-SCNC: 5.8 MEQ/L (ref 3.5–5.2)
PROT SERPL-MCNC: 7.2 G/DL (ref 6.1–8)
PROT UR STRIP.AUTO-MCNC: 30 MG/DL
RBC # BLD AUTO: 4.43 MILL/MM3 (ref 4.7–6.1)
RBC URINE: ABNORMAL /HPF
RENAL EPI CELLS #/AREA URNS HPF: ABNORMAL /[HPF]
SODIUM SERPL-SCNC: 134 MEQ/L (ref 135–145)
SODIUM SERPL-SCNC: 137 MEQ/L (ref 135–145)
SP GR UR REFRACT.AUTO: 1.02 (ref 1–1.03)
TROPONIN T: < 0.01 NG/ML
TROPONIN T: < 0.01 NG/ML
UROBILINOGEN, URINE: 0.2 EU/DL (ref 0–1)
WBC # BLD AUTO: 7.9 THOU/MM3 (ref 4.8–10.8)
WBC #/AREA URNS HPF: > 100 /HPF
WBC #/AREA URNS HPF: ABNORMAL /[HPF]
YEAST LIKE FUNGI URNS QL MICRO: ABNORMAL

## 2023-05-08 PROCEDURE — 93010 ELECTROCARDIOGRAM REPORT: CPT | Performed by: INTERNAL MEDICINE

## 2023-05-08 PROCEDURE — G0422 INTENS CARDIAC REHAB W/EXERC: HCPCS

## 2023-05-08 PROCEDURE — 87086 URINE CULTURE/COLONY COUNT: CPT

## 2023-05-08 PROCEDURE — 36415 COLL VENOUS BLD VENIPUNCTURE: CPT

## 2023-05-08 PROCEDURE — 6360000002 HC RX W HCPCS: Performed by: NURSE PRACTITIONER

## 2023-05-08 PROCEDURE — 2100000000 HC CCU R&B

## 2023-05-08 PROCEDURE — 82948 REAGENT STRIP/BLOOD GLUCOSE: CPT

## 2023-05-08 PROCEDURE — 96365 THER/PROPH/DIAG IV INF INIT: CPT

## 2023-05-08 PROCEDURE — 2580000003 HC RX 258: Performed by: INTERNAL MEDICINE

## 2023-05-08 PROCEDURE — 85027 COMPLETE CBC AUTOMATED: CPT

## 2023-05-08 PROCEDURE — 83605 ASSAY OF LACTIC ACID: CPT

## 2023-05-08 PROCEDURE — 93005 ELECTROCARDIOGRAM TRACING: CPT | Performed by: STUDENT IN AN ORGANIZED HEALTH CARE EDUCATION/TRAINING PROGRAM

## 2023-05-08 PROCEDURE — 80053 COMPREHEN METABOLIC PANEL: CPT

## 2023-05-08 PROCEDURE — 6370000000 HC RX 637 (ALT 250 FOR IP): Performed by: STUDENT IN AN ORGANIZED HEALTH CARE EDUCATION/TRAINING PROGRAM

## 2023-05-08 PROCEDURE — 83735 ASSAY OF MAGNESIUM: CPT

## 2023-05-08 PROCEDURE — 71045 X-RAY EXAM CHEST 1 VIEW: CPT

## 2023-05-08 PROCEDURE — 2580000003 HC RX 258: Performed by: STUDENT IN AN ORGANIZED HEALTH CARE EDUCATION/TRAINING PROGRAM

## 2023-05-08 PROCEDURE — 2580000003 HC RX 258: Performed by: NURSE PRACTITIONER

## 2023-05-08 PROCEDURE — 83036 HEMOGLOBIN GLYCOSYLATED A1C: CPT

## 2023-05-08 PROCEDURE — 6370000000 HC RX 637 (ALT 250 FOR IP): Performed by: NURSE PRACTITIONER

## 2023-05-08 PROCEDURE — 99223 1ST HOSP IP/OBS HIGH 75: CPT | Performed by: INTERNAL MEDICINE

## 2023-05-08 PROCEDURE — 99291 CRITICAL CARE FIRST HOUR: CPT | Performed by: INTERNAL MEDICINE

## 2023-05-08 PROCEDURE — 96361 HYDRATE IV INFUSION ADD-ON: CPT

## 2023-05-08 PROCEDURE — 84484 ASSAY OF TROPONIN QUANT: CPT

## 2023-05-08 PROCEDURE — 2500000003 HC RX 250 WO HCPCS: Performed by: STUDENT IN AN ORGANIZED HEALTH CARE EDUCATION/TRAINING PROGRAM

## 2023-05-08 PROCEDURE — 6360000002 HC RX W HCPCS: Performed by: STUDENT IN AN ORGANIZED HEALTH CARE EDUCATION/TRAINING PROGRAM

## 2023-05-08 PROCEDURE — 93005 ELECTROCARDIOGRAM TRACING: CPT | Performed by: NURSE PRACTITIONER

## 2023-05-08 PROCEDURE — 83880 ASSAY OF NATRIURETIC PEPTIDE: CPT

## 2023-05-08 PROCEDURE — 83690 ASSAY OF LIPASE: CPT

## 2023-05-08 PROCEDURE — 99285 EMERGENCY DEPT VISIT HI MDM: CPT

## 2023-05-08 PROCEDURE — 82330 ASSAY OF CALCIUM: CPT

## 2023-05-08 PROCEDURE — 81001 URINALYSIS AUTO W/SCOPE: CPT

## 2023-05-08 RX ORDER — DEXTROSE MONOHYDRATE 100 MG/ML
INJECTION, SOLUTION INTRAVENOUS CONTINUOUS PRN
Status: DISCONTINUED | OUTPATIENT
Start: 2023-05-08 | End: 2023-05-12 | Stop reason: HOSPADM

## 2023-05-08 RX ORDER — ONDANSETRON 2 MG/ML
4 INJECTION INTRAMUSCULAR; INTRAVENOUS EVERY 6 HOURS PRN
Status: DISCONTINUED | OUTPATIENT
Start: 2023-05-08 | End: 2023-05-12 | Stop reason: HOSPADM

## 2023-05-08 RX ORDER — SODIUM CHLORIDE 0.9 % (FLUSH) 0.9 %
10 SYRINGE (ML) INJECTION PRN
Status: DISCONTINUED | OUTPATIENT
Start: 2023-05-08 | End: 2023-05-12 | Stop reason: HOSPADM

## 2023-05-08 RX ORDER — NOREPINEPHRINE BIT/0.9 % NACL 16MG/250ML
1-100 INFUSION BOTTLE (ML) INTRAVENOUS CONTINUOUS
Status: DISCONTINUED | OUTPATIENT
Start: 2023-05-08 | End: 2023-05-10

## 2023-05-08 RX ORDER — ONDANSETRON 4 MG/1
4 TABLET, ORALLY DISINTEGRATING ORAL EVERY 8 HOURS PRN
Status: DISCONTINUED | OUTPATIENT
Start: 2023-05-08 | End: 2023-05-12 | Stop reason: HOSPADM

## 2023-05-08 RX ORDER — CALCIUM GLUCONATE 20 MG/ML
2000 INJECTION, SOLUTION INTRAVENOUS ONCE
Status: COMPLETED | OUTPATIENT
Start: 2023-05-08 | End: 2023-05-08

## 2023-05-08 RX ORDER — FUROSEMIDE 10 MG/ML
40 INJECTION INTRAMUSCULAR; INTRAVENOUS ONCE
Status: COMPLETED | OUTPATIENT
Start: 2023-05-08 | End: 2023-05-08

## 2023-05-08 RX ORDER — ENOXAPARIN SODIUM 100 MG/ML
40 INJECTION SUBCUTANEOUS DAILY
Status: DISCONTINUED | OUTPATIENT
Start: 2023-05-08 | End: 2023-05-08

## 2023-05-08 RX ORDER — SODIUM CHLORIDE 9 MG/ML
INJECTION, SOLUTION INTRAVENOUS CONTINUOUS
Status: ACTIVE | OUTPATIENT
Start: 2023-05-08 | End: 2023-05-09

## 2023-05-08 RX ORDER — ACETAMINOPHEN 650 MG/1
650 SUPPOSITORY RECTAL EVERY 6 HOURS PRN
Status: DISCONTINUED | OUTPATIENT
Start: 2023-05-08 | End: 2023-05-12 | Stop reason: HOSPADM

## 2023-05-08 RX ORDER — POLYETHYLENE GLYCOL 3350 17 G/17G
17 POWDER, FOR SOLUTION ORAL DAILY PRN
Status: DISCONTINUED | OUTPATIENT
Start: 2023-05-08 | End: 2023-05-12 | Stop reason: HOSPADM

## 2023-05-08 RX ORDER — PANTOPRAZOLE SODIUM 40 MG/1
40 TABLET, DELAYED RELEASE ORAL
Status: DISCONTINUED | OUTPATIENT
Start: 2023-05-09 | End: 2023-05-12 | Stop reason: HOSPADM

## 2023-05-08 RX ORDER — BUPROPION HYDROCHLORIDE 150 MG/1
150 TABLET ORAL EVERY MORNING
Status: DISCONTINUED | OUTPATIENT
Start: 2023-05-09 | End: 2023-05-12 | Stop reason: HOSPADM

## 2023-05-08 RX ORDER — ACETAMINOPHEN 325 MG/1
650 TABLET ORAL EVERY 6 HOURS PRN
Status: DISCONTINUED | OUTPATIENT
Start: 2023-05-08 | End: 2023-05-12 | Stop reason: HOSPADM

## 2023-05-08 RX ORDER — ATORVASTATIN CALCIUM 40 MG/1
40 TABLET, FILM COATED ORAL DAILY
Status: DISCONTINUED | OUTPATIENT
Start: 2023-05-08 | End: 2023-05-12 | Stop reason: HOSPADM

## 2023-05-08 RX ORDER — INSULIN LISPRO 100 [IU]/ML
0-4 INJECTION, SOLUTION INTRAVENOUS; SUBCUTANEOUS NIGHTLY
Status: DISCONTINUED | OUTPATIENT
Start: 2023-05-08 | End: 2023-05-11

## 2023-05-08 RX ORDER — MIDODRINE HYDROCHLORIDE 5 MG/1
5 TABLET ORAL EVERY 8 HOURS
Status: DISCONTINUED | OUTPATIENT
Start: 2023-05-08 | End: 2023-05-08

## 2023-05-08 RX ORDER — ASPIRIN 81 MG/1
324 TABLET, CHEWABLE ORAL ONCE
Status: COMPLETED | OUTPATIENT
Start: 2023-05-08 | End: 2023-05-08

## 2023-05-08 RX ORDER — SODIUM CHLORIDE 0.9 % (FLUSH) 0.9 %
10 SYRINGE (ML) INJECTION EVERY 12 HOURS SCHEDULED
Status: DISCONTINUED | OUTPATIENT
Start: 2023-05-08 | End: 2023-05-12 | Stop reason: HOSPADM

## 2023-05-08 RX ORDER — INSULIN LISPRO 100 [IU]/ML
0-8 INJECTION, SOLUTION INTRAVENOUS; SUBCUTANEOUS
Status: DISCONTINUED | OUTPATIENT
Start: 2023-05-08 | End: 2023-05-11

## 2023-05-08 RX ORDER — INSULIN LISPRO 100 [IU]/ML
7 INJECTION, SOLUTION INTRAVENOUS; SUBCUTANEOUS ONCE
Status: COMPLETED | OUTPATIENT
Start: 2023-05-08 | End: 2023-05-08

## 2023-05-08 RX ORDER — SODIUM CHLORIDE 9 MG/ML
INJECTION, SOLUTION INTRAVENOUS PRN
Status: DISCONTINUED | OUTPATIENT
Start: 2023-05-08 | End: 2023-05-12 | Stop reason: HOSPADM

## 2023-05-08 RX ORDER — 0.9 % SODIUM CHLORIDE 0.9 %
30 INTRAVENOUS SOLUTION INTRAVENOUS ONCE
Status: DISCONTINUED | OUTPATIENT
Start: 2023-05-08 | End: 2023-05-08

## 2023-05-08 RX ORDER — TAMSULOSIN HYDROCHLORIDE 0.4 MG/1
0.8 CAPSULE ORAL DAILY
Status: DISCONTINUED | OUTPATIENT
Start: 2023-05-08 | End: 2023-05-12 | Stop reason: HOSPADM

## 2023-05-08 RX ORDER — MIDODRINE HYDROCHLORIDE 10 MG/1
10 TABLET ORAL EVERY 8 HOURS
Status: DISCONTINUED | OUTPATIENT
Start: 2023-05-08 | End: 2023-05-09

## 2023-05-08 RX ADMIN — DEXTROSE MONOHYDRATE 250 ML: 100 INJECTION, SOLUTION INTRAVENOUS at 17:51

## 2023-05-08 RX ADMIN — FUROSEMIDE 40 MG: 10 INJECTION, SOLUTION INTRAMUSCULAR; INTRAVENOUS at 17:51

## 2023-05-08 RX ADMIN — TAMSULOSIN HYDROCHLORIDE 0.8 MG: 0.4 CAPSULE ORAL at 18:50

## 2023-05-08 RX ADMIN — SODIUM CHLORIDE: 9 INJECTION, SOLUTION INTRAVENOUS at 16:39

## 2023-05-08 RX ADMIN — SODIUM CHLORIDE, PRESERVATIVE FREE 10 ML: 5 INJECTION INTRAVENOUS at 19:55

## 2023-05-08 RX ADMIN — CALCIUM GLUCONATE 2000 MG: 20 INJECTION, SOLUTION INTRAVENOUS at 13:33

## 2023-05-08 RX ADMIN — Medication 5 MCG/MIN: at 12:25

## 2023-05-08 RX ADMIN — ASPIRIN 81 MG 324 MG: 81 TABLET ORAL at 10:44

## 2023-05-08 RX ADMIN — INSULIN HUMAN 10 UNITS: 100 INJECTION, SOLUTION PARENTERAL at 17:11

## 2023-05-08 RX ADMIN — INSULIN LISPRO 7 UNITS: 100 INJECTION, SOLUTION INTRAVENOUS; SUBCUTANEOUS at 19:48

## 2023-05-08 RX ADMIN — MIDODRINE HYDROCHLORIDE 10 MG: 10 TABLET ORAL at 18:50

## 2023-05-08 RX ADMIN — INSULIN LISPRO 4 UNITS: 100 INJECTION, SOLUTION INTRAVENOUS; SUBCUTANEOUS at 20:55

## 2023-05-08 RX ADMIN — APIXABAN 5 MG: 5 TABLET, FILM COATED ORAL at 19:55

## 2023-05-08 RX ADMIN — ATORVASTATIN CALCIUM 40 MG: 40 TABLET, FILM COATED ORAL at 18:50

## 2023-05-08 RX ADMIN — SODIUM CHLORIDE 1000 ML: 9 INJECTION, SOLUTION INTRAVENOUS at 10:45

## 2023-05-08 ASSESSMENT — ENCOUNTER SYMPTOMS
TROUBLE SWALLOWING: 0
SORE THROAT: 0
BACK PAIN: 0
VOMITING: 0
PHOTOPHOBIA: 0
NAUSEA: 0
ABDOMINAL PAIN: 0
COLOR CHANGE: 0
WHEEZING: 0
CHEST TIGHTNESS: 0
SHORTNESS OF BREATH: 0

## 2023-05-08 ASSESSMENT — PAIN - FUNCTIONAL ASSESSMENT: PAIN_FUNCTIONAL_ASSESSMENT: NONE - DENIES PAIN

## 2023-05-08 NOTE — ED NOTES
Pt resting quietly on cot in stable condition. Denies any needs at this time. Call light in reach.       Jacqueline Franco RN  05/08/23 3198

## 2023-05-08 NOTE — ED PROVIDER NOTES
[address] [facility]"" [address] [address] EMERGENCY DEPT      EMERGENCY MEDICINE     Pt Name: Owen Hoyt  MRN: [id number]  [place] 1960  Date of evaluation: 5/8/2023  Provider: Keron Milton MD  Supervising Physician: [address] [place] [address]       Chief Complaint   Patient presents with    Hypotension    Dizziness     History obtained from the patient. HISTORY OF PRESENT ILLNESS   Owen Hoyt is a pleasant 66 y.o. male who presents to the emergency department from  cardiac rehab , as a walk in to the ED lobby for evaluation of hypotension, dizziness. Patient has hx of double bypass, is wearing life vest, he is unsure of other medical problems. He thinks he is supposed to get a pacemaker soon. Recent UTI a couple months ago. Has had dizziness with any movement for the past few days, has noticed that he has gained a few pounds over the past week. Denies any chest pain, SOB, nausea, vomiting, or any changes to medications. Denies any additional complaints. Pertinent ROS included above. PASTMEDICAL HISTORY     Past Medical History:   Diagnosis Date    Coronary artery disease 2022    Diabetes mellitus ([address] 2020    Hypertension     Ischemic cardiomyopathy 2022    Urinary retention 2022       Patient Active Problem List   Diagnosis Code    Syncope R55    Facial injury S09. 93XA    Tobacco abuse Z72.0    Cranial facial fractures ([address] S02. 91XA, S02. 92XA    Diabetes mellitus type 2 in nonobese (HCC) E11.9    Fungal toenail infection B35.1    Golfer's elbow M77.00    Medical non-compliance Z91.199    Erectile dysfunction N52.9    NAVARRO (generalized anxiety disorder) F41.1    Impacted cerumen of right ear H61.21    Essential hypertension I10    Uncontrolled type 2 diabetes mellitus with hyperglycemia (HCC) E11.65    Thoracic arthritis M47.814    New onset of congestive heart failure (HCC) G06.0    Acute systolic congestive heart failure (HCC) I50.21    Nonischemic cardiomyopathy (HCC) I42.8    Stage 3a chronic kidney

## 2023-05-08 NOTE — ED NOTES
Pt resting on cot at this time. No needs voiced. VSS. Will continue to monitor.       Carla Cassidy RN  05/08/23 4758

## 2023-05-08 NOTE — ED NOTES
Pt medicated per MAR at this time. No needs voiced. Will continue to monitor.       Sara Gutierrez RN  05/08/23 8752

## 2023-05-08 NOTE — PROGRESS NOTES
Phase 2 Cardiac Rehab  Untoward Event  Physician Notification    Patient Name: Cal Nation    :     1960  Diagnosis  S/P CABG on 2023    Physician:    Dr. Rosalia Cardenas    2023    ()Chest Pain/Discomfort  ()New Arrhythmia/Ectopy  (x)New Signs/Symptoms  ()Findings Exceed Acceptable Parameters  ()Other:     Description of event:  pt BP before starting exercise was 92/58. Pt did 15 min on the NUSTEP machine and stated the he felt a little lightheaded. BP = 90/60. Pt stated that he is diabetic and did not eat or drink anything or check his blood sugar before exercise. We gave pt 2 cups of water and a nutrigrain bar. He rested for 10 minutes. Pt stood up and felt lightheaded again. BP =80/60. He also started to look grey/pale. Took pt to ED to be evaluated. Action taken:    (x)Transported to ED  ()Rapid Response Called  ()Symptoms resolved, sent patient home  ()Called Physician's Office  ()Appointment Scheduled    Please contact Cardiac Rehab with any changes, or to put Cardiac Rehab on hold.          Cardiac Rehab Staff  ()faxed a copy to office, ()called office   [phone]

## 2023-05-08 NOTE — ED NOTES
Assumed care of pt at this time. Received report from  [facility].      Brynn Rajan RN  05/08/23 [id number]

## 2023-05-08 NOTE — ED NOTES
Pts blood pressure remains hypotensive at this time. Levo started. Will continue to monitor.       Sarah Rocha RN  05/08/23 8389

## 2023-05-08 NOTE — H&P
more than 50% of the time involved with patient care by the [address] [place] [address] team.  Electronically signed by Jaden Mcintyre.  Shannan Valentine MD.

## 2023-05-08 NOTE — ED NOTES
Pt to ER from the cardiac rehab facility. Pt was exercising and became dizzy and hypotensive. Upon arrival to the ER, pt states he is still dizzy. He states he has felt that way for the past few days.  EKG and labs completed upon arrival.      Nati Cornejo RN  05/08/23 0425

## 2023-05-09 ENCOUNTER — TELEPHONE (OUTPATIENT)
Dept: FAMILY MEDICINE CLINIC | Age: 63
End: 2023-05-09

## 2023-05-09 ENCOUNTER — APPOINTMENT (OUTPATIENT)
Dept: GENERAL RADIOLOGY | Age: 63
End: 2023-05-09
Payer: COMMERCIAL

## 2023-05-09 ENCOUNTER — APPOINTMENT (OUTPATIENT)
Dept: ULTRASOUND IMAGING | Age: 63
End: 2023-05-09
Payer: COMMERCIAL

## 2023-05-09 PROBLEM — E87.5 HYPERKALEMIA: Status: ACTIVE | Noted: 2023-05-09

## 2023-05-09 PROBLEM — I13.10 CARDIORENAL SYNDROME WITH RENAL FAILURE: Status: ACTIVE | Noted: 2023-05-09

## 2023-05-09 LAB
ALBUMIN SERPL BCG-MCNC: 3.9 G/DL (ref 3.5–5.1)
ALP SERPL-CCNC: 68 U/L (ref 38–126)
ALT SERPL W/O P-5'-P-CCNC: 8 U/L (ref 11–66)
ANION GAP SERPL CALC-SCNC: 12 MEQ/L (ref 8–16)
ANION GAP SERPL CALC-SCNC: 14 MEQ/L (ref 8–16)
AST SERPL-CCNC: 11 U/L (ref 5–40)
BACTERIA: ABNORMAL
BASOPHILS ABSOLUTE: 0.1 THOU/MM3 (ref 0–0.1)
BASOPHILS NFR BLD AUTO: 0.7 %
BILIRUB CONJ SERPL-MCNC: < 0.2 MG/DL (ref 0–0.3)
BILIRUB SERPL-MCNC: 0.4 MG/DL (ref 0.3–1.2)
BILIRUB UR QL STRIP: NEGATIVE
BUN SERPL-MCNC: 32 MG/DL (ref 7–22)
BUN SERPL-MCNC: 34 MG/DL (ref 7–22)
CA-I BLD ISE-SCNC: 1.24 MMOL/L (ref 1.12–1.32)
CALCIUM SERPL-MCNC: 9.2 MG/DL (ref 8.5–10.5)
CALCIUM SERPL-MCNC: 9.5 MG/DL (ref 8.5–10.5)
CASTS #/AREA URNS LPF: ABNORMAL /LPF
CASTS #/AREA URNS LPF: ABNORMAL /LPF
CHARACTER UR: ABNORMAL
CHARCOAL URNS QL MICRO: ABNORMAL
CHLORIDE 24H UR-SRATE: 61 MEQ/L
CHLORIDE SERPL-SCNC: 100 MEQ/L (ref 98–111)
CHLORIDE SERPL-SCNC: 101 MEQ/L (ref 98–111)
CO2 SERPL-SCNC: 20 MEQ/L (ref 23–33)
CO2 SERPL-SCNC: 23 MEQ/L (ref 23–33)
COLOR UR: YELLOW
CREAT SERPL-MCNC: 2.1 MG/DL (ref 0.4–1.2)
CREAT SERPL-MCNC: 2.1 MG/DL (ref 0.4–1.2)
CREAT UR-MCNC: 74.2 MG/DL
CRYSTALS URNS QL MICRO: ABNORMAL
DEPRECATED RDW RBC AUTO: 44 FL (ref 35–45)
EOSINOPHIL NFR BLD AUTO: 1.4 %
EOSINOPHIL SMEAR, URINE: NORMAL
EOSINOPHILS ABSOLUTE: 0.2 THOU/MM3 (ref 0–0.4)
EPITHELIAL CELLS, UA: ABNORMAL /HPF
ERYTHROCYTE [DISTWIDTH] IN BLOOD BY AUTOMATED COUNT: 13.8 % (ref 11.5–14.5)
GFR SERPL CREATININE-BSD FRML MDRD: 35 ML/MIN/1.73M2
GFR SERPL CREATININE-BSD FRML MDRD: 35 ML/MIN/1.73M2
GLUCOSE BLD STRIP.AUTO-MCNC: 161 MG/DL (ref 70–108)
GLUCOSE BLD STRIP.AUTO-MCNC: 161 MG/DL (ref 70–108)
GLUCOSE BLD STRIP.AUTO-MCNC: 180 MG/DL (ref 70–108)
GLUCOSE BLD STRIP.AUTO-MCNC: 230 MG/DL (ref 70–108)
GLUCOSE BLD STRIP.AUTO-MCNC: 276 MG/DL (ref 70–108)
GLUCOSE BLD STRIP.AUTO-MCNC: 314 MG/DL (ref 70–108)
GLUCOSE BLD STRIP.AUTO-MCNC: 372 MG/DL (ref 70–108)
GLUCOSE BLD STRIP.AUTO-MCNC: 61 MG/DL (ref 70–108)
GLUCOSE SERPL-MCNC: 248 MG/DL (ref 70–108)
GLUCOSE SERPL-MCNC: 259 MG/DL (ref 70–108)
GLUCOSE UR QL STRIP.AUTO: >= 1000 MG/DL
HCT VFR BLD AUTO: 37.9 % (ref 42–52)
HGB BLD-MCNC: 11.5 GM/DL (ref 14–18)
HGB UR QL STRIP.AUTO: ABNORMAL
IMM GRANULOCYTES # BLD AUTO: 0.05 THOU/MM3 (ref 0–0.07)
IMM GRANULOCYTES NFR BLD AUTO: 0.4 %
KETONES UR QL STRIP.AUTO: NEGATIVE
LEUKOCYTE ESTERASE UR QL STRIP.AUTO: ABNORMAL
LV EF: 35 %
LVEF MODALITY: NORMAL
LYMPHOCYTES ABSOLUTE: 2.2 THOU/MM3 (ref 1–4.8)
LYMPHOCYTES NFR BLD AUTO: 17.7 %
MAGNESIUM SERPL-MCNC: 1.8 MG/DL (ref 1.6–2.4)
MCH RBC QN AUTO: 26.4 PG (ref 26–33)
MCHC RBC AUTO-ENTMCNC: 30.3 GM/DL (ref 32.2–35.5)
MCV RBC AUTO: 87.1 FL (ref 80–94)
MONOCYTES ABSOLUTE: 0.7 THOU/MM3 (ref 0.4–1.3)
MONOCYTES NFR BLD AUTO: 6 %
NEUTROPHILS NFR BLD AUTO: 73.8 %
NITRITE UR QL STRIP.AUTO: NEGATIVE
NRBC BLD AUTO-RTO: 0 /100 WBC
OSMOLALITY SERPL: 298 MOSMOL/KG (ref 275–295)
OSMOLALITY UR: 426 MOSMOL/KG (ref 250–750)
PH UR STRIP.AUTO: 5.5 [PH] (ref 5–9)
PLATELET # BLD AUTO: 342 THOU/MM3 (ref 130–400)
PMV BLD AUTO: 9.7 FL (ref 9.4–12.4)
POTASSIUM SERPL-SCNC: 4.6 MEQ/L (ref 3.5–5.2)
POTASSIUM SERPL-SCNC: 5.6 MEQ/L (ref 3.5–5.2)
POTASSIUM SERPL-SCNC: 5.6 MEQ/L (ref 3.5–5.2)
PROT SERPL-MCNC: 6.2 G/DL (ref 6.1–8)
PROT UR STRIP.AUTO-MCNC: 30 MG/DL
PROT UR-MCNC: 43.7 MG/DL
PROT/CREAT 24H UR: 0.59 MG/G{CREAT}
RBC # BLD AUTO: 4.35 MILL/MM3 (ref 4.7–6.1)
RBC #/AREA URNS HPF: > 100 /HPF
RENAL EPI CELLS #/AREA URNS HPF: ABNORMAL /[HPF]
SEGMENTED NEUTROPHILS ABSOLUTE COUNT: 9 THOU/MM3 (ref 1.8–7.7)
SODIUM SERPL-SCNC: 135 MEQ/L (ref 135–145)
SODIUM SERPL-SCNC: 135 MEQ/L (ref 135–145)
SODIUM UR-SCNC: 59 MEQ/L
SP GR UR REFRACT.AUTO: 1.02 (ref 1–1.03)
UROBILINOGEN UR QL STRIP.AUTO: 0.2 EU/DL (ref 0–1)
UUN 24H UR-MCNC: 299 MG/DL
WBC # BLD AUTO: 12.2 THOU/MM3 (ref 4.8–10.8)
WBC #/AREA URNS HPF: > 100 /HPF
YEAST LIKE FUNGI URNS QL MICRO: ABNORMAL

## 2023-05-09 PROCEDURE — 83735 ASSAY OF MAGNESIUM: CPT

## 2023-05-09 PROCEDURE — 82570 ASSAY OF URINE CREATININE: CPT

## 2023-05-09 PROCEDURE — 93307 TTE W/O DOPPLER COMPLETE: CPT

## 2023-05-09 PROCEDURE — 71045 X-RAY EXAM CHEST 1 VIEW: CPT

## 2023-05-09 PROCEDURE — 82948 REAGENT STRIP/BLOOD GLUCOSE: CPT

## 2023-05-09 PROCEDURE — 84132 ASSAY OF SERUM POTASSIUM: CPT

## 2023-05-09 PROCEDURE — 84156 ASSAY OF PROTEIN URINE: CPT

## 2023-05-09 PROCEDURE — 99232 SBSQ HOSP IP/OBS MODERATE 35: CPT | Performed by: STUDENT IN AN ORGANIZED HEALTH CARE EDUCATION/TRAINING PROGRAM

## 2023-05-09 PROCEDURE — 81001 URINALYSIS AUTO W/SCOPE: CPT

## 2023-05-09 PROCEDURE — 6370000000 HC RX 637 (ALT 250 FOR IP): Performed by: STUDENT IN AN ORGANIZED HEALTH CARE EDUCATION/TRAINING PROGRAM

## 2023-05-09 PROCEDURE — 84300 ASSAY OF URINE SODIUM: CPT

## 2023-05-09 PROCEDURE — 82330 ASSAY OF CALCIUM: CPT

## 2023-05-09 PROCEDURE — 85025 COMPLETE CBC W/AUTO DIFF WBC: CPT

## 2023-05-09 PROCEDURE — 99291 CRITICAL CARE FIRST HOUR: CPT | Performed by: INTERNAL MEDICINE

## 2023-05-09 PROCEDURE — 2100000000 HC CCU R&B

## 2023-05-09 PROCEDURE — 99223 1ST HOSP IP/OBS HIGH 75: CPT | Performed by: INTERNAL MEDICINE

## 2023-05-09 PROCEDURE — 76770 US EXAM ABDO BACK WALL COMP: CPT

## 2023-05-09 PROCEDURE — 89190 NASAL SMEAR FOR EOSINOPHILS: CPT

## 2023-05-09 PROCEDURE — 82436 ASSAY OF URINE CHLORIDE: CPT

## 2023-05-09 PROCEDURE — 2580000003 HC RX 258: Performed by: NURSE PRACTITIONER

## 2023-05-09 PROCEDURE — 36415 COLL VENOUS BLD VENIPUNCTURE: CPT

## 2023-05-09 PROCEDURE — 6370000000 HC RX 637 (ALT 250 FOR IP)

## 2023-05-09 PROCEDURE — 84540 ASSAY OF URINE/UREA-N: CPT

## 2023-05-09 PROCEDURE — 80053 COMPREHEN METABOLIC PANEL: CPT

## 2023-05-09 PROCEDURE — 6360000002 HC RX W HCPCS: Performed by: NURSE PRACTITIONER

## 2023-05-09 PROCEDURE — 83930 ASSAY OF BLOOD OSMOLALITY: CPT

## 2023-05-09 PROCEDURE — 6370000000 HC RX 637 (ALT 250 FOR IP): Performed by: NURSE PRACTITIONER

## 2023-05-09 PROCEDURE — 83935 ASSAY OF URINE OSMOLALITY: CPT

## 2023-05-09 PROCEDURE — 82248 BILIRUBIN DIRECT: CPT

## 2023-05-09 RX ORDER — INSULIN GLARGINE 100 [IU]/ML
10 INJECTION, SOLUTION SUBCUTANEOUS NIGHTLY
Status: DISCONTINUED | OUTPATIENT
Start: 2023-05-09 | End: 2023-05-12 | Stop reason: HOSPADM

## 2023-05-09 RX ORDER — INSULIN LISPRO 100 [IU]/ML
5 INJECTION, SOLUTION INTRAVENOUS; SUBCUTANEOUS ONCE
Status: COMPLETED | OUTPATIENT
Start: 2023-05-09 | End: 2023-05-09

## 2023-05-09 RX ORDER — INSULIN LISPRO 100 [IU]/ML
5 INJECTION, SOLUTION INTRAVENOUS; SUBCUTANEOUS
Status: DISCONTINUED | OUTPATIENT
Start: 2023-05-09 | End: 2023-05-11

## 2023-05-09 RX ORDER — INSULIN GLARGINE 100 [IU]/ML
5 INJECTION, SOLUTION SUBCUTANEOUS NIGHTLY
Status: DISCONTINUED | OUTPATIENT
Start: 2023-05-09 | End: 2023-05-09

## 2023-05-09 RX ADMIN — INSULIN LISPRO 5 UNITS: 100 INJECTION, SOLUTION INTRAVENOUS; SUBCUTANEOUS at 19:04

## 2023-05-09 RX ADMIN — SODIUM ZIRCONIUM CYCLOSILICATE 10 G: 10 POWDER, FOR SUSPENSION ORAL at 05:10

## 2023-05-09 RX ADMIN — ATORVASTATIN CALCIUM 40 MG: 40 TABLET, FILM COATED ORAL at 08:29

## 2023-05-09 RX ADMIN — PANTOPRAZOLE SODIUM 40 MG: 40 TABLET, DELAYED RELEASE ORAL at 08:30

## 2023-05-09 RX ADMIN — DEXTROSE MONOHYDRATE 125 ML: 100 INJECTION, SOLUTION INTRAVENOUS at 22:18

## 2023-05-09 RX ADMIN — TAMSULOSIN HYDROCHLORIDE 0.8 MG: 0.4 CAPSULE ORAL at 08:28

## 2023-05-09 RX ADMIN — INSULIN GLARGINE 10 UNITS: 100 INJECTION, SOLUTION SUBCUTANEOUS at 21:59

## 2023-05-09 RX ADMIN — APIXABAN 5 MG: 5 TABLET, FILM COATED ORAL at 08:28

## 2023-05-09 RX ADMIN — MIDODRINE HYDROCHLORIDE 10 MG: 10 TABLET ORAL at 00:45

## 2023-05-09 RX ADMIN — SODIUM CHLORIDE, PRESERVATIVE FREE 10 ML: 5 INJECTION INTRAVENOUS at 08:29

## 2023-05-09 RX ADMIN — SODIUM ZIRCONIUM CYCLOSILICATE 10 G: 10 POWDER, FOR SUSPENSION ORAL at 15:57

## 2023-05-09 RX ADMIN — INSULIN LISPRO 5 UNITS: 100 INJECTION, SOLUTION INTRAVENOUS; SUBCUTANEOUS at 19:07

## 2023-05-09 RX ADMIN — INSULIN LISPRO 2 UNITS: 100 INJECTION, SOLUTION INTRAVENOUS; SUBCUTANEOUS at 11:53

## 2023-05-09 RX ADMIN — INSULIN LISPRO 4 UNITS: 100 INJECTION, SOLUTION INTRAVENOUS; SUBCUTANEOUS at 08:30

## 2023-05-09 RX ADMIN — SODIUM CHLORIDE, PRESERVATIVE FREE 10 ML: 5 INJECTION INTRAVENOUS at 22:12

## 2023-05-09 RX ADMIN — CEFTRIAXONE SODIUM 1000 MG: 1 INJECTION, POWDER, FOR SOLUTION INTRAMUSCULAR; INTRAVENOUS at 10:33

## 2023-05-09 RX ADMIN — BUPROPION HYDROCHLORIDE 150 MG: 150 TABLET, FILM COATED, EXTENDED RELEASE ORAL at 08:28

## 2023-05-09 RX ADMIN — MIDODRINE HYDROCHLORIDE 10 MG: 10 TABLET ORAL at 15:57

## 2023-05-09 RX ADMIN — INSULIN LISPRO 8 UNITS: 100 INJECTION, SOLUTION INTRAVENOUS; SUBCUTANEOUS at 17:28

## 2023-05-09 RX ADMIN — MIDODRINE HYDROCHLORIDE 10 MG: 10 TABLET ORAL at 08:28

## 2023-05-09 RX ADMIN — APIXABAN 5 MG: 5 TABLET, FILM COATED ORAL at 22:11

## 2023-05-09 RX ADMIN — SODIUM ZIRCONIUM CYCLOSILICATE 10 G: 10 POWDER, FOR SUSPENSION ORAL at 22:11

## 2023-05-09 ASSESSMENT — PAIN SCALES - GENERAL
PAINLEVEL_OUTOF10: 0

## 2023-05-09 ASSESSMENT — ENCOUNTER SYMPTOMS
TROUBLE SWALLOWING: 0
CHOKING: 0
PHOTOPHOBIA: 0
ABDOMINAL PAIN: 0
COLOR CHANGE: 0
SHORTNESS OF BREATH: 0
BACK PAIN: 0
SORE THROAT: 0
NAUSEA: 0
VOMITING: 0

## 2023-05-09 NOTE — PLAN OF CARE
Problem: Chronic Conditions and Co-morbidities  Goal: Patient's chronic conditions and co-morbidity symptoms are monitored and maintained or improved  Outcome: Progressing  Flowsheets (Taken 5/8/2023 2000)  Care Plan - Patient's Chronic Conditions and Co-Morbidity Symptoms are Monitored and Maintained or Improved: Monitor and assess patient's chronic conditions and comorbid symptoms for stability, deterioration, or improvement     Problem: Discharge Planning  Goal: Discharge to home or other facility with appropriate resources  Outcome: Progressing  Flowsheets (Taken 5/8/2023 2000)  Discharge to home or other facility with appropriate resources: Identify barriers to discharge with patient and caregiver     Problem: Safety - Adult  Goal: Free from fall injury  Outcome: Progressing     Problem: Pain  Goal: Verbalizes/displays adequate comfort level or baseline comfort level  Outcome: Progressing  Flowsheets (Taken 5/8/2023 2000)  Verbalizes/displays adequate comfort level or baseline comfort level:   Encourage patient to monitor pain and request assistance   Assess pain using appropriate pain scale   Administer analgesics based on type and severity of pain and evaluate response

## 2023-05-09 NOTE — CONSULTS
[facility]    [address], [address]  [facility]  5/9/2023 10:50 AM    Pt Name:    Pauline Penaloza  MRN:     [id number]   [id number]  YOB: 1960  Admit Date:    5/8/2023  9:57 AM  Primary Care Physician:  BLACK Peña CNP    [facility] Number:   [id number]    Reason for Consult:  Aileen Huggins on CKD\"  Requesting provider:  Hospitalist    History: This is a 59-year-old male with a past medical history of CABG, paroxysmal atrial fibrillation on apixaban, chronic systolic heart failure with reduced ejection fraction, BPH, and diabetes mellitus type 2 who presented to [address] [place] [address] on 05/08/2023. Per chart review, patient was participating in cardiac rehab and blood pressure was noted to be 92/58 prior to starting exercise, patient's blood pressure dropped to 80/60 and was noted to have pallor during the rehabilitation. He was taken to the emergency department for further evaluation. ED labs are significant for sodium 134, potassium 5.8, bicarb 19, BUN 29, creatinine 2.0, BNP 1607, glucose 319, hemoglobin 11.7, hematocrit 38.6. Urinalysis showing glucose, large blood, positive nitrate, positive leukocyte Estrace, with no bacteria. Specific gravity 1.024. CXR showed evidence of prominent interstitial markings. Patient was admitted to the ICU for concerns for acute on chronic kidney injury as well as concerns for heart failure exacerbation. In the outpatient setting, patient is maintained on a apixaban 5 mg twice daily, atorvastatin, bupropion, dulaglutide, empagliflozin, iron supplementation, glipizide, metoprolol succinate, midodrine 5 mg 3 times daily with hold parameters, Entresto, TMP-SMX, tamsulosin, trazodone. In the emergency department patient was given a small bolus and was started on pressor support. Cardiology was consulted for advanced care recommendations. Patient seen and evaluated at the bedside in no acute distress.   He states that
D/w Patient's R.N. and specific instructions given and cardiovascular disease issues addressed. I above assessment and plan has been reviewed and modified per my assessment as necessary and I agree with it. Hypotension due to [facility] and UTI and ? urosepsis: Required  low dose 7 mcg levophed in ED, currently on 6.6.  2.  Hx of hypotension On midodrine at home   3. Dehydration   4. Acute kidney injury on CKD  5. UTI on dip stick- nitrate +ve - need microscopy WBC>100 R/O urosepsis  5. Ischemic Cardiomyopathy   6. HFrEF -stable  7. CAD hx of CABG x2- 11/2022  8.  PAF on apixaban      Plan:     Hydration NS 50 cc / hr for 8 hrs  and lab at am  Need to be treated for UTI and left it to ICU team   Increase midodrine 10 tid   Icd as out pat  and under F/u with dr. Jignesh Werner off Levophed as bp is getting better  Will follow    D/w the pat and nurse    Jose Diaz MD, MD,[facility]   Cardiology - [facility]

## 2023-05-09 NOTE — CARE COORDINATION
Case Management Assessment  Initial Evaluation    Date/Time of Evaluation: 5/9/2023 12:49 PM  Assessment Completed by: Yoli Fontaine RN    If patient is discharged prior to next notation, then this note serves as note for discharge by case management. Patient Name: Beulah Slater                   YOB: 1960  Diagnosis: Cardiorenal syndrome with renal failure [I13.10]  Acute on chronic systolic congestive heart failure (HCC) [I50.23]  CHF (congestive heart failure), NYHA class I, acute on chronic, combined ([address] [I50.43]                   Date / Time: 5/8/2023  9:57 AM  Location: [address] [place] [address]     Patient Admission Status: Inpatient   Readmission Risk Low 0-14, Mod 15-19), High > 20: Readmission Risk Score: 24    Current PCP: Honore Moritz, APRN - CNP  PCP verified by CM? Yes    Chart Reviewed: Yes      History Provided by: Patient  Patient Orientation: Alert and Oriented    Patient Cognition: Alert    Hospitalization in the last 30 days (Readmission):  Yes    If yes, Readmission Assessment in CM Navigator will be completed. Advance Directives:      Code Status: Full Code   Patient's Primary Decision Maker is: Patient Declined (Legal Next of Kin Remains as Decision Maker)      Discharge Planning:    Patient lives with: Spouse/Significant Other Type of Home: House  Primary Care Giver: Self  Patient Support Systems include: Spouse/Significant Other, Children, Family Members   Current Financial resources: Other (Comment) ([place])  Current community resources: Other (Comment) ([facility])  Current services prior to admission: Durable Medical Equipment            Current DME: [facility] (Comment) ([employer]; Has not used walker in the last 3 months.)            Type of Home Care services:  None    ADLS  Prior functional level: Independent in ADLs/IADLs  Current functional level:  Independent in ADLs/IADLs    Family can provide assistance at

## 2023-05-10 ENCOUNTER — APPOINTMENT (OUTPATIENT)
Dept: CARDIAC REHAB | Age: 63
End: 2023-05-10
Payer: COMMERCIAL

## 2023-05-10 ENCOUNTER — HOSPITAL ENCOUNTER (OUTPATIENT)
Dept: CARDIAC REHAB | Age: 63
Setting detail: THERAPIES SERIES
End: 2023-05-10
Payer: COMMERCIAL

## 2023-05-10 LAB
ANION GAP SERPL CALC-SCNC: 10 MEQ/L (ref 8–16)
ANION GAP SERPL CALC-SCNC: 13 MEQ/L (ref 8–16)
BACTERIA UR CULT: ABNORMAL
BASOPHILS ABSOLUTE: 0 THOU/MM3 (ref 0–0.1)
BASOPHILS NFR BLD AUTO: 0.5 %
BUN SERPL-MCNC: 39 MG/DL (ref 7–22)
BUN SERPL-MCNC: 39 MG/DL (ref 7–22)
CALCIUM SERPL-MCNC: 8.8 MG/DL (ref 8.5–10.5)
CALCIUM SERPL-MCNC: 9 MG/DL (ref 8.5–10.5)
CHLORIDE SERPL-SCNC: 100 MEQ/L (ref 98–111)
CHLORIDE SERPL-SCNC: 102 MEQ/L (ref 98–111)
CO2 SERPL-SCNC: 20 MEQ/L (ref 23–33)
CO2 SERPL-SCNC: 23 MEQ/L (ref 23–33)
CREAT SERPL-MCNC: 1.9 MG/DL (ref 0.4–1.2)
CREAT SERPL-MCNC: 2 MG/DL (ref 0.4–1.2)
DEPRECATED RDW RBC AUTO: 43 FL (ref 35–45)
EKG ATRIAL RATE: 84 BPM
EKG ATRIAL RATE: 95 BPM
EKG P AXIS: 73 DEGREES
EKG P AXIS: 80 DEGREES
EKG P-R INTERVAL: 146 MS
EKG P-R INTERVAL: 150 MS
EKG Q-T INTERVAL: 354 MS
EKG Q-T INTERVAL: 370 MS
EKG QRS DURATION: 74 MS
EKG QRS DURATION: 78 MS
EKG QTC CALCULATION (BAZETT): 418 MS
EKG QTC CALCULATION (BAZETT): 464 MS
EKG R AXIS: 84 DEGREES
EKG R AXIS: 86 DEGREES
EKG T AXIS: 85 DEGREES
EKG T AXIS: 95 DEGREES
EKG VENTRICULAR RATE: 84 BPM
EKG VENTRICULAR RATE: 95 BPM
EOSINOPHIL NFR BLD AUTO: 1.8 %
EOSINOPHILS ABSOLUTE: 0.2 THOU/MM3 (ref 0–0.4)
ERYTHROCYTE [DISTWIDTH] IN BLOOD BY AUTOMATED COUNT: 13.8 % (ref 11.5–14.5)
GFR SERPL CREATININE-BSD FRML MDRD: 37 ML/MIN/1.73M2
GFR SERPL CREATININE-BSD FRML MDRD: 40 ML/MIN/1.73M2
GLUCOSE BLD STRIP.AUTO-MCNC: 139 MG/DL (ref 70–108)
GLUCOSE BLD STRIP.AUTO-MCNC: 193 MG/DL (ref 70–108)
GLUCOSE BLD STRIP.AUTO-MCNC: 203 MG/DL (ref 70–108)
GLUCOSE BLD STRIP.AUTO-MCNC: 210 MG/DL (ref 70–108)
GLUCOSE BLD STRIP.AUTO-MCNC: 217 MG/DL (ref 70–108)
GLUCOSE SERPL-MCNC: 134 MG/DL (ref 70–108)
GLUCOSE SERPL-MCNC: 198 MG/DL (ref 70–108)
HCT VFR BLD AUTO: 35.4 % (ref 42–52)
HGB BLD-MCNC: 10.9 GM/DL (ref 14–18)
IMM GRANULOCYTES # BLD AUTO: 0.03 THOU/MM3 (ref 0–0.07)
IMM GRANULOCYTES NFR BLD AUTO: 0.3 %
LYMPHOCYTES ABSOLUTE: 1.9 THOU/MM3 (ref 1–4.8)
LYMPHOCYTES NFR BLD AUTO: 18.7 %
MCH RBC QN AUTO: 26.3 PG (ref 26–33)
MCHC RBC AUTO-ENTMCNC: 30.8 GM/DL (ref 32.2–35.5)
MCV RBC AUTO: 85.5 FL (ref 80–94)
MONOCYTES ABSOLUTE: 0.7 THOU/MM3 (ref 0.4–1.3)
MONOCYTES NFR BLD AUTO: 7.3 %
NEUTROPHILS NFR BLD AUTO: 71.4 %
NRBC BLD AUTO-RTO: 0 /100 WBC
ORGANISM: ABNORMAL
PLATELET # BLD AUTO: 306 THOU/MM3 (ref 130–400)
PMV BLD AUTO: 9.7 FL (ref 9.4–12.4)
POTASSIUM SERPL-SCNC: 4.4 MEQ/L (ref 3.5–5.2)
POTASSIUM SERPL-SCNC: 4.7 MEQ/L (ref 3.5–5.2)
RBC # BLD AUTO: 4.14 MILL/MM3 (ref 4.7–6.1)
SEGMENTED NEUTROPHILS ABSOLUTE COUNT: 7.1 THOU/MM3 (ref 1.8–7.7)
SODIUM SERPL-SCNC: 133 MEQ/L (ref 135–145)
SODIUM SERPL-SCNC: 135 MEQ/L (ref 135–145)
WBC # BLD AUTO: 9.9 THOU/MM3 (ref 4.8–10.8)

## 2023-05-10 PROCEDURE — 82948 REAGENT STRIP/BLOOD GLUCOSE: CPT

## 2023-05-10 PROCEDURE — 1200000003 HC TELEMETRY R&B

## 2023-05-10 PROCEDURE — 99232 SBSQ HOSP IP/OBS MODERATE 35: CPT | Performed by: INTERNAL MEDICINE

## 2023-05-10 PROCEDURE — 85025 COMPLETE CBC W/AUTO DIFF WBC: CPT

## 2023-05-10 PROCEDURE — 6370000000 HC RX 637 (ALT 250 FOR IP)

## 2023-05-10 PROCEDURE — 6360000002 HC RX W HCPCS: Performed by: NURSE PRACTITIONER

## 2023-05-10 PROCEDURE — 36415 COLL VENOUS BLD VENIPUNCTURE: CPT

## 2023-05-10 PROCEDURE — 6370000000 HC RX 637 (ALT 250 FOR IP): Performed by: NURSE PRACTITIONER

## 2023-05-10 PROCEDURE — 2580000003 HC RX 258

## 2023-05-10 PROCEDURE — 99291 CRITICAL CARE FIRST HOUR: CPT | Performed by: INTERNAL MEDICINE

## 2023-05-10 PROCEDURE — 80048 BASIC METABOLIC PNL TOTAL CA: CPT

## 2023-05-10 PROCEDURE — 2580000003 HC RX 258: Performed by: NURSE PRACTITIONER

## 2023-05-10 RX ORDER — SODIUM CHLORIDE 9 MG/ML
INJECTION, SOLUTION INTRAVENOUS CONTINUOUS
Status: ACTIVE | OUTPATIENT
Start: 2023-05-10 | End: 2023-05-10

## 2023-05-10 RX ADMIN — INSULIN LISPRO 5 UNITS: 100 INJECTION, SOLUTION INTRAVENOUS; SUBCUTANEOUS at 12:54

## 2023-05-10 RX ADMIN — SODIUM CHLORIDE, PRESERVATIVE FREE 10 ML: 5 INJECTION INTRAVENOUS at 08:28

## 2023-05-10 RX ADMIN — CEFTRIAXONE SODIUM 1000 MG: 1 INJECTION, POWDER, FOR SOLUTION INTRAMUSCULAR; INTRAVENOUS at 08:31

## 2023-05-10 RX ADMIN — ATORVASTATIN CALCIUM 40 MG: 40 TABLET, FILM COATED ORAL at 08:27

## 2023-05-10 RX ADMIN — APIXABAN 5 MG: 5 TABLET, FILM COATED ORAL at 20:31

## 2023-05-10 RX ADMIN — MIDODRINE HYDROCHLORIDE 15 MG: 10 TABLET ORAL at 08:27

## 2023-05-10 RX ADMIN — MIDODRINE HYDROCHLORIDE 15 MG: 10 TABLET ORAL at 00:00

## 2023-05-10 RX ADMIN — SODIUM CHLORIDE: 9 INJECTION, SOLUTION INTRAVENOUS at 09:19

## 2023-05-10 RX ADMIN — BUPROPION HYDROCHLORIDE 150 MG: 150 TABLET, FILM COATED, EXTENDED RELEASE ORAL at 08:27

## 2023-05-10 RX ADMIN — INSULIN LISPRO 5 UNITS: 100 INJECTION, SOLUTION INTRAVENOUS; SUBCUTANEOUS at 08:47

## 2023-05-10 RX ADMIN — INSULIN LISPRO 5 UNITS: 100 INJECTION, SOLUTION INTRAVENOUS; SUBCUTANEOUS at 17:48

## 2023-05-10 RX ADMIN — SODIUM ZIRCONIUM CYCLOSILICATE 10 G: 10 POWDER, FOR SUSPENSION ORAL at 08:27

## 2023-05-10 RX ADMIN — APIXABAN 5 MG: 5 TABLET, FILM COATED ORAL at 08:27

## 2023-05-10 RX ADMIN — INSULIN GLARGINE 10 UNITS: 100 INJECTION, SOLUTION SUBCUTANEOUS at 20:31

## 2023-05-10 RX ADMIN — INSULIN LISPRO 2 UNITS: 100 INJECTION, SOLUTION INTRAVENOUS; SUBCUTANEOUS at 12:53

## 2023-05-10 RX ADMIN — SODIUM CHLORIDE: 9 INJECTION, SOLUTION INTRAVENOUS at 19:57

## 2023-05-10 RX ADMIN — PANTOPRAZOLE SODIUM 40 MG: 40 TABLET, DELAYED RELEASE ORAL at 05:07

## 2023-05-10 ASSESSMENT — ENCOUNTER SYMPTOMS
ABDOMINAL PAIN: 0
BACK PAIN: 0
WHEEZING: 0
PHOTOPHOBIA: 0
SORE THROAT: 0
VOMITING: 0
COLOR CHANGE: 0
NAUSEA: 0
CHEST TIGHTNESS: 0
TROUBLE SWALLOWING: 0
SHORTNESS OF BREATH: 0

## 2023-05-10 ASSESSMENT — PAIN SCALES - GENERAL: PAINLEVEL_OUTOF10: 0

## 2023-05-10 NOTE — SIGNIFICANT EVENT
Report to [facility]. Electronically signed by Shree Noel.  Monet Burleson CNP on 5/10/2023 at 9:24 AM

## 2023-05-10 NOTE — PLAN OF CARE
Problem: Chronic Conditions and Co-morbidities  Goal: Patient's chronic conditions and co-morbidity symptoms are monitored and maintained or improved  Outcome: Progressing  Flowsheets (Taken 5/10/2023 0800)  Care Plan - Patient's Chronic Conditions and Co-Morbidity Symptoms are Monitored and Maintained or Improved:   Monitor and assess patient's chronic conditions and comorbid symptoms for stability, deterioration, or improvement   Collaborate with multidisciplinary team to address chronic and comorbid conditions and prevent exacerbation or deterioration   Update acute care plan with appropriate goals if chronic or comorbid symptoms are exacerbated and prevent overall improvement and discharge     Problem: Discharge Planning  Goal: Discharge to home or other facility with appropriate resources  Outcome: Progressing  Flowsheets (Taken 5/10/2023 0800)  Discharge to home or other facility with appropriate resources:   Identify barriers to discharge with patient and caregiver   Arrange for needed discharge resources and transportation as appropriate   Identify discharge learning needs (meds, wound care, etc)     Problem: Safety - Adult  Goal: Free from fall injury  Outcome: Progressing     Problem: Pain  Goal: Verbalizes/displays adequate comfort level or baseline comfort level  Outcome: Progressing

## 2023-05-10 NOTE — PLAN OF CARE
Behavioral Outcomes Behavioral Outcomes Behavioral Outcomes Behavioral Outcomes Behavioral Outcomes Behavioral Outcomes   PHQ-9 score 10  Depression Severity  []Minimal  []Mild   [x]Moderate  []Moderately Severe  []Severe     PHQ-9 score   Depression Severity  []Minimal  []Mild   []Moderate  []Moderately Severe []Severe   Does patient have Family Support? [x] Yes      [] No  No signs of marital/family distress        Within the Past Month:  *Have you wished you were dead or wished you could go to sleep and not wake up? [] Yes      [x] No  *Have you had any thoughts of killing yourself? [] Yes      [x] No          Using a scale of 0-10, 0=none, 10=very:   Rate your depression: 5    Rate your anxiety:  5  Using a scale of 0-10, 0=none, 10=very:   Rate your depression:     Rate your anxiety:   Using a scale of 0-10, 0=none, 10=very:   Rate your depression:    Rate your anxiety:   Using a scale of 0-10, 0=none, 10=very:   Rate your depression:     Rate your anxiety:   Using a scale of 0-10, 0=none, 10=very:   Rate your depression:     Rate your anxiety:   Using a scale of 0-10, 0=none, 10=very:   Rate your depression:     Rate your anxiety:     Signs and Symptoms of Depression Present? [x] Yes      [] No  If yes, please explain:  It has not been a great few months, since November he has had several medical issues. Signs and Symptoms of Depression Present? [x] Yes      [] No  If yes, please explain:   due to medical issues Signs and Symptoms of Depression Present? [x] Yes      [] No  If yes, please explain:    Same as previous Signs and Symptoms of Depression Present? [] Yes      [] No  If yes, please explain:   Signs and Symptoms of Depression Present? [] Yes      [] No  If yes, please explain:   Signs and Symptoms of Depression Present? [] Yes      [] No  If yes, please explain:     Signs and Symptoms of Anxiety Present?     [x] Yes      [] No  If yes, please explain:  Due to all of his recent

## 2023-05-10 NOTE — PLAN OF CARE
Problem: Chronic Conditions and Co-morbidities  Goal: Patient's chronic conditions and co-morbidity symptoms are monitored and maintained or improved  Outcome: Progressing  Flowsheets  Taken 5/9/2023 [address] - Patient's Chronic Conditions and Co-Morbidity Symptoms are Monitored and Maintained or Improved: Monitor and assess patient's chronic conditions and comorbid symptoms for stability, deterioration, or improvement  Taken 5/9/2023 2000  Care Plan - Patient's Chronic Conditions and Co-Morbidity Symptoms are Monitored and Maintained or Improved: Monitor and assess patient's chronic conditions and comorbid symptoms for stability, deterioration, or improvement     Problem: Discharge Planning  Goal: Discharge to home or other facility with appropriate resources  Outcome: Progressing  Flowsheets  Taken 5/9/2023 2345  Discharge to home or other facility with appropriate resources: Identify barriers to discharge with patient and caregiver  Taken 5/9/2023 2000  Discharge to home or other facility with appropriate resources: Identify barriers to discharge with patient and caregiver     Problem: Safety - Adult  Goal: Free from fall injury  Outcome: Progressing     Problem: Pain  Goal: Verbalizes/displays adequate comfort level or baseline comfort level  Outcome: Progressing  Flowsheets  Taken 5/9/2023 2345  Verbalizes/displays adequate comfort level or baseline comfort level: Encourage patient to monitor pain and request assistance  Taken 5/9/2023 2000  Verbalizes/displays adequate comfort level or baseline comfort level:   Encourage patient to monitor pain and request assistance   Assess pain using appropriate pain scale   Administer analgesics based on type and severity of pain and evaluate response

## 2023-05-11 PROBLEM — I50.23 ACUTE ON CHRONIC SYSTOLIC CONGESTIVE HEART FAILURE (HCC): Status: ACTIVE | Noted: 2022-10-29

## 2023-05-11 LAB
ANION GAP SERPL CALC-SCNC: 10 MEQ/L (ref 8–16)
ANION GAP SERPL CALC-SCNC: 10 MEQ/L (ref 8–16)
BASOPHILS ABSOLUTE: 0.1 THOU/MM3 (ref 0–0.1)
BASOPHILS NFR BLD AUTO: 0.9 %
BUN SERPL-MCNC: 28 MG/DL (ref 7–22)
BUN SERPL-MCNC: 30 MG/DL (ref 7–22)
CALCIUM SERPL-MCNC: 9.1 MG/DL (ref 8.5–10.5)
CALCIUM SERPL-MCNC: 9.2 MG/DL (ref 8.5–10.5)
CHLORIDE SERPL-SCNC: 105 MEQ/L (ref 98–111)
CHLORIDE SERPL-SCNC: 99 MEQ/L (ref 98–111)
CO2 SERPL-SCNC: 22 MEQ/L (ref 23–33)
CO2 SERPL-SCNC: 23 MEQ/L (ref 23–33)
CORTIS SERPL-MCNC: 13.81 UG/DL
CORTISOL COLLECTION INFO: NORMAL
CREAT SERPL-MCNC: 1.3 MG/DL (ref 0.4–1.2)
CREAT SERPL-MCNC: 1.4 MG/DL (ref 0.4–1.2)
DEPRECATED RDW RBC AUTO: 44.8 FL (ref 35–45)
EOSINOPHIL NFR BLD AUTO: 3.3 %
EOSINOPHILS ABSOLUTE: 0.3 THOU/MM3 (ref 0–0.4)
ERYTHROCYTE [DISTWIDTH] IN BLOOD BY AUTOMATED COUNT: 13.8 % (ref 11.5–14.5)
GFR SERPL CREATININE-BSD FRML MDRD: 57 ML/MIN/1.73M2
GFR SERPL CREATININE-BSD FRML MDRD: > 60 ML/MIN/1.73M2
GLUCOSE BLD STRIP.AUTO-MCNC: 136 MG/DL (ref 70–108)
GLUCOSE BLD STRIP.AUTO-MCNC: 155 MG/DL (ref 70–108)
GLUCOSE BLD STRIP.AUTO-MCNC: 251 MG/DL (ref 70–108)
GLUCOSE BLD STRIP.AUTO-MCNC: 88 MG/DL (ref 70–108)
GLUCOSE SERPL-MCNC: 250 MG/DL (ref 70–108)
GLUCOSE SERPL-MCNC: 81 MG/DL (ref 70–108)
HCT VFR BLD AUTO: 37.8 % (ref 42–52)
HGB BLD-MCNC: 11.2 GM/DL (ref 14–18)
IMM GRANULOCYTES # BLD AUTO: 0.03 THOU/MM3 (ref 0–0.07)
IMM GRANULOCYTES NFR BLD AUTO: 0.4 %
LYMPHOCYTES ABSOLUTE: 1.8 THOU/MM3 (ref 1–4.8)
LYMPHOCYTES NFR BLD AUTO: 22 %
MCH RBC QN AUTO: 26.4 PG (ref 26–33)
MCHC RBC AUTO-ENTMCNC: 29.6 GM/DL (ref 32.2–35.5)
MCV RBC AUTO: 88.9 FL (ref 80–94)
MONOCYTES ABSOLUTE: 0.8 THOU/MM3 (ref 0.4–1.3)
MONOCYTES NFR BLD AUTO: 9.9 %
NEUTROPHILS NFR BLD AUTO: 63.5 %
NRBC BLD AUTO-RTO: 0 /100 WBC
PLATELET # BLD AUTO: 287 THOU/MM3 (ref 130–400)
PMV BLD AUTO: 9.4 FL (ref 9.4–12.4)
POTASSIUM SERPL-SCNC: 4.3 MEQ/L (ref 3.5–5.2)
POTASSIUM SERPL-SCNC: 4.9 MEQ/L (ref 3.5–5.2)
RBC # BLD AUTO: 4.25 MILL/MM3 (ref 4.7–6.1)
SEGMENTED NEUTROPHILS ABSOLUTE COUNT: 5.2 THOU/MM3 (ref 1.8–7.7)
SODIUM SERPL-SCNC: 132 MEQ/L (ref 135–145)
SODIUM SERPL-SCNC: 137 MEQ/L (ref 135–145)
WBC # BLD AUTO: 8.2 THOU/MM3 (ref 4.8–10.8)

## 2023-05-11 PROCEDURE — 97110 THERAPEUTIC EXERCISES: CPT

## 2023-05-11 PROCEDURE — 99232 SBSQ HOSP IP/OBS MODERATE 35: CPT | Performed by: INTERNAL MEDICINE

## 2023-05-11 PROCEDURE — 97166 OT EVAL MOD COMPLEX 45 MIN: CPT

## 2023-05-11 PROCEDURE — 97116 GAIT TRAINING THERAPY: CPT

## 2023-05-11 PROCEDURE — 36415 COLL VENOUS BLD VENIPUNCTURE: CPT

## 2023-05-11 PROCEDURE — 6360000002 HC RX W HCPCS: Performed by: NURSE PRACTITIONER

## 2023-05-11 PROCEDURE — 80048 BASIC METABOLIC PNL TOTAL CA: CPT

## 2023-05-11 PROCEDURE — 85025 COMPLETE CBC W/AUTO DIFF WBC: CPT

## 2023-05-11 PROCEDURE — 6370000000 HC RX 637 (ALT 250 FOR IP): Performed by: INTERNAL MEDICINE

## 2023-05-11 PROCEDURE — 6370000000 HC RX 637 (ALT 250 FOR IP)

## 2023-05-11 PROCEDURE — 97530 THERAPEUTIC ACTIVITIES: CPT

## 2023-05-11 PROCEDURE — 2580000003 HC RX 258: Performed by: NURSE PRACTITIONER

## 2023-05-11 PROCEDURE — 1200000003 HC TELEMETRY R&B

## 2023-05-11 PROCEDURE — 6370000000 HC RX 637 (ALT 250 FOR IP): Performed by: NURSE PRACTITIONER

## 2023-05-11 PROCEDURE — 82948 REAGENT STRIP/BLOOD GLUCOSE: CPT

## 2023-05-11 PROCEDURE — 97162 PT EVAL MOD COMPLEX 30 MIN: CPT

## 2023-05-11 PROCEDURE — 2580000003 HC RX 258

## 2023-05-11 PROCEDURE — 82533 TOTAL CORTISOL: CPT

## 2023-05-11 RX ORDER — INSULIN LISPRO 100 [IU]/ML
0-4 INJECTION, SOLUTION INTRAVENOUS; SUBCUTANEOUS
Status: DISCONTINUED | OUTPATIENT
Start: 2023-05-11 | End: 2023-05-12 | Stop reason: HOSPADM

## 2023-05-11 RX ORDER — SODIUM CHLORIDE 9 MG/ML
INJECTION, SOLUTION INTRAVENOUS CONTINUOUS
Status: DISCONTINUED | OUTPATIENT
Start: 2023-05-11 | End: 2023-05-11

## 2023-05-11 RX ORDER — METOPROLOL SUCCINATE 25 MG/1
12.5 TABLET, EXTENDED RELEASE ORAL DAILY
Status: DISCONTINUED | OUTPATIENT
Start: 2023-05-11 | End: 2023-05-12 | Stop reason: HOSPADM

## 2023-05-11 RX ORDER — INSULIN LISPRO 100 [IU]/ML
7 INJECTION, SOLUTION INTRAVENOUS; SUBCUTANEOUS
Status: DISCONTINUED | OUTPATIENT
Start: 2023-05-11 | End: 2023-05-12 | Stop reason: HOSPADM

## 2023-05-11 RX ORDER — INSULIN LISPRO 100 [IU]/ML
0-4 INJECTION, SOLUTION INTRAVENOUS; SUBCUTANEOUS NIGHTLY
Status: DISCONTINUED | OUTPATIENT
Start: 2023-05-11 | End: 2023-05-12 | Stop reason: HOSPADM

## 2023-05-11 RX ADMIN — SODIUM CHLORIDE, PRESERVATIVE FREE 10 ML: 5 INJECTION INTRAVENOUS at 21:03

## 2023-05-11 RX ADMIN — INSULIN GLARGINE 10 UNITS: 100 INJECTION, SOLUTION SUBCUTANEOUS at 21:03

## 2023-05-11 RX ADMIN — METOPROLOL SUCCINATE 12.5 MG: 25 TABLET, EXTENDED RELEASE ORAL at 16:10

## 2023-05-11 RX ADMIN — INSULIN LISPRO 2 UNITS: 100 INJECTION, SOLUTION INTRAVENOUS; SUBCUTANEOUS at 17:41

## 2023-05-11 RX ADMIN — APIXABAN 5 MG: 5 TABLET, FILM COATED ORAL at 08:07

## 2023-05-11 RX ADMIN — MIDODRINE HYDROCHLORIDE 15 MG: 10 TABLET ORAL at 00:10

## 2023-05-11 RX ADMIN — APIXABAN 5 MG: 5 TABLET, FILM COATED ORAL at 21:01

## 2023-05-11 RX ADMIN — SODIUM CHLORIDE: 9 INJECTION, SOLUTION INTRAVENOUS at 08:05

## 2023-05-11 RX ADMIN — CEFTRIAXONE SODIUM 1000 MG: 1 INJECTION, POWDER, FOR SOLUTION INTRAMUSCULAR; INTRAVENOUS at 08:06

## 2023-05-11 RX ADMIN — INSULIN LISPRO 7 UNITS: 100 INJECTION, SOLUTION INTRAVENOUS; SUBCUTANEOUS at 17:42

## 2023-05-11 RX ADMIN — PANTOPRAZOLE SODIUM 40 MG: 40 TABLET, DELAYED RELEASE ORAL at 06:47

## 2023-05-11 RX ADMIN — BUPROPION HYDROCHLORIDE 150 MG: 150 TABLET, FILM COATED, EXTENDED RELEASE ORAL at 08:06

## 2023-05-11 RX ADMIN — MIDODRINE HYDROCHLORIDE 15 MG: 10 TABLET ORAL at 08:06

## 2023-05-11 RX ADMIN — ATORVASTATIN CALCIUM 40 MG: 40 TABLET, FILM COATED ORAL at 08:06

## 2023-05-11 ASSESSMENT — PAIN SCALES - GENERAL: PAINLEVEL_OUTOF10: 0

## 2023-05-11 NOTE — PLAN OF CARE
Problem: Chronic Conditions and Co-morbidities  Goal: Patient's chronic conditions and co-morbidity symptoms are monitored and maintained or improved  5/10/2023 2244 by Rasta Yee RN  Outcome: Progressing  Flowsheets (Taken 5/10/2023 2244)  Care Plan - Patient's Chronic Conditions and Co-Morbidity Symptoms are Monitored and Maintained or Improved:   Monitor and assess patient's chronic conditions and comorbid symptoms for stability, deterioration, or improvement   Collaborate with multidisciplinary team to address chronic and comorbid conditions and prevent exacerbation or deterioration   Update acute care plan with appropriate goals if chronic or comorbid symptoms are exacerbated and prevent overall improvement and discharge  5/10/2023 1417 by Perri Sanchez RN  Outcome: Progressing  Flowsheets (Taken 5/10/2023 0800)  Care Plan - Patient's Chronic Conditions and Co-Morbidity Symptoms are Monitored and Maintained or Improved:   Monitor and assess patient's chronic conditions and comorbid symptoms for stability, deterioration, or improvement   Collaborate with multidisciplinary team to address chronic and comorbid conditions and prevent exacerbation or deterioration   Update acute care plan with appropriate goals if chronic or comorbid symptoms are exacerbated and prevent overall improvement and discharge     Problem: Discharge Planning  Goal: Discharge to home or other facility with appropriate resources  5/10/2023 2244 by Rasta Yee RN  Outcome: Progressing  Flowsheets (Taken 5/10/2023 2244)  Discharge to home or other facility with appropriate resources:   Identify barriers to discharge with patient and caregiver   Identify discharge learning needs (meds, wound care, etc)   Arrange for needed discharge resources and transportation as appropriate  5/10/2023 1417 by Perri Sanchez RN  Outcome: Progressing  Flowsheets (Taken 5/10/2023 0800)  Discharge to home or other facility with appropriate

## 2023-05-11 NOTE — CARE COORDINATION
5/11/23, 3:50 PM EDT    DISCHARGE ON [facility] day: 3  Location: -03/003-A Reason for admit: Cardiorenal syndrome with renal failure [I13.10]  Acute on chronic systolic congestive heart failure (HCC) [I50.23]  CHF (congestive heart failure), NYHA class I, acute on chronic, combined ([address] [I50.43]   Procedure:   5/8 CXR: Prominent interstitial markings throughout both lungs which could be related to pulmonary vascular congestion. 5/9 CXR: Stable radiographic appearance of the chest. No evidence of an acute process  5/9 Echo with EF 35%; severe global hypokinesis of LV    Barriers to Discharge: Levo weaned off yesterday morning. Afebrile. NSR. On room air. Ox4. Follows commands. PT/OT. Telemetry, SCDs. Eliquis, lipitor, wellbutrin xl, IV rocephin, jardiance, lantus, SSI, toprol xl, midodrine, protonix. BUN 30, Creat 1.4    PCP: BLACK Flores - CNP  Readmission Risk Score: 24.1%  Patient Goals/Plan/Treatment Preferences: Home with wife. Declines HH. Would like a cane. Has walker and shower chair. Follows at [address] and Cardiac Rehab.

## 2023-05-12 ENCOUNTER — APPOINTMENT (OUTPATIENT)
Dept: CARDIAC REHAB | Age: 63
End: 2023-05-12
Payer: COMMERCIAL

## 2023-05-12 VITALS
DIASTOLIC BLOOD PRESSURE: 60 MMHG | WEIGHT: 140.3 LBS | RESPIRATION RATE: 16 BRPM | TEMPERATURE: 97.8 F | HEART RATE: 65 BPM | BODY MASS INDEX: 20.09 KG/M2 | HEIGHT: 70 IN | OXYGEN SATURATION: 98 % | SYSTOLIC BLOOD PRESSURE: 120 MMHG

## 2023-05-12 LAB
ANION GAP SERPL CALC-SCNC: 8 MEQ/L (ref 8–16)
BASOPHILS ABSOLUTE: 0.1 THOU/MM3 (ref 0–0.1)
BASOPHILS NFR BLD AUTO: 1.2 %
BUN SERPL-MCNC: 25 MG/DL (ref 7–22)
CALCIUM SERPL-MCNC: 9.3 MG/DL (ref 8.5–10.5)
CHLORIDE SERPL-SCNC: 105 MEQ/L (ref 98–111)
CO2 SERPL-SCNC: 23 MEQ/L (ref 23–33)
CREAT SERPL-MCNC: 1.3 MG/DL (ref 0.4–1.2)
DEPRECATED RDW RBC AUTO: 42.9 FL (ref 35–45)
EOSINOPHIL NFR BLD AUTO: 3.1 %
EOSINOPHILS ABSOLUTE: 0.2 THOU/MM3 (ref 0–0.4)
ERYTHROCYTE [DISTWIDTH] IN BLOOD BY AUTOMATED COUNT: 13.7 % (ref 11.5–14.5)
FERRITIN SERPL IA-MCNC: 224 NG/ML (ref 22–322)
GFR SERPL CREATININE-BSD FRML MDRD: > 60 ML/MIN/1.73M2
GLUCOSE BLD STRIP.AUTO-MCNC: 167 MG/DL (ref 70–108)
GLUCOSE BLD STRIP.AUTO-MCNC: 179 MG/DL (ref 70–108)
GLUCOSE BLD STRIP.AUTO-MCNC: 92 MG/DL (ref 70–108)
GLUCOSE SERPL-MCNC: 155 MG/DL (ref 70–108)
HCT VFR BLD AUTO: 36.7 % (ref 42–52)
HGB BLD-MCNC: 11.3 GM/DL (ref 14–18)
HGB RETIC QN AUTO: 31.1 PG (ref 28.2–35.7)
IMM GRANULOCYTES # BLD AUTO: 0.04 THOU/MM3 (ref 0–0.07)
IMM GRANULOCYTES NFR BLD AUTO: 0.5 %
IMM RETICS NFR: 8.5 % (ref 2.3–13.4)
IRON SATN MFR SERPL: 27 % (ref 20–50)
IRON SERPL-MCNC: 70 UG/DL (ref 65–195)
LYMPHOCYTES ABSOLUTE: 1.8 THOU/MM3 (ref 1–4.8)
LYMPHOCYTES NFR BLD AUTO: 24.2 %
MAGNESIUM SERPL-MCNC: 2 MG/DL (ref 1.6–2.4)
MCH RBC QN AUTO: 26.2 PG (ref 26–33)
MCHC RBC AUTO-ENTMCNC: 30.8 GM/DL (ref 32.2–35.5)
MCV RBC AUTO: 85.2 FL (ref 80–94)
MONOCYTES ABSOLUTE: 0.7 THOU/MM3 (ref 0.4–1.3)
MONOCYTES NFR BLD AUTO: 9.5 %
NEUTROPHILS NFR BLD AUTO: 61.5 %
NRBC BLD AUTO-RTO: 0 /100 WBC
PLATELET # BLD AUTO: 298 THOU/MM3 (ref 130–400)
PMV BLD AUTO: 9.5 FL (ref 9.4–12.4)
POTASSIUM SERPL-SCNC: 4.6 MEQ/L (ref 3.5–5.2)
RBC # BLD AUTO: 4.31 MILL/MM3 (ref 4.7–6.1)
RETICS # AUTO: 61 THOU/MM3 (ref 20–115)
RETICS/RBC NFR AUTO: 1.4 % (ref 0.5–2)
SEGMENTED NEUTROPHILS ABSOLUTE COUNT: 4.6 THOU/MM3 (ref 1.8–7.7)
SODIUM SERPL-SCNC: 136 MEQ/L (ref 135–145)
TIBC SERPL-MCNC: 258 UG/DL (ref 171–450)
WBC # BLD AUTO: 7.5 THOU/MM3 (ref 4.8–10.8)

## 2023-05-12 PROCEDURE — 2580000003 HC RX 258: Performed by: NURSE PRACTITIONER

## 2023-05-12 PROCEDURE — 6370000000 HC RX 637 (ALT 250 FOR IP): Performed by: NURSE PRACTITIONER

## 2023-05-12 PROCEDURE — 6370000000 HC RX 637 (ALT 250 FOR IP)

## 2023-05-12 PROCEDURE — 6370000000 HC RX 637 (ALT 250 FOR IP): Performed by: INTERNAL MEDICINE

## 2023-05-12 PROCEDURE — 99239 HOSP IP/OBS DSCHRG MGMT >30: CPT

## 2023-05-12 PROCEDURE — 6360000002 HC RX W HCPCS: Performed by: NURSE PRACTITIONER

## 2023-05-12 PROCEDURE — 36415 COLL VENOUS BLD VENIPUNCTURE: CPT

## 2023-05-12 PROCEDURE — 85046 RETICYTE/HGB CONCENTRATE: CPT

## 2023-05-12 PROCEDURE — 85025 COMPLETE CBC W/AUTO DIFF WBC: CPT

## 2023-05-12 PROCEDURE — 83550 IRON BINDING TEST: CPT

## 2023-05-12 PROCEDURE — 80048 BASIC METABOLIC PNL TOTAL CA: CPT

## 2023-05-12 PROCEDURE — 83540 ASSAY OF IRON: CPT

## 2023-05-12 PROCEDURE — 99232 SBSQ HOSP IP/OBS MODERATE 35: CPT | Performed by: INTERNAL MEDICINE

## 2023-05-12 PROCEDURE — 82728 ASSAY OF FERRITIN: CPT

## 2023-05-12 PROCEDURE — 83735 ASSAY OF MAGNESIUM: CPT

## 2023-05-12 PROCEDURE — 82948 REAGENT STRIP/BLOOD GLUCOSE: CPT

## 2023-05-12 RX ORDER — METOPROLOL SUCCINATE 25 MG/1
12.5 TABLET, EXTENDED RELEASE ORAL DAILY
Qty: 30 TABLET | Refills: 3 | Status: SHIPPED
Start: 2023-05-12

## 2023-05-12 RX ORDER — CEPHALEXIN 500 MG/1
500 CAPSULE ORAL EVERY 8 HOURS SCHEDULED
Status: DISCONTINUED | OUTPATIENT
Start: 2023-05-13 | End: 2023-05-12 | Stop reason: HOSPADM

## 2023-05-12 RX ORDER — MIDODRINE HYDROCHLORIDE 10 MG/1
10 TABLET ORAL EVERY 8 HOURS
Status: DISCONTINUED | OUTPATIENT
Start: 2023-05-12 | End: 2023-05-12 | Stop reason: HOSPADM

## 2023-05-12 RX ORDER — CEPHALEXIN 500 MG/1
500 CAPSULE ORAL EVERY 8 HOURS SCHEDULED
Qty: 9 CAPSULE | Refills: 0 | Status: SHIPPED | OUTPATIENT
Start: 2023-05-13 | End: 2023-05-16

## 2023-05-12 RX ORDER — MIDODRINE HYDROCHLORIDE 5 MG/1
5 TABLET ORAL EVERY 8 HOURS
Status: DISCONTINUED | OUTPATIENT
Start: 2023-05-12 | End: 2023-05-12

## 2023-05-12 RX ADMIN — APIXABAN 5 MG: 5 TABLET, FILM COATED ORAL at 08:10

## 2023-05-12 RX ADMIN — ATORVASTATIN CALCIUM 40 MG: 40 TABLET, FILM COATED ORAL at 08:11

## 2023-05-12 RX ADMIN — SODIUM CHLORIDE, PRESERVATIVE FREE 10 ML: 5 INJECTION INTRAVENOUS at 09:00

## 2023-05-12 RX ADMIN — PANTOPRAZOLE SODIUM 40 MG: 40 TABLET, DELAYED RELEASE ORAL at 06:25

## 2023-05-12 RX ADMIN — ACETAMINOPHEN 650 MG: 325 TABLET ORAL at 12:39

## 2023-05-12 RX ADMIN — CEFTRIAXONE SODIUM 1000 MG: 1 INJECTION, POWDER, FOR SOLUTION INTRAMUSCULAR; INTRAVENOUS at 09:00

## 2023-05-12 RX ADMIN — INSULIN LISPRO 7 UNITS: 100 INJECTION, SOLUTION INTRAVENOUS; SUBCUTANEOUS at 09:13

## 2023-05-12 RX ADMIN — MIDODRINE HYDROCHLORIDE 15 MG: 10 TABLET ORAL at 00:31

## 2023-05-12 RX ADMIN — METOPROLOL SUCCINATE 12.5 MG: 25 TABLET, EXTENDED RELEASE ORAL at 08:10

## 2023-05-12 RX ADMIN — BUPROPION HYDROCHLORIDE 150 MG: 150 TABLET, FILM COATED, EXTENDED RELEASE ORAL at 08:10

## 2023-05-12 ASSESSMENT — PAIN DESCRIPTION - ORIENTATION: ORIENTATION: MID

## 2023-05-12 ASSESSMENT — PAIN SCALES - GENERAL
PAINLEVEL_OUTOF10: 3
PAINLEVEL_OUTOF10: 0
PAINLEVEL_OUTOF10: 0

## 2023-05-12 ASSESSMENT — PAIN DESCRIPTION - DESCRIPTORS: DESCRIPTORS: DISCOMFORT

## 2023-05-12 ASSESSMENT — PAIN - FUNCTIONAL ASSESSMENT: PAIN_FUNCTIONAL_ASSESSMENT: ACTIVITIES ARE NOT PREVENTED

## 2023-05-12 ASSESSMENT — PAIN DESCRIPTION - LOCATION: LOCATION: HEAD

## 2023-05-12 NOTE — DISCHARGE INSTR - DIET
Good nutrition is important when healing from an illness, injury, or surgery. Follow any nutrition recommendations given to you during your hospital stay. If you were given an oral nutrition supplement while in the hospital, continue to take this supplement at home. You can take it with meals, in-between meals, and/or before bedtime. These supplements can be purchased at most local grocery stores, pharmacies, and chain "[employer]-stores. If you have any questions about your diet or nutrition, call the hospital and ask for the dietitian. Low Sodium Diet    Sodium is a mineral found in table salt and sea salt. It is found in some foods naturally, and in additives or preservatives. Too much sodium intake can cause fluid retention and high blood pressure. Sodium intake should not exceed 2300 mg per day. Keep in mind that a teaspoon of salt is equal to 2300 mg of sodium. Salt and Sodium Savers      Use less sodium in cooking. Try citrus fruits, vinegar, black pepper, herbs, or spices to add flavor to foods because they are very low in sodium. When choosing flavorings and seasonings, make sure that your choices do not contain the words salt or sodium. For example, choose onion powder instead of onion salt, and do not choose seasoning salt. Use sodium free herb blend seasonings instead of seasonings that contain salt or sodium. Add less sodium at the table. It may seem difficult at first, but try to use other seasonings such as pepper rather than salt at the table. Condiments and toppings such as ketchup, pickles, olives, soy sauce, and teriyaki sauce are high sodium condiments and should be used according to your specific sodium limits. Discuss this with your dietitian. Ask your doctor before using a salt-substitute. Salt substitutes that look and taste like salt usually contain potassium.   Too much potassium can cause serious problems for people who have certain

## 2023-05-12 NOTE — CARE COORDINATION
5/12/23, 9:01 AM EDT    [address] day: 4  Location: 4B-03/003-A Reason for admit: Cardiorenal syndrome with renal failure [I13.10]  Acute on chronic systolic congestive heart failure (HCC) [I50.23]  CHF (congestive heart failure), NYHA class I, acute on chronic, combined ([address] [I50.43]   Procedure:   5/8 CXR: Prominent interstitial markings throughout both lungs which could be related to pulmonary vascular congestion. 5/9 CXR: Stable radiographic appearance of the chest. No evidence of an acute process  5/9 Echo with EF 35%; severe global hypokinesis of LV     Barriers to Discharge: Creatinine 1.3, diabetes management, PT/OT, Nephrology, Cardiology following, telemetry, Eliquis, Lipitor, IV Rocephin, Jardiance, Midodrine, Protonix. PCP: BLACK Kaur CNP  Readmission Risk Score: 23.7%  Patient Goals/Plan/Treatment Preferences:  Home with wife. Declines HH. Would like a cane. Has walker and shower chair. Follows at [address] [facility] and Cardiac Rehab.

## 2023-05-12 NOTE — DISCHARGE SUMMARY
intact without any focal sensory/motor deficits. Cranial nerves: II-XII intact, grossly non-focal.  Psychiatric:  Alert and oriented, thought content appropriate, normal insight  Capillary Refill: Brisk,< 3 seconds   Peripheral Pulses: +2 palpable, equal bilaterally       Labs: For convenience and continuity at follow-up the following most recent labs are provided:      CBC:    Lab Results   Component Value Date/Time    WBC 7.5 05/12/2023 07:37 AM    HGB 11.3 05/12/2023 07:37 AM    HCT 36.7 05/12/2023 07:37 AM     05/12/2023 07:37 AM       Renal:    Lab Results   Component Value Date/Time     05/12/2023 07:37 AM    K 4.6 05/12/2023 07:37 AM    K 5.6 05/09/2023 03:51 AM     05/12/2023 07:37 AM    CO2 23 05/12/2023 07:37 AM    BUN 25 05/12/2023 07:37 AM    CREATININE 1.3 05/12/2023 07:37 AM    CALCIUM 9.3 05/12/2023 07:37 AM    PHOS 3.4 12/23/2022 08:11 AM         Significant Diagnostic Studies    Radiology:   US RENAL COMPLETE   Final Result   1. Probable left renal cyst.   2. Layering echogenic material in the urinary bladder of indeterminant etiology. Differential diagnostic considerations include both hemorrhage and infection. Clinical and laboratory correlation are required. Final report electronically signed by Dr. Tanisha Miller on 5/9/2023 2:24 PM      XR CHEST PORTABLE   Final Result   Stable radiographic appearance of the chest. No evidence of an acute process. **This report has been created using voice recognition software. It may contain minor errors which are inherent in voice recognition technology. **      Final report electronically signed by Dr. Ada Prado on 5/9/2023 7:34 AM      XR CHEST PORTABLE   Final Result   Prominent interstitial markings throughout both lungs which could be related to pulmonary vascular congestion. **This report has been created using voice recognition software.  It may contain minor errors which are inherent in voice recognition

## 2023-05-12 NOTE — CARE COORDINATION
DISCHARGE PLANNING EVALUATION  5/12/23, 1:00 PM EDT    Reason for Referral: arrange for [facility] and therapy   Mental Status: alert and oriented. Decision Making: makes own decisions. Family/Social/Home Environment: Assessment completed with pt and wife, state they reside together along with their 15 yr old son. Pt states they are both employed, share household chores and relies on wife for transportation. Pt planning home at discharge and declined [facility] for therapy, states he prefers outpt therapy. Current Services including food security, transportation and housekeeping: see note above. Current Equipment: Walker and shower chair. Payment Source:BC/BS  Concerns or Barriers to Discharge: None reported. Post-acute [facility]) provider list was provided to patient. Patient was informed of their freedom to choose [facility] provider. Discussed and offered to show the patient the relevant [facility] Providers quality and resource use measures on Medicare Compare web site via computer based on patient's goals of care and treatment preferences. Questions regarding selection process were answered. Teach Back Method used with pt regarding care plan and discharge planning. Patient verbalized understanding of the plan of care and contribute to goal setting. Patient goals, treatment preferences and discharge plan: Planning home with wife, pt declined [facility] for therapy. Pt is agreeable to outpt therapy, sw notified pts nurse, she will call physician to get order.     Electronically signed by SYDNEY Rapp on 5/12/2023 at 1:00 PM

## 2023-05-13 NOTE — PROGRESS NOTES
[address] [address]  INPATIENT PHYSICAL THERAPY  [facility]    Time In: 8109  Time Out: 1103  Timed Code Treatment Minutes: 38 Minutes  Minutes: 46          Date: 2023  Patient Name: Cat King,  Gender:  male        MRN: [id number]  : 1960  (66 y.o.)      Referring Practitioner: BLACK Hyman CNP  Diagnosis: Cardiorenal syndrome with renal failure  Additional Pertinent Hx: Per H&P :Len Wilekrson is a 72-year-old white male who came to [facility] from cardiac rehab secondary to dizziness and hypotensive. He has a past medical history of reformed smoker, coronary artery disease requiring CABG, diabetes mellitus, hypertension, ischemic cardiomyopathy, and urinary retention. Per report patient states he was at cardiac rehab when he began to feel dizzy. He states he has had these dizzy spells over the last few days. He does state that the dizziness is worse when he stands or walks or tries to exert himself. Blood pressure was taken in cardiac rehab which was low. He was sent to the emergency department. In the emergency department he was placed on vasopressors. He was admitted to the ICU for further care. He was noted to have an acute on chronic kidney injury and possible heart failure exacerbation. Acute on chronic cardiomyopathy with reduced ejection fraction. Restrictions/Precautions:  Restrictions/Precautions: Fall Risk, General Precautions  Position Activity Restriction  Other position/activity restrictions: monitor BP    Subjective:  Chart Reviewed: Yes  Patient assessed for rehabilitation services?: Yes  Family / Caregiver Present: Yes (wife)  Subjective: RN approved session. Pt pleasantly agrees, his wife is present and supportive. Pt and his wife plan for pt to return home. We discussed use of AD at length, formal recommendation of a RW not a cane. RN, pt and his wife aware of this.  Also recommended continued PT as pt
Assessment and Plan:        Damián due to hypotension, ? Bactrima:  improving; nearly at baseline  Left vent systolic dysfx: ?which meds to restart? Could probably start BB SGLT2i. Hx afib- on DOAC. Dm- monitor- on lantus. Glipizide not best option for him due to increased risk for hypoglycemia. A1C 10.9    CC:  dizzy  HPI:  pt with dm, cad, cabg, ckd presented with hypotension and dizziness; had damián due to above; slowly improving. Neph and Cardio following. May not be able to use entresto, but ?ARB? ROS (12 point review of systems completed. Pertinent positives noted.  Otherwise ROS is negative) : hx cva  PMH:  Per HPI  SHX:  , former smoker  FHX: stroke, dm  Allergies: See above    Medications:     sodium chloride      sodium chloride      dextrose        insulin lispro  7 Units SubCUTAneous TID WC    insulin lispro  0-4 Units SubCUTAneous TID WC    insulin lispro  0-4 Units SubCUTAneous Nightly    [Held by provider] sodium zirconium cyclosilicate  10 g Oral Daily    cefTRIAXone (ROCEPHIN) IV  1,000 mg IntraVENous Q24H    insulin glargine  10 Units SubCUTAneous Nightly    midodrine  15 mg Oral Q8H    sodium chloride flush  10 mL IntraVENous 2 times per day    apixaban  5 mg Oral BID    atorvastatin  40 mg Oral Daily    buPROPion  150 mg Oral QAM    pantoprazole  40 mg Oral QAM AC    [Held by provider] tamsulosin  0.8 mg Oral Daily       Vital Signs:   BP (!) 111/59   Pulse 63   Temp 98.2 °F (36.8 °C) (Oral)   Resp 17   Ht 5' 10\" (1.778 m)   Wt 140 lb 4.8 oz (63.6 kg)   SpO2 96%   BMI 20.13 kg/m²      Intake/Output Summary (Last 24 hours) at 5/11/2023 0750  Last data filed at 5/11/2023 4299  Gross per 24 hour   Intake 1189.62 ml   Output 2150 ml   Net -960.38 ml        Physical Examination: General appearance - chronically ill appearing  Mental status - alert, oriented to person, place, and time  Neck - supple, no significant adenopathy, no JVD, or carotid bruits  Chest - clear to auscultation, no
CRITICAL CARE PROGRESS NOTE      Patient:  Brissa Ferreira    Unit/Bed:4B-03/003-A  YOB: 1960  MRN: [id number]   PCP: BLACK Guillen CNP  Date of Admission: 5/8/2023  Chief Complaint:- dizzy and hypotension      Assessment and Plan:       Acute on chronic cardiomyopathy with reduced ejection fraction: Repeat echo pending. Cardiology consult pending. Attempt diuresis. Patient has chronic hypotension. Does not appear to be in shock. Patient alert and oriented. Heart rate is normal.  He is in no distress. On room air satting 100%. No edema. Chest x-ray does report pulmonary edema. Acute on chronic kidney injury: Creatinine noted at 2.1 baseline appears to be 1.3-1.5, suspect cardiorenal syndrome. Attempt diuresis. Nephrology consulted   Chronic hypotension: Increase midodrine to 15 TID  Hyperkalemia: Hyperkalemia protocol ordered. Drusilla Barthel, nephrology consulted   Non anion gap metabolic acidosis: In the setting of acute kidney injury. Attempt diuresis. Paroxysmal atrial fibrillation: Continue Eliquis, currently in normal sinus rhythm  Chronic normocytic anemia: Monitor, no indication for transfusion. H/H 11.5/37.9  Diabetes mellitus uncontrolled: Hemoglobin A1c noted at 9.7 on 3/20/2023. Patient presents to the hospital with glucose over 300. Repeat A1c 11.3     INITIAL H AND P AND ICU COURSE:     Alonza Nyhan is a 59-year-old white male who scented to [facility] from cardiac rehab secondary to dizziness and hypotensive. He has a past medical history of reformed smoker, coronary artery disease requiring CABG, diabetes mellitus, hypertension, ischemic cardiomyopathy, and urinary retention. Per report patient states he was at cardiac rehab when he began to feel dizzy. He states he has had these dizzy spells over the last few days. He denies any chest pain. He did state the dizziness was better when he sat down. He denies any nausea and vomiting.   He does
CRITICAL CARE PROGRESS NOTE      Patient:  Mone Torres    Unit/Bed:4B-03/003-A  YOB: 1960  MRN: [id number]   PCP: BLACK Marley CNP  Date of Admission: 5/8/2023  Chief Complaint:- dizzy and hypotension      Assessment and Plan:       Acute on chronic cardiomyopathy with reduced ejection fraction: Repeat echo reports ejection fraction 35%. Cardiology consult. Patient has chronic hypotension. Does not appear to be in shock. Patient alert and oriented. Heart rate is normal.  He is in no distress. On room air satting 100%. No edema. Chest x-ray does report pulmonary edema. Patient does need AICD although this has been held because of chronic urinary tract infection. Acute on chronic kidney injury: Creatinine noted at 2.0 baseline appears to be 1.3-1.5, suspect cardiorenal syndrome. Nephrology consulted   Sepsis: urinary tract infection, on Rocephin day #2. Chronic UTI per wife. [facility] urology outpatient, recent TURP  Chronic hypotension: Increase midodrine to 15 TID  Hyperkalemia: Hyperkalemia protocol ordered. José Miguel Montgomery, nephrology consulted   Non anion gap metabolic acidosis: In the setting of acute kidney injury. Paroxysmal atrial fibrillation: Continue Eliquis, currently in normal sinus rhythm  Enlarged prostate: History of  TURP follows with urology outpatient. History of chronic Bella. Patient now able to void normally. States he has had a chronic urinary tract infection. Chronic normocytic anemia: Monitor, no indication for transfusion. H/H 10.9/35.4  Diabetes mellitus uncontrolled: Hemoglobin A1c noted at 9.7 on 3/20/2023. Patient presents to the hospital with glucose over 300. Repeat A1c 11.3. Education on diabetes and need to control glucose. INITIAL H AND P AND ICU COURSE:     Caro Green is a 72-year-old white male who scented to [facility] from cardiac rehab secondary to dizziness and hypotensive.   He has a past medical history of
Cardiology Progress Note      Patient:  Brissa Ferreira  YOB: 1960  MRN: [id number]   Acct: [de-identified]  Admit Date:  5/8/2023  Primary Cardiologist:  Micki Soulier, Nallu  Note per Dr Linda Fernandez:  \"CHIEF COMPLAINT: Hypotension / cardiorenal        HPI: This is a pleasant 66 y.o. male PHMx of CABG in 11/22, stroke 12/22, CAD, DM2, HFrEF, HTN presents with hypotension. Pt is a former smoker w/ 33 pack year hx. Pt was at cardiac rehab this morning. He states while at rehab was wobbly and lightheaded. Says they took his bp there and he was low. Was 88/58 in the ED. They took him from rehab to the ED. He required levophed in ED. Was taken to ICU due to being on pressors. Pt endorses drinking 80 oz of water a day. Echo: LV size mildly increased, Systolic function severely reduced. EF 20-25% LV wall thickness normal, Dyskinetic apical wall. All labs, EKG's, diagnostic testing and images as well as cardiac cath, stress testing were reviewed during this encounter\"    Subjective (Events in last 24 hours):   Pt awake, alert. NAD. Denies cp or sob. Feeling okay today. No dizziness or lightheaded. Has not gotten up much except to sit in bed. Plans to wean iv medicaiton for BP as much as possible. Bps are better still but borderline it seems. He is still wearing lifevest. Waiting on urinary infection to clear prior to AICD placement.      Objective:   /61   Pulse 100   Temp 98.2 °F (36.8 °C) (Oral)   Resp 10   Ht 5' 10\" (1.778 m)   Wt 141 lb 1.5 oz (64 kg)   SpO2 98%   BMI 20.24 kg/m²      Vss   TELEMETRY: nsr    Physical Exam:  General Appearance: alert and oriented to person, place and time, in no acute distress  Cardiovascular: normal rate, regular rhythm, normal S1 and S2, no murmurs, rubs, clicks, or gallops, distal pulses intact, no carotid bruits, no JVD  Pulmonary/Chest: clear to auscultation bilaterally- no wheezes, rales or rhonchi, normal air movement, no respiratory distress  Abdomen:
Discharge teaching and instructions completed with patient using teachback method. AVS reviewed. Patient voiced understanding regarding prescriptions, follow up appointments, and care of self at home.  Discharged in a wheelchair to home with support per family
Echo done bedside Dr. Aniyah Rodriguez to read
[facility]   Nephrology progress note  5/12/2023, 8:42 AM      Pt Name:    Cat King  MRN:     [id number]     YOB: 1960  Admit Date:    5/8/2023  9:57 AM    Chief Complaint: Nephrology following for LC    Subjective:  Patient seen and evaluated at the bedside in no acute distress. He denies chest pain, fever, chills, nausea, vomiting, or diarrhea. Vital signs reviewed; patient is maintaining MAP greater than 65 with midodrine supplementation. Cumulative -3030 cc intake and output for admission. Creatinine appears stable at 1.3. Patient denies any additional acute symptoms or concerns. He denies any lightheadedness/dizziness, however he notes he hasn't been ambulating at his baseline yet. Objective:  24HR INTAKE/OUTPUT:    Intake/Output Summary (Last 24 hours) at 5/12/2023 0842  Last data filed at 5/12/2023 0540  Gross per 24 hour   Intake --   Output 1300 ml   Net -1300 ml         I/O last 3 completed shifts: In: 700.3 [P.O.:400; I.V.:300.3]  Out: 2350 [Urine:2350]  No intake/output data recorded.    Admission weight: 142 lb (64.4 kg)  Wt Readings from Last 3 Encounters:   05/10/23 140 lb 4.8 oz (63.6 kg)   04/27/23 135 lb (61.2 kg)   04/13/23 136 lb (61.7 kg)        Vitals :   Vitals:    05/11/23 1531 05/11/23 1900 05/12/23 0034 05/12/23 0402   BP: (!) 143/77 133/68 111/63 136/81   Pulse: 85 73 62 56   Resp: 18 19 19 13   Temp: 97.8 °F (36.6 °C) 98 °F (36.7 °C) 98.1 °F (36.7 °C) 98.2 °F (36.8 °C)   TempSrc: Oral Oral Oral Oral   SpO2: 94% 97% 95% 94%   Weight:       Height:           Physical examination  General Appearance: alert and cooperative with exam, appears comfortable, no distress  Mouth/Throat: Oral mucosa moist  Neck: No JVD  Lungs: Air entry B/L, no rales, no use of accessory muscles  Heart:  S1, S2 heard  GI: soft, non-tender, no guarding  Extremities: No lower extremity edema    Medications:  Infusion:    sodium chloride      dextrose       Meds:
Mónica Schilling 62  INPATIENT OCCUPATIONAL THERAPY  [facility]    Time:    Time In: 0800  Time Out: 08  Timed Code Treatment Minutes: 21 Minutes  Minutes: 31          Date: 2023  Patient Name: Zion Salomon,   Gender: male      MRN: [id number]  : 1960  (66 y.o.)  Referring Practitioner: MARIA D Guardado  Diagnosis: Cardiorenal Syndrome with Renal Failure  Additional Pertinent Hx: Per ER note on 2023: Pt is a pleasant 66 y.o. male who presents to the emergency department from cardiac rehab , as a walk in to the ED lobby for evaluation of hypotension, dizziness. Patient has hx of double bypass, is wearing life vest, he is unsure of other medical problems. He thinks he is supposed to get a pacemaker soon. Recent UTI a couple months ago. Has had dizziness with any movement for the past few days, has noticed that he has gained a few pounds over the past week. Denies any chest pain, SOB, nausea, vomiting, or any changes to medications. Denies any additional complaints. Restrictions/Precautions:  Restrictions/Precautions: Fall Risk, General Precautions  Position Activity Restriction  Other position/activity restrictions: monitor BP    Subjective  Chart Reviewed: Yes, Imaging, History and Physical, Progress Notes, Orders, Labs  Patient assessed for rehabilitation services?: Yes    Subjective: Pt supine in bed upon arrival with RN present in room. Pt very pleasant and cooperative with therapy. Pt's wife present midway through session.     Pain: no number given/10: no c/o pain    Vitals: Blood Pressure: 115/64 supine  Oxygen: 95-98% SpO2 on RA  Heart Rate: 71  **Stable BP throughout tx session, no OH symptoms noted    Social/Functional History:  Lives With: Family  Type of Home: House  Home Layout: One level  Home Access: Stairs to enter with rails  Entrance Stairs - Number of Steps: 3  Entrance Stairs - Rails: Both  Home Equipment: Walker, rolling   Bathroom Shower/Tub:
Patient up in chair at bedside - spouse present. States feels well. Appears well. Talkative. Denies  lightheadedness or dizziness; no syncope or near syncope. States feels ready to go home. VSS and heart rhythm stable. Cardiology will follow-up OP. See Dr. Holly Pickens in office in 2 - 3 weeks. Please call with questions or concerns.    Christina Pierson, APRN - CNP
Physician Progress Note      Teresita Rodriguez  CSN #:                  [id number]  :                       1960  ADMIT DATE:       2023 9:57 AM  DISCH DATE:  Emily Quinn  PROVIDER #:        Krystyna Dunne CNP          QUERY TEXT:    Patient admitted with Acute on chronic combined CHF. Documentation reflects   mention of Sepsis in ED provider notes and mention of sepsis in Cardiology   consult note(s) dated  . If possible, please make selection below,   document in the progress notes and discharge summary if Sepsis was: The medical record reflects the following:  Risk Factors: abnormal UA , hypotension, dehydration ,jethro,  Clinical Indicators: On admission WBC 7.9, 12.2, Blood pressures as low as   61/49, ua - glucose lg blood, protein, pos nitrite , large le, wbc, turbid, no   bacteria  Treatment: Admission , imaging, labs, fluid bolus and infusion , medication   review, levophed for pressure support with shock ruled out,  IV antibiotics -   Rocephin    Thank you ,  Jennifer SALAZARN, RN, CRCR  Clinical   P: [phone]  F: [phone]  Options provided:  -- Sepsis ruled out after study  -- Sepsis confirmed after study  -- Sepsis treated and resolved  -- Other - I will add my own diagnosis  -- Disagree - Not applicable / Not valid  -- Disagree - Clinically unable to determine / Unknown  -- Refer to Clinical Documentation Reviewer    PROVIDER RESPONSE TEXT:    Sepsis confirmed after study.     Query created by: Doris Landin on 2023 10:07 AM      Electronically signed by:  Krystyna Dunne CNP 5/10/2023 9:18 AM
Pt is a 64y.o. male, sitting in easychair beginning to eat dinner, in 4B-03. Charles Armas was happy to talk for a bit; he is pleasant and peaceful today; [id number] in speaking. He shared his health challenges in recent months (heart issue discovered, stroke, covid twice and now blood pressure issue). He also shared that he is a believer and reads the bible, but struggles with 'finding a ting that I identify with'. He shared that he struggles with everything going on in the [place] today, but has 'learned to live in today, accepting each day and what happens, rather than get upset or run around.'  provided active listening, encouragement, conversation around his ting offerings and prayer-met with gratitude by Charles Armas. 05/11/23 1825   Encounter Summary   Encounter Overview/Reason  Initial Encounter   Service Provided For: Patient   Referral/Consult From: [facility] Family members   Last Encounter  05/11/23   Complexity of Encounter Low   Begin Time 1752   End Time  1806   Total Time Calculated 14 min   Spiritual/Emotional needs   Type Spiritual Support   Assessment/Intervention/Outcome   Assessment Calm;Coping; Hopeful;Peaceful   Intervention Prayer (assurance of)/[place];Nurtured Hope;Explored/Affirmed feelings, thoughts, concerns; Discussed illness injury and its impact; Active listening   Outcome Expressed feelings, needs, and concerns;Expressed Gratitude;Receptive;Coping
S1, S2 heard  GI: soft, non-tender, no guarding  Extremities: * LE edema    Medications:  Infusion:    sodium chloride      dextrose       Meds:    sodium zirconium cyclosilicate  10 g Oral Daily    cefTRIAXone (ROCEPHIN) IV  1,000 mg IntraVENous Q24H    insulin glargine  10 Units SubCUTAneous Nightly    insulin lispro  5 Units SubCUTAneous TID WC    midodrine  15 mg Oral Q8H    sodium chloride flush  10 mL IntraVENous 2 times per day    apixaban  5 mg Oral BID    atorvastatin  40 mg Oral Daily    buPROPion  150 mg Oral QAM    pantoprazole  40 mg Oral QAM AC    [Held by provider] tamsulosin  0.8 mg Oral Daily    insulin lispro  0-8 Units SubCUTAneous TID WC    insulin lispro  0-4 Units SubCUTAneous Nightly     Meds prn: sodium chloride flush, sodium chloride, ondansetron **OR** ondansetron, polyethylene glycol, acetaminophen **OR** acetaminophen, glucose, dextrose bolus **OR** dextrose bolus, glucagon (rDNA), dextrose     Lab Data :  CBC:   Recent Labs     05/08/23  1035 05/09/23  0351 05/10/23  0422   WBC 7.9 12.2* 9.9   HGB 11.7* 11.5* 10.9*   HCT 38.6* 37.9* 35.4*    342 306     CMP:  Recent Labs     05/08/23  1035 05/08/23  2147 05/09/23  0351 05/09/23  1155 05/09/23  1843 05/10/23  0422 05/10/23  1536   *   < > 135 135  --  133* 135   K 5.8*   < > 5.6* 5.6* 4.6 4.7 4.4      < > 101 100  --  100 102   CO2 19*   < > 20* 23  --  20* 23   BUN 29*   < > 32* 34*  --  39* 39*   CREATININE 2.0*   < > 2.1* 2.1*  --  2.0* 1.9*   GLUCOSE 319*   < > 248* 259*  --  198* 134*   CALCIUM 9.8   < > 9.5 9.2  --  9.0 8.8   MG 1.8  --  1.8  --   --   --   --     < > = values in this interval not displayed.      Hepatic:   Recent Labs     05/08/23  1035 05/09/23  0351   LABALBU 4.3 3.9   AST 10 11   ALT 8* 8*   BILITOT 0.3 0.4   ALKPHOS 72 68         Assessment and Plan:  LC on CKD Stage III  Baseline Cr 1.5  Fractional Excretion of Urea 24.9% indicating pre-renal etiology  05/09 Renal US showng echogenic
evidence of nephrologic dysfunction with concurrent hyperkalemia  Continue to titrate with POC glucose checks as well as hypoglycemia protocol in place   Recommendation for diabetes clinic followup outpatient for CGM candidacy evaluation   Bladder Outlet Obstruction with known history of BPH, Emphysematous Cystitis, and Urinary Retention; Query Cystitis versus Complicated UTI  S/p cystoscopy, TURBT, greenlight PVP by Dr. Cookie Romberg on 04/06/2023. Pathology negative for malignancy; no plan for additional intervention per patient and patient's wife at bedside  Previous had chronic indwelling crum, removed on 04/13/2023  Patient is urinating comfortably without dysuria  Antibiotics per primary; UA showing few bacteria; Urine culture growing gram negative bacilli  S/P CABG November 2022  Hx CVA Left Parietal Region Rufus Radiata  HLD  Tobacco Use Disorder  Hx Alcohol Use Disorder  Chronic Systolic Heart Failure with Reduced Ejection Fraction EF 25-30%  Last ECHO 03/03/2023  Following with [facility] for dual chamber ICD placement; Lifevest in Place  Optimization of GDMT per primary  Strict I/O's, Daily Weights, Judicious Diuresis and IVF  Paroxysmal Atrial Fibrillation, on OAC  Rate control as well as anticoagulation per primary    D/W patient and RN    Mary Bar MD  [facility]    This report has been created using voice recognition software.  It may contain minor errors which are inherent in voice recognition technology

## 2023-05-15 ENCOUNTER — APPOINTMENT (OUTPATIENT)
Dept: CARDIAC REHAB | Age: 63
End: 2023-05-15
Payer: COMMERCIAL

## 2023-05-15 ENCOUNTER — HOSPITAL ENCOUNTER (OUTPATIENT)
Dept: CARDIAC REHAB | Age: 63
Setting detail: THERAPIES SERIES
End: 2023-05-15
Payer: COMMERCIAL

## 2023-05-15 NOTE — TELEPHONE ENCOUNTER
Care Transitions Initial Follow Up Call    Outreach made within 2 business days of discharge: Yes    Patient: Connor January Patient : 1960   MRN: [id number]  Reason for Admission: Cardiac Rehab- dizziness, lightheadedness. Dehydration, LC and bladder infection  Discharge Date: 23       Spoke with: Wife- caroline per HIPAA    Discharge department/facility: [facility] Interactive Patient Contact:  Was patient able to fill all prescriptions: Yes  Was patient instructed to bring all medications to the follow-up visit: Yes  Is patient taking all medications as directed in the discharge summary?  Yes  Does patient understand their discharge instructions: Yes  Does patient have questions or concerns that need addressed prior to 7-14 day follow up office visit: no    Scheduled appointment with PCP within 7-14 days    Follow Up  Future Appointments   Date Time Provider Sridhar Clements   5/15/2023  9:00 AM [address]   5/15/2023 10:00 AM STR EDUCATION CLASSROOM STRZ CAR PUL 34 [facility]   2023  9:00 AM STR CARDIAC EXERCISE RM STRZ CAR PUL [address] [facility]   2023 10:00 AM STR EDUCATION CLASSROOM STRZ CAR PUL 34 [facility]   2023  9:00 AM STR CARDIAC EXERCISE RM STRZ CAR PUL 6019 [facility]   2023 10:00 AM STR ICR COOK CLASSROOM STRZ CAR PUL 6[address] [facility]   2023  9:00 AM STR CARDIAC EXERCISE RM STRZ CAR PUL 6019 [facility]   2023 10:00 AM STR EDUCATION CLASSROOM STRZ CAR PUL 6019 [facility]   2023  9:00 AM STR CARDIAC EXERCISE RM STRZ CAR PUL 6019 [facility]   2023 10:00 AM STR EDUCATION CLASSROOM STRZ CAR PUL 6019 [facility]   2023  9:00 AM STR CARDIAC EXERCISE RM STRZ CAR PUL 6019 [facility]   2023 10:00 AM STR ICR COOK CLASSROOM STRZ CAR PUL 6019 [facility]   2023  9:00 AM STR CARDIAC EXERCISE RM STRZ CAR PUL 6019 [facility]   2023 10:00 AM STR EDUCATION CLASSROOM STRZ CAR PUL 6019 [facility]   2023  9:00 AM STR CARDIAC EXERCISE RM STRZ CAR PUL 34 [facility]   2023 10:00 AM STR [address]

## 2023-05-15 NOTE — TELEPHONE ENCOUNTER
Patient was discharged from [facility] on 05/12/2023. Patient will need a follow up discharge call.

## 2023-05-16 ENCOUNTER — OFFICE VISIT (OUTPATIENT)
Dept: FAMILY MEDICINE CLINIC | Age: 63
End: 2023-05-16

## 2023-05-16 VITALS
RESPIRATION RATE: 19 BRPM | WEIGHT: 143.2 LBS | OXYGEN SATURATION: 97 % | HEART RATE: 99 BPM | DIASTOLIC BLOOD PRESSURE: 84 MMHG | TEMPERATURE: 99.7 F | SYSTOLIC BLOOD PRESSURE: 136 MMHG | BODY MASS INDEX: 20.55 KG/M2

## 2023-05-16 DIAGNOSIS — E11.65 UNCONTROLLED TYPE 2 DIABETES MELLITUS WITH HYPERGLYCEMIA (HCC): ICD-10-CM

## 2023-05-16 DIAGNOSIS — I13.10 CARDIORENAL SYNDROME WITH RENAL FAILURE: ICD-10-CM

## 2023-05-16 DIAGNOSIS — I25.5 ISCHEMIC CARDIOMYOPATHY: ICD-10-CM

## 2023-05-16 DIAGNOSIS — I95.89 OTHER HYPOTENSION: ICD-10-CM

## 2023-05-16 DIAGNOSIS — Z09 HOSPITAL DISCHARGE FOLLOW-UP: Primary | ICD-10-CM

## 2023-05-16 PROBLEM — I42.8 NONISCHEMIC CARDIOMYOPATHY (HCC): Status: RESOLVED | Noted: 2022-10-31 | Resolved: 2023-05-16

## 2023-05-16 RX ORDER — PROCHLORPERAZINE 25 MG/1
SUPPOSITORY RECTAL
Qty: 1 EACH | Refills: 0 | Status: SHIPPED | OUTPATIENT
Start: 2023-05-16

## 2023-05-16 RX ORDER — PROCHLORPERAZINE 25 MG/1
SUPPOSITORY RECTAL
Qty: 9 EACH | Refills: 3 | Status: SHIPPED | OUTPATIENT
Start: 2023-05-16

## 2023-05-16 RX ORDER — LISINOPRIL 5 MG/1
5 TABLET ORAL DAILY
Qty: 30 TABLET | Refills: 5 | Status: SHIPPED | OUTPATIENT
Start: 2023-05-16

## 2023-05-16 RX ORDER — INSULIN GLARGINE 100 [IU]/ML
13 INJECTION, SOLUTION SUBCUTANEOUS NIGHTLY
Qty: 5 ADJUSTABLE DOSE PRE-FILLED PEN SYRINGE | Refills: 3 | Status: SHIPPED | OUTPATIENT
Start: 2023-05-16

## 2023-05-16 RX ORDER — PROCHLORPERAZINE 25 MG/1
SUPPOSITORY RECTAL
Qty: 1 EACH | Refills: 3 | Status: SHIPPED | OUTPATIENT
Start: 2023-05-16

## 2023-05-16 RX ORDER — PEN NEEDLE, DIABETIC 29 GAUGE
1 NEEDLE, DISPOSABLE MISCELLANEOUS DAILY
Qty: 100 EACH | Refills: 3 | Status: SHIPPED | OUTPATIENT
Start: 2023-05-16

## 2023-05-16 ASSESSMENT — PATIENT HEALTH QUESTIONNAIRE - PHQ9
8. MOVING OR SPEAKING SO SLOWLY THAT OTHER PEOPLE COULD HAVE NOTICED. OR THE OPPOSITE, BEING SO FIGETY OR RESTLESS THAT YOU HAVE BEEN MOVING AROUND A LOT MORE THAN USUAL: 0
SUM OF ALL RESPONSES TO PHQ QUESTIONS 1-9: 0
1. LITTLE INTEREST OR PLEASURE IN DOING THINGS: 0
9. THOUGHTS THAT YOU WOULD BE BETTER OFF DEAD, OR OF HURTING YOURSELF: 0
SUM OF ALL RESPONSES TO PHQ QUESTIONS 1-9: 0
SUM OF ALL RESPONSES TO PHQ QUESTIONS 1-9: 0
4. FEELING TIRED OR HAVING LITTLE ENERGY: 0
3. TROUBLE FALLING OR STAYING ASLEEP: 0
2. FEELING DOWN, DEPRESSED OR HOPELESS: 0
5. POOR APPETITE OR OVEREATING: 0
SUM OF ALL RESPONSES TO PHQ QUESTIONS 1-9: 0
7. TROUBLE CONCENTRATING ON THINGS, SUCH AS READING THE NEWSPAPER OR WATCHING TELEVISION: 0
10. IF YOU CHECKED OFF ANY PROBLEMS, HOW DIFFICULT HAVE THESE PROBLEMS MADE IT FOR YOU TO DO YOUR WORK, TAKE CARE OF THINGS AT HOME, OR GET ALONG WITH OTHER PEOPLE: 0
6. FEELING BAD ABOUT YOURSELF - OR THAT YOU ARE A FAILURE OR HAVE LET YOURSELF OR YOUR FAMILY DOWN: 0
SUM OF ALL RESPONSES TO PHQ9 QUESTIONS 1 & 2: 0

## 2023-05-16 ASSESSMENT — ENCOUNTER SYMPTOMS
EYE DISCHARGE: 0
SHORTNESS OF BREATH: 0
RHINORRHEA: 0
COUGH: 0
DIARRHEA: 0
CHEST TIGHTNESS: 0
ABDOMINAL PAIN: 0
CONSTIPATION: 0
VOMITING: 0

## 2023-05-16 NOTE — PROGRESS NOTES
patient taking as of now reconciled against medications ordered at time of hospital discharge: Yes    Review of Systems   Constitutional:  Negative for activity change, appetite change and fever. HENT:  Negative for congestion, ear pain and rhinorrhea. Eyes:  Negative for discharge and visual disturbance. Respiratory:  Negative for cough, chest tightness and shortness of breath. Cardiovascular:  Negative for chest pain and palpitations. Gastrointestinal:  Negative for abdominal pain, constipation, diarrhea and vomiting. Genitourinary:  Negative for difficulty urinating and hematuria. Musculoskeletal:  Negative for arthralgias and myalgias. Skin:  Negative for rash. Neurological:  Positive for weakness. Negative for dizziness, numbness and headaches. Psychiatric/Behavioral:  The patient is not nervous/anxious. Objective:    /84   Pulse 99   Temp 99.7 °F (37.6 °C) (Temporal)   Resp 19   Wt 143 lb 3.2 oz (65 kg)   SpO2 97%   BMI 20.55 kg/m²   Physical Exam  Vitals reviewed. Constitutional:       Appearance: He is well-developed. HENT:      Head: Normocephalic and atraumatic. Right Ear: External ear normal.      Left Ear: External ear normal.   Eyes:      Conjunctiva/sclera: Conjunctivae normal.   Neck:      Thyroid: No thyromegaly. Trachea: Trachea normal.   Cardiovascular:      Rate and Rhythm: Normal rate and regular rhythm. Heart sounds: Normal heart sounds. No murmur heard. No friction rub. No gallop. Pulmonary:      Effort: Pulmonary effort is normal.      Breath sounds: Normal breath sounds. Abdominal:      General: Bowel sounds are normal.      Palpations: Abdomen is soft. Tenderness: There is no abdominal tenderness. Musculoskeletal:         General: Normal range of motion. Cervical back: Normal range of motion and neck supple. No spinous process tenderness. Skin:     General: Skin is warm and dry. Findings: No erythema or rash.

## 2023-05-17 ENCOUNTER — HOSPITAL ENCOUNTER (OUTPATIENT)
Dept: CARDIAC REHAB | Age: 63
Setting detail: THERAPIES SERIES
End: 2023-05-17
Payer: COMMERCIAL

## 2023-05-19 ENCOUNTER — HOSPITAL ENCOUNTER (OUTPATIENT)
Dept: CARDIAC REHAB | Age: 63
Setting detail: THERAPIES SERIES
End: 2023-05-19
Payer: COMMERCIAL

## 2023-05-22 ENCOUNTER — TELEPHONE (OUTPATIENT)
Dept: CARDIAC REHAB | Age: 63
End: 2023-05-22

## 2023-05-22 ENCOUNTER — HOSPITAL ENCOUNTER (OUTPATIENT)
Dept: CARDIAC REHAB | Age: 63
Setting detail: THERAPIES SERIES
End: 2023-05-22
Payer: COMMERCIAL

## 2023-05-24 ENCOUNTER — HOSPITAL ENCOUNTER (OUTPATIENT)
Dept: CARDIAC REHAB | Age: 63
Setting detail: THERAPIES SERIES
End: 2023-05-24
Payer: COMMERCIAL

## 2023-05-24 ENCOUNTER — APPOINTMENT (OUTPATIENT)
Dept: CARDIAC REHAB | Age: 63
End: 2023-05-24
Payer: COMMERCIAL

## 2023-05-26 ENCOUNTER — APPOINTMENT (OUTPATIENT)
Dept: CARDIAC REHAB | Age: 63
End: 2023-05-26
Payer: COMMERCIAL

## 2023-05-26 ENCOUNTER — HOSPITAL ENCOUNTER (OUTPATIENT)
Dept: CARDIAC REHAB | Age: 63
Setting detail: THERAPIES SERIES
End: 2023-05-26
Payer: COMMERCIAL

## 2023-05-31 ENCOUNTER — HOSPITAL ENCOUNTER (OUTPATIENT)
Dept: CARDIAC REHAB | Age: 63
Setting detail: THERAPIES SERIES
End: 2023-05-31
Payer: COMMERCIAL

## 2023-05-31 ENCOUNTER — TELEPHONE (OUTPATIENT)
Dept: CARDIOTHORACIC SURGERY | Age: 63
End: 2023-05-31

## 2023-05-31 NOTE — TELEPHONE ENCOUNTER
Received LTD paperwork from [facility]  Patient no longer under care of Dr. Cody Christina. S/p CABG 11/07/2022  [facility] for  [facility] ([phone])- we do not do LTD. Would defer to patient's cardiologist or PCP.   Claim # [id number]

## 2023-06-01 NOTE — TELEPHONE ENCOUNTER
Jamie Yepez called office back. States that she understands Dr. Shanae Mcnair won't be filling out LTD paperwork. Requested medical records still be sent from our office. Faxed to [phone].

## 2023-06-05 ENCOUNTER — HOSPITAL ENCOUNTER (OUTPATIENT)
Dept: CARDIAC REHAB | Age: 63
Setting detail: THERAPIES SERIES
Discharge: HOME | End: 2023-06-05

## 2023-06-05 NOTE — PLAN OF CARE
Use  [x] none          [] daily  [] weekly      [] special         Diet Assessment Tool:  RATE YOUR PLATE  *Given to patient to complete and return. Diet Assessment Tool:    Score:  46/72        Diet Assessment Tool: RATE YOUR PLATE  Score:  /45   NUTRITION PLAN NUTRITION PLAN NUTRITION PLAN NUTRITION PLAN NUTRITION PLAN NUTRITION PLAN   *Interventions* *Interventions* *Interventions* *Interventions* *Interventions* *Interventions*   Initial Survey given Goal Setting Discussion:   [x] Yes      [] No        Follow Up Survey Reviewed & Goals Updated:     Professional Referral  Please check if needed. [] Dietitian Consult   [] Wt. Management Referral  [] Other:  Professional Referral  Please check if needed. [] Dietitian Consult   [] Wt. Management Referral  [] Other: Professional Referral  Please check if needed. [] Dietitian Consult   [] Wt. Management Referral  [] Other: Professional Referral  Please check if needed. [] Dietitian Consult   [] Wt. Management Referral  [] Other: Professional Referral  Please check if needed. [] Dietitian Consult   [] Wt. Management Referral  [] Other: Professional Referral  Please check if needed. [] Dietitian Consult   [] Wt. Management Referral  [] Other:   *Education* *Education* *Education* *Education* *Education* *Education*   Nutritional Education Recommended    [x] 1:1 Registered Dietitian    Workshops  [x] Label Reading   [x] Menu  [x] Targeting Nutrition Priorities  [x] Mindful Eating   Nutritional Education Attended/Date    Fueling a healthy body 3/22/23 Nutritional Education Attended/Date    5/3/23 Targeting Nutrition Priorities Nutritional Education Attended/Date Nutritional Education Attended/Date All Sessions Completed?     [] Yes  [] No   Cooking School  Recommended     [x] Adding Flavor  [x] Fast & Healthy     Breakfasts  [x] Salads & Dressings  [x] Savory Soups  [x] Simple Sides & Sauces  [x] Appetizers &     Snacks  [x] Delicious Desserts  [x] Plant-Based

## 2023-06-21 ENCOUNTER — OFFICE VISIT (OUTPATIENT)
Dept: FAMILY MEDICINE CLINIC | Age: 63
End: 2023-06-21
Payer: COMMERCIAL

## 2023-06-21 VITALS
TEMPERATURE: 97 F | WEIGHT: 136.2 LBS | BODY MASS INDEX: 19.54 KG/M2 | DIASTOLIC BLOOD PRESSURE: 68 MMHG | HEART RATE: 99 BPM | OXYGEN SATURATION: 98 % | SYSTOLIC BLOOD PRESSURE: 116 MMHG | RESPIRATION RATE: 18 BRPM

## 2023-06-21 DIAGNOSIS — N18.31 STAGE 3A CHRONIC KIDNEY DISEASE (HCC): ICD-10-CM

## 2023-06-21 DIAGNOSIS — Z91.148 NON COMPLIANCE W MEDICATION REGIMEN: ICD-10-CM

## 2023-06-21 DIAGNOSIS — E11.65 UNCONTROLLED TYPE 2 DIABETES MELLITUS WITH HYPERGLYCEMIA (HCC): Primary | ICD-10-CM

## 2023-06-21 DIAGNOSIS — I10 ESSENTIAL (PRIMARY) HYPERTENSION: ICD-10-CM

## 2023-06-21 DIAGNOSIS — Z72.0 TOBACCO ABUSE: ICD-10-CM

## 2023-06-21 DIAGNOSIS — E78.2 MIXED HYPERLIPIDEMIA: ICD-10-CM

## 2023-06-21 PROCEDURE — 3078F DIAST BP <80 MM HG: CPT | Performed by: NURSE PRACTITIONER

## 2023-06-21 PROCEDURE — 3046F HEMOGLOBIN A1C LEVEL >9.0%: CPT | Performed by: NURSE PRACTITIONER

## 2023-06-21 PROCEDURE — 99214 OFFICE O/P EST MOD 30 MIN: CPT | Performed by: NURSE PRACTITIONER

## 2023-06-21 PROCEDURE — 3074F SYST BP LT 130 MM HG: CPT | Performed by: NURSE PRACTITIONER

## 2023-06-21 RX ORDER — BUPROPION HYDROCHLORIDE 150 MG/1
150 TABLET ORAL EVERY MORNING
Qty: 30 TABLET | Refills: 1 | Status: SHIPPED | OUTPATIENT
Start: 2023-06-21

## 2023-06-21 ASSESSMENT — PATIENT HEALTH QUESTIONNAIRE - PHQ9
SUM OF ALL RESPONSES TO PHQ QUESTIONS 1-9: 14
SUM OF ALL RESPONSES TO PHQ QUESTIONS 1-9: 14
SUM OF ALL RESPONSES TO PHQ9 QUESTIONS 1 & 2: 5
7. TROUBLE CONCENTRATING ON THINGS, SUCH AS READING THE NEWSPAPER OR WATCHING TELEVISION: 0
SUM OF ALL RESPONSES TO PHQ QUESTIONS 1-9: 14
4. FEELING TIRED OR HAVING LITTLE ENERGY: 3
6. FEELING BAD ABOUT YOURSELF - OR THAT YOU ARE A FAILURE OR HAVE LET YOURSELF OR YOUR FAMILY DOWN: 0
SUM OF ALL RESPONSES TO PHQ QUESTIONS 1-9: 14
8. MOVING OR SPEAKING SO SLOWLY THAT OTHER PEOPLE COULD HAVE NOTICED. OR THE OPPOSITE, BEING SO FIGETY OR RESTLESS THAT YOU HAVE BEEN MOVING AROUND A LOT MORE THAN USUAL: 2
5. POOR APPETITE OR OVEREATING: 2
2. FEELING DOWN, DEPRESSED OR HOPELESS: 3
9. THOUGHTS THAT YOU WOULD BE BETTER OFF DEAD, OR OF HURTING YOURSELF: 0
3. TROUBLE FALLING OR STAYING ASLEEP: 2
1. LITTLE INTEREST OR PLEASURE IN DOING THINGS: 2
10. IF YOU CHECKED OFF ANY PROBLEMS, HOW DIFFICULT HAVE THESE PROBLEMS MADE IT FOR YOU TO DO YOUR WORK, TAKE CARE OF THINGS AT HOME, OR GET ALONG WITH OTHER PEOPLE: 2

## 2023-06-21 ASSESSMENT — ENCOUNTER SYMPTOMS
ABDOMINAL PAIN: 0
CONSTIPATION: 0
COUGH: 0
CHEST TIGHTNESS: 0
DIARRHEA: 0
SHORTNESS OF BREATH: 0
EYE DISCHARGE: 0
RHINORRHEA: 0
VOMITING: 0

## 2023-06-21 NOTE — PROGRESS NOTES
Positive for arthralgias, gait problem (cane with ambulation) and myalgias. Skin:  Negative for rash. Neurological:  Positive for headaches. Negative for dizziness, weakness and numbness. Psychiatric/Behavioral:  The patient is not nervous/anxious.        Allergies   Allergen Reactions    Metformin And Related Nausea And Vomiting       Outpatient Medications Prior to Visit   Medication Sig Dispense Refill    ciprofloxacin (CIPRO) 500 MG tablet Take 1 tablet by mouth 2 times daily 60 tablet 0    metoprolol succinate (TOPROL XL) 25 MG extended release tablet Take 1 tablet by mouth daily 90 tablet 3    lisinopril (PRINIVIL;ZESTRIL) 5 MG tablet Take 1 tablet by mouth daily 30 tablet 5    insulin glargine (BASAGLAR KWIKPEN) 100 UNIT/ML injection pen Inject 13 Units into the skin nightly 5 Adjustable Dose Pre-filled Pen Syringe 3    Insulin Pen Needle ([employer]ER PEN NEEDLES 29G) 29G X 12MM MISC 1 each by Does not apply route daily 100 each 3    Handicap Placard MISC by Does not apply route Expires 5 year from date of prescription 1 each 0    acetaminophen (TYLENOL) 500 MG tablet Take 1 tablet by mouth every 6 hours as needed for Pain As needed      Continuous Blood Gluc  (DEXCOM G6 ) NIRAJ Can use 4 times a day (Patient not taking: Reported on 6/21/2023) 1 each 0    Continuous Blood Gluc Sensor (DEXCOM G6 SENSOR) MISC Use 4 times a day (Patient not taking: Reported on 6/21/2023) 9 each 3    Continuous Blood Gluc Transmit (DEXCOM G6 TRANSMITTER) MISC Use 4 times a day (Patient not taking: Reported on 6/21/2023) 1 each 3    apixaban (ELIQUIS) 5 MG TABS tablet Take 1 tablet by mouth 2 times daily (Patient not taking: Reported on 6/13/2023) 60 tablet 3    glipiZIDE (GLUCOTROL) 10 MG tablet Take 1 tablet by mouth every morning (before breakfast) (Patient not taking: Reported on 6/13/2023) 60 tablet 3    atorvastatin (LIPITOR) 40 MG tablet Take 1 tablet by mouth daily (Patient not taking: Reported on

## 2023-07-12 ENCOUNTER — TELEPHONE (OUTPATIENT)
Dept: CARDIOLOGY CLINIC | Age: 63
End: 2023-07-12

## 2023-08-09 ENCOUNTER — TELEPHONE (OUTPATIENT)
Dept: CARDIOLOGY CLINIC | Age: 63
End: 2023-08-09

## 2023-08-10 NOTE — TELEPHONE ENCOUNTER
Pt needs ICD and they are waiting for infection to clear up first. There are several encounters in here as Michael [place]inch[place] has been trying to reach pt also.    Try to contact pt and had to lmovm

## 2023-08-18 ENCOUNTER — NURSE ONLY (OUTPATIENT)
Dept: FAMILY MEDICINE CLINIC | Age: 63
End: 2023-08-18

## 2023-08-18 ENCOUNTER — HOSPITAL ENCOUNTER (OUTPATIENT)
Age: 63
Setting detail: SPECIMEN
Discharge: HOME | End: 2023-08-18

## 2023-08-18 DIAGNOSIS — N30.00 ACUTE CYSTITIS WITHOUT HEMATURIA: ICD-10-CM

## 2023-08-19 LAB
MICROORGANISM SPEC CULT: NORMAL
SPECIMEN DESCRIPTION: NORMAL

## 2023-08-22 ENCOUNTER — TELEPHONE (OUTPATIENT)
Dept: UROLOGY | Age: 63
End: 2023-08-22

## 2023-08-23 ENCOUNTER — TELEPHONE (OUTPATIENT)
Dept: FAMILY MEDICINE CLINIC | Age: 63
End: 2023-08-23

## 2023-08-23 NOTE — TELEPHONE ENCOUNTER
Note was placed up in front basket asking for patient to schedule an appointment to further discuss and fill out disability paperwork- thanks.

## 2023-08-23 NOTE — TELEPHONE ENCOUNTER
Patient is asking about the paper from [address] [facility] that needed filled out by [facility]. This was dropped off on Friday.

## 2023-08-28 ENCOUNTER — OFFICE VISIT (OUTPATIENT)
Dept: FAMILY MEDICINE CLINIC | Age: 63
End: 2023-08-28
Payer: COMMERCIAL

## 2023-08-28 VITALS
HEART RATE: 88 BPM | BODY MASS INDEX: 20.49 KG/M2 | OXYGEN SATURATION: 98 % | DIASTOLIC BLOOD PRESSURE: 62 MMHG | SYSTOLIC BLOOD PRESSURE: 110 MMHG | WEIGHT: 142.8 LBS | RESPIRATION RATE: 16 BRPM | TEMPERATURE: 97.5 F

## 2023-08-28 DIAGNOSIS — E11.65 UNCONTROLLED TYPE 2 DIABETES MELLITUS WITH HYPERGLYCEMIA (HCC): Primary | ICD-10-CM

## 2023-08-28 DIAGNOSIS — Z99.89 AMBULATES WITH CANE: ICD-10-CM

## 2023-08-28 DIAGNOSIS — Z91.148 NON COMPLIANCE W MEDICATION REGIMEN: ICD-10-CM

## 2023-08-28 DIAGNOSIS — N18.31 STAGE 3A CHRONIC KIDNEY DISEASE (HCC): ICD-10-CM

## 2023-08-28 DIAGNOSIS — E78.2 MIXED HYPERLIPIDEMIA: ICD-10-CM

## 2023-08-28 DIAGNOSIS — R53.1 RIGHT SIDED WEAKNESS: ICD-10-CM

## 2023-08-28 DIAGNOSIS — Z86.73 HISTORY OF CVA (CEREBROVASCULAR ACCIDENT): ICD-10-CM

## 2023-08-28 DIAGNOSIS — I10 ESSENTIAL HYPERTENSION: ICD-10-CM

## 2023-08-28 LAB — HBA1C MFR BLD: 11.3 %

## 2023-08-28 PROCEDURE — 3074F SYST BP LT 130 MM HG: CPT | Performed by: NURSE PRACTITIONER

## 2023-08-28 PROCEDURE — 83036 HEMOGLOBIN GLYCOSYLATED A1C: CPT | Performed by: NURSE PRACTITIONER

## 2023-08-28 PROCEDURE — 3078F DIAST BP <80 MM HG: CPT | Performed by: NURSE PRACTITIONER

## 2023-08-28 PROCEDURE — 99214 OFFICE O/P EST MOD 30 MIN: CPT | Performed by: NURSE PRACTITIONER

## 2023-08-28 PROCEDURE — 3046F HEMOGLOBIN A1C LEVEL >9.0%: CPT | Performed by: NURSE PRACTITIONER

## 2023-08-28 ASSESSMENT — PATIENT HEALTH QUESTIONNAIRE - PHQ9: DEPRESSION UNABLE TO ASSESS: PT REFUSES

## 2023-08-28 NOTE — PROGRESS NOTES
Vilma Shannon [address], [place]  [address]  Dept: [phone]  Dept Fax: [phone]    Visit type: Established patient    Reason for Visit: Diabetes (Type 2 follow up ), Letter for School/Work (FMLA paperwork ), and Health Maintenance (A1C testing; vaccines; diabetic foot exam; diabetic retinal exam )         Assessment and Plan       1. Uncontrolled type 2 diabetes mellitus with hyperglycemia (HCC)  -     POCT glycosylated hemoglobin (Hb A1C)  2. Non compliance w medication regimen  3. Essential hypertension  4. Mixed hyperlipidemia  5. Stage 3a chronic kidney disease ([address])  6. History of CVA (cerebrovascular accident)  -     External Referral To Physical Therapy  7. Right sided weakness  -     External Referral To Physical Therapy  8. Ambulates with cane  -     External Referral To Physical Therapy    Declines home health but wife can take him after 3 pm locally to PT  Discussed his medication non compliance- he is going to try to take his medication more routinely  Discussed not taking it can lead to further CV disease   Has also not been using his insulin correctly, as he is not attaching the pen needles to the pen and just stabbing himself with a needle daily- education given. Along with we discussed that he could come in here with his pen or go to pharmacy for demonstration on use  Would like disability paper completed for work   Return in about 1 month (around 9/28/2023) for DM, chronic issues .        Subjective       DM type 2  Onset over a year ago   Current treatment- is only using needle and poking himself   Is not taking lisinopril     History of CAD  Is not taking his eliquis, tprl xl  Was doing cardio therapy- but had to stop because did not have transportation     Wife is with him and staying that he is in bed all day     History of CVA  Has right sided weakness  Is using cane    Has to be able to pull or push a crate that weighs over 100 lbs at his

## 2023-08-29 ASSESSMENT — ENCOUNTER SYMPTOMS
SHORTNESS OF BREATH: 0
RHINORRHEA: 0
CHEST TIGHTNESS: 0
DIARRHEA: 0
EYE DISCHARGE: 0
VOMITING: 0
ABDOMINAL PAIN: 0
COUGH: 0
CONSTIPATION: 0

## 2023-09-06 ENCOUNTER — OFFICE VISIT (OUTPATIENT)
Dept: CARDIOLOGY CLINIC | Age: 63
End: 2023-09-06
Payer: COMMERCIAL

## 2023-09-06 VITALS
BODY MASS INDEX: 20.47 KG/M2 | HEIGHT: 70 IN | WEIGHT: 143 LBS | OXYGEN SATURATION: 95 % | DIASTOLIC BLOOD PRESSURE: 81 MMHG | HEART RATE: 90 BPM | SYSTOLIC BLOOD PRESSURE: 132 MMHG

## 2023-09-06 DIAGNOSIS — I25.5 ISCHEMIC CARDIOMYOPATHY: Primary | ICD-10-CM

## 2023-09-06 DIAGNOSIS — I50.22 CHRONIC SYSTOLIC CONGESTIVE HEART FAILURE, NYHA CLASS 3 (HCC): ICD-10-CM

## 2023-09-06 PROCEDURE — 93000 ELECTROCARDIOGRAM COMPLETE: CPT | Performed by: INTERNAL MEDICINE

## 2023-09-06 PROCEDURE — 3075F SYST BP GE 130 - 139MM HG: CPT | Performed by: INTERNAL MEDICINE

## 2023-09-06 PROCEDURE — 99214 OFFICE O/P EST MOD 30 MIN: CPT | Performed by: INTERNAL MEDICINE

## 2023-09-06 PROCEDURE — 3079F DIAST BP 80-89 MM HG: CPT | Performed by: INTERNAL MEDICINE

## 2023-09-06 NOTE — PROGRESS NOTES
[facility]  [address] [facility] [address] [facility] [address]  Dept: [phone]  Cardiac Electrophysiology: Follow up Note  Patient's demographics:  Date:   9/6/2023  Patient name:              Karen Deras  YOB: 1960  Sex:    male   MRN:   [id number]    Primary Care Physician:  Bird Auguste, APRN - CNP    Cardiologist:  Kassy Santiago MD    Reason for Follow up:  Ischemic cardiomyopathy, evaluation for AICD implantation for primary prevention of sudden cardiac death,     Clinical Summary:  63/M with ischemic cardiomyopathy, LVEF 25 to 30%, was seen in the EP clinic in 4/20/2023 for the evaluation of ASD implantation for primary prevention of sudden cardiac death. At that time the patient was noted to have urinary tract infection related to Bella catheter and as such the device implantation was delayed. Complains of exertional fatigue and shortness of breath. No PND, lower extremity swelling, palpitation, syncope. Currently on lisinopril and metoprolol succinate. Reports that he has not been aggressive with his medications. Medcial Hx: ICM dx 10/29/22 (LVEF 25-30% => 20-25%), CAD/CABG x2 (11/2022), PAF on Apixaban., HTN, DM, CVA (12.2022), BHP with an indwelling urinary catheter and Klebsiella UTI on antimicrobial therapy. Review of systems:  Constitutional: Negative for chills and fever  HENT: Negative for congestion, sinus pressure, sneezing and sore throat. Eyes: Negative for pain, discharge, redness and itching. Respiratory: Negative for apnea, cough  Gastrointestinal: Negative for blood in stool, constipation, diarrhea   Endocrine: Negative for cold intolerance, heat intolerance, polydipsia. Genitourinary: Negative for dysuria, enuresis, flank pain and hematuria. Musculoskeletal: Negative for arthralgias, joint swelling and neck pain. Neurological: Negative for numbness and headaches.    Psychiatric/Behavioral: Negative for agitation,

## 2023-09-12 ENCOUNTER — OFFICE VISIT (OUTPATIENT)
Dept: UROLOGY | Age: 63
End: 2023-09-12
Payer: COMMERCIAL

## 2023-09-12 ENCOUNTER — OFFICE VISIT (OUTPATIENT)
Dept: CARDIOLOGY CLINIC | Age: 63
End: 2023-09-12
Payer: COMMERCIAL

## 2023-09-12 VITALS — WEIGHT: 146 LBS | RESPIRATION RATE: 16 BRPM | BODY MASS INDEX: 20.9 KG/M2 | HEIGHT: 70 IN

## 2023-09-12 VITALS
HEART RATE: 109 BPM | OXYGEN SATURATION: 98 % | DIASTOLIC BLOOD PRESSURE: 90 MMHG | WEIGHT: 146.2 LBS | BODY MASS INDEX: 21.66 KG/M2 | HEIGHT: 69 IN | SYSTOLIC BLOOD PRESSURE: 170 MMHG

## 2023-09-12 DIAGNOSIS — I50.22 CHRONIC SYSTOLIC CONGESTIVE HEART FAILURE, NYHA CLASS 3 (HCC): ICD-10-CM

## 2023-09-12 DIAGNOSIS — R33.8 BENIGN PROSTATIC HYPERPLASIA WITH URINARY RETENTION: Primary | ICD-10-CM

## 2023-09-12 DIAGNOSIS — N39.0 RECURRENT UTI: ICD-10-CM

## 2023-09-12 DIAGNOSIS — I25.5 ISCHEMIC CARDIOMYOPATHY: Primary | ICD-10-CM

## 2023-09-12 DIAGNOSIS — N40.1 BENIGN PROSTATIC HYPERPLASIA WITH URINARY RETENTION: Primary | ICD-10-CM

## 2023-09-12 DIAGNOSIS — Z91.89 AT RISK FOR FLUID VOLUME OVERLOAD: ICD-10-CM

## 2023-09-12 DIAGNOSIS — N31.9 NEUROGENIC BLADDER: ICD-10-CM

## 2023-09-12 LAB — POST VOID RESIDUAL (PVR): 643 ML

## 2023-09-12 PROCEDURE — 3077F SYST BP >= 140 MM HG: CPT | Performed by: NURSE PRACTITIONER

## 2023-09-12 PROCEDURE — 3080F DIAST BP >= 90 MM HG: CPT | Performed by: NURSE PRACTITIONER

## 2023-09-12 PROCEDURE — 51798 US URINE CAPACITY MEASURE: CPT | Performed by: UROLOGY

## 2023-09-12 PROCEDURE — 99214 OFFICE O/P EST MOD 30 MIN: CPT | Performed by: UROLOGY

## 2023-09-12 PROCEDURE — 99214 OFFICE O/P EST MOD 30 MIN: CPT | Performed by: NURSE PRACTITIONER

## 2023-09-12 ASSESSMENT — ENCOUNTER SYMPTOMS
ABDOMINAL DISTENTION: 0
SHORTNESS OF BREATH: 0
COUGH: 0
NAUSEA: 0
VOMITING: 0

## 2023-09-12 NOTE — PROGRESS NOTES
The patient has  chronic kidney disease, monitor daily basic metabolic panel, Nephrology  is following. Apparently, a Bella catheter was placed last night due to  concern for urinary retention. Hematuria was reported after Bella  placement. Urology consult is recommended. Monitor CBC. Continue on  aspirin and Lipitor. Monitor the patient on telemetry. Consult  Cardiovascular Surgery. Heart team discussion regarding  revascularization approach is warranted. Options are coronary artery  bypass graft surgery versus high-risk PCI with Impella support. Findings and plan of care were discussed with the patient and his  family, they are agreeable to the plan.            Herlinda Trujillo MD     D: 11/01/2022 13:06:38       T: 11/01/2022 13:10:35     [id number]  Job#: [id number]     Doc#: [id number]     CC:         Results reviewed:  BNP: No results found for: \"BNP\"  CBC:   Lab Results   Component Value Date/Time    WBC 7.5 05/12/2023 07:37 AM    RBC 4.31 05/12/2023 07:37 AM    HGB 11.3 05/12/2023 07:37 AM    HCT 36.7 05/12/2023 07:37 AM     05/12/2023 07:37 AM     CMP:    Lab Results   Component Value Date/Time     05/12/2023 07:37 AM    K 4.6 05/12/2023 07:37 AM    K 5.6 05/09/2023 03:51 AM     05/12/2023 07:37 AM    CO2 23 05/12/2023 07:37 AM    BUN 25 05/12/2023 07:37 AM    CREATININE 1.3 05/12/2023 07:37 AM    GFRAA 58 05/24/2022 04:58 AM    LABGLOM >60 05/12/2023 07:37 AM    GLUCOSE 155 05/12/2023 07:37 AM    CALCIUM 9.3 05/12/2023 07:37 AM     Hepatic Function Panel:    Lab Results   Component Value Date/Time    ALKPHOS 68 05/09/2023 03:51 AM    ALT 8 05/09/2023 03:51 AM    AST 11 05/09/2023 03:51 AM    PROT 6.2 05/09/2023 03:51 AM    BILITOT 0.4 05/09/2023 03:51 AM    BILIDIR <0.2 05/09/2023 03:51 AM    LABALBU 3.9 05/09/2023 03:51 AM     Magnesium:    Lab Results   Component Value Date/Time    MG 2.0 05/12/2023 07:37 AM     PT/INR:    Lab Results   Component Value Date/Time    INR 1.09

## 2023-09-12 NOTE — PROGRESS NOTES
CYSTOSCOPY N/A 2023    CYSTO, TURBT, GREENLIGHT PHOTO VAPORIZATION OF PROSTATE performed by Rupinder Miller MD at [address]      TRANSESOPHAGEAL ECHOCARDIOGRAM N/A 2022    TRANSESOPHAGEAL ECHOCARDIOGRAM WITH ANESTHESIA performed by Shay Moran MD at [facility]     Family History   Problem Relation Age of Onset    Diabetes Father     Stroke Father     Diabetes Brother     Diabetes Brother     Alcohol Abuse Paternal Grandfather     Heart Attack Paternal Grandfather      Outpatient Medications Marked as Taking for the 23 encounter (Office Visit) with Rupinder Miller MD   Medication Sig Dispense Refill    buPROPion (WELLBUTRIN XL) 150 MG extended release tablet Take 1 tablet by mouth every morning 30 tablet 1    apixaban (ELIQUIS) 5 MG TABS tablet Take 1 tablet by mouth 2 times daily 60 tablet 3    metoprolol succinate (TOPROL XL) 25 MG extended release tablet Take 1 tablet by mouth daily 90 tablet 3    lisinopril (PRINIVIL;ZESTRIL) 5 MG tablet Take 1 tablet by mouth daily 30 tablet 5    insulin glargine (BASAGLAR KWIKPEN) 100 UNIT/ML injection pen Inject 13 Units into the skin nightly 5 Adjustable Dose Pre-filled Pen Syringe 3    Insulin Pen Needle ([employer] PEN NEEDLES 29G) 29G X 12MM MISC 1 each by Does not apply route daily 100 each 3    Handicap Placard MISC by Does not apply route Expires 5 year from date of prescription 1 each 0    acetaminophen (TYLENOL) 500 MG tablet Take 1 tablet by mouth every 6 hours as needed for Pain As needed         Metformin and related  Social History     Tobacco Use   Smoking Status Former    Packs/day: 1.00    Years: 33.00    Additional pack years: 0.00    Total pack years: 33.00    Types: Cigarettes    Start date: 38    Quit date:     Years since quittin.6   Smokeless Tobacco Never      (If patient a smoker, smoking cessation counseling offered)   Social History     Substance and Sexual Activity   Alcohol Use Not Currently    Comment: Uses during

## 2023-09-12 NOTE — PATIENT INSTRUCTIONS
You may receive a survey regarding the care you received during your visit. Your input is valuable to us. We encourage you to complete and return your survey. We hope you will choose us in the future for your healthcare needs. Your nurses today were Yennifer and Serafin Bangura.   Office hours:   Mon-Thurs 8-4:30  Friday 8-12  Phone: [phone]    Continue:  Continue current medications  Daily weights and record  Fluid restriction of 2 Liters per day  Limit sodium in diet to around 9801-7219 mg/day  Monitor BP  Activity as tolerated     Call the [address] for any of the following symptoms:   Weight gain of 2-3 pounds in 1 day or 5 pounds in 1 week  Increased shortness of breath  Shortness of breath while laying down  Cough  Chest pain  Swelling in feet, ankles or legs  Bloating in abdomen  Fatigue

## 2023-10-16 ENCOUNTER — TELEPHONE (OUTPATIENT)
Dept: FAMILY MEDICINE CLINIC | Age: 63
End: 2023-10-16

## 2023-10-16 NOTE — TELEPHONE ENCOUNTER
Per Leopoldo Charter,     Call patient to notify that the cardiologist does not complete long term disability forms and is instructed to contact Social Security at this time.      Called patient and left message to call back

## 2023-10-18 ENCOUNTER — HOSPITAL ENCOUNTER (OUTPATIENT)
Dept: NON INVASIVE DIAGNOSTICS | Age: 63
Discharge: HOME | End: 2023-10-18
Attending: INTERNAL MEDICINE
Payer: COMMERCIAL

## 2023-10-18 DIAGNOSIS — I50.22 CHRONIC SYSTOLIC CONGESTIVE HEART FAILURE, NYHA CLASS 3 (HCC): ICD-10-CM

## 2023-10-18 DIAGNOSIS — I25.5 ISCHEMIC CARDIOMYOPATHY: ICD-10-CM

## 2023-10-18 PROCEDURE — 93307 TTE W/O DOPPLER COMPLETE: CPT

## 2023-11-02 ENCOUNTER — TELEPHONE (OUTPATIENT)
Dept: CARDIOLOGY CLINIC | Age: 63
End: 2023-11-02

## 2023-11-02 NOTE — TELEPHONE ENCOUNTER
Assessment And Recommendations: The patient's blood pressure has improved. Currently off midodrine. We have room to optimize his guideline directed medical therapy. Referred to heart failure clinic for optimization of heart failure medications. Limited echocardiogram in 6 weeks. Recommendation regarding next implantation to follow. Electronically signed by Lenin Ramirez MD, [facility], [facility] on 9/6/2023 at 1:20 PM       Please see Echo results-  patient canceled the apt on 11.01.2023-  no follow up with [facility] on file at this time. Next apt with CHF 12.07.2023  Conclusions      Summary   Technically difficult study due to poor acoustic windows. Ejection fraction is visually estimated at 35-40%. There was moderate global hypokinesis of the left ventricle.       Signature      ----------------------------------------------------------------   Electronically signed by Carmelo Lopez MD (Interpreting   physician) on 10/20/2023 at 07:10 PM   ----------------------------------------------------------------

## 2023-11-06 ENCOUNTER — TELEPHONE (OUTPATIENT)
Dept: CARDIOLOGY CLINIC | Age: 63
End: 2023-11-06

## 2023-11-06 NOTE — TELEPHONE ENCOUNTER
Received [facility] forms for pt for long term disability. LM for pt to return call. Pt was seen by Christina 1-30-23 - no current follow up with Dr. Álvaro Webber. Pt saw Dr. Mary Jimenez 9-6-23 - no ICD is needing. Is pt back to work? We do not do long term disability paperwork. Forms in Dr. Mery Melissa box until we talk to pt.

## 2023-12-12 ENCOUNTER — HOSPITAL ENCOUNTER (EMERGENCY)
Age: 63
Discharge: HOME | End: 2023-12-12
Attending: EMERGENCY MEDICINE
Payer: COMMERCIAL

## 2023-12-12 VITALS
WEIGHT: 155 LBS | SYSTOLIC BLOOD PRESSURE: 156 MMHG | OXYGEN SATURATION: 100 % | DIASTOLIC BLOOD PRESSURE: 87 MMHG | HEART RATE: 96 BPM | TEMPERATURE: 97.7 F | RESPIRATION RATE: 23 BRPM | BODY MASS INDEX: 22.89 KG/M2

## 2023-12-12 DIAGNOSIS — R11.2 NAUSEA VOMITING AND DIARRHEA: Primary | ICD-10-CM

## 2023-12-12 DIAGNOSIS — R19.7 NAUSEA VOMITING AND DIARRHEA: Primary | ICD-10-CM

## 2023-12-12 LAB
ALBUMIN SERPL BCG-MCNC: 4.5 G/DL (ref 3.5–5.1)
ALP SERPL-CCNC: 80 U/L (ref 38–126)
ALT SERPL W/O P-5'-P-CCNC: 18 U/L (ref 11–66)
ANION GAP SERPL CALC-SCNC: 15 MEQ/L (ref 8–16)
AST SERPL-CCNC: 16 U/L (ref 5–40)
BASOPHILS ABSOLUTE: 0 THOU/MM3 (ref 0–0.1)
BASOPHILS NFR BLD AUTO: 0.4 %
BILIRUB SERPL-MCNC: 0.9 MG/DL (ref 0.3–1.2)
BUN SERPL-MCNC: 21 MG/DL (ref 7–22)
CALCIUM SERPL-MCNC: 9.8 MG/DL (ref 8.5–10.5)
CHLORIDE SERPL-SCNC: 97 MEQ/L (ref 98–111)
CO2 SERPL-SCNC: 25 MEQ/L (ref 23–33)
CREAT SERPL-MCNC: 1.4 MG/DL (ref 0.4–1.2)
DEPRECATED RDW RBC AUTO: 41.1 FL (ref 35–45)
EOSINOPHIL NFR BLD AUTO: 0.3 %
EOSINOPHILS ABSOLUTE: 0 THOU/MM3 (ref 0–0.4)
ERYTHROCYTE [DISTWIDTH] IN BLOOD BY AUTOMATED COUNT: 13.2 % (ref 11.5–14.5)
FLUAV RNA RESP QL NAA+PROBE: NOT DETECTED
FLUBV RNA RESP QL NAA+PROBE: NOT DETECTED
GFR SERPL CREATININE-BSD FRML MDRD: 57 ML/MIN/1.73M2
GLUCOSE SERPL-MCNC: 438 MG/DL (ref 70–108)
HCT VFR BLD AUTO: 46.9 % (ref 42–52)
HGB BLD-MCNC: 14.8 GM/DL (ref 14–18)
IMM GRANULOCYTES # BLD AUTO: 0.06 THOU/MM3 (ref 0–0.07)
IMM GRANULOCYTES NFR BLD AUTO: 0.5 %
LIPASE SERPL-CCNC: 31.6 U/L (ref 5.6–51.3)
LYMPHOCYTES ABSOLUTE: 0.9 THOU/MM3 (ref 1–4.8)
LYMPHOCYTES NFR BLD AUTO: 7.7 %
MCH RBC QN AUTO: 27 PG (ref 26–33)
MCHC RBC AUTO-ENTMCNC: 31.6 GM/DL (ref 32.2–35.5)
MCV RBC AUTO: 85.4 FL (ref 80–94)
MONOCYTES ABSOLUTE: 0.6 THOU/MM3 (ref 0.4–1.3)
MONOCYTES NFR BLD AUTO: 5.2 %
NEUTROPHILS NFR BLD AUTO: 85.9 %
NRBC BLD AUTO-RTO: 0 /100 WBC
OSMOLALITY SERPL CALC.SUM OF ELEC: 295.7 MOSMOL/KG (ref 275–300)
PLATELET # BLD AUTO: 273 THOU/MM3 (ref 130–400)
PMV BLD AUTO: 10.3 FL (ref 9.4–12.4)
POTASSIUM SERPL-SCNC: 4.6 MEQ/L (ref 3.5–5.2)
PROT SERPL-MCNC: 7.6 G/DL (ref 6.1–8)
RBC # BLD AUTO: 5.49 MILL/MM3 (ref 4.7–6.1)
SARS-COV-2 RNA RESP QL NAA+PROBE: NOT DETECTED
SEGMENTED NEUTROPHILS ABSOLUTE COUNT: 10.2 THOU/MM3 (ref 1.8–7.7)
SODIUM SERPL-SCNC: 137 MEQ/L (ref 135–145)
WBC # BLD AUTO: 11.9 THOU/MM3 (ref 4.8–10.8)

## 2023-12-12 PROCEDURE — 87636 SARSCOV2 & INF A&B AMP PRB: CPT

## 2023-12-12 PROCEDURE — 36415 COLL VENOUS BLD VENIPUNCTURE: CPT

## 2023-12-12 PROCEDURE — 85025 COMPLETE CBC W/AUTO DIFF WBC: CPT

## 2023-12-12 PROCEDURE — 80053 COMPREHEN METABOLIC PANEL: CPT

## 2023-12-12 PROCEDURE — 99283 EMERGENCY DEPT VISIT LOW MDM: CPT

## 2023-12-12 PROCEDURE — 83690 ASSAY OF LIPASE: CPT

## 2023-12-12 RX ORDER — ONDANSETRON 4 MG/1
4 TABLET, FILM COATED ORAL EVERY 8 HOURS PRN
Qty: 20 TABLET | Refills: 0 | Status: SHIPPED | OUTPATIENT
Start: 2023-12-12 | End: 2023-12-12 | Stop reason: SDUPTHER

## 2023-12-12 RX ORDER — ONDANSETRON 4 MG/1
4 TABLET, FILM COATED ORAL EVERY 8 HOURS PRN
Qty: 20 TABLET | Refills: 0 | Status: SHIPPED | OUTPATIENT
Start: 2023-12-12 | End: 2024-01-01

## 2023-12-12 NOTE — ED NOTES
Pt to ED via intake with c/o vomiting and diarrhea since 3AM this morning. Pt reports that the diarrhea began first and then the vomiting. Pt reports they have vomited 3x. Pt HR tachycardiac. IV inserted. Will continue to monitor.       Estefania Smith, ABIGAIL  12/12/23 [id number]

## 2024-01-10 NOTE — PLAN OF CARE
Problem: Chronic Conditions and Co-morbidities  Goal: Patient's chronic conditions and co-morbidity symptoms are monitored and maintained or improved  Flowsheets (Taken 12/15/2022 2246)  Care Plan - Patient's Chronic Conditions and Co-Morbidity Symptoms are Monitored and Maintained or Improved:   Monitor and assess patient's chronic conditions and comorbid symptoms for stability, deterioration, or improvement   Collaborate with multidisciplinary team to address chronic and comorbid conditions and prevent exacerbation or deterioration   Update acute care plan with appropriate goals if chronic or comorbid symptoms are exacerbated and prevent overall improvement and discharge     Problem: Pain  Goal: Verbalizes/displays adequate comfort level or baseline comfort level  Outcome: Progressing  Flowsheets (Taken 12/15/2022 2246)  Verbalizes/displays adequate comfort level or baseline comfort level:   Encourage patient to monitor pain and request assistance   Assess pain using appropriate pain scale   Implement non-pharmacological measures as appropriate and evaluate response <-- Click to add NO pertinent Past Medical History

## 2024-01-23 ENCOUNTER — TELEPHONE (OUTPATIENT)
Dept: FAMILY MEDICINE CLINIC | Age: 64
End: 2024-01-23

## 2024-01-23 DIAGNOSIS — E11.65 UNCONTROLLED TYPE 2 DIABETES MELLITUS WITH HYPERGLYCEMIA (HCC): Primary | ICD-10-CM

## 2024-01-23 NOTE — TELEPHONE ENCOUNTER
Fasting labs ordered- will need follow up after they are completed. Thank you for assisting to schedule. Thanks.

## 2024-02-05 ENCOUNTER — NURSE ONLY (OUTPATIENT)
Dept: FAMILY MEDICINE CLINIC | Age: 64
End: 2024-02-05
Payer: COMMERCIAL

## 2024-02-05 ENCOUNTER — HOSPITAL ENCOUNTER (OUTPATIENT)
Age: 64
Setting detail: SPECIMEN
Discharge: HOME | End: 2024-02-05

## 2024-02-05 ENCOUNTER — TELEPHONE (OUTPATIENT)
Dept: CARDIOLOGY CLINIC | Age: 64
End: 2024-02-05

## 2024-02-05 DIAGNOSIS — I50.22 CHRONIC SYSTOLIC CONGESTIVE HEART FAILURE, NYHA CLASS 3 (HCC): ICD-10-CM

## 2024-02-05 DIAGNOSIS — I50.22 CHRONIC SYSTOLIC HEART FAILURE (HCC): Primary | ICD-10-CM

## 2024-02-05 PROCEDURE — 36415 COLL VENOUS BLD VENIPUNCTURE: CPT | Performed by: NURSE PRACTITIONER

## 2024-02-05 NOTE — TELEPHONE ENCOUNTER
Patient called in and would like your opinion if smoking one cigarette a week would still impact his health

## 2024-02-06 ENCOUNTER — APPOINTMENT (OUTPATIENT)
Dept: GENERAL RADIOLOGY | Age: 64
DRG: 057 | End: 2024-02-06
Payer: COMMERCIAL

## 2024-02-06 ENCOUNTER — HOSPITAL ENCOUNTER (INPATIENT)
Age: 64
LOS: 3 days | Discharge: HOME | DRG: 057 | End: 2024-02-09
Attending: EMERGENCY MEDICINE
Payer: COMMERCIAL

## 2024-02-06 ENCOUNTER — APPOINTMENT (OUTPATIENT)
Dept: CT IMAGING | Age: 64
DRG: 057 | End: 2024-02-06
Payer: COMMERCIAL

## 2024-02-06 DIAGNOSIS — I50.22 CHRONIC SYSTOLIC CONGESTIVE HEART FAILURE, NYHA CLASS 3 (HCC): ICD-10-CM

## 2024-02-06 DIAGNOSIS — I63.9 ACUTE STROKE DUE TO ISCHEMIA (HCC): ICD-10-CM

## 2024-02-06 DIAGNOSIS — I65.23 BILATERAL CAROTID ARTERY STENOSIS: ICD-10-CM

## 2024-02-06 DIAGNOSIS — R79.89 ELEVATED TROPONIN: ICD-10-CM

## 2024-02-06 DIAGNOSIS — D68.9 COAGULOPATHY (HCC): ICD-10-CM

## 2024-02-06 DIAGNOSIS — R20.0 RIGHT SIDED NUMBNESS: Primary | ICD-10-CM

## 2024-02-06 DIAGNOSIS — E11.65 UNCONTROLLED TYPE 2 DIABETES MELLITUS WITH HYPERGLYCEMIA (HCC): ICD-10-CM

## 2024-02-06 DIAGNOSIS — N18.9 CHRONIC KIDNEY DISEASE, UNSPECIFIED CKD STAGE: ICD-10-CM

## 2024-02-06 LAB
ALBUMIN SERPL BCG-MCNC: 4.1 G/DL (ref 3.5–5.1)
ALP SERPL-CCNC: 63 U/L (ref 38–126)
ALT SERPL W/O P-5'-P-CCNC: 8 U/L (ref 11–66)
ANION GAP SERPL CALC-SCNC: 10 MEQ/L (ref 8–16)
ANION GAP SERPL CALC-SCNC: 10 MEQ/L (ref 8–16)
ANION GAP SERPL CALC-SCNC: 11 MEQ/L (ref 8–16)
ANION GAP SERPL CALCULATED.3IONS-SCNC: 13 MMOL/L (ref 9–17)
APTT PPP: 31.6 SECONDS (ref 22–38)
AST SERPL-CCNC: 9 U/L (ref 5–40)
BASOPHILS ABSOLUTE: 0.1 THOU/MM3 (ref 0–0.1)
BASOPHILS NFR BLD AUTO: 0.9 %
BILIRUB SERPL-MCNC: 0.7 MG/DL (ref 0.3–1.2)
BNP SERPL-MCNC: 670 PG/ML
BUN SERPL-MCNC: 23 MG/DL (ref 8–23)
BUN SERPL-MCNC: 26 MG/DL (ref 7–22)
BUN SERPL-MCNC: 27 MG/DL (ref 7–22)
BUN SERPL-MCNC: 29 MG/DL (ref 7–22)
CALCIUM SERPL-MCNC: 8.8 MG/DL (ref 8.5–10.5)
CALCIUM SERPL-MCNC: 9 MG/DL (ref 8.5–10.5)
CALCIUM SERPL-MCNC: 9.5 MG/DL (ref 8.5–10.5)
CALCIUM SERPL-MCNC: 9.6 MG/DL (ref 8.6–10.4)
CHLORIDE SERPL-SCNC: 102 MEQ/L (ref 98–111)
CHLORIDE SERPL-SCNC: 103 MEQ/L (ref 98–111)
CHLORIDE SERPL-SCNC: 97 MMOL/L (ref 98–107)
CHLORIDE SERPL-SCNC: 98 MEQ/L (ref 98–111)
CO2 SERPL-SCNC: 21 MEQ/L (ref 23–33)
CO2 SERPL-SCNC: 22 MEQ/L (ref 23–33)
CO2 SERPL-SCNC: 25 MEQ/L (ref 23–33)
CO2 SERPL-SCNC: 25 MMOL/L (ref 20–31)
CREAT SERPL-MCNC: 1.3 MG/DL (ref 0.4–1.2)
CREAT SERPL-MCNC: 1.4 MG/DL (ref 0.4–1.2)
CREAT SERPL-MCNC: 1.4 MG/DL (ref 0.7–1.2)
CREAT SERPL-MCNC: 1.7 MG/DL (ref 0.4–1.2)
DEPRECATED RDW RBC AUTO: 40.4 FL (ref 35–45)
EOSINOPHIL NFR BLD AUTO: 3.8 %
EOSINOPHILS ABSOLUTE: 0.2 THOU/MM3 (ref 0–0.4)
ERYTHROCYTE [DISTWIDTH] IN BLOOD BY AUTOMATED COUNT: 13.1 % (ref 11.5–14.5)
GFR SERPL CREATININE-BSD FRML MDRD: 45 ML/MIN/1.73M2
GFR SERPL CREATININE-BSD FRML MDRD: 57 ML/MIN/1.73M2
GFR SERPL CREATININE-BSD FRML MDRD: 57 ML/MIN/1.73M2
GFR SERPL CREATININE-BSD FRML MDRD: > 60 ML/MIN/1.73M2
GLUCOSE BLD STRIP.AUTO-MCNC: 318 MG/DL (ref 70–108)
GLUCOSE SERPL-MCNC: 293 MG/DL (ref 70–108)
GLUCOSE SERPL-MCNC: 296 MG/DL (ref 70–108)
GLUCOSE SERPL-MCNC: 338 MG/DL (ref 70–108)
GLUCOSE SERPL-MCNC: 477 MG/DL (ref 70–99)
HCT VFR BLD AUTO: 46.8 % (ref 42–52)
HGB BLD-MCNC: 14.9 GM/DL (ref 14–18)
IMM GRANULOCYTES # BLD AUTO: 0.03 THOU/MM3 (ref 0–0.07)
IMM GRANULOCYTES NFR BLD AUTO: 0.5 %
INR PPP: 0.93 (ref 0.85–1.13)
LYMPHOCYTES ABSOLUTE: 2 THOU/MM3 (ref 1–4.8)
LYMPHOCYTES NFR BLD AUTO: 31.3 %
MAGNESIUM SERPL-MCNC: 1.9 MG/DL (ref 1.6–2.4)
MAGNESIUM SERPL-MCNC: 2.2 MG/DL (ref 1.6–2.6)
MCH RBC QN AUTO: 26.8 PG (ref 26–33)
MCHC RBC AUTO-ENTMCNC: 31.8 GM/DL (ref 32.2–35.5)
MCV RBC AUTO: 84.3 FL (ref 80–94)
MONOCYTES ABSOLUTE: 0.6 THOU/MM3 (ref 0.4–1.3)
MONOCYTES NFR BLD AUTO: 9.2 %
NEUTROPHILS NFR BLD AUTO: 54.3 %
NRBC BLD AUTO-RTO: 0 /100 WBC
OSMOLALITY SERPL CALC.SUM OF ELEC: 282.3 MOSMOL/KG (ref 275–300)
OSMOLALITY SERPL CALC.SUM OF ELEC: 285.8 MOSMOL/KG (ref 275–300)
OSMOLALITY SERPL CALC.SUM OF ELEC: 287.4 MOSMOL/KG (ref 275–300)
PH BLDV: 7.28 [PH] (ref 7.31–7.41)
PLATELET # BLD AUTO: 227 THOU/MM3 (ref 130–400)
PMV BLD AUTO: 10.7 FL (ref 9.4–12.4)
POC CREATININE WHOLE BLOOD: 1.4 MG/DL (ref 0.5–1.2)
POTASSIUM SERPL-SCNC: 4 MMOL/L (ref 3.7–5.3)
POTASSIUM SERPL-SCNC: 4.6 MEQ/L (ref 3.5–5.2)
POTASSIUM SERPL-SCNC: 4.7 MEQ/L (ref 3.5–5.2)
POTASSIUM SERPL-SCNC: 5.1 MEQ/L (ref 3.5–5.2)
PROT SERPL-MCNC: 6.9 G/DL (ref 6.1–8)
RBC # BLD AUTO: 5.55 MILL/MM3 (ref 4.7–6.1)
SEGMENTED NEUTROPHILS ABSOLUTE COUNT: 3.4 THOU/MM3 (ref 1.8–7.7)
SODIUM SERPL-SCNC: 133 MEQ/L (ref 135–145)
SODIUM SERPL-SCNC: 134 MEQ/L (ref 135–145)
SODIUM SERPL-SCNC: 135 MEQ/L (ref 135–145)
SODIUM SERPL-SCNC: 135 MMOL/L (ref 135–144)
TROPONIN, HIGH SENSITIVITY: 29 NG/L (ref 0–12)
WBC # BLD AUTO: 6.3 THOU/MM3 (ref 4.8–10.8)

## 2024-02-06 PROCEDURE — 93005 ELECTROCARDIOGRAM TRACING: CPT | Performed by: STUDENT IN AN ORGANIZED HEALTH CARE EDUCATION/TRAINING PROGRAM

## 2024-02-06 PROCEDURE — 2060000000 HC ICU INTERMEDIATE R&B

## 2024-02-06 PROCEDURE — 85025 COMPLETE CBC W/AUTO DIFF WBC: CPT

## 2024-02-06 PROCEDURE — 70498 CT ANGIOGRAPHY NECK: CPT

## 2024-02-06 PROCEDURE — 82800 BLOOD PH: CPT

## 2024-02-06 PROCEDURE — 84484 ASSAY OF TROPONIN QUANT: CPT

## 2024-02-06 PROCEDURE — 83036 HEMOGLOBIN GLYCOSYLATED A1C: CPT

## 2024-02-06 PROCEDURE — 85610 PROTHROMBIN TIME: CPT

## 2024-02-06 PROCEDURE — 82607 VITAMIN B-12: CPT

## 2024-02-06 PROCEDURE — 36415 COLL VENOUS BLD VENIPUNCTURE: CPT

## 2024-02-06 PROCEDURE — 2580000003 HC RX 258: Performed by: PHYSICIAN ASSISTANT

## 2024-02-06 PROCEDURE — 82565 ASSAY OF CREATININE: CPT

## 2024-02-06 PROCEDURE — 82746 ASSAY OF FOLIC ACID SERUM: CPT

## 2024-02-06 PROCEDURE — 82948 REAGENT STRIP/BLOOD GLUCOSE: CPT

## 2024-02-06 PROCEDURE — 6360000004 HC RX CONTRAST MEDICATION: Performed by: PHYSICIAN ASSISTANT

## 2024-02-06 PROCEDURE — 80053 COMPREHEN METABOLIC PANEL: CPT

## 2024-02-06 PROCEDURE — 83090 ASSAY OF HOMOCYSTEINE: CPT

## 2024-02-06 PROCEDURE — 70450 CT HEAD/BRAIN W/O DYE: CPT

## 2024-02-06 PROCEDURE — 85730 THROMBOPLASTIN TIME PARTIAL: CPT

## 2024-02-06 PROCEDURE — 99285 EMERGENCY DEPT VISIT HI MDM: CPT

## 2024-02-06 PROCEDURE — 70496 CT ANGIOGRAPHY HEAD: CPT

## 2024-02-06 PROCEDURE — 71045 X-RAY EXAM CHEST 1 VIEW: CPT

## 2024-02-06 PROCEDURE — 93005 ELECTROCARDIOGRAM TRACING: CPT | Performed by: EMERGENCY MEDICINE

## 2024-02-06 PROCEDURE — 99223 1ST HOSP IP/OBS HIGH 75: CPT | Performed by: NURSE PRACTITIONER

## 2024-02-06 PROCEDURE — 83735 ASSAY OF MAGNESIUM: CPT

## 2024-02-06 RX ORDER — INSULIN GLARGINE 100 [IU]/ML
10 INJECTION, SOLUTION SUBCUTANEOUS NIGHTLY
Status: DISCONTINUED | OUTPATIENT
Start: 2024-02-07 | End: 2024-02-09 | Stop reason: HOSPADM

## 2024-02-06 RX ORDER — METOPROLOL SUCCINATE 25 MG/1
25 TABLET, EXTENDED RELEASE ORAL DAILY
Status: DISCONTINUED | OUTPATIENT
Start: 2024-02-07 | End: 2024-02-09 | Stop reason: HOSPADM

## 2024-02-06 RX ORDER — SPIRONOLACTONE 25 MG/1
25 TABLET ORAL DAILY
Status: DISCONTINUED | OUTPATIENT
Start: 2024-02-07 | End: 2024-02-09 | Stop reason: HOSPADM

## 2024-02-06 RX ORDER — SPIRONOLACTONE 25 MG/1
25 TABLET ORAL DAILY
Qty: 90 TABLET | Refills: 3 | Status: ON HOLD | OUTPATIENT
Start: 2024-02-06 | End: 2024-02-09 | Stop reason: HOSPADM

## 2024-02-06 RX ORDER — SODIUM CHLORIDE, SODIUM LACTATE, POTASSIUM CHLORIDE, CALCIUM CHLORIDE 600; 310; 30; 20 MG/100ML; MG/100ML; MG/100ML; MG/100ML
INJECTION, SOLUTION INTRAVENOUS CONTINUOUS
Status: ACTIVE | OUTPATIENT
Start: 2024-02-06 | End: 2024-02-07

## 2024-02-06 RX ORDER — SODIUM CHLORIDE, SODIUM LACTATE, POTASSIUM CHLORIDE, CALCIUM CHLORIDE 600; 310; 30; 20 MG/100ML; MG/100ML; MG/100ML; MG/100ML
INJECTION, SOLUTION INTRAVENOUS CONTINUOUS
Status: ACTIVE | OUTPATIENT
Start: 2024-02-07 | End: 2024-02-07

## 2024-02-06 RX ORDER — INSULIN LISPRO 100 [IU]/ML
0-16 INJECTION, SOLUTION INTRAVENOUS; SUBCUTANEOUS EVERY 4 HOURS
Status: DISCONTINUED | OUTPATIENT
Start: 2024-02-06 | End: 2024-02-09 | Stop reason: HOSPADM

## 2024-02-06 RX ORDER — 0.9 % SODIUM CHLORIDE 0.9 %
1000 INTRAVENOUS SOLUTION INTRAVENOUS ONCE
Status: COMPLETED | OUTPATIENT
Start: 2024-02-06 | End: 2024-02-06

## 2024-02-06 RX ADMIN — SODIUM CHLORIDE 1000 ML: 9 INJECTION, SOLUTION INTRAVENOUS at 19:29

## 2024-02-06 RX ADMIN — IOPAMIDOL 80 ML: 755 INJECTION, SOLUTION INTRAVENOUS at 19:07

## 2024-02-06 ASSESSMENT — ENCOUNTER SYMPTOMS
ABDOMINAL PAIN: 0
NAUSEA: 0
PHOTOPHOBIA: 0
COUGH: 0
VOMITING: 0
DIARRHEA: 0
SORE THROAT: 0
SHORTNESS OF BREATH: 0
RHINORRHEA: 0

## 2024-02-06 ASSESSMENT — PAIN - FUNCTIONAL ASSESSMENT
PAIN_FUNCTIONAL_ASSESSMENT: NONE - DENIES PAIN
PAIN_FUNCTIONAL_ASSESSMENT: WONG-BAKER FACES
PAIN_FUNCTIONAL_ASSESSMENT: NONE - DENIES PAIN

## 2024-02-06 ASSESSMENT — PAIN SCALES - WONG BAKER: WONGBAKER_NUMERICALRESPONSE: 2

## 2024-02-06 NOTE — ED TRIAGE NOTES
Patient presents to ED from home with report of right sided weakness that began today at 1544. Patient states he has a history of a stroke 1.5 years ago. Patient states his entire right side feels weak and is able to ambulate in room 33 with mild difficulty. Patient is A/O x4  on arrival. Family present at bedside. PAL NEGRON at bedside.

## 2024-02-07 ENCOUNTER — APPOINTMENT (OUTPATIENT)
Dept: MRI IMAGING | Age: 64
DRG: 057 | End: 2024-02-07
Payer: COMMERCIAL

## 2024-02-07 ENCOUNTER — APPOINTMENT (OUTPATIENT)
Age: 64
DRG: 057 | End: 2024-02-07
Attending: NURSE PRACTITIONER
Payer: COMMERCIAL

## 2024-02-07 PROBLEM — I25.5 ISCHEMIC CARDIOMYOPATHY: Status: RESOLVED | Noted: 2022-11-05 | Resolved: 2024-02-07

## 2024-02-07 PROBLEM — Q27.30 AVM (ARTERIOVENOUS MALFORMATION): Status: ACTIVE | Noted: 2024-02-07

## 2024-02-07 PROBLEM — I69.351 HEMIPARESIS AFFECTING RIGHT SIDE AS LATE EFFECT OF STROKE (HCC): Status: RESOLVED | Noted: 2022-12-23 | Resolved: 2024-02-07

## 2024-02-07 PROBLEM — I13.10 CARDIORENAL SYNDROME WITH RENAL FAILURE: Status: RESOLVED | Noted: 2023-05-09 | Resolved: 2024-02-07

## 2024-02-07 PROBLEM — I50.43 CHF (CONGESTIVE HEART FAILURE), NYHA CLASS I, ACUTE ON CHRONIC, COMBINED (HCC): Status: RESOLVED | Noted: 2023-05-08 | Resolved: 2024-02-07

## 2024-02-07 PROBLEM — R27.8 COORDINATION IMPAIRMENT: Status: RESOLVED | Noted: 2022-12-23 | Resolved: 2024-02-07

## 2024-02-07 PROBLEM — M77.00 GOLFER'S ELBOW: Status: RESOLVED | Noted: 2017-04-03 | Resolved: 2024-02-07

## 2024-02-07 PROBLEM — E44.0 MODERATE MALNUTRITION (HCC): Status: RESOLVED | Noted: 2022-12-26 | Resolved: 2024-02-07

## 2024-02-07 PROBLEM — H61.21 IMPACTED CERUMEN OF RIGHT EAR: Status: RESOLVED | Noted: 2021-08-10 | Resolved: 2024-02-07

## 2024-02-07 PROBLEM — F10.10 ETOH ABUSE: Status: RESOLVED | Noted: 2022-11-05 | Resolved: 2024-02-07

## 2024-02-07 PROBLEM — N39.0 UTI DUE TO KLEBSIELLA SPECIES: Status: RESOLVED | Noted: 2022-12-23 | Resolved: 2024-02-07

## 2024-02-07 PROBLEM — R53.81 DEBILITY: Status: RESOLVED | Noted: 2022-12-23 | Resolved: 2024-02-07

## 2024-02-07 PROBLEM — I50.9 NEW ONSET OF CONGESTIVE HEART FAILURE (HCC): Status: RESOLVED | Noted: 2022-10-29 | Resolved: 2024-02-07

## 2024-02-07 PROBLEM — R25.1 TREMOR: Status: RESOLVED | Noted: 2022-11-04 | Resolved: 2024-02-07

## 2024-02-07 PROBLEM — Z98.890 S/P CARDIAC CATH: Status: RESOLVED | Noted: 2022-11-05 | Resolved: 2024-02-07

## 2024-02-07 PROBLEM — R78.81 BACTEREMIA DUE TO ENTEROBACTER SPECIES: Status: RESOLVED | Noted: 2022-12-16 | Resolved: 2024-02-07

## 2024-02-07 PROBLEM — B96.89 BACTEREMIA DUE TO ENTEROBACTER SPECIES: Status: RESOLVED | Noted: 2022-12-16 | Resolved: 2024-02-07

## 2024-02-07 PROBLEM — N10 ACUTE PYELONEPHRITIS: Status: RESOLVED | Noted: 2022-12-15 | Resolved: 2024-02-07

## 2024-02-07 PROBLEM — N30.80 EMPHYSEMATOUS CYSTITIS: Status: RESOLVED | Noted: 2022-12-15 | Resolved: 2024-02-07

## 2024-02-07 PROBLEM — N18.31 STAGE 3A CHRONIC KIDNEY DISEASE (HCC): Status: RESOLVED | Noted: 2022-10-31 | Resolved: 2024-02-07

## 2024-02-07 PROBLEM — N17.9 AKI (ACUTE KIDNEY INJURY) (HCC): Status: RESOLVED | Noted: 2022-11-04 | Resolved: 2024-02-07

## 2024-02-07 PROBLEM — R33.9 URINARY RETENTION: Status: RESOLVED | Noted: 2022-12-16 | Resolved: 2024-02-07

## 2024-02-07 PROBLEM — E87.5 HYPERKALEMIA: Status: RESOLVED | Noted: 2023-05-09 | Resolved: 2024-02-07

## 2024-02-07 PROBLEM — M47.814 THORACIC ARTHRITIS: Status: RESOLVED | Noted: 2022-10-12 | Resolved: 2024-02-07

## 2024-02-07 PROBLEM — R20.0 RIGHT SIDED NUMBNESS: Status: ACTIVE | Noted: 2024-02-07

## 2024-02-07 PROBLEM — I65.23 BILATERAL CAROTID ARTERY STENOSIS: Status: ACTIVE | Noted: 2024-02-07

## 2024-02-07 PROBLEM — I25.10 CAD, MULTIPLE VESSEL: Status: RESOLVED | Noted: 2022-11-05 | Resolved: 2024-02-07

## 2024-02-07 PROBLEM — B96.89 UTI DUE TO KLEBSIELLA SPECIES: Status: RESOLVED | Noted: 2022-12-23 | Resolved: 2024-02-07

## 2024-02-07 PROBLEM — I95.89 OTHER HYPOTENSION: Status: RESOLVED | Noted: 2023-01-11 | Resolved: 2024-02-07

## 2024-02-07 PROBLEM — I10 ESSENTIAL HYPERTENSION: Status: RESOLVED | Noted: 2021-08-10 | Resolved: 2024-02-07

## 2024-02-07 PROBLEM — B35.1 FUNGAL TOENAIL INFECTION: Status: RESOLVED | Noted: 2017-04-03 | Resolved: 2024-02-07

## 2024-02-07 PROBLEM — I48.0 PAF (PAROXYSMAL ATRIAL FIBRILLATION) (HCC): Status: ACTIVE | Noted: 2024-02-07

## 2024-02-07 PROBLEM — I69.322 DYSARTHRIA AS LATE EFFECT OF STROKE: Status: RESOLVED | Noted: 2023-03-21 | Resolved: 2024-02-07

## 2024-02-07 PROBLEM — I63.9 ACUTE STROKE DUE TO ISCHEMIA (HCC): Status: RESOLVED | Noted: 2022-12-23 | Resolved: 2024-02-07

## 2024-02-07 PROBLEM — I50.23 ACUTE ON CHRONIC SYSTOLIC CONGESTIVE HEART FAILURE (HCC): Status: RESOLVED | Noted: 2022-10-29 | Resolved: 2024-02-07

## 2024-02-07 LAB
ALBUMIN SERPL BCG-MCNC: 3.5 G/DL (ref 3.5–5.1)
ALP SERPL-CCNC: 48 U/L (ref 38–126)
ALT SERPL W/O P-5'-P-CCNC: 7 U/L (ref 11–66)
AMPHETAMINES UR QL SCN: NEGATIVE
ANION GAP SERPL CALC-SCNC: 11 MEQ/L (ref 8–16)
ANION GAP SERPL CALC-SCNC: 9 MEQ/L (ref 8–16)
AST SERPL-CCNC: 7 U/L (ref 5–40)
BACTERIA: ABNORMAL
BARBITURATES UR QL SCN: NEGATIVE
BASOPHILS ABSOLUTE: 0.1 THOU/MM3 (ref 0–0.1)
BASOPHILS NFR BLD AUTO: 0.9 %
BENZODIAZ UR QL SCN: NEGATIVE
BILIRUB SERPL-MCNC: 0.7 MG/DL (ref 0.3–1.2)
BILIRUB UR QL STRIP: NEGATIVE
BUN SERPL-MCNC: 24 MG/DL (ref 7–22)
BUN SERPL-MCNC: 24 MG/DL (ref 7–22)
BZE UR QL SCN: NEGATIVE
CALCIUM SERPL-MCNC: 8.4 MG/DL (ref 8.5–10.5)
CALCIUM SERPL-MCNC: 8.5 MG/DL (ref 8.5–10.5)
CANNABINOIDS UR QL SCN: NEGATIVE
CASTS #/AREA URNS LPF: ABNORMAL /LPF
CASTS #/AREA URNS LPF: ABNORMAL /LPF
CHARACTER UR: CLEAR
CHARCOAL URNS QL MICRO: ABNORMAL
CHLORIDE SERPL-SCNC: 103 MEQ/L (ref 98–111)
CHLORIDE SERPL-SCNC: 104 MEQ/L (ref 98–111)
CHOLEST SERPL-MCNC: 161 MG/DL (ref 100–199)
CO2 SERPL-SCNC: 22 MEQ/L (ref 23–33)
CO2 SERPL-SCNC: 23 MEQ/L (ref 23–33)
COLOR UR: YELLOW
CREAT SERPL-MCNC: 1.3 MG/DL (ref 0.4–1.2)
CREAT SERPL-MCNC: 1.3 MG/DL (ref 0.4–1.2)
CRYSTALS URNS QL MICRO: ABNORMAL
DEPRECATED MEAN GLUCOSE BLD GHB EST-ACNC: 300 MG/DL (ref 70–126)
DEPRECATED RDW RBC AUTO: 39.9 FL (ref 35–45)
ECHO AV CUSP MM: 1.6 CM
ECHO AV PEAK GRADIENT: 6 MMHG
ECHO AV PEAK VELOCITY: 1.2 M/S
ECHO AV VELOCITY RATIO: 0.58
ECHO BSA: 1.85 M2
ECHO LA AREA 2C: 17.1 CM2
ECHO LA AREA 4C: 19.1 CM2
ECHO LA DIAMETER INDEX: 2.18 CM/M2
ECHO LA DIAMETER: 4.1 CM
ECHO LA MAJOR AXIS: 5.4 CM
ECHO LA MINOR AXIS: 5.2 CM
ECHO LA VOL BP: 52 ML (ref 18–58)
ECHO LA VOL MOD A2C: 46 ML (ref 18–58)
ECHO LA VOL MOD A4C: 56 ML (ref 18–58)
ECHO LA VOL/BSA BIPLANE: 28 ML/M2 (ref 16–34)
ECHO LA VOLUME INDEX MOD A2C: 24 ML/M2 (ref 16–34)
ECHO LA VOLUME INDEX MOD A4C: 30 ML/M2 (ref 16–34)
ECHO LV E' LATERAL VELOCITY: 5 CM/S
ECHO LV E' SEPTAL VELOCITY: 4 CM/S
ECHO LV EDV A2C: 115 ML
ECHO LV EDV A4C: 125 ML
ECHO LV EDV INDEX A4C: 66 ML/M2
ECHO LV EDV NDEX A2C: 61 ML/M2
ECHO LV EJECTION FRACTION A2C: 36 %
ECHO LV EJECTION FRACTION A4C: 41 %
ECHO LV EJECTION FRACTION BIPLANE: 40 % (ref 55–100)
ECHO LV ESV A2C: 74 ML
ECHO LV ESV A4C: 73 ML
ECHO LV ESV INDEX A2C: 39 ML/M2
ECHO LV ESV INDEX A4C: 39 ML/M2
ECHO LV FRACTIONAL SHORTENING: 20 % (ref 28–44)
ECHO LV INTERNAL DIMENSION DIASTOLE INDEX: 2.66 CM/M2
ECHO LV INTERNAL DIMENSION DIASTOLIC: 5 CM (ref 4.2–5.9)
ECHO LV INTERNAL DIMENSION SYSTOLIC INDEX: 2.13 CM/M2
ECHO LV INTERNAL DIMENSION SYSTOLIC: 4 CM
ECHO LV ISOVOLUMETRIC RELAXATION TIME (IVRT): 144 MS
ECHO LV IVSD: 1.4 CM (ref 0.6–1)
ECHO LV MASS 2D: 261.8 G (ref 88–224)
ECHO LV MASS INDEX 2D: 139.3 G/M2 (ref 49–115)
ECHO LV POSTERIOR WALL DIASTOLIC: 1.2 CM (ref 0.6–1)
ECHO LV RELATIVE WALL THICKNESS RATIO: 0.48
ECHO LVOT PEAK GRADIENT: 2 MMHG
ECHO LVOT PEAK VELOCITY: 0.7 M/S
ECHO MV A VELOCITY: 1.23 M/S
ECHO MV E DECELERATION TIME (DT): 206 MS
ECHO MV E VELOCITY: 0.68 M/S
ECHO MV E/A RATIO: 0.55
ECHO MV E/E' LATERAL: 13.6
ECHO MV E/E' RATIO (AVERAGED): 15.3
ECHO MV REGURGITANT PEAK GRADIENT: 149 MMHG
ECHO MV REGURGITANT PEAK VELOCITY: 6.1 M/S
ECHO RV INTERNAL DIMENSION: 2.7 CM
ECHO RV TAPSE: 1.6 CM (ref 1.7–?)
ECHO TV E WAVE: 0.5 M/S
EKG ATRIAL RATE: 77 BPM
EKG ATRIAL RATE: 90 BPM
EKG P AXIS: 70 DEGREES
EKG P AXIS: 76 DEGREES
EKG P-R INTERVAL: 136 MS
EKG P-R INTERVAL: 154 MS
EKG Q-T INTERVAL: 368 MS
EKG Q-T INTERVAL: 396 MS
EKG QRS DURATION: 80 MS
EKG QRS DURATION: 84 MS
EKG QTC CALCULATION (BAZETT): 448 MS
EKG QTC CALCULATION (BAZETT): 450 MS
EKG R AXIS: 79 DEGREES
EKG R AXIS: 86 DEGREES
EKG T AXIS: 59 DEGREES
EKG T AXIS: 95 DEGREES
EKG VENTRICULAR RATE: 77 BPM
EKG VENTRICULAR RATE: 90 BPM
EOSINOPHIL NFR BLD AUTO: 3.2 %
EOSINOPHILS ABSOLUTE: 0.2 THOU/MM3 (ref 0–0.4)
EPITHELIAL CELLS, UA: ABNORMAL /HPF
ERYTHROCYTE [DISTWIDTH] IN BLOOD BY AUTOMATED COUNT: 12.9 % (ref 11.5–14.5)
FENTANYL: NEGATIVE
FOLATE SERPL-MCNC: 11.5 NG/ML (ref 4.8–24.2)
GFR SERPL CREATININE-BSD FRML MDRD: > 60 ML/MIN/1.73M2
GFR SERPL CREATININE-BSD FRML MDRD: > 60 ML/MIN/1.73M2
GLUCOSE BLD STRIP.AUTO-MCNC: 203 MG/DL (ref 70–108)
GLUCOSE BLD STRIP.AUTO-MCNC: 229 MG/DL (ref 70–108)
GLUCOSE BLD STRIP.AUTO-MCNC: 240 MG/DL (ref 70–108)
GLUCOSE BLD STRIP.AUTO-MCNC: 245 MG/DL (ref 70–108)
GLUCOSE BLD STRIP.AUTO-MCNC: 258 MG/DL (ref 70–108)
GLUCOSE BLD STRIP.AUTO-MCNC: 68 MG/DL (ref 70–108)
GLUCOSE BLD STRIP.AUTO-MCNC: 74 MG/DL (ref 70–108)
GLUCOSE SERPL-MCNC: 259 MG/DL (ref 70–108)
GLUCOSE SERPL-MCNC: 266 MG/DL (ref 70–108)
GLUCOSE UR QL STRIP.AUTO: >= 1000 MG/DL
HBA1C MFR BLD HPLC: 12 % (ref 4.4–6.4)
HCT VFR BLD AUTO: 38.6 % (ref 42–52)
HDLC SERPL-MCNC: 55 MG/DL
HGB BLD-MCNC: 12.2 GM/DL (ref 14–18)
HGB UR QL STRIP.AUTO: NEGATIVE
HOMOCYSTEINE: 15.7 UMOL/L
IMM GRANULOCYTES # BLD AUTO: 0.02 THOU/MM3 (ref 0–0.07)
IMM GRANULOCYTES NFR BLD AUTO: 0.4 %
KETONES UR QL STRIP.AUTO: NEGATIVE
LDLC SERPL CALC-MCNC: 95 MG/DL
LEUKOCYTE ESTERASE UR QL STRIP.AUTO: NEGATIVE
LYMPHOCYTES ABSOLUTE: 1.3 THOU/MM3 (ref 1–4.8)
LYMPHOCYTES NFR BLD AUTO: 24.1 %
MCH RBC QN AUTO: 26.8 PG (ref 26–33)
MCHC RBC AUTO-ENTMCNC: 31.6 GM/DL (ref 32.2–35.5)
MCV RBC AUTO: 84.8 FL (ref 80–94)
MONOCYTES ABSOLUTE: 0.5 THOU/MM3 (ref 0.4–1.3)
MONOCYTES NFR BLD AUTO: 8.4 %
NEUTROPHILS NFR BLD AUTO: 63 %
NITRITE UR QL STRIP.AUTO: NEGATIVE
NRBC BLD AUTO-RTO: 0 /100 WBC
OPIATES UR QL SCN: NEGATIVE
OSMOLALITY SERPL CALC.SUM OF ELEC: 287.2 MOSMOL/KG (ref 275–300)
OXYCODONE: NEGATIVE
PCP UR QL SCN: NEGATIVE
PH BLDV: 7.36 [PH] (ref 7.31–7.41)
PH UR STRIP.AUTO: 5.5 [PH] (ref 5–9)
PLATELET # BLD AUTO: 180 THOU/MM3 (ref 130–400)
PMV BLD AUTO: 10.8 FL (ref 9.4–12.4)
POTASSIUM SERPL-SCNC: 4.1 MEQ/L (ref 3.5–5.2)
POTASSIUM SERPL-SCNC: 4.3 MEQ/L (ref 3.5–5.2)
PROT SERPL-MCNC: 5.4 G/DL (ref 6.1–8)
PROT UR STRIP.AUTO-MCNC: 30 MG/DL
RBC # BLD AUTO: 4.55 MILL/MM3 (ref 4.7–6.1)
RBC #/AREA URNS HPF: ABNORMAL /HPF
RENAL EPI CELLS #/AREA URNS HPF: ABNORMAL /[HPF]
SEGMENTED NEUTROPHILS ABSOLUTE COUNT: 3.5 THOU/MM3 (ref 1.8–7.7)
SODIUM SERPL-SCNC: 135 MEQ/L (ref 135–145)
SODIUM SERPL-SCNC: 137 MEQ/L (ref 135–145)
SP GR UR REFRACT.AUTO: > 1.03 (ref 1–1.03)
TRIGL SERPL-MCNC: 54 MG/DL (ref 0–199)
UROBILINOGEN UR QL STRIP.AUTO: 0.2 EU/DL (ref 0–1)
VIT B12 SERPL-MCNC: 506 PG/ML (ref 211–911)
WBC # BLD AUTO: 5.6 THOU/MM3 (ref 4.8–10.8)
WBC #/AREA URNS HPF: ABNORMAL /HPF
YEAST LIKE FUNGI URNS QL MICRO: ABNORMAL

## 2024-02-07 PROCEDURE — 80053 COMPREHEN METABOLIC PANEL: CPT

## 2024-02-07 PROCEDURE — 2060000000 HC ICU INTERMEDIATE R&B

## 2024-02-07 PROCEDURE — 70551 MRI BRAIN STEM W/O DYE: CPT

## 2024-02-07 PROCEDURE — 2580000003 HC RX 258: Performed by: INTERNAL MEDICINE

## 2024-02-07 PROCEDURE — A4216 STERILE WATER/SALINE, 10 ML: HCPCS | Performed by: INTERNAL MEDICINE

## 2024-02-07 PROCEDURE — C8929 TTE W OR WO FOL WCON,DOPPLER: HCPCS

## 2024-02-07 PROCEDURE — 2580000003 HC RX 258: Performed by: NURSE PRACTITIONER

## 2024-02-07 PROCEDURE — 93010 ELECTROCARDIOGRAM REPORT: CPT | Performed by: INTERNAL MEDICINE

## 2024-02-07 PROCEDURE — 99232 SBSQ HOSP IP/OBS MODERATE 35: CPT

## 2024-02-07 PROCEDURE — 82800 BLOOD PH: CPT

## 2024-02-07 PROCEDURE — 80061 LIPID PANEL: CPT

## 2024-02-07 PROCEDURE — 81001 URINALYSIS AUTO W/SCOPE: CPT

## 2024-02-07 PROCEDURE — 97530 THERAPEUTIC ACTIVITIES: CPT

## 2024-02-07 PROCEDURE — 99223 1ST HOSP IP/OBS HIGH 75: CPT

## 2024-02-07 PROCEDURE — 97166 OT EVAL MOD COMPLEX 45 MIN: CPT

## 2024-02-07 PROCEDURE — 6370000000 HC RX 637 (ALT 250 FOR IP)

## 2024-02-07 PROCEDURE — 36415 COLL VENOUS BLD VENIPUNCTURE: CPT

## 2024-02-07 PROCEDURE — 93306 TTE W/DOPPLER COMPLETE: CPT | Performed by: INTERNAL MEDICINE

## 2024-02-07 PROCEDURE — 80307 DRUG TEST PRSMV CHEM ANLYZR: CPT

## 2024-02-07 PROCEDURE — 82948 REAGENT STRIP/BLOOD GLUCOSE: CPT

## 2024-02-07 PROCEDURE — 97110 THERAPEUTIC EXERCISES: CPT

## 2024-02-07 PROCEDURE — 97535 SELF CARE MNGMENT TRAINING: CPT

## 2024-02-07 PROCEDURE — 6370000000 HC RX 637 (ALT 250 FOR IP): Performed by: NURSE PRACTITIONER

## 2024-02-07 PROCEDURE — 85025 COMPLETE CBC W/AUTO DIFF WBC: CPT

## 2024-02-07 PROCEDURE — 6360000004 HC RX CONTRAST MEDICATION: Performed by: INTERNAL MEDICINE

## 2024-02-07 PROCEDURE — 97162 PT EVAL MOD COMPLEX 30 MIN: CPT

## 2024-02-07 RX ORDER — ONDANSETRON 2 MG/ML
4 INJECTION INTRAMUSCULAR; INTRAVENOUS EVERY 6 HOURS PRN
Status: DISCONTINUED | OUTPATIENT
Start: 2024-02-07 | End: 2024-02-07 | Stop reason: SDUPTHER

## 2024-02-07 RX ORDER — SODIUM CHLORIDE 0.9 % (FLUSH) 0.9 %
5-40 SYRINGE (ML) INJECTION PRN
Status: DISCONTINUED | OUTPATIENT
Start: 2024-02-07 | End: 2024-02-09 | Stop reason: HOSPADM

## 2024-02-07 RX ORDER — POLYETHYLENE GLYCOL 3350 17 G/17G
17 POWDER, FOR SOLUTION ORAL DAILY PRN
Status: DISCONTINUED | OUTPATIENT
Start: 2024-02-07 | End: 2024-02-07 | Stop reason: SDUPTHER

## 2024-02-07 RX ORDER — SODIUM CHLORIDE 0.9 % (FLUSH) 0.9 %
5-40 SYRINGE (ML) INJECTION EVERY 12 HOURS SCHEDULED
Status: DISCONTINUED | OUTPATIENT
Start: 2024-02-08 | End: 2024-02-09 | Stop reason: HOSPADM

## 2024-02-07 RX ORDER — ACETAMINOPHEN 650 MG/1
650 SUPPOSITORY RECTAL EVERY 6 HOURS PRN
Status: DISCONTINUED | OUTPATIENT
Start: 2024-02-07 | End: 2024-02-09 | Stop reason: HOSPADM

## 2024-02-07 RX ORDER — POTASSIUM CHLORIDE 7.45 MG/ML
10 INJECTION INTRAVENOUS PRN
Status: DISCONTINUED | OUTPATIENT
Start: 2024-02-07 | End: 2024-02-09 | Stop reason: HOSPADM

## 2024-02-07 RX ORDER — GLUCAGON 1 MG/ML
1 KIT INJECTION PRN
Status: DISCONTINUED | OUTPATIENT
Start: 2024-02-07 | End: 2024-02-09 | Stop reason: HOSPADM

## 2024-02-07 RX ORDER — LIDOCAINE 4 G/G
1 PATCH TOPICAL DAILY
Status: DISCONTINUED | OUTPATIENT
Start: 2024-02-07 | End: 2024-02-09 | Stop reason: HOSPADM

## 2024-02-07 RX ORDER — CLOPIDOGREL BISULFATE 75 MG/1
75 TABLET ORAL DAILY
Status: DISCONTINUED | OUTPATIENT
Start: 2024-02-08 | End: 2024-02-08

## 2024-02-07 RX ORDER — SODIUM CHLORIDE 9 MG/ML
INJECTION, SOLUTION INTRAVENOUS PRN
Status: DISCONTINUED | OUTPATIENT
Start: 2024-02-07 | End: 2024-02-09 | Stop reason: HOSPADM

## 2024-02-07 RX ORDER — ONDANSETRON 4 MG/1
4 TABLET, ORALLY DISINTEGRATING ORAL EVERY 8 HOURS PRN
Status: DISCONTINUED | OUTPATIENT
Start: 2024-02-07 | End: 2024-02-09 | Stop reason: HOSPADM

## 2024-02-07 RX ORDER — ACETAMINOPHEN 325 MG/1
650 TABLET ORAL EVERY 6 HOURS PRN
Status: DISCONTINUED | OUTPATIENT
Start: 2024-02-07 | End: 2024-02-08

## 2024-02-07 RX ORDER — ONDANSETRON 2 MG/ML
4 INJECTION INTRAMUSCULAR; INTRAVENOUS EVERY 6 HOURS PRN
Status: DISCONTINUED | OUTPATIENT
Start: 2024-02-07 | End: 2024-02-09 | Stop reason: HOSPADM

## 2024-02-07 RX ORDER — ONDANSETRON 4 MG/1
4 TABLET, ORALLY DISINTEGRATING ORAL EVERY 8 HOURS PRN
Status: DISCONTINUED | OUTPATIENT
Start: 2024-02-07 | End: 2024-02-07 | Stop reason: SDUPTHER

## 2024-02-07 RX ORDER — POTASSIUM CHLORIDE 20 MEQ/1
40 TABLET, EXTENDED RELEASE ORAL PRN
Status: DISCONTINUED | OUTPATIENT
Start: 2024-02-07 | End: 2024-02-09 | Stop reason: HOSPADM

## 2024-02-07 RX ORDER — MAGNESIUM SULFATE IN WATER 40 MG/ML
2000 INJECTION, SOLUTION INTRAVENOUS PRN
Status: DISCONTINUED | OUTPATIENT
Start: 2024-02-07 | End: 2024-02-09 | Stop reason: HOSPADM

## 2024-02-07 RX ORDER — DEXTROSE MONOHYDRATE 100 MG/ML
INJECTION, SOLUTION INTRAVENOUS CONTINUOUS PRN
Status: DISCONTINUED | OUTPATIENT
Start: 2024-02-07 | End: 2024-02-09 | Stop reason: HOSPADM

## 2024-02-07 RX ORDER — CLOPIDOGREL 300 MG/1
600 TABLET, FILM COATED ORAL ONCE
Status: COMPLETED | OUTPATIENT
Start: 2024-02-07 | End: 2024-02-07

## 2024-02-07 RX ORDER — ASPIRIN 300 MG/1
300 SUPPOSITORY RECTAL DAILY
Status: DISCONTINUED | OUTPATIENT
Start: 2024-02-07 | End: 2024-02-09 | Stop reason: HOSPADM

## 2024-02-07 RX ORDER — ASPIRIN 81 MG/1
81 TABLET, CHEWABLE ORAL DAILY
Status: DISCONTINUED | OUTPATIENT
Start: 2024-02-07 | End: 2024-02-09 | Stop reason: HOSPADM

## 2024-02-07 RX ADMIN — CLOPIDOGREL BISULFATE 600 MG: 300 TABLET, FILM COATED ORAL at 19:44

## 2024-02-07 RX ADMIN — APIXABAN 5 MG: 5 TABLET, FILM COATED ORAL at 21:08

## 2024-02-07 RX ADMIN — PERFLUTREN 1 ML: 6.52 INJECTION, SUSPENSION INTRAVENOUS at 10:54

## 2024-02-07 RX ADMIN — INSULIN GLARGINE 10 UNITS: 100 INJECTION, SOLUTION SUBCUTANEOUS at 00:23

## 2024-02-07 RX ADMIN — SODIUM CHLORIDE, POTASSIUM CHLORIDE, SODIUM LACTATE AND CALCIUM CHLORIDE: 600; 310; 30; 20 INJECTION, SOLUTION INTRAVENOUS at 11:15

## 2024-02-07 RX ADMIN — SODIUM CHLORIDE, POTASSIUM CHLORIDE, SODIUM LACTATE AND CALCIUM CHLORIDE: 600; 310; 30; 20 INJECTION, SOLUTION INTRAVENOUS at 03:30

## 2024-02-07 RX ADMIN — INSULIN LISPRO 4 UNITS: 100 INJECTION, SOLUTION INTRAVENOUS; SUBCUTANEOUS at 16:41

## 2024-02-07 RX ADMIN — APIXABAN 5 MG: 5 TABLET, FILM COATED ORAL at 00:43

## 2024-02-07 RX ADMIN — INSULIN LISPRO 4 UNITS: 100 INJECTION, SOLUTION INTRAVENOUS; SUBCUTANEOUS at 19:43

## 2024-02-07 RX ADMIN — APIXABAN 5 MG: 5 TABLET, FILM COATED ORAL at 08:45

## 2024-02-07 RX ADMIN — ASPIRIN 81 MG 81 MG: 81 TABLET ORAL at 08:45

## 2024-02-07 RX ADMIN — SODIUM CHLORIDE, POTASSIUM CHLORIDE, SODIUM LACTATE AND CALCIUM CHLORIDE: 600; 310; 30; 20 INJECTION, SOLUTION INTRAVENOUS at 00:02

## 2024-02-07 RX ADMIN — INSULIN GLARGINE 10 UNITS: 100 INJECTION, SOLUTION SUBCUTANEOUS at 19:44

## 2024-02-07 RX ADMIN — INSULIN LISPRO 4 UNITS: 100 INJECTION, SOLUTION INTRAVENOUS; SUBCUTANEOUS at 00:23

## 2024-02-07 RX ADMIN — INSULIN LISPRO 8 UNITS: 100 INJECTION, SOLUTION INTRAVENOUS; SUBCUTANEOUS at 06:53

## 2024-02-07 ASSESSMENT — PAIN SCALES - WONG BAKER

## 2024-02-07 ASSESSMENT — ENCOUNTER SYMPTOMS
VOMITING: 0
ABDOMINAL PAIN: 0
COUGH: 0
TROUBLE SWALLOWING: 0
SHORTNESS OF BREATH: 0
SORE THROAT: 0
PHOTOPHOBIA: 0
NAUSEA: 0
RHINORRHEA: 0

## 2024-02-07 ASSESSMENT — 9 HOLE PEG TEST
TESTTIME_SECONDS: 58.99
TESTTIME_SECONDS: 45.59

## 2024-02-07 ASSESSMENT — PAIN SCALES - GENERAL: PAINLEVEL_OUTOF10: 0

## 2024-02-07 NOTE — ED NOTES
ED to inpatient nurses report      Chief Complaint:  Chief Complaint   Patient presents with    right sided weakness     Present to ED from: Home    MOA:     LOC: alert and orientated to name, place, date  Mobility: Requires assistance * 1  Oxygen Baseline: RA    Current needs required: RA     Code Status:   Prior    What abnormal results were found and what did you give/do to treat them? Right-sided weakness, elevated trops  Any procedures or intervention occur? CT scan, XR scan, fluid bolus    Mental Status:  Level of Consciousness: Alert (0)    Psych Assessment:        Vitals:  Patient Vitals for the past 24 hrs:   BP Temp Temp src Pulse Resp SpO2 Weight   02/06/24 2012 (!) 140/76 -- -- 71 17 100 % --   02/06/24 1925 (!) 157/68 -- -- 80 20 99 % --   02/06/24 1923 (!) 187/78 -- -- 91 18 98 % --   02/06/24 1903 (!) 158/99 -- -- 98 20 98 % --   02/06/24 1844 (!) 176/73 98 °F (36.7 °C) Oral 84 18 100 % 66.2 kg (146 lb)        LDAs:   Peripheral IV 02/06/24 Right Antecubital (Active)   Site Assessment Clean, dry & intact 02/06/24 1854       Peripheral IV 02/06/24 Left Antecubital (Active)   Site Assessment Clean, dry & intact 02/06/24 1910       Ambulatory Status:  No data recorded    Diagnosis:  DISPOSITION Decision To Admit 02/06/2024 08:50:03 PM   Final diagnoses:   Right sided numbness   Uncontrolled type 2 diabetes mellitus with hyperglycemia (HCC)   Elevated troponin   Coagulopathy (HCC)   Chronic kidney disease, unspecified CKD stage        Consults:  None     Pain Score:  Pain Assessment  Pain Assessment: None - Denies Pain    C-SSRS:   Risk of Suicide: No Risk    Sepsis Screening:  Sepsis Risk Score: 3.32    [place] Fall Risk:       Swallow Screening        Preferred Language:   English      ALLERGIES     Metformin and related    SURGICAL HISTORY       Past Surgical History:   Procedure Laterality Date    CORONARY ARTERY BYPASS GRAFT N/A 11/7/2022    CABG X2 JOY WITH BALLOON PUMP performed by Dominic Chopra MD

## 2024-02-07 NOTE — PROGRESS NOTES
Patient admitted to  Room 16 in a wheelchair and from ED  Complaint upon arrival to the room none  IV site free of s/s of infection or infiltration.   Vital signs obtained. Assessment and data collection initiated. Oriented to room. Policies and procedures for  explained All questions answered with no further questions at this time. Fall prevention and safety brochure discussed with patient. 2 person skin check completed with Moody Nichols RN.      Was swallow screen completed on admission? [x] YES or [] NO  If patient failed swallow test, obtain order for speech therapy consult and keep NPO.

## 2024-02-07 NOTE — ED NOTES
Pt resting on cot with easy and unlabored respirations. Call light within reach. Telemetry in place.

## 2024-02-07 NOTE — PROGRESS NOTES
Hospitalist Progress Note    Patient:  Len Slater    YOB: 1960  Unit/Bed:4A-16/016-A  Date of Admission: 2/6/2024    Expected Discharge: 2/8?  Disposition: Home    Assessment/Plan:    Acute Ischemic CVA: 2/6/24 complaining of right-sided weakness and paresthesias.  NIH equals 1.  Not candidate for TNKase.  History of CVA 2022  CT head demonstrated remote left basilar ganglia lacunar infarct.  No hemorrhage.  CTA head/neck showed high-grade focal atherosclerotic stenosis of proximal right/left internal carotid arteries.  No aneurysm or dissection.  R ICA is 81% and LICA equals 75%.  High-grade stenosis or occlusion of right upper ocular frontal and tubular anterior trunk of right M2 origin.  Extensive collaterals indicate these high-grade focal stenosis for M2 origin short occlusions are chronic.  Neurology contacted per ER, permissive hypertension for 24 hours.  MRI pending.  Echocardiogram pending.  Lipid panel WNL.  Hemoglobin A1c 12%  PT/OT/SLP to eval.  GCS/neuro/NHISS per protocol.    T2DM, uncontrolled: Patient has been taking Lantus at home, no hyperglycemic crisis at home regimen.  Lantus resumed. Sliding scale insulin. Hypoglycemia protocol. Accu checks. Carb controlled diet.     PAF, likely secondary hypercoagulable state: QAE0DR1-UCBa 5, on Eliquis.  Toprol not restarted to admission atenolol for permissive hypertension.    CAD s/p CABG: Two-vessel in November 2022.  Continue aspirin, will resume metoprolol when able    HFrEF: Echo 1020/23 shows EF 35-40%. Does not appear acutely decompensated.  Entresto, metoprolol and Aldactone held to allow for permissive hypertension. Strict I&Os. Daily weights. Fluid/salt restriction.     BPH: History of urinary retention, neurogenic bladder and recurrent UTI.  Follows with urology outpatient.  Clinic note documentation reveals patient does not tolerate catheters well, previously catheter dependent.    History of alcohol abuse: Sobriety x  located in the posterior right occipital cortex of uncertain etiology.     Impression: There is high-grade stenosis or occlusion of right opercular frontal M2 and/or the anterior superior trunk of right M2 origin. Extensive collaterals indicate that these high-grade focal stenoses or M2 origin short occlusions are chronic. Multiplecortical collaterals are present. No signs of aneurysm. Multiple large draining veins are present in the posterior right parieto-occipital cortex draining toward the falx and peripheral dura, of uncertain origin, likely filled by arterial cortical collateral circulation, not as well visualized without DSA. This document has been electronically signed by: Juan Jones MD on 02/06/2024 08:09 PM All CTs at this facility use dose modulation techniques and iterative reconstructions, and/or weight-based dosing when appropriate to reduce radiation to a low as reasonably achievable. 3D Post-processing was performed on this study.    CTA NECK W WO CONTRAST (CODE STROKE)    Result Date: 2/6/2024  CTA Neck , Bolus contrast injection, 3D reconstructions and MPRs: Comparison: None Findings: Soft tissues of the neck are unremarkable Superior aorta and branch vessels are unremarkable. The right subclavian artery is obscured by venous contrast artifact. Right carotid: 81% right internal carotid artery origin stenosis (NASCET). This measurement may be exaggerated by calcium blooming artifact. Right vertebral, no stenosis Left Carotid: 75% atherosclerotic focal stenosis at the origin of the left internal carotid (NASCET). This measurement may be exaggerated because of calcium plaque blooming artifact. Left Vertebral originates from the aortic arch. No stenosis     Impression: High-grade focal atherosclerotic stenosis of proximal right and left internal carotid arteries. No aneurysm or dissection This document has been electronically signed by: Juan Jones MD on 02/06/2024 07:55 PM All CTs at this

## 2024-02-07 NOTE — ED PROVIDER NOTES
[facility] EMERGENCY DEPT      Pt Name: Len Slater  MRN: [id number]  Birthdate 1960  Date of evaluation: 2/6/2024  Provider: Mary Navarro PA-C    CHIEF COMPLAINT       Chief Complaint   Patient presents with    right sided weakness       Nurses Notes reviewed and I agree except as noted in the HPI.      HISTORY OF PRESENT ILLNESS    Len Slater is a 63 y.o. male who presents from home by private vehicle with wife for numbness.  Around 1745 patient developed right-sided numbness.  He describes it as a decreased sensation.  It involves his face, tongue, arm and leg.  He says the symptoms first started as feeling like his \"shoulder was dislocated.\"  He denies weakness but admits that it hurts to move his shoulder and his right leg.  Since arriving here he has developed chest pain but he believes it to be anxiety.  Wife reports the patient is on Eliquis due to bypass.  He also suffered a prior CVA with residual right-sided weakness.  Patient denies shortness of breath, diaphoresis, nausea, vomiting, fever, chills any recent illness, change in appetite, decrease in urination, or other complaints.      REVIEW OF SYSTEMS     Review of Systems   Constitutional:  Negative for chills, diaphoresis and fever.   HENT:  Negative for congestion, rhinorrhea and sore throat.    Eyes:  Negative for photophobia.   Respiratory:  Negative for cough and shortness of breath.    Cardiovascular:  Positive for chest pain.   Gastrointestinal:  Negative for abdominal pain, diarrhea, nausea and vomiting.   Genitourinary:  Negative for difficulty urinating.   Musculoskeletal:  Negative for gait problem.   Skin:  Negative for rash.   Neurological:  Positive for numbness. Negative for dizziness, weakness and light-headedness.   Psychiatric/Behavioral:  Negative for confusion. The patient is nervous/anxious.         PAST MEDICAL HISTORY    has a past medical history of Coronary artery disease, Diabetes mellitus (HCC),

## 2024-02-07 NOTE — PROGRESS NOTES
Patient/family has been educated on their personal risk factors of:  [X]Hypertension  [X]Previous stroke  []previous TIA  []Hyperlipidemia  []smoking cessation  []Atrial fibrillation  [X]Carotid Artery stenosis  [X]diabetes  []other(specify)    They have been given hand outs on the following medications:( give handouts/attach to AVS)  [X] asa  []Plavix  []coumadin  []statins(Specify)  [X]antihypertensive(metoprolol)    Treatment for stroke includes:  Risk factor modifications  Following the medication regime prescribed by physician      Educated on FAST-Face-Arm-Speech-Time    A stroke is a brain attack.Stroke is a brain injury. It occurs when the brain's blood supply is interrupted. Blood carries oxygen and nutrients to the brain. Without oxygen and nutrients from blood, brain tissue starts to die rapidly. This can happen in less than 10 minutes.    A stroke occurs when blood flow to the brain is blocked (called ischemic stroke). This is caused by one of the following:   Sudden decreased blood flow   Damage to a blood vessel supplying blood to the brain can occur suddenly from either:   Injury   A clot that forms and breaks off from another part of the body (such as the heart or neck)   There are certain conditions which predispose people to form blood clots, such as:   Cancer   Pregnancy   Atrial fibrillation   Certain autoimmune diseases   Local blood clot   A build-up of fatty substances ( atherosclerotic plaque ) along the inner lining of the artery causes:   Narrowing of artery   Reduced elasticity   Local inflammation   Blood protein defects leading to increased clotting tendency   Decreased blood flow in the artery   Clot in an artery supplying the brain   Inflammatory conditions in the blood vessels (vasculitis)   A stroke may also occur if a blood vessel breaks and bleeds into or around the brain. This is called hemorrhagic stroke.      This condition needs to be monitored closely. Be sure to keep all

## 2024-02-07 NOTE — CONSULTS
Neurology Consult Note    Date:2/7/2024       Room:[facility]-  Patient Name:Len Slater     YOB: 1960     Age:63 y.o.    Requesting Physician: Amparo Simpson PA-C     Reason for Consult:  Evaluate for high-grade stenosis, code stroke      Chief Complaint:   Chief Complaint   Patient presents with    right sided weakness       Subjective     Len Slater is a 63 y.o. male with a history of CAD s/p CABG 2022, DM, HTN, cardiomyopathy, urinary retention, former smoker, CVA 2022, HF - LVEF 35-40%, former alcohol use disorder, PAF - on Eliquis, who presented to [facility] ED for evaluation of right sided numbness and weakness. LKW 1745 yesterday. Patient states his stroke in 2022 also affected his right side, but he felt symptoms yesterday were more significant than normal. Initial BP in the /80. POC glucose 318. Patient on Eliquis as outpatient, therefore, deemed not a candidate for thrombolytics. Non-contrasted CTH negative for acute intracranial abnormalities, revealed remote left basal ganglia lacunar infarcts which have progressed compared to previous exam. CTA head and neck negative for acute LVO but revealed hemodynamically significant stenosis of approximately 81% R ICA and 75% L ICA, as well as high grade stenosis vs occlusion of the right opercular frontal M2 and/or anterior superior trunk of the right M2 origin with extensive collaterals, indicating that the intracranial high grade stenosis/occlusion of the R M2 is chronic. 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 On-call neuro interventionalist, Dr. Hoffmann, contacted by ED provider and recommended admission, IVF, and MRI brain WO. Today, patient states his right sided numbness/paresthesias and weakness have improved, but are still present. He denies facial droop and feels his dysarthria is at baseline following previous stroke. He denies chest pain or trouble breathing. Reports  states there is no need to hold Eliquis prior to the procedure.   HgbA1C 12.0. If the patient has diabetes, recommend tight control of A1c with goal of <7.0 if attainable. Insulin initiated.   LDL 95. LDL goal of 45-70.Begin Lipitor 40 mg nightly.   Smoking and alcohol cessation when applicable. Provide stroke eduction for individualized risk factors.  EKG/telemetry to monitor for atrial fibrillation  Core stroke metrics  Dysphagia screen prior to oral intake  PT/OT/SLP consult. IPR consult if applicable.  DVT prophylaxis: home Eliquis  NIHSS every shift. Neuro checks per unit unless otherwise specified.  Pre-morbid Modified Celine Scale: 2 - Slight disability:  unable to carry out all previous activities, but able to look after own affairs without assistance.  Recommend complete infectious/metabolic work-up with treatment as applicable, per primary team.    B12 and folate normal  White count normal  Calcium normal  Mild creat elevation at 1.3  Trop elevated at 29  Patient scheduled for DSA with conscious sedation and possible carotid artery stenting at 1100 tomorrow, 2/8/24, with Dr. Hoffmann, as above.  Neurology following. Further recommendations to follow tomorrow's procedure. The risks and benefits of the procedure were explained to the patient. Risks include, but are not limited to, stroke, hematoma, hemorrhage, infection, nephrotoxicity, allergic reaction, bleeding, and dissection.  Written informed consent was obtained and placed in patient's chart. If stent placed, patient will require an ICU bed overnight. NPO at midnight. Okay to take morning medications with a sip of water.     This patient was seen and evaluated with Dr. Hoffmann and he is in agreement with the assessment and plan.    Electronically signed by Daniel Crystal PA-C on 2/7/24 at 6:51 PM EST

## 2024-02-07 NOTE — ED NOTES
Pt resting on cot with easy and unlabored respirations. Pt updated on room assignment, verbalized understanding. Telemetry in place. Call light within reach.

## 2024-02-07 NOTE — H&P
sacubitril-valsartan (ENTRESTO) 24-26 MG per tablet Take 1 tablet by mouth 2 times daily 9/12/23   Eleonora Saul APRN - CNP   apixaban (ELIQUIS) 5 MG TABS tablet Take 1 tablet by mouth 2 times daily  Patient not taking: Reported on 12/18/2023 6/21/23   Aliya Barone APRN - CNP   metoprolol succinate (TOPROL XL) 25 MG extended release tablet Take 1 tablet by mouth daily 6/13/23   Eleonora Saul APRN - CNP   insulin glargine (BASAGLAR KWIKPEN) 100 UNIT/ML injection pen Inject 13 Units into the skin nightly  Patient not taking: Reported on 9/12/2023 5/16/23   Aliya Barone APRN - CNP   Insulin Pen Needle ([employer] PEN NEEDLES 29G) 29G X 12MM MISC 1 each by Does not apply route daily 5/16/23   Aliya Barone APRN - CNP   Handicap Placard MISC by Does not apply route Expires 5 year from date of prescription 1/19/23   Aliya Barone APRN - CNP   acetaminophen (TYLENOL) 500 MG tablet Take 1 tablet by mouth every 6 hours as needed for Pain As needed    Provider, Historical, MD       Allergies:  Metformin and related    Social History:   TOBACCO:   reports that he quit smoking about 2 years ago. His smoking use included cigarettes. He started smoking about 44 years ago. He has a 42.0 pack-year smoking history. He has never used smokeless tobacco.  ETOH:   reports that he does not currently use alcohol.  OCCUPATION:   lives with wife    Family History:       Problem Relation Age of Onset    Diabetes Father     Stroke Father     Diabetes Brother     Diabetes Brother     Alcohol Abuse Paternal Grandfather     Heart Attack Paternal Grandfather        REVIEW OF SYSTEMS:  GENERAL: Positive for fatigue, weakness, no fever  SKN: No lesions or rashes.  HEAD: No headaches or recent injury  EYES: No acute changes in vision, no diplopia or blurred vision, no drainage  EARS: No hearing loss, no tinnitus, no drainage  NOSE/THROAT: No rhinorrhea or pharyngitis, no nasal drainage  NECK: No lumps or unusual neck  24  2317 24    134*   K 4.0 4.6   CL 97* 98   CO2 25 25   BUN 23 29*   CREATININE 1.4* 1.7*   GLUCOSE 477* 338*     Hepatic:   Recent Labs     24   AST 9   ALT 8*   BILITOT 0.7   ALKPHOS 63     Troponin: No results for input(s): \"TROPONINI\" in the last 72 hours.  BNP: No results for input(s): \"BNP\" in the last 72 hours.  Lipids: No results for input(s): \"CHOL\", \"HDL\" in the last 72 hours.    Invalid input(s): \"LDLCALCU\"  ABGs: No results found for: \"PHART\", \"PO2ART\", \"VVH2IME\"  INR:   Recent Labs     24   INR 0.93     -----------------------------------------------------------------  PA/lat CXR: 24 no consolidation or effusion.   EK24 NSR HR 90  QTc 450    Assessment and Plan   Acute ischemic CVA: 24 complaint of right sided weakness and paresthesia. NIH=1 Patient not a candidate for TNKase.  History of CVA . CTH-remote left basilar ganglia lacunar infarcts.  No hemorrhage. CTA HEAD/NECK-high-grade focal atherosclerotic stenosis of proximal right and left internal carotid arteries.  No aneurysm or dissection. TAM=81% and LICA=75%. High grade stenosis or occlusion of right opercular frontal M2 and/or the anterior trunk of right M2 origin. Extensive collaterals indicate that these high-grade focal stenosis or M2 origin short occlusions are chronic.  No sign of aneurysm.  Neurology was contacted by ER provider.  Recommendations to allow for permissive hyper tension.  MRI in a.m. hemoglobin A1c and lipid panel pending.  Echocardiogram pending. GCS/Neuro/NHISS per protocol. Notify provider if SBP <100.   Diabetes mellitus type 2, uncontrolled hemoglobin A1c is pending.  Positive for polyuria and polydipsia complaints.  Patient has not been taking his Lantus.  Patient does no home monitoring.  No hyperglycemic crisis at time of admission.  Lantus was restarted.  Will monitor with Accu-Cheks and sliding scale insulin.  PAF: [id number], Toprol XL was

## 2024-02-07 NOTE — PROGRESS NOTES
[facility]  INPATIENT OCCUPATIONAL THERAPY  [facility] NE[facility]ENC[facility] 4A  EVALUATION    Time:    Time In: 1050  Time Out: 1131  Timed Code Treatment Minutes: 31 Minutes  Minutes: 41          Date: 2024  Patient Name: Len Slater,   Gender: male      MRN: [id number]  : 1960  (63 y.o.)  Referring Practitioner: Zulema Keita, APRN - CNP  Diagnosis: Coagulopathy  Additional Pertinent Hx: Len Slater is a 63 year old gentlemen presented to [facility] ER 24 by private car from home with chief complaint of right sided weakness and paresthesia. Patient has a past medical history significant for former smoker, ASHD s/p CABG , Essential HPTN, HFimpEF-ECHO  LVEF 35-40% (LVEF 25-30%), PAF-Eliquis, DMT2, CVA, Urinary Retention, Former AUD (quit drinking 1 year ago). Acute ischemic CVA: 24 complaint of right sided weakness and paresthesia. NIH=1 Patient not a candidate for TNKase.  History of CVA . CTH-remote left basilar ganglia lacunar infarcts.  No hemorrhage. MRI pending    Restrictions/Precautions:  Restrictions/Precautions: General Precautions, Fall Risk    Subjective  Chart Reviewed: Yes, Orders, Progress Notes  Patient assessed for rehabilitation services?: Yes    Subjective: RN approved OT evaluation. Pt was resting in bed upon therapist arrival. Pt reports feeling more off balance compared to baseline.    Pain: Pt reported pain in R shoulder with end range movement. Did not quantify.    Vitals: Heart Rate: 90s-100s throughout  Pt's blood sugar in 60s, received orange juice from tech.    Social/Functional History:  Lives With: Spouse (and 2 kids (14 and 22))  Type of Home: House  Home Layout: One level  Home Access: Stairs to enter with rails  Entrance Stairs - Number of Steps: 3 AMOR  Entrance Stairs - Rails: Both  Home Equipment: Cane, quad, Walker, rolling   Bathroom Shower/Tub: Tub/Shower unit, Shower chair with back  Bathroom Toilet: Standard       ADL

## 2024-02-07 NOTE — ED NOTES
Upon first contact, pt noticeably shaking. Pt reports this shaking is new and is due to being nervous. Pt provided warm blanket at this time. Telemetry in place.

## 2024-02-07 NOTE — CARE COORDINATION
Case Management Assessment  Initial Evaluation    Date/Time of Evaluation: 2/7/2024 12:14 PM  Assessment Completed by: Mary Rowland RN    If patient is discharged prior to next notation, then this note serves as note for discharge by case management.    Patient Name: Len Slater                   YOB: 1960  Diagnosis: Coagulopathy (HCC) [D68.9]  Elevated troponin [R79.89]  Acute CVA (cerebrovascular accident) (HCC) [I63.9]  Uncontrolled type 2 diabetes mellitus with hyperglycemia (HCC) [E11.65]  Chronic kidney disease, unspecified CKD stage [N18.9]  Right sided numbness [R20.0]                   Date / Time: 2/6/2024  6:39 PM  Location: [address] [place] [address]     Patient Admission Status: Inpatient   Readmission Risk Low 0-14, Mod 15-19), High > 20: Readmission Risk Score: 15.3    Current PCP: Aliya Barone APRN - CNP  PCP verified by CM? Yes    Chart Reviewed: Yes      History Provided by: Patient  Patient Orientation: Alert and Oriented    Patient Cognition: Alert    Hospitalization in the last 30 days (Readmission):  No    If yes, Readmission Assessment in CM Navigator will be completed.    Advance Directives:      Code Status: Full Code   Patient's Primary Decision Maker is: Patient Declined (Legal Next of Kin Remains as Decision Maker)      Discharge Planning:    Patient lives with: Spouse/Significant Other Type of Home: House  Primary Care Giver: Self  Patient Support Systems include: Spouse/Significant Other   Current Financial resources: Other (Comment) (commercial)  Current community resources: None  Current services prior to admission: None            Current DME:              Type of Home Care services:  None    ADLS  Prior functional level: Independent in ADLs/IADLs  Current functional level: Independent in ADLs/IADLs    Family can provide assistance at DC: Yes  Would you like Case Management to discuss the discharge plan with any other family members/significant others, and if so,

## 2024-02-07 NOTE — PLAN OF CARE
Problem: Discharge Planning  Goal: Discharge to home or other facility with appropriate resources  Outcome: Progressing  Flowsheets (Taken 2/7/2024 0140)  Discharge to home or other facility with appropriate resources:   Identify barriers to discharge with patient and caregiver   Arrange for needed discharge resources and transportation as appropriate   Identify discharge learning needs (meds, wound care, etc)   Refer to discharge planning if patient needs post-hospital services based on physician order or complex needs related to functional status, cognitive ability or social support system     Problem: Safety - Adult  Goal: Free from fall injury  Outcome: Progressing  Flowsheets (Taken 2/7/2024 0140)  Free From Fall Injury: Instruct family/caregiver on patient safety     Problem: ABCDS Injury Assessment  Goal: Absence of physical injury  Outcome: Progressing  Flowsheets (Taken 2/7/2024 0140)  Absence of Physical Injury: Implement safety measures based on patient assessment     Problem: Neurosensory - Adult  Goal: Achieves stable or improved neurological status  Outcome: Progressing  Flowsheets (Taken 2/7/2024 0140)  Achieves stable or improved neurological status:   Assess for and report changes in neurological status   Maintain blood pressure and fluid volume within ordered parameters to optimize cerebral perfusion and minimize risk of hemorrhage   Monitor temperature, glucose, and sodium. Initiate appropriate interventions as ordered     Problem: Neurosensory - Adult  Goal: Absence of seizures  Outcome: Progressing  Flowsheets (Taken 2/7/2024 0140)  Absence of seizures:   Monitor for seizure activity.  If seizure occurs, document type and location of movements and any associated apnea   If seizure occurs, turn head to side and suction secretions as needed     Problem: Neurosensory - Adult  Goal: Remains free of injury related to seizures activity  Outcome: Progressing  Flowsheets (Taken 2/7/2024 0140)  Remains

## 2024-02-07 NOTE — PROGRESS NOTES
[facility]  INPATIENT PHYSICAL THERAPY  EVALUATION  [facility] 4A - 4A-16/016-A    Time In: 1352  Time Out: 1416  Timed Code Treatment Minutes: 10 Minutes  Minutes: 24          Date: 2024  Patient Name: Len Slater,  Gender:  male        MRN: [id number]  : 1960  (63 y.o.)      Referring Practitioner: Zulema Keita, BLACK - CNP  Diagnosis: Acute CVA (cerebrovascular accident)  Additional Pertinent Hx: Len Slater is a 63 year old gentlemen presented to [facility] ER 24 by private car from home with chief complaint of right sided weakness and paresthesia. Patient was walking from bathroom and commented that his right side did not feel right. LTK 1544. He identifies \"it was not like when I had my stroke when my entire side stopped working\".  CODE STROKE was called NIH=1. CT imaging completed and negative for acute findings.  MRI pending.  CTA shows 81% right internal carotid artery origin stenosis, Left Carotid: 75% atherosclerotic focal stenosis.  Pt to have cerebral angiogram with stent placement .     Restrictions/Precautions:  Restrictions/Precautions: General Precautions, Fall Risk    Subjective:  Chart Reviewed: Yes  Patient assessed for rehabilitation services?: Yes  Family / Caregiver Present: No  Subjective: RN approved session, pt is supine in bed and agreeable to PT.    General:  Overall Orientation Status: Within Functional Limits  Vision: Within Functional Limits  Hearing: Within functional limits       Pain: denies    Vitals: Vitals not assessed per clinical judgement, see nursing flowsheet    Social/Functional History:    Lives With: Spouse (and 2 kids (14 and 22))  Type of Home: House  Home Layout: One level  Home Access: Stairs to enter with rails  Entrance Stairs - Number of Steps: 3 AMOR  Entrance Stairs - Rails: Both  Home Equipment: Cane, quad, Walker, rolling     Bathroom Shower/Tub: Tub/Shower unit, Shower chair with back  Bathroom Toilet:

## 2024-02-08 ENCOUNTER — APPOINTMENT (OUTPATIENT)
Age: 64
DRG: 057 | End: 2024-02-08
Payer: COMMERCIAL

## 2024-02-08 LAB
ABO: NORMAL
ANION GAP SERPL CALC-SCNC: 9 MEQ/L (ref 8–16)
ANTIBODY SCREEN: NORMAL
APTT PPP: 35 SECONDS (ref 22–38)
BUN SERPL-MCNC: 22 MG/DL (ref 7–22)
CALCIUM SERPL-MCNC: 8.9 MG/DL (ref 8.5–10.5)
CHLORIDE SERPL-SCNC: 103 MEQ/L (ref 98–111)
CO2 SERPL-SCNC: 24 MEQ/L (ref 23–33)
CREAT SERPL-MCNC: 1.3 MG/DL (ref 0.4–1.2)
DEPRECATED RDW RBC AUTO: 40.1 FL (ref 35–45)
ECHO BSA: 1.85 M2
EKG ATRIAL RATE: 92 BPM
EKG P AXIS: 75 DEGREES
EKG P-R INTERVAL: 152 MS
EKG Q-T INTERVAL: 380 MS
EKG QRS DURATION: 82 MS
EKG QTC CALCULATION (BAZETT): 469 MS
EKG R AXIS: 84 DEGREES
EKG T AXIS: 79 DEGREES
EKG VENTRICULAR RATE: 92 BPM
ERYTHROCYTE [DISTWIDTH] IN BLOOD BY AUTOMATED COUNT: 13 % (ref 11.5–14.5)
GFR SERPL CREATININE-BSD FRML MDRD: > 60 ML/MIN/1.73M2
GLUCOSE BLD STRIP.AUTO-MCNC: 131 MG/DL (ref 70–108)
GLUCOSE BLD STRIP.AUTO-MCNC: 143 MG/DL (ref 70–108)
GLUCOSE BLD STRIP.AUTO-MCNC: 144 MG/DL (ref 70–108)
GLUCOSE BLD STRIP.AUTO-MCNC: 278 MG/DL (ref 70–108)
GLUCOSE BLD STRIP.AUTO-MCNC: 77 MG/DL (ref 70–108)
GLUCOSE BLD STRIP.AUTO-MCNC: 92 MG/DL (ref 70–108)
GLUCOSE SERPL-MCNC: 157 MG/DL (ref 70–108)
HCT VFR BLD AUTO: 40.6 % (ref 42–52)
HGB BLD-MCNC: 12.8 GM/DL (ref 14–18)
INR PPP: 1.05 (ref 0.85–1.13)
MCH RBC QN AUTO: 26.7 PG (ref 26–33)
MCHC RBC AUTO-ENTMCNC: 31.5 GM/DL (ref 32.2–35.5)
MCV RBC AUTO: 84.8 FL (ref 80–94)
P2Y12 ASSAY: 20 PRU (ref 180–376)
PLATELET # BLD AUTO: 203 THOU/MM3 (ref 130–400)
PMV BLD AUTO: 10.4 FL (ref 9.4–12.4)
POTASSIUM SERPL-SCNC: 4.4 MEQ/L (ref 3.5–5.2)
RBC # BLD AUTO: 4.79 MILL/MM3 (ref 4.7–6.1)
RH FACTOR: NORMAL
SODIUM SERPL-SCNC: 136 MEQ/L (ref 135–145)
WBC # BLD AUTO: 5.6 THOU/MM3 (ref 4.8–10.8)

## 2024-02-08 PROCEDURE — 6360000002 HC RX W HCPCS: Performed by: PSYCHIATRY & NEUROLOGY

## 2024-02-08 PROCEDURE — 36224 PLACE CATH CAROTD ART: CPT

## 2024-02-08 PROCEDURE — 36227 PLACE CATH XTRNL CAROTID: CPT

## 2024-02-08 PROCEDURE — B31GYZZ FLUOROSCOPY OF BILATERAL VERTEBRAL ARTERIES USING OTHER CONTRAST: ICD-10-PCS | Performed by: PSYCHIATRY & NEUROLOGY

## 2024-02-08 PROCEDURE — 36226 PLACE CATH VERTEBRAL ART: CPT | Performed by: PSYCHIATRY & NEUROLOGY

## 2024-02-08 PROCEDURE — 36223 PLACE CATH CAROTID/INOM ART: CPT | Performed by: PSYCHIATRY & NEUROLOGY

## 2024-02-08 PROCEDURE — C1769 GUIDE WIRE: HCPCS

## 2024-02-08 PROCEDURE — 2580000003 HC RX 258: Performed by: NURSE PRACTITIONER

## 2024-02-08 PROCEDURE — C1894 INTRO/SHEATH, NON-LASER: HCPCS

## 2024-02-08 PROCEDURE — 2060000000 HC ICU INTERMEDIATE R&B

## 2024-02-08 PROCEDURE — 93005 ELECTROCARDIOGRAM TRACING: CPT

## 2024-02-08 PROCEDURE — 2709999900 HC NON-CHARGEABLE SUPPLY

## 2024-02-08 PROCEDURE — 6370000000 HC RX 637 (ALT 250 FOR IP)

## 2024-02-08 PROCEDURE — C1760 CLOSURE DEV, VASC: HCPCS

## 2024-02-08 PROCEDURE — B315YZZ FLUOROSCOPY OF BILATERAL COMMON CAROTID ARTERIES USING OTHER CONTRAST: ICD-10-PCS | Performed by: PSYCHIATRY & NEUROLOGY

## 2024-02-08 PROCEDURE — 97535 SELF CARE MNGMENT TRAINING: CPT

## 2024-02-08 PROCEDURE — 97530 THERAPEUTIC ACTIVITIES: CPT

## 2024-02-08 PROCEDURE — 36226 PLACE CATH VERTEBRAL ART: CPT

## 2024-02-08 PROCEDURE — 85576 BLOOD PLATELET AGGREGATION: CPT

## 2024-02-08 PROCEDURE — 80048 BASIC METABOLIC PNL TOTAL CA: CPT

## 2024-02-08 PROCEDURE — 85730 THROMBOPLASTIN TIME PARTIAL: CPT

## 2024-02-08 PROCEDURE — 86850 RBC ANTIBODY SCREEN: CPT

## 2024-02-08 PROCEDURE — 85027 COMPLETE CBC AUTOMATED: CPT

## 2024-02-08 PROCEDURE — 86901 BLOOD TYPING SEROLOGIC RH(D): CPT

## 2024-02-08 PROCEDURE — B31CYZZ FLUOROSCOPY OF BILATERAL EXTERNAL CAROTID ARTERIES USING OTHER CONTRAST: ICD-10-PCS | Performed by: PSYCHIATRY & NEUROLOGY

## 2024-02-08 PROCEDURE — 2580000003 HC RX 258

## 2024-02-08 PROCEDURE — 85610 PROTHROMBIN TIME: CPT

## 2024-02-08 PROCEDURE — 36227 PLACE CATH XTRNL CAROTID: CPT | Performed by: PSYCHIATRY & NEUROLOGY

## 2024-02-08 PROCEDURE — 86900 BLOOD TYPING SEROLOGIC ABO: CPT

## 2024-02-08 PROCEDURE — 6370000000 HC RX 637 (ALT 250 FOR IP): Performed by: NURSE PRACTITIONER

## 2024-02-08 PROCEDURE — 82948 REAGENT STRIP/BLOOD GLUCOSE: CPT

## 2024-02-08 PROCEDURE — B317YZZ FLUOROSCOPY OF LEFT INTERNAL CAROTID ARTERY USING OTHER CONTRAST: ICD-10-PCS | Performed by: PSYCHIATRY & NEUROLOGY

## 2024-02-08 PROCEDURE — 99232 SBSQ HOSP IP/OBS MODERATE 35: CPT

## 2024-02-08 PROCEDURE — 93010 ELECTROCARDIOGRAM REPORT: CPT | Performed by: INTERNAL MEDICINE

## 2024-02-08 PROCEDURE — 36221 PLACE CATH THORACIC AORTA: CPT

## 2024-02-08 PROCEDURE — 36224 PLACE CATH CAROTD ART: CPT | Performed by: PSYCHIATRY & NEUROLOGY

## 2024-02-08 PROCEDURE — 6360000004 HC RX CONTRAST MEDICATION: Performed by: PSYCHIATRY & NEUROLOGY

## 2024-02-08 PROCEDURE — 36415 COLL VENOUS BLD VENIPUNCTURE: CPT

## 2024-02-08 RX ORDER — SODIUM CHLORIDE 0.9 % (FLUSH) 0.9 %
5-40 SYRINGE (ML) INJECTION PRN
Status: DISCONTINUED | OUTPATIENT
Start: 2024-02-08 | End: 2024-02-09 | Stop reason: HOSPADM

## 2024-02-08 RX ORDER — HEPARIN SODIUM 1000 [USP'U]/ML
INJECTION, SOLUTION INTRAVENOUS; SUBCUTANEOUS PRN
Status: COMPLETED | OUTPATIENT
Start: 2024-02-08 | End: 2024-02-08

## 2024-02-08 RX ORDER — SODIUM CHLORIDE 9 MG/ML
INJECTION, SOLUTION INTRAVENOUS PRN
Status: DISCONTINUED | OUTPATIENT
Start: 2024-02-08 | End: 2024-02-09 | Stop reason: HOSPADM

## 2024-02-08 RX ORDER — FENTANYL CITRATE 50 UG/ML
INJECTION, SOLUTION INTRAMUSCULAR; INTRAVENOUS PRN
Status: COMPLETED | OUTPATIENT
Start: 2024-02-08 | End: 2024-02-08

## 2024-02-08 RX ORDER — MIDAZOLAM HYDROCHLORIDE 1 MG/ML
INJECTION INTRAMUSCULAR; INTRAVENOUS PRN
Status: COMPLETED | OUTPATIENT
Start: 2024-02-08 | End: 2024-02-08

## 2024-02-08 RX ORDER — ACETAMINOPHEN 325 MG/1
650 TABLET ORAL EVERY 4 HOURS PRN
Status: DISCONTINUED | OUTPATIENT
Start: 2024-02-08 | End: 2024-02-09 | Stop reason: HOSPADM

## 2024-02-08 RX ORDER — ATORVASTATIN CALCIUM 40 MG/1
40 TABLET, FILM COATED ORAL NIGHTLY
Status: DISCONTINUED | OUTPATIENT
Start: 2024-02-08 | End: 2024-02-09 | Stop reason: HOSPADM

## 2024-02-08 RX ORDER — SODIUM CHLORIDE 0.9 % (FLUSH) 0.9 %
5-40 SYRINGE (ML) INJECTION EVERY 12 HOURS SCHEDULED
Status: DISCONTINUED | OUTPATIENT
Start: 2024-02-08 | End: 2024-02-09 | Stop reason: HOSPADM

## 2024-02-08 RX ADMIN — SODIUM CHLORIDE, PRESERVATIVE FREE 10 ML: 5 INJECTION INTRAVENOUS at 19:28

## 2024-02-08 RX ADMIN — IOPAMIDOL 91 ML: 612 INJECTION, SOLUTION INTRAVENOUS at 11:25

## 2024-02-08 RX ADMIN — ASPIRIN 81 MG 81 MG: 81 TABLET ORAL at 09:27

## 2024-02-08 RX ADMIN — SODIUM CHLORIDE, PRESERVATIVE FREE 10 ML: 5 INJECTION INTRAVENOUS at 09:28

## 2024-02-08 RX ADMIN — FENTANYL CITRATE 50 MCG: 50 INJECTION, SOLUTION INTRAMUSCULAR; INTRAVENOUS at 10:27

## 2024-02-08 RX ADMIN — CLOPIDOGREL BISULFATE 75 MG: 75 TABLET ORAL at 09:27

## 2024-02-08 RX ADMIN — APIXABAN 5 MG: 5 TABLET, FILM COATED ORAL at 19:26

## 2024-02-08 RX ADMIN — HEPARIN SODIUM 5000 UNITS: 1000 INJECTION INTRAVENOUS; SUBCUTANEOUS at 10:31

## 2024-02-08 RX ADMIN — ATORVASTATIN CALCIUM 40 MG: 40 TABLET, FILM COATED ORAL at 19:27

## 2024-02-08 RX ADMIN — INSULIN GLARGINE 10 UNITS: 100 INJECTION, SOLUTION SUBCUTANEOUS at 19:27

## 2024-02-08 RX ADMIN — APIXABAN 5 MG: 5 TABLET, FILM COATED ORAL at 09:27

## 2024-02-08 RX ADMIN — INSULIN LISPRO 8 UNITS: 100 INJECTION, SOLUTION INTRAVENOUS; SUBCUTANEOUS at 19:27

## 2024-02-08 RX ADMIN — MIDAZOLAM 0.5 MG: 1 INJECTION INTRAMUSCULAR; INTRAVENOUS at 10:32

## 2024-02-08 ASSESSMENT — ENCOUNTER SYMPTOMS
NAUSEA: 0
VOMITING: 0
RHINORRHEA: 0
COUGH: 0
ABDOMINAL PAIN: 0
SORE THROAT: 0
SHORTNESS OF BREATH: 0

## 2024-02-08 ASSESSMENT — PAIN SCALES - GENERAL
PAINLEVEL_OUTOF10: 0

## 2024-02-08 ASSESSMENT — PAIN SCALES - WONG BAKER
WONGBAKER_NUMERICALRESPONSE: 0

## 2024-02-08 NOTE — CARE COORDINATION
2/8/24, 2:00 PM EST    DISCHARGE ON GOING EVALUATION    [facility] day: 2  Location: -16/016-A Reason for admit: Coagulopathy (HCC) [D68.9]  Elevated troponin [R79.89]  Acute CVA (cerebrovascular accident) (HCC) [I63.9]  Uncontrolled type 2 diabetes mellitus with hyperglycemia (HCC) [E11.65]  Chronic kidney disease, unspecified CKD stage [N18.9]  Right sided numbness [R20.0]   Procedure:   2/6 CT Head WO Contrast Chronic involutional volume loss with remote left basal ganglia lacunar   infarcts which have progressed when compared to the previous exam.   ASPECTS score 10, NORMAL      2/6 CXR Normal heart size with prominent left ventricle.   The pulmonary vasculature is prominent. No pulmonary edema.      2/6 CTA Neck W WO Contrast High-grade focal atherosclerotic stenosis of proximal right and left   internal carotid arteries.   No aneurysm or dissection      2/6 CTA Head W WO Contrast There is high-grade stenosis or occlusion of right opercular frontal M2   and/or the anterior superior trunk of right M2 origin.   Extensive collaterals indicate that these high-grade focal stenoses or M2   origin short occlusions are chronic.   Multiplecortical collaterals are present.   No signs of aneurysm.   Multiple large draining veins are present in the posterior right   parieto-occipital cortex draining toward the falx and peripheral dura, of   uncertain origin, likely filled by arterial cortical collateral   circulation, not as well visualized without DSA.      2/7 ECHO EF 40-45%    2/8 . Six vessel diagnostic cerebral angiogram     Barriers to Discharge: Telemetry, diabetes management, Neurology following, PT/OT, Eliquis, Asa, Zofran prn, electrolyte replacement protocols.   PCP: Aliya Barone APRN - CNP  Readmission Risk Score: 15.3%  Patient Goals/Plan/Treatment Preferences: Plans return home with spouse. Denies needs. Will follow.

## 2024-02-08 NOTE — PROGRESS NOTES
[facility]  [facility] 4A  Occupational Therapy  Daily Note  Time:   Time In: 818  Time Out: 841  Timed Code Treatment Minutes: 23 Minutes  Minutes: 23          Date: 2024  Patient Name: Len Slater,   Gender: male      Room: [facility]16/016-A  MRN: [id number]  : 1960  (63 y.o.)  Referring Practitioner: Zulema Keita, BLACK - CNP  Diagnosis: Coagulopathy  Additional Pertinent Hx: Len Slater is a 63 year old gentlemen presented to [facility] ER 24 by private car from home with chief complaint of right sided weakness and paresthesia. Patient has a past medical history significant for former smoker, ASHD s/p CABG , Essential HPTN, HFimpEF-ECHO  LVEF 35-40% (LVEF 25-30%), PAF-Eliquis, DMT2, CVA, Urinary Retention, Former AUD (quit drinking 1 year ago). Acute ischemic CVA: 24 complaint of right sided weakness and paresthesia. NIH=1 Patient not a candidate for TNKase.  History of CVA . CTH-remote left basilar ganglia lacunar infarcts.  No hemorrhage. MRI pending    Restrictions/Precautions:  Restrictions/Precautions: General Precautions, Fall Risk     SUBJECTIVE: Pt laying in bed upon arrival, pt agreeable to OT session, pt scheduled for angiogram this date, and also slightly agitated during session.     PAIN: 0/10:     Vitals: Nurse checked vitals prior to session    COGNITION: Slow Processing and Impaired Memory    ADL:   Footwear Management: Stand By Assistance.  With pt donning socks, with pt educated on importance of wearing gripper socks .    BALANCE:  Standing Balance: Contact Guard Assistance. With quad cane, with pt noted to have slight posterior lean with standing, and shakiness, with pt given vc's to place toes down and stand upright     BED MOBILITY:  Supine to Sit: Stand By Assistance with extended time needed, and pt refusing help to get to the EOB    TRANSFERS:  Sit to Stand:  Minimal Assistance. From the EOB  Stand to Sit: Contact Guard  precautions in place.

## 2024-02-08 NOTE — PROCEDURES
PREOPERATIVE DIAGNOSIS: TIA/right-sided weakness/dural AV fistula    POSTOPERATIVE DIAGNOSIS: Same    SURGEONS:  Adrienne Hoffmann MD    ANESTHESIA: Lidocaine 1% for local anesthesia. Versed/Fentanyl administered IV in divided doses for moderate sedation/analgesia    MEDICATIONS: Heparin 4000 units/liter was used for saline flushes    ESTIMATED BLOOD LOSS: 10 cc    COMPLICATIONS: None    HEMOSTASIS: 6 Greenlandic Angioseal and the arteriotomy site was covered with a sterile dressing    PROCEDURES PERFORMED:    1. Six vessel diagnostic cerebral angiogram    CATHETERIZATION OF THE FOLLOWING VESSELS:    1. Right common carotid artery  2. Right internal carotid artery  3. Right external carotid artery  4. Right subclavian artery  5. Right vertebral artery  6. Left common carotid artery  7. Left external carotid artery  8. Left vertebral artery    ANGIOGRAPHY AND INTERPRETATION OF THE FOLLOWING IMAGES:    1. Right common carotid artery - cervical station - lateral and oblique angiograms  2. Right internal carotid artery - cranial station - AP and lateral angiograms  3. Right internal carotid artery - cranial station - Oblique and lateral angiograms  4. Right external carotid artery - cranial station - AP and lateral angiograms  5. Right vertebral artery - cranial station - AP and lateral angiograms  6. Right common carotid artery - cervical station - lateral and oblique angiograms  7. Left common carotid artery - cranial station - AP, Oblique and lateral angiograms  8. Left external carotid artery - cranial station - AP and lateral angiograms  9. Left vertebral artery - cranial station - AP and lateral angiogams  10. Limited right common femoral artery - AP angiogram    INDICATIONS: Mr. Etienne is 63-year-old right-handed man admitted with a chief complaint of transient right-sided weakness and was found to have severe stenosis of the bilateral internal carotid arteries based on the CT angiogram.  On my review to the CT angiogram

## 2024-02-08 NOTE — PROGRESS NOTES
100 Verify consent, H&P, and/or immediate pre-procedure note completed  1010 Patient received in cath lab for procedure family taken to waiting room.  (Neuro assessments suspended while in procedure, vital signs, and pulse oximeter every 5 minutes documented in flow sheets  1015 Pre-procedure peripheral pulse,  right pedal 1, right posterior tibial 1, left pedal 1, left posterior tibial 1  1020 Patient prepped for procedure  1022 Procedure started with Dr. Hoffmann.Timeout performed and the following information was verified: correct patient, correct procedure, correct procedure site, correct position, correct laterality (if applicable), procedure site marked and visible (if applicable), and consents verified.  Allergies reviewed.  Pertinent diagnostic and radiologic results present and relevant images available (if applicable).  All special equipment or special requirements available as applicable. Prophylactic antibiotics given as applicable.  Fire risk safety assessment completed, shared with team and appropriate interventions implemented.  1028 Lidocaine time 8mL to to right groin   1029 Access obtained at right femoral artery  via 2 sticks, 6 Fr sheath advanced  1033 Angiogram of right Vert.mult. views/ images taken   1040 Angio of right ICA mult views/ images taken   1046 Angio of left Vert mult. Views / images taken   1052 Angio of left ICA. Mult. Views / images taken   1057 Catheter removed   1059 Sheath-removed, intact, Angioseal and manual pressure  1100 Procedure completed; patient tolerated well.  1105 Femoral site; area soft to touch with no bleeding noted. Hemostasis achieved.  1106 Post-procedure peripheral pulse,  right pedal 1, right posterior tibial 1, left pedal 1, left posterior tibial 1  1109 Femoral dressing remains dry and intact with area soft. Neuro assessment is unchanged  Last set of vitals with O2 99 Eliza Score10  1110 Case end time  1111 Patient on bed, patient exits procedural

## 2024-02-08 NOTE — PLAN OF CARE
Interventional neurology post procedure:  Patient underwent diagnostic cerebral angiogram today at 10am with Dr. 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 Will d/c Plavix  Vital signs and wound checks per order set   Post procedure instructions:  Wound Care:    Keep Incision Clean and Dry   You may shower daily, but do not soak incision. Pat dry after showering.   No tub baths, soaking, swimming for 1 week after angiogram.   You do not need to cover the incision. Mild to moderate bruising and tenderness to the site is expected and may last up to 1-2 weeks after your procedure.     For groin closures: A closure device was placed at the catheter insertion site. This is MRI compatible. Remove the dressing 24 hours after your procedure.     If your access site is bleeding, apply firm pressure for 10 minutes. Reinforce dressing rather than removing and checking frequently. If continues to bleed through the dressing after 1 hour, contact your neurosurgeon's office.     Anticipatory Education:    PAIN MED W/ Acetaminophen (Tylenol)  --IF a prescription for pain medicine has been sent home with you:  --Narcotic pain medication may cause constipation. Be sure to take stool softeners or laxatives while you are on narcotic pain medication.   --Do not drive after taking prescription pain medicine.     If this medicine is too strong, or no longer necessary, or we did NOT recommend/prescribe oral narcotics, you may take:   - Tylenol Extra-strength/Acetaminophen, 2 tablets every 4-6 hours as needed for mild pain. DO NOT TAKE MORE THAN 4000MG PER DAY from combined sources. NOTE: Remember to eat when taking pain medicines in order to

## 2024-02-08 NOTE — H&P
Left Carotid Siphon: Mild calcified plaque. Left Anterior Cerebral Artery: A1 and A2 segments are unremarkable. There is peripheral cortical enhancement. Left Middle Cerebral Artery: M1 and M2 segments are unremarkable. There is peripheral cortical enhancement. Left Posterior Cerebral Artery: P1 and P2 segments are unremarkable. There is peripheral cortical enhancement. Basilar Artery: Unremarkable Venous drainage: No signs of sagittal sinus thrombosis. Multiple large draining cortical veins are located in the posterior right occipital cortex of uncertain etiology.     Impression: There is high-grade stenosis or occlusion of right opercular frontal M2 and/or the anterior superior trunk of right M2 origin. Extensive collaterals indicate that these high-grade focal stenoses or M2 origin short occlusions are chronic. Multiplecortical collaterals are present. No signs of aneurysm. Multiple large draining veins are present in the posterior right parieto-occipital cortex draining toward the falx and peripheral dura, of uncertain origin, likely filled by arterial cortical collateral circulation, not as well visualized without DSA. This document has been electronically signed by: Juan Jones MD on 02/06/2024 08:09 PM All CTs at this facility use dose modulation techniques and iterative reconstructions, and/or weight-based dosing when appropriate to reduce radiation to a low as reasonably achievable. 3D Post-processing was performed on this study.    CTA NECK W WO CONTRAST (CODE STROKE)    Result Date: 2/6/2024  CTA Neck , Bolus contrast injection, 3D reconstructions and MPRs: Comparison: None Findings: Soft tissues of the neck are unremarkable Superior aorta and branch vessels are unremarkable. The right subclavian artery is obscured by venous contrast artifact. Right carotid: 81% right internal carotid artery origin stenosis (NASCET). This measurement may be exaggerated by calcium blooming artifact. Right vertebral, no  stenosis Left Carotid: 75% atherosclerotic focal stenosis at the origin of the left internal carotid (NASCET). This measurement may be exaggerated because of calcium plaque blooming artifact. Left Vertebral originates from the aortic arch. No stenosis     Impression: High-grade focal atherosclerotic stenosis of proximal right and left internal carotid arteries. No aneurysm or dissection This document has been electronically signed by: Juan Jones MD on 02/06/2024 07:55 PM All CTs at this facility use dose modulation techniques and iterative reconstructions, and/or weight-based dosing when appropriate to reduce radiation to a low as reasonably achievable. Carotid stenosis and measurements are in accordance with NASCET criteria. 3D Post-processing was performed on this study.    XR CHEST PORTABLE    Result Date: 2/6/2024  1 view chest x-ray. Comparison: 05/09/2023 Findings: No consolidation or effusion Heart size is normal with a prominent left ventricle. Pulmonary vasculature is prominent. No acute fracture. Median sternotomy wires are vertically aligned.     Impression: Normal heart size with prominent left ventricle. The pulmonary vasculature is prominent. No pulmonary edema. This document has been electronically signed by: Juan Jnoes MD on 02/06/2024 07:45 PM    CT HEAD WO CONTRAST    Result Date: 2/6/2024  ** ADDENDUM #1 ** This report was discussed with Wai Coello RN on Feb 06, 2024 19:33:00 EST. This document has been electronically signed by: Klaus Phillips on 02/06/2024 07:34 PM ** ORIGINAL REPORT ** CT Head Without Contrast: Comparison: 12/15/2022 Findings: Cortical Sulci and cisterns are prominent There are remote lacunar infarcts involving left basal ganglia, including the thalamus, and the anterior left periventricular white matter which have increased in number when compared to the previous exam. No shift in midline structures No intraparenchymal bleeding or abnormal extra axial blood fluid

## 2024-02-08 NOTE — PLAN OF CARE
Problem: Discharge Planning  Goal: Discharge to home or other facility with appropriate resources  2/8/2024 0021 by Mariann Rosas RN  Outcome: Progressing  Flowsheets (Taken 2/7/2024 2259)  Discharge to home or other facility with appropriate resources: Identify barriers to discharge with patient and caregiver     Problem: Safety - Adult  Goal: Free from fall injury  2/8/2024 0021 by Mariann Rosas RN  Outcome: Progressing     Problem: ABCDS Injury Assessment  Goal: Absence of physical injury  2/8/2024 0021 by Mariann Rosas RN  Outcome: Progressing     Problem: Neurosensory - Adult  Goal: Achieves stable or improved neurological status  2/8/2024 0021 by Mariann Rosas RN  Outcome: Progressing  Flowsheets (Taken 2/7/2024 2259)  Achieves stable or improved neurological status: Assess for and report changes in neurological status     Problem: Neurosensory - Adult  Goal: Absence of seizures  2/8/2024 0021 by Mariann Rosas RN  Outcome: Progressing  Flowsheets (Taken 2/7/2024 2259)  Absence of seizures: Monitor for seizure activity.  If seizure occurs, document type and location of movements and any associated apnea     Problem: Neurosensory - Adult  Goal: Remains free of injury related to seizures activity  2/8/2024 0021 by Mariann Rosas RN  Outcome: Progressing  Flowsheets (Taken 2/7/2024 2259)  Remains free of injury related to seizure activity: Maintain airway, patient safety  and administer oxygen as ordered     Problem: Neurosensory - Adult  Goal: Achieves maximal functionality and self care  2/8/2024 0021 by Mariann Rosas RN  Outcome: Progressing  Flowsheets (Taken 2/7/2024 2259)  Achieves maximal functionality and self care: Monitor swallowing and airway patency with patient fatigue and changes in neurological status     Problem: Respiratory - Adult  Goal: Achieves optimal ventilation and oxygenation  2/8/2024 0021 by Mariann Rosas RN  Outcome: Progressing  Flowsheets (Taken 2/7/2024 2259)  Achieves optimal ventilation

## 2024-02-08 NOTE — PROGRESS NOTES
Hospitalist Progress Note    Patient:  Len Slater    YOB: 1960  Unit/Bed:4A-16/016-A  Date of Admission: 2/6/2024    Expected Discharge: 2/8?  Disposition: Home    Assessment/Plan:    Concern for TIA/significant bilateral carotid artery stenosis: 2/6/24 complaining of right-sided weakness and paresthesias.  NIH =1.  Not candidate for TNKase.  History of CVA 2022  CT head demonstrated remote left basilar ganglia lacunar infarct.  No hemorrhage. MRI brain showed no acute infarct.  CTA head/neck showed high-grade focal atherosclerotic stenosis of proximal right/left internal carotid arteries.  No aneurysm or dissection.  R ICA is 81% and LICA equals 75%.  High-grade stenosis or occlusion of right upper ocular frontal and tubular anterior trunk of right M2 origin.  Extensive collaterals indicate these high-grade focal stenosis for M2 origin short occlusions are chronic.  Neurology following- Plan for diagnostic cerebral angiogram and possible carotid artery stenting.   Lipid panel WNL, LDL greater than 70 show per neuro, will initiate Lipitor 40 mg nightly.  Hemoglobin A1c 12%. Echo with EF 40-45%, moderately reduced LV systolic function. Akinesis of apex.  PT/OT/SLP. GCS/neuro/NHISS per protocol.    T2DM, uncontrolled: Patient has been taking Lantus at home, no hyperglycemic crisis at home regimen.  Lantus resumed. Sliding scale insulin. Hypoglycemia protocol. Accu checks. Carb controlled diet.     PAF, likely secondary hypercoagulable state: [id number], on Eliquis.  Toprol not restarted to admission to allow for permissive hypertension.    CAD s/p CABG: Two-vessel CABG in November 2022.  Continue aspirin, will resume metoprolol when able.     HFrEF: Echo 1020/23 shows EF 35-40%. Does not appear acutely decompensated.  Entresto, metoprolol and Aldactone held to allow for permissive hypertension. Strict I&Os. Daily weights. Fluid/salt restriction.     BPH: History of urinary retention,

## 2024-02-08 NOTE — PROGRESS NOTES
Patient's femoral puncture site oozing blood at this time, dressing saturated. VSS. Manual pressure and Quick Clot applied at this time.

## 2024-02-08 NOTE — ED PROVIDER NOTES
Attending Supervising Physician's Attestation Statement  I performed a history and physical examination on the patient and discussed the management with the physician Assistant. I reviewed and agree with the findings and plan as documented in the resident physician note.     This includes all diagnostic interpretations and treatment plans as written. I was present for the key portion of any procedures performed and the inclusive time noted in any critical care statement.   I have reviewed and interpreted all available lab, radiology and ekg results available at the moment.  Patient arrived as Stroke alert. NIH- 1. Right sided Paresthesia. Glucose 240. Ho Prior CVA with right sided weakness. NIH was low. Not TNK candidate.  Admitted to hospitalist.   Diagnosis, treatment and disposition plans were discussed and agreed upon by the patient and family.     This transcription was electronically signed. It was dictated by use of voice recognition software and electronically transcribed. The transcription may contain errors not detected in proofreading.       Electronically signed by Radames Ulrich DO on 2/7/24 at 10:10 PM aRdames Gaitan DO  02/07/24 0715

## 2024-02-08 NOTE — PLAN OF CARE
Care plan reviewed with patient and family.  Patient and family verbalize understanding of the plan of care and contribute to goal setting.   Problem: Discharge Planning  Goal: Discharge to home or other facility with appropriate resources  2/8/2024 1047 by Dayanna Rivera RN  Outcome: Progressing  2/8/2024 0021 by Mariann Rosas RN  Outcome: Progressing  Flowsheets (Taken 2/7/2024 2259)  Discharge to home or other facility with appropriate resources: Identify barriers to discharge with patient and caregiver     Problem: Safety - Adult  Goal: Free from fall injury  2/8/2024 1047 by Dayanna Rivera RN  Outcome: Progressing  2/8/2024 0021 by Mariann Rosas RN  Outcome: Progressing     Problem: ABCDS Injury Assessment  Goal: Absence of physical injury  2/8/2024 1047 by Dayanna Rivera RN  Outcome: Progressing  2/8/2024 0021 by Mariann Rosas RN  Outcome: Progressing     Problem: Neurosensory - Adult  Goal: Achieves stable or improved neurological status  2/8/2024 1047 by Dayanna Rivera RN  Outcome: Progressing  2/8/2024 0021 by Mariann Rosas RN  Outcome: Progressing  Flowsheets (Taken 2/7/2024 2259)  Achieves stable or improved neurological status: Assess for and report changes in neurological status  Goal: Absence of seizures  2/8/2024 1047 by Dayanna Rivera RN  Outcome: Progressing  2/8/2024 0021 by Mariann Rosas RN  Outcome: Progressing  Flowsheets (Taken 2/7/2024 2259)  Absence of seizures: Monitor for seizure activity.  If seizure occurs, document type and location of movements and any associated apnea  Goal: Remains free of injury related to seizures activity  2/8/2024 1047 by Dayanna Rivera RN  Outcome: Progressing  2/8/2024 0021 by Mariann Rosas RN  Outcome: Progressing  Flowsheets (Taken 2/7/2024 2259)  Remains free of injury related to seizure activity: Maintain airway, patient safety  and administer oxygen as ordered  Goal: Achieves maximal functionality and

## 2024-02-09 VITALS
HEIGHT: 72 IN | SYSTOLIC BLOOD PRESSURE: 112 MMHG | WEIGHT: 149.2 LBS | TEMPERATURE: 99.1 F | DIASTOLIC BLOOD PRESSURE: 56 MMHG | HEART RATE: 82 BPM | RESPIRATION RATE: 16 BRPM | OXYGEN SATURATION: 100 % | BODY MASS INDEX: 20.21 KG/M2

## 2024-02-09 LAB
ANION GAP SERPL CALC-SCNC: 10 MEQ/L (ref 8–16)
BUN SERPL-MCNC: 15 MG/DL (ref 7–22)
CALCIUM SERPL-MCNC: 8.9 MG/DL (ref 8.5–10.5)
CHLORIDE SERPL-SCNC: 105 MEQ/L (ref 98–111)
CO2 SERPL-SCNC: 23 MEQ/L (ref 23–33)
CREAT SERPL-MCNC: 1.2 MG/DL (ref 0.4–1.2)
DEPRECATED RDW RBC AUTO: 41.4 FL (ref 35–45)
ERYTHROCYTE [DISTWIDTH] IN BLOOD BY AUTOMATED COUNT: 13.1 % (ref 11.5–14.5)
GFR SERPL CREATININE-BSD FRML MDRD: > 60 ML/MIN/1.73M2
GLUCOSE BLD STRIP.AUTO-MCNC: 201 MG/DL (ref 70–108)
GLUCOSE BLD STRIP.AUTO-MCNC: 86 MG/DL (ref 70–108)
GLUCOSE BLD STRIP.AUTO-MCNC: 89 MG/DL (ref 70–108)
GLUCOSE SERPL-MCNC: 95 MG/DL (ref 70–108)
HCT VFR BLD AUTO: 39.9 % (ref 42–52)
HGB BLD-MCNC: 12.2 GM/DL (ref 14–18)
MCH RBC QN AUTO: 26.6 PG (ref 26–33)
MCHC RBC AUTO-ENTMCNC: 30.6 GM/DL (ref 32.2–35.5)
MCV RBC AUTO: 86.9 FL (ref 80–94)
PLATELET # BLD AUTO: 199 THOU/MM3 (ref 130–400)
PMV BLD AUTO: 10.4 FL (ref 9.4–12.4)
POTASSIUM SERPL-SCNC: 4.4 MEQ/L (ref 3.5–5.2)
POTASSIUM SERPL-SCNC: 4.4 MEQ/L (ref 3.5–5.2)
RBC # BLD AUTO: 4.59 MILL/MM3 (ref 4.7–6.1)
SODIUM SERPL-SCNC: 138 MEQ/L (ref 135–145)
WBC # BLD AUTO: 6.6 THOU/MM3 (ref 4.8–10.8)

## 2024-02-09 PROCEDURE — 99232 SBSQ HOSP IP/OBS MODERATE 35: CPT | Performed by: NURSE PRACTITIONER

## 2024-02-09 PROCEDURE — 2580000003 HC RX 258: Performed by: NURSE PRACTITIONER

## 2024-02-09 PROCEDURE — 97530 THERAPEUTIC ACTIVITIES: CPT

## 2024-02-09 PROCEDURE — 6370000000 HC RX 637 (ALT 250 FOR IP)

## 2024-02-09 PROCEDURE — 99239 HOSP IP/OBS DSCHRG MGMT >30: CPT

## 2024-02-09 PROCEDURE — 6370000000 HC RX 637 (ALT 250 FOR IP): Performed by: NURSE PRACTITIONER

## 2024-02-09 PROCEDURE — 2580000003 HC RX 258

## 2024-02-09 PROCEDURE — 82948 REAGENT STRIP/BLOOD GLUCOSE: CPT

## 2024-02-09 PROCEDURE — 80048 BASIC METABOLIC PNL TOTAL CA: CPT

## 2024-02-09 PROCEDURE — 97535 SELF CARE MNGMENT TRAINING: CPT

## 2024-02-09 PROCEDURE — 36415 COLL VENOUS BLD VENIPUNCTURE: CPT

## 2024-02-09 PROCEDURE — 85027 COMPLETE CBC AUTOMATED: CPT

## 2024-02-09 RX ORDER — INSULIN GLARGINE 100 [IU]/ML
13 INJECTION, SOLUTION SUBCUTANEOUS NIGHTLY
Qty: 5 ADJUSTABLE DOSE PRE-FILLED PEN SYRINGE | Refills: 3 | Status: SHIPPED | OUTPATIENT
Start: 2024-02-09

## 2024-02-09 RX ORDER — ATORVASTATIN CALCIUM 40 MG/1
40 TABLET, FILM COATED ORAL NIGHTLY
Qty: 30 TABLET | Refills: 3 | Status: SHIPPED | OUTPATIENT
Start: 2024-02-09

## 2024-02-09 RX ORDER — ASPIRIN 81 MG/1
81 TABLET, CHEWABLE ORAL DAILY
Qty: 30 TABLET | Refills: 3 | Status: SHIPPED | OUTPATIENT
Start: 2024-02-10

## 2024-02-09 RX ADMIN — SODIUM CHLORIDE, PRESERVATIVE FREE 10 ML: 5 INJECTION INTRAVENOUS at 08:55

## 2024-02-09 RX ADMIN — APIXABAN 5 MG: 5 TABLET, FILM COATED ORAL at 08:54

## 2024-02-09 RX ADMIN — SODIUM CHLORIDE, PRESERVATIVE FREE 10 ML: 5 INJECTION INTRAVENOUS at 08:54

## 2024-02-09 RX ADMIN — ASPIRIN 81 MG 81 MG: 81 TABLET ORAL at 08:54

## 2024-02-09 ASSESSMENT — ENCOUNTER SYMPTOMS
ABDOMINAL PAIN: 0
COUGH: 0
VOMITING: 0
SORE THROAT: 0
NAUSEA: 0
RHINORRHEA: 0
SHORTNESS OF BREATH: 0

## 2024-02-09 ASSESSMENT — PAIN SCALES - GENERAL
PAINLEVEL_OUTOF10: 0

## 2024-02-09 NOTE — DISCHARGE INSTRUCTIONS
Discharge Instructions from your Internal Medicine Provider:  -Resume insulin glargine 13 U nightly. Check your blood sugar in the morning before breakfast, after lunch, dinner and before bed. Record these levels and bring record to your primary care provider.    -Resume Eliquis and aspirin. Start taking lipitor 40 mg nightly.    -Hold spironolactone (aldactone) until you follow up with primary care provider.

## 2024-02-09 NOTE — CARE COORDINATION
2/9/24, 11:52 AM EST    Patient goals/plan/ treatment preferences discussed by  and .  Patient goals/plan/ treatment preferences reviewed with patient/ family.  Patient/ family verbalize understanding of discharge plan and are in agreement with goal/plan/treatment preferences.  Understanding was demonstrated using the teach back method.  AVS provided by RN at time of discharge, which includes all necessary medical information pertaining to the patients current course of illness, treatment, post-discharge goals of care, and treatment preferences.     Services At/After Discharge: None  Pt to be discharged to home with spouse. Denies needs.

## 2024-02-09 NOTE — PROGRESS NOTES
Interventional Neurology Progress Note    Date:2/9/2024       Room:[facility]/016-A  Patient Name:Len Slater     YOB: 1960     Age:63 y.o.    Chief Complaint:   Chief Complaint   Patient presents with   • right sided weakness       Subjective     Len Slater is a 63 y.o. male with a history of CAD s/p CABG 2022, DM, HTN, cardiomyopathy, urinary retention, former smoker, CVA 2022, HF - LVEF 35-40%, former alcohol use disorder, PAF - on Eliquis, who presented to [facility] ED for evaluation of right sided numbness and weakness. LKW 1745 yesterday. Patient states his stroke in 2022 also affected his right side, but he felt symptoms yesterday were more significant than normal. Initial BP in the /80. POC glucose 318. Patient on Eliquis as outpatient, therefore, deemed not a candidate for thrombolytics. Non-contrasted CTH negative for acute intracranial abnormalities, revealed remote left basal ganglia lacunar infarcts which have progressed compared to previous exam. CTA head and neck negative for acute LVO but revealed hemodynamically significant stenosis of approximately 81% R ICA and 75% L ICA, as well as high grade stenosis vs occlusion of the right opercular frontal M2 and/or anterior superior trunk of the right M2 origin with extensive collaterals, indicating that the intracranial high grade stenosis/occlusion of the R M2 is chronic. 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 On-call neuro interventionalist, Dr. Hoffmann, contacted by ED provider and recommended admission, IVF, and MRI brain WO. Today, patient states his right sided numbness/paresthesias and weakness have improved, but are still present. He denies facial droop and feels his dysarthria is at baseline following previous stroke. He denies chest pain or trouble breathing. Reports a short-lasting GI illness two to three weeks ago, but otherwise denies illness/fevers/chills. Reports

## 2024-02-09 NOTE — PROGRESS NOTES
02/09/24 0925   Encounter Summary   Encounter Overview/Reason  Initial Encounter   Service Provided For: Patient   Referral/Consult From: Rounding   Support System Spouse;Friends/neighbors;Family members   Spiritual/Emotional needs   Type Spiritual Support   Assessment/Intervention/Outcome   Assessment Coping   Intervention Sustaining Presence/Ministry of presence;Prayer (assurance of)/[place];Nurtured Hope   Outcome Comfort     Assessment:  In my encounter with the 63 yr old patient the pt's family was supportively present. While rounding the unit 4A,  I provided spiritual care to patient and their family through conversation, I also came to assess their spiritual needs present. The pt was admitted due to acute CVA cerebrovascular accident.     Interventions:  I provided, prayer, emotional support and words of comfort.  provided a listening presence and encouraged pt to share their beliefs and how they support him during their hospitalization.       Outcomes:  The patient was encouraged and didn't share any further spiritual needs at this time. The pt remains optimistic and hopeful. The pt shared that they were appreciative for the support.     Plan:  Chaplains will follow-up at a later time for assessment of any spiritual care needs present.

## 2024-02-09 NOTE — PROGRESS NOTES
Recommendations:Equipment Needed: No  Discharge Recommendations: Home with Home Health PT  Plan: Current Treatment Recommendations: Strengthening, Balance training, Gait training, Functional mobility training, Stair training, Safety education & training, Therapeutic activities, Neuromuscular re-education, Patient/Caregiver education & training, Transfer training  General Plan:  (5x N)    Education  Education:  Learners: Patient and wife  Patient Education: Plan of Care, Transfers, Gait, Up in Chair for All Meals, Verbal Exercise Instruction    Goals:  Patient Goals : to go home  Short Term Goals  Time Frame for Short Term Goals: by discharge  Short Term Goal 1: Pt to transfer supine <--> sit S to enable pt to get in/out of bed.  Short Term Goal 2: Pt to transfer sit <--> stand S for increased functional mobility.  Short Term Goal 3: Pt to ambulate >150 feet with SBQC SBA for household ambulation.  Short Term Goal 4: Pt to ascend/descend 3 steps with HR SBA for home entry.  Long Term Goals  Time Frame for Long Term Goals : NA due to short length of stay.    Following session, patient left in safe position with all fall risk precautions in place.

## 2024-02-09 NOTE — PROGRESS NOTES
Discharge teaching and instructions for diagnosis/procedure of stroke-like symptoms completed with patient using teachback method. AVS reviewed. Printed prescriptions given to patient. Patient voiced understanding regarding prescriptions, follow up appointments, and care of self at home. Discharged in a wheelchair to  home with support per family

## 2024-02-09 NOTE — PROGRESS NOTES
[facility]  [facility] NEUROSCIENCES 4A  Occupational Therapy  Daily Note  Time:   Time In: 813  Time Out: 836  Timed Code Treatment Minutes: 23 Minutes  Minutes: 23          Date: 2024  Patient Name: Len Slater,   Gender: male      Room: [facility]/016-A  MRN: [id number]  : 1960  (63 y.o.)  Referring Practitioner: Zulema Keita, BLACK - CNP  Diagnosis: Coagulopathy  Additional Pertinent Hx: Len Slater is a 63 year old gentlemen presented to [facility] ER 24 by private car from home with chief complaint of right sided weakness and paresthesia. Patient has a past medical history significant for former smoker, ASHD s/p CABG , Essential HPTN, HFimpEF-ECHO  LVEF 35-40% (LVEF 25-30%), PAF-Eliquis, DMT2, CVA, Urinary Retention, Former AUD (quit drinking 1 year ago). Acute ischemic CVA: 24 complaint of right sided weakness and paresthesia. NIH=1 Patient not a candidate for TNKase.  History of CVA . CTH-remote left basilar ganglia lacunar infarcts.  No hemorrhage. MRI pending    Restrictions/Precautions:  Restrictions/Precautions: General Precautions, Fall Risk     SUBJECTIVE: Pt laying in bed upon arrival, pt agreeable to OT session, RN gave verbal approval for session    PAIN: 0/10:     Vitals: Nurse checked vitals prior to session    COGNITION: Inattention and Impulsive    ADL:   Grooming: Stand By Assistance.  With pt standing at the sink to wash face, and complete oral care  .    BALANCE:  Standing Balance: Stand By Assistance. With pt standing at the sink with quad cane for 3-4 minutes    BED MOBILITY:  Supine to Sit: Stand By Assistance with the HOB elevated    TRANSFERS:  Sit to Stand:  Contact Guard Assistance. From the EOB  Stand to Sit: Contact Guard Assistance. To the recliner    FUNCTIONAL MOBILITY:  Assistive Device: Quad Cane  Assist Level:  Contact Guard Assistance.   Distance:  HH distances, with a slow, steady pace, and vc's for activity pacing

## 2024-02-09 NOTE — PROGRESS NOTES
Physician Progress Note      PATIENT:               JESSICA BRICEÑO  [facility] #:                  [id number]  :                       1960  ADMIT DATE:       2024 6:39 PM  DISCH DATE:        2024 12:59 PM  RESPONDING  PROVIDER #:        Amparo Nguyen PA-C          QUERY TEXT:    Pt admitted with R sided weakness. Pt noted to have carotid artery stenosis   and previous CVA. If possible, please document in progress notes and discharge   summary the relationship, if any, between R sided weakness and olga following:    The medical record reflects the following:  Risk Factors: R sided weakness  Clinical Indicators: presented with R sided weakness, concern for TIA,   neurology consult note reflects likely recrudescence of prior stroke symptoms;   found to have bilateral carotid artery stenosis  Treatment: Labs, imaging, monitoring, Neurology consult  Options provided:  -- R sided weakness due to sequelae of previous CVA  -- R sided weakness due to bilateral internal carotid artery stenosis  -- Other - I will add my own diagnosis  -- Disagree - Not applicable / Not valid  -- Disagree - Clinically unable to determine / Unknown  -- Refer to Clinical Documentation Reviewer    PROVIDER RESPONSE TEXT:    This patient has R sided weakness due to sequelae of previous CVA.    Query created by: Lizz Shay on 2024 11:55 AM      Electronically signed by:  Amparo Nguyen PA-C 2024 3:06 PM

## 2024-02-09 NOTE — DISCHARGE SUMMARY
document has been electronically signed by: Juan Jones MD on    02/06/2024 07:45 PM      CTA NECK W WO CONTRAST (CODE STROKE)   Final Result   Impression:   High-grade focal atherosclerotic stenosis of proximal right and left    internal carotid arteries.   No aneurysm or dissection      This document has been electronically signed by: Juan Jones MD on    02/06/2024 07:55 PM      All CTs at this facility use dose modulation techniques and iterative    reconstructions, and/or weight-based dosing   when appropriate to reduce radiation to a low as reasonably achievable.      Carotid stenosis and measurements are in accordance with NASCET criteria.      3D Post-processing was performed on this study.      CTA HEAD W WO CONTRAST (CODE STROKE)   Final Result   Impression:      There is high-grade stenosis or occlusion of right opercular frontal M2    and/or the anterior superior trunk of right M2 origin.   Extensive collaterals indicate that these high-grade focal stenoses or M2    origin short occlusions are chronic.   Multiplecortical collaterals are present.   No signs of aneurysm.   Multiple large draining veins are present in the posterior right    parieto-occipital cortex draining toward the falx and peripheral dura, of    uncertain origin, likely filled by arterial cortical collateral    circulation, not as well visualized without DSA.      This document has been electronically signed by: Juan Jones MD on    02/06/2024 08:09 PM      All CTs at this facility use dose modulation techniques and iterative    reconstructions, and/or weight-based dosing   when appropriate to reduce radiation to a low as reasonably achievable.      3D Post-processing was performed on this study.      CT HEAD WO CONTRAST   Final Result   Impression:      Chronic involutional volume loss with remote left basal ganglia lacunar    infarcts which have progressed when compared to the previous exam.   ASPECTS score 10, NORMAL     Regular; 4 carb choices (60 gm/meal)      Follow-up visits:   Aliya Barone APRN - CNP  [address] [facility] [id number]  [phone]    Schedule an appointment as soon as possible for a visit  In 7-10 days         Disposition: home  Condition at Discharge: Stable    Time Spent: 40 minutes    Signed:    Thank you Aliya Barone APRN - CNP for the opportunity to be involved in this patient's care.    Electronically signed by Amparo Simpson PA-C on 2/9/2024 at 1:04 PM  Discharging Hospitalist

## 2024-02-15 ENCOUNTER — TELEPHONE (OUTPATIENT)
Dept: FAMILY MEDICINE CLINIC | Age: 64
End: 2024-02-15

## 2024-02-15 NOTE — TELEPHONE ENCOUNTER
----- Message from Isael Johnson sent at 2/14/2024  3:23 PM EST -----  Subject: Hospital Follow Up    QUESTIONS  What hospital was the Patient Discharged from? [facility]   Date of Discharge? 2024-02-09  Discharge Location? Home  Reason for hospitalization as patient stated? eleanor light headed ,   (thought that he was having a stroke ) stabilized and sent home after 3  What question does the patient have, if applicable?   ---------------------------------------------------------------------------  --------------  CALL BACK INFO  What is the best way for the office to contact you? OK to leave message on   voicemail  Preferred Call Back Phone Number? [phone]  ---------------------------------------------------------------------------  --------------  SCRIPT ANSWERS  Relationship to Patient? Self

## 2024-02-19 NOTE — TELEPHONE ENCOUNTER
[facility] Transitions Initial Follow Up Call    Outreach made within 2 business days of discharge: Yes    Patient: Len Slater Patient : 1960   MRN: [id number]  Reason for Admission: There are no discharge diagnoses documented for the most recent discharge.  Discharge Date: 24       Spoke with: Len    Discharge department/facility: [facility]    TCM Interactive Patient Contact:  Was patient able to fill all prescriptions: Yes  Was patient instructed to bring all medications to the follow-up visit: Yes  Is patient taking all medications as directed in the discharge summary? Yes  Does patient understand their discharge instructions: Yes  Does patient have questions or concerns that need addressed prior to 7-14 day follow up office visit: no    Scheduled appointment with PCP within 7-14 days    Follow Up  Future Appointments   Date Time Provider Department Center   3/12/2024  2:00 PM Eleonora Saul APRN - CNP N [facility] CHF [facility] - [place]   2024  2:00 PM STR VASCULAR LAB ROOM 2 [facility] VAS LAB STR Rad/Card   2024  1:20 PM SCHEDULE, [facility] NEUROINTERVENTION [facility] NEURINT P - [place]   9/10/2024  3:00 PM Shazia Gilliam APRN - CNP N [place] [facility] - [facility] Sanchez MA

## 2024-02-26 ENCOUNTER — OFFICE VISIT (OUTPATIENT)
Dept: FAMILY MEDICINE CLINIC | Age: 64
End: 2024-02-26
Payer: COMMERCIAL

## 2024-02-26 VITALS
DIASTOLIC BLOOD PRESSURE: 78 MMHG | WEIGHT: 150.6 LBS | OXYGEN SATURATION: 97 % | SYSTOLIC BLOOD PRESSURE: 132 MMHG | BODY MASS INDEX: 20.43 KG/M2 | HEART RATE: 94 BPM | TEMPERATURE: 97.7 F | RESPIRATION RATE: 18 BRPM

## 2024-02-26 DIAGNOSIS — E11.65 UNCONTROLLED TYPE 2 DIABETES MELLITUS WITH HYPERGLYCEMIA (HCC): ICD-10-CM

## 2024-02-26 DIAGNOSIS — I10 ESSENTIAL HYPERTENSION: ICD-10-CM

## 2024-02-26 DIAGNOSIS — Z91.148 NON COMPLIANCE W MEDICATION REGIMEN: ICD-10-CM

## 2024-02-26 DIAGNOSIS — Z09 HOSPITAL DISCHARGE FOLLOW-UP: Primary | ICD-10-CM

## 2024-02-26 DIAGNOSIS — Z86.73 HISTORY OF CVA (CEREBROVASCULAR ACCIDENT): ICD-10-CM

## 2024-02-26 PROCEDURE — 3046F HEMOGLOBIN A1C LEVEL >9.0%: CPT | Performed by: NURSE PRACTITIONER

## 2024-02-26 PROCEDURE — 3078F DIAST BP <80 MM HG: CPT | Performed by: NURSE PRACTITIONER

## 2024-02-26 PROCEDURE — 3075F SYST BP GE 130 - 139MM HG: CPT | Performed by: NURSE PRACTITIONER

## 2024-02-26 PROCEDURE — 1111F DSCHRG MED/CURRENT MED MERGE: CPT | Performed by: NURSE PRACTITIONER

## 2024-02-26 PROCEDURE — 99214 OFFICE O/P EST MOD 30 MIN: CPT | Performed by: NURSE PRACTITIONER

## 2024-02-26 RX ORDER — TIRZEPATIDE 2.5 MG/.5ML
2.5 INJECTION, SOLUTION SUBCUTANEOUS WEEKLY
Qty: 2 ML | Refills: 0 | Status: SHIPPED | OUTPATIENT
Start: 2024-02-26

## 2024-02-26 RX ORDER — ATORVASTATIN CALCIUM 40 MG/1
40 TABLET, FILM COATED ORAL NIGHTLY
Qty: 30 TABLET | Refills: 3 | Status: SHIPPED | OUTPATIENT
Start: 2024-02-26

## 2024-02-26 ASSESSMENT — ENCOUNTER SYMPTOMS
COUGH: 0
CHEST TIGHTNESS: 0
DIARRHEA: 0
VOMITING: 0
SHORTNESS OF BREATH: 0
EYE DISCHARGE: 0
BACK PAIN: 1
ABDOMINAL PAIN: 0
RHINORRHEA: 0
CONSTIPATION: 0

## 2024-02-26 ASSESSMENT — PATIENT HEALTH QUESTIONNAIRE - PHQ9
8. MOVING OR SPEAKING SO SLOWLY THAT OTHER PEOPLE COULD HAVE NOTICED. OR THE OPPOSITE, BEING SO FIGETY OR RESTLESS THAT YOU HAVE BEEN MOVING AROUND A LOT MORE THAN USUAL: 0
SUM OF ALL RESPONSES TO PHQ QUESTIONS 1-9: 0
SUM OF ALL RESPONSES TO PHQ QUESTIONS 1-9: 0
1. LITTLE INTEREST OR PLEASURE IN DOING THINGS: 0
6. FEELING BAD ABOUT YOURSELF - OR THAT YOU ARE A FAILURE OR HAVE LET YOURSELF OR YOUR FAMILY DOWN: 0
2. FEELING DOWN, DEPRESSED OR HOPELESS: 0
4. FEELING TIRED OR HAVING LITTLE ENERGY: 0
9. THOUGHTS THAT YOU WOULD BE BETTER OFF DEAD, OR OF HURTING YOURSELF: 0
10. IF YOU CHECKED OFF ANY PROBLEMS, HOW DIFFICULT HAVE THESE PROBLEMS MADE IT FOR YOU TO DO YOUR WORK, TAKE CARE OF THINGS AT HOME, OR GET ALONG WITH OTHER PEOPLE: 0
5. POOR APPETITE OR OVEREATING: 0
SUM OF ALL RESPONSES TO PHQ9 QUESTIONS 1 & 2: 0
7. TROUBLE CONCENTRATING ON THINGS, SUCH AS READING THE NEWSPAPER OR WATCHING TELEVISION: 0
SUM OF ALL RESPONSES TO PHQ QUESTIONS 1-9: 0
SUM OF ALL RESPONSES TO PHQ QUESTIONS 1-9: 0

## 2024-02-26 NOTE — PROGRESS NOTES
Post-Discharge Transitional Care Follow Up      Len Slater   YOB: 1960    Date of Office Visit:  2/26/2024  Date of Hospital Admission: 2/6/24  Date of Hospital Discharge: 2/9/24  Readmission Risk Score (high >=14%. Medium >=10%):Readmission Risk Score: 15.5      Care management risk score Rising risk (score 2-5) and Complex Care (Scores >=6): No Risk Score On File     Non face to face  following discharge, date last encounter closed (first attempt may have been earlier): 02/15/2024     Call initiated 2 business days of discharge: Yes     Hospital discharge follow-up  -     [place] DISCHARGE MEDS RECONCILED W/ CURRENT OUTPATIENT MED LIST  Uncontrolled type 2 diabetes mellitus with hyperglycemia (HCC)  -     Tirzepatide (MOUNJARO) 2.5 MG/0.5ML SOPN SC injection; Inject 0.5 mLs into the skin once a week, Disp-2 mL, R-0Normal  -     atorvastatin (LIPITOR) 40 MG tablet; Take 1 tablet by mouth nightly, Disp-30 tablet, R-3Normal  -     Que Lord MD, Endocrinology, [place]  History of CVA (cerebrovascular accident)  -     atorvastatin (LIPITOR) 40 MG tablet; Take 1 tablet by mouth nightly, Disp-30 tablet, R-3Normal  Essential hypertension  Non compliance w medication regimen  -     Que Lord MD, Endocrinology, [place]  Insurance is not covering insulin  Is wanting to start GLP1 and GIP medication- mounjaro  If insurance doesn't cover mounjaro will restart trulicity   Discussed being on lipitor to help reduce his CV risk- is willing to restart  Is going to try and be complaint with his medication  Need to start monitoring his BP and BS at home   Medical Decision Making: moderate complexity  Return in about 3 months (around 5/26/2024) for DM, chronic issues .           Subjective:   Is here for hospital follow up  States that he had a history of stroke  Did have weakness on his right side of his body     DM  Type 2  Uncontrolled  States that insurance is not covering the insurance  While in

## 2024-03-27 ENCOUNTER — OFFICE VISIT (OUTPATIENT)
Dept: CARDIOLOGY CLINIC | Age: 64
End: 2024-03-27
Payer: COMMERCIAL

## 2024-03-27 VITALS
DIASTOLIC BLOOD PRESSURE: 80 MMHG | HEART RATE: 104 BPM | WEIGHT: 149.2 LBS | SYSTOLIC BLOOD PRESSURE: 126 MMHG | HEIGHT: 70 IN | RESPIRATION RATE: 18 BRPM | BODY MASS INDEX: 21.36 KG/M2 | OXYGEN SATURATION: 100 %

## 2024-03-27 DIAGNOSIS — I25.5 ISCHEMIC CARDIOMYOPATHY: Primary | ICD-10-CM

## 2024-03-27 DIAGNOSIS — Z91.89 AT RISK FOR FLUID VOLUME OVERLOAD: ICD-10-CM

## 2024-03-27 DIAGNOSIS — I50.22 CHRONIC SYSTOLIC CONGESTIVE HEART FAILURE, NYHA CLASS 3 (HCC): ICD-10-CM

## 2024-03-27 DIAGNOSIS — Z95.1 S/P CABG X 2: ICD-10-CM

## 2024-03-27 PROCEDURE — 99214 OFFICE O/P EST MOD 30 MIN: CPT | Performed by: NURSE PRACTITIONER

## 2024-03-27 ASSESSMENT — ENCOUNTER SYMPTOMS
VOMITING: 0
COUGH: 0
ABDOMINAL DISTENTION: 0
NAUSEA: 0
SHORTNESS OF BREATH: 0

## 2024-03-27 NOTE — PATIENT INSTRUCTIONS
You may receive a survey regarding the care you received during your visit.  Your input is valuable to us.  We encourage you to complete and return your survey.  We hope you will choose us in the future for your healthcare needs.    Your nurses today were Kadi.  Office hours:   Mon-Thurs 8-4:30  Friday 8-12  Phone: [phone]    Continue:  Continue current medications  Daily weights and record  Fluid restriction of 2 Liters per day  Limit sodium in diet to around 7415-7723 mg/day  Monitor BP  Activity as tolerated     Call the [facility] Failure Clinic for any of the following symptoms:   Weight gain of 2-3 pounds in 1 day or 5 pounds in 1 week  Increased shortness of breath  Shortness of breath while laying down  Cough  Chest pain  Swelling in feet, ankles or legs  Bloating in abdomen  Fatigue      No medication changes today     Continue diet/fluid adherence  Continue daily wts.  F/U w/ Cardiology  F/U in clinic in 6 months

## 2024-03-27 NOTE — PROGRESS NOTES
Function Panel:    Lab Results   Component Value Date/Time    ALKPHOS 48 02/07/2024 04:46 AM    ALT 7 02/07/2024 04:46 AM    AST 7 02/07/2024 04:46 AM    PROT 5.4 02/07/2024 04:46 AM    BILITOT 0.7 02/07/2024 04:46 AM    BILIDIR <0.2 05/09/2023 03:51 AM    LABALBU 3.5 02/07/2024 04:46 AM     Magnesium:    Lab Results   Component Value Date/Time    MG 1.9 02/06/2024 09:32 PM     PT/INR:    Lab Results   Component Value Date/Time    INR 1.05 02/08/2024 04:22 AM     Lipids:    Lab Results   Component Value Date/Time    TRIG 54 02/07/2024 01:05 AM    HDL 55 02/07/2024 01:05 AM    LDLCALC 95 02/07/2024 01:05 AM       ASSESSMENT AND PLAN:   The patient's condition/symptoms are stable        Diagnosis Orders   1. improved Ischemic cardiomyopathy        2. Chronic systolic congestive heart failure, NYHA class 2/3, Stage C (HCC)        3. At risk for fluid volume overload        4. S/P CABG x 2            Continue:  GDMT:   ACE/ARB/ARNI - Entresto 24/26 BID   BB - Toprol 25mg daily   Diuretic - no  AA - Aldactone 12.5 daily   SGLT2 -  Jardiance (stopped at discharge, UTI w/ bacteremia)   Vasodilator - no  Other -     HFimpEF 40-45% 2024 (35-40% 2023) (20-25% 3/2023)  (30% 12/2022)  Ischemic cardiomyopathy - improved   S/P CABG 11/2022  Mild to mod MR - no murmur on exam, stable - will continue to monitor    Stable, no fluid on exam. No changes today. Needs better control of his DM. 6 month f/u      GDMT: yes unable to add SGLT2    Lab reviewed - Cr 1.2 K 4.4 mag 1.9 Hgb 12.2    ECHO 2023: no valvular disease, mildly dilated LA  CATH 2022: LVEDP 31, severe ischemic cardiomyopathy    No medication changes today     Continue diet/fluid adherence  Continue daily wts.  F/U w/ Cardiology  F/U in clinic in 6 months      Tolerating above noted HF meds, no ill side effects noted. Will continue to monitor kidney function and electrolytes. Will optimize as tolerated.   Pt is compliant w/ medications.    Total visit time of 30 minutes

## 2024-04-02 ENCOUNTER — OFFICE VISIT (OUTPATIENT)
Dept: FAMILY MEDICINE CLINIC | Age: 64
End: 2024-04-02
Payer: COMMERCIAL

## 2024-04-02 VITALS
BODY MASS INDEX: 21.42 KG/M2 | OXYGEN SATURATION: 94 % | TEMPERATURE: 97.2 F | HEART RATE: 98 BPM | WEIGHT: 149.3 LBS | DIASTOLIC BLOOD PRESSURE: 82 MMHG | RESPIRATION RATE: 18 BRPM | SYSTOLIC BLOOD PRESSURE: 124 MMHG

## 2024-04-02 DIAGNOSIS — M25.511 CHRONIC RIGHT SHOULDER PAIN: ICD-10-CM

## 2024-04-02 DIAGNOSIS — G89.29 CHRONIC RIGHT SHOULDER PAIN: ICD-10-CM

## 2024-04-02 DIAGNOSIS — B35.1 ONYCHOMYCOSIS: ICD-10-CM

## 2024-04-02 DIAGNOSIS — E11.65 UNCONTROLLED TYPE 2 DIABETES MELLITUS WITH HYPERGLYCEMIA (HCC): Primary | ICD-10-CM

## 2024-04-02 PROCEDURE — 99214 OFFICE O/P EST MOD 30 MIN: CPT | Performed by: NURSE PRACTITIONER

## 2024-04-02 PROCEDURE — 3046F HEMOGLOBIN A1C LEVEL >9.0%: CPT | Performed by: NURSE PRACTITIONER

## 2024-04-02 SDOH — ECONOMIC STABILITY: INCOME INSECURITY: HOW HARD IS IT FOR YOU TO PAY FOR THE VERY BASICS LIKE FOOD, HOUSING, MEDICAL CARE, AND HEATING?: NOT HARD AT ALL

## 2024-04-02 SDOH — ECONOMIC STABILITY: FOOD INSECURITY: WITHIN THE PAST 12 MONTHS, YOU WORRIED THAT YOUR FOOD WOULD RUN OUT BEFORE YOU GOT MONEY TO BUY MORE.: NEVER TRUE

## 2024-04-02 SDOH — ECONOMIC STABILITY: FOOD INSECURITY: WITHIN THE PAST 12 MONTHS, THE FOOD YOU BOUGHT JUST DIDN'T LAST AND YOU DIDN'T HAVE MONEY TO GET MORE.: NEVER TRUE

## 2024-04-02 ASSESSMENT — ENCOUNTER SYMPTOMS
CHEST TIGHTNESS: 0
DIARRHEA: 0
EYE DISCHARGE: 0
RHINORRHEA: 0
SHORTNESS OF BREATH: 0
VOMITING: 0
CONSTIPATION: 0
COUGH: 0
ABDOMINAL PAIN: 0

## 2024-04-02 ASSESSMENT — PATIENT HEALTH QUESTIONNAIRE - PHQ9
2. FEELING DOWN, DEPRESSED OR HOPELESS: NOT AT ALL
SUM OF ALL RESPONSES TO PHQ QUESTIONS 1-9: 0
SUM OF ALL RESPONSES TO PHQ QUESTIONS 1-9: 0
SUM OF ALL RESPONSES TO PHQ9 QUESTIONS 1 & 2: 0
1. LITTLE INTEREST OR PLEASURE IN DOING THINGS: NOT AT ALL
SUM OF ALL RESPONSES TO PHQ QUESTIONS 1-9: 0
SUM OF ALL RESPONSES TO PHQ QUESTIONS 1-9: 0

## 2024-04-02 NOTE — PROGRESS NOTES
.   [facility] [place] [facility]  [address] [place] [address].  [place] [address]  Dept: [phone]  Dept Fax: [phone]    Visit type: Established patient    Reason for Visit: Diabetes (Type 2 f/u ), Shoulder Pain (Ongoing - does down into left sciatica ), and Health Maintenance (Vaccines; Diabetic alb to cr ratio; low dose ct lung screening; diabetic retinal exam; )         Assessment and Plan       1. Uncontrolled type 2 diabetes mellitus with hyperglycemia (HCC)  -      DIABETES FOOT EXAM  -     [facility], RAF[facility], Podiatry, [place]  2. Chronic right shoulder pain  -     XR SHOULDER RIGHT (MIN 2 VIEWS); Future  -     External Referral To Physical Therapy  3. Onychomycosis  -     [facility], [facility], Podiatry, [place]    Is interested in getting toenails trimmed every 3 months- referral placed  PT and xray ordered for right shoulder    Return for keep fu apt .       Subjective       States that he has lumbar back pain with right sciatica. He has been putting more pressure on his shoulders because he is needing to use his upper body strength to get up from sitting position. Is using cane with ambulation.     DM type 2.   States that he just got his mounjaro filled at 2.5 mg. Has not started it yet.   Has not been monitoring his BS  Is taking statin medication   Is taking entresto as he does have a history CHF           Review of Systems   Constitutional:  Negative for activity change, appetite change and fever.   HENT:  Negative for congestion, ear pain and rhinorrhea.    Eyes:  Negative for discharge and visual disturbance.   Respiratory:  Negative for cough, chest tightness and shortness of breath.    Cardiovascular:  Negative for chest pain and palpitations.   Gastrointestinal:  Negative for abdominal pain, constipation, diarrhea and vomiting.   Genitourinary:  Negative for difficulty urinating and hematuria.   Musculoskeletal:  Positive for arthralgias, gait problem (cane with ambulation) and myalgias.   Skin:

## 2024-05-01 DIAGNOSIS — E11.65 UNCONTROLLED TYPE 2 DIABETES MELLITUS WITH HYPERGLYCEMIA (HCC): ICD-10-CM

## 2024-05-01 RX ORDER — TIRZEPATIDE 2.5 MG/.5ML
INJECTION, SOLUTION SUBCUTANEOUS
Qty: 4 ML | Refills: 0 | OUTPATIENT
Start: 2024-05-01

## 2024-05-01 RX ORDER — TIRZEPATIDE 5 MG/.5ML
5 INJECTION, SOLUTION SUBCUTANEOUS WEEKLY
Qty: 12 ADJUSTABLE DOSE PRE-FILLED PEN SYRINGE | Refills: 1 | Status: SHIPPED | OUTPATIENT
Start: 2024-05-01

## 2024-05-01 NOTE — TELEPHONE ENCOUNTER
Please let patient know that I did get his refill request for his Mounjaro.  We will plan on increasing his Mounjaro to 5 mg each week.  This prescription has been sent to the pharmacy-thank you

## 2024-05-28 ENCOUNTER — OFFICE VISIT (OUTPATIENT)
Dept: FAMILY MEDICINE CLINIC | Age: 64
End: 2024-05-28
Payer: COMMERCIAL

## 2024-05-28 VITALS
BODY MASS INDEX: 20.99 KG/M2 | TEMPERATURE: 97.6 F | SYSTOLIC BLOOD PRESSURE: 128 MMHG | RESPIRATION RATE: 18 BRPM | HEART RATE: 112 BPM | DIASTOLIC BLOOD PRESSURE: 64 MMHG | OXYGEN SATURATION: 97 % | WEIGHT: 146.3 LBS

## 2024-05-28 DIAGNOSIS — R03.0 ELEVATED BLOOD PRESSURE READING: ICD-10-CM

## 2024-05-28 DIAGNOSIS — Z12.11 SCREENING FOR COLON CANCER: ICD-10-CM

## 2024-05-28 DIAGNOSIS — M19.011 ARTHRITIS OF RIGHT SHOULDER REGION: ICD-10-CM

## 2024-05-28 DIAGNOSIS — E11.65 UNCONTROLLED TYPE 2 DIABETES MELLITUS WITH HYPERGLYCEMIA (HCC): Primary | ICD-10-CM

## 2024-05-28 LAB
CREATININE URINE POCT: ABNORMAL
HBA1C MFR BLD: 9 %
MICROALBUMIN/CREAT 24H UR: ABNORMAL MG/DL
MICROALBUMIN/CREAT UR-RTO: ABNORMAL MG/G

## 2024-05-28 PROCEDURE — 83036 HEMOGLOBIN GLYCOSYLATED A1C: CPT | Performed by: NURSE PRACTITIONER

## 2024-05-28 PROCEDURE — 99214 OFFICE O/P EST MOD 30 MIN: CPT | Performed by: NURSE PRACTITIONER

## 2024-05-28 PROCEDURE — 3052F HG A1C>EQUAL 8.0%<EQUAL 9.0%: CPT | Performed by: NURSE PRACTITIONER

## 2024-05-28 PROCEDURE — 82044 UR ALBUMIN SEMIQUANTITATIVE: CPT | Performed by: NURSE PRACTITIONER

## 2024-05-28 ASSESSMENT — PATIENT HEALTH QUESTIONNAIRE - PHQ9
SUM OF ALL RESPONSES TO PHQ QUESTIONS 1-9: 0
SUM OF ALL RESPONSES TO PHQ9 QUESTIONS 1 & 2: 0
SUM OF ALL RESPONSES TO PHQ QUESTIONS 1-9: 0
2. FEELING DOWN, DEPRESSED OR HOPELESS: NOT AT ALL
1. LITTLE INTEREST OR PLEASURE IN DOING THINGS: NOT AT ALL

## 2024-05-28 NOTE — PROGRESS NOTES
Health Maintenance Due   Topic Date Due    Diabetic retinal exam  Never done    Shingles vaccine (1 of 2) Never done    Colorectal Cancer Screen  03/02/2018    Respiratory Syncytial Virus (RSV) Pregnant or age 60 yrs+ (1 - 1-dose 60+ series) Never done    COVID-19 Vaccine (3 - 2023-24 season) 09/01/2023    Diabetic Alb to Cr ratio (uACR) test  10/31/2023    Low dose CT lung screening &/or counseling  Never done    A1C test (Diabetic or Prediabetic)  05/06/2024

## 2024-05-28 NOTE — PROGRESS NOTES
.   [facility] MEDICINE  [address] [place] [id number]  Dept: [phone]  Dept Fax: [phone]    Visit type: Established patient    Reason for Visit: Diabetes (3m f/u ), Follow-up Chronic Condition (3 month follow up ), and Health Maintenance (See notes /)      Assessment & Plan   Assessment and Plan       1. Uncontrolled type 2 diabetes mellitus with hyperglycemia (HCC)  -     POCT microalbumin  -     POCT glycosylated hemoglobin (Hb A1C)  -     CBC with Auto Differential; Future  -     Comprehensive Metabolic Panel, Fasting; Future  -     Hemoglobin A1C; Future  -     TSH with Reflex; Future  -     Magnesium; Future  2. Elevated blood pressure reading  3. Arthritis of right shoulder region  4. Screening for colon cancer  -     Fecal DNA Colorectal cancer screening (Cologuard)    Referral placed to PT in April- patient is okay to schedule  A1c captured 6 weeks of him on mounjaro- discussed monitoring daily BS- but will be improved likely at his follow up   Continue with lifestyle changes   Is willing to complete Cologuard  Return in about 3 months (around 8/28/2024) for DM, chronic issues.       Subjective       Right shoulder arthitis. Was referred to PT but did not schedule     Elevated blood pressure reading  History of hypertension  Has been monitoring his BP at home and is gettign 140 / 80-90  No chest pain or palpitations  History of CABG, paroxysmal atrial fib, and CVA    Diabetes type 2.  BS- is not monitoring   Has been on mounjaro   Not following a particular diet or exercise         Review of Systems   Constitutional:  Negative for activity change, appetite change and fever.   HENT:  Negative for congestion, ear pain and rhinorrhea.    Eyes:  Negative for discharge and visual disturbance.   Respiratory:  Negative for cough, chest tightness and shortness of breath.    Cardiovascular:  Negative for chest pain and palpitations.   Gastrointestinal:  Negative for abdominal pain,

## 2024-05-30 ASSESSMENT — ENCOUNTER SYMPTOMS
DIARRHEA: 0
CHEST TIGHTNESS: 0
RHINORRHEA: 0
COUGH: 0
ABDOMINAL PAIN: 0
SHORTNESS OF BREATH: 0
CONSTIPATION: 0
EYE DISCHARGE: 0
VOMITING: 0

## 2024-08-08 ENCOUNTER — TELEPHONE (OUTPATIENT)
Dept: NEUROLOGY | Age: 64
End: 2024-08-08

## 2024-08-08 NOTE — TELEPHONE ENCOUNTER
Voicemail left to remind patient of carotid ultrasound appointment 8/9/24 with an arrival of 1:45pm.

## 2024-08-09 ENCOUNTER — HOSPITAL ENCOUNTER (OUTPATIENT)
Dept: INTERVENTIONAL RADIOLOGY/VASCULAR | Age: 64
Discharge: HOME | End: 2024-08-09
Payer: COMMERCIAL

## 2024-08-09 DIAGNOSIS — I65.23 BILATERAL CAROTID ARTERY STENOSIS: ICD-10-CM

## 2024-08-09 PROCEDURE — 93880 EXTRACRANIAL BILAT STUDY: CPT

## 2024-09-09 ENCOUNTER — OFFICE VISIT (OUTPATIENT)
Dept: FAMILY MEDICINE CLINIC | Age: 64
End: 2024-09-09
Payer: COMMERCIAL

## 2024-09-09 VITALS
WEIGHT: 142.4 LBS | RESPIRATION RATE: 18 BRPM | DIASTOLIC BLOOD PRESSURE: 84 MMHG | HEIGHT: 70 IN | OXYGEN SATURATION: 98 % | BODY MASS INDEX: 20.39 KG/M2 | HEART RATE: 104 BPM | TEMPERATURE: 97.2 F | SYSTOLIC BLOOD PRESSURE: 129 MMHG

## 2024-09-09 DIAGNOSIS — M25.511 CHRONIC RIGHT SHOULDER PAIN: ICD-10-CM

## 2024-09-09 DIAGNOSIS — M15.9 PRIMARY OSTEOARTHRITIS INVOLVING MULTIPLE JOINTS: ICD-10-CM

## 2024-09-09 DIAGNOSIS — E11.65 UNCONTROLLED TYPE 2 DIABETES MELLITUS WITH HYPERGLYCEMIA (HCC): ICD-10-CM

## 2024-09-09 DIAGNOSIS — R14.2 BURPING: ICD-10-CM

## 2024-09-09 DIAGNOSIS — E11.65 UNCONTROLLED TYPE 2 DIABETES MELLITUS WITH HYPERGLYCEMIA (HCC): Primary | ICD-10-CM

## 2024-09-09 DIAGNOSIS — G89.29 CHRONIC RIGHT SHOULDER PAIN: ICD-10-CM

## 2024-09-09 LAB
ALBUMIN SERPL BCG-MCNC: 4.5 G/DL (ref 3.5–5.1)
ALP SERPL-CCNC: 60 U/L (ref 38–126)
ALT SERPL W/O P-5'-P-CCNC: 15 U/L (ref 11–66)
ANION GAP SERPL CALC-SCNC: 13 MEQ/L (ref 8–16)
AST SERPL-CCNC: 21 U/L (ref 5–40)
BASOPHILS ABSOLUTE: 0.1 THOU/MM3 (ref 0–0.1)
BASOPHILS NFR BLD AUTO: 1 %
BILIRUB SERPL-MCNC: 1 MG/DL (ref 0.3–1.2)
BUN SERPL-MCNC: 16 MG/DL (ref 7–22)
CALCIUM SERPL-MCNC: 9.5 MG/DL (ref 8.5–10.5)
CHLORIDE SERPL-SCNC: 101 MEQ/L (ref 98–111)
CO2 SERPL-SCNC: 25 MEQ/L (ref 23–33)
CREAT SERPL-MCNC: 1.4 MG/DL (ref 0.4–1.2)
DEPRECATED MEAN GLUCOSE BLD GHB EST-ACNC: 159 MG/DL (ref 70–126)
DEPRECATED RDW RBC AUTO: 40.9 FL (ref 35–45)
EOSINOPHIL NFR BLD AUTO: 6.4 %
EOSINOPHILS ABSOLUTE: 0.4 THOU/MM3 (ref 0–0.4)
ERYTHROCYTE [DISTWIDTH] IN BLOOD BY AUTOMATED COUNT: 13.2 % (ref 11.5–14.5)
GFR SERPL CREATININE-BSD FRML MDRD: 57 ML/MIN/1.73M2
GLUCOSE FASTING: 135 MG/DL (ref 70–108)
HBA1C MFR BLD HPLC: 7.3 % (ref 4.4–6.4)
HCT VFR BLD AUTO: 46.1 % (ref 42–52)
HGB BLD-MCNC: 14.8 GM/DL (ref 14–18)
IMM GRANULOCYTES # BLD AUTO: 0.05 THOU/MM3 (ref 0–0.07)
IMM GRANULOCYTES NFR BLD AUTO: 0.7 %
LYMPHOCYTES ABSOLUTE: 2.1 THOU/MM3 (ref 1–4.8)
LYMPHOCYTES NFR BLD AUTO: 31.4 %
MAGNESIUM SERPL-MCNC: 1.9 MG/DL (ref 1.6–2.4)
MCH RBC QN AUTO: 27.2 PG (ref 26–33)
MCHC RBC AUTO-ENTMCNC: 32.1 GM/DL (ref 32.2–35.5)
MCV RBC AUTO: 84.7 FL (ref 80–94)
MONOCYTES ABSOLUTE: 0.6 THOU/MM3 (ref 0.4–1.3)
MONOCYTES NFR BLD AUTO: 8.9 %
NEUTROPHILS ABSOLUTE: 3.5 THOU/MM3 (ref 1.8–7.7)
NEUTROPHILS NFR BLD AUTO: 51.6 %
NRBC BLD AUTO-RTO: 0 /100 WBC
PLATELET # BLD AUTO: 272 THOU/MM3 (ref 130–400)
PMV BLD AUTO: 10.3 FL (ref 9.4–12.4)
POTASSIUM SERPL-SCNC: 4.9 MEQ/L (ref 3.5–5.2)
PROT SERPL-MCNC: 7 G/DL (ref 6.1–8)
RBC # BLD AUTO: 5.44 MILL/MM3 (ref 4.7–6.1)
SODIUM SERPL-SCNC: 139 MEQ/L (ref 135–145)
TSH SERPL DL<=0.005 MIU/L-ACNC: 1.01 UIU/ML (ref 0.4–4.2)
WBC # BLD AUTO: 6.7 THOU/MM3 (ref 4.8–10.8)

## 2024-09-09 PROCEDURE — 99214 OFFICE O/P EST MOD 30 MIN: CPT | Performed by: NURSE PRACTITIONER

## 2024-09-09 PROCEDURE — 3052F HG A1C>EQUAL 8.0%<EQUAL 9.0%: CPT | Performed by: NURSE PRACTITIONER

## 2024-09-09 ASSESSMENT — ENCOUNTER SYMPTOMS
EYE DISCHARGE: 0
ABDOMINAL PAIN: 0
SHORTNESS OF BREATH: 0
CHEST TIGHTNESS: 0
DIARRHEA: 0
COUGH: 0
RHINORRHEA: 0
VOMITING: 0
CONSTIPATION: 0

## 2024-09-10 ENCOUNTER — OFFICE VISIT (OUTPATIENT)
Dept: UROLOGY | Age: 64
End: 2024-09-10
Payer: COMMERCIAL

## 2024-09-10 ENCOUNTER — LAB (OUTPATIENT)
Dept: LAB | Age: 64
End: 2024-09-10

## 2024-09-10 VITALS
HEIGHT: 70 IN | SYSTOLIC BLOOD PRESSURE: 134 MMHG | DIASTOLIC BLOOD PRESSURE: 72 MMHG | WEIGHT: 142 LBS | BODY MASS INDEX: 20.33 KG/M2 | RESPIRATION RATE: 18 BRPM

## 2024-09-10 DIAGNOSIS — N13.8 BPH WITH URINARY OBSTRUCTION: ICD-10-CM

## 2024-09-10 DIAGNOSIS — N40.1 BENIGN PROSTATIC HYPERPLASIA WITH URINARY RETENTION: Primary | ICD-10-CM

## 2024-09-10 DIAGNOSIS — R33.8 BENIGN PROSTATIC HYPERPLASIA WITH URINARY RETENTION: ICD-10-CM

## 2024-09-10 DIAGNOSIS — N31.9 NEUROGENIC BLADDER: ICD-10-CM

## 2024-09-10 DIAGNOSIS — R33.8 BENIGN PROSTATIC HYPERPLASIA WITH URINARY RETENTION: Primary | ICD-10-CM

## 2024-09-10 DIAGNOSIS — N40.1 BENIGN PROSTATIC HYPERPLASIA WITH URINARY RETENTION: ICD-10-CM

## 2024-09-10 DIAGNOSIS — R31.29 MICROSCOPIC HEMATURIA: ICD-10-CM

## 2024-09-10 DIAGNOSIS — N40.1 BPH WITH URINARY OBSTRUCTION: ICD-10-CM

## 2024-09-10 LAB
BACTERIA: ABNORMAL
BILIRUB UR QL STRIP: NEGATIVE
BILIRUBIN, URINE: NEGATIVE
BLOOD URINE, POC: ABNORMAL
CASTS #/AREA URNS LPF: ABNORMAL /LPF
CASTS #/AREA URNS LPF: ABNORMAL /LPF
CHARACTER UR: CLEAR
CHARACTER, URINE: CLEAR
CHARCOAL URNS QL MICRO: ABNORMAL
COLOR UR: YELLOW
COLOR, UA: YELLOW
CRYSTALS URNS QL MICRO: ABNORMAL
EPITHELIAL CELLS, UA: ABNORMAL /HPF
GLUCOSE UR QL STRIP.AUTO: NEGATIVE MG/DL
GLUCOSE URINE: NEGATIVE MG/DL
HGB UR QL STRIP.AUTO: NEGATIVE
KETONES UR QL STRIP.AUTO: NEGATIVE
KETONES, URINE: NEGATIVE
LEUKOCYTE CLUMPS, URINE: NEGATIVE
LEUKOCYTE ESTERASE UR QL STRIP.AUTO: NEGATIVE
NITRITE UR QL STRIP.AUTO: NEGATIVE
NITRITE, URINE: NEGATIVE
PH UR STRIP.AUTO: 6 [PH] (ref 5–9)
PH, URINE: 6 (ref 5–9)
POST VOID RESIDUAL (PVR): 563 ML
PROT UR STRIP.AUTO-MCNC: 30 MG/DL
PROTEIN, URINE: 30 MG/DL
PSA SERPL-MCNC: 0.4 NG/ML (ref 0–1)
RBC #/AREA URNS HPF: ABNORMAL /HPF
RENAL EPI CELLS #/AREA URNS HPF: ABNORMAL /[HPF]
SPECIFIC GRAVITY UA: 1.01 (ref 1–1.03)
SPECIFIC GRAVITY UA: 1.02 (ref 1–1.03)
UROBILINOGEN, URINE: 0.2 EU/DL (ref 0–1)
UROBILINOGEN, URINE: 0.2 EU/DL (ref 0–1)
WBC #/AREA URNS HPF: ABNORMAL /HPF
YEAST LIKE FUNGI URNS QL MICRO: ABNORMAL

## 2024-09-10 PROCEDURE — 51798 US URINE CAPACITY MEASURE: CPT | Performed by: NURSE PRACTITIONER

## 2024-09-10 PROCEDURE — 99214 OFFICE O/P EST MOD 30 MIN: CPT | Performed by: NURSE PRACTITIONER

## 2024-09-10 PROCEDURE — 81003 URINALYSIS AUTO W/O SCOPE: CPT | Performed by: NURSE PRACTITIONER

## 2024-09-10 ASSESSMENT — ENCOUNTER SYMPTOMS
NAUSEA: 0
VOMITING: 0
BACK PAIN: 0
ABDOMINAL PAIN: 0

## 2024-09-11 LAB
BACTERIA UR CULT: ABNORMAL
ORGANISM: ABNORMAL

## 2024-09-26 ENCOUNTER — OFFICE VISIT (OUTPATIENT)
Dept: CARDIOLOGY CLINIC | Age: 64
End: 2024-09-26
Payer: COMMERCIAL

## 2024-09-26 VITALS
OXYGEN SATURATION: 99 % | HEIGHT: 70 IN | DIASTOLIC BLOOD PRESSURE: 94 MMHG | WEIGHT: 143 LBS | HEART RATE: 96 BPM | SYSTOLIC BLOOD PRESSURE: 154 MMHG | BODY MASS INDEX: 20.47 KG/M2

## 2024-09-26 DIAGNOSIS — I25.5 ISCHEMIC CARDIOMYOPATHY: ICD-10-CM

## 2024-09-26 DIAGNOSIS — I25.5 ISCHEMIC CARDIOMYOPATHY: Primary | ICD-10-CM

## 2024-09-26 DIAGNOSIS — I50.22 CHRONIC SYSTOLIC CONGESTIVE HEART FAILURE, NYHA CLASS 3 (HCC): ICD-10-CM

## 2024-09-26 DIAGNOSIS — Z91.89 AT RISK FOR FLUID VOLUME OVERLOAD: ICD-10-CM

## 2024-09-26 PROCEDURE — 99214 OFFICE O/P EST MOD 30 MIN: CPT | Performed by: NURSE PRACTITIONER

## 2024-09-26 RX ORDER — METOPROLOL SUCCINATE 25 MG/1
25 TABLET, EXTENDED RELEASE ORAL DAILY
Qty: 90 TABLET | Refills: 3 | Status: SHIPPED | OUTPATIENT
Start: 2024-09-26

## 2024-09-26 ASSESSMENT — ENCOUNTER SYMPTOMS
SHORTNESS OF BREATH: 0
ABDOMINAL DISTENTION: 0
NAUSEA: 0
COUGH: 0
VOMITING: 0

## 2024-11-12 DIAGNOSIS — I50.22 CHRONIC SYSTOLIC CONGESTIVE HEART FAILURE, NYHA CLASS 3 (HCC): ICD-10-CM

## 2024-11-12 DIAGNOSIS — I25.5 ISCHEMIC CARDIOMYOPATHY: ICD-10-CM

## 2024-11-17 DIAGNOSIS — Z86.73 HISTORY OF CVA (CEREBROVASCULAR ACCIDENT): ICD-10-CM

## 2024-11-17 DIAGNOSIS — I25.5 ISCHEMIC CARDIOMYOPATHY: ICD-10-CM

## 2024-11-17 DIAGNOSIS — Z86.73 HISTORY OF CVA IN ADULTHOOD: ICD-10-CM

## 2024-11-17 DIAGNOSIS — E11.65 UNCONTROLLED TYPE 2 DIABETES MELLITUS WITH HYPERGLYCEMIA (HCC): ICD-10-CM

## 2024-11-18 RX ORDER — METOPROLOL SUCCINATE 25 MG/1
25 TABLET, EXTENDED RELEASE ORAL DAILY
Qty: 90 TABLET | Refills: 3 | OUTPATIENT
Start: 2024-11-18

## 2024-11-18 RX ORDER — ATORVASTATIN CALCIUM 40 MG/1
40 TABLET, FILM COATED ORAL NIGHTLY
Qty: 30 TABLET | Refills: 3 | Status: SHIPPED | OUTPATIENT
Start: 2024-11-18

## 2024-12-10 ENCOUNTER — OFFICE VISIT (OUTPATIENT)
Dept: FAMILY MEDICINE CLINIC | Age: 64
End: 2024-12-10
Payer: COMMERCIAL

## 2024-12-10 VITALS
TEMPERATURE: 97.2 F | WEIGHT: 147.6 LBS | HEIGHT: 70 IN | DIASTOLIC BLOOD PRESSURE: 64 MMHG | RESPIRATION RATE: 18 BRPM | HEART RATE: 99 BPM | BODY MASS INDEX: 21.13 KG/M2 | SYSTOLIC BLOOD PRESSURE: 160 MMHG | OXYGEN SATURATION: 98 %

## 2024-12-10 DIAGNOSIS — I10 ESSENTIAL HYPERTENSION: ICD-10-CM

## 2024-12-10 DIAGNOSIS — M15.0 PRIMARY OSTEOARTHRITIS INVOLVING MULTIPLE JOINTS: ICD-10-CM

## 2024-12-10 DIAGNOSIS — I50.22 CHRONIC SYSTOLIC HEART FAILURE (HCC): ICD-10-CM

## 2024-12-10 DIAGNOSIS — Z53.20 LUNG CANCER SCREENING DECLINED BY PATIENT: ICD-10-CM

## 2024-12-10 DIAGNOSIS — N18.31 STAGE 3A CHRONIC KIDNEY DISEASE (HCC): ICD-10-CM

## 2024-12-10 DIAGNOSIS — F32.A MILD DEPRESSION: ICD-10-CM

## 2024-12-10 DIAGNOSIS — E11.65 UNCONTROLLED TYPE 2 DIABETES MELLITUS WITH HYPERGLYCEMIA (HCC): ICD-10-CM

## 2024-12-10 DIAGNOSIS — E78.2 MIXED HYPERLIPIDEMIA: ICD-10-CM

## 2024-12-10 DIAGNOSIS — E11.65 UNCONTROLLED TYPE 2 DIABETES MELLITUS WITH HYPERGLYCEMIA (HCC): Primary | ICD-10-CM

## 2024-12-10 DIAGNOSIS — Z91.148 NON COMPLIANCE W MEDICATION REGIMEN: ICD-10-CM

## 2024-12-10 LAB
25(OH)D3 SERPL-MCNC: < 6 NG/ML (ref 30–100)
ALBUMIN SERPL BCG-MCNC: 4.3 G/DL (ref 3.5–5.1)
ALP SERPL-CCNC: 66 U/L (ref 38–126)
ALT SERPL W/O P-5'-P-CCNC: 19 U/L (ref 11–66)
ANION GAP SERPL CALC-SCNC: 13 MEQ/L (ref 8–16)
AST SERPL-CCNC: 19 U/L (ref 5–40)
BASOPHILS ABSOLUTE: 0.1 THOU/MM3 (ref 0–0.1)
BASOPHILS NFR BLD AUTO: 1.3 %
BILIRUB SERPL-MCNC: 0.7 MG/DL (ref 0.3–1.2)
BUN SERPL-MCNC: 19 MG/DL (ref 7–22)
CALCIUM SERPL-MCNC: 9.8 MG/DL (ref 8.5–10.5)
CHLORIDE SERPL-SCNC: 99 MEQ/L (ref 98–111)
CHOLEST SERPL-MCNC: 179 MG/DL (ref 100–199)
CO2 SERPL-SCNC: 26 MEQ/L (ref 23–33)
CREAT SERPL-MCNC: 1.3 MG/DL (ref 0.4–1.2)
DEPRECATED MEAN GLUCOSE BLD GHB EST-ACNC: 180 MG/DL (ref 70–126)
DEPRECATED RDW RBC AUTO: 40.7 FL (ref 35–45)
EOSINOPHIL NFR BLD AUTO: 3.2 %
EOSINOPHILS ABSOLUTE: 0.2 THOU/MM3 (ref 0–0.4)
ERYTHROCYTE [DISTWIDTH] IN BLOOD BY AUTOMATED COUNT: 13.1 % (ref 11.5–14.5)
GFR SERPL CREATININE-BSD FRML MDRD: 62 ML/MIN/1.73M2
GLUCOSE SERPL-MCNC: 163 MG/DL (ref 70–108)
HBA1C MFR BLD HPLC: 8 % (ref 4.4–6.4)
HCT VFR BLD AUTO: 44.1 % (ref 42–52)
HDLC SERPL-MCNC: 67 MG/DL
HGB BLD-MCNC: 14 GM/DL (ref 14–18)
IMM GRANULOCYTES # BLD AUTO: 0.02 THOU/MM3 (ref 0–0.07)
IMM GRANULOCYTES NFR BLD AUTO: 0.3 %
LDLC SERPL CALC-MCNC: 95 MG/DL
LYMPHOCYTES ABSOLUTE: 2 THOU/MM3 (ref 1–4.8)
LYMPHOCYTES NFR BLD AUTO: 26.3 %
MAGNESIUM SERPL-MCNC: 1.9 MG/DL (ref 1.6–2.4)
MCH RBC QN AUTO: 27 PG (ref 26–33)
MCHC RBC AUTO-ENTMCNC: 31.7 GM/DL (ref 32.2–35.5)
MCV RBC AUTO: 85.1 FL (ref 80–94)
MONOCYTES ABSOLUTE: 0.6 THOU/MM3 (ref 0.4–1.3)
MONOCYTES NFR BLD AUTO: 8.5 %
NEUTROPHILS ABSOLUTE: 4.5 THOU/MM3 (ref 1.8–7.7)
NEUTROPHILS NFR BLD AUTO: 60.4 %
NRBC BLD AUTO-RTO: 0 /100 WBC
PLATELET # BLD AUTO: 272 THOU/MM3 (ref 130–400)
PMV BLD AUTO: 10.8 FL (ref 9.4–12.4)
POTASSIUM SERPL-SCNC: 4.7 MEQ/L (ref 3.5–5.2)
PROT SERPL-MCNC: 7.3 G/DL (ref 6.1–8)
RBC # BLD AUTO: 5.18 MILL/MM3 (ref 4.7–6.1)
SODIUM SERPL-SCNC: 138 MEQ/L (ref 135–145)
TRIGL SERPL-MCNC: 86 MG/DL (ref 0–199)
TSH SERPL DL<=0.005 MIU/L-ACNC: 0.77 UIU/ML (ref 0.4–4.2)
WBC # BLD AUTO: 7.5 THOU/MM3 (ref 4.8–10.8)

## 2024-12-10 PROCEDURE — 99214 OFFICE O/P EST MOD 30 MIN: CPT | Performed by: NURSE PRACTITIONER

## 2024-12-10 PROCEDURE — 3077F SYST BP >= 140 MM HG: CPT | Performed by: NURSE PRACTITIONER

## 2024-12-10 PROCEDURE — 3078F DIAST BP <80 MM HG: CPT | Performed by: NURSE PRACTITIONER

## 2024-12-10 PROCEDURE — 3051F HG A1C>EQUAL 7.0%<8.0%: CPT | Performed by: NURSE PRACTITIONER

## 2024-12-10 RX ORDER — BUPROPION HYDROCHLORIDE 150 MG/1
150 TABLET ORAL EVERY MORNING
Qty: 30 TABLET | Refills: 1 | Status: SHIPPED | OUTPATIENT
Start: 2024-12-10

## 2024-12-10 ASSESSMENT — ENCOUNTER SYMPTOMS
VOMITING: 0
COUGH: 0
SHORTNESS OF BREATH: 0
RHINORRHEA: 0
DIARRHEA: 0
CONSTIPATION: 0
EYE DISCHARGE: 0
ABDOMINAL PAIN: 0
CHEST TIGHTNESS: 0

## 2024-12-10 NOTE — ASSESSMENT & PLAN NOTE
Orders:    CBC with Auto Differential; Future    Hemoglobin A1C; Future    Lipid Panel; Future    Magnesium; Future    Vitamin D 25 Hydroxy; Future    TSH with Reflex; Future    Comprehensive Metabolic Panel; Future

## 2024-12-10 NOTE — PROGRESS NOTES
.   [facility] MEDICINE  [address] [place] [id number]  Dept: [phone]  Dept Fax: [phone]    Visit type: Established patient    Reason for Visit: Diabetes (Follow up ) and Health Maintenance (See note )      Assessment & Plan   Assessment and Plan       Assessment & Plan  Uncontrolled type 2 diabetes mellitus with hyperglycemia (HCC)       Orders:    CBC with Auto Differential; Future    Hemoglobin A1C; Future    Lipid Panel; Future    Magnesium; Future    Vitamin D 25 Hydroxy; Future    TSH with Reflex; Future    Comprehensive Metabolic Panel; Future    Mild depression       Orders:    buPROPion (WELLBUTRIN XL) 150 MG extended release tablet; Take 1 tablet by mouth every morning    Primary osteoarthritis involving multiple joints            Essential hypertension            Non compliance w medication regimen            Chronic systolic heart failure (HCC)            Mixed hyperlipidemia            Stage 3a chronic kidney disease (HCC)            Lung cancer screening declined by patient            Advised to take medication as directed  Add wellbutrin  Continue fu cardiology and urology  Declines lung cancer screening   Declines covid, flu, rsv and shingles vaccine   Has cologuard ordered- is going to complete  Blood work today   Return in about 3 months (around 3/10/2025).       Subjective         Diabetes type 2.  BS- is not monitoring   Has been on mounjaro - may skip a week occasionally (this is still the case)  Has not been able to have much of an appetite, some burping   Not following a particular diet or exercise    OA  Onset over a year ago  Right shoulder has felt worse recently- unable to sleep more than 1-2 hours   Was referred to PT in the past and did not schedule- is willing to schedule now   Mild numbness and tingling to arms    History of CABG and is following with cardiology   Is supposed to take entresto, toprol, lipitor and eliquis but has not taken it in the last

## 2024-12-10 NOTE — PROGRESS NOTES
Health Maintenance Due   Topic Date Due    Diabetic retinal exam  Never done    Shingles vaccine (1 of 2) Never done    Colorectal Cancer Screen  03/02/2018    Respiratory Syncytial Virus (RSV) Pregnant or age 60 yrs+ (1 - Risk 60-74 years 1-dose series) Never done    Lung Cancer Screening &/or Counseling  12/13/2023    Flu vaccine (1) 08/01/2024    COVID-19 Vaccine (3 - 2023-24 season) 09/01/2024

## 2024-12-30 ENCOUNTER — TELEPHONE (OUTPATIENT)
Dept: FAMILY MEDICINE CLINIC | Age: 64
End: 2024-12-30

## 2024-12-30 DIAGNOSIS — E11.65 UNCONTROLLED TYPE 2 DIABETES MELLITUS WITH HYPERGLYCEMIA (HCC): Primary | ICD-10-CM

## 2024-12-30 RX ORDER — TIRZEPATIDE 5 MG/.5ML
5 INJECTION, SOLUTION SUBCUTANEOUS WEEKLY
Qty: 2 ML | Refills: 2 | Status: SHIPPED | OUTPATIENT
Start: 2024-12-30

## 2024-12-30 NOTE — TELEPHONE ENCOUNTER
Patient said he has been taking his mounjaro as prescribed - went to refill and pharmacy is asking for a new script to be sent. Pharmacy is [facility] on [address] in [place], [place]

## 2025-01-02 ENCOUNTER — OFFICE VISIT (OUTPATIENT)
Dept: CARDIOLOGY CLINIC | Age: 65
End: 2025-01-02
Payer: COMMERCIAL

## 2025-01-02 VITALS
WEIGHT: 149.2 LBS | SYSTOLIC BLOOD PRESSURE: 126 MMHG | BODY MASS INDEX: 21.36 KG/M2 | DIASTOLIC BLOOD PRESSURE: 76 MMHG | HEART RATE: 86 BPM | HEIGHT: 70 IN

## 2025-01-02 DIAGNOSIS — I10 ESSENTIAL HYPERTENSION: Primary | ICD-10-CM

## 2025-01-02 PROCEDURE — 99214 OFFICE O/P EST MOD 30 MIN: CPT | Performed by: INTERNAL MEDICINE

## 2025-01-02 PROCEDURE — 3078F DIAST BP <80 MM HG: CPT | Performed by: INTERNAL MEDICINE

## 2025-01-02 PROCEDURE — 3074F SYST BP LT 130 MM HG: CPT | Performed by: INTERNAL MEDICINE

## 2025-01-02 PROCEDURE — 93000 ELECTROCARDIOGRAM COMPLETE: CPT | Performed by: INTERNAL MEDICINE

## 2025-01-02 NOTE — PROGRESS NOTES
[facility] [place] [facility]  [facility] CARDIOLOGY  [address]  [address]  [facility] [address]  Dept: [phone]  Dept Fax: [phone]  Loc: [phone]    Visit Date: 1/2/2025  Mr. Slater is a 64 y.o. male  who presented for:   No chief complaint on file.       HPI:   63 yo M with hx of CABG 11/2022, DM, alcohol abuse, EF 40-45%,Former smoker is here for a follow up.  He follows up with CHF clinic.  He states he is doing better.  No cardiac symptoms.  A1c: 8 %      Current Outpatient Medications:     Tirzepatide (MOUNJARO) 5 MG/0.5ML SOAJ, Inject 5 mg into the skin once a week, Disp: 2 mL, Rfl: 2    buPROPion (WELLBUTRIN XL) 150 MG extended release tablet, Take 1 tablet by mouth every morning, Disp: 30 tablet, Rfl: 1    Handicap Placard [facility], by Does not apply route Expires 5 year from date of prescription, Disp: 1 each, Rfl: 0    apixaban (ELIQUIS) 5 MG TABS tablet, Take 1 tablet by mouth 2 times daily, Disp: 60 tablet, Rfl: 3    atorvastatin (LIPITOR) 40 MG tablet, Take 1 tablet by mouth nightly, Disp: 30 tablet, Rfl: 3    sacubitril-valsartan (ENTRESTO) 24-26 MG per tablet, Take 1 tablet by mouth 2 times daily, Disp: 180 tablet, Rfl: 0    metoprolol succinate (TOPROL XL) 25 MG extended release tablet, Take 1 tablet by mouth daily, Disp: 90 tablet, Rfl: 3    acetaminophen (TYLENOL) 500 MG tablet, Take 1 tablet by mouth every 6 hours as needed for Pain As needed, Disp: , Rfl:     Past Medical History   has a past medical history of Coronary artery disease, Diabetes mellitus (HCC), Hypertension, Ischemic cardiomyopathy, Nonischemic cardiomyopathy (HCC), and Urinary retention.    Social History  Len  reports that he quit smoking about 3 years ago. His smoking use included cigarettes. He started smoking about 45 years ago. He has a 42 pack-year smoking history. He has never used smokeless tobacco. He reports that he does not currently use alcohol. He reports that he does not use

## 2025-01-02 NOTE — PATIENT INSTRUCTIONS
Your Provider for Today: Dr. Álvarez  Your nurses for today: Libia    You may receive a survey regarding the care you received during your visit.  Your input is valuable to us.  We encourage you to complete and return your survey.  We hope you will choose us in the future for your healthcare needs.

## 2025-01-23 ENCOUNTER — OFFICE VISIT (OUTPATIENT)
Dept: FAMILY MEDICINE CLINIC | Age: 65
End: 2025-01-23
Payer: COMMERCIAL

## 2025-01-23 VITALS
HEIGHT: 70 IN | DIASTOLIC BLOOD PRESSURE: 62 MMHG | RESPIRATION RATE: 17 BRPM | HEART RATE: 97 BPM | OXYGEN SATURATION: 99 % | TEMPERATURE: 97.6 F | BODY MASS INDEX: 20.64 KG/M2 | SYSTOLIC BLOOD PRESSURE: 110 MMHG | WEIGHT: 144.2 LBS

## 2025-01-23 DIAGNOSIS — Z86.73 HISTORY OF CVA (CEREBROVASCULAR ACCIDENT): ICD-10-CM

## 2025-01-23 DIAGNOSIS — F32.1 MODERATE MAJOR DEPRESSION (HCC): Primary | ICD-10-CM

## 2025-01-23 DIAGNOSIS — I10 ESSENTIAL HYPERTENSION: ICD-10-CM

## 2025-01-23 DIAGNOSIS — I25.810 CORONARY ARTERY DISEASE INVOLVING OTHER CORONARY ARTERY BYPASS GRAFT WITHOUT ANGINA PECTORIS: ICD-10-CM

## 2025-01-23 DIAGNOSIS — R53.1 RIGHT SIDED WEAKNESS: ICD-10-CM

## 2025-01-23 DIAGNOSIS — R51.9 ACHING HEADACHE: ICD-10-CM

## 2025-01-23 DIAGNOSIS — E11.65 UNCONTROLLED TYPE 2 DIABETES MELLITUS WITH HYPERGLYCEMIA (HCC): ICD-10-CM

## 2025-01-23 DIAGNOSIS — M15.0 PRIMARY OSTEOARTHRITIS INVOLVING MULTIPLE JOINTS: ICD-10-CM

## 2025-01-23 DIAGNOSIS — K21.9 GASTROESOPHAGEAL REFLUX DISEASE, UNSPECIFIED WHETHER ESOPHAGITIS PRESENT: ICD-10-CM

## 2025-01-23 DIAGNOSIS — N18.31 STAGE 3A CHRONIC KIDNEY DISEASE (HCC): ICD-10-CM

## 2025-01-23 DIAGNOSIS — I25.5 ISCHEMIC CARDIOMYOPATHY: ICD-10-CM

## 2025-01-23 PROCEDURE — 99215 OFFICE O/P EST HI 40 MIN: CPT | Performed by: NURSE PRACTITIONER

## 2025-01-23 PROCEDURE — 3074F SYST BP LT 130 MM HG: CPT | Performed by: NURSE PRACTITIONER

## 2025-01-23 PROCEDURE — 3078F DIAST BP <80 MM HG: CPT | Performed by: NURSE PRACTITIONER

## 2025-01-23 RX ORDER — PANTOPRAZOLE SODIUM 20 MG/1
20 TABLET, DELAYED RELEASE ORAL DAILY
Qty: 30 TABLET | Refills: 3 | Status: SHIPPED | OUTPATIENT
Start: 2025-01-23

## 2025-01-23 RX ORDER — METOPROLOL SUCCINATE 50 MG/1
50 TABLET, EXTENDED RELEASE ORAL DAILY
Qty: 90 TABLET | Refills: 0 | Status: SHIPPED | OUTPATIENT
Start: 2025-01-23

## 2025-01-23 SDOH — ECONOMIC STABILITY: FOOD INSECURITY: WITHIN THE PAST 12 MONTHS, THE FOOD YOU BOUGHT JUST DIDN'T LAST AND YOU DIDN'T HAVE MONEY TO GET MORE.: NEVER TRUE

## 2025-01-23 SDOH — ECONOMIC STABILITY: FOOD INSECURITY: WITHIN THE PAST 12 MONTHS, YOU WORRIED THAT YOUR FOOD WOULD RUN OUT BEFORE YOU GOT MONEY TO BUY MORE.: NEVER TRUE

## 2025-01-23 ASSESSMENT — PATIENT HEALTH QUESTIONNAIRE - PHQ9
3. TROUBLE FALLING OR STAYING ASLEEP: SEVERAL DAYS
SUM OF ALL RESPONSES TO PHQ QUESTIONS 1-9: 16
1. LITTLE INTEREST OR PLEASURE IN DOING THINGS: NOT AT ALL
SUM OF ALL RESPONSES TO PHQ QUESTIONS 1-9: 16
10. IF YOU CHECKED OFF ANY PROBLEMS, HOW DIFFICULT HAVE THESE PROBLEMS MADE IT FOR YOU TO DO YOUR WORK, TAKE CARE OF THINGS AT HOME, OR GET ALONG WITH OTHER PEOPLE: VERY DIFFICULT
SUM OF ALL RESPONSES TO PHQ QUESTIONS 1-9: 15
SUM OF ALL RESPONSES TO PHQ QUESTIONS 1-9: 16
6. FEELING BAD ABOUT YOURSELF - OR THAT YOU ARE A FAILURE OR HAVE LET YOURSELF OR YOUR FAMILY DOWN: NEARLY EVERY DAY
7. TROUBLE CONCENTRATING ON THINGS, SUCH AS READING THE NEWSPAPER OR WATCHING TELEVISION: SEVERAL DAYS
4. FEELING TIRED OR HAVING LITTLE ENERGY: NEARLY EVERY DAY
5. POOR APPETITE OR OVEREATING: NEARLY EVERY DAY
8. MOVING OR SPEAKING SO SLOWLY THAT OTHER PEOPLE COULD HAVE NOTICED. OR THE OPPOSITE, BEING SO FIGETY OR RESTLESS THAT YOU HAVE BEEN MOVING AROUND A LOT MORE THAN USUAL: SEVERAL DAYS
SUM OF ALL RESPONSES TO PHQ9 QUESTIONS 1 & 2: 3
9. THOUGHTS THAT YOU WOULD BE BETTER OFF DEAD, OR OF HURTING YOURSELF: SEVERAL DAYS
2. FEELING DOWN, DEPRESSED OR HOPELESS: NEARLY EVERY DAY

## 2025-01-23 ASSESSMENT — ENCOUNTER SYMPTOMS
EYE DISCHARGE: 0
CONSTIPATION: 0
ABDOMINAL PAIN: 0
DIARRHEA: 0
SHORTNESS OF BREATH: 0
CHEST TIGHTNESS: 0
COUGH: 0
RHINORRHEA: 0
VOMITING: 0

## 2025-01-23 ASSESSMENT — COLUMBIA-SUICIDE SEVERITY RATING SCALE - C-SSRS
2. HAVE YOU ACTUALLY HAD ANY THOUGHTS OF KILLING YOURSELF?: NO
1. WITHIN THE PAST MONTH, HAVE YOU WISHED YOU WERE DEAD OR WISHED YOU COULD GO TO SLEEP AND NOT WAKE UP?: NO
6. HAVE YOU EVER DONE ANYTHING, STARTED TO DO ANYTHING, OR PREPARED TO DO ANYTHING TO END YOUR LIFE?: NO

## 2025-01-23 NOTE — PROGRESS NOTES
Health Maintenance Due   Topic Date Due    Colorectal Cancer Screen  03/02/2018       
  12/10/2024 8.0 (H) 4.4 - 6.4 % Final     Lab Results   Component Value Date    TSH 0.771 12/10/2024     Lab Results   Component Value Date    CHOL 179 12/10/2024    TRIG 86 12/10/2024    HDL 67 12/10/2024    LDL 95 12/10/2024    VLDL NOT REPORTED 08/10/2021    CHOLHDLRATIO 3.2 03/20/2023     .lastcmp  Lab Results   Component Value Date     12/10/2024    K 4.7 12/10/2024    CL 99 12/10/2024    CO2 26 12/10/2024    BUN 19 12/10/2024    CREATININE 1.3 (H) 12/10/2024    GLUCOSE 163 (H) 12/10/2024    CALCIUM 9.8 12/10/2024    BILITOT 0.7 12/10/2024    ALKPHOS 66 12/10/2024    AST 19 12/10/2024    ALT 19 12/10/2024    LABGLOM 62 12/10/2024    GFRAA 58 (L) 05/24/2022       Lab Results   Component Value Date    WBC 7.5 12/10/2024    HGB 14.0 12/10/2024    HCT 44.1 12/10/2024    MCV 85.1 12/10/2024     12/10/2024    RBC 5.18 12/10/2024    MCH 27.0 12/10/2024    MCHC 31.7 (L) 12/10/2024    RDW 12.5 05/24/2022             The patient (or guardian, if applicable) and other individuals in attendance with the patient were advised that Artificial Intelligence will be utilized during this visit to record, process the conversation to generate a clinical note and to support improvement of the AI technology. The patient (or guardian, if applicable) and other individuals in attendance at the appointment consented to the use of AI, including the recording.      An electronic signature was used to authenticate this note.    --JOSH GUTIERREZ, BLACK - CNP

## 2025-03-11 ENCOUNTER — TELEPHONE (OUTPATIENT)
Dept: FAMILY MEDICINE CLINIC | Age: 65
End: 2025-03-11

## 2025-03-11 NOTE — TELEPHONE ENCOUNTER
MANFREDM with patient to call to set an appt for FMLA ([facility]) documents. These documents are in the purple folder at the  and are to be given to Aliya once the appt is made.      69-year-old female history of sciatica here with complaint of back pain.  Similar in character to prior sciatica flares.  Worse in severity.  Has been taking NSAIDs cyclobenzaprine topical lidocaine patch without relief.  Denies urinary or fecal incontinence, perineal numbness.

## 2025-03-18 ENCOUNTER — OFFICE VISIT (OUTPATIENT)
Dept: FAMILY MEDICINE CLINIC | Age: 65
End: 2025-03-18
Payer: COMMERCIAL

## 2025-03-18 VITALS
DIASTOLIC BLOOD PRESSURE: 84 MMHG | HEART RATE: 111 BPM | HEIGHT: 70 IN | BODY MASS INDEX: 21.3 KG/M2 | SYSTOLIC BLOOD PRESSURE: 132 MMHG | WEIGHT: 148.8 LBS | OXYGEN SATURATION: 97 % | TEMPERATURE: 97.9 F | RESPIRATION RATE: 18 BRPM

## 2025-03-18 DIAGNOSIS — E11.65 UNCONTROLLED TYPE 2 DIABETES MELLITUS WITH HYPERGLYCEMIA (HCC): ICD-10-CM

## 2025-03-18 DIAGNOSIS — M15.0 PRIMARY OSTEOARTHRITIS INVOLVING MULTIPLE JOINTS: ICD-10-CM

## 2025-03-18 DIAGNOSIS — F41.1 GAD (GENERALIZED ANXIETY DISORDER): ICD-10-CM

## 2025-03-18 DIAGNOSIS — F32.1 MODERATE MAJOR DEPRESSION (HCC): ICD-10-CM

## 2025-03-18 DIAGNOSIS — I48.0 PAF (PAROXYSMAL ATRIAL FIBRILLATION) (HCC): Primary | ICD-10-CM

## 2025-03-18 LAB
ALBUMIN SERPL BCG-MCNC: 4.2 G/DL (ref 3.4–4.9)
ALP SERPL-CCNC: 67 U/L (ref 40–129)
ALT SERPL W/O P-5'-P-CCNC: 23 U/L (ref 10–50)
ANION GAP SERPL CALC-SCNC: 12 MEQ/L (ref 8–16)
AST SERPL-CCNC: 25 U/L (ref 10–50)
BASOPHILS ABSOLUTE: 0.1 THOU/MM3 (ref 0–0.1)
BASOPHILS NFR BLD AUTO: 1 %
BILIRUB SERPL-MCNC: 0.6 MG/DL (ref 0.3–1.2)
BUN SERPL-MCNC: 26 MG/DL (ref 8–23)
CALCIUM SERPL-MCNC: 9.7 MG/DL (ref 8.8–10.2)
CHLORIDE SERPL-SCNC: 100 MEQ/L (ref 98–111)
CO2 SERPL-SCNC: 25 MEQ/L (ref 22–29)
CREAT SERPL-MCNC: 1.4 MG/DL (ref 0.7–1.2)
DEPRECATED MEAN GLUCOSE BLD GHB EST-ACNC: 177 MG/DL (ref 70–126)
DEPRECATED RDW RBC AUTO: 41.4 FL (ref 35–45)
EOSINOPHIL NFR BLD AUTO: 5.3 %
EOSINOPHILS ABSOLUTE: 0.4 THOU/MM3 (ref 0–0.4)
ERYTHROCYTE [DISTWIDTH] IN BLOOD BY AUTOMATED COUNT: 13.4 % (ref 11.5–14.5)
GFR SERPL CREATININE-BSD FRML MDRD: 56 ML/MIN/1.73M2
GLUCOSE SERPL-MCNC: 202 MG/DL (ref 74–109)
HBA1C MFR BLD HPLC: 7.9 % (ref 4–6)
HCT VFR BLD AUTO: 45.8 % (ref 42–52)
HGB BLD-MCNC: 14.8 GM/DL (ref 14–18)
IMM GRANULOCYTES # BLD AUTO: 0.02 THOU/MM3 (ref 0–0.07)
IMM GRANULOCYTES NFR BLD AUTO: 0.3 %
LYMPHOCYTES ABSOLUTE: 2 THOU/MM3 (ref 1–4.8)
LYMPHOCYTES NFR BLD AUTO: 30.1 %
MCH RBC QN AUTO: 27.5 PG (ref 26–33)
MCHC RBC AUTO-ENTMCNC: 32.3 GM/DL (ref 32.2–35.5)
MCV RBC AUTO: 85 FL (ref 80–94)
MONOCYTES ABSOLUTE: 0.7 THOU/MM3 (ref 0.4–1.3)
MONOCYTES NFR BLD AUTO: 10.4 %
NEUTROPHILS ABSOLUTE: 3.6 THOU/MM3 (ref 1.8–7.7)
NEUTROPHILS NFR BLD AUTO: 52.9 %
NRBC BLD AUTO-RTO: 0 /100 WBC
PLATELET # BLD AUTO: 233 THOU/MM3 (ref 130–400)
PMV BLD AUTO: 10.6 FL (ref 9.4–12.4)
POTASSIUM SERPL-SCNC: 5 MEQ/L (ref 3.5–5.2)
PROT SERPL-MCNC: 7.1 G/DL (ref 6.4–8.3)
RBC # BLD AUTO: 5.39 MILL/MM3 (ref 4.7–6.1)
SODIUM SERPL-SCNC: 137 MEQ/L (ref 135–145)
TSH SERPL DL<=0.05 MIU/L-ACNC: 0.95 UIU/ML (ref 0.27–4.2)
WBC # BLD AUTO: 6.8 THOU/MM3 (ref 4.8–10.8)

## 2025-03-18 PROCEDURE — 3051F HG A1C>EQUAL 7.0%<8.0%: CPT | Performed by: NURSE PRACTITIONER

## 2025-03-18 PROCEDURE — 99214 OFFICE O/P EST MOD 30 MIN: CPT | Performed by: NURSE PRACTITIONER

## 2025-03-18 ASSESSMENT — PATIENT HEALTH QUESTIONNAIRE - PHQ9
SUM OF ALL RESPONSES TO PHQ QUESTIONS 1-9: 0
1. LITTLE INTEREST OR PLEASURE IN DOING THINGS: NOT AT ALL
SUM OF ALL RESPONSES TO PHQ QUESTIONS 1-9: 0
SUM OF ALL RESPONSES TO PHQ QUESTIONS 1-9: 0
2. FEELING DOWN, DEPRESSED OR HOPELESS: NOT AT ALL
SUM OF ALL RESPONSES TO PHQ QUESTIONS 1-9: 0

## 2025-03-18 ASSESSMENT — ENCOUNTER SYMPTOMS
EYE DISCHARGE: 0
COUGH: 0
CHEST TIGHTNESS: 0
ABDOMINAL PAIN: 0
WHEEZING: 0
CONSTIPATION: 0
SHORTNESS OF BREATH: 0
VOMITING: 0
DIARRHEA: 0

## 2025-03-18 NOTE — PROGRESS NOTES
Health Maintenance Due   Topic Date Due    Pneumococcal 50+ years Vaccine (1 of 2 - PCV) Never done    Colorectal Cancer Screen  03/02/2018    Diabetic foot exam  04/02/2025       
Venipuncture obtained from  right arm. Patient tolerated the procedure without complications or complaints.  
the patient were advised that Artificial Intelligence will be utilized during this visit to record, process the conversation to generate a clinical note and to support improvement of the AI technology. The patient (or guardian, if applicable) and other individuals in attendance at the appointment consented to the use of AI, including the recording.      An electronic signature was used to authenticate this note.    --JOSH GUTIERREZ, APRN - CNP

## 2025-03-20 ENCOUNTER — RESULTS FOLLOW-UP (OUTPATIENT)
Dept: FAMILY MEDICINE CLINIC | Age: 65
End: 2025-03-20

## 2025-05-13 DIAGNOSIS — E11.65 UNCONTROLLED TYPE 2 DIABETES MELLITUS WITH HYPERGLYCEMIA (HCC): ICD-10-CM

## 2025-05-13 RX ORDER — TIRZEPATIDE 5 MG/.5ML
INJECTION, SOLUTION SUBCUTANEOUS
Qty: 4 ML | Refills: 0 | Status: SHIPPED | OUTPATIENT
Start: 2025-05-13

## 2025-07-01 ENCOUNTER — OFFICE VISIT (OUTPATIENT)
Dept: FAMILY MEDICINE CLINIC | Age: 65
End: 2025-07-01

## 2025-07-01 VITALS
OXYGEN SATURATION: 98 % | RESPIRATION RATE: 16 BRPM | DIASTOLIC BLOOD PRESSURE: 78 MMHG | SYSTOLIC BLOOD PRESSURE: 122 MMHG | HEIGHT: 70 IN | BODY MASS INDEX: 19.53 KG/M2 | TEMPERATURE: 97.6 F | HEART RATE: 105 BPM | WEIGHT: 136.4 LBS

## 2025-07-01 DIAGNOSIS — N18.31 STAGE 3A CHRONIC KIDNEY DISEASE (HCC): ICD-10-CM

## 2025-07-01 DIAGNOSIS — E11.65 UNCONTROLLED TYPE 2 DIABETES MELLITUS WITH HYPERGLYCEMIA (HCC): ICD-10-CM

## 2025-07-01 DIAGNOSIS — F41.1 GAD (GENERALIZED ANXIETY DISORDER): ICD-10-CM

## 2025-07-01 DIAGNOSIS — I48.0 PAF (PAROXYSMAL ATRIAL FIBRILLATION) (HCC): ICD-10-CM

## 2025-07-01 DIAGNOSIS — I25.810 CORONARY ARTERY DISEASE INVOLVING OTHER CORONARY ARTERY BYPASS GRAFT WITHOUT ANGINA PECTORIS: ICD-10-CM

## 2025-07-01 DIAGNOSIS — F32.1 MODERATE MAJOR DEPRESSION (HCC): ICD-10-CM

## 2025-07-01 DIAGNOSIS — M15.0 PRIMARY OSTEOARTHRITIS INVOLVING MULTIPLE JOINTS: ICD-10-CM

## 2025-07-01 DIAGNOSIS — E11.21 DIABETIC NEPHROPATHY WITH PROTEINURIA (HCC): ICD-10-CM

## 2025-07-01 DIAGNOSIS — I50.22 CHRONIC SYSTOLIC HEART FAILURE (HCC): ICD-10-CM

## 2025-07-01 DIAGNOSIS — Z12.11 SCREENING FOR COLON CANCER: ICD-10-CM

## 2025-07-01 DIAGNOSIS — I10 ESSENTIAL HYPERTENSION: ICD-10-CM

## 2025-07-01 DIAGNOSIS — E11.65 UNCONTROLLED TYPE 2 DIABETES MELLITUS WITH HYPERGLYCEMIA (HCC): Primary | ICD-10-CM

## 2025-07-01 LAB
ALBUMIN SERPL BCG-MCNC: 4.3 G/DL (ref 3.4–4.9)
ALP SERPL-CCNC: 58 U/L (ref 40–129)
ALT SERPL W/O P-5'-P-CCNC: 11 U/L (ref 10–50)
ANION GAP SERPL CALC-SCNC: 12 MEQ/L (ref 8–16)
AST SERPL-CCNC: 17 U/L (ref 10–50)
BASOPHILS ABSOLUTE: 0.1 THOU/MM3 (ref 0–0.1)
BASOPHILS NFR BLD AUTO: 0.9 %
BILIRUB SERPL-MCNC: 0.7 MG/DL (ref 0.3–1.2)
BUN SERPL-MCNC: 23 MG/DL (ref 8–23)
CALCIUM SERPL-MCNC: 10.3 MG/DL (ref 8.8–10.2)
CHLORIDE SERPL-SCNC: 104 MEQ/L (ref 98–111)
CO2 SERPL-SCNC: 23 MEQ/L (ref 22–29)
CREAT SERPL-MCNC: 1.5 MG/DL (ref 0.7–1.2)
DEPRECATED MEAN GLUCOSE BLD GHB EST-ACNC: 141 MG/DL (ref 70–126)
DEPRECATED RDW RBC AUTO: 42.5 FL (ref 35–45)
EOSINOPHIL NFR BLD AUTO: 4.1 %
EOSINOPHILS ABSOLUTE: 0.3 THOU/MM3 (ref 0–0.4)
ERYTHROCYTE [DISTWIDTH] IN BLOOD BY AUTOMATED COUNT: 13.5 % (ref 11.5–14.5)
GFR SERPL CREATININE-BSD FRML MDRD: 52 ML/MIN/1.73M2
GLUCOSE SERPL-MCNC: 121 MG/DL (ref 74–109)
HBA1C MFR BLD HPLC: 6.7 % (ref 4–6)
HCT VFR BLD AUTO: 48.3 % (ref 42–52)
HGB BLD-MCNC: 15.3 GM/DL (ref 14–18)
IMM GRANULOCYTES # BLD AUTO: 0.01 THOU/MM3 (ref 0–0.07)
IMM GRANULOCYTES NFR BLD AUTO: 0.1 %
LYMPHOCYTES ABSOLUTE: 1.9 THOU/MM3 (ref 1–4.8)
LYMPHOCYTES NFR BLD AUTO: 25.6 %
MCH RBC QN AUTO: 27.3 PG (ref 26–33)
MCHC RBC AUTO-ENTMCNC: 31.7 GM/DL (ref 32.2–35.5)
MCV RBC AUTO: 86.3 FL (ref 80–94)
MONOCYTES ABSOLUTE: 0.6 THOU/MM3 (ref 0.4–1.3)
MONOCYTES NFR BLD AUTO: 7.8 %
NEUTROPHILS ABSOLUTE: 4.7 THOU/MM3 (ref 1.8–7.7)
NEUTROPHILS NFR BLD AUTO: 61.5 %
NRBC BLD AUTO-RTO: 0 /100 WBC
PLATELET # BLD AUTO: 246 THOU/MM3 (ref 130–400)
PMV BLD AUTO: 10.8 FL (ref 9.4–12.4)
POTASSIUM SERPL-SCNC: 4.5 MEQ/L (ref 3.5–5.2)
PROT SERPL-MCNC: 7.1 G/DL (ref 6.4–8.3)
RBC # BLD AUTO: 5.6 MILL/MM3 (ref 4.7–6.1)
SODIUM SERPL-SCNC: 139 MEQ/L (ref 135–145)
TSH SERPL DL<=0.05 MIU/L-ACNC: 1.04 UIU/ML (ref 0.27–4.2)
WBC # BLD AUTO: 7.6 THOU/MM3 (ref 4.8–10.8)

## 2025-07-01 ASSESSMENT — ENCOUNTER SYMPTOMS
EYE DISCHARGE: 0
DIARRHEA: 0
CONSTIPATION: 0
CHEST TIGHTNESS: 0
WHEEZING: 0
SHORTNESS OF BREATH: 0
COUGH: 0
VOMITING: 0
ABDOMINAL PAIN: 0

## 2025-07-01 NOTE — PROGRESS NOTES
collected today to check for proteinuria.  - Thyroid labs, CBC, and comprehensive metabolic panel will be repeated today.    8. History of Stroke.  - Reports feeling wobbly at times.  - Physical therapy is recommended if feeling unsteady on feet.  - Engaged in virtual physical therapy through [employer].  - Encouraged to continue daily stretching and strengthening exercises.    Results  Labs   - A1c: 03/2025, 7.9%  1. Uncontrolled type 2 diabetes mellitus with hyperglycemia (HCC)  -     Albumin/Creatinine Ratio, Urine; Future  -     Hemoglobin A1C; Future  -     TSH reflex to FT4; Future  -     CBC with Auto Differential; Future  -     Comprehensive Metabolic Panel; Future  2. PAF (paroxysmal atrial fibrillation) (HCC)  3. NAVARRO (generalized anxiety disorder)  4. Moderate major depression (HCC)  5. Primary osteoarthritis involving multiple joints  6. Essential hypertension  7. Stage 3a chronic kidney disease (HCC)  8. Coronary artery disease involving other coronary artery bypass graft without angina pectoris  9. Chronic systolic heart failure (HCC)  10. Diabetic nephropathy with proteinuria (HCC)  11. Screening for colon cancer  -     Fecal DNA Colorectal cancer screening (Cologuard)    Return in about 3 months (around 10/1/2025) for DM- get labs today .       Subjective   History of Present Illness  The patient presents for a 3-month follow-up of his chronic issues.    He has a significant health history, including coronary artery disease, congestive heart failure, open heart surgery, stroke, and paroxysmal atrial fibrillation. He is under the care of a cardiologist and urologist. He is on Entresto for his congestive heart failure, a beta blocker, and Eliquis.    He is currently on Mounjaro for diabetes management and has been adhering to his medication regimen. He has been making efforts to maintain a heart-healthy diet and manage his blood sugar levels. However, he reports experiencing side effects from Mounjaro,

## 2025-07-02 ENCOUNTER — RESULTS FOLLOW-UP (OUTPATIENT)
Dept: FAMILY MEDICINE CLINIC | Age: 65
End: 2025-07-02

## (undated) DEVICE — SET AUTOTRNS C175ML BOWL BTM OUTLT RESERVOIRXTRA

## (undated) DEVICE — APPLIER CLP L9.38IN M LIG TI DISP STR RNG HNDL LIGACLP

## (undated) DEVICE — SUTURE NONABSORBABLE MONOFILAMENT 4-0 RB-1 36 IN BLU PROLENE 8557H

## (undated) DEVICE — SUTURE SOFSILK SZ 2 L30IN NONABSORBABLE WHT V-26 L37MM 1/2 CS746

## (undated) DEVICE — KIT BLWR MISTER 5P 15L W/ TBNG SET IRRIG MIST TO IMPROVE

## (undated) DEVICE — DUAL STAGE VENOUS RETURN CANNULA: Brand: EDWARDS LIFESCIENCES DUAL STAGE VENOUS DRAINAGE CANNULA

## (undated) DEVICE — SUTURE PROL SZ 4-0 L36IN NONABSORBABLE BLU L26MM SH 1/2 CIR 8521H

## (undated) DEVICE — CANNULA PERF L5.5IN DIA9FR AORT ROOT AG STD TIP W/ VENT LN

## (undated) DEVICE — DRAINBAG,ANTI-REFLUX TOWER,L/F,2000ML,LL: Brand: MEDLINE

## (undated) DEVICE — CATHETER,FOLEY,3WAY,SILI-ELAST,20FR,30ML: Brand: MEDLINE

## (undated) DEVICE — SURGICAL PROCEDURE PACK OXGNTR ST MARYS

## (undated) DEVICE — THORACIC CATHETER,RIGHT ANGLE: Brand: ARGYLE

## (undated) DEVICE — SYSTEM ENDOSCP VES HARV W/ TOOL CANN SEAL SHT PRT BLNT TIP

## (undated) DEVICE — GLOVE ORANGE PI 7 1/2   MSG9075

## (undated) DEVICE — APPLICATOR MEDICATED 26 CC SOLUTION HI LT ORNG CHLORAPREP

## (undated) DEVICE — ATTACHMENT POSER 360DEG ARTC STBL FOR POS THE APEX OF THE

## (undated) DEVICE — ADHESIVE SKIN CLSR 0.7ML TOP DERMBND ADV

## (undated) DEVICE — CANNULA PERF 15FR L12.5IN RG STPCOCK WIREWOUND BODY

## (undated) DEVICE — LASER FIBER: Brand: MOXY

## (undated) DEVICE — CLIP LIG M TI 6 SIL HNDL FOR OPN AND ENDOSCP SGL APPL

## (undated) DEVICE — BASIC SINGLE BASIN BTC-LF: Brand: MEDLINE INDUSTRIES, INC.

## (undated) DEVICE — Y-TYPE TUR/BLADDER IRRIGATION SET, REGULATING CLAMP

## (undated) DEVICE — SUTURE VCRL + SZ 2-0 L27IN ABSRB CLR CT-1 1/2 CIR TAPERCUT VCP259H

## (undated) DEVICE — SUTURE SILK PERMAHAND PRECUT 6 X 30 IN SZ 1 BLK BRAID A307H

## (undated) DEVICE — SUTURE SZ 7 L18IN NONABSORBABLE SIL CCS L48MM 1/2 CIR STRNM M655G

## (undated) DEVICE — KIT VEN DRNGE VAC ACCSRY PERF VAVD STOCK W/ SPEC TRAP

## (undated) DEVICE — IMPLANTABLE DEVICE
Type: IMPLANTABLE DEVICE | Status: NON-FUNCTIONAL
Removed: 2022-11-07

## (undated) DEVICE — CYSTO PACK: Brand: MEDLINE INDUSTRIES, INC.

## (undated) DEVICE — APPLIER CLP L9.375IN APER 2.1MM CLS L3.8MM 20 SM TI CLP

## (undated) DEVICE — DRAPE SLUSH DISC W44XL66IN ST FOR RND BSIN HUSH SLUSH SYS

## (undated) DEVICE — THORACIC CATHETER,STRAIGHT: Brand: ARGYLE

## (undated) DEVICE — EZ GLIDE AORTIC CANNULA: Brand: EDWARDS LIFESCIENCES EZ GLIDE AORTIC CANNULA

## (undated) DEVICE — CLIP LIG SM TI 6 BLU HNDL FOR OPN AND ENDOSCP SGL APPL

## (undated) DEVICE — SUTURE VCRL + SZ 0 L36IN ABSRB VLT L36MM CT-1 1/2 CIR VCP346H

## (undated) DEVICE — CONNECTOR PERF 3/8X3/8X3/8IN EQL WYE

## (undated) DEVICE — CANNULA PVC RCSP 15FR SLD STYL W/ HNDL OVERALL LEN 11 IN

## (undated) DEVICE — DRAIN SURG SGL COLL PT TB FOR ATS BG OASIS

## (undated) DEVICE — SUTURE PROL 6-0 L24IN NONABSORBABLE BLU L13MM C-1 3/8 CIR M8726

## (undated) DEVICE — HF-RESECTION ELECTRODE PLASMALOOP LOOP, MEDIUM, 24 FR., 12°-30°, ESG TURIS: Brand: OLYMPUS

## (undated) DEVICE — APPLIER LIG CLP M L11IN TI STR RNG HNDL FOR 20 CLP DISP

## (undated) DEVICE — DRESSING GRMCDL 6 12FR D1N CNTR HOLE 4MM ANTMCRBL PRTCTVE DI

## (undated) DEVICE — SHUNT COR 6FR L1.8CM DIA2MM NONPROGRAMMABLE REG TAPR VLV
Type: IMPLANTABLE DEVICE | Status: NON-FUNCTIONAL
Removed: 2022-11-07

## (undated) DEVICE — PACK SUCT CATHETER 14FR OPN WHSTL W NO VLV

## (undated) DEVICE — OPEN HRT CDS LF

## (undated) DEVICE — SPONGE GZ W4XL4IN COT 12 PLY TYP VII WVN C FLD DSGN STERILE

## (undated) DEVICE — SUTURE VCRL + SZ 0 L27IN ABSRB VLT L36MM CT-1 1/2 CIR VCPB260H

## (undated) DEVICE — SUTURE PROL 3-0 36IN NONABSORB BLU 26MM SH 1/2 CIR P8522H

## (undated) DEVICE — GLOVE ORTHO 8   MSG9480

## (undated) DEVICE — LEAD PACE L475MM CHNL A OR V MYOCARDIAL STEROID ELUT SIL

## (undated) DEVICE — SOLUTION IRRIG 3000ML 0.9% SOD CHL USP UROMATIC PLAS CONT

## (undated) DEVICE — SYRINGE MED 10ML LUERLOCK TIP W/O SFTY DISP

## (undated) DEVICE — KIT PROC 1 ICG VI AQ SOLVNT DRP SPY ELITE

## (undated) DEVICE — SUTURE PROL SZ 3-0 L36IN NONABSORBABLE BLU L26MM SH 1/2 CIR 8522H

## (undated) DEVICE — CATHETER IABP 7.5FR 40CC SHTH LAIN DATASCP SYS COMPATIBLE

## (undated) DEVICE — CATHETER IABP 8FR 50CC FBROPT CONN W INSRT KT TWO STATLOK

## (undated) DEVICE — TUBING INSUFFLATION SMK EVAC HI FLO SET PNEUMOCLEAR

## (undated) DEVICE — GOWN,SIRUS,NON REINFRCD,LARGE,SET IN SL: Brand: MEDLINE

## (undated) DEVICE — DECANTER FLD 9IN ST BG FOR ASEP TRNSF OF FLD

## (undated) DEVICE — SUTURE SOFSILK SZ 1 L30IN NABSORBABLE BLK C-17 L39MM 3/8 SS646

## (undated) DEVICE — SUTURE MCRYL SZ 4-0 L27IN ABSRB UD L19MM PS-2 1/2 CIR PRIM Y426H

## (undated) DEVICE — Device: Brand: SUCTION TIP

## (undated) DEVICE — RETRACTOR SURG INSRT SUT HLD OCTOBASE

## (undated) DEVICE — SUTURE PROL 7-0 L24IN NONABSORBABLE BLU L9.3MM CC 3/8 CIR M8704

## (undated) DEVICE — BLANKET THER AD W24XL60IN FAB COVERING SUP SFT ULT THN LTWT

## (undated) DEVICE — PROVE COVER: Brand: UNBRANDED

## (undated) DEVICE — JELLY,LUBE,STERILE,FLIP TOP,TUBE,2-OZ: Brand: MEDLINE

## (undated) DEVICE — FOGARTY - HYDRAGRIP SURGICAL - CLAMP INSERTS: Brand: FOGARTY SOFTJAW

## (undated) DEVICE — APPLICATOR MEDICATED 26 CC SOLUTION CLR STRL CHLORAPREP

## (undated) DEVICE — SUTURE VCRL + SZ 3-0 L27IN ABSRB WHT CT-1 1/2 CIR VCP258H